# Patient Record
Sex: MALE | Race: WHITE | NOT HISPANIC OR LATINO | Employment: OTHER | URBAN - METROPOLITAN AREA
[De-identification: names, ages, dates, MRNs, and addresses within clinical notes are randomized per-mention and may not be internally consistent; named-entity substitution may affect disease eponyms.]

---

## 2017-01-04 ENCOUNTER — PATIENT OUTREACH (OUTPATIENT)
Dept: HEALTH INFORMATION MANAGEMENT | Facility: OTHER | Age: 82
End: 2017-01-04

## 2017-01-04 NOTE — PROGRESS NOTES
Outbound  call for Remote Home Tele-Monitoring COPD    Alerted: /78    HR 50      Contact today with  Jeffrey          02 use per patient: 3/L at night and 5/L with activity during the day continuously via NC         SOB: Patient was SOB when answering the phone.  He had walked to the kitchen and back without 02.  He states                   Only a few steps without 02 cause significant SOB         Cough: Denies         Chest pain: Denies         Headaches: Denies         Swelling: Mild puffiness of feet by end of day but unchanged from normal         Dizziness: Denies         Nausea: Denies         Weakness/Fatigue: Fatigues with moderate activity           Jeffrey  reports that he is compliant with medications and diet.         Interventions/Education:  --Follow up with PCP ever other week.  --Patient remains on Cardiology cancellation list regarding HR--Patient encouraged to call again.   --Education provided regarding use of 02 especially with any activities to minimize stress to heart/lungs/body caused by lack of 02.  --Reviewed COPD action Plan --Yellow zone      Patient verbalized understanding of education/teaching provided

## 2017-01-05 ENCOUNTER — PATIENT OUTREACH (OUTPATIENT)
Dept: HEALTH INFORMATION MANAGEMENT | Facility: OTHER | Age: 82
End: 2017-01-05

## 2017-01-06 NOTE — PROGRESS NOTES
Outbound call for [facility] transmit Error    Spoke to Shahram and he did transmit as usual.      --[employer] ticket placed to troubleshoot problem.

## 2017-01-11 ENCOUNTER — PATIENT OUTREACH (OUTPATIENT)
Dept: HEALTH INFORMATION MANAGEMENT | Facility: OTHER | Age: 82
End: 2017-01-11
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
## 2017-01-12 ENCOUNTER — PATIENT OUTREACH (OUTPATIENT)
Dept: HEALTH INFORMATION MANAGEMENT | Facility: OTHER | Age: 82
End: 2017-01-12

## 2017-01-12 NOTE — PROGRESS NOTES
REMOTE HOME TELE MONITORING FOR COPD  Alerted: /87 and HR 44  Cardiologist has been sent information regarding his intermittent low pulse.  Patient is pending on a cancellation list to be seen.  Reviewed biometrics and these numbers are trending within patients normal ranges.  Refer to last “CC” progress note on 1/11/17

## 2017-01-18 ENCOUNTER — PATIENT OUTREACH (OUTPATIENT)
Dept: HEALTH INFORMATION MANAGEMENT | Facility: OTHER | Age: 82
End: 2017-01-18

## 2017-01-18 NOTE — PROGRESS NOTES
Outbound  call for Remote Home Tele-Monitoring COPD with cardiac medical health issues    Alerted: Pulse 42       Contact today with  Jeffrey          02 use per patient: 3/L at night and 5/L with activity during the day continuously via NC         SOB: Patient reports only SOB with mild exertion         Cough: Denies         Chest pain: Denies         Headaches: Denies         Swelling: mild puffiness present in ankle/feet but remains unchanged         Dizziness: Denies         Nausea: Denies         Weakness/Fatigue: Patient reports fatigues quickly but this is unchanged                    Jeffrey reports that he is compliant with medications and diet.       Interventions/Education:  -- Appt with Dr. Whitehead on 2/10/16 -- will review the lower HR  --Education provided to patient regarding symptoms related to low HR for which he should seek immediate medical care.  --Education provided regarding review of medication/02 to be ensure he has sufficient supply to get through upcoming [place]    Patient verbalized understanding of education/teaching provided

## 2017-01-24 ENCOUNTER — PATIENT OUTREACH (OUTPATIENT)
Dept: HEALTH INFORMATION MANAGEMENT | Facility: OTHER | Age: 82
End: 2017-01-24

## 2017-01-24 NOTE — PROGRESS NOTES
Outbound  call for Remote Home Tele-Monitoring COPD    Alerted: Wt: 199.0 decreased 3.5 lbs in 24 hours                 /103    Unable to reach patient today -- LVM  Requested return call and retest of BP    Interventions/Education:  --Continue to monitor biometrics daily  --Cardiology appt on 2/10/17

## 2017-01-26 ENCOUNTER — PATIENT OUTREACH (OUTPATIENT)
Dept: HEALTH INFORMATION MANAGEMENT | Facility: OTHER | Age: 82
End: 2017-01-26

## 2017-01-27 ENCOUNTER — PATIENT OUTREACH (OUTPATIENT)
Dept: HEALTH INFORMATION MANAGEMENT | Facility: OTHER | Age: 82
End: 2017-01-27

## 2017-01-27 NOTE — PROGRESS NOTES
"Outbound  call for Remote Home Tele-Monitoring COPD    Alerted: O2 = 77%      Unable to reach Jeffrey -- LVM requested retest for contact \"CC\" if noting respiratory issues.  "

## 2017-01-27 NOTE — PROGRESS NOTES
Outbound  call for Remote Home Tele-Monitoring COPD       Biometrics today are within range (green).\      Unable to reach patient- LVM

## 2017-01-30 ENCOUNTER — PATIENT OUTREACH (OUTPATIENT)
Dept: HEALTH INFORMATION MANAGEMENT | Facility: OTHER | Age: 82
End: 2017-01-30

## 2017-01-30 NOTE — PROGRESS NOTES
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                  Patient reports Am /74 and HR 58        Contact today with  Jeffrey          02 use per patient: 3/L at night and 5/L with activity during the day continuously via NC.                     Patient reports that he frequently take off is 02 when moving about the house from room to room. He                     Has 02 set up in several areas of his home. He is aware that his 02 rapidly decreases when he is                     Off his 02.                     SOB: Denies Patient states this at his normal baseline         Cough: Denies         Chest pain: Denies         Headaches: Denies         Swelling: Has noted puffiness in his hands that seems to correlate with periods of increase wt gain.         Dizziness: Denies         Nausea: Denies         Weakness/Fatigue: Patient states at his normal baseline           Jeffrey  reports that he is compliant with medications and diet.          Jeffrey states he is having a good day today and his back pain level is around a 4/10.    Interventions/Education:  --Education provided on maximizing his energy  --Education provided on use of 02 with ambulating  --Cardiology appt on 2/10/17  --Requested patient to add to written logs that he keeps and takes with him to his MD appt. (when he remembers)               Weight and any edema noted, has weights have been very labile.      Patient verbalized understanding of education/teaching provided

## 2017-02-02 ENCOUNTER — PATIENT OUTREACH (OUTPATIENT)
Dept: HEALTH INFORMATION MANAGEMENT | Facility: OTHER | Age: 82
End: 2017-02-02

## 2017-02-02 NOTE — PROGRESS NOTES
"Outbound  call for Remote Home Tele-Monitoring for COPD    Alerted: Transmit Error   Retested                Wt remain up from 188 approximately 1 week to ago to 191.5 today                                             02 over past 3 days slightly decreased to 91 % from normal baseline of 95%    Contact today with  Jeffrey today.     Jeffrey states he is not feeling well.  Has picked up a \"cold\"  Did see PCP on 2/1/17.  CXR was ordered as well                         As an ultrasound of left leg related to a pain in calf he has noted over the last couple of days         02 use per patient:  3/L at night and 5/L with activity during the day continuously via NC         SOB: Slight increase of SOB         Cough: Productive cough with increase of amt and thickness of phlegm that is currently clear.  Runny nose                      That is also clear         Chest pain: Denies         Headaches: patient has had a dull headache over the past few days.         Swelling: He continues to note puffiness in his hands         Dizziness: Denies         Nausea: Denies         Fever:  Patient states he has a low grade fever 99's         Weakness/Fatigue: Increase of fatigue noted over the past few days.           Jeffrey reports that he is compliant with medications and diet.          Jeffrey reports interventions he has taken:   Saw PCP, Had CXR completed, Pending call back for LLE US to                Be scheduled, Increased his nebulizer to 3 X a day. Patient called PCP for CXR results    Interventions/Education:  --\"CC\" contacted Marisol Banerjee's nurse--reviewed recent biometrics changes. Marisol reports no acute process               Noted on CXR--Marisol will contact scheduling about ultrasound.  Marisol will discuss biometric changes                    with PCP for possible early symptoms of exacerbation of COPD with steroid/abx intervention.   --Above information relayed to Jeffrey--Jeffrey will contact scheduling as " "well for LLE ultrasound.  --Education provided for use of his rescue inhaler  --Education provided on s/sx of RED zone from COPD action plan, when to report to ED.  --Reviewed upcoming appt with Cardiology on 2/10/17- patient to discuss extremity edema.  --\"CC\" will send biometrics to Cardiology prior to this appt.     Patient verbalized understanding of education/teaching provided          "

## 2017-02-02 NOTE — PROGRESS NOTES
Received incoming call from Mr. Lizama. Mr. Lizama states that his RHM said transmission error this morning. Advised to retest. If still having difficulty he can call back or call [employer]. Verbalized understanding.    Mr. Lizama states he just recently went to doctor and was diagnosed with pneumonia. Advised to rest and get plenty of fluids. Verbalized understanding.

## 2017-02-03 ENCOUNTER — PATIENT OUTREACH (OUTPATIENT)
Dept: HEALTH INFORMATION MANAGEMENT | Facility: OTHER | Age: 82
End: 2017-02-03

## 2017-02-07 ENCOUNTER — PATIENT OUTREACH (OUTPATIENT)
Dept: HEALTH INFORMATION MANAGEMENT | Facility: OTHER | Age: 82
End: 2017-02-07

## 2017-02-07 NOTE — PROGRESS NOTES
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     Patient does report that he still has tenderness in his left calf--awaiting LE ultrasound ordered by PCP      Interventions/Education:  --Education provided on blood clots/PE's --Patient has a past history of PE.  Patient able to teach back        On symptoms and when to seek urgent medical care.  --Education provided on use of steroids/abx.  --Patient has \"CC\" number to all for concerns      Patient verbalized understanding of education/teaching provided          "

## 2017-02-07 NOTE — PROGRESS NOTES
"Outbound  call for Remote Home Tele-Monitoring COPD    Alerted: /81 and HR 42    Biometrics are within range patient normal range   Unable to reach patient today - LVM requested return call.    Interventions/Education:  --\"CC\"       Patient verbalized understanding of education/teaching provided          "

## 2017-02-08 ENCOUNTER — PATIENT OUTREACH (OUTPATIENT)
Dept: HEALTH INFORMATION MANAGEMENT | Facility: OTHER | Age: 82
End: 2017-02-08
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
## 2017-02-13 ENCOUNTER — PATIENT OUTREACH (OUTPATIENT)
Dept: HEALTH INFORMATION MANAGEMENT | Facility: OTHER | Age: 82
End: 2017-02-13
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
## 2017-02-16 ENCOUNTER — PATIENT OUTREACH (OUTPATIENT)
Dept: HEALTH INFORMATION MANAGEMENT | Facility: OTHER | Age: 82
End: 2017-02-16
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
## 2017-02-27 ENCOUNTER — PATIENT OUTREACH (OUTPATIENT)
Dept: HEALTH INFORMATION MANAGEMENT | Facility: OTHER | Age: 82
End: 2017-02-27
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
## 2017-03-03 ENCOUNTER — PATIENT OUTREACH (OUTPATIENT)
Dept: HEALTH INFORMATION MANAGEMENT | Facility: OTHER | Age: 82
End: 2017-03-03

## 2017-03-03 NOTE — PROGRESS NOTES
Outbound  call for Remote Home Tele-Monitoring COPD    Alerted: Transmit Error  Verbally patient states all his biometrics were within a good range with 02 at 94%                                         Patient reports that the 2 low 02 readings over the past couple of days were related                                         To him doing a chore without his 02 on and is 02 drops very quickly.    Contact today with  Jeffrey          02 use per patient:  3/L at night and 5/L with activity during the day continuously via NC         SOB: Patient reports to be at his normal baseline         Cough: Denies         Chest pain: Denies           Swelling: Remains stable no significant changes         Dizziness: Denies         Weakness/Fatigue: Patient reports to be as this normal level                  Jeffrey reports that he is compliant with medications and diet.     Interventions/Education:  --Education provided regarding maximizing his energy with use of 02 during activities and seeking ways to work around              The need to take his 02 off for activities.  Reviewed the potential risk and increase stress on heart/lungs with              Low 02 saturation and the increase recovery time once 02 is low.    Patient verbalized understanding of education/teaching provided

## 2017-03-08 ENCOUNTER — PATIENT OUTREACH (OUTPATIENT)
Dept: HEALTH INFORMATION MANAGEMENT | Facility: OTHER | Age: 82
End: 2017-03-08

## 2017-03-09 NOTE — PROGRESS NOTES
"Outbound  call for Remote Home Tele-Monitoring COPD  (with third degree AV block/SSS/pacemaker/HPN)    Alerted:   02 84% Said he was rushing to get to the tele-monitoring equipment as it had alarmed                              Retested 1 hours later and was at 98%    Contact today with  Jeffrey          02 use per patient:  3/L at night and 5/L with activity during the day continuously via NC         SOB: Patient reports his usual SOB with minimal activity but at his normal baseline         Cough: Patient reports that this has completely resolved         Chest pain: Denies         Headaches: Denies         Swelling: Patient states at his normal baseline with mild to moderate puffiness of feet/ankles by end of day         Dizziness: Denies         Weakness/Fatigue: Reports at his normal baseline          Jeffrey reports that he is having a bad day related to his back pain 8/10.  He does manage self manage with              Medications and follow up with his MD's                 Jeffrey reports that he is compliant with medications and diet.          Ananth reports that he had a follow up visit with his PCP and was told that his chest now sounded clear.                  Reviewed with Jeffrey the that \"CC\" will now focus mainly on his COPD=02 / HR readings. He can continue to              Use the scale as he does not have one, but that \"CC\" will not be receiving the information and that he will             Need to self reports issues to his PCP/Cardiologist.  He will start taking his BP with his own home cuff.    Interventions/Education:  --Education provided to Jeffrey regarding frequency of weight/BP self monitoring/Keeping an on going log for his            Physicians and self reporting issues to PCP/Cardiologist for interventions as needed  --Education provided regarding alarm with tele-monitoring equipment/not to rush to get to the equipment/equipment            Set to alarm and will alarm a total of 3 times 5 " minutes apart.  Also if he misses those times--reviewed manually            Turning equipment for monitoring and transmission of biometrics.  --BP/weight turned off on [employer]. Patient aware and in agreement with changes.                  Patient verbalized understanding of education/teaching provided

## 2017-03-10 ENCOUNTER — PATIENT OUTREACH (OUTPATIENT)
Dept: HEALTH INFORMATION MANAGEMENT | Facility: OTHER | Age: 82
End: 2017-03-10

## 2017-03-11 NOTE — PROGRESS NOTES
Outbound  call for Remote Home Tele-Monitoring COPD    Alerted: Transmit Error      Unable to contact patient today. LVM  Requested return call.

## 2017-03-13 ENCOUNTER — PATIENT OUTREACH (OUTPATIENT)
Dept: HEALTH INFORMATION MANAGEMENT | Facility: OTHER | Age: 82
End: 2017-03-13

## 2017-03-13 NOTE — PROGRESS NOTES
Outbound  call for Remote Home Tele-Monitoring COPD    Alerted: Empty Packet patient has not been able to transmit for 3 days                Patient reports that his 02 is at 97% today    Contact today with  Jeffrey          02 use per patient:  3/L at night and 5/L with activity during the day continuously via NC         SOB: Patient reports he is at his normal baseline         Cough: Denies         Chest pain: Denies         Headaches: Denies         Swelling: patient reports he is at his normal baseline         Dizziness: Denies         Weakness/Fatigue: Pt. Reports at normal baseline           Jeffrey reports that he is compliant with medications and diet.          Jeffrey reports that he awoke this AM with a very stuffy nose.  The nasal drainage is clear.  He otherwise                      Does not feel ill.    Interventions/Education:  --Education reviewed with patient on COPD action plan/yellow zone.  --Education provided regarding nasal drainage--color changes--patient advised to been seen asap by PCP  --[employer] ticket placed to troubleshoot transmission error.      Patient verbalized understanding of education/teaching provided

## 2017-03-17 ENCOUNTER — PATIENT OUTREACH (OUTPATIENT)
Dept: HEALTH INFORMATION MANAGEMENT | Facility: OTHER | Age: 82
End: 2017-03-17
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
## 2017-03-24 ENCOUNTER — PATIENT OUTREACH (OUTPATIENT)
Dept: HEALTH INFORMATION MANAGEMENT | Facility: OTHER | Age: 82
End: 2017-03-24

## 2017-03-24 NOTE — PROGRESS NOTES
Telephone contact with Mr. Lizama for RHM.    Mr. Lizama received new tablet. Diazcarissa got it up and running with him today. VS stable.    Mr. Lizama has no questions at this time. Educated Mr. Lizama this CCRN will be out of town next week, but a team member will be monitoring and calling him as needed. He may leave  on this writers  and someone will return call if needed. Verbalized understanding.    Plan: will f/u in 2 weeks.

## 2017-03-30 ENCOUNTER — PATIENT OUTREACH (OUTPATIENT)
Dept: HEALTH INFORMATION MANAGEMENT | Facility: OTHER | Age: 82
End: 2017-03-30

## 2017-03-30 NOTE — PROGRESS NOTES
Care Coordination:    RHM Alert    Pt states he had his O2 off because he dropped his coffee this AM and was bent over cleaning it up.    Pt states he has already had a cough x 1 week. He denies SOB. He states his symptoms are improving.    Plan:    Continue to monitor RHM

## 2017-04-03 ENCOUNTER — PATIENT OUTREACH (OUTPATIENT)
Dept: HEALTH INFORMATION MANAGEMENT | Facility: OTHER | Age: 82
End: 2017-04-03

## 2017-04-03 NOTE — PROGRESS NOTES
Received retest on RHM. O2 sat 58%  LVM to return call. If no return call will call tomorrow, 4/4/17.

## 2017-04-03 NOTE — PROGRESS NOTES
Telephone contact with Jeffrey for VS on RHM. O2 sat 81% on RHM this AM.    Jeffrey denies any new SOB and states his cough is much better. States he feels pretty well today.     Requested retest.    Jeffrey states he does have an appt in 2 weeks with PCP.

## 2017-04-04 ENCOUNTER — PATIENT OUTREACH (OUTPATIENT)
Dept: HEALTH INFORMATION MANAGEMENT | Facility: OTHER | Age: 82
End: 2017-04-04

## 2017-04-04 NOTE — PROGRESS NOTES
Spoke with Jeffrey to review the following:    Completed COPD biometrics within parameters x 30 days.    Jeffrey reports that he is compliant with medications and diet.     Jeffrey is able to identify yellow and red symptoms and the necessary interventions and provided Teach Back on causes; symptoms; home care interventions; when to seek medical care versus immediate medical care for COPD management.    Jeffrey is able to give examples interventions of maximizing his energy.    Graduation Plan: Transition to a lower level monitoring with self-reporting to Dr. Banerjee.    Jeffrey is in agreement with the transition to self-reporting initiation of the protocols &/or support regarding symptom management by contacting Dr. Banerjee.    Confirmed that Jeffrey has the Care Coordinator's  contact information:  [place] [phone] and Dr Banerjee contact number.    Provided Jeffrey with the [facility] Nurse Healthline number:  [phone].  An RN is available 24-hours day, 7 days a week to answer medical and health questions you might have about your symptoms, medications, allergies or other conditions.    ILEANA notified Randy to contact the patient to provide instructions on returning the equipment used for the COPD Amb In Home Remote Tele-monitoring program.

## 2017-04-10 ENCOUNTER — TELEPHONE (OUTPATIENT)
Dept: CARDIOLOGY | Facility: MEDICAL CENTER | Age: 82
End: 2017-04-10

## 2017-04-10 DIAGNOSIS — I10 ESSENTIAL HYPERTENSION, BENIGN: ICD-10-CM

## 2017-04-10 RX ORDER — TRIAMTERENE AND HYDROCHLOROTHIAZIDE 37.5; 25 MG/1; MG/1
1 CAPSULE ORAL EVERY MORNING
Qty: 30 CAP | Refills: 3 | Status: SHIPPED | OUTPATIENT
Start: 2017-04-10 | End: 2017-08-23 | Stop reason: SDUPTHER

## 2017-04-11 ENCOUNTER — PATIENT OUTREACH (OUTPATIENT)
Dept: HEALTH INFORMATION MANAGEMENT | Facility: OTHER | Age: 82
End: 2017-04-11

## 2017-04-11 NOTE — TELEPHONE ENCOUNTER
S/w SW about pts hypertension. Per SW: pt is to start Diazide, at standard dose, and get BMP in 1 week. Called pt and notified. He agrees to get lab work on Monday. Orders placed. Pt gets lab work done at [facility]. [facility] lab work to: [phone].      -----------------------------------------------------------------------------------------------------------------------------------------------------------------------------------------------------------------------------------------------------------------------------------------------  S/w pt about his blood pressures. He seems anxious but denies any significant events or added stress recently. He also denies any diet changes or added salt. Pt reports today his blood pressure has been lower than yesterday. BP today 150's/90's. Pt only takes lisinopril 10mg Q evenings for his blood pressure. Pt denies any headaches, CP or shortness of breath. He says hes just worried about his blood pressure. Advised pt to go to ER if his blood pressure.

## 2017-04-11 NOTE — PROGRESS NOTES
Telephone contact with Jeffrey regarding starting testing again on RHM. He states Randy has contacted him and is sending out a box, but his BP was high and Dr. Whitehead told him to start doing it again. Educated eJffrey that we will still complete his COPD monitoring and can start a new program of HTN with the [facility] team. Jeffrey states he would like to do that. Referral made to PAULINE Benitez Pharmacist.

## 2017-04-11 NOTE — TELEPHONE ENCOUNTER
"----- Message from Lala Keller sent at 4/10/2017  2:25 PM PDT -----  Regarding: b/p running as high as 200/110 for 3 days  Contact: [phone]  JARET/ellen    Pt calling to report for last 3 plus days he has been having b/p issues.  Pt took b/p at 2:20pm today, it was 153/95, heart rate 74; it's been as high as 200/110, last evening, running 180's-190's/ with heart rate of mid 70's overall.  Pt is very concerned & hopes to hear from you asap.   Pt feels \"a little bit beat up\".   Pls call pt at [phone].    "

## 2017-04-14 ENCOUNTER — TELEPHONE (OUTPATIENT)
Dept: HEALTH INFORMATION MANAGEMENT | Facility: OTHER | Age: 82
End: 2017-04-14

## 2017-04-14 DIAGNOSIS — I10 ESSENTIAL HYPERTENSION: ICD-10-CM

## 2017-04-14 NOTE — TELEPHONE ENCOUNTER
Dear Dr. Maloney     I would like to make you aware that your patient's HTN will no longer be monitored by the Care Coordination RN.  If you feel he would benefit from the pharmacist managed HTN service, please consider signing the attached order.  Otherwise there will not be any further BP readings transmitted through remote home monitoring.  I have included the patients most recent BP readings.  Feel free to contact me with any questions.         Thank you,   Brittny Michelle, PHARMD  [facility] Clinical Pharmacist  [address] [place] [id number]  [phone]

## 2017-04-19 ENCOUNTER — PATIENT OUTREACH (OUTPATIENT)
Dept: HEALTH INFORMATION MANAGEMENT | Facility: OTHER | Age: 82
End: 2017-04-19

## 2017-04-19 NOTE — PROGRESS NOTES
Outbound call to Jeffrey. Patient is not enrolled in pharmacist-managed HTN protocol. Blood pressures will no longer be monitored through RHM. Patient will send back his RHM equipment to Randy. Jeffrey will maintain a BP log using his own home monitor and will follow-up with cardiology for BP management.     CC RN (Link) to follow-up with Randy for equipment pick-up.      Brittny Michelle, PHARMD

## 2017-04-26 ENCOUNTER — PATIENT OUTREACH (OUTPATIENT)
Dept: HEALTH INFORMATION MANAGEMENT | Facility: OTHER | Age: 82
End: 2017-04-26

## 2017-04-28 ENCOUNTER — TELEPHONE (OUTPATIENT)
Dept: CARDIOLOGY | Facility: MEDICAL CENTER | Age: 82
End: 2017-04-28

## 2017-04-28 DIAGNOSIS — I10 ESSENTIAL HYPERTENSION, BENIGN: ICD-10-CM

## 2017-04-28 LAB — INR PPP: 3.6 (ref 2–3.5)

## 2017-04-29 NOTE — TELEPHONE ENCOUNTER
Pt started Diazide 2 weeks ago and was told by SW to get a BMP in 1 week after starting.     To SW please see BMP results in media- sodium is low

## 2017-05-01 ENCOUNTER — ANTICOAGULATION MONITORING (OUTPATIENT)
Dept: VASCULAR LAB | Facility: MEDICAL CENTER | Age: 82
End: 2017-05-01

## 2017-05-01 DIAGNOSIS — Z79.01 ANTICOAGULANT LONG-TERM USE: ICD-10-CM

## 2017-05-01 DIAGNOSIS — Z86.711 HISTORY OF PULMONARY EMBOLISM: ICD-10-CM

## 2017-05-01 NOTE — PROGRESS NOTES
Anticoagulation Summary as of 5/1/2017     INR goal 2.0-3.0   Selected INR 3.6! (4/28/2017)   Maintenance plan 2.5 mg (5 mg x 0.5) on Mon, Wed; 5 mg (5 mg x 1) all other days   Weekly total 30 mg   Plan last modified Miguel Mojica, PHARMD (3/14/2016)   Next INR check 5/15/2017   Target end date Indefinite    Indications   Anticoagulant long-term use [Z79.01]  Chronic anticoagulation (Resolved) [Z79.01]  History of pulmonary embolism [Z86.711]         Anticoagulation Episode Summary     INR check location Outside Lab    Preferred lab     Send INR reminders to     Comments DUKE 1       Anticoagulation Care Providers     Provider Role Specialty Phone number    Ruthann Sierra M.D. Referring Family Medicine [phone]    Miguel Mojica Responsible          Anticoagulation Patient Findings    Spoke with pt on the phone. Pt is supratherapeutic today. Denies any changes in medications or diet. Patient denies any s/sx of bleeding or clotting. Will HOLD tonight's dose then continue dosing as outlined in calendar. Will follow-up with pt in 2 weeks.    Elise BANUELOS

## 2017-05-03 ENCOUNTER — PATIENT OUTREACH (OUTPATIENT)
Dept: HEALTH INFORMATION MANAGEMENT | Facility: OTHER | Age: 82
End: 2017-05-03

## 2017-05-03 NOTE — PROGRESS NOTES
Telephone contact with Jeffrey for rural care coordination.    Jeffrey states he is doing well. No difficulties at this time.    Jeffrey states [employer] equipment went out yesterday. He will start monitoring BP and Pulse ox and logging to show PCP.    No questions or concerns at this time. Plan to follow up with Jeffrey in approx two weeks; in the interim, Jeffrey is able to call me with questions or concerns.

## 2017-05-04 DIAGNOSIS — E87.1 HYPONATREMIA: ICD-10-CM

## 2017-05-04 DIAGNOSIS — I10 ESSENTIAL HYPERTENSION, BENIGN: ICD-10-CM

## 2017-05-04 NOTE — TELEPHONE ENCOUNTER
"Per SW, \"OK to have repeat BMP\" order placed and pt notified. Pt requesting lab slip to be faxed to [facility] in [place], [place]. #[phone]. Pt to get lab done tomorrow.   "

## 2017-05-15 ENCOUNTER — TELEPHONE (OUTPATIENT)
Dept: CARDIOLOGY | Facility: MEDICAL CENTER | Age: 82
End: 2017-05-15

## 2017-05-15 DIAGNOSIS — I10 ESSENTIAL HYPERTENSION, BENIGN: ICD-10-CM

## 2017-05-15 NOTE — TELEPHONE ENCOUNTER
Received surgery clearance from [facility] requesting pt hold coumadin for 5 days prior to procedure prior to trial spinal cord stimulator implant with Dr. Sarabia. Procedure to be scheduled once cleared.     To JARET. Clearance placed in SW's folder

## 2017-05-15 NOTE — Clinical Note
PROCEDURE/SURGERY CLEARANCE FORM      Encounter Date: 5/15/2017    Patient: Jeffrey Lizama  YOB: 1932    CARDIOLOGIST:  Gene Maloney MD   REFERRING DOCTOR:  Dr. Sarabia    PATIENT HAS PPM. Yes      The above patient can hold warfarin in regards to his atrial fibrillation but will have to check with whoever is managing his DVT and 9 pulmonary emboli about whether bridging is required. He also has advanced lung disease and need a pulmonary clearance of some sort.     Comments: Patient states he has an appointment with his PCP, Dr. Banerjee, next week who monitors his lung issues.                                                      MD Signature   Gene Maloney MD

## 2017-05-16 NOTE — TELEPHONE ENCOUNTER
He can hold it for his atrial fibrillation but they will have to check with whoever is managing his DVT and 9 pulmonary emboli  He also has advanced lung disease and need a pulmonary clearance

## 2017-05-17 ENCOUNTER — TELEPHONE (OUTPATIENT)
Dept: VASCULAR LAB | Facility: MEDICAL CENTER | Age: 82
End: 2017-05-17

## 2017-05-17 NOTE — TELEPHONE ENCOUNTER
[facility]    Left message with pt requesting a call back to discuss anticoagulation for upcoming procedure.    Huang Sotelo, PHARMD

## 2017-05-18 ENCOUNTER — TELEPHONE (OUTPATIENT)
Dept: VASCULAR LAB | Facility: MEDICAL CENTER | Age: 82
End: 2017-05-18

## 2017-05-18 RX ORDER — DILTIAZEM HYDROCHLORIDE 120 MG/1
120 CAPSULE, COATED, EXTENDED RELEASE ORAL DAILY
Qty: 30 CAP | Refills: 5 | Status: SHIPPED | OUTPATIENT
Start: 2017-05-18 | End: 2017-08-23 | Stop reason: SDUPTHER

## 2017-05-18 NOTE — TELEPHONE ENCOUNTER
Called pt to discuss BP and clearance.     Regarding the surgery clearance: letter composed per JARET to the [facility] clinic. Discussed SW's recommendations with the pt. He understands these clearances are recommended by JARET and has already made an appt with his PCP Dr. Banerjee next week.  Letter faxed to [facility] at [phone].     Regarding blood pressure pt reports his SBP remains in the 140's-150's the last 1-2 weeks despite the addition of Diazide. He is concerned about this. He is still urinating well with the diuretic and says he eats a low salt diet and as not had any additional stress lately.     To FK ADD.     Pt is concerned about b/p  Received: Today       Raisa Han R.N.                     JARET/Thomas     Pt is concerned about his b/p and would please like a call back at [phone].

## 2017-05-18 NOTE — TELEPHONE ENCOUNTER
[facility]    Spoke with pt concerning upcoming spinal cord stimulator.  He has been cleared by cardiology but not for his recurrent PE's.  Pt states he has been hospitalized at least 8 times due to PE.  Strongly suggest pt should use enoxaparin for bridging.  He states that his PCP is providing enoxaparin and instructions for bridging.  Instructed pt to follow PCP suggestions and to contact clinic if he needs any additional help.      Also reminded pt he is due for his next INR.    Huang Sotelo, PHARMD

## 2017-05-18 NOTE — TELEPHONE ENCOUNTER
FK recommends starting Diltiaziem 120mg Qday as long as DBP >60. Called pt and discussed. Pt states his DBP stays consistently at about 85. Pt agrees to try new BP drug and requests rx to [facility] in [place]. [place] pharmacy. Pt reminded to call office if SBP continues to be above 140.  Pt agrees.

## 2017-05-23 ENCOUNTER — PATIENT OUTREACH (OUTPATIENT)
Dept: HEALTH INFORMATION MANAGEMENT | Facility: OTHER | Age: 82
End: 2017-05-23

## 2017-05-23 NOTE — PROGRESS NOTES
Telephone contact with Jeffrey for rural care coordination.    Jeffrey states he is doing well. He has been in contact with [facility] regarding blood pressures.    Jeffrey has no other questions or concerns. Explained will be signing off case now as Jeffrey has meet goals for rural care coordination program. Verbalized understanding. Educated to f/u with PCP for medical care. Verbalized understanding.

## 2017-06-12 ENCOUNTER — ANTICOAGULATION MONITORING (OUTPATIENT)
Dept: VASCULAR LAB | Facility: MEDICAL CENTER | Age: 82
End: 2017-06-12

## 2017-06-12 DIAGNOSIS — Z86.711 HISTORY OF PULMONARY EMBOLISM: ICD-10-CM

## 2017-06-12 DIAGNOSIS — Z79.01 ANTICOAGULANT LONG-TERM USE: ICD-10-CM

## 2017-06-12 NOTE — PROGRESS NOTES
Anticoagulation clinic    Reminder voice message for patient regarding getting INR done ASAP for anticoagulation clinic.       Miguel Mojica, PHARMD

## 2017-06-29 ENCOUNTER — ANTICOAGULATION MONITORING (OUTPATIENT)
Dept: VASCULAR LAB | Facility: MEDICAL CENTER | Age: 82
End: 2017-06-29

## 2017-06-29 DIAGNOSIS — Z79.01 ANTICOAGULANT LONG-TERM USE: ICD-10-CM

## 2017-06-29 DIAGNOSIS — Z86.711 HISTORY OF PULMONARY EMBOLISM: ICD-10-CM

## 2017-07-27 ENCOUNTER — ANTICOAGULATION MONITORING (OUTPATIENT)
Dept: VASCULAR LAB | Facility: MEDICAL CENTER | Age: 82
End: 2017-07-27

## 2017-07-27 DIAGNOSIS — Z86.711 HISTORY OF PULMONARY EMBOLISM: ICD-10-CM

## 2017-07-27 DIAGNOSIS — Z79.01 ANTICOAGULANT LONG-TERM USE: ICD-10-CM

## 2017-08-08 ENCOUNTER — ANTICOAGULATION MONITORING (OUTPATIENT)
Dept: VASCULAR LAB | Facility: MEDICAL CENTER | Age: 82
End: 2017-08-08

## 2017-08-08 DIAGNOSIS — Z86.711 HISTORY OF PULMONARY EMBOLISM: ICD-10-CM

## 2017-08-08 DIAGNOSIS — Z79.01 ANTICOAGULANT LONG-TERM USE: ICD-10-CM

## 2017-08-15 ENCOUNTER — ANTICOAGULATION MONITORING (OUTPATIENT)
Dept: VASCULAR LAB | Facility: MEDICAL CENTER | Age: 82
End: 2017-08-15

## 2017-08-15 DIAGNOSIS — Z86.711 HISTORY OF PULMONARY EMBOLISM: ICD-10-CM

## 2017-08-15 DIAGNOSIS — Z79.01 ANTICOAGULANT LONG-TERM USE: ICD-10-CM

## 2017-08-23 ENCOUNTER — OFFICE VISIT (OUTPATIENT)
Dept: CARDIOLOGY | Facility: MEDICAL CENTER | Age: 82
End: 2017-08-23
Payer: MEDICARE

## 2017-08-23 ENCOUNTER — NON-PROVIDER VISIT (OUTPATIENT)
Dept: CARDIOLOGY | Facility: MEDICAL CENTER | Age: 82
End: 2017-08-23
Payer: MEDICARE

## 2017-08-23 VITALS
HEART RATE: 78 BPM | BODY MASS INDEX: 32.99 KG/M2 | OXYGEN SATURATION: 98 % | DIASTOLIC BLOOD PRESSURE: 70 MMHG | SYSTOLIC BLOOD PRESSURE: 122 MMHG | WEIGHT: 198 LBS | HEIGHT: 65 IN

## 2017-08-23 DIAGNOSIS — I10 ESSENTIAL HYPERTENSION, BENIGN: ICD-10-CM

## 2017-08-23 DIAGNOSIS — I48.0 PAF (PAROXYSMAL ATRIAL FIBRILLATION) (HCC): ICD-10-CM

## 2017-08-23 DIAGNOSIS — Z79.01 ANTICOAGULANT LONG-TERM USE: ICD-10-CM

## 2017-08-23 DIAGNOSIS — Z86.711 HISTORY OF PULMONARY EMBOLISM: ICD-10-CM

## 2017-08-23 DIAGNOSIS — Z95.0 CARDIAC PACEMAKER: ICD-10-CM

## 2017-08-23 PROCEDURE — 99214 OFFICE O/P EST MOD 30 MIN: CPT | Performed by: NURSE PRACTITIONER

## 2017-08-23 PROCEDURE — 93280 PM DEVICE PROGR EVAL DUAL: CPT | Performed by: INTERNAL MEDICINE

## 2017-08-23 RX ORDER — TRIAMTERENE AND HYDROCHLOROTHIAZIDE 37.5; 25 MG/1; MG/1
1 CAPSULE ORAL EVERY MORNING
Qty: 90 CAP | Refills: 3 | Status: ON HOLD | OUTPATIENT
Start: 2017-08-23 | End: 2019-02-08

## 2017-08-23 RX ORDER — LISINOPRIL 30 MG/1
30 TABLET ORAL DAILY
Qty: 90 TAB | Refills: 3 | Status: ON HOLD | OUTPATIENT
Start: 2017-08-23 | End: 2018-06-20

## 2017-08-23 RX ORDER — DILTIAZEM HYDROCHLORIDE 120 MG/1
120 CAPSULE, COATED, EXTENDED RELEASE ORAL DAILY
Qty: 90 CAP | Refills: 3 | Status: ON HOLD | OUTPATIENT
Start: 2017-08-23 | End: 2019-02-08

## 2017-08-23 RX ORDER — SOTALOL HYDROCHLORIDE 80 MG/1
40 TABLET ORAL 2 TIMES DAILY
Qty: 90 TAB | Refills: 3 | Status: SHIPPED | OUTPATIENT
Start: 2017-08-23 | End: 2018-09-12 | Stop reason: SDUPTHER

## 2017-08-23 ASSESSMENT — ENCOUNTER SYMPTOMS
ORTHOPNEA: 0
COUGH: 0
CLAUDICATION: 0
BACK PAIN: 1
PALPITATIONS: 0
MYALGIAS: 0
SHORTNESS OF BREATH: 1
WEAKNESS: 0
ABDOMINAL PAIN: 0
DIZZINESS: 0
PND: 0

## 2017-08-23 NOTE — Clinical Note
[facility]   [address] [place] [address]  ROL[place]O Chiu [address]  Phone: [phone]  Fax: [phone]              Jeffrey Lizama  12/23/1932    Encounter Date: 8/23/2017    DOMINIQUE Campbell          PROGRESS NOTE:  Subjective:   Jeffrey Lizama is a 84 y.o. male who presents today to follow up on paroxysmal atrial fibrillation. He has a history of bradycardia, pacemaker, COPD and history of multiple pulmonary emboli.    He states he has been in his usual state of health. His blood pressure has been going up and down per his report. He says it is as low as 85/55 and occasionally as high as 160-170 systolic. The higher blood pressures tend to be in the evening. He is checking his blood pressure with an arm cuff. He is taking most of his blood pressure medication in the morning.    He continues to have shortness of breath from COPD which is due to occupational exposure. He was never a smoker. He is now using his oxygen continuously. He states his breathing is stable.    He denies any chest tightness, heaviness or pressure. No palpitations. No ankle edema.    Past Medical History   Diagnosis Date   • Pituitary adenoma (CMS-HCC) 1995     hypophysectomy   • 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CMS-HCC) 1995   • Phlebitis      chronic / recurrent   • Hypertension    • ASTHMA    • S/P insertion of IVC (inferior vena caval) filter    • DJD (degenerative joint disease)    • Diverticulitis    • Asbestos exposure    • COPD (chronic obstructive pulmonary disease) (CMS-HCC)    • Pituitary adenoma (CMS-HCC) 1995     hypophysectomy   • Bradycardia    • thyroidectomy 2005     benign   • S/P parathyroidectomy    • Cardiac pacemaker 04 2010   • Knee arthroplasty 2002     right   • S/P parathyroidectomy 2005   • Unspecified urinary incontinence    • Arrhythmia    • Heart burn    • Hiatus hernia syndrome    • Urinary bladder disorder    • Bronchitis    • EMPHYSEMA    • Breath shortness    •  "Arthritis      hand, elbow   • BPH (benign prostatic hypertrophy) 4/1/2010   • Obstructive hypertrophic cardiomyopathy (CMS-HCC) 8/4/2011   • Paroxysmal atrial fibrillation (CMS-HCC) 8/29/2011   • Benign neoplasm of pituitary gland and craniopharyngeal duct (pouch) (CMS-HCC) 4/1/2010   • SSS (sick sinus syndrome) (CMS-HCC) 8/29/2011   • Third degree AV block (CMS-HCC) 8/29/2011   • Unspecified hemorrhagic conditions    • Indigestion    • Unspecified disorder of thyroid    • Anesthesia      \"heart stopped\"   • PE (pulmonary embolism)      IVC filter, states PE clots 8 times   • Sleep apnea      no cpap machine   • PAF (paroxysmal atrial fibrillation) (CMS-HCC)    • Back pain    • Cataract    • Glaucoma    • Heart murmur    • Rheumatic fever    • Pneumonia      Past Surgical History   Procedure Laterality Date   • Other orthopedic surgery       knee surgery left knee x 2   • Cholecystectomy     • Pr total knee arthroplasty       left   • Other       pituitary tumor removed   • Cervical disk and fusion anterior     • Pr revise ulnar nerve at wrist     • Pr total knee arthroplasty       right   • Cataract extraction with iol       bilateral    • Thyroidectomy     • Pacemaker insertion     • Carpal tunnel endoscopic  9/30/2011     Performed by RONALD ENNIS at SURGERY SAME DAY [facility] ORS   • Knee revision total  6/20/2013     Performed by Ortega Vega M.D. at SURGERY [facility] ORS   • Cholecystectomy     • Cervical fusion posterior       Family History   Problem Relation Age of Onset   • Cancer Neg Hx    • Heart Disease Neg Hx    • Arthritis Mother    • Arthritis Father      History   Smoking status   • Never Smoker    Smokeless tobacco   • Never Used     Allergies   Allergen Reactions   • Pcn [Penicillins] Rash     Tolerates cephalosporins       Outpatient Encounter Prescriptions as of 8/23/2017   Medication Sig Dispense Refill   • sotalol (BETAPACE) 80 MG Tab Take 0.5 Tabs by mouth 2 Times a Day. 90 Tab " 3   • diltiazem CD (CARDIZEM CD) 120 MG CAPSULE SR 24 HR Take 1 Cap by mouth every day. 90 Cap 3   • triamterene/hctz (DYAZIDE) 37.5-25 MG Cap Take 1 Cap by mouth every morning. 90 Cap 3   • lisinopril (PRINIVIL, ZESTRIL) 30 MG tablet Take 1 Tab by mouth every day. 90 Tab 3   • lisinopril (PRINIVIL) 10 MG Tab Take 3 Tabs by mouth every day. 90 Tab 11   • levothyroxine (SYNTHROID) 100 MCG Tab TAKE ONE TABLET ORALLY ONCE A DAY 90 Tab 3   • hydrocortisone (CORTEF) 10 MG Tab 20 mg in am 10 mg in pm 270 Tab 3   • dutasteride (AVODART) 0.5 MG capsule      • tamsulosin (FLOMAX) 0.4 MG capsule 0.4 mg every day.     • Mometasone Furo-Formoterol Fum (DULERA) 200-5 MCG/ACT Aerosol Inhale 2 Puffs by mouth 2 Times a Day. Use spacer. Rinse mouth after use. 1 Inhaler 11   • warfarin (COUMADIN) 5 MG Tab Take 5 mg by mouth every day. No variation.     • cyclobenzaprine (FLEXERIL) 10 MG Tab Take 10 mg by mouth at bedtime as needed for Moderate Pain.     • gabapentin (NEURONTIN) 300 MG Cap Take 300 mg by mouth every bedtime.     • hydrocodone-acetaminophen (NORCO) 5-325 MG Tab per tablet Take 1 Tab by mouth every 8 hours as needed. Indications: Moderate to Moderately Severe Pain     • montelukast (SINGULAIR) 10 MG Tab Take 10 mg by mouth every day.     • testosterone cypionate (DEPO-TESTOSTERONE) 200 MG/ML Solution injection 300 mg by Intramuscular route every 21 days.     • albuterol (PROVENTIL) 2.5mg/3ml NEBU 2.5 mg by Nebulization route every four hours as needed for Shortness of Breath.     • albuterol (VENTOLIN OR PROVENTIL) 108 (90 BASE) MCG/ACT AERS Inhale 2 Puffs by mouth every 6 hours as needed for Shortness of Breath.     • tiotropium (SPIRIVA) 18 MCG CAPS Inhale 18 mcg by mouth every day.     • [DISCONTINUED] diltiazem CD (CARDIZEM CD) 120 MG CAPSULE SR 24 HR Take 1 Cap by mouth every day. 30 Cap 5   • [DISCONTINUED] triamterene/hctz (DYAZIDE) 37.5-25 MG Cap Take 1 Cap by mouth every morning. 30 Cap 3   • [DISCONTINUED]  "imiquimod (ALDARA) 5 % cream   1   • [DISCONTINUED] predniSONE (DELTASONE) 10 MG Tab      • [DISCONTINUED] sotalol (BETAPACE) 80 MG Tab Take 0.5 Tabs by mouth 2 Times a Day. 90 Tab 3     Facility-Administered Encounter Medications as of 8/23/2017   Medication Dose Route Frequency Provider Last Rate Last Dose   • testosterone cypionate (DEPO-TESTOSTERONE) injection 300 mg  300 mg Intramuscular Q21 DAYS Ruthann Sierra M.D.         Review of Systems   Constitutional: Negative for malaise/fatigue.   Respiratory: Positive for shortness of breath (walking on flat surface.). Negative for cough.    Cardiovascular: Negative for chest pain, palpitations, orthopnea, claudication, leg swelling and PND.   Gastrointestinal: Negative for abdominal pain.   Musculoskeletal: Positive for back pain (chronic) and joint pain (left ankle sprain 2 mo ago). Negative for myalgias.   Neurological: Negative for dizziness and weakness.        Objective:   /70 mmHg  Pulse 78  Ht 1.651 m (5' 5\")  Wt 89.812 kg (198 lb)  BMI 32.95 kg/m2  SpO2 98%    Physical Exam   Constitutional: He is oriented to person, place, and time. He appears well-developed and well-nourished.   Patient with O2 in place. No acute distress.   HENT:   Head: Normocephalic.   Eyes: Conjunctivae are normal.   Neck: No JVD present. No thyromegaly present.   Cardiovascular: Normal rate and normal heart sounds.  An irregularly irregular rhythm present.   Pulmonary/Chest: Effort normal. He has decreased breath sounds (throughout.). He has no wheezes. He has no rales.   Pacemaker left upper chest without redness or fluctuance.   Abdominal: Soft. Bowel sounds are normal. He exhibits no distension. There is no tenderness.   Musculoskeletal: He exhibits no edema.   Neurological: He is alert and oriented to person, place, and time.   Skin: Skin is warm and dry.   Psychiatric: He has a normal mood and affect.     April 28, 2017: Basic metabolic panel was normal with the " exception of sodium 131 and chloride 95.    Assessment:     1. PAF (paroxysmal atrial fibrillation) (CMS-HCC)  sotalol (BETAPACE) 80 MG Tab   2. Anticoagulant long-term use     3. Essential hypertension, benign  diltiazem CD (CARDIZEM CD) 120 MG CAPSULE SR 24 HR    triamterene/hctz (DYAZIDE) 37.5-25 MG Cap    lisinopril (PRINIVIL, ZESTRIL) 30 MG tablet   4. History of pulmonary embolism         Medical Decision Making:  Today's Assessment / Status / Plan:     Paroxysmal atrial fibrillation: He is in an irregular rhythm today. His rate is well controlled. He denies any palpitations. We'll have him continue on the sotalol.    Anticoagulation: He is on warfarin and is followed by the anticoagulation clinic. No excessive bruising or active bleeding.    Hypertension: His blood pressure is excellent in the office today. It was checked on both arms and it is equal. We will have him take his diltiazem and Dyazide in the morning and take the lisinopril 30 mg in the evening. He will continue to monitor his blood pressure.    History of multiple pulmonary emboli: He will remain on anticoagulation. No change in his respiratory effort.    He is stable enough to follow up in 6 months with Dr Maloney. He will follow-up sooner if problems.    Collaborating Provider: Dr. Thomas Bermudez.        No Recipients

## 2017-08-23 NOTE — MR AVS SNAPSHOT
"        Jeffrey Livingston Dl   2017 3:40 PM   Office Visit   MRN: [id number]    Department:  [facility]   Dept Phone:  [phone]    Description:  Male : 1932   Provider:  DOMINIQUE Campbell           Reason for Visit     Follow-Up           Allergies as of 2017     Allergen Noted Reactions    Pcn [Penicillins] 2010   Rash    Tolerates cephalosporins        You were diagnosed with     PAF (paroxysmal atrial fibrillation) (CMS-[facility])   [998899]       Anticoagulant long-term use   [816631]       Essential hypertension, benign   [401.1.ICD-9-CM]       History of pulmonary embolism   [004714]         Vital Signs     Blood Pressure Pulse Height Weight Body Mass Index Oxygen Saturation    122/70 mmHg 78 1.651 m (5' 5\") 89.812 kg (198 lb) 32.95 kg/m2 98%    Smoking Status                   Never Smoker            Basic Information     Date Of Birth Sex Race Ethnicity Preferred Language    1932 Male White Non- English      Problem List              ICD-10-CM Priority Class Noted - Resolved    Essential hypertension, benign I10   2010 - Present    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   Unknown - Present    Cardiac pacemaker Z95.0   2010 - Present    COPD (chronic obstructive pulmonary disease) (CMS-HCC) (Chronic) J44.9   2/3/2012 - Present    EVANGELINA (obstructive sleep apnea) (Chronic) G47.33   2/3/2012 - Present    History of pulmonary embolism (Chronic) Z86.711   2/3/2012 - Present    Chronic back pain (Chronic) M54.9, G89.29   2/3/2012 - Present    Anticoagulant long-term use (Chronic) Z79.01   2012 - Present    BPH (benign prostatic hypertrophy) (Chronic) N40.0   2010 - Present    Obstructive hypertrophic cardiomyopathy (CMS-HCC) (Chronic) I42.1   2011 - Present    Paroxysmal atrial fibrillation (CMS-HCC) (Chronic) I48.0   2011 - Present    SSS (sick sinus syndrome) (CMS-HCC) (Chronic) I49.5   2011 - Present    Third degree AV block (CMS-HCC) " (Chronic) I44.2   8/29/2011 - Present    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 T84.89XA   6/20/2013 - Present    Peripheral neuropathy, idiopathic G60.9   5/26/2015 - Present    SIRS (systemic inflammatory response syndrome) (CMS-HCC) R65.10   12/22/2015 - Present    Ventricular ectopy I49.3   6/16/2016 - Present    SOB (shortness of breath) R06.02   6/16/2016 - Present    Frequent PVCs I49.3   7/26/2016 - Present    Chronic respiratory failure (CMS-HCC) J96.10   7/26/2016 - Present    Long term current use of antiarrhythmic drug Z79.899   7/26/2016 - Present    Pulmonary embolism (CMS-HCC) I26.99   8/18/2016 - Present    Potential for deficient knowledge of chronic obstructive pulmonary disease Z91.89   8/2/2016 - Present      Health Maintenance        Date Due Completion Dates    IMM ZOSTER VACCINE 12/23/1992 ---    IMM PNEUMOCOCCAL 65+ (ADULT) LOW/MEDIUM RISK SERIES (2 of 2 - PPSV23) 12/23/2016 12/23/2015    IMM INFLUENZA (1) 9/1/2017 10/30/2015, 8/26/2013, 10/28/2012, 7/26/2011    COLONOSCOPY 1/8/2022 1/8/2012 (N/S)    Override on 1/8/2012: (N/S)    IMM DTaP/Tdap/Td Vaccine (2 - Td) 4/17/2023 4/17/2013            Current Immunizations     13-VALENT PCV PREVNAR 12/23/2015 12:34 PM    Influenza TIV (IM) 10/30/2015, 8/26/2013, 10/28/2012, 7/26/2011    Tdap Vaccine 4/17/2013      Below and/or attached are the medications your provider expects you to take. Review all of your home medications and newly ordered medications with your provider and/or pharmacist. Follow medication instructions as directed by your provider and/or pharmacist. Please keep your medication list with you and share with your provider. Update the information when medications are discontinued, doses are changed, or new medications (including over-the-counter products) are added; and carry medication information at all times in the event of emergency situations     Allergies:  PCN - Rash               Medications  Valid as of: August  23, 2017 -  4:29 PM    Generic Name Brand Name Tablet Size Instructions for use    Albuterol Sulfate (Nebu Soln) PROVENTIL 2.5mg/3ml 2.5 mg by Nebulization route every four hours as needed for Shortness of Breath.        Albuterol Sulfate (Aero Soln) albuterol 108 (90 Base) MCG/ACT Inhale 2 Puffs by mouth every 6 hours as needed for Shortness of Breath.        Cyclobenzaprine HCl (Tab) FLEXERIL 10 MG Take 10 mg by mouth at bedtime as needed for Moderate Pain.        DilTIAZem HCl Coated Beads (CAPSULE SR 24 HR) CARDIZEM  MG Take 1 Cap by mouth every day.        Dutasteride (Cap) AVODART 0.5 MG         Gabapentin (Cap) NEURONTIN 300 MG Take 300 mg by mouth every bedtime.        Hydrocodone-Acetaminophen (Tab) NORCO 5-325 MG Take 1 Tab by mouth every 8 hours as needed. Indications: Moderate to Moderately Severe Pain        Hydrocortisone (Tab) CORTEF 10 MG 20 mg in am 10 mg in pm        Levothyroxine Sodium (Tab) SYNTHROID 100 MCG TAKE ONE TABLET ORALLY ONCE A DAY        Lisinopril (Tab) PRINIVIL 10 MG Take 3 Tabs by mouth every day.        Lisinopril (Tab) PRINIVIL, ZESTRIL 30 MG Take 1 Tab by mouth every day.        Mometasone Furo-Formoterol Fum (Aerosol) Mometasone Furo-Formoterol Fum 200-5 MCG/ACT Inhale 2 Puffs by mouth 2 Times a Day. Use spacer. Rinse mouth after use.        Montelukast Sodium (Tab) SINGULAIR 10 MG Take 10 mg by mouth every day.        Sotalol HCl (Tab) BETAPACE 80 MG Take 0.5 Tabs by mouth 2 Times a Day.        Tamsulosin HCl (Cap) FLOMAX 0.4 MG 0.4 mg every day.        Testosterone Cypionate (Solution) DEPO-TESTOSTERONE 200 MG/ mg by Intramuscular route every 21 days.        Tiotropium Bromide Monohydrate (Cap) SPIRIVA 18 MCG Inhale 18 mcg by mouth every day.        Triamterene-HCTZ (Cap) MAXZIDE-25/DYAZIDE 37.5-25 MG Take 1 Cap by mouth every morning.        Warfarin Sodium (Tab) COUMADIN 5 MG Take 5 mg by mouth every day. No variation.        .                 Medicines  prescribed today were sent to:     [place] [place] [facility] [address]    [address] [address]    Phone: [phone] Fax: [phone]    Open 24 Hours?: No    [facility]    [address] [place] [address]    Phone: [phone] Fax: [phone]      Medication refill instructions:       If your prescription bottle indicates you have medication refills left, it is not necessary to call your provider’s office. Please contact your pharmacy and they will refill your medication.    If your prescription bottle indicates you do not have any refills left, you may request refills at any time through one of the following ways: The online Social Tables system (except Urgent Care), by calling your provider’s office, or by asking your pharmacy to contact your provider’s office with a refill request. Medication refills are processed only during regular business hours and may not be available until the next business day. Your provider may request additional information or to have a follow-up visit with you prior to refilling your medication.   *Please Note: Medication refills are assigned a new Rx number when refilled electronically. Your pharmacy may indicate that no refills were authorized even though a new prescription for the same medication is available at the pharmacy. Please request the medicine by name with the pharmacy before contacting your provider for a refill.           Social Tables Access Code: Activation code not generated  Current Social Tables Status: Active

## 2017-08-23 NOTE — PROGRESS NOTES
Subjective:   Jeffrey Lizama is a 84 y.o. male who presents today to follow up on paroxysmal atrial fibrillation. He has a history of bradycardia, pacemaker, COPD and history of multiple pulmonary emboli.    He states he has been in his usual state of health. His blood pressure has been going up and down per his report. He says it is as low as 85/55 and occasionally as high as 160-170 systolic. The higher blood pressures tend to be in the evening. He is checking his blood pressure with an arm cuff. He is taking most of his blood pressure medication in the morning.    He continues to have shortness of breath from COPD which is due to occupational exposure. He was never a smoker. He is now using his oxygen continuously. He states his breathing is stable.    He denies any chest tightness, heaviness or pressure. No palpitations. No ankle edema.    Past Medical History   Diagnosis Date   • Pituitary adenoma (CMS-HCC) 1995     hypophysectomy   • 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CMS-HCC) 1995   • Phlebitis      chronic / recurrent   • Hypertension    • ASTHMA    • S/P insertion of IVC (inferior vena caval) filter    • DJD (degenerative joint disease)    • Diverticulitis    • Asbestos exposure    • COPD (chronic obstructive pulmonary disease) (CMS-HCC)    • Pituitary adenoma (CMS-HCC) 1995     hypophysectomy   • Bradycardia    • thyroidectomy 2005     benign   • S/P parathyroidectomy    • Cardiac pacemaker 04 2010   • Knee arthroplasty 2002     right   • S/P parathyroidectomy 2005   • Unspecified urinary incontinence    • Arrhythmia    • Heart burn    • Hiatus hernia syndrome    • Urinary bladder disorder    • Bronchitis    • EMPHYSEMA    • Breath shortness    • Arthritis      hand, elbow   • BPH (benign prostatic hypertrophy) 4/1/2010   • Obstructive hypertrophic cardiomyopathy (CMS-HCC) 8/4/2011   • Paroxysmal atrial fibrillation (CMS-HCC) 8/29/2011   • Benign neoplasm of pituitary gland and craniopharyngeal duct (pouch) (CMS-HCC)  "4/1/2010   • SSS (sick sinus syndrome) (CMS-HCC) 8/29/2011   • Third degree AV block (CMS-HCC) 8/29/2011   • Unspecified hemorrhagic conditions    • Indigestion    • Unspecified disorder of thyroid    • Anesthesia      \"heart stopped\"   • PE (pulmonary embolism)      IVC filter, states PE clots 8 times   • Sleep apnea      no cpap machine   • PAF (paroxysmal atrial fibrillation) (CMS-HCC)    • Back pain    • Cataract    • Glaucoma    • Heart murmur    • Rheumatic fever    • Pneumonia      Past Surgical History   Procedure Laterality Date   • Other orthopedic surgery       knee surgery left knee x 2   • Cholecystectomy     • Pr total knee arthroplasty       left   • Other       pituitary tumor removed   • Cervical disk and fusion anterior     • Pr revise ulnar nerve at wrist     • Pr total knee arthroplasty       right   • Cataract extraction with iol       bilateral    • Thyroidectomy     • Pacemaker insertion     • Carpal tunnel endoscopic  9/30/2011     Performed by RONALD ENNIS at SURGERY SAME DAY [facility] ORS   • Knee revision total  6/20/2013     Performed by Ortega Vega M.D. at SURGERY [facility] ORS   • Cholecystectomy     • Cervical fusion posterior       Family History   Problem Relation Age of Onset   • Cancer Neg Hx    • Heart Disease Neg Hx    • Arthritis Mother    • Arthritis Father      History   Smoking status   • Never Smoker    Smokeless tobacco   • Never Used     Allergies   Allergen Reactions   • Pcn [Penicillins] Rash     Tolerates cephalosporins       Outpatient Encounter Prescriptions as of 8/23/2017   Medication Sig Dispense Refill   • sotalol (BETAPACE) 80 MG Tab Take 0.5 Tabs by mouth 2 Times a Day. 90 Tab 3   • diltiazem CD (CARDIZEM CD) 120 MG CAPSULE SR 24 HR Take 1 Cap by mouth every day. 90 Cap 3   • triamterene/hctz (DYAZIDE) 37.5-25 MG Cap Take 1 Cap by mouth every morning. 90 Cap 3   • lisinopril (PRINIVIL, ZESTRIL) 30 MG tablet Take 1 Tab by mouth every day. 90 Tab 3 "   • lisinopril (PRINIVIL) 10 MG Tab Take 3 Tabs by mouth every day. 90 Tab 11   • levothyroxine (SYNTHROID) 100 MCG Tab TAKE ONE TABLET ORALLY ONCE A DAY 90 Tab 3   • hydrocortisone (CORTEF) 10 MG Tab 20 mg in am 10 mg in pm 270 Tab 3   • dutasteride (AVODART) 0.5 MG capsule      • tamsulosin (FLOMAX) 0.4 MG capsule 0.4 mg every day.     • Mometasone Furo-Formoterol Fum (DULERA) 200-5 MCG/ACT Aerosol Inhale 2 Puffs by mouth 2 Times a Day. Use spacer. Rinse mouth after use. 1 Inhaler 11   • warfarin (COUMADIN) 5 MG Tab Take 5 mg by mouth every day. No variation.     • cyclobenzaprine (FLEXERIL) 10 MG Tab Take 10 mg by mouth at bedtime as needed for Moderate Pain.     • gabapentin (NEURONTIN) 300 MG Cap Take 300 mg by mouth every bedtime.     • hydrocodone-acetaminophen (NORCO) 5-325 MG Tab per tablet Take 1 Tab by mouth every 8 hours as needed. Indications: Moderate to Moderately Severe Pain     • montelukast (SINGULAIR) 10 MG Tab Take 10 mg by mouth every day.     • testosterone cypionate (DEPO-TESTOSTERONE) 200 MG/ML Solution injection 300 mg by Intramuscular route every 21 days.     • albuterol (PROVENTIL) 2.5mg/3ml NEBU 2.5 mg by Nebulization route every four hours as needed for Shortness of Breath.     • albuterol (VENTOLIN OR PROVENTIL) 108 (90 BASE) MCG/ACT AERS Inhale 2 Puffs by mouth every 6 hours as needed for Shortness of Breath.     • tiotropium (SPIRIVA) 18 MCG CAPS Inhale 18 mcg by mouth every day.     • [DISCONTINUED] diltiazem CD (CARDIZEM CD) 120 MG CAPSULE SR 24 HR Take 1 Cap by mouth every day. 30 Cap 5   • [DISCONTINUED] triamterene/hctz (DYAZIDE) 37.5-25 MG Cap Take 1 Cap by mouth every morning. 30 Cap 3   • [DISCONTINUED] imiquimod (ALDARA) 5 % cream   1   • [DISCONTINUED] predniSONE (DELTASONE) 10 MG Tab      • [DISCONTINUED] sotalol (BETAPACE) 80 MG Tab Take 0.5 Tabs by mouth 2 Times a Day. 90 Tab 3     Facility-Administered Encounter Medications as of 8/23/2017   Medication Dose Route  "Frequency Provider Last Rate Last Dose   • testosterone cypionate (DEPO-TESTOSTERONE) injection 300 mg  300 mg Intramuscular Q21 DAYS Ruthann Sierra M.D.         Review of Systems   Constitutional: Negative for malaise/fatigue.   Respiratory: Positive for shortness of breath (walking on flat surface.). Negative for cough.    Cardiovascular: Negative for chest pain, palpitations, orthopnea, claudication, leg swelling and PND.   Gastrointestinal: Negative for abdominal pain.   Musculoskeletal: Positive for back pain (chronic) and joint pain (left ankle sprain 2 mo ago). Negative for myalgias.   Neurological: Negative for dizziness and weakness.        Objective:   /70 mmHg  Pulse 78  Ht 1.651 m (5' 5\")  Wt 89.812 kg (198 lb)  BMI 32.95 kg/m2  SpO2 98%    Physical Exam   Constitutional: He is oriented to person, place, and time. He appears well-developed and well-nourished.   Patient with O2 in place. No acute distress.   HENT:   Head: Normocephalic.   Eyes: Conjunctivae are normal.   Neck: No JVD present. No thyromegaly present.   Cardiovascular: Normal rate and normal heart sounds.  An irregularly irregular rhythm present.   Pulmonary/Chest: Effort normal. He has decreased breath sounds (throughout.). He has no wheezes. He has no rales.   Pacemaker left upper chest without redness or fluctuance.   Abdominal: Soft. Bowel sounds are normal. He exhibits no distension. There is no tenderness.   Musculoskeletal: He exhibits no edema.   Neurological: He is alert and oriented to person, place, and time.   Skin: Skin is warm and dry.   Psychiatric: He has a normal mood and affect.     April 28, 2017: Basic metabolic panel was normal with the exception of sodium 131 and chloride 95.    Assessment:     1. PAF (paroxysmal atrial fibrillation) (CMS-HCC)  sotalol (BETAPACE) 80 MG Tab   2. Anticoagulant long-term use     3. Essential hypertension, benign  diltiazem CD (CARDIZEM CD) 120 MG CAPSULE SR 24 HR    " triamterene/hctz (DYAZIDE) 37.5-25 MG Cap    lisinopril (PRINIVIL, ZESTRIL) 30 MG tablet   4. History of pulmonary embolism         Medical Decision Making:  Today's Assessment / Status / Plan:     Paroxysmal atrial fibrillation: He is in an irregular rhythm today. His rate is well controlled. He denies any palpitations. We'll have him continue on the sotalol.    Anticoagulation: He is on warfarin and is followed by the anticoagulation clinic. No excessive bruising or active bleeding.    Hypertension: His blood pressure is excellent in the office today. It was checked on both arms and it is equal. We will have him take his diltiazem and Dyazide in the morning and take the lisinopril 30 mg in the evening. He will continue to monitor his blood pressure.    History of multiple pulmonary emboli: He will remain on anticoagulation. No change in his respiratory effort.    He is stable enough to follow up in 6 months with Dr Maloney. He will follow-up sooner if problems.    Collaborating Provider: Dr. Thomas Bermudez.

## 2017-09-05 ENCOUNTER — ANTICOAGULATION MONITORING (OUTPATIENT)
Dept: VASCULAR LAB | Facility: MEDICAL CENTER | Age: 82
End: 2017-09-05

## 2017-09-05 DIAGNOSIS — Z79.01 ANTICOAGULANT LONG-TERM USE: ICD-10-CM

## 2017-09-05 DIAGNOSIS — Z86.711 HISTORY OF PULMONARY EMBOLISM: ICD-10-CM

## 2017-09-26 DIAGNOSIS — E23.0 HYPOPITUITARISM (HCC): Chronic | ICD-10-CM

## 2017-09-26 RX ORDER — HYDROCORTISONE 10 MG/1
TABLET ORAL
Qty: 270 TAB | Refills: 0 | OUTPATIENT
Start: 2017-09-26

## 2017-10-03 ENCOUNTER — ANTICOAGULATION MONITORING (OUTPATIENT)
Dept: VASCULAR LAB | Facility: MEDICAL CENTER | Age: 82
End: 2017-10-03

## 2017-10-03 DIAGNOSIS — Z79.01 ANTICOAGULANT LONG-TERM USE: ICD-10-CM

## 2017-10-03 DIAGNOSIS — Z86.711 HISTORY OF PULMONARY EMBOLISM: ICD-10-CM

## 2017-10-05 RX ORDER — LEVOTHYROXINE SODIUM 0.1 MG/1
TABLET ORAL
Qty: 90 TAB | Refills: 3 | OUTPATIENT
Start: 2017-10-05

## 2017-10-11 DIAGNOSIS — I10 ESSENTIAL HYPERTENSION, BENIGN: ICD-10-CM

## 2017-10-19 ENCOUNTER — ANTICOAGULATION MONITORING (OUTPATIENT)
Dept: VASCULAR LAB | Facility: MEDICAL CENTER | Age: 82
End: 2017-10-19

## 2017-10-19 DIAGNOSIS — Z79.01 ANTICOAGULANT LONG-TERM USE: ICD-10-CM

## 2017-10-19 DIAGNOSIS — Z86.711 HISTORY OF PULMONARY EMBOLISM: ICD-10-CM

## 2017-10-19 NOTE — PROGRESS NOTES
Spoke with Jeffrey, his INRs have been managed by his PCP.  I will DC from clinic    Miguel Mojica, PharmD

## 2017-11-28 ENCOUNTER — NON-PROVIDER VISIT (OUTPATIENT)
Dept: CARDIOLOGY | Facility: MEDICAL CENTER | Age: 82
End: 2017-11-28
Payer: MEDICARE

## 2017-11-28 VITALS
WEIGHT: 192 LBS | DIASTOLIC BLOOD PRESSURE: 90 MMHG | BODY MASS INDEX: 31.99 KG/M2 | SYSTOLIC BLOOD PRESSURE: 155 MMHG | OXYGEN SATURATION: 92 % | HEART RATE: 48 BPM | HEIGHT: 65 IN

## 2017-11-28 DIAGNOSIS — Z95.0 CARDIAC PACEMAKER: ICD-10-CM

## 2017-11-28 DIAGNOSIS — I49.5 SSS (SICK SINUS SYNDROME) (HCC): Chronic | ICD-10-CM

## 2017-11-28 DIAGNOSIS — I48.0 PAROXYSMAL ATRIAL FIBRILLATION (HCC): Chronic | ICD-10-CM

## 2017-11-28 PROCEDURE — 93288 INTERROG EVL PM/LDLS PM IP: CPT | Performed by: NURSE PRACTITIONER

## 2017-11-28 NOTE — PROGRESS NOTES
Device is working normally. No mode switching episodes.  Normal sensing and capture of RA and RV leads; stable impedances. Battery longevity is 1 year.  No changes are made today.    FU in 3-4 months for next PM check, as battery is nearing SHRAVAN. He will also be due to see Dr. Maloney.    Collaborating MD: Ilan

## 2018-05-29 ENCOUNTER — TELEPHONE (OUTPATIENT)
Dept: CARDIOLOGY | Facility: MEDICAL CENTER | Age: 83
End: 2018-05-29

## 2018-05-29 NOTE — TELEPHONE ENCOUNTER
Pt states HR reading on BP monitor occasionally shows 48-54. No sx's reported. Last PMC was 11/2017 & battery was believed to be about 1 yr.   I asked why he missed last 2 PM checks. Lives near [facility] & weather was bad. PMC appt. made for tomorrow at [place] [facility] office. ER precautions given. To KEIRA Gtz to inform.

## 2018-05-29 NOTE — TELEPHONE ENCOUNTER
----- Message from Raisa Fenton sent at 2018  9:38 AM PDT -----  Regarding: battery in pacemaker  last night  Contact: [phone]  AB/Maryjo    Pt reports his battery in pacemaker  last night. He would please like a call back at [phone].

## 2018-05-30 ENCOUNTER — NON-PROVIDER VISIT (OUTPATIENT)
Dept: CARDIOLOGY | Facility: MEDICAL CENTER | Age: 83
End: 2018-05-30
Payer: MEDICARE

## 2018-05-30 ENCOUNTER — TELEPHONE (OUTPATIENT)
Dept: CARDIOLOGY | Facility: MEDICAL CENTER | Age: 83
End: 2018-05-30

## 2018-05-30 DIAGNOSIS — Z95.0 CARDIAC PACEMAKER: ICD-10-CM

## 2018-05-30 PROCEDURE — 93280 PM DEVICE PROGR EVAL DUAL: CPT | Performed by: INTERNAL MEDICINE

## 2018-05-30 NOTE — TELEPHONE ENCOUNTER
Patient scheduled for PM gen change on 6-20-18 at [facility] with Dr. Donaldson. Patient's last Coumadin dose will be on 6-18-18. He has been instructed to her his INR checked on 6-18-18. Future task sent to Natalie the check the patient's INR on 6-18-18.

## 2018-05-30 NOTE — TELEPHONE ENCOUNTER
----- Message from Randall Donaldson M.D. sent at 5/30/2018  3:23 PM PDT -----  Regarding: FW: Coumadin and gen change  Hold one day but get inr two days before procedure  ----- Message -----  From: Samaria Rosas, Med Ass't  Sent: 5/30/2018   3:18 PM  To: Randall Donaldson M.D.  Subject: Coumadin and gen change                          Dr. Donaldson,    I'm going to schedule this patient with you for his gen change. He is taking Coumadin. Would you like him to hold the Coumadin for this procedure or continue taking it?    Thank You,  Samaria

## 2018-06-18 ENCOUNTER — TELEPHONE (OUTPATIENT)
Dept: CARDIOLOGY | Facility: MEDICAL CENTER | Age: 83
End: 2018-06-18

## 2018-06-18 DIAGNOSIS — I48.0 PAF (PAROXYSMAL ATRIAL FIBRILLATION) (HCC): ICD-10-CM

## 2018-06-18 NOTE — TELEPHONE ENCOUNTER
Called to get INR results. Pt did not go today bc his wife was sick. He just figured he would wait until wed morning at [facility], advised pt we need INR prior to advise on dosing, or procedure could be cancelled if INR too high. Pt will go to [facility] tomorrow and will fax order for stat INR to [phone], [phone]

## 2018-06-18 NOTE — TELEPHONE ENCOUNTER
----- Message from Natalie Pereira R.N. sent at 6/18/2018  9:22 AM PDT -----  Regarding: FW: Check patient's INR      ----- Message -----  From: Samaria Rosas, Med Ass't  Sent: 6/18/2018  To: Natalie Pereira R.N.  Subject: Check patient's INR                              Natalie,    This patient is scheduled for a gen change on 6-20-18. His last dose will be on 6-18-18. He has been instructed to get his INR checked today. Please check his INR to make sure holding the one dose will be ok.    Thank You,  Samaria

## 2018-06-19 NOTE — TELEPHONE ENCOUNTER
Per Lab verbally on phone INR 1.9, do you want pt to hold coumadin tonight for tomorrows gen change?

## 2018-06-20 ENCOUNTER — HOSPITAL ENCOUNTER (OUTPATIENT)
Facility: MEDICAL CENTER | Age: 83
End: 2018-06-20
Attending: INTERNAL MEDICINE | Admitting: INTERNAL MEDICINE
Payer: MEDICARE

## 2018-06-20 VITALS
RESPIRATION RATE: 16 BRPM | TEMPERATURE: 97 F | BODY MASS INDEX: 30.86 KG/M2 | HEART RATE: 56 BPM | SYSTOLIC BLOOD PRESSURE: 75 MMHG | OXYGEN SATURATION: 97 % | DIASTOLIC BLOOD PRESSURE: 45 MMHG | WEIGHT: 192 LBS | HEIGHT: 66 IN

## 2018-06-20 LAB
ALBUMIN SERPL BCP-MCNC: 3.5 G/DL (ref 3.2–4.9)
ALBUMIN/GLOB SERPL: 1.6 G/DL
ALP SERPL-CCNC: 37 U/L (ref 30–99)
ALT SERPL-CCNC: 13 U/L (ref 2–50)
ANION GAP SERPL CALC-SCNC: 9 MMOL/L (ref 0–11.9)
AST SERPL-CCNC: 16 U/L (ref 12–45)
BILIRUB SERPL-MCNC: 0.5 MG/DL (ref 0.1–1.5)
BUN SERPL-MCNC: 23 MG/DL (ref 8–22)
CALCIUM SERPL-MCNC: 9.1 MG/DL (ref 8.5–10.5)
CHLORIDE SERPL-SCNC: 103 MMOL/L (ref 96–112)
CO2 SERPL-SCNC: 27 MMOL/L (ref 20–33)
CREAT SERPL-MCNC: 1.36 MG/DL (ref 0.5–1.4)
EKG IMPRESSION: NORMAL
ERYTHROCYTE [DISTWIDTH] IN BLOOD BY AUTOMATED COUNT: 47.4 FL (ref 35.9–50)
GLOBULIN SER CALC-MCNC: 2.2 G/DL (ref 1.9–3.5)
GLUCOSE SERPL-MCNC: 97 MG/DL (ref 65–99)
HCT VFR BLD AUTO: 40.9 % (ref 42–52)
HGB BLD-MCNC: 13 G/DL (ref 14–18)
INR PPP: 1.72 (ref 0.87–1.13)
MCH RBC QN AUTO: 28.4 PG (ref 27–33)
MCHC RBC AUTO-ENTMCNC: 31.8 G/DL (ref 33.7–35.3)
MCV RBC AUTO: 89.3 FL (ref 81.4–97.8)
PLATELET # BLD AUTO: 213 K/UL (ref 164–446)
PMV BLD AUTO: 10.3 FL (ref 9–12.9)
POTASSIUM SERPL-SCNC: 4.5 MMOL/L (ref 3.6–5.5)
PROT SERPL-MCNC: 5.7 G/DL (ref 6–8.2)
PROTHROMBIN TIME: 19.8 SEC (ref 12–14.6)
RBC # BLD AUTO: 4.58 M/UL (ref 4.7–6.1)
SODIUM SERPL-SCNC: 139 MMOL/L (ref 135–145)
WBC # BLD AUTO: 14.1 K/UL (ref 4.8–10.8)

## 2018-06-20 PROCEDURE — 33228 REMV&REPLC PM GEN DUAL LEAD: CPT

## 2018-06-20 PROCEDURE — 304952 HCHG R 2 PADS

## 2018-06-20 PROCEDURE — 700101 HCHG RX REV CODE 250

## 2018-06-20 PROCEDURE — 80053 COMPREHEN METABOLIC PANEL: CPT

## 2018-06-20 PROCEDURE — 85027 COMPLETE CBC AUTOMATED: CPT

## 2018-06-20 PROCEDURE — 160002 HCHG RECOVERY MINUTES (STAT)

## 2018-06-20 PROCEDURE — 99152 MOD SED SAME PHYS/QHP 5/>YRS: CPT

## 2018-06-20 PROCEDURE — 305387 HCHG SUTURES

## 2018-06-20 PROCEDURE — C1785 PMKR, DUAL, RATE-RESP: HCPCS

## 2018-06-20 PROCEDURE — 304853 HCHG PPM TEST CABLE

## 2018-06-20 PROCEDURE — 700111 HCHG RX REV CODE 636 W/ 250 OVERRIDE (IP)

## 2018-06-20 PROCEDURE — 93010 ELECTROCARDIOGRAM REPORT: CPT | Performed by: INTERNAL MEDICINE

## 2018-06-20 PROCEDURE — 93005 ELECTROCARDIOGRAM TRACING: CPT | Performed by: INTERNAL MEDICINE

## 2018-06-20 PROCEDURE — 85610 PROTHROMBIN TIME: CPT

## 2018-06-20 RX ORDER — DOXYCYCLINE 100 MG/1
100 CAPSULE ORAL 2 TIMES DAILY
Qty: 8 CAP | Refills: 0 | Status: SHIPPED | OUTPATIENT
Start: 2018-06-20 | End: 2019-01-21 | Stop reason: CLARIF

## 2018-06-20 RX ORDER — LIDOCAINE HYDROCHLORIDE 20 MG/ML
INJECTION, SOLUTION INFILTRATION; PERINEURAL
Status: COMPLETED
Start: 2018-06-20 | End: 2018-06-20

## 2018-06-20 RX ORDER — MIDAZOLAM HYDROCHLORIDE 1 MG/ML
INJECTION INTRAMUSCULAR; INTRAVENOUS
Status: COMPLETED
Start: 2018-06-20 | End: 2018-06-20

## 2018-06-20 RX ADMIN — VANCOMYCIN HYDROCHLORIDE 3000 MG: 1 INJECTION, POWDER, LYOPHILIZED, FOR SOLUTION INTRAVENOUS at 14:14

## 2018-06-20 RX ADMIN — MIDAZOLAM 2 MG: 1 INJECTION INTRAMUSCULAR; INTRAVENOUS at 14:14

## 2018-06-20 RX ADMIN — LIDOCAINE HYDROCHLORIDE: 20 INJECTION, SOLUTION INFILTRATION; PERINEURAL at 14:14

## 2018-06-20 RX ADMIN — FENTANYL CITRATE 100 MCG: 50 INJECTION, SOLUTION INTRAMUSCULAR; INTRAVENOUS at 14:14

## 2018-06-20 ASSESSMENT — PAIN SCALES - GENERAL
PAINLEVEL_OUTOF10: 0

## 2018-06-20 NOTE — OR NURSING
1437 Pt report received from cath lab RN, pt brought over on a gurney by cath lab RN, pt placed on Tele monitor, VSS, no C/O pain at this. Left upper quadrant PPM site is clean, dry and soft, dressing intact. Family at beside, pt given water and a snack.     1445 Pt left upper quadrant site clean, dry and soft, no C/O pain     1500 Pt left upper quadrant site clean, dry and soft, no C/O pain     1557 pt given D/C instructions, pt verbalized understanding. Pt was given information regarding PPM care. Pt going home with family in wheelchair.

## 2018-06-20 NOTE — H&P
Physician H&P    Patient ID:  Jeffrey Lizama  [id number]  85 y.o. male  12/23/1932    History:  Primary Diagnosis: Syncope and CHB f=with PPM at [facility]    HPI:  For generator change. Asymptomatic. No chest pain or SOB. No constitutional symptoms.     Past Medical History:  has a past medical history of Anesthesia; Arrhythmia; Arthritis; Asbestos exposure; ASTHMA; Back pain; Benign neoplasm of pituitary gland and craniopharyngeal duct (pouch) (Formerly [facility]) (4/1/2010); BPH (benign prostatic hypertrophy) (4/1/2010); Bradycardia; Breath shortness; Bronchitis; Cardiac pacemaker (04 2010); Cataract; COPD (chronic obstructive pulmonary disease) (Formerly [facility]); Diverticulitis; DJD (degenerative joint disease); EMPHYSEMA; Glaucoma; Heart burn; Heart murmur; Hiatus hernia syndrome; Hypertension; 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); Indigestion; Knee arthroplasty (2002); Obstructive hypertrophic cardiomyopathy (Formerly [facility]) (8/4/2011); PAF (paroxysmal atrial fibrillation) (Formerly [facility]); Paroxysmal atrial fibrillation (Formerly [facility]) (8/29/2011); PE (pulmonary embolism); Phlebitis; Pituitary adenoma (Formerly [facility]) (1995); Pituitary adenoma ([facility]ly [facility]) (1995); Pneumonia; Rheumatic fever; S/P insertion of IVC (inferior vena caval) filter; S/P parathyroidectomy ([facility]ly [facility]); S/P parathyroidectomy ([facility]ly [facility]) (2005); Sleep apnea; SSS (sick sinus syndrome) (Formerly [facility]) (8/29/2011); Third degree AV block (Formerly [facility]) (8/29/2011); thyroidectomy (2005); Unspecified disorder of thyroid; Unspecified hemorrhagic conditions; Unspecified urinary incontinence; and Urinary bladder disorder.  Past Surgical History:  has a past surgical history that includes other orthopedic surgery; cholecystectomy; pr total knee arthroplasty; other; cervical disk and fusion anterior; pr revise ulnar nerve at wrist; pr total knee arthroplasty; cataract extraction with iol; thyroidectomy; pacemaker insertion (Left, 04/23/2010); carpal tunnel endoscopic (9/30/2011); knee revision total (6/20/2013); cholecystectomy; and cervical fusion posterior.  Past  Social History:  reports that he has never smoked. He has never used smokeless tobacco. He reports that he does not drink alcohol or use drugs.  Past Family History:   Family History   Problem Relation Age of Onset   • Arthritis Mother    • Arthritis Father    • Cancer Neg Hx    • Heart Disease Neg Hx      Allergies: Pcn [penicillins]    Current Medications:  Prior to Admission medications    Medication Sig Start Date End Date Taking? Authorizing Provider   sotalol (BETAPACE) 80 MG Tab Take 0.5 Tabs by mouth 2 Times a Day. 8/23/17   KENYA CampbellP.N.   diltiazem CD (CARDIZEM CD) 120 MG CAPSULE SR 24 HR Take 1 Cap by mouth every day. 8/23/17   KEVIN Campbell.P.N.   triamterene/hctz (DYAZIDE) 37.5-25 MG Cap Take 1 Cap by mouth every morning. 8/23/17   KEVIN Campbell.P.N.   lisinopril (PRINIVIL) 10 MG Tab Take 3 Tabs by mouth every day. 10/6/16   KEVIN Campbell.P.NShannan   levothyroxine (SYNTHROID) 100 MCG Tab TAKE ONE TABLET ORALLY ONCE A DAY 10/3/16   Ruthann Sierra M.D.   hydrocortisone (CORTEF) 10 MG Tab 20 mg in am 10 mg in pm 9/27/16   Ruthann Sierra M.D.   dutasteride (AVODART) 0.5 MG capsule  5/31/16   Physician Outpatient   tamsulosin (FLOMAX) 0.4 MG capsule 0.4 mg every day. 8/8/16   Physician Outpatient   Mometasone Furo-Formoterol Fum (DULERA) 200-5 MCG/ACT Aerosol Inhale 2 Puffs by mouth 2 Times a Day. Use spacer. Rinse mouth after use. 8/12/16   Ritika Mckeon A.P.R.N.   warfarin (COUMADIN) 5 MG Tab Take 5 mg by mouth every day. No variation.    Physician Outpatient   cyclobenzaprine (FLEXERIL) 10 MG Tab Take 10 mg by mouth at bedtime as needed for Moderate Pain.    Physician Outpatient   gabapentin (NEURONTIN) 300 MG Cap Take 300 mg by mouth every bedtime.    Physician Outpatient   hydrocodone-acetaminophen (NORCO) 5-325 MG Tab per tablet Take 1 Tab by mouth every 8 hours as needed. Indications: Moderate to Moderately Severe Pain    Physician Outpatient   montelukast  "(SINGULAIR) 10 MG Tab Take 10 mg by mouth every day.    Physician Outpatient   testosterone cypionate (DEPO-TESTOSTERONE) 200 MG/ML Solution injection 300 mg by Intramuscular route every 21 days.    Physician Outpatient   albuterol (PROVENTIL) 2.5mg/3ml NEBU 2.5 mg by Nebulization route every four hours as needed for Shortness of Breath.    Physician Outpatient   albuterol (VENTOLIN OR PROVENTIL) 108 (90 BASE) MCG/ACT AERS Inhale 2 Puffs by mouth every 6 hours as needed for Shortness of Breath.    Physician Outpatient   tiotropium (SPIRIVA) 18 MCG CAPS Inhale 18 mcg by mouth every day.    Physician Outpatient       Review of Systems:  ROS  Blood pressure (!) 75/45, pulse 76, temperature 36.4 °C (97.5 °F), resp. rate 18, height 1.676 m (5' 6\"), weight 87.1 kg (192 lb), SpO2 97 %.    Physical Examination:  Physical Exam   Constitutional: He is oriented to person, place, and time. He appears well-developed. No distress.   HENT:   Mouth/Throat: Mucous membranes are normal.   Eyes: Conjunctivae and EOM are normal.   Neck: No JVD present. No tracheal deviation present. No thyroid mass and no thyromegaly present.   Cardiovascular: Normal rate, regular rhythm and intact distal pulses.    No murmur heard.  Pulmonary/Chest: Effort normal and breath sounds normal. No respiratory distress. He exhibits no tenderness.   Abdominal: Soft. There is no tenderness.   Musculoskeletal: Normal range of motion. He exhibits no edema.   Neurological: He is alert and oriented to person, place, and time. He has normal strength. He displays no tremor.   Skin: Skin is warm and dry. He is not diaphoretic.   Psychiatric: He has a normal mood and affect. His behavior is normal.   Vitals reviewed.      Impression:  1. PPM for generator change.  The risks, benefits, and alternatives to permanent pacemaker replacement were discussed in great detail.  Specific risks mentioned to the patient including bleeding, cardiac perforation with possible " tamponade possibly requiring pericardiocentesis or open heart surgery.  In addition the possibility of lead dislodgment (2-3%), pneumothorax (3%), hemothorax, infection were discussed.  Also, mentioned were the risk of death, stroke, and myocardial infarction.  The patient verbalized understanding of the potential complications and wishes to proceed with the procedure.          Pre Procedure Assessment:  Pre-Procedure Assessment - Applicable for patients receiving sedation by non-anesthesia practitioner.  Previous drug history, other anesthetic experiences, potential anesthetic problems and choice of anesthesia assessed, discussed with patient.     No prior complications.  Results of relevant diagnostic studies reviewed.    Plan of Sedation:  IV conscious sedationPatient assessed immediately prior to sedationPatient deemed appropriate candidate for conscious sedation options and plans discussed with patient / family    ASA 3 - Patient with severe systemic disease

## 2018-06-20 NOTE — OP REPORT
Electrophysiology Procedure Note  [facility]    PROCEDURE:  PPM generator change,   moderate sedation administered by CRNA and supervised by physician    : Randall Donaldson M.D.    ANESTHESIA: Moderate sedation,  start time 2:04 pm, stop time 2:29 pm  the moderate sedation document has been reviewed, signed and scanned into media     ESTIMATED BLOOD LOSS: 20 cc.    SPECIMENS: None.    COMPLICATIONS: None    INDICATION: PPM at [facility]    PRE-PROCEDURE ECG: SR with RV pacing and PVC's    POST-PROCEDURE ECG: SR with RV pacing and PVC's    DESCRIPTION OF PROCEDURE: After informed written consent, the patient was brought to the electrophysiology lab in the fasting, unsedated state. The patient was prepped and draped in the usual sterile fashion. The procedure was performed under moderate sedation with local anesthetic. An incision was made with a scalpel along the old scar. Access to the device pocket was made using a combination of blunt dissection and electrocautery. The old generator and leads were freed from adhesions and the generator disconnected from the leads. Lead tested fine.  The pocket was irrigated with antibiotic solution, and the new generator was connected to the leads and inserted back in the pocket. The wound was closed with three layers of absorbable sutures and covered with Steri-Strips.   I personally supervised the administration of moderate sedation by the CRNA and observed the level of consciousness and physiologic status throughout the procedure.  Following recovery from sedation, the patient was transferred to a monitored bed in good condition.    IMPLANTED DEVICE INFORMATION:    Pulse generator is a [place] model L301  Serial number [id number]      LEAD INFORMATION:  1. Right atrial lead is a [place] model 4136, serial number [id number], P wave 3 millivolts, threshold 0.5 Volts, pacing impedance 523 Ohms, implant date 4/23/2010  2. Right ventricular lead is a [place] model 4137,  serial number [id number], R wave 5 millivolts, threshold 1.1 Volts, pacing impedance 471 Ohms, implant date 4/23/2010    DEVICE PROGRAMMING:    As previous programmed     IMPRESSIONS:  1. Successful PPM dual chamber generator change.    RECOMMENDATIONS:  1. Transfer to PPU.  2. Follow-up in device clinic for wound check and device interrogation.

## 2018-06-20 NOTE — DISCHARGE INSTRUCTIONS
ACTIVITY: Rest and take it easy for the first 24 hours.  A responsible adult is recommended to remain with you during that time.  It is normal to feel sleepy.  We encourage you to not do anything that requires balance, judgment or coordination.    MILD FLU-LIKE SYMPTOMS ARE NORMAL. YOU MAY EXPERIENCE GENERALIZED MUSCLE ACHES, THROAT IRRITATION, HEADACHE AND/OR SOME NAUSEA.    FOR 24 HOURS DO NOT:  Drive, operate machinery or run household appliances.  Drink beer or alcoholic beverages.   Make important decisions or sign legal documents.    SPECIAL INSTRUCTIONS: keep incision dry for 7 days and follow up in device clinic in 1 week    DIET: To avoid nausea, slowly advance diet as tolerated, avoiding spicy or greasy foods for the first day.  Add more substantial food to your diet according to your physician's instructions.  Babies can be fed formula or breast milk as soon as they are hungry.  INCREASE FLUIDS AND FIBER TO AVOID CONSTIPATION.    SURGICAL DRESSING/BATHING: do not shower for 7 days, may shower 6/27/2018    FOLLOW-UP APPOINTMENT:  A follow-up appointment should be arranged with your cardiologist at [phone]; call to schedule.    You should CALL YOUR PHYSICIAN if you develop:  Fever greater than 101 degrees F.  Pain not relieved by medication, or persistent nausea or vomiting.  Excessive bleeding (blood soaking through dressing) or unexpected drainage from the wound.  Extreme redness or swelling around the incision site, drainage of pus or foul smelling drainage.  Inability to urinate or empty your bladder within 8 hours.  Problems with breathing or chest pain.    You should call 911 if you develop problems with breathing or chest pain.  If you are unable to contact your doctor or surgical center, you should go to the nearest emergency room or urgent care center.  Physician's telephone #: [phone]    If any questions arise, call your doctor.  If your doctor is not available, please feel free to  call the Surgical Center at [phone].  The Center is open Monday through Friday from 7AM to 7PM.  You can also call the HEALTH HOTLINE open 24 hours/day, 7 days/week and speak to a nurse at [phone], or toll free at [phone].    A registered nurse may call you a few days after your surgery to see how you are doing after your procedure.    MEDICATIONS: Resume taking daily medication.  Take prescribed pain medication with food.  If no medication is prescribed, you may take non-aspirin pain medication if needed.  PAIN MEDICATION CAN BE VERY CONSTIPATING.  Take a stool softener or laxative such as senokot, pericolace, or milk of magnesia if needed.    If your physician has prescribed pain medication that includes Acetaminophen (Tylenol), do not take additional Acetaminophen (Tylenol) while taking the prescribed medication.    Depression / Suicide Risk    As you are discharged from this [facility] facility, it is important to learn how to keep safe from harming yourself.    Recognize the warning signs:  · Abrupt changes in personality, positive or negative- including increase in energy   · Giving away possessions  · Change in eating patterns- significant weight changes-  positive or negative  · Change in sleeping patterns- unable to sleep or sleeping all the time   · Unwillingness or inability to communicate  · Depression  · Unusual sadness, discouragement and loneliness  · Talk of wanting to die  · Neglect of personal appearance   · Rebelliousness- reckless behavior  · Withdrawal from people/activities they love  · Confusion- inability to concentrate     If you or a loved one observes any of these behaviors or has concerns about self-harm, here's what you can do:  · Talk about it- your feelings and reasons for harming yourself  · Remove any means that you might use to hurt yourself (examples: pills, rope, extension cords, firearm)  · Get professional help from the community (Mental Health, Substance  Abuse, psychological counseling)  · Do not be alone:Call your Safe Contact- someone whom you trust who will be there for you.  · Call your local CRISIS HOTLINE 613-0385 or 067-143-5589  · Call your local Children's Mobile Crisis Response Team [place] (686) 743-4247 or www.Cookapp  · Call the toll free National Suicide Prevention Hotlines   · National Suicide Prevention Lifeline 275-386-DVEL (0315)  · [place] [facility] Line Network 800-SUICIDE (615-6580)    PACEMAKER / AICD    DISCHARGE INSTRUCTIONS      WOUND CARE:    • No Showers for one week, you may take a sponge bath.  Keep your dressing dry.  No submerging in bath tub, hot tub, swimming, etc. for six weeks.  • Leave your dressing in place until your follow up appointment.    • No lifting over 5-10 pounds for six weeks; this applies to affected side only.  • Do not raise affected arm above shoulder level for 4-6 weeks.  • Avoid excessive pushing, pulling, or twisting for six weeks; this applies to affected side only.  • Call your doctor’s office if you notice any increased swelling, redness, or any drainage at the incision site.  Also notify your doctor if you develop a fever.  • You can also call the Health Hotline open 24 hours/day, 7 days/week and speak to a nurse at [phone], or toll free at [phone].  • Seven to ten days after implant, your cardiologist’s office will schedule a visit for you with a nurse to check your incision site.  The nurse will remove your original dressing at this time.  If you have an AICD, you will be enrolled in an AICD clinic.  • For AICD’s - you should be checked every three months or as determined by your cardiologist.    • Do not drive until you have been cleared to do so by your cardiologist.  • Call 911 if you develop problems with breathing or chest pain.    IF YOU RECEIVE A SHOCK FROM YOUR AICD:    • When you receive your first shock, please call your cardiologist and schedule an appointment in the  pacemaker/AICD clinic.  If you are being shocked multiple times, please call 911.  • Once you have been evaluated after the initial shock with the pacemaker/AICD nurse, you should notify your doctor if you receive 3 or more shocks in a 24 hour period.  • If you experience any near fainting or fainting episodes, please call your cardiologists office.  • For detailed information on your particular pacemaker or AICD, please read the patient guide that has been provided to you by the company representative.    FOR PROBLEMS CALL YOUR CARDIOLOGIST -[phone]

## 2018-06-21 ENCOUNTER — TELEPHONE (OUTPATIENT)
Dept: CARDIOLOGY | Facility: MEDICAL CENTER | Age: 83
End: 2018-06-21

## 2018-06-21 RX ORDER — DOXYCYCLINE 100 MG/1
100 CAPSULE ORAL 2 TIMES DAILY
Qty: 4 CAP | Refills: 0 | Status: SHIPPED | OUTPATIENT
Start: 2018-06-21 | End: 2019-01-21 | Stop reason: CLARIF

## 2018-06-21 NOTE — PROGRESS NOTES
"Pt called and d/c Rx for [place]xy was sent to [place] [facility], but he will be here another day. Requesting doxy for \"a day or 2\" to go to [facility].   "

## 2018-06-21 NOTE — TELEPHONE ENCOUNTER
----- Message from Anjana Roe sent at 6/21/2018  1:21 PM PDT -----  Regarding: patient concerned after having pacemaker replaced yesterday  HANNA/Yuki        Patient had pacemaker replaced yesterday. He said the skin around the surgical area is red, and appears wet, he also noticed a tracing of blood on the bandage. Please call to advise. He can be reached at [phone].

## 2018-06-21 NOTE — TELEPHONE ENCOUNTER
"Spoke to pt. Reports he was hot and sweaty after taking a nap, thus the wetness. Reports blood was an area <1/2 inch and not new, and redness looks like it is probably from \"razor burn\". Denies swelling, oozing, or fever; advised to call for symptoms such as those. Pt is taking antibiotics. Wound check appt made.   "

## 2018-06-27 ENCOUNTER — NON-PROVIDER VISIT (OUTPATIENT)
Dept: CARDIOLOGY | Facility: MEDICAL CENTER | Age: 83
End: 2018-06-27
Payer: MEDICARE

## 2018-06-27 DIAGNOSIS — Z95.0 CARDIAC PACEMAKER: ICD-10-CM

## 2018-06-27 PROCEDURE — 93280 PM DEVICE PROGR EVAL DUAL: CPT | Performed by: INTERNAL MEDICINE

## 2018-06-28 NOTE — NON-PROVIDER
S/p pacemaker generator replacement, implanted on 6-. Appropriate device function demonstrated. Wound site appears clear. Advised to watch for redness,swelling, oozing or fever. Pt verbalized understanding. See back in 5 weeks for final testing.

## 2018-09-12 DIAGNOSIS — I48.0 PAF (PAROXYSMAL ATRIAL FIBRILLATION) (HCC): ICD-10-CM

## 2018-09-12 RX ORDER — SOTALOL HYDROCHLORIDE 80 MG/1
TABLET ORAL
Qty: 90 TAB | Refills: 0 | Status: SHIPPED | OUTPATIENT
Start: 2018-09-12 | End: 2018-10-08 | Stop reason: SDUPTHER

## 2018-10-08 DIAGNOSIS — I48.0 PAF (PAROXYSMAL ATRIAL FIBRILLATION) (HCC): ICD-10-CM

## 2018-10-09 RX ORDER — SOTALOL HYDROCHLORIDE 80 MG/1
40 TABLET ORAL 2 TIMES DAILY
Qty: 90 TAB | Refills: 0 | Status: ON HOLD | OUTPATIENT
Start: 2018-10-09 | End: 2019-02-08

## 2018-11-08 ENCOUNTER — TELEPHONE (OUTPATIENT)
Dept: CARDIOLOGY | Facility: MEDICAL CENTER | Age: 83
End: 2018-11-08

## 2019-01-21 ENCOUNTER — HOSPITAL ENCOUNTER (INPATIENT)
Facility: MEDICAL CENTER | Age: 84
LOS: 7 days | DRG: 377 | End: 2019-01-28
Attending: EMERGENCY MEDICINE | Admitting: HOSPITALIST
Payer: MEDICARE

## 2019-01-21 ENCOUNTER — APPOINTMENT (OUTPATIENT)
Dept: RADIOLOGY | Facility: MEDICAL CENTER | Age: 84
DRG: 377 | End: 2019-01-21
Attending: EMERGENCY MEDICINE
Payer: MEDICARE

## 2019-01-21 DIAGNOSIS — I48.0 PAROXYSMAL ATRIAL FIBRILLATION (HCC): Chronic | ICD-10-CM

## 2019-01-21 DIAGNOSIS — I95.89 HYPOTENSION DUE TO HYPOVOLEMIA: ICD-10-CM

## 2019-01-21 DIAGNOSIS — E86.1 HYPOTENSION DUE TO HYPOVOLEMIA: ICD-10-CM

## 2019-01-21 DIAGNOSIS — Z79.01 CHRONIC ANTICOAGULATION: ICD-10-CM

## 2019-01-21 DIAGNOSIS — K92.2 GASTRIC HEMORRHAGE: Primary | ICD-10-CM

## 2019-01-21 DIAGNOSIS — E87.20 METABOLIC ACIDOSIS: ICD-10-CM

## 2019-01-21 DIAGNOSIS — K92.1 GASTROINTESTINAL HEMORRHAGE WITH MELENA: ICD-10-CM

## 2019-01-21 DIAGNOSIS — R57.8 NONTRAUMATIC HEMORRHAGIC SHOCK (HCC): ICD-10-CM

## 2019-01-21 DIAGNOSIS — E87.29 RESPIRATORY ACIDOSIS: ICD-10-CM

## 2019-01-21 PROBLEM — D50.0 ANEMIA DUE TO GI BLOOD LOSS: Status: ACTIVE | Noted: 2019-01-21

## 2019-01-21 PROBLEM — B37.2 CANDIDAL INTERTRIGO: Status: ACTIVE | Noted: 2019-01-21

## 2019-01-21 PROBLEM — R79.1 SUPRATHERAPEUTIC INR: Status: ACTIVE | Noted: 2019-01-21

## 2019-01-21 PROBLEM — J96.01 ACUTE RESPIRATORY FAILURE WITH HYPOXIA (HCC): Status: ACTIVE | Noted: 2019-01-21

## 2019-01-21 PROBLEM — R57.9 SHOCK (HCC): Status: ACTIVE | Noted: 2019-01-21

## 2019-01-21 LAB
ABO GROUP BLD: NORMAL
ABO GROUP BLD: NORMAL
ACTION RANGE TRIGGERED IACRT: NO
ACTION RANGE TRIGGERED IACRT: YES
ALBUMIN SERPL BCP-MCNC: 2.7 G/DL (ref 3.2–4.9)
ALBUMIN/GLOB SERPL: 1.7 G/DL
ALP SERPL-CCNC: 22 U/L (ref 30–99)
ALT SERPL-CCNC: 7 U/L (ref 2–50)
ANION GAP SERPL CALC-SCNC: 5 MMOL/L (ref 0–11.9)
APPEARANCE UR: CLEAR
APTT PPP: 49 SEC (ref 24.7–36)
AST SERPL-CCNC: 12 U/L (ref 12–45)
BACTERIA #/AREA URNS HPF: NEGATIVE /HPF
BARCODED ABORH UBTYP: 5100
BARCODED ABORH UBTYP: 5100
BARCODED ABORH UBTYP: 9500
BARCODED PRD CODE UBPRD: NORMAL
BARCODED UNIT NUM UBUNT: NORMAL
BASE EXCESS BLDA CALC-SCNC: -6 MMOL/L (ref -4–3)
BASE EXCESS BLDA CALC-SCNC: -8 MMOL/L (ref -4–3)
BILIRUB SERPL-MCNC: 0.9 MG/DL (ref 0.1–1.5)
BILIRUB UR QL STRIP.AUTO: NEGATIVE
BLD GP AB SCN SERPL QL: NORMAL
BODY TEMPERATURE: ABNORMAL DEGREES
BODY TEMPERATURE: ABNORMAL DEGREES
BUN SERPL-MCNC: 89 MG/DL (ref 8–22)
CA-I BLD ISE-SCNC: 1.09 MMOL/L (ref 1.1–1.3)
CALCIUM SERPL-MCNC: 7.2 MG/DL (ref 8.5–10.5)
CFT BLD TEG: 4.3 MIN (ref 5–10)
CFT BLD TEG: 5.2 MIN (ref 5–10)
CFT P HPASE BLD TEG: 4.3 MIN (ref 5–10)
CHLORIDE SERPL-SCNC: 111 MMOL/L (ref 96–112)
CLOT ANGLE BLD TEG: 67.8 DEGREES (ref 53–72)
CLOT ANGLE BLD TEG: 74.1 DEGREES (ref 53–72)
CLOT ANGLE P HPASE BLD TEG: 73.3 DEGREES (ref 53–72)
CLOT INIT P HPASE BLD TEG: 1.2 MIN (ref 1–3)
CLOT LYSIS 30M P MA LENFR BLD TEG: 0 % (ref 0–8)
CLOT LYSIS 30M P MA LENFR BLD TEG: 0.1 % (ref 0–8)
CLOT LYSIS 30M P MA LENFR BLD TEG: 0.2 % (ref 0–8)
CO2 BLDA-SCNC: 18 MMOL/L (ref 20–33)
CO2 BLDA-SCNC: 23 MMOL/L (ref 20–33)
CO2 SERPL-SCNC: 21 MMOL/L (ref 20–33)
COLOR UR: YELLOW
COMPONENT R 8504R: NORMAL
CREAT SERPL-MCNC: 1.28 MG/DL (ref 0.5–1.4)
CT.EXTRINSIC BLD ROTEM: 1.2 MIN (ref 1–3)
CT.EXTRINSIC BLD ROTEM: 1.6 MIN (ref 1–3)
EKG IMPRESSION: NORMAL
EPI CELLS #/AREA URNS HPF: NEGATIVE /HPF
ERYTHROCYTE [DISTWIDTH] IN BLOOD BY AUTOMATED COUNT: 48.3 FL (ref 35.9–50)
ERYTHROCYTE [DISTWIDTH] IN BLOOD BY AUTOMATED COUNT: 48.6 FL (ref 35.9–50)
GLOBULIN SER CALC-MCNC: 1.6 G/DL (ref 1.9–3.5)
GLUCOSE SERPL-MCNC: 126 MG/DL (ref 65–99)
GLUCOSE UR STRIP.AUTO-MCNC: NEGATIVE MG/DL
HCO3 BLDA-SCNC: 17.3 MMOL/L (ref 17–25)
HCO3 BLDA-SCNC: 21.2 MMOL/L (ref 17–25)
HCT VFR BLD AUTO: 21.7 % (ref 42–52)
HCT VFR BLD AUTO: 29.9 % (ref 42–52)
HCT VFR BLD CALC: 30 % (ref 42–52)
HGB BLD-MCNC: 10.2 G/DL (ref 14–18)
HGB BLD-MCNC: 7 G/DL (ref 14–18)
HGB BLD-MCNC: 9.6 G/DL (ref 14–18)
HGB BLD-MCNC: 9.8 G/DL (ref 14–18)
HOROWITZ INDEX BLDA+IHG-RTO: 387 MM[HG]
HOROWITZ INDEX BLDA+IHG-RTO: 391 MM[HG]
HYALINE CASTS #/AREA URNS LPF: ABNORMAL /LPF
INR PPP: 1.08 (ref 0.87–1.13)
INR PPP: 6.56 (ref 0.87–1.13)
INST. QUALIFIED PATIENT IIQPT: YES
INST. QUALIFIED PATIENT IIQPT: YES
KETONES UR STRIP.AUTO-MCNC: NEGATIVE MG/DL
LACTATE BLD-SCNC: 2.5 MMOL/L (ref 0.5–2)
LEUKOCYTE ESTERASE UR QL STRIP.AUTO: NEGATIVE
MCF BLD TEG: 66.4 MM (ref 50–70)
MCF BLD TEG: 66.5 MM (ref 50–70)
MCF P HPASE BLD TEG: 67.3 MM (ref 50–70)
MCH RBC QN AUTO: 28 PG (ref 27–33)
MCH RBC QN AUTO: 29.3 PG (ref 27–33)
MCHC RBC AUTO-ENTMCNC: 32.3 G/DL (ref 33.7–35.3)
MCHC RBC AUTO-ENTMCNC: 32.8 G/DL (ref 33.7–35.3)
MCV RBC AUTO: 86.8 FL (ref 81.4–97.8)
MCV RBC AUTO: 89.3 FL (ref 81.4–97.8)
MICRO URNS: ABNORMAL
NITRITE UR QL STRIP.AUTO: NEGATIVE
O2/TOTAL GAS SETTING VFR VENT: 100 %
O2/TOTAL GAS SETTING VFR VENT: 60 %
PA AA BLD-ACNC: 44.6 %
PA AA BLD-ACNC: 90.4 %
PA ADP BLD-ACNC: 100 %
PA ADP BLD-ACNC: 99.8 %
PCO2 BLDA: 31.6 MMHG (ref 26–37)
PCO2 BLDA: 50 MMHG (ref 26–37)
PCO2 TEMP ADJ BLDA: 32.1 MMHG (ref 26–37)
PH BLDA: 7.24 [PH] (ref 7.4–7.5)
PH BLDA: 7.34 [PH] (ref 7.4–7.5)
PH TEMP ADJ BLDA: 7.34 [PH] (ref 7.4–7.5)
PH UR STRIP.AUTO: 5 [PH]
PLATELET # BLD AUTO: 199 K/UL (ref 164–446)
PLATELET # BLD AUTO: 219 K/UL (ref 164–446)
PMV BLD AUTO: 11 FL (ref 9–12.9)
PMV BLD AUTO: 11.3 FL (ref 9–12.9)
PO2 BLDA: 232 MMHG (ref 64–87)
PO2 BLDA: 391 MMHG (ref 64–87)
PO2 TEMP ADJ BLDA: 233 MMHG (ref 64–87)
POTASSIUM BLD-SCNC: 4.4 MMOL/L (ref 3.6–5.5)
POTASSIUM SERPL-SCNC: 4.4 MMOL/L (ref 3.6–5.5)
PROCALCITONIN SERPL-MCNC: 0.13 NG/ML
PRODUCT TYPE UPROD: NORMAL
PROT SERPL-MCNC: 4.3 G/DL (ref 6–8.2)
PROT UR QL STRIP: NEGATIVE MG/DL
PROTHROMBIN TIME: 14.1 SEC (ref 12–14.6)
PROTHROMBIN TIME: 57.1 SEC (ref 12–14.6)
RBC # BLD AUTO: 2.5 M/UL (ref 4.7–6.1)
RBC # BLD AUTO: 3.35 M/UL (ref 4.7–6.1)
RBC # URNS HPF: ABNORMAL /HPF
RBC UR QL AUTO: ABNORMAL
RH BLD: NORMAL
RH BLD: NORMAL
SAO2 % BLDA: 100 % (ref 93–99)
SAO2 % BLDA: 100 % (ref 93–99)
SODIUM BLD-SCNC: 141 MMOL/L (ref 135–145)
SODIUM SERPL-SCNC: 137 MMOL/L (ref 135–145)
SP GR UR STRIP.AUTO: 1.02
SPECIMEN DRAWN FROM PATIENT: ABNORMAL
SPECIMEN DRAWN FROM PATIENT: ABNORMAL
TEG ALGORITHM TGALG: ABNORMAL
TEG ALGORITHM TGALG: NORMAL
UNIT STATUS USTAT: NORMAL
UROBILINOGEN UR STRIP.AUTO-MCNC: 0.2 MG/DL
WBC # BLD AUTO: 19.6 K/UL (ref 4.8–10.8)
WBC # BLD AUTO: 32.1 K/UL (ref 4.8–10.8)
WBC #/AREA URNS HPF: ABNORMAL /HPF

## 2019-01-21 PROCEDURE — 51702 INSERT TEMP BLADDER CATH: CPT

## 2019-01-21 PROCEDURE — 85018 HEMOGLOBIN: CPT | Mod: 91

## 2019-01-21 PROCEDURE — 86850 RBC ANTIBODY SCREEN: CPT

## 2019-01-21 PROCEDURE — 36620 INSERTION CATHETER ARTERY: CPT

## 2019-01-21 PROCEDURE — 700111 HCHG RX REV CODE 636 W/ 250 OVERRIDE (IP)

## 2019-01-21 PROCEDURE — C1751 CATH, INF, PER/CENT/MIDLINE: HCPCS

## 2019-01-21 PROCEDURE — 94002 VENT MGMT INPAT INIT DAY: CPT

## 2019-01-21 PROCEDURE — 86923 COMPATIBILITY TEST ELECTRIC: CPT | Mod: 91

## 2019-01-21 PROCEDURE — P9016 RBC LEUKOCYTES REDUCED: HCPCS

## 2019-01-21 PROCEDURE — 0DJ68ZZ INSPECTION OF STOMACH, VIA NATURAL OR ARTIFICIAL OPENING ENDOSCOPIC: ICD-10-PCS | Performed by: INTERNAL MEDICINE

## 2019-01-21 PROCEDURE — 86900 BLOOD TYPING SEROLOGIC ABO: CPT

## 2019-01-21 PROCEDURE — 82803 BLOOD GASES ANY COMBINATION: CPT

## 2019-01-21 PROCEDURE — 160203 HCHG ENDO MINUTES - 1ST 30 MINS LEVEL 4: Performed by: INTERNAL MEDICINE

## 2019-01-21 PROCEDURE — 96375 TX/PRO/DX INJ NEW DRUG ADDON: CPT

## 2019-01-21 PROCEDURE — 87070 CULTURE OTHR SPECIMN AEROBIC: CPT

## 2019-01-21 PROCEDURE — 87205 SMEAR GRAM STAIN: CPT

## 2019-01-21 PROCEDURE — 74018 RADEX ABDOMEN 1 VIEW: CPT

## 2019-01-21 PROCEDURE — 700105 HCHG RX REV CODE 258: Performed by: HOSPITALIST

## 2019-01-21 PROCEDURE — 36600 WITHDRAWAL OF ARTERIAL BLOOD: CPT

## 2019-01-21 PROCEDURE — 86901 BLOOD TYPING SEROLOGIC RH(D): CPT

## 2019-01-21 PROCEDURE — 700101 HCHG RX REV CODE 250

## 2019-01-21 PROCEDURE — 30233N1 TRANSFUSION OF NONAUTOLOGOUS RED BLOOD CELLS INTO PERIPHERAL VEIN, PERCUTANEOUS APPROACH: ICD-10-PCS | Performed by: INTERNAL MEDICINE

## 2019-01-21 PROCEDURE — 96368 THER/DIAG CONCURRENT INF: CPT

## 2019-01-21 PROCEDURE — 0BH17EZ INSERTION OF ENDOTRACHEAL AIRWAY INTO TRACHEA, VIA NATURAL OR ARTIFICIAL OPENING: ICD-10-PCS | Performed by: EMERGENCY MEDICINE

## 2019-01-21 PROCEDURE — 99291 CRITICAL CARE FIRST HOUR: CPT

## 2019-01-21 PROCEDURE — 96376 TX/PRO/DX INJ SAME DRUG ADON: CPT

## 2019-01-21 PROCEDURE — 500066 HCHG BITE BLOCK, ECT: Performed by: INTERNAL MEDICINE

## 2019-01-21 PROCEDURE — 87040 BLOOD CULTURE FOR BACTERIA: CPT

## 2019-01-21 PROCEDURE — 93005 ELECTROCARDIOGRAM TRACING: CPT | Performed by: EMERGENCY MEDICINE

## 2019-01-21 PROCEDURE — 99291 CRITICAL CARE FIRST HOUR: CPT | Performed by: INTERNAL MEDICINE

## 2019-01-21 PROCEDURE — 700105 HCHG RX REV CODE 258: Performed by: INTERNAL MEDICINE

## 2019-01-21 PROCEDURE — 700101 HCHG RX REV CODE 250: Performed by: INTERNAL MEDICINE

## 2019-01-21 PROCEDURE — 36556 INSERT NON-TUNNEL CV CATH: CPT

## 2019-01-21 PROCEDURE — 99292 CRITICAL CARE ADDL 30 MIN: CPT | Mod: 25 | Performed by: INTERNAL MEDICINE

## 2019-01-21 PROCEDURE — 304538 HCHG NG TUBE

## 2019-01-21 PROCEDURE — 31500 INSERT EMERGENCY AIRWAY: CPT

## 2019-01-21 PROCEDURE — 700101 HCHG RX REV CODE 250: Performed by: HOSPITALIST

## 2019-01-21 PROCEDURE — 700105 HCHG RX REV CODE 258: Performed by: EMERGENCY MEDICINE

## 2019-01-21 PROCEDURE — C9132 KCENTRA, PER I.U.: HCPCS | Mod: JG | Performed by: EMERGENCY MEDICINE

## 2019-01-21 PROCEDURE — 85610 PROTHROMBIN TIME: CPT

## 2019-01-21 PROCEDURE — 84132 ASSAY OF SERUM POTASSIUM: CPT

## 2019-01-21 PROCEDURE — 700111 HCHG RX REV CODE 636 W/ 250 OVERRIDE (IP): Mod: JG | Performed by: EMERGENCY MEDICINE

## 2019-01-21 PROCEDURE — 96366 THER/PROPH/DIAG IV INF ADDON: CPT

## 2019-01-21 PROCEDURE — 160208 HCHG ENDO MINUTES - EA ADDL 1 MIN LEVEL 4: Performed by: INTERNAL MEDICINE

## 2019-01-21 PROCEDURE — C9113 INJ PANTOPRAZOLE SODIUM, VIA: HCPCS | Performed by: EMERGENCY MEDICINE

## 2019-01-21 PROCEDURE — 85014 HEMATOCRIT: CPT | Mod: 91

## 2019-01-21 PROCEDURE — 304561 HCHG STAT O2

## 2019-01-21 PROCEDURE — 303105 HCHG CATHETER EXTRA

## 2019-01-21 PROCEDURE — 85384 FIBRINOGEN ACTIVITY: CPT | Mod: 91

## 2019-01-21 PROCEDURE — 700111 HCHG RX REV CODE 636 W/ 250 OVERRIDE (IP): Performed by: INTERNAL MEDICINE

## 2019-01-21 PROCEDURE — C9113 INJ PANTOPRAZOLE SODIUM, VIA: HCPCS | Performed by: HOSPITALIST

## 2019-01-21 PROCEDURE — 87086 URINE CULTURE/COLONY COUNT: CPT

## 2019-01-21 PROCEDURE — 80053 COMPREHEN METABOLIC PANEL: CPT

## 2019-01-21 PROCEDURE — 03HY32Z INSERTION OF MONITORING DEVICE INTO UPPER ARTERY, PERCUTANEOUS APPROACH: ICD-10-PCS | Performed by: INTERNAL MEDICINE

## 2019-01-21 PROCEDURE — 85730 THROMBOPLASTIN TIME PARTIAL: CPT

## 2019-01-21 PROCEDURE — 99223 1ST HOSP IP/OBS HIGH 75: CPT | Performed by: HOSPITALIST

## 2019-01-21 PROCEDURE — 84295 ASSAY OF SERUM SODIUM: CPT

## 2019-01-21 PROCEDURE — 770022 HCHG ROOM/CARE - ICU (200)

## 2019-01-21 PROCEDURE — B34HZZZ ULTRASONOGRAPHY OF RIGHT UPPER EXTREMITY ARTERIES: ICD-10-PCS | Performed by: INTERNAL MEDICINE

## 2019-01-21 PROCEDURE — 02HV33Z INSERTION OF INFUSION DEVICE INTO SUPERIOR VENA CAVA, PERCUTANEOUS APPROACH: ICD-10-PCS | Performed by: EMERGENCY MEDICINE

## 2019-01-21 PROCEDURE — 96365 THER/PROPH/DIAG IV INF INIT: CPT

## 2019-01-21 PROCEDURE — 81001 URINALYSIS AUTO W/SCOPE: CPT

## 2019-01-21 PROCEDURE — 700101 HCHG RX REV CODE 250: Performed by: EMERGENCY MEDICINE

## 2019-01-21 PROCEDURE — C1751 CATH, INF, PER/CENT/MIDLINE: HCPCS | Performed by: EMERGENCY MEDICINE

## 2019-01-21 PROCEDURE — 85347 COAGULATION TIME ACTIVATED: CPT | Mod: 91

## 2019-01-21 PROCEDURE — 5A1945Z RESPIRATORY VENTILATION, 24-96 CONSECUTIVE HOURS: ICD-10-PCS | Performed by: EMERGENCY MEDICINE

## 2019-01-21 PROCEDURE — 85576 BLOOD PLATELET AGGREGATION: CPT | Mod: 91

## 2019-01-21 PROCEDURE — 71045 X-RAY EXAM CHEST 1 VIEW: CPT

## 2019-01-21 PROCEDURE — 160048 HCHG OR STATISTICAL LEVEL 1-5: Performed by: INTERNAL MEDICINE

## 2019-01-21 PROCEDURE — 85027 COMPLETE CBC AUTOMATED: CPT

## 2019-01-21 PROCEDURE — 83605 ASSAY OF LACTIC ACID: CPT

## 2019-01-21 PROCEDURE — 84145 PROCALCITONIN (PCT): CPT

## 2019-01-21 PROCEDURE — 96367 TX/PROPH/DG ADDL SEQ IV INF: CPT

## 2019-01-21 PROCEDURE — 700111 HCHG RX REV CODE 636 W/ 250 OVERRIDE (IP): Performed by: HOSPITALIST

## 2019-01-21 PROCEDURE — 36620 INSERTION CATHETER ARTERY: CPT | Mod: RT | Performed by: INTERNAL MEDICINE

## 2019-01-21 PROCEDURE — 36430 TRANSFUSION BLD/BLD COMPNT: CPT

## 2019-01-21 PROCEDURE — 700102 HCHG RX REV CODE 250 W/ 637 OVERRIDE(OP): Performed by: HOSPITALIST

## 2019-01-21 PROCEDURE — 82330 ASSAY OF CALCIUM: CPT

## 2019-01-21 PROCEDURE — 700105 HCHG RX REV CODE 258

## 2019-01-21 RX ORDER — NYSTATIN 100000 [USP'U]/G
POWDER TOPICAL 2 TIMES DAILY
Status: DISCONTINUED | OUTPATIENT
Start: 2019-01-21 | End: 2019-01-28 | Stop reason: HOSPADM

## 2019-01-21 RX ORDER — HYDRALAZINE HYDROCHLORIDE 10 MG/1
10 TABLET, FILM COATED ORAL
Status: ON HOLD | COMMUNITY
End: 2019-02-08

## 2019-01-21 RX ORDER — MIDAZOLAM HYDROCHLORIDE 1 MG/ML
INJECTION INTRAMUSCULAR; INTRAVENOUS
Status: COMPLETED
Start: 2019-01-21 | End: 2019-01-21

## 2019-01-21 RX ORDER — MIDAZOLAM HYDROCHLORIDE 1 MG/ML
2 INJECTION INTRAMUSCULAR; INTRAVENOUS ONCE
Status: COMPLETED | OUTPATIENT
Start: 2019-01-21 | End: 2019-01-21

## 2019-01-21 RX ORDER — SODIUM CHLORIDE 9 MG/ML
INJECTION, SOLUTION INTRAVENOUS
Status: COMPLETED
Start: 2019-01-21 | End: 2019-01-21

## 2019-01-21 RX ORDER — METOCLOPRAMIDE HYDROCHLORIDE 5 MG/ML
10 INJECTION INTRAMUSCULAR; INTRAVENOUS ONCE
Status: COMPLETED | OUTPATIENT
Start: 2019-01-21 | End: 2019-01-21

## 2019-01-21 RX ORDER — LEVOTHYROXINE SODIUM 0.07 MG/1
75 TABLET ORAL
Status: ON HOLD | COMMUNITY
End: 2019-02-08

## 2019-01-21 RX ORDER — KETAMINE HYDROCHLORIDE 50 MG/ML
INJECTION, SOLUTION INTRAMUSCULAR; INTRAVENOUS
Status: COMPLETED
Start: 2019-01-21 | End: 2019-01-21

## 2019-01-21 RX ORDER — SODIUM CHLORIDE 9 MG/ML
30 INJECTION, SOLUTION INTRAVENOUS ONCE
Status: COMPLETED | OUTPATIENT
Start: 2019-01-21 | End: 2019-01-21

## 2019-01-21 RX ORDER — PHENYLEPHRINE HCL IN 0.9% NACL 0.5 MG/5ML
200 SYRINGE (ML) INTRAVENOUS
Status: DISCONTINUED | OUTPATIENT
Start: 2019-01-21 | End: 2019-01-21

## 2019-01-21 RX ORDER — BISACODYL 10 MG
10 SUPPOSITORY, RECTAL RECTAL
Status: DISCONTINUED | OUTPATIENT
Start: 2019-01-21 | End: 2019-01-25

## 2019-01-21 RX ORDER — ROCURONIUM BROMIDE 10 MG/ML
100 INJECTION, SOLUTION INTRAVENOUS ONCE
Status: COMPLETED | OUTPATIENT
Start: 2019-01-21 | End: 2019-01-21

## 2019-01-21 RX ORDER — MIDAZOLAM HYDROCHLORIDE 1 MG/ML
.5-2 INJECTION INTRAMUSCULAR; INTRAVENOUS PRN
Status: ACTIVE | OUTPATIENT
Start: 2019-01-21 | End: 2019-01-21

## 2019-01-21 RX ORDER — SODIUM CHLORIDE 9 MG/ML
500 INJECTION, SOLUTION INTRAVENOUS
Status: ACTIVE | OUTPATIENT
Start: 2019-01-21 | End: 2019-01-21

## 2019-01-21 RX ORDER — HYDROCORTISONE 10 MG/1
10 TABLET ORAL 3 TIMES DAILY
Status: ON HOLD | COMMUNITY
End: 2019-01-27

## 2019-01-21 RX ORDER — AMOXICILLIN 250 MG
2 CAPSULE ORAL 2 TIMES DAILY
Status: DISCONTINUED | OUTPATIENT
Start: 2019-01-21 | End: 2019-01-25

## 2019-01-21 RX ORDER — METOCLOPRAMIDE HYDROCHLORIDE 5 MG/ML
10 INJECTION INTRAMUSCULAR; INTRAVENOUS EVERY 6 HOURS
Status: COMPLETED | OUTPATIENT
Start: 2019-01-21 | End: 2019-01-22

## 2019-01-21 RX ORDER — KETAMINE HYDROCHLORIDE 50 MG/ML
150 INJECTION, SOLUTION INTRAMUSCULAR; INTRAVENOUS ONCE
Status: COMPLETED | OUTPATIENT
Start: 2019-01-21 | End: 2019-01-21

## 2019-01-21 RX ORDER — NOREPINEPHRINE BITARTRATE 1 MG/ML
INJECTION, SOLUTION INTRAVENOUS
Status: DISPENSED
Start: 2019-01-21 | End: 2019-01-22

## 2019-01-21 RX ORDER — SODIUM CHLORIDE, SODIUM LACTATE, POTASSIUM CHLORIDE, CALCIUM CHLORIDE 600; 310; 30; 20 MG/100ML; MG/100ML; MG/100ML; MG/100ML
INJECTION, SOLUTION INTRAVENOUS CONTINUOUS
Status: DISCONTINUED | OUTPATIENT
Start: 2019-01-21 | End: 2019-01-22

## 2019-01-21 RX ORDER — PHENYLEPHRINE HCL IN 0.9% NACL 0.5 MG/5ML
SYRINGE (ML) INTRAVENOUS
Status: COMPLETED
Start: 2019-01-21 | End: 2019-01-21

## 2019-01-21 RX ORDER — POLYETHYLENE GLYCOL 3350 17 G/17G
1 POWDER, FOR SOLUTION ORAL
Status: DISCONTINUED | OUTPATIENT
Start: 2019-01-21 | End: 2019-01-25

## 2019-01-21 RX ORDER — LISINOPRIL 30 MG/1
30 TABLET ORAL DAILY
Status: ON HOLD | COMMUNITY
End: 2019-01-27

## 2019-01-21 RX ADMIN — FENTANYL CITRATE 100 MCG: 50 INJECTION, SOLUTION INTRAMUSCULAR; INTRAVENOUS at 15:10

## 2019-01-21 RX ADMIN — MIDAZOLAM HYDROCHLORIDE 2 MG: 1 INJECTION INTRAMUSCULAR; INTRAVENOUS at 17:57

## 2019-01-21 RX ADMIN — HYDROCORTISONE SODIUM SUCCINATE 100 MG: 100 INJECTION, POWDER, FOR SOLUTION INTRAMUSCULAR; INTRAVENOUS at 14:53

## 2019-01-21 RX ADMIN — Medication 200 MCG: at 14:38

## 2019-01-21 RX ADMIN — PROTHROMBIN, COAGULATION FACTOR VII HUMAN, COAGULATION FACTOR IX HUMAN, COAGULATION FACTOR X HUMAN, PROTEIN C, PROTEIN S HUMAN, AND WATER 3635 UNITS: KIT at 13:34

## 2019-01-21 RX ADMIN — FENTANYL CITRATE 50 MCG: 50 INJECTION, SOLUTION INTRAMUSCULAR; INTRAVENOUS at 18:09

## 2019-01-21 RX ADMIN — SODIUM CHLORIDE 80 MG: 9 INJECTION, SOLUTION INTRAVENOUS at 15:14

## 2019-01-21 RX ADMIN — FENTANYL CITRATE 50 MCG: 50 INJECTION, SOLUTION INTRAMUSCULAR; INTRAVENOUS at 18:00

## 2019-01-21 RX ADMIN — FENTANYL CITRATE 100 MCG: 50 INJECTION, SOLUTION INTRAMUSCULAR; INTRAVENOUS at 15:45

## 2019-01-21 RX ADMIN — PHENYLEPHRINE HYDROCHLORIDE 40000 MCG: 10 INJECTION INTRAVENOUS at 20:00

## 2019-01-21 RX ADMIN — Medication 200 MCG: at 14:00

## 2019-01-21 RX ADMIN — PROTHROMBIN, COAGULATION FACTOR VII HUMAN, COAGULATION FACTOR IX HUMAN, COAGULATION FACTOR X HUMAN, PROTEIN C, PROTEIN S HUMAN, AND WATER 1604 UNITS: KIT at 14:55

## 2019-01-21 RX ADMIN — DOXYCYCLINE 100 MG: 100 INJECTION, POWDER, LYOPHILIZED, FOR SOLUTION INTRAVENOUS at 20:52

## 2019-01-21 RX ADMIN — ROCURONIUM BROMIDE 100 MG: 10 INJECTION, SOLUTION INTRAVENOUS at 13:57

## 2019-01-21 RX ADMIN — NYSTATIN: 100000 POWDER TOPICAL at 21:44

## 2019-01-21 RX ADMIN — FENTANYL CITRATE 50 MCG: 50 INJECTION, SOLUTION INTRAMUSCULAR; INTRAVENOUS at 16:25

## 2019-01-21 RX ADMIN — NOREPINEPHRINE BITARTRATE 30 MCG/MIN: 1 INJECTION INTRAVENOUS at 19:58

## 2019-01-21 RX ADMIN — MIDAZOLAM 2 MG: 1 INJECTION INTRAMUSCULAR; INTRAVENOUS at 14:11

## 2019-01-21 RX ADMIN — HYDROCORTISONE SODIUM SUCCINATE 50 MG: 100 INJECTION, POWDER, FOR SOLUTION INTRAMUSCULAR; INTRAVENOUS at 21:44

## 2019-01-21 RX ADMIN — METOCLOPRAMIDE 10 MG: 5 INJECTION, SOLUTION INTRAMUSCULAR; INTRAVENOUS at 18:42

## 2019-01-21 RX ADMIN — SODIUM CHLORIDE: 9 INJECTION, SOLUTION INTRAVENOUS at 20:30

## 2019-01-21 RX ADMIN — OCTREOTIDE ACETATE 50 MCG/HR: 200 INJECTION, SOLUTION INTRAVENOUS; SUBCUTANEOUS at 17:58

## 2019-01-21 RX ADMIN — MIDAZOLAM 2 MG: 1 INJECTION INTRAMUSCULAR; INTRAVENOUS at 13:30

## 2019-01-21 RX ADMIN — FENTANYL CITRATE 100 MCG: 50 INJECTION, SOLUTION INTRAMUSCULAR; INTRAVENOUS at 17:55

## 2019-01-21 RX ADMIN — KETAMINE HYDROCHLORIDE 150 MG: 50 INJECTION, SOLUTION INTRAMUSCULAR; INTRAVENOUS at 13:57

## 2019-01-21 RX ADMIN — SODIUM CHLORIDE: 9 INJECTION, SOLUTION INTRAVENOUS at 20:00

## 2019-01-21 RX ADMIN — MIDAZOLAM HYDROCHLORIDE 2 MG: 1 INJECTION, SOLUTION INTRAMUSCULAR; INTRAVENOUS at 17:57

## 2019-01-21 RX ADMIN — SODIUM CHLORIDE 8 MG/HR: 9 INJECTION, SOLUTION INTRAVENOUS at 15:31

## 2019-01-21 RX ADMIN — Medication 200 MCG: at 13:45

## 2019-01-21 RX ADMIN — SODIUM CHLORIDE, POTASSIUM CHLORIDE, SODIUM LACTATE AND CALCIUM CHLORIDE: 600; 310; 30; 20 INJECTION, SOLUTION INTRAVENOUS at 18:34

## 2019-01-21 RX ADMIN — KETAMINE HYDROCHLORIDE 150 MG: 50 INJECTION, SOLUTION, CONCENTRATE INTRAMUSCULAR; INTRAVENOUS at 13:57

## 2019-01-21 RX ADMIN — METOCLOPRAMIDE 10 MG: 5 INJECTION, SOLUTION INTRAMUSCULAR; INTRAVENOUS at 15:06

## 2019-01-21 RX ADMIN — SODIUM CHLORIDE 2994 ML: 9 INJECTION, SOLUTION INTRAVENOUS at 13:21

## 2019-01-21 RX ADMIN — MIDAZOLAM HYDROCHLORIDE 2 MG: 1 INJECTION INTRAMUSCULAR; INTRAVENOUS at 13:30

## 2019-01-21 RX ADMIN — FENTANYL CITRATE 100 MCG: 50 INJECTION, SOLUTION INTRAMUSCULAR; INTRAVENOUS at 19:45

## 2019-01-21 RX ADMIN — PHYTONADIONE 10 MG: 10 INJECTION, EMULSION INTRAMUSCULAR; INTRAVENOUS; SUBCUTANEOUS at 13:21

## 2019-01-21 RX ADMIN — NOREPINEPHRINE BITARTRATE 15 MCG/MIN: 1 INJECTION INTRAVENOUS at 13:50

## 2019-01-21 RX ADMIN — SODIUM CHLORIDE: 9 INJECTION, SOLUTION INTRAVENOUS at 19:45

## 2019-01-21 RX ADMIN — CEFTRIAXONE SODIUM 2 G: 2 INJECTION, POWDER, FOR SOLUTION INTRAMUSCULAR; INTRAVENOUS at 18:21

## 2019-01-21 RX ADMIN — FENTANYL CITRATE 100 MCG: 50 INJECTION, SOLUTION INTRAMUSCULAR; INTRAVENOUS at 20:09

## 2019-01-21 RX ADMIN — FENTANYL CITRATE 100 MCG: 50 INJECTION, SOLUTION INTRAMUSCULAR; INTRAVENOUS at 20:25

## 2019-01-21 NOTE — CONSULTS
Critical Care Consultation    Date of consult: 1/21/2019    Referring Physician  Macie Simmons M.D.    Reason for Consultation  Hemorrhagic shock    History of Presenting Illness  86 y.o. male who presented 1/21/2019 with pituitary neoplasm on chronic steroids, pulmonary embolism with IVC filter, cardiac pace maker for sick sinus syndrome, COPD, EVANGELINA, that presented to [facility] for one day of bloody melanotic stools, and some chronic pain which he was taking aleve per report. His hg was 11 and repeat here was 7 and had persistent melanotic stools and agitation that led him getting intubation with ketamine and mayuri in ER here and was given 1 prbc and placed on Jonny synephrine up to 400 and norepinephrine to 20 also required jonny pushes, his inial blood gas was 7.23/50/391 and his RR was increased from 18 to 22 and titrated down to 40% FIO2. His INR was 6 and was given K centra. I was asked to see patient and history is limited secondary to being intubated and given ketamine and versed.     Code Status  Full Code    Review of Systems  Review of Systems   Unable to perform ROS: Intubated       Past Medical History   has a past medical history of Anesthesia; Arrhythmia; Arthritis; Asbestos exposure; ASTHMA; Back pain; Benign neoplasm of pituitary gland and craniopharyngeal duct (pouch) ([facility]) (4/1/2010); BPH (benign prostatic hypertrophy) (4/1/2010); Bradycardia; Breath shortness; Bronchitis; Cardiac pacemaker (04 2010); Cataract; COPD (chronic obstructive pulmonary disease) ([facility]); Diverticulitis; DJD (degenerative joint disease); EMPHYSEMA; Glaucoma; Heart burn; Heart murmur; Hiatus hernia syndrome; Hypertension; 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); Indigestion; Knee arthroplasty (2002); Obstructive hypertrophic cardiomyopathy ([facility]) (8/4/2011); PAF (paroxysmal atrial fibrillation) ([facility]); Paroxysmal atrial fibrillation ([facility]) (8/29/2011); PE (pulmonary embolism); Phlebitis; Pituitary adenoma ([facility]) (1995); Pituitary adenoma ([facility])  (1995); Pneumonia; Rheumatic fever; S/P insertion of IVC (inferior vena caval) filter; S/P parathyroidectomy ([facility]); S/P parathyroidectomy ([facility]) (2005); Sleep apnea; SSS (sick sinus syndrome) ([facility]) (8/29/2011); Third degree AV block ([facility]) (8/29/2011); thyroidectomy (2005); Unspecified disorder of thyroid; Unspecified hemorrhagic conditions; Unspecified urinary incontinence; and Urinary bladder disorder.    Surgical History   has a past surgical history that includes other orthopedic surgery; cholecystectomy; pr total knee arthroplasty; other; cervical disk and fusion anterior; pr revise ulnar nerve at wrist; pr total knee arthroplasty; cataract extraction with iol; thyroidectomy; pacemaker insertion (Left, 04/23/2010); carpal tunnel endoscopic (9/30/2011); knee revision total (6/20/2013); cholecystectomy; and cervical fusion posterior.    Family History  family history includes Arthritis in his father and mother.    Social History   reports that he has never smoked. He has never used smokeless tobacco. He reports that he does not drink alcohol or use drugs.    Medications  Home Medications     Reviewed by William Torres (Pharmacy Tech) on 01/21/19 at 1421  Med List Status: Complete   Medication Last Dose Status   albuterol (PROVENTIL) 2.5mg/3ml NEBU PRN Active   albuterol (VENTOLIN OR PROVENTIL) 108 (90 BASE) MCG/ACT AERS PRN Active   diltiazem CD (CARDIZEM CD) 120 MG CAPSULE SR 24 HR UNK Active   gabapentin (NEURONTIN) 600 MG tablet UNK Active   hydrALAZINE (APRESOLINE) 10 MG Tab UNK Active   hydrocodone-acetaminophen (NORCO) 5-325 MG Tab per tablet UNK Active   hydrocortisone (CORTEF) 10 MG Tab UNK Active   levothyroxine (SYNTHROID) 75 MCG Tab UNK Active   lisinopril (PRINIVIL, ZESTRIL) 30 MG tablet UNK Active   Mometasone Furo-Formoterol Fum (DULERA) 200-5 MCG/ACT Aerosol UNK Active   montelukast (SINGULAIR) 10 MG Tab UNK Active   sotalol (BETAPACE) 80 MG Tab UNK Active   tamsulosin (FLOMAX) 0.4 MG capsule UNK  Active   testosterone cypionate (DEPO-TESTOSTERONE) 200 MG/ML Solution injection UNK Active   testosterone cypionate (DEPO-TESTOSTERONE) injection 300 mg  Active   tiotropium (SPIRIVA) 18 MCG CAPS UNK Active   triamterene/hctz (DYAZIDE) 37.5-25 MG Cap UNK Active   warfarin (COUMADIN) 5 MG Tab UNK Active              Current Facility-Administered Medications   Medication Dose Route Frequency Provider Last Rate Last Dose   • fentaNYL (SUBLIMAZE) injection 50 mcg  50 mcg Intravenous Once Macie Simmons M.D.   Stopped at 01/21/19 1330   • NOREPINEPHRINE BITARTRATE 1 MG/ML IV SOLN            • norepinephrine (LEVOPHED) 8 mg in  mL Infusion  0-30 mcg/min Intravenous Continuous Luca Puckett M.D. 26.3 mL/hr at 01/21/19 1622 14 mcg/min at 01/21/19 1622   • octreotide (SANDOSTATIN) 1,250 mcg in  mL Infusion  50 mcg/hr Intravenous Continuous Luca Puckett M.D.       • pantoprazole (PROTONIX) 80 mg in  mL Infusion  8 mg/hr Intravenous Continuous Luca Puckett M.D. 25 mL/hr at 01/21/19 1531 8 mg/hr at 01/21/19 1531   • senna-docusate (PERICOLACE or SENOKOT S) 8.6-50 MG per tablet 2 Tab  2 Tab Enteral Tube BID Luca Puckett M.D.        And   • polyethylene glycol/lytes (MIRALAX) PACKET 1 Packet  1 Packet Enteral Tube QDAY PRN Luca Puckett M.D.        And   • magnesium hydroxide (MILK OF MAGNESIA) suspension 30 mL  30 mL Enteral Tube QDAY PRN Luca Puckett M.D.        And   • bisacodyl (DULCOLAX) suppository 10 mg  10 mg Rectal QDAY PRN Luca Puckett M.D.       • Respiratory Care per Protocol   Nebulization Continuous RT Luca Puckett M.D.       • lactated ringers infusion   Intravenous Continuous Luca Puckett M.D.       • hydrocortisone sodium succinate PF (SOLU-CORTEF) 100 MG injection 50 mg  50 mg Intravenous Q6HRS Luca Puckett M.D.       • fentaNYL (SUBLIMAZE) injection 25-50 mcg  25-50 mcg Intravenous Q30 MIN PRN Luca Puckett M.D.   50 mcg at 01/21/19 1625   • meropenem (MERREM) 1 g in NS  100 mL IVPB  1,000 mg Intravenous Q12HRS Randolph Tan M.D.       • doxycycline (VIBRAMYCIN) 100 mg in  mL IVPB  100 mg Intravenous Q12HRS Randolph Tan M.D.       • testosterone cypionate (DEPO-TESTOSTERONE) injection 300 mg  300 mg Intramuscular Q21 DAYS Ruthann Sierra M.D.         Current Outpatient Prescriptions   Medication Sig Dispense Refill   • lisinopril (PRINIVIL, ZESTRIL) 30 MG tablet Take 30 mg by mouth every day.     • hydrALAZINE (APRESOLINE) 10 MG Tab Take 10 mg by mouth 1 time daily as needed. Only take if BP > 160     • hydrocortisone (CORTEF) 10 MG Tab Take 10 mg by mouth 3 times a day.     • levothyroxine (SYNTHROID) 75 MCG Tab Take 75 mcg by mouth Every morning on an empty stomach.     • sotalol (BETAPACE) 80 MG Tab Take 0.5 Tabs by mouth 2 times a day. Needs to be seen for further refills. Thank you 90 Tab 0   • diltiazem CD (CARDIZEM CD) 120 MG CAPSULE SR 24 HR Take 1 Cap by mouth every day. 90 Cap 3   • triamterene/hctz (DYAZIDE) 37.5-25 MG Cap Take 1 Cap by mouth every morning. 90 Cap 3   • tamsulosin (FLOMAX) 0.4 MG capsule Take 0.4 mg by mouth every day.     • Mometasone Furo-Formoterol Fum (DULERA) 200-5 MCG/ACT Aerosol Inhale 2 Puffs by mouth 2 Times a Day. Use spacer. Rinse mouth after use. 1 Inhaler 11   • gabapentin (NEURONTIN) 600 MG tablet Take 600 mg by mouth every bedtime.     • hydrocodone-acetaminophen (NORCO) 5-325 MG Tab per tablet Take 1 Tab by mouth every 8 hours as needed. Indications: Moderate to Moderately Severe Pain     • montelukast (SINGULAIR) 10 MG Tab Take 10 mg by mouth every day.     • testosterone cypionate (DEPO-TESTOSTERONE) 200 MG/ML Solution injection 300 mg by Intramuscular route every 21 days.     • albuterol (PROVENTIL) 2.5mg/3ml NEBU 2.5 mg by Nebulization route every four hours as needed for Shortness of Breath.     • albuterol (VENTOLIN OR PROVENTIL) 108 (90 BASE) MCG/ACT AERS Inhale 2 Puffs by mouth every 6 hours as needed for Shortness  of Breath.     • tiotropium (SPIRIVA) 18 MCG CAPS Inhale 18 mcg by mouth every day.         Allergies  Allergies   Allergen Reactions   • Pcn [Penicillins] Rash     Tolerates cephalosporins         Vital Signs last 24 hours  Temp:  [36.1 °C (97 °F)-36.9 °C (98.4 °F)] 36.9 °C (98.4 °F)  Pulse:  [] 119  Resp:  [15-32] 20  SpO2:  [86 %-100 %] 86 %    Physical Exam  Physical Exam   Constitutional: He appears well-developed and well-nourished. No distress.   HENT:   Head: Normocephalic.   Periorbital edema  ET in place   Eyes: Pupils are equal, round, and reactive to light. EOM are normal.   Neck: Normal range of motion. No JVD present.   Cardiovascular: Normal rate and regular rhythm.    No murmur heard.  Pulmonary/Chest: No respiratory distress. He has no wheezes. He has no rales.   Abdominal: He exhibits no distension. There is no tenderness. There is no rebound and no guarding.   Musculoskeletal:   Left scar to knee   Neurological:   Sedated and received mayuri and ketamine   Skin: He is not diaphoretic.   Red rash to left under breast and bilateral groin   Psychiatric:   sedated       Fluids  No intake or output data in the 24 hours ending 19 1630    Laboratory  Recent Results (from the past 48 hour(s))   EKG    Collection Time: 19 12:56 PM   Result Value Ref Range    Report       [facility] Emergency Dept.    Test Date:  2019  Pt Name:    MANOLO AUGUST                 Department: ER  MRN:        [id number]                      Room:       RD 12  Gender:     Male                         Technician: [id number]  :        [id number]                   Requested By:ER TRIAGE PROTOCOL  Order #:    [id number]                    Reading MD: SUNDEEP GAN MD    Measurements  Intervals                                Axis  Rate:       81                           P:          0  MN:         248                          QRS:        -38  QRSD:       126                          T:           155  QT:         384  QTc:        446    Interpretive Statements  SINUS RHYTHM at 81  FIRST DEGREE AV BLOCK  LEFT BUNDLE BRANCH BLOCK  INFERIOR Q WAVES, POSSIBLY DUE TO LBBB  Compared to ECG 06/20/2018 12:22:46  First degree AV block now present  Left bundle-branch block now present  Inferior Q waves now present  Q waves now present  Ventricular-paced complex(es) or rhy thm no longer present  Ventricular premature complex(es) no longer present  Prolonged QT interval no longer present    Electronically Signed On 1- 14:24:04 PST by SUNDEEP GAN MD     COD (ADULT)    Collection Time: 01/21/19  1:40 PM   Result Value Ref Range    ABO Grouping Only O     Rh Grouping Only POS     Antibody Screen-Cod NEG    CBC WITHOUT DIFFERENTIAL    Collection Time: 01/21/19  1:45 PM   Result Value Ref Range    WBC 19.6 (H) 4.8 - 10.8 K/uL    RBC 2.50 (L) 4.70 - 6.10 M/uL    Hemoglobin 7.0 (L) 14.0 - 18.0 g/dL    Hematocrit 21.7 (L) 42.0 - 52.0 %    MCV 86.8 81.4 - 97.8 fL    MCH 28.0 27.0 - 33.0 pg    MCHC 32.3 (L) 33.7 - 35.3 g/dL    RDW 48.6 35.9 - 50.0 fL    Platelet Count 199 164 - 446 K/uL    MPV 11.0 9.0 - 12.9 fL   LACTIC ACID    Collection Time: 01/21/19  1:45 PM   Result Value Ref Range    Lactic Acid 2.5 (H) 0.5 - 2.0 mmol/L   PROTHROMBIN TIME (INR)    Collection Time: 01/21/19  1:45 PM   Result Value Ref Range    PT 57.1 (HH) 12.0 - 14.6 sec    INR 6.56 (H) 0.87 - 1.13   APTT    Collection Time: 01/21/19  1:45 PM   Result Value Ref Range    APTT 49.0 (H) 24.7 - 36.0 sec   ABO AND RH CONFIRMATION    Collection Time: 01/21/19  1:45 PM   Result Value Ref Range    ABO Confirm O     Second Rh Group POS    UN-XM'D RBC    Collection Time: 01/21/19  1:59 PM   Result Value Ref Range    Component R       R33                 Red Blood Cells     [id number]   issued       01/21/19   13:50      Product Type Red Blood Cells LR Pheresis     Dispense Status Issued     Unit Number (Barcoded) [id number]     Product Code  (Barcoded) N5741W88     Blood Type (Barcoded) 5100     Component R       R3                  Red Blood Cells3    [id number]   issued       01/21/19   15:01      Product Type Red Blood Cells LR Pheresis     Dispense Status Issued     Unit Number (Barcoded) [id number]     Product Code (Barcoded) U0341O08     Blood Type (Barcoded) 5100    URINALYSIS (UA)    Collection Time: 01/21/19  2:11 PM   Result Value Ref Range    Color Yellow     Character Clear     Specific Gravity 1.018 <1.035    Ph 5.0 5.0 - 8.0    Glucose Negative Negative mg/dL    Ketones Negative Negative mg/dL    Protein Negative Negative mg/dL    Bilirubin Negative Negative    Urobilinogen, Urine 0.2 Negative    Nitrite Negative Negative    Leukocyte Esterase Negative Negative    Occult Blood Trace (A) Negative    Micro Urine Req Microscopic    URINE MICROSCOPIC (W/UA)    Collection Time: 01/21/19  2:11 PM   Result Value Ref Range    WBC 0-2 (A) /hpf    RBC 5-10 (A) /hpf    Bacteria Negative None /hpf    Epithelial Cells Negative /hpf    Hyaline Cast 3-5 (A) /lpf   ISTAT ARTERIAL BLOOD GAS    Collection Time: 01/21/19  2:20 PM   Result Value Ref Range    Ph 7.235 (LL) 7.400 - 7.500    Pco2 50.0 (H) 26.0 - 37.0 mmHg    Po2 391 (H) 64 - 87 mmHg    Tco2 23 20 - 33 mmol/L    S02 100 (H) 93 - 99 %    Hco3 21.2 17.0 - 25.0 mmol/L    BE -6 (L) -4 - 3 mmol/L    Body Temp see below degrees    O2 Therapy 100 %    iPF Ratio 391     Specimen Arterial     Action Range Triggered YES     Inst. Qualified Patient YES        Imaging  [id number] VIEW   Final Result      NG tube tip in the expected location of the mid stomach.      DX-CHEST-PORTABLE (1 VIEW)   Final Result      Tubes and line as described above.      Enlarged cardiac silhouette with left basal consolidation and left pleural fluid.      NM-GI BLEEDING SCAN    (Results Pending)       Assessment/Plan  * Shock (HCC)- (present on admission)   Assessment & Plan    Mutlifactorial: vasodilatory secondary to  adrenal insufficiency, hemorrhagic shock and possible septic  Follow up on exam, hg, cortisol, echo and cultures  Currently with warm shock predominating  Wean kehinde first and continue to wean norepinephrine to map > 65     Acute respiratory failure with hypoxia (HCC)   Assessment & Plan    Intubated date: 1/21 for airway protection and hemodynamic control  Goal saturation > 90%    Monitor pulse oximetry and adjust peep and FIO2 to abg/vbg, and peep optimization.  Monitor ventilator waveforms and titrate flow/peep and volumes according.   CXR: monitor lung volumes and tube/line placement  VAP bundle prevention, oral care, post pyloric feeding  Head of bed > 30 degree  GI prophylaxis  Daily awakening and SBT trials unless contraindicated  Monitor for liberation/extubation    Respiratory treatments: bronchodilators    PNA left lower lobe vs effusion will start antibiotics             GI bleed- (present on admission)   Assessment & Plan    Life threatening GI bleed requiring emergent reversal of anticoagulation and blood transfusion  PPI and GI planning for EGD tonight to access bleeding  Q6 hg re-check teg and Inr post kcentra  Continue to evaluate need for IR or surgery  Likely related to coagulopathy and chronic aleve use per report     Anemia due to GI blood loss- (present on admission)   Assessment & Plan    PPI likely NSAID related and coagulopathy, Q6 hr, INR TEG follow up  Transfuse hg<7 s/p 3 units PRBC and Kcentra     Supratherapeutic INR- (present on admission)   Assessment & Plan    Secondary to Coumadin s/p kcentra     SIRS (systemic inflammatory response syndrome) (HCC)- (present on admission)   Assessment & Plan    Board spectrum antibiotics and rapidly narrow antibiotic coverage once source or culture data return.     Likely source: lung vs gi    Continue to monitor for adequate source control  Fluid resusitiation of crystalloids of at least 30ml/kg unless contraindicated.  Monitor lactate clearence and  end organ perfusion  Monitor for patient fluid responsiveness vs EVLW and cappilary leak  If persistent hypotension will add vasopressor/iontropic support  If escalation of pressor requirement or concern for relative adrenal insufficiency will give a trial of steroids.     Review risk for MRSA, MDRO, pseudomonas, ESBL, strep pna resistance, caninda risk    Patient critical ill with vasodilatory and hemorrhagic shock as well as adrenal insufficieny will cover empirically with meropenem and doxy and follow up on culture my guess this is all related to AI and bleeding but is critical ill.        Paroxysmal atrial fibrillation (HCC)- (present on admission)   Assessment & Plan    Rate control  Currently in shock on pressors norepi  Continue to re-access need for and add home regime once stablize     Anticoagulant long-term use- (present on admission)   Assessment & Plan    Currently reversed with kcentra for life threatening hemorrhage INR 6     History of pulmonary embolism- (present on admission)   Assessment & Plan    Currently with life threatening hemorrhagic shock requiring Kcentra reversal  Has IVC filter  Check official Echo  EGD then discuss with GI timing depending of restarting anticoagulation on risk of source of bleeding     COPD (chronic obstructive pulmonary disease) (HCC)- (present on admission)   Assessment & Plan    Not in acute excerbation continue bronchidilators  On stress dose steroids for AI  No air trapping on Vent wavefom  LLL pleural effusion vs pna     Cardiac pacemaker- (present on admission)   Assessment & Plan    Continue to monitor on tele     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HCC)- (present on admission)   Assessment & Plan    Continue hydrocortisone stress dose   Wean as appropriate to chronic dosing  Check TSH  Follow up on cortisol         Discussed patient condition and risk of morbidity and/or mortality with Hospitalist, RN, Pharmacy and GI.    The patient remains critically ill from hemorrhagic and  vasodilatory shock requiring pressor support.  Critical care time = 65 minutes in directly providing and coordinating critical care and extensive data review.  No time overlap and excludes procedures.

## 2019-01-21 NOTE — ASSESSMENT & PLAN NOTE
EGD with gastric ulcer and possible mass status post clipping  PO omeprazole and sucralfate     Patient will need repeat EGD in 3 months reviewed with patient importance to have this follow-up  Holding AC  Resume 1-2 weeks if Hemoglobin is stable

## 2019-01-21 NOTE — H&P
Hospital Medicine History & Physical Note    Date of Service  1/21/2019    Primary Care Physician  Bebe Banerjee M.D.    Consultants  Critical Care. I discussed with Dr. Britney MCCLENDON. I discussed with Dr. Mtz  Code Status  Full code by default.     Chief Complaint  Black stools.     History of Presenting Illness  86 y.o. male who presented 1/21/2019 with Black stools. Mr. Lizama has a complex medical history including history of pulmonary embolism as well as atrial fibrillation on chronic Coumadin therapy as well as oxygen dependency from COPD at 5 L that reportedly 2 days ago developed black stools.  He was brought to the emergency room in [facility] where he was found to be hypotensive with a blood pressure of 60/40 and was found to have a hemoglobin of 11 and INR of 4.  He was appropriately resuscitated and immediately transferred here for higher level of care.  In the emergency room, he cannot protect her is airway and required intubation.  Despite IV fluids, IV pressors, and blood products, he remains markedly hypotensive.  I spoke with Dr. Mtz, gastric, and he will take him to the operating room for an EGD.  Of note, as patient is on outpatient Cortef thus IV hydrocortisone will be given the emergency room.  At this point in time, there are no family members available nor contacts we have only the ER notes from the transferring facility.    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) (4/1/2010); BPH (benign prostatic hypertrophy) (4/1/2010); Bradycardia; Breath shortness; Bronchitis; Cardiac pacemaker (04 2010); Cataract; COPD (chronic obstructive pulmonary disease) ([facility]); Diverticulitis; DJD (degenerative joint disease); EMPHYSEMA; Glaucoma; Heart burn; Heart murmur;  Hiatus hernia syndrome; Hypertension; 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[facility]) (); Indigestion; Knee arthroplasty (); Obstructive hypertrophic cardiomyopathy ([facility]) (2011); PAF (paroxysmal atrial fibrillation) ([facility]); Paroxysmal atrial fibrillation ([facility]) (2011); PE (pulmonary embolism); Phlebitis; Pituitary adenoma ([facility]) (); Pituitary adenoma ([facility]) (); Pneumonia; Rheumatic fever; S/P insertion of IVC (inferior vena caval) filter; S/P parathyroidectomy ([facility]); S/P parathyroidectomy ([facility]) (); Sleep apnea; SSS (sick sinus syndrome) ([facility]) (2011); Third degree AV block ([facility]) (2011); thyroidectomy (); Unspecified disorder of thyroid; Unspecified hemorrhagic conditions; Unspecified urinary incontinence; and Urinary bladder disorder.    Surgical History   has a past surgical history that includes other orthopedic surgery; cholecystectomy; pr total knee arthroplasty; other; cervical disk and fusion anterior; pr revise ulnar nerve at wrist; pr total knee arthroplasty; cataract extraction with iol; thyroidectomy; pacemaker insertion (Left, 2010); carpal tunnel endoscopic (2011); knee revision total (2013); cholecystectomy; and cervical fusion posterior.     Family History  Unobtainable as patient is intubated.      Social History   reports that he has never smoked. He has never used smokeless tobacco. He reports that he does not drink alcohol or use drugs.    Allergies  Allergies   Allergen Reactions   • Pcn [Penicillins] Rash     Tolerates cephalosporins         Medications  Prior to Admission Medications   Prescriptions Last Dose Informant Patient Reported? Taking?   Mometasone Furo-Formoterol Fum (DULERA) 200-5 MCG/ACT Aerosol UNK at UNK Patient's Home Pharmacy No No   Sig: Inhale 2 Puffs by mouth 2 Times a Day. Use spacer. Rinse mouth after use.   albuterol (PROVENTIL) 2.5mg/3ml NEBU PRN at PRN Patient's Home Pharmacy Yes No   Si.5 mg by Nebulization route every four hours as needed  for Shortness of Breath.   albuterol (VENTOLIN OR PROVENTIL) 108 (90 BASE) MCG/ACT AERS PRN at PRN Patient's Home Pharmacy Yes No   Sig: Inhale 2 Puffs by mouth every 6 hours as needed for Shortness of Breath.   diltiazem CD (CARDIZEM CD) 120 MG CAPSULE SR 24 HR UNK at [facility] Patient's Home Pharmacy No No   Sig: Take 1 Cap by mouth every day.   gabapentin (NEURONTIN) 600 MG tablet UNK at [facility] Patient's Home Pharmacy Yes No   Sig: Take 600 mg by mouth every bedtime.   hydrALAZINE (APRESOLINE) 10 MG Tab UNK at [facility] Patient's Home Pharmacy Yes Yes   Sig: Take 10 mg by mouth 1 time daily as needed. Only take if BP > 160   hydrocodone-acetaminophen (NORCO) 5-325 MG Tab per tablet UNK at [facility] Patient's Home Pharmacy Yes No   Sig: Take 1 Tab by mouth every 8 hours as needed. Indications: Moderate to Moderately Severe Pain   hydrocortisone (CORTEF) 10 MG Tab UNK at [facility] Patient's Home Pharmacy Yes Yes   Sig: Take 10 mg by mouth 3 times a day.   levothyroxine (SYNTHROID) 75 MCG Tab UNK at [facility] Patient's Home Pharmacy Yes Yes   Sig: Take 75 mcg by mouth Every morning on an empty stomach.   lisinopril (PRINIVIL, ZESTRIL) 30 MG tablet UNK at [facility] Patient's Home Pharmacy Yes Yes   Sig: Take 30 mg by mouth every day.   montelukast (SINGULAIR) 10 MG Tab UNK at [facility] Patient's Home Pharmacy Yes No   Sig: Take 10 mg by mouth every day.   sotalol (BETAPACE) 80 MG Tab UNK at [facility] Patient's Home Pharmacy No No   Sig: Take 0.5 Tabs by mouth 2 times a day. Needs to be seen for further refills. Thank you   tamsulosin (FLOMAX) 0.4 MG capsule UNK at [facility] Patient's Home Pharmacy Yes No   Sig: Take 0.4 mg by mouth every day.   testosterone cypionate (DEPO-TESTOSTERONE) 200 MG/ML Solution injection UNK at [facility] Patient's Home Pharmacy Yes No   Si mg by Intramuscular route every 21 days.   tiotropium (SPIRIVA) 18 MCG CAPS UNK at [facility] Patient's Home Pharmacy Yes No   Sig: Inhale 18 mcg by mouth every day.   triamterene/hctz (DYAZIDE) 37.5-25 MG Cap UNK at  [facility] Patient's Home Pharmacy No No   Sig: Take 1 Cap by mouth every morning.   warfarin (COUMADIN) 5 MG Tab UNK at [facility] Patient's Home Pharmacy Yes No   Sig: Take 5 mg by mouth every day. No variation.      Facility-Administered Medications Last Administration Doses Remaining   testosterone cypionate (DEPO-TESTOSTERONE) injection 300 mg None recorded           Physical Exam  Temp:  [36.1 °C (97 °F)-36.9 °C (98.4 °F)] 36.9 °C (98.4 °F)  Pulse:  [] 109  Resp:  [20-32] 21  SpO2:  [89 %-100 %] 100 %    Physical Exam   Constitutional: He appears distressed.   HENT:   Pale conjunctiva  Dry mucous membranes   Neck:   Right IJ central line   Cardiovascular:   Murmur heard.  Distant heart sounds  Irregular   Pulmonary/Chest:   Vent, good air movement   Abdominal: He exhibits distension. There is no tenderness.   Musculoskeletal: He exhibits no edema.   Left leg is externally rotated  Bruises on both his arms   Neurological:   Sedated, not responsive, he does not follow commands.    Skin: Skin is warm and dry. He is not diaphoretic. There is pallor.   Intertrigo of the creases under her breasts and in his groin   Nursing note and vitals reviewed.      Laboratory:  Recent Labs      01/21/19   1345   WBC  19.6*   RBC  2.50*   HEMOGLOBIN  7.0*   HEMATOCRIT  21.7*   MCV  86.8   MCH  28.0   MCHC  32.3*   RDW  48.6   PLATELETCT  199   MPV  11.0         No results for input(s): ALTSGPT, ASTSGOT, ALKPHOSPHAT, TBILIRUBIN, DBILIRUBIN, GAMMAGT, AMYLASE, LIPASE, ALB, PREALBUMIN, GLUCOSE in the last 72 hours.  Recent Labs      01/21/19   1345   APTT  49.0*   INR  6.56*             No results for input(s): TROPONINI in the last 72 hours.    Urinalysis:    Recent Labs      01/21/19   1411   SPECGRAVITY  1.018   GLUCOSEUR  Negative   KETONES  Negative   NITRITE  Negative   LEUKESTERAS  Negative   WBCURINE  0-2*   RBCURINE  5-10*   BACTERIA  Negative   EPITHELCELL  Negative        Imaging:  [id number] VIEW   Final Result      NG tube  tip in the expected location of the mid stomach.      DX-CHEST-PORTABLE (1 VIEW)   Final Result      Tubes and line as described above.      Enlarged cardiac silhouette with left basal consolidation and left pleural fluid.      EC-ECHOCARDIOGRAM COMPLETE W/ CONT    (Results Pending)         Assessment/Plan:  I anticipate this patient will require at least two midnights for appropriate medical management, necessitating inpatient admission.    * Shock (HCC)- (present on admission)   Assessment & Plan    Hypovolemic shock secondary to acute blood loss  IV fluids, transfusion of RBCs, IV levophed ordered to maintain a MAP over 65, IV hydrocortisone.   Admit to the ICU     Acute respiratory failure with hypoxia (HCC)- (present on admission)   Assessment & Plan    Initiated on mechanical ventilation on 1/21/2019 due to lack of ability to protect his airway  Critical care has been consulted     GI bleed- (present on admission)   Assessment & Plan    Upper GI hemorrhage  IV protonix drip ordered  Reverse INR with vitamin K and Kcentra  TEG ordered  Dr. Mtz, GI, consulted for EGD 1/21 after reversal.     Candidal intertrigo- (present on admission)   Assessment & Plan    Nystatin ordered.      Anemia due to GI blood loss- (present on admission)   Assessment & Plan    Hb 7  Transfuse RBCs for Hb less than 7 or for hypotension  Serial Hb q 6 hours ordered.      Supratherapeutic INR- (present on admission)   Assessment & Plan    On coumadin for history of PE and afib  INR was 4 prior to transfer and has gone up to 6.56     SIRS (systemic inflammatory response syndrome) (HCC)- (present on admission)   Assessment & Plan    WBC 19.6 and tachycardia  No apparent source of infection  This is consistent with a non-infectious SIRS in the setting of GI hemorrhage     Paroxysmal atrial fibrillation (HCC)- (present on admission)   Assessment & Plan    History of   On Sotalol outpatient which will be restarted once he is able to tolerate  enteral meds     Anticoagulant long-term use- (present on admission)   Assessment & Plan    On coumadin     History of pulmonary embolism- (present on admission)   Assessment & Plan    On coumadin outpatient  History of IVC filter     COPD (chronic obstructive pulmonary disease) ([facility])- (present on admission)   Assessment & Plan    On 5 liters nasal cannula oxygen prior to admit     Cardiac pacemaker- (present on admission)   Assessment & Plan    Secondary to sick sinus syndrome     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[facility])- (present on admission)   Assessment & Plan    On cortef outpatient  Stress dose IV hydrocortisone 100 mg x 1 in the ER and 50 mg IV q6 hourse          VTE prophylaxis: SCDs

## 2019-01-21 NOTE — ED NOTES
Multiple IV attempts made- unsuccessful.  ERP at bedside for central line placement  Pt remains hypotensive (ERP aware)  Pt uncooperative in following commands

## 2019-01-21 NOTE — ED PROVIDER NOTES
"ED Provider Note    Scribed for Macie Simmons M.D. by Rosas Parrish. 1/21/2019, 12:55 PM.    Primary care provider: Bebe Banerjee M.D.  Means of arrival: EMS  History obtained from: Patient and hospital notes  History limited by: Altered mental status    CHIEF COMPLAINT  Chief Complaint   Patient presents with   • Bloody Stools     melena since last night per ems, Takes coumadin for hx of a fib, PE. 5L NC at home baseline.      HPI  Jeffrey Lizama is a 86 y.o. male who presents to the Emergency Department as a transfer from [place].  The patient's rectum had a large amount of melena and he was agitated on arrival.  The patient complains of melena, upper back pain, neck pain, and abdominal pain.  The patient states the melena onset last night, which is when he took his last dose of Coumadin.  Denies any vomiting or current chest pain.  He previously reported chest pain at the [facility].  He regularly takes Coumadin, has a pacemaker in place, and he has been taking Aleve to manage his pain.  He additionally has a history of PE, atrial fibrillation, sick sinus syndrome with 3rd degree av block, IVC filter, and Dr. Whitehead is his cardiologist.  He has had a prior cholecystectomy but denies any history of melena.    REVIEW OF SYSTEMS  Pertinent positives include melena, neck pain, abdominal pain, upper back pain. Pertinent negatives include no vomiting, fever, or chest pain. As above, all other systems cannot be evaluated as patient has altered mental status  See HPI for further details.     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) 8/4/2011   • PAF (paroxysmal atrial fibrillation) ([facility])    • Paroxysmal atrial fibrillation ([facility]) 8/29/2011   • PE (pulmonary embolism)     IVC filter, states PE clots 8 times   • Phlebitis     chronic / recurrent   • Pituitary adenoma ([facility]) 1995    hypophysectomy   • Pituitary adenoma ([facility]) 1995    hypophysectomy   • Pneumonia    • Rheumatic fever    • S/P insertion of IVC (inferior vena caval) filter    • S/P parathyroidectomy ([facility])    • S/P parathyroidectomy ([facility]) 2005   • Sleep apnea     no cpap machine   • SSS (sick sinus syndrome) ([facility]) 8/29/2011   • Third degree AV block ([facility]) 8/29/2011   • thyroidectomy 2005    benign   • Unspecified disorder of thyroid    • Unspecified hemorrhagic conditions    • Unspecified urinary incontinence    • Urinary bladder disorder      SURGICAL HISTORY  Past Surgical History:   Procedure Laterality Date   • KNEE REVISION TOTAL  6/20/2013    Performed by Ortega Vega M.D. at SURGERY [facility] ORS   • CARPAL TUNNEL ENDOSCOPIC  9/30/2011    Performed by RONALD ENNIS at SURGERY SAME DAY [facility] ORS   • PACEMAKER INSERTION Left 04/23/2010    [employer] Altrua 60 [id number] implanted by Dr. Donaldson.   • CATARACT EXTRACTION WITH IOL      bilateral    • CERVICAL DISK AND FUSION ANTERIOR     • CERVICAL FUSION POSTERIOR     • CHOLECYSTECTOMY     • CHOLECYSTECTOMY     • OTHER      pituitary tumor removed   • OTHER ORTHOPEDIC SURGERY      knee surgery left knee x 2   • PB REVISE ULNAR NERVE AT WRIST     • PB TOTAL KNEE ARTHROPLASTY      left   • PB TOTAL KNEE ARTHROPLASTY      right   • THYROIDECTOMY       SOCIAL  "HISTORY  Social History   Substance Use Topics   • Smoking status: Never Smoker   • Smokeless tobacco: Never Used   • Alcohol use No      History   Drug Use No     FAMILY HISTORY  Family History   Problem Relation Age of Onset   • Arthritis Mother    • Arthritis Father    • Cancer Neg Hx    • Heart Disease Neg Hx      CURRENT MEDICATIONS  Home Medications     Reviewed by Gisell Haque PhT (Pharmacy Tech) on 01/21/19 at 1421  Med List Status: Complete   Medication Last Dose Status   albuterol (PROVENTIL) 2.5mg/3ml NEBU PRN Active   albuterol (VENTOLIN OR PROVENTIL) 108 (90 BASE) MCG/ACT AERS PRN Active   diltiazem CD (CARDIZEM CD) 120 MG CAPSULE SR 24 HR UNK Active   gabapentin (NEURONTIN) 600 MG tablet UNK Active   hydrALAZINE (APRESOLINE) 10 MG Tab UNK Active   hydrocodone-acetaminophen (NORCO) 5-325 MG Tab per tablet UNK Active   hydrocortisone (CORTEF) 10 MG Tab UNK Active   levothyroxine (SYNTHROID) 75 MCG Tab UNK Active   lisinopril (PRINIVIL, ZESTRIL) 30 MG tablet UNK Active   Mometasone Furo-Formoterol Fum (DULERA) 200-5 MCG/ACT Aerosol UNK Active   montelukast (SINGULAIR) 10 MG Tab UNK Active   sotalol (BETAPACE) 80 MG Tab UNK Active   tamsulosin (FLOMAX) 0.4 MG capsule UNK Active   testosterone cypionate (DEPO-TESTOSTERONE) 200 MG/ML Solution injection UNK Active   testosterone cypionate (DEPO-TESTOSTERONE) injection 300 mg  Active   tiotropium (SPIRIVA) 18 MCG CAPS UNK Active   triamterene/hctz (DYAZIDE) 37.5-25 MG Cap UNK Active   warfarin (COUMADIN) 5 MG Tab UNK Active              ALLERGIES  Allergies   Allergen Reactions   • Pcn [Penicillins] Rash     Tolerates cephalosporins         PHYSICAL EXAM  VITAL SIGNS: Pulse 79   Resp (!) 21   Ht 1.651 m (5' 5\")   Wt 99.8 kg (220 lb)   SpO2 100%   BMI 36.61 kg/m²   Vitals reviewed.    Consitutional: Well-developed, morbidly obese. Obese, pale, agitated, won't lay still.  HENT: Normocephalic, right external ear normal, left external ear normal, oropharynx " clear, dry mucous membranes.  Eyes: PERRLA at 2 mm bilaterally.  Conjunctivae normal, extraocular movements normal. Negative for: discharge in right and left eye, icterus.  Neck: Range of motion normal, supple. Negative for cervical adenopathy.  Cardiovascular: Tachycardic rate, regular rhythm, heart sounds normal, intact distal pulses. Negative for: murmur, rub, gallop.  Pulmonary/Chest Wall: Effort increased, tachypneic, breath sounds normal. Negative for: respiratory distress, wheezes, rales, rhonchi.   Abdominal: Soft, bowel sounds normal. Negative for: distention, tenderness, rebound, guarding.  Rectal: Large amount of melena.  Musculoskeletal: Normal range of motion. Negative for edema.  Neurological: Somnolent and oriented x1. No focal deficits.  Unable to cooperate with the rest of the exam  Skin: Warm, dry. Negative for rash.  Multiple ecchymoses of differing ages.  Psych: Intermittently extremely agitated and combative    DIAGNOSTIC STUDIES / PROCEDURES    LABS  Results for orders placed or performed during the hospital encounter of 01/21/19   CBC WITHOUT DIFFERENTIAL   Result Value Ref Range    WBC 19.6 (H) 4.8 - 10.8 K/uL    RBC 2.50 (L) 4.70 - 6.10 M/uL    Hemoglobin 7.0 (L) 14.0 - 18.0 g/dL    Hematocrit 21.7 (L) 42.0 - 52.0 %    MCV 86.8 81.4 - 97.8 fL    MCH 28.0 27.0 - 33.0 pg    MCHC 32.3 (L) 33.7 - 35.3 g/dL    RDW 48.6 35.9 - 50.0 fL    Platelet Count 199 164 - 446 K/uL    MPV 11.0 9.0 - 12.9 fL   LACTIC ACID   Result Value Ref Range    Lactic Acid 2.5 (H) 0.5 - 2.0 mmol/L   URINALYSIS (UA)   Result Value Ref Range    Color Yellow     Character Clear     Specific Gravity 1.018 <1.035    Ph 5.0 5.0 - 8.0    Glucose Negative Negative mg/dL    Ketones Negative Negative mg/dL    Protein Negative Negative mg/dL    Bilirubin Negative Negative    Urobilinogen, Urine 0.2 Negative    Nitrite Negative Negative    Leukocyte Esterase Negative Negative    Occult Blood Trace (A) Negative    Micro Urine Req  Microscopic    PROTHROMBIN TIME (INR)   Result Value Ref Range    PT 57.1 (HH) 12.0 - 14.6 sec    INR 6.56 (H) 0.87 - 1.13   APTT   Result Value Ref Range    APTT 49.0 (H) 24.7 - 36.0 sec   COD (ADULT)   Result Value Ref Range    ABO Grouping Only O     Rh Grouping Only POS     Antibody Screen-Cod NEG    ABO AND RH CONFIRMATION   Result Value Ref Range    ABO Confirm O     Second Rh Group POS    UN-XM'D RBC   Result Value Ref Range    Component R       R33                 Red Blood Cells     [id number]   issued       01/21/19   13:50      Product Type Red Blood Cells LR Pheresis     Dispense Status Issued     Unit Number (Barcoded) [id number]     Product Code (Barcoded) D5832E04     Blood Type (Barcoded) 5100     Component R       R3                  Red Blood Cells3    [id number]   issued       01/21/19   15:01      Product Type Red Blood Cells LR Pheresis     Dispense Status Issued     Unit Number (Barcoded) [id number]     Product Code (Barcoded) F3864E41     Blood Type (Barcoded) 5100    BASIC TEG W HEPARINASE   Result Value Ref Range    React Time Initial Hep 4.3 (L) 5.0 - 10.0 min    Clot Kinetics Heparinase 1.2 1.0 - 3.0 min    Clot Angle Heparinase 73.3 (H) 53.0 - 72.0 degrees    Max Clot Heparinase 67.3 50.0 - 70.0 mm    Lysis 30 min Heparinase 0.2 0.0 - 8.0 %    TEG Algorithm Link Algorithm    PLATELET MAPPING WITH BASIC TEG   Result Value Ref Range    Reaction Time Initial-R 4.3 (L) 5.0 - 10.0 min    Clot Kinetics-K 1.2 1.0 - 3.0 min    Clot Angle-Angle 74.1 (H) 53.0 - 72.0 degrees    Maximum Clot Strength-MA 66.4 50.0 - 70.0 mm    Lysis 30 minutes-LY30 0.1 0.0 - 8.0 %    % Inhibition .0 %    % Inhibition AA 90.4 %   URINE MICROSCOPIC (W/UA)   Result Value Ref Range    WBC 0-2 (A) /hpf    RBC 5-10 (A) /hpf    Bacteria Negative None /hpf    Epithelial Cells Negative /hpf    Hyaline Cast 3-5 (A) /lpf   PLATELET MAPPING WITH BASIC TEG   Result Value Ref Range    Reaction Time Initial-R 5.2  5.0 - 10.0 min    Clot Kinetics-K 1.6 1.0 - 3.0 min    Clot Angle-Angle 67.8 53.0 - 72.0 degrees    Maximum Clot Strength-MA 66.5 50.0 - 70.0 mm    Lysis 30 minutes-LY30 0.0 0.0 - 8.0 %    % Inhibition ADP 99.8 %    % Inhibition AA 44.6 %    TEG Algorithm Link Algorithm    COMP METABOLIC PANEL   Result Value Ref Range    Sodium 137 135 - 145 mmol/L    Potassium 4.4 3.6 - 5.5 mmol/L    Chloride 111 96 - 112 mmol/L    Co2 21 20 - 33 mmol/L    Anion Gap 5.0 0.0 - 11.9    Glucose 126 (H) 65 - 99 mg/dL    Bun 89 (HH) 8 - 22 mg/dL    Creatinine 1.28 0.50 - 1.40 mg/dL    Calcium 7.2 (L) 8.5 - 10.5 mg/dL    AST(SGOT) 12 12 - 45 U/L    ALT(SGPT) 7 2 - 50 U/L    Alkaline Phosphatase 22 (L) 30 - 99 U/L    Total Bilirubin 0.9 0.1 - 1.5 mg/dL    Albumin 2.7 (L) 3.2 - 4.9 g/dL    Total Protein 4.3 (L) 6.0 - 8.2 g/dL    Globulin 1.6 (L) 1.9 - 3.5 g/dL    A-G Ratio 1.7 g/dL   PROCALCITONIN   Result Value Ref Range    Procalcitonin 0.13 <0.25 ng/mL   Prothrombin Time   Result Value Ref Range    PT 14.1 12.0 - 14.6 sec    INR 1.08 0.87 - 1.13   ESTIMATED GFR   Result Value Ref Range    GFR If African American >60 >60 mL/min/1.73 m 2    GFR If Non  53 (A) >60 mL/min/1.73 m 2   EKG   Result Value Ref Range    Report       [facility] Emergency Dept.    Test Date:  2019  Pt Name:    MANOLO AUGUST                 Department: ER  MRN:        [id number]                      Room:       RD 12  Gender:     Male                         Technician: [id number]  :        [id number]                   Requested By:ER TRIAGE PROTOCOL  Order #:    [id number]                    Reading MD: SUNDEEP GAN MD    Measurements  Intervals                                Axis  Rate:       81                           P:          0  GA:         248                          QRS:        -38  QRSD:       126                          T:          155  QT:         384  QTc:        446    Interpretive Statements  SINUS RHYTHM at  81  FIRST DEGREE AV BLOCK  LEFT BUNDLE BRANCH BLOCK  INFERIOR Q WAVES, POSSIBLY DUE TO LBBB  Compared to ECG 06/20/2018 12:22:46  First degree AV block now present  Left bundle-branch block now present  Inferior Q waves now present  Q waves now present  Ventricular-paced complex(es) or rhy thm no longer present  Ventricular premature complex(es) no longer present  Prolonged QT interval no longer present    Electronically Signed On 1- 14:24:04 PST by SUNDEEP GAN MD     ISTAT ARTERIAL BLOOD GAS   Result Value Ref Range    Ph 7.235 (LL) 7.400 - 7.500    Pco2 50.0 (H) 26.0 - 37.0 mmHg    Po2 391 (H) 64 - 87 mmHg    Tco2 23 20 - 33 mmol/L    S02 100 (H) 93 - 99 %    Hco3 21.2 17.0 - 25.0 mmol/L    BE -6 (L) -4 - 3 mmol/L    Body Temp see below degrees    O2 Therapy 100 %    iPF Ratio 391     Specimen Arterial     Action Range Triggered YES     Inst. Qualified Patient YES       All labs reviewed by me.    EKG Interpretation:  Twelve-lead EKG by my interpretation shows sinus rhythm at a rate of 81.  First-degree AV block continues.  Left bundle branch block.  No ST or T wave changes to indicate ischemia or infarct.  Similar morphology seen on 6/20/18    RADIOLOGY  MN-YFLGNEY-3 VIEW   Final Result      NG tube tip in the expected location of the mid stomach.      DX-CHEST-PORTABLE (1 VIEW)   Final Result      Tubes and line as described above.      Enlarged cardiac silhouette with left basal consolidation and left pleural fluid.      EC-ECHOCARDIOGRAM COMPLETE W/ CONT    (Results Pending)     The radiologist's interpretation of all radiological studies have been reviewed by me.    Central Line Placement Procedure Note  Indication: poor peripheral access, centrally administered medications and need for frequent blood draws    Consent: Unable to be obtained due to the emergent nature of this procedure.    Procedure: The patient was positioned appropriately and the skin over the right internal jugular vein was  prepped with betadine and draped in a sterile fashion. Local anesthesia was obtained by infiltration using 1% Lidocaine without epinephrine.  A large bore needle was used to identify the vein.  A guide wire was then inserted into the vein through the needle. A triple lumen catheter was then inserted into the vessel over the guide wire using the Seldinger technique.  All ports showed good, free flowing blood return and were flushed with saline solution.  The catheter was then securely fastened to the skin with sutures and with an adhesive dressing and covered with a sterile dressing.  A post procedure X-ray was ordered and showed good line position.    The patient tolerated the procedure well.    Complications: None      Intubation Procedure Note    Indication: impending respiratory failure, hypercarbia and airway protection    Consent: Unable to be obtained due to the emergent nature of this procedure.    Medications Used: ketamine intravenously and rocuronium intravenously    Procedure: The patient was placed in the appropriate position.  Cricoid pressure was not required.  Intubation was performed by direct laryngoscopy using a laryngoscope and a 7.5 cuffed endotracheal tube.  The cuff was then inflated and the tube was secured appropriately at a distance of 24 cm to the dental ridge.  Initial confirmation of placement included bilateral breath sounds, an end tidal CO2 detector, absence of sounds over the stomach, tube fogging and adequate chest rise.  A chest x-ray to verify correct placement of the tube showed appropriate tube position.    The patient tolerated the procedure well.     Complications: None    COURSE & MEDICAL DECISION MAKING  Nursing notes, VS, PMSFHx reviewed in chart.    Obtained and reviewed past medical records from today from [facility] which indicated, tropinin and cka were negative, bnp 782, inr 4, wbc 21, hemoglobin 11, platelets 262, bun 89, creatinine 1.5, PT INR 4.    12:55 PM  Patient seen and examined at bedside. The patient presents with melena, abdominal pain, and neck pain and the differential diagnosis includes but is not limited to upper versus lower GI bleed, sepsis, shock. Ordered EKG, GI bleeding scan, Lactic acid, blood culture x2, UA, Urine culture, PT INR, APTT, COD, CBC without differential. Patient will be treated with 10 mg Phytonadione, 50 mcg fentanyl, and 2994 mL normal saline for his symptoms.  IV fluids were administered for hypotension, volume loss, and suspected sepsis.  I discussed the plan of care and patient agrees to proceed.      12:57 PM Paged pharmacy.      1:02 PM Discussed patient's case and medications with pharmacy at bedside.      1:25 PM Central line was placed, see above for procedure note.  Ordered for Chest Xray, medical restrains, 2mg Verced, and 3635 units prothrombin complex.    1:38 .9mg/1-mL Phenylephrine adminstered.  Ordered for blood transfusion.      I have explained to the patient the risks and benefits of transfusion of blood products.  This includes, as appropriate, the risk of mild allergic reaction, hemolytic reaction, transfusion-associated lung injury, febrile reactions, circulatory or iron overload, and infection.    We discussed possible alternatives and their risks, including directed donation, autologous transfusion, and no transfusion, including IV or oral iron supplementation, as appropriate.  I believe the patient understands the risks and benefits and was able to express understanding.     1:54 PM Patient was reevaluated at the bedside.  Patient will be intubated.      1:57  mg Ketamine and 100 of rocuronium administered.  Patient intubated, see above procedure note.    2:06 PM Discussed the patient's case, above findings, and admission with Dr. Puckett.  Placement of OG tube shows yoseph blood.    2:12 PM Paged Gastroenterology.    2:21 PM Dr Mtz has been given the history and current situation and says that he  will come into consult promptly.    2:34 PM Patient's case was discussed further with Dr. Puckett.      CRITICAL CARE  The very real possibilty of a deterioration of this patient's condition required the highest level of my preparedness for sudden, emergent intervention.  I provided critical care services, which included medication orders, frequent reevaluations of the patient's condition and response to treatment, ordering and reviewing test results, and discussing the case with various consultants.  The critical care time associated with the care of the patient was 50 minutes. Review chart for interventions. This time is exclusive of any other billable procedures.      DISPOSITION:  Patient will be admitted to Dr. Puckett in guarded condition.     FINAL IMPRESSION  1. Gastric hemorrhage    2. Chronic anticoagulation    3. Hypotension due to hypovolemia    4. Nontraumatic hemorrhagic shock (HCC)    5. Metabolic acidosis    6. Respiratory acidosis    The critical care time associated with the care of the patient was 50 minutes.  This time is exclusive of any other billable procedures.     IRosas (Margaretteibe), am scribing for, and in the presence of, Macie Simmons M.D..    Electronically signed by: Rosas Parrish (Jono), 1/21/2019    IMacie M.D. personally performed the services described in this documentation, as scribed by Rosas Parrish in my presence, and it is both accurate and complete.  C.    The note accurately reflects work and decisions made by me.  Macie Simmons  1/21/2019  7:42 PM

## 2019-01-21 NOTE — ASSESSMENT & PLAN NOTE
On coumadin for history of A. Fib / PE  Anticoagulation reversed given GI bleed  Continue to hold anticoagulation and monitor clinically

## 2019-01-21 NOTE — DISCHARGE PLANNING
Medical Social Work    LSW notified by RN ILEANA that Dr. Simmons requesting that pt's family be notified of pt's change in condition. LSW notified that pt's condition is guarded and finding NOK and AD info would be helpful. LSW made PC to wife Jacquie Lizama ([phone]) and left message requesting a return call. LSW made PC to [facility] ([phone]) in an effort to obtain additional information and LSW left a message requesting a return call.

## 2019-01-21 NOTE — ASSESSMENT & PLAN NOTE
Continue home dose of sotalol and Cardizem  Resume anticoagulation 1-2 weeks from control of bleeding

## 2019-01-21 NOTE — ASSESSMENT & PLAN NOTE
Continue home dose of levothyroxine and hydrocortisone  We will change hydrocortisone to 30/20/10 with acute concerns for now  Wean off to home regimen over the coming days

## 2019-01-21 NOTE — ASSESSMENT & PLAN NOTE
Hypovolemic shock secondary to acute blood loss    Continue IV fluids and stress dose hydrocortisone  Wean off norepinephrine

## 2019-01-21 NOTE — ASSESSMENT & PLAN NOTE
No acute exacerbation  Oxygen requirements back to baseline  Continue Symbicort Spiriva and bronchodilators as needed

## 2019-01-21 NOTE — ASSESSMENT & PLAN NOTE
On coumadin outpatient  History of IVC filter    Anticoagulation reversed given severe GI bleed  Resume anticoagulation in 2 weeks from acute bleed control

## 2019-01-21 NOTE — ED NOTES
Late Entry-    Pt remains hypotensive.  ERP at bedside- decision made to place central line.  Per ERP verbal order- transfuse 1 unit uncrossmatched RBC.  Transfusion started 1354.  Pt becoming more uncooperative- decision made to intubate.  ERP, RT & pharmacy at bedside.  OG placed.  Rodas placed.  Lab at bedside for further blood draw.  Xray at bedside to confirm tube placement.  OK to use lines.  Admitting MD at bedside.  GI at bedside.

## 2019-01-21 NOTE — ASSESSMENT & PLAN NOTE
Was empirically started on ceftriaxone and doxycycline for possible community-acquired pneumonia    No clinical signs of pneumonia  Pro-calcitonin normal  Antibiotics discontinued

## 2019-01-21 NOTE — ED NOTES
Chief Complaint   Patient presents with   • Bloody Stools     melena since last night per ems, Takes coumadin for hx of a fib, PE. 5L NC at home baseline.         Pt bib care flight from [facility] where pt was seen for melena since last night. Pt last took coumadin last night. INR: 4     Pt noncompliant during flight, restrained for safety.    Pt very restless upon arrival. x1 episode of large melena.     BP 63/47. ERP paged overhead, Dr. Simmons to bedside.

## 2019-01-22 ENCOUNTER — APPOINTMENT (OUTPATIENT)
Dept: CARDIOLOGY | Facility: MEDICAL CENTER | Age: 84
DRG: 377 | End: 2019-01-22
Attending: INTERNAL MEDICINE
Payer: MEDICARE

## 2019-01-22 ENCOUNTER — APPOINTMENT (OUTPATIENT)
Dept: RADIOLOGY | Facility: MEDICAL CENTER | Age: 84
DRG: 377 | End: 2019-01-22
Attending: INTERNAL MEDICINE
Payer: MEDICARE

## 2019-01-22 PROBLEM — I35.0 AORTIC STENOSIS: Status: ACTIVE | Noted: 2019-01-22

## 2019-01-22 LAB
ACTION RANGE TRIGGERED IACRT: NO
ALBUMIN SERPL BCP-MCNC: 2.8 G/DL (ref 3.2–4.9)
ALBUMIN/GLOB SERPL: 1.6 G/DL
ALP SERPL-CCNC: 21 U/L (ref 30–99)
ALT SERPL-CCNC: 7 U/L (ref 2–50)
ANION GAP SERPL CALC-SCNC: 9 MMOL/L (ref 0–11.9)
AST SERPL-CCNC: 17 U/L (ref 12–45)
BASE EXCESS BLDA CALC-SCNC: -7 MMOL/L (ref -4–3)
BASOPHILS # BLD AUTO: 0.3 % (ref 0–1.8)
BASOPHILS # BLD: 0.08 K/UL (ref 0–0.12)
BILIRUB SERPL-MCNC: 0.9 MG/DL (ref 0.1–1.5)
BODY TEMPERATURE: ABNORMAL DEGREES
BUN SERPL-MCNC: 67 MG/DL (ref 8–22)
CALCIUM SERPL-MCNC: 8.4 MG/DL (ref 8.5–10.5)
CHLORIDE SERPL-SCNC: 113 MMOL/L (ref 96–112)
CO2 BLDA-SCNC: 17 MMOL/L (ref 20–33)
CO2 SERPL-SCNC: 16 MMOL/L (ref 20–33)
CREAT SERPL-MCNC: 1.01 MG/DL (ref 0.5–1.4)
EOSINOPHIL # BLD AUTO: 0.01 K/UL (ref 0–0.51)
EOSINOPHIL NFR BLD: 0 % (ref 0–6.9)
ERYTHROCYTE [DISTWIDTH] IN BLOOD BY AUTOMATED COUNT: 47.2 FL (ref 35.9–50)
GLOBULIN SER CALC-MCNC: 1.8 G/DL (ref 1.9–3.5)
GLUCOSE SERPL-MCNC: 169 MG/DL (ref 65–99)
GRAM STN SPEC: NORMAL
HCO3 BLDA-SCNC: 16.6 MMOL/L (ref 17–25)
HCT VFR BLD AUTO: 30.9 % (ref 42–52)
HCT VFR BLD AUTO: 32.7 % (ref 42–52)
HGB BLD-MCNC: 10.1 G/DL (ref 14–18)
HGB BLD-MCNC: 10.4 G/DL (ref 14–18)
HGB BLD-MCNC: 11 G/DL (ref 14–18)
HGB BLD-MCNC: 9.6 G/DL (ref 14–18)
HGB BLD-MCNC: 9.6 G/DL (ref 14–18)
HOROWITZ INDEX BLDA+IHG-RTO: 317 MM[HG]
IMM GRANULOCYTES # BLD AUTO: 0.28 K/UL (ref 0–0.11)
IMM GRANULOCYTES NFR BLD AUTO: 1 % (ref 0–0.9)
INST. QUALIFIED PATIENT IIQPT: YES
LV EJECT FRACT  99904: 80
LV EJECT FRACT MOD 2C 99903: 69.78
LV EJECT FRACT MOD 4C 99902: 65.37
LV EJECT FRACT MOD BP 99901: 62.97
LYMPHOCYTES # BLD AUTO: 1.26 K/UL (ref 1–4.8)
LYMPHOCYTES NFR BLD: 4.6 % (ref 22–41)
MAGNESIUM SERPL-MCNC: 1.7 MG/DL (ref 1.5–2.5)
MCH RBC QN AUTO: 29.3 PG (ref 27–33)
MCHC RBC AUTO-ENTMCNC: 33.7 G/DL (ref 33.7–35.3)
MCV RBC AUTO: 87 FL (ref 81.4–97.8)
MONOCYTES # BLD AUTO: 0.77 K/UL (ref 0–0.85)
MONOCYTES NFR BLD AUTO: 2.8 % (ref 0–13.4)
NEUTROPHILS # BLD AUTO: 25.18 K/UL (ref 1.82–7.42)
NEUTROPHILS NFR BLD: 91.3 % (ref 44–72)
NRBC # BLD AUTO: 0 K/UL
NRBC BLD-RTO: 0 /100 WBC
O2/TOTAL GAS SETTING VFR VENT: 30 %
PCO2 BLDA: 26.2 MMHG (ref 26–37)
PCO2 TEMP ADJ BLDA: 26.9 MMHG (ref 26–37)
PH BLDA: 7.41 [PH] (ref 7.4–7.5)
PH TEMP ADJ BLDA: 7.4 [PH] (ref 7.4–7.5)
PHOSPHATE SERPL-MCNC: 2.2 MG/DL (ref 2.5–4.5)
PLATELET # BLD AUTO: 180 K/UL (ref 164–446)
PMV BLD AUTO: 10.9 FL (ref 9–12.9)
PO2 BLDA: 95 MMHG (ref 64–87)
PO2 TEMP ADJ BLDA: 98 MMHG (ref 64–87)
POTASSIUM SERPL-SCNC: 4.2 MMOL/L (ref 3.6–5.5)
PROT SERPL-MCNC: 4.6 G/DL (ref 6–8.2)
RBC # BLD AUTO: 3.55 M/UL (ref 4.7–6.1)
SAO2 % BLDA: 98 % (ref 93–99)
SIGNIFICANT IND 70042: NORMAL
SITE SITE: NORMAL
SODIUM SERPL-SCNC: 138 MMOL/L (ref 135–145)
SOURCE SOURCE: NORMAL
SPECIMEN DRAWN FROM PATIENT: ABNORMAL
WBC # BLD AUTO: 27.6 K/UL (ref 4.8–10.8)

## 2019-01-22 PROCEDURE — 82803 BLOOD GASES ANY COMBINATION: CPT

## 2019-01-22 PROCEDURE — 160048 HCHG OR STATISTICAL LEVEL 1-5: Performed by: INTERNAL MEDICINE

## 2019-01-22 PROCEDURE — 85018 HEMOGLOBIN: CPT

## 2019-01-22 PROCEDURE — 93005 ELECTROCARDIOGRAM TRACING: CPT | Performed by: INTERNAL MEDICINE

## 2019-01-22 PROCEDURE — 99291 CRITICAL CARE FIRST HOUR: CPT | Performed by: INTERNAL MEDICINE

## 2019-01-22 PROCEDURE — 93306 TTE W/DOPPLER COMPLETE: CPT | Mod: 26 | Performed by: INTERNAL MEDICINE

## 2019-01-22 PROCEDURE — 500066 HCHG BITE BLOCK, ECT: Performed by: INTERNAL MEDICINE

## 2019-01-22 PROCEDURE — 700105 HCHG RX REV CODE 258: Performed by: INTERNAL MEDICINE

## 2019-01-22 PROCEDURE — 700111 HCHG RX REV CODE 636 W/ 250 OVERRIDE (IP): Performed by: HOSPITALIST

## 2019-01-22 PROCEDURE — 84100 ASSAY OF PHOSPHORUS: CPT

## 2019-01-22 PROCEDURE — C9113 INJ PANTOPRAZOLE SODIUM, VIA: HCPCS | Performed by: HOSPITALIST

## 2019-01-22 PROCEDURE — 93306 TTE W/DOPPLER COMPLETE: CPT

## 2019-01-22 PROCEDURE — 94003 VENT MGMT INPAT SUBQ DAY: CPT

## 2019-01-22 PROCEDURE — 700111 HCHG RX REV CODE 636 W/ 250 OVERRIDE (IP): Performed by: INTERNAL MEDICINE

## 2019-01-22 PROCEDURE — 700111 HCHG RX REV CODE 636 W/ 250 OVERRIDE (IP)

## 2019-01-22 PROCEDURE — 83735 ASSAY OF MAGNESIUM: CPT

## 2019-01-22 PROCEDURE — 700105 HCHG RX REV CODE 258: Performed by: HOSPITALIST

## 2019-01-22 PROCEDURE — A9270 NON-COVERED ITEM OR SERVICE: HCPCS | Performed by: INTERNAL MEDICINE

## 2019-01-22 PROCEDURE — 700101 HCHG RX REV CODE 250: Performed by: HOSPITALIST

## 2019-01-22 PROCEDURE — 37799 UNLISTED PX VASCULAR SURGERY: CPT

## 2019-01-22 PROCEDURE — 700102 HCHG RX REV CODE 250 W/ 637 OVERRIDE(OP): Performed by: HOSPITALIST

## 2019-01-22 PROCEDURE — 93010 ELECTROCARDIOGRAM REPORT: CPT | Performed by: INTERNAL MEDICINE

## 2019-01-22 PROCEDURE — 0BP1XDZ REMOVAL OF INTRALUMINAL DEVICE FROM TRACHEA, EXTERNAL APPROACH: ICD-10-PCS | Performed by: INTERNAL MEDICINE

## 2019-01-22 PROCEDURE — 85025 COMPLETE CBC W/AUTO DIFF WBC: CPT

## 2019-01-22 PROCEDURE — 0DJ68ZZ INSPECTION OF STOMACH, VIA NATURAL OR ARTIFICIAL OPENING ENDOSCOPIC: ICD-10-PCS | Performed by: INTERNAL MEDICINE

## 2019-01-22 PROCEDURE — 99233 SBSQ HOSP IP/OBS HIGH 50: CPT | Performed by: HOSPITALIST

## 2019-01-22 PROCEDURE — 80053 COMPREHEN METABOLIC PANEL: CPT

## 2019-01-22 PROCEDURE — 700105 HCHG RX REV CODE 258

## 2019-01-22 PROCEDURE — 160203 HCHG ENDO MINUTES - 1ST 30 MINS LEVEL 4: Performed by: INTERNAL MEDICINE

## 2019-01-22 PROCEDURE — 700102 HCHG RX REV CODE 250 W/ 637 OVERRIDE(OP): Performed by: INTERNAL MEDICINE

## 2019-01-22 PROCEDURE — 99292 CRITICAL CARE ADDL 30 MIN: CPT | Performed by: INTERNAL MEDICINE

## 2019-01-22 PROCEDURE — A9270 NON-COVERED ITEM OR SERVICE: HCPCS | Performed by: HOSPITALIST

## 2019-01-22 PROCEDURE — 71045 X-RAY EXAM CHEST 1 VIEW: CPT

## 2019-01-22 PROCEDURE — 770022 HCHG ROOM/CARE - ICU (200)

## 2019-01-22 PROCEDURE — 700101 HCHG RX REV CODE 250: Performed by: INTERNAL MEDICINE

## 2019-01-22 RX ORDER — GABAPENTIN 300 MG/1
600 CAPSULE ORAL
Status: DISCONTINUED | OUTPATIENT
Start: 2019-01-22 | End: 2019-01-28 | Stop reason: HOSPADM

## 2019-01-22 RX ORDER — BUDESONIDE AND FORMOTEROL FUMARATE DIHYDRATE 160; 4.5 UG/1; UG/1
2 AEROSOL RESPIRATORY (INHALATION) 2 TIMES DAILY
Status: DISCONTINUED | OUTPATIENT
Start: 2019-01-22 | End: 2019-01-28 | Stop reason: HOSPADM

## 2019-01-22 RX ORDER — MAGNESIUM SULFATE HEPTAHYDRATE 40 MG/ML
2 INJECTION, SOLUTION INTRAVENOUS ONCE
Status: COMPLETED | OUTPATIENT
Start: 2019-01-22 | End: 2019-01-22

## 2019-01-22 RX ORDER — CALCIUM CHLORIDE 100 MG/ML
1 INJECTION INTRAVENOUS; INTRAVENTRICULAR ONCE
Status: DISCONTINUED | OUTPATIENT
Start: 2019-01-22 | End: 2019-01-22

## 2019-01-22 RX ORDER — DILTIAZEM HYDROCHLORIDE 120 MG/1
120 CAPSULE, COATED, EXTENDED RELEASE ORAL DAILY
Status: DISCONTINUED | OUTPATIENT
Start: 2019-01-23 | End: 2019-01-22

## 2019-01-22 RX ORDER — LEVOTHYROXINE SODIUM 0.07 MG/1
75 TABLET ORAL
Status: DISCONTINUED | OUTPATIENT
Start: 2019-01-23 | End: 2019-01-28 | Stop reason: HOSPADM

## 2019-01-22 RX ORDER — EPINEPHRINE 1 MG/ML(1)
AMPUL (ML) INJECTION
Status: COMPLETED
Start: 2019-01-22 | End: 2019-01-22

## 2019-01-22 RX ORDER — TIOTROPIUM BROMIDE 18 UG/1
1 CAPSULE ORAL; RESPIRATORY (INHALATION) DAILY
Status: DISCONTINUED | OUTPATIENT
Start: 2019-01-22 | End: 2019-01-28 | Stop reason: HOSPADM

## 2019-01-22 RX ORDER — FUROSEMIDE 10 MG/ML
20 INJECTION INTRAMUSCULAR; INTRAVENOUS ONCE
Status: COMPLETED | OUTPATIENT
Start: 2019-01-22 | End: 2019-01-22

## 2019-01-22 RX ORDER — EPINEPHRINE 1 MG/ML(1)
1 AMPUL (ML) INJECTION ONCE
Status: DISCONTINUED | OUTPATIENT
Start: 2019-01-22 | End: 2019-01-22

## 2019-01-22 RX ORDER — ACETAMINOPHEN 500 MG
500 TABLET ORAL EVERY 6 HOURS PRN
Status: DISCONTINUED | OUTPATIENT
Start: 2019-01-22 | End: 2019-01-28 | Stop reason: HOSPADM

## 2019-01-22 RX ORDER — SODIUM CHLORIDE 9 MG/ML
INJECTION, SOLUTION INTRAVENOUS
Status: COMPLETED
Start: 2019-01-22 | End: 2019-01-22

## 2019-01-22 RX ORDER — MONTELUKAST SODIUM 10 MG/1
10 TABLET ORAL DAILY
Status: DISCONTINUED | OUTPATIENT
Start: 2019-01-23 | End: 2019-01-28 | Stop reason: HOSPADM

## 2019-01-22 RX ORDER — SOTALOL HYDROCHLORIDE 80 MG/1
40 TABLET ORAL 2 TIMES DAILY
Status: DISCONTINUED | OUTPATIENT
Start: 2019-01-22 | End: 2019-01-22

## 2019-01-22 RX ORDER — TAMSULOSIN HYDROCHLORIDE 0.4 MG/1
0.4 CAPSULE ORAL DAILY
Status: DISCONTINUED | OUTPATIENT
Start: 2019-01-23 | End: 2019-01-28 | Stop reason: HOSPADM

## 2019-01-22 RX ORDER — EPINEPHRINE 1 MG/ML(1)
1 AMPUL (ML) INJECTION ONCE
Status: COMPLETED | OUTPATIENT
Start: 2019-01-22 | End: 2019-01-22

## 2019-01-22 RX ADMIN — MAGNESIUM SULFATE IN WATER 2 G: 40 INJECTION, SOLUTION INTRAVENOUS at 20:39

## 2019-01-22 RX ADMIN — SODIUM CHLORIDE 8 MG/HR: 9 INJECTION, SOLUTION INTRAVENOUS at 00:53

## 2019-01-22 RX ADMIN — CEFTRIAXONE SODIUM 2 G: 2 INJECTION, POWDER, FOR SOLUTION INTRAMUSCULAR; INTRAVENOUS at 05:50

## 2019-01-22 RX ADMIN — FENTANYL CITRATE 100 MCG: 50 INJECTION, SOLUTION INTRAMUSCULAR; INTRAVENOUS at 06:06

## 2019-01-22 RX ADMIN — FUROSEMIDE 20 MG: 10 INJECTION, SOLUTION INTRAMUSCULAR; INTRAVENOUS at 14:43

## 2019-01-22 RX ADMIN — FENTANYL CITRATE 100 MCG: 50 INJECTION, SOLUTION INTRAMUSCULAR; INTRAVENOUS at 02:00

## 2019-01-22 RX ADMIN — DOXYCYCLINE 100 MG: 100 INJECTION, POWDER, LYOPHILIZED, FOR SOLUTION INTRAVENOUS at 05:50

## 2019-01-22 RX ADMIN — Medication 1 MG: at 13:32

## 2019-01-22 RX ADMIN — HYDROCORTISONE SODIUM SUCCINATE 50 MG: 100 INJECTION, POWDER, FOR SOLUTION INTRAMUSCULAR; INTRAVENOUS at 20:39

## 2019-01-22 RX ADMIN — SODIUM CHLORIDE 500 ML: 9 INJECTION, SOLUTION INTRAVENOUS at 10:54

## 2019-01-22 RX ADMIN — DILTIAZEM HYDROCHLORIDE 60 MG: 60 TABLET, FILM COATED ORAL at 22:07

## 2019-01-22 RX ADMIN — TIOTROPIUM BROMIDE 1 CAPSULE: 18 CAPSULE ORAL; RESPIRATORY (INHALATION) at 20:38

## 2019-01-22 RX ADMIN — POTASSIUM PHOSPHATE, MONOBASIC AND POTASSIUM PHOSPHATE, DIBASIC 15 MMOL: 224; 236 INJECTION, SOLUTION INTRAVENOUS at 10:54

## 2019-01-22 RX ADMIN — GABAPENTIN 600 MG: 300 CAPSULE ORAL at 20:39

## 2019-01-22 RX ADMIN — BUDESONIDE AND FORMOTEROL FUMARATE DIHYDRATE 2 PUFF: 160; 4.5 AEROSOL RESPIRATORY (INHALATION) at 20:38

## 2019-01-22 RX ADMIN — CALCIUM CHLORIDE 1 G: 100 INJECTION, SOLUTION INTRAVENOUS at 00:50

## 2019-01-22 RX ADMIN — DOXYCYCLINE 100 MG: 100 INJECTION, POWDER, LYOPHILIZED, FOR SOLUTION INTRAVENOUS at 19:17

## 2019-01-22 RX ADMIN — HYDROCORTISONE SODIUM SUCCINATE 50 MG: 100 INJECTION, POWDER, FOR SOLUTION INTRAMUSCULAR; INTRAVENOUS at 02:45

## 2019-01-22 RX ADMIN — EPINEPHRINE 1 MG: 1 INJECTION INTRAMUSCULAR; INTRAVENOUS; SUBCUTANEOUS at 13:32

## 2019-01-22 RX ADMIN — HYDROCORTISONE SODIUM SUCCINATE 50 MG: 100 INJECTION, POWDER, FOR SOLUTION INTRAMUSCULAR; INTRAVENOUS at 08:46

## 2019-01-22 RX ADMIN — NYSTATIN: 100000 POWDER TOPICAL at 20:39

## 2019-01-22 RX ADMIN — METOCLOPRAMIDE 10 MG: 5 INJECTION, SOLUTION INTRAMUSCULAR; INTRAVENOUS at 00:51

## 2019-01-22 RX ADMIN — HYDROCORTISONE SODIUM SUCCINATE 50 MG: 100 INJECTION, POWDER, FOR SOLUTION INTRAMUSCULAR; INTRAVENOUS at 14:43

## 2019-01-22 RX ADMIN — PROPOFOL 15 MCG/KG/MIN: 10 INJECTION, EMULSION INTRAVENOUS at 08:46

## 2019-01-22 RX ADMIN — FENTANYL CITRATE 100 MCG: 50 INJECTION, SOLUTION INTRAMUSCULAR; INTRAVENOUS at 07:36

## 2019-01-22 RX ADMIN — MAGNESIUM SULFATE IN WATER 2 G: 40 INJECTION, SOLUTION INTRAVENOUS at 07:58

## 2019-01-22 RX ADMIN — NYSTATIN: 100000 POWDER TOPICAL at 05:50

## 2019-01-22 RX ADMIN — FENTANYL CITRATE 25 MCG: 50 INJECTION, SOLUTION INTRAMUSCULAR; INTRAVENOUS at 15:24

## 2019-01-22 RX ADMIN — FENTANYL CITRATE 100 MCG: 50 INJECTION, SOLUTION INTRAMUSCULAR; INTRAVENOUS at 00:51

## 2019-01-22 ASSESSMENT — PAIN SCALES - GENERAL
PAINLEVEL_OUTOF10: 0
PAINLEVEL_OUTOF10: 3
PAINLEVEL_OUTOF10: 0
PAINLEVEL_OUTOF10: 10
PAINLEVEL_OUTOF10: 2

## 2019-01-22 ASSESSMENT — COPD QUESTIONNAIRES
DURING THE PAST 4 WEEKS HOW MUCH DID YOU FEEL SHORT OF BREATH: NONE/LITTLE OF THE TIME
HAVE YOU SMOKED AT LEAST 100 CIGARETTES IN YOUR ENTIRE LIFE: NO/DON'T KNOW
COPD SCREENING SCORE: 2
DO YOU EVER COUGH UP ANY MUCUS OR PHLEGM?: NO/ONLY WITH OCCASIONAL COLDS OR INFECTIONS

## 2019-01-22 ASSESSMENT — LIFESTYLE VARIABLES: EVER_SMOKED: NEVER

## 2019-01-22 NOTE — ASSESSMENT & PLAN NOTE
PPI likely NSAID related and coagulopathy, Q6 hr, INR TEG follow up  Transfuse hg<7 s/p 3 units PRBC and Kcentra  INR normal  Melena overnight  2 endoscopies, second with clipping  Continued bleeding, recurred again today  Stable hb near 8

## 2019-01-22 NOTE — PROGRESS NOTES
Late Entry:     1900- Bedside report received from ОЛЬГА Pagan. Pt thrashing in bed, agitated, MD Corona paged for updates and sedation orders. MD stated he would place orders in EPIC himself.     1932- Pt  BP dropped to 75/59, dark blood continuing to rapidly drain from OG, MD Corona STAT paged.  Per MD get ABG with H&H and order 2Units PRBC's. LR increased to 500ml/hr per MD at this time as well.     2015- MD Corona to bedside. After discussion with family, MD to place arterial line for more accurate BP    2030- Consent signed by pt spouse for arterial line.   2035- Time out called, all in agreement. Arterial line successfully placed, pt tolerated procedure well. Arterial line and BP cuff correlating.

## 2019-01-22 NOTE — OP REPORT
OP Note  Procedure Date: 1/22/2019     Procedure Time: 13:19-13:37    PreOp Diagnosis: UGI bleeding    PostOp Diagnosis:   Esophagus: Esophagitis, LA-B  Stomach: A. A 2 cm somewaht mass-like lesion with ulcerative surface with exposed vessel and small oozing (Tito IB) was seen at the lesser curvature of lower body, s/p Epi (1:10,000) 3 cc injection X 2, and hemoclip X 1 with successful hemostasis B. A 2 cm ulcer with adherent clot (Tito IIB) was seen at the greater curvature of upper body, s/p Epi (1:10,000) 4 cc injection X 1, and hemoclip X 1 with successful hemostasis  Duodenum: Mild duodenitis    Procedure(s):  GASTROSCOPY-ENDO    Surgeon(s):  Yoandy Mcelroy M.D.    Anesthesiologist/Type of Anesthesia:  No anesthesia staff entered./* No anesthesia type entered * Propofol 60 mg and 40 mg per ICU RN    Surgical Staff:  Endoscopy Technician: Lucero Langston    Specimens removed if any:  Nil    Estimated Blood Loss: < 1 cc    COMPLICATIONS:  No immediate complications.    PROCEDURE IN DETAIL, Findings and ENDOSCOPIC DIAGNOSIS:      -Prior to the procedure, a History and Physical was performed, and patient medications and allergies were reviewed. The patient’s tolerance of previous anesthesia was also reviewed. The risks and benefits of the procedure and the sedation options and risks were previously discussed with the family. The patient was deemed satisfactory to undergo the procedure. Consent was made by 2 physicians.    -Prior Anticoagulants: the patient has taken no previous anticoagulant or antiplatelet agents.    -ASA Grade Assessment: IV    -The patient was placed in the left lateral decubitus position. The scope was passed under direct vision. Continuous oxygen was provided via ET tube and intravenous sedation was administered in divided doses throughout the procedure. The patient’s blood pressure, pulse, and oxygen saturation were monitored continuously throughout the procedure.    -The  gastroscope was gently advanced under direct visualization over the tongue, down the esophagus, through the stomach and into the 2nd portion of the duodenum. The color, texture, mucosa and anatomy of esophagus, stomach and duodenum were carefully examined with the scope. The scope was then withdrawn from the patient and the procedure terminated. Further details are in the finding section, based on anatomical location.    -The patient tolerated the procedure well and there were no immediate complications. The patient was then recovered in stable condition.    Findings:  Esophagus: Esophagitis, LA-B  Stomach: A. A 2 cm somewaht mass-like lesion with ulcerative surface with exposed vessel and small oozing (Tito IB) was seen at the lesser curvature of lower body, s/p Epi (1:10,000) 3 cc injection X 2, and hemoclip X 1 with successful hemostasis B. A 2 cm ulcer with adherent clot (Tito IIB) was seen at the greater curvature of upper body, s/p Epi (1:10,000) 4 cc injection X 1, and hemoclip X 1 with successful hemostasis  Duodenum: Mild duodenitis    RECOMMENDATIONS:    1. OG was removed. Avoid OG insertion/trauma  2. Consider to extubate the patient if stable  3. Ok to start clear liq diet once extubated, awake and alert  4. Continue PPI infusion for another 48 hours  5. Repeat EGD to confirm healing and possible biopsy in 3-4 months    1/22/2019 2:08 PM Yoandy Mcelroy M.D.

## 2019-01-22 NOTE — PROGRESS NOTES
Critical Care Progress Note    Date of admission  1/21/2019    Chief Complaint  86 y.o. 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 His hg was 11 and repeat here was 7 and had persistent melanotic stools and agitation that led him getting intubation with ketamine and mayuri in ER here and was given 1 prbc and placed on Jonny synephrine up to 400 and norepinephrine to 20 also required jonny pushes, his inial blood gas was 7.23/50/391 and his RR was increased from 18 to 22 and titrated down to 40% FIO2. His INR was 6 and was given K centra. I was asked to see patient and history is limited secondary to being intubated and given ketamine and versed.        Hospital Course    intubated 1/21 for gi bleed  1/22 endoscopy overnight, repeat today, sbt following procedure      Interval Problem Update  Reviewed last 24 hour events:  On sedation overnight  Follows without prop  Hr 70s  Black tarry bm overnight  Pending repeat endoscopy today  sbt after scope  LR at 100 ml/hr, stop  10.4 to 9.6 hb  1 u prbc overnight, 2 u prbc in er  Vent day 2  On propofol  Apneic this am on sbt  fent if remains intubated    Review of Systems  Review of Systems   Unable to perform ROS: Intubated        Vital Signs for last 24 hours   Temp:  [36.1 °C (97 °F)-37.8 °C (100 °F)] 37.7 °C (99.9 °F)  Pulse:  [] 85  Resp:  [7-32] 16  BP: (121)/(60) 121/60  SpO2:  [89 %-100 %] 100 %    Hemodynamic parameters for last 24 hours       Respiratory  Kramer Vent Mode: APVCMV  Rate (breaths/min): 20  Vt Target (mL): 410  PEEP/CPAP: 8  FiO2: 30  P MEAN: 11  Control VTE (exp VT): 407    Physical Exam   Physical Exam   Constitutional: He appears well-developed and well-nourished. No distress.   Intubated and sedated    HENT:   Head: Normocephalic and atraumatic.   Right Ear: External ear normal.   Left Ear: External ear normal.   Mouth/Throat: No oropharyngeal exudate.   Eyes: Pupils are equal, round, and reactive to light. Conjunctivae and EOM are normal. Right eye exhibits no discharge. Left eye exhibits no discharge.   Neck: No JVD present. No tracheal deviation present.   Cardiovascular: Normal rate, regular rhythm and normal heart sounds.    No murmur heard.  Pulmonary/Chest: No stridor. He is in respiratory distress. He has no wheezes. He has rales. He exhibits no tenderness.   Intubated and sedated   Abdominal: He exhibits no mass. There is no tenderness. There is no rebound and no guarding.   Musculoskeletal: He exhibits edema. He exhibits no tenderness or deformity.   Neurological: He displays normal reflexes. No cranial nerve deficit. Coordination normal.   Moves all ext, agitated off sedation but does follow commmands   Skin: Skin is warm and dry. No rash noted. He is not diaphoretic. No erythema.   Psychiatric: He has a normal mood and affect. His behavior is normal.       Medications  Current Facility-Administered Medications   Medication Dose Route Frequency Provider Last Rate Last Dose   • magnesium sulfate IVPB premix 2 g  2 g Intravenous Once Luca Puckett M.D.       • fentaNYL (SUBLIMAZE) injection 50 mcg  50 mcg Intravenous Once Macie Simmons M.D.   Stopped at 01/21/19 1330   • norepinephrine (LEVOPHED) 8 mg in  mL Infusion  0-30 mcg/min Intravenous Continuous Luca Puckett M.D. 15 mL/hr at 01/22/19 0647 8 mcg/min at 01/22/19 0647   • pantoprazole (PROTONIX) 80 mg in  mL Infusion  8 mg/hr Intravenous Continuous Luca Puckett M.D. 25 mL/hr at 01/22/19 0053 8 mg/hr at 01/22/19 0053   • senna-docusate (PERICOLACE or SENOKOT S) 8.6-50 MG per tablet 2 Tab  2 Tab Enteral Tube BID Luca Puckett M.D.   Stopped at 01/21/19 1800    And   • polyethylene glycol/lytes (MIRALAX) PACKET 1 Packet  1 Packet  Enteral Tube QDAY PRN Luca Puckett M.D.        And   • magnesium hydroxide (MILK OF MAGNESIA) suspension 30 mL  30 mL Enteral Tube QDAY PRN Luca Puckett M.D.        And   • bisacodyl (DULCOLAX) suppository 10 mg  10 mg Rectal QDAY PRN Luca Puckett M.D.       • Respiratory Care per Protocol   Nebulization Continuous RT Luca Puckett M.D.       • lactated ringers infusion   Intravenous Continuous Luca Puckett M.D. 100 mL/hr at 01/21/19 1834     • hydrocortisone sodium succinate PF (SOLU-CORTEF) 100 MG injection 50 mg  50 mg Intravenous Q6HRS Luca Puckett M.D.   50 mg at 01/22/19 0245   • doxycycline (VIBRAMYCIN) 100 mg in  mL IVPB  100 mg Intravenous Q12HRS Randolph Tan M.D. 100 mL/hr at 01/22/19 0550 100 mg at 01/22/19 0550   • cefTRIAXone (ROCEPHIN) 2 g in  mL IVPB  2 g Intravenous Q24HRS Randolph Tan M.D. 200 mL/hr at 01/22/19 0550 2 g at 01/22/19 0550   • MD Alert...ICU Electrolyte Replacement per Pharmacy   Other PHARMACY TO DOSE Randolph Tan M.D.       • nystatin (MYCOSTATIN) powder   Topical BID Luca Puckett M.D.       • propofol (DIPRIVAN) injection  0-80 mcg/kg/min Intravenous Continuous Salvador Corona M.D. 9 mL/hr at 01/22/19 0311 15 mcg/kg/min at 01/22/19 0311   • fentaNYL (SUBLIMAZE) injection  mcg   mcg Intravenous Q30 MIN PRN Salvador Corona M.D.   100 mcg at 01/22/19 0606   • vasopressin (VASOSTRICT) 20 Units in  mL Infusion  0.03 Units/min Intravenous Continuous Salvador Corona M.D.   Stopped at 01/21/19 2000       Fluids    Intake/Output Summary (Last 24 hours) at 01/22/19 0647  Last data filed at 01/22/19 0600   Gross per 24 hour   Intake          2639.53 ml   Output             2100 ml   Net           539.53 ml       Laboratory  Recent Labs      01/21/19   1420  01/21/19   1953  01/22/19   0457   ISTATAPH  7.235*  7.345*  7.411   ISTATAPCO2  50.0*  31.6  26.2   ISTATAPO2  391*  232*  95*   ISTATATCO2  23  18*  17*   [id number]  100*  100*   98   ISTATARTHCO3  21.2  17.3  16.6*   ISTATARTBE  -6*  -8*  -7*   ISTATTEMP  see below  37.3 C  37.6 C   ISTATFIO2  100  60  30   ISTATSPEC  Arterial  Arterial  Arterial   ISTATAPHTC   --   7.340*  7.402   [id number]   --   233*  98*         Recent Labs      01/21/19   1621  01/22/19   0543   SODIUM  137   --    POTASSIUM  4.4   --    CHLORIDE  111   --    CO2  21   --    BUN  89*   --    CREATININE  1.28   --    MAGNESIUM   --   1.7   PHOSPHORUS   --   2.2*   CALCIUM  7.2*   --      Recent Labs      01/21/19   1621   ALTSGPT  7   ASTSGOT  12   ALKPHOSPHAT  22*   TBILIRUBIN  0.9   GLUCOSE  126*     Recent Labs      01/21/19   1345  01/21/19   1621  01/21/19   1924  01/22/19   0543   WBC  19.6*   --   32.1*  27.6*   NEUTSPOLYS   --    --    --   91.30*   LYMPHOCYTES   --    --    --   4.60*   MONOCYTES   --    --    --   2.80   EOSINOPHILS   --    --    --   0.00   BASOPHILS   --    --    --   0.30   ASTSGOT   --   12   --    --    ALTSGPT   --   7   --    --    ALKPHOSPHAT   --   22*   --    --    TBILIRUBIN   --   0.9   --    --      Recent Labs      01/21/19   1345   01/21/19   1635  01/21/19   1924  01/21/19   2156  01/22/19   0248  01/22/19   0543   RBC  2.50*   --    --   3.35*   --    --   3.55*   HEMOGLOBIN  7.0*   < >   --   9.8*  11.0*  10.1*  10.4*   HEMATOCRIT  21.7*   --    --   29.9*  32.7*   --   30.9*   PLATELETCT  199   --    --   219   --    --   180   PROTHROMBTM  57.1*   --   14.1   --    --    --    --    APTT  49.0*   --    --    --    --    --    --    INR  6.56*   --   1.08   --    --    --    --     < > = values in this interval not displayed.       Imaging  X-Ray:  I have personally reviewed the images and compared with prior images. and My impression is: bilateral congestion, no significant consolidation  EKG:  I have personally reviewed the images and compared with prior images. and No film today    Assessment/Plan  * Shock (HCC)- (present on admission)   Assessment & Plan    Secondary  to hemorrhagic shock  Received transfusions  2 endoscopies with intervention  k central to reverse inr  Transfuse for hb < 7     Acute respiratory failure with hypoxia (HCC)- (present on admission)   Assessment & Plan    On ventilator  sbt following procedure  Plan for extubation if tolerates  Lasix for diuresis  On hydrocortisone  Nebs scheduled       GI bleed- (present on admission)   Assessment & Plan    Repeat endoscopy today  Placed clips  ppi drip, can change to bid  Hold ac for now       Candidal intertrigo- (present on admission)   Assessment & Plan    nystatin     Anemia due to GI blood loss- (present on admission)   Assessment & Plan    PPI likely NSAID related and coagulopathy, Q6 hr, INR TEG follow up  Transfuse hg<7 s/p 3 units PRBC and Kcentra     Supratherapeutic INR- (present on admission)   Assessment & Plan    Secondary to Coumadin s/p kcentra     SIRS (systemic inflammatory response syndrome) (HCC)- (present on admission)   Assessment & Plan      On ceftriaxone and doxycycline for possible cap  Improved infectious symptoms  Once extubated reevaluate  May be able to discontinue antibiotics  Leukocytosis likely from aspiration penumonitis and gi bleed     Paroxysmal atrial fibrillation (HCC)- (present on admission)   Assessment & Plan    Keep map > 65  Rate controlled currently  Will add home regimen diltiazem and sotalol as stabilizes       Anticoagulant long-term use- (present on admission)   Assessment & Plan    Elevated inr  Received k centra  Hold ac       History of pulmonary embolism- (present on admission)   Assessment & Plan    Has ivc filter  Echo reviewed  Does not appear to have pe  On ac outpt  Received k central  Hold ac for now       COPD (chronic obstructive pulmonary disease) (HCC)- (present on admission)   Assessment & Plan    On hydrocortisone  Not contributory to current ICU stay  May have been partially exac with intubation  Has hx of pulmonary fibrosis  duonebs sched  Restart  mometasone and spiriva once extubated     Cardiac pacemaker- (present on admission)   Assessment & Plan    Continue to monitor on tele     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 contriubotory to bleeding  On hydrocortisone  Will wean off with weaning of pressors        OK to restart diltiazem  Held sotalol as patient qtc 553    VTE:  Contraindicated  Ulcer: PPI  Lines: Central Line  Ongoing indication addressed, Arterial Line  Ongoing indication addressed and Rodas Catheter  Ongoing indication addressed    I have performed a physical exam and reviewed and updated ROS and Plan today (1/22/2019). In review of yesterday's note (1/21/2019), there are no changes except as documented above.     Discussed patient condition and risk of morbidity and/or mortality with Hospitalist, Family, RN, RT, Therapies, Pharmacy, Dietary, , Code status disscussed, Charge nurse / hot rounds, Patient and GI     The patient remains critically ill.  Adjustment of propofol drip, on mechanical ventilator, sbt failed am due to agitation, had endoscopy, sbt again of which tolerated.  Extubated and did well post extubation.  Multiple reassessments and extensive discussion with family.  Critical care time = 90 minutes in directly providing and coordinating critical care and extensive data review.  No time overlap and excludes procedures.

## 2019-01-22 NOTE — PROGRESS NOTES
Gastroenterology Interval Note  Note Author: Rene Mirza M.D.     Name Jeffrey Lizama     1932   Age/Sex 86 y.o. male   MRN [id number]   Code Status Full     After 5PM or if no immediate response to page, please call for cross-coverage  Attending/Team: Dr Mcelroy See Patient List for primary contact information  Call [phone] to page    1st Call - Day Intern (R1):   Yuki          Reason for interval visit  (Principal Problem)   Shock (HCC)    Interval Problem Daily Status Update  (24 hours)   -Remains intubated  -Hgb recovered s/p 4 units PRBC but continues to trend down  -Endoscopy  showed multiple stomach ulcers likely resulting from excess NSAID use s/p clips x2, one lesion potentially suggestive of malignancy, will likely need EUS to appropriately biopsy    Review of Systems   Unable to perform ROS: Intubated         Disposition  ICU    Quality Measures  Quality-Core Measures   Reviewed items::  EKG reviewed, Labs reviewed, Radiology images reviewed and Medications reviewed  Rodas catheter::  Critically Ill - Requiring Accurate Measurement of Urinary Output  DVT prophylaxis - mechanical:  SCDs          Physical Exam       Vitals:    19 0923 19 1000 19 1100 19 1200   BP:       Pulse:  (P) 71 (P) 69 (P) 80   Resp:  (P) 17 (P) 18 (P) 20   Temp:       TempSrc:    (P) Rodas   SpO2: 100% (P) 100% (P) 98% (P) 99%   Weight:       Height:         Body mass index is 36.87 kg/m². Weight: 100.5 kg (221 lb 9 oz)  Oxygen Therapy:  Pulse Oximetry: (P) 99 %, FiO2%: 30 %, O2 Delivery: Ventilator    Physical Exam   Constitutional: He has a sickly appearance.   HENT:   Head: Normocephalic and atraumatic.   Eyes: Pupils are equal, round, and reactive to light.   Cardiovascular: Normal rate and regular rhythm.    No murmur heard.  Pulmonary/Chest: Effort normal and breath sounds normal.   Abdominal: Soft. He exhibits distension.   Musculoskeletal: He exhibits no edema.    Neurological:   GCS 7-8t   Skin: Skin is warm and dry.         Lab Data Review:         1/22/2019  2:02 PM    Recent Labs      01/21/19 1621 01/22/19   0543   SODIUM  137  138   POTASSIUM  4.4  4.2   CHLORIDE  111  113*   CO2  21  16*   BUN  89*  67*   CREATININE  1.28  1.01   MAGNESIUM   --   1.7   PHOSPHORUS   --   2.2*   CALCIUM  7.2*  8.4*       Recent Labs      01/21/19   1621 01/22/19   0543   ALTSGPT  7  7   ASTSGOT  12  17   ALKPHOSPHAT  22*  21*   TBILIRUBIN  0.9  0.9   GLUCOSE  126*  169*       Recent Labs      01/21/19   1345   01/21/19   1635  01/21/19 1924 01/21/19   2156  01/22/19   0248  01/22/19   0543  01/22/19   0850   RBC  2.50*   --    --   3.35*   --    --   3.55*   --    HEMOGLOBIN  7.0*   < >   --   9.8*  11.0*  10.1*  10.4*  9.6*   HEMATOCRIT  21.7*   --    --   29.9*  32.7*   --   30.9*   --    PLATELETCT  199   --    --   219   --    --   180   --    PROTHROMBTM  57.1*   --   14.1   --    --    --    --    --    APTT  49.0*   --    --    --    --    --    --    --    INR  6.56*   --   1.08   --    --    --    --    --     < > = values in this interval not displayed.       Recent Labs      01/21/19   1345  01/21/19   1621 01/21/19 1924 01/22/19   0543   WBC  19.6*   --   32.1*  27.6*   NEUTSPOLYS   --    --    --   91.30*   LYMPHOCYTES   --    --    --   4.60*   MONOCYTES   --    --    --   2.80   EOSINOPHILS   --    --    --   0.00   BASOPHILS   --    --    --   0.30   ASTSGOT   --   12   --   17   ALTSGPT   --   7   --   7   ALKPHOSPHAT   --   22*   --   21*   TBILIRUBIN   --   0.9   --   0.9           Assessment/Plan     #Upper GI Bleed  -Prior to intubation pt noted epigastric pain and excess NSAID use  -Hgb recovered s/p 4 units PRBC but continues to trend down, melanotic stools and bloody NG suction continue  -Endoscopy 1/22 showed multiple stomach ulcers likely resulting from excess NSAID use s/p clips x2, one lesion potentially suggestive of malignancy, will likely need  EUS to appropriately biopsy  -Continue IV PPI  -CTM H/H Q6H and transfuse for Hgb <7

## 2019-01-22 NOTE — CONSULTS
"Date of Consultation:  1/21/2019    Patient: : Jeffrey Lizama  MRN: [id number]    Referring Physician: HAKAN Simmons:Luca Mtz M.D.     Reason for Consultation: Upper GI bleed melena anemia    History of Present Illness:   86-year-old male with history of proximal atrial fibrillation sick sinus syndrome third-degree AV block cardiomyopathy pacemaker pituitary adenoma with hypopituitarism who is being seen today for anemia and melena.  Patient was intubated on examination history obtained from discussion with team as well as nursing staff.  Per review of chart patient had melena since last night INR was 4 initially however on arrival was 6.  Patient was noncompliant during flight and upon arrival had a large melenic stool.  Due to mentation changes patient was intubated.  Patient was noted to have hemoglobin of 7 down from previous 13 in 2018.  In review of chart patient was taking a lot of NSAID as well.  OG was placed with significant amount of dark blood noted.  Patient was given transfusion and medication for reversal      Past Medical History:   Diagnosis Date   • Paroxysmal atrial fibrillation ([facility]) 8/29/2011   • SSS (sick sinus syndrome) ([facility]) 8/29/2011   • Third degree AV block ([facility]) 8/29/2011   • Obstructive hypertrophic cardiomyopathy ([facility]) 8/4/2011   • Cardiac pacemaker 04 2010   • BPH (benign prostatic hypertrophy) 4/1/2010   • Benign neoplasm of pituitary gland and craniopharyngeal duct (pouch) ([facility]) 4/1/2010   • thyroidectomy 2005    benign   • S/P parathyroidectomy ([facility]) 2005   • Knee arthroplasty 2002    right   • Pituitary adenoma ([facility]) 1995    hypophysectomy   • 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[facility]) 1995   • Pituitary adenoma ([facility]) 1995    hypophysectomy   • Anesthesia     \"heart stopped\"   • Arrhythmia    • Arthritis     hand, elbow   • Asbestos exposure    • ASTHMA    • Back pain    • Bradycardia    • Breath shortness    • Bronchitis    • Cataract    • COPD (chronic obstructive pulmonary disease) ([facility])  "   • Diverticulitis    • DJD (degenerative joint disease)    • EMPHYSEMA    • Glaucoma    • Heart burn    • Heart murmur    • Hiatus hernia syndrome    • Hypertension    • Indigestion    • PAF (paroxysmal atrial fibrillation) ([facility])    • PE (pulmonary embolism)     IVC filter, states PE clots 8 times   • Phlebitis     chronic / recurrent   • Pneumonia    • Rheumatic fever    • S/P insertion of IVC (inferior vena caval) filter    • S/P parathyroidectomy (HCC)    • Sleep apnea     no cpap machine   • Unspecified disorder of thyroid    • Unspecified hemorrhagic conditions    • Unspecified urinary incontinence    • Urinary bladder disorder          Past Surgical History:   Procedure Laterality Date   • KNEE REVISION TOTAL  6/20/2013    Performed by Ortega Vega M.D. at SURGERY [facility] ORS   • CARPAL TUNNEL ENDOSCOPIC  9/30/2011    Performed by RONALD ENNIS at SURGERY SAME DAY [facility] ORS   • PACEMAKER INSERTION Left 04/23/2010    [employer] Altrua 60 [id number] implanted by Dr. Donaldson.   • CATARACT EXTRACTION WITH IOL      bilateral    • CERVICAL DISK AND FUSION ANTERIOR     • CERVICAL FUSION POSTERIOR     • CHOLECYSTECTOMY     • CHOLECYSTECTOMY     • OTHER      pituitary tumor removed   • OTHER ORTHOPEDIC SURGERY      knee surgery left knee x 2   • PB REVISE ULNAR NERVE AT WRIST     • PB TOTAL KNEE ARTHROPLASTY      left   • PB TOTAL KNEE ARTHROPLASTY      right   • THYROIDECTOMY         Family History   Problem Relation Age of Onset   • Arthritis Mother    • Arthritis Father    • Cancer Neg Hx    • Heart Disease Neg Hx        Social History     Social History   • Marital status:      Spouse name: N/A   • Number of children: N/A   • Years of education: N/A     Social History Main Topics   • Smoking status: Never Smoker   • Smokeless tobacco: Never Used   • Alcohol use No   • Drug use: No   • Sexual activity: Not on file     Other Topics Concern   • Not on file     Social History Narrative   • No  narrative on file       Review of Systems   Unable to perform ROS: Intubated         Physical Exam:  Vitals:    01/21/19 1611 01/21/19 1616 01/21/19 1620 01/21/19 1624   Pulse: 100 (!) 108 (!) 112    Resp: (!) 33 (!) 28 (!) 29    Temp:       TempSrc:       SpO2: 100% 100% 100% 100%   Weight:       Height:           Physical Exam   Constitutional: He is well-developed, well-nourished, and in no distress.   HENT:   Head: Normocephalic and atraumatic.   Intubated   Eyes: Pupils are equal, round, and reactive to light.   Neck: Normal range of motion. Neck supple. No JVD present. No tracheal deviation present. No thyromegaly present.   Cardiovascular: Normal rate and regular rhythm.  Exam reveals no gallop and no friction rub.    No murmur heard.  Pulmonary/Chest: Effort normal and breath sounds normal. No stridor. No respiratory distress. He has no wheezes. He has no rales. He exhibits no tenderness.   Intubated on respirator   Abdominal: Soft. Bowel sounds are normal. He exhibits no distension and no mass. There is no tenderness. There is no rebound and no guarding.   Musculoskeletal: Normal range of motion. He exhibits no edema, tenderness or deformity.   Lymphadenopathy:     He has no cervical adenopathy.   Skin: Skin is warm and dry. No rash noted. No erythema. No pallor.         Labs:  Recent Labs      01/21/19   1345   WBC  19.6*   RBC  2.50*   HEMOGLOBIN  7.0*   HEMATOCRIT  21.7*   MCV  86.8   MCH  28.0   MCHC  32.3*   RDW  48.6   PLATELETCT  199   MPV  11.0     No results for input(s): SODIUM, POTASSIUM, CHLORIDE, CO2, GLUCOSE, BUN, CPKTOTAL in the last 72 hours.  Recent Labs      01/21/19   1345   APTT  49.0*   INR  6.56*         Imaging:  None        Impressions:  1. Shock: Likely multifactorial with hemorrhagic septic versus hypopituitarism  2.  GI bleed suspect upper: Differential diagnosis esophagitis peptic ulcer disease versus other  3. Acute respiratory failure  4. Supratherapeutic INR s/p kcentra  5.  Sick sinus  6. History of PE  7. Hypopituitarism     86-year-old male with history of multiple significant condition including hypopituitarism, sick sinus on chronic pacemaker atrial fibrillation on chronic anticoagulation who presents with melenic stool requiring intubation.  Also had hypotension possibly hemorrhagic versus septic shock.  Was taking significant NSAIDs.  INR supratherapeutic.  Differential diagnosis likely upper GI peptic ulcer versus other.  OG suction out significant amount of blood.  H&H sound.    Recommendations:  1.  INR anticoagulation reversal as per pharmacy and ICU  2.  Hypotension management as per ICU with blood replacement pressor  3.  Based on anticoagulation status and reversal INR recommend potential for endoscopic evaluation.  Risk and benefit was unable to be discussed to physician consent signed.  10 mg IV Reglan given to promote GI motility to clean out stomach for further evaluation  4.  PPI drip for now  5.  Critically ill patient with high potential for decompensation and rebleeding.         This note was generated using voice recognition software which has a small chance of producing errors of grammar and possibly content. I have made every reasonable attempt to find and correct any obvious errors, but expect that some may not be found prior to finalization of this note.

## 2019-01-22 NOTE — ASSESSMENT & PLAN NOTE
On steroids outpt,  ? contriubotory to bleeding  On hydrocortisone IV, changed to po home dosing  Tolerating  May need to be weaned off eventually if tolerates given gi bleed

## 2019-01-22 NOTE — PROGRESS NOTES
I was called to bedside for worsening hypotension.  Patient was admitted with GI hemorrhage, EGD reviewed showing probable gastric bleeding with no clear culprit lesion identified due to blood in the stomach.  He remains on Protonix infusion, octreotide, full mechanical ventilator support and now is more hypotensive on escalating doses of norepinephrine.  Have added vasopressin.  A stat hemoglobin is pending but there is some evidence of ongoing blood from the NG tube.  I have given an additional 1 unit of packed red blood cells.  I replace calcium.  I examined the patient.  He has slightly cool extremities but intact capillary refill.  He is sedate but withdrawals appropriately and moves all extremities.  Pupils are equal.  I placed a right radial arterial catheter.    I reviewed the most recent TEG.  He does have significant platelet inhibition.  If there is ongoing bleeding we will give additional platelets but await follow-up H&H.  If there is evidence of ongoing significant bleeding he may require embolization.  Case will be reviewed with GI if further bleeding noted.    The patient is critically ill.  I have examined and reexamined the patient.  Critical care time=40 minutes excluding separate procedures.

## 2019-01-22 NOTE — ASSESSMENT & PLAN NOTE
Secondary to Coumadin s/p kcentra  Currently normal  See ac discussion above  Needs risk benefit assessment

## 2019-01-22 NOTE — ASSESSMENT & PLAN NOTE
Has ivc filter  Echo reviewed  Does not appear to have pe  On ac outpt  Received k central  Hold ac for now  Will need to restart at some point, discuss with gi when appropriate per their perspective  Then needs risk benefit discussion, has DVT presumably from hx dvt of which had source for prior pe, if that is the case then does not absolutely need ac for pe history  Afib, older age, may be able to just be on antiplatelets

## 2019-01-22 NOTE — PROGRESS NOTES
Report received from ER RN. Pt taken up to [facility] with RT and 2 RNs. Plan for EGD was explained to wife.

## 2019-01-22 NOTE — ASSESSMENT & PLAN NOTE
On ceftriaxone and doxycycline for possible cap  Improved infectious symptoms  Once extubated reevaluate  Stopped antibiotics 1/23  No changes regarding fever, no leukocytosis currently

## 2019-01-22 NOTE — PROCEDURES
Date: 1/22/2019    Procedure:  Arterial Catheter Insertion    Indication: Hypotension, GI bleed    Physician:  Dr. Salvador Corona MD    Consent:  Emergent    Procedure:  The patient is intubated and on full mechanical vent support with GI bleed, hypotensive on vasopressors.  Arterial catheter is indicated for continuous invasive BP monitoring to titrate vasoactive agents.  The right wrist was prepped and draped using sterile barrier precautions.  A small bore radial artery catheter was placed in the right radial artery without difficulty.  Ultrasound was used for localization and placement.  A good quality arterial waveform was noted on monitor.  The catheter ws sutured in place and a sterile dressing was applied.  No immediate complications.  EBL < 5 cc.

## 2019-01-22 NOTE — ASSESSMENT & PLAN NOTE
Repeat endoscopy today  Placed clips  ppi drip, can change to bid after 72 hours post procedure as per GI  Hold ac for now  H/h stable  3 u prbc on this admission  Continued bleeding  Hb remains relatively stable in 8 range  Had another melanotic stool today

## 2019-01-22 NOTE — PROGRESS NOTES
2 RN assessment completed with Kimberly RN. Patient has moisture excoriation in skin folds, under breasts, pannus, and moisture fissure in sacral fold.  Waffle cushion overlay in use.  Picture of sacrum uploaded in chart.  Wound consult ordered.

## 2019-01-22 NOTE — PROGRESS NOTES
Hospital Medicine Daily Progress Note    Date of Service  1/22/2019    Chief Complaint  86 y.o. male admitted 1/21/2019 with GI bleed on coumadin  For AFib received vit  K and Kcentra    Hospital Course          Interval Problem Update  Agitated started on prop 15  SR with paroxysmal AFib  Levo at 6  Small tarry BVM  OG with minimal output this   am   protonix drip   Received 1 unit of blood overnight  Hg 10.1-10.4-9.6  VD#2     Consultants/Specialty  Critical care  GI    Code Status  Full code    Disposition  To be determined    Review of Systems  Review of Systems   Unable to perform ROS: Intubated        Physical Exam  Temp:  [36.1 °C (97 °F)-37.8 °C (100 °F)] 37.7 °C (99.9 °F)  Pulse:  [] (P) 71  Resp:  [7-32] (P) 12  BP: (121)/(60) 121/60  SpO2:  [89 %-100 %] 100 %    Physical Exam   Constitutional: He appears well-developed and well-nourished.   HENT:   Head: Normocephalic and atraumatic.   Right Ear: External ear normal.   Left Ear: External ear normal.   Mouth/Throat: No oropharyngeal exudate.   Eyes: Conjunctivae are normal. Right eye exhibits no discharge. Left eye exhibits no discharge. No scleral icterus.   Neck: Neck supple. No JVD present. No tracheal deviation present.   Cardiovascular: Normal rate and regular rhythm.  Exam reveals no gallop and no friction rub.    No murmur heard.  Pulmonary/Chest: Effort normal. No stridor. No respiratory distress. He has no wheezes. He has rales. He exhibits no tenderness.   Intubated   Abdominal: Soft. Bowel sounds are normal. He exhibits no distension. There is no tenderness. There is no rebound.   Musculoskeletal: He exhibits no edema or tenderness.   Neurological: No cranial nerve deficit. He exhibits normal muscle tone.   Sedated   Skin: Skin is warm and dry. He is not diaphoretic. No cyanosis. Nails show no clubbing.   Psychiatric:   Sedated   Nursing note and vitals reviewed.      Fluids    Intake/Output Summary (Last 24 hours) at 01/22/19  1000  Last data filed at 01/22/19 0800   Gross per 24 hour   Intake             3028 ml   Output             2345 ml   Net              683 ml       Laboratory  Recent Labs      01/21/19   1345   01/21/19   1924  01/21/19   2156  01/22/19   0248  01/22/19   0543  01/22/19   0850   WBC  19.6*   --   32.1*   --    --   27.6*   --    RBC  2.50*   --   3.35*   --    --   3.55*   --    HEMOGLOBIN  7.0*   < >  9.8*  11.0*  10.1*  10.4*  9.6*   HEMATOCRIT  21.7*   --   29.9*  32.7*   --   30.9*   --    MCV  86.8   --   89.3   --    --   87.0   --    MCH  28.0   --   29.3   --    --   29.3   --    MCHC  32.3*   --   32.8*   --    --   33.7   --    RDW  48.6   --   48.3   --    --   47.2   --    PLATELETCT  199   --   219   --    --   180   --    MPV  11.0   --   11.3   --    --   10.9   --     < > = values in this interval not displayed.     Recent Labs      01/21/19   1621  01/22/19   0543   SODIUM  137  138   POTASSIUM  4.4  4.2   CHLORIDE  111  113*   CO2  21  16*   GLUCOSE  126*  169*   BUN  89*  67*   CREATININE  1.28  1.01   CALCIUM  7.2*  8.4*     Recent Labs      01/21/19   1345  01/21/19   1635   APTT  49.0*   --    INR  6.56*  1.08               Imaging  EC-ECHOCARDIOGRAM COMPLETE W/O CONT   Final Result      DX-CHEST-PORTABLE (1 VIEW)   Final Result      Mild worsening hypoinflation and bibasilar infiltrate or atelectasis, worse on the LEFT.      [id number] VIEW   Final Result      NG tube tip in the expected location of the mid stomach.      DX-CHEST-PORTABLE (1 VIEW)   Final Result      Tubes and line as described above.      Enlarged cardiac silhouette with left basal consolidation and left pleural fluid.           Assessment/Plan  * Shock (HCC)- (present on admission)   Assessment & Plan    Hypovolemic shock secondary to acute blood loss    Continue IV fluids and stress dose hydrocortisone  Wean off norepinephrine     Acute respiratory failure with hypoxia (HCC)- (present on admission)   Assessment & Plan     Vent management per critical care discussed with Dr. Da Silva  Plan is to evaluate for extubation post EGD         GI bleed- (present on admission)   Assessment & Plan    Continue IV Protonix  Monitor H&H and transfuse if less than 7  GI following with plans for repeat EGD today discussed with Dr. Mcelroy     Aortic stenosis   Assessment & Plan    Mild noted on echo     Candidal intertrigo- (present on admission)   Assessment & Plan    Nystatin      Anemia due to GI blood loss- (present on admission)   Assessment & Plan    Monitor hemoglobin and transfuse if less than 7  Plan is for repeat EGD today     SIRS (systemic inflammatory response syndrome) ([facility])- (present on admission)   Assessment & Plan    Was empirically started on ceftriaxone and doxycycline for possible community-acquired pneumonia  Follow-up on cultures and check pro-calcitonin     Paroxysmal atrial fibrillation ([facility])- (present on admission)   Assessment & Plan    History of   Resume sotalol  Telemetry monitoring     Anticoagulant long-term use- (present on admission)   Assessment & Plan    On coumadin for history of A. Fib  Anticoagulation reversed given GI bleed     History of pulmonary embolism- (present on admission)   Assessment & Plan    On coumadin outpatient  History of IVC filter    Anticoagulation reversed given severe GI bleed     COPD (chronic obstructive pulmonary disease) ([facility])- (present on admission)   Assessment & Plan    On 5 liters nasal cannula oxygen prior to admit     Cardiac pacemaker- (present on admission)   Assessment & Plan    Secondary to sick sinus syndrome     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[facility])- (present on admission)   Assessment & Plan    On cortef outpatient    Continue stress dose hydrocortisone          VTE prophylaxis: SCD

## 2019-01-22 NOTE — ASSESSMENT & PLAN NOTE
Secondary to hemorrhagic shock  Received transfusions  2 endoscopies with intervention  k central to reverse inr  Transfuse for hb < 7

## 2019-01-22 NOTE — PROGRESS NOTES
Pt given 2 mg of versed for EGD. BP 's did not tolerate, droppped to 60's/40's. Levo maxed and 250ml bolus given per Dr. Ashton. BP's increased to 100's/70's.

## 2019-01-22 NOTE — DIETARY
NUTRITION - Order received for Metabolic Cart Study per Dietitian/RT. Study not indicated at this time. Dietitian/RT to order as needed.   -no TF to begin today

## 2019-01-22 NOTE — ASSESSMENT & PLAN NOTE
On hydrocortisone, changed to home dosing  Not contributory to current ICU stay  May have been partially exac with intubation  Has hx of pulmonary fibrosis  duonebs sched  Restarted spiriva and symbicort (on other brand outpt)  Improved respiratory status, close to baseline per patient

## 2019-01-22 NOTE — PROCEDURES
Esophagogastroduodenoscopy  Date of Procedure: 1/21/2019  Attending Physician: Luca Mtz MD    Indications: Melena, anemia, upper GI bleed    Instrument: Olympus Flexible Endoscope  Sedation:   Moderate sedation  Versed: 2mg (patient already intubated)  Start 6:00PM  End: 6:15PM    Pre-Anesthesia Assessment:  Prior to the procedure, a History and Physical was performed, and patient medications and allergies were reviewed. The patient’s tolerance of previous anesthesia was also reviewed. The risks and benefits of the procedure and the sedation options and risks were discussed with the family including but not limited to infection, bleeding, aspiration, perforation, adverse medication reaction, missed diagnosis, and missed lesions. The patient verbalized understanding. All questions were answered, and informed consent was obtained.       After I obtained informed consent from the patient, the patient was placed in the left lateral position. Appropriate time-out protocol was followed: the correct patient, the correct procedure, and the correct equipment in the room were confirmed. Throughout the procedure, the patient’s blood pressure, pulse, and oxygen saturations were monitored continuously. The Olympus flexible gastroscope was gently passed through the incisoral orifice into the oral cavity and under direct visualization the esophagus was intubated. The endoscope was passed down the esophagus, through the stomach and into the 2nd portion of the duodenum. Retroflexion was performed at the gastroesophageal junction. Color, texture, mucosa and anatomy of esophagus, stomach, and duodenum were carefully examined with the scope.  After completion of the examination, the endoscope as removed. The patient tolerated the procedure well. There were no immediate postoperative complications.       Findings:    Esophagus: no gross esophagitis, some reflux old blood seen. No active fresh blood seen.    Stomach: visualization of  stomach poor due to lots of old blood (black) covering the mucosa; despite extensive lavage, does not have great visualization. Retroflexion of the cardia fundus showed old blood with possible clot noted. Unable to suction out all the clots but visualization still hindered. No active fresh red blood seen.    Duodenum: old red blood noted to 2nd portion of the duodenum.    Impressions:   1. GI bleed likely from gastric source, however unable to visualize due to old blood. No active fresh red blood seen.    Recommendations:   1. Continue PPI drip  2. Continue serial H/H monitoring, transfuse as per ICU  3. Recommend repeat EGD tomorrow with Dr. Mcelroy on 1/22/2019 at bedside after additional promotility medications given (erythromycin 250mg IV x 1 dose national shortage, given 10mg reglan x 2.doses).      This note was generated using voice recognition software which has a small chance of producing errors of grammar and possibly content. I have made every reasonable attempt to find and correct any obvious errors, but expect that some may not be found prior to finalization of this note

## 2019-01-22 NOTE — ASSESSMENT & PLAN NOTE
On ventilator  sbt following procedure  Plan for extubation if tolerates  Lasix for diuresis  On hydrocortisone  Nebs scheduled  Restarted spiriva  On symbicort, on mometason-vilanterol  Deconditioning contributory  Needs pt/ot eval, may need skilled facility short term

## 2019-01-22 NOTE — CARE PLAN
Problem: Ventilation Defect:  Goal: Ability to achieve and maintain unassisted ventilation or tolerate decreased levels of ventilator support    Intervention: Support and monitor invasive and noninvasive mechanical ventilation  Adult Ventilation Update    Total Vent Days: 2    Patient Lines/Drains/Airways Status    Active Airway     Name: Placement date: Placement time: Site: Days:    Airway ETT Oral 7.5 01/21/19   1657   Oral    1              Plateau Pressure (Q Shift): 17 (01/21/19 2231)  Static Compliance (ml / cm H2O): 57 (01/22/19 0457)    Sputum/Suction  Cough: Productive (01/22/19 0256)  Sputum Amount: Small (01/22/19 0256)  Sputum Color: White (01/22/19 0256)  Sputum Consistency: Thick (01/22/19 0256)    Mobility  Level of Mobility: Level IV (01/22/19 0000)  Activity Performed: Unable to mobilize (01/22/19 0000)  Reason Not Mobilized: Bed rest;Unstable condition (01/22/19 0000)  Mobilization Comments: Active Bleed (01/22/19 0000)    Events/Summary/Plan: Fio2 weaned to 30% (01/21/19 2114)

## 2019-01-22 NOTE — CARE PLAN
Problem: Venous Thromboembolism (VTW)/Deep Vein Thrombosis (DVT) Prevention:  Goal: Patient will participate in Venous Thrombosis (VTE)/Deep Vein Thrombosis (DVT)Prevention Measures    Intervention: Ensure patient wears graduated elastic stockings (BRIDGETT hose) and/or SCDs, if ordered, when in bed or chair (Remove at least once per shift for skin check)  SCD's properly placed      Problem: Pain Management  Goal: Pain level will decrease to patient's comfort goal    Intervention: Follow pain managment plan developed in collaboration with patient and Interdisciplinary Team  Pt medicated per MAR for pain

## 2019-01-22 NOTE — ASSESSMENT & PLAN NOTE
Keep map > 65  Rate controlled currently  Added diltiazem overnight, restart sotalol  Continue to hold anticoagulation

## 2019-01-23 ENCOUNTER — APPOINTMENT (OUTPATIENT)
Dept: RADIOLOGY | Facility: MEDICAL CENTER | Age: 84
DRG: 377 | End: 2019-01-23
Attending: INTERNAL MEDICINE
Payer: MEDICARE

## 2019-01-23 PROBLEM — R57.9 SHOCK (HCC): Status: RESOLVED | Noted: 2019-01-21 | Resolved: 2019-01-23

## 2019-01-23 PROBLEM — J90 PLEURAL EFFUSION: Status: ACTIVE | Noted: 2019-01-23

## 2019-01-23 PROBLEM — J96.21 ACUTE ON CHRONIC RESPIRATORY FAILURE WITH HYPOXIA (HCC): Status: ACTIVE | Noted: 2019-01-21

## 2019-01-23 LAB
ANION GAP SERPL CALC-SCNC: 7 MMOL/L (ref 0–11.9)
BACTERIA UR CULT: NORMAL
BASOPHILS # BLD AUTO: 0.1 % (ref 0–1.8)
BASOPHILS # BLD AUTO: 0.2 % (ref 0–1.8)
BASOPHILS # BLD: 0.02 K/UL (ref 0–0.12)
BASOPHILS # BLD: 0.04 K/UL (ref 0–0.12)
BUN SERPL-MCNC: 36 MG/DL (ref 8–22)
CALCIUM SERPL-MCNC: 7.8 MG/DL (ref 8.5–10.5)
CHLORIDE SERPL-SCNC: 111 MMOL/L (ref 96–112)
CO2 SERPL-SCNC: 24 MMOL/L (ref 20–33)
CREAT SERPL-MCNC: 1.01 MG/DL (ref 0.5–1.4)
EKG IMPRESSION: NORMAL
EKG IMPRESSION: NORMAL
EOSINOPHIL # BLD AUTO: 0.01 K/UL (ref 0–0.51)
EOSINOPHIL # BLD AUTO: 0.04 K/UL (ref 0–0.51)
EOSINOPHIL NFR BLD: 0.1 % (ref 0–6.9)
EOSINOPHIL NFR BLD: 0.2 % (ref 0–6.9)
ERYTHROCYTE [DISTWIDTH] IN BLOOD BY AUTOMATED COUNT: 49.5 FL (ref 35.9–50)
ERYTHROCYTE [DISTWIDTH] IN BLOOD BY AUTOMATED COUNT: 50.3 FL (ref 35.9–50)
GLUCOSE SERPL-MCNC: 127 MG/DL (ref 65–99)
HCT VFR BLD AUTO: 24.2 % (ref 42–52)
HCT VFR BLD AUTO: 25.5 % (ref 42–52)
HGB BLD-MCNC: 8 G/DL (ref 14–18)
HGB BLD-MCNC: 8.3 G/DL (ref 14–18)
IMM GRANULOCYTES # BLD AUTO: 0.15 K/UL (ref 0–0.11)
IMM GRANULOCYTES # BLD AUTO: 0.23 K/UL (ref 0–0.11)
IMM GRANULOCYTES NFR BLD AUTO: 0.8 % (ref 0–0.9)
IMM GRANULOCYTES NFR BLD AUTO: 1.3 % (ref 0–0.9)
LYMPHOCYTES # BLD AUTO: 0.99 K/UL (ref 1–4.8)
LYMPHOCYTES # BLD AUTO: 1.05 K/UL (ref 1–4.8)
LYMPHOCYTES NFR BLD: 5.3 % (ref 22–41)
LYMPHOCYTES NFR BLD: 6 % (ref 22–41)
MAGNESIUM SERPL-MCNC: 2.4 MG/DL (ref 1.5–2.5)
MCH RBC QN AUTO: 29.2 PG (ref 27–33)
MCH RBC QN AUTO: 29.2 PG (ref 27–33)
MCHC RBC AUTO-ENTMCNC: 32.5 G/DL (ref 33.7–35.3)
MCHC RBC AUTO-ENTMCNC: 33.1 G/DL (ref 33.7–35.3)
MCV RBC AUTO: 88.3 FL (ref 81.4–97.8)
MCV RBC AUTO: 89.8 FL (ref 81.4–97.8)
MONOCYTES # BLD AUTO: 1.09 K/UL (ref 0–0.85)
MONOCYTES # BLD AUTO: 1.16 K/UL (ref 0–0.85)
MONOCYTES NFR BLD AUTO: 6.3 % (ref 0–13.4)
MONOCYTES NFR BLD AUTO: 6.3 % (ref 0–13.4)
NEUTROPHILS # BLD AUTO: 14.97 K/UL (ref 1.82–7.42)
NEUTROPHILS # BLD AUTO: 16.2 K/UL (ref 1.82–7.42)
NEUTROPHILS NFR BLD: 86 % (ref 44–72)
NEUTROPHILS NFR BLD: 87.4 % (ref 44–72)
NRBC # BLD AUTO: 0 K/UL
NRBC # BLD AUTO: 0 K/UL
NRBC BLD-RTO: 0 /100 WBC
NRBC BLD-RTO: 0 /100 WBC
PHOSPHATE SERPL-MCNC: 2.6 MG/DL (ref 2.5–4.5)
PLATELET # BLD AUTO: 124 K/UL (ref 164–446)
PLATELET # BLD AUTO: 127 K/UL (ref 164–446)
PMV BLD AUTO: 10.7 FL (ref 9–12.9)
PMV BLD AUTO: 10.8 FL (ref 9–12.9)
POTASSIUM SERPL-SCNC: 3.6 MMOL/L (ref 3.6–5.5)
RBC # BLD AUTO: 2.74 M/UL (ref 4.7–6.1)
RBC # BLD AUTO: 2.84 M/UL (ref 4.7–6.1)
SIGNIFICANT IND 70042: NORMAL
SITE SITE: NORMAL
SODIUM SERPL-SCNC: 142 MMOL/L (ref 135–145)
SOURCE SOURCE: NORMAL
WBC # BLD AUTO: 17.4 K/UL (ref 4.8–10.8)
WBC # BLD AUTO: 18.5 K/UL (ref 4.8–10.8)

## 2019-01-23 PROCEDURE — 71045 X-RAY EXAM CHEST 1 VIEW: CPT

## 2019-01-23 PROCEDURE — 99291 CRITICAL CARE FIRST HOUR: CPT | Performed by: INTERNAL MEDICINE

## 2019-01-23 PROCEDURE — 700102 HCHG RX REV CODE 250 W/ 637 OVERRIDE(OP): Performed by: INTERNAL MEDICINE

## 2019-01-23 PROCEDURE — 83735 ASSAY OF MAGNESIUM: CPT

## 2019-01-23 PROCEDURE — 770020 HCHG ROOM/CARE - TELE (206)

## 2019-01-23 PROCEDURE — C9113 INJ PANTOPRAZOLE SODIUM, VIA: HCPCS | Performed by: HOSPITALIST

## 2019-01-23 PROCEDURE — 700111 HCHG RX REV CODE 636 W/ 250 OVERRIDE (IP): Performed by: INTERNAL MEDICINE

## 2019-01-23 PROCEDURE — 700105 HCHG RX REV CODE 258: Performed by: INTERNAL MEDICINE

## 2019-01-23 PROCEDURE — 93010 ELECTROCARDIOGRAM REPORT: CPT | Performed by: INTERNAL MEDICINE

## 2019-01-23 PROCEDURE — A9270 NON-COVERED ITEM OR SERVICE: HCPCS | Performed by: INTERNAL MEDICINE

## 2019-01-23 PROCEDURE — 700105 HCHG RX REV CODE 258: Performed by: HOSPITALIST

## 2019-01-23 PROCEDURE — 84100 ASSAY OF PHOSPHORUS: CPT

## 2019-01-23 PROCEDURE — 99233 SBSQ HOSP IP/OBS HIGH 50: CPT | Performed by: HOSPITALIST

## 2019-01-23 PROCEDURE — 85025 COMPLETE CBC W/AUTO DIFF WBC: CPT

## 2019-01-23 PROCEDURE — 700111 HCHG RX REV CODE 636 W/ 250 OVERRIDE (IP): Performed by: HOSPITALIST

## 2019-01-23 PROCEDURE — 700102 HCHG RX REV CODE 250 W/ 637 OVERRIDE(OP): Performed by: HOSPITALIST

## 2019-01-23 PROCEDURE — 700101 HCHG RX REV CODE 250: Performed by: INTERNAL MEDICINE

## 2019-01-23 PROCEDURE — 80048 BASIC METABOLIC PNL TOTAL CA: CPT

## 2019-01-23 PROCEDURE — 93005 ELECTROCARDIOGRAM TRACING: CPT | Performed by: HOSPITALIST

## 2019-01-23 PROCEDURE — A9270 NON-COVERED ITEM OR SERVICE: HCPCS | Performed by: HOSPITALIST

## 2019-01-23 RX ORDER — POTASSIUM CHLORIDE 20 MEQ/1
60 TABLET, EXTENDED RELEASE ORAL ONCE
Status: COMPLETED | OUTPATIENT
Start: 2019-01-23 | End: 2019-01-23

## 2019-01-23 RX ORDER — SOTALOL HYDROCHLORIDE 80 MG/1
40 TABLET ORAL 2 TIMES DAILY
Status: DISCONTINUED | OUTPATIENT
Start: 2019-01-23 | End: 2019-01-28 | Stop reason: HOSPADM

## 2019-01-23 RX ORDER — HYDROCODONE BITARTRATE AND ACETAMINOPHEN 5; 325 MG/1; MG/1
1 TABLET ORAL EVERY 6 HOURS PRN
Status: DISCONTINUED | OUTPATIENT
Start: 2019-01-23 | End: 2019-01-28 | Stop reason: HOSPADM

## 2019-01-23 RX ORDER — POLYVINYL ALCOHOL 14 MG/ML
1-2 SOLUTION/ DROPS OPHTHALMIC
Status: DISCONTINUED | OUTPATIENT
Start: 2019-01-23 | End: 2019-01-28 | Stop reason: HOSPADM

## 2019-01-23 RX ORDER — TETRAHYDROZOLINE HCL 0.05 %
1-2 DROPS OPHTHALMIC (EYE) 4 TIMES DAILY PRN
Status: ON HOLD | COMMUNITY
End: 2019-02-08

## 2019-01-23 RX ORDER — HYDROCORTISONE 10 MG/1
10 TABLET ORAL 3 TIMES DAILY
Status: DISCONTINUED | OUTPATIENT
Start: 2019-01-23 | End: 2019-01-25

## 2019-01-23 RX ORDER — POLYVINYL ALCOHOL 14 MG/ML
1 SOLUTION/ DROPS OPHTHALMIC
Status: DISCONTINUED | OUTPATIENT
Start: 2019-01-23 | End: 2019-01-23

## 2019-01-23 RX ADMIN — HYDROCORTISONE 10 MG: 10 TABLET ORAL at 12:11

## 2019-01-23 RX ADMIN — ACETAMINOPHEN 500 MG: 500 TABLET ORAL at 12:11

## 2019-01-23 RX ADMIN — POLYVINYL ALCOHOL 1 DROP: 14 SOLUTION/ DROPS OPHTHALMIC at 22:52

## 2019-01-23 RX ADMIN — SODIUM CHLORIDE 8 MG/HR: 9 INJECTION, SOLUTION INTRAVENOUS at 12:12

## 2019-01-23 RX ADMIN — DILTIAZEM HYDROCHLORIDE 60 MG: 60 TABLET, FILM COATED ORAL at 05:30

## 2019-01-23 RX ADMIN — BUDESONIDE AND FORMOTEROL FUMARATE DIHYDRATE 2 PUFF: 160; 4.5 AEROSOL RESPIRATORY (INHALATION) at 05:29

## 2019-01-23 RX ADMIN — POLYVINYL ALCOHOL 1 DROP: 14 SOLUTION/ DROPS OPHTHALMIC at 16:43

## 2019-01-23 RX ADMIN — HYDROCODONE BITARTRATE AND ACETAMINOPHEN 1 TABLET: 5; 325 TABLET ORAL at 22:49

## 2019-01-23 RX ADMIN — MONTELUKAST SODIUM 10 MG: 10 TABLET, FILM COATED ORAL at 05:28

## 2019-01-23 RX ADMIN — POTASSIUM CHLORIDE 60 MEQ: 1500 TABLET, EXTENDED RELEASE ORAL at 09:50

## 2019-01-23 RX ADMIN — HYDROCODONE BITARTRATE AND ACETAMINOPHEN 1 TABLET: 5; 325 TABLET ORAL at 16:22

## 2019-01-23 RX ADMIN — LEVOTHYROXINE SODIUM 75 MCG: 75 TABLET ORAL at 05:28

## 2019-01-23 RX ADMIN — NYSTATIN: 100000 POWDER TOPICAL at 05:30

## 2019-01-23 RX ADMIN — DOXYCYCLINE 100 MG: 100 INJECTION, POWDER, LYOPHILIZED, FOR SOLUTION INTRAVENOUS at 05:28

## 2019-01-23 RX ADMIN — HYDROCORTISONE SODIUM SUCCINATE 50 MG: 100 INJECTION, POWDER, FOR SOLUTION INTRAMUSCULAR; INTRAVENOUS at 05:24

## 2019-01-23 RX ADMIN — SOTALOL HYDROCHLORIDE 40 MG: 80 TABLET ORAL at 12:11

## 2019-01-23 RX ADMIN — GABAPENTIN 600 MG: 300 CAPSULE ORAL at 19:56

## 2019-01-23 RX ADMIN — TAMSULOSIN HYDROCHLORIDE 0.4 MG: 0.4 CAPSULE ORAL at 05:28

## 2019-01-23 RX ADMIN — SODIUM CHLORIDE 8 MG/HR: 9 INJECTION, SOLUTION INTRAVENOUS at 22:48

## 2019-01-23 RX ADMIN — DILTIAZEM HYDROCHLORIDE 60 MG: 60 TABLET, FILM COATED ORAL at 18:39

## 2019-01-23 RX ADMIN — BUDESONIDE AND FORMOTEROL FUMARATE DIHYDRATE 2 PUFF: 160; 4.5 AEROSOL RESPIRATORY (INHALATION) at 18:40

## 2019-01-23 RX ADMIN — NYSTATIN: 100000 POWDER TOPICAL at 18:40

## 2019-01-23 RX ADMIN — SOTALOL HYDROCHLORIDE 40 MG: 80 TABLET ORAL at 18:36

## 2019-01-23 RX ADMIN — HYDROCORTISONE 10 MG: 10 TABLET ORAL at 18:39

## 2019-01-23 RX ADMIN — SODIUM CHLORIDE 8 MG/HR: 9 INJECTION, SOLUTION INTRAVENOUS at 01:44

## 2019-01-23 RX ADMIN — TIOTROPIUM BROMIDE 1 CAPSULE: 18 CAPSULE ORAL; RESPIRATORY (INHALATION) at 05:29

## 2019-01-23 RX ADMIN — CEFTRIAXONE SODIUM 2 G: 2 INJECTION, POWDER, FOR SOLUTION INTRAMUSCULAR; INTRAVENOUS at 05:28

## 2019-01-23 ASSESSMENT — ENCOUNTER SYMPTOMS
HEADACHES: 0
DEPRESSION: 0
HEMOPTYSIS: 0
WEAKNESS: 0
MYALGIAS: 0
FOCAL WEAKNESS: 0
COUGH: 0
WHEEZING: 0
BLOOD IN STOOL: 0
BACK PAIN: 1
SHORTNESS OF BREATH: 1
SEIZURES: 0
CONSTIPATION: 0
VOMITING: 0
PALPITATIONS: 0
LOSS OF CONSCIOUSNESS: 0
BLURRED VISION: 0
MEMORY LOSS: 0
CHILLS: 0
STRIDOR: 0
DOUBLE VISION: 0
SENSORY CHANGE: 0
DIZZINESS: 0
DIAPHORESIS: 0
EYE REDNESS: 0
SPEECH CHANGE: 0
BACK PAIN: 0
WEIGHT LOSS: 0
FEVER: 0
NECK PAIN: 0
FALLS: 0
EYE DISCHARGE: 0
SINUS PAIN: 0
SHORTNESS OF BREATH: 0
HALLUCINATIONS: 0
NERVOUS/ANXIOUS: 0
TINGLING: 0
ABDOMINAL PAIN: 0
POLYDIPSIA: 0
ORTHOPNEA: 0
PHOTOPHOBIA: 0
FLANK PAIN: 0
BRUISES/BLEEDS EASILY: 0
SPUTUM PRODUCTION: 0
DIARRHEA: 0
NAUSEA: 0
EYE PAIN: 0
HEARTBURN: 0

## 2019-01-23 ASSESSMENT — LIFESTYLE VARIABLES: SUBSTANCE_ABUSE: 0

## 2019-01-23 ASSESSMENT — PAIN SCALES - GENERAL
PAINLEVEL_OUTOF10: 0
PAINLEVEL_OUTOF10: 0

## 2019-01-23 NOTE — PROGRESS NOTES
Attempted sedation vacation per MD's request. Pt calm initially and following commands. After about 15 mins she became very tense and agitated., HR increased rapidly from 90's to 150 and PIP increased from 30's to 50 on vent. Sedation increased and 200 mcg Fentanyl push given.

## 2019-01-23 NOTE — PROGRESS NOTES
Gastroenterology Interval Note  Note Author: Rene Mirza M.D.     Name Jeffrey Lizama     1932   Age/Sex 86 y.o. male   MRN [id number]   Code Status Full     After 5PM or if no immediate response to page, please call for cross-coverage  Attending/Team: Dr Mcelroy See Patient List for primary contact information  Call [phone] to page    1st Call - Day Intern (R1):   Yuki          Reason for interval visit  (Principal Problem)   Shock (HCC)    Interval Problem Daily Status Update  (24 hours)   -Extubated   -Endoscopy  showed multiple stomach ulcers likely resulting from excess NSAID use s/p clips x2, one lesion potentially suggestive of malignancy, will likely need EUS to appropriately biopsy  -Pt denies abd pain, denies N/V/D, did not look at BM (last on ) nurse notes say melanic  -Continue IV PPI for another 24 hrs and then transition to oral  -Thank you for this consult, at this point as ulcers have been clipped and pt appears to be clinically improving we will sign off. Please contact if any further deterioration.      Review of Systems   Constitutional: Negative for chills and fever.   Respiratory: Negative for cough and shortness of breath.    Cardiovascular: Negative for chest pain, palpitations and leg swelling.   Gastrointestinal: Positive for melena. Negative for abdominal pain, blood in stool, constipation, diarrhea, nausea and vomiting.   Genitourinary: Negative for dysuria.   Musculoskeletal: Negative for myalgias.   Skin: Negative for rash.       Disposition  ICU    Quality Measures  Quality-Core Measures   Reviewed items::  EKG reviewed, Labs reviewed, Radiology images reviewed and Medications reviewed  Rodas catheter::  Critically Ill - Requiring Accurate Measurement of Urinary Output  DVT prophylaxis - mechanical:  SCDs          Physical Exam       Vitals:    19 0400 19 0500 19 0600 19 0700   BP:       Pulse: 70 70 70 70   Resp: 19  15 19 17   Temp: 36.6 °C (97.9 °F)      TempSrc: Adrianna      SpO2: 94%      Weight:       Height:         Body mass index is 36.87 kg/m².    Oxygen Therapy:  Pulse Oximetry: 94 %, O2 (LPM): 3.5, O2 Delivery: Silicone Nasal Cannula    Physical Exam   Constitutional: He is oriented to person, place, and time. He has a sickly appearance.   HENT:   Head: Normocephalic and atraumatic.   Eyes: Pupils are equal, round, and reactive to light.   Cardiovascular: Normal rate and regular rhythm.    Murmur heard.  Pulmonary/Chest: Effort normal and breath sounds normal.   Abdominal: Soft. He exhibits distension. There is no tenderness.   Musculoskeletal: He exhibits no edema or tenderness.   Neurological: He is alert and oriented to person, place, and time. GCS score is 15.   Skin: Skin is warm and dry.       Lab Data Review:       1/22/2019  2:02 PM    Recent Labs      01/21/19   1621  01/22/19   0543  01/23/19   0530   SODIUM  137  138  142   POTASSIUM  4.4  4.2  3.6   CHLORIDE  111  113*  111   CO2  21  16*  24   BUN  89*  67*  36*   CREATININE  1.28  1.01  1.01   MAGNESIUM   --   1.7  2.4   PHOSPHORUS   --   2.2*  2.6   CALCIUM  7.2*  8.4*  7.8*       Recent Labs      01/21/19   1621  01/22/19   0543  01/23/19   0530   ALTSGPT  7  7   --    ASTSGOT  12  17   --    ALKPHOSPHAT  22*  21*   --    TBILIRUBIN  0.9  0.9   --    GLUCOSE  126*  169*  127*       Recent Labs      01/21/19   1345   01/21/19   1635  01/21/19   1924  01/21/19   2156   01/22/19   0543  01/22/19   0850  01/22/19   1450  01/23/19   0530   RBC  2.50*   --    --   3.35*   --    --   3.55*   --    --   2.74*   HEMOGLOBIN  7.0*   < >   --   9.8*  11.0*   < >  10.4*  9.6*  9.6*  8.0*   HEMATOCRIT  21.7*   --    --   29.9*  32.7*   --   30.9*   --    --   24.2*   PLATELETCT  199   --    --   219   --    --   180   --    --   127*   PROTHROMBTM  57.1*   --   14.1   --    --    --    --    --    --    --    APTT  49.0*   --    --    --    --    --    --    --    --     --    INR  6.56*   --   1.08   --    --    --    --    --    --    --     < > = values in this interval not displayed.       Recent Labs      01/21/19   1621  01/21/19   1924  01/22/19   0543  01/23/19   0530   WBC   --   32.1*  27.6*  18.5*   NEUTSPOLYS   --    --   91.30*  87.40*   LYMPHOCYTES   --    --   4.60*  5.30*   MONOCYTES   --    --   2.80  6.30   EOSINOPHILS   --    --   0.00  0.10   BASOPHILS   --    --   0.30  0.10   ASTSGOT  12   --   17   --    ALTSGPT  7   --   7   --    ALKPHOSPHAT  22*   --   21*   --    TBILIRUBIN  0.9   --   0.9   --            Assessment/Plan     #Upper GI Bleed  -Prior to intubation pt noted epigastric pain and excess NSAID use  -Hgb recovered s/p 4 units PRBC  -Endoscopy 1/22 showed multiple stomach ulcers likely resulting from excess NSAID use s/p clips x2, one lesion potentially suggestive of malignancy, will likely need EUS to appropriately biopsy  -Continue IV PPI, transition to oral after another 24 hrs  -CTM H/H and transfuse for Hgb <7    Thank you for this consult, at this point as ulcers have been clipped and pt appears to be clinically improving we will sign off. Please contact if any further deterioration.

## 2019-01-23 NOTE — DISCHARGE PLANNING
Anticipated Discharge Disposition: TBD    Action: Spoke to bedside RN. Pt's son, Arya, is an RN. He is requesting to speak w/ LSW for d/c planning needs.    Pt is his wife's CG. She has Parkinson's. LSw noted pt lives in [place], [place]. LSw advised son to contact the local [facility] in their county as they will have a current list of all resources available to both pt and his wife.    Son indicates he lives miles away from pt and wife. Wife's two sons lives in [place] and [place]. Pt's son is planning on moving to [place] and will be closer to pt. The only concern is [place] and there may not be a lot of services to support pt upon d/c as he will need supports for his wife and himself. Wife has tremors, but is independent w/ ADLs.     LSw explained HH, Senior Services, Assisted Living Facilities as options to keep the elderly couple safe.           Barriers to Discharge: TBD    Plan: f/u w/ medical team

## 2019-01-23 NOTE — CARE PLAN
Problem: Safety  Goal: Will remain free from injury  Outcome: PROGRESSING AS EXPECTED  Pt confused at times. Removed arterial and central line just prior to this RN's arrival. Wife, Jacquie, concerned and stayed at pt's bedside throughout the night. Pt responded well to wife's presence and did not pull at tubes/lines thus far tonight.     Problem: Urinary Elimination:  Goal: Ability to reestablish a normal urinary elimination pattern will improve  Outcome: PROGRESSING AS EXPECTED   01/23/19 0417   OTHER   Urinary Elimination Catheter (Document on LDA)     Great urine output tonight.     Problem: Mobility  Goal: Risk for activity intolerance will decrease  Outcome: PROGRESSING AS EXPECTED  Stood at the edge of the bed and walked a few steps with this RN. Pt has generalized weakness, but is able to mobilize well with 1 person assistance.

## 2019-01-23 NOTE — PROGRESS NOTES
Critical Care Progress Note    Date of admission  1/21/2019    Chief Complaint  86 y.o. 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 His hg was 11 and repeat here was 7 and had persistent melanotic stools and agitation that led him getting intubation with ketamine and mayuri in ER here and was given 1 prbc and placed on Jonny synephrine up to 400 and norepinephrine to 20 also required jonny pushes, his inial blood gas was 7.23/50/391 and his RR was increased from 18 to 22 and titrated down to 40% FIO2. His INR was 6 and was given K centra. I was asked to see patient and history is limited secondary to being intubated and given ketamine and versed.        Hospital Course    intubated 1/21 for gi bleed  1/22 endoscopy overnight, repeat today, sbt following procedure      Interval Problem Update  Reviewed last 24 hour events:  Extubated 1/22  Hb stable  2 egd on this hospitalization  Stopped hydrocortisone  toleraing diet  Blood pressure 110-140s  Hr 6070s, paced 60 back up  Tarry bm overnight  Voiding adequately  On lasix  Chest xray left pleural effusion  Stopped ab  Pt/ot  qtc improved with mg  Restart sotalol  ppi drip continue another 24 hours, then bid  Restart 10 po tid hydrocortisone at home        Review of Systems  Review of Systems   Constitutional: Positive for malaise/fatigue. Negative for chills, diaphoresis, fever and weight loss.   HENT: Negative for congestion and sinus pain.    Eyes: Negative for blurred vision, double vision and photophobia.   Respiratory: Positive for shortness of breath. Negative for cough, hemoptysis, sputum production, wheezing and stridor.    Cardiovascular: Negative for chest pain, palpitations and leg swelling.   Gastrointestinal: Negative for  blood in stool, heartburn, melena and vomiting.   Genitourinary: Negative for dysuria and urgency.   Musculoskeletal: Positive for back pain. Negative for myalgias and neck pain.   Skin: Negative for itching and rash.   Neurological: Negative for dizziness, tingling, sensory change, speech change, focal weakness and headaches.   Endo/Heme/Allergies: Negative for polydipsia. Does not bruise/bleed easily.   Psychiatric/Behavioral: Negative for depression. The patient is not nervous/anxious.       Per bedside us not significant left side effusion    Vital Signs for last 24 hours   Temp:  [36.5 °C (97.7 °F)-36.9 °C (98.4 °F)] 36.6 °C (97.9 °F)  Pulse:  [] 70  Resp:  [2-29] 17  SpO2:  [93 %-100 %] 94 %    Hemodynamic parameters for last 24 hours       Respiratory  Kramer Vent Mode: Spont  Rate (breaths/min): 20  Vt Target (mL): 410  PEEP/CPAP: 8  FiO2: 30  P Support: 5  P MEAN: 10  Length of Weaning Trial (Hours): .30  Control VTE (exp VT): 371    Physical Exam   Physical Exam   Constitutional: He is oriented to person, place, and time. He appears well-developed and well-nourished. No distress.   Intubated and sedated   HENT:   Head: Normocephalic and atraumatic.   Right Ear: External ear normal.   Left Ear: External ear normal.   Mouth/Throat: No oropharyngeal exudate.   Eyes: Pupils are equal, round, and reactive to light. Conjunctivae and EOM are normal. Right eye exhibits no discharge. Left eye exhibits no discharge.   Neck: No JVD present. No tracheal deviation present.   Cardiovascular: Normal rate, regular rhythm and normal heart sounds.    No murmur heard.  Pulmonary/Chest: No stridor. He is in respiratory distress. He has no wheezes. He has rales. He exhibits no tenderness.   Abdominal: He exhibits no mass. There is no tenderness. There is no rebound and no guarding.   Musculoskeletal: He exhibits edema. He exhibits no tenderness or deformity.   Neurological: He is alert and oriented to person, place,  and time. He displays normal reflexes. No cranial nerve deficit. Coordination normal.   Skin: Skin is warm and dry. No rash noted. He is not diaphoretic. No erythema.   Psychiatric: He has a normal mood and affect. His behavior is normal.       Medications  Current Facility-Administered Medications   Medication Dose Route Frequency Provider Last Rate Last Dose   • gabapentin (NEURONTIN) capsule 600 mg  600 mg Oral QHS Pacheco Montiel M.D.   600 mg at 01/22/19 2039   • levothyroxine (SYNTHROID) tablet 75 mcg  75 mcg Oral AM ES Pacheco Montiel M.D.   75 mcg at 01/23/19 0528   • montelukast (SINGULAIR) tablet 10 mg  10 mg Oral DAILY Pacheco Montiel M.D.   10 mg at 01/23/19 0528   • tamsulosin (FLOMAX) capsule 0.4 mg  0.4 mg Oral DAILY Pacheco Montiel M.D.   0.4 mg at 01/23/19 0528   • tiotropium (SPIRIVA) 18 MCG inhalation capsule 1 Cap  1 Cap Inhalation DAILY Pacheco Montiel M.D.   1 Cap at 01/23/19 0529   • budesonide-formoterol (SYMBICORT) 160-4.5 MCG/ACT inhaler 2 Puff  2 Puff Inhalation BID Pacheco Montiel M.D.   2 Puff at 01/23/19 0529   • acetaminophen (TYLENOL) tablet 500 mg  500 mg Oral Q6HRS PRN Johnny Da Silva M.D.       • DILTIAZem (CARDIZEM) tablet 60 mg  60 mg Oral TWICE DAILY Jhonny Da Silva M.D.   60 mg at 01/23/19 0530   • norepinephrine (LEVOPHED) 8 mg in  mL Infusion  0-30 mcg/min Intravenous Continuous Luca Puckett M.D.   Stopped at 01/22/19 1512   • pantoprazole (PROTONIX) 80 mg in  mL Infusion  8 mg/hr Intravenous Continuous Luca Puckett M.D. 25 mL/hr at 01/23/19 0144 8 mg/hr at 01/23/19 0144   • senna-docusate (PERICOLACE or SENOKOT S) 8.6-50 MG per tablet 2 Tab  2 Tab Enteral Tube BID Luca Puckett M.D.   Stopped at 01/21/19 1800    And   • polyethylene glycol/lytes (MIRALAX) PACKET 1 Packet  1 Packet Enteral Tube QDAY PRN Luca Puckett M.D.        And   • magnesium hydroxide (MILK OF MAGNESIA) suspension 30 mL  30 mL Enteral Tube QDAY PRN Luca NAVARRETE  EMILY Puckett        And   • bisacodyl (DULCOLAX) suppository 10 mg  10 mg Rectal QDAY PRN Luca Puckett M.D.       • Respiratory Care per Protocol   Nebulization Continuous RT Luca Puckett M.D.       • hydrocortisone sodium succinate PF (SOLU-CORTEF) 100 MG injection 50 mg  50 mg Intravenous Q6HRS Luca Puckett M.D.   50 mg at 01/23/19 0524   • doxycycline (VIBRAMYCIN) 100 mg in  mL IVPB  100 mg Intravenous Q12HRS Randolph Tan M.D.   Stopped at 01/23/19 0656   • cefTRIAXone (ROCEPHIN) 2 g in  mL IVPB  2 g Intravenous Q24HRS Randolph Tan M.D.   Stopped at 01/23/19 0655   • MD Alert...ICU Electrolyte Replacement per Pharmacy   Other PHARMACY TO DOSE Randolph Tan M.D.       • nystatin (MYCOSTATIN) powder   Topical BID Luca Puckett M.D.           Fluids    Intake/Output Summary (Last 24 hours) at 01/23/19 0751  Last data filed at 01/23/19 0600   Gross per 24 hour   Intake          2231.22 ml   Output             2710 ml   Net          -478.78 ml       Laboratory  Recent Labs      01/21/19   1420  01/21/19   1953  01/22/19   0457   ISTATAPH  7.235*  7.345*  7.411   ISTATAPCO2  50.0*  31.6  26.2   ISTATAPO2  391*  232*  95*   ISTATATCO2  23  18*  17*   [id number]  100*  100*  98   ISTATARTHCO3  21.2  17.3  16.6*   ISTATARTBE  -6*  -8*  -7*   ISTATTEMP  see below  37.3 C  37.6 C   ISTATFIO2  100  60  30   ISTATSPEC  Arterial  Arterial  Arterial   ISTATAPHTC   --   7.340*  7.402   [id number]   --   233*  98*         Recent Labs      01/21/19   1621  01/22/19   0543  01/23/19   0530   SODIUM  137  138  142   POTASSIUM  4.4  4.2  3.6   CHLORIDE  111  113*  111   CO2  21  16*  24   BUN  89*  67*  36*   CREATININE  1.28  1.01  1.01   MAGNESIUM   --   1.7  2.4   PHOSPHORUS   --   2.2*  2.6   CALCIUM  7.2*  8.4*  7.8*     Recent Labs      01/21/19   1621  01/22/19   0543  01/23/19   0530   ALTSGPT  7  7   --    ASTSGOT  12  17   --    ALKPHOSPHAT  22*  21*   --    TBILIRUBIN  0.9  0.9   --     GLUCOSE  126*  169*  127*     Recent Labs      01/21/19   1621  01/21/19   1924  01/22/19   0543  01/23/19   0530   WBC   --   32.1*  27.6*  18.5*   NEUTSPOLYS   --    --   91.30*  87.40*   LYMPHOCYTES   --    --   4.60*  5.30*   MONOCYTES   --    --   2.80  6.30   EOSINOPHILS   --    --   0.00  0.10   BASOPHILS   --    --   0.30  0.10   ASTSGOT  12   --   17   --    ALTSGPT  7   --   7   --    ALKPHOSPHAT  22*   --   21*   --    TBILIRUBIN  0.9   --   0.9   --      Recent Labs      01/21/19   1345   01/21/19   1635  01/21/19   1924  01/21/19   2156   01/22/19   0543  01/22/19   0850  01/22/19   1450  01/23/19   0530   RBC  2.50*   --    --   3.35*   --    --   3.55*   --    --   2.74*   HEMOGLOBIN  7.0*   < >   --   9.8*  11.0*   < >  10.4*  9.6*  9.6*  8.0*   HEMATOCRIT  21.7*   --    --   29.9*  32.7*   --   30.9*   --    --   24.2*   PLATELETCT  199   --    --   219   --    --   180   --    --   127*   PROTHROMBTM  57.1*   --   14.1   --    --    --    --    --    --    --    APTT  49.0*   --    --    --    --    --    --    --    --    --    INR  6.56*   --   1.08   --    --    --    --    --    --    --     < > = values in this interval not displayed.       Imaging  X-Ray:  I have personally reviewed the images and compared with prior images. and My impression is: small left side effusion  EKG:  I have personally reviewed the images and compared with prior images., My impression is: no st/t changes for ischemia and No film today  Echo:   Reviewed    Assessment/Plan  Acute on chronic respiratory failure with hypoxia (HCC)- (present on admission)   Assessment & Plan    On ventilator  sbt following procedure  Plan for extubation if tolerates  Lasix for diuresis  On hydrocortisone  Nebs scheduled  Restarted spiriva  On symbicort, on mometason-vilanterol       GI bleed- (present on admission)   Assessment & Plan    Repeat endoscopy today  Placed clips  ppi drip, can change to bid after 72 hours post procedure as  per GI  Hold ac for now  H/h stable  3 u prbc on this admission         Pleural effusion   Assessment & Plan    Left pleural effuison on chest xray  Minimal and not substantial for thoracentesis per bedside us       Candidal intertrigo- (present on admission)   Assessment & Plan    nystatin     Anemia due to GI blood loss- (present on admission)   Assessment & Plan    PPI likely NSAID related and coagulopathy, Q6 hr, INR TEG follow up  Transfuse hg<7 s/p 3 units PRBC and Kcentra  INR normal  Melena overnight  2 endoscopies, second with clipping     Supratherapeutic INR- (present on admission)   Assessment & Plan    Secondary to Coumadin s/p kcentra  Currently normal  Will need ac once stable     SIRS (systemic inflammatory response syndrome) (HCC)- (present on admission)   Assessment & Plan      On ceftriaxone and doxycycline for possible cap  Improved infectious symptoms  Once extubated reevaluate  Stopped antibiotics today     Paroxysmal atrial fibrillation (HCC)- (present on admission)   Assessment & Plan    Keep map > 65  Rate controlled currently  Added diltiazem overnight, restart sotalol       Anticoagulant long-term use- (present on admission)   Assessment & Plan    Elevated inr  Received k centra  Hold ac       History of pulmonary embolism- (present on admission)   Assessment & Plan    Has ivc filter  Echo reviewed  Does not appear to have pe  On ac outpt  Received k central  Hold ac for now  Will need to restart at some point       COPD (chronic obstructive pulmonary disease) (HCC)- (present on admission)   Assessment & Plan    On hydrocortisone, changed to home dosing  Not contributory to current ICU stay  May have been partially exac with intubation  Has hx of pulmonary fibrosis  duonebs sched  Restarted spiriva and symbicort (on other brand outpt)     Cardiac pacemaker- (present on admission)   Assessment & Plan    Continue to monitor on tele     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 contriubotory to bleeding  On hydrocortisone IV, changed to po home dosing          OK to restart diltiazem  Held sotalol as patient qtc 553    VTE:  Contraindicated  Ulcer: PPI  Lines: Rodas Catheter  Ongoing indication addressed    I have performed a physical exam and reviewed and updated ROS and Plan today (1/23/2019). In review of yesterday's note (1/22/2019), there are no changes except as documented above.     Discussed patient condition and risk of morbidity and/or mortality with Hospitalist, Family, RN, RT, Therapies, Pharmacy, Dietary, , Code status disscussed, Charge nurse / hot rounds, Patient and GI     The patient remains critically ill.  Ongoing GI bleed with melena with hb 9.6 to 8 overnight.  On pantoprazole drip.  Critical care time = 31 minutes in directly providing and coordinating critical care and extensive data review.  No time overlap and excludes procedures.

## 2019-01-23 NOTE — PROGRESS NOTES
Monitor summary: pt has been SR/PARTIALLY PACED.  HR: 70s-90s  Ectopy: occasional PVC    12 hour chart check

## 2019-01-24 ENCOUNTER — APPOINTMENT (OUTPATIENT)
Dept: RADIOLOGY | Facility: MEDICAL CENTER | Age: 84
DRG: 377 | End: 2019-01-24
Attending: INTERNAL MEDICINE
Payer: MEDICARE

## 2019-01-24 PROBLEM — R60.9 EDEMA: Status: ACTIVE | Noted: 2019-01-24

## 2019-01-24 LAB
ANION GAP SERPL CALC-SCNC: 3 MMOL/L (ref 0–11.9)
BACTERIA SPEC RESP CULT: NORMAL
BASOPHILS # BLD AUTO: 0.4 % (ref 0–1.8)
BASOPHILS # BLD: 0.06 K/UL (ref 0–0.12)
BUN SERPL-MCNC: 21 MG/DL (ref 8–22)
CALCIUM SERPL-MCNC: 7.8 MG/DL (ref 8.5–10.5)
CHLORIDE SERPL-SCNC: 109 MMOL/L (ref 96–112)
CO2 SERPL-SCNC: 26 MMOL/L (ref 20–33)
CREAT SERPL-MCNC: 0.78 MG/DL (ref 0.5–1.4)
EOSINOPHIL # BLD AUTO: 0.38 K/UL (ref 0–0.51)
EOSINOPHIL NFR BLD: 2.8 % (ref 0–6.9)
ERYTHROCYTE [DISTWIDTH] IN BLOOD BY AUTOMATED COUNT: 52.8 FL (ref 35.9–50)
GLUCOSE SERPL-MCNC: 85 MG/DL (ref 65–99)
GRAM STN SPEC: NORMAL
HCT VFR BLD AUTO: 25.8 % (ref 42–52)
HGB BLD-MCNC: 8.1 G/DL (ref 14–18)
IMM GRANULOCYTES # BLD AUTO: 0.19 K/UL (ref 0–0.11)
IMM GRANULOCYTES NFR BLD AUTO: 1.4 % (ref 0–0.9)
LYMPHOCYTES # BLD AUTO: 1.28 K/UL (ref 1–4.8)
LYMPHOCYTES NFR BLD: 9.3 % (ref 22–41)
MAGNESIUM SERPL-MCNC: 2 MG/DL (ref 1.5–2.5)
MCH RBC QN AUTO: 29.2 PG (ref 27–33)
MCHC RBC AUTO-ENTMCNC: 31.4 G/DL (ref 33.7–35.3)
MCV RBC AUTO: 93.1 FL (ref 81.4–97.8)
MONOCYTES # BLD AUTO: 0.89 K/UL (ref 0–0.85)
MONOCYTES NFR BLD AUTO: 6.4 % (ref 0–13.4)
NEUTROPHILS # BLD AUTO: 11 K/UL (ref 1.82–7.42)
NEUTROPHILS NFR BLD: 79.7 % (ref 44–72)
NRBC # BLD AUTO: 0 K/UL
NRBC BLD-RTO: 0 /100 WBC
PHOSPHATE SERPL-MCNC: 1.7 MG/DL (ref 2.5–4.5)
PLATELET # BLD AUTO: 95 K/UL (ref 164–446)
PMV BLD AUTO: 10.8 FL (ref 9–12.9)
POTASSIUM SERPL-SCNC: 4.2 MMOL/L (ref 3.6–5.5)
RBC # BLD AUTO: 2.77 M/UL (ref 4.7–6.1)
SIGNIFICANT IND 70042: NORMAL
SITE SITE: NORMAL
SODIUM SERPL-SCNC: 138 MMOL/L (ref 135–145)
SOURCE SOURCE: NORMAL
WBC # BLD AUTO: 13.8 K/UL (ref 4.8–10.8)

## 2019-01-24 PROCEDURE — 99291 CRITICAL CARE FIRST HOUR: CPT | Performed by: INTERNAL MEDICINE

## 2019-01-24 PROCEDURE — 85025 COMPLETE CBC W/AUTO DIFF WBC: CPT

## 2019-01-24 PROCEDURE — 97162 PT EVAL MOD COMPLEX 30 MIN: CPT

## 2019-01-24 PROCEDURE — 84100 ASSAY OF PHOSPHORUS: CPT

## 2019-01-24 PROCEDURE — 97166 OT EVAL MOD COMPLEX 45 MIN: CPT

## 2019-01-24 PROCEDURE — 700111 HCHG RX REV CODE 636 W/ 250 OVERRIDE (IP): Performed by: INTERNAL MEDICINE

## 2019-01-24 PROCEDURE — 700111 HCHG RX REV CODE 636 W/ 250 OVERRIDE (IP): Performed by: HOSPITALIST

## 2019-01-24 PROCEDURE — A9270 NON-COVERED ITEM OR SERVICE: HCPCS | Performed by: HOSPITALIST

## 2019-01-24 PROCEDURE — 770020 HCHG ROOM/CARE - TELE (206)

## 2019-01-24 PROCEDURE — 71045 X-RAY EXAM CHEST 1 VIEW: CPT

## 2019-01-24 PROCEDURE — 99232 SBSQ HOSP IP/OBS MODERATE 35: CPT | Performed by: HOSPITALIST

## 2019-01-24 PROCEDURE — 700105 HCHG RX REV CODE 258: Performed by: HOSPITALIST

## 2019-01-24 PROCEDURE — A9270 NON-COVERED ITEM OR SERVICE: HCPCS | Mod: TB | Performed by: INTERNAL MEDICINE

## 2019-01-24 PROCEDURE — 80048 BASIC METABOLIC PNL TOTAL CA: CPT

## 2019-01-24 PROCEDURE — 83735 ASSAY OF MAGNESIUM: CPT

## 2019-01-24 PROCEDURE — 700102 HCHG RX REV CODE 250 W/ 637 OVERRIDE(OP): Performed by: HOSPITALIST

## 2019-01-24 PROCEDURE — C9113 INJ PANTOPRAZOLE SODIUM, VIA: HCPCS | Performed by: HOSPITALIST

## 2019-01-24 PROCEDURE — 700102 HCHG RX REV CODE 250 W/ 637 OVERRIDE(OP): Mod: TB | Performed by: INTERNAL MEDICINE

## 2019-01-24 RX ORDER — FUROSEMIDE 10 MG/ML
20 INJECTION INTRAMUSCULAR; INTRAVENOUS
Status: DISCONTINUED | OUTPATIENT
Start: 2019-01-24 | End: 2019-01-26

## 2019-01-24 RX ORDER — OMEPRAZOLE 20 MG/1
20 CAPSULE, DELAYED RELEASE ORAL 2 TIMES DAILY
Status: DISCONTINUED | OUTPATIENT
Start: 2019-01-24 | End: 2019-01-25

## 2019-01-24 RX ADMIN — HYDROCORTISONE 10 MG: 10 TABLET ORAL at 18:32

## 2019-01-24 RX ADMIN — MONTELUKAST SODIUM 10 MG: 10 TABLET, FILM COATED ORAL at 05:28

## 2019-01-24 RX ADMIN — SENNOSIDES AND DOCUSATE SODIUM 2 TABLET: 8.6; 5 TABLET ORAL at 17:05

## 2019-01-24 RX ADMIN — BUDESONIDE AND FORMOTEROL FUMARATE DIHYDRATE 2 PUFF: 160; 4.5 AEROSOL RESPIRATORY (INHALATION) at 18:00

## 2019-01-24 RX ADMIN — BUDESONIDE AND FORMOTEROL FUMARATE DIHYDRATE 2 PUFF: 160; 4.5 AEROSOL RESPIRATORY (INHALATION) at 05:26

## 2019-01-24 RX ADMIN — OMEPRAZOLE 20 MG: 20 CAPSULE, DELAYED RELEASE ORAL at 11:02

## 2019-01-24 RX ADMIN — HYDROCORTISONE 10 MG: 10 TABLET ORAL at 05:27

## 2019-01-24 RX ADMIN — LEVOTHYROXINE SODIUM 75 MCG: 75 TABLET ORAL at 05:27

## 2019-01-24 RX ADMIN — OMEPRAZOLE 20 MG: 20 CAPSULE, DELAYED RELEASE ORAL at 17:05

## 2019-01-24 RX ADMIN — SODIUM CHLORIDE 8 MG/HR: 9 INJECTION, SOLUTION INTRAVENOUS at 07:56

## 2019-01-24 RX ADMIN — NYSTATIN: 100000 POWDER TOPICAL at 05:46

## 2019-01-24 RX ADMIN — HYDROCODONE BITARTRATE AND ACETAMINOPHEN 1 TABLET: 5; 325 TABLET ORAL at 17:06

## 2019-01-24 RX ADMIN — FUROSEMIDE 20 MG: 10 INJECTION, SOLUTION INTRAMUSCULAR; INTRAVENOUS at 08:54

## 2019-01-24 RX ADMIN — GABAPENTIN 600 MG: 300 CAPSULE ORAL at 23:01

## 2019-01-24 RX ADMIN — SOTALOL HYDROCHLORIDE 40 MG: 80 TABLET ORAL at 05:27

## 2019-01-24 RX ADMIN — HYDROCORTISONE 10 MG: 10 TABLET ORAL at 11:02

## 2019-01-24 RX ADMIN — SOTALOL HYDROCHLORIDE 40 MG: 80 TABLET ORAL at 17:06

## 2019-01-24 RX ADMIN — TIOTROPIUM BROMIDE 1 CAPSULE: 18 CAPSULE ORAL; RESPIRATORY (INHALATION) at 05:30

## 2019-01-24 RX ADMIN — TAMSULOSIN HYDROCHLORIDE 0.4 MG: 0.4 CAPSULE ORAL at 05:27

## 2019-01-24 RX ADMIN — NYSTATIN: 100000 POWDER TOPICAL at 17:08

## 2019-01-24 RX ADMIN — HYDROCODONE BITARTRATE AND ACETAMINOPHEN 1 TABLET: 5; 325 TABLET ORAL at 05:40

## 2019-01-24 RX ADMIN — DILTIAZEM HYDROCHLORIDE 60 MG: 60 TABLET, FILM COATED ORAL at 05:26

## 2019-01-24 RX ADMIN — DILTIAZEM HYDROCHLORIDE 60 MG: 60 TABLET, FILM COATED ORAL at 17:05

## 2019-01-24 ASSESSMENT — PATIENT HEALTH QUESTIONNAIRE - PHQ9
SUM OF ALL RESPONSES TO PHQ9 QUESTIONS 1 AND 2: 0
1. LITTLE INTEREST OR PLEASURE IN DOING THINGS: NOT AT ALL
2. FEELING DOWN, DEPRESSED, IRRITABLE, OR HOPELESS: NOT AT ALL
2. FEELING DOWN, DEPRESSED, IRRITABLE, OR HOPELESS: NOT AT ALL
SUM OF ALL RESPONSES TO PHQ9 QUESTIONS 1 AND 2: 0
1. LITTLE INTEREST OR PLEASURE IN DOING THINGS: NOT AT ALL

## 2019-01-24 ASSESSMENT — COGNITIVE AND FUNCTIONAL STATUS - GENERAL
EATING MEALS: A LITTLE
DAILY ACTIVITIY SCORE: 13
MOVING FROM LYING ON BACK TO SITTING ON SIDE OF FLAT BED: A LOT
MOBILITY SCORE: 9
PERSONAL GROOMING: A LOT
SUGGESTED CMS G CODE MODIFIER MOBILITY: CM
SUGGESTED CMS G CODE MODIFIER MOBILITY: CL
HELP NEEDED FOR BATHING: A LOT
DRESSING REGULAR UPPER BODY CLOTHING: A LOT
SUGGESTED CMS G CODE MODIFIER DAILY ACTIVITY: CK
MOVING TO AND FROM BED TO CHAIR: A LOT
WALKING IN HOSPITAL ROOM: A LOT
MOVING TO AND FROM BED TO CHAIR: UNABLE
DRESSING REGULAR LOWER BODY CLOTHING: A LOT
CLIMB 3 TO 5 STEPS WITH RAILING: TOTAL
SUGGESTED CMS G CODE MODIFIER DAILY ACTIVITY: CL
STANDING UP FROM CHAIR USING ARMS: A LOT
PERSONAL GROOMING: A LITTLE
MOVING FROM LYING ON BACK TO SITTING ON SIDE OF FLAT BED: UNABLE
WALKING IN HOSPITAL ROOM: A LITTLE
TURNING FROM BACK TO SIDE WHILE IN FLAT BAD: UNABLE
TURNING FROM BACK TO SIDE WHILE IN FLAT BAD: A LITTLE
HELP NEEDED FOR BATHING: A LOT
DAILY ACTIVITIY SCORE: 15
DRESSING REGULAR LOWER BODY CLOTHING: A LOT
MOBILITY SCORE: 13
STANDING UP FROM CHAIR USING ARMS: A LOT
TOILETING: A LOT
TOILETING: A LOT
CLIMB 3 TO 5 STEPS WITH RAILING: A LOT
DRESSING REGULAR UPPER BODY CLOTHING: A LITTLE
EATING MEALS: A LITTLE

## 2019-01-24 ASSESSMENT — ENCOUNTER SYMPTOMS
EYE REDNESS: 0
HEMOPTYSIS: 0
DIZZINESS: 0
DOUBLE VISION: 0
SENSORY CHANGE: 0
MYALGIAS: 0
CHILLS: 0
NECK PAIN: 0
POLYDIPSIA: 0
ABDOMINAL PAIN: 0
DIAPHORESIS: 0
SINUS PAIN: 0
BACK PAIN: 0
SEIZURES: 0
PALPITATIONS: 0
TINGLING: 0
PHOTOPHOBIA: 0
FEVER: 0
BLOOD IN STOOL: 0
SPEECH CHANGE: 0
WEIGHT LOSS: 0
WHEEZING: 0
COUGH: 0
SHORTNESS OF BREATH: 1
SPUTUM PRODUCTION: 0
HEADACHES: 0
FOCAL WEAKNESS: 0
NERVOUS/ANXIOUS: 0
DEPRESSION: 0
BLURRED VISION: 0
BRUISES/BLEEDS EASILY: 0
STRIDOR: 0
MUSCULOSKELETAL NEGATIVE: 1
PSYCHIATRIC NEGATIVE: 1
EYE DISCHARGE: 0
VOMITING: 0
HEARTBURN: 0

## 2019-01-24 ASSESSMENT — GAIT ASSESSMENTS
GAIT LEVEL OF ASSIST: MINIMAL ASSIST
DISTANCE (FEET): 15
ASSISTIVE DEVICE: FRONT WHEEL WALKER
DEVIATION: BRADYKINETIC;SHUFFLED GAIT

## 2019-01-24 ASSESSMENT — ACTIVITIES OF DAILY LIVING (ADL): TOILETING: INDEPENDENT

## 2019-01-24 ASSESSMENT — LIFESTYLE VARIABLES: ALCOHOL_USE: NO

## 2019-01-24 NOTE — THERAPY
"Occupational Therapy Evaluation completed.   Functional Status: Pt is an 87 y/o male admitted with bloody stool found to have UGIB, respiratory failure, and shock, now s/p endoscopy with GIB repair. He was pleasant and cooperative. He needed modA for sit<>stand and CGA-Jeanette for functional mobility with FWW. MaxA LB cares. Setup for feeding with built up  due to edematous hands. He is limited by weakness, fatigue, impaired balance, and edema which impacts independence in ADLs and functional mobility.  Plan of Care: Will benefit from Occupational Therapy 3 times per week  Discharge Recommendations:  Equipment: Will Continue to Assess for Equipment Needs. Recommend inpatient transitional care services for continued occupational therapy services.       See \"Rehab Therapy-Acute\" Patient Summary Report for complete documentation.    "

## 2019-01-24 NOTE — THERAPY
"Physical Therapy Evaluation completed.   Bed Mobility:  Supine to Sit:  (up in chair)  Transfers: Sit to Stand: Moderate Assist  Gait: Level Of Assist: Minimal Assist with Front-Wheel Walker       Plan of Care: Will benefit from Physical Therapy 3 times per week  Discharge Recommendations: Equipment: Will Continue to Assess for Equipment Needs. Post-acute therapy Recommend inpatient transitional care services for continued physical therapy services.      Mr. Lizama is an 87 y/o male who presents to acute secondary to respiratory failure, GI bleed, and aortic stenosis. He presents with significan tlwoer extremity weakness, gross motor coordiantion deficits, dynamic balance impairments, and decreased activity tolerance. These deficits negatively impact his ability to perform gait, transfers, and bed mobility at his piror level of function and prevent his safe discharge home. His spouse has physical issues as well and cannot assist. Very motivated to improve. Recommend post acute rehabiliaton prior to discharge home. He would benefit from continued acute physical therapy services to improve his mobility and decrease risk for falls.     See \"Rehab Therapy-Acute\" Patient Summary Report for complete documentation.     "

## 2019-01-24 NOTE — ASSESSMENT & PLAN NOTE
Left pleural effuison on chest xray  Minimal and not substantial for thoracentesis per bedside us  Similar on todays chest xray  Per family may have chronic effusion  No ifnectious etiology currently  Free flowing fluid on us but likely less than 100 ml  Post resuscitation diuresis

## 2019-01-24 NOTE — CARE PLAN
Problem: Pain Management  Goal: Pain level will decrease to patient's comfort goal  Order obtained for prn medication.  Per patient, medication administration gives adequate pain relief.      Problem: Mobility  Goal: Risk for activity intolerance will decrease  Patient refusing mobilization during this RN's shift.    Order obtained for PT/OT eval and treat.

## 2019-01-24 NOTE — PROGRESS NOTES
Critical Care Progress Note    Date of admission  1/21/2019    Chief Complaint  86 y.o. 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 His hg was 11 and repeat here was 7 and had persistent melanotic stools and agitation that led him getting intubation with ketamine and mayuri in ER here and was given 1 prbc and placed on Jonny synephrine up to 400 and norepinephrine to 20 also required jonny pushes, his inial blood gas was 7.23/50/391 and his RR was increased from 18 to 22 and titrated down to 40% FIO2. His INR was 6 and was given K centra. I was asked to see patient and history is limited secondary to being intubated and given ketamine and versed.        Hospital Course    intubated 1/21 for gi bleed  1/22 endoscopy overnight, repeat today, sbt following procedure      Interval Problem Update  Reviewed last 24 hour events:  Sotalol and diltiazem  Hr 70s  Paced   Peripherals  Rodas  Pt/ot eval, may need placement  Started lasix, bid  Hb stable  3 L nc  1500 is  No effusion per us, elevated left diaphragm 1/24, not enough for thoracentesis  protonix drip switch to oral  Off ac  Hx pe, has ivc filter, afib  ? Need at this point regarding ac, needs risk vs benefit discussion once more stable  Will check with gi when appropriate to consider starting  Had melena this morning, moderate amount    Review of Systems  Review of Systems   Constitutional: Negative for chills, diaphoresis, fever, malaise/fatigue and weight loss.   HENT: Negative for congestion and sinus pain.    Eyes: Negative for blurred vision, double vision and photophobia.   Respiratory: Positive for shortness of breath. Negative for cough, hemoptysis, sputum production, wheezing and stridor.    Cardiovascular: Negative for  chest pain, palpitations and leg swelling.   Gastrointestinal: Positive for melena. Negative for blood in stool, heartburn and vomiting.   Genitourinary: Negative for dysuria and urgency.   Musculoskeletal: Negative for back pain, myalgias and neck pain.   Skin: Negative for itching and rash.   Neurological: Negative for dizziness, tingling, sensory change, speech change, focal weakness and headaches.   Endo/Heme/Allergies: Negative for polydipsia. Does not bruise/bleed easily.   Psychiatric/Behavioral: Negative for depression. The patient is not nervous/anxious.       Per bedside us not significant left side effusion    Vital Signs for last 24 hours   Temp:  [36.6 °C (97.9 °F)-36.8 °C (98.2 °F)] 36.7 °C (98.1 °F)  Pulse:  [68-74] 70  Resp:  [12-23] 16  SpO2:  [89 %-100 %] 97 %    Hemodynamic parameters for last 24 hours       Respiratory       Physical Exam   Physical Exam   Constitutional: He is oriented to person, place, and time. No distress.   Intubated and sedated   HENT:   Head: Normocephalic and atraumatic.   Right Ear: External ear normal.   Left Ear: External ear normal.   Mouth/Throat: No oropharyngeal exudate.   Eyes: Pupils are equal, round, and reactive to light. Conjunctivae and EOM are normal. Right eye exhibits no discharge. Left eye exhibits no discharge.   Neck: No JVD present. No tracheal deviation present.   Cardiovascular: Normal rate, regular rhythm and normal heart sounds.    No murmur heard.  Pulmonary/Chest: No stridor. He is in respiratory distress. He has no wheezes. He has rales. He exhibits no tenderness.   Reduced air movement left base   Abdominal: He exhibits no distension and no mass. There is no tenderness. There is no rebound and no guarding.   Musculoskeletal: He exhibits edema. He exhibits no tenderness or deformity.   Neurological: He is alert and oriented to person, place, and time. He displays normal reflexes. No cranial nerve deficit. Coordination normal.   Skin: Skin is  warm and dry. No rash noted. He is not diaphoretic. No erythema.   Psychiatric: He has a normal mood and affect. His behavior is normal.       Medications  Current Facility-Administered Medications   Medication Dose Route Frequency Provider Last Rate Last Dose   • sotalol (BETAPACE) tablet 40 mg  40 mg Oral BID Johnny Da Silva M.D.   40 mg at 01/24/19 0527   • hydrocortisone (CORTEF) tablet 10 mg  10 mg Oral TID Johnny Da Silva M.D.   10 mg at 01/24/19 0527   • HYDROcodone-acetaminophen (NORCO) 5-325 MG per tablet 1 Tab  1 Tab Oral Q6HRS PRN Johnny Da Silva M.D.   1 Tab at 01/24/19 0540   • artificial tears 1.4 % ophthalmic solution 1-2 Drop  1-2 Drop Both Eyes Q2HRS PRN Pacheco Montiel M.D.   1 Drop at 01/23/19 2252   • gabapentin (NEURONTIN) capsule 600 mg  600 mg Oral QHS Pacheco Montiel M.D.   600 mg at 01/23/19 1956   • levothyroxine (SYNTHROID) tablet 75 mcg  75 mcg Oral AM ES Pacheco Montiel M.D.   75 mcg at 01/24/19 0527   • montelukast (SINGULAIR) tablet 10 mg  10 mg Oral DAILY Pacheco Montiel M.D.   10 mg at 01/24/19 0528   • tamsulosin (FLOMAX) capsule 0.4 mg  0.4 mg Oral DAILY Pacheco Montiel M.D.   0.4 mg at 01/24/19 0527   • tiotropium (SPIRIVA) 18 MCG inhalation capsule 1 Cap  1 Cap Inhalation DAILY Pacheco Montiel M.D.   1 Cap at 01/24/19 0530   • budesonide-formoterol (SYMBICORT) 160-4.5 MCG/ACT inhaler 2 Puff  2 Puff Inhalation BID Pacheco Montiel M.D.   2 Puff at 01/24/19 0526   • acetaminophen (TYLENOL) tablet 500 mg  500 mg Oral Q6HRS PRN Johnny Da Silva M.D.   500 mg at 01/23/19 1211   • DILTIAZem (CARDIZEM) tablet 60 mg  60 mg Oral TWICE DAILY Johnny Da Silva M.D.   60 mg at 01/24/19 0526   • pantoprazole (PROTONIX) 80 mg in  mL Infusion  8 mg/hr Intravenous Continuous Luca Puckett M.D. 25 mL/hr at 01/24/19 0756 8 mg/hr at 01/24/19 0756   • senna-docusate (PERICOLACE or SENOKOT S) 8.6-50 MG per tablet 2 Tab  2 Tab Enteral Tube BID Luca Puckett M.D.    Stopped at 01/21/19 1800    And   • polyethylene glycol/lytes (MIRALAX) PACKET 1 Packet  1 Packet Enteral Tube QDAY PRN Luca Puckett M.D.        And   • magnesium hydroxide (MILK OF MAGNESIA) suspension 30 mL  30 mL Enteral Tube QDAY PRN Luca Puckett M.D.        And   • bisacodyl (DULCOLAX) suppository 10 mg  10 mg Rectal QDAY PRN Luca Puckett M.D.       • Respiratory Care per Protocol   Nebulization Continuous RT Luca Puckett M.D.       • MD Alert...ICU Electrolyte Replacement per Pharmacy   Other PHARMACY TO DOSE Randolph Tan M.D.       • nystatin (MYCOSTATIN) powder   Topical BID Luca Puckett M.D.           Fluids    Intake/Output Summary (Last 24 hours) at 01/24/19 0806  Last data filed at 01/24/19 0600   Gross per 24 hour   Intake             1400 ml   Output             1650 ml   Net             -250 ml       Laboratory  Recent Labs      01/21/19   1420  01/21/19   1953  01/22/19   0457   ISTATAPH  7.235*  7.345*  7.411   ISTATAPCO2  50.0*  31.6  26.2   ISTATAPO2  391*  232*  95*   ISTATATCO2  23  18*  17*   NPLGAMY2DSB  100*  100*  98   ISTATARTHCO3  21.2  17.3  16.6*   ISTATARTBE  -6*  -8*  -7*   ISTATTEMP  see below  37.3 C  37.6 C   ISTATFIO2  100  60  30   ISTATSPEC  Arterial  Arterial  Arterial   ISTATAPHTC   --   7.340*  7.402   ONNCMSNM4BI   --   233*  98*         Recent Labs      01/22/19   0543  01/23/19   0530  01/24/19   0503   SODIUM  138  142  138   POTASSIUM  4.2  3.6  4.2   CHLORIDE  113*  111  109   CO2  16*  24  26   BUN  67*  36*  21   CREATININE  1.01  1.01  0.78   MAGNESIUM  1.7  2.4  2.0   PHOSPHORUS  2.2*  2.6  1.7*   CALCIUM  8.4*  7.8*  7.8*     Recent Labs      01/21/19   1621  01/22/19   0543  01/23/19   0530  01/24/19   0503   ALTSGPT  7  7   --    --    ASTSGOT  12  17   --    --    ALKPHOSPHAT  22*  21*   --    --    TBILIRUBIN  0.9  0.9   --    --    GLUCOSE  126*  169*  127*  85     Recent Labs      01/21/19   1621   01/22/19   0543  01/23/19   0530  01/23/19    1640  01/24/19   0503   WBC   --    < >  27.6*  18.5*  17.4*  13.8*   NEUTSPOLYS   --    < >  91.30*  87.40*  86.00*  79.70*   LYMPHOCYTES   --    < >  4.60*  5.30*  6.00*  9.30*   MONOCYTES   --    < >  2.80  6.30  6.30  6.40   EOSINOPHILS   --    < >  0.00  0.10  0.20  2.80   BASOPHILS   --    < >  0.30  0.10  0.20  0.40   ASTSGOT  12   --   17   --    --    --    ALTSGPT  7   --   7   --    --    --    ALKPHOSPHAT  22*   --   21*   --    --    --    TBILIRUBIN  0.9   --   0.9   --    --    --     < > = values in this interval not displayed.     Recent Labs      01/21/19   1345   01/21/19   1635   01/23/19   0530  01/23/19   1640  01/24/19   0503   RBC  2.50*   --    --    < >  2.74*  2.84*  2.77*   HEMOGLOBIN  7.0*   < >   --    < >  8.0*  8.3*  8.1*   HEMATOCRIT  21.7*   --    --    < >  24.2*  25.5*  25.8*   PLATELETCT  199   --    --    < >  127*  124*  95*   PROTHROMBTM  57.1*   --   14.1   --    --    --    --    APTT  49.0*   --    --    --    --    --    --    INR  6.56*   --   1.08   --    --    --    --     < > = values in this interval not displayed.       Imaging  X-Ray:  I have personally reviewed the images and compared with prior images. and My impression is: small left side effusion  EKG:  I have personally reviewed the images and compared with prior images., My impression is: no st/t changes for ischemia and No film today  Echo:   Reviewed    Assessment/Plan  Acute on chronic respiratory failure with hypoxia (HCC)- (present on admission)   Assessment & Plan    On ventilator  sbt following procedure  Plan for extubation if tolerates  Lasix for diuresis  On hydrocortisone  Nebs scheduled  Restarted spiriva  On symbicort, on mometason-vilanterol  Deconditioning contributory  Needs pt/ot eval, may need skilled facility short term     GI bleed- (present on admission)   Assessment & Plan    Repeat endoscopy today  Placed clips  ppi drip, can change to bid after 72 hours post procedure as per GI  Hold ac  for now  H/h stable  3 u prbc on this admission  Continued bleeding  Hb remains relatively stable in 8 range  Had another melanotic stool today         Edema   Assessment & Plan    Secondary to resuscitation fluids and blood  Diuresis, will give 20 mg bid IV for now     Pleural effusion   Assessment & Plan    Left pleural effuison on chest xray  Minimal and not substantial for thoracentesis per bedside us  Similar on todays chest xray  Per family may have chronic effusion  No ifnectious etiology currently  Free flowing fluid on us but likely less than 100 ml  Post resuscitation diuresis       Aortic stenosis   Assessment & Plan    Contributory to respiratory insufficiency     Candidal intertrigo- (present on admission)   Assessment & Plan    nystatin     Anemia due to GI blood loss- (present on admission)   Assessment & Plan    PPI likely NSAID related and coagulopathy, Q6 hr, INR TEG follow up  Transfuse hg<7 s/p 3 units PRBC and Kcentra  INR normal  Melena overnight  2 endoscopies, second with clipping  Continued bleeding, recurred again today  Stable hb near 8     Supratherapeutic INR- (present on admission)   Assessment & Plan    Secondary to Coumadin s/p kcentra  Currently normal  See ac discussion above  Needs risk benefit assessment     SIRS (systemic inflammatory response syndrome) (HCC)- (present on admission)   Assessment & Plan      On ceftriaxone and doxycycline for possible cap  Improved infectious symptoms  Once extubated reevaluate  Stopped antibiotics 1/23  No changes regarding fever, no leukocytosis currently     Paroxysmal atrial fibrillation (HCC)- (present on admission)   Assessment & Plan    Keep map > 65  Rate controlled currently  Added diltiazem overnight, restart sotalol  Continue to hold anticoagulation     Anticoagulant long-term use- (present on admission)   Assessment & Plan    Elevated inr  Received k centra  See above for decision making for future  Hold for now     History of  pulmonary embolism- (present on admission)   Assessment & Plan    Has ivc filter  Echo reviewed  Does not appear to have pe  On ac outpt  Received k central  Hold ac for now  Will need to restart at some point, discuss with gi when appropriate per their perspective  Then needs risk benefit discussion, has DVT presumably from hx dvt of which had source for prior pe, if that is the case then does not absolutely need ac for pe history  Afib, older age, may be able to just be on antiplatelets       COPD (chronic obstructive pulmonary disease) ([facility])- (present on admission)   Assessment & Plan    On hydrocortisone, changed to home dosing  Not contributory to current ICU stay  May have been partially exac with intubation  Has hx of pulmonary fibrosis  duonebs sched  Restarted spiriva and symbicort (on other brand outpt)  Improved respiratory status, close to baseline per patient       Cardiac pacemaker- (present on admission)   Assessment & Plan    Continue to monitor on tele     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 contriubotory to bleeding  On hydrocortisone IV, changed to po home dosing  Tolerating  May need to be weaned off eventually if tolerates given gi bleed          OK to restart diltiazem  Held sotalol as patient qtc 553    VTE:  Contraindicated  Ulcer: PPI  Lines: Rodas Catheter  Ongoing indication addressed    I have performed a physical exam and reviewed and updated ROS and Plan today (1/24/2019). In review of yesterday's note (1/23/2019), there are no changes except as documented above.     Discussed patient condition and risk of morbidity and/or mortality with Hospitalist, Family, RN, RT, Therapies, Pharmacy, Dietary, , Code status disscussed, Charge nurse / hot rounds, Patient and GI     The patient remains critically ill.  Ongoing melena/GI bleeding.  Hb GI bleed with melena with hb 8.1 and transfusion target is less than 7, mild decrease  past 24 hours. Repeat bid for now.  Continue on pantoprazole drip.  Critical care time = 34 minutes in directly providing and coordinating critical care and extensive data review.  No time overlap and excludes procedures.

## 2019-01-24 NOTE — PROGRESS NOTES
Received bedside report from ОЛЬГА Felix. Assumed care of patient. Patient up to the chair at this time. Plan of care discussed with patient and white board updated. Personal belongings and call light within reach.

## 2019-01-24 NOTE — CARE PLAN
Problem: Pain Management  Goal: Pain level will decrease to patient's comfort goal    Intervention: Follow pain managment plan developed in collaboration with patient and Interdisciplinary Team  Assessing patient's pain every 2-4 hours and PRN using the 0-10 pain scale. Patient being given pain medications and heat for pain relief.       Problem: Mobility  Goal: Risk for activity intolerance will decrease    Intervention: Provide rest periods  Patient mobilized up to the chair for 4 hours. Tolerated well. Patient placed back in bed to rest.

## 2019-01-24 NOTE — CARE PLAN
Problem: Pain Management  Goal: Pain level will decrease to patient's comfort goal  Pain assessed and documented Q2h and PRN, chronis back and neck pain present. Norco administered as needed. Television utilized for distraction     Problem: Respiratory:  Goal: Respiratory status will improve  Pt on home O2 of 3LNC. Lung sounds assessed and documented. IS encouraged

## 2019-01-24 NOTE — PROGRESS NOTES
Hospital Medicine Daily Progress Note    Date of Service  1/24/2019    Chief Complaint  86 y.o. male admitted 1/21/2019 with GI bleed on coumadin  For AFib received vit  K and Kcentra    Hospital Course          Interval Problem Update    Phos 1.7  3L NC  IS  1500  Paced  No sign effusion on US              Consultants/Specialty  Critical care  GI    Code Status  Full code    Disposition  Telemetry    Review of Systems  Review of Systems   Constitutional: Negative for chills and fever.   HENT: Negative for congestion and nosebleeds.    Eyes: Negative for discharge and redness.   Respiratory: Negative for cough, hemoptysis and wheezing.    Cardiovascular: Negative for chest pain and palpitations.   Gastrointestinal: Positive for melena. Negative for abdominal pain.   Genitourinary: Negative for hematuria.   Musculoskeletal: Negative.    Skin: Negative.    Neurological: Negative for speech change, focal weakness and seizures.   Psychiatric/Behavioral: Negative.    All other systems reviewed and are negative.       Physical Exam  Temp:  [36.6 °C (97.9 °F)-36.8 °C (98.2 °F)] 36.7 °C (98.1 °F)  Pulse:  [68-74] 70  Resp:  [12-23] 16  SpO2:  [89 %-98 %] 97 %    Physical Exam   Constitutional: He is oriented to person, place, and time. He appears well-developed.   HENT:   Head: Normocephalic and atraumatic.   Mouth/Throat: Oropharynx is clear and moist. No oropharyngeal exudate.   Eyes: Pupils are equal, round, and reactive to light. Conjunctivae are normal. Right eye exhibits no discharge. Left eye exhibits no discharge. No scleral icterus.   Neck: Neck supple. No JVD present. No tracheal deviation present.   Cardiovascular: Normal rate and regular rhythm.  Exam reveals no gallop and no friction rub.    No murmur heard.  Pulmonary/Chest: Effort normal. No stridor. No respiratory distress. He has no wheezes. He has rales. He exhibits no tenderness.   Abdominal: Soft. Bowel sounds are normal. He exhibits no distension. There  is no tenderness. There is no rebound.   Musculoskeletal: He exhibits edema. He exhibits no tenderness.   Neurological: He is alert and oriented to person, place, and time. No cranial nerve deficit. He exhibits normal muscle tone.   Skin: Skin is warm and dry. He is not diaphoretic. No cyanosis or erythema. Nails show no clubbing.   Psychiatric: He has a normal mood and affect. His behavior is normal.   Nursing note and vitals reviewed.      Fluids    Intake/Output Summary (Last 24 hours) at 01/24/19 0933  Last data filed at 01/24/19 0800   Gross per 24 hour   Intake             1700 ml   Output             1725 ml   Net              -25 ml       Laboratory  Recent Labs      01/23/19   0530  01/23/19   1640  01/24/19   0503   WBC  18.5*  17.4*  13.8*   RBC  2.74*  2.84*  2.77*   HEMOGLOBIN  8.0*  8.3*  8.1*   HEMATOCRIT  24.2*  25.5*  25.8*   MCV  88.3  89.8  93.1   MCH  29.2  29.2  29.2   MCHC  33.1*  32.5*  31.4*   RDW  49.5  50.3*  52.8*   PLATELETCT  127*  124*  95*   MPV  10.7  10.8  10.8     Recent Labs      01/22/19   0543  01/23/19   0530  01/24/19   0503   SODIUM  138  142  138   POTASSIUM  4.2  3.6  4.2   CHLORIDE  113*  111  109   CO2  16*  24  26   GLUCOSE  169*  127*  85   BUN  67*  36*  21   CREATININE  1.01  1.01  0.78   CALCIUM  8.4*  7.8*  7.8*     Recent Labs      01/21/19   1345  01/21/19   1635   APTT  49.0*   --    INR  6.56*  1.08               Imaging  DX-CHEST-PORTABLE (1 VIEW)   Final Result      Left basilar opacity likely represents atelectasis/consolidation. There may be a layering left pleural effusion.      Trace right pleural effusion may be present.      Stable prominence of the cardiomediastinal silhouette.      Atherosclerotic plaque.         EC-ECHOCARDIOGRAM COMPLETE W/O CONT   Final Result      [id number] VIEW   Final Result      NG tube tip in the expected location of the mid stomach.      DX-CHEST-PORTABLE (1 VIEW)   Final Result      Tubes and line as described above.       Enlarged cardiac silhouette with left basal consolidation and left pleural fluid.           Assessment/Plan  Acute on chronic respiratory failure with hypoxia ([facility])- (present on admission)   Assessment & Plan    Tolerated extubation on 1/22/2019    Diuresis and monitor intake and output  Continue oxygen and RT protocol  Discussed with Dr. Da Silva         GI bleed- (present on admission)   Assessment & Plan    EGD with gastric ulcer and possible mass status post clipping  Continue IV Protonix for 24 more hours discussed with GI    Patient will need repeat EGD in 3 months reviewed with patient importance to have this follow-up     Aortic stenosis   Assessment & Plan    Mild noted on echo  Will need outpatient followup     Candidal intertrigo- (present on admission)   Assessment & Plan    Skin care  Nystatin     Anemia due to GI blood loss- (present on admission)   Assessment & Plan    Hemoglobin stable    Change Protonix to Prilosec  Monitor H&H     SIRS (systemic inflammatory response syndrome) ([facility])- (present on admission)   Assessment & Plan    Was empirically started on ceftriaxone and doxycycline for possible community-acquired pneumonia    No clinical signs of pneumonia  Pro-calcitonin normal  Antibiotics discontinued     Paroxysmal atrial fibrillation ([facility])- (present on admission)   Assessment & Plan    Resume home dose of sotalol and Cardizem  Consider resuming anticoagulation 1 week if no further bleeding     Anticoagulant long-term use- (present on admission)   Assessment & Plan    On coumadin for history of A. Fib  Anticoagulation reversed given GI bleed    Continue to hold anticoagulation and monitor clinically     History of pulmonary embolism- (present on admission)   Assessment & Plan    On coumadin outpatient  History of IVC filter    Anticoagulation reversed given severe GI bleed     COPD (chronic obstructive pulmonary disease) ([facility])- (present on admission)   Assessment & Plan    No acute  exacerbation  Oxygen requirements back to baseline  Continue Symbicort Spiriva and bronchodilators as needed     Cardiac pacemaker- (present on admission)   Assessment & Plan    Secondary to sick sinus syndrome     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HCC)- (present on admission)   Assessment & Plan    Continue home dose of levothyroxine and hydrocortisone          VTE prophylaxis: SCD

## 2019-01-25 LAB
ANION GAP SERPL CALC-SCNC: 6 MMOL/L (ref 0–11.9)
BASOPHILS # BLD AUTO: 0.4 % (ref 0–1.8)
BASOPHILS # BLD: 0.06 K/UL (ref 0–0.12)
BUN SERPL-MCNC: 16 MG/DL (ref 8–22)
CALCIUM SERPL-MCNC: 7.7 MG/DL (ref 8.5–10.5)
CHLORIDE SERPL-SCNC: 105 MMOL/L (ref 96–112)
CO2 SERPL-SCNC: 26 MMOL/L (ref 20–33)
CREAT SERPL-MCNC: 0.78 MG/DL (ref 0.5–1.4)
EOSINOPHIL # BLD AUTO: 0.98 K/UL (ref 0–0.51)
EOSINOPHIL NFR BLD: 7.1 % (ref 0–6.9)
ERYTHROCYTE [DISTWIDTH] IN BLOOD BY AUTOMATED COUNT: 50.3 FL (ref 35.9–50)
GLUCOSE SERPL-MCNC: 80 MG/DL (ref 65–99)
HCT VFR BLD AUTO: 26.6 % (ref 42–52)
HGB BLD-MCNC: 8.5 G/DL (ref 14–18)
IMM GRANULOCYTES # BLD AUTO: 0.25 K/UL (ref 0–0.11)
IMM GRANULOCYTES NFR BLD AUTO: 1.8 % (ref 0–0.9)
LYMPHOCYTES # BLD AUTO: 1.43 K/UL (ref 1–4.8)
LYMPHOCYTES NFR BLD: 10.4 % (ref 22–41)
MAGNESIUM SERPL-MCNC: 1.9 MG/DL (ref 1.5–2.5)
MCH RBC QN AUTO: 29.4 PG (ref 27–33)
MCHC RBC AUTO-ENTMCNC: 32 G/DL (ref 33.7–35.3)
MCV RBC AUTO: 92 FL (ref 81.4–97.8)
MONOCYTES # BLD AUTO: 1.01 K/UL (ref 0–0.85)
MONOCYTES NFR BLD AUTO: 7.4 % (ref 0–13.4)
NEUTROPHILS # BLD AUTO: 10.01 K/UL (ref 1.82–7.42)
NEUTROPHILS NFR BLD: 72.9 % (ref 44–72)
NRBC # BLD AUTO: 0 K/UL
NRBC BLD-RTO: 0 /100 WBC
PHOSPHATE SERPL-MCNC: 2.2 MG/DL (ref 2.5–4.5)
PLATELET # BLD AUTO: 144 K/UL (ref 164–446)
PMV BLD AUTO: 10.6 FL (ref 9–12.9)
POTASSIUM SERPL-SCNC: 4.2 MMOL/L (ref 3.6–5.5)
RBC # BLD AUTO: 2.89 M/UL (ref 4.7–6.1)
SODIUM SERPL-SCNC: 137 MMOL/L (ref 135–145)
WBC # BLD AUTO: 13.7 K/UL (ref 4.8–10.8)

## 2019-01-25 PROCEDURE — 700102 HCHG RX REV CODE 250 W/ 637 OVERRIDE(OP): Performed by: INTERNAL MEDICINE

## 2019-01-25 PROCEDURE — 84100 ASSAY OF PHOSPHORUS: CPT

## 2019-01-25 PROCEDURE — 85025 COMPLETE CBC W/AUTO DIFF WBC: CPT

## 2019-01-25 PROCEDURE — 700102 HCHG RX REV CODE 250 W/ 637 OVERRIDE(OP): Mod: TB | Performed by: INTERNAL MEDICINE

## 2019-01-25 PROCEDURE — A9270 NON-COVERED ITEM OR SERVICE: HCPCS | Performed by: HOSPITALIST

## 2019-01-25 PROCEDURE — 700102 HCHG RX REV CODE 250 W/ 637 OVERRIDE(OP): Performed by: HOSPITALIST

## 2019-01-25 PROCEDURE — 99232 SBSQ HOSP IP/OBS MODERATE 35: CPT | Performed by: INTERNAL MEDICINE

## 2019-01-25 PROCEDURE — 700111 HCHG RX REV CODE 636 W/ 250 OVERRIDE (IP): Performed by: INTERNAL MEDICINE

## 2019-01-25 PROCEDURE — 36415 COLL VENOUS BLD VENIPUNCTURE: CPT

## 2019-01-25 PROCEDURE — 80048 BASIC METABOLIC PNL TOTAL CA: CPT

## 2019-01-25 PROCEDURE — 99358 PROLONG SERVICE W/O CONTACT: CPT | Performed by: PHYSICAL MEDICINE & REHABILITATION

## 2019-01-25 PROCEDURE — 770020 HCHG ROOM/CARE - TELE (206)

## 2019-01-25 PROCEDURE — A9270 NON-COVERED ITEM OR SERVICE: HCPCS | Mod: TB | Performed by: INTERNAL MEDICINE

## 2019-01-25 PROCEDURE — 83735 ASSAY OF MAGNESIUM: CPT

## 2019-01-25 PROCEDURE — A9270 NON-COVERED ITEM OR SERVICE: HCPCS | Performed by: INTERNAL MEDICINE

## 2019-01-25 RX ORDER — HYDROCORTISONE 10 MG/1
10 TABLET ORAL EVERY EVENING
Status: DISCONTINUED | OUTPATIENT
Start: 2019-01-25 | End: 2019-01-28 | Stop reason: HOSPADM

## 2019-01-25 RX ORDER — SUCRALFATE ORAL 1 G/10ML
1 SUSPENSION ORAL EVERY 6 HOURS
Status: DISCONTINUED | OUTPATIENT
Start: 2019-01-25 | End: 2019-01-28 | Stop reason: HOSPADM

## 2019-01-25 RX ORDER — OMEPRAZOLE 20 MG/1
40 CAPSULE, DELAYED RELEASE ORAL 2 TIMES DAILY
Status: DISCONTINUED | OUTPATIENT
Start: 2019-01-25 | End: 2019-01-28 | Stop reason: HOSPADM

## 2019-01-25 RX ORDER — HYDROCORTISONE 20 MG/1
20 TABLET ORAL
Status: DISCONTINUED | OUTPATIENT
Start: 2019-01-26 | End: 2019-01-28 | Stop reason: HOSPADM

## 2019-01-25 RX ORDER — POLYETHYLENE GLYCOL 3350 17 G/17G
1 POWDER, FOR SOLUTION ORAL 2 TIMES DAILY
Status: DISCONTINUED | OUTPATIENT
Start: 2019-01-25 | End: 2019-01-28 | Stop reason: HOSPADM

## 2019-01-25 RX ADMIN — SENNOSIDES AND DOCUSATE SODIUM 2 TABLET: 8.6; 5 TABLET ORAL at 05:43

## 2019-01-25 RX ADMIN — OMEPRAZOLE 20 MG: 20 CAPSULE, DELAYED RELEASE ORAL at 05:43

## 2019-01-25 RX ADMIN — BUDESONIDE AND FORMOTEROL FUMARATE DIHYDRATE 2 PUFF: 160; 4.5 AEROSOL RESPIRATORY (INHALATION) at 18:00

## 2019-01-25 RX ADMIN — OMEPRAZOLE 40 MG: 20 CAPSULE, DELAYED RELEASE ORAL at 17:45

## 2019-01-25 RX ADMIN — POLYETHYLENE GLYCOL 3350 1 PACKET: 17 POWDER, FOR SOLUTION ORAL at 20:13

## 2019-01-25 RX ADMIN — BUDESONIDE AND FORMOTEROL FUMARATE DIHYDRATE 2 PUFF: 160; 4.5 AEROSOL RESPIRATORY (INHALATION) at 05:45

## 2019-01-25 RX ADMIN — GABAPENTIN 600 MG: 300 CAPSULE ORAL at 20:14

## 2019-01-25 RX ADMIN — NYSTATIN: 100000 POWDER TOPICAL at 17:45

## 2019-01-25 RX ADMIN — HYDROCODONE BITARTRATE AND ACETAMINOPHEN 1 TABLET: 5; 325 TABLET ORAL at 08:56

## 2019-01-25 RX ADMIN — SENNOSIDES AND DOCUSATE SODIUM 2 TABLET: 8.6; 5 TABLET ORAL at 17:45

## 2019-01-25 RX ADMIN — TIOTROPIUM BROMIDE 1 CAPSULE: 18 CAPSULE ORAL; RESPIRATORY (INHALATION) at 05:43

## 2019-01-25 RX ADMIN — SUCRALFATE 1 G: 1 SUSPENSION ORAL at 13:26

## 2019-01-25 RX ADMIN — NYSTATIN: 100000 POWDER TOPICAL at 05:45

## 2019-01-25 RX ADMIN — FUROSEMIDE 20 MG: 10 INJECTION, SOLUTION INTRAMUSCULAR; INTRAVENOUS at 05:43

## 2019-01-25 RX ADMIN — SOTALOL HYDROCHLORIDE 40 MG: 80 TABLET ORAL at 05:44

## 2019-01-25 RX ADMIN — SOTALOL HYDROCHLORIDE 40 MG: 80 TABLET ORAL at 17:45

## 2019-01-25 RX ADMIN — HYDROCORTISONE 10 MG: 10 TABLET ORAL at 13:26

## 2019-01-25 RX ADMIN — HYDROCORTISONE 10 MG: 10 TABLET ORAL at 20:19

## 2019-01-25 RX ADMIN — DIBASIC SODIUM PHOSPHATE, MONOBASIC POTASSIUM PHOSPHATE AND MONOBASIC SODIUM PHOSPHATE 2 TABLET: 852; 155; 130 TABLET ORAL at 20:21

## 2019-01-25 RX ADMIN — HYDROCORTISONE 10 MG: 10 TABLET ORAL at 05:43

## 2019-01-25 RX ADMIN — SUCRALFATE 1 G: 1 SUSPENSION ORAL at 17:44

## 2019-01-25 RX ADMIN — HYDROCORTISONE 10 MG: 10 TABLET ORAL at 17:45

## 2019-01-25 RX ADMIN — DILTIAZEM HYDROCHLORIDE 60 MG: 60 TABLET, FILM COATED ORAL at 05:43

## 2019-01-25 RX ADMIN — TAMSULOSIN HYDROCHLORIDE 0.4 MG: 0.4 CAPSULE ORAL at 05:45

## 2019-01-25 RX ADMIN — DILTIAZEM HYDROCHLORIDE 60 MG: 60 TABLET, FILM COATED ORAL at 17:45

## 2019-01-25 RX ADMIN — MONTELUKAST SODIUM 10 MG: 10 TABLET, FILM COATED ORAL at 05:43

## 2019-01-25 RX ADMIN — LEVOTHYROXINE SODIUM 75 MCG: 75 TABLET ORAL at 05:43

## 2019-01-25 ASSESSMENT — ENCOUNTER SYMPTOMS
SHORTNESS OF BREATH: 0
STRIDOR: 0
MYALGIAS: 0
HALLUCINATIONS: 0
HEMOPTYSIS: 0
COUGH: 0
HEARTBURN: 0
SORE THROAT: 0
EYE REDNESS: 0
DIAPHORESIS: 0
SENSORY CHANGE: 0
PND: 0
CONSTIPATION: 0
DIZZINESS: 0
VOMITING: 0
PALPITATIONS: 0
FLANK PAIN: 0
SEIZURES: 0
LOSS OF CONSCIOUSNESS: 0
DOUBLE VISION: 0
CHILLS: 0
FEVER: 0
BLURRED VISION: 0
HEADACHES: 0
WEAKNESS: 1
SINUS PAIN: 0
WHEEZING: 0
NAUSEA: 0
ORTHOPNEA: 0
TINGLING: 0
FALLS: 0
ABDOMINAL PAIN: 0
SPEECH CHANGE: 0
BACK PAIN: 0
BLOOD IN STOOL: 0
CLAUDICATION: 0
FOCAL WEAKNESS: 0
DEPRESSION: 0
NECK PAIN: 0
DIARRHEA: 0
EYE DISCHARGE: 0

## 2019-01-25 ASSESSMENT — PATIENT HEALTH QUESTIONNAIRE - PHQ9
2. FEELING DOWN, DEPRESSED, IRRITABLE, OR HOPELESS: NOT AT ALL
SUM OF ALL RESPONSES TO PHQ9 QUESTIONS 1 AND 2: 0
1. LITTLE INTEREST OR PLEASURE IN DOING THINGS: NOT AT ALL

## 2019-01-25 ASSESSMENT — LIFESTYLE VARIABLES: SUBSTANCE_ABUSE: 0

## 2019-01-25 NOTE — DISCHARGE PLANNING
Anticipated Discharge Disposition: Discharge to SNF vs Home Health    Action: Met with patient and family for 30-minutes. Family and patient does not want him to be sent to a facility in [facility], they request he be able to go to [facility] or [facility] which is in [facility]. Patient lives in [place] and there is not much outpatient services in [place]. Patient has strong support from children and they are willing to assist him should he be discharged home with home health services. I explained that he would receive more intensive physical therapy from a skilled facility than with home health services they agreed to pursue SNF placement first. Discussed also that he has a case with Worker's compensation that pays for his coumadin therapy. He inquired as to if they will pay for his hospital stay and SNF. I explained that this is something that he will have to discuss with the   that is assigned to him.    Case # is [id number]      Barriers to Discharge: Placement to SNF vs Home health services    Plan: [facility] Faxed choice for SNF to [facility] and [facility]. If neither accepts or has bed available, will ask for home health order.   Family provided their numbers  Daughter Maya [phone] Home [phone]  Son in law Miles [phone]     Care Transition Team Assessment    Information Source  Orientation : (P) Oriented x 4    Readmission Evaluation  Is this a readmission?: (P) No    Elopement Risk  Legal Hold: No  Ambulatory or Self Mobile in Wheelchair: No-Not an Elopement Risk  Elopement Risk: Not at Risk for Elopement    Interdisciplinary Discharge Planning  Lives with - Patient's Self Care Capacity: Spouse  Patient or legal guardian wants to designate a caregiver (see row info): No  Housing / Facility: [address]  Prior Services: None    Discharge Preparedness  What is your plan after discharge?: (P) Home with help, Skilled nursing facility  What are  your discharge supports?: (P) Child  Prior Functional Level: (P) Ambulatory, Drives Self, Independent with Activities of Daily Living, Independent with Medication Management, Uses Wheelchair    Functional Assesment  Prior Functional Level: (P) Ambulatory, Drives Self, Independent with Activities of Daily Living, Independent with Medication Management, Uses Wheelchair    Finances  Financial Barriers to Discharge: (P) No  Prescription Coverage: (P) Yes      Discharge Risks or Barriers  Discharge risks or barriers?: (P) Post-acute placement / services  Patient risk factors: (P) Complex medical needs    Anticipated Discharge Information  Anticipated discharge disposition: (P) SNF VS home health services with family support.

## 2019-01-25 NOTE — CONSULTS
"Medical chart review completed.     Patient is a 86 y.o. year-old male with a past medical history significant for BPH, COPD, Glaucoma, HTN, A fib on coumadin, Hx of PE, Hypothyroidism,  admitted to [facility] on 1/21/2019 12:42 PM with black stools for 2 days. Patient was reportedly hypotensive at OSH and anemic and was transferred to [facility] for higher level of care.  Patient on transport could not protect his airway and required intubation. Patient was hypotensive and required IV fluids, pressors and multiple blood transfusions. In addition patient received Vit K and K centra for coumadin. Patient was taken to EGD with Dr. Mtz on 1/21/19, but with significant old blood limiting view. Patient was admitted to ICU and continued on PPI drip.  Patient had repeat EGD on 1/22/19 and two ulcers were clipped with successful hemostasis.  Per GI consult, one lesion concerning for malignancy and recommending follow-up EGD in 3 months.  Patient was extubated successfully on 1/22/19 and has since been transferred to the floor.  Hemoglobin has remained 8-8.5 since clipping. Echo was performed on 1/22/19 and EF of 80% with mild aortic stenosis/insufficiency. Last SBP 85/57.  Patient has been discontinued off of antibiotics, last WBC 13.7 on 1/25/19.     Patient reportedly lives with wife who has Parkinson's in [place] in a 1 story home with a ramp to enter; previously using a 4WW. Patient was last evaluated by PT on 1/24/19 and was Jeanette for gait. Patient last evaluated by OT on 1/24/19 and was CGA-MaxA for ADLs.       PMH:  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 (pouch) (HCC) 4/1/2010   • BPH (benign prostatic hypertrophy) 4/1/2010   • Bradycardia    • Breath shortness    • Bronchitis    • Cardiac pacemaker 04 2010   • " Cataract    • COPD (chronic obstructive pulmonary disease) ([facility])    • Diverticulitis    • DJD (degenerative joint disease)    • EMPHYSEMA    • Glaucoma    • Heart burn    • Heart murmur    • Hiatus hernia syndrome    • Hypertension    • 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   • Indigestion    • Knee arthroplasty 2002    right   • Obstructive hypertrophic cardiomyopathy ([facility]) 8/4/2011   • PAF (paroxysmal atrial fibrillation) ([facility])    • Paroxysmal atrial fibrillation ([facility]) 8/29/2011   • PE (pulmonary embolism)     IVC filter, states PE clots 8 times   • Phlebitis     chronic / recurrent   • Pituitary adenoma ([facility]) 1995    hypophysectomy   • Pituitary adenoma ([facility]) 1995    hypophysectomy   • Pneumonia    • Rheumatic fever    • S/P insertion of IVC (inferior vena caval) filter    • S/P parathyroidectomy ([facility])    • S/P parathyroidectomy ([facility]) 2005   • Sleep apnea     no cpap machine   • SSS (sick sinus syndrome) ([facility]) 8/29/2011   • Third degree AV block ([facility]) 8/29/2011   • thyroidectomy 2005    benign   • Unspecified disorder of thyroid    • Unspecified hemorrhagic conditions    • Unspecified urinary incontinence    • Urinary bladder disorder        PSH:  Past Surgical History:   Procedure Laterality Date   • GASTROSCOPY-ENDO  1/22/2019    Procedure: GASTROSCOPY-ENDO;  Surgeon: Yoandy Mcelroy M.D.;  Location: [facility];  Service: Gastroenterology   • GASTROSCOPY-ENDO  1/21/2019    Procedure: GASTROSCOPY-ENDO;  Surgeon: Luca Mtz M.D.;  Location: ENDOSCOPY [facility];  Service: Gastroenterology   • KNEE REVISION TOTAL  6/20/2013    Performed by Ortega Vega M.D. at SURGERY [facility]   • CARPAL TUNNEL ENDOSCOPIC  9/30/2011    Performed by RONALD ENNIS at SURGERY SAME DAY [facility]   • PACEMAKER INSERTION Left 04/23/2010    [employer] Altrua 60 [id number] implanted by Dr. Donaldson.   • CATARACT EXTRACTION WITH IOL      bilateral    • CERVICAL DISK AND FUSION ANTERIOR     • CERVICAL FUSION  POSTERIOR     • CHOLECYSTECTOMY     • CHOLECYSTECTOMY     • OTHER      pituitary tumor removed   • OTHER ORTHOPEDIC SURGERY      knee surgery left knee x 2   • PB REVISE ULNAR NERVE AT WRIST     • PB TOTAL KNEE ARTHROPLASTY      left   • PB TOTAL KNEE ARTHROPLASTY      right   • THYROIDECTOMY         FAMILY HISTORY:  Family History   Problem Relation Age of Onset   • Arthritis Mother    • Arthritis Father    • Cancer Neg Hx    • Heart Disease Neg Hx        MEDICATIONS:  Current Facility-Administered Medications   Medication Dose   • omeprazole (PRILOSEC) capsule 40 mg  40 mg   • sucralfate (CARAFATE) 1 GM/10ML suspension 1 g  1 g   • furosemide (LASIX) injection 20 mg  20 mg   • sotalol (BETAPACE) tablet 40 mg  40 mg   • hydrocortisone (CORTEF) tablet 10 mg  10 mg   • HYDROcodone-acetaminophen (NORCO) 5-325 MG per tablet 1 Tab  1 Tab   • artificial tears 1.4 % ophthalmic solution 1-2 Drop  1-2 Drop   • gabapentin (NEURONTIN) capsule 600 mg  600 mg   • levothyroxine (SYNTHROID) tablet 75 mcg  75 mcg   • montelukast (SINGULAIR) tablet 10 mg  10 mg   • tamsulosin (FLOMAX) capsule 0.4 mg  0.4 mg   • tiotropium (SPIRIVA) 18 MCG inhalation capsule 1 Cap  1 Cap   • budesonide-formoterol (SYMBICORT) 160-4.5 MCG/ACT inhaler 2 Puff  2 Puff   • acetaminophen (TYLENOL) tablet 500 mg  500 mg   • DILTIAZem (CARDIZEM) tablet 60 mg  60 mg   • senna-docusate (PERICOLACE or SENOKOT S) 8.6-50 MG per tablet 2 Tab  2 Tab    And   • polyethylene glycol/lytes (MIRALAX) PACKET 1 Packet  1 Packet    And   • magnesium hydroxide (MILK OF MAGNESIA) suspension 30 mL  30 mL    And   • bisacodyl (DULCOLAX) suppository 10 mg  10 mg   • Respiratory Care per Protocol     • nystatin (MYCOSTATIN) powder         ALLERGIES:  Pcn [penicillins]    PSYCHOSOCIAL HISTORY:  Living Site:  Home  Living With:  spouse  Caregiver's availability:  Wife caregiver  Number of stairs:  Ramped  Substance use history:  Denies      The patient presents  with cognitive  deficits and functional deficits in mobility/self-cares, and Moderate  de-conditioning. Pre-morbidly, this patient lived in a single level home with rampto enter,with spouse  The patient was evaluated by acute care Physical Therapy and Occupational Therapy; currently requiring minimal assistance for mobility and maximal assistance for ADLs. The patient's current diet is Full liquid liquids.     Patient still undergoing acute work-up. Current SBP is 85/57 and not medically stable for rehab.  Hgb stable in 8 range after clipping. WBC 13.7 improved from > 25, no longer on antibiotics.     Once medically stable, the patient is   a Good candidate for an acute inpatient rehabilitation program with a coordinated program of care at an intensity and frequency not available at a lower level of care.     Note: This recommendation requires that patient has at least CGA/Minimal Assistance needs in at least two therapy disciplines.  If patient progresses to no longer need CGA/Lex with at least two therapy disciplines they may be more appropriate for Skilled nursing facility versus home with home health.      This recommendation is substantiated by the patient's current medical condition with intervention and assessment of medical issues requiring an acute level of care for patient's safety and maximum outcome. A coordinated program of care will be provided by an interdisciplinary team including physical therapy, occupational therapy, hospitalist, physiatry, rehab nursing and rehab psychology. Rehab goals include improved mobility, self-care management, strength and conditioning/endurance, pain management, bowel and bladder management, mood and affect, and safety with independent home management including caregiver training. Estimated length of stay is approximately 10-14 days. Rehab potential: Good. Disposition: to pre-morbid independent living setting with supportive care of patient's spouse. We will continue to follow with you  in anticipation of discharge to acute inpatient rehabilitation when medically stable to do so at the discretion of the attending physician. Thank you for allowing us to participate in this patient's care. Please call with any questions regarding this recommendation.    Faviola Garcia M.D.

## 2019-01-25 NOTE — PROGRESS NOTES
Assumed care of pt. Bedside report completed with night shift RN Flores. Pt alert and oriented x4. Pt denies pain at this time. Pt on 3L O2 and resting comfortably. Call light within reach. Bed low and locked. Side rails up x2.

## 2019-01-25 NOTE — PROGRESS NOTES
Pt transferred to 735 Bed 1 with ICU RN present. Pt ambulated from wheelchair to bed with 2 person assist. Bedside report completed with ICU RN. Pt oriented to room and call light. Pt resting comfortably in bed. Call light in reach.

## 2019-01-25 NOTE — CARE PLAN
Problem: Communication  Goal: The ability to communicate needs accurately and effectively will improve  Outcome: PROGRESSING AS EXPECTED      Problem: Safety  Goal: Will remain free from falls  Outcome: PROGRESSING AS EXPECTED  Pt will remain free from falls through effective communication and use of call light and strip alarm.

## 2019-01-25 NOTE — PROGRESS NOTES
2 RN Skin check completed with Ashley ROLON    Pt's bilateral ears pink, but blanchable. Grey foam applied to silicone oxygen tubing.   Bilateral arms very bruised and weeping. Right arm/elbow purple.   Pt's abdomen intact.  Pt's sacrum intact, but has pink dermitis due to moist areas on buttocks. Will encourage turning and powder applied.   Pink and moist in bilateral groin area and under panis. Powder applied.  Bilateral heels dry, but intact.  Gale Castro

## 2019-01-25 NOTE — RESPIRATORY CARE
COPD EDUCATION by COPD CLINICAL EDUCATOR  1/25/2019 at 6:25 AM by Sandy Yates     Patient reviewed by COPD education team. Patient does not qualify for COPD program.

## 2019-01-25 NOTE — DISCHARGE PLANNING
PMR referral from Dr. Henry.     Debility ongoing medical management as well as therapy need. Anticipate post acute services to facilitate a successful transition to community home single story with spouse. Spouse is independent with a diagnosis of Parkinson's[place] RPG to consult per protocol. Medicare procvider.

## 2019-01-26 LAB
ANION GAP SERPL CALC-SCNC: 4 MMOL/L (ref 0–11.9)
BACTERIA BLD CULT: NORMAL
BACTERIA BLD CULT: NORMAL
BUN SERPL-MCNC: 15 MG/DL (ref 8–22)
CALCIUM SERPL-MCNC: 8.3 MG/DL (ref 8.5–10.5)
CHLORIDE SERPL-SCNC: 101 MMOL/L (ref 96–112)
CO2 SERPL-SCNC: 31 MMOL/L (ref 20–33)
CREAT SERPL-MCNC: 0.83 MG/DL (ref 0.5–1.4)
ERYTHROCYTE [DISTWIDTH] IN BLOOD BY AUTOMATED COUNT: 49.8 FL (ref 35.9–50)
GLUCOSE SERPL-MCNC: 108 MG/DL (ref 65–99)
HCT VFR BLD AUTO: 27.1 % (ref 42–52)
HGB BLD-MCNC: 8.7 G/DL (ref 14–18)
MCH RBC QN AUTO: 29.5 PG (ref 27–33)
MCHC RBC AUTO-ENTMCNC: 32.1 G/DL (ref 33.7–35.3)
MCV RBC AUTO: 91.9 FL (ref 81.4–97.8)
PLATELET # BLD AUTO: 147 K/UL (ref 164–446)
PMV BLD AUTO: 10.4 FL (ref 9–12.9)
POTASSIUM SERPL-SCNC: 4 MMOL/L (ref 3.6–5.5)
RBC # BLD AUTO: 2.95 M/UL (ref 4.7–6.1)
SIGNIFICANT IND 70042: NORMAL
SIGNIFICANT IND 70042: NORMAL
SITE SITE: NORMAL
SITE SITE: NORMAL
SODIUM SERPL-SCNC: 136 MMOL/L (ref 135–145)
SOURCE SOURCE: NORMAL
SOURCE SOURCE: NORMAL
WBC # BLD AUTO: 14.1 K/UL (ref 4.8–10.8)

## 2019-01-26 PROCEDURE — A9270 NON-COVERED ITEM OR SERVICE: HCPCS | Performed by: HOSPITALIST

## 2019-01-26 PROCEDURE — 700111 HCHG RX REV CODE 636 W/ 250 OVERRIDE (IP): Performed by: INTERNAL MEDICINE

## 2019-01-26 PROCEDURE — A9270 NON-COVERED ITEM OR SERVICE: HCPCS | Mod: TB | Performed by: INTERNAL MEDICINE

## 2019-01-26 PROCEDURE — 85027 COMPLETE CBC AUTOMATED: CPT

## 2019-01-26 PROCEDURE — 700102 HCHG RX REV CODE 250 W/ 637 OVERRIDE(OP): Performed by: INTERNAL MEDICINE

## 2019-01-26 PROCEDURE — 36415 COLL VENOUS BLD VENIPUNCTURE: CPT

## 2019-01-26 PROCEDURE — 80048 BASIC METABOLIC PNL TOTAL CA: CPT

## 2019-01-26 PROCEDURE — 700102 HCHG RX REV CODE 250 W/ 637 OVERRIDE(OP): Mod: TB | Performed by: INTERNAL MEDICINE

## 2019-01-26 PROCEDURE — 99232 SBSQ HOSP IP/OBS MODERATE 35: CPT | Performed by: INTERNAL MEDICINE

## 2019-01-26 PROCEDURE — 700102 HCHG RX REV CODE 250 W/ 637 OVERRIDE(OP): Performed by: HOSPITALIST

## 2019-01-26 PROCEDURE — 770006 HCHG ROOM/CARE - MED/SURG/GYN SEMI*

## 2019-01-26 PROCEDURE — 700105 HCHG RX REV CODE 258: Performed by: INTERNAL MEDICINE

## 2019-01-26 PROCEDURE — A9270 NON-COVERED ITEM OR SERVICE: HCPCS | Performed by: INTERNAL MEDICINE

## 2019-01-26 RX ORDER — SODIUM CHLORIDE 9 MG/ML
250 INJECTION, SOLUTION INTRAVENOUS ONCE
Status: COMPLETED | OUTPATIENT
Start: 2019-01-26 | End: 2019-01-26

## 2019-01-26 RX ADMIN — SUCRALFATE 1 G: 1 SUSPENSION ORAL at 17:46

## 2019-01-26 RX ADMIN — SUCRALFATE 1 G: 1 SUSPENSION ORAL at 00:00

## 2019-01-26 RX ADMIN — HYDROCODONE BITARTRATE AND ACETAMINOPHEN 1 TABLET: 5; 325 TABLET ORAL at 21:39

## 2019-01-26 RX ADMIN — DILTIAZEM HYDROCHLORIDE 60 MG: 60 TABLET, FILM COATED ORAL at 05:43

## 2019-01-26 RX ADMIN — SOTALOL HYDROCHLORIDE 40 MG: 80 TABLET ORAL at 17:46

## 2019-01-26 RX ADMIN — HYDROCORTISONE 20 MG: 20 TABLET ORAL at 12:24

## 2019-01-26 RX ADMIN — NYSTATIN: 100000 POWDER TOPICAL at 05:45

## 2019-01-26 RX ADMIN — HYDROCODONE BITARTRATE AND ACETAMINOPHEN 1 TABLET: 5; 325 TABLET ORAL at 02:24

## 2019-01-26 RX ADMIN — POLYETHYLENE GLYCOL 3350 1 PACKET: 17 POWDER, FOR SOLUTION ORAL at 05:45

## 2019-01-26 RX ADMIN — OMEPRAZOLE 40 MG: 20 CAPSULE, DELAYED RELEASE ORAL at 17:47

## 2019-01-26 RX ADMIN — HYDROCORTISONE 30 MG: 10 TABLET ORAL at 08:36

## 2019-01-26 RX ADMIN — NYSTATIN: 100000 POWDER TOPICAL at 17:47

## 2019-01-26 RX ADMIN — FUROSEMIDE 20 MG: 10 INJECTION, SOLUTION INTRAMUSCULAR; INTRAVENOUS at 05:43

## 2019-01-26 RX ADMIN — TIOTROPIUM BROMIDE 1 CAPSULE: 18 CAPSULE ORAL; RESPIRATORY (INHALATION) at 05:41

## 2019-01-26 RX ADMIN — SODIUM CHLORIDE 250 ML: 9 INJECTION, SOLUTION INTRAVENOUS at 18:33

## 2019-01-26 RX ADMIN — SOTALOL HYDROCHLORIDE 40 MG: 80 TABLET ORAL at 05:44

## 2019-01-26 RX ADMIN — BUDESONIDE AND FORMOTEROL FUMARATE DIHYDRATE 2 PUFF: 160; 4.5 AEROSOL RESPIRATORY (INHALATION) at 05:41

## 2019-01-26 RX ADMIN — SUCRALFATE 1 G: 1 SUSPENSION ORAL at 05:44

## 2019-01-26 RX ADMIN — OMEPRAZOLE 40 MG: 20 CAPSULE, DELAYED RELEASE ORAL at 05:44

## 2019-01-26 RX ADMIN — TAMSULOSIN HYDROCHLORIDE 0.4 MG: 0.4 CAPSULE ORAL at 05:44

## 2019-01-26 RX ADMIN — DILTIAZEM HYDROCHLORIDE 60 MG: 60 TABLET, FILM COATED ORAL at 17:46

## 2019-01-26 RX ADMIN — BUDESONIDE AND FORMOTEROL FUMARATE DIHYDRATE 2 PUFF: 160; 4.5 AEROSOL RESPIRATORY (INHALATION) at 19:31

## 2019-01-26 RX ADMIN — GABAPENTIN 600 MG: 300 CAPSULE ORAL at 20:57

## 2019-01-26 RX ADMIN — LEVOTHYROXINE SODIUM 75 MCG: 75 TABLET ORAL at 05:44

## 2019-01-26 RX ADMIN — SUCRALFATE 1 G: 1 SUSPENSION ORAL at 12:24

## 2019-01-26 RX ADMIN — HYDROCORTISONE 10 MG: 10 TABLET ORAL at 17:47

## 2019-01-26 RX ADMIN — HYDROCODONE BITARTRATE AND ACETAMINOPHEN 1 TABLET: 5; 325 TABLET ORAL at 12:24

## 2019-01-26 RX ADMIN — MONTELUKAST SODIUM 10 MG: 10 TABLET, FILM COATED ORAL at 05:44

## 2019-01-26 ASSESSMENT — ENCOUNTER SYMPTOMS
FLANK PAIN: 0
STRIDOR: 0
FOCAL WEAKNESS: 0
WEAKNESS: 1
CONSTIPATION: 0
VOMITING: 0
PND: 0
EYE REDNESS: 0
SINUS PAIN: 0
NECK PAIN: 0
DOUBLE VISION: 0
CLAUDICATION: 0
WHEEZING: 0
SENSORY CHANGE: 0
EYE DISCHARGE: 0
HALLUCINATIONS: 0
HEARTBURN: 0
MYALGIAS: 0
BLOOD IN STOOL: 0
DIARRHEA: 0
NAUSEA: 0
FEVER: 0
CHILLS: 0
LOSS OF CONSCIOUSNESS: 0
BACK PAIN: 0
PALPITATIONS: 0
COUGH: 0
FALLS: 0
ORTHOPNEA: 0
SEIZURES: 0
DIAPHORESIS: 0
TINGLING: 0
DIZZINESS: 0
DEPRESSION: 0
SORE THROAT: 0
SPEECH CHANGE: 0
ABDOMINAL PAIN: 0
SHORTNESS OF BREATH: 0
HEMOPTYSIS: 0
BLURRED VISION: 0
HEADACHES: 0

## 2019-01-26 ASSESSMENT — LIFESTYLE VARIABLES: SUBSTANCE_ABUSE: 0

## 2019-01-26 NOTE — PROGRESS NOTES
0725: Report received from ОЛЬГА Tanner. Patient is alert and oriented, resting in bed. Fall precautions in place, call light within reach, vitals stable. Will continue to monitor.

## 2019-01-26 NOTE — CARE PLAN
Problem: Safety - Medical Restraint  Goal: Remains free of injury from restraints (Restraint for Interference with Medical Device)  INTERVENTIONS:  1. Determine that other, less restrictive measures have been tried or would not be effective before applying the restraint  2. Evaluate the patient's condition at the time of restraint application  3. Inform patient/family regarding the reason for restraint  4. Q2H: Monitor safety, psychosocial status, comfort, nutrition and hydration     Outcome: PROGRESSING AS EXPECTED  Call light use; nonskid socks; up to chair    Problem: Infection  Goal: Will remain free from infection  Outcome: PROGRESSING AS EXPECTED  Clean hands; vickie care; daily bath; sitting up for meals

## 2019-01-26 NOTE — PROGRESS NOTES
[facility] Medicine Daily Progress Note    Date of Service  1/25/2019    Chief Complaint  86 y.o. male admitted 1/21/2019 with GI bleed on coumadin  For AFib received vit  K and Kcentra    Hospital Course          Interval Problem Update  Patient seen and evaluated on rounds  Discussed with nursing staff  Transferred out of the ICU  Feels better but significantly weak and debilitated  Wants to go to an acute rehab facility as opposed to SNF  Discussed with spouse at bedside  Plan of care discussed, all questions and concerns answered  No other complaints  Discussed with social workers on rounds    Consultants/Specialty  Pulmonology / Critical Care  Gastroenterology     Code Status  FULL CODE, discussed with patient   Palliative team consult to complete AD     Disposition  PT/OT evaluations  Anticipate post acute placement  Physiatry evaluation     Review of Systems  Review of Systems   Constitutional: Positive for malaise/fatigue. Negative for chills, diaphoresis and fever.   HENT: Negative for congestion, ear discharge, ear pain, hearing loss, nosebleeds, sinus pain, sore throat and tinnitus.    Eyes: Negative for blurred vision, double vision, discharge and redness.   Respiratory: Negative for cough, hemoptysis, shortness of breath, wheezing and stridor.    Cardiovascular: Negative for chest pain, palpitations, orthopnea, claudication, leg swelling and PND.   Gastrointestinal: Negative for abdominal pain, blood in stool, constipation, diarrhea, heartburn, melena, nausea and vomiting.   Genitourinary: Negative for dysuria, flank pain, frequency, hematuria and urgency.   Musculoskeletal: Negative for back pain, falls, joint pain, myalgias and neck pain.   Skin: Negative for itching and rash.   Neurological: Positive for weakness. Negative for dizziness, tingling, sensory change, speech change, focal weakness, seizures, loss of consciousness and headaches.   Psychiatric/Behavioral: Negative for depression,  hallucinations, substance abuse and suicidal ideas.        Physical Exam  Temp:  [35.8 °C (96.4 °F)-36.8 °C (98.3 °F)] 36.2 °C (97.2 °F)  Pulse:  [67-74] 69  Resp:  [18-19] 18  BP: ()/(57-77) 118/77  SpO2:  [93 %-100 %] 95 %    Physical Exam   Constitutional: He is oriented to person, place, and time. He appears well-developed and well-nourished. No distress.   Body mass index is 36.87 kg/m².   HENT:   Head: Normocephalic and atraumatic.   Mouth/Throat: Oropharynx is clear and moist. No oropharyngeal exudate.   Eyes: Pupils are equal, round, and reactive to light. Right eye exhibits no discharge. Left eye exhibits no discharge. No scleral icterus.   Pale conjunctivae    Neck: Neck supple. No JVD present. No tracheal deviation present.   Cardiovascular: Normal rate and regular rhythm.  Exam reveals no gallop and no friction rub.    Murmur heard.  Pulmonary/Chest: Effort normal. No stridor. No respiratory distress. He has no wheezes. He has rales. He exhibits no tenderness.   Diminished bases   Abdominal: Soft. Bowel sounds are normal. He exhibits no distension. There is no tenderness. There is no rebound.   Musculoskeletal: He exhibits edema. He exhibits no tenderness.   Neurological: He is alert and oriented to person, place, and time. No cranial nerve deficit. He exhibits normal muscle tone.   Skin: Skin is warm and dry. He is not diaphoretic. No cyanosis or erythema. There is pallor. Nails show no clubbing.   Psychiatric: He has a normal mood and affect. His behavior is normal. Judgment and thought content normal.   Nursing note and vitals reviewed.      Fluids    Intake/Output Summary (Last 24 hours) at 01/25/19 1846  Last data filed at 01/25/19 1300   Gross per 24 hour   Intake              540 ml   Output             3600 ml   Net            -3060 ml       Laboratory  Recent Labs      01/23/19   1640  01/24/19   0503  01/25/19   0451   WBC  17.4*  13.8*  13.7*   RBC  2.84*  2.77*  2.89*   HEMOGLOBIN  8.3*   8.1*  8.5*   HEMATOCRIT  25.5*  25.8*  26.6*   MCV  89.8  93.1  92.0   MCH  29.2  29.2  29.4   MCHC  32.5*  31.4*  32.0*   RDW  50.3*  52.8*  50.3*   PLATELETCT  124*  95*  144*   MPV  10.8  10.8  10.6     Recent Labs      01/23/19   0530  01/24/19   0503  01/25/19   0450   SODIUM  142  138  137   POTASSIUM  3.6  4.2  4.2   CHLORIDE  111  109  105   CO2  24  26  26   GLUCOSE  127*  85  80   BUN  36*  21  16   CREATININE  1.01  0.78  0.78   CALCIUM  7.8*  7.8*  7.7*                   Imaging  DX-CHEST-PORTABLE (1 VIEW)   Final Result      Left basilar opacity likely represents atelectasis/consolidation. There may be a layering left pleural effusion.      Trace right pleural effusion may be present.      Stable prominence of the cardiomediastinal silhouette.      Atherosclerotic plaque.         EC-ECHOCARDIOGRAM COMPLETE W/O CONT   Final Result      TV-HYMHGPI-1 VIEW   Final Result      NG tube tip in the expected location of the mid stomach.      DX-CHEST-PORTABLE (1 VIEW)   Final Result      Tubes and line as described above.      Enlarged cardiac silhouette with left basal consolidation and left pleural fluid.           Assessment/Plan  Acute on chronic respiratory failure with hypoxia (HCC)- (present on admission)   Assessment & Plan    Stable  Monitor closely  Wean off O2 as able  RT protocol      GI bleed- (present on admission)   Assessment & Plan    EGD with gastric ulcer and possible mass status post clipping  PO omeprazole and sucralfate     Patient will need repeat EGD in 3 months reviewed with patient importance to have this follow-up  Holding AC  Resume 1-2 weeks if Hemoglobin is stable      Edema- (present on admission)   Assessment & Plan    IV lasix     Aortic stenosis- (present on admission)   Assessment & Plan    Mild noted on echo  Will need outpatient followup     Candidal intertrigo- (present on admission)   Assessment & Plan    Skin care  Nystatin     Anemia due to GI blood loss- (present on  admission)   Assessment & Plan    Hemoglobin stable  Monitor Hb / Restrictive transfusion strategy     Paroxysmal atrial fibrillation (HCC)- (present on admission)   Assessment & Plan    Continue home dose of sotalol and Cardizem  Resume anticoagulation 1-2 weeks from control of bleeding      Anticoagulant long-term use- (present on admission)   Assessment & Plan    On coumadin for history of A. Fib / PE  Anticoagulation reversed given GI bleed  Continue to hold anticoagulation and monitor clinically     History of pulmonary embolism- (present on admission)   Assessment & Plan    On coumadin outpatient  History of IVC filter    Anticoagulation reversed given severe GI bleed  Resume anticoagulation in 2 weeks from acute bleed control     COPD (chronic obstructive pulmonary disease) ([facility])- (present on admission)   Assessment & Plan    No acute exacerbation  Oxygen requirements back to baseline  Continue Symbicort Spiriva and bronchodilators as needed     Cardiac pacemaker- (present on admission)   Assessment & Plan    Secondary to sick sinus syndrome     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[facility])- (present on admission)   Assessment & Plan    Continue home dose of levothyroxine and hydrocortisone  We will change hydrocortisone to 30/20/10 with acute concerns for now  Wean off to home regimen over the coming days          VTE prophylaxis: SCD

## 2019-01-26 NOTE — CARE PLAN
Problem: Fluid Volume:  Goal: Will maintain balanced intake and output  Outcome: PROGRESSING SLOWER THAN EXPECTED  Patient continues to have 2+ BLE edema and 3+ with weeping in Bilateral upper extremities. Lasix given as ordered. Will continue to monitor.     Problem: Urinary Elimination:  Goal: Ability to reestablish a normal urinary elimination pattern will improve  Outcome: PROGRESSING AS EXPECTED  Patient has condom cath applied at this time for frequency d/t lasix, will continue to monitor for any skin breakdown or issues

## 2019-01-26 NOTE — PROGRESS NOTES
Notified Dr Henry that pt's BP was low when getting pt up to chair. Pt asymptomatic. BP 9763 when in chair and then rechecked after pt had been sitting up for a while at 85/57. Physician states to continue to monitor. Will continue to monitor.

## 2019-01-27 ENCOUNTER — APPOINTMENT (OUTPATIENT)
Dept: RADIOLOGY | Facility: MEDICAL CENTER | Age: 84
DRG: 377 | End: 2019-01-27
Attending: INTERNAL MEDICINE
Payer: MEDICARE

## 2019-01-27 ENCOUNTER — PATIENT OUTREACH (OUTPATIENT)
Dept: HEALTH INFORMATION MANAGEMENT | Facility: OTHER | Age: 84
End: 2019-01-27

## 2019-01-27 PROBLEM — Z71.89 ADVANCE CARE PLANNING: Status: ACTIVE | Noted: 2019-01-27

## 2019-01-27 LAB
ALBUMIN SERPL BCP-MCNC: 2.9 G/DL (ref 3.2–4.9)
ALBUMIN/GLOB SERPL: 1.6 G/DL
ALP SERPL-CCNC: 30 U/L (ref 30–99)
ALT SERPL-CCNC: 13 U/L (ref 2–50)
ANION GAP SERPL CALC-SCNC: 5 MMOL/L (ref 0–11.9)
AST SERPL-CCNC: 14 U/L (ref 12–45)
BASOPHILS # BLD AUTO: 0.3 % (ref 0–1.8)
BASOPHILS # BLD: 0.06 K/UL (ref 0–0.12)
BILIRUB SERPL-MCNC: 0.3 MG/DL (ref 0.1–1.5)
BUN SERPL-MCNC: 15 MG/DL (ref 8–22)
CALCIUM SERPL-MCNC: 8.2 MG/DL (ref 8.5–10.5)
CHLORIDE SERPL-SCNC: 98 MMOL/L (ref 96–112)
CO2 SERPL-SCNC: 33 MMOL/L (ref 20–33)
CREAT SERPL-MCNC: 0.86 MG/DL (ref 0.5–1.4)
EOSINOPHIL # BLD AUTO: 0.34 K/UL (ref 0–0.51)
EOSINOPHIL NFR BLD: 1.8 % (ref 0–6.9)
ERYTHROCYTE [DISTWIDTH] IN BLOOD BY AUTOMATED COUNT: 50.9 FL (ref 35.9–50)
GLOBULIN SER CALC-MCNC: 1.8 G/DL (ref 1.9–3.5)
GLUCOSE SERPL-MCNC: 96 MG/DL (ref 65–99)
HCT VFR BLD AUTO: 27.5 % (ref 42–52)
HGB BLD-MCNC: 8.7 G/DL (ref 14–18)
IMM GRANULOCYTES # BLD AUTO: 0.82 K/UL (ref 0–0.11)
IMM GRANULOCYTES NFR BLD AUTO: 4.3 % (ref 0–0.9)
LYMPHOCYTES # BLD AUTO: 1.6 K/UL (ref 1–4.8)
LYMPHOCYTES NFR BLD: 8.5 % (ref 22–41)
MAGNESIUM SERPL-MCNC: 1.8 MG/DL (ref 1.5–2.5)
MCH RBC QN AUTO: 29.4 PG (ref 27–33)
MCHC RBC AUTO-ENTMCNC: 31.6 G/DL (ref 33.7–35.3)
MCV RBC AUTO: 92.9 FL (ref 81.4–97.8)
MONOCYTES # BLD AUTO: 1.53 K/UL (ref 0–0.85)
MONOCYTES NFR BLD AUTO: 8.1 % (ref 0–13.4)
NEUTROPHILS # BLD AUTO: 14.58 K/UL (ref 1.82–7.42)
NEUTROPHILS NFR BLD: 77 % (ref 44–72)
NRBC # BLD AUTO: 0.02 K/UL
NRBC BLD-RTO: 0.1 /100 WBC
PHOSPHATE SERPL-MCNC: 2.9 MG/DL (ref 2.5–4.5)
PLATELET # BLD AUTO: 174 K/UL (ref 164–446)
PMV BLD AUTO: 10.7 FL (ref 9–12.9)
POTASSIUM SERPL-SCNC: 3.6 MMOL/L (ref 3.6–5.5)
PROT SERPL-MCNC: 4.7 G/DL (ref 6–8.2)
RBC # BLD AUTO: 2.96 M/UL (ref 4.7–6.1)
SODIUM SERPL-SCNC: 136 MMOL/L (ref 135–145)
WBC # BLD AUTO: 18.9 K/UL (ref 4.8–10.8)

## 2019-01-27 PROCEDURE — 700102 HCHG RX REV CODE 250 W/ 637 OVERRIDE(OP): Performed by: INTERNAL MEDICINE

## 2019-01-27 PROCEDURE — 85025 COMPLETE CBC W/AUTO DIFF WBC: CPT

## 2019-01-27 PROCEDURE — 770006 HCHG ROOM/CARE - MED/SURG/GYN SEMI*

## 2019-01-27 PROCEDURE — 700102 HCHG RX REV CODE 250 W/ 637 OVERRIDE(OP): Mod: TB | Performed by: INTERNAL MEDICINE

## 2019-01-27 PROCEDURE — 99497 ADVNCD CARE PLAN 30 MIN: CPT | Performed by: INTERNAL MEDICINE

## 2019-01-27 PROCEDURE — A9270 NON-COVERED ITEM OR SERVICE: HCPCS | Performed by: HOSPITALIST

## 2019-01-27 PROCEDURE — 84100 ASSAY OF PHOSPHORUS: CPT

## 2019-01-27 PROCEDURE — 700102 HCHG RX REV CODE 250 W/ 637 OVERRIDE(OP): Performed by: HOSPITALIST

## 2019-01-27 PROCEDURE — 80053 COMPREHEN METABOLIC PANEL: CPT

## 2019-01-27 PROCEDURE — A9270 NON-COVERED ITEM OR SERVICE: HCPCS | Mod: TB | Performed by: INTERNAL MEDICINE

## 2019-01-27 PROCEDURE — 71045 X-RAY EXAM CHEST 1 VIEW: CPT

## 2019-01-27 PROCEDURE — 36415 COLL VENOUS BLD VENIPUNCTURE: CPT

## 2019-01-27 PROCEDURE — 83735 ASSAY OF MAGNESIUM: CPT

## 2019-01-27 PROCEDURE — A9270 NON-COVERED ITEM OR SERVICE: HCPCS | Performed by: INTERNAL MEDICINE

## 2019-01-27 PROCEDURE — 99232 SBSQ HOSP IP/OBS MODERATE 35: CPT | Mod: 25 | Performed by: INTERNAL MEDICINE

## 2019-01-27 RX ORDER — HYDROCORTISONE 10 MG/1
30 TABLET ORAL
Qty: 30 TAB | Status: ON HOLD
Start: 2019-01-27 | End: 2019-02-08

## 2019-01-27 RX ORDER — SUCRALFATE ORAL 1 G/10ML
1 SUSPENSION ORAL EVERY 6 HOURS
Refills: 3 | Status: ON HOLD
Start: 2019-01-27 | End: 2019-02-08

## 2019-01-27 RX ORDER — POLYETHYLENE GLYCOL 3350 17 G/17G
17 POWDER, FOR SOLUTION ORAL 2 TIMES DAILY
Refills: 3 | Status: ON HOLD
Start: 2019-01-27 | End: 2019-02-08

## 2019-01-27 RX ORDER — HYDROCORTISONE 10 MG/1
10 TABLET ORAL EVERY EVENING
Qty: 30 TAB | Status: ON HOLD
Start: 2019-01-27 | End: 2019-02-08

## 2019-01-27 RX ORDER — HYDROCORTISONE 20 MG/1
20 TABLET ORAL
Qty: 30 TAB | Status: ON HOLD
Start: 2019-01-27 | End: 2019-02-08

## 2019-01-27 RX ORDER — OMEPRAZOLE 40 MG/1
40 CAPSULE, DELAYED RELEASE ORAL 2 TIMES DAILY
Qty: 30 CAP | Status: ON HOLD
Start: 2019-01-27 | End: 2019-02-08

## 2019-01-27 RX ADMIN — BUDESONIDE AND FORMOTEROL FUMARATE DIHYDRATE 2 PUFF: 160; 4.5 AEROSOL RESPIRATORY (INHALATION) at 05:31

## 2019-01-27 RX ADMIN — TIOTROPIUM BROMIDE 1 CAPSULE: 18 CAPSULE ORAL; RESPIRATORY (INHALATION) at 05:31

## 2019-01-27 RX ADMIN — OMEPRAZOLE 40 MG: 20 CAPSULE, DELAYED RELEASE ORAL at 05:33

## 2019-01-27 RX ADMIN — SOTALOL HYDROCHLORIDE 40 MG: 80 TABLET ORAL at 17:50

## 2019-01-27 RX ADMIN — NYSTATIN: 100000 POWDER TOPICAL at 17:49

## 2019-01-27 RX ADMIN — LEVOTHYROXINE SODIUM 75 MCG: 75 TABLET ORAL at 05:32

## 2019-01-27 RX ADMIN — NYSTATIN: 100000 POWDER TOPICAL at 05:33

## 2019-01-27 RX ADMIN — HYDROCORTISONE 20 MG: 20 TABLET ORAL at 12:15

## 2019-01-27 RX ADMIN — SOTALOL HYDROCHLORIDE 40 MG: 80 TABLET ORAL at 05:32

## 2019-01-27 RX ADMIN — SUCRALFATE 1 G: 1 SUSPENSION ORAL at 17:49

## 2019-01-27 RX ADMIN — SUCRALFATE 1 G: 1 SUSPENSION ORAL at 12:15

## 2019-01-27 RX ADMIN — HYDROCORTISONE 30 MG: 10 TABLET ORAL at 09:09

## 2019-01-27 RX ADMIN — GABAPENTIN 600 MG: 300 CAPSULE ORAL at 20:43

## 2019-01-27 RX ADMIN — SUCRALFATE 1 G: 1 SUSPENSION ORAL at 05:31

## 2019-01-27 RX ADMIN — OMEPRAZOLE 40 MG: 20 CAPSULE, DELAYED RELEASE ORAL at 17:49

## 2019-01-27 RX ADMIN — HYDROCODONE BITARTRATE AND ACETAMINOPHEN 1 TABLET: 5; 325 TABLET ORAL at 17:49

## 2019-01-27 RX ADMIN — DILTIAZEM HYDROCHLORIDE 60 MG: 60 TABLET, FILM COATED ORAL at 05:32

## 2019-01-27 RX ADMIN — HYDROCODONE BITARTRATE AND ACETAMINOPHEN 1 TABLET: 5; 325 TABLET ORAL at 09:09

## 2019-01-27 RX ADMIN — BUDESONIDE AND FORMOTEROL FUMARATE DIHYDRATE 2 PUFF: 160; 4.5 AEROSOL RESPIRATORY (INHALATION) at 20:42

## 2019-01-27 RX ADMIN — MONTELUKAST SODIUM 10 MG: 10 TABLET, FILM COATED ORAL at 05:33

## 2019-01-27 RX ADMIN — SUCRALFATE 1 G: 1 SUSPENSION ORAL at 01:05

## 2019-01-27 RX ADMIN — DILTIAZEM HYDROCHLORIDE 60 MG: 60 TABLET, FILM COATED ORAL at 17:49

## 2019-01-27 RX ADMIN — TAMSULOSIN HYDROCHLORIDE 0.4 MG: 0.4 CAPSULE ORAL at 05:34

## 2019-01-27 RX ADMIN — HYDROCORTISONE 10 MG: 10 TABLET ORAL at 17:49

## 2019-01-27 ASSESSMENT — LIFESTYLE VARIABLES: SUBSTANCE_ABUSE: 0

## 2019-01-27 ASSESSMENT — ENCOUNTER SYMPTOMS
NAUSEA: 0
FOCAL WEAKNESS: 0
MYALGIAS: 0
HEARTBURN: 0
TINGLING: 0
SINUS PAIN: 0
SPEECH CHANGE: 0
BLOOD IN STOOL: 0
DEPRESSION: 0
WEAKNESS: 1
FEVER: 0
PALPITATIONS: 0
FALLS: 0
SENSORY CHANGE: 0
STRIDOR: 0
HALLUCINATIONS: 0
BACK PAIN: 0
PND: 0
SORE THROAT: 0
HEMOPTYSIS: 0
HEADACHES: 0
COUGH: 0
CLAUDICATION: 0
ABDOMINAL PAIN: 0
SHORTNESS OF BREATH: 0
CHILLS: 0
WHEEZING: 0
LOSS OF CONSCIOUSNESS: 0
EYE REDNESS: 0
VOMITING: 0
SEIZURES: 0
DOUBLE VISION: 0
BLURRED VISION: 0
DIAPHORESIS: 0
CONSTIPATION: 0
ORTHOPNEA: 0
EYE DISCHARGE: 0
NECK PAIN: 0
FLANK PAIN: 0
DIARRHEA: 0
DIZZINESS: 0

## 2019-01-27 ASSESSMENT — PATIENT HEALTH QUESTIONNAIRE - PHQ9
1. LITTLE INTEREST OR PLEASURE IN DOING THINGS: NOT AT ALL
SUM OF ALL RESPONSES TO PHQ9 QUESTIONS 1 AND 2: 0
2. FEELING DOWN, DEPRESSED, IRRITABLE, OR HOPELESS: NOT AT ALL

## 2019-01-27 NOTE — DISCHARGE PLANNING
Anticipated Discharge Disposition: acute rehab    Action: Spoke with pt's son in law Miles [phone], he states family is agreeable to having pt go to acute rehab vs SNF, as pt has been to [facility] acute [facility]hab in the past.   Will f/u with acute rehab for bed availability     Barriers to Discharge: None    Plan: F/U with acute rehab

## 2019-01-27 NOTE — DISCHARGE SUMMARY
Discharge Summary    CHIEF COMPLAINT ON ADMISSION  Chief Complaint   Patient presents with   • Bloody Stools     melena since last night per ems, Takes coumadin for hx of a fib, PE. 5L NC at home baseline.        Reason for Admission  EMS     Admission Date  1/21/2019    CODE STATUS  Full Code    HPI & HOSPITAL COURSE  This is a 86 y.o. male here with Bloody stools.     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     Patient was admitted to the hospital on January 21, 2019 with melena.  On presentation he was noted to be hypotensive.  His hemoglobin was 11 and INR was 4.  He was noted to have acute hypoxemic respiratory failure, inability to protect his airway requiring intubation in the emergency department and requiring initiation of vasopressors.  He was admitted in critical condition to the intensive care unit.  Coumadin was reversed medically, gastroenterology and pulmonology and critical care consultations were obtained.  Patient underwent upper endoscopy with Dr. garay on January 21, 2019 revealing lots of blood covering the gastric mucosa.  Unable to suction out all the clots.  He was continued on IV PPI.  Repeat upper endoscopy was performed on January 22, 2019, this revealed a 2 cm somewhat masslike lesion with ulcerated surface with exposed vessel and small oozing was seen at the lesser curvature of the body which was treated.  A 2 cm ulcer with adherent clot was seen at the greater curvature of the upper body and it was also treated.  Mild duodenitis was also noted.  Subsequently he  was weaned off IV PPI, transition to oral omeprazole 40 mg twice a day with sucralfate.  Patient will need to take sucralfate for another 2-4 weeks.  Recommend omeprazole 40 mg twice a day for 2 weeks, followed by 20 mg twice a day until established with gastroenterology in the outpatient setting.  He was noted to be hypotensive, suspect component of adrenal insufficiency with chronic steroid supplementation.  Given this at this time his Cortef dose has been changed to 30 mg in the morning, 20 at lunchtime and 10 mg at nighttime.  Recommend slow tapering of this over the coming week to his baseline regimen of 10 mg 3 times daily.  In the setting of underlying recurrent venous thromboembolic disease and atrial fibrillation he would need reinitiation of his anticoagulation with Coumadin.  Would consider resuming anticoagulation in approximately 10 days from endoscopy which would be around February 2, 2019.  While anticoagulation is resumed, he will need ongoing close monitoring of his hemoglobin and monitoring for any issues with bleeding.  Otherwise this patient has chronic leukocytosis, which has been ongoing since 2013.  I would recommend outpatient hematology evaluation for this.  He would need monitoring of his CBC twice a week while he is at the rehab.  Chemistry panel is stable at this time and will require monitoring twice a week also at the rehab.  He was noted to be slightly hypotensive which responded to increase in the dosage of Cortef and stopping IV Lasix.  On discharge we have held his home lisinopril 30 mg which can be resumed if he is hypertensive.  Testosterone supplementation is held given this may lead to venous thromboembolic disease incidence.  This can be resumed only one therapeutic anticoagulation is resumed.  He remains on 4 L of oxygen at this time, baseline use of 5 L.  He has underlying leukocytosis which is chronic but I do not see any acute infectious etiologies, his vital signs are  stable and he is afebrile given I would not pursue any further workup of this at this point in time.  If patient becomes symptomatic with any concerns and prompt evaluation should be considered.  He would need outpatient follow-up with gastroenterology and cardiology.  In addition he will need follow-up with pulmonology for underlying COPD.  CODE STATUS was discussed with the patient, he wants to pursue a full CODE STATUS. POLST completed with his wishes during this hospital stay. He will need to follow-up with his primary care physician following discharge from the rehab.                Therefore, he is discharged in good and stable condition to an inpatient rehabilitation hospital.    The patient met 2-midnight criteria for an inpatient stay at the time of discharge.    Discharge Date  01/28/19    FOLLOW UP ITEMS POST DISCHARGE  Monitor Hb, to resume AC as stated above     DISCHARGE DIAGNOSES  Active Problems:    Acute on chronic respiratory failure with hypoxia (HCC) POA: Yes    GI bleed POA: Yes    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HCC) POA: Yes      Overview: Resection pituitary adenoma 1998.  Treated with Cortef, testosterone       injections, levothyroxine.    Cardiac pacemaker POA: Yes    COPD (chronic obstructive pulmonary disease) (HCC) POA: Yes      Overview: PMA HOME O2    History of pulmonary embolism POA: Yes      Overview: Multiple. S/P IVC filter    Anticoagulant long-term use POA: Yes    Paroxysmal atrial fibrillation (HCC) POA: Yes    Anemia due to GI blood loss POA: Yes    Candidal intertrigo POA: Yes    Aortic stenosis POA: Yes    Edema POA: Yes  Resolved Problems:    * No resolved hospital problems. *      FOLLOW UP  Future Appointments  Date Time Provider Department Center   3/11/2019 3:15 PM PACER CHECK-[facility] None   3/11/2019 3:40 PM Gene Maloney M.D. [facility] None     No follow-up provider specified.    MEDICATIONS ON DISCHARGE     Medication List      START taking these medications       Instructions   omeprazole 40 MG delayed-release capsule  Commonly known as:  PRILOSEC   Take 1 Cap by mouth 2 Times a Day.  Dose:  40 mg     polyethylene glycol/lytes Pack  Commonly known as:  MIRALAX   Take 1 Packet by mouth 2 Times a Day.  Dose:  17 g     sucralfate 1 GM/10ML Susp  Commonly known as:  CARAFATE   Take 10 mL by mouth every 6 hours.  Dose:  1 g        CHANGE how you take these medications      Instructions   * hydrocortisone 10 MG Tabs  What changed:  · how much to take  · when to take this  Commonly known as:  CORTEF   Take 3 Tabs by mouth every morning with breakfast.  Dose:  30 mg     * hydrocortisone 20 MG Tabs  What changed:  You were already taking a medication with the same name, and this prescription was added. Make sure you understand how and when to take each.  Commonly known as:  CORTEF   Take 1 Tab by mouth ONE-HALF HOUR AFTER LUNCH.  Dose:  20 mg     * hydrocortisone 10 MG Tabs  What changed:  You were already taking a medication with the same name, and this prescription was added. Make sure you understand how and when to take each.  Commonly known as:  CORTEF   Take 1 Tab by mouth every evening.  Dose:  10 mg        * This list has 3 medication(s) that are the same as other medications prescribed for you. Read the directions carefully, and ask your doctor or other care provider to review them with you.            CONTINUE taking these medications      Instructions   * albuterol 2.5mg/3ml Nebu solution for nebulization  Commonly known as:  PROVENTIL   2.5 mg by Nebulization route every four hours as needed for Shortness of Breath.  Dose:  2.5 mg     * albuterol 108 (90 Base) MCG/ACT Aers inhalation aerosol   Inhale 2 Puffs by mouth every 6 hours as needed for Shortness of Breath.  Dose:  2 Puff     DILTIAZem  MG Cp24  Commonly known as:  CARDIZEM CD   Take 1 Cap by mouth every day.  Dose:  120 mg     gabapentin 600 MG tablet  Commonly known as:  NEURONTIN   Take 600 mg by mouth  every bedtime.  Dose:  600 mg     hydrALAZINE 10 MG Tabs  Commonly known as:  APRESOLINE   Take 10 mg by mouth 1 time daily as needed. Only take if BP > 160  Dose:  10 mg     HYDROcodone-acetaminophen 5-325 MG Tabs per tablet  Commonly known as:  NORCO   Take 1 Tab by mouth every 8 hours as needed. Indications: Moderate to Moderately Severe Pain  Dose:  1 Tab     levothyroxine 75 MCG Tabs  Commonly known as:  SYNTHROID   Take 75 mcg by mouth Every morning on an empty stomach.  Dose:  75 mcg     Mometasone Furo-Formoterol Fum 200-5 MCG/ACT Aero  Commonly known as:  DULERA   Inhale 2 Puffs by mouth 2 Times a Day. Use spacer. Rinse mouth after use.  Dose:  2 Puff     montelukast 10 MG Tabs  Commonly known as:  SINGULAIR   Take 10 mg by mouth every day.  Dose:  10 mg     sotalol 80 MG Tabs  Commonly known as:  BETAPACE   Take 0.5 Tabs by mouth 2 times a day. Needs to be seen for further refills. Thank you  Dose:  40 mg     tamsulosin 0.4 MG capsule  Commonly known as:  FLOMAX   Take 0.4 mg by mouth every day.  Dose:  0.4 mg     tetrahydrozoline 0.05 % Soln  Commonly known as:  VISINE   Place 1-2 Drops in both eyes 4 times a day as needed (dry eyes).  Dose:  1-2 Drop     tiotropium 18 MCG Caps  Commonly known as:  SPIRIVA   Inhale 18 mcg by mouth every day.  Dose:  18 mcg     triamterene/hctz 37.5-25 MG Caps  Commonly known as:  DYAZIDE   Take 1 Cap by mouth every morning.  Dose:  1 Cap        * This list has 2 medication(s) that are the same as other medications prescribed for you. Read the directions carefully, and ask your doctor or other care provider to review them with you.            STOP taking these medications    lisinopril 30 MG tablet  Commonly known as:  PRINIVIL, ZESTRIL     testosterone cypionate 200 MG/ML Soln injection  Commonly known as:  DEPO-TESTOSTERONE            Allergies  Allergies   Allergen Reactions   • Pcn [Penicillins] Rash     Tolerates cephalosporins         DIET  Orders Placed This  Encounter   Procedures   • Diet Order Low Fiber(GI Soft) (once extubated, awake and alert)     Standing Status:   Standing     Number of Occurrences:   1     Order Specific Question:   Diet:     Answer:   Low Fiber(GI Soft) [2]     Comments:   once extubated, awake and alert       ACTIVITY  As tolerated.  Weight bearing as tolerated    CONSULTATIONS  Pulmonology critical care  Physiatry  Gastroenterology    PROCEDURES  Upper endoscopy, intubation, central venous catheter placement    LABORATORY  Lab Results   Component Value Date    SODIUM 136 01/27/2019    POTASSIUM 3.6 01/27/2019    CHLORIDE 98 01/27/2019    CO2 33 01/27/2019    GLUCOSE 96 01/27/2019    BUN 15 01/27/2019    CREATININE 0.86 01/27/2019    CREATININE 1.3 09/11/2008        Lab Results   Component Value Date    WBC 18.9 (H) 01/27/2019    HEMOGLOBIN 8.7 (L) 01/27/2019    HEMATOCRIT 27.5 (L) 01/27/2019    PLATELETCT 174 01/27/2019        Total time of the discharge process exceeds 36 minutes.

## 2019-01-27 NOTE — PROGRESS NOTES
Bedside report received. Pt sitting up in chair eating dinner. Tele box on. No signs of distress. Call light within reach, white board updated.

## 2019-01-27 NOTE — ASSESSMENT & PLAN NOTE
Discussed advance care planning with the patient, discussed process of CPR, complications, benefits, suffering associated with this.  Patient wants to proceed with full CODE STATUS but verified that he would not want a tracheostomy, prolonged intubation or long care facilities. POLST completed. Appreciate palliative team help.     Time spent on advanced care planning is 16 minutes, this is in addition to time spent evaluating this medically complex patient.  I am billing [id number], [id number] for my services today.

## 2019-01-27 NOTE — CONSULTS
"Reason for PC Consult: Advance Care Planning    Consulted by: Sulma Henry MD    Assessment:  General: 85yo gentleman transferred from [place], intubated in ED and admitted to ICU 1/21 for shock 2/2 GIB, now looking to d/c tomorrow to acute rehab.  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    Spiritual:  Is Jainism or spirituality important for coping with this illness? Yes- pt's Jew germán is source of strength, message left on [phone] for chaplian visit  Has a  or spiritual provider visit been requested? Yes    Palliative Performance Scale: 50    Advanced Directive: None- completed at this encounter with pt choosing Statements of Desires #2, #3 and #5 and additional wishes for no tracheostomy, no long term ventilator, no long term facility - Dr. Henry signed Certificate of Competency.  Pt wanted his wife to review prior to [place] service so that will likley happen tomorrow as his wife does not have batteries for her hearing aids and she does not simply want to read the document - she wants her  and myself to talk her though it.  DPOA:  -    POLST:No- completed at this encounter for Full code, no tracheostomy, no long term ventilator, no long term facility, and artificial nutrition only as long as pt is improving.  Signed by pt and MD. To be scanned to EPIC with original placed on chart to go with pt at d/c.     Code Status: Full- re-addressed at this encounter, including detailing what attempting resuscitation looks like and survival statistics, pt reiterated full code staing \"I might be the nora 3%.\"    Social:  Lives with his wife Jacquie in [place], [place] with dtr Maya and family nearby.  Pt also has three sons, 13 grandchildren and 4 great grand " children.  Pt enjoys being out on his deck and the company of his husky dog.  Per pt, he is 100% disabled from an accident while working for the [employer].  He did also work as an addiction therapist in behavioral health.    Outcome:  Introduced self and role of Palliative Care.  Assessed pt's understanding of his current medical status, overall health picture, and options for future care.  Pt verbalized good understanding of his acute and chronic issues, including the fine balance of coumadin for his PE history/Afib vs GIB.    Explored pt's values, beliefs, and preferences in order to identify GOC.  Pt stated his goal is to d/c to acute rehab tomorrow (where he has already been accepted) for reconditioning with hopes of building up enough to eliminate need for the walker he has used x6 months.    Active listening, reflection, reminiscing, empathic support and validation utilized throughout this encounter.  All questions answered.  PC contact information given.     Discussed with/Updated: Dr. Henry    Plan: d/c [facility], AD notarized prior to d/c    Recommendations:  I do not recommend an ethics or hospice consult at this time because pt does not meet criteria and is pursuing treatment.    Thank you for allowing Palliative Care to participate in this patient's care. Please feel free to call [phone] with any questions or concerns.

## 2019-01-27 NOTE — PROGRESS NOTES
[facility] Medicine Daily Progress Note    Date of Service  1/26/2019    Chief Complaint  86 y.o. male admitted 1/21/2019 with GI bleed on coumadin  For AFib received vit  K and Kcentra    Hospital Course          Interval Problem Update  Patient seen and evaluated on rounds  Discussed with nursing staff  Feels better but significantly weak and debilitated  Wants to go to an acute rehab facility as opposed to SNF  Appreciate physiatry input  Intermittent BP low, likely lasix related will stop  Will repeat labs in am tomorrow  Does not appear infectious  Leukocytosis is chronic for many year  Will repeat CXRAY in am tomorrow   Discussed with spouse at bedside  Plan of care discussed, all questions and concerns answered  No other complaints  Will give 250 cc NSB and assess response   Discussed with social workers on rounds    Consultants/Specialty  Pulmonology / Critical Care  Gastroenterology     Code Status  FULL CODE, discussed with patient   Palliative team consult to complete AD     Disposition  No telemetry needs can be transferred to medical RNF  PT/OT evaluations  Anticipate post acute placement  Physiatry evaluation appreciated  Acute rehab vs SNF  Discussed with SW / CM On rounds    Review of Systems  Review of Systems   Constitutional: Positive for malaise/fatigue. Negative for chills, diaphoresis and fever.   HENT: Negative for congestion, ear discharge, ear pain, hearing loss, nosebleeds, sinus pain, sore throat and tinnitus.    Eyes: Negative for blurred vision, double vision, discharge and redness.   Respiratory: Negative for cough, hemoptysis, shortness of breath, wheezing and stridor.    Cardiovascular: Negative for chest pain, palpitations, orthopnea, claudication, leg swelling and PND.   Gastrointestinal: Negative for abdominal pain, blood in stool, constipation, diarrhea, heartburn, melena, nausea and vomiting.   Genitourinary: Negative for dysuria, flank pain, frequency, hematuria and urgency.    Musculoskeletal: Negative for back pain, falls, joint pain, myalgias and neck pain.   Skin: Negative for itching and rash.   Neurological: Positive for weakness. Negative for dizziness, tingling, sensory change, speech change, focal weakness, seizures, loss of consciousness and headaches.   Psychiatric/Behavioral: Negative for depression, hallucinations, substance abuse and suicidal ideas.        Physical Exam  Temp:  [35.8 °C (96.5 °F)-36.6 °C (97.9 °F)] 36.6 °C (97.9 °F)  Pulse:  [65-76] 65  Resp:  [16-19] 16  BP: ()/(57-66) 98/59  SpO2:  [95 %-96 %] 96 %    Physical Exam   Constitutional: He is oriented to person, place, and time. He appears well-developed and well-nourished. No distress.   Body mass index is 36.87 kg/m².   HENT:   Head: Normocephalic and atraumatic.   Mouth/Throat: Oropharynx is clear and moist. No oropharyngeal exudate.   Eyes: Pupils are equal, round, and reactive to light. Right eye exhibits no discharge. Left eye exhibits no discharge. No scleral icterus.   Pale conjunctivae    Neck: Neck supple. No JVD present. No tracheal deviation present.   Cardiovascular: Normal rate and regular rhythm.  Exam reveals no gallop and no friction rub.    Murmur heard.  Pulmonary/Chest: Effort normal. No stridor. No respiratory distress. He has no wheezes. He has rales. He exhibits no tenderness.   Diminished bases   Abdominal: Soft. Bowel sounds are normal. He exhibits no distension. There is no tenderness. There is no rebound.   Musculoskeletal: He exhibits edema. He exhibits no tenderness.   Neurological: He is alert and oriented to person, place, and time. No cranial nerve deficit. He exhibits normal muscle tone.   Skin: Skin is warm and dry. He is not diaphoretic. No cyanosis or erythema. There is pallor. Nails show no clubbing.   Psychiatric: He has a normal mood and affect. His behavior is normal. Judgment and thought content normal.   Nursing note and vitals  reviewed.      Fluids    Intake/Output Summary (Last 24 hours) at 01/26/19 1809  Last data filed at 01/26/19 1400   Gross per 24 hour   Intake              840 ml   Output             3600 ml   Net            -2760 ml       Laboratory  Recent Labs      01/24/19   0503  01/25/19   0451  01/26/19   0410   WBC  13.8*  13.7*  14.1*   RBC  2.77*  2.89*  2.95*   HEMOGLOBIN  8.1*  8.5*  8.7*   HEMATOCRIT  25.8*  26.6*  27.1*   MCV  93.1  92.0  91.9   MCH  29.2  29.4  29.5   MCHC  31.4*  32.0*  32.1*   RDW  52.8*  50.3*  49.8   PLATELETCT  95*  144*  147*   MPV  10.8  10.6  10.4     Recent Labs      01/24/19   0503  01/25/19   0450  01/26/19   0410   SODIUM  138  137  136   POTASSIUM  4.2  4.2  4.0   CHLORIDE  109  105  101   CO2  26  26  31   GLUCOSE  85  80  108*   BUN  21  16  15   CREATININE  0.78  0.78  0.83   CALCIUM  7.8*  7.7*  8.3*                   Imaging  DX-CHEST-PORTABLE (1 VIEW)   Final Result      Left basilar opacity likely represents atelectasis/consolidation. There may be a layering left pleural effusion.      Trace right pleural effusion may be present.      Stable prominence of the cardiomediastinal silhouette.      Atherosclerotic plaque.         EC-ECHOCARDIOGRAM COMPLETE W/O CONT   Final Result      GE-BPTDEYS-9 VIEW   Final Result      NG tube tip in the expected location of the mid stomach.      DX-CHEST-PORTABLE (1 VIEW)   Final Result      Tubes and line as described above.      Enlarged cardiac silhouette with left basal consolidation and left pleural fluid.           Assessment/Plan  Acute on chronic respiratory failure with hypoxia (HCC)- (present on admission)   Assessment & Plan    Stable  Monitor closely  Wean off O2 as able  RT protocol      GI bleed- (present on admission)   Assessment & Plan    EGD with gastric ulcer and possible mass status post clipping  PO omeprazole and sucralfate     Patient will need repeat EGD in 3 months reviewed with patient importance to have this  follow-up  Holding AC  Resume 1-2 weeks if Hemoglobin is stable      Edema- (present on admission)   Assessment & Plan    Improved, will stop lasix      Aortic stenosis- (present on admission)   Assessment & Plan    Mild noted on echo  Will need outpatient followup     Candidal intertrigo- (present on admission)   Assessment & Plan    Skin care  Nystatin     Anemia due to GI blood loss- (present on admission)   Assessment & Plan    Hemoglobin stable  Monitor Hb / Restrictive transfusion strategy     Paroxysmal atrial fibrillation (HCC)- (present on admission)   Assessment & Plan    Continue home dose of sotalol and Cardizem  Resume anticoagulation 1-2 weeks from control of bleeding      Anticoagulant long-term use- (present on admission)   Assessment & Plan    On coumadin for history of A. Fib / PE  Anticoagulation reversed given GI bleed  Continue to hold anticoagulation and monitor clinically     History of pulmonary embolism- (present on admission)   Assessment & Plan    On coumadin outpatient  History of IVC filter    Anticoagulation reversed given severe GI bleed  Resume anticoagulation in 2 weeks from acute bleed control     COPD (chronic obstructive pulmonary disease) ([facility])- (present on admission)   Assessment & Plan    No acute exacerbation  Oxygen requirements back to baseline  Continue Symbicort Spiriva and bronchodilators as needed     Cardiac pacemaker- (present on admission)   Assessment & Plan    Secondary to sick sinus syndrome     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[facility])- (present on admission)   Assessment & Plan    Continue home dose of levothyroxine and hydrocortisone  We will change hydrocortisone to 30/20/10 with acute concerns for now  Wean off to home regimen over the coming days          VTE prophylaxis: SCD

## 2019-01-27 NOTE — PROGRESS NOTES
[facility] Medicine Daily Progress Note    Date of Service  1/27/2019    Chief Complaint  86 y.o. male admitted 1/21/2019 with GI bleed on coumadin  For AFib received vit  K and [facility]    Hospital Course          Interval Problem Update  Patient seen and evaluated on rounds  Discussed with nursing staff  Feels better but significantly weak and debilitated  Wants to go to an acute rehab facility as opposed to SNF  Appreciate physiatry input  Intermittent BP low, improved  Labs stable, chronic leukocytosis  Does not appear infectious  Leukocytosis is chronic for many years  No PNA symptoms   Discussed with spouse at bedside  Plan of care discussed, all questions and concerns answered  No other complaints  Advance care planning addressed   Appreciate palliative input and help   Discussed with social workers on rounds    Consultants/Specialty  Pulmonology / Critical Care  Gastroenterology   Palliative Care    Code Status  FULL CODE, discussed with patient   Palliative team consult to complete AD     Disposition  No telemetry needs can be transferred to medical RNF  PT/OT evaluations  Anticipate post acute placement  Physiatry evaluation appreciated  Acute rehab vs SNF  Discussed with SW / CM On rounds    Review of Systems  Review of Systems   Constitutional: Positive for malaise/fatigue. Negative for chills, diaphoresis and fever.   HENT: Negative for congestion, ear discharge, ear pain, hearing loss, nosebleeds, sinus pain, sore throat and tinnitus.    Eyes: Negative for blurred vision, double vision, discharge and redness.   Respiratory: Negative for cough, hemoptysis, shortness of breath, wheezing and stridor.    Cardiovascular: Negative for chest pain, palpitations, orthopnea, claudication, leg swelling and PND.   Gastrointestinal: Negative for abdominal pain, blood in stool, constipation, diarrhea, heartburn, melena, nausea and vomiting.   Genitourinary: Negative for dysuria, flank pain, frequency, hematuria and  urgency.   Musculoskeletal: Negative for back pain, falls, joint pain, myalgias and neck pain.   Skin: Negative for itching and rash.   Neurological: Positive for weakness. Negative for dizziness, tingling, sensory change, speech change, focal weakness, seizures, loss of consciousness and headaches.   Psychiatric/Behavioral: Negative for depression, hallucinations, substance abuse and suicidal ideas.        Physical Exam  Temp:  [36.2 °C (97.2 °F)-36.9 °C (98.5 °F)] 36.9 °C (98.5 °F)  Pulse:  [65-82] 69  Resp:  [16-19] 17  BP: ()/(54-86) 108/70  SpO2:  [95 %-98 %] 97 %    Physical Exam   Constitutional: He is oriented to person, place, and time. He appears well-developed and well-nourished. No distress.   Body mass index is 36.87 kg/m².   HENT:   Head: Normocephalic and atraumatic.   Mouth/Throat: Oropharynx is clear and moist. No oropharyngeal exudate.   Eyes: Pupils are equal, round, and reactive to light. Right eye exhibits no discharge. Left eye exhibits no discharge. No scleral icterus.   Pale conjunctivae    Neck: Neck supple. No JVD present. No tracheal deviation present.   Cardiovascular: Normal rate and regular rhythm.  Exam reveals no gallop and no friction rub.    Murmur heard.  Pulmonary/Chest: Effort normal. No stridor. No respiratory distress. He has no wheezes. He has rales. He exhibits no tenderness.   Diminished bases   Abdominal: Soft. Bowel sounds are normal. He exhibits no distension. There is no tenderness. There is no rebound.   Musculoskeletal: He exhibits edema. He exhibits no tenderness.   Neurological: He is alert and oriented to person, place, and time. No cranial nerve deficit. He exhibits normal muscle tone.   Skin: Skin is warm and dry. He is not diaphoretic. No cyanosis or erythema. There is pallor. Nails show no clubbing.   Psychiatric: He has a normal mood and affect. His behavior is normal. Judgment and thought content normal.   Nursing note and vitals  reviewed.      Fluids    Intake/Output Summary (Last 24 hours) at 01/27/19 1549  Last data filed at 01/27/19 0909   Gross per 24 hour   Intake              240 ml   Output             2900 ml   Net            -2660 ml       Laboratory  Recent Labs      01/25/19 0451 01/26/19   0410  01/27/19   0430   WBC  13.7*  14.1*  18.9*   RBC  2.89*  2.95*  2.96*   HEMOGLOBIN  8.5*  8.7*  8.7*   HEMATOCRIT  26.6*  27.1*  27.5*   MCV  92.0  91.9  92.9   MCH  29.4  29.5  29.4   MCHC  32.0*  32.1*  31.6*   RDW  50.3*  49.8  50.9*   PLATELETCT  144*  147*  174   MPV  10.6  10.4  10.7     Recent Labs      01/25/19 0450 01/26/19 0410 01/27/19   0430   SODIUM  137  136  136   POTASSIUM  4.2  4.0  3.6   CHLORIDE  105  101  98   CO2  26  31  33   GLUCOSE  80  108*  96   BUN  16  15  15   CREATININE  0.78  0.83  0.86   CALCIUM  7.7*  8.3*  8.2*                   Imaging  DX-CHEST-LIMITED (1 VIEW)   Final Result      1.  Low lung volumes with bilateral basilar atelectasis. Underlying infection is possible.   2.  Small left pleural effusion likely present.   3.  Stable enlargement of the cardiomediastinal silhouette.      DX-CHEST-PORTABLE (1 VIEW)   Final Result      Left basilar opacity likely represents atelectasis/consolidation. There may be a layering left pleural effusion.      Trace right pleural effusion may be present.      Stable prominence of the cardiomediastinal silhouette.      Atherosclerotic plaque.         EC-ECHOCARDIOGRAM COMPLETE W/O CONT   Final Result      [id number] VIEW   Final Result      NG tube tip in the expected location of the mid stomach.      DX-CHEST-PORTABLE (1 VIEW)   Final Result      Tubes and line as described above.      Enlarged cardiac silhouette with left basal consolidation and left pleural fluid.           Assessment/Plan  Acute on chronic respiratory failure with hypoxia (HCC)- (present on admission)   Assessment & Plan    Stable  Monitor closely  Wean off O2 as able  RT protocol       Advance care planning- (present on admission)   Assessment & Plan    Discussed advance care planning with the patient, discussed process of CPR, complications, benefits, suffering associated with this.  Patient wants to proceed with full CODE STATUS but verified that he would not want a tracheostomy, prolonged intubation or long care facilities. POLST completed. Appreciate palliative team help.     Time spent on advanced care planning is 16 minutes, this is in addition to time spent evaluating this medically complex patient.  I am billing [id number], [id number] for my services today.     GI bleed- (present on admission)   Assessment & Plan    EGD with gastric ulcer and possible mass status post clipping  PO omeprazole and sucralfate     Patient will need repeat EGD in 3 months reviewed with patient importance to have this follow-up  Holding AC  Resume 1-2 weeks if Hemoglobin is stable      Edema- (present on admission)   Assessment & Plan    Improved, will stop lasix      Aortic stenosis- (present on admission)   Assessment & Plan    Mild noted on echo  Will need outpatient followup     Candidal intertrigo- (present on admission)   Assessment & Plan    Skin care  Nystatin     Anemia due to GI blood loss- (present on admission)   Assessment & Plan    Hemoglobin stable  Monitor Hb / Restrictive transfusion strategy     Paroxysmal atrial fibrillation (HCC)- (present on admission)   Assessment & Plan    Continue home dose of sotalol and Cardizem  Resume anticoagulation 1-2 weeks from control of bleeding      Anticoagulant long-term use- (present on admission)   Assessment & Plan    On coumadin for history of A. Fib / PE  Anticoagulation reversed given GI bleed  Continue to hold anticoagulation and monitor clinically     History of pulmonary embolism- (present on admission)   Assessment & Plan    On coumadin outpatient  History of IVC filter    Anticoagulation reversed given severe GI bleed  Resume anticoagulation in 2 weeks  from acute bleed control     COPD (chronic obstructive pulmonary disease) ([facility])- (present on admission)   Assessment & Plan    No acute exacerbation  Oxygen requirements back to baseline  Continue Symbicort Spiriva and bronchodilators as needed     Cardiac pacemaker- (present on admission)   Assessment & Plan    Secondary to sick sinus syndrome     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

## 2019-01-27 NOTE — CARE PLAN
Problem: Communication  Goal: The ability to communicate needs accurately and effectively will improve  Outcome: PROGRESSING AS EXPECTED  Patient able to express needs appropriately, will continue to use call light as needed.     Problem: Knowledge Deficit  Goal: Knowledge of disease process/condition, treatment plan, diagnostic tests, and medications will improve  Outcome: PROGRESSING AS EXPECTED  Patient has been educated on importance of care after hospitalization. Will continue to educate as needed.

## 2019-01-27 NOTE — PROGRESS NOTES
0720: Report received from Yamila ROLON. Patient is alert and oriented at this time. Vitals wnl, fall precautions in place, bed alarm on, and call light within reach. Will continue to monitor.

## 2019-01-28 ENCOUNTER — HOSPITAL ENCOUNTER (INPATIENT)
Facility: REHABILITATION | Age: 84
LOS: 11 days | DRG: 948 | End: 2019-02-08
Attending: PHYSICAL MEDICINE & REHABILITATION | Admitting: PHYSICAL MEDICINE & REHABILITATION
Payer: MEDICARE

## 2019-01-28 VITALS
OXYGEN SATURATION: 97 % | HEART RATE: 71 BPM | WEIGHT: 226.19 LBS | BODY MASS INDEX: 37.69 KG/M2 | DIASTOLIC BLOOD PRESSURE: 77 MMHG | TEMPERATURE: 98.4 F | SYSTOLIC BLOOD PRESSURE: 152 MMHG | HEIGHT: 65 IN | RESPIRATION RATE: 19 BRPM

## 2019-01-28 DIAGNOSIS — I48.0 PAF (PAROXYSMAL ATRIAL FIBRILLATION) (HCC): ICD-10-CM

## 2019-01-28 PROCEDURE — 99223 1ST HOSP IP/OBS HIGH 75: CPT | Mod: AI | Performed by: PHYSICAL MEDICINE & REHABILITATION

## 2019-01-28 PROCEDURE — A9270 NON-COVERED ITEM OR SERVICE: HCPCS | Performed by: HOSPITALIST

## 2019-01-28 PROCEDURE — 97530 THERAPEUTIC ACTIVITIES: CPT

## 2019-01-28 PROCEDURE — 700102 HCHG RX REV CODE 250 W/ 637 OVERRIDE(OP): Performed by: PHYSICAL MEDICINE & REHABILITATION

## 2019-01-28 PROCEDURE — 99239 HOSP IP/OBS DSCHRG MGMT >30: CPT | Performed by: INTERNAL MEDICINE

## 2019-01-28 PROCEDURE — A9270 NON-COVERED ITEM OR SERVICE: HCPCS | Performed by: INTERNAL MEDICINE

## 2019-01-28 PROCEDURE — A9270 NON-COVERED ITEM OR SERVICE: HCPCS | Performed by: PHYSICAL MEDICINE & REHABILITATION

## 2019-01-28 PROCEDURE — 700102 HCHG RX REV CODE 250 W/ 637 OVERRIDE(OP): Mod: TB | Performed by: INTERNAL MEDICINE

## 2019-01-28 PROCEDURE — A9270 NON-COVERED ITEM OR SERVICE: HCPCS | Mod: TB | Performed by: INTERNAL MEDICINE

## 2019-01-28 PROCEDURE — 700102 HCHG RX REV CODE 250 W/ 637 OVERRIDE(OP): Performed by: INTERNAL MEDICINE

## 2019-01-28 PROCEDURE — 700102 HCHG RX REV CODE 250 W/ 637 OVERRIDE(OP): Performed by: HOSPITALIST

## 2019-01-28 PROCEDURE — 97535 SELF CARE MNGMENT TRAINING: CPT

## 2019-01-28 PROCEDURE — 94760 N-INVAS EAR/PLS OXIMETRY 1: CPT

## 2019-01-28 PROCEDURE — 770010 HCHG ROOM/CARE - REHAB SEMI PRIVAT*

## 2019-01-28 RX ORDER — ONDANSETRON 2 MG/ML
4 INJECTION INTRAMUSCULAR; INTRAVENOUS 4 TIMES DAILY PRN
Status: DISCONTINUED | OUTPATIENT
Start: 2019-01-28 | End: 2019-02-08 | Stop reason: HOSPADM

## 2019-01-28 RX ORDER — SOTALOL HYDROCHLORIDE 80 MG/1
40 TABLET ORAL 2 TIMES DAILY
Status: DISCONTINUED | OUTPATIENT
Start: 2019-01-28 | End: 2019-02-08 | Stop reason: HOSPADM

## 2019-01-28 RX ORDER — GABAPENTIN 300 MG/1
600 CAPSULE ORAL
Status: DISCONTINUED | OUTPATIENT
Start: 2019-01-28 | End: 2019-02-05

## 2019-01-28 RX ORDER — SUCRALFATE ORAL 1 G/10ML
1 SUSPENSION ORAL EVERY 6 HOURS
Status: DISCONTINUED | OUTPATIENT
Start: 2019-01-28 | End: 2019-02-08 | Stop reason: HOSPADM

## 2019-01-28 RX ORDER — LEVOTHYROXINE SODIUM 0.07 MG/1
75 TABLET ORAL
Status: CANCELLED | OUTPATIENT
Start: 2019-01-29

## 2019-01-28 RX ORDER — TIOTROPIUM BROMIDE 18 UG/1
1 CAPSULE ORAL; RESPIRATORY (INHALATION) DAILY
Status: DISCONTINUED | OUTPATIENT
Start: 2019-01-29 | End: 2019-02-08 | Stop reason: HOSPADM

## 2019-01-28 RX ORDER — MONTELUKAST SODIUM 10 MG/1
10 TABLET ORAL DAILY
Status: DISCONTINUED | OUTPATIENT
Start: 2019-01-29 | End: 2019-02-08 | Stop reason: HOSPADM

## 2019-01-28 RX ORDER — HYDROCORTISONE 10 MG/1
30 TABLET ORAL
Status: DISCONTINUED | OUTPATIENT
Start: 2019-01-29 | End: 2019-01-30

## 2019-01-28 RX ORDER — TRAZODONE HYDROCHLORIDE 50 MG/1
50 TABLET ORAL
Status: DISCONTINUED | OUTPATIENT
Start: 2019-01-28 | End: 2019-02-08 | Stop reason: HOSPADM

## 2019-01-28 RX ORDER — BUDESONIDE AND FORMOTEROL FUMARATE DIHYDRATE 160; 4.5 UG/1; UG/1
2 AEROSOL RESPIRATORY (INHALATION) 2 TIMES DAILY
Status: CANCELLED | OUTPATIENT
Start: 2019-01-28

## 2019-01-28 RX ORDER — NYSTATIN 100000 [USP'U]/G
POWDER TOPICAL 2 TIMES DAILY
Status: CANCELLED | OUTPATIENT
Start: 2019-01-28

## 2019-01-28 RX ORDER — AMOXICILLIN 250 MG
2 CAPSULE ORAL 2 TIMES DAILY
Status: DISCONTINUED | OUTPATIENT
Start: 2019-01-28 | End: 2019-02-08 | Stop reason: HOSPADM

## 2019-01-28 RX ORDER — ONDANSETRON 4 MG/1
4 TABLET, ORALLY DISINTEGRATING ORAL 4 TIMES DAILY PRN
Status: DISCONTINUED | OUTPATIENT
Start: 2019-01-28 | End: 2019-02-08 | Stop reason: HOSPADM

## 2019-01-28 RX ORDER — HYDRALAZINE HYDROCHLORIDE 25 MG/1
25 TABLET, FILM COATED ORAL EVERY 8 HOURS PRN
Status: DISCONTINUED | OUTPATIENT
Start: 2019-01-28 | End: 2019-02-08 | Stop reason: HOSPADM

## 2019-01-28 RX ORDER — SOTALOL HYDROCHLORIDE 80 MG/1
40 TABLET ORAL 2 TIMES DAILY
Status: CANCELLED | OUTPATIENT
Start: 2019-01-28

## 2019-01-28 RX ORDER — GABAPENTIN 300 MG/1
600 CAPSULE ORAL
Status: CANCELLED | OUTPATIENT
Start: 2019-01-28

## 2019-01-28 RX ORDER — HYDROCORTISONE 10 MG/1
20 TABLET ORAL
Status: DISCONTINUED | OUTPATIENT
Start: 2019-01-29 | End: 2019-02-04

## 2019-01-28 RX ORDER — BISACODYL 10 MG
10 SUPPOSITORY, RECTAL RECTAL
Status: DISCONTINUED | OUTPATIENT
Start: 2019-01-28 | End: 2019-02-08 | Stop reason: HOSPADM

## 2019-01-28 RX ORDER — TAMSULOSIN HYDROCHLORIDE 0.4 MG/1
0.4 CAPSULE ORAL DAILY
Status: CANCELLED | OUTPATIENT
Start: 2019-01-29

## 2019-01-28 RX ORDER — NYSTATIN 100000 [USP'U]/G
POWDER TOPICAL 2 TIMES DAILY
Status: DISCONTINUED | OUTPATIENT
Start: 2019-01-28 | End: 2019-02-08 | Stop reason: HOSPADM

## 2019-01-28 RX ORDER — TIOTROPIUM BROMIDE 18 UG/1
1 CAPSULE ORAL; RESPIRATORY (INHALATION) DAILY
Status: CANCELLED | OUTPATIENT
Start: 2019-01-29

## 2019-01-28 RX ORDER — HYDROCORTISONE 10 MG/1
10 TABLET ORAL EVERY EVENING
Status: CANCELLED | OUTPATIENT
Start: 2019-01-28

## 2019-01-28 RX ORDER — POLYVINYL ALCOHOL 14 MG/ML
1 SOLUTION/ DROPS OPHTHALMIC PRN
Status: DISCONTINUED | OUTPATIENT
Start: 2019-01-28 | End: 2019-02-08 | Stop reason: HOSPADM

## 2019-01-28 RX ORDER — BUDESONIDE AND FORMOTEROL FUMARATE DIHYDRATE 160; 4.5 UG/1; UG/1
2 AEROSOL RESPIRATORY (INHALATION) 2 TIMES DAILY
Status: DISCONTINUED | OUTPATIENT
Start: 2019-01-28 | End: 2019-02-08 | Stop reason: HOSPADM

## 2019-01-28 RX ORDER — POLYETHYLENE GLYCOL 3350 17 G/17G
1 POWDER, FOR SOLUTION ORAL
Status: DISCONTINUED | OUTPATIENT
Start: 2019-01-28 | End: 2019-02-08 | Stop reason: HOSPADM

## 2019-01-28 RX ORDER — POLYETHYLENE GLYCOL 3350 17 G/17G
1 POWDER, FOR SOLUTION ORAL 2 TIMES DAILY
Status: CANCELLED | OUTPATIENT
Start: 2019-01-28

## 2019-01-28 RX ORDER — HYDROCODONE BITARTRATE AND ACETAMINOPHEN 5; 325 MG/1; MG/1
1 TABLET ORAL EVERY 6 HOURS PRN
Status: DISCONTINUED | OUTPATIENT
Start: 2019-01-28 | End: 2019-02-08 | Stop reason: HOSPADM

## 2019-01-28 RX ORDER — OMEPRAZOLE 20 MG/1
40 CAPSULE, DELAYED RELEASE ORAL 2 TIMES DAILY
Status: CANCELLED | OUTPATIENT
Start: 2019-01-28

## 2019-01-28 RX ORDER — ALUMINA, MAGNESIA, AND SIMETHICONE 2400; 2400; 240 MG/30ML; MG/30ML; MG/30ML
20 SUSPENSION ORAL
Status: DISCONTINUED | OUTPATIENT
Start: 2019-01-28 | End: 2019-02-08 | Stop reason: HOSPADM

## 2019-01-28 RX ORDER — TAMSULOSIN HYDROCHLORIDE 0.4 MG/1
0.4 CAPSULE ORAL DAILY
Status: DISCONTINUED | OUTPATIENT
Start: 2019-01-29 | End: 2019-02-08 | Stop reason: HOSPADM

## 2019-01-28 RX ORDER — ACETAMINOPHEN 325 MG/1
650 TABLET ORAL EVERY 4 HOURS PRN
Status: DISCONTINUED | OUTPATIENT
Start: 2019-01-28 | End: 2019-02-08 | Stop reason: HOSPADM

## 2019-01-28 RX ORDER — MONTELUKAST SODIUM 10 MG/1
10 TABLET ORAL DAILY
Status: CANCELLED | OUTPATIENT
Start: 2019-01-29

## 2019-01-28 RX ORDER — TRAMADOL HYDROCHLORIDE 50 MG/1
50 TABLET ORAL EVERY 4 HOURS PRN
Status: DISCONTINUED | OUTPATIENT
Start: 2019-01-28 | End: 2019-02-08 | Stop reason: HOSPADM

## 2019-01-28 RX ORDER — ECHINACEA PURPUREA EXTRACT 125 MG
2 TABLET ORAL PRN
Status: DISCONTINUED | OUTPATIENT
Start: 2019-01-28 | End: 2019-02-08 | Stop reason: HOSPADM

## 2019-01-28 RX ORDER — HYDROCODONE BITARTRATE AND ACETAMINOPHEN 5; 325 MG/1; MG/1
1 TABLET ORAL EVERY 6 HOURS PRN
Status: CANCELLED | OUTPATIENT
Start: 2019-01-28

## 2019-01-28 RX ORDER — OMEPRAZOLE 20 MG/1
40 CAPSULE, DELAYED RELEASE ORAL 2 TIMES DAILY
Status: DISCONTINUED | OUTPATIENT
Start: 2019-01-28 | End: 2019-02-08 | Stop reason: HOSPADM

## 2019-01-28 RX ORDER — HYDROCORTISONE 20 MG/1
20 TABLET ORAL
Status: CANCELLED | OUTPATIENT
Start: 2019-01-28

## 2019-01-28 RX ORDER — LEVOTHYROXINE SODIUM 0.07 MG/1
75 TABLET ORAL
Status: DISCONTINUED | OUTPATIENT
Start: 2019-01-29 | End: 2019-02-08 | Stop reason: HOSPADM

## 2019-01-28 RX ORDER — POLYETHYLENE GLYCOL 3350 17 G/17G
1 POWDER, FOR SOLUTION ORAL 2 TIMES DAILY
Status: DISCONTINUED | OUTPATIENT
Start: 2019-01-28 | End: 2019-02-08 | Stop reason: HOSPADM

## 2019-01-28 RX ORDER — HYDROCORTISONE 10 MG/1
10 TABLET ORAL EVERY EVENING
Status: DISCONTINUED | OUTPATIENT
Start: 2019-01-28 | End: 2019-02-08 | Stop reason: HOSPADM

## 2019-01-28 RX ORDER — SUCRALFATE ORAL 1 G/10ML
1 SUSPENSION ORAL EVERY 6 HOURS
Status: CANCELLED | OUTPATIENT
Start: 2019-01-28

## 2019-01-28 RX ADMIN — SOTALOL HYDROCHLORIDE 40 MG: 80 TABLET ORAL at 20:38

## 2019-01-28 RX ADMIN — BUDESONIDE AND FORMOTEROL FUMARATE DIHYDRATE 2 PUFF: 160; 4.5 AEROSOL RESPIRATORY (INHALATION) at 05:27

## 2019-01-28 RX ADMIN — OMEPRAZOLE 40 MG: 20 CAPSULE, DELAYED RELEASE ORAL at 20:38

## 2019-01-28 RX ADMIN — HYDROCORTISONE 30 MG: 10 TABLET ORAL at 08:49

## 2019-01-28 RX ADMIN — HYDROCODONE BITARTRATE AND ACETAMINOPHEN 1 TABLET: 5; 325 TABLET ORAL at 05:29

## 2019-01-28 RX ADMIN — SOTALOL HYDROCHLORIDE 40 MG: 80 TABLET ORAL at 05:30

## 2019-01-28 RX ADMIN — MONTELUKAST SODIUM 10 MG: 10 TABLET, FILM COATED ORAL at 05:30

## 2019-01-28 RX ADMIN — LEVOTHYROXINE SODIUM 75 MCG: 75 TABLET ORAL at 05:28

## 2019-01-28 RX ADMIN — OMEPRAZOLE 40 MG: 20 CAPSULE, DELAYED RELEASE ORAL at 05:29

## 2019-01-28 RX ADMIN — SUCRALFATE 1 G: 1 SUSPENSION ORAL at 23:57

## 2019-01-28 RX ADMIN — GABAPENTIN 600 MG: 300 CAPSULE ORAL at 20:38

## 2019-01-28 RX ADMIN — DILTIAZEM HYDROCHLORIDE 60 MG: 60 TABLET, FILM COATED ORAL at 05:30

## 2019-01-28 RX ADMIN — HYDROCODONE BITARTRATE AND ACETAMINOPHEN 1 TABLET: 5; 325 TABLET ORAL at 23:57

## 2019-01-28 RX ADMIN — SUCRALFATE 1 G: 1 SUSPENSION ORAL at 17:09

## 2019-01-28 RX ADMIN — HYDROCODONE BITARTRATE AND ACETAMINOPHEN 1 TABLET: 5; 325 TABLET ORAL at 12:18

## 2019-01-28 RX ADMIN — HYDROCORTISONE 20 MG: 20 TABLET ORAL at 12:18

## 2019-01-28 RX ADMIN — SUCRALFATE 1 G: 1 SUSPENSION ORAL at 05:28

## 2019-01-28 RX ADMIN — TIOTROPIUM BROMIDE 1 CAPSULE: 18 CAPSULE ORAL; RESPIRATORY (INHALATION) at 05:27

## 2019-01-28 RX ADMIN — SUCRALFATE 1 G: 1 SUSPENSION ORAL at 00:26

## 2019-01-28 RX ADMIN — NYSTATIN: 100000 POWDER TOPICAL at 05:28

## 2019-01-28 RX ADMIN — TAMSULOSIN HYDROCHLORIDE 0.4 MG: 0.4 CAPSULE ORAL at 05:29

## 2019-01-28 RX ADMIN — HYDROCORTISONE 10 MG: 10 TABLET ORAL at 20:38

## 2019-01-28 RX ADMIN — DILTIAZEM HYDROCHLORIDE 60 MG: 30 TABLET, FILM COATED ORAL at 17:09

## 2019-01-28 RX ADMIN — SUCRALFATE 1 G: 1 SUSPENSION ORAL at 12:18

## 2019-01-28 RX ADMIN — BUDESONIDE AND FORMOTEROL FUMARATE DIHYDRATE 2 PUFF: 160; 4.5 AEROSOL RESPIRATORY (INHALATION) at 20:37

## 2019-01-28 RX ADMIN — NYSTATIN: 100000 POWDER TOPICAL at 20:39

## 2019-01-28 ASSESSMENT — COGNITIVE AND FUNCTIONAL STATUS - GENERAL
CLIMB 3 TO 5 STEPS WITH RAILING: A LOT
TURNING FROM BACK TO SIDE WHILE IN FLAT BAD: A LITTLE
TOILETING: A LOT
MOBILITY SCORE: 17
MOVING FROM LYING ON BACK TO SITTING ON SIDE OF FLAT BED: A LITTLE
HELP NEEDED FOR BATHING: A LOT
WALKING IN HOSPITAL ROOM: A LITTLE
EATING MEALS: A LITTLE
DRESSING REGULAR UPPER BODY CLOTHING: A LITTLE
DRESSING REGULAR LOWER BODY CLOTHING: A LOT
PERSONAL GROOMING: A LITTLE
SUGGESTED CMS G CODE MODIFIER DAILY ACTIVITY: CK
SUGGESTED CMS G CODE MODIFIER MOBILITY: CK
DAILY ACTIVITIY SCORE: 15
MOVING TO AND FROM BED TO CHAIR: A LITTLE
STANDING UP FROM CHAIR USING ARMS: A LITTLE

## 2019-01-28 ASSESSMENT — PATIENT HEALTH QUESTIONNAIRE - PHQ9
2. FEELING DOWN, DEPRESSED, IRRITABLE, OR HOPELESS: NOT AT ALL
1. LITTLE INTEREST OR PLEASURE IN DOING THINGS: NOT AT ALL
SUM OF ALL RESPONSES TO PHQ9 QUESTIONS 1 AND 2: 0
SUM OF ALL RESPONSES TO PHQ9 QUESTIONS 1 AND 2: 0
1. LITTLE INTEREST OR PLEASURE IN DOING THINGS: NOT AT ALL
SUM OF ALL RESPONSES TO PHQ9 QUESTIONS 1 AND 2: 0
2. FEELING DOWN, DEPRESSED, IRRITABLE, OR HOPELESS: NOT AT ALL
1. LITTLE INTEREST OR PLEASURE IN DOING THINGS: NOT AT ALL
2. FEELING DOWN, DEPRESSED, IRRITABLE, OR HOPELESS: NOT AT ALL

## 2019-01-28 ASSESSMENT — LIFESTYLE VARIABLES
ALCOHOL_USE: NO
EVER_SMOKED: NEVER

## 2019-01-28 ASSESSMENT — GAIT ASSESSMENTS
DISTANCE (FEET): 60
GAIT LEVEL OF ASSIST: MINIMAL ASSIST
DEVIATION: BRADYKINETIC
ASSISTIVE DEVICE: FRONT WHEEL WALKER

## 2019-01-28 NOTE — DISCHARGE INSTRUCTIONS
Discharge Instructions    Discharged to other by transport team with relative. Discharged via wheelchair, hospital escort: Yes.  Special equipment needed: Walker    Be sure to schedule a follow-up appointment with your primary care doctor or any specialists as instructed.     Discharge Plan:   Diet Plan: Discussed  Activity Level: Discussed  Confirmed Follow up Appointment: Appointment Scheduled  Confirmed Symptoms Management: Discussed  Medication Reconciliation Updated: Yes  Influenza Vaccine Indication: Not indicated: Previously immunized this influenza season and > 8 years of age    I understand that a diet low in cholesterol, fat, and sodium is recommended for good health. Unless I have been given specific instructions below for another diet, I accept this instruction as my diet prescription.   Other diet: Cardiac/ Mech soft    Special Instructions: None    · Is patient discharged on Warfarin / Coumadin?   No     Depression / Suicide Risk    As you are discharged from this [facility] facility, it is important to learn how to keep safe from harming yourself.    Recognize the warning signs:  · Abrupt changes in personality, positive or negative- including increase in energy   · Giving away possessions  · Change in eating patterns- significant weight changes-  positive or negative  · Change in sleeping patterns- unable to sleep or sleeping all the time   · Unwillingness or inability to communicate  · Depression  · Unusual sadness, discouragement and loneliness  · Talk of wanting to die  · Neglect of personal appearance   · Rebelliousness- reckless behavior  · Withdrawal from people/activities they love  · Confusion- inability to concentrate     If you or a loved one observes any of these behaviors or has concerns about self-harm, here's what you can do:  · Talk about it- your feelings and reasons for harming yourself  · Remove any means that you might use to hurt yourself (examples: pills, rope, extension  cords, firearm)  · Get professional help from the community (Mental Health, Substance Abuse, psychological counseling)  · Do not be alone:Call your Safe Contact- someone whom you trust who will be there for you.  · Call your local CRISIS HOTLINE 151-4300 or 249-405-9060  · Call your local Children's Mobile Crisis Response Team [place] (993) 980-2021 or www.BandApp  · Call the toll free National Suicide Prevention Hotlines   · National Suicide Prevention Lifeline 367-902-KBEI (7943)  · [employer] Hope Line Network 800-SUICIDE (202-2102)        Gastrointestinal Bleeding  Gastrointestinal (GI) bleeding is bleeding somewhere along the digestive tract, between the mouth and anus. This can be caused by various problems. The severity of these problems can range from mild to serious or even life-threatening. If you have GI bleeding, you may find blood in your stools (feces), you may have black stools, or you may vomit blood. If there is a lot of bleeding, you may need to stay in the hospital.  What are the causes?  This condition may be caused by:  · Esophagitis. This is inflammation, irritation, or swelling of the esophagus.  · Hemorrhoids. These are swollen veins in the rectum.  · Anal fissures. These are areas of painful tearing that are often caused by passing hard stool.  · Diverticulosis. These are pouches that form on the colon over time, with age, and may bleed a lot.  · Diverticulitis. This is inflammation in areas with diverticulosis. It can cause pain, fever, and bloody stools, although bleeding may be mild.  · Polyps and cancer. Colon cancer often starts out as precancerous polyps.  · Gastritis and ulcers. With these, bleeding may come from the upper GI tract, near the stomach.  What are the signs or symptoms?  Symptoms of this condition may include:  · Bright red blood in your vomit, or vomit that looks like coffee grounds.  · Bloody, black, or tarry stools.  ¨ Bleeding from the lower GI tract will  usually cause red or maroon blood in the stools.  ¨ Bleeding from the upper GI tract may cause black, tarry, often bad-smelling stools.  ¨ In certain cases, if the bleeding is fast enough, the stools may be red.  · Pain or cramping in the abdomen.  How is this diagnosed?  This condition may be diagnosed based on:  · Medical history and physical exam.  · Various tests, such as:  ¨ Blood tests.  ¨ X-rays and other imaging tests.  ¨ Esophagogastroduodenoscopy (EGD). In this test, a flexible, lighted tube is used to look at your esophagus, stomach, and small intestine.  ¨ Colonoscopy. In this test, a flexible, lighted tube is used to look at your colon.  How is this treated?  Treatment for this condition depends on the cause of the bleeding. For example:  · For bleeding from the esophagus, stomach, small intestine, or colon, the health care provider doing your EGD or colonoscopy may be able to stop the bleeding as part of the procedure.  · Inflammation or infection of the colon can be treated with medicines.  · Certain rectal problems can be treated with creams, suppositories, or warm baths.  · Surgery is sometimes needed.  · Blood transfusions are sometimes needed if a lot of blood has been lost.  If bleeding is slow, you may be allowed to go home. If there is a lot of bleeding, you will need to stay in the hospital for observation.  Follow these instructions at home:  · Take over-the-counter and prescription medicines only as told by your health care provider.  · Eat foods that are high in fiber. This will help to keep your stools soft. These foods include whole grains, legumes, fruits, and vegetables. Eating 1-3 prunes each day works well for many people.  · Drink enough fluid to keep your urine clear or pale yellow.  · Keep all follow-up visits as told by your health care provider. This is important.  Contact a health care provider if:  · Your symptoms do not improve.  Get help right away if:  · Your bleeding  increases.  · You feel light-headed or you faint.  · You feel weak.  · You have severe cramps in your back or abdomen.  · You pass large blood clots in your stool.  · Your symptoms are getting worse.  This information is not intended to replace advice given to you by your health care provider. Make sure you discuss any questions you have with your health care provider.  Document Released: 12/15/2001 Document Revised: 05/17/2017 Document Reviewed: 06/06/2016  [employer] Patient Education © 2017 [employer]

## 2019-01-28 NOTE — H&P
REHABILITATION HISTORY AND PHYSICAL/POST ADMISSION PHYSICAL EVALUATION    Date of Admission: 1/28/2019  3:53 PM  Jeffrey Lizama  [id number]/02    [facility] Code / Diagnosis to Support: 16 Debility (Non Cardiac, Non Pulmonary)  Etiologic Diagnosis: GI bleed    CC: Decreased function after GI bleed    HPI:  Per my PM&R consult note:  Patient is a 86 y.o. year-old male with a past medical history significant for BPH, COPD, Glaucoma, HTN, A fib on coumadin, Hx of PE, Hypothyroidism,  admitted to [facility] on 1/21/2019 12:42 PM with black stools for 2 days. Patient was reportedly hypotensive at OSH and anemic and was transferred to [facility] for higher level of care.  Patient on transport could not protect his airway and required intubation. Patient was hypotensive and required IV fluids, pressors and multiple blood transfusions. In addition patient received Vit K and K centra for coumadin. Patient was taken to EGD with Dr. Mtz on 1/21/19, but with significant old blood limiting view. Patient was admitted to ICU and continued on PPI drip.  Patient had repeat EGD on 1/22/19 and two ulcers were clipped with successful hemostasis.  Per GI consult, one lesion concerning for malignancy and recommending follow-up EGD in 3 months.  Patient was extubated successfully on 1/22/19 and has since been transferred to the floor.  Hemoglobin has remained 8-8.5 since clipping. Echo was performed on 1/22/19 and EF of 80% with mild aortic stenosis/insufficiency. Last SBP 85/57.  Patient has been discontinued off of antibiotics, last WBC 13.7 on 1/25/19.      Patient reportedly lives with wife who has Parkinson's in [place] in a 1 story home with a ramp to enter; previously using a 4WW. Patient was last evaluated by PT on 1/24/19 and was Jeanette for gait. Patient last evaluated by OT on 1/24/19 and was CGA-MaxA for ADLs.     Since my consult patient's SBP has improved. He reports his WBC elevation is chronic and always very labile. Per  "discharge summary recommending restarting anticoagulation 10 days post clipping (February 2nd) and to slowly titrate steroids down to home dose of 10 mg TID. Patient's lisinopril has been held due to the low BP.  Patient reports he tolerated the transfer. He reported to nursing that he had some double vision earlier today. Denies NVD. Bilateral leg swelling is present on admission.     REVIEW OF SYSTEMS:     Comprehensive 14 point ROS was reviewed and all were negative except as noted elsewhere in this document.     PMH:  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 (pouch) ([facility]) 4/1/2010   • BPH (benign prostatic hypertrophy) 4/1/2010   • Bradycardia    • Breath shortness    • Bronchitis    • Cardiac pacemaker 04 2010   • Cataract    • COPD (chronic obstructive pulmonary disease) ([facility])    • Diverticulitis    • DJD (degenerative joint disease)    • EMPHYSEMA    • Glaucoma    • Heart burn    • Heart murmur    • Hiatus hernia syndrome    • Hypertension    • 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[facility]) 1995   • Indigestion    • Knee arthroplasty 2002    right   • Obstructive hypertrophic cardiomyopathy ([facility]) 8/4/2011   • PAF (paroxysmal atrial fibrillation) ([facility])    • Paroxysmal atrial fibrillation ([facility]) 8/29/2011   • PE (pulmonary embolism)     IVC filter, states PE clots 8 times   • Phlebitis     chronic / recurrent   • Pituitary adenoma ([facility]) 1995    hypophysectomy   • Pituitary adenoma ([facility]) 1995    hypophysectomy   • Pneumonia    • Rheumatic fever    • S/P insertion of IVC (inferior vena caval) filter    • S/P parathyroidectomy ([facility])    • S/P parathyroidectomy ([facility]) 2005   • Sleep apnea     no cpap machine   • SSS (sick sinus syndrome) ([facility]) 8/29/2011   • Third degree AV block ([facility]) 8/29/2011   • thyroidectomy 2005    benign   • Unspecified disorder " of thyroid    • Unspecified hemorrhagic conditions    • Unspecified urinary incontinence    • Urinary bladder disorder        PSH:  Past Surgical History:   Procedure Laterality Date   • GASTROSCOPY-ENDO  1/22/2019    Procedure: GASTROSCOPY-ENDO;  Surgeon: Yoandy Mcelroy M.D.;  Location: ENDOSCOPY [facility];  Service: Gastroenterology   • GASTROSCOPY-ENDO  1/21/2019    Procedure: GASTROSCOPY-ENDO;  Surgeon: Luca Mtz M.D.;  Location: ENDOSCOPY [facility];  Service: Gastroenterology   • KNEE REVISION TOTAL  6/20/2013    Performed by Ortega Vega M.D. at SURGERY [facility] ORS   • CARPAL TUNNEL ENDOSCOPIC  9/30/2011    Performed by RONALD ENNIS at SURGERY SAME DAY [facility] ORS   • PACEMAKER INSERTION Left 04/23/2010    [employer] Altrua 60 [id number] implanted by Dr. Donaldson.   • CATARACT EXTRACTION WITH IOL      bilateral    • CERVICAL DISK AND FUSION ANTERIOR     • CERVICAL FUSION POSTERIOR     • CHOLECYSTECTOMY     • CHOLECYSTECTOMY     • OTHER      pituitary tumor removed   • OTHER CARDIAC SURGERY     • OTHER ORTHOPEDIC SURGERY      knee surgery left knee x 2   • PB REVISE ULNAR NERVE AT WRIST     • PB TOTAL KNEE ARTHROPLASTY      left   • PB TOTAL KNEE ARTHROPLASTY      right   • THYROIDECTOMY         FAMILY HISTORY:  Family History   Problem Relation Age of Onset   • Arthritis Mother    • Arthritis Father    • Cancer Neg Hx    • Heart Disease Neg Hx    No family history of GI bleed    MEDICATIONS:  Current Facility-Administered Medications   Medication Dose   • Respiratory Care per Protocol     • Pharmacy Consult Request ...Pain Management Review 1 Each  1 Each   • tramadol (ULTRAM) 50 MG tablet 50 mg  50 mg   • hydrALAZINE (APRESOLINE) tablet 25 mg  25 mg   • acetaminophen (TYLENOL) tablet 650 mg  650 mg   • senna-docusate (PERICOLACE or SENOKOT S) 8.6-50 MG per tablet 2 Tab  2 Tab    And   • polyethylene glycol/lytes (MIRALAX) PACKET 1 Packet  1 Packet    And   • magnesium hydroxide  (MILK OF MAGNESIA) suspension 30 mL  30 mL    And   • bisacodyl (DULCOLAX) suppository 10 mg  10 mg   • artificial tears 1.4 % ophthalmic solution 1 Drop  1 Drop   • benzocaine-menthol (CEPACOL) lozenge 1 Lozenge  1 Lozenge   • mag hydrox-al hydrox-simeth (MAALOX PLUS ES or MYLANTA DS) suspension 20 mL  20 mL   • ondansetron (ZOFRAN ODT) dispertab 4 mg  4 mg    Or   • ondansetron (ZOFRAN) syringe/vial injection 4 mg  4 mg   • traZODone (DESYREL) tablet 50 mg  50 mg   • sodium chloride (OCEAN) 0.65 % nasal spray 2 Spray  2 Spray   • budesonide-formoterol (SYMBICORT) 160-4.5 MCG/ACT inhaler 2 Puff  2 Puff   • DILTIAZem (CARDIZEM) tablet 60 mg  60 mg   • gabapentin (NEURONTIN) capsule 600 mg  600 mg   • HYDROcodone-acetaminophen (NORCO) 5-325 MG per tablet 1 Tab  1 Tab   • hydrocortisone (CORTEF) tablet 10 mg  10 mg   • [START ON 1/29/2019] hydrocortisone (CORTEF) tablet 20 mg  20 mg   • [START ON 1/29/2019] hydrocortisone (CORTEF) tablet 30 mg  30 mg   • [START ON 1/29/2019] levothyroxine (SYNTHROID) tablet 75 mcg  75 mcg   • [START ON 1/29/2019] montelukast (SINGULAIR) tablet 10 mg  10 mg   • nystatin (MYCOSTATIN) powder     • omeprazole (PRILOSEC) capsule 40 mg  40 mg   • polyethylene glycol/lytes (MIRALAX) PACKET 1 Packet  1 Packet   • sotalol (BETAPACE) tablet 40 mg  40 mg   • sucralfate (CARAFATE) 1 GM/10ML suspension 1 g  1 g   • [START ON 1/29/2019] tamsulosin (FLOMAX) capsule 0.4 mg  0.4 mg   • [START ON 1/29/2019] tiotropium (SPIRIVA) 18 MCG inhalation capsule 1 Cap  1 Cap       ALLERGIES:  Pcn [penicillins]    PSYCHOSOCIAL HISTORY:  Housing / Facility: 1 Story House  Steps Into Home:  (ramp to enter)  Lives with - Patient's Self Care Capacity: Spouse  Equipment Owned: 4-Wheel Walker, Ramp     Prior Level of Function / Living Situation:   Physical Therapy: Prior Services: None  Housing / Facility: [address] [place] [address]  Steps Into Home:  (ramp to enter)  Bathroom Set up: Bathtub / Shower Combination, Shower  Chair  Equipment Owned: 4-Wheel Walker, Ramp  Lives with - Patient's Self Care Capacity: Spouse  Bed Mobility: Independent  Transfer Status: Independent  Ambulation: Independent  Distance Ambulation (Feet):  (community ambulator)  Assistive Devices Used: 4-Wheel Walker  Current Level of Function:   Level Of Assist: Minimal Assist  Assistive Device: Front Wheel Walker  Distance (Feet): 60 (40 feet, then seated rest, then 20 feet to sink.)  Deviation: Bradykinetic  # of Stairs Climbed: 0  Weight Bearing Status: no restrictions  Skilled Intervention: Verbal Cuing  Supine to Sit:  (up in chair)  Sit to Supine:  (up chair)  Scooting: Stand by Assist  Skilled Intervention: Verbal Cuing  Sit to Stand: Contact Guard Assist (SBA by last sit to stand from chair)  Bed, Chair, Wheelchair Transfer: Contact Guard Assist  Skilled Intervention: Verbal Cuing, Sequencing  Sitting in Chair: pre/post  Standin  Occupational Therapy:   Self Feeding: Independent  Grooming / Hygiene: Independent  Bathing: Independent  Dressing: Independent  Toileting: Independent  Medication Management: Independent  Laundry: Requires Assist  Kitchen Mobility: Independent  Finances: Independent  Home Management: Requires Assist  Shopping: Requires Assist  Prior Level Of Mobility: Independent With Device in Community, Independent With Device in Home  Driving / Transportation: Driving Independent  Prior Services: None  Housing / Facility: [address]  Occupation (Pre-Hospital Vocational): Retired Due To Age  Current Level of Function:   Eating: Supervision (w/ built up )  Lower Body Dressing: Moderate Assist (don pajama pants)  Skilled Intervention: Verbal Cuing, Tactile Cuing, Compensatory Strategies  Speech Language Pathology:      Rehabilitation Prognosis/Potential: Good  Estimated Length of Stay: 10-14 days    CURRENT LEVEL OF FUNCTION:   Same as level of function prior to admission to [facility]    PHYSICAL EXAM:     VITAL  "SIGNS:   height is 1.651 m (5' 5\") and weight is 93 kg (205 lb 0.4 oz). His oral temperature is 36.4 °C (97.6 °F). His blood pressure is 114/72 and his pulse is 72. His respiration is 20 and oxygen saturation is 96%.     GENERAL: No apparent distress  HEENT: Normocephalic/atraumatic, EOMI and PERRL  CARDIAC: Regular rate and rhythm, normal S1, S2   LUNGS: Diminished bilaterally, no upper airway sounds  ABDOMINAL: bowel sounds present, soft, nontender and nondistended    EXTREMITIES: no contractures, spasticity, bilateral 1+ edema. No calf tenderness bilaterally  NEURO:  Mental status:  A&Ox4 (person, place, date, situation) answers questions appropriately follows commands  Speech: fluent, no aphasia or dysarthria  CRANIAL NERVES: 2-12 grossly intact.  Motor:    Deferred today, moving all extremities grossly antigravity at least  Sensory: subjectively intact to light touch through out    RADIOLOGY:                            TTE 1/22/19  CONCLUSIONS  Technically difficult and incomplete study.   Small left ventricular chamber size.  Normal left ventricular systolic function.  Left ventricular ejection fraction is visually estimated to be >80%.  Normal regional wall motion.  All the valves were not well visualized.  Mild aortic insufficiency.  Mild aortic stenosis is probably present.  No mitral stenosis or mitral regurgitation seen.  Mildly dilated left atrium.  Right heart was well visualized.  Vena cava is not well visualized.  Normal pericardium without effusion.              CXR 1/27/19  Impression       1.  Low lung volumes with bilateral basilar atelectasis. Underlying infection is possible.  2.  Small left pleural effusion likely present.  3.  Stable enlargement of the cardiomediastinal silhouette.         LABS:  Recent Labs      01/26/19   0410  01/27/19   0430   SODIUM  136  136   POTASSIUM  4.0  3.6   CHLORIDE  101  98   CO2  31  33   GLUCOSE  108*  96   BUN  15  15   CREATININE  0.83  0.86   CALCIUM  8.3*  " 8.2*     Recent Labs      01/26/19   0410  01/27/19   0430   WBC  14.1*  18.9*   RBC  2.95*  2.96*   HEMOGLOBIN  8.7*  8.7*   HEMATOCRIT  27.1*  27.5*   MCV  91.9  92.9   MCH  29.5  29.4   MCHC  32.1*  31.6*   RDW  49.8  50.9*   PLATELETCT  147*  174   MPV  10.4  10.7             PRIMARY REHAB DIAGNOSIS:    This patient is a 86 y.o. male admitted for acute inpatient rehabilitation with GI bleed.    IMPAIRMENTS:   Cognitive  ADLs/IADLs  Mobility    SECONDARY DIAGNOSIS/MEDICAL CO-MORBIDITIES AFFECTING FUNCTION:  BPH, COPD, Glaucoma, HTN, A fib on coumadin, Hx of PE, Hypothyroidism, Hypopituitary    RELEVANT CHANGES SINCE PREADMISSION EVALUATION:    Status unchanged    The patient's rehabilitation potential is Good  The patient's medical prognosis is good    PLAN:   Discussion and Recommendations:   1. The patient requires an acute inpatient rehabilitation program with a coordinated program of care at an intensity and frequency not available at a lower level of care. This recommendation is substantiated by the patient's medical physicians who recommend that the patient's intervention and assessment of medical issues needs to be done at an acute level of care for patient's safety and maximum outcome.   2. A coordinated program of care will be supplied by an interdisciplinary team of physical therapy, occupational therapy, rehab physician, rehab nursing, and, if needed, speech therapy and rehab psychology. Rehab team presents a patient-specific rehabilitation and education program concentrating on prevention of future problems related to accessibility, mobility, skin, bowel, bladder, sexuality, and psychosocial and medical/surgical problems.   3. Need for Rehabilitation Physician: The rehab physician will be evaluating the patient on a multi-weekly basis to help coordinate the program of care. The rehab physician communicates between medical physicians, therapists, and nurses to maximize the patient's potential outcome.  Specific areas in which the rehab physician will be providing daily assessment include the following:   A. Assessing the patient's heart rate and blood pressure response (vitals monitoring) to activity and making adjustments in medications or conservative measures as needed.   B. The rehab physician will be assessing the frequency at which the program can be increased to allow the patient to reach optimal functional outcome.   C. The rehab physician will also provide assessments in daily skin care, especially in light of patient's impairments in mobility.   D. The rehab physician will provide special expertise in understanding how to work with functional impairment and recommend appropriate interventions, compensatory techniques, and education that will facilitate the patient's outcome.   4. Rehab R.N.   The rehab RN will be working with patient to carry over in room mobility and activities of daily living when the patient is not in 3 hours of skilled therapy. Rehab nursing will be working in conjunction with rehab physician to address all the medical issues above and continue to assess laboratory work and discuss abnormalities with the treating physicians, assess vitals, and response to activity, and discuss and report abnormalities with the rehab physician. Rehab RN will also continue daily skin care, supervise bladder/bowel program, instruct in medication administration, and ensure patient safety.   5. Rehab Therapy: Therapies to treat at intensity and frequency of (may change after completion of evaluation by all therapeutic disciplines):       PT:  Physical therapy to address mobility, transfer, gait training and evaluation for adaptive equipment needs 1 and 1/2 hour/day at least 5 days/week for the duration of the ELOS (see below)       OT:  Occupational therapy to address ADLs, self-care, home management training, functional mobility/transfers and assistive device evaluation, and community re-integration 1  and 1/2 hour/day at least 5 days/week for the duration of the ELOS (see below).     Medical management / Rehabilitation Issues/ Adverse Potential as part of rehabilitation plan     REHABILITATION ISSUES/ADVERSE POTENTIAL:  1.  GI Bleed w debility: GI bleed s/p clipping x2 areas. Patient demonstrates functional deficits in strength, balance, coordination, and ADL's. Patient is admitted to [facility] for comprehensive rehabilitation therapy as described below.   Rehabilitation nursing monitors bowel and bladder control, educates on medication administration, co-morbidities and monitors patient safety.    2.  Neurostimulants: None at this time but continue to assess daily for need to initiate should status change.    3.  DVT prophylaxis:  Patient is high risk for bleed with recent GI bleed. Encourage OOB. Monitor daily for signs and symptoms of DVT including but not limited to swelling and pain to prevent the development of DVT that may interfere with therapies.    4.  GI prophylaxis:  On prilosec to prevent gastritis/dyspepsia which may interfere with therapies.    5.  Pain: No issues with pain currently / Controlled with APAP/Tramadol    6.  Nutrition/Dysphagia: Dietician monitors nutrient intake, recommend supplements prn and provide nutrition education to pt/family to promote optimal nutrition for wound healing/recovery.     7.  Bladder/bowel:  Start bowel and bladder program as described below, to prevent constipation, urinary retention (which may lead to UTI), and urinary incontinence (which will impact upon pt's functional independence).   - Post void bladder scans, I&O cath for PVRs >400. Prefers condom catheter  - up to commode after meal     8.  Skin/dermal ulcer prophylaxis: Monitor for new skin conditions with q.2 h. turns as required to prevent the development of skin breakdown.     9.  Cognition/Behavior:  Psychologist Dr. Torres provides adjustment counseling to illness and  psychosocial barriers that may be potential barriers to rehabilitation.     10. Respiratory therapy: RT performs O2 management prn, breathing retraining, pulmonary hygiene and bronchospasm management prn to optimize participation in therapies.     MEDICAL CO-MORBIDITIES/ADVERSE POTENTIAL AFFECTING FUNCTION:  GI Bleed w debility: GI bleed s/p clipping x2 areas s/p multiple transfusions. Last Hgb 8.7 on 1/27/19  -Continue Omeprazole 40 mg BID. Recommended at discharge to continue for 2 weeks then 20 mg BID until follow-up with GI  -Continue Sucralfate for 2-4 weeks  -PT and OT for mobility and ADLs.    Adrenal Insufficiency - Patient on steroids at baseline for previous pituitary lesion.   -Continue hydrocortisone 30 QAM, 20 QNOON and 10 mg QHS. Baseline on 10 mg TID  -Consider consult hospitalist    HTN/A Fib - Patient on Diltiazem 60 mg BID, Sotalol 40 mg BID. Previously on Lisinopril which was put on hold due to hypotension.  -Continue Diltiazem and Sotalol  -Will need to restart Coumadin on 2/2/19 per DC summary    COPD - Patient on Symbicort BID, Singular daily, and Spiriva  -RT to consult    Leukocytosis - Chronic issue with labile WBC. Patient will need follow-up with Hematology    Hypothyroidism - Patient on 75 mcg daily    BPH - Patient on Flomax 0.4 mg on transfer, currently with condom catheter    DVT Ppx - Currently high risk for bleed with recent GI bleed. Restart Coumadin on 2/2/19    Discussed patient's code status: Confirmed as FULL    I personally performed a complete drug regimen review and no potential clinically significant medication issues were identified.     Pt was seen today for 75 min, and entire time spent in face-to-face contact was >50% in counseling and coordination of care as detailed in A/P above.        GOALS/EXPECTED LEVEL OF FUNCTION BASED ON CURRENT MEDICAL AND FUNCTIONAL STATUS (may change based on patient's medical status and rate of impairment recovery):  Transfers:   Modified  Independent  Mobility/Gait:   Modified Independent  ADL's:   Modified Independent    DISPOSITION: Discharge to pre-morbid independent living setting with the supportive care of patient's family.    ELOS: 10-14 days

## 2019-01-28 NOTE — THERAPY
"Occupational Therapy Treatment completed with focus on ADLs, ADL transfers and patient education.  Functional Status: Pt seen today for OT treatment. He remains very pleasant and cooperative, motivated to get stronger. He needed modA to don pajama pants, assist with threading but he was able to pull over hips with CGA for balance. CGA for functional mobility with FWW. He completed grooming in stance at the sink with CGA. He fatigued quickly needing a seated rest break. Pt very motivated, but limited by weakness, fatigue, impaired balance which impacts independence in ADLs and functional mobility.  Plan of Care: Will benefit from Occupational Therapy 3 times per week  Discharge Recommendations:  Equipment Will Continue to Assess for Equipment Needs. Recommend inpatient transitional care services for continued occupational therapy services.       See \"Rehab Therapy-Acute\" Patient Summary Report for complete documentation.   "

## 2019-01-28 NOTE — CARE PLAN
Problem: Respiratory:  Goal: Respiratory status will improve  Outcome: PROGRESSING AS EXPECTED  Patient continues to be on 4LNC, patient on 3-5L at home. Will continue to monitor.     Problem: Mobility  Goal: Risk for activity intolerance will decrease  Outcome: PROGRESSING SLOWER THAN EXPECTED  Patient able to be up for meals, but gets SOB on any exertion. Patient to discharge to rehab facility today. Will continue to monitor.

## 2019-01-28 NOTE — PROGRESS NOTES
1430 Pt arrived at [facility] from [facility] via wheelchair. Pt oriented to room and facility routine and safety measures; pt education binder provided and discussed. Pt A/O x 4, continent of bowel, has condom catheter in place, draining yellow clear urine. SBA assist for transfers. All wounds photographed and documented; photos uploaded to . Pt complains of pain 4/10 to lower back. Patient also mentioned some occasional double vision but vision is fine at the moment. Pt positioned for comfort in bed. Call light within reach, safety measures in place. Will continue to monitor.

## 2019-01-28 NOTE — PROGRESS NOTES
2 RN skin check completed with Tomasa RN     Pt's bilateral ears intact. Bilateral upper extremities red, bruised, weeping but blanchable. Pt. Sacrum intact, red, blanchable. Bilateral heels dry but intact.     Encouraged to turn in bed heels floated with pillows and condom cath used for moisture diversion.

## 2019-01-28 NOTE — PROGRESS NOTES
0712: Report received from Marion ROLON. Patient is alert and oriented at this time. Vitals wnl, fall precautions in place, bed alarm on, and call light within reach. Will continue to monitor.     1400: Discharge instructions given and discussed, Signed copy in chart. Patient verbalizes understanding with all questions answered. All prescriptions will be taken care of at [facility]. Pt discharged to [facility]. In stable condition, on 4L O2 via NC. Escorted by Transport/wife. Personal belongings returned to patient. IV in place per Rehab and tolerated well. Telemetry box removed and monitor room notified.

## 2019-01-28 NOTE — DISCHARGE PLANNING
Follow up with client/spouse at bedside to discuss IRF referral. Mrs. Lizama tells me they are familiar with [facility] from prior experience and she endorses admission. Provided [facility] contact information for any additional questions. Will follow for updated PT note in anticipation of transfer today.

## 2019-01-28 NOTE — DISCHARGE PLANNING
Dr. Garcia is accepting Jeffrey to inpatient rehab. Transport is scheduled at 2p today. Nursing to call report to [phone]. Bedside RN Anna aware. JARET Mancini aware. TCC will follow to assist as needed with transition to [facility].

## 2019-01-28 NOTE — PALLIATIVE CARE
PALLIATIVE CARE FOLLOW-UP:    Pt's wife wants to take more time to review AD prior to having notarized.  Instructed that they can use notary or two witnesses, and that if pt is still at [facility] when they are ready, to please contact PC office as there is possibility that volunteer notary will be available to go there and assist pt to complete.  Pt verbalized understanding and has PC office #.    Thank you for allowing Palliative Care to support this pt and his family.  Contact [phone] for additional assistance, questions or concerns.

## 2019-01-28 NOTE — FLOWSHEET NOTE
01/28/19 1510   Type of Assessment   Assessment Yes   Patient History   Pulmonary Diagnosis COPD, pulm fibrosis   Home O2 Yes   Oxygen liter flow 5   Oxygen at night only No   Nocturnal CPAP No   Home Treatments/Frequency Yes   DPI 1/Frequency Spiriva   DPI 2/Frequency Advair BID   COPD Risk Screening   Do you have a history of COPD? Yes   Do you have a Pulmonologist? Yes   Smoking History   Have you ever smoked Never   Level Of Consciousness   Level of Consciousness Alert   Respiratory WDL   Respiratory (WDL) X   Chest Exam   Work Of Breathing / Effort Moderate  (positional)   Respiration 20   Pulse 72   Breath Sounds   RML Breath Sounds Diminished   RLL Breath Sounds Diminished   LLL Breath Sounds Diminished   Secretions   Cough (daily per pt)   How Sputum Obtained Spontaneous   Sputum Amount Small   Sputum Color Unable to Evaluate   Sputum Consistency Unable to Evaluate   Oximetry   #Pulse Oximetry (Single Determination) Yes   Oxygen   Home O2 Use Prior To Admission? Yes   Home O2 LPM Flow 5 LPM   Home O2 Delivery Method Nasal Cannula   Home O2 Frequency of Use Continuous   Pulse Oximetry 96 %   O2 (LPM) 5   O2 Daily Delivery Respiratory  Silicone Nasal Cannula   Protocol Pathways   Protocol Pathways Yes   Bronchodilator Protocol   Med Order With RCP No   Is this an exacerbation of COPD/Asthma? No   Bronchodilator Indications History / Diagnosis;Strong Subjective / Objective Improvement   Breath Sounds Criteria 1    Benefit Criteria 0    Pulse Criteria 0    Respiratory Rate Criteria 1    S.O.B. Criteria 1    Criteria Total 3   Point Values 0-3 - PRN   Bronchodilator Goals/Outcome Patient at Stable Baseline   O2 Protocol   O2 Protocol Indications Room Air SpO2 Less Than 90%   Continuous oxygen initiated for: Continuous Home Oxygen / CPAP / BIPAP   O2 Protocol Goals/Outcome SpO2 greater than 90% with exertion

## 2019-01-28 NOTE — THERAPY
"Physical Therapy Treatment completed.   Bed Mobility:  Supine to Sit:  (up in chair)  Transfers: Sit to Stand: Contact Guard Assist (SBA by last sit to stand from chair)  Gait: Level Of Assist: Minimal Assist with Front-Wheel Walker       Plan of Care: Plan to complete next treatment by 11/30  Discharge Recommendations: Equipment: Will Continue to Assess for Equipment Needs.  Today pt is showing decreased endurance for ambulation, needing seated rest after 40 feet of ambulation with FWW.  Pt will need a transitional care stay before home.   See \"Rehab Therapy-Acute\" Patient Summary Report for complete documentation.       "

## 2019-01-28 NOTE — CARE PLAN
PROBLEM: SAFETY     Outcome: PROGRESSING AS EXPECTED  Pt remains free from falls at this time. Safety precautions in place. Pt educated on calling for assistance when needed.     Problem: Infection  Goal: Will remain free from infection  Pt remains free from infection at this time. Pt assessed for signs and symptoms of infection. Standard precautions in place.

## 2019-01-29 LAB
ALBUMIN SERPL BCP-MCNC: 3.1 G/DL (ref 3.2–4.9)
ALBUMIN/GLOB SERPL: 1.6 G/DL
ALP SERPL-CCNC: 33 U/L (ref 30–99)
ALT SERPL-CCNC: 20 U/L (ref 2–50)
ANION GAP SERPL CALC-SCNC: 2 MMOL/L (ref 0–11.9)
AST SERPL-CCNC: 19 U/L (ref 12–45)
BASOPHILS # BLD AUTO: 0.4 % (ref 0–1.8)
BASOPHILS # BLD: 0.07 K/UL (ref 0–0.12)
BILIRUB SERPL-MCNC: 0.4 MG/DL (ref 0.1–1.5)
BUN SERPL-MCNC: 15 MG/DL (ref 8–22)
CALCIUM SERPL-MCNC: 8.6 MG/DL (ref 8.5–10.5)
CHLORIDE SERPL-SCNC: 101 MMOL/L (ref 96–112)
CHOLEST SERPL-MCNC: 124 MG/DL (ref 100–199)
CO2 SERPL-SCNC: 35 MMOL/L (ref 20–33)
CREAT SERPL-MCNC: 0.8 MG/DL (ref 0.5–1.4)
EOSINOPHIL # BLD AUTO: 0.46 K/UL (ref 0–0.51)
EOSINOPHIL NFR BLD: 2.4 % (ref 0–6.9)
ERYTHROCYTE [DISTWIDTH] IN BLOOD BY AUTOMATED COUNT: 55 FL (ref 35.9–50)
EST. AVERAGE GLUCOSE BLD GHB EST-MCNC: 103 MG/DL
GLOBULIN SER CALC-MCNC: 1.9 G/DL (ref 1.9–3.5)
GLUCOSE SERPL-MCNC: 85 MG/DL (ref 65–99)
HBA1C MFR BLD: 5.2 % (ref 0–5.6)
HCT VFR BLD AUTO: 28.7 % (ref 42–52)
HDLC SERPL-MCNC: 42 MG/DL
HGB BLD-MCNC: 8.9 G/DL (ref 14–18)
IMM GRANULOCYTES # BLD AUTO: 0.96 K/UL (ref 0–0.11)
IMM GRANULOCYTES NFR BLD AUTO: 5 % (ref 0–0.9)
LDLC SERPL CALC-MCNC: 69 MG/DL
LYMPHOCYTES # BLD AUTO: 1.29 K/UL (ref 1–4.8)
LYMPHOCYTES NFR BLD: 6.8 % (ref 22–41)
MCH RBC QN AUTO: 29.5 PG (ref 27–33)
MCHC RBC AUTO-ENTMCNC: 31 G/DL (ref 33.7–35.3)
MCV RBC AUTO: 95 FL (ref 81.4–97.8)
MONOCYTES # BLD AUTO: 1.33 K/UL (ref 0–0.85)
MONOCYTES NFR BLD AUTO: 7 % (ref 0–13.4)
NEUTROPHILS # BLD AUTO: 14.91 K/UL (ref 1.82–7.42)
NEUTROPHILS NFR BLD: 78.4 % (ref 44–72)
NRBC # BLD AUTO: 0.02 K/UL
NRBC BLD-RTO: 0.1 /100 WBC
PLATELET # BLD AUTO: 215 K/UL (ref 164–446)
PMV BLD AUTO: 10 FL (ref 9–12.9)
POTASSIUM SERPL-SCNC: 4.1 MMOL/L (ref 3.6–5.5)
PROT SERPL-MCNC: 5 G/DL (ref 6–8.2)
RBC # BLD AUTO: 3.02 M/UL (ref 4.7–6.1)
SODIUM SERPL-SCNC: 138 MMOL/L (ref 135–145)
TRIGL SERPL-MCNC: 67 MG/DL (ref 0–149)
TSH SERPL DL<=0.005 MIU/L-ACNC: 1.44 UIU/ML (ref 0.38–5.33)
WBC # BLD AUTO: 19 K/UL (ref 4.8–10.8)

## 2019-01-29 PROCEDURE — 700102 HCHG RX REV CODE 250 W/ 637 OVERRIDE(OP): Performed by: PHYSICAL MEDICINE & REHABILITATION

## 2019-01-29 PROCEDURE — A9270 NON-COVERED ITEM OR SERVICE: HCPCS | Performed by: PHYSICAL MEDICINE & REHABILITATION

## 2019-01-29 PROCEDURE — 97110 THERAPEUTIC EXERCISES: CPT

## 2019-01-29 PROCEDURE — 97166 OT EVAL MOD COMPLEX 45 MIN: CPT

## 2019-01-29 PROCEDURE — 97116 GAIT TRAINING THERAPY: CPT

## 2019-01-29 PROCEDURE — 84443 ASSAY THYROID STIM HORMONE: CPT

## 2019-01-29 PROCEDURE — 94760 N-INVAS EAR/PLS OXIMETRY 1: CPT

## 2019-01-29 PROCEDURE — 83036 HEMOGLOBIN GLYCOSYLATED A1C: CPT

## 2019-01-29 PROCEDURE — 80061 LIPID PANEL: CPT

## 2019-01-29 PROCEDURE — 99233 SBSQ HOSP IP/OBS HIGH 50: CPT | Performed by: PHYSICAL MEDICINE & REHABILITATION

## 2019-01-29 PROCEDURE — 97535 SELF CARE MNGMENT TRAINING: CPT

## 2019-01-29 PROCEDURE — 770010 HCHG ROOM/CARE - REHAB SEMI PRIVAT*

## 2019-01-29 PROCEDURE — 80053 COMPREHEN METABOLIC PANEL: CPT

## 2019-01-29 PROCEDURE — 85025 COMPLETE CBC W/AUTO DIFF WBC: CPT

## 2019-01-29 PROCEDURE — 97530 THERAPEUTIC ACTIVITIES: CPT

## 2019-01-29 PROCEDURE — 97161 PT EVAL LOW COMPLEX 20 MIN: CPT

## 2019-01-29 PROCEDURE — 36415 COLL VENOUS BLD VENIPUNCTURE: CPT

## 2019-01-29 RX ORDER — LISINOPRIL 5 MG/1
10 TABLET ORAL DAILY
Status: DISCONTINUED | OUTPATIENT
Start: 2019-01-30 | End: 2019-02-04

## 2019-01-29 RX ADMIN — HYDROCORTISONE 20 MG: 10 TABLET ORAL at 11:19

## 2019-01-29 RX ADMIN — NYSTATIN: 100000 POWDER TOPICAL at 20:50

## 2019-01-29 RX ADMIN — SOTALOL HYDROCHLORIDE 40 MG: 80 TABLET ORAL at 08:26

## 2019-01-29 RX ADMIN — SUCRALFATE 1 G: 1 SUSPENSION ORAL at 05:47

## 2019-01-29 RX ADMIN — LEVOTHYROXINE SODIUM 75 MCG: 75 TABLET ORAL at 05:48

## 2019-01-29 RX ADMIN — BUDESONIDE AND FORMOTEROL FUMARATE DIHYDRATE 2 PUFF: 160; 4.5 AEROSOL RESPIRATORY (INHALATION) at 08:25

## 2019-01-29 RX ADMIN — TIOTROPIUM BROMIDE 1 CAPSULE: 18 CAPSULE ORAL; RESPIRATORY (INHALATION) at 10:09

## 2019-01-29 RX ADMIN — OMEPRAZOLE 40 MG: 20 CAPSULE, DELAYED RELEASE ORAL at 20:51

## 2019-01-29 RX ADMIN — HYDROCORTISONE 10 MG: 10 TABLET ORAL at 20:51

## 2019-01-29 RX ADMIN — DILTIAZEM HYDROCHLORIDE 60 MG: 30 TABLET, FILM COATED ORAL at 05:48

## 2019-01-29 RX ADMIN — TAMSULOSIN HYDROCHLORIDE 0.4 MG: 0.4 CAPSULE ORAL at 08:26

## 2019-01-29 RX ADMIN — BUDESONIDE AND FORMOTEROL FUMARATE DIHYDRATE 2 PUFF: 160; 4.5 AEROSOL RESPIRATORY (INHALATION) at 20:50

## 2019-01-29 RX ADMIN — HYDROCODONE BITARTRATE AND ACETAMINOPHEN 1 TABLET: 5; 325 TABLET ORAL at 20:54

## 2019-01-29 RX ADMIN — SUCRALFATE 1 G: 1 SUSPENSION ORAL at 11:20

## 2019-01-29 RX ADMIN — HYDROCORTISONE 30 MG: 10 TABLET ORAL at 08:25

## 2019-01-29 RX ADMIN — HYDROCODONE BITARTRATE AND ACETAMINOPHEN 1 TABLET: 5; 325 TABLET ORAL at 08:25

## 2019-01-29 RX ADMIN — DILTIAZEM HYDROCHLORIDE 60 MG: 30 TABLET, FILM COATED ORAL at 17:17

## 2019-01-29 RX ADMIN — GABAPENTIN 600 MG: 300 CAPSULE ORAL at 20:51

## 2019-01-29 RX ADMIN — OMEPRAZOLE 40 MG: 20 CAPSULE, DELAYED RELEASE ORAL at 08:25

## 2019-01-29 RX ADMIN — MONTELUKAST SODIUM 10 MG: 10 TABLET, FILM COATED ORAL at 08:26

## 2019-01-29 RX ADMIN — SOTALOL HYDROCHLORIDE 40 MG: 80 TABLET ORAL at 20:50

## 2019-01-29 RX ADMIN — NYSTATIN: 100000 POWDER TOPICAL at 08:25

## 2019-01-29 RX ADMIN — SUCRALFATE 1 G: 1 SUSPENSION ORAL at 17:17

## 2019-01-29 ASSESSMENT — GAIT ASSESSMENTS
GAIT LEVEL OF ASSIST: CONTACT GUARD ASSIST
DEVIATION: BRADYKINETIC
DISTANCE (FEET): 30
ASSISTIVE DEVICE: 4 WHEEL WALKER
GAIT LEVEL OF ASSIST: CONTACT GUARD ASSIST
ASSISTIVE DEVICE: 4 WHEEL WALKER
DISTANCE (FEET): 110
DEVIATION: BRADYKINETIC

## 2019-01-29 ASSESSMENT — BRIEF INTERVIEW FOR MENTAL STATUS (BIMS)
WHAT MONTH IS IT: ACCURATE WITHIN 5 DAYS
BIMS SUMMARY SCORE: 15
WHAT YEAR IS IT: CORRECT
ASKED TO RECALL BED: YES, NO CUE REQUIRED
ASKED TO RECALL SOCK: YES, NO CUE REQUIRED
WHAT DAY OF THE WEEK IS IT: CORRECT
INITIAL REPETITION OF BED BLUE SOCK - FIRST ATTEMPT: 3
ASKED TO RECALL BLUE: YES, NO CUE REQUIRED

## 2019-01-29 ASSESSMENT — ACTIVITIES OF DAILY LIVING (ADL): TOILETING: INDEPENDENT

## 2019-01-29 NOTE — PROGRESS NOTES
-----------Non-Face-to-Face------------  Prolonged non-face-to-face time was spent on 1/25/19 from 1231 to 1304 and 1515 to 1520 by this reviewer.  This time included medical chart review, medication review, and decision making for the appropriateness of transfer to inpatient rehabilitation.  Please see PM&R Chart Review Consult from 1/25/19 by this provider for detailed summation of the medical chart and the reasoning for current recommendations. In addition to the above, time was spent coordinating care with case management as well as transitional care coordination team in the acceptance and transfer of the patient to the inpatient rehabilitation facility setting.

## 2019-01-29 NOTE — THERAPY
Occupational Therapy Initial Plan of Care Note    1) Assessment:  Patient is 86 y.o. male with a diagnosis of Pt is an 86 y.o. male s/p GI bleed with PMH of diplopia, multiple PEs, L knee replacement, back injury, 5L 02. Pt lives with wife in accessible home.  Uses 4ww in home and community.  .  Additional factors influencing patient status / progress (ie: cognitive factors, co-morbidities, social support, etc): .  Long term and short term goals have been discussed with patient and they are in agreement.  2) Plan:  Recommend Occupational Therapy  minutes per day 5-7 days per week for 2-3 weeks for the following treatments:  OT Self Care/ADL, OT Community Reintegration, OT Neuro Re-Ed/Balance, OT Therapeutic Activity, OT Evaluation and OT Therapeutic Exercise.  3) Goals:  Please refer to care plan for goals.

## 2019-01-29 NOTE — PROGRESS NOTES
Received shift report and assumed care of patient.  Patient awake, calm and stable. Denies pain or discomfort at this time.  Will continue to monitor.

## 2019-01-29 NOTE — PROGRESS NOTES
"Rehab Progress Note     Encounter Date: 1/29/2019    CC: Debility from blood loss; GI bleed;     Interval Events (Subjective)  Patient seen in bed. He reports he slept well due to the quietness at [facility]. Discussed admission labs with patient including Hgb 8.9, slightly improved and WBC at 19. Patient reports labile WBC for years. He reports he is encouraged that his anemia is slowly improving. He denies NVD.  Denies fever or chills. He reports he was previously followed by the [facility] for his left ankle pain and would like another referral.  Reviewed left ankle and poor alignment with early charcot changes. He reports he was previously told he could not have the procedure due to his risk factors including coumadin. Otherwise SBP mildly elevated today, will continue to monitor.     Objective:  VITAL SIGNS: /80   Pulse 71   Temp 36.4 °C (97.6 °F) (Oral)   Resp 20   Ht 1.651 m (5' 5\")   Wt 93 kg (205 lb 0.4 oz)   SpO2 96%   BMI 34.12 kg/m²   Gen: NAD  Psych: Mood and affect appropriate  CV: RRR, no edema  Resp: CTAB, no upper airway sounds  Abd: NTND  Neuro: AOx4, 5/5 BUE  MSK: L ankle with rocker bottom deformity, normal ROM, nonTTP    Recent Results (from the past 72 hour(s))   MAGNESIUM    Collection Time: 01/27/19  4:30 AM   Result Value Ref Range    Magnesium 1.8 1.5 - 2.5 mg/dL   CBC WITH DIFFERENTIAL    Collection Time: 01/27/19  4:30 AM   Result Value Ref Range    WBC 18.9 (H) 4.8 - 10.8 K/uL    RBC 2.96 (L) 4.70 - 6.10 M/uL    Hemoglobin 8.7 (L) 14.0 - 18.0 g/dL    Hematocrit 27.5 (L) 42.0 - 52.0 %    MCV 92.9 81.4 - 97.8 fL    MCH 29.4 27.0 - 33.0 pg    MCHC 31.6 (L) 33.7 - 35.3 g/dL    RDW 50.9 (H) 35.9 - 50.0 fL    Platelet Count 174 164 - 446 K/uL    MPV 10.7 9.0 - 12.9 fL    Neutrophils-Polys 77.00 (H) 44.00 - 72.00 %    Lymphocytes 8.50 (L) 22.00 - 41.00 %    Monocytes 8.10 0.00 - 13.40 %    Eosinophils 1.80 0.00 - 6.90 %    Basophils 0.30 0.00 - 1.80 %    Immature Granulocytes 4.30 (H) 0.00 " - 0.90 %    Nucleated RBC 0.10 /100 WBC    Neutrophils (Absolute) 14.58 (H) 1.82 - 7.42 K/uL    Lymphs (Absolute) 1.60 1.00 - 4.80 K/uL    Monos (Absolute) 1.53 (H) 0.00 - 0.85 K/uL    Eos (Absolute) 0.34 0.00 - 0.51 K/uL    Baso (Absolute) 0.06 0.00 - 0.12 K/uL    Immature Granulocytes (abs) 0.82 (H) 0.00 - 0.11 K/uL    NRBC (Absolute) 0.02 K/uL   COMP METABOLIC PANEL    Collection Time: 01/27/19  4:30 AM   Result Value Ref Range    Sodium 136 135 - 145 mmol/L    Potassium 3.6 3.6 - 5.5 mmol/L    Chloride 98 96 - 112 mmol/L    Co2 33 20 - 33 mmol/L    Anion Gap 5.0 0.0 - 11.9    Glucose 96 65 - 99 mg/dL    Bun 15 8 - 22 mg/dL    Creatinine 0.86 0.50 - 1.40 mg/dL    Calcium 8.2 (L) 8.5 - 10.5 mg/dL    AST(SGOT) 14 12 - 45 U/L    ALT(SGPT) 13 2 - 50 U/L    Alkaline Phosphatase 30 30 - 99 U/L    Total Bilirubin 0.3 0.1 - 1.5 mg/dL    Albumin 2.9 (L) 3.2 - 4.9 g/dL    Total Protein 4.7 (L) 6.0 - 8.2 g/dL    Globulin 1.8 (L) 1.9 - 3.5 g/dL    A-G Ratio 1.6 g/dL   PHOSPHORUS    Collection Time: 01/27/19  4:30 AM   Result Value Ref Range    Phosphorus 2.9 2.5 - 4.5 mg/dL   ESTIMATED GFR    Collection Time: 01/27/19  4:30 AM   Result Value Ref Range    GFR If African American >60 >60 mL/min/1.73 m 2    GFR If Non African American >60 >60 mL/min/1.73 m 2   CBC with Differential    Collection Time: 01/29/19  5:58 AM   Result Value Ref Range    WBC 19.0 (H) 4.8 - 10.8 K/uL    RBC 3.02 (L) 4.70 - 6.10 M/uL    Hemoglobin 8.9 (L) 14.0 - 18.0 g/dL    Hematocrit 28.7 (L) 42.0 - 52.0 %    MCV 95.0 81.4 - 97.8 fL    MCH 29.5 27.0 - 33.0 pg    MCHC 31.0 (L) 33.7 - 35.3 g/dL    RDW 55.0 (H) 35.9 - 50.0 fL    Platelet Count 215 164 - 446 K/uL    MPV 10.0 9.0 - 12.9 fL    Neutrophils-Polys 78.40 (H) 44.00 - 72.00 %    Lymphocytes 6.80 (L) 22.00 - 41.00 %    Monocytes 7.00 0.00 - 13.40 %    Eosinophils 2.40 0.00 - 6.90 %    Basophils 0.40 0.00 - 1.80 %    Immature Granulocytes 5.00 (H) 0.00 - 0.90 %    Nucleated RBC 0.10 /100 WBC     Neutrophils (Absolute) 14.91 (H) 1.82 - 7.42 K/uL    Lymphs (Absolute) 1.29 1.00 - 4.80 K/uL    Monos (Absolute) 1.33 (H) 0.00 - 0.85 K/uL    Eos (Absolute) 0.46 0.00 - 0.51 K/uL    Baso (Absolute) 0.07 0.00 - 0.12 K/uL    Immature Granulocytes (abs) 0.96 (H) 0.00 - 0.11 K/uL    NRBC (Absolute) 0.02 K/uL   Comp Metabolic Panel (CMP)    Collection Time: 01/29/19  5:58 AM   Result Value Ref Range    Sodium 138 135 - 145 mmol/L    Potassium 4.1 3.6 - 5.5 mmol/L    Chloride 101 96 - 112 mmol/L    Co2 35 (H) 20 - 33 mmol/L    Anion Gap 2.0 0.0 - 11.9    Glucose 85 65 - 99 mg/dL    Bun 15 8 - 22 mg/dL    Creatinine 0.80 0.50 - 1.40 mg/dL    Calcium 8.6 8.5 - 10.5 mg/dL    AST(SGOT) 19 12 - 45 U/L    ALT(SGPT) 20 2 - 50 U/L    Alkaline Phosphatase 33 30 - 99 U/L    Total Bilirubin 0.4 0.1 - 1.5 mg/dL    Albumin 3.1 (L) 3.2 - 4.9 g/dL    Total Protein 5.0 (L) 6.0 - 8.2 g/dL    Globulin 1.9 1.9 - 3.5 g/dL    A-G Ratio 1.6 g/dL   TSH with Reflex to FT4    Collection Time: 01/29/19  5:58 AM   Result Value Ref Range    TSH 1.440 0.380 - 5.330 uIU/mL   HEMOGLOBIN A1C    Collection Time: 01/29/19  5:58 AM   Result Value Ref Range    Glycohemoglobin 5.2 0.0 - 5.6 %    Est Avg Glucose 103 mg/dL   Lipid Profile (Lipid Panel)    Collection Time: 01/29/19  5:58 AM   Result Value Ref Range    Cholesterol,Tot 124 100 - 199 mg/dL    Triglycerides 67 0 - 149 mg/dL    HDL 42 >=40 mg/dL    LDL 69 <100 mg/dL   ESTIMATED GFR    Collection Time: 01/29/19  5:58 AM   Result Value Ref Range    GFR If African American >60 >60 mL/min/1.73 m 2    GFR If Non African American >60 >60 mL/min/1.73 m 2       Current Facility-Administered Medications   Medication Frequency   • Respiratory Care per Protocol Continuous RT   • Pharmacy Consult Request ...Pain Management Review 1 Each PHARMACY TO DOSE   • tramadol (ULTRAM) 50 MG tablet 50 mg Q4HRS PRN   • hydrALAZINE (APRESOLINE) tablet 25 mg Q8HRS PRN   • acetaminophen (TYLENOL) tablet 650 mg Q4HRS PRN   •  senna-docusate (PERICOLACE or SENOKOT S) 8.6-50 MG per tablet 2 Tab BID    And   • polyethylene glycol/lytes (MIRALAX) PACKET 1 Packet QDAY PRN    And   • magnesium hydroxide (MILK OF MAGNESIA) suspension 30 mL QDAY PRN    And   • bisacodyl (DULCOLAX) suppository 10 mg QDAY PRN   • artificial tears 1.4 % ophthalmic solution 1 Drop PRN   • benzocaine-menthol (CEPACOL) lozenge 1 Lozenge Q2HRS PRN   • mag hydrox-al hydrox-simeth (MAALOX PLUS ES or MYLANTA DS) suspension 20 mL Q2HRS PRN   • ondansetron (ZOFRAN ODT) dispertab 4 mg 4X/DAY PRN    Or   • ondansetron (ZOFRAN) syringe/vial injection 4 mg 4X/DAY PRN   • traZODone (DESYREL) tablet 50 mg QHS PRN   • sodium chloride (OCEAN) 0.65 % nasal spray 2 Spray PRN   • budesonide-formoterol (SYMBICORT) 160-4.5 MCG/ACT inhaler 2 Puff BID   • DILTIAZem (CARDIZEM) tablet 60 mg TWICE DAILY   • gabapentin (NEURONTIN) capsule 600 mg QHS   • HYDROcodone-acetaminophen (NORCO) 5-325 MG per tablet 1 Tab Q6HRS PRN   • hydrocortisone (CORTEF) tablet 10 mg Q EVENING   • hydrocortisone (CORTEF) tablet 20 mg AFTER LUNCH   • hydrocortisone (CORTEF) tablet 30 mg QDAY with Breakfast   • levothyroxine (SYNTHROID) tablet 75 mcg AM ES   • montelukast (SINGULAIR) tablet 10 mg DAILY   • nystatin (MYCOSTATIN) powder BID   • omeprazole (PRILOSEC) capsule 40 mg BID   • polyethylene glycol/lytes (MIRALAX) PACKET 1 Packet BID   • sotalol (BETAPACE) tablet 40 mg BID   • sucralfate (CARAFATE) 1 GM/10ML suspension 1 g Q6HRS   • tamsulosin (FLOMAX) capsule 0.4 mg DAILY   • tiotropium (SPIRIVA) 18 MCG inhalation capsule 1 Cap DAILY       Orders Placed This Encounter   Procedures   • Diet Order Low Fiber(GI Soft)     Standing Status:   Standing     Number of Occurrences:   1     Order Specific Question:   Diet:     Answer:   Low Fiber(GI Soft) [2]       Assessment:  Active Hospital Problems    Diagnosis   • *GI bleed   • Aortic stenosis   • Anemia due to GI blood loss   • Anticoagulant long-term use   •  Chronic back pain   • EVANGELINA (obstructive sleep apnea)   • BPH (benign prostatic hypertrophy)       Medical Decision Making and Plan:  GI Bleed w debility: GI bleed s/p clipping x2 areas s/p multiple transfusions. Last Hgb 8.7 on 1/27/19  -Continue Omeprazole 40 mg BID. Recommended at discharge to continue for 2 weeks then 20 mg BID until follow-up with GI  -Continue Sucralfate for 2-4 weeks  -PT and OT for mobility and ADLs.     Adrenal Insufficiency - Patient on steroids at baseline for previous pituitary lesion.   -Continue hydrocortisone 30 QAM, 20 QNOON and 10 mg QHS. Baseline on 10 mg TID  -Consider consult hospitalist     Left Ankle - Worsening arthritis with early charcot changes. Would like referral back to [facility]. Consider AFO although limited evidence of improvement and concern for skin breakdown.     HTN/A Fib - Patient on Diltiazem 60 mg BID, Sotalol 40 mg BID. Previously on Lisinopril which was put on hold due to hypotension.  -Continue Diltiazem and Sotalol. SBP. Will restart Lisinopril at 10  -Will need to restart Coumadin on 2/2/19 per DC summary     COPD - Patient on Symbicort BID, Singular daily, and Spiriva  -RT to consult     Leukocytosis - Chronic issue with labile WBC. Patient will need follow-up with Hematology. Admit WBC - 19     Hypothyroidism - Patient on 75 mcg daily     BPH - Patient on Flomax 0.4 mg on transfer, currently with condom catheter PRN     DVT Ppx - Currently high risk for bleed with recent GI bleed. Restart Coumadin on 2/2/19    Total time:  35 minutes.  I spent greater than 50% of the time for patient care and coordination on this date, including unit/floor time, and face-to-face time with the patient as per assessment and plan above.  Discussed admission labs including leukocytosis, SBP elevated restart home lisinopril at 10 mg (home 30), and improving anemia. Patient would like referral to [facility] for ankle evaluation.     Faviola Garcia M.D.

## 2019-01-29 NOTE — CARE PLAN
Problem: Communication  Goal: The ability to communicate needs accurately and effectively will improve  Patient is AOx4, able to communicate needs effectively to staff.     Problem: Safety  Goal: Will remain free from falls  Patient was oriented to the new environment, initiated fall prevention and safety precautions, patient is wearing non skid socks, bed is locked and at lowest position, belongings are within easy reach, encouraged use of call light when assistance is needed.     Problem: Pain Management  Goal: Pain level will decrease to patient's comfort goal    Intervention: Educate and implement non-pharmacologic comfort measures. Examples: relaxation, distration, play therapy, activity therapy, massage, etc.  Patient verbalized pain to lower back 3/10, patient says it's tolerable, comfortable at rest at the moment. Repositioned for comfort, encouraged to rest.       Problem: Respiratory:  Goal: Respiratory status will improve  Patient was on 4 LPM oxygen via nasal cannula on admission, RT increased flow to 5 LPM during assessment, patient verbalized feeling better after the increase. No coughing noted thus far. Head of the bed currently elevated.     Problem: Urinary Elimination:  Goal: Ability to reestablish a normal urinary elimination pattern will improve  Patient has a condom catheter in place, intact, draining yellow clear urine, no foul odor noted.

## 2019-01-29 NOTE — THERAPY
Physical Therapy Initial Plan of Care Note    1) Assessment: Patient is 86 y.o. male with a diagnosis of debility 2* GI bleed.  Additional factors influencing patient status / progress : include L knee/ ankle joint deformity, limited respiratory capacity and advanced age.  Long term and short term goals have been discussed with patient and they are in agreement.  2) Plan: Recommend Physical Therapy  minutes per day 5-6 days per week for 10-14 days for the following treatments:  PT Group Therapy, PT Gait Training, PT Therapeutic Exercises, PT Neuro Re-Ed/Balance, PT Aquatic Therapy, PT Therapeutic Activity, PT Manual Therapy and PT Evaluation.  3) Goals:  Please refer to care plan for goals.

## 2019-01-29 NOTE — REHAB-DIETARY IDT TEAM NOTE
Dietary   Nutrition       Dietary Problems (Active)            There are no active problems.      Nutrition Care/ Consult For Supplements    Assessment:    Admitting Diagnosis: Decreased function s/p GIB s/p ulcer clipping x 2    Pertinent PMH: BPH, COPD, Glaucoma, HTN, A.Fib on Coumadin, PE (multiple) s/p IVC filter, Hypothyroidism, Arthritis, Pacemaker, Diverticulitis, DJD, Emphysema, s/p Parathyroidectomy,     Additional Information: Mr. Lizama is seen in his room sleeping soundly after lunch.  He appears adequately nourished.  Nutrition screen upon admission is negative.  Patient low risk and does not indicate need for supplements.  Did not arouse patient for interview.     Appetite: Good  Diet: GI Soft/ Low Fiber   Average PO intake x 3 days: % of meals since admission (x 2 meals)     Labs: WBC 19, Hgb 8.9/Hct 28.7, CO2 35, Albumin 3.1, T.P. 5  Medications: Symbicort, Cardizem, Gabapentin, Cortef, Synthroid, Lisinopril, Singulair, Nystatin, Omeprazole, Bowel Regimen, Betapace, Carafate, Flomax, Spiriva   PRN Medications: noted  IVF: n/a     Height: 165.1cm  Weight: 93kg  BMI: 34.12- obese class 1     Skin: leg wound open to air   GI: BM 1/28  : WNL  Vitals: 152/80 BP, 5L NC  I/Os: -480mL since admission/ UOP adequate      Malnutrition risk: low     Diagnosis: No nutrition diagnosis.     Intervention/ Recommendations/POC:  1. Continue current nutritional POC. PO adequate with no indication for supplements.   2.Encourage adequate PO/fluid intake.  3. Nutrition rep to see regarding food prefs/ honor within dietary restrictions (if indicated)     Monitor/Evaluation: Monitor PO intake, weight, labs, medication adjustments, skin integrity, GI function, vitals, I/Os, and overall hydration status.  Adjust nutritional POC pending clinical outcomes.    RD following PRN.  Goal: Maintain adequate oral nutrient/fluid intake to promote nutrition optimization/healing.         Section completed by:  Belinda Felix,  ROSA

## 2019-01-29 NOTE — DISCHARGE PLANNING
Future Appointments  Date Time Provider Department Center   3/11/2019 3:15 PM PACER CHECK-[facility] B 2 [facility] None   3/11/2019 3:40 PM Gene Maloney M.D. [facility] None

## 2019-01-29 NOTE — CARE PLAN
Problem: Safety  Goal: Will remain free from falls    Intervention: Implement fall precautions  Use of call light encouraged to make needs known.Bed in low position, non skid socks/shoes on when out of bed.Alarms in place for safety.Call light within reach.Will continue to monitor and assess needs and safety.      Problem: Bowel/Gastric:  Goal: Normal bowel function is maintained or improved    Intervention: Educate patient and significant other/support system about signs and symptoms of constipation and interventions to implement  Pt refused scheduled medication.Assisted to the bathroom, continent of bowel.LBM 01/28.Will continue to monitor and assess for s/sx of constipation.      Problem: Pain Management  Goal: Pain level will decrease to patient's comfort goal    Intervention: Follow pain managment plan developed in collaboration with patient and Interdisciplinary Team  Medicated per request for pain.Repositioned with pillows for comfort.Will continue to monitor and assess pain level and medicate as needed.      Problem: Urinary Elimination:  Goal: Ability to reestablish a normal urinary elimination pattern will improve  Pt is continent of bladder using condom catheter.Draining adequate amount of urine.Will continue to monitor and assess.

## 2019-01-29 NOTE — FLOWSHEET NOTE
01/29/19 0834   Events/Summary/Plan   Events/Summary/Plan 02 spot check   Interdisciplinary Plan of Care-Goals (Indications)   Bronchodilator Indications History / Diagnosis   Interdisciplinary Plan of Care-Outcomes    Bronchodilator Outcome Patient at Stable Baseline   Respiratory WDL   Respiratory (WDL) X   Chest Exam   Respiration 20   Pulse 71   Oximetry   #Pulse Oximetry (Single Determination) Yes   Oxygen   Home O2 Use Prior To Admission? Yes   Home O2 LPM Flow 5 LPM   Home O2 Delivery Method Nasal Cannula   Home O2 Frequency of Use Continuous   Pulse Oximetry 96 %   O2 (LPM) 5   O2 Daily Delivery Respiratory  Silicone Nasal Cannula

## 2019-01-29 NOTE — CARE PLAN
Problem: Safety  Goal: Will remain free from injury  Pt uses call light consistently and appropriately. Waits for assistance does not attempt self transfer this shift. Able to verbalize needs.       Problem: Bowel/Gastric:  Goal: Will not experience complications related to bowel motility  Pt. Is continent of bowels. LBM 1/28/19.

## 2019-01-30 PROCEDURE — 97110 THERAPEUTIC EXERCISES: CPT

## 2019-01-30 PROCEDURE — 97535 SELF CARE MNGMENT TRAINING: CPT

## 2019-01-30 PROCEDURE — 97530 THERAPEUTIC ACTIVITIES: CPT

## 2019-01-30 PROCEDURE — 94760 N-INVAS EAR/PLS OXIMETRY 1: CPT

## 2019-01-30 PROCEDURE — A9270 NON-COVERED ITEM OR SERVICE: HCPCS | Performed by: PHYSICAL MEDICINE & REHABILITATION

## 2019-01-30 PROCEDURE — 99232 SBSQ HOSP IP/OBS MODERATE 35: CPT | Performed by: PHYSICAL MEDICINE & REHABILITATION

## 2019-01-30 PROCEDURE — 97116 GAIT TRAINING THERAPY: CPT

## 2019-01-30 PROCEDURE — 97112 NEUROMUSCULAR REEDUCATION: CPT

## 2019-01-30 PROCEDURE — 700102 HCHG RX REV CODE 250 W/ 637 OVERRIDE(OP): Performed by: PHYSICAL MEDICINE & REHABILITATION

## 2019-01-30 PROCEDURE — 770010 HCHG ROOM/CARE - REHAB SEMI PRIVAT*

## 2019-01-30 RX ORDER — HYDROCORTISONE 10 MG/1
20 TABLET ORAL
Status: DISCONTINUED | OUTPATIENT
Start: 2019-01-31 | End: 2019-02-06

## 2019-01-30 RX ADMIN — SOTALOL HYDROCHLORIDE 40 MG: 80 TABLET ORAL at 08:00

## 2019-01-30 RX ADMIN — LEVOTHYROXINE SODIUM 75 MCG: 75 TABLET ORAL at 05:39

## 2019-01-30 RX ADMIN — NYSTATIN: 100000 POWDER TOPICAL at 21:04

## 2019-01-30 RX ADMIN — TIOTROPIUM BROMIDE 1 CAPSULE: 18 CAPSULE ORAL; RESPIRATORY (INHALATION) at 08:00

## 2019-01-30 RX ADMIN — DILTIAZEM HYDROCHLORIDE 60 MG: 30 TABLET, FILM COATED ORAL at 05:39

## 2019-01-30 RX ADMIN — SUCRALFATE 1 G: 1 SUSPENSION ORAL at 05:39

## 2019-01-30 RX ADMIN — Medication 2 TABLET: at 08:00

## 2019-01-30 RX ADMIN — SUCRALFATE 1 G: 1 SUSPENSION ORAL at 17:42

## 2019-01-30 RX ADMIN — POLYVINYL ALCOHOL 1 DROP: 14 SOLUTION/ DROPS OPHTHALMIC at 16:05

## 2019-01-30 RX ADMIN — TAMSULOSIN HYDROCHLORIDE 0.4 MG: 0.4 CAPSULE ORAL at 08:00

## 2019-01-30 RX ADMIN — POLYETHYLENE GLYCOL 3350 1 PACKET: 17 POWDER, FOR SOLUTION ORAL at 08:00

## 2019-01-30 RX ADMIN — HYDROCODONE BITARTRATE AND ACETAMINOPHEN 1 TABLET: 5; 325 TABLET ORAL at 06:01

## 2019-01-30 RX ADMIN — HYDROCODONE BITARTRATE AND ACETAMINOPHEN 1 TABLET: 5; 325 TABLET ORAL at 16:05

## 2019-01-30 RX ADMIN — HYDROCORTISONE 10 MG: 10 TABLET ORAL at 21:04

## 2019-01-30 RX ADMIN — GABAPENTIN 600 MG: 300 CAPSULE ORAL at 21:03

## 2019-01-30 RX ADMIN — SUCRALFATE 1 G: 1 SUSPENSION ORAL at 23:50

## 2019-01-30 RX ADMIN — HYDROCORTISONE 30 MG: 10 TABLET ORAL at 08:00

## 2019-01-30 RX ADMIN — HYDROCORTISONE 20 MG: 10 TABLET ORAL at 11:51

## 2019-01-30 RX ADMIN — LISINOPRIL 10 MG: 5 TABLET ORAL at 08:00

## 2019-01-30 RX ADMIN — OMEPRAZOLE 40 MG: 20 CAPSULE, DELAYED RELEASE ORAL at 21:03

## 2019-01-30 RX ADMIN — BUDESONIDE AND FORMOTEROL FUMARATE DIHYDRATE 2 PUFF: 160; 4.5 AEROSOL RESPIRATORY (INHALATION) at 08:00

## 2019-01-30 RX ADMIN — BUDESONIDE AND FORMOTEROL FUMARATE DIHYDRATE 2 PUFF: 160; 4.5 AEROSOL RESPIRATORY (INHALATION) at 21:04

## 2019-01-30 RX ADMIN — HYDROCODONE BITARTRATE AND ACETAMINOPHEN 1 TABLET: 5; 325 TABLET ORAL at 23:50

## 2019-01-30 RX ADMIN — SOTALOL HYDROCHLORIDE 40 MG: 80 TABLET ORAL at 21:05

## 2019-01-30 RX ADMIN — Medication 2 TABLET: at 21:03

## 2019-01-30 RX ADMIN — MONTELUKAST SODIUM 10 MG: 10 TABLET, FILM COATED ORAL at 08:00

## 2019-01-30 RX ADMIN — OMEPRAZOLE 40 MG: 20 CAPSULE, DELAYED RELEASE ORAL at 08:00

## 2019-01-30 RX ADMIN — SUCRALFATE 1 G: 1 SUSPENSION ORAL at 00:08

## 2019-01-30 RX ADMIN — SUCRALFATE 1 G: 1 SUSPENSION ORAL at 11:50

## 2019-01-30 RX ADMIN — NYSTATIN: 100000 POWDER TOPICAL at 08:00

## 2019-01-30 ASSESSMENT — GAIT ASSESSMENTS
ASSISTIVE DEVICE: 4 WHEEL WALKER
DEVIATION: BRADYKINETIC
DISTANCE (FEET): 210
GAIT LEVEL OF ASSIST: CONTACT GUARD ASSIST

## 2019-01-30 NOTE — CARE PLAN
Problem: Safety  Goal: Will remain free from falls    Intervention: Implement fall precautions  Appropriately uses call light to make needs known.Bed in low position, non skid socks/shoes on when out of bed.Call light within reach.Will continue to monitor and assess needs and safety.      Problem: Bowel/Gastric:  Goal: Normal bowel function is maintained or improved    Intervention: Educate patient and significant other/support system about signs and symptoms of constipation and interventions to implement  Pt refused scheduled medication.Assisted to the bathroom, continent of bowel.[facility] 01/29.Will continue to monitor and assess for s/sx of constipation.      Problem: Pain Management  Goal: Pain level will decrease to patient's comfort goal    Intervention: Follow pain managment plan developed in collaboration with patient and Interdisciplinary Team  Medicated per request for pain with good effect.Repositioned with pillows for comfort.Will continue to monitor and assess pain level and medicate as needed.

## 2019-01-30 NOTE — PROGRESS NOTES
"Rehab Progress Note     Encounter Date: 1/30/2019    CC: Debility from blood loss; GI bleed;     Interval Events (Subjective)  Patient seen in therapy gym. Discussed with PT about possible AFO vs bracing for L ankle. PT discussed with orthotics vendor and recommend soft bracing with tight wrapping trial. Otherwise there are AFOs that are possible. Patient seen in gym and doing well walking in parallel bars in soft brace. Denies NVD.     Objective:  VITAL SIGNS: /74   Pulse 75   Temp 36.4 °C (97.6 °F) (Oral)   Resp 20   Ht 1.651 m (5' 5\")   Wt 93 kg (205 lb 0.4 oz)   SpO2 97%   BMI 34.12 kg/m²   Gen: NAD  Psych: Mood and affect appropriate  CV: RRR, no edema  Resp: CTAB, no upper airway sounds  Abd: NTND  Neuro: AOx4, 5/5 BUE  MSK: L ankle with rocker bottom deformity, normal ROM, nonTTP  Unchanged from 1/29/19 except now in soft brace which corrects eversion of left foot    Recent Results (from the past 72 hour(s))   CBC with Differential    Collection Time: 01/29/19  5:58 AM   Result Value Ref Range    WBC 19.0 (H) 4.8 - 10.8 K/uL    RBC 3.02 (L) 4.70 - 6.10 M/uL    Hemoglobin 8.9 (L) 14.0 - 18.0 g/dL    Hematocrit 28.7 (L) 42.0 - 52.0 %    MCV 95.0 81.4 - 97.8 fL    MCH 29.5 27.0 - 33.0 pg    MCHC 31.0 (L) 33.7 - 35.3 g/dL    RDW 55.0 (H) 35.9 - 50.0 fL    Platelet Count 215 164 - 446 K/uL    MPV 10.0 9.0 - 12.9 fL    Neutrophils-Polys 78.40 (H) 44.00 - 72.00 %    Lymphocytes 6.80 (L) 22.00 - 41.00 %    Monocytes 7.00 0.00 - 13.40 %    Eosinophils 2.40 0.00 - 6.90 %    Basophils 0.40 0.00 - 1.80 %    Immature Granulocytes 5.00 (H) 0.00 - 0.90 %    Nucleated RBC 0.10 /100 WBC    Neutrophils (Absolute) 14.91 (H) 1.82 - 7.42 K/uL    Lymphs (Absolute) 1.29 1.00 - 4.80 K/uL    Monos (Absolute) 1.33 (H) 0.00 - 0.85 K/uL    Eos (Absolute) 0.46 0.00 - 0.51 K/uL    Baso (Absolute) 0.07 0.00 - 0.12 K/uL    Immature Granulocytes (abs) 0.96 (H) 0.00 - 0.11 K/uL    NRBC (Absolute) 0.02 K/uL   Comp Metabolic Panel " (CMP)    Collection Time: 01/29/19  5:58 AM   Result Value Ref Range    Sodium 138 135 - 145 mmol/L    Potassium 4.1 3.6 - 5.5 mmol/L    Chloride 101 96 - 112 mmol/L    Co2 35 (H) 20 - 33 mmol/L    Anion Gap 2.0 0.0 - 11.9    Glucose 85 65 - 99 mg/dL    Bun 15 8 - 22 mg/dL    Creatinine 0.80 0.50 - 1.40 mg/dL    Calcium 8.6 8.5 - 10.5 mg/dL    AST(SGOT) 19 12 - 45 U/L    ALT(SGPT) 20 2 - 50 U/L    Alkaline Phosphatase 33 30 - 99 U/L    Total Bilirubin 0.4 0.1 - 1.5 mg/dL    Albumin 3.1 (L) 3.2 - 4.9 g/dL    Total Protein 5.0 (L) 6.0 - 8.2 g/dL    Globulin 1.9 1.9 - 3.5 g/dL    A-G Ratio 1.6 g/dL   TSH with Reflex to FT4    Collection Time: 01/29/19  5:58 AM   Result Value Ref Range    TSH 1.440 0.380 - 5.330 uIU/mL   HEMOGLOBIN A1C    Collection Time: 01/29/19  5:58 AM   Result Value Ref Range    Glycohemoglobin 5.2 0.0 - 5.6 %    Est Avg Glucose 103 mg/dL   Lipid Profile (Lipid Panel)    Collection Time: 01/29/19  5:58 AM   Result Value Ref Range    Cholesterol,Tot 124 100 - 199 mg/dL    Triglycerides 67 0 - 149 mg/dL    HDL 42 >=40 mg/dL    LDL 69 <100 mg/dL   ESTIMATED GFR    Collection Time: 01/29/19  5:58 AM   Result Value Ref Range    GFR If African American >60 >60 mL/min/1.73 m 2    GFR If Non African American >60 >60 mL/min/1.73 m 2       Current Facility-Administered Medications   Medication Frequency   • lisinopril (PRINIVIL) tablet 10 mg DAILY   • Respiratory Care per Protocol Continuous RT   • Pharmacy Consult Request ...Pain Management Review 1 Each PHARMACY TO DOSE   • tramadol (ULTRAM) 50 MG tablet 50 mg Q4HRS PRN   • hydrALAZINE (APRESOLINE) tablet 25 mg Q8HRS PRN   • acetaminophen (TYLENOL) tablet 650 mg Q4HRS PRN   • senna-docusate (PERICOLACE or SENOKOT S) 8.6-50 MG per tablet 2 Tab BID    And   • polyethylene glycol/lytes (MIRALAX) PACKET 1 Packet QDAY PRN    And   • magnesium hydroxide (MILK OF MAGNESIA) suspension 30 mL QDAY PRN    And   • bisacodyl (DULCOLAX) suppository 10 mg QDAY PRN   •  artificial tears 1.4 % ophthalmic solution 1 Drop PRN   • benzocaine-menthol (CEPACOL) lozenge 1 Lozenge Q2HRS PRN   • mag hydrox-al hydrox-simeth (MAALOX PLUS ES or MYLANTA DS) suspension 20 mL Q2HRS PRN   • ondansetron (ZOFRAN ODT) dispertab 4 mg 4X/DAY PRN    Or   • ondansetron (ZOFRAN) syringe/vial injection 4 mg 4X/DAY PRN   • traZODone (DESYREL) tablet 50 mg QHS PRN   • sodium chloride (OCEAN) 0.65 % nasal spray 2 Spray PRN   • budesonide-formoterol (SYMBICORT) 160-4.5 MCG/ACT inhaler 2 Puff BID   • DILTIAZem (CARDIZEM) tablet 60 mg TWICE DAILY   • gabapentin (NEURONTIN) capsule 600 mg QHS   • HYDROcodone-acetaminophen (NORCO) 5-325 MG per tablet 1 Tab Q6HRS PRN   • hydrocortisone (CORTEF) tablet 10 mg Q EVENING   • hydrocortisone (CORTEF) tablet 20 mg AFTER LUNCH   • hydrocortisone (CORTEF) tablet 30 mg QDAY with Breakfast   • levothyroxine (SYNTHROID) tablet 75 mcg AM ES   • montelukast (SINGULAIR) tablet 10 mg DAILY   • nystatin (MYCOSTATIN) powder BID   • omeprazole (PRILOSEC) capsule 40 mg BID   • polyethylene glycol/lytes (MIRALAX) PACKET 1 Packet BID   • sotalol (BETAPACE) tablet 40 mg BID   • sucralfate (CARAFATE) 1 GM/10ML suspension 1 g Q6HRS   • tamsulosin (FLOMAX) capsule 0.4 mg DAILY   • tiotropium (SPIRIVA) 18 MCG inhalation capsule 1 Cap DAILY       Orders Placed This Encounter   Procedures   • Diet Order Low Fiber(GI Soft)     Standing Status:   Standing     Number of Occurrences:   1     Order Specific Question:   Diet:     Answer:   Low Fiber(GI Soft) [2]       Assessment:  Active Hospital Problems    Diagnosis   • *GI bleed   • Aortic stenosis   • Anemia due to GI blood loss   • Anticoagulant long-term use   • Chronic back pain   • EVANGELINA (obstructive sleep apnea)   • BPH (benign prostatic hypertrophy)       Medical Decision Making and Plan:  GI Bleed w debility: GI bleed s/p clipping x2 areas s/p multiple transfusions. Last Hgb 8.7 on 1/27/19  -Continue Omeprazole 40 mg BID. Recommended at  discharge to continue for 2 weeks then 20 mg BID until follow-up with GI  -Continue Sucralfate for 2-4 weeks  -PT and OT for mobility and ADLs.     Adrenal Insufficiency - Patient on steroids at baseline for previous pituitary lesion.   -Continue hydrocortisone 30 QAM, 20 QNOON and 10 mg QHS. Baseline on 10 mg TID. Decrease QAM to 20 mg  -Consider consult hospitalist     Left Ankle - Worsening arthritis with early charcot changes. Would like referral back to [facility]. Consider AFO although limited evidence of improvement and concern for skin breakdown.   -Orthotics vendor to issue soft brace for correction of deformity. Improved ambulation    HTN/A Fib - Patient on Diltiazem 60 mg BID, Sotalol 40 mg BID. Previously on Lisinopril which was put on hold due to hypotension.  -Continue Diltiazem and Sotalol. SBP. Will restart Lisinopril at 10, continue to monitor another day  -Will need to restart Coumadin on 2/2/19 per DC summary     COPD - Patient on Symbicort BID, Singular daily, and Spiriva  -RT to consult     Leukocytosis - Chronic issue with labile WBC. Patient will need follow-up with Hematology. Admit WBC - 19     Hypothyroidism - Patient on 75 mcg daily     BPH - Patient on Flomax 0.4 mg on transfer, currently with condom catheter PRN. Would prefer patient to wear condom cath only at night, he prefers during daytime as well.      DVT Ppx - Currently high risk for bleed with recent GI bleed. Restart Coumadin on 2/2/19    Total time:  25 minutes.  I spent greater than 50% of the time for patient care, counseling, and coordination on this date, including unit/floor time, and face-to-face time with the patient as per interval events and assessment and plan above. Topics discussed included bracing for ankle, decrease steroids and monitor SBP.     Faviola Garcia M.D.

## 2019-01-30 NOTE — FLOWSHEET NOTE
01/30/19 0857   Events/Summary/Plan   Events/Summary/Plan 02 spot check   Interdisciplinary Plan of Care-Goals (Indications)   Bronchodilator Indications History / Diagnosis;Strong Subjective / Objective Improvement   Interdisciplinary Plan of Care-Outcomes    Bronchodilator Outcome Patient at Stable Baseline   Respiratory WDL   Respiratory (WDL) X   Chest Exam   Respiration 20   Pulse 75   Oximetry   #Pulse Oximetry (Single Determination) Yes   Oxygen   Home O2 Use Prior To Admission? Yes   Home O2 LPM Flow 5 LPM   Home O2 Delivery Method Nasal Cannula   Home O2 Frequency of Use Continuous   Pulse Oximetry 97 %   O2 (LPM) 5   O2 Daily Delivery Respiratory  Silicone Nasal Cannula

## 2019-01-30 NOTE — CARE PLAN
"Problem: Bowel/Gastric:  Goal: Normal bowel function is maintained or improved  Scheduled Miralax and stool softer have been given as ordered. Per patient last bowel movement was yesterday \"just one time, and at home I go twice a day\". Bowel sounds present in all four quadrants. LBM     Problem: Urinary Elimination:  Goal: Ability to reestablish a normal urinary elimination pattern will improve  Pt. is continent of bladder. Per patient request condom cath stay on all the time. New condom catheter was placed and drainage bag was changed. . MD aware.       "

## 2019-01-31 PROCEDURE — 97530 THERAPEUTIC ACTIVITIES: CPT

## 2019-01-31 PROCEDURE — A9270 NON-COVERED ITEM OR SERVICE: HCPCS | Performed by: PHYSICAL MEDICINE & REHABILITATION

## 2019-01-31 PROCEDURE — 97112 NEUROMUSCULAR REEDUCATION: CPT

## 2019-01-31 PROCEDURE — 770010 HCHG ROOM/CARE - REHAB SEMI PRIVAT*

## 2019-01-31 PROCEDURE — 97110 THERAPEUTIC EXERCISES: CPT

## 2019-01-31 PROCEDURE — 94760 N-INVAS EAR/PLS OXIMETRY 1: CPT

## 2019-01-31 PROCEDURE — 97116 GAIT TRAINING THERAPY: CPT

## 2019-01-31 PROCEDURE — 99233 SBSQ HOSP IP/OBS HIGH 50: CPT | Performed by: PHYSICAL MEDICINE & REHABILITATION

## 2019-01-31 PROCEDURE — 700102 HCHG RX REV CODE 250 W/ 637 OVERRIDE(OP): Performed by: PHYSICAL MEDICINE & REHABILITATION

## 2019-01-31 RX ORDER — ANALGESIC BALM 1.74; 4.06 G/29G; G/29G
OINTMENT TOPICAL 3 TIMES DAILY PRN
Status: DISCONTINUED | OUTPATIENT
Start: 2019-01-31 | End: 2019-02-08 | Stop reason: HOSPADM

## 2019-01-31 RX ADMIN — POLYETHYLENE GLYCOL 3350 1 PACKET: 17 POWDER, FOR SOLUTION ORAL at 08:24

## 2019-01-31 RX ADMIN — TAMSULOSIN HYDROCHLORIDE 0.4 MG: 0.4 CAPSULE ORAL at 08:22

## 2019-01-31 RX ADMIN — HYDROCORTISONE 10 MG: 10 TABLET ORAL at 20:35

## 2019-01-31 RX ADMIN — SUCRALFATE 1 G: 1 SUSPENSION ORAL at 17:31

## 2019-01-31 RX ADMIN — LEVOTHYROXINE SODIUM 75 MCG: 75 TABLET ORAL at 05:47

## 2019-01-31 RX ADMIN — BUDESONIDE AND FORMOTEROL FUMARATE DIHYDRATE 2 PUFF: 160; 4.5 AEROSOL RESPIRATORY (INHALATION) at 20:35

## 2019-01-31 RX ADMIN — HYDROCORTISONE 20 MG: 10 TABLET ORAL at 12:00

## 2019-01-31 RX ADMIN — NYSTATIN: 100000 POWDER TOPICAL at 08:25

## 2019-01-31 RX ADMIN — HYDROCODONE BITARTRATE AND ACETAMINOPHEN 1 TABLET: 5; 325 TABLET ORAL at 20:36

## 2019-01-31 RX ADMIN — DILTIAZEM HYDROCHLORIDE 60 MG: 30 TABLET, FILM COATED ORAL at 05:47

## 2019-01-31 RX ADMIN — TIOTROPIUM BROMIDE 1 CAPSULE: 18 CAPSULE ORAL; RESPIRATORY (INHALATION) at 08:22

## 2019-01-31 RX ADMIN — OMEPRAZOLE 40 MG: 20 CAPSULE, DELAYED RELEASE ORAL at 08:22

## 2019-01-31 RX ADMIN — POLYETHYLENE GLYCOL 3350 1 PACKET: 17 POWDER, FOR SOLUTION ORAL at 20:34

## 2019-01-31 RX ADMIN — HYDROCORTISONE 20 MG: 10 TABLET ORAL at 08:22

## 2019-01-31 RX ADMIN — NYSTATIN: 100000 POWDER TOPICAL at 20:35

## 2019-01-31 RX ADMIN — HYDROCODONE BITARTRATE AND ACETAMINOPHEN 1 TABLET: 5; 325 TABLET ORAL at 09:59

## 2019-01-31 RX ADMIN — DILTIAZEM HYDROCHLORIDE 60 MG: 30 TABLET, FILM COATED ORAL at 17:31

## 2019-01-31 RX ADMIN — SUCRALFATE 1 G: 1 SUSPENSION ORAL at 12:00

## 2019-01-31 RX ADMIN — Medication 2 TABLET: at 20:35

## 2019-01-31 RX ADMIN — SOTALOL HYDROCHLORIDE 40 MG: 80 TABLET ORAL at 08:22

## 2019-01-31 RX ADMIN — SUCRALFATE 1 G: 1 SUSPENSION ORAL at 23:58

## 2019-01-31 RX ADMIN — SUCRALFATE 1 G: 1 SUSPENSION ORAL at 05:47

## 2019-01-31 RX ADMIN — MONTELUKAST SODIUM 10 MG: 10 TABLET, FILM COATED ORAL at 08:22

## 2019-01-31 RX ADMIN — LISINOPRIL 10 MG: 5 TABLET ORAL at 08:21

## 2019-01-31 RX ADMIN — Medication 2 TABLET: at 08:22

## 2019-01-31 RX ADMIN — SOTALOL HYDROCHLORIDE 40 MG: 80 TABLET ORAL at 20:35

## 2019-01-31 RX ADMIN — GABAPENTIN 600 MG: 300 CAPSULE ORAL at 20:35

## 2019-01-31 RX ADMIN — BUDESONIDE AND FORMOTEROL FUMARATE DIHYDRATE 2 PUFF: 160; 4.5 AEROSOL RESPIRATORY (INHALATION) at 08:22

## 2019-01-31 RX ADMIN — OMEPRAZOLE 40 MG: 20 CAPSULE, DELAYED RELEASE ORAL at 20:35

## 2019-01-31 ASSESSMENT — GAIT ASSESSMENTS
GAIT LEVEL OF ASSIST: CONTACT GUARD ASSIST
DISTANCE (FEET): 80
DEVIATION: BRADYKINETIC
GAIT LEVEL OF ASSIST: CONTACT GUARD ASSIST
DEVIATION: BRADYKINETIC
ASSISTIVE DEVICE: 4 WHEEL WALKER
DISTANCE (FEET): 80
ASSISTIVE DEVICE: 4 WHEEL WALKER

## 2019-01-31 NOTE — FLOWSHEET NOTE
01/31/19 1035   Events/Summary/Plan   Events/Summary/Plan 02 spot check.  Pt sitting quietly in room   Interdisciplinary Plan of Care-Goals (Indications)   Bronchodilator Indications History / Diagnosis   Interdisciplinary Plan of Care-Outcomes    Bronchodilator Outcome Patient at Stable Baseline   Respiratory WDL   Respiratory (WDL) X   Chest Exam   Respiration 18   Pulse 74   Breath Sounds   RML Breath Sounds Diminished   RLL Breath Sounds Diminished   LLL Breath Sounds Diminished   Oximetry   #Pulse Oximetry (Single Determination) Yes   Oxygen   Home O2 Use Prior To Admission? Yes   Home O2 LPM Flow 5 LPM   Home O2 Delivery Method Nasal Cannula   Home O2 Frequency of Use Continuous   Pulse Oximetry 96 %   O2 (LPM) 5   O2 Daily Delivery Respiratory  Silicone Nasal Cannula

## 2019-01-31 NOTE — CARE PLAN
Problem: Dressing  Goal: STG-Within one week, patient will dress LB  1) Individualized Goal:  Set up with AE prn  2) Interventions:  OT Group Therapy, OT Self Care/ADL, OT Community Reintegration, OT Neuro Re-Ed/Balance, OT Therapeutic Activity, OT Evaluation and OT Therapeutic Exercise     Outcome: NOT MET    Goal: STG-Within one week, patient will retrieve clothing  1) Individualized Goal:  CGA with 4ww  2) Interventions:  OT Group Therapy, OT Self Care/ADL, OT Community Reintegration, OT Neuro Re-Ed/Balance, OT Therapeutic Activity, OT Evaluation and OT Therapeutic Exercise   Outcome: NOT MET      Problem: Toileting  Goal: STG-Within one week, patient will complete toileting tasks  1) Individualized Goal:  SUpervision for safety  2) Interventions:  OT Group Therapy, OT Self Care/ADL, OT Community Reintegration, OT Neuro Re-Ed/Balance, OT Therapeutic Activity, OT Evaluation and OT Therapeutic Exercise   Outcome: NOT MET

## 2019-01-31 NOTE — CARE PLAN
Problem: Balance  Goal: STG-Within one week, patient will maintain static standing  1) Individualized goal:  In // bars with no UE support, SPV for 1 minute.  2) Interventions:  PT Group Therapy, PT Gait Training, PT Self Care/Home Eval, PT Therapeutic Exercises, PT Neuro Re-Ed/Balance, PT Therapeutic Activity and PT Manual Therapy     Outcome: NOT MET  Not formally assessed  Goal: STG-Within one week, patient will maintain dynamic sitting  1) Individualized goal:  On uneven surface with no UE for 1 minute  2) Interventions:  PT Group Therapy, PT Gait Training, PT Self Care/Home Eval, PT Therapeutic Exercises, PT Neuro Re-Ed/Balance, PT Therapeutic Activity and PT Manual Therapy     Outcome: NOT MET  Not formally assessed  Goal: STG-Within one week, patient will maintain dynamic standing  1) Individualized goal:  In // bars on uneven surface with single UE support for 30 seconds  2) Interventions:  PT Group Therapy, PT Gait Training, PT Self Care/Home Eval, PT Therapeutic Exercises, PT Neuro Re-Ed/Balance, PT Therapeutic Activity and PT Manual Therapy     Outcome: NOT MET  Not formally assessed    Problem: Mobility  Goal: STG-Within one week, patient will propel wheelchair household distances  1) Individualized goal:  100 ft x 2 with min A  2) Interventions:  PT Group Therapy, PT Gait Training, PT Self Care/Home Eval, PT Therapeutic Exercises, PT Neuro Re-Ed/Balance, PT Therapeutic Activity and PT Manual Therapy     Outcome: NOT MET  Endurance limited to 50 ft  Goal: STG-Within one week, patient will ambulate household distance  1) Individualized goal:  50 ft x 2 with 4WW, SPV/set-up  2) Interventions:  PT Group Therapy, PT Gait Training, PT Self Care/Home Eval, PT Therapeutic Exercises, PT Neuro Re-Ed/Balance, PT Therapeutic Activity and PT Manual Therapy     Outcome: PROGRESSING AS EXPECTED  CGA for safety, ambulates 80 ft x 3  Goal: STG-Within one week, patient will ambulate up/down a curb  1) Individualized  goal:  With 4WW and mod A  2) Interventions:  PT Group Therapy, PT Gait Training, PT Self Care/Home Eval, PT Therapeutic Exercises, PT Neuro Re-Ed/Balance, PT Therapeutic Activity and PT Manual Therapy     Outcome: MET Date Met: 01/31/19  Defer to LTG

## 2019-01-31 NOTE — REHAB-CM IDT TEAM NOTE
Case Management    DC Planning    DC destination/dispostion:  Home w/ family assist to [place], [place] (near [place]). Has 4WW, entry ramp & home O2. Wife has undiagnosed tremor. Daughter is retired RN who lives w/in 3 miles to assist.     Referrals: TBD.    DC Needs: HH referral. MD f/u appts: verify if cardiology, pulmonary & GI f/u needed post rehab.    Barriers to discharge: Functional mobilty deficits, [place], wife unable to provide much assistance.      Strengths: Motivation. Good support system. Already established w/ cardiology, Dr. Maloney, & pulmonary (unknown provider's name on [address]    Section completed by:  Addie Box R.N.

## 2019-01-31 NOTE — DISCHARGE PLANNING
Case Management;  Met with patient and reviewed team conference discussion and d/c target.  He confirms that he has his 4ww and home oxygen.   He has used [facility] prior and is agreeable to referral to them again.  Case Management will continue to follow.

## 2019-01-31 NOTE — REHAB-PHARMACY IDT TEAM NOTE
Pharmacy   Pharmacy  Antibiotics/Antifungals/Antivirals:  Medication:      Active Orders     None        Route:         None  Stop Date:  N/A  Reason:   Antihypertensives/Cardiac:  Medication:    Orders (72h ago through future)    Start     Ordered    01/30/19 0900  lisinopril (PRINIVIL) tablet 10 mg  DAILY      01/29/19 1245    01/28/19 2100  sotalol (BETAPACE) tablet 40 mg  2 TIMES DAILY      01/28/19 1438    01/28/19 1800  DILTIAZem (CARDIZEM) tablet 60 mg  TWICE DAILY      01/28/19 1438    01/28/19 1438  hydrALAZINE (APRESOLINE) tablet 25 mg  EVERY 8 HOURS PRN      01/28/19 1438        Patient Vitals for the past 24 hrs:   BP Pulse   01/30/19 1739 112/60 -   01/30/19 1501 128/66 76   01/30/19 1300 105/62 72   01/30/19 1001 - 82   01/30/19 0857 - 75   01/30/19 0700 120/74 68   01/30/19 0539 150/80 70   01/29/19 1940 108/66 70       Anticoagulation:  Medication:  Not applicable  INR:      Other key medications:       A review of the medication list reveals no issues at this time. Patient is currently on antihypertensive(s). Recommend home blood pressure monitoring/recording if antihypertensive(s) regimen(s) continue.    Section completed by:  Asaf Angeles [facility]

## 2019-01-31 NOTE — DISCHARGE PLANNING
"CASE MANAGEMENT INITIAL ASSESSMENT    Admit Date:  1/28/2019     I met with patient to discuss role of case management / discharge planning / team conference.   Patient is a  86 y.o. male transferred from [facility] 1.28.19 s/p UGI bleed, acute resp failure requiring airway protection & vent mgmt & extubation.   PMHx: multiple medical co-morbidities, PE & afib w/ PPM, on chronic coumadin, COPD on home O2 24/7 w/ [employer], BPH, DJD, HTN, CAD, cardiomyopathy, IVC filter placement, EVANGELINA, cervical fusion, glaucoma, pituitary adenoma, on chronic steroids.  PLOF: mod independent, uses 4WW, still drives, home O2 dependent at 5L/nc, lives w/ wife (has undiagnosed tremor disorder), has ramp to enter home, supportive daughter lives approx 3 mi away (retired RN, caring for patient's wife while he is hospitalized), has tub/shower combo, no grab bars, has shower \"stool\" w/o arms. States \"if we need grab bars either my son or a contractor can install them.\"   States \"I need to be modified independent to go home.\" Discussed IDT conference set for tomorrow. Reviewed possibility of HH referral for discharge to continue progress w/ therapies post acute. Verbalizes understanding may need continued therapies for back to baseline.    Admitting MD: Dr. Emory Garcia    Diagnosis: 16 Debility  GI bleed    Co-morbidities:   Patient Active Problem List    Diagnosis Date Noted   • Acute on chronic respiratory failure with hypoxia (HCC) 01/21/2019     Priority: High   • Advance care planning 01/27/2019     Priority: Medium   • GI bleed 01/21/2019     Priority: Medium   • Edema 01/24/2019     Priority: Low   • Aortic stenosis 01/22/2019     Priority: Low   • Anemia due to GI blood loss 01/21/2019     Priority: Low   • Candidal intertrigo 01/21/2019     Priority: Low   • Anticoagulant long-term use 07/02/2012     Priority: Low   • COPD (chronic obstructive pulmonary disease) (HCC) 02/03/2012     Priority: Low   • History of pulmonary " embolism 02/03/2012     Priority: Low   • Paroxysmal atrial fibrillation ([facility]) 08/29/2011     Priority: Low   • 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[facility])      Priority: Low   • Cardiac pacemaker 04/01/2010     Priority: Low   • Pleural effusion 01/23/2019   • Supratherapeutic INR 01/21/2019   • Pulmonary embolism ([facility]) 08/18/2016   • Potential for deficient knowledge of chronic obstructive pulmonary disease 08/02/2016   • Frequent PVCs 07/26/2016   • Chronic respiratory failure ([facility]) 07/26/2016   • Long term current use of antiarrhythmic drug 07/26/2016   • Ventricular ectopy 06/16/2016   • SOB (shortness of breath) 06/16/2016   • Peripheral neuropathy, idiopathic 05/26/2015   • 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 06/20/2013   • EVANGELINA (obstructive sleep apnea) 02/03/2012   • Chronic back pain 02/03/2012   • SSS (sick sinus syndrome) ([facility]) 08/29/2011   • Third degree AV block ([facility]) 08/29/2011   • Obstructive hypertrophic cardiomyopathy ([facility]) 08/04/2011   • Essential hypertension, benign 04/14/2010   • BPH (benign prostatic hypertrophy) 04/01/2010     Prior Living Situation:  Housing / Facility: [address] [facility] in [place], [place] (outside [facility])  Lives with - Patient's Self Care Capacity: Spouse w/ medical issues also    Prior Level of Function:  Medication Management: Independent  Finances: Independent  Home Management: Independent  Shopping: Requires Assist  Prior Level Of Mobility: Independent With Device in Community  Driving / Transportation: Driving Independent    Support Systems:  Primary : Maya Lee daughter [phone]  Other support systems: none provided  Advance Directives: No  Power of  (Name & Phone): no    Previous Services Utilized:   Equipment Owned: 4-Wheel Walker, Ramp, Oxygen, Other (Comments) (has shower stool w/o arms)  Prior Services: Home-Independent    Other Information:  Occupation (Pre-Hospital Vocational): Retired Due To Age     Primary Payor Source:  Medicare A  Secondary Payor Source: Supplemental Insurance  Primary Care Practitioner : Dr. Bebe Banerjee [phone]  Other MDs: Dr. Luca MCCLNEDON, Dr. Gene Maloney Cardiology, unknown inessa SHELDON    Patient / Family Goal:  Patient / Family Goal: To improve mobility, To get back home to my wife.    Additional Case Management Questions:  Have you ever received case management services for yourself or a family member? no    Do you feel you have and an understanding of what services  provide? Yes, thank you.    Do you have any additional questions regarding case management? I need to be modified independent to go home.         CASE MANAGEMENT PLAN OF CARE   Individualized Goals:   1. To improve mobility.  2. To improve strength.  3. To get back home to my wife.    Barriers:   1. Multiple medical co-morbidities.  2. Functional mobility deficits.  [place]. [place].    Plan:  1. Continue to follow patient through hospitalization and provide discharge planning in collaboration with patient, family, physicians and ancillary services.     2. Utilize community resources to ensure a safe discharge.

## 2019-01-31 NOTE — CARE PLAN
Problem: Safety  Goal: Will remain free from falls    Intervention: Implement fall precautions  Appropriately uses call light to make needs known.Bed in low position, non skid socks/shoes on when out of bed.Call light within reach.Will continue to monitor and assess needs and safety.      Problem: Bowel/Gastric:  Goal: Normal bowel function is maintained or improved    Intervention: Educate patient and significant other/support system about signs and symptoms of constipation and interventions to implement  Pt continues to refused scheduled miralax.Continent of bowel.[facility] 01/30.Will continue to monitor and assess for s/sx of constipation.      Problem: Pain Management  Goal: Pain level will decrease to patient's comfort goal    Intervention: Follow pain managment plan developed in collaboration with patient and Interdisciplinary Team  Medicated per request for pain with good effect.Repositioned with pillows for comfort.Will continue to monitor and assess pain level and medicate as needed.

## 2019-01-31 NOTE — REHAB-OT IDT TEAM NOTE
Occupational Therapy   Activities of Daily Living  Eating Initial:  6 - Modified Independent  Eating Current:  6 - Modified Independent   Eating Description:     Grooming Initial:  5 - Standby Prompting/Supervision or Set-up  Grooming Current:  5 - Standby Prompting/Supervision or Set-up   Grooming Description:  Set-up of equipment  Bathing Initial:  4 - Minimal Assistance  Bathing Current:  5 - Standby Prompting/Supervision or Set-up   Bathing Description:  Grab bar, Tub bench  Upper Body Dressing Initial:  4 - Minimal Assistance  Upper Body Dressing Current:  5 - Standby Prompting/Supervision or Set-up   Upper Body Dressing Description:     Lower Body Dressing Initial:  1 - Total Assistance  Lower Body Dressing Current:  1 - Total Assistance   Lower Body Dressing Description:  1 - Total Assistance  Toileting Initial:  4 - Minimal Assistance  Toileting Current:  2 - Max Assistance   Toileting Description:     Toilet Transfer Initial:  5 - Standby Prompting/Supervision or Set-up  Toilet Transfer Current:  4 - Minimal Assistance   Toilet Transfer Description:  4 - Minimal Assistance  Tub / Shower Transfer Initial:  4 - Minimal Assistance  Tub / Shower Transfer Current:  4 - Minimal Assistance   Tub / Shower Transfer Description:  Grab bar  IADL:  Total A   Family Training/Education:  Wife to be trained   DME/DC Recommendations:  Home with assist per wife     Strengths:  Motivated for self care and independence, Pleasant and cooperative, Supportive family and Willingly participates in therapeutic activities  Barriers:  Decreased endurance, Generalized weakness and Other: arthritis     # of short term goals set= 3    # of short term goals met= 0          Occupational Therapy Goals           Problem: Dressing     Dates: Start: 01/29/19       Goal: STG-Within one week, patient will dress LB     Dates: Start: 01/29/19       Description: 1) Individualized Goal:  Set up with AE prn  2) Interventions:  OT Group Therapy, OT  Self Care/ADL, OT Community Reintegration, OT Neuro Re-Ed/Balance, OT Therapeutic Activity, OT Evaluation and OT Therapeutic Exercise             Goal: STG-Within one week, patient will retrieve clothing     Dates: Start: 01/29/19       Description: 1) Individualized Goal:  CGA with 4ww  2) Interventions:  OT Group Therapy, OT Self Care/ADL, OT Community Reintegration, OT Neuro Re-Ed/Balance, OT Therapeutic Activity, OT Evaluation and OT Therapeutic Exercise             Problem: OT Long Term Goals     Dates: Start: 01/29/19       Goal: LTG-By discharge, patient will complete basic self care tasks     Dates: Start: 01/29/19       Description: 1) Individualized Goal:  MOd I  2) Interventions:  OT Group Therapy, OT Self Care/ADL, OT Community Reintegration, OT Neuro Re-Ed/Balance, OT Therapeutic Activity, OT Evaluation and OT Therapeutic Exercise           Goal: LTG-By discharge, patient will complete basic home management     Dates: Start: 01/29/19       Description: 1) Individualized Goal:  MOd I  2) Interventions:  OT Group Therapy, OT Self Care/ADL, OT Community Reintegration, OT Neuro Re-Ed/Balance, OT Therapeutic Activity, OT Evaluation and OT Therapeutic Exercise             Problem: Toileting     Dates: Start: 01/29/19       Goal: STG-Within one week, patient will complete toileting tasks     Dates: Start: 01/29/19       Description: 1) Individualized Goal:  SUpervision for safety  2) Interventions:  OT Group Therapy, OT Self Care/ADL, OT Community Reintegration, OT Neuro Re-Ed/Balance, OT Therapeutic Activity, OT Evaluation and OT Therapeutic Exercise                 Section completed by:  Kellee Askew MS,OTR/L

## 2019-01-31 NOTE — REHAB-RESPIRATORY IDT TEAM NOTE
Respiratory Therapy   Respiratory Therapy    Pt is stable on his home 0xygen level of 5 lpm, with SATS in mid 90's.    Section completed by:  Cheryl Loya, RRT

## 2019-01-31 NOTE — PROGRESS NOTES
Received shift report and assumed care of patient.  Patient awake, calm and stable, assisted up into wheelchair for meal. Will continue to monitor.

## 2019-01-31 NOTE — REHAB-PT IDT TEAM NOTE
Physical Therapy   Mobility  Bed mobility:   SBA / CGA  Bed /Chair/Wheelchair Transfer Initial:  5 - Standby Prompting/Supervision or Set-up  Bed /Chair/Wheelchair Transfer Current:  4 - Minimal Assistance   Bed/Chair/Wheelchair Transfer Description:  Adaptive equipment, Increased time, Supervision for safety, Verbal cueing, Set-up of equipment (Bed mobility and SPT with SBA)  Walk Initial:  1 - Total Assistance  Walk Current:  2 - Max Assistance   Walk Description:   (CGA with 4WW 80 ft x 3)  Wheelchair Initial:  1 - Total Assistance  Wheelchair Current:  2 - Max Assistance   Wheelchair Description:   (SBA with BUE support x 50 ft, increased time/effort)  Stairs Initial:  0 - Not tested,unsafe activity  Stairs Current: 2 - Max Assistance   Stairs Description:    Patient/Family Training/Education:  On-going endurance/ gait training, trial use of soft orthotic for L ankle/foot  DME/DC Recommendations:  Home health PT  Strengths:  Able to follow instructions, Independent PLOF, Making steady progress towards goals, Manages pain appropriately, Motivated for self care and independence, Pleasant and cooperative, Supportive family and Willingly participates in therapeutic activities  Barriers:   Decreased endurance, Generalized weakness, Limited mobility and Other: chronic LBP, L foot/ankle instability  # of short term goals set= 6  # of short term goals met=1       Physical Therapy Problems           Problem: Balance     Dates: Start: 01/29/19       Goal: STG-Within one week, patient will maintain static standing     Dates: Start: 01/29/19       Description: 1) Individualized goal:  In // bars with no UE support, SPV for 1 minute.  2) Interventions:  PT Group Therapy, PT Gait Training, PT Self Care/Home Eval, PT Therapeutic Exercises, PT Neuro Re-Ed/Balance, PT Therapeutic Activity and PT Manual Therapy       Note:     Goal Note filed on 01/31/19 1215 by Halley Hays, BSPT, ATP    Goal: STG-Within one week, patient  will maintain static standing  Outcome: NOT MET  Not formally assessed                Goal: STG-Within one week, patient will maintain dynamic sitting     Dates: Start: 01/29/19       Description: 1) Individualized goal:  On uneven surface with no UE for 1 minute  2) Interventions:  PT Group Therapy, PT Gait Training, PT Self Care/Home Eval, PT Therapeutic Exercises, PT Neuro Re-Ed/Balance, PT Therapeutic Activity and PT Manual Therapy       Note:     Goal Note filed on 01/31/19 1215 by SHIRA GeorgeT, ATP    Goal: STG-Within one week, patient will maintain dynamic sitting  Outcome: NOT MET  Not formally assessed                Goal: STG-Within one week, patient will maintain dynamic standing     Dates: Start: 01/29/19       Description: 1) Individualized goal:  In // bars on uneven surface with single UE support for 30 seconds  2) Interventions:  PT Group Therapy, PT Gait Training, PT Self Care/Home Eval, PT Therapeutic Exercises, PT Neuro Re-Ed/Balance, PT Therapeutic Activity and PT Manual Therapy       Note:     Goal Note filed on 01/31/19 1215 by Halley Hays, SHIRAT, ATP    Goal: STG-Within one week, patient will maintain dynamic standing  Outcome: NOT MET  Not formally assessed                  Problem: Mobility     Dates: Start: 01/29/19       Goal: STG-Within one week, patient will propel wheelchair household distances     Dates: Start: 01/29/19       Description: 1) Individualized goal:  100 ft x 2 with min A  2) Interventions:  PT Group Therapy, PT Gait Training, PT Self Care/Home Eval, PT Therapeutic Exercises, PT Neuro Re-Ed/Balance, PT Therapeutic Activity and PT Manual Therapy       Note:     Goal Note filed on 01/31/19 1215 by SHIRA GeorgeT, ATP    Goal: STG-Within one week, patient will propel wheelchair household   distances  Outcome: NOT MET  Endurance limited to 50 ft                Goal: STG-Within one week, patient will ambulate household distance     Dates: Start:  01/29/19       Description: 1) Individualized goal:  50 ft x 2 with 4WW, SPV/set-up  2) Interventions:  PT Group Therapy, PT Gait Training, PT Self Care/Home Eval, PT Therapeutic Exercises, PT Neuro Re-Ed/Balance, PT Therapeutic Activity and PT Manual Therapy       Note:     Goal Note filed on 01/31/19 1215 by Halley Hays, BSPT, ATP    Goal: STG-Within one week, patient will ambulate household distance  Outcome: PROGRESSING AS EXPECTED  CGA for safety, ambulates 80 ft x 3                  Problem: PT-Long Term Goals     Dates: Start: 01/29/19       Goal: LTG-By discharge, patient will maintain balance     Dates: Start: 01/29/19       Description: 1) Individualized goal:  With no UE support for standing on even surface for >5 minutes independently  2) Interventions:  PT Group Therapy, PT Gait Training, PT Self Care/Home Eval, PT Therapeutic Exercises, PT Neuro Re-Ed/Balance, PT Therapeutic Activity and PT Manual Therapy              Goal: LTG-By discharge, patient will propel wheelchair     Dates: Start: 01/29/19       Description: 1) Individualized goal:  >200 ft on even surface independently  2) Interventions:  PT Group Therapy, PT Gait Training, PT Self Care/Home Eval, PT Therapeutic Exercises, PT Neuro Re-Ed/Balance, PT Therapeutic Activity and PT Manual Therapy             Goal: LTG-By discharge, patient will ambulate     Dates: Start: 01/29/19       Description: 1) Individualized goal:  200 ft x 3 with 4WW modified independent  2) Interventions:  PT Group Therapy, PT Gait Training, PT Self Care/Home Eval, PT Therapeutic Exercises, PT Neuro Re-Ed/Balance, PT Therapeutic Activity and PT Manual Therapy             Goal: LTG-By discharge, patient will transfer in/out of a car     Dates: Start: 01/29/19       Description: 1) Individualized goal:  Modified independent   2) Interventions:  PT Group Therapy, PT Gait Training, PT Self Care/Home Eval, PT Therapeutic Exercises, PT Neuro Re-Ed/Balance, PT Therapeutic  Activity and PT Manual Therapy                     Section completed by:  Halley Hays, BSPT, ATP

## 2019-01-31 NOTE — REHAB-COLLABORATIVE ONGOING IDT TEAM NOTE
Weekly Interdisciplinary Team Conference Note    Weekly Interdisciplinary Team Conference # 1  Date:  1/31/2019    Clinicians present and reporting at team conference include the following:   MD: SABRA Garcia MD    RN:  Nalini Palafox RN   PT:   Ni Hays PT, BSPT  OT:  Kellee Askew, MS, OTR/L   ST:  None  CM:  Pamela Lynch RN, [facility]  REC:  None  RT:  None  RPh:  Cecilia Quinteros [facility]  Other:   None  All reporting clinicians have a working knowledge of this patient's plan of care.    Targeted DC Date:  2/9/19     Medical    Patient Active Problem List    Diagnosis Date Noted   • Acute on chronic respiratory failure with hypoxia (HCC) 01/21/2019     Priority: High   • Advance care planning 01/27/2019     Priority: Medium   • GI bleed 01/21/2019     Priority: Medium   • Edema 01/24/2019     Priority: Low   • Aortic stenosis 01/22/2019     Priority: Low   • Anemia due to GI blood loss 01/21/2019     Priority: Low   • Candidal intertrigo 01/21/2019     Priority: Low   • Anticoagulant long-term use 07/02/2012     Priority: Low   • COPD (chronic obstructive pulmonary disease) ([facility]) 02/03/2012     Priority: Low   • History of pulmonary embolism 02/03/2012     Priority: Low   • Paroxysmal atrial fibrillation ([facility]) 08/29/2011     Priority: Low   • 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[facility])      Priority: Low   • Cardiac pacemaker 04/01/2010     Priority: Low   • Pleural effusion 01/23/2019   • Supratherapeutic INR 01/21/2019   • Pulmonary embolism ([facility]) 08/18/2016   • Potential for deficient knowledge of chronic obstructive pulmonary disease 08/02/2016   • Frequent PVCs 07/26/2016   • Chronic respiratory failure (HCC) 07/26/2016   • Long term current use of antiarrhythmic drug 07/26/2016   • Ventricular ectopy 06/16/2016   • SOB (shortness of breath) 06/16/2016   • Peripheral neuropathy, idiopathic 05/26/2015   • 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) 08/29/2011   • Third degree AV block ([facility]) 08/29/2011   • Obstructive hypertrophic cardiomyopathy ([facility]) 08/04/2011   • Essential hypertension, benign 04/14/2010   • BPH (benign prostatic hypertrophy) 04/01/2010     Results     ** No results found for the last 24 hours. **           Nursing  Diet/Nutrition: Low Fiber  Medication Administration:  Whole with Liquid Wash  % consumed at meals in last 24 hours:  Consumed 75%-100% of meals per documentation.  Meal/Snack Consumption for the past 24 hrs:   Oral Nutrition   01/30/19 1300 Lunch;Between % Consumed   01/30/19 0900 Breakfast;Between % Consumed     Snack schedule:  None  Appetite:  Good  Fluid Intake/Output in past 24 hours: In: 960 [P.O.:960]  Out: 2100   Admit Weight:  Weight: 93 kg (205 lb 0.4 oz)  Weight Last 7 Days   [93 kg (205 lb 0.4 oz)] 93 kg (205 lb 0.4 oz) (01/28 1445)    Pain Issues:    Location:  Back (01/30 2352)  Mid (01/30 2352)         Severity:  Moderate   Scheduled pain medications:  gabapentin (NEURONTIN)      PRN pain medications used in last 24 hours:  hydrocodone/acetaminophen (NORCO)    Non Pharmacologic Interventions:  emotional support, repositioned and rest  Effectiveness of pain management plan:  good=patient states acceptable comfort after interventions    Bowel:    Bowel Assist Initial Score:  4 - Minimal Assistance  Bowel Assist Current Score:  4 - Minimal Assistance  Bowl Accidents in last 7 days:     Last bowel movement: 01/30/19  Stool Description: Formed, Soft, Medium     Usual bowel pattern:  daily  Scheduled bowel medications:  senna-docusate (PERICOLACE or SENOKOT S)  and polyethylene glycol/lytes (MIRALAX)   PRN bowel medications used in last 24 hours:  None  Nursing Interventions:  Increased time, Scheduled medication, Supervision, Verbal cueing, Positioning on commode/toilet  Effectiveness of bowel program:   fair=sometimes needs prn bowel meds for  constipation  Bladder:    Bladder Assist Initial Score:  1 - Total Assistance  Bladder Assist Current Score:  1 - Total Assistance  Bladder Accidents in last 7 days:     PVR range for past 24-48 hours: -- ()  Medications affecting bladder:  Flomax    Interventions:  Increased time, Verbal cueing, Emptying of device, External catheter  Effectiveness of bladder training:  Fair=no incontinence but requires cueing to keep time void schedule    Wound:  Patient Lines/Drains/Airways Status    Active Current Wounds     Name: Placement date: Placement time: Site: Days:    Wound 01/28/19 Leg 01/28/19   1612      2                   Interventions:  Monitor and assess  Effectiveness of intervention:  wound is unchanged     Sleep/wake cycle:   Average hours slept:  Sleeps 4-6 hours without waking  Sleep medication usage:  None    Patient/Family Training/Education:  Bladder Management/Training, Bowel Management/Training, Diet/Nutrition, Fall Prevention, General Self Care, Medication Management, Pain Management, Respiratory Hygiene, Safety and Skin Care    Strengths: Alert and oriented, Willingly participates in therapeutic activities, Able to follow instructions and Supportive family   Barriers:   Fatigue, Generalized weakness and Limited mobility            Nursing Problems           Problem: Bowel/Gastric:     Goal: Normal bowel function is maintained or improved           Goal: Will not experience complications related to bowel motility             Problem: Communication     Goal: The ability to communicate needs accurately and effectively will improve             Problem: Discharge Barriers/Planning     Goal: Patient's continuum of care needs will be met             Problem: Fluid Volume:     Goal: Will maintain balanced intake and output             Problem: Infection     Goal: Will remain free from infection             Problem: Knowledge Deficit     Goal: Knowledge of disease process/condition, treatment plan, diagnostic  tests, and medications will improve           Goal: Knowledge of the prescribed therapeutic regimen will improve             Problem: Medication     Goal: Compliance with prescribed medication will improve             Problem: Mobility     Goal: Risk for activity intolerance will decrease             Problem: Pain Management     Goal: Pain level will decrease to patient's comfort goal             Problem: Psychosocial Needs:     Goal: Level of anxiety will decrease             Problem: Respiratory:     Goal: Respiratory status will improve             Problem: Safety     Goal: Will remain free from injury           Goal: Will remain free from falls             Problem: Skin Integrity     Goal: Risk for impaired skin integrity will decrease             Problem: Urinary Elimination:     Goal: Ability to reestablish a normal urinary elimination pattern will improve             Problem: Venous Thromboembolism (VTW)/Deep Vein Thrombosis (DVT) Prevention:     Goal: Patient will participate in Venous Thrombosis (VTE)/Deep Vein Thrombosis (DVT)Prevention Measures                  Long Term Goals:   At discharge patient will be able to function safely at home and in the community with support.    Section completed by:  Leta Hall R.N.           Respiratory Therapy    Pt is stable on his home 0xygen level of 5 lpm, with SATS in mid 90's.    Section completed by:  Cheryl Loya, RRT       Mobility  Bed mobility:   SBA / CGA  Bed /Chair/Wheelchair Transfer Initial:  5 - Standby Prompting/Supervision or Set-up  Bed /Chair/Wheelchair Transfer Current:  4 - Minimal Assistance   Bed/Chair/Wheelchair Transfer Description:  Adaptive equipment, Increased time, Supervision for safety, Verbal cueing, Set-up of equipment (Bed mobility and SPT with SBA)  Walk Initial:  1 - Total Assistance  Walk Current:  2 - Max Assistance   Walk Description:   (CGA with 4WW 80 ft x 3)  Wheelchair Initial:  1 - Total  Assistance  Wheelchair Current:  2 - Max Assistance   Wheelchair Description:   (SBA with BUE support x 50 ft, increased time/effort)  Stairs Initial:  0 - Not tested,unsafe activity  Stairs Current: 2 - Max Assistance   Stairs Description:    Patient/Family Training/Education:  On-going endurance/ gait training, trial use of soft orthotic for L ankle/foot  DME/DC Recommendations:  Home health PT  Strengths:  Able to follow instructions, Independent PLOF, Making steady progress towards goals, Manages pain appropriately, Motivated for self care and independence, Pleasant and cooperative, Supportive family and Willingly participates in therapeutic activities  Barriers:   Decreased endurance, Generalized weakness, Limited mobility and Other: chronic LBP, L foot/ankle instability  # of short term goals set= 6  # of short term goals met=1       Physical Therapy Problems           Problem: Balance     Dates: Start: 01/29/19       Goal: STG-Within one week, patient will maintain static standing     Dates: Start: 01/29/19       Description: 1) Individualized goal:  In // bars with no UE support, SPV for 1 minute.  2) Interventions:  PT Group Therapy, PT Gait Training, PT Self Care/Home Eval, PT Therapeutic Exercises, PT Neuro Re-Ed/Balance, PT Therapeutic Activity and PT Manual Therapy       Note:     Goal Note filed on 01/31/19 1215 by LAURA George, ATP    Goal: STG-Within one week, patient will maintain static standing  Outcome: NOT MET  Not formally assessed                Goal: STG-Within one week, patient will maintain dynamic sitting     Dates: Start: 01/29/19       Description: 1) Individualized goal:  On uneven surface with no UE for 1 minute  2) Interventions:  PT Group Therapy, PT Gait Training, PT Self Care/Home Eval, PT Therapeutic Exercises, PT Neuro Re-Ed/Balance, PT Therapeutic Activity and PT Manual Therapy       Note:     Goal Note filed on 01/31/19 1215 by LAURA George, ATP    Goal:  STG-Within one week, patient will maintain dynamic sitting  Outcome: NOT MET  Not formally assessed                Goal: STG-Within one week, patient will maintain dynamic standing     Dates: Start: 01/29/19       Description: 1) Individualized goal:  In // bars on uneven surface with single UE support for 30 seconds  2) Interventions:  PT Group Therapy, PT Gait Training, PT Self Care/Home Eval, PT Therapeutic Exercises, PT Neuro Re-Ed/Balance, PT Therapeutic Activity and PT Manual Therapy       Note:     Goal Note filed on 01/31/19 1215 by Halley Hays, SHIRAT, ATP    Goal: STG-Within one week, patient will maintain dynamic standing  Outcome: NOT MET  Not formally assessed                  Problem: Mobility     Dates: Start: 01/29/19       Goal: STG-Within one week, patient will propel wheelchair household distances     Dates: Start: 01/29/19       Description: 1) Individualized goal:  100 ft x 2 with min A  2) Interventions:  PT Group Therapy, PT Gait Training, PT Self Care/Home Eval, PT Therapeutic Exercises, PT Neuro Re-Ed/Balance, PT Therapeutic Activity and PT Manual Therapy       Note:     Goal Note filed on 01/31/19 1215 by Halley Hays, SHIRAT, ATP    Goal: STG-Within one week, patient will propel wheelchair household   distances  Outcome: NOT MET  Endurance limited to 50 ft                Goal: STG-Within one week, patient will ambulate household distance     Dates: Start: 01/29/19       Description: 1) Individualized goal:  50 ft x 2 with 4WW, SPV/set-up  2) Interventions:  PT Group Therapy, PT Gait Training, PT Self Care/Home Eval, PT Therapeutic Exercises, PT Neuro Re-Ed/Balance, PT Therapeutic Activity and PT Manual Therapy       Note:     Goal Note filed on 01/31/19 1215 by SHIRA GeorgeT, ATP    Goal: STG-Within one week, patient will ambulate household distance  Outcome: PROGRESSING AS EXPECTED  CGA for safety, ambulates 80 ft x 3                  Problem: PT-Long Term Goals      Dates: Start: 01/29/19       Goal: LTG-By discharge, patient will maintain balance     Dates: Start: 01/29/19       Description: 1) Individualized goal:  With no UE support for standing on even surface for >5 minutes independently  2) Interventions:  PT Group Therapy, PT Gait Training, PT Self Care/Home Eval, PT Therapeutic Exercises, PT Neuro Re-Ed/Balance, PT Therapeutic Activity and PT Manual Therapy              Goal: LTG-By discharge, patient will propel wheelchair     Dates: Start: 01/29/19       Description: 1) Individualized goal:  >200 ft on even surface independently  2) Interventions:  PT Group Therapy, PT Gait Training, PT Self Care/Home Eval, PT Therapeutic Exercises, PT Neuro Re-Ed/Balance, PT Therapeutic Activity and PT Manual Therapy             Goal: LTG-By discharge, patient will ambulate     Dates: Start: 01/29/19       Description: 1) Individualized goal:  200 ft x 3 with 4WW modified independent  2) Interventions:  PT Group Therapy, PT Gait Training, PT Self Care/Home Eval, PT Therapeutic Exercises, PT Neuro Re-Ed/Balance, PT Therapeutic Activity and PT Manual Therapy             Goal: LTG-By discharge, patient will transfer in/out of a car     Dates: Start: 01/29/19       Description: 1) Individualized goal:  Modified independent   2) Interventions:  PT Group Therapy, PT Gait Training, PT Self Care/Home Eval, PT Therapeutic Exercises, PT Neuro Re-Ed/Balance, PT Therapeutic Activity and PT Manual Therapy                     Section completed by:  Halley Hays, BSPT, ATP       Activities of Daily Living  Eating Initial:  6 - Modified Independent  Eating Current:  6 - Modified Independent   Eating Description:     Grooming Initial:  5 - Standby Prompting/Supervision or Set-up  Grooming Current:  5 - Standby Prompting/Supervision or Set-up   Grooming Description:  Set-up of equipment  Bathing Initial:  4 - Minimal Assistance  Bathing Current:  5 - Standby Prompting/Supervision or  Set-up   Bathing Description:  Grab bar, Tub bench  Upper Body Dressing Initial:  4 - Minimal Assistance  Upper Body Dressing Current:  5 - Standby Prompting/Supervision or Set-up   Upper Body Dressing Description:     Lower Body Dressing Initial:  1 - Total Assistance  Lower Body Dressing Current:  1 - Total Assistance   Lower Body Dressing Description:  1 - Total Assistance  Toileting Initial:  4 - Minimal Assistance  Toileting Current:  2 - Max Assistance   Toileting Description:     Toilet Transfer Initial:  5 - Standby Prompting/Supervision or Set-up  Toilet Transfer Current:  4 - Minimal Assistance   Toilet Transfer Description:  4 - Minimal Assistance  Tub / Shower Transfer Initial:  4 - Minimal Assistance  Tub / Shower Transfer Current:  4 - Minimal Assistance   Tub / Shower Transfer Description:  Grab bar  IADL:  Total A   Family Training/Education:  Wife to be trained   DME/DC Recommendations:  Home with assist per wife     Strengths:  Motivated for self care and independence, Pleasant and cooperative, Supportive family and Willingly participates in therapeutic activities  Barriers:  Decreased endurance, Generalized weakness and Other: arthritis     # of short term goals set= 3    # of short term goals met= 0          Occupational Therapy Goals           Problem: Dressing     Dates: Start: 01/29/19       Goal: STG-Within one week, patient will dress LB     Dates: Start: 01/29/19       Description: 1) Individualized Goal:  Set up with AE prn  2) Interventions:  OT Group Therapy, OT Self Care/ADL, OT Community Reintegration, OT Neuro Re-Ed/Balance, OT Therapeutic Activity, OT Evaluation and OT Therapeutic Exercise             Goal: STG-Within one week, patient will retrieve clothing     Dates: Start: 01/29/19       Description: 1) Individualized Goal:  CGA with 4ww  2) Interventions:  OT Group Therapy, OT Self Care/ADL, OT Community Reintegration, OT Neuro Re-Ed/Balance, OT Therapeutic Activity, OT  Evaluation and OT Therapeutic Exercise             Problem: OT Long Term Goals     Dates: Start: 01/29/19       Goal: LTG-By discharge, patient will complete basic self care tasks     Dates: Start: 01/29/19       Description: 1) Individualized Goal:  MOd I  2) Interventions:  OT Group Therapy, OT Self Care/ADL, OT Community Reintegration, OT Neuro Re-Ed/Balance, OT Therapeutic Activity, OT Evaluation and OT Therapeutic Exercise           Goal: LTG-By discharge, patient will complete basic home management     Dates: Start: 01/29/19       Description: 1) Individualized Goal:  MOd I  2) Interventions:  OT Group Therapy, OT Self Care/ADL, OT Community Reintegration, OT Neuro Re-Ed/Balance, OT Therapeutic Activity, OT Evaluation and OT Therapeutic Exercise             Problem: Toileting     Dates: Start: 01/29/19       Goal: STG-Within one week, patient will complete toileting tasks     Dates: Start: 01/29/19       Description: 1) Individualized Goal:  SUpervision for safety  2) Interventions:  OT Group Therapy, OT Self Care/ADL, OT Community Reintegration, OT Neuro Re-Ed/Balance, OT Therapeutic Activity, OT Evaluation and OT Therapeutic Exercise                 Section completed by:  Kellee Askew, MS,OTR/L             Nutrition       Dietary Problems (Active)            There are no active problems.      Nutrition Care/ Consult For Supplements    Assessment:    Admitting Diagnosis: Decreased function s/p GIB s/p ulcer clipping x 2    Pertinent PMH: BPH, COPD, Glaucoma, HTN, A.Fib on Coumadin, PE (multiple) s/p IVC filter, Hypothyroidism, Arthritis, Pacemaker, Diverticulitis, DJD, Emphysema, s/p Parathyroidectomy,     Additional Information: Mr. Lizama is seen in his room sleeping soundly after lunch.  He appears adequately nourished.  Nutrition screen upon admission is negative.  Patient low risk and does not indicate need for supplements.  Did not arouse patient for interview.     Appetite: Good  Diet: GI Soft/ Low  Fiber   Average PO intake x 3 days: % of meals since admission (x 2 meals)     Labs: WBC 19, Hgb 8.9/Hct 28.7, CO2 35, Albumin 3.1, T.P. 5  Medications: Symbicort, Cardizem, Gabapentin, Cortef, Synthroid, Lisinopril, Singulair, Nystatin, Omeprazole, Bowel Regimen, Betapace, Carafate, Flomax, Spiriva   PRN Medications: noted  IVF: n/a     Height: 165.1cm  Weight: 93kg  BMI: 34.12- obese class 1     Skin: leg wound open to air   GI: BM 1/28  : WNL  Vitals: 152/80 BP, 5L NC  I/Os: -480mL since admission/ UOP adequate      Malnutrition risk: low     Diagnosis: No nutrition diagnosis.     Intervention/ Recommendations/POC:  1. Continue current nutritional POC. PO adequate with no indication for supplements.   2.Encourage adequate PO/fluid intake.  3. Nutrition rep to see regarding food prefs/ honor within dietary restrictions (if indicated)     Monitor/Evaluation: Monitor PO intake, weight, labs, medication adjustments, skin integrity, GI function, vitals, I/Os, and overall hydration status.  Adjust nutritional POC pending clinical outcomes.    RD following PRN.  Goal: Maintain adequate oral nutrient/fluid intake to promote nutrition optimization/healing.         Section completed by:  Belinda Felix R.D.    REHAB-Pharmacy IDT Team Note by Asaf Angeles RPH at 1/30/2019  5:49 PM  Version 1 of 1    Author:  Asaf Angeles RPH Service:  (none) Author Type:  Pharmacist    Filed:  1/30/2019  5:50 PM Date of Service:  1/30/2019  5:49 PM Status:  Signed    :  Asaf Angeles RPH (Pharmacist)         Pharmacy   Pharmacy  Antibiotics/Antifungals/Antivirals:  Medication:      Active Orders     None        Route:         None  Stop Date:  N/A  Reason:   Antihypertensives/Cardiac:  Medication:    Orders (72h ago through future)    Start     Ordered    01/30/19 0900  lisinopril (PRINIVIL) tablet 10 mg  DAILY      01/29/19 1245    01/28/19 2100  sotalol (BETAPACE) tablet 40 mg  2 TIMES DAILY       01/28/19 1438    01/28/19 1800  DILTIAZem (CARDIZEM) tablet 60 mg  TWICE DAILY      01/28/19 1438    01/28/19 1438  hydrALAZINE (APRESOLINE) tablet 25 mg  EVERY 8 HOURS PRN      01/28/19 1438        Patient Vitals for the past 24 hrs:   BP Pulse   01/30/19 1739 112/60 -   01/30/19 1501 128/66 76   01/30/19 1300 105/62 72   01/30/19 1001 - 82   01/30/19 0857 - 75   01/30/19 0700 120/74 68   01/30/19 0539 150/80 70   01/29/19 1940 108/66 70       Anticoagulation:  Medication:  Not applicable  INR:      Other key medications:       A review of the medication list reveals no issues at this time. Patient is currently on antihypertensive(s). Recommend home blood pressure monitoring/recording if antihypertensive(s) regimen(s) continue.    Section completed by:  Asaf Angeles RPH[WR.1]        Attribution Hyatt     WR.1 - Asaf Angeles RPH on 1/30/2019  5:49 PM                    DC Planning    DC destination/dispostion:  Home w/ family assist to [place], [place] (near [place]). Has 4WW, entry ramp & home O2. Wife has undiagnosed tremor. Daughter is retired RN who lives w/in 3 miles to assist.     Referrals: TBD.    DC Needs:  referral. MD f/u appts: verify if cardiology, pulmonary & GI f/u needed post rehab.    Barriers to discharge: Functional mobilty deficits, [place], wife unable to provide much assistance.      Strengths: Motivation. Good support system. Already established w/ cardiology, Dr. Maloney, & pulmonary (unknown provider's name on [address]    Section completed by:  Addie Box R.N.      Physician Summary  SABRA Garcia MD  participated and led team conference discussion.

## 2019-01-31 NOTE — REHAB-NURSING IDT TEAM NOTE
Nursing   Nursing  Diet/Nutrition: Low Fiber  Medication Administration:  Whole with Liquid Wash  % consumed at meals in last 24 hours:  Consumed 75%-100% of meals per documentation.  Meal/Snack Consumption for the past 24 hrs:   Oral Nutrition   01/30/19 1300 Lunch;Between % Consumed   01/30/19 0900 Breakfast;Between % Consumed     Snack schedule:  None  Appetite:  Good  Fluid Intake/Output in past 24 hours: In: 960 [P.O.:960]  Out: 2100   Admit Weight:  Weight: 93 kg (205 lb 0.4 oz)  Weight Last 7 Days   [93 kg (205 lb 0.4 oz)] 93 kg (205 lb 0.4 oz) (01/28 1445)    Pain Issues:    Location:  Back (01/30 2352)  Mid (01/30 2352)         Severity:  Moderate   Scheduled pain medications:  gabapentin (NEURONTIN)      PRN pain medications used in last 24 hours:  hydrocodone/acetaminophen (NORCO)    Non Pharmacologic Interventions:  emotional support, repositioned and rest  Effectiveness of pain management plan:  good=patient states acceptable comfort after interventions    Bowel:    Bowel Assist Initial Score:  4 - Minimal Assistance  Bowel Assist Current Score:  4 - Minimal Assistance  Bowl Accidents in last 7 days:     Last bowel movement: 01/30/19  Stool Description: Formed, Soft, Medium     Usual bowel pattern:  daily  Scheduled bowel medications:  senna-docusate (PERICOLACE or SENOKOT S)  and polyethylene glycol/lytes (MIRALAX)   PRN bowel medications used in last 24 hours:  None  Nursing Interventions:  Increased time, Scheduled medication, Supervision, Verbal cueing, Positioning on commode/toilet  Effectiveness of bowel program:   fair=sometimes needs prn bowel meds for constipation  Bladder:    Bladder Assist Initial Score:  1 - Total Assistance  Bladder Assist Current Score:  1 - Total Assistance  Bladder Accidents in last 7 days:     PVR range for past 24-48 hours: -- ()  Medications affecting bladder:  Flomax    Interventions:  Increased time, Verbal cueing, Emptying of device, External  catheter  Effectiveness of bladder training:  Fair=no incontinence but requires cueing to keep time void schedule    Wound:  Patient Lines/Drains/Airways Status    Active Current Wounds     Name: Placement date: Placement time: Site: Days:    Wound 01/28/19 Leg 01/28/19   1612      2                   Interventions:  Monitor and assess  Effectiveness of intervention:  wound is unchanged     Sleep/wake cycle:   Average hours slept:  Sleeps 4-6 hours without waking  Sleep medication usage:  None    Patient/Family Training/Education:  Bladder Management/Training, Bowel Management/Training, Diet/Nutrition, Fall Prevention, General Self Care, Medication Management, Pain Management, Respiratory Hygiene, Safety and Skin Care    Strengths: Alert and oriented, Willingly participates in therapeutic activities, Able to follow instructions and Supportive family   Barriers:   Fatigue, Generalized weakness and Limited mobility            Nursing Problems           Problem: Bowel/Gastric:     Goal: Normal bowel function is maintained or improved           Goal: Will not experience complications related to bowel motility             Problem: Communication     Goal: The ability to communicate needs accurately and effectively will improve             Problem: Discharge Barriers/Planning     Goal: Patient's continuum of care needs will be met             Problem: Fluid Volume:     Goal: Will maintain balanced intake and output             Problem: Infection     Goal: Will remain free from infection             Problem: Knowledge Deficit     Goal: Knowledge of disease process/condition, treatment plan, diagnostic tests, and medications will improve           Goal: Knowledge of the prescribed therapeutic regimen will improve             Problem: Medication     Goal: Compliance with prescribed medication will improve             Problem: Mobility     Goal: Risk for activity intolerance will decrease             Problem: Pain Management      Goal: Pain level will decrease to patient's comfort goal             Problem: Psychosocial Needs:     Goal: Level of anxiety will decrease             Problem: Respiratory:     Goal: Respiratory status will improve             Problem: Safety     Goal: Will remain free from injury           Goal: Will remain free from falls             Problem: Skin Integrity     Goal: Risk for impaired skin integrity will decrease             Problem: Urinary Elimination:     Goal: Ability to reestablish a normal urinary elimination pattern will improve             Problem: Venous Thromboembolism (VTW)/Deep Vein Thrombosis (DVT) Prevention:     Goal: Patient will participate in Venous Thrombosis (VTE)/Deep Vein Thrombosis (DVT)Prevention Measures                  Long Term Goals:   At discharge patient will be able to function safely at home and in the community with support.    Section completed by:  Leta Hall R.N.

## 2019-02-01 PROCEDURE — 97116 GAIT TRAINING THERAPY: CPT

## 2019-02-01 PROCEDURE — 97530 THERAPEUTIC ACTIVITIES: CPT

## 2019-02-01 PROCEDURE — 97535 SELF CARE MNGMENT TRAINING: CPT

## 2019-02-01 PROCEDURE — A9270 NON-COVERED ITEM OR SERVICE: HCPCS | Performed by: PHYSICAL MEDICINE & REHABILITATION

## 2019-02-01 PROCEDURE — 99233 SBSQ HOSP IP/OBS HIGH 50: CPT | Performed by: PHYSICAL MEDICINE & REHABILITATION

## 2019-02-01 PROCEDURE — 97110 THERAPEUTIC EXERCISES: CPT

## 2019-02-01 PROCEDURE — 700102 HCHG RX REV CODE 250 W/ 637 OVERRIDE(OP): Performed by: PHYSICAL MEDICINE & REHABILITATION

## 2019-02-01 PROCEDURE — 770010 HCHG ROOM/CARE - REHAB SEMI PRIVAT*

## 2019-02-01 RX ORDER — GUAIFENESIN/DEXTROMETHORPHAN 100-10MG/5
5 SYRUP ORAL EVERY 6 HOURS PRN
Status: DISCONTINUED | OUTPATIENT
Start: 2019-02-01 | End: 2019-02-08 | Stop reason: HOSPADM

## 2019-02-01 RX ADMIN — DILTIAZEM HYDROCHLORIDE 60 MG: 30 TABLET, FILM COATED ORAL at 17:52

## 2019-02-01 RX ADMIN — TRAMADOL HYDROCHLORIDE 50 MG: 50 TABLET, FILM COATED ORAL at 17:52

## 2019-02-01 RX ADMIN — BUDESONIDE AND FORMOTEROL FUMARATE DIHYDRATE 2 PUFF: 160; 4.5 AEROSOL RESPIRATORY (INHALATION) at 20:24

## 2019-02-01 RX ADMIN — POLYETHYLENE GLYCOL 3350 1 PACKET: 17 POWDER, FOR SOLUTION ORAL at 20:16

## 2019-02-01 RX ADMIN — HYDROCORTISONE 20 MG: 10 TABLET ORAL at 08:32

## 2019-02-01 RX ADMIN — SOTALOL HYDROCHLORIDE 40 MG: 80 TABLET ORAL at 08:33

## 2019-02-01 RX ADMIN — DILTIAZEM HYDROCHLORIDE 60 MG: 30 TABLET, FILM COATED ORAL at 05:36

## 2019-02-01 RX ADMIN — TAMSULOSIN HYDROCHLORIDE 0.4 MG: 0.4 CAPSULE ORAL at 08:32

## 2019-02-01 RX ADMIN — Medication 2 TABLET: at 08:32

## 2019-02-01 RX ADMIN — SUCRALFATE 1 G: 1 SUSPENSION ORAL at 23:45

## 2019-02-01 RX ADMIN — HYDROCODONE BITARTRATE AND ACETAMINOPHEN 1 TABLET: 5; 325 TABLET ORAL at 06:27

## 2019-02-01 RX ADMIN — MONTELUKAST SODIUM 10 MG: 10 TABLET, FILM COATED ORAL at 08:33

## 2019-02-01 RX ADMIN — SUCRALFATE 1 G: 1 SUSPENSION ORAL at 11:52

## 2019-02-01 RX ADMIN — GABAPENTIN 600 MG: 300 CAPSULE ORAL at 20:17

## 2019-02-01 RX ADMIN — HYDROCODONE BITARTRATE AND ACETAMINOPHEN 1 TABLET: 5; 325 TABLET ORAL at 14:43

## 2019-02-01 RX ADMIN — LEVOTHYROXINE SODIUM 75 MCG: 75 TABLET ORAL at 05:36

## 2019-02-01 RX ADMIN — BUDESONIDE AND FORMOTEROL FUMARATE DIHYDRATE 2 PUFF: 160; 4.5 AEROSOL RESPIRATORY (INHALATION) at 08:35

## 2019-02-01 RX ADMIN — OMEPRAZOLE 40 MG: 20 CAPSULE, DELAYED RELEASE ORAL at 08:32

## 2019-02-01 RX ADMIN — HYDROCORTISONE 20 MG: 10 TABLET ORAL at 11:52

## 2019-02-01 RX ADMIN — HYDROCORTISONE 10 MG: 10 TABLET ORAL at 20:19

## 2019-02-01 RX ADMIN — LISINOPRIL 10 MG: 5 TABLET ORAL at 08:32

## 2019-02-01 RX ADMIN — POLYVINYL ALCOHOL 1 DROP: 14 SOLUTION/ DROPS OPHTHALMIC at 14:03

## 2019-02-01 RX ADMIN — Medication 2 TABLET: at 20:17

## 2019-02-01 RX ADMIN — SUCRALFATE 1 G: 1 SUSPENSION ORAL at 05:36

## 2019-02-01 RX ADMIN — NYSTATIN: 100000 POWDER TOPICAL at 20:24

## 2019-02-01 RX ADMIN — HYDROCODONE BITARTRATE AND ACETAMINOPHEN 1 TABLET: 5; 325 TABLET ORAL at 21:15

## 2019-02-01 RX ADMIN — OMEPRAZOLE 40 MG: 20 CAPSULE, DELAYED RELEASE ORAL at 20:17

## 2019-02-01 RX ADMIN — SOTALOL HYDROCHLORIDE 40 MG: 80 TABLET ORAL at 20:17

## 2019-02-01 RX ADMIN — POLYETHYLENE GLYCOL 3350 1 PACKET: 17 POWDER, FOR SOLUTION ORAL at 08:35

## 2019-02-01 RX ADMIN — SUCRALFATE 1 G: 1 SUSPENSION ORAL at 17:55

## 2019-02-01 RX ADMIN — NYSTATIN: 100000 POWDER TOPICAL at 08:39

## 2019-02-01 RX ADMIN — TIOTROPIUM BROMIDE 1 CAPSULE: 18 CAPSULE ORAL; RESPIRATORY (INHALATION) at 08:33

## 2019-02-01 ASSESSMENT — CHA2DS2 SCORE
PRIOR STROKE OR TIA OR THROMBOEMBOLISM: YES
AGE 75 OR GREATER: YES
HYPERTENSION: YES
VASCULAR DISEASE: NO
DIABETES: NO
CHA2DS2 VASC SCORE: 5
AGE 65 TO 74: NO
SEX: MALE
CHF OR LEFT VENTRICULAR DYSFUNCTION: NO

## 2019-02-01 ASSESSMENT — GAIT ASSESSMENTS
GAIT LEVEL OF ASSIST: STAND BY ASSIST
DISTANCE (FEET): 150
ASSISTIVE DEVICE: 4 WHEEL WALKER
DEVIATION: BRADYKINETIC
GAIT LEVEL OF ASSIST: STAND BY ASSIST
ASSISTIVE DEVICE: 4 WHEEL WALKER
DISTANCE (FEET): 150
DEVIATION: BRADYKINETIC

## 2019-02-01 ASSESSMENT — PATIENT HEALTH QUESTIONNAIRE - PHQ9
SUM OF ALL RESPONSES TO PHQ9 QUESTIONS 1 AND 2: 0
2. FEELING DOWN, DEPRESSED, IRRITABLE, OR HOPELESS: NOT AT ALL
1. LITTLE INTEREST OR PLEASURE IN DOING THINGS: NOT AT ALL

## 2019-02-01 NOTE — CARE PLAN
Problem: Safety  Goal: Will remain free from injury  Outcome: PROGRESSING AS EXPECTED  Patient use call light for assistance. Remains free from injury.     Problem: Pain Management  Goal: Pain level will decrease to patient's comfort goal  Outcome: PROGRESSING SLOWER THAN EXPECTED  Patient c/o left elbow pain. Dr Garcia assessed patient. Medicated patient with norco for pain with positive result. Up and participated in therapies. Tolerated well.

## 2019-02-01 NOTE — CARE PLAN
Problem: Communication  Goal: The ability to communicate needs accurately and effectively will improve  Pt is a/o x4. Pt able to make needs known, uses call light for assistance.    Problem: Pain Management  Goal: Pain level will decrease to patient's comfort goal  Pt c/o pain, administered prn NORCO 1 tab with good relief (see MAR/flowsheet).

## 2019-02-01 NOTE — PROGRESS NOTES
Pharmacy Warfarin Consult   2/1/2019     86 y.o.   male     Indication for anticoagulation: Atrial Fibrillation    Goal INR = 2 - 3    No results for input(s): INR, HEMOGLOBIN, HEMATOCRIT in the last 72 hours.    Pertinent Drug/Drug Interactions:  Steroid, PPI, PRN ultram, PRN trazodone, Thyroid medications.   Outpatient Warfarin Regimen:  Last record in system 10/2017.  Recent Warfarin Dosing:  None    Bridge Therapy: No bridge therapy due to GI bleed.        1.  Start on 2/2/2019 per MD order.  2.  Recent GI Bleed.  3.  INR to begin 2/2/2019.        Mendez Valentino formerly [facility]

## 2019-02-01 NOTE — PROGRESS NOTES
"Rehab Progress Note     Encounter Date: 2/1/2019    CC: Debility from blood loss; GI bleed;     Interval Events (Subjective)  Patient sitting up in room. He reports he is doing well except tickle in his throat which required a lot of coughing to clear. Discussed adding Tussin PRN. Not related to eating, felt like he just had to cough.  Discussed about restarting anticoagulation with him tomorrow. He reports history of 8 PEs during 6 hospitalizations. Discussed would monitor his INR and CBC.  Discussed risks and benefits, has been 10 days since clipping.  SBP has been labile from 145 to 100 yesterday. SOB at baseline.     IDT Team Meeting 1/31/2019  DC/Disposition:  2/9/19    Objective:  VITAL SIGNS: /76   Pulse 75   Temp 36.5 °C (97.7 °F) (Tympanic)   Resp 18   Ht 1.651 m (5' 5\")   Wt 93 kg (205 lb 0.4 oz)   SpO2 98%   BMI 34.12 kg/m²   Gen: NAD  Psych: Mood and affect appropriate  CV: RRR, no edema  Resp: CTAB, no upper airway sounds  Abd: NTND  MSK: Left elbow with swelling, no erythema, no drainage or lesion  Unchanged from 1/31/19    No results found for this or any previous visit (from the past 72 hour(s)).    Current Facility-Administered Medications   Medication Frequency   • MD Alert...Warfarin per Pharmacy PHARMACY TO DOSE   • menthol-methyl salicylate (BENGAY) ointment TID PRN   • hydrocortisone (CORTEF) tablet 20 mg QDAY with Breakfast   • lisinopril (PRINIVIL) tablet 10 mg DAILY   • Respiratory Care per Protocol Continuous RT   • Pharmacy Consult Request ...Pain Management Review 1 Each PHARMACY TO DOSE   • tramadol (ULTRAM) 50 MG tablet 50 mg Q4HRS PRN   • hydrALAZINE (APRESOLINE) tablet 25 mg Q8HRS PRN   • acetaminophen (TYLENOL) tablet 650 mg Q4HRS PRN   • senna-docusate (PERICOLACE or SENOKOT S) 8.6-50 MG per tablet 2 Tab BID    And   • polyethylene glycol/lytes (MIRALAX) PACKET 1 Packet QDAY PRN    And   • magnesium hydroxide (MILK OF MAGNESIA) suspension 30 mL QDAY PRN    And   • " bisacodyl (DULCOLAX) suppository 10 mg QDAY PRN   • artificial tears 1.4 % ophthalmic solution 1 Drop PRN   • benzocaine-menthol (CEPACOL) lozenge 1 Lozenge Q2HRS PRN   • mag hydrox-al hydrox-simeth (MAALOX PLUS ES or MYLANTA DS) suspension 20 mL Q2HRS PRN   • ondansetron (ZOFRAN ODT) dispertab 4 mg 4X/DAY PRN    Or   • ondansetron (ZOFRAN) syringe/vial injection 4 mg 4X/DAY PRN   • traZODone (DESYREL) tablet 50 mg QHS PRN   • sodium chloride (OCEAN) 0.65 % nasal spray 2 Spray PRN   • budesonide-formoterol (SYMBICORT) 160-4.5 MCG/ACT inhaler 2 Puff BID   • DILTIAZem (CARDIZEM) tablet 60 mg TWICE DAILY   • gabapentin (NEURONTIN) capsule 600 mg QHS   • HYDROcodone-acetaminophen (NORCO) 5-325 MG per tablet 1 Tab Q6HRS PRN   • hydrocortisone (CORTEF) tablet 10 mg Q EVENING   • hydrocortisone (CORTEF) tablet 20 mg AFTER LUNCH   • levothyroxine (SYNTHROID) tablet 75 mcg AM ES   • montelukast (SINGULAIR) tablet 10 mg DAILY   • nystatin (MYCOSTATIN) powder BID   • omeprazole (PRILOSEC) capsule 40 mg BID   • polyethylene glycol/lytes (MIRALAX) PACKET 1 Packet BID   • sotalol (BETAPACE) tablet 40 mg BID   • sucralfate (CARAFATE) 1 GM/10ML suspension 1 g Q6HRS   • tamsulosin (FLOMAX) capsule 0.4 mg DAILY   • tiotropium (SPIRIVA) 18 MCG inhalation capsule 1 Cap DAILY       Orders Placed This Encounter   Procedures   • Diet Order Low Fiber(GI Soft)     Standing Status:   Standing     Number of Occurrences:   1     Order Specific Question:   Diet:     Answer:   Low Fiber(GI Soft) [2]       Assessment:  Active Hospital Problems    Diagnosis   • *GI bleed   • Aortic stenosis   • Anemia due to GI blood loss   • Anticoagulant long-term use   • Chronic back pain   • EVANGELINA (obstructive sleep apnea)   • BPH (benign prostatic hypertrophy)       Medical Decision Making and Plan:  GI Bleed w debility: GI bleed s/p clipping x2 areas s/p multiple transfusions. Last Hgb 8.7 on 1/27/19  -Continue Omeprazole 40 mg BID. Recommended at discharge to  continue for 2 weeks then 20 mg BID until follow-up with GI  -Continue Sucralfate for 2-4 weeks  -PT and OT for mobility and ADLs.     Anemia - 8.9 on admission. Will recheck CBC on 2/2/19    Adrenal Insufficiency - Patient on steroids at baseline for previous pituitary lesion.   -Continue hydrocortisone 30 QAM, 20 QNOON and 10 mg QHS. Baseline on 10 mg TID. Decrease QAM to 20 mg     Left Ankle - Worsening arthritis with early charcot changes. Would like referral back to [facility]. Consider AFO although limited evidence of improvement and concern for skin breakdown.   -Orthotics vendor to issue soft brace for correction of deformity. Improved ambulation, but limited ability to don any orthotic    Olecranon bursitis - Patient with olecranon bursitis, suggested compression to therapy.    HTN/A Fib - Patient on Diltiazem 60 mg BID, Sotalol 40 mg BID. Previously on Lisinopril which was put on hold due to hypotension.  -Continue Diltiazem and Sotalol. SBP. Will restart Lisinopril at 10, continue to monitor another day  -Restart Coumadin on 2/2/19, pharmacy to manage. Discussed risks and benefits     COPD - Patient on Symbicort BID, Singular daily, and Spiriva  -RT to consult    Leukocytosis - Chronic issue with labile WBC. Patient will need follow-up with Hematology. Admit WBC - 19     Hypothyroidism - Patient on 75 mcg daily     BPH - Patient on Flomax 0.4 mg on transfer, currently with condom catheter PRN. Would prefer patient to wear condom cath only at night, he prefers during daytime as well.      DVT Ppx - Currently high risk for bleed with recent GI bleed.  Restart coumadin on 2/2/19, pharmacy to manage.      Total time:  35 minutes.  I spent greater than 50% of the time for patient care, counseling, and coordination on this date, including unit/floor time, and face-to-face time with the patient as per interval events and assessment and plan above. Topics discussed included cough, started on Tussin PRN.  Discussed risks  and benefits for restarting coumadin, patient with very high risk for PE.     Faviola Garcia M.D.

## 2019-02-02 LAB
BASOPHILS # BLD AUTO: 0.3 % (ref 0–1.8)
BASOPHILS # BLD: 0.05 K/UL (ref 0–0.12)
EOSINOPHIL # BLD AUTO: 0.23 K/UL (ref 0–0.51)
EOSINOPHIL NFR BLD: 1.6 % (ref 0–6.9)
ERYTHROCYTE [DISTWIDTH] IN BLOOD BY AUTOMATED COUNT: 56.2 FL (ref 35.9–50)
HCT VFR BLD AUTO: 31.7 % (ref 42–52)
HGB BLD-MCNC: 9.7 G/DL (ref 14–18)
IMM GRANULOCYTES # BLD AUTO: 0.16 K/UL (ref 0–0.11)
IMM GRANULOCYTES NFR BLD AUTO: 1.1 % (ref 0–0.9)
INR PPP: 1.08 (ref 0.87–1.13)
LYMPHOCYTES # BLD AUTO: 1.24 K/UL (ref 1–4.8)
LYMPHOCYTES NFR BLD: 8.4 % (ref 22–41)
MCH RBC QN AUTO: 29.2 PG (ref 27–33)
MCHC RBC AUTO-ENTMCNC: 30.6 G/DL (ref 33.7–35.3)
MCV RBC AUTO: 95.5 FL (ref 81.4–97.8)
MONOCYTES # BLD AUTO: 0.85 K/UL (ref 0–0.85)
MONOCYTES NFR BLD AUTO: 5.7 % (ref 0–13.4)
NEUTROPHILS # BLD AUTO: 12.3 K/UL (ref 1.82–7.42)
NEUTROPHILS NFR BLD: 82.9 % (ref 44–72)
NRBC # BLD AUTO: 0 K/UL
NRBC BLD-RTO: 0 /100 WBC
PLATELET # BLD AUTO: 219 K/UL (ref 164–446)
PMV BLD AUTO: 10 FL (ref 9–12.9)
PROTHROMBIN TIME: 14.1 SEC (ref 12–14.6)
RBC # BLD AUTO: 3.32 M/UL (ref 4.7–6.1)
WBC # BLD AUTO: 14.8 K/UL (ref 4.8–10.8)

## 2019-02-02 PROCEDURE — 85025 COMPLETE CBC W/AUTO DIFF WBC: CPT

## 2019-02-02 PROCEDURE — 97116 GAIT TRAINING THERAPY: CPT

## 2019-02-02 PROCEDURE — 36415 COLL VENOUS BLD VENIPUNCTURE: CPT

## 2019-02-02 PROCEDURE — 770010 HCHG ROOM/CARE - REHAB SEMI PRIVAT*

## 2019-02-02 PROCEDURE — A9270 NON-COVERED ITEM OR SERVICE: HCPCS | Performed by: PHYSICAL MEDICINE & REHABILITATION

## 2019-02-02 PROCEDURE — 85610 PROTHROMBIN TIME: CPT

## 2019-02-02 PROCEDURE — 97530 THERAPEUTIC ACTIVITIES: CPT

## 2019-02-02 PROCEDURE — 97110 THERAPEUTIC EXERCISES: CPT

## 2019-02-02 PROCEDURE — 94760 N-INVAS EAR/PLS OXIMETRY 1: CPT

## 2019-02-02 PROCEDURE — 700102 HCHG RX REV CODE 250 W/ 637 OVERRIDE(OP): Performed by: PHYSICAL MEDICINE & REHABILITATION

## 2019-02-02 RX ORDER — WARFARIN SODIUM 2.5 MG/1
2.5 TABLET ORAL
Status: COMPLETED | OUTPATIENT
Start: 2019-02-02 | End: 2019-02-02

## 2019-02-02 RX ADMIN — SOTALOL HYDROCHLORIDE 40 MG: 80 TABLET ORAL at 08:46

## 2019-02-02 RX ADMIN — NYSTATIN: 100000 POWDER TOPICAL at 08:50

## 2019-02-02 RX ADMIN — OMEPRAZOLE 40 MG: 20 CAPSULE, DELAYED RELEASE ORAL at 08:46

## 2019-02-02 RX ADMIN — SOTALOL HYDROCHLORIDE 40 MG: 80 TABLET ORAL at 19:53

## 2019-02-02 RX ADMIN — HYDROCORTISONE 20 MG: 10 TABLET ORAL at 11:57

## 2019-02-02 RX ADMIN — SUCRALFATE 1 G: 1 SUSPENSION ORAL at 23:31

## 2019-02-02 RX ADMIN — NYSTATIN: 100000 POWDER TOPICAL at 19:56

## 2019-02-02 RX ADMIN — BUDESONIDE AND FORMOTEROL FUMARATE DIHYDRATE 2 PUFF: 160; 4.5 AEROSOL RESPIRATORY (INHALATION) at 08:45

## 2019-02-02 RX ADMIN — OMEPRAZOLE 40 MG: 20 CAPSULE, DELAYED RELEASE ORAL at 19:53

## 2019-02-02 RX ADMIN — SUCRALFATE 1 G: 1 SUSPENSION ORAL at 11:57

## 2019-02-02 RX ADMIN — SUCRALFATE 1 G: 1 SUSPENSION ORAL at 17:59

## 2019-02-02 RX ADMIN — DILTIAZEM HYDROCHLORIDE 60 MG: 30 TABLET, FILM COATED ORAL at 05:40

## 2019-02-02 RX ADMIN — DILTIAZEM HYDROCHLORIDE 60 MG: 30 TABLET, FILM COATED ORAL at 17:59

## 2019-02-02 RX ADMIN — GABAPENTIN 600 MG: 300 CAPSULE ORAL at 19:53

## 2019-02-02 RX ADMIN — Medication 2 TABLET: at 19:53

## 2019-02-02 RX ADMIN — WARFARIN SODIUM 2.5 MG: 2.5 TABLET ORAL at 17:59

## 2019-02-02 RX ADMIN — HYDROCODONE BITARTRATE AND ACETAMINOPHEN 1 TABLET: 5; 325 TABLET ORAL at 07:06

## 2019-02-02 RX ADMIN — HYDROCODONE BITARTRATE AND ACETAMINOPHEN 1 TABLET: 5; 325 TABLET ORAL at 17:59

## 2019-02-02 RX ADMIN — HYDROCORTISONE 20 MG: 10 TABLET ORAL at 08:46

## 2019-02-02 RX ADMIN — ACETAMINOPHEN 650 MG: 325 TABLET, FILM COATED ORAL at 19:53

## 2019-02-02 RX ADMIN — HYDROCORTISONE 10 MG: 10 TABLET ORAL at 19:53

## 2019-02-02 RX ADMIN — TIOTROPIUM BROMIDE 1 CAPSULE: 18 CAPSULE ORAL; RESPIRATORY (INHALATION) at 08:46

## 2019-02-02 RX ADMIN — MONTELUKAST SODIUM 10 MG: 10 TABLET, FILM COATED ORAL at 08:46

## 2019-02-02 RX ADMIN — POLYETHYLENE GLYCOL 3350 1 PACKET: 17 POWDER, FOR SOLUTION ORAL at 19:52

## 2019-02-02 RX ADMIN — LISINOPRIL 10 MG: 5 TABLET ORAL at 08:46

## 2019-02-02 RX ADMIN — LEVOTHYROXINE SODIUM 75 MCG: 75 TABLET ORAL at 05:40

## 2019-02-02 RX ADMIN — TRAMADOL HYDROCHLORIDE 50 MG: 50 TABLET, FILM COATED ORAL at 23:35

## 2019-02-02 RX ADMIN — BUDESONIDE AND FORMOTEROL FUMARATE DIHYDRATE 2 PUFF: 160; 4.5 AEROSOL RESPIRATORY (INHALATION) at 19:52

## 2019-02-02 RX ADMIN — SUCRALFATE 1 G: 1 SUSPENSION ORAL at 05:40

## 2019-02-02 RX ADMIN — TAMSULOSIN HYDROCHLORIDE 0.4 MG: 0.4 CAPSULE ORAL at 08:46

## 2019-02-02 RX ADMIN — Medication 2 TABLET: at 08:46

## 2019-02-02 ASSESSMENT — GAIT ASSESSMENTS
DEVIATION: BRADYKINETIC;SHUFFLED GAIT
ASSISTIVE DEVICE: FRONT WHEEL WALKER
ASSISTIVE DEVICE: 4 WHEEL WALKER
GAIT LEVEL OF ASSIST: STAND BY ASSIST
DISTANCE (FEET): 125
DISTANCE (FEET): 125
GAIT LEVEL OF ASSIST: CONTACT GUARD ASSIST
DEVIATION: BRADYKINETIC;SHUFFLED GAIT

## 2019-02-02 NOTE — CARE PLAN
Problem: Safety  Goal: Will remain free from injury  Outcome: PROGRESSING AS EXPECTED  Patient use call light for assistance. Remains free from injury.     Problem: Bowel/Gastric:  Goal: Normal bowel function is maintained or improved  Outcome: PROGRESSING AS EXPECTED  Patient denied any s/s of constipation. Last BM was on 1/31.

## 2019-02-02 NOTE — CARE PLAN
Problem: Safety  Goal: Will remain free from injury  Outcome: PROGRESSING AS EXPECTED  Patient use call light for assistance. Remains free from injury.     Problem: Bowel/Gastric:  Goal: Normal bowel function is maintained or improved  Outcome: PROGRESSING AS EXPECTED  Patient denied any s/s of constipation. Last BM was on 2/2. Refused miralax this am.

## 2019-02-02 NOTE — CARE PLAN
Problem: Infection  Goal: Will remain free from infection  Pt is afebrile and VSS. No s/s of infection.     Problem: Pain Management  Goal: Pain level will decrease to patient's comfort goal  Pt c/o generalized pain, administered prn NORCO with good relief (see MAR/flowsheet).      Problem: Respiratory:  Goal: Respiratory status will improve    Intervention: Assess and monitor pulmonary status  Pt is on 5L NC continuous. RR are even and unlabored. Occasionally non-productive coughing noted. No s/s of respiratory distress. Will continue to monitor.

## 2019-02-02 NOTE — FLOWSHEET NOTE
02/02/19 1144   Events/Summary/Plan   Events/Summary/Plan O2 spot check   Respiratory WDL   Respiratory (WDL) X   Chest Exam   Respiration 18   Pulse 76   Oximetry   #Pulse Oximetry (Single Determination) Yes   Oxygen   Home O2 Use Prior To Admission? Yes   Home O2 LPM Flow 5 LPM   Home O2 Delivery Method Nasal Cannula   Home O2 Frequency of Use Continuous   Pulse Oximetry 98 %   O2 (LPM) 5   O2 Daily Delivery Respiratory  Silicone Nasal Cannula

## 2019-02-03 LAB
INR PPP: 1.08 (ref 0.87–1.13)
PROTHROMBIN TIME: 14.1 SEC (ref 12–14.6)

## 2019-02-03 PROCEDURE — A9270 NON-COVERED ITEM OR SERVICE: HCPCS | Performed by: PHYSICAL MEDICINE & REHABILITATION

## 2019-02-03 PROCEDURE — 36415 COLL VENOUS BLD VENIPUNCTURE: CPT

## 2019-02-03 PROCEDURE — 94760 N-INVAS EAR/PLS OXIMETRY 1: CPT

## 2019-02-03 PROCEDURE — 700102 HCHG RX REV CODE 250 W/ 637 OVERRIDE(OP): Performed by: PHYSICAL MEDICINE & REHABILITATION

## 2019-02-03 PROCEDURE — 770010 HCHG ROOM/CARE - REHAB SEMI PRIVAT*

## 2019-02-03 PROCEDURE — 85610 PROTHROMBIN TIME: CPT

## 2019-02-03 RX ORDER — WARFARIN SODIUM 5 MG/1
5 TABLET ORAL
Status: COMPLETED | OUTPATIENT
Start: 2019-02-03 | End: 2019-02-03

## 2019-02-03 RX ADMIN — BUDESONIDE AND FORMOTEROL FUMARATE DIHYDRATE 2 PUFF: 160; 4.5 AEROSOL RESPIRATORY (INHALATION) at 08:08

## 2019-02-03 RX ADMIN — TIOTROPIUM BROMIDE 1 CAPSULE: 18 CAPSULE ORAL; RESPIRATORY (INHALATION) at 08:09

## 2019-02-03 RX ADMIN — WARFARIN SODIUM 5 MG: 5 TABLET ORAL at 17:38

## 2019-02-03 RX ADMIN — BUDESONIDE AND FORMOTEROL FUMARATE DIHYDRATE 2 PUFF: 160; 4.5 AEROSOL RESPIRATORY (INHALATION) at 20:21

## 2019-02-03 RX ADMIN — POLYETHYLENE GLYCOL 3350 1 PACKET: 17 POWDER, FOR SOLUTION ORAL at 20:23

## 2019-02-03 RX ADMIN — SUCRALFATE 1 G: 1 SUSPENSION ORAL at 23:58

## 2019-02-03 RX ADMIN — SUCRALFATE 1 G: 1 SUSPENSION ORAL at 17:38

## 2019-02-03 RX ADMIN — HYDROCORTISONE 20 MG: 10 TABLET ORAL at 08:10

## 2019-02-03 RX ADMIN — HYDROCODONE BITARTRATE AND ACETAMINOPHEN 1 TABLET: 5; 325 TABLET ORAL at 06:05

## 2019-02-03 RX ADMIN — NYSTATIN: 100000 POWDER TOPICAL at 20:23

## 2019-02-03 RX ADMIN — TAMSULOSIN HYDROCHLORIDE 0.4 MG: 0.4 CAPSULE ORAL at 08:10

## 2019-02-03 RX ADMIN — DILTIAZEM HYDROCHLORIDE 60 MG: 30 TABLET, FILM COATED ORAL at 06:05

## 2019-02-03 RX ADMIN — LEVOTHYROXINE SODIUM 75 MCG: 75 TABLET ORAL at 06:05

## 2019-02-03 RX ADMIN — LISINOPRIL 10 MG: 5 TABLET ORAL at 08:10

## 2019-02-03 RX ADMIN — OMEPRAZOLE 40 MG: 20 CAPSULE, DELAYED RELEASE ORAL at 08:09

## 2019-02-03 RX ADMIN — DILTIAZEM HYDROCHLORIDE 60 MG: 30 TABLET, FILM COATED ORAL at 17:38

## 2019-02-03 RX ADMIN — POLYETHYLENE GLYCOL 3350 1 PACKET: 17 POWDER, FOR SOLUTION ORAL at 08:13

## 2019-02-03 RX ADMIN — TRAMADOL HYDROCHLORIDE 50 MG: 50 TABLET, FILM COATED ORAL at 20:24

## 2019-02-03 RX ADMIN — HYDROCORTISONE 10 MG: 10 TABLET ORAL at 20:22

## 2019-02-03 RX ADMIN — POLYVINYL ALCOHOL 1 DROP: 14 SOLUTION/ DROPS OPHTHALMIC at 14:51

## 2019-02-03 RX ADMIN — NYSTATIN: 100000 POWDER TOPICAL at 08:13

## 2019-02-03 RX ADMIN — Medication 2 TABLET: at 20:22

## 2019-02-03 RX ADMIN — MONTELUKAST SODIUM 10 MG: 10 TABLET, FILM COATED ORAL at 08:10

## 2019-02-03 RX ADMIN — SOTALOL HYDROCHLORIDE 40 MG: 80 TABLET ORAL at 20:22

## 2019-02-03 RX ADMIN — SOTALOL HYDROCHLORIDE 40 MG: 80 TABLET ORAL at 08:10

## 2019-02-03 RX ADMIN — SUCRALFATE 1 G: 1 SUSPENSION ORAL at 11:48

## 2019-02-03 RX ADMIN — SUCRALFATE 1 G: 1 SUSPENSION ORAL at 06:05

## 2019-02-03 RX ADMIN — OMEPRAZOLE 40 MG: 20 CAPSULE, DELAYED RELEASE ORAL at 20:22

## 2019-02-03 RX ADMIN — HYDROCORTISONE 20 MG: 10 TABLET ORAL at 11:48

## 2019-02-03 RX ADMIN — GABAPENTIN 600 MG: 300 CAPSULE ORAL at 20:22

## 2019-02-03 NOTE — PROGRESS NOTES
Received shift report and assumed care of patient.  Patient awake, calm and stable, currently positioned in bed for comfort and safety; call light within reach.  Denies pain or discomfort at this time. Will continue to monitor.

## 2019-02-03 NOTE — PROGRESS NOTES
Inpatient Anticoagulation Service Note    Date: 2/3/2019  Reason for Anticoagulation: Atrial Fibrillation, Pulmonary Embolism   [id number] VASc Score: 5    Hemoglobin Value: (!) 9.7  Hematocrit Value: (!) 31.7  Lab Platelet Value: 219  Target INR: 2.0 to 3.0    INR from last 7 days     Date/Time INR Value    02/03/19 0542  1.08    02/02/19 0541  1.08        Dose from last 7 days     Date/Time Dose (mg)    02/03/19 1400  5    02/02/19 1700  2.5    02/01/19 1300  0        Average Dose (mg):  (Clinic: 2.5 mg MW, 5 mg AOD)  Significant Interactions: Corticosteroids, Proton Pump Inhibitor, Thyroid Medications, Other (Comments) (prn ultram and prn trazodone)  Bridge Therapy: No     Comments: Begin 2/2/2019    Plan:  Coumadin 5 mg PO tonight for INR 1.08     Pharmacist suggested discharge dosing: resume outpatient regimen of 2.5 mg Mon/Wed and 5 mg all other days with close follow up by anticoagulation clinic     Ade DevlinD

## 2019-02-03 NOTE — CARE PLAN
Problem: Safety  Goal: Will remain free from injury  Pt uses call light consistently and appropriately. Waits for assistance does not attempt self transfer this shift. Able to verbalize needs.    Problem: Pain Management  Goal: Pain level will decrease to patient's comfort goal  Patient able to verbalize pain level and verbalize an acceptable level of pain.

## 2019-02-03 NOTE — CONSULTS
DATE OF SERVICE:  01/30/2019    BEHAVIORAL MEDICINE EVALUATION    BRIEF HISTORY OF PRESENTING COMPLAINTS:  The patient is an 86-year-old white    male who is referred for behavioral medicine evaluation by Dr. Garcia.  The patient was transferred to rehab from [facility] where he was   admitted on 01/21/2019 with black stools for 2 days.  The patient was found to   have a GI bleed.  The patient was treated and stabilized.  He was then sent   to rehab to address his general debility.    PAST MEDICAL HISTORY:  Significant for anesthesia, arrhythmia, arthritis,   asbestos exposure, asthma, back pain, benign neoplasm of the pituitary gland,   BPH, bradycardia, breath shortness, bronchitis, cardiac pacemaker, cataract,   COPD, diverticulitis, degenerative joint disease, emphysema, glaucoma,   heartburn, heart murmur, hiatal hernia, hypertension, hypopituitarism,   indigestion, knee arthroplasty, obstructive hypertrophic cardiomyopathy,   atrial fibrillation, pulmonary embolism, phlebitis, pituitary adenoma,   rheumatic fever, sleep apnea, sick sinus syndrome, unspecified disorder of the   thyroid, hemorrhagic conditions, and urinary bladder disorder.    PSYCHOLOGICAL STATUS:  MENTAL STATUS EXAMINATION:  The patient is a well-nourished, obese male of   short stature who appeared his stated age of 86.  At presentation, the patient   was alert.  He was resting supine in bed when approached.  The patient   oriented well to my presence.  The patient was kempt in appearance.  He was   dressed in hospital garb.  The patient's manner of presentation was   cooperative and friendly.    The patient was well oriented to time, place, and person.  His language was   logical and goal oriented, his speech was normal for rate and rhythm.  The   patient's concentration and memory functioning seemed intact.  The patient's   abstract reasoning was good.    The patient's affect was well modulated, stable, and mildly intense.  He    related well.  His mood appeared euthymic and appropriate to the context.    There was no evidence for delusional or perceptual disturbance.  Also, the   patient showed no unusual pain or motor behavior during the interview.    SPECIFIC BEHAVIORAL COMPLAINTS:  The patient denied any clinically significant   symptoms of a negative mood.  He reported no strong feelings of depression,   anxiety, or anger.    The patient reported some mild-to-moderate chronic low back pain.  He rated   his pain as a 4-5/10 on the average.  He is taking Norco chronically.    The patient reported his appetite is good.  He said his energy level is   returning and he is sleeping well.  The patient reported no interpersonal   discord or discomfort, marital strife or problems managing his day-to-day   stressors.    The patient also denied ETOH or other drug abuse or any use of tobacco.    PSYCHIATRIC HISTORY:  The patient denied any history significant for   psychiatric disturbance or treatment including in or outpatient care.    PSYCHOMETRIC TESTING:  The patient was administered 2 psychometric tests and 2   screening instruments.  The PS/PC-R revealed no clinically significant   symptoms of a negative mood.  His CDR survey showed no problems with level of   consciousness, attention, thinking, perception, speech or memory.  He did   report some loss of vigor.  He said his sleep is good.  He also reported   problems with behavioral activation and basic self-care.  No problem behaviors   are noted.  He denied any mood disturbance.  He said his appetite is good.    He admitted to chronic low back pain.  The patient reported no history of ETOH   or other drug abuse.    The patient was screened for any elder abuse or risk of suicide.  There is no   strong evidence for either problem.    SOCIAL HISTORY:  The patient is a retired [employer] man.  He is .  He lives   with his wife in [place], [place].    IMPRESSION:  Minor adjustment  difficulties.    RECOMMENDATIONS:  The patient will be followed for status and supportive care.       ____________________________________     ROSENDO POLLARD, PHD    JOJO / LYNNE    DD:  02/03/2019 10:57:41  DT:  02/03/2019 11:31:58    D#:  [id number]  Job#:  [id number]

## 2019-02-03 NOTE — PROGRESS NOTES
Inpatient Anticoagulation Service Note    Date: 2/2/2019  Reason for Anticoagulation: Atrial Fibrillation, Pulmonary Embolism   [id number] VASc Score: 5    Hemoglobin Value: (!) 9.7  Hematocrit Value: (!) 31.7  Lab Platelet Value: 219  Target INR: 2.0 to 3.0    INR from last 7 days     Date/Time INR Value    02/02/19 0541  1.08        Dose from last 7 days     Date/Time Dose (mg)    02/02/19 1700  2.5    02/01/19 1300  0        Significant Interactions: Corticosteroids, Proton Pump Inhibitor, Thyroid Medications, Other (Comments) (prn ultram and prn trazodone)  Bridge Therapy: No (If less than 5 days and overlap therapy discontinued -- document reason (i.e. Bleed Risk))    (If still on overlap therapy, if No -- document reason (i.e. Bleed Risk))    Comments: Begin 2/2/2019    Plan:  Warfarin 2.5 mg PO today.  Will dose very conservatively given patient's recent GIB.        Sha Bridges, PharmD

## 2019-02-04 LAB
INR PPP: 1.06 (ref 0.87–1.13)
PROTHROMBIN TIME: 13.9 SEC (ref 12–14.6)

## 2019-02-04 PROCEDURE — 97116 GAIT TRAINING THERAPY: CPT

## 2019-02-04 PROCEDURE — 99233 SBSQ HOSP IP/OBS HIGH 50: CPT | Performed by: PHYSICAL MEDICINE & REHABILITATION

## 2019-02-04 PROCEDURE — A9270 NON-COVERED ITEM OR SERVICE: HCPCS | Performed by: PHYSICAL MEDICINE & REHABILITATION

## 2019-02-04 PROCEDURE — 97530 THERAPEUTIC ACTIVITIES: CPT

## 2019-02-04 PROCEDURE — 94760 N-INVAS EAR/PLS OXIMETRY 1: CPT

## 2019-02-04 PROCEDURE — 85610 PROTHROMBIN TIME: CPT

## 2019-02-04 PROCEDURE — 36415 COLL VENOUS BLD VENIPUNCTURE: CPT

## 2019-02-04 PROCEDURE — 770010 HCHG ROOM/CARE - REHAB SEMI PRIVAT*

## 2019-02-04 PROCEDURE — 97110 THERAPEUTIC EXERCISES: CPT

## 2019-02-04 PROCEDURE — 700102 HCHG RX REV CODE 250 W/ 637 OVERRIDE(OP): Performed by: PHYSICAL MEDICINE & REHABILITATION

## 2019-02-04 RX ORDER — HYDROCORTISONE 10 MG/1
10 TABLET ORAL
Status: DISCONTINUED | OUTPATIENT
Start: 2019-02-05 | End: 2019-02-08 | Stop reason: HOSPADM

## 2019-02-04 RX ORDER — LISINOPRIL 20 MG/1
20 TABLET ORAL DAILY
Status: DISCONTINUED | OUTPATIENT
Start: 2019-02-05 | End: 2019-02-08 | Stop reason: HOSPADM

## 2019-02-04 RX ORDER — WARFARIN SODIUM 5 MG/1
5 TABLET ORAL
Status: COMPLETED | OUTPATIENT
Start: 2019-02-04 | End: 2019-02-04

## 2019-02-04 RX ADMIN — NYSTATIN: 100000 POWDER TOPICAL at 21:18

## 2019-02-04 RX ADMIN — BUDESONIDE AND FORMOTEROL FUMARATE DIHYDRATE 2 PUFF: 160; 4.5 AEROSOL RESPIRATORY (INHALATION) at 21:24

## 2019-02-04 RX ADMIN — DILTIAZEM HYDROCHLORIDE 60 MG: 30 TABLET, FILM COATED ORAL at 06:16

## 2019-02-04 RX ADMIN — LEVOTHYROXINE SODIUM 75 MCG: 75 TABLET ORAL at 06:16

## 2019-02-04 RX ADMIN — POLYETHYLENE GLYCOL 3350 1 PACKET: 17 POWDER, FOR SOLUTION ORAL at 21:25

## 2019-02-04 RX ADMIN — SUCRALFATE 1 G: 1 SUSPENSION ORAL at 23:10

## 2019-02-04 RX ADMIN — OMEPRAZOLE 40 MG: 20 CAPSULE, DELAYED RELEASE ORAL at 21:18

## 2019-02-04 RX ADMIN — OMEPRAZOLE 40 MG: 20 CAPSULE, DELAYED RELEASE ORAL at 10:05

## 2019-02-04 RX ADMIN — NYSTATIN: 100000 POWDER TOPICAL at 10:02

## 2019-02-04 RX ADMIN — SUCRALFATE 1 G: 1 SUSPENSION ORAL at 18:02

## 2019-02-04 RX ADMIN — SUCRALFATE 1 G: 1 SUSPENSION ORAL at 06:16

## 2019-02-04 RX ADMIN — HYDROCODONE BITARTRATE AND ACETAMINOPHEN 1 TABLET: 5; 325 TABLET ORAL at 06:16

## 2019-02-04 RX ADMIN — GABAPENTIN 600 MG: 300 CAPSULE ORAL at 21:18

## 2019-02-04 RX ADMIN — LISINOPRIL 10 MG: 5 TABLET ORAL at 10:14

## 2019-02-04 RX ADMIN — POLYETHYLENE GLYCOL 3350 1 PACKET: 17 POWDER, FOR SOLUTION ORAL at 10:02

## 2019-02-04 RX ADMIN — SOTALOL HYDROCHLORIDE 40 MG: 80 TABLET ORAL at 10:05

## 2019-02-04 RX ADMIN — WARFARIN SODIUM 5 MG: 5 TABLET ORAL at 19:19

## 2019-02-04 RX ADMIN — HYDROCORTISONE 20 MG: 10 TABLET ORAL at 08:57

## 2019-02-04 RX ADMIN — DILTIAZEM HYDROCHLORIDE 60 MG: 30 TABLET, FILM COATED ORAL at 17:59

## 2019-02-04 RX ADMIN — HYDROCORTISONE 10 MG: 10 TABLET ORAL at 21:18

## 2019-02-04 RX ADMIN — MONTELUKAST SODIUM 10 MG: 10 TABLET, FILM COATED ORAL at 10:05

## 2019-02-04 RX ADMIN — BUDESONIDE AND FORMOTEROL FUMARATE DIHYDRATE 2 PUFF: 160; 4.5 AEROSOL RESPIRATORY (INHALATION) at 09:02

## 2019-02-04 RX ADMIN — SOTALOL HYDROCHLORIDE 40 MG: 80 TABLET ORAL at 21:18

## 2019-02-04 RX ADMIN — TIOTROPIUM BROMIDE 1 CAPSULE: 18 CAPSULE ORAL; RESPIRATORY (INHALATION) at 09:03

## 2019-02-04 RX ADMIN — TAMSULOSIN HYDROCHLORIDE 0.4 MG: 0.4 CAPSULE ORAL at 10:02

## 2019-02-04 RX ADMIN — HYDROCORTISONE 20 MG: 10 TABLET ORAL at 11:49

## 2019-02-04 RX ADMIN — TRAMADOL HYDROCHLORIDE 50 MG: 50 TABLET, FILM COATED ORAL at 21:18

## 2019-02-04 RX ADMIN — Medication 2 TABLET: at 21:18

## 2019-02-04 RX ADMIN — SUCRALFATE 1 G: 1 SUSPENSION ORAL at 11:48

## 2019-02-04 ASSESSMENT — GAIT ASSESSMENTS
DISTANCE (FEET): 160
GAIT LEVEL OF ASSIST: STAND BY ASSIST
DEVIATION: BRADYKINETIC
ASSISTIVE DEVICE: 4 WHEEL WALKER

## 2019-02-04 NOTE — CARE PLAN
Problem: Communication  Goal: The ability to communicate needs accurately and effectively will improve  Makes needs known to staff.  Encouraged to use call light for staff assist.    Problem: Safety  Goal: Will remain free from falls  Call light kept within reach and encouraged to use for assistance and with toileting needs. Encouraged to call staff and to wait for staff before transfers.    Problem: Pain Management  Goal: Pain level will decrease to patient's comfort goal  Complains of back pain, medicated tonight with scheduled Gabapentin and prn Tramadol, has + relief.  See MAR and doc flow sheet.  Will continue to monitor patient.

## 2019-02-04 NOTE — PROGRESS NOTES
"Rehab Progress Note     Encounter Date: 2/4/2019    CC: Debility from blood loss; GI bleed;     Interval Events (Subjective)  Patient sitting up in room. He reports the weekend went well. No new bleeding after restarting coumadin. Reviewed labs including WBC 14.8, Hgb improved to 9.7 and INR subtherapeutic.  Patient reports he has had many VTEs so knows what to watch for.  Discussed would decrease his steroids again.     IDT Team Meeting 1/31/2019  DC/Disposition:  2/9/19    Objective:  VITAL SIGNS: /56   Pulse (!) 59   Temp 36.6 °C (97.8 °F) (Oral)   Resp 18   Ht 1.651 m (5' 5\")   Wt 94.8 kg (208 lb 15.9 oz)   SpO2 96%   BMI 34.78 kg/m²   Gen: NAD  Psych: Mood and affect appropriate  CV: RRR, no edema  Resp: CTAB, no upper airway sounds  Abd: NTND  Neuro: AOx4, 4+/5 BUE     Recent Results (from the past 72 hour(s))   CBC WITH DIFFERENTIAL    Collection Time: 02/02/19  5:41 AM   Result Value Ref Range    WBC 14.8 (H) 4.8 - 10.8 K/uL    RBC 3.32 (L) 4.70 - 6.10 M/uL    Hemoglobin 9.7 (L) 14.0 - 18.0 g/dL    Hematocrit 31.7 (L) 42.0 - 52.0 %    MCV 95.5 81.4 - 97.8 fL    MCH 29.2 27.0 - 33.0 pg    MCHC 30.6 (L) 33.7 - 35.3 g/dL    RDW 56.2 (H) 35.9 - 50.0 fL    Platelet Count 219 164 - 446 K/uL    MPV 10.0 9.0 - 12.9 fL    Neutrophils-Polys 82.90 (H) 44.00 - 72.00 %    Lymphocytes 8.40 (L) 22.00 - 41.00 %    Monocytes 5.70 0.00 - 13.40 %    Eosinophils 1.60 0.00 - 6.90 %    Basophils 0.30 0.00 - 1.80 %    Immature Granulocytes 1.10 (H) 0.00 - 0.90 %    Nucleated RBC 0.00 /100 WBC    Neutrophils (Absolute) 12.30 (H) 1.82 - 7.42 K/uL    Lymphs (Absolute) 1.24 1.00 - 4.80 K/uL    Monos (Absolute) 0.85 0.00 - 0.85 K/uL    Eos (Absolute) 0.23 0.00 - 0.51 K/uL    Baso (Absolute) 0.05 0.00 - 0.12 K/uL    Immature Granulocytes (abs) 0.16 (H) 0.00 - 0.11 K/uL    NRBC (Absolute) 0.00 K/uL   Prothrombin Time    Collection Time: 02/02/19  5:41 AM   Result Value Ref Range    PT 14.1 12.0 - 14.6 sec    INR 1.08 0.87 - " 1.13   Prothrombin Time    Collection Time: 02/03/19  5:42 AM   Result Value Ref Range    PT 14.1 12.0 - 14.6 sec    INR 1.08 0.87 - 1.13   Prothrombin Time    Collection Time: 02/04/19  5:57 AM   Result Value Ref Range    PT 13.9 12.0 - 14.6 sec    INR 1.06 0.87 - 1.13       Current Facility-Administered Medications   Medication Frequency   • warfarin (COUMADIN) tablet 5 mg COUMADIN-ONCE   • MD Alert...Warfarin per Pharmacy PHARMACY TO DOSE   • guaiFENesin dextromethorphan (ROBITUSSIN DM) 100-10 MG/5ML syrup 5 mL Q6HRS PRN   • menthol-methyl salicylate (BENGAY) ointment TID PRN   • hydrocortisone (CORTEF) tablet 20 mg QDAY with Breakfast   • lisinopril (PRINIVIL) tablet 10 mg DAILY   • Respiratory Care per Protocol Continuous RT   • Pharmacy Consult Request ...Pain Management Review 1 Each PHARMACY TO DOSE   • tramadol (ULTRAM) 50 MG tablet 50 mg Q4HRS PRN   • hydrALAZINE (APRESOLINE) tablet 25 mg Q8HRS PRN   • acetaminophen (TYLENOL) tablet 650 mg Q4HRS PRN   • senna-docusate (PERICOLACE or SENOKOT S) 8.6-50 MG per tablet 2 Tab BID    And   • polyethylene glycol/lytes (MIRALAX) PACKET 1 Packet QDAY PRN    And   • magnesium hydroxide (MILK OF MAGNESIA) suspension 30 mL QDAY PRN    And   • bisacodyl (DULCOLAX) suppository 10 mg QDAY PRN   • artificial tears 1.4 % ophthalmic solution 1 Drop PRN   • benzocaine-menthol (CEPACOL) lozenge 1 Lozenge Q2HRS PRN   • mag hydrox-al hydrox-simeth (MAALOX PLUS ES or MYLANTA DS) suspension 20 mL Q2HRS PRN   • ondansetron (ZOFRAN ODT) dispertab 4 mg 4X/DAY PRN    Or   • ondansetron (ZOFRAN) syringe/vial injection 4 mg 4X/DAY PRN   • traZODone (DESYREL) tablet 50 mg QHS PRN   • sodium chloride (OCEAN) 0.65 % nasal spray 2 Spray PRN   • budesonide-formoterol (SYMBICORT) 160-4.5 MCG/ACT inhaler 2 Puff BID   • DILTIAZem (CARDIZEM) tablet 60 mg TWICE DAILY   • gabapentin (NEURONTIN) capsule 600 mg QHS   • HYDROcodone-acetaminophen (NORCO) 5-325 MG per tablet 1 Tab Q6HRS PRN   •  hydrocortisone (CORTEF) tablet 10 mg Q EVENING   • hydrocortisone (CORTEF) tablet 20 mg AFTER LUNCH   • levothyroxine (SYNTHROID) tablet 75 mcg AM ES   • montelukast (SINGULAIR) tablet 10 mg DAILY   • nystatin (MYCOSTATIN) powder BID   • omeprazole (PRILOSEC) capsule 40 mg BID   • polyethylene glycol/lytes (MIRALAX) PACKET 1 Packet BID   • sotalol (BETAPACE) tablet 40 mg BID   • sucralfate (CARAFATE) 1 GM/10ML suspension 1 g Q6HRS   • tamsulosin (FLOMAX) capsule 0.4 mg DAILY   • tiotropium (SPIRIVA) 18 MCG inhalation capsule 1 Cap DAILY       Orders Placed This Encounter   Procedures   • Diet Order Low Fiber(GI Soft)     Standing Status:   Standing     Number of Occurrences:   1     Order Specific Question:   Diet:     Answer:   Low Fiber(GI Soft) [2]       Assessment:  Active Hospital Problems    Diagnosis   • *GI bleed   • Aortic stenosis   • Anemia due to GI blood loss   • Anticoagulant long-term use   • Chronic back pain   • EVANGELINA (obstructive sleep apnea)   • BPH (benign prostatic hypertrophy)       Medical Decision Making and Plan:  GI Bleed w debility: GI bleed s/p clipping x2 areas s/p multiple transfusions. Last Hgb 8.7 on 1/27/19  -Continue Omeprazole 40 mg BID. Recommended at discharge to continue for 2 weeks then 20 mg BID until follow-up with GI  -Continue Sucralfate for 2-4 weeks  -PT and OT for mobility and ADLs.     Anemia - 8.9 on admission. Will recheck CBC on 2/2/19 - 9.7    Adrenal Insufficiency - Patient on steroids at baseline for previous pituitary lesion.   -Continue hydrocortisone 30 QAM, 20 QNOON and 10 mg QHS. Baseline on 10 mg TID. Decrease QAM to 20 mg. Decrease noon to 10 mg     Left Ankle - Worsening arthritis with early charcot changes. Would like referral back to [facility]. Consider AFO although limited evidence of improvement and concern for skin breakdown.   -Orthotics vendor to issue soft brace for correction of deformity. Improved ambulation, but limited ability to don any  orthotic    Olecranon bursitis - Patient with olecranon bursitis, suggested compression to therapy.    HTN/A Fib - Patient on Diltiazem 60 mg BID, Sotalol 40 mg BID. Previously on Lisinopril which was put on hold due to hypotension.  -Continue Diltiazem and Sotalol. SBP. Will restart Lisinopril at 10, continue to monitor another day.  SBP into 140s will increase Lisinopril to 20 mg  -Restart Coumadin on 2/2/19, pharmacy to manage. Discussed risks and benefits     COPD - Patient on Symbicort BID, Singular daily, and Spiriva  -RT to consult    Leukocytosis - Chronic issue with labile WBC. Patient will need follow-up with Hematology. Admit WBC - 19     Hypothyroidism - Patient on 75 mcg daily     BPH - Patient on Flomax 0.4 mg on transfer, currently with condom catheter PRN. Would prefer patient to wear condom cath only at night, he prefers during daytime as well.      DVT Ppx - Currently high risk for bleed with recent GI bleed.  Restart coumadin on 2/2/19, pharmacy to manage.      Total time:  35 minutes.  I spent greater than 50% of the time for patient care, counseling, and coordination on this date, including unit/floor time, and face-to-face time with the patient as per interval events and assessment and plan above. Topics discussed included INR, anemia improving, decrease steroid and increase ACEi.    Faviola Garcia M.D.

## 2019-02-04 NOTE — FLOWSHEET NOTE
02/03/19 1508   Events/Summary/Plan   Events/Summary/Plan O2 spot check   Chest Exam   Respiration 18   Pulse 64   Oximetry   #Pulse Oximetry (Single Determination) Yes   Oxygen   Home O2 Use Prior To Admission? Yes   Home O2 LPM Flow 5 LPM   Home O2 Delivery Method Nasal Cannula   Home O2 Frequency of Use Continuous   Pulse Oximetry 97 %   O2 (LPM) 5   O2 Daily Delivery Respiratory  Silicone Nasal Cannula

## 2019-02-04 NOTE — PROGRESS NOTES
Received bedside shift report from Gita SHARIF RN regarding patient and assumed care. Patient awake, calm and stable, currently positioned in bed for comfort and safety; call light within reach. Denies pain or discomfort at this time. Will continue to monitor.

## 2019-02-04 NOTE — PROGRESS NOTES
Pharmacy Warfarin Consult   2/4/2019     86 y.o.   male     Indication for anticoagulation: Atrial Fibrillation     Goal INR = 2 - 3    Recent Labs      02/02/19   0541  02/03/19   0542  02/04/19   0557   INR  1.08  1.08  1.06   HEMOGLOBIN  9.7*   --    --    HEMATOCRIT  31.7*   --    --        Pertinent Drug/Drug Interactions:  Steroid, PPI, PRN ultram, PRN trazodone, Thyroid medications.   Outpatient Warfarin Regimen:  Last record in system 10/2017.  Recent Warfarin Dosing:   Dose from last 7 days     Date/Time Dose (mg)    02/04/19 0557  5    02/03/19 1400  5    02/02/19 1700  2.5    02/01/19 1300  0           Bridge Therapy: No bridge therapy due to GI bleed.           1.  Coumadin 5 mg tonight per INR = 1.06  2.  Recent GI Bleed.             Mendez Valentino [facility]

## 2019-02-05 LAB
INR PPP: 1.13 (ref 0.87–1.13)
PROTHROMBIN TIME: 14.6 SEC (ref 12–14.6)

## 2019-02-05 PROCEDURE — 97116 GAIT TRAINING THERAPY: CPT

## 2019-02-05 PROCEDURE — 97112 NEUROMUSCULAR REEDUCATION: CPT

## 2019-02-05 PROCEDURE — 99232 SBSQ HOSP IP/OBS MODERATE 35: CPT | Performed by: PHYSICAL MEDICINE & REHABILITATION

## 2019-02-05 PROCEDURE — 94760 N-INVAS EAR/PLS OXIMETRY 1: CPT

## 2019-02-05 PROCEDURE — 97530 THERAPEUTIC ACTIVITIES: CPT

## 2019-02-05 PROCEDURE — 97110 THERAPEUTIC EXERCISES: CPT

## 2019-02-05 PROCEDURE — 97535 SELF CARE MNGMENT TRAINING: CPT

## 2019-02-05 PROCEDURE — A9270 NON-COVERED ITEM OR SERVICE: HCPCS | Performed by: PHYSICAL MEDICINE & REHABILITATION

## 2019-02-05 PROCEDURE — 36415 COLL VENOUS BLD VENIPUNCTURE: CPT

## 2019-02-05 PROCEDURE — 700102 HCHG RX REV CODE 250 W/ 637 OVERRIDE(OP): Performed by: PHYSICAL MEDICINE & REHABILITATION

## 2019-02-05 PROCEDURE — 770010 HCHG ROOM/CARE - REHAB SEMI PRIVAT*

## 2019-02-05 PROCEDURE — 85610 PROTHROMBIN TIME: CPT

## 2019-02-05 RX ORDER — LANOLIN ALCOHOL/MO/W.PET/CERES
6 CREAM (GRAM) TOPICAL
Status: DISCONTINUED | OUTPATIENT
Start: 2019-02-05 | End: 2019-02-06

## 2019-02-05 RX ORDER — SUCRALFATE ORAL 1 G/10ML
1 SUSPENSION ORAL ONCE
Status: COMPLETED | OUTPATIENT
Start: 2019-02-05 | End: 2019-02-05

## 2019-02-05 RX ORDER — GABAPENTIN 300 MG/1
900 CAPSULE ORAL
Status: DISCONTINUED | OUTPATIENT
Start: 2019-02-05 | End: 2019-02-08 | Stop reason: HOSPADM

## 2019-02-05 RX ORDER — WARFARIN SODIUM 5 MG/1
5 TABLET ORAL
Status: COMPLETED | OUTPATIENT
Start: 2019-02-05 | End: 2019-02-05

## 2019-02-05 RX ADMIN — POLYETHYLENE GLYCOL 3350 1 PACKET: 17 POWDER, FOR SOLUTION ORAL at 20:54

## 2019-02-05 RX ADMIN — MONTELUKAST SODIUM 10 MG: 10 TABLET, FILM COATED ORAL at 09:19

## 2019-02-05 RX ADMIN — HYDROCORTISONE 10 MG: 10 TABLET ORAL at 20:55

## 2019-02-05 RX ADMIN — SOTALOL HYDROCHLORIDE 40 MG: 80 TABLET ORAL at 09:18

## 2019-02-05 RX ADMIN — OMEPRAZOLE 40 MG: 20 CAPSULE, DELAYED RELEASE ORAL at 20:55

## 2019-02-05 RX ADMIN — MELATONIN TAB 3 MG 6 MG: 3 TAB at 20:55

## 2019-02-05 RX ADMIN — HYDROCORTISONE 20 MG: 10 TABLET ORAL at 09:17

## 2019-02-05 RX ADMIN — HYDROCODONE BITARTRATE AND ACETAMINOPHEN 1 TABLET: 5; 325 TABLET ORAL at 18:11

## 2019-02-05 RX ADMIN — BUDESONIDE AND FORMOTEROL FUMARATE DIHYDRATE 2 PUFF: 160; 4.5 AEROSOL RESPIRATORY (INHALATION) at 20:59

## 2019-02-05 RX ADMIN — Medication 2 TABLET: at 20:55

## 2019-02-05 RX ADMIN — LISINOPRIL 20 MG: 20 TABLET ORAL at 09:19

## 2019-02-05 RX ADMIN — TRAMADOL HYDROCHLORIDE 50 MG: 50 TABLET, FILM COATED ORAL at 13:48

## 2019-02-05 RX ADMIN — TAMSULOSIN HYDROCHLORIDE 0.4 MG: 0.4 CAPSULE ORAL at 09:16

## 2019-02-05 RX ADMIN — SUCRALFATE 1 G: 1 SUSPENSION ORAL at 15:27

## 2019-02-05 RX ADMIN — SOTALOL HYDROCHLORIDE 40 MG: 80 TABLET ORAL at 20:55

## 2019-02-05 RX ADMIN — WARFARIN SODIUM 5 MG: 5 TABLET ORAL at 18:07

## 2019-02-05 RX ADMIN — NYSTATIN: 100000 POWDER TOPICAL at 09:22

## 2019-02-05 RX ADMIN — TRAMADOL HYDROCHLORIDE 50 MG: 50 TABLET, FILM COATED ORAL at 20:55

## 2019-02-05 RX ADMIN — DILTIAZEM HYDROCHLORIDE 60 MG: 30 TABLET, FILM COATED ORAL at 05:00

## 2019-02-05 RX ADMIN — SUCRALFATE 1 G: 1 SUSPENSION ORAL at 18:06

## 2019-02-05 RX ADMIN — SUCRALFATE 1 G: 1 SUSPENSION ORAL at 23:47

## 2019-02-05 RX ADMIN — TRAMADOL HYDROCHLORIDE 50 MG: 50 TABLET, FILM COATED ORAL at 06:26

## 2019-02-05 RX ADMIN — HYDROCORTISONE 10 MG: 10 TABLET ORAL at 12:26

## 2019-02-05 RX ADMIN — DILTIAZEM HYDROCHLORIDE 60 MG: 30 TABLET, FILM COATED ORAL at 18:06

## 2019-02-05 RX ADMIN — SUCRALFATE 1 G: 1 SUSPENSION ORAL at 05:00

## 2019-02-05 RX ADMIN — SUCRALFATE 1 G: 1 SUSPENSION ORAL at 12:26

## 2019-02-05 RX ADMIN — Medication 2 TABLET: at 09:17

## 2019-02-05 RX ADMIN — GABAPENTIN 900 MG: 300 CAPSULE ORAL at 20:55

## 2019-02-05 RX ADMIN — BUDESONIDE AND FORMOTEROL FUMARATE DIHYDRATE 2 PUFF: 160; 4.5 AEROSOL RESPIRATORY (INHALATION) at 09:15

## 2019-02-05 RX ADMIN — NYSTATIN: 100000 POWDER TOPICAL at 20:59

## 2019-02-05 RX ADMIN — LEVOTHYROXINE SODIUM 75 MCG: 75 TABLET ORAL at 05:01

## 2019-02-05 RX ADMIN — OMEPRAZOLE 40 MG: 20 CAPSULE, DELAYED RELEASE ORAL at 09:17

## 2019-02-05 RX ADMIN — TIOTROPIUM BROMIDE 1 CAPSULE: 18 CAPSULE ORAL; RESPIRATORY (INHALATION) at 09:14

## 2019-02-05 RX ADMIN — HYDROCODONE BITARTRATE AND ACETAMINOPHEN 1 TABLET: 5; 325 TABLET ORAL at 01:27

## 2019-02-05 ASSESSMENT — GAIT ASSESSMENTS
DISTANCE (FEET): 150
DEVIATION: BRADYKINETIC
DISTANCE (FEET): 150
ASSISTIVE DEVICE: 4 WHEEL WALKER
GAIT LEVEL OF ASSIST: STAND BY ASSIST
ASSISTIVE DEVICE: 4 WHEEL WALKER
GAIT LEVEL OF ASSIST: STAND BY ASSIST

## 2019-02-05 NOTE — CARE PLAN
Problem: Bowel/Gastric:  Goal: Normal bowel function is maintained or improved    Intervention: Educate patient and significant other/support system about diet, fluid intake, medications and activity to promote bowel function  Had a moderate BM today without difficulty. Taking Miralax but did not want the pericolace this am for at least 1 day.

## 2019-02-05 NOTE — FLOWSHEET NOTE
02/05/19 1255   Events/Summary/Plan   Events/Summary/Plan 02 spot check   Interdisciplinary Plan of Care-Goals (Indications)   Bronchodilator Indications History / Diagnosis   Interdisciplinary Plan of Care-Outcomes    Bronchodilator Outcome Patient at Stable Baseline   Education   Education Yes - Pt. / Family has been Instructed in use of Respiratory Equipment   Respiratory WDL   Respiratory (WDL) X   Chest Exam   Respiration 18   Pulse 72   Oximetry   #Pulse Oximetry (Single Determination) Yes   Oxygen   Home O2 Use Prior To Admission? Yes   Home O2 LPM Flow 5 LPM   Home O2 Delivery Method Nasal Cannula   Home O2 Frequency of Use Continuous   Pulse Oximetry 97 %   O2 (LPM) 5   O2 Daily Delivery Respiratory  Silicone Nasal Cannula

## 2019-02-05 NOTE — PROGRESS NOTES
Received bedside shift report from Bharati LOCKETT RN regarding patient and assumed care. Patient awake, calm and stable, currently positioned in bed for comfort and safety; call light within reach. Denies pain or discomfort at this time. Will continue to monitor.

## 2019-02-05 NOTE — PROGRESS NOTES
Pharmacy Warfarin Consult   2/5/2019     86 y.o.   male     Indication for anticoagulation: Atrial Fibrillation     Goal INR = 2 - 3    Recent Labs      02/03/19   0542  02/04/19   0557  02/05/19   0540   INR  1.08  1.06  1.13       Pertinent Drug/Drug Interactions:  Steroid, PPI, PRN ultram, PRN trazodone, Thyroid medications.   Outpatient Warfarin Regimen:  Last record in system 10/2017.  Recent Warfarin Dosing:   Dose from last 7 days     Date/Time Dose (mg)    02/05/19 0540  5    02/04/19 0557  5    02/03/19 1400  5    02/02/19 1700  2.5    02/01/19 1300  0          Bridge Therapy: No bridge therapy due to GI bleed.           1.  Coumadin 5 mg tonight per INR = 1.13  2.  Recent GI Bleed.              Mendez Valentino [facility]

## 2019-02-05 NOTE — FLOWSHEET NOTE
02/04/19 1642   Events/Summary/Plan   Events/Summary/Plan 02 spot check.     Interdisciplinary Plan of Care-Goals (Indications)   Bronchodilator Indications History / Diagnosis   Interdisciplinary Plan of Care-Outcomes    Bronchodilator Outcome Patient at Stable Baseline   Respiratory WDL   Respiratory (WDL) X   Chest Exam   Respiration 18   Pulse 64   Oximetry   #Pulse Oximetry (Single Determination) Yes   Oxygen   Home O2 Use Prior To Admission? Yes   Home O2 LPM Flow 5 LPM   Home O2 Delivery Method Nasal Cannula   Home O2 Frequency of Use Continuous   Pulse Oximetry 95 %   O2 (LPM) 5   O2 Daily Delivery Respiratory  Silicone Nasal Cannula

## 2019-02-05 NOTE — PROGRESS NOTES
"Rehab Progress Note     Encounter Date: 2/5/2019    CC: Debility from blood loss; GI bleed;     Interval Events (Subjective)  Patient sitting up in room. He reports his sleep was very poor last night due to pain. He had pain in multiple areas including his neck, left elbow and feet. He reports he eventually requested a Norco and he was able to sleep for 3-4 hours.  Had a tramadol this morning which helped with the pain as well. He reports he had a lot of back to back therapy this morning so he is now very tired. Denies SHARYN    IDT Team Meeting 1/31/2019  DC/Disposition:  2/9/19    Objective:  VITAL SIGNS: /84   Pulse 73   Temp 36.8 °C (98.3 °F) (Oral)   Resp 18   Ht 1.651 m (5' 5\")   Wt 94.8 kg (208 lb 15.9 oz)   SpO2 96%   BMI 34.78 kg/m²   Gen: NAD  Psych: Mood and affect appropriate  CV: RRR, no edema  Resp: CTAB, no upper airway sounds  Abd: NTND  Neuro: AOx4, 4+/5 BUE   Unchanged from 2/4/19 except TTP on left elbow at bursa and common extensor ligament    Recent Results (from the past 72 hour(s))   Prothrombin Time    Collection Time: 02/03/19  5:42 AM   Result Value Ref Range    PT 14.1 12.0 - 14.6 sec    INR 1.08 0.87 - 1.13   Prothrombin Time    Collection Time: 02/04/19  5:57 AM   Result Value Ref Range    PT 13.9 12.0 - 14.6 sec    INR 1.06 0.87 - 1.13   Prothrombin Time    Collection Time: 02/05/19  5:40 AM   Result Value Ref Range    PT 14.6 12.0 - 14.6 sec    INR 1.13 0.87 - 1.13       Current Facility-Administered Medications   Medication Frequency   • warfarin (COUMADIN) tablet 5 mg COUMADIN-ONCE   • hydrocortisone (CORTEF) tablet 10 mg AFTER LUNCH   • lisinopril (PRINIVIL) tablet 20 mg DAILY   • MD Alert...Warfarin per Pharmacy PHARMACY TO DOSE   • guaiFENesin dextromethorphan (ROBITUSSIN DM) 100-10 MG/5ML syrup 5 mL Q6HRS PRN   • menthol-methyl salicylate (BENGAY) ointment TID PRN   • hydrocortisone (CORTEF) tablet 20 mg QDAY with Breakfast   • Respiratory Care per Protocol Continuous " RT   • Pharmacy Consult Request ...Pain Management Review 1 Each PHARMACY TO DOSE   • tramadol (ULTRAM) 50 MG tablet 50 mg Q4HRS PRN   • hydrALAZINE (APRESOLINE) tablet 25 mg Q8HRS PRN   • acetaminophen (TYLENOL) tablet 650 mg Q4HRS PRN   • senna-docusate (PERICOLACE or SENOKOT S) 8.6-50 MG per tablet 2 Tab BID    And   • polyethylene glycol/lytes (MIRALAX) PACKET 1 Packet QDAY PRN    And   • magnesium hydroxide (MILK OF MAGNESIA) suspension 30 mL QDAY PRN    And   • bisacodyl (DULCOLAX) suppository 10 mg QDAY PRN   • artificial tears 1.4 % ophthalmic solution 1 Drop PRN   • benzocaine-menthol (CEPACOL) lozenge 1 Lozenge Q2HRS PRN   • mag hydrox-al hydrox-simeth (MAALOX PLUS ES or MYLANTA DS) suspension 20 mL Q2HRS PRN   • ondansetron (ZOFRAN ODT) dispertab 4 mg 4X/DAY PRN    Or   • ondansetron (ZOFRAN) syringe/vial injection 4 mg 4X/DAY PRN   • traZODone (DESYREL) tablet 50 mg QHS PRN   • sodium chloride (OCEAN) 0.65 % nasal spray 2 Spray PRN   • budesonide-formoterol (SYMBICORT) 160-4.5 MCG/ACT inhaler 2 Puff BID   • DILTIAZem (CARDIZEM) tablet 60 mg TWICE DAILY   • gabapentin (NEURONTIN) capsule 600 mg QHS   • HYDROcodone-acetaminophen (NORCO) 5-325 MG per tablet 1 Tab Q6HRS PRN   • hydrocortisone (CORTEF) tablet 10 mg Q EVENING   • levothyroxine (SYNTHROID) tablet 75 mcg AM ES   • montelukast (SINGULAIR) tablet 10 mg DAILY   • nystatin (MYCOSTATIN) powder BID   • omeprazole (PRILOSEC) capsule 40 mg BID   • polyethylene glycol/lytes (MIRALAX) PACKET 1 Packet BID   • sotalol (BETAPACE) tablet 40 mg BID   • sucralfate (CARAFATE) 1 GM/10ML suspension 1 g Q6HRS   • tamsulosin (FLOMAX) capsule 0.4 mg DAILY   • tiotropium (SPIRIVA) 18 MCG inhalation capsule 1 Cap DAILY       Orders Placed This Encounter   Procedures   • Diet Order Low Fiber(GI Soft)     Standing Status:   Standing     Number of Occurrences:   1     Order Specific Question:   Diet:     Answer:   Low Fiber(GI Soft) [2]       Assessment:  Active Hospital  Problems    Diagnosis   • *GI bleed   • Aortic stenosis   • Anemia due to GI blood loss   • Anticoagulant long-term use   • Chronic back pain   • EVANGELINA (obstructive sleep apnea)   • BPH (benign prostatic hypertrophy)       Medical Decision Making and Plan:  GI Bleed w debility: GI bleed s/p clipping x2 areas s/p multiple transfusions. Last Hgb 8.7 on 1/27/19  -Continue Omeprazole 40 mg BID. Recommended at discharge to continue for 2 weeks then 20 mg BID until follow-up with GI  -Continue Sucralfate for 2-4 weeks  -PT and OT for mobility and ADLs.     Anemia - 8.9 on admission. Will recheck CBC on 2/2/19 - 9.7    Adrenal Insufficiency - Patient on steroids at baseline for previous pituitary lesion.   -Continue hydrocortisone 30 QAM, 20 QNOON and 10 mg QHS. Baseline on 10 mg TID. Decrease QAM to 20 mg. Decrease noon to 10 mg     Left Ankle - Worsening arthritis with early charcot changes. Would like referral back to [facility]. Consider AFO although limited evidence of improvement and concern for skin breakdown.   -Orthotics vendor to issue soft brace for correction of deformity. Improved ambulation, but limited ability to don any orthotic    Olecranon bursitis - Patient with olecranon bursitis, suggested compression to therapy.    HTN/A Fib - Patient on Diltiazem 60 mg BID, Sotalol 40 mg BID. Previously on Lisinopril which was put on hold due to hypotension.  -Continue Diltiazem and Sotalol. SBP. Will restart Lisinopril at 10, continue to monitor another day.  SBP into 140s will increase Lisinopril to 20 mg  -Restart Coumadin on 2/2/19, pharmacy to manage. Discussed risks and benefits     COPD - Patient on Symbicort BID, Singular daily, and Spiriva  -RT to consult    Leukocytosis - Chronic issue with labile WBC. Patient will need follow-up with Hematology. Admit WBC - 19     Hypothyroidism - Patient on 75 mcg daily     Pain - Patient with chronic pain in multiple joints, most common worse at night. Increase Gabapentin to 900  QHS.    Insomnia - Start Melatonin 6 mg QHS    BPH - Patient on Flomax 0.4 mg on transfer, currently with condom catheter PRN. Would prefer patient to wear condom cath only at night, he prefers during daytime as well.      DVT Ppx - Currently high risk for bleed with recent GI bleed.  Restart coumadin on 2/2/19, pharmacy to manage. INR still subtherapeutic on 2/5/19     Total time:  30 minutes.  I spent greater than 50% of the time for patient care, counseling, and coordination on this date, including unit/floor time, and face-to-face time with the patient as per interval events and assessment and plan above. Topics discussed included ongoing INR titration, discussed with pharmacy, added melatonin and increase Gabapentin to 900 QHS.     Faviola Garcia M.D.

## 2019-02-06 LAB
INR PPP: 1.19 (ref 0.87–1.13)
PROTHROMBIN TIME: 15.2 SEC (ref 12–14.6)

## 2019-02-06 PROCEDURE — 97110 THERAPEUTIC EXERCISES: CPT

## 2019-02-06 PROCEDURE — 85610 PROTHROMBIN TIME: CPT

## 2019-02-06 PROCEDURE — 97535 SELF CARE MNGMENT TRAINING: CPT

## 2019-02-06 PROCEDURE — 97530 THERAPEUTIC ACTIVITIES: CPT

## 2019-02-06 PROCEDURE — 770010 HCHG ROOM/CARE - REHAB SEMI PRIVAT*

## 2019-02-06 PROCEDURE — 97116 GAIT TRAINING THERAPY: CPT

## 2019-02-06 PROCEDURE — A9270 NON-COVERED ITEM OR SERVICE: HCPCS | Performed by: PHYSICAL MEDICINE & REHABILITATION

## 2019-02-06 PROCEDURE — 36415 COLL VENOUS BLD VENIPUNCTURE: CPT

## 2019-02-06 PROCEDURE — 97112 NEUROMUSCULAR REEDUCATION: CPT

## 2019-02-06 PROCEDURE — 99232 SBSQ HOSP IP/OBS MODERATE 35: CPT | Performed by: PHYSICAL MEDICINE & REHABILITATION

## 2019-02-06 PROCEDURE — 700102 HCHG RX REV CODE 250 W/ 637 OVERRIDE(OP): Performed by: PHYSICAL MEDICINE & REHABILITATION

## 2019-02-06 PROCEDURE — 94760 N-INVAS EAR/PLS OXIMETRY 1: CPT

## 2019-02-06 RX ORDER — WARFARIN SODIUM 2 MG/1
2 TABLET ORAL
Status: COMPLETED | OUTPATIENT
Start: 2019-02-06 | End: 2019-02-06

## 2019-02-06 RX ORDER — WARFARIN SODIUM 5 MG/1
5 TABLET ORAL
Status: COMPLETED | OUTPATIENT
Start: 2019-02-06 | End: 2019-02-06

## 2019-02-06 RX ORDER — HYDROCORTISONE 10 MG/1
10 TABLET ORAL
Status: DISCONTINUED | OUTPATIENT
Start: 2019-02-07 | End: 2019-02-08 | Stop reason: HOSPADM

## 2019-02-06 RX ORDER — LANOLIN ALCOHOL/MO/W.PET/CERES
9 CREAM (GRAM) TOPICAL
Status: DISCONTINUED | OUTPATIENT
Start: 2019-02-06 | End: 2019-02-08 | Stop reason: HOSPADM

## 2019-02-06 RX ADMIN — TAMSULOSIN HYDROCHLORIDE 0.4 MG: 0.4 CAPSULE ORAL at 08:26

## 2019-02-06 RX ADMIN — LISINOPRIL 20 MG: 20 TABLET ORAL at 08:27

## 2019-02-06 RX ADMIN — LEVOTHYROXINE SODIUM 75 MCG: 75 TABLET ORAL at 04:46

## 2019-02-06 RX ADMIN — SUCRALFATE 1 G: 1 SUSPENSION ORAL at 23:44

## 2019-02-06 RX ADMIN — TIOTROPIUM BROMIDE 1 CAPSULE: 18 CAPSULE ORAL; RESPIRATORY (INHALATION) at 08:26

## 2019-02-06 RX ADMIN — WARFARIN SODIUM 2 MG: 2 TABLET ORAL at 17:50

## 2019-02-06 RX ADMIN — MONTELUKAST SODIUM 10 MG: 10 TABLET, FILM COATED ORAL at 08:27

## 2019-02-06 RX ADMIN — POLYVINYL ALCOHOL 1 DROP: 14 SOLUTION/ DROPS OPHTHALMIC at 11:08

## 2019-02-06 RX ADMIN — DILTIAZEM HYDROCHLORIDE 60 MG: 30 TABLET, FILM COATED ORAL at 04:46

## 2019-02-06 RX ADMIN — TRAMADOL HYDROCHLORIDE 50 MG: 50 TABLET, FILM COATED ORAL at 08:47

## 2019-02-06 RX ADMIN — HYDROCORTISONE 10 MG: 10 TABLET ORAL at 11:46

## 2019-02-06 RX ADMIN — SUCRALFATE 1 G: 1 SUSPENSION ORAL at 17:50

## 2019-02-06 RX ADMIN — SOTALOL HYDROCHLORIDE 40 MG: 80 TABLET ORAL at 08:26

## 2019-02-06 RX ADMIN — BUDESONIDE AND FORMOTEROL FUMARATE DIHYDRATE 2 PUFF: 160; 4.5 AEROSOL RESPIRATORY (INHALATION) at 20:11

## 2019-02-06 RX ADMIN — BUDESONIDE AND FORMOTEROL FUMARATE DIHYDRATE 2 PUFF: 160; 4.5 AEROSOL RESPIRATORY (INHALATION) at 08:31

## 2019-02-06 RX ADMIN — NYSTATIN: 100000 POWDER TOPICAL at 08:30

## 2019-02-06 RX ADMIN — POLYETHYLENE GLYCOL 3350 1 PACKET: 17 POWDER, FOR SOLUTION ORAL at 08:30

## 2019-02-06 RX ADMIN — SUCRALFATE 1 G: 1 SUSPENSION ORAL at 12:00

## 2019-02-06 RX ADMIN — Medication 2 TABLET: at 08:27

## 2019-02-06 RX ADMIN — WARFARIN SODIUM 5 MG: 5 TABLET ORAL at 18:10

## 2019-02-06 RX ADMIN — OMEPRAZOLE 40 MG: 20 CAPSULE, DELAYED RELEASE ORAL at 20:12

## 2019-02-06 RX ADMIN — OMEPRAZOLE 40 MG: 20 CAPSULE, DELAYED RELEASE ORAL at 08:27

## 2019-02-06 RX ADMIN — TRAMADOL HYDROCHLORIDE 50 MG: 50 TABLET, FILM COATED ORAL at 18:10

## 2019-02-06 RX ADMIN — HYDROCODONE BITARTRATE AND ACETAMINOPHEN 1 TABLET: 5; 325 TABLET ORAL at 20:28

## 2019-02-06 RX ADMIN — NYSTATIN: 100000 POWDER TOPICAL at 20:13

## 2019-02-06 RX ADMIN — GABAPENTIN 900 MG: 300 CAPSULE ORAL at 20:12

## 2019-02-06 RX ADMIN — DILTIAZEM HYDROCHLORIDE 60 MG: 30 TABLET, FILM COATED ORAL at 17:50

## 2019-02-06 RX ADMIN — HYDROCORTISONE 10 MG: 10 TABLET ORAL at 20:13

## 2019-02-06 RX ADMIN — HYDROCORTISONE 20 MG: 10 TABLET ORAL at 08:27

## 2019-02-06 RX ADMIN — SUCRALFATE 1 G: 1 SUSPENSION ORAL at 04:46

## 2019-02-06 RX ADMIN — SOTALOL HYDROCHLORIDE 40 MG: 80 TABLET ORAL at 20:13

## 2019-02-06 RX ADMIN — MELATONIN TAB 3 MG 9 MG: 3 TAB at 20:12

## 2019-02-06 RX ADMIN — Medication 2 TABLET: at 20:13

## 2019-02-06 ASSESSMENT — GAIT ASSESSMENTS
GAIT LEVEL OF ASSIST: STAND BY ASSIST
ASSISTIVE DEVICE: 4 WHEEL WALKER
GAIT LEVEL OF ASSIST: STAND BY ASSIST
DISTANCE (FEET): 150
DEVIATION: BRADYKINETIC;SHUFFLED GAIT;DECREASED HEEL STRIKE;DECREASED TOE OFF
ASSISTIVE DEVICE: 4 WHEEL WALKER

## 2019-02-06 NOTE — PROGRESS NOTES
Received bedside shift report from Pearl PARDO RN regarding patient and assumed care. Patient awake, calm and stable, currently positioned in bed for comfort and safety; call light within reach. Denies pain or discomfort at this time. Will continue to monitor.

## 2019-02-06 NOTE — CARE PLAN
Problem: Pain Management  Goal: Pain level will decrease to patient's comfort goal  Patient complains of lower back pain 7/10, PRN tramadol given, repositioned for comfort.     Verbalized decrease in pain after reassessment. Currently up in wheelchair, comfortable.     Problem: Respiratory:  Goal: Respiratory status will improve  Patient is on 5 lpm oxygen via nasal cannula, up in wheelchair, no complains of shortness of breath, mild work of breathing noted,  Both lungs are clear, diminished on bases, no acute distress noted.

## 2019-02-06 NOTE — FLOWSHEET NOTE
02/06/19 1248   Events/Summary/Plan   Events/Summary/Plan 02 spot check   Interdisciplinary Plan of Care-Goals (Indications)   Bronchodilator Indications History / Diagnosis   Interdisciplinary Plan of Care-Outcomes    Bronchodilator Outcome Patient at Stable Baseline   Education   Education Yes - Pt. / Family has been Instructed in use of Respiratory Equipment   Respiratory WDL   Respiratory (WDL) X   Chest Exam   Respiration 18   Pulse 70   Oximetry   #Pulse Oximetry (Single Determination) Yes   Oxygen   Home O2 Use Prior To Admission? Yes   Home O2 LPM Flow 5 LPM   Home O2 Delivery Method Nasal Cannula   Home O2 Frequency of Use Continuous   Pulse Oximetry 99 %   O2 (LPM) 5   O2 Daily Delivery Respiratory  Silicone Nasal Cannula

## 2019-02-06 NOTE — PROGRESS NOTES
"Rehab Progress Note     Encounter Date: 2/6/2019    CC: Debility from blood loss; GI bleed;     Interval Events (Subjective)  Patient sitting up in room. He reports he is doing well. He reports he is concerned about his discharge date of 2/9/19 if it snows or if his son cannot come down. Discussed can go home on 2/8/19 if concern. He reports he will talk to his family. Otherwise his cough is slowly improving.   Pain is controlled on current dose of gabapentin.     IDT Team Meeting 1/31/2019  DC/Disposition:  2/9/19    Objective:  VITAL SIGNS: /74   Pulse 70   Temp 36.2 °C (97.1 °F) (Temporal)   Resp 18   Ht 1.651 m (5' 5\")   Wt 94.8 kg (208 lb 15.9 oz)   SpO2 99%   BMI 34.78 kg/m²   Gen: NAD  Psych: Mood and affect appropriate  CV: RRR, no edema  Resp: CTAB, no upper airway sounds  Abd: NTND  Neuro: AOx4, 4+/5 BUE    Recent Results (from the past 72 hour(s))   Prothrombin Time    Collection Time: 02/04/19  5:57 AM   Result Value Ref Range    PT 13.9 12.0 - 14.6 sec    INR 1.06 0.87 - 1.13   Prothrombin Time    Collection Time: 02/05/19  5:40 AM   Result Value Ref Range    PT 14.6 12.0 - 14.6 sec    INR 1.13 0.87 - 1.13   Prothrombin Time    Collection Time: 02/06/19  5:54 AM   Result Value Ref Range    PT 15.2 (H) 12.0 - 14.6 sec    INR 1.19 (H) 0.87 - 1.13       Current Facility-Administered Medications   Medication Frequency   • warfarin (COUMADIN) tablet 5 mg COUMADIN-ONCE    And   • warfarin (COUMADIN) tablet 2 mg COUMADIN-ONCE   • melatonin tablet 6 mg QHS   • gabapentin (NEURONTIN) capsule 900 mg QHS   • hydrocortisone (CORTEF) tablet 10 mg AFTER LUNCH   • lisinopril (PRINIVIL) tablet 20 mg DAILY   • MD Alert...Warfarin per Pharmacy PHARMACY TO DOSE   • guaiFENesin dextromethorphan (ROBITUSSIN DM) 100-10 MG/5ML syrup 5 mL Q6HRS PRN   • menthol-methyl salicylate (BENGAY) ointment TID PRN   • hydrocortisone (CORTEF) tablet 20 mg QDAY with Breakfast   • Respiratory Care per Protocol Continuous RT   • " Pharmacy Consult Request ...Pain Management Review 1 Each PHARMACY TO DOSE   • tramadol (ULTRAM) 50 MG tablet 50 mg Q4HRS PRN   • hydrALAZINE (APRESOLINE) tablet 25 mg Q8HRS PRN   • acetaminophen (TYLENOL) tablet 650 mg Q4HRS PRN   • senna-docusate (PERICOLACE or SENOKOT S) 8.6-50 MG per tablet 2 Tab BID    And   • polyethylene glycol/lytes (MIRALAX) PACKET 1 Packet QDAY PRN    And   • magnesium hydroxide (MILK OF MAGNESIA) suspension 30 mL QDAY PRN    And   • bisacodyl (DULCOLAX) suppository 10 mg QDAY PRN   • artificial tears 1.4 % ophthalmic solution 1 Drop PRN   • benzocaine-menthol (CEPACOL) lozenge 1 Lozenge Q2HRS PRN   • mag hydrox-al hydrox-simeth (MAALOX PLUS ES or MYLANTA DS) suspension 20 mL Q2HRS PRN   • ondansetron (ZOFRAN ODT) dispertab 4 mg 4X/DAY PRN    Or   • ondansetron (ZOFRAN) syringe/vial injection 4 mg 4X/DAY PRN   • traZODone (DESYREL) tablet 50 mg QHS PRN   • sodium chloride (OCEAN) 0.65 % nasal spray 2 Spray PRN   • budesonide-formoterol (SYMBICORT) 160-4.5 MCG/ACT inhaler 2 Puff BID   • DILTIAZem (CARDIZEM) tablet 60 mg TWICE DAILY   • HYDROcodone-acetaminophen (NORCO) 5-325 MG per tablet 1 Tab Q6HRS PRN   • hydrocortisone (CORTEF) tablet 10 mg Q EVENING   • levothyroxine (SYNTHROID) tablet 75 mcg AM ES   • montelukast (SINGULAIR) tablet 10 mg DAILY   • nystatin (MYCOSTATIN) powder BID   • omeprazole (PRILOSEC) capsule 40 mg BID   • polyethylene glycol/lytes (MIRALAX) PACKET 1 Packet BID   • sotalol (BETAPACE) tablet 40 mg BID   • sucralfate (CARAFATE) 1 GM/10ML suspension 1 g Q6HRS   • tamsulosin (FLOMAX) capsule 0.4 mg DAILY   • tiotropium (SPIRIVA) 18 MCG inhalation capsule 1 Cap DAILY       Orders Placed This Encounter   Procedures   • Diet Order Low Fiber(GI Soft)     Standing Status:   Standing     Number of Occurrences:   1     Order Specific Question:   Diet:     Answer:   Low Fiber(GI Soft) [2]       Assessment:  Active Hospital Problems    Diagnosis   • *GI bleed   • Aortic  stenosis   • Anemia due to GI blood loss   • Anticoagulant long-term use   • Chronic back pain   • EVANGELINA (obstructive sleep apnea)   • BPH (benign prostatic hypertrophy)       Medical Decision Making and Plan:  GI Bleed w debility: GI bleed s/p clipping x2 areas s/p multiple transfusions. Last Hgb 8.7 on 1/27/19  -Continue Omeprazole 40 mg BID. Recommended at discharge to continue for 2 weeks then 20 mg BID until follow-up with GI  -Continue Sucralfate for 2-4 weeks  -PT and OT for mobility and ADLs.     Anemia - 8.9 on admission. Will recheck CBC on 2/2/19 - 9.7    Adrenal Insufficiency - Patient on steroids at baseline for previous pituitary lesion.   -Continue hydrocortisone 30 QAM, 20 QNOON and 10 mg QHS. Baseline on 10 mg TID. Titrated down to 10 mg TID    Left Ankle - Worsening arthritis with early charcot changes. Would like referral back to [facility]. Consider AFO although limited evidence of improvement and concern for skin breakdown.   -Orthotics vendor to issue soft brace for correction of deformity. Improved ambulation, but limited ability to don any orthotic    Olecranon bursitis - Patient with olecranon bursitis, suggested compression to therapy.    HTN/A Fib - Patient on Diltiazem 60 mg BID, Sotalol 40 mg BID. Previously on Lisinopril which was put on hold due to hypotension.  -Continue Diltiazem and Sotalol. SBP. Will restart Lisinopril at 10, continue to monitor another day.  SBP into 140s will increase Lisinopril to 20 mg  -Restart Coumadin on 2/2/19, pharmacy to manage. Discussed risks and benefits     COPD - Patient on Symbicort BID, Singular daily, and Spiriva  -RT to consult    Leukocytosis - Chronic issue with labile WBC. Patient will need follow-up with Hematology. Admit WBC - 19     Hypothyroidism - Patient on 75 mcg daily     Pain - Patient with chronic pain in multiple joints, most common worse at night. Increase Gabapentin to 900 QHS.    Insomnia - Start Melatonin 6 mg QHS    BPH - Patient on  Flomax 0.4 mg on transfer, currently with condom catheter PRN. Would prefer patient to wear condom cath only at night, he prefers during daytime as well.      DVT Ppx - Currently high risk for bleed with recent GI bleed.  Restart coumadin on 2/2/19, pharmacy to manage. INR still subtherapeutic on 2/5/19     Dispo - Patient will need ride other than wife. Discussed can discharge early on 2/8/19 if son available at that time. Patient will talk to family.     Total time:  25 minutes.  I spent greater than 50% of the time for patient care, counseling, and coordination on this date, including unit/floor time, and face-to-face time with the patient as per interval events and assessment and plan above. Topics discussed included improved pain control, decrease steroids and working on dispo planning.     Faviola Garcia M.D.

## 2019-02-06 NOTE — PROGRESS NOTES
Received shift report from night RN and assumed patient care. Patient is awake, alert and oriented. Patient complains of lower back pain, repositioned for comfort, reinforced safety precautions, will continue to monitor.

## 2019-02-06 NOTE — CARE PLAN
Problem: Bowel/Gastric:  Goal: Normal bowel function is maintained or improved  Patient is continent of bowels. Last BM was 2/5/19    Problem: Pain Management  Goal: Pain level will decrease to patient's comfort goal  Pt requested Tramadol after therapies this afternoon for generalized pain.  Pt requested [place] after dinner for generalized pain.   Medication given with good results. See MAR

## 2019-02-06 NOTE — PROGRESS NOTES
Pharmacy Warfarin Consult   2/6/2019     86 y.o.   male     Indication for anticoagulation: Atrial Fibrillation    Goal INR = 2 to 3     Recent Labs      02/04/19   0557  02/05/19   0540  02/06/19   0554   INR  1.06  1.13  1.19*       Pertinent Drug/Drug Interactions:  Steroids, PPI, Thyroid Medications. PRN Ultram and Trazodone.   Outpatient Warfarin Regimen:  Last record in system 10/2017  Recent Warfarin Dosing:    Dose from last 7 days     Date/Time Dose (mg)    02/06/19 0823  --    02/06/19 0554  7    02/05/19 0540  5    02/04/19 0557  5    02/03/19 1400  5    02/02/19 1700  2.5    02/01/19 1300  0          Bridge Therapy: No bridge therapy due to GI bleed        1.  Coumadin 7mg tonight for INR = 1.19  2.   Recent GI bleed.           Jaswinder [facility]

## 2019-02-07 LAB
INR PPP: 1.26 (ref 0.87–1.13)
PROTHROMBIN TIME: 16 SEC (ref 12–14.6)

## 2019-02-07 PROCEDURE — 770010 HCHG ROOM/CARE - REHAB SEMI PRIVAT*

## 2019-02-07 PROCEDURE — 85610 PROTHROMBIN TIME: CPT

## 2019-02-07 PROCEDURE — 97535 SELF CARE MNGMENT TRAINING: CPT

## 2019-02-07 PROCEDURE — A9270 NON-COVERED ITEM OR SERVICE: HCPCS | Performed by: PHYSICAL MEDICINE & REHABILITATION

## 2019-02-07 PROCEDURE — 97116 GAIT TRAINING THERAPY: CPT

## 2019-02-07 PROCEDURE — 97530 THERAPEUTIC ACTIVITIES: CPT

## 2019-02-07 PROCEDURE — 99233 SBSQ HOSP IP/OBS HIGH 50: CPT | Performed by: PHYSICAL MEDICINE & REHABILITATION

## 2019-02-07 PROCEDURE — 97110 THERAPEUTIC EXERCISES: CPT

## 2019-02-07 PROCEDURE — 36415 COLL VENOUS BLD VENIPUNCTURE: CPT

## 2019-02-07 PROCEDURE — 700102 HCHG RX REV CODE 250 W/ 637 OVERRIDE(OP): Performed by: PHYSICAL MEDICINE & REHABILITATION

## 2019-02-07 RX ORDER — WARFARIN SODIUM 3 MG/1
6 TABLET ORAL
Status: COMPLETED | OUTPATIENT
Start: 2019-02-07 | End: 2019-02-07

## 2019-02-07 RX ADMIN — NYSTATIN: 100000 POWDER TOPICAL at 20:56

## 2019-02-07 RX ADMIN — HYDROCORTISONE 10 MG: 10 TABLET ORAL at 09:03

## 2019-02-07 RX ADMIN — TIOTROPIUM BROMIDE 1 CAPSULE: 18 CAPSULE ORAL; RESPIRATORY (INHALATION) at 09:00

## 2019-02-07 RX ADMIN — HYDROCODONE BITARTRATE AND ACETAMINOPHEN 1 TABLET: 5; 325 TABLET ORAL at 09:03

## 2019-02-07 RX ADMIN — DILTIAZEM HYDROCHLORIDE 60 MG: 30 TABLET, FILM COATED ORAL at 05:28

## 2019-02-07 RX ADMIN — OMEPRAZOLE 40 MG: 20 CAPSULE, DELAYED RELEASE ORAL at 20:56

## 2019-02-07 RX ADMIN — SUCRALFATE 1 G: 1 SUSPENSION ORAL at 05:28

## 2019-02-07 RX ADMIN — OMEPRAZOLE 40 MG: 20 CAPSULE, DELAYED RELEASE ORAL at 09:02

## 2019-02-07 RX ADMIN — MELATONIN TAB 3 MG 9 MG: 3 TAB at 20:55

## 2019-02-07 RX ADMIN — BUDESONIDE AND FORMOTEROL FUMARATE DIHYDRATE 2 PUFF: 160; 4.5 AEROSOL RESPIRATORY (INHALATION) at 09:03

## 2019-02-07 RX ADMIN — SOTALOL HYDROCHLORIDE 40 MG: 80 TABLET ORAL at 09:01

## 2019-02-07 RX ADMIN — SOTALOL HYDROCHLORIDE 40 MG: 80 TABLET ORAL at 20:56

## 2019-02-07 RX ADMIN — HYDROCORTISONE 10 MG: 10 TABLET ORAL at 21:08

## 2019-02-07 RX ADMIN — BUDESONIDE AND FORMOTEROL FUMARATE DIHYDRATE 2 PUFF: 160; 4.5 AEROSOL RESPIRATORY (INHALATION) at 20:56

## 2019-02-07 RX ADMIN — SUCRALFATE 1 G: 1 SUSPENSION ORAL at 23:20

## 2019-02-07 RX ADMIN — TAMSULOSIN HYDROCHLORIDE 0.4 MG: 0.4 CAPSULE ORAL at 09:02

## 2019-02-07 RX ADMIN — HYDROCORTISONE 10 MG: 10 TABLET ORAL at 11:42

## 2019-02-07 RX ADMIN — LEVOTHYROXINE SODIUM 75 MCG: 75 TABLET ORAL at 05:28

## 2019-02-07 RX ADMIN — MONTELUKAST SODIUM 10 MG: 10 TABLET, FILM COATED ORAL at 09:02

## 2019-02-07 RX ADMIN — NYSTATIN: 100000 POWDER TOPICAL at 09:12

## 2019-02-07 RX ADMIN — Medication 2 TABLET: at 09:02

## 2019-02-07 RX ADMIN — LISINOPRIL 20 MG: 20 TABLET ORAL at 09:03

## 2019-02-07 RX ADMIN — GABAPENTIN 900 MG: 300 CAPSULE ORAL at 20:56

## 2019-02-07 RX ADMIN — POLYVINYL ALCOHOL 1 DROP: 14 SOLUTION/ DROPS OPHTHALMIC at 09:12

## 2019-02-07 RX ADMIN — SUCRALFATE 1 G: 1 SUSPENSION ORAL at 11:43

## 2019-02-07 RX ADMIN — SUCRALFATE 1 G: 1 SUSPENSION ORAL at 18:12

## 2019-02-07 RX ADMIN — WARFARIN SODIUM 6 MG: 3 TABLET ORAL at 18:15

## 2019-02-07 RX ADMIN — TRAMADOL HYDROCHLORIDE 50 MG: 50 TABLET, FILM COATED ORAL at 10:34

## 2019-02-07 ASSESSMENT — GAIT ASSESSMENTS
ASSISTIVE DEVICE: 4 WHEEL WALKER
GAIT LEVEL OF ASSIST: STAND BY ASSIST
DISTANCE (FEET): 200

## 2019-02-07 NOTE — PROGRESS NOTES
"Rehab Progress Note     Encounter Date: 2/7/2019    CC: Debility from blood loss; GI bleed;     Interval Events (Subjective)  Patient sitting up in room. He reports he is doing well. Denies NVD. Denies SOB or cough. He reports he thinks he can arrange for a family member to pick him up at 1 o clock tomorrow if we are OK with that. Discussed that we would have IDT later to discuss discharge planning. Otherwise discussed will be able to prescribe all of his medications at discharge.  Discussed weaned his hydrocortisone to 10 mg TID and he can follow-up with his physician.      IDT Team Meeting 2/7/2019    IFaviola M.D., was present and led the interdisciplinary team conference on 2/7/2019.  I led the IDT conference and agree with the IDT conference documentation and plan of care as noted below.     RN:  Diet Regular   % Meal     Pain    Sleep    Bowel Continent   Bladder Continent   In's & Out's    HR - high 40's     PT:  Bed Mobility    Transfers setup   Mobility SBA-setup  feet   Stairs    Limited by activity tolerance  5 of 6 short term goals    OT:  Eating    Grooming    Bathing SBA   UB Dressing SBA   LB Dressing SBA   Toileting    Shower & Transfer    BRIDGETT hoses difficult    SLP:  Not following    Resp:  No issues    CM:  Continues to work on disposition and DME needs.      DC/Disposition:  2/9/19 -> moved up to 2/8?    Objective:  VITAL SIGNS: /74   Pulse 69   Temp 36.6 °C (97.9 °F) (Oral)   Resp 18   Ht 1.651 m (5' 5\")   Wt 94.8 kg (208 lb 15.9 oz)   SpO2 95%   BMI 34.78 kg/m²   Gen: NAD  Psych: Mood and affect appropriate  CV: RRR, no edema  Resp: CTAB, no upper airway sounds  Abd: NTND  Neuro: AOx4, 4+/5 BUE  Unchanged from 2/6/19    Recent Results (from the past 72 hour(s))   Prothrombin Time    Collection Time: 02/05/19  5:40 AM   Result Value Ref Range    PT 14.6 12.0 - 14.6 sec    INR 1.13 0.87 - 1.13   Prothrombin Time    Collection Time: 02/06/19  5:54 AM   Result " Value Ref Range    PT 15.2 (H) 12.0 - 14.6 sec    INR 1.19 (H) 0.87 - 1.13   Prothrombin Time    Collection Time: 02/07/19  5:22 AM   Result Value Ref Range    PT 16.0 (H) 12.0 - 14.6 sec    INR 1.26 (H) 0.87 - 1.13       Current Facility-Administered Medications   Medication Frequency   • warfarin (COUMADIN) tablet 6 mg COUMADIN-ONCE   • hydrocortisone (CORTEF) tablet 10 mg QDAY with Breakfast   • melatonin tablet 9 mg QHS   • gabapentin (NEURONTIN) capsule 900 mg QHS   • hydrocortisone (CORTEF) tablet 10 mg AFTER LUNCH   • lisinopril (PRINIVIL) tablet 20 mg DAILY   • MD Alert...Warfarin per Pharmacy PHARMACY TO DOSE   • guaiFENesin dextromethorphan (ROBITUSSIN DM) 100-10 MG/5ML syrup 5 mL Q6HRS PRN   • menthol-methyl salicylate (BENGAY) ointment TID PRN   • Respiratory Care per Protocol Continuous RT   • Pharmacy Consult Request ...Pain Management Review 1 Each PHARMACY TO DOSE   • tramadol (ULTRAM) 50 MG tablet 50 mg Q4HRS PRN   • hydrALAZINE (APRESOLINE) tablet 25 mg Q8HRS PRN   • acetaminophen (TYLENOL) tablet 650 mg Q4HRS PRN   • senna-docusate (PERICOLACE or SENOKOT S) 8.6-50 MG per tablet 2 Tab BID    And   • polyethylene glycol/lytes (MIRALAX) PACKET 1 Packet QDAY PRN    And   • magnesium hydroxide (MILK OF MAGNESIA) suspension 30 mL QDAY PRN    And   • bisacodyl (DULCOLAX) suppository 10 mg QDAY PRN   • artificial tears 1.4 % ophthalmic solution 1 Drop PRN   • benzocaine-menthol (CEPACOL) lozenge 1 Lozenge Q2HRS PRN   • mag hydrox-al hydrox-simeth (MAALOX PLUS ES or MYLANTA DS) suspension 20 mL Q2HRS PRN   • ondansetron (ZOFRAN ODT) dispertab 4 mg 4X/DAY PRN    Or   • ondansetron (ZOFRAN) syringe/vial injection 4 mg 4X/DAY PRN   • traZODone (DESYREL) tablet 50 mg QHS PRN   • sodium chloride (OCEAN) 0.65 % nasal spray 2 Spray PRN   • budesonide-formoterol (SYMBICORT) 160-4.5 MCG/ACT inhaler 2 Puff BID   • DILTIAZem (CARDIZEM) tablet 60 mg TWICE DAILY   • HYDROcodone-acetaminophen (NORCO) 5-325 MG per tablet  1 Tab Q6HRS PRN   • hydrocortisone (CORTEF) tablet 10 mg Q EVENING   • levothyroxine (SYNTHROID) tablet 75 mcg AM ES   • montelukast (SINGULAIR) tablet 10 mg DAILY   • nystatin (MYCOSTATIN) powder BID   • omeprazole (PRILOSEC) capsule 40 mg BID   • polyethylene glycol/lytes (MIRALAX) PACKET 1 Packet BID   • sotalol (BETAPACE) tablet 40 mg BID   • sucralfate (CARAFATE) 1 GM/10ML suspension 1 g Q6HRS   • tamsulosin (FLOMAX) capsule 0.4 mg DAILY   • tiotropium (SPIRIVA) 18 MCG inhalation capsule 1 Cap DAILY       Orders Placed This Encounter   Procedures   • Diet Order Low Fiber(GI Soft)     Standing Status:   Standing     Number of Occurrences:   1     Order Specific Question:   Diet:     Answer:   Low Fiber(GI Soft) [2]       Assessment:  Active Hospital Problems    Diagnosis   • *GI bleed   • Aortic stenosis   • Anemia due to GI blood loss   • Anticoagulant long-term use   • Chronic back pain   • EVANGELINA (obstructive sleep apnea)   • BPH (benign prostatic hypertrophy)       Medical Decision Making and Plan:  GI Bleed w debility: GI bleed s/p clipping x2 areas s/p multiple transfusions. Last Hgb 8.7 on 1/27/19  -Continue Omeprazole 40 mg BID. Recommended at discharge to continue for 2 weeks then 20 mg BID until follow-up with GI. Will discharge on 20 mg BID  -Continue Sucralfate for 2-4 weeks. Will not order at discharge  -PT and OT for mobility and ADLs.     Anemia - 8.9 on admission. Will recheck CBC on 2/2/19 - 9.7    Adrenal Insufficiency - Patient on steroids at baseline for previous pituitary lesion. Continue hydrocortisone 30 QAM, 20 QNOON and 10 mg QHS. Baseline on 10 mg TID. Titrated down to 10 mg TID  -Follow-up with PCP    Left Ankle - Worsening arthritis with early charcot changes. Would like referral back to [facility]. Consider AFO although limited evidence of improvement and concern for skin breakdown.   -Orthotics vendor to issue soft brace for correction of deformity. Improved ambulation, but limited ability to  don any orthotic    Olecranon bursitis - Patient with olecranon bursitis, suggested compression to therapy.    HTN/A Fib - Patient on Diltiazem 60 mg BID, Sotalol 40 mg BID. Previously on Lisinopril which was put on hold due to hypotension.  -Continue Diltiazem and Sotalol. SBP. Will restart Lisinopril at 10, continue to monitor another day.  SBP into 140s will increase Lisinopril to 20 mg. SBP well controlled  -Restart Coumadin on 2/2/19, pharmacy to manage. Discussed risks and benefits     COPD - Patient on Symbicort BID, Singular daily, and Spiriva  -RT to consult    Leukocytosis - Chronic issue with labile WBC. Patient will need follow-up with Hematology. Admit WBC - 19     Hypothyroidism - Patient on 75 mcg daily     Pain - Patient with chronic pain in multiple joints, most common worse at night. Increase Gabapentin to 900 QHS.    Insomnia - Start Melatonin 6 mg QHS    BPH - Patient on Flomax 0.4 mg on transfer, currently with condom catheter PRN. Would prefer patient to wear condom cath only at night, he prefers during daytime as well.      DVT Ppx - Currently high risk for bleed with recent GI bleed.  Restart coumadin on 2/2/19, pharmacy to manage. INR still subtherapeutic on 2/7/19     Dispo - Patient will need ride other than wife. Discussed can discharge early on 2/8/19 if son available at that time. Patient will talk to family.     Total time:  35 minutes.  I spent greater than 50% of the time for patient care, counseling, and coordination on this date, including unit/floor time, and face-to-face time with the patient as per interval events and assessment and plan above. Topics discussed included discharge planning, medications including coumadin and will need follow-up with PCP for steroid management. Patient's son may be available tomorrow afternoon. Patient was discussed separately in IDT today; please see details above.    Faviola Garcia M.D.

## 2019-02-07 NOTE — REHAB-COLLABORATIVE ONGOING IDT TEAM NOTE
Weekly Interdisciplinary Team Conference Note    Weekly Interdisciplinary Team Conference # 2  Date:  2/7/2019    Clinicians present and reporting at team conference include the following:   MD: SABRA Garcia MD    RN:  Pearl Monique RN   PT:   Ni Hays, PT, BSPT  OT:  Kellee Askew, MS, OTR/L   ST:  Not following.   CM:  ANDERSON EscalanteW, JONG  REC:  Not Applicable  RT:  Not Applicable  RPh:  Mendez Valentino [facility]  Other:   None  All reporting clinicians have a working knowledge of this patient's plan of care.    Targeted DC Date:  2/8/2019 Friday      Medical    Patient Active Problem List    Diagnosis Date Noted   • Acute on chronic respiratory failure with hypoxia (HCC) 01/21/2019     Priority: High   • Advance care planning 01/27/2019     Priority: Medium   • GI bleed 01/21/2019     Priority: Medium   • Edema 01/24/2019     Priority: Low   • Aortic stenosis 01/22/2019     Priority: Low   • Anemia due to GI blood loss 01/21/2019     Priority: Low   • Candidal intertrigo 01/21/2019     Priority: Low   • Anticoagulant long-term use 07/02/2012     Priority: Low   • COPD (chronic obstructive pulmonary disease) ([facility]) 02/03/2012     Priority: Low   • History of pulmonary embolism 02/03/2012     Priority: Low   • Paroxysmal atrial fibrillation ([facility]) 08/29/2011     Priority: Low   • 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[facility])      Priority: Low   • Cardiac pacemaker 04/01/2010     Priority: Low   • Pleural effusion 01/23/2019   • Supratherapeutic INR 01/21/2019   • Pulmonary embolism ([facility]) 08/18/2016   • Potential for deficient knowledge of chronic obstructive pulmonary disease 08/02/2016   • Frequent PVCs 07/26/2016   • Chronic respiratory failure (HCC) 07/26/2016   • Long term current use of antiarrhythmic drug 07/26/2016   • Ventricular ectopy 06/16/2016   • SOB (shortness of breath) 06/16/2016   • Peripheral neuropathy, idiopathic 05/26/2015   • 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 06/20/2013   • EVANGELINA (obstructive  sleep apnea) 02/03/2012   • Chronic back pain 02/03/2012   • SSS (sick sinus syndrome) ([facility]) 08/29/2011   • Third degree AV block ([facility]) 08/29/2011   • Obstructive hypertrophic cardiomyopathy ([facility]) 08/04/2011   • Essential hypertension, benign 04/14/2010   • BPH (benign prostatic hypertrophy) 04/01/2010     Results     Procedure Component Value Ref Range Date/Time    Prothrombin Time [[id number]]  (Abnormal) Collected:  02/07/19 0522    Order Status:  Completed Lab Status:  Final result Updated:  02/07/19 0623    Specimen:  Blood      PT 16.0 (H) 12.0 - 14.6 sec      INR 1.26 (H) 0.87 - 1.13      Comment: INR - Non-therapeutic Reference Range: 0.87-1.13  INR - Therapeutic Reference Range: 2.0-4.0         Narrative:       Indicate which anticoagulants the patient is on:->COUMADIN           Nursing  Diet/Nutrition:  Thin Liquids and Other: low fiber  Medication Administration:  Whole with Liquid Wash  % consumed at meals in last 24 hours:  Consumed % of meals per documentation.  Meal/Snack Consumption for the past 24 hrs:   Oral Nutrition   02/06/19 1900 Dinner;Between % Consumed       Snack schedule:  None  Appetite:  Good  Fluid Intake/Output in past 24 hours: In: 480 [P.O.:480]  Out: 1800   Admit Weight:  Weight: 93 kg (205 lb 0.4 oz)  Weight Last 7 Days   [94.8 kg (208 lb 15.9 oz)] 94.8 kg (208 lb 15.9 oz) (02/03 0500)    Pain Issues:    Location:  Back (02/06 2020)  Lower (02/06 2020)         Severity:  Moderate   Scheduled pain medications:  gabapentin (NEURONTIN)      PRN pain medications used in last 24 hours:  hydrocodone/acetaminophen (NORCO)  and tramadol (Ultram)   Non Pharmacologic Interventions:  distraction, emotional support, relaxation and repositioned  Effectiveness of pain management plan:  good=patient states acceptable comfort after interventions    Bowel:    Bowel Assist Initial Score:  4 - Minimal Assistance  Bowel Assist Current Score0:  4 - Minimal Assistance  Bowl Accidents in last  7 days:     Last bowel movement: 02/06/19  Stool Description: Medium, Brown, Formed     Usual bowel pattern:  daily  Scheduled bowel medications:  senna-docusate (PERICOLACE or SENOKOT S)  and polyethylene glycol/lytes (MIRALAX)   PRN bowel medications used in last 24 hours:  None  Nursing Interventions:  Increased time, Scheduled medication, Supervision, Verbal cueing, Positioning on commode/toilet  Effectiveness of bowel program:   good=regular, predictable, controlled emptying of bowel  Bladder:    Bladder Assist Initial Score:  1 - Total Assistance  Bladder Assist Current Score:  5 - Standby Prompting/Supervision or Set-up  Bladder Accidents in last 7 days:  0   PVR range for past 24-48 hours:  [23-45]  ()  Medications affecting bladder:  Flomax    Time void schedule/voiding pattern:  Pt initiates voiding  Interventions:  Increased time, Supervision, Verbal cueing, Emptying of device, Urinal, Time void patient initiated  Effectiveness of bladder training:  continent  Wound:      Patient Lines/Drains/Airways Status    Active Current Wounds     Name: Placement date: Placement time: Site: Days:    Wound 02/02/19 Pressure Injury Penis 02/02/19   2300      4                   Interventions:  monitor  Effectiveness of intervention:  wound is unchanged   Sleep/wake cycle:   Average hours slept:  Sleeps 3-4 hours without waking  Sleep medication usage:  Other melatonin 9mg     Patient/Family Training/Education:  Bladder Management/Training, Fall Prevention, General Self Care, Medication Management and Pain Management  Strengths: Alert and oriented, Willingly participates in therapeutic activities, Able to follow instructions, Supportive family, Pleasant and cooperative and Motivated for self care and independence   Barriers:   Generalized weakness            Nursing Problems           Problem: Bowel/Gastric:     Goal: Normal bowel function is maintained or improved           Goal: Will not experience complications  related to bowel motility             Problem: Communication     Goal: The ability to communicate needs accurately and effectively will improve             Problem: Discharge Barriers/Planning     Goal: Patient's continuum of care needs will be met             Problem: Fluid Volume:     Goal: Will maintain balanced intake and output             Problem: Infection     Goal: Will remain free from infection             Problem: Knowledge Deficit     Goal: Knowledge of disease process/condition, treatment plan, diagnostic tests, and medications will improve           Goal: Knowledge of the prescribed therapeutic regimen will improve             Problem: Medication     Goal: Compliance with prescribed medication will improve             Problem: Mobility     Goal: Risk for activity intolerance will decrease             Problem: Pain Management     Goal: Pain level will decrease to patient's comfort goal             Problem: Psychosocial Needs:     Goal: Level of anxiety will decrease             Problem: Respiratory:     Goal: Respiratory status will improve             Problem: Safety     Goal: Will remain free from injury           Goal: Will remain free from falls             Problem: Skin Integrity     Goal: Risk for impaired skin integrity will decrease             Problem: Urinary Elimination:     Goal: Ability to reestablish a normal urinary elimination pattern will improve             Problem: Venous Thromboembolism (VTW)/Deep Vein Thrombosis (DVT) Prevention:     Goal: Patient will participate in Venous Thrombosis (VTE)/Deep Vein Thrombosis (DVT)Prevention Measures                  Long Term Goals:   At discharge patient will be able to function safely at home and in the community with support.    Section completed by:  Marta Loza RLIZETTE           Respiratory Therapy    Pt is stable on his home 0xygen level of 5 lpm, with SATS in mid 90's.    Section completed by:  Cheryl Loya, RRT        Mobility  Bed mobility:   SPV  Bed /Chair/Wheelchair Transfer Initial:  5 - Standby Prompting/Supervision or Set-up  Bed /Chair/Wheelchair Transfer Current:  5 - Standby Prompting/Supervision or Set-up   Bed/Chair/Wheelchair Transfer Description:  Adaptive equipment, Increased time, Set-up of equipment  Walk Initial:  1 - Total Assistance  Walk Current:  5 - Standby Prompting/Supervision or Set-up   Walk Description:  Oxygen (150,120,150 4ww level surface assist with O2, SBA for safety. )  Wheelchair Initial:  1 - Total Assistance  Wheelchair Current:  1 - Total Assistance   Wheelchair Description:   (limited to 25 ft with increased effort / fatigued)  Stairs Initial:  0 - Not tested,unsafe activity  Stairs Current: 2 - Max Assistance   Stairs Description:  (SBA/ CGA with B hand rails for 6 short rise steps. )  Patient/Family Training/Education:  On-going strength/ endurance and gait training  DME/DC Recommendations:  Home health PT  Strengths:  Making steady progress towards goals, Manages pain appropriately, Motivated for self care and independence, Pleasant and cooperative, Supportive family and Willingly participates in therapeutic activities  Barriers:   Decreased endurance, Limited mobility and Other: limited respiratory capacity, poly-OA  # of short term goals set= 6  # of short term goals met=5       Physical Therapy Problems           Problem: Mobility     Dates: Start: 01/29/19       Goal: STG-Within one week, patient will propel wheelchair household distances     Dates: Start: 01/29/19       Description: 1) Individualized goal:  100 ft x 2 with min A  2) Interventions:  PT Group Therapy, PT Gait Training, PT Self Care/Home Eval, PT Therapeutic Exercises, PT Neuro Re-Ed/Balance, PT Therapeutic Activity and PT Manual Therapy       Note:     Goal Note filed on 01/31/19 1215 by Halley Hays, BSPT, ATP    Goal: STG-Within one week, patient will propel wheelchair household   distances  Outcome: NOT  MET  Endurance limited to 50 ft                  Problem: PT-Long Term Goals     Dates: Start: 01/29/19       Goal: LTG-By discharge, patient will maintain balance     Dates: Start: 01/29/19       Description: 1) Individualized goal:  With no UE support for standing on even surface for >5 minutes independently  2) Interventions:  PT Group Therapy, PT Gait Training, PT Self Care/Home Eval, PT Therapeutic Exercises, PT Neuro Re-Ed/Balance, PT Therapeutic Activity and PT Manual Therapy              Goal: LTG-By discharge, patient will propel wheelchair     Dates: Start: 01/29/19       Description: 1) Individualized goal:  >200 ft on even surface independently  2) Interventions:  PT Group Therapy, PT Gait Training, PT Self Care/Home Eval, PT Therapeutic Exercises, PT Neuro Re-Ed/Balance, PT Therapeutic Activity and PT Manual Therapy             Goal: LTG-By discharge, patient will ambulate     Dates: Start: 01/29/19       Description: 1) Individualized goal:  200 ft x 3 with 4WW modified independent  2) Interventions:  PT Group Therapy, PT Gait Training, PT Self Care/Home Eval, PT Therapeutic Exercises, PT Neuro Re-Ed/Balance, PT Therapeutic Activity and PT Manual Therapy             Goal: LTG-By discharge, patient will transfer in/out of a car     Dates: Start: 01/29/19       Description: 1) Individualized goal:  Modified independent   2) Interventions:  PT Group Therapy, PT Gait Training, PT Self Care/Home Eval, PT Therapeutic Exercises, PT Neuro Re-Ed/Balance, PT Therapeutic Activity and PT Manual Therapy                     Section completed by:  Halley Hays, BSPT, ATP       Activities of Daily Living  Eating Initial:  6 - Modified Independent  Eating Current:  6 - Modified Independent   Eating Description:     Grooming Initial:  5 - Standby Prompting/Supervision or Set-up  Grooming Current:  5 - Standby Prompting/Supervision or Set-up   Grooming Description:  Set-up of equipment  Bathing Initial:  4 - Minimal  Assistance  Bathing Current:  5 - Standby Prompting/Supervision or Set-up   Bathing Description:  Grab bar, Tub bench  Upper Body Dressing Initial:  4 - Minimal Assistance  Upper Body Dressing Current:  5 - Standby Prompting/Supervision or Set-up   Upper Body Dressing Description:     Lower Body Dressing Initial:  1 - Total Assistance  Lower Body Dressing Current:  1 - Total Assistance   Lower Body Dressing Description:  1 - Total Assistance  Toileting Initial:  4 - Minimal Assistance  Toileting Current:  2 - Max Assistance   Toileting Description:     Toilet Transfer Initial:  5 - Standby Prompting/Supervision or Set-up  Toilet Transfer Current:  4 - Minimal Assistance   Toilet Transfer Description:  4 - Minimal Assistance  Tub / Shower Transfer Initial:  4 - Minimal Assistance  Tub / Shower Transfer Current:  4 - Minimal Assistance   Tub / Shower Transfer Description:  Grab bar  IADL:  Total A   Family Training/Education:  Wife to be trained   DME/DC Recommendations:  Home with assist per wife     Strengths:  Motivated for self care and independence, Pleasant and cooperative, Supportive family and Willingly participates in therapeutic activities  Barriers:  Decreased endurance, Generalized weakness and Other: arthritis     # of short term goals set= 3    # of short term goals met= 0          Occupational Therapy Goals           Problem: Dressing     Dates: Start: 01/29/19       Goal: STG-Within one week, patient will dress LB     Dates: Start: 01/29/19       Description: 1) Individualized Goal:  Set up with AE prn  2) Interventions:  OT Group Therapy, OT Self Care/ADL, OT Community Reintegration, OT Neuro Re-Ed/Balance, OT Therapeutic Activity, OT Evaluation and OT Therapeutic Exercise             Goal: STG-Within one week, patient will retrieve clothing     Dates: Start: 01/29/19       Description: 1) Individualized Goal:  CGA with 4ww  2) Interventions:  OT Group Therapy, OT Self Care/ADL, OT Community  Reintegration, OT Neuro Re-Ed/Balance, OT Therapeutic Activity, OT Evaluation and OT Therapeutic Exercise             Problem: OT Long Term Goals     Dates: Start: 01/29/19       Goal: LTG-By discharge, patient will complete basic self care tasks     Dates: Start: 01/29/19       Description: 1) Individualized Goal:  MOd I  2) Interventions:  OT Group Therapy, OT Self Care/ADL, OT Community Reintegration, OT Neuro Re-Ed/Balance, OT Therapeutic Activity, OT Evaluation and OT Therapeutic Exercise           Goal: LTG-By discharge, patient will complete basic home management     Dates: Start: 01/29/19       Description: 1) Individualized Goal:  MOd I  2) Interventions:  OT Group Therapy, OT Self Care/ADL, OT Community Reintegration, OT Neuro Re-Ed/Balance, OT Therapeutic Activity, OT Evaluation and OT Therapeutic Exercise             Problem: Toileting     Dates: Start: 01/29/19       Goal: STG-Within one week, patient will complete toileting tasks     Dates: Start: 01/29/19       Description: 1) Individualized Goal:  SUpervision for safety  2) Interventions:  OT Group Therapy, OT Self Care/ADL, OT Community Reintegration, OT Neuro Re-Ed/Balance, OT Therapeutic Activity, OT Evaluation and OT Therapeutic Exercise                 Section completed by:  Kellee Askew, MS,OTR/L             Nutrition       Dietary Problems (Active)            There are no active problems.      Nutrition Care/ Consult For Supplements    Assessment:    Admitting Diagnosis: Decreased function s/p GIB s/p ulcer clipping x 2    Pertinent PMH: BPH, COPD, Glaucoma, HTN, A.Fib on Coumadin, PE (multiple) s/p IVC filter, Hypothyroidism, Arthritis, Pacemaker, Diverticulitis, DJD, Emphysema, s/p Parathyroidectomy,     Additional Information: Mr. Lizama is seen in his room sleeping soundly after lunch.  He appears adequately nourished.  Nutrition screen upon admission is negative.  Patient low risk and does not indicate need for supplements.  Did not  arouse patient for interview.     Appetite: Good  Diet: GI Soft/ Low Fiber   Average PO intake x 3 days: % of meals since admission (x 2 meals)     Labs: WBC 19, Hgb 8.9/Hct 28.7, CO2 35, Albumin 3.1, T.P. 5  Medications: Symbicort, Cardizem, Gabapentin, Cortef, Synthroid, Lisinopril, Singulair, Nystatin, Omeprazole, Bowel Regimen, Betapace, Carafate, Flomax, Spiriva   PRN Medications: noted  IVF: n/a     Height: 165.1cm  Weight: 93kg  BMI: 34.12- obese class 1     Skin: leg wound open to air   GI: BM 1/28  : WNL  Vitals: 152/80 BP, 5L NC  I/Os: -480mL since admission/ UOP adequate      Malnutrition risk: low     Diagnosis: No nutrition diagnosis.     Intervention/ Recommendations/POC:  1. Continue current nutritional POC. PO adequate with no indication for supplements.   2.Encourage adequate PO/fluid intake.  3. Nutrition rep to see regarding food prefs/ honor within dietary restrictions (if indicated)     Monitor/Evaluation: Monitor PO intake, weight, labs, medication adjustments, skin integrity, GI function, vitals, I/Os, and overall hydration status.  Adjust nutritional POC pending clinical outcomes.    RD following PRN.  Goal: Maintain adequate oral nutrient/fluid intake to promote nutrition optimization/healing.         Section completed by:  Belinda Felix R.D.    REHAB-Pharmacy IDT Team Note by Paris Jackson RPH at 2/6/2019  4:20 PM  Version 1 of 1    Author:  Paris Jackson RPH Service:  (none) Author Type:  Pharmacist    Filed:  2/6/2019  4:36 PM Date of Service:  2/6/2019  4:20 PM Status:  Signed    :  Paris Jackson RPH (Pharmacist)         Pharmacy   Pharmacy  Antibiotics/Antifungals/Antivirals:  Medication:      Active Orders     None        Route:         n/a  Stop Date:  n/a  Reason:   Antihypertensives/Cardiac:  Medication:    Orders (72h ago through future)    Start     Ordered    02/05/19 0900  lisinopril (PRINIVIL) tablet 20 mg  DAILY      02/04/19 1332    01/30/19 0900   lisinopril (PRINIVIL) tablet 10 mg  DAILY,   Status:  Discontinued      01/29/19 1245    01/28/19 2100  sotalol (BETAPACE) tablet 40 mg  2 TIMES DAILY      01/28/19 1438    01/28/19 1800  DILTIAZem (CARDIZEM) tablet 60 mg  TWICE DAILY      01/28/19 1438    01/28/19 1438  hydrALAZINE (APRESOLINE) tablet 25 mg  EVERY 8 HOURS PRN      01/28/19 1438        Patient Vitals for the past 24 hrs:   BP Pulse   02/06/19 1248 - 70   02/06/19 0615 118/74 71   02/06/19 0446 108/64 70   02/05/19 1830 107/63 71       Anticoagulation:  Medication:  Coumadin per pharmacy  INR:    Recent Labs      02/03/19   0542  02/04/19   0557  02/05/19   0540  02/06/19   0554   INR  1.08  1.06  1.13  1.19*     Other key medications: gabapentin, cortef, montelukast, omeprazole, levothyroxine, sucralfate, tamsulosin  A review of the medication list reveals no issues at this time. Patient is currently on an antihypertensive. Recommend home blood pressure monitoring/recording if antihypertensive regimen continues.  Section completed by:  Paris Jackson RPH[TK.1]        Attribution Key     TK.1 - Paris Jackson RPH on 2/6/2019  4:20 PM                    DC Planning  DC destination/disposition:  Return home w/ family assist to [place], [place] (near [employer] Wife has undiagnosed tremor. Daughter is retired RN who lives w/in 3 miles to assist. SonArya to provide transport home - patient may need discharge on Friday 2/8/19 instead of 2/9 due to [place] weather conditions.     Referrals: Referred to [facility] for RN & PT.     DC Needs:  DME needs met, patient already has 4WW, entry ramp & home O2 . Appointment scheduled with PCP; verify if cardiology, pulmonary & GI f/u needed post rehab.     Barriers to discharge: Functional mobilty deficits[place], wife unable to provide much assistance.       Strengths: Motivation. Good support system. Already established w/ cardiology, Dr. Maloney, &  pulmonary (unknown provider's name on [address]     Section completed by:  Shivani Zimmerman CM MSW    Summary:  Needs assist w/ don and doffing kyle hose; stephany on 2/8/2019; PCP to coordinate w/ pulmonary as needed.      Physician Summary  SABRA Garcia MD  participated and led team conference discussion.

## 2019-02-07 NOTE — DISCHARGE PLANNING
Pt has been on coumadin prior to admission.     Contact made with pt's PCP and they indicate that Kellee Cespedes RN operates a coumadin clinic out of the [facility] in [place].      Son will provide transportation home on Friday 2/8.

## 2019-02-07 NOTE — CARE PLAN
Problem: Dressing  Goal: STG-Within one week, patient will dress LB  1) Individualized Goal:  Set up with AE prn  2) Interventions:  OT Group Therapy, OT Self Care/ADL, OT Community Reintegration, OT Neuro Re-Ed/Balance, OT Therapeutic Activity, OT Evaluation and OT Therapeutic Exercise     Outcome: NOT MET    Goal: STG-Within one week, patient will retrieve clothing  1) Individualized Goal:  CGA with 4ww  2) Interventions:  OT Group Therapy, OT Self Care/ADL, OT Community Reintegration, OT Neuro Re-Ed/Balance, OT Therapeutic Activity, OT Evaluation and OT Therapeutic Exercise   Outcome: NOT MET      Problem: Toileting  Goal: STG-Within one week, patient will complete toileting tasks  1) Individualized Goal:  SUpervision for safety  2) Interventions:  OT Group Therapy, OT Self Care/ADL, OT Community Reintegration, OT Neuro Re-Ed/Balance, OT Therapeutic Activity, OT Evaluation and OT Therapeutic Exercise   Outcome: MET Date Met: 02/07/19

## 2019-02-07 NOTE — CARE PLAN
Problem: Safety  Goal: Will remain free from injury  Patient uses call light consistently and appropriately this shift.  Waits for assistance when needed and does not attempt self transfer.  Able to verbalize needs.  Will continue to monitor.    Problem: Bowel/Gastric:  Goal: Normal bowel function is maintained or improved  Pt refused the miralax but took the senna, denies  Having constipation.    Problem: Pain Management  Goal: Pain level will decrease to patient's comfort goal  Medicated pt per mar with routine and prn meds with relief, non pharmacological measures such as relaxation, repositioning, distraction and  Heat encouraged.    Problem: Respiratory:  Goal: Respiratory status will improve  Pt is on 5L/min of oxygen via nasal cannula, sats at 95%, no signs of acute resp distress.

## 2019-02-07 NOTE — CARE PLAN
Problem: Safety  Goal: Will remain free from injury  Patient uses call light consistently and appropriately this shift.  Waits for assistance when needed and does not attempt self transfer.  Able to verbalize needs.  Will continue to monitor.    Problem: Pain Management  Goal: Pain level will decrease to patient's comfort goal  Medicated pt per mar with routine and prn meds with relief,     Problem: Respiratory:  Goal: Respiratory status will improve  Pt is on 5L/min of oxygen via nasal cannula, sats at 95%, no signs of acute resp distress.

## 2019-02-07 NOTE — PROGRESS NOTES
Pharmacy Warfarin Consult   2/7/2019     86 y.o.   male     Indication for anticoagulation: Atrial Fibrillation     Goal INR = 2 to 3     Recent Labs      02/05/19   0540  02/06/19   0554  02/07/19   0522   INR  1.13  1.19*  1.26*       Pertinent Drug/Drug Interactions:  Steroids, PPI, Thyroid Medications. PRN Ultram and Trazodone.   Outpatient Warfarin Regimen:  Last record in system 10/2017  Recent Warfarin Dosing:    Dose from last 7 days     Date/Time Dose (mg)    02/07/19 0522  6    02/06/19 0823  --    02/06/19 0554  7    02/05/19 0540  5    02/04/19 0557  5    02/03/19 1400  5    02/02/19 1700  2.5    02/01/19 1300  0          Bridge Therapy: No bridge therapy due to GI bleed           1.  Coumadin 6 mg tonight per INR = 1.26  2.   Recent GI bleed.               Mendez Valentino [facility]

## 2019-02-07 NOTE — CARE PLAN
Problem: Balance  Goal: STG-Within one week, patient will maintain static standing  1) Individualized goal:  In // bars with no UE support, SPV for 1 minute.  2) Interventions:  PT Group Therapy, PT Gait Training, PT Self Care/Home Eval, PT Therapeutic Exercises, PT Neuro Re-Ed/Balance, PT Therapeutic Activity and PT Manual Therapy     Outcome: MET Date Met: 02/07/19    Goal: STG-Within one week, patient will maintain dynamic sitting  1) Individualized goal:  On uneven surface with no UE for 1 minute  2) Interventions:  PT Group Therapy, PT Gait Training, PT Self Care/Home Eval, PT Therapeutic Exercises, PT Neuro Re-Ed/Balance, PT Therapeutic Activity and PT Manual Therapy     Outcome: MET Date Met: 02/07/19    Goal: STG-Within one week, patient will maintain dynamic standing  1) Individualized goal:  In // bars on uneven surface with single UE support for 30 seconds  2) Interventions:  PT Group Therapy, PT Gait Training, PT Self Care/Home Eval, PT Therapeutic Exercises, PT Neuro Re-Ed/Balance, PT Therapeutic Activity and PT Manual Therapy     Outcome: MET Date Met: 02/07/19      Problem: Mobility  Goal: STG-Within one week, patient will propel wheelchair household distances  1) Individualized goal:  100 ft x 2 with min A  2) Interventions:  PT Group Therapy, PT Gait Training, PT Self Care/Home Eval, PT Therapeutic Exercises, PT Neuro Re-Ed/Balance, PT Therapeutic Activity and PT Manual Therapy     Outcome: NOT MET    Goal: STG-Within one week, patient will ambulate household distance  1) Individualized goal:  50 ft x 2 with 4WW, SPV/set-up  2) Interventions:  PT Group Therapy, PT Gait Training, PT Self Care/Home Eval, PT Therapeutic Exercises, PT Neuro Re-Ed/Balance, PT Therapeutic Activity and PT Manual Therapy     Outcome: MET Date Met: 02/07/19

## 2019-02-07 NOTE — REHAB-NURSING IDT TEAM NOTE
Nursing   Nursing  Diet/Nutrition:  Thin Liquids and Other: low fiber  Medication Administration:  Whole with Liquid Wash  % consumed at meals in last 24 hours:  Consumed % of meals per documentation.  Meal/Snack Consumption for the past 24 hrs:   Oral Nutrition   02/06/19 1900 Dinner;Between % Consumed       Snack schedule:  None  Appetite:  Good  Fluid Intake/Output in past 24 hours: In: 480 [P.O.:480]  Out: 1800   Admit Weight:  Weight: 93 kg (205 lb 0.4 oz)  Weight Last 7 Days   [94.8 kg (208 lb 15.9 oz)] 94.8 kg (208 lb 15.9 oz) (02/03 0500)    Pain Issues:    Location:  Back (02/06 2020)  Lower (02/06 2020)         Severity:  Moderate   Scheduled pain medications:  gabapentin (NEURONTIN)      PRN pain medications used in last 24 hours:  hydrocodone/acetaminophen (NORCO)  and tramadol (Ultram)   Non Pharmacologic Interventions:  distraction, emotional support, relaxation and repositioned  Effectiveness of pain management plan:  good=patient states acceptable comfort after interventions    Bowel:    Bowel Assist Initial Score:  4 - Minimal Assistance  Bowel Assist Current Score0:  4 - Minimal Assistance  Bowl Accidents in last 7 days:     Last bowel movement: 02/06/19  Stool Description: Medium, Brown, Formed     Usual bowel pattern:  daily  Scheduled bowel medications:  senna-docusate (PERICOLACE or SENOKOT S)  and polyethylene glycol/lytes (MIRALAX)   PRN bowel medications used in last 24 hours:  None  Nursing Interventions:  Increased time, Scheduled medication, Supervision, Verbal cueing, Positioning on commode/toilet  Effectiveness of bowel program:   good=regular, predictable, controlled emptying of bowel  Bladder:    Bladder Assist Initial Score:  1 - Total Assistance  Bladder Assist Current Score:  5 - Standby Prompting/Supervision or Set-up  Bladder Accidents in last 7 days:  0   PVR range for past 24-48 hours:  [23-45]  ()  Medications affecting bladder:  Flomax    Time void  schedule/voiding pattern:  Pt initiates voiding  Interventions:  Increased time, Supervision, Verbal cueing, Emptying of device, Urinal, Time void patient initiated  Effectiveness of bladder training:  continent  Wound:      Patient Lines/Drains/Airways Status    Active Current Wounds     Name: Placement date: Placement time: Site: Days:    Wound 02/02/19 Pressure Injury Penis 02/02/19   2300      4                   Interventions:  monitor  Effectiveness of intervention:  wound is unchanged   Sleep/wake cycle:   Average hours slept:  Sleeps 3-4 hours without waking  Sleep medication usage:  Other melatonin 9mg     Patient/Family Training/Education:  Bladder Management/Training, Fall Prevention, General Self Care, Medication Management and Pain Management  Strengths: Alert and oriented, Willingly participates in therapeutic activities, Able to follow instructions, Supportive family, Pleasant and cooperative and Motivated for self care and independence   Barriers:   Generalized weakness            Nursing Problems           Problem: Bowel/Gastric:     Goal: Normal bowel function is maintained or improved           Goal: Will not experience complications related to bowel motility             Problem: Communication     Goal: The ability to communicate needs accurately and effectively will improve             Problem: Discharge Barriers/Planning     Goal: Patient's continuum of care needs will be met             Problem: Fluid Volume:     Goal: Will maintain balanced intake and output             Problem: Infection     Goal: Will remain free from infection             Problem: Knowledge Deficit     Goal: Knowledge of disease process/condition, treatment plan, diagnostic tests, and medications will improve           Goal: Knowledge of the prescribed therapeutic regimen will improve             Problem: Medication     Goal: Compliance with prescribed medication will improve             Problem: Mobility     Goal: Risk for  activity intolerance will decrease             Problem: Pain Management     Goal: Pain level will decrease to patient's comfort goal             Problem: Psychosocial Needs:     Goal: Level of anxiety will decrease             Problem: Respiratory:     Goal: Respiratory status will improve             Problem: Safety     Goal: Will remain free from injury           Goal: Will remain free from falls             Problem: Skin Integrity     Goal: Risk for impaired skin integrity will decrease             Problem: Urinary Elimination:     Goal: Ability to reestablish a normal urinary elimination pattern will improve             Problem: Venous Thromboembolism (VTW)/Deep Vein Thrombosis (DVT) Prevention:     Goal: Patient will participate in Venous Thrombosis (VTE)/Deep Vein Thrombosis (DVT)Prevention Measures                  Long Term Goals:   At discharge patient will be able to function safely at home and in the community with support.    Section completed by:  Marta Loza R.N.

## 2019-02-07 NOTE — REHAB-OT IDT TEAM NOTE
Occupational Therapy   Activities of Daily Living  Eating Initial:  6 - Modified Independent  Eating Current:  6 - Modified Independent   Eating Description:     Grooming Initial:  5 - Standby Prompting/Supervision or Set-up  Grooming Current:  5 - Standby Prompting/Supervision or Set-up   Grooming Description:  Set-up of equipment  Bathing Initial:  4 - Minimal Assistance  Bathing Current:  5 - Standby Prompting/Supervision or Set-up   Bathing Description:  Grab bar, Tub bench  Upper Body Dressing Initial:  4 - Minimal Assistance  Upper Body Dressing Current:  5 - Standby Prompting/Supervision or Set-up   Upper Body Dressing Description:     Lower Body Dressing Initial:  1 - Total Assistance  Lower Body Dressing Current:  3 - Moderate Assistance   Lower Body Dressing Description:  3 - Moderate Assistance  Toileting Initial:  4 - Minimal Assistance  Toileting Current:  5 - Standby Prompting/Supervision or Set-up   Toileting Description:     Toilet Transfer Initial:  5 - Standby Prompting/Supervision or Set-up  Toilet Transfer Current:  5 - Standby Prompting/Supervision or Set-up   Toilet Transfer Description:  5 - Standby Prompting/Supervision or Set-up  Tub / Shower Transfer Initial:  4 - Minimal Assistance  Tub / Shower Transfer Current:  4 - Minimal Assistance   Tub / Shower Transfer Description:  Grab bar  IADL:  Min A   Family Training/Education:  Pt to go home with wife/son/daughter   DME/DC Recommendations:  Home with assister per family     Strengths:  Effective communication skills, Good carryover of learning, Good insight into deficits/needs, Independent PLOF, Motivated for self care and independence, Pleasant and cooperative, Supportive family and Willingly participates in therapeutic activities  Barriers:  Decreased endurance and Other: arthritis, age, bursitis     # of short term goals set= 3    # of short term goals met= 1          Occupational Therapy Goals           Problem: Dressing     Dates:  Start: 01/29/19       Goal: STG-Within one week, patient will dress LB     Dates: Start: 01/29/19       Description: 1) Individualized Goal:  Set up with AE prn  2) Interventions:  OT Group Therapy, OT Self Care/ADL, OT Community Reintegration, OT Neuro Re-Ed/Balance, OT Therapeutic Activity, OT Evaluation and OT Therapeutic Exercise             Goal: STG-Within one week, patient will retrieve clothing     Dates: Start: 01/29/19       Description: 1) Individualized Goal:  CGA with 4ww  2) Interventions:  OT Group Therapy, OT Self Care/ADL, OT Community Reintegration, OT Neuro Re-Ed/Balance, OT Therapeutic Activity, OT Evaluation and OT Therapeutic Exercise             Problem: OT Long Term Goals     Dates: Start: 01/29/19       Goal: LTG-By discharge, patient will complete basic self care tasks     Dates: Start: 01/29/19       Description: 1) Individualized Goal:  MOd I  2) Interventions:  OT Group Therapy, OT Self Care/ADL, OT Community Reintegration, OT Neuro Re-Ed/Balance, OT Therapeutic Activity, OT Evaluation and OT Therapeutic Exercise           Goal: LTG-By discharge, patient will complete basic home management     Dates: Start: 01/29/19       Description: 1) Individualized Goal:  MOd I  2) Interventions:  OT Group Therapy, OT Self Care/ADL, OT Community Reintegration, OT Neuro Re-Ed/Balance, OT Therapeutic Activity, OT Evaluation and OT Therapeutic Exercise                 Section completed by:  Kellee Askew, MS,OTR/L

## 2019-02-07 NOTE — REHAB-CM IDT TEAM NOTE
Case Management    DC Planning  DC destination/disposition:  Return home w/ family assist to [place], [place] (near [place]). Wife has undiagnosed tremor. Daughter is retired RN who lives w/in 3 miles to assist. Son, Arya to provide transport home - patient may need discharge on Friday 2/8/19 instead of 2/9 due to [place] weather conditions.     Referrals: Referred to [facility] for RN & PT.     DC Needs:  DME needs met, patient already has 4WW, entry ramp & home O2 . Appointment scheduled with PCP; verify if cardiology, pulmonary & GI f/u needed post rehab.     Barriers to discharge: Functional mobilty deficits, [place], wife unable to provide much assistance.       Strengths: Motivation. Good support system. Already established w/ cardiology, Dr. Maloney, & pulmonary (unknown provider's name on [address]     Section completed by:  Shivani Zimmerman CM MSW

## 2019-02-07 NOTE — REHAB-PT IDT TEAM NOTE
Physical Therapy   Mobility  Bed mobility:   SPV  Bed /Chair/Wheelchair Transfer Initial:  5 - Standby Prompting/Supervision or Set-up  Bed /Chair/Wheelchair Transfer Current:  5 - Standby Prompting/Supervision or Set-up   Bed/Chair/Wheelchair Transfer Description:  Adaptive equipment, Increased time, Set-up of equipment  Walk Initial:  1 - Total Assistance  Walk Current:  5 - Standby Prompting/Supervision or Set-up   Walk Description:  Oxygen (150,120,150 4ww level surface assist with O2, SBA for safety. )  Wheelchair Initial:  1 - Total Assistance  Wheelchair Current:  1 - Total Assistance   Wheelchair Description:   (limited to 25 ft with increased effort / fatigued)  Stairs Initial:  0 - Not tested,unsafe activity  Stairs Current: 2 - Max Assistance   Stairs Description:  (SBA/ CGA with B hand rails for 6 short rise steps. )  Patient/Family Training/Education:  On-going strength/ endurance and gait training  DME/DC Recommendations:  Home health PT  Strengths:  Making steady progress towards goals, Manages pain appropriately, Motivated for self care and independence, Pleasant and cooperative, Supportive family and Willingly participates in therapeutic activities  Barriers:   Decreased endurance, Limited mobility and Other: limited respiratory capacity, poly-OA  # of short term goals set= 6  # of short term goals met=5       Physical Therapy Problems           Problem: Mobility     Dates: Start: 01/29/19       Goal: STG-Within one week, patient will propel wheelchair household distances     Dates: Start: 01/29/19       Description: 1) Individualized goal:  100 ft x 2 with min A  2) Interventions:  PT Group Therapy, PT Gait Training, PT Self Care/Home Eval, PT Therapeutic Exercises, PT Neuro Re-Ed/Balance, PT Therapeutic Activity and PT Manual Therapy       Note:     Goal Note filed on 01/31/19 1215 by Halley Hays, BSPT, ATP    Goal: STG-Within one week, patient will propel wheelchair household    distances  Outcome: NOT MET  Endurance limited to 50 ft                  Problem: PT-Long Term Goals     Dates: Start: 01/29/19       Goal: LTG-By discharge, patient will maintain balance     Dates: Start: 01/29/19       Description: 1) Individualized goal:  With no UE support for standing on even surface for >5 minutes independently  2) Interventions:  PT Group Therapy, PT Gait Training, PT Self Care/Home Eval, PT Therapeutic Exercises, PT Neuro Re-Ed/Balance, PT Therapeutic Activity and PT Manual Therapy              Goal: LTG-By discharge, patient will propel wheelchair     Dates: Start: 01/29/19       Description: 1) Individualized goal:  >200 ft on even surface independently  2) Interventions:  PT Group Therapy, PT Gait Training, PT Self Care/Home Eval, PT Therapeutic Exercises, PT Neuro Re-Ed/Balance, PT Therapeutic Activity and PT Manual Therapy             Goal: LTG-By discharge, patient will ambulate     Dates: Start: 01/29/19       Description: 1) Individualized goal:  200 ft x 3 with 4WW modified independent  2) Interventions:  PT Group Therapy, PT Gait Training, PT Self Care/Home Eval, PT Therapeutic Exercises, PT Neuro Re-Ed/Balance, PT Therapeutic Activity and PT Manual Therapy             Goal: LTG-By discharge, patient will transfer in/out of a car     Dates: Start: 01/29/19       Description: 1) Individualized goal:  Modified independent   2) Interventions:  PT Group Therapy, PT Gait Training, PT Self Care/Home Eval, PT Therapeutic Exercises, PT Neuro Re-Ed/Balance, PT Therapeutic Activity and PT Manual Therapy                     Section completed by:  Halley Hays, BSPT, ATP

## 2019-02-07 NOTE — DISCHARGE PLANNING
Case Management Discharge Instructions for Jeffrey Greer  Discharge Date: Friday 2/8/2019        Discharge Location: Home with spouse with home health.      Agency Name / Phone: [facility]  -  Phone [phone].  They will call you to schedule home visits with a registered nurse and physical therapist.  Home health is not available until 2/18/2019.  Therefore, a lab slip has been provided in order for you to go to a lab to get blood work (PT/INR)  done on Mondays and Thursday.  Kellee Cespedes RN with [facility] in [place] will manage your coumadin dosing.         Durable medical Equipment:  None ordered, as patient has a ramp in place, front wheeled walker, and home oxygen.         Follow-up Information:  Bebe Banerjee M.D.  [address] [place] [id number] [phone]  Appointment on 2/19/2019 PCP appointment at 1:30 PM     Gene Maloney M.D.  [address]  [facility] [address]  [phone]  Appointment on 3/11/2019:   Carlos @ 3:15pm and then appointment with MD at 4:40pm.

## 2019-02-08 VITALS
DIASTOLIC BLOOD PRESSURE: 70 MMHG | OXYGEN SATURATION: 93 % | WEIGHT: 209 LBS | BODY MASS INDEX: 34.82 KG/M2 | SYSTOLIC BLOOD PRESSURE: 114 MMHG | HEIGHT: 65 IN | TEMPERATURE: 97.9 F | HEART RATE: 52 BPM | RESPIRATION RATE: 16 BRPM

## 2019-02-08 LAB
INR PPP: 1.6 (ref 0.87–1.13)
PROTHROMBIN TIME: 19.2 SEC (ref 12–14.6)

## 2019-02-08 PROCEDURE — A9270 NON-COVERED ITEM OR SERVICE: HCPCS | Performed by: PHYSICAL MEDICINE & REHABILITATION

## 2019-02-08 PROCEDURE — 85610 PROTHROMBIN TIME: CPT

## 2019-02-08 PROCEDURE — 36415 COLL VENOUS BLD VENIPUNCTURE: CPT

## 2019-02-08 PROCEDURE — 99239 HOSP IP/OBS DSCHRG MGMT >30: CPT | Performed by: PHYSICAL MEDICINE & REHABILITATION

## 2019-02-08 PROCEDURE — 700102 HCHG RX REV CODE 250 W/ 637 OVERRIDE(OP): Performed by: PHYSICAL MEDICINE & REHABILITATION

## 2019-02-08 PROCEDURE — 94760 N-INVAS EAR/PLS OXIMETRY 1: CPT

## 2019-02-08 RX ORDER — HYDROCORTISONE 10 MG/1
10 TABLET ORAL 3 TIMES DAILY
Qty: 90 TAB | Refills: 2 | Status: ON HOLD | OUTPATIENT
Start: 2019-02-08 | End: 2021-09-26

## 2019-02-08 RX ORDER — GABAPENTIN 600 MG/1
900 TABLET ORAL
Qty: 90 TAB | Refills: 2 | Status: ON HOLD | OUTPATIENT
Start: 2019-02-08 | End: 2021-05-14

## 2019-02-08 RX ORDER — SOTALOL HYDROCHLORIDE 80 MG/1
40 TABLET ORAL 2 TIMES DAILY
Qty: 60 TAB | Refills: 2 | Status: ON HOLD | OUTPATIENT
Start: 2019-02-08 | End: 2021-05-14

## 2019-02-08 RX ORDER — LISINOPRIL 20 MG/1
20 TABLET ORAL DAILY
Qty: 30 TAB | Refills: 2 | Status: SHIPPED | OUTPATIENT
Start: 2019-02-09 | End: 2019-03-11

## 2019-02-08 RX ORDER — DILTIAZEM HYDROCHLORIDE 60 MG/1
60 TABLET, FILM COATED ORAL 2 TIMES DAILY
Qty: 60 TAB | Refills: 2 | Status: SHIPPED | OUTPATIENT
Start: 2019-02-08 | End: 2019-02-08

## 2019-02-08 RX ORDER — TIOTROPIUM BROMIDE 18 UG/1
18 CAPSULE ORAL; RESPIRATORY (INHALATION) DAILY
Qty: 30 CAP | Refills: 3 | Status: SHIPPED | OUTPATIENT
Start: 2019-02-08

## 2019-02-08 RX ORDER — HYDROCORTISONE 10 MG/1
30 TABLET ORAL 3 TIMES DAILY
Qty: 90 TAB | Refills: 1 | Status: SHIPPED | OUTPATIENT
Start: 2019-02-08 | End: 2019-02-08

## 2019-02-08 RX ORDER — WARFARIN SODIUM 3 MG/1
6 TABLET ORAL
Status: DISCONTINUED | OUTPATIENT
Start: 2019-02-08 | End: 2019-02-08 | Stop reason: HOSPADM

## 2019-02-08 RX ORDER — WARFARIN SODIUM 6 MG/1
6 TABLET ORAL DAILY
Qty: 30 TAB | Refills: 0 | Status: SHIPPED | OUTPATIENT
Start: 2019-02-08 | End: 2019-02-08

## 2019-02-08 RX ORDER — TAMSULOSIN HYDROCHLORIDE 0.4 MG/1
0.4 CAPSULE ORAL DAILY
Qty: 30 CAP | Refills: 2 | Status: SHIPPED | OUTPATIENT
Start: 2019-02-08 | End: 2021-05-20 | Stop reason: SDUPTHER

## 2019-02-08 RX ORDER — WARFARIN SODIUM 5 MG/1
5 TABLET ORAL DAILY
Qty: 30 TAB | Refills: 1 | Status: ON HOLD | OUTPATIENT
Start: 2019-02-08 | End: 2021-05-14

## 2019-02-08 RX ORDER — LEVOTHYROXINE SODIUM 0.07 MG/1
75 TABLET ORAL
Qty: 30 TAB | Refills: 2 | Status: SHIPPED | OUTPATIENT
Start: 2019-02-08 | End: 2021-05-20 | Stop reason: SDUPTHER

## 2019-02-08 RX ORDER — ALBUTEROL SULFATE 90 UG/1
2 AEROSOL, METERED RESPIRATORY (INHALATION) EVERY 6 HOURS PRN
Qty: 8.5 G | Refills: 1 | Status: ON HOLD | OUTPATIENT
Start: 2019-02-08 | End: 2021-04-29

## 2019-02-08 RX ORDER — OMEPRAZOLE 20 MG/1
20 CAPSULE, DELAYED RELEASE ORAL 2 TIMES DAILY
Qty: 60 CAP | Refills: 1 | Status: ON HOLD | OUTPATIENT
Start: 2019-02-08 | End: 2021-04-29

## 2019-02-08 RX ORDER — MONTELUKAST SODIUM 10 MG/1
10 TABLET ORAL DAILY
Qty: 30 TAB | Refills: 2 | Status: ON HOLD | OUTPATIENT
Start: 2019-02-08 | End: 2021-09-26

## 2019-02-08 RX ADMIN — SUCRALFATE 1 G: 1 SUSPENSION ORAL at 12:51

## 2019-02-08 RX ADMIN — TIOTROPIUM BROMIDE 1 CAPSULE: 18 CAPSULE ORAL; RESPIRATORY (INHALATION) at 08:38

## 2019-02-08 RX ADMIN — HYDROCORTISONE 10 MG: 10 TABLET ORAL at 12:51

## 2019-02-08 RX ADMIN — OMEPRAZOLE 40 MG: 20 CAPSULE, DELAYED RELEASE ORAL at 08:39

## 2019-02-08 RX ADMIN — HYDROCORTISONE 10 MG: 10 TABLET ORAL at 08:44

## 2019-02-08 RX ADMIN — BUDESONIDE AND FORMOTEROL FUMARATE DIHYDRATE 2 PUFF: 160; 4.5 AEROSOL RESPIRATORY (INHALATION) at 08:37

## 2019-02-08 RX ADMIN — SUCRALFATE 1 G: 1 SUSPENSION ORAL at 05:13

## 2019-02-08 RX ADMIN — MONTELUKAST SODIUM 10 MG: 10 TABLET, FILM COATED ORAL at 08:39

## 2019-02-08 RX ADMIN — NYSTATIN: 100000 POWDER TOPICAL at 08:43

## 2019-02-08 RX ADMIN — SOTALOL HYDROCHLORIDE 40 MG: 80 TABLET ORAL at 08:40

## 2019-02-08 RX ADMIN — TAMSULOSIN HYDROCHLORIDE 0.4 MG: 0.4 CAPSULE ORAL at 08:39

## 2019-02-08 RX ADMIN — LEVOTHYROXINE SODIUM 75 MCG: 75 TABLET ORAL at 05:13

## 2019-02-08 RX ADMIN — LISINOPRIL 20 MG: 20 TABLET ORAL at 08:41

## 2019-02-08 ASSESSMENT — ACTIVITIES OF DAILY LIVING (ADL)
TOILETING_LEVEL_OF_ASSIST: ABLE TO COMPLETE TOILETING WITHOUT ASSIST
TOILET_TRANSFER_LEVEL_OF_ASSIST: ABLE TO COMPLETE TOILET TRANSFER WITHOUT ASSIST
SHOWER_TRANSFER_LEVEL_OF_ASSIST: ABLE TO COMPLETE SHOWER TRANSFER WITHOUT ASSIST

## 2019-02-08 NOTE — DISCHARGE INSTRUCTIONS
Physical Therapy Discharge Instructions for Jeffrey Lizama    2/8/2019    Level of Assist Required for Ambulation: Supervision on Flat Surfaces, Assist for Balance on Curbs, Assist for Balance on Stairs  Distance Patient May Ambulate: as tolerated up to 200 ft  Device Recommended for Ambulation: 4-Wheeled Walker  Level of Assist Required to Propel Wheelchair: Intermittent Physical Assist  Level of Assist Required for Transfers: Supervision  Device Recommended for Transfers: 4-Wheeled Walker    Occupational Therapy Discharge Instructions for Jeffrey Lizama    2/8/2019    Level of Assist Required for Eating: Able to Complete Eating without Assist  Level of Assist Required for Grooming: Able to Complete Grooming without Assist  Level of Assist Required for Dressing: Requires Physical Assist with Dressing  Level of Assist Required for Toileting: Able to Complete Toileting without Assist  Level of Assist Required for Toilet Transfer: Able to Complete Toilet Transfer without Assist  Level of Assist Required for Bathing: Able to Complete Bathing without Assist  Equipment for Bathing: Shower Chair  Level of Assist Required for Shower Transfer: Able to Complete Shower Transfer without Assist  Equipment for Shower Transfer: Shower Chair  Level of Assist Required for Home Mgmt: Requires Physical Assist with Home Management  Level of Assist Required for Meal Prep: Able to Complete Meal Preparation without Assist  Driving: Please Contact Physician Prior to Driving  Home Exercise Program: None Issued    It was wonderful to meet and work with you anna Murphy on your further recovery.  [facility] NURSING DISCHARGE INSTRUCTIONS    Blood Pressure : 133/83  Weight: 94.8 kg (208 lb 15.9 oz)  Nursing recommendations for Jeffrey Lizama at time of discharge are as follows:  Client and Family Member verbalized understanding of all discharge instructions and prescriptions.      Review all your home medications and newly ordered medications with your doctor and/or pharmacist. Follow medication instructions as directed by your doctor and/or pharmacist.    Pain Management:   Discharge Pain Medication Instructions:  Comfort Goal: 0, Comfort at Rest, Sleep Comfortably, Stay Alert, Perform Activity, Comfort with Movement  Notify your primary care provider if pain is unrelieved with these measures, if the pain is new, or increased in intensity.    Discharge Skin Characteristics: Warm, Dry  Discharge Skin Exam:  (penile wound)  Wound 02/02/19 Pressure Injury Penis (Active)   Site Assessment Red;Excoriated 2/7/2019  8:56 PM   Shavonne-wound Assessment Red 2/7/2019  8:56 PM   Closure Open to air 2/7/2019  8:56 PM   Drainage Amount None 2/7/2019  8:56 PM   Cleansing Approved Wound Cleanser 2/7/2019  9:00 AM   Dressing Options Open to Air 2/7/2019  9:00 AM   NEXT Weekly Photo (Inpatient Only) 02/08/19 2/6/2019  7:15 AM     Skin / Wound Care Instructions: Please contact your primary care physician for any change in skin integrity.     If You Have Surgical Incisions / Wounds:  Monitor surgical site(s) for signs of increased swelling, redness or symptoms of drainage from the site or fever as this could indicate signs and symptoms of infection. If these symptoms are noted, notifiy your primary care provider.      Discharge Safety Instructions: Should Have ADULT SUPERVISION     Discharge Safety Concerns: Unsteady Gait, Weakness  The interdisciplinary team has made recommendation that you should have adult supervision in the house due to weakness and unsteady gait  Anti-embolic stockings are required during the day and off at night to increase circulation to the lower extremities.    Discharge Diet: Low Fiber/ GI soft     Discharge Liquids: Thin  Discharge Bowel Function: Continent  Please contact your primary care physician for any changes in bowel habits.  Discharge Bowel Program:    Discharge Bladder  Function: Continent  Discharge Urinary Devices: None      Nursing Discharge Plan:   Influenza Vaccine Indication: Not indicated: Previously immunized this influenza season and > 8 years of age    Case Management Discharge Instructions:   Discharge Location: Home with Home Health  Agency Name/Address/Phone: [facility]  -  Phone [phone].  They will call you to schedule visits with the following: They are not available until 2/18/2019.    Home Health: Registered Nurse, Physical Therapist  Outpatient Services:    DME Provider/Phone: None ordered, as patient has a ramp in place, front wheeled walker, and home oxygen.   Medical Equipment Ordered:    Prescription Faxed to:        Discharge Medication Instructions:  Below are the medications your physician expects you to take upon discharge:      Gastrointestinal Bleeding  Introduction  Gastrointestinal bleeding is bleeding somewhere along the path food travels through the body (digestive tract). This path is anywhere between the mouth and the opening of the butt (anus). You may have blood in your poop (stools) or have black poop. If you throw up (vomit), there may be blood in it.  This condition can be mild, serious, or even life-threatening. If you have a lot of bleeding, you may need to stay in the hospital.  Follow these instructions at home:  · Take over-the-counter and prescription medicines only as told by your doctor.  · Eat foods that have a lot of fiber in them. These foods include whole grains, fruits, and vegetables. You can also try eating 1-3 prunes each day.  · Drink enough fluid to keep your pee (urine) clear or pale yellow.  · Keep all follow-up visits as told by your doctor. This is important.  Contact a doctor if:  · Your symptoms do not get better.  Get help right away if:  · Your bleeding gets worse.  · You feel dizzy or you pass out (faint).  · You feel weak.  · You have very bad cramps in your back or belly (abdomen).  · You  pass large clumps of blood (clots) in your poop.  · Your symptoms are getting worse.  This information is not intended to replace advice given to you by your health care provider. Make sure you discuss any questions you have with your health care provider.  Document Released: 09/26/2009 Document Revised: 05/25/2017 Document Reviewed: 06/06/2016  © 2017 Elsevier    Benign Prostatic Hyperplasia  An enlarged prostate (benign prostatic hyperplasia) is common in older men. You may experience the following:  · Weak urine stream.  · Dribbling.  · Feeling like the bladder has not emptied completely.  · Difficulty starting urination.  · Getting up frequently at night to urinate.  · Urinating more frequently during the day.  HOME CARE INSTRUCTIONS   Monitor your prostatic hyperplasia for any changes. The following actions may help to alleviate any discomfort you are experiencing:  · Give yourself time when you urinate.  · Stay away from alcohol.  · Avoid beverages containing caffeine, such as coffee, tea, and lokesh, because they can make the problem worse.  · Avoid decongestants, antihistamines, and some prescription medicines that can make the problem worse.  · Follow up with your health care provider for further treatment as recommended.  SEEK MEDICAL CARE IF:  · You are experiencing progressive difficulty voiding.  · Your urine stream is progressively getting narrower.  · You are awaking from sleep with the urge to void more frequently.  · You are constantly feeling the need to void.  · You experience loss of urine, especially in small amounts.  SEEK IMMEDIATE MEDICAL CARE IF:   · You develop increased pain with urination or are unable to urinate.  · You develop severe abdominal pain, vomiting, a high fever, or fainting.  · You develop back pain or blood in your urine.  MAKE SURE YOU:   · Understand these instructions.  · Will watch your condition.  · Will get help right away if you are not doing well or get worse.  This  information is not intended to replace advice given to you by your health care provider. Make sure you discuss any questions you have with your health care provider.  Document Released: 12/18/2006 Document Revised: 01/08/2016 Document Reviewed: 05/20/2014  [employer] Interactive Patient Education © 2017 Elsevier Inc.    Tamsulosin capsules  What is this medicine?  TAMSULOSIN (mckay LOUISA alycia sin) is used to treat enlargement of the prostate gland in men, a condition called benign prostatic hyperplasia or BPH. It is not for use in women. It works by relaxing muscles in the prostate and bladder neck. This improves urine flow and reduces BPH symptoms.  This medicine may be used for other purposes; ask your health care provider or pharmacist if you have questions.  COMMON BRAND NAME(S): Flomax  What should I tell my health care provider before I take this medicine?  They need to know if you have any of the following conditions:  -advanced kidney disease  -advanced liver disease  -low blood pressure  -prostate cancer  -an unusual or allergic reaction to tamsulosin, sulfa drugs, other medicines, foods, dyes, or preservatives  -pregnant or trying to get pregnant  -breast-feeding  How should I use this medicine?  Take this medicine by mouth about 30 minutes after the same meal every day. Follow the directions on the prescription label. Swallow the capsules whole with a glass of water. Do not crush, chew, or open capsules. Do not take your medicine more often than directed. Do not stop taking your medicine unless your doctor tells you to.  Talk to your pediatrician regarding the use of this medicine in children. Special care may be needed.  Overdosage: If you think you have taken too much of this medicine contact a poison control center or emergency room at once.  NOTE: This medicine is only for you. Do not share this medicine with others.  What if I miss a dose?  If you miss a dose, take it as soon as you can. If it is almost  time for your next dose, take only that dose. Do not take double or extra doses. If you stop taking your medicine for several days or more, ask your doctor or health care professional what dose you should start back on.  What may interact with this medicine?  -cimetidine  -fluoxetine  -ketoconazole  -medicines for erectile disfunction like sildenafil, tadalafil, vardenafil  -medicines for high blood pressure  -other alpha-blockers like alfuzosin, doxazosin, phentolamine, phenoxybenzamine, prazosin, terazosin  -warfarin  This list may not describe all possible interactions. Give your health care provider a list of all the medicines, herbs, non-prescription drugs, or dietary supplements you use. Also tell them if you smoke, drink alcohol, or use illegal drugs. Some items may interact with your medicine.  What should I watch for while using this medicine?  Visit your doctor or health care professional for regular check ups. You will need lab work done before you start this medicine and regularly while you are taking it. Check your blood pressure as directed. Ask your health care professional what your blood pressure should be, and when you should contact him or her.  This medicine may make you feel dizzy or lightheaded. This is more likely to happen after the first dose, after an increase in dose, or during hot weather or exercise. Drinking alcohol and taking some medicines can make this worse. Do not drive, use machinery, or do anything that needs mental alertness until you know how this medicine affects you. Do not sit or stand up quickly. If you begin to feel dizzy, sit down until you feel better. These effects can decrease once your body adjusts to the medicine.  Contact your doctor or health care professional right away if you have an erection that lasts longer than 4 hours or if it becomes painful. This may be a sign of a serious problem and must be treated right away to prevent permanent damage.  If you are  thinking of having cataract surgery, tell your eye surgeon that you have taken this medicine.  What side effects may I notice from receiving this medicine?  Side effects that you should report to your doctor or health care professional as soon as possible:  -allergic reactions like skin rash or itching, hives, swelling of the lips, mouth, tongue, or throat  -breathing problems  -change in vision  -feeling faint or lightheaded  -irregular heartbeat  -prolonged or painful erection  -weakness  Side effects that usually do not require medical attention (report to your doctor or health care professional if they continue or are bothersome):  -back pain  -change in sex drive or performance  -constipation, nausea or vomiting  -cough  -drowsy  -runny or stuffy nose  -trouble sleeping  This list may not describe all possible side effects. Call your doctor for medical advice about side effects. You may report side effects to FDA at 1-503-FDA-0376.  Where should I keep my medicine?  Keep out of the reach of children.  Store at room temperature between 15 and 30 degrees C (59 and 86 degrees F). Throw away any unused medicine after the expiration date.  NOTE: This sheet is a summary. It may not cover all possible information. If you have questions about this medicine, talk to your doctor, pharmacist, or health care provider.  © 2018 Elsevier/Gold Standard (2013-12-18 14:11:34)      How to Take a Pulse  Your pulse is the increase in pressure inside the blood vessels that carry blood from your heart to the rest of your body (arteries). Every time your heart beats, you can feel your pulse in an artery near the surface of your skin.  You can easily feel your pulse in the artery in your wrist (radial artery) and in the artery in your neck (carotid artery). Taking your pulse can tell you how fast your heart is beating and whether it has a normal rhythm. You can also tell whether your heart is beating strongly or weakly.  What you need  to know about pulse rates  Your pulse is the same as your heart rate. Both are measured in beats per minute (bpm). A normal resting heart rate varies depending on a person's age.  · Infants under 1 year of age: Normal heart rate of 100-160 bpm.  · Children 1-2 years of age: Normal heart rate of  bpm.  · Children 2-5 years of age: Normal heart rate of  bpm.  · Children 6-12 years of age: Normal heart rate of  bpm.  · Everyone over 12 years of age: Normal heart rate of  bpm.  There can be a lot of variation in your pulse. It can be different depending on the time of day or the amount of exercise that you get. It changes with your fitness level. Many things can change the speed and regularity of your pulse. These include:  · Exercise.  · Fever.  · Stress.  · Heart problems.  · Poor circulation.  · Medicines.  How to take your pulse  To take your pulse, all you need is a digital stopwatch or a clock or watch that has a second hand. The best time to measure your resting pulse is in the morning before you start moving around. Take it as soon as you wake up or after resting for about 10 minutes. There are no firm rules about how often to check your pulse. In general, it is a good idea to check your pulse at least once a month. Measuring your pulse is a good way to check your heart health.  Checking your pulse before and after exercise can tell you if you are getting the right amount of exercise. This is called finding your target heart rate. Your target heart rate depends on your age, fitness, and health. Ask your health care provider what would be a safe target heart rate for you during exercise.  Radial Pulse   To check the pulse in your radial artery:  1. Turn one hand palm-up and relax your arm.  2. Place the first two fingers of your other hand gently over your wrist, just below the base of your thumb.  3. Place your fingertips just inside the bone that runs along the outside of your  arm.  4. Slowly increase pressure until you feel a pulsing beneath your fingers. You may need to move your fingers slightly.  5. Do not press too hard. Too much pressure may cut off blood supply.  6. Count how many pulse beats you feel in 1 minute. Or, count how many pulse beats you feel in 30 seconds and double that number.  7. Pay attention to the rhythm of the pulse. It should be steady and even.  Carotid Pulse   To check the pulse in your carotid artery:  1. Place two fingers just to one side of your Miguelito’s apple so that you feel a pulsing beneath your fingers.  2. Do not press too hard. Too much pressure may cut off blood supply and can make you dizzy.  3. Count how many pulse beats you feel in 1 minute. Or, count how many pulse beats you feel in 30 seconds and double that number.  4. Pay attention to the rhythm of the pulse. It should be steady and even.  Contact a health care provider if:  · Your pulse is too slow or too fast.  · Your pulse is weak or hard to find.  · You have skipped beats or extra beats.  · Your pulse has an irregular rhythm.  · You have an abnormal pulse along with dizziness, fatigue, or shortness of breath.  This information is not intended to replace advice given to you by your health care provider. Make sure you discuss any questions you have with your health care provider.  Document Released: 06/23/2004 Document Revised: 07/07/2017 Document Reviewed: 05/23/2017  [employer] Interactive Patient Education © 2017 [employer]    Atrial Fibrillation  Introduction  Atrial fibrillation is a type of heartbeat that is irregular or fast (rapid). If you have this condition, your heart keeps quivering in a weird (chaotic) way. This condition can make it so your heart cannot pump blood normally. Having this condition gives a person more risk for stroke, heart failure, and other heart problems. There are different types of atrial fibrillation. Talk with your doctor to learn about the type that you  have.  Follow these instructions at home:  · Take over-the-counter and prescription medicines only as told by your doctor.  · If your doctor prescribed a blood-thinning medicine, take it exactly as told. Taking too much of it can cause bleeding. If you do not take enough of it, you will not have the protection that you need against stroke and other problems.  · Do not use any tobacco products. These include cigarettes, chewing tobacco, and e-cigarettes. If you need help quitting, ask your doctor.  · If you have apnea (obstructive sleep apnea), manage it as told by your doctor.  · Do not drink alcohol.  · Do not drink beverages that have caffeine. These include coffee, soda, and tea.  · Maintain a healthy weight. Do not use diet pills unless your doctor says they are safe for you. Diet pills may make heart problems worse.  · Follow diet instructions as told by your doctor.  · Exercise regularly as told by your doctor.  · Keep all follow-up visits as told by your doctor. This is important.  Contact a doctor if:  · You notice a change in the speed, rhythm, or strength of your heartbeat.  · You are taking a blood-thinning medicine and you notice more bruising.  · You get tired more easily when you move or exercise.  Get help right away if:  · You have pain in your chest or your belly (abdomen).  · You have sweating or weakness.  · You feel sick to your stomach (nauseous).  · You notice blood in your throw up (vomit), poop (stool), or pee (urine).  · You are short of breath.  · You suddenly have swollen feet and ankles.  · You feel dizzy.  · Your suddenly get weak or numb in your face, arms, or legs, especially if it happens on one side of your body.  · You have trouble talking, trouble understanding, or both.  · Your face or your eyelid droops on one side.  These symptoms may be an emergency. Do not wait to see if the symptoms will go away. Get medical help right away. Call your local emergency services (911 in the  [place] Do not drive yourself to the hospital.   This information is not intended to replace advice given to you by your health care provider. Make sure you discuss any questions you have with your health care provider.  Document Released: 09/26/2009 Document Revised: 05/25/2017 Document Reviewed: 04/13/2016  © 2017 Elsevier      Warfarin   Warfarin is a blood thinner (anticoagulant) medicine. It is used to thin the blood so blood clots do not form. This medicine may cause very bad bleeding. You may also bruise easily while on this medicine. You may also bleed a little longer if you cut yourself.   Before taking warfarin, tell your doctor if:  · You take any other medicine for your heart or blood pressure.  · You are pregnant.  · You plan to get pregnant.  · You are breastfeeding.  · You are allergic to any medicine.  HOME CARE  · Have your blood checked as told by your doctor. Do not miss visits. Blood tests will include the PT and INR tests. These tests measure how long it takes for a clot to form in a blood sample. The test results help your doctor change your dose of warfarin if needed. If you miss your blood test visits, you could have bad bleeding or blood clots.  · Take warfarin exactly as told by your doctor. If you do not, bleeding or blood clots could lead to lasting injury, pain, or disability.  · Take your medicine at the same time every day. Do not miss a dose.  · Tell your doctor about all medicine you take. This includes medicines, vitamins, natural products, or dietary pills (supplements). Certain medicines are not safe to take while taking warfarin. Taking other medicines while taking warfarin can affect your PT and INR test results.  · Do not start or stop any medicine unless you have been told to do so by your doctor.  · Do not take anything that has aspirin in it unless your doctor says it is okay.  · Eat the same amount of vitamin K foods every day. Vitamin K foods can change how warfarin works  and your PT and INR test results.    · High vitamin K foods: Beef liver. Pork liver. Green tea. Broccoli. Hustler sprouts. Cauliflower. Chickpeas. Kale. Spinach. Turnip greens.  · Medium vitamin K foods: Chicken liver. Pork tenderloin. Cheddar cheese. Rolled oats. Coffee. Asparagus. Cabbage. Iceberg lettuce.  · Low vitamin K foods: Apples. Butter. Bananas. Skim or 1% milk. Canned pears. White bread. Strawberries. Corn. Tomatoes. Green beans. Eggs. Potatoes. Lopez. Pumpkin. Chicken breasts. Ground beef. Oil (except soybean oil).  · Avoid making major changes to your normal diet. Talk to your doctor first before changing your normal diet.  · Do not drink alcohol.  · Tell your doctors and dentists that you are taking warfarin at the beginning of every visit.  GET HELP RIGHT AWAY IF:  · You miss a dose of warfarin. Do not take 2 doses at the same time.  · You have a skin rash.  · You have heavy or unusual bleeding.  · There is blood in your pee (urine) or poop (stool).  · You have side effects from medicine that do not get better after a few days.  MAKE SURE YOU:  · Understand these instructions.  · Will watch your condition.  · Will get help right away if you are not doing well or get worse.  Document Released: 01/20/2012 Document Revised: 06/18/2013 Document Reviewed: 01/20/2012  [employer]® Patient Information ©2014 [employer].      Insomnia  Insomnia is a sleep disorder that makes it difficult to fall asleep or to stay asleep. Insomnia can cause tiredness (fatigue), low energy, difficulty concentrating, mood swings, and poor performance at work or school.  There are three different ways to classify insomnia:  · Difficulty falling asleep.  · Difficulty staying asleep.  · Waking up too early in the morning.  Any type of insomnia can be long-term (chronic) or short-term (acute). Both are common. Short-term insomnia usually lasts for three months or less. Chronic insomnia occurs at least three times a week for longer  than three months.  What are the causes?  Insomnia may be caused by another condition, situation, or substance, such as:  · Anxiety.  · Certain medicines.  · Gastroesophageal reflux disease (GERD) or other gastrointestinal conditions.  · Asthma or other breathing conditions.  · Restless legs syndrome, sleep apnea, or other sleep disorders.  · Chronic pain.  · Menopause. This may include hot flashes.  · Stroke.  · Abuse of alcohol, tobacco, or illegal drugs.  · Depression.  · Caffeine.  · Neurological disorders, such as Alzheimer disease.  · An overactive thyroid (hyperthyroidism).  The cause of insomnia may not be known.  What increases the risk?  Risk factors for insomnia include:  · Gender. Women are more commonly affected than men.  · Age. Insomnia is more common as you get older.  · Stress. This may involve your professional or personal life.  · Income. Insomnia is more common in people with lower income.  · Lack of exercise.  · Irregular work schedule or night shifts.  · Traveling between different time zones.  What are the signs or symptoms?  If you have insomnia, trouble falling asleep or trouble staying asleep is the main symptom. This may lead to other symptoms, such as:  · Feeling fatigued.  · Feeling nervous about going to sleep.  · Not feeling rested in the morning.  · Having trouble concentrating.  · Feeling irritable, anxious, or depressed.  How is this treated?  Treatment for insomnia depends on the cause. If your insomnia is caused by an underlying condition, treatment will focus on addressing the condition. Treatment may also include:  · Medicines to help you sleep.  · Counseling or therapy.  · Lifestyle adjustments.  Follow these instructions at home:  · Take medicines only as directed by your health care provider.  · Keep regular sleeping and waking hours. Avoid naps.  · Keep a sleep diary to help you and your health care provider figure out what could be causing your insomnia. Include:  ¨ When  you sleep.  ¨ When you wake up during the night.  ¨ How well you sleep.  ¨ How rested you feel the next day.  ¨ Any side effects of medicines you are taking.  ¨ What you eat and drink.  · Make your bedroom a comfortable place where it is easy to fall asleep:  ¨ Put up shades or special blackout curtains to block light from outside.  ¨ Use a white noise machine to block noise.  ¨ Keep the temperature cool.  · Exercise regularly as directed by your health care provider. Avoid exercising right before bedtime.  · Use relaxation techniques to manage stress. Ask your health care provider to suggest some techniques that may work well for you. These may include:  ¨ Breathing exercises.  ¨ Routines to release muscle tension.  ¨ Visualizing peaceful scenes.  · Cut back on alcohol, caffeinated beverages, and cigarettes, especially close to bedtime. These can disrupt your sleep.  · Do not overeat or eat spicy foods right before bedtime. This can lead to digestive discomfort that can make it hard for you to sleep.  · Limit screen use before bedtime. This includes:  ¨ Watching TV.  ¨ Using your smartphone, tablet, and computer.  · Stick to a routine. This can help you fall asleep faster. Try to do a quiet activity, brush your teeth, and go to bed at the same time each night.  · Get out of bed if you are still awake after 15 minutes of trying to sleep. Keep the lights down, but try reading or doing a quiet activity. When you feel sleepy, go back to bed.  · Make sure that you drive carefully. Avoid driving if you feel very sleepy.  · Keep all follow-up appointments as directed by your health care provider. This is important.  Contact a health care provider if:  · You are tired throughout the day or have trouble in your daily routine due to sleepiness.  · You continue to have sleep problems or your sleep problems get worse.  Get help right away if:  · You have serious thoughts about hurting yourself or someone else.  This  information is not intended to replace advice given to you by your health care provider. Make sure you discuss any questions you have with your health care provider.  Document Released: 12/15/2001 Document Revised: 05/19/2017 Document Reviewed: 09/18/2015  [employer] Interactive Patient Education © 2017 [employer]    Gabapentin capsules or tablets  What is this medicine?  GABAPENTIN (GA ba pen tin) is used to control partial seizures in adults with epilepsy. It is also used to treat certain types of nerve pain.  This medicine may be used for other purposes; ask your health care provider or pharmacist if you have questions.  COMMON BRAND NAME(S): Active-PAC with Gabapentin, Gabarone, Neurontin  What should I tell my health care provider before I take this medicine?  They need to know if you have any of these conditions:  -kidney disease  -suicidal thoughts, plans, or attempt; a previous suicide attempt by you or a family member  -an unusual or allergic reaction to gabapentin, other medicines, foods, dyes, or preservatives  -pregnant or trying to get pregnant  -breast-feeding  How should I use this medicine?  Take this medicine by mouth with a glass of water. Follow the directions on the prescription label. You can take it with or without food. If it upsets your stomach, take it with food.Take your medicine at regular intervals. Do not take it more often than directed. Do not stop taking except on your doctor's advice.  If you are directed to break the 600 or 800 mg tablets in half as part of your dose, the extra half tablet should be used for the next dose. If you have not used the extra half tablet within 28 days, it should be thrown away.  A special MedGuide will be given to you by the pharmacist with each prescription and refill. Be sure to read this information carefully each time.  Talk to your pediatrician regarding the use of this medicine in children. Special care may be needed.  Overdosage: If you think you  have taken too much of this medicine contact a poison control center or emergency room at once.  NOTE: This medicine is only for you. Do not share this medicine with others.  What if I miss a dose?  If you miss a dose, take it as soon as you can. If it is almost time for your next dose, take only that dose. Do not take double or extra doses.  What may interact with this medicine?  Do not take this medicine with any of the following medications:  -other gabapentin products  This medicine may also interact with the following medications:  -alcohol  -antacids  -antihistamines for allergy, cough and cold  -certain medicines for anxiety or sleep  -certain medicines for depression or psychotic disturbances  -homatropine; hydrocodone  -naproxen  -narcotic medicines (opiates) for pain  -phenothiazines like chlorpromazine, mesoridazine, prochlorperazine, thioridazine  This list may not describe all possible interactions. Give your health care provider a list of all the medicines, herbs, non-prescription drugs, or dietary supplements you use. Also tell them if you smoke, drink alcohol, or use illegal drugs. Some items may interact with your medicine.  What should I watch for while using this medicine?  Visit your doctor or health care professional for regular checks on your progress. You may want to keep a record at home of how you feel your condition is responding to treatment. You may want to share this information with your doctor or health care professional at each visit. You should contact your doctor or health care professional if your seizures get worse or if you have any new types of seizures. Do not stop taking this medicine or any of your seizure medicines unless instructed by your doctor or health care professional. Stopping your medicine suddenly can increase your seizures or their severity.  Wear a medical identification bracelet or chain if you are taking this medicine for seizures, and carry a card that lists  all your medications.  You may get drowsy, dizzy, or have blurred vision. Do not drive, use machinery, or do anything that needs mental alertness until you know how this medicine affects you. To reduce dizzy or fainting spells, do not sit or stand up quickly, especially if you are an older patient. Alcohol can increase drowsiness and dizziness. Avoid alcoholic drinks.  Your mouth may get dry. Chewing sugarless gum or sucking hard candy, and drinking plenty of water will help.  The use of this medicine may increase the chance of suicidal thoughts or actions. Pay special attention to how you are responding while on this medicine. Any worsening of mood, or thoughts of suicide or dying should be reported to your health care professional right away.  Women who become pregnant while using this medicine may enroll in the [place] Antiepileptic Drug Pregnancy Registry by calling 1-703.683.6221. This registry collects information about the safety of antiepileptic drug use during pregnancy.  What side effects may I notice from receiving this medicine?  Side effects that you should report to your doctor or health care professional as soon as possible:  -allergic reactions like skin rash, itching or hives, swelling of the face, lips, or tongue  -worsening of mood, thoughts or actions of suicide or dying  Side effects that usually do not require medical attention (report to your doctor or health care professional if they continue or are bothersome):  -constipation  -difficulty walking or controlling muscle movements  -dizziness  -nausea  -slurred speech  -tiredness  -tremors  -weight gain  This list may not describe all possible side effects. Call your doctor for medical advice about side effects. You may report side effects to FDA at 8-791-FDA-0689.  Where should I keep my medicine?  Keep out of reach of children.  This medicine may cause accidental overdose and death if it taken by other adults, children, or pets. Mix  any unused medicine with a substance like cat litter or coffee grounds. Then throw the medicine away in a sealed container like a sealed bag or a coffee can with a lid. Do not use the medicine after the expiration date.  Store at room temperature between 15 and 30 degrees C (59 and 86 degrees F).  NOTE: This sheet is a summary. It may not cover all possible information. If you have questions about this medicine, talk to your doctor, pharmacist, or health care provider.  © 2018 [employer]vier/Gold Standard (2015-02-13 15:26:50)      Fall Prevention in the Home  Introduction  Falls can cause injuries. They can happen to people of all ages. There are many things you can do to make your home safe and to help prevent falls.  What can I do on the outside of my home?  · Regularly fix the edges of walkways and driveways and fix any cracks.  · Remove anything that might make you trip as you walk through a door, such as a raised step or threshold.  · Trim any bushes or trees on the path to your home.  · Use bright outdoor lighting.  · Clear any walking paths of anything that might make someone trip, such as rocks or tools.  · Regularly check to see if handrails are loose or broken. Make sure that both sides of any steps have handrails.  · Any raised decks and porches should have guardrails on the edges.  · Have any leaves, snow, or ice cleared regularly.  · Use sand or salt on walking paths during winter.  · Clean up any spills in your garage right away. This includes oil or grease spills.  What can I do in the bathroom?  · Use night lights.  · Install grab bars by the toilet and in the tub and shower. Do not use towel bars as grab bars.  · Use non-skid mats or decals in the tub or shower.  · If you need to sit down in the shower, use a plastic, non-slip stool.  · Keep the floor dry. Clean up any water that spills on the floor as soon as it happens.  · Remove soap buildup in the tub or shower regularly.  · Attach bath mats securely  with double-sided non-slip rug tape.  · Do not have throw rugs and other things on the floor that can make you trip.  What can I do in the bedroom?  · Use night lights.  · Make sure that you have a light by your bed that is easy to reach.  · Do not use any sheets or blankets that are too big for your bed. They should not hang down onto the floor.  · Have a firm chair that has side arms. You can use this for support while you get dressed.  · Do not have throw rugs and other things on the floor that can make you trip.  What can I do in the kitchen?  · Clean up any spills right away.  · Avoid walking on wet floors.  · Keep items that you use a lot in easy-to-reach places.  · If you need to reach something above you, use a strong step stool that has a grab bar.  · Keep electrical cords out of the way.  · Do not use floor polish or wax that makes floors slippery. If you must use wax, use non-skid floor wax.  · Do not have throw rugs and other things on the floor that can make you trip.  What can I do with my stairs?  · Do not leave any items on the stairs.  · Make sure that there are handrails on both sides of the stairs and use them. Fix handrails that are broken or loose. Make sure that handrails are as long as the stairways.  · Check any carpeting to make sure that it is firmly attached to the stairs. Fix any carpet that is loose or worn.  · Avoid having throw rugs at the top or bottom of the stairs. If you do have throw rugs, attach them to the floor with carpet tape.  · Make sure that you have a light switch at the top of the stairs and the bottom of the stairs. If you do not have them, ask someone to add them for you.  What else can I do to help prevent falls?  · Wear shoes that:  ¨ Do not have high heels.  ¨ Have rubber bottoms.  ¨ Are comfortable and fit you well.  ¨ Are closed at the toe. Do not wear sandals.  · If you use a stepladder:  ¨ Make sure that it is fully opened. Do not climb a closed  stepladder.  ¨ Make sure that both sides of the stepladder are locked into place.  ¨ Ask someone to hold it for you, if possible.  · Clearly kurt and make sure that you can see:  ¨ Any grab bars or handrails.  ¨ First and last steps.  ¨ Where the edge of each step is.  · Use tools that help you move around (mobility aids) if they are needed. These include:  ¨ Canes.  ¨ Walkers.  ¨ Scooters.  ¨ Crutches.  · Turn on the lights when you go into a dark area. Replace any light bulbs as soon as they burn out.  · Set up your furniture so you have a clear path. Avoid moving your furniture around.  · If any of your floors are uneven, fix them.  · If there are any pets around you, be aware of where they are.  · Review your medicines with your doctor. Some medicines can make you feel dizzy. This can increase your chance of falling.  Ask your doctor what other things that you can do to help prevent falls.  This information is not intended to replace advice given to you by your health care provider. Make sure you discuss any questions you have with your health care provider.  Document Released: 10/14/2010 Document Revised: 05/25/2017 Document Reviewed: 01/22/2016  © 2017 Elsevier      Depression, Adult  Depression is feeling sad, low, down in the dumps, blue, gloomy, or empty. In general, there are two kinds of depression:  · Normal sadness or grief. This can happen after something upsetting. It often goes away on its own within 2 weeks. After losing a loved one (bereavement), normal sadness and grief may last longer than two weeks. It usually gets better with time.  · Clinical depression. This kind lasts longer than normal sadness or grief. It keeps you from doing the things you normally do in life. It is often hard to function at home, work, or at school. It may affect your relationships with others. Treatment is often needed.  GET HELP RIGHT AWAY IF:  · You have thoughts about hurting yourself or others.  · You lose touch  with reality (psychotic symptoms). You may:  ¨ See or hear things that are not real.  ¨ Have untrue beliefs about your life or people around you.  · Your medicine is giving you problems.  MAKE SURE YOU:  · Understand these instructions.  · Will watch your condition.  · Will get help right away if you are not doing well or get worse.     This information is not intended to replace advice given to you by your health care provider. Make sure you discuss any questions you have with your health care provider.     Document Released: 01/20/2012 Document Revised: 01/08/2016 Document Reviewed: 04/18/2013  [employer] Interactive Patient Education ©2016 [employer].    Helping Someone Who is Suicidal  Suicide is when someone takes his or her own life. Someone who is thinking about suicide needs immediate help. Although you might not know what to say or do to help, start by letting that person know you care. Listen to him or her. Then talk about how to get help. Help is available through therapy, medicine, and other treatments.  What are signs that someone is suicidal?  Common signs include:  · Signs of depression, such as:  ¨ Rage.  ¨ Irritability.  ¨ Shame.  ¨ Excessive worry.  ¨ Loss of interest in things the person once enjoyed.  · Changes in social behaviors and relationships, including:  ¨ Isolating oneself.  ¨ Withdrawing from friends and family.  ¨ Giving away possessions.  ¨ Saying good-bye.  ¨ Acting aggressively.  ¨ Sleeping more or less than usual.  ¨ Having trouble managing school or work.  ¨ Talking about feeling hopeless or being a burden.  ¨ Engaging in risky behaviors, such as drinking more alcohol or using more drugs.  What are the risk factors for suicide?  Risk factors for suicide include:  · Other suicides in the family.  · A history of suicide attempts.  · Depression or other mental health issues.  · Being in intermediate or facing intermediate time.  · Having had close friends who have committed suicide.  · Alcohol or  drug abuse, especially combined with a mental illness.  What should I do if someone is suicidal?  If you believe a person is in immediate danger of committing suicide, call your local emergency services (911 in the [place] for help.  If a person says he or she wants to commit suicide, take the threat seriously. Help the person get help right away by:  · Calling your local emergency services.  · Calling a suicide prevention hotline.  · Contacting a crisis center or a local suicide prevention center. These are often located at hospitals, clinics, community service organizations, social service providers, or health departments.  If a person confides in you that he or she is considering suicide:  · Listen to the person's thoughts and concerns with compassion.  · Let the person know you will stay with him or her.  · Ask if the person is having thoughts of hurting himself or herself.  · Offer to help the person get to a doctor or mental health professional.  · Remove all weapons and medicines from the person's living space.  · Do not promise to keep his or her thoughts of suicide a secret.  This information is not intended to replace advice given to you by your health care provider. Make sure you discuss any questions you have with your health care provider.  Document Released: 06/23/2004 Document Revised: 05/25/2017 Document Reviewed: 05/28/2015  [employer]vier Interactive Patient Education © 2017 [employer]

## 2019-02-08 NOTE — FLOWSHEET NOTE
02/08/19 0824   Events/Summary/Plan   Events/Summary/Plan O2 spot check   Interdisciplinary Plan of Care-Outcomes    Bronchodilator Outcome Patient at Stable Baseline   Education   Education Yes - Pt. / Family has been Instructed in Trach Care   Respiratory WDL   Respiratory (WDL) X   Chest Exam   Work Of Breathing / Effort Mild   Respiration 16   Oximetry   #Pulse Oximetry (Single Determination) Yes   Oxygen   Home O2 Use Prior To Admission? Yes   Home O2 Delivery Method Nasal Cannula   Home O2 Frequency of Use Continuous   Pulse Oximetry 93 %   O2 (LPM) 5   O2 Daily Delivery Respiratory  Silicone Nasal Cannula

## 2019-02-08 NOTE — PROGRESS NOTES
DATE OF SERVICE:  02/05/2019    Patient is doing well on followup.  He said he is making gains in his strength   and endurance.  The patient was alert and well focused.  He talked about his   therapies and what his plans are for returning home.  Patient also talked   about some of his interest, now he would like to renew them.  This includes   the reading of history.       ____________________________________     ROSENDO POLLARD, PHD    JOJO / LYNNE    DD:  02/07/2019 11:25:57  DT:  02/08/2019 06:19:55    D#:  [id number]  Job#:  [id number]

## 2019-02-08 NOTE — CARE PLAN
Problem: Communication  Goal: The ability to communicate needs accurately and effectively will improve  Outcome: PROGRESSING AS EXPECTED  Pt is AOx4 and uses the call light to notify staff of any needs.    Problem: Pain Management  Goal: Pain level will decrease to patient's comfort goal  Outcome: PROGRESSING AS EXPECTED  Pt denied pain when initially assessed. Pt educated to call for changes in pain or other needs and verbalized understanding.

## 2019-02-08 NOTE — PROGRESS NOTES
DATE OF SERVICE:  02/06/2019    The patient continues to do well at followup.  The patient was spoken to at   length about topics that were brought up the last time he was seen.  The focus   was on his home program and what he believes he needs to do to continue his   rehab efforts upon his return home.       ____________________________________     ROSENDO POLLARD, PHD    JOJO / LYNNE    DD:  02/07/2019 11:38:58  DT:  02/08/2019 07:33:53    D#:  [id number]  Job#:  [id number]

## 2019-02-08 NOTE — CARE PLAN
Problem: Communication  Goal: The ability to communicate needs accurately and effectively will improve  Patient is able to communicate needs with no difficulty. Patient is educated as to whom to voice concerns and questions to as needed. Patient is able to understand with no difficulty. Will continue to monitor.     Problem: Safety  Goal: Will remain free from falls  Pt alert, oriented and cooperative. Pt consistently used call light to call for assistance. Pt waits for assistance. Able to verbalize needs when awake.  Call light within reach of patient.      Problem: Pain Management  Goal: Pain level will decrease to patient's comfort goal  Patient denies pain this shift.

## 2019-02-08 NOTE — DISCHARGE SUMMARY
Rehab Discharge Summary    Admission Date: 1/28/2019    Discharge Date: 2/8/2019    Attending Provider: Faviola Garcia MD/PhD    Admission Diagnosis:   Active Hospital Problems    Diagnosis   • *GI bleed   • Aortic stenosis   • Anemia due to GI blood loss   • Anticoagulant long-term use   • Chronic back pain   • EVANGELINA (obstructive sleep apnea)   • BPH (benign prostatic hypertrophy)       Discharge Diagnosis:  Active Hospital Problems    Diagnosis   • *GI bleed   • Aortic stenosis   • Anemia due to GI blood loss   • Anticoagulant long-term use   • Chronic back pain   • EVANGELINA (obstructive sleep apnea)   • BPH (benign prostatic hypertrophy)       HPI per H&P:  Per my PM&R consult note:  Patient is a 86 y.o. year-old male with a past medical history significant for BPH, COPD, Glaucoma, HTN, A fib on coumadin, Hx of PE, Hypothyroidism,  admitted to [facility] on 1/21/2019 12:42 PM with black stools for 2 days. Patient was reportedly hypotensive at OSH and anemic and was transferred to [facility] for higher level of care.  Patient on transport could not protect his airway and required intubation. Patient was hypotensive and required IV fluids, pressors and multiple blood transfusions. In addition patient received Vit K and K centra for coumadin. Patient was taken to EGD with Dr. Mtz on 1/21/19, but with significant old blood limiting view. Patient was admitted to ICU and continued on PPI drip.  Patient had repeat EGD on 1/22/19 and two ulcers were clipped with successful hemostasis.  Per GI consult, one lesion concerning for malignancy and recommending follow-up EGD in 3 months.  Patient was extubated successfully on 1/22/19 and has since been transferred to the floor.  Hemoglobin has remained 8-8.5 since clipping. Echo was performed on 1/22/19 and EF of 80% with mild aortic stenosis/insufficiency. Last SBP 85/57.  Patient has been discontinued off of antibiotics, last WBC 13.7 on 1/25/19.      Patient  reportedly lives with wife who has Parkinson's in [place] in a 1 story home with a ramp to enter; previously using a 4WW. Patient was last evaluated by PT on 1/24/19 and was Jeanette for gait. Patient last evaluated by OT on 1/24/19 and was CGA-MaxA for ADLs.      Since my consult patient's SBP has improved. He reports his WBC elevation is chronic and always very labile. Per discharge summary recommending restarting anticoagulation 10 days post clipping (February 2nd) and to slowly titrate steroids down to home dose of 10 mg TID. Patient's lisinopril has been held due to the low BP.  Patient reports he tolerated the transfer. He reported to nursing that he had some double vision earlier today. Denies NVD. Bilateral leg swelling is present on admission.     Patient was admitted to [facility] on 1/28/2019.     Hospital Course by Problem List:  GI Bleed w debility: GI bleed s/p clipping x2 areas s/p multiple transfusions. Last Hgb 8.7 on 1/27/19. Continue Omeprazole 40 mg BID. Recommended at discharge to continue for 2 weeks then 20 mg BID until follow-up with GI. Patient underwent acute inpatient rehabilitation from 1/28/19 to 2/8/19 with good improvement in ADLs and mobility.  -Will discharge on Omeprazole 20 mg BID. Completed sucralfate     Anemia - 8.9 on admission. Will recheck CBC on 2/2/19 - 9.7. Continue to improve     Adrenal Insufficiency - Patient on steroids at baseline for previous pituitary lesion. Continue hydrocortisone 30 QAM, 20 QNOON and 10 mg QHS. Baseline on 10 mg TID. Titrated down to 10 mg TID  -Follow-up with PCP     Left Ankle - Worsening arthritis with early charcot changes. Would like referral back to [facility]. Consider AFO although limited evidence of improvement and concern for skin breakdown.   -Orthotics vendor to issue soft brace for correction of deformity. Improved ambulation, but limited ability to don any orthotic     Olecranon bursitis - Patient with olecranon  bursitis, suggested compression to therapy.     HTN/A Fib - Patient on Diltiazem 60 mg BID, Sotalol 40 mg BID. Previously on Lisinopril which was put on hold due to hypotension. Continue Diltiazem and Sotalol. SBP. Will restart Lisinopril at 10, continue to monitor another day.  SBP into 140s will increase Lisinopril to 20 mg. SBP well controlled  -Restart Coumadin on 19, discharge on 5 mg coumadin, follow-up with coumadin clinic on Monday     COPD - Patient on Symbicort BID, Singular daily, and Spiriva     Leukocytosis - Chronic issue with labile WBC. Patient will need follow-up with Hematology. Admit WBC -      Hypothyroidism - Patient on 75 mcg daily     Pain - Patient with chronic pain in multiple joints, most common worse at night. Increase Gabapentin to 900 QHS.     BPH - Patient on Flomax 0.4 mg on transfer, currently with condom catheter PRN. Would prefer patient to wear condom cath only at night, he prefers during daytime as well.      DVT Ppx - Currently high risk for bleed with recent GI bleed.  Restart coumadin on 19, coumadin clinic to manage     Dispo - Patient's son is available for pickup today at 1PM. OK for early discharge.     Functional Status at Discharge  Eatin - Independent  Eating Description:     Groomin - Modified Independent  Grooming Description:  Set-up of equipment  Bathin - Modified Independent  Bathing Description:  Grab bar, Tub bench  Upper Body Dressin - Modified Independent  Upper Body Dressing Description:     Lower Body Dressin - Minimal Assistance  Lower Body Dressing Description:  4 - Minimal Assistance  Discharge Location : Home  Patient Discharging with Assist of: Family   Level of Supervision Required: Intermittent Supervision  Recommended Services Upon Discharge: Home Health Occupational Therapy  Long Term Goals Met: IADLs mod I  Long Term Goals Not Met: self care mod I  Reason(s) for Goals Not Met: Pt limited with donning kyle hose for  home use and foot wear  Criteria for Termination of Services: Maximum Function Achieved for Inpatient Rehabilitation  Walk:  5 - Standby Prompting/Supervision or Set-up  Distance Walked:  Walks a minimum of 150 feet  Walk Description:   (SBA for O2 management, pt ambulates with 4WW x 200 ft)  Wheelchair:  2 - Max Assistance  Distance Propelled:  Propels  feet   Wheelchair Description:   (SBA for encouragement x 75 ft, slow pace with increased effort)  Stairs 2 - Max Assistance  Stairs Description (CGA with B hand rails x 6 short rise steps/ reciprocal pattern)  Discharge Location: Home  Patient Discharging with Assist of: Family  Level of Supervision Required Upon Discharge: Intermittent Supervision  Recommended Equipment for Discharge: 4-Wheeled Walker  Recommeded Services Upon Discharge: Home Health Physical Therapy  Long Term Goals Met: 0  Long Term Goals Not Met: 4  Reason(s) for Goals Not Met: pt remains with signifincaint endurance limitations due to pre-existing respiratory impairment  Criteria for Termination of Services: Maximum Function Achieved for Inpatient Rehabilitation  Comprehension Mode:  Both  Comprehension:  7 - Independent  Comprehension Description:     Expression Mode:  Both  Expression:  7 - Independent  Expression Description:     Social Interaction:  7 - Independent  Social Interaction Description:     Problem Solvin - Independent  Problem Solving Description:     Memory:  7 - Independent  Memory Description:          Faviola BHAKTA M.D., personally performed a complete drug regimen review and no potential clinically significant medication issues were identified.   Discharge Medication:     Medication List      START taking these medications      Instructions   DILTIAZem 30 MG Tabs  Commonly known as:  CARDIZEM   Take 1 Tab by mouth 2 Times a Day.  Dose:  30 mg     lisinopril 20 MG Tabs  Start taking on:  2019  Commonly known as:  PRINIVIL  Notes to patient:   Blood pressure   Take 1 Tab by mouth every day.  Dose:  20 mg     warfarin 5 MG Tabs  Commonly known as:  COUMADIN   Take 1 Tab by mouth every day.  Dose:  5 mg        CHANGE how you take these medications      Instructions   albuterol 108 (90 Base) MCG/ACT Aers inhalation aerosol  What changed:  Another medication with the same name was removed. Continue taking this medication, and follow the directions you see here.  Notes to patient:  Breathing   Inhale 2 Puffs by mouth every 6 hours as needed for Shortness of Breath.  Dose:  2 Puff     gabapentin 600 MG tablet  What changed:  how much to take  Commonly known as:  NEURONTIN  Notes to patient:  Nerve pain   Take 1.5 Tabs by mouth every bedtime.  Dose:  900 mg     hydrocortisone 10 MG Tabs  What changed:  · how much to take  · when to take this  · Another medication with the same name was removed. Continue taking this medication, and follow the directions you see here.  Commonly known as:  CORTEF   Take 1 Tab by mouth 3 times a day.  Dose:  10 mg     omeprazole 20 MG delayed-release capsule  What changed:  · medication strength  · how much to take  · when to take this  Commonly known as:  PRILOSEC   Take 1 Cap by mouth 2 times a day.  Dose:  20 mg     sotalol 80 MG Tabs  What changed:  additional instructions  Commonly known as:  BETAPACE  Notes to patient:  Arrhythmia   Take 0.5 Tabs by mouth 2 times a day.  Dose:  40 mg        CONTINUE taking these medications      Instructions   levothyroxine 75 MCG Tabs  Commonly known as:  SYNTHROID  Notes to patient:  Thyroid   Take 1 Tab by mouth Every morning on an empty stomach.  Dose:  75 mcg     Mometasone Furo-Formoterol Fum 200-5 MCG/ACT Aero  Commonly known as:  DULERA  Notes to patient:  Breathing   Inhale 2 Puffs by mouth 2 Times a Day. Use spacer. Rinse mouth after use.  Dose:  2 Puff     montelukast 10 MG Tabs  Commonly known as:  SINGULAIR  Notes to patient:  Breathing   Take 1 Tab by mouth every day.  Dose:  10  mg     tamsulosin 0.4 MG capsule  Commonly known as:  FLOMAX  Notes to patient:  Bladder   Take 1 Cap by mouth every day.  Dose:  0.4 mg     tiotropium 18 MCG Caps  Commonly known as:  SPIRIVA  Notes to patient:  Breathing   Inhale 1 Cap by mouth every day.  Dose:  18 mcg        STOP taking these medications    DILTIAZem  MG Cp24  Commonly known as:  CARDIZEM CD     hydrALAZINE 10 MG Tabs  Commonly known as:  APRESOLINE     HYDROcodone-acetaminophen 5-325 MG Tabs per tablet  Commonly known as:  NORCO     polyethylene glycol/lytes Pack  Commonly known as:  MIRALAX     sucralfate 1 GM/10ML Susp  Commonly known as:  CARAFATE     tetrahydrozoline 0.05 % Soln  Commonly known as:  VISINE     triamterene/hctz 37.5-25 MG Caps  Commonly known as:  DYAZIDE            Discharge Diet:  Regular    Discharge Activity:  As tolerated     Disposition:  Patient to discharge home with family support and community resources.     Equipment:  Home O2    Follow-up & Discharge Instructions:  Follow up with your primary care provider (PCP) within 7-10 days of discharge to review your medications and take over your care.     If you develop chest pain, fever, chills, change in neurologic function (weakness, sensation changes, vision changes), or other concerning sxs, seek immediate medical attention or call 911.      Condition on Discharge:  Good    More than 40 minutes was spent on discharging this patient, including face-to-face time, prescription management, and the dictation of this note.    Faviola Garcia M.D.    Date of Service: 2/8/2019

## 2019-02-08 NOTE — CARE PLAN
Problem: Mobility  Goal: STG-Within one week, patient will propel wheelchair household distances  1) Individualized goal:  100 ft x 2 with min A  2) Interventions:  PT Group Therapy, PT Gait Training, PT Self Care/Home Eval, PT Therapeutic Exercises, PT Neuro Re-Ed/Balance, PT Therapeutic Activity and PT Manual Therapy     Outcome: DISCHARGED-GOAL NOT MET Date Met: 02/08/19      Problem: PT-Long Term Goals  Goal: LTG-By discharge, patient will maintain balance  1) Individualized goal:  With no UE support for standing on even surface for >5 minutes independently  2) Interventions:  PT Group Therapy, PT Gait Training, PT Self Care/Home Eval, PT Therapeutic Exercises, PT Neuro Re-Ed/Balance, PT Therapeutic Activity and PT Manual Therapy      Outcome: DISCHARGED-GOAL NOT MET Date Met: 02/08/19  Requires intermittent UE support  Goal: LTG-By discharge, patient will propel wheelchair  1) Individualized goal:  >200 ft on even surface independently  2) Interventions:  PT Group Therapy, PT Gait Training, PT Self Care/Home Eval, PT Therapeutic Exercises, PT Neuro Re-Ed/Balance, PT Therapeutic Activity and PT Manual Therapy     Outcome: DISCHARGED-GOAL NOT MET Date Met: 02/08/19    Goal: LTG-By discharge, patient will ambulate  1) Individualized goal:  200 ft x 3 with 4WW modified independent  2) Interventions:  PT Group Therapy, PT Gait Training, PT Self Care/Home Eval, PT Therapeutic Exercises, PT Neuro Re-Ed/Balance, PT Therapeutic Activity and PT Manual Therapy     Outcome: DISCHARGED-GOAL NOT MET Date Met: 02/08/19    Goal: LTG-By discharge, patient will transfer in/out of a car  1) Individualized goal:  Modified independent   2) Interventions:  PT Group Therapy, PT Gait Training, PT Self Care/Home Eval, PT Therapeutic Exercises, PT Neuro Re-Ed/Balance, PT Therapeutic Activity and PT Manual Therapy     Outcome: DISCHARGED-GOAL NOT MET Date Met: 02/08/19

## 2019-02-08 NOTE — PROGRESS NOTES
Pt discharged into care of spouse and son. Discussed education, follow up appointments, activity. Questions encouraged and answered. Pt and wife state understanding of instructions.  Pt escorted to car with staff without incident.   Pt given written and verbal education. Discharged to home with wife in stable condition with RN in attendance to vehicle without incident.    Pt left facility at 1315.

## 2019-02-08 NOTE — PROGRESS NOTES
Pharmacy Warfarin Consult   2/8/2019     86 y.o.   male     Indication for anticoagulation: Atrial Fibrillation     Goal INR = 2 - 3     Recent Labs      02/06/19   0554  02/07/19   0522  02/08/19   0549   INR  1.19*  1.26*  1.60*       Pertinent Drug/Drug Interactions:  Steroids, PPI, Thyroid Medications. PRN Ultram and Trazodone.   Outpatient Warfarin Regimen:  Last record in system 10/2017  Recent Warfarin Dosing:    Dose from last 7 days     Date/Time Dose (mg)    02/08/19 0549  6    02/07/19 0522  6    02/06/19 0823  --    02/06/19 0554  7    02/05/19 0540  5    02/04/19 0557  5    02/03/19 1400  5    02/02/19 1700  2.5    02/01/19 1300  0          Bridge Therapy: No bridge therapy due to GI bleed           1.  Coumadin 6 mg tonight per INR = 1.60  2.   Recent GI bleed.               Mendez Valentino [facility]

## 2019-02-08 NOTE — DISCHARGE PLANNING
"Case management Summary:   I met  with pt today  prior to discharge.  PT reports his son who lives in [place] will be picking him up, and plans to stay w/ patient and his wife for several days and then other family members will be able to stay w/ pt.  PT also has a \"\" who comes to the house daily for 2 hours  @ a time to assist.   Reviewed  follow up appointments for his PCP / Cardiologist. Pt reports he may call cardiology sooner in order to have his pacer checked sooner.     Referral made to [facility], and they are have accepted referral and are not able to see pt until 2/18.  Pt understands.   Pt confirms he goes to the lab in [place] for his blood draws, and then result are sent to Amira Dasilva RN @ [facility].  Pt is very familiar w/ process.   PT has has dme at home including 02.  Provider is [facility] in [place] area.  PT's son will bring 02 tanks for trip home.   PT confirms he has 4 sons and 1 dtr + grandchildren who all work in healthcare ---rn's/ emt's/ administrators/.    During hospitalization, I have provided support and education and have been available for questions and informati on during hours of operation, communicated with therapy team and MD along with providing links/resources  to outside services.    Patient verbalizes agreement with all plans and has an understanding of the next steps within the post acute services.     Individualized Goals:   1. To improve mobility.  2. To improve strength.  3. To get back home to my wife.    Outcome:   1. Met.  Mobility has improved.   2.  Met. Strength has improved.   3. Met.  PT returning home home w/ spouse     "

## 2019-02-21 ENCOUNTER — TELEPHONE (OUTPATIENT)
Dept: CARDIOLOGY | Facility: MEDICAL CENTER | Age: 84
End: 2019-02-21

## 2019-02-21 NOTE — TELEPHONE ENCOUNTER
cardizem   Received: Today   Message Contents   Krupajohn Gould, Med Ass't  Yuki Posey R.N.   Phone Number: [phone]             Kary     Pt has questions regarding his cardizem medication, he has a 60MG dosage but chart says 30MG, pt wants clarification. He can be reached at [phone]      Spoke to pt. Verified dosage as per recent hospital discharge. Called pharmacy, pt did  correct dosage. Advised pt to track BP and call us w/any problems/concerns. Verified upcoming appt w/SW on 3/11.

## 2019-03-11 ENCOUNTER — OFFICE VISIT (OUTPATIENT)
Dept: CARDIOLOGY | Facility: MEDICAL CENTER | Age: 84
End: 2019-03-11
Payer: MEDICARE

## 2019-03-11 ENCOUNTER — NON-PROVIDER VISIT (OUTPATIENT)
Dept: CARDIOLOGY | Facility: MEDICAL CENTER | Age: 84
End: 2019-03-11
Payer: MEDICARE

## 2019-03-11 VITALS
HEART RATE: 58 BPM | HEIGHT: 65 IN | SYSTOLIC BLOOD PRESSURE: 124 MMHG | BODY MASS INDEX: 34.49 KG/M2 | WEIGHT: 207 LBS | OXYGEN SATURATION: 92 % | DIASTOLIC BLOOD PRESSURE: 84 MMHG

## 2019-03-11 DIAGNOSIS — I48.0 PAF (PAROXYSMAL ATRIAL FIBRILLATION) (HCC): ICD-10-CM

## 2019-03-11 DIAGNOSIS — Z95.0 CARDIAC PACEMAKER: ICD-10-CM

## 2019-03-11 DIAGNOSIS — I49.3 FREQUENT PVCS: ICD-10-CM

## 2019-03-11 DIAGNOSIS — I42.1 OBSTRUCTIVE HYPERTROPHIC CARDIOMYOPATHY (HCC): Chronic | ICD-10-CM

## 2019-03-11 DIAGNOSIS — Z79.899 LONG TERM CURRENT USE OF ANTIARRHYTHMIC DRUG: ICD-10-CM

## 2019-03-11 DIAGNOSIS — I10 ESSENTIAL HYPERTENSION, BENIGN: ICD-10-CM

## 2019-03-11 DIAGNOSIS — I49.5 SSS (SICK SINUS SYNDROME) (HCC): Chronic | ICD-10-CM

## 2019-03-11 PROCEDURE — 99214 OFFICE O/P EST MOD 30 MIN: CPT | Performed by: INTERNAL MEDICINE

## 2019-03-11 PROCEDURE — 93280 PM DEVICE PROGR EVAL DUAL: CPT | Performed by: INTERNAL MEDICINE

## 2019-03-11 RX ORDER — OMEPRAZOLE 40 MG/1
CAPSULE, DELAYED RELEASE ORAL
Refills: 6 | Status: ON HOLD | COMMUNITY
Start: 2019-02-19 | End: 2019-04-19 | Stop reason: CLARIF

## 2019-03-11 RX ORDER — LISINOPRIL 10 MG/1
TABLET ORAL
Refills: 11 | COMMUNITY
Start: 2019-03-08 | End: 2019-03-11 | Stop reason: SDUPTHER

## 2019-03-11 RX ORDER — FUROSEMIDE 20 MG/1
TABLET ORAL
Refills: 3 | COMMUNITY
Start: 2019-02-19 | End: 2019-03-11

## 2019-03-11 RX ORDER — WARFARIN SODIUM 6 MG/1
TABLET ORAL
Refills: 0 | Status: ON HOLD | COMMUNITY
Start: 2019-02-08 | End: 2019-04-19 | Stop reason: CLARIF

## 2019-03-11 RX ORDER — DILTIAZEM HYDROCHLORIDE 60 MG/1
TABLET, FILM COATED ORAL
Refills: 2 | COMMUNITY
Start: 2019-02-08 | End: 2019-03-11

## 2019-03-11 RX ORDER — LISINOPRIL 10 MG/1
10 TABLET ORAL DAILY
Qty: 90 TAB | Refills: 3 | Status: ON HOLD | OUTPATIENT
Start: 2019-03-11 | End: 2019-04-19 | Stop reason: CLARIF

## 2019-03-11 RX ORDER — HYDROCODONE BITARTRATE AND ACETAMINOPHEN 5; 325 MG/1; MG/1
1 TABLET ORAL EVERY 8 HOURS PRN
Refills: 0 | Status: ON HOLD | COMMUNITY
End: 2021-05-01

## 2019-03-11 ASSESSMENT — ENCOUNTER SYMPTOMS
MYALGIAS: 0
BRUISES/BLEEDS EASILY: 1
DIZZINESS: 0
PALPITATIONS: 0
LOSS OF CONSCIOUSNESS: 0
COUGH: 0
SHORTNESS OF BREATH: 0

## 2019-03-11 NOTE — PROGRESS NOTES
Chief Complaint   Patient presents with   • Atrial Fibrillation       Subjective:   Jeffrey Lizama is a 86 y.o. male who presents today for followup evaluation ventricular ectopy, antiarrhythmic therapy, HOCM, PAF, chronic anticoagulation, permanent pacemaker and hypertension.     Last seen on 8/23/2017 by APN.    Since 8/23/2017 the patient has had no specific cardiac problems.  In 6/2018 underwent pacemaker generator change for end of life.  In 1/2019 hospitalized consecutively for upper GI bleed requiring therapeutic endoscopic clipping.  The patient here today with his son who is an RN.  Has had intermittent hypotension which is resolved after stopping Cardizem.  Currently on lisinopril 10 mg daily and sotalol 40 mg twice daily.    Past medical history   Blood pressures been labile.  Lisinopril as been titrated up to 30 mg daily at bedtime.  No cardiac symptoms.  Continues to suffer from chronic back pain problems.     Past medical history  Previous appointment appointment the patient was pulled off Multaq because of over 3 million PVCs on pacemaker interrogation along with symptoms of worsening shortness of breath.  Pacemaker interrogation on 6/30/2016 showed 519,000 PVCs.  Since starting Multaq however the patient has noted increasing shortness of breath and fatigue.  He did not take his Multaq hasn't yesterday.  He was notified to come in for follow-up evaluation.  No fevers chills or productive cough.     His being seen for evaluation of excessive ventricular ectopy is recorded on the last 2 permanent pacemaker interrogations.  There is evidence of innumerable PVCs.  The patient reports worsening fatigue and shortness of breath.  No palpitations.  No near syncope or syncope.  No angina pectoris.  The patient has advanced lung disease in addition to her history of pulmonary emboli.  No worsening CHF symptoms such as PND, orthopnea or lower extremity edema.      Past medical history  #1 history of fall  "multiple pulmonary emboli, remote, status post IVC filter     #2 paroxysmal atrial fibrillation, sick sinus syndrome, status post permanent pacemaker in 2010     #3 hypertension     #4 COPD, O2 at night     #5 history of hypertrophic obstructive cardiomyopathy     #6 chronic anticoagulation with Coumadin      #7 BPH, on Hytrin and Proscar     #8 Thyroid dysfunction     #9 Pituitary dysfunction, on steroids      #10 left total knee replacement x2.     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 (pouch) ([facility]) 4/1/2010   • BPH (benign prostatic hypertrophy) 4/1/2010   • Bradycardia    • Breath shortness    • Bronchitis    • Cardiac pacemaker 04 2010   • Cataract    • COPD (chronic obstructive pulmonary disease) ([facility])    • Diverticulitis    • DJD (degenerative joint disease)    • EMPHYSEMA    • Glaucoma    • Heart burn    • Heart murmur    • Hiatus hernia syndrome    • Hypertension    • 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   • Indigestion    • Knee arthroplasty 2002    right   • Obstructive hypertrophic cardiomyopathy ([facility]) 8/4/2011   • PAF (paroxysmal atrial fibrillation) ([facility])    • Paroxysmal atrial fibrillation ([facility]) 8/29/2011   • PE (pulmonary embolism)     IVC filter, states PE clots 8 times   • Phlebitis     chronic / recurrent   • Pituitary adenoma ([facility]) 1995    hypophysectomy   • Pituitary adenoma ([facility]) 1995    hypophysectomy   • Pneumonia    • Rheumatic fever    • S/P insertion of IVC (inferior vena caval) filter    • S/P parathyroidectomy ([facility])    • S/P parathyroidectomy ([facility]) 2005   • Sleep apnea     no cpap machine   • SSS (sick sinus syndrome) ([facility]) 8/29/2011   • Third degree AV block ([facility]) 8/29/2011   • thyroidectomy 2005    benign   • Unspecified disorder of thyroid    • Unspecified " hemorrhagic conditions    • Unspecified urinary incontinence    • Urinary bladder disorder      Past Surgical History:   Procedure Laterality Date   • GASTROSCOPY-ENDO  1/22/2019    Procedure: GASTROSCOPY-ENDO;  Surgeon: Yoandy Mcelroy M.D.;  Location: ENDOSCOPY [facility];  Service: Gastroenterology   • GASTROSCOPY-ENDO  1/21/2019    Procedure: GASTROSCOPY-ENDO;  Surgeon: Luca Mtz M.D.;  Location: ENDOSCOPY [facility];  Service: Gastroenterology   • KNEE REVISION TOTAL  6/20/2013    Performed by Ortega Vega M.D. at SURGERY [facility]   • CARPAL TUNNEL ENDOSCOPIC  9/30/2011    Performed by RONALD ENNIS at SURGERY SAME DAY [facility] ORS   • PACEMAKER INSERTION Left 04/23/2010    [employer] Altrua 60 [id number] implanted by Dr. Donaldson.   • CATARACT EXTRACTION WITH IOL      bilateral    • CERVICAL DISK AND FUSION ANTERIOR     • CERVICAL FUSION POSTERIOR     • CHOLECYSTECTOMY     • CHOLECYSTECTOMY     • OTHER      pituitary tumor removed   • OTHER CARDIAC SURGERY     • OTHER ORTHOPEDIC SURGERY      knee surgery left knee x 2   • PB REVISE ULNAR NERVE AT WRIST     • PB TOTAL KNEE ARTHROPLASTY      left   • PB TOTAL KNEE ARTHROPLASTY      right   • THYROIDECTOMY       Family History   Problem Relation Age of Onset   • Arthritis Mother    • Arthritis Father    • Cancer Neg Hx    • Heart Disease Neg Hx      Social History     Social History   • Marital status:      Spouse name: N/A   • Number of children: N/A   • Years of education: N/A     Occupational History   • Not on file.     Social History Main Topics   • Smoking status: Never Smoker   • Smokeless tobacco: Never Used   • Alcohol use No   • Drug use: No   • Sexual activity: Not on file     Other Topics Concern   • Not on file     Social History Narrative   • No narrative on file     Allergies   Allergen Reactions   • Pcn [Penicillins] Rash     Tolerates cephalosporins       Outpatient Encounter Prescriptions as of 3/11/2019    Medication Sig Dispense Refill   • HYDROcodone-acetaminophen (NORCO) 5-325 MG Tab per tablet   0   • lisinopril (PRINIVIL) 10 MG Tab Take 1 Tab by mouth every day. 90 Tab 3   • albuterol 108 (90 Base) MCG/ACT Aero Soln inhalation aerosol Inhale 2 Puffs by mouth every 6 hours as needed for Shortness of Breath. 8.5 g 1   • gabapentin (NEURONTIN) 600 MG tablet Take 1.5 Tabs by mouth every bedtime. 90 Tab 2   • levothyroxine (SYNTHROID) 75 MCG Tab Take 1 Tab by mouth Every morning on an empty stomach. 30 Tab 2   • montelukast (SINGULAIR) 10 MG Tab Take 1 Tab by mouth every day. 30 Tab 2   • sotalol (BETAPACE) 80 MG Tab Take 0.5 Tabs by mouth 2 times a day. 60 Tab 2   • tamsulosin (FLOMAX) 0.4 MG capsule Take 1 Cap by mouth every day. 30 Cap 2   • tiotropium (SPIRIVA) 18 MCG Cap Inhale 1 Cap by mouth every day. 30 Cap 3   • omeprazole (PRILOSEC) 20 MG delayed-release capsule Take 1 Cap by mouth 2 times a day. 60 Cap 1   • warfarin (COUMADIN) 5 MG Tab Take 1 Tab by mouth every day. 30 Tab 1   • hydrocortisone (CORTEF) 10 MG Tab Take 1 Tab by mouth 3 times a day. 90 Tab 2   • Mometasone Furo-Formoterol Fum (DULERA) 200-5 MCG/ACT Aerosol Inhale 2 Puffs by mouth 2 Times a Day. Use spacer. Rinse mouth after use. 1 Inhaler 11   • omeprazole (PRILOSEC) 40 MG delayed-release capsule   6   • warfarin (COUMADIN) 6 MG Tab   0   • [DISCONTINUED] lisinopril (PRINIVIL) 10 MG Tab   11   • [DISCONTINUED] furosemide (LASIX) 20 MG Tab   3   • [DISCONTINUED] DILTIAZem (CARDIZEM) 60 MG Tab   2   • [DISCONTINUED] lisinopril (PRINIVIL) 20 MG Tab Take 1 Tab by mouth every day. 30 Tab 2   • [DISCONTINUED] DILTIAZem (CARDIZEM) 30 MG Tab Take 1 Tab by mouth 2 Times a Day. 60 Tab 2     No facility-administered encounter medications on file as of 3/11/2019.      Review of Systems   Constitutional: Positive for malaise/fatigue.   Respiratory: Negative for cough and shortness of breath.    Cardiovascular: Negative for chest pain and palpitations.  "  Musculoskeletal: Negative for myalgias.   Neurological: Negative for dizziness and loss of consciousness.   Endo/Heme/Allergies: Bruises/bleeds easily.        Objective:   /84 (BP Location: Left arm, Patient Position: Sitting, BP Cuff Size: Adult)   Pulse (!) 58   Ht 1.651 m (5' 5\")   Wt 93.9 kg (207 lb)   SpO2 92%   BMI 34.45 kg/m²     Physical Exam   Constitutional: He is oriented to person, place, and time. No distress.   Chronically ill-appearing.  Nasal cannula.   Eyes: Pupils are equal, round, and reactive to light. Conjunctivae are normal.   Neck: Normal range of motion. Neck supple. No JVD present.   Cardiovascular: Normal rate, regular rhythm, normal heart sounds and intact distal pulses.    Pulmonary/Chest: Effort normal. No accessory muscle usage. No respiratory distress. He has no wheezes. He has rales.   Increased AP diameter.   Musculoskeletal: He exhibits edema.   Mild edema.  Bilateral support stockings.   Neurological: He is alert and oriented to person, place, and time.   Skin: Skin is warm, dry and intact. No rash noted. He is not diaphoretic. No cyanosis. Nails show no clubbing.   Psychiatric: He has a normal mood and affect. His behavior is normal.       Pacemaker interrogation results all reviewed.     06/16/2016 EKG: AV pacing. Ventricular bigeminy.     ECHOCARDIOGRAM 01/22/2019  Technically difficult and incomplete study.   Small left ventricular chamber size.  Normal left ventricular systolic function.  Left ventricular ejection fraction is visually estimated to be >80%.  Normal regional wall motion.  All the valves were not well visualized.  Mild aortic insufficiency.  Mild aortic stenosis is probably present.    Assessment:     1. PAF (paroxysmal atrial fibrillation) (HCC)  Basic Metabolic Panel   2. Frequent PVCs     3. Cardiac pacemaker     4. Obstructive hypertrophic cardiomyopathy (HCC)     5. Long term current use of antiarrhythmic drug     6. Essential hypertension, " "benign         Medical Decision Making:  Today's Assessment / Status / Plan:   Assessment  1.  Ventricular ectopy. On chronic sotalol therapy.   Dramatic reduction on Multaq from 3 million PVCs to 519,000.  Shortness of breath and fatigue aggravated by Multaq started.  2.  PAF.  No documented recurrence on pacemaker interrogation.  3.  Permanent pacemaker.  Generator change 6/20/2018.   4.  SVT, no recurrence.  5.  Hypertrophic cardiomyopathy.  6.  COPD.  7.  Hypertension. Labile.  8.  Chronic respiratory failure.  9.  Anticoagulation.   10.  History of DVT and \"9 pulmonary emboli\".  11.  Upper GI bleed 1/22/2019 with vessel clipping.     Recommendation Discussion  1.  I reviewed all of his medications with the patient and his son.  2.  Reorder his medications.  3.  INR surveillance being done through [facility].  4.  Obtain BMP.  5.  Patient is stable from a cardiac standpoint regards to his PAF, ventricular ectopy, permanent pacemaker function, hypertrophic cardiomyopathy, and hypertension  6.  Continue current cardiac therapy.  7.  Follow-up 3 months.  "

## 2019-04-18 ENCOUNTER — HOSPITAL ENCOUNTER (INPATIENT)
Facility: MEDICAL CENTER | Age: 84
LOS: 3 days | DRG: 871 | End: 2019-04-21
Attending: EMERGENCY MEDICINE | Admitting: INTERNAL MEDICINE
Payer: MEDICARE

## 2019-04-18 ENCOUNTER — APPOINTMENT (OUTPATIENT)
Dept: RADIOLOGY | Facility: MEDICAL CENTER | Age: 84
DRG: 871 | End: 2019-04-18
Attending: EMERGENCY MEDICINE
Payer: MEDICARE

## 2019-04-18 DIAGNOSIS — J43.9 PULMONARY EMPHYSEMA, UNSPECIFIED EMPHYSEMA TYPE (HCC): ICD-10-CM

## 2019-04-18 DIAGNOSIS — N39.0 URINARY TRACT INFECTION WITHOUT HEMATURIA, SITE UNSPECIFIED: ICD-10-CM

## 2019-04-18 DIAGNOSIS — E27.40 ADRENAL INSUFFICIENCY (HCC): ICD-10-CM

## 2019-04-18 PROBLEM — I95.9 HYPOTENSION: Status: ACTIVE | Noted: 2019-04-18

## 2019-04-18 PROBLEM — D64.9 NORMOCYTIC ANEMIA: Status: ACTIVE | Noted: 2019-04-18

## 2019-04-18 LAB
ALBUMIN SERPL BCP-MCNC: 2.8 G/DL (ref 3.2–4.9)
ALBUMIN/GLOB SERPL: 1.1 G/DL
ALP SERPL-CCNC: 35 U/L (ref 30–99)
ALT SERPL-CCNC: 13 U/L (ref 2–50)
ANION GAP SERPL CALC-SCNC: 6 MMOL/L (ref 0–11.9)
APPEARANCE UR: CLEAR
APTT PPP: 38.2 SEC (ref 24.7–36)
AST SERPL-CCNC: 16 U/L (ref 12–45)
BACTERIA #/AREA URNS HPF: ABNORMAL /HPF
BASOPHILS # BLD AUTO: 0.9 % (ref 0–1.8)
BASOPHILS # BLD: 0.1 K/UL (ref 0–0.12)
BILIRUB SERPL-MCNC: 0.4 MG/DL (ref 0.1–1.5)
BILIRUB UR QL STRIP.AUTO: NEGATIVE
BUN SERPL-MCNC: 18 MG/DL (ref 8–22)
CALCIUM SERPL-MCNC: 7.9 MG/DL (ref 8.4–10.2)
CHLORIDE SERPL-SCNC: 97 MMOL/L (ref 96–112)
CO2 SERPL-SCNC: 26 MMOL/L (ref 20–33)
COLOR UR: YELLOW
CREAT SERPL-MCNC: 1.32 MG/DL (ref 0.5–1.4)
EOSINOPHIL # BLD AUTO: 0.59 K/UL (ref 0–0.51)
EOSINOPHIL NFR BLD: 5.4 % (ref 0–6.9)
EPI CELLS #/AREA URNS HPF: ABNORMAL /HPF
ERYTHROCYTE [DISTWIDTH] IN BLOOD BY AUTOMATED COUNT: 48 FL (ref 35.9–50)
GLOBULIN SER CALC-MCNC: 2.5 G/DL (ref 1.9–3.5)
GLUCOSE SERPL-MCNC: 80 MG/DL (ref 65–99)
GLUCOSE UR STRIP.AUTO-MCNC: NEGATIVE MG/DL
HCT VFR BLD AUTO: 36 % (ref 42–52)
HGB BLD-MCNC: 11.1 G/DL (ref 14–18)
HYALINE CASTS #/AREA URNS LPF: ABNORMAL /LPF
IMM GRANULOCYTES # BLD AUTO: 0.08 K/UL (ref 0–0.11)
IMM GRANULOCYTES NFR BLD AUTO: 0.7 % (ref 0–0.9)
INR PPP: 1.63 (ref 0.87–1.13)
KETONES UR STRIP.AUTO-MCNC: NEGATIVE MG/DL
LACTATE BLD-SCNC: 1.1 MMOL/L (ref 0.5–2)
LACTATE BLD-SCNC: 1.3 MMOL/L (ref 0.5–2)
LEUKOCYTE ESTERASE UR QL STRIP.AUTO: ABNORMAL
LYMPHOCYTES # BLD AUTO: 1.28 K/UL (ref 1–4.8)
LYMPHOCYTES NFR BLD: 11.7 % (ref 22–41)
MCH RBC QN AUTO: 25.2 PG (ref 27–33)
MCHC RBC AUTO-ENTMCNC: 30.8 G/DL (ref 33.7–35.3)
MCV RBC AUTO: 81.6 FL (ref 81.4–97.8)
MICRO URNS: ABNORMAL
MONOCYTES # BLD AUTO: 1.15 K/UL (ref 0–0.85)
MONOCYTES NFR BLD AUTO: 10.5 % (ref 0–13.4)
MUCOUS THREADS #/AREA URNS HPF: ABNORMAL /HPF
NEUTROPHILS # BLD AUTO: 7.71 K/UL (ref 1.82–7.42)
NEUTROPHILS NFR BLD: 70.8 % (ref 44–72)
NITRITE UR QL STRIP.AUTO: NEGATIVE
NRBC # BLD AUTO: 0 K/UL
NRBC BLD-RTO: 0 /100 WBC
PH UR STRIP.AUTO: 6 [PH]
PLATELET # BLD AUTO: 204 K/UL (ref 164–446)
PMV BLD AUTO: 9.7 FL (ref 9–12.9)
POTASSIUM SERPL-SCNC: 3.6 MMOL/L (ref 3.6–5.5)
PROT SERPL-MCNC: 5.3 G/DL (ref 6–8.2)
PROT UR QL STRIP: NEGATIVE MG/DL
PROTHROMBIN TIME: 19.1 SEC (ref 12–14.6)
RBC # BLD AUTO: 4.41 M/UL (ref 4.7–6.1)
RBC # URNS HPF: ABNORMAL /HPF
RBC UR QL AUTO: ABNORMAL
SODIUM SERPL-SCNC: 129 MMOL/L (ref 135–145)
SP GR UR STRIP.AUTO: <=1.005
TSH SERPL DL<=0.005 MIU/L-ACNC: 2.11 UIU/ML (ref 0.38–5.33)
WBC # BLD AUTO: 10.9 K/UL (ref 4.8–10.8)
WBC #/AREA URNS HPF: ABNORMAL /HPF

## 2019-04-18 PROCEDURE — 71045 X-RAY EXAM CHEST 1 VIEW: CPT

## 2019-04-18 PROCEDURE — 85610 PROTHROMBIN TIME: CPT

## 2019-04-18 PROCEDURE — 85025 COMPLETE CBC W/AUTO DIFF WBC: CPT

## 2019-04-18 PROCEDURE — 99285 EMERGENCY DEPT VISIT HI MDM: CPT

## 2019-04-18 PROCEDURE — 700102 HCHG RX REV CODE 250 W/ 637 OVERRIDE(OP): Performed by: INTERNAL MEDICINE

## 2019-04-18 PROCEDURE — 80053 COMPREHEN METABOLIC PANEL: CPT

## 2019-04-18 PROCEDURE — 304561 HCHG STAT O2

## 2019-04-18 PROCEDURE — 83605 ASSAY OF LACTIC ACID: CPT | Mod: 91

## 2019-04-18 PROCEDURE — A9270 NON-COVERED ITEM OR SERVICE: HCPCS | Performed by: INTERNAL MEDICINE

## 2019-04-18 PROCEDURE — 36415 COLL VENOUS BLD VENIPUNCTURE: CPT

## 2019-04-18 PROCEDURE — 700105 HCHG RX REV CODE 258: Performed by: EMERGENCY MEDICINE

## 2019-04-18 PROCEDURE — 770020 HCHG ROOM/CARE - TELE (206)

## 2019-04-18 PROCEDURE — 700101 HCHG RX REV CODE 250: Performed by: INTERNAL MEDICINE

## 2019-04-18 PROCEDURE — 87040 BLOOD CULTURE FOR BACTERIA: CPT

## 2019-04-18 PROCEDURE — 96365 THER/PROPH/DIAG IV INF INIT: CPT

## 2019-04-18 PROCEDURE — 700111 HCHG RX REV CODE 636 W/ 250 OVERRIDE (IP): Performed by: EMERGENCY MEDICINE

## 2019-04-18 PROCEDURE — 81001 URINALYSIS AUTO W/SCOPE: CPT

## 2019-04-18 PROCEDURE — 85730 THROMBOPLASTIN TIME PARTIAL: CPT

## 2019-04-18 PROCEDURE — 87086 URINE CULTURE/COLONY COUNT: CPT

## 2019-04-18 PROCEDURE — 99223 1ST HOSP IP/OBS HIGH 75: CPT | Mod: AI | Performed by: INTERNAL MEDICINE

## 2019-04-18 PROCEDURE — 84443 ASSAY THYROID STIM HORMONE: CPT

## 2019-04-18 RX ORDER — SODIUM CHLORIDE 9 MG/ML
INJECTION, SOLUTION INTRAVENOUS CONTINUOUS
Status: DISCONTINUED | OUTPATIENT
Start: 2019-04-18 | End: 2019-04-18

## 2019-04-18 RX ORDER — HYDRALAZINE HYDROCHLORIDE 20 MG/ML
10 INJECTION INTRAMUSCULAR; INTRAVENOUS EVERY 4 HOURS PRN
Status: DISCONTINUED | OUTPATIENT
Start: 2019-04-18 | End: 2019-04-21 | Stop reason: HOSPADM

## 2019-04-18 RX ORDER — ACETAMINOPHEN 325 MG/1
650 TABLET ORAL EVERY 6 HOURS PRN
Status: DISCONTINUED | OUTPATIENT
Start: 2019-04-18 | End: 2019-04-21 | Stop reason: HOSPADM

## 2019-04-18 RX ORDER — HYDROCORTISONE 10 MG/1
10 TABLET ORAL 3 TIMES DAILY
Status: DISCONTINUED | OUTPATIENT
Start: 2019-04-18 | End: 2019-04-21 | Stop reason: HOSPADM

## 2019-04-18 RX ORDER — HYDROCODONE BITARTRATE AND ACETAMINOPHEN 5; 325 MG/1; MG/1
1-2 TABLET ORAL EVERY 6 HOURS PRN
Status: DISCONTINUED | OUTPATIENT
Start: 2019-04-18 | End: 2019-04-21 | Stop reason: HOSPADM

## 2019-04-18 RX ORDER — TAMSULOSIN HYDROCHLORIDE 0.4 MG/1
0.4 CAPSULE ORAL DAILY
Status: DISCONTINUED | OUTPATIENT
Start: 2019-04-19 | End: 2019-04-21 | Stop reason: HOSPADM

## 2019-04-18 RX ORDER — AMOXICILLIN 250 MG
2 CAPSULE ORAL 2 TIMES DAILY
Status: DISCONTINUED | OUTPATIENT
Start: 2019-04-18 | End: 2019-04-21 | Stop reason: HOSPADM

## 2019-04-18 RX ORDER — ONDANSETRON 2 MG/ML
4 INJECTION INTRAMUSCULAR; INTRAVENOUS EVERY 4 HOURS PRN
Status: DISCONTINUED | OUTPATIENT
Start: 2019-04-18 | End: 2019-04-21 | Stop reason: HOSPADM

## 2019-04-18 RX ORDER — SODIUM CHLORIDE 9 MG/ML
30 INJECTION, SOLUTION INTRAVENOUS
Status: DISCONTINUED | OUTPATIENT
Start: 2019-04-18 | End: 2019-04-21 | Stop reason: HOSPADM

## 2019-04-18 RX ORDER — SODIUM CHLORIDE, SODIUM LACTATE, POTASSIUM CHLORIDE, CALCIUM CHLORIDE 600; 310; 30; 20 MG/100ML; MG/100ML; MG/100ML; MG/100ML
INJECTION, SOLUTION INTRAVENOUS ONCE
Status: COMPLETED | OUTPATIENT
Start: 2019-04-18 | End: 2019-04-18

## 2019-04-18 RX ORDER — BISACODYL 10 MG
10 SUPPOSITORY, RECTAL RECTAL
Status: DISCONTINUED | OUTPATIENT
Start: 2019-04-18 | End: 2019-04-21 | Stop reason: HOSPADM

## 2019-04-18 RX ORDER — POLYETHYLENE GLYCOL 3350 17 G/17G
1 POWDER, FOR SOLUTION ORAL
Status: DISCONTINUED | OUTPATIENT
Start: 2019-04-18 | End: 2019-04-21 | Stop reason: HOSPADM

## 2019-04-18 RX ORDER — OMEPRAZOLE 20 MG/1
20 CAPSULE, DELAYED RELEASE ORAL DAILY
Status: DISCONTINUED | OUTPATIENT
Start: 2019-04-19 | End: 2019-04-21 | Stop reason: HOSPADM

## 2019-04-18 RX ORDER — SODIUM CHLORIDE 9 MG/ML
1000 INJECTION, SOLUTION INTRAVENOUS
Status: DISCONTINUED | OUTPATIENT
Start: 2019-04-18 | End: 2019-04-21 | Stop reason: HOSPADM

## 2019-04-18 RX ORDER — SODIUM CHLORIDE 9 MG/ML
30 INJECTION, SOLUTION INTRAVENOUS ONCE
Status: COMPLETED | OUTPATIENT
Start: 2019-04-18 | End: 2019-04-18

## 2019-04-18 RX ORDER — WARFARIN SODIUM 3 MG/1
9 TABLET ORAL
Status: COMPLETED | OUTPATIENT
Start: 2019-04-18 | End: 2019-04-18

## 2019-04-18 RX ORDER — ONDANSETRON 4 MG/1
4 TABLET, ORALLY DISINTEGRATING ORAL EVERY 4 HOURS PRN
Status: DISCONTINUED | OUTPATIENT
Start: 2019-04-18 | End: 2019-04-21 | Stop reason: HOSPADM

## 2019-04-18 RX ORDER — SODIUM CHLORIDE AND POTASSIUM CHLORIDE 150; 900 MG/100ML; MG/100ML
INJECTION, SOLUTION INTRAVENOUS CONTINUOUS
Status: DISCONTINUED | OUTPATIENT
Start: 2019-04-18 | End: 2019-04-19

## 2019-04-18 RX ORDER — SODIUM CHLORIDE 9 MG/ML
1667 INJECTION, SOLUTION INTRAVENOUS ONCE
Status: ACTIVE | OUTPATIENT
Start: 2019-04-18 | End: 2019-04-19

## 2019-04-18 RX ORDER — SOTALOL HYDROCHLORIDE 80 MG/1
40 TABLET ORAL 2 TIMES DAILY
Status: DISCONTINUED | OUTPATIENT
Start: 2019-04-18 | End: 2019-04-21 | Stop reason: HOSPADM

## 2019-04-18 RX ORDER — GABAPENTIN 300 MG/1
900 CAPSULE ORAL
Status: DISCONTINUED | OUTPATIENT
Start: 2019-04-18 | End: 2019-04-21 | Stop reason: HOSPADM

## 2019-04-18 RX ORDER — LEVOTHYROXINE SODIUM 0.05 MG/1
75 TABLET ORAL
Status: DISCONTINUED | OUTPATIENT
Start: 2019-04-19 | End: 2019-04-21 | Stop reason: HOSPADM

## 2019-04-18 RX ADMIN — GABAPENTIN 900 MG: 300 CAPSULE ORAL at 21:52

## 2019-04-18 RX ADMIN — SODIUM CHLORIDE 1667 ML: 9 INJECTION, SOLUTION INTRAVENOUS at 18:12

## 2019-04-18 RX ADMIN — POTASSIUM CHLORIDE AND SODIUM CHLORIDE: 900; 150 INJECTION, SOLUTION INTRAVENOUS at 21:12

## 2019-04-18 RX ADMIN — CEFTRIAXONE SODIUM 2 G: 2 INJECTION, POWDER, FOR SOLUTION INTRAMUSCULAR; INTRAVENOUS at 19:31

## 2019-04-18 RX ADMIN — HYDROCODONE BITARTRATE AND ACETAMINOPHEN 1 TABLET: 5; 325 TABLET ORAL at 22:18

## 2019-04-18 RX ADMIN — SODIUM CHLORIDE, POTASSIUM CHLORIDE, SODIUM LACTATE AND CALCIUM CHLORIDE 1000 ML: 600; 310; 30; 20 INJECTION, SOLUTION INTRAVENOUS at 20:16

## 2019-04-18 RX ADMIN — HYDROCORTISONE 10 MG: 10 TABLET ORAL at 21:52

## 2019-04-18 RX ADMIN — WARFARIN SODIUM 9 MG: 3 TABLET ORAL at 21:52

## 2019-04-18 ASSESSMENT — COGNITIVE AND FUNCTIONAL STATUS - GENERAL
TOILETING: A LOT
EATING MEALS: A LOT
MOBILITY SCORE: 12
DAILY ACTIVITIY SCORE: 12
STANDING UP FROM CHAIR USING ARMS: A LOT
CLIMB 3 TO 5 STEPS WITH RAILING: A LOT
TURNING FROM BACK TO SIDE WHILE IN FLAT BAD: A LOT
DRESSING REGULAR LOWER BODY CLOTHING: A LOT
MOVING TO AND FROM BED TO CHAIR: A LOT
DRESSING REGULAR UPPER BODY CLOTHING: A LOT
MOVING FROM LYING ON BACK TO SITTING ON SIDE OF FLAT BED: A LOT
HELP NEEDED FOR BATHING: A LOT
SUGGESTED CMS G CODE MODIFIER DAILY ACTIVITY: CL
SUGGESTED CMS G CODE MODIFIER MOBILITY: CL
WALKING IN HOSPITAL ROOM: A LOT
PERSONAL GROOMING: A LOT

## 2019-04-18 ASSESSMENT — ENCOUNTER SYMPTOMS
LOSS OF CONSCIOUSNESS: 0
TINGLING: 0
WEAKNESS: 0
FALLS: 0
CHILLS: 0
STRIDOR: 0
DEPRESSION: 0
SPUTUM PRODUCTION: 0
DIZZINESS: 0
NAUSEA: 0
FEVER: 0
MYALGIAS: 0
HEADACHES: 0
PALPITATIONS: 0
COUGH: 0
CONSTIPATION: 0
DIARRHEA: 0
ABDOMINAL PAIN: 1
SHORTNESS OF BREATH: 0
VOMITING: 0

## 2019-04-18 ASSESSMENT — LIFESTYLE VARIABLES: EVER_SMOKED: YES

## 2019-04-19 PROBLEM — E27.40 ADRENAL INSUFFICIENCY (HCC): Status: ACTIVE | Noted: 2019-04-19

## 2019-04-19 LAB
ANION GAP SERPL CALC-SCNC: 7 MMOL/L (ref 0–11.9)
BASOPHILS # BLD AUTO: 0.6 % (ref 0–1.8)
BASOPHILS # BLD: 0.08 K/UL (ref 0–0.12)
BUN SERPL-MCNC: 14 MG/DL (ref 8–22)
CALCIUM SERPL-MCNC: 7.6 MG/DL (ref 8.4–10.2)
CHLORIDE SERPL-SCNC: 100 MMOL/L (ref 96–112)
CO2 SERPL-SCNC: 24 MMOL/L (ref 20–33)
CORTIS SERPL-MCNC: 7.8 UG/DL (ref 0–23)
CREAT SERPL-MCNC: 1.15 MG/DL (ref 0.5–1.4)
EOSINOPHIL # BLD AUTO: 0.45 K/UL (ref 0–0.51)
EOSINOPHIL NFR BLD: 3.7 % (ref 0–6.9)
ERYTHROCYTE [DISTWIDTH] IN BLOOD BY AUTOMATED COUNT: 48.1 FL (ref 35.9–50)
GLUCOSE SERPL-MCNC: 82 MG/DL (ref 65–99)
HCT VFR BLD AUTO: 35.1 % (ref 42–52)
HGB BLD-MCNC: 10.7 G/DL (ref 14–18)
IMM GRANULOCYTES # BLD AUTO: 0.08 K/UL (ref 0–0.11)
IMM GRANULOCYTES NFR BLD AUTO: 0.6 % (ref 0–0.9)
INR PPP: 1.82 (ref 0.87–1.13)
LACTATE BLD-SCNC: 0.9 MMOL/L (ref 0.5–2)
LACTATE BLD-SCNC: 1 MMOL/L (ref 0.5–2)
LYMPHOCYTES # BLD AUTO: 0.99 K/UL (ref 1–4.8)
LYMPHOCYTES NFR BLD: 8 % (ref 22–41)
MCH RBC QN AUTO: 25.1 PG (ref 27–33)
MCHC RBC AUTO-ENTMCNC: 30.5 G/DL (ref 33.7–35.3)
MCV RBC AUTO: 82.2 FL (ref 81.4–97.8)
MONOCYTES # BLD AUTO: 1.01 K/UL (ref 0–0.85)
MONOCYTES NFR BLD AUTO: 8.2 % (ref 0–13.4)
NEUTROPHILS # BLD AUTO: 9.71 K/UL (ref 1.82–7.42)
NEUTROPHILS NFR BLD: 78.9 % (ref 44–72)
NRBC # BLD AUTO: 0 K/UL
NRBC BLD-RTO: 0 /100 WBC
PLATELET # BLD AUTO: 191 K/UL (ref 164–446)
PMV BLD AUTO: 9.4 FL (ref 9–12.9)
POTASSIUM SERPL-SCNC: 4.1 MMOL/L (ref 3.6–5.5)
PROTHROMBIN TIME: 20.8 SEC (ref 12–14.6)
RBC # BLD AUTO: 4.27 M/UL (ref 4.7–6.1)
SODIUM SERPL-SCNC: 131 MMOL/L (ref 135–145)
WBC # BLD AUTO: 12.3 K/UL (ref 4.8–10.8)

## 2019-04-19 PROCEDURE — 85610 PROTHROMBIN TIME: CPT

## 2019-04-19 PROCEDURE — 700111 HCHG RX REV CODE 636 W/ 250 OVERRIDE (IP): Performed by: INTERNAL MEDICINE

## 2019-04-19 PROCEDURE — 82533 TOTAL CORTISOL: CPT

## 2019-04-19 PROCEDURE — 97162 PT EVAL MOD COMPLEX 30 MIN: CPT

## 2019-04-19 PROCEDURE — 83605 ASSAY OF LACTIC ACID: CPT

## 2019-04-19 PROCEDURE — 700105 HCHG RX REV CODE 258: Performed by: INTERNAL MEDICINE

## 2019-04-19 PROCEDURE — A9270 NON-COVERED ITEM OR SERVICE: HCPCS | Performed by: INTERNAL MEDICINE

## 2019-04-19 PROCEDURE — 80048 BASIC METABOLIC PNL TOTAL CA: CPT

## 2019-04-19 PROCEDURE — 700102 HCHG RX REV CODE 250 W/ 637 OVERRIDE(OP): Performed by: INTERNAL MEDICINE

## 2019-04-19 PROCEDURE — 85025 COMPLETE CBC W/AUTO DIFF WBC: CPT

## 2019-04-19 PROCEDURE — 770020 HCHG ROOM/CARE - TELE (206)

## 2019-04-19 PROCEDURE — 99233 SBSQ HOSP IP/OBS HIGH 50: CPT | Performed by: INTERNAL MEDICINE

## 2019-04-19 RX ORDER — WARFARIN SODIUM 3 MG/1
6 TABLET ORAL
Status: DISCONTINUED | OUTPATIENT
Start: 2019-04-19 | End: 2019-04-20

## 2019-04-19 RX ORDER — LISINOPRIL 30 MG/1
30 TABLET ORAL DAILY
COMMUNITY
End: 2021-04-18

## 2019-04-19 RX ORDER — NYSTATIN 100000 [USP'U]/G
POWDER TOPICAL 2 TIMES DAILY
Status: DISCONTINUED | OUTPATIENT
Start: 2019-04-19 | End: 2019-04-21 | Stop reason: HOSPADM

## 2019-04-19 RX ADMIN — SOTALOL HYDROCHLORIDE 40 MG: 80 TABLET ORAL at 11:17

## 2019-04-19 RX ADMIN — OMEPRAZOLE 20 MG: 20 CAPSULE, DELAYED RELEASE ORAL at 05:16

## 2019-04-19 RX ADMIN — NYSTATIN: 100000 POWDER TOPICAL at 17:41

## 2019-04-19 RX ADMIN — GABAPENTIN 900 MG: 300 CAPSULE ORAL at 22:18

## 2019-04-19 RX ADMIN — CEFTRIAXONE SODIUM 2 G: 2 INJECTION, POWDER, FOR SOLUTION INTRAMUSCULAR; INTRAVENOUS at 17:40

## 2019-04-19 RX ADMIN — HYDROCORTISONE 10 MG: 10 TABLET ORAL at 11:18

## 2019-04-19 RX ADMIN — WARFARIN SODIUM 6 MG: 3 TABLET ORAL at 17:39

## 2019-04-19 RX ADMIN — TAMSULOSIN HYDROCHLORIDE 0.4 MG: 0.4 CAPSULE ORAL at 05:16

## 2019-04-19 RX ADMIN — HYDROCORTISONE 10 MG: 10 TABLET ORAL at 17:39

## 2019-04-19 RX ADMIN — HYDROCODONE BITARTRATE AND ACETAMINOPHEN 2 TABLET: 5; 325 TABLET ORAL at 17:39

## 2019-04-19 RX ADMIN — HYDROCORTISONE 10 MG: 10 TABLET ORAL at 05:15

## 2019-04-19 RX ADMIN — LEVOTHYROXINE SODIUM 75 MCG: 50 TABLET ORAL at 05:15

## 2019-04-19 RX ADMIN — HYDROCODONE BITARTRATE AND ACETAMINOPHEN 1 TABLET: 5; 325 TABLET ORAL at 11:17

## 2019-04-19 ASSESSMENT — CHA2DS2 SCORE
AGE 65 TO 74: NO
SEX: MALE
AGE 75 OR GREATER: YES
CHF OR LEFT VENTRICULAR DYSFUNCTION: NO
HYPERTENSION: YES
PRIOR STROKE OR TIA OR THROMBOEMBOLISM: YES
DIABETES: NO
VASCULAR DISEASE: NO
CHA2DS2 VASC SCORE: 5

## 2019-04-19 ASSESSMENT — GAIT ASSESSMENTS
DEVIATION: SHUFFLED GAIT;BRADYKINETIC;DECREASED HEEL STRIKE;DECREASED TOE OFF
GAIT LEVEL OF ASSIST: CONTACT GUARD ASSIST
DISTANCE (FEET): 40
ASSISTIVE DEVICE: FRONT WHEEL WALKER

## 2019-04-19 ASSESSMENT — ENCOUNTER SYMPTOMS
SHORTNESS OF BREATH: 1
WEAKNESS: 1
NAUSEA: 0
DIZZINESS: 0
ABDOMINAL PAIN: 0
FEVER: 0
VOMITING: 0

## 2019-04-19 NOTE — PROGRESS NOTES
Med Rec completed per patient and home pharmacy   Allergies reviewed  No ORAL antibiotics in last 30 days    Patient takes Warfarin   5 mg daily

## 2019-04-19 NOTE — DISCHARGE PLANNING
LSW spoke with pt and spouse Jacquie at bedside. Per Jacquie, she is hard of hearing and has a hard time telling if her phone is ringing, family on face sheet can be reached if needed. [facility]. Pt uses a walker at all times and has a cane. Pt uses [facility] for his 02. Pt has used [facility] and has been admitted to [facility] in the past. Pt stated family is available to provide transportation upon d/c. CM team will follow for d/c planning.     Care Transition Team Assessment    Information Source  Information Given By: Patient  Informant's Name:  (Jeffrey Lizama)  Who is responsible for making decisions for patient? : Patient    Readmission Evaluation  Is this a readmission?: No    Elopement Risk  Legal Hold: No  Ambulatory or Self Mobile in Wheelchair: No-Not an Elopement Risk    Interdisciplinary Discharge Planning  Patient or legal guardian wants to designate a caregiver (see row info): No    Discharge Preparedness  What is your plan after discharge?: Uncertain - pending medical team collaboration  What are your discharge supports?: Child, Spouse  Prior Functional Level: Independent with Activities of Daily Living, Uses Cane, Uses Walker    Functional Assesment  Prior Functional Level: Independent with Activities of Daily Living, Uses Cane, Uses Walker    Finances  Financial Barriers to Discharge: No  Prescription Coverage: Yes    Vision / Hearing Impairment  Vision Impairment : No  Hearing Impairment : No         Advance Directive  Advance Directive?: None    Domestic Abuse  Have you ever been the victim of abuse or violence?: No  Physical Abuse or Sexual Abuse: No  Verbal Abuse or Emotional Abuse: No  Possible Abuse Reported to:: Not Applicable    Psychological Assessment  History of Substance Abuse: None  History of Psychiatric Problems: No  Non-compliant with Treatment: No    Discharge Risks or Barriers  Discharge risks or barriers?: Complex medical needs  Patient risk  factors: Complex medical needs, Vulnerable adult    Anticipated Discharge Information  Anticipated discharge disposition: Discharge needs currently unknown

## 2019-04-19 NOTE — ED NOTES
Pt brought in by [facility]. Attached to vitals and cardiac monitor. IV established by ems. Pt is A&Ox3, disoriented to date. On 5L NC which is baseline. Call light in reach. VSS. No apparent distress. All questions answered. Waiting for MD

## 2019-04-19 NOTE — ASSESSMENT & PLAN NOTE
-Currently sinus on sotalol, continue  Supplement with magnesium 400mg daily for level 1.7  -Continue Coumadin, INR slightly sub therapeutic on admission, dose adjust PRN to get INR 2-3, check daily INR today is 2.96 now adequate

## 2019-04-19 NOTE — ED TRIAGE NOTES
Pt bib [facility] from outside facility for hypotension. Pt was down at home, called ems, brought to outside facility where he was Dx with UTI. Developed episode of hypotension BP in the 50's, given fluids and sent to [facility].

## 2019-04-19 NOTE — H&P
Hospital Medicine History & Physical Note    Date of Service  4/18/2019    Primary Care Physician  Bebe Banerjee M.D.    Consultants  None    Code Status  Full    Chief Complaint  Abdominal pain    History of Presenting Illness  86 y.o. male who presented 4/18/2019 with abdominal pain.  Patient states it started a couple days ago, went to the hospital, has been in the [facility] since, transferred here due to hypotension.  He states he had abdominal pain that he felt were spasms as well as abdominal swelling.  He states that was associated with shortness of breath.  He states that lasted about a minute and then he just felt weak.  Because of this he went to the emergency department.  He states he gets cold but denies any actual fever or chills, denied any nausea or vomiting.  He does complain of some mild dysuria and decreased urination.  He also states he has had decreased oral intake, states he is just not hungry.  During my exam he did have a heart rate of 96 and a respiratory rate of 24.  Patient is somewhat of a poor historian, he is not sure what treatments they provided at the previous hospital.    Review of Systems  Review of Systems   Constitutional: Negative for chills, fever and malaise/fatigue.   HENT: Negative for congestion.    Respiratory: Negative for cough, sputum production, shortness of breath and stridor.    Cardiovascular: Negative for chest pain, palpitations and leg swelling.   Gastrointestinal: Positive for abdominal pain. Negative for constipation, diarrhea, nausea and vomiting.   Genitourinary: Positive for dysuria and urgency. Negative for frequency.   Musculoskeletal: Negative for falls and myalgias.   Neurological: Negative for dizziness, tingling, loss of consciousness, weakness and headaches.   Psychiatric/Behavioral: Negative for depression and suicidal ideas.   All other systems reviewed and are negative.      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); Indigestion; Knee arthroplasty (2002); Obstructive hypertrophic cardiomyopathy ([facility]) (8/4/2011); PAF (paroxysmal atrial fibrillation) ([facility]); Paroxysmal atrial fibrillation ([facility]) (8/29/2011); PE (pulmonary embolism); Phlebitis; Pituitary adenoma ([facility]) (1995); Pituitary adenoma ([facility]) (1995); Pneumonia; Rheumatic fever; S/P insertion of IVC (inferior vena caval) filter; S/P parathyroidectomy ([facility]); S/P parathyroidectomy ([facility]) (2005); Sleep apnea; SSS (sick sinus syndrome) ([facility]) (8/29/2011); Third degree AV block ([facility]) (8/29/2011); thyroidectomy (2005); Unspecified disorder of thyroid; Unspecified hemorrhagic conditions; Unspecified urinary incontinence; and Urinary bladder disorder.    Surgical History   has a past surgical history that includes other orthopedic surgery; cholecystectomy; pr total knee arthroplasty; other; cervical disk and fusion anterior; pr revise ulnar nerve at wrist; pr total knee arthroplasty; cataract extraction with iol; thyroidectomy; pacemaker insertion (Left, 04/23/2010); carpal tunnel endoscopic (9/30/2011); knee revision total (6/20/2013); cholecystectomy; cervical fusion posterior; gastroscopy-endo (1/21/2019); gastroscopy-endo (1/22/2019); and other cardiac surgery.     Family History  family history includes Arthritis in his father and mother.     Social History   reports that he has never smoked. He has never used smokeless tobacco. He reports that he does not drink alcohol or use  drugs.    Allergies  Allergies   Allergen Reactions   • Pcn [Penicillins] Rash     Tolerates cephalosporins         Medications  Prior to Admission Medications   Prescriptions Last Dose Informant Patient Reported? Taking?   HYDROcodone-acetaminophen (NORCO) 5-325 MG Tab per tablet   Yes No   Mometasone Furo-Formoterol Fum (DULERA) 200-5 MCG/ACT Aerosol  Patient's Home Pharmacy No No   Sig: Inhale 2 Puffs by mouth 2 Times a Day. Use spacer. Rinse mouth after use.   albuterol 108 (90 Base) MCG/ACT Aero Soln inhalation aerosol   No No   Sig: Inhale 2 Puffs by mouth every 6 hours as needed for Shortness of Breath.   gabapentin (NEURONTIN) 600 MG tablet 4/17/2019 at Unknown time  No No   Sig: Take 1.5 Tabs by mouth every bedtime.   hydrocortisone (CORTEF) 10 MG Tab 4/17/2019 at Unknown time  No No   Sig: Take 1 Tab by mouth 3 times a day.   levothyroxine (SYNTHROID) 75 MCG Tab 4/18/2019 at Unknown time  No No   Sig: Take 1 Tab by mouth Every morning on an empty stomach.   lisinopril (PRINIVIL) 10 MG Tab   No No   Sig: Take 1 Tab by mouth every day.   montelukast (SINGULAIR) 10 MG Tab 4/18/2019 at Unknown time  No No   Sig: Take 1 Tab by mouth every day.   omeprazole (PRILOSEC) 20 MG delayed-release capsule 4/18/2019 at Unknown time  No No   Sig: Take 1 Cap by mouth 2 times a day.   Patient taking differently: Take 20 mg by mouth every day.   omeprazole (PRILOSEC) 40 MG delayed-release capsule   Yes No   sotalol (BETAPACE) 80 MG Tab 4/18/2019 at Unknown time  No No   Sig: Take 0.5 Tabs by mouth 2 times a day.   tamsulosin (FLOMAX) 0.4 MG capsule 4/18/2019 at Unknown time  No No   Sig: Take 1 Cap by mouth every day.   tiotropium (SPIRIVA) 18 MCG Cap 4/18/2019 at Unknown time  No No   Sig: Inhale 1 Cap by mouth every day.   warfarin (COUMADIN) 5 MG Tab 4/17/2019 at Unknown time  No No   Sig: Take 1 Tab by mouth every day.   warfarin (COUMADIN) 6 MG Tab   Yes No      Facility-Administered Medications: None       Physical  Exam  Temp:  [37.7 °C (99.8 °F)] 37.7 °C (99.8 °F)  Pulse:  [68-96] 96  Resp:  [12-24] 24  BP: (94)/(62) 94/62  SpO2:  [94 %-99 %] 96 %    Physical Exam   Constitutional: He is oriented to person, place, and time. He appears well-developed and well-nourished.  Non-toxic appearance. No distress.   HENT:   Head: Normocephalic and atraumatic. Not macrocephalic and not microcephalic. Head is without raccoon's eyes and without Jhaveri's sign.   Right Ear: External ear normal.   Left Ear: External ear normal.   Mouth/Throat: Mucous membranes are dry. No oropharyngeal exudate.   Eyes: Conjunctivae are normal. Right eye exhibits no discharge. Left eye exhibits no discharge. No scleral icterus.   Neck: Normal range of motion. Neck supple. No tracheal deviation, no edema and no erythema present.   Cardiovascular: Normal rate, regular rhythm and intact distal pulses.  Exam reveals no gallop, no friction rub and no decreased pulses.    Murmur heard.  Pulmonary/Chest: Effort normal and breath sounds normal. No stridor. No respiratory distress. He has no decreased breath sounds. He has no wheezes. He has no rhonchi. He has no rales. He exhibits no tenderness.   Abdominal: Soft. Bowel sounds are normal. He exhibits no distension. There is no splenomegaly or hepatomegaly. There is no tenderness. There is no rebound and no guarding.   Musculoskeletal: Normal range of motion. He exhibits no edema, tenderness or deformity.   Lymphadenopathy:     He has no cervical adenopathy.   Neurological: He is alert and oriented to person, place, and time. No cranial nerve deficit. Coordination normal.   Skin: Skin is warm and dry. No rash noted. He is not diaphoretic. No cyanosis or erythema. No pallor. Nails show no clubbing.   Psychiatric: He has a normal mood and affect. His speech is normal and behavior is normal. Judgment and thought content normal. Cognition and memory are normal.   Nursing note and vitals reviewed.      Laboratory:  Recent  Labs      04/18/19   1715   WBC  10.9*   RBC  4.41*   HEMOGLOBIN  11.1*   HEMATOCRIT  36.0*   MCV  81.6   MCH  25.2*   MCHC  30.8*   RDW  48.0   PLATELETCT  204   MPV  9.7     Recent Labs      04/18/19   1715   SODIUM  129*   POTASSIUM  3.6   CHLORIDE  97   CO2  26   GLUCOSE  80   BUN  18   CREATININE  1.32   CALCIUM  7.9*     Recent Labs      04/18/19   1715   ALTSGPT  13   ASTSGOT  16   ALKPHOSPHAT  35   TBILIRUBIN  0.4   GLUCOSE  80                 No results for input(s): TROPONINI in the last 72 hours.    Urinalysis:    Recent Labs      04/18/19   1930   SPECGRAVITY  <=1.005   GLUCOSEUR  Negative   KETONES  Negative   NITRITE  Negative   LEUKESTERAS  Small*   WBCURINE  20-50*   RBCURINE  0-2*   BACTERIA  Few*   EPITHELCELL  Rare        Imaging:  DX-CHEST-PORTABLE (1 VIEW)   Final Result      1.  Hypoinflation with LEFT lung base consolidation which may indicate atelectasis and/or pneumonia.   2.  Mild blunting of LEFT costophrenic angle, unchanged.  Small chronic pleural effusion versus pleural reactive change.                  Assessment/Plan:  I anticipate this patient will require at least two midnights for appropriate medical management, necessitating inpatient admission.    Severe sepsis with septic shock (HCC)- (present on admission)   Assessment & Plan    -This is severe sepsis with the following associated acute organ dysfunction(s): acute kidney failure.   -Due to urinary tract infection  -Start IV Rocephin  -Await culture results  -Sepsis protocol has been initiated, patient will receive significant IV fluids  -Blood pressure is currently improved, unsure if they were giving him his home Cortef but this has been restarted  -Apparently at outside facility blood pressure was in the 50s currently it has been in the 90s to low 100s  -Trend lactic acid     UTI (urinary tract infection)   Assessment & Plan    -Start IV Rocephin     Normocytic anemia   Assessment & Plan    -No sign of gross bleeding  -Repeat  CBC in the morning     ARF (acute renal failure) ([facility])- (present on admission)   Assessment & Plan    -Due to sepsis and dehydration  -Start IV fluids  -Repeat BMP in the morning     Hyponatremia- (present on admission)   Assessment & Plan    -Due to dehydration  -Start IV fluids  -Repeat BMP in the morning     BPH (benign prostatic hypertrophy)- (present on admission)   Assessment & Plan    -Continue Flomax     Paroxysmal atrial fibrillation ([facility])- (present on admission)   Assessment & Plan    -Currently sinus on sotalol, continue  -Continue Coumadin     COPD (chronic obstructive pulmonary disease) ([facility])- (present on admission)   Assessment & Plan    -No acute exacerbation         VTE prophylaxis: Coumadin

## 2019-04-19 NOTE — PROGRESS NOTES
Telemetry Shift Summary    Rhythm SR A-V-AV pacing  HR Range 70-90  Ectopy occ pac freq pvc  Measurements -/0.12/0.34        Normal Values  Rhythm SR  HR Range    Measurements 0.12-0.20 / 0.06-0.10  / 0.30-0.52

## 2019-04-19 NOTE — PROGRESS NOTES
· 2 RN skin check complete with ОЛЬГА Dexter.  · Devices in place -.  · Skin assessed under devices -.  · Confirmed pressure ulcers found on -.  · New potential pressure ulcers noted on -. Wound consult placed and wound reported.  · The following interventions in place -*    Left breast open sore and redness, pannus fungal infection, bilateral arms bruising redness, butt cheeks reddened.    Wound c/s placed.    Pillows in place for positioning, patient turns self q2.

## 2019-04-19 NOTE — ED NOTES
Assisting with care, skin warm and dry, awake, alert, c/o pain to back-chronic. Oxygen to 4L n/c from 5. Ns hung for sbp=70-80. erp aware of same

## 2019-04-19 NOTE — THERAPY
"Physical Therapy Evaluation completed.   Bed Mobility:  Supine to Sit: Moderate Assist  Transfers: Sit to Stand: Contact Guard Assist  Gait: Level Of Assist: Contact Guard Assist with Front-Wheel Walker   X 40 ft    Plan of Care: Will benefit from Physical Therapy 3 times per week  Discharge Recommendations: Equipment: No Equipment Needed. Post-acute therapy Discharge to home with outpatient or home health for additional skilled therapy services.    See \"Rehab Therapy-Acute\" Patient Summary Report for complete documentation.   Pt is an 86 y.o.m. referred to PT secondary to weakness and functional decline d/t UTI.  The pt lives with his wife in a single story home with a ramp to enter. He has a hx of arthritic pain and bilat TKA with the most recent being 1.5 years ago.  the pt is alert and able to follow commands.  He is pleasant and cooperative.  The pt presents with mild LE pain bilat.  He required assist with supine to sit.  He was limited to 40 ft with gait secondary to pain and demonstrated a shuffling gait pattern.  The pt is not at his PLOF and would benefit from cont skilled PT in acute care setting to increase strength, improve gait, and increase indep so he can return home with his wife.   "

## 2019-04-19 NOTE — PROGRESS NOTES
Inpatient Anticoagulation Service Note    Date: 4/19/2019  Reason for Anticoagulation: Atrial Fibrillation, Pulmonary Embolism   [id number] VASc Score: 5    Hemoglobin Value: (!) 10.7  Hematocrit Value: (!) 35.1  Lab Platelet Value: 191  Target INR: 2.0 to 3.0    INR from last 7 days     Date/Time INR Value    04/19/19 0227 (!)  1.82    04/18/19 1715 (!)  1.63        Dose from last 7 days     Date/Time Dose (mg)    04/19/19 0800  6    04/18/19 1715  9        Average Dose (mg): 6  Significant Interactions: Antibiotics, Corticosteroids, Proton Pump Inhibitor, Thyroid Medications       Plan:  Coumadin 6 mg PO tonight for INR 1.82  Education Material Provided?: No (long-term anticoagulation)  Pharmacist suggested discharge dosing: resume outpatient regimen of 6 mg daily.  Consider follow up with [facility], last seen 10/2017.     Ade DevlinD

## 2019-04-19 NOTE — ED PROVIDER NOTES
ED Provider Note    ED Provider Note      Primary care provider: Bebe Banerjee M.D.    CHIEF COMPLAINT  Chief Complaint   Patient presents with   • Hypotension     had low bp at the hospital so they transferred me here   • UTI     pain with urination, urinary frequency       HPI  Jeffrey Lizama is a 86 y.o. male who presents to the Emergency Department with chief complaint of hypotension and presyncope.  Patient sent here from outside hospital after he was having bouts of hypotension.  Patient reportedly was told earlier in the week that he had a urinary tract infection and probable prostatitis by primary care physician and was started on an unknown antibiotic.  Since then he has had intermittent fevers chills intermittent weakness and intermittent low blood pressure.  He reports no headache no altered mental status no cough congestion chest pain or shortness of breath.  He is having worsening lower back pain that goes into bilateral flanks.  Patient states history of disc and degenerative disc disease of the lumbar spine.  No fecal incontinence no urinary retention no saddle anesthesia no weakness numbness tingling in lower extremities.    REVIEW OF SYSTEMS  10 systems reviewed and otherwise negative, pertinent positives and negatives listed in the history of present illness.    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); Indigestion; Knee arthroplasty (2002); Obstructive hypertrophic cardiomyopathy  "([facility]) (8/4/2011); PAF (paroxysmal atrial fibrillation) ([facility]); Paroxysmal atrial fibrillation ([facility]) (8/29/2011); PE (pulmonary embolism); Phlebitis; Pituitary adenoma ([facility]) (1995); Pituitary adenoma ([facility]) (1995); Pneumonia; Rheumatic fever; S/P insertion of IVC (inferior vena caval) filter; S/P parathyroidectomy ([facility]); S/P parathyroidectomy ([facility]) (2005); Sleep apnea; SSS (sick sinus syndrome) ([facility]) (8/29/2011); Third degree AV block ([facility]) (8/29/2011); thyroidectomy (2005); Unspecified disorder of thyroid; Unspecified hemorrhagic conditions; Unspecified urinary incontinence; and Urinary bladder disorder.    SURGICAL HISTORY   has a past surgical history that includes other orthopedic surgery; cholecystectomy; total knee arthroplasty; other; cervical disk and fusion anterior; revise ulnar nerve at wrist; total knee arthroplasty; cataract extraction with iol; thyroidectomy; pacemaker insertion (Left, 04/23/2010); carpal tunnel endoscopic (9/30/2011); knee revision total (6/20/2013); cholecystectomy; cervical fusion posterior; gastroscopy-endo (1/21/2019); gastroscopy-endo (1/22/2019); and other cardiac surgery.    SOCIAL HISTORY  Social History   Substance Use Topics   • Smoking status: Never Smoker   • Smokeless tobacco: Never Used   • Alcohol use No      History   Drug Use No       FAMILY HISTORY  Non-Contributory    CURRENT MEDICATIONS  Home Medications    **Home medications have not yet been reviewed for this encounter**         ALLERGIES  Allergies   Allergen Reactions   • Pcn [Penicillins] Rash     Tolerates cephalosporins             PHYSICAL EXAM  VITAL SIGNS: BP (!) 94/62   Pulse 80   Temp 37.7 °C (99.8 °F) (Temporal)   Resp 13   Ht 1.702 m (5' 7\")   Wt 88.9 kg (196 lb)   SpO2 97%   BMI 30.70 kg/m²   Pulse ox interpretation: I interpret this pulse ox as normal.  Constitutional: Alert and oriented x 3, minimal distress  HEENT: Atraumatic normocephalic, pupils are equal round reactive to light extraocular " movements are intact. The nares is clear, external ears are normal, mouth shows moist mucous membranes  Neck: Supple, no JVD no tracheal deviation  Cardiovascular: Regular rate and rhythm no murmur rub or gallop 2+ pulses peripherally x4  Thorax & Lungs: No respiratory distress, no wheezes rales or rhonchi, No chest tenderness.   GI: Obese soft nontender nondistended positive bowel sounds, no peritoneal signs  Skin: Warm dry no acute rash or lesion  Musculoskeletal: Moving all extremities with full range and 5 of 5 strength, no acute  deformity  Neurologic: Cranial nerves III through XII are grossly intact, no sensory deficit, no cerebellar dysfunction   Psychiatric: Appropriate affect for situation at this time      DIAGNOSTIC STUDIES / PROCEDURES  LABS      Results for orders placed or performed during the hospital encounter of 04/18/19   LACTIC ACID   Result Value Ref Range    Lactic Acid 1.3 0.5 - 2.0 mmol/L   CBC WITH DIFFERENTIAL   Result Value Ref Range    WBC 10.9 (H) 4.8 - 10.8 K/uL    RBC 4.41 (L) 4.70 - 6.10 M/uL    Hemoglobin 11.1 (L) 14.0 - 18.0 g/dL    Hematocrit 36.0 (L) 42.0 - 52.0 %    MCV 81.6 81.4 - 97.8 fL    MCH 25.2 (L) 27.0 - 33.0 pg    MCHC 30.8 (L) 33.7 - 35.3 g/dL    RDW 48.0 35.9 - 50.0 fL    Platelet Count 204 164 - 446 K/uL    MPV 9.7 9.0 - 12.9 fL    Neutrophils-Polys 70.80 44.00 - 72.00 %    Lymphocytes 11.70 (L) 22.00 - 41.00 %    Monocytes 10.50 0.00 - 13.40 %    Eosinophils 5.40 0.00 - 6.90 %    Basophils 0.90 0.00 - 1.80 %    Immature Granulocytes 0.70 0.00 - 0.90 %    Nucleated RBC 0.00 /100 WBC    Neutrophils (Absolute) 7.71 (H) 1.82 - 7.42 K/uL    Lymphs (Absolute) 1.28 1.00 - 4.80 K/uL    Monos (Absolute) 1.15 (H) 0.00 - 0.85 K/uL    Eos (Absolute) 0.59 (H) 0.00 - 0.51 K/uL    Baso (Absolute) 0.10 0.00 - 0.12 K/uL    Immature Granulocytes (abs) 0.08 0.00 - 0.11 K/uL    NRBC (Absolute) 0.00 K/uL   COMP METABOLIC PANEL   Result Value Ref Range    Sodium 129 (L) 135 - 145 mmol/L     Potassium 3.6 3.6 - 5.5 mmol/L    Chloride 97 96 - 112 mmol/L    Co2 26 20 - 33 mmol/L    Anion Gap 6.0 0.0 - 11.9    Glucose 80 65 - 99 mg/dL    Bun 18 8 - 22 mg/dL    Creatinine 1.32 0.50 - 1.40 mg/dL    Calcium 7.9 (L) 8.4 - 10.2 mg/dL    AST(SGOT) 16 12 - 45 U/L    ALT(SGPT) 13 2 - 50 U/L    Alkaline Phosphatase 35 30 - 99 U/L    Total Bilirubin 0.4 0.1 - 1.5 mg/dL    Albumin 2.8 (L) 3.2 - 4.9 g/dL    Total Protein 5.3 (L) 6.0 - 8.2 g/dL    Globulin 2.5 1.9 - 3.5 g/dL    A-G Ratio 1.1 g/dL   URINALYSIS   Result Value Ref Range    Color Yellow     Character Clear     Specific Gravity <=1.005 <1.035    Ph 6.0 5.0 - 8.0    Glucose Negative Negative mg/dL    Ketones Negative Negative mg/dL    Protein Negative Negative mg/dL    Bilirubin Negative Negative    Nitrite Negative Negative    Leukocyte Esterase Small (A) Negative    Occult Blood Trace (A) Negative    Micro Urine Req Microscopic    LACTIC ACID   Result Value Ref Range    Lactic Acid 1.1 0.5 - 2.0 mmol/L   ESTIMATED GFR   Result Value Ref Range    GFR If African American >60 >60 mL/min/1.73 m 2    GFR If Non  51 (A) >60 mL/min/1.73 m 2   URINE MICROSCOPIC (W/UA)   Result Value Ref Range    WBC 20-50 (A) /hpf    RBC 0-2 (A) /hpf    Bacteria Few (A) None /hpf    Epithelial Cells Rare Few /hpf    Mucous Threads Rare /hpf    Hyaline Cast 0-2 /lpf   Prothrombin time (INR)   Result Value Ref Range    PT 19.1 (H) 12.0 - 14.6 sec    INR 1.63 (H) 0.87 - 1.13   APTT   Result Value Ref Range    APTT 38.2 (H) 24.7 - 36.0 sec   TSH   Result Value Ref Range    TSH 2.110 0.380 - 5.330 uIU/mL       All labs reviewed by me.      RADIOLOGY  DX-CHEST-PORTABLE (1 VIEW)   Final Result      1.  Hypoinflation with LEFT lung base consolidation which may indicate atelectasis and/or pneumonia.   2.  Mild blunting of LEFT costophrenic angle, unchanged.  Small chronic pleural effusion versus pleural reactive change.              The radiologist's interpretation of  "all radiological studies have been reviewed by me.    COURSE & MEDICAL DECISION MAKING  Pertinent Labs & Imaging studies reviewed. (See chart for details)    5:46 PM - Patient seen and examined at bedside.  Patient presents from outside hospital concerns urosepsis hypotensive he is given 30 cc/kg bolus appropriate cultures were obtained given Rocephin IV.  Patient be admitted to the hospital service for further evaluation and treatment admitted in guarded condition.    Patient noted to have slightly elevated blood pressure likely circumstantial secondary to presenting complaint. Referred to primary care physician for further evaluation.     Patient was given IV fluids based on hypotension concern for dehydration and sepsis, oral hydration was not attempted due to insufficiency for hydration, after fluids had improvement of hypotension and symptoms      BP (!) 94/62   Pulse 80   Temp 37.7 °C (99.8 °F) (Temporal)   Resp 13   Ht 1.702 m (5' 7\")   Wt 88.9 kg (196 lb)   SpO2 97%   BMI 30.70 kg/m²             FINAL IMPRESSION  1.  Hypotension  2.  Urosepsis  3.  Hyponatremia    This dictation has been created using voice recognition software and/or scribes. The accuracy of the dictation is limited by the abilities of the software and the expertise of the scribes. I expect there may be some errors of grammar and possibly content. I made every attempt to manually correct the errors within my dictation. However, errors related to voice recognition software and/or scribes may still exist and should be interpreted within the appropriate context.            "

## 2019-04-19 NOTE — ASSESSMENT & PLAN NOTE
-This is severe sepsis with the following associated acute organ dysfunction(s): acute kidney failure.   -Due to urinary tract infection  continue IV Rocephin  Cultures negative to date urine and blood  -Sepsis protocol was initiated  Blood pressures better, cortisol 8

## 2019-04-20 LAB
ANION GAP SERPL CALC-SCNC: 7 MMOL/L (ref 0–11.9)
BASOPHILS # BLD AUTO: 0.7 % (ref 0–1.8)
BASOPHILS # BLD: 0.08 K/UL (ref 0–0.12)
BUN SERPL-MCNC: 13 MG/DL (ref 8–22)
CALCIUM SERPL-MCNC: 7.7 MG/DL (ref 8.4–10.2)
CHLORIDE SERPL-SCNC: 100 MMOL/L (ref 96–112)
CO2 SERPL-SCNC: 25 MMOL/L (ref 20–33)
CREAT SERPL-MCNC: 0.99 MG/DL (ref 0.5–1.4)
EOSINOPHIL # BLD AUTO: 0.49 K/UL (ref 0–0.51)
EOSINOPHIL NFR BLD: 4.2 % (ref 0–6.9)
ERYTHROCYTE [DISTWIDTH] IN BLOOD BY AUTOMATED COUNT: 48.1 FL (ref 35.9–50)
GLUCOSE SERPL-MCNC: 112 MG/DL (ref 65–99)
HCT VFR BLD AUTO: 32.8 % (ref 42–52)
HGB BLD-MCNC: 10.1 G/DL (ref 14–18)
IMM GRANULOCYTES # BLD AUTO: 0.06 K/UL (ref 0–0.11)
IMM GRANULOCYTES NFR BLD AUTO: 0.5 % (ref 0–0.9)
INR PPP: 2.96 (ref 0.87–1.13)
LYMPHOCYTES # BLD AUTO: 1.06 K/UL (ref 1–4.8)
LYMPHOCYTES NFR BLD: 9 % (ref 22–41)
MAGNESIUM SERPL-MCNC: 1.8 MG/DL (ref 1.5–2.5)
MCH RBC QN AUTO: 25.2 PG (ref 27–33)
MCHC RBC AUTO-ENTMCNC: 30.8 G/DL (ref 33.7–35.3)
MCV RBC AUTO: 81.8 FL (ref 81.4–97.8)
MONOCYTES # BLD AUTO: 1.31 K/UL (ref 0–0.85)
MONOCYTES NFR BLD AUTO: 11.2 % (ref 0–13.4)
NEUTROPHILS # BLD AUTO: 8.73 K/UL (ref 1.82–7.42)
NEUTROPHILS NFR BLD: 74.4 % (ref 44–72)
NRBC # BLD AUTO: 0 K/UL
NRBC BLD-RTO: 0 /100 WBC
PLATELET # BLD AUTO: 175 K/UL (ref 164–446)
PMV BLD AUTO: 9.6 FL (ref 9–12.9)
POTASSIUM SERPL-SCNC: 3.9 MMOL/L (ref 3.6–5.5)
PROTHROMBIN TIME: 30.4 SEC (ref 12–14.6)
RBC # BLD AUTO: 4.01 M/UL (ref 4.7–6.1)
SODIUM SERPL-SCNC: 132 MMOL/L (ref 135–145)
WBC # BLD AUTO: 11.7 K/UL (ref 4.8–10.8)

## 2019-04-20 PROCEDURE — 770020 HCHG ROOM/CARE - TELE (206)

## 2019-04-20 PROCEDURE — 700111 HCHG RX REV CODE 636 W/ 250 OVERRIDE (IP): Performed by: INTERNAL MEDICINE

## 2019-04-20 PROCEDURE — A9270 NON-COVERED ITEM OR SERVICE: HCPCS | Performed by: INTERNAL MEDICINE

## 2019-04-20 PROCEDURE — 85610 PROTHROMBIN TIME: CPT

## 2019-04-20 PROCEDURE — 700105 HCHG RX REV CODE 258: Performed by: INTERNAL MEDICINE

## 2019-04-20 PROCEDURE — A9270 NON-COVERED ITEM OR SERVICE: HCPCS | Performed by: HOSPITALIST

## 2019-04-20 PROCEDURE — 85025 COMPLETE CBC W/AUTO DIFF WBC: CPT

## 2019-04-20 PROCEDURE — 700102 HCHG RX REV CODE 250 W/ 637 OVERRIDE(OP): Performed by: HOSPITALIST

## 2019-04-20 PROCEDURE — 80048 BASIC METABOLIC PNL TOTAL CA: CPT

## 2019-04-20 PROCEDURE — 700102 HCHG RX REV CODE 250 W/ 637 OVERRIDE(OP): Performed by: INTERNAL MEDICINE

## 2019-04-20 PROCEDURE — 99232 SBSQ HOSP IP/OBS MODERATE 35: CPT | Performed by: HOSPITALIST

## 2019-04-20 PROCEDURE — 97165 OT EVAL LOW COMPLEX 30 MIN: CPT

## 2019-04-20 PROCEDURE — 83735 ASSAY OF MAGNESIUM: CPT

## 2019-04-20 RX ORDER — WARFARIN SODIUM 3 MG/1
3 TABLET ORAL
Status: COMPLETED | OUTPATIENT
Start: 2019-04-20 | End: 2019-04-20

## 2019-04-20 RX ADMIN — HYDROCORTISONE 10 MG: 10 TABLET ORAL at 17:01

## 2019-04-20 RX ADMIN — HYDROCODONE BITARTRATE AND ACETAMINOPHEN 2 TABLET: 5; 325 TABLET ORAL at 00:06

## 2019-04-20 RX ADMIN — SOTALOL HYDROCHLORIDE 40 MG: 80 TABLET ORAL at 20:10

## 2019-04-20 RX ADMIN — NYSTATIN: 100000 POWDER TOPICAL at 18:00

## 2019-04-20 RX ADMIN — CEFTRIAXONE SODIUM 2 G: 2 INJECTION, POWDER, FOR SOLUTION INTRAMUSCULAR; INTRAVENOUS at 17:01

## 2019-04-20 RX ADMIN — HYDROCODONE BITARTRATE AND ACETAMINOPHEN 1 TABLET: 5; 325 TABLET ORAL at 14:28

## 2019-04-20 RX ADMIN — WARFARIN SODIUM 3 MG: 3 TABLET ORAL at 17:03

## 2019-04-20 RX ADMIN — HYDROCORTISONE 10 MG: 10 TABLET ORAL at 14:28

## 2019-04-20 RX ADMIN — HYDROCODONE BITARTRATE AND ACETAMINOPHEN 1 TABLET: 5; 325 TABLET ORAL at 05:45

## 2019-04-20 RX ADMIN — HYDROCORTISONE 10 MG: 10 TABLET ORAL at 05:46

## 2019-04-20 RX ADMIN — TAMSULOSIN HYDROCHLORIDE 0.4 MG: 0.4 CAPSULE ORAL at 05:46

## 2019-04-20 RX ADMIN — Medication 400 MG: at 14:28

## 2019-04-20 RX ADMIN — GABAPENTIN 900 MG: 300 CAPSULE ORAL at 20:09

## 2019-04-20 RX ADMIN — LEVOTHYROXINE SODIUM 75 MCG: 50 TABLET ORAL at 05:45

## 2019-04-20 RX ADMIN — NYSTATIN: 100000 POWDER TOPICAL at 05:45

## 2019-04-20 RX ADMIN — OMEPRAZOLE 20 MG: 20 CAPSULE, DELAYED RELEASE ORAL at 05:46

## 2019-04-20 RX ADMIN — SOTALOL HYDROCHLORIDE 40 MG: 80 TABLET ORAL at 08:21

## 2019-04-20 RX ADMIN — SENNOSIDES, DOCUSATE SODIUM 2 TABLET: 50; 8.6 TABLET, FILM COATED ORAL at 17:01

## 2019-04-20 ASSESSMENT — COGNITIVE AND FUNCTIONAL STATUS - GENERAL
DRESSING REGULAR UPPER BODY CLOTHING: A LITTLE
HELP NEEDED FOR BATHING: A LITTLE
PERSONAL GROOMING: A LITTLE
TOILETING: A LITTLE
DRESSING REGULAR LOWER BODY CLOTHING: A LOT
DAILY ACTIVITIY SCORE: 18
SUGGESTED CMS G CODE MODIFIER DAILY ACTIVITY: CK

## 2019-04-20 ASSESSMENT — ENCOUNTER SYMPTOMS
VOMITING: 0
NAUSEA: 0
DIZZINESS: 0
SHORTNESS OF BREATH: 1
ABDOMINAL PAIN: 0
WEAKNESS: 1
FEVER: 0

## 2019-04-20 ASSESSMENT — ACTIVITIES OF DAILY LIVING (ADL): TOILETING: INDEPENDENT

## 2019-04-20 NOTE — PROGRESS NOTES
[facility] Medicine Daily Progress Note    Date of Service  4/20/2019    Chief Complaint  86 y.o. male admitted 4/18/2019 with sepsis and UTI along with abdominal pain    Hospital Course    86 y.o. male who presented 4/18/2019 with abdominal pain.  Patient states it started a couple days ago, went to the hospital, has been in the [facility] since, transferred here due to hypotension.  He states he had abdominal pain that he felt were spasms as well as abdominal swelling.  He states that was associated with shortness of breath.  He states that lasted about a minute and then he just felt weak.  Because of this he went to the emergency department.  He states he gets cold but denies any actual fever or chills, denied any nausea or vomiting.  He does complain of some mild dysuria and decreased urination.  He also states he has had decreased oral intake, states he is just not hungry. Found to have UTI and sepsis, hypotension.    Interval Problem Update  Renal function normalized, eating well, feels much better. OT and PT recommending skilled nursing, patient would prefer to return home Because he is from [place] and not residing in this area  He is doing a lot better and much stronger he reports today.  Wife at bedside.    Consultants/Specialty  none    Code Status  fcfc    Disposition  HH versus SNF    Review of Systems  Review of Systems   Constitutional: Positive for malaise/fatigue. Negative for fever.   Respiratory: Positive for shortness of breath (better).    Gastrointestinal: Negative for abdominal pain, nausea and vomiting.   Genitourinary: Negative for dysuria and hematuria.   Neurological: Positive for weakness. Negative for dizziness.   All other systems reviewed and are negative.       Physical Exam  Temp:  [36.3 °C (97.4 °F)-36.8 °C (98.2 °F)] 36.3 °C (97.4 °F)  Pulse:  [71-78] 75  Resp:  [18] 18  BP: ()/(48-72) 113/69  SpO2:  [96 %-100 %] 100 %    Physical Exam   Constitutional: He is  oriented to person, place, and time. He appears well-developed and well-nourished. No distress.   Patient eating lunch and appears to have finished 90% of his food.   HENT:   Head: Normocephalic and atraumatic.   Mouth/Throat: No oropharyngeal exudate.   NC in place   Eyes: Pupils are equal, round, and reactive to light. No scleral icterus.   Neck: Normal range of motion. Neck supple.   Cardiovascular: Normal rate, regular rhythm, normal heart sounds and intact distal pulses.    No murmur heard.  Pulmonary/Chest: Effort normal and breath sounds normal. No stridor. No respiratory distress.   Abdominal: Soft. Bowel sounds are normal. There is no tenderness.   Musculoskeletal: Normal range of motion. He exhibits edema (Trace improved). He exhibits no tenderness.   Neurological: He is alert and oriented to person, place, and time. No cranial nerve deficit.   Skin: Skin is warm and dry. No rash noted. He is not diaphoretic.   Psychiatric: He has a normal mood and affect. His behavior is normal. Judgment and thought content normal.   Nursing note and vitals reviewed.      Fluids    Intake/Output Summary (Last 24 hours) at 04/20/19 1545  Last data filed at 04/20/19 1327   Gross per 24 hour   Intake              220 ml   Output              550 ml   Net             -330 ml       Laboratory  Recent Labs      04/18/19 1715 04/19/19 0227 04/20/19   0404   WBC  10.9*  12.3*  11.7*   RBC  4.41*  4.27*  4.01*   HEMOGLOBIN  11.1*  10.7*  10.1*   HEMATOCRIT  36.0*  35.1*  32.8*   MCV  81.6  82.2  81.8   MCH  25.2*  25.1*  25.2*   MCHC  30.8*  30.5*  30.8*   RDW  48.0  48.1  48.1   PLATELETCT  204  191  175   MPV  9.7  9.4  9.6     Recent Labs      04/18/19   1715 04/19/19 0227 04/20/19   0404   SODIUM  129*  131*  132*   POTASSIUM  3.6  4.1  3.9   CHLORIDE  97  100  100   CO2  26  24  25   GLUCOSE  80  82  112*   BUN  18  14  13   CREATININE  1.32  1.15  0.99   CALCIUM  7.9*  7.6*  7.7*     Recent Labs      04/18/19    1715  04/19/19   0227  04/20/19   0404   APTT  38.2*   --    --    INR  1.63*  1.82*  2.96*               Imaging  DX-CHEST-PORTABLE (1 VIEW)   Final Result      1.  Hypoinflation with LEFT lung base consolidation which may indicate atelectasis and/or pneumonia.   2.  Mild blunting of LEFT costophrenic angle, unchanged.  Small chronic pleural effusion versus pleural reactive change.                 Assessment/Plan  Severe sepsis with septic shock (HCC)- (present on admission)   Assessment & Plan    -This is severe sepsis with the following associated acute organ dysfunction(s): acute kidney failure.   -Due to urinary tract infection  continue IV Rocephin  Cultures negative to date urine and blood  -Sepsis protocol was initiated  Blood pressures better, cortisol 8       UTI (urinary tract infection)- (present on admission)   Assessment & Plan    -Start IV Rocephin, F/U urine culture and adjust PRN     Adrenal insufficiency (HCC)- (present on admission)   Assessment & Plan    -continiue outpatient cortef,  random cortisol adequate at 8 and blood pressures improved     Normocytic anemia- (present on admission)   Assessment & Plan    -No sign of gross bleeding  Chronic remains at 10     ARF (acute renal failure) ([facility])- (present on admission)   Assessment & Plan    -Due to sepsis and dehydration  Resolved, stop iv fluids and observe     Hyponatremia- (present on admission)   Assessment & Plan    -Due to dehydration  -Slowly improving, daily Na+     BPH (benign prostatic hypertrophy)- (present on admission)   Assessment & Plan    -Continue Flomax     Paroxysmal atrial fibrillation ([facility])- (present on admission)   Assessment & Plan    -Currently sinus on sotalol, continue  Supplement with magnesium 400mg daily for level 1.7  -Continue Coumadin, INR slightly sub therapeutic on admission, dose adjust PRN to get INR 2-3, check daily INR today is 2.96 now adequate     COPD (chronic obstructive pulmonary disease) ([facility])- (present  on admission)   Assessment & Plan    -No acute exacerbation          VTE prophylaxis: coumadin

## 2019-04-20 NOTE — THERAPY
"Occupational Therapy Evaluation completed.   Functional Status:  Pt is an 85 y/o male, admit with increasing weakness, SOB- UTI. Pt lives with his wife, who will be able to assist with care after d/c. Pt PLOF- Wife assists with IADLS and showering. Pt presents with decreased activity tolerance, and generalized weakness., decreased I with ADLS and functional mobility, from baseline. Pt requires Mod A for LB self care, Min A for UB and for functional transfers. Pt will benefit from Acute OT services to increase functional I prior to d/c.  Plan of Care: Will benefit from Occupational Therapy 3 times per week  Discharge Recommendations:  Equipment: Will Continue to Assess for Equipment Needs. Post-acute therapy Discharge to a transitional care facility for continued skilled therapy services.    See \"Rehab Therapy-Acute\" Patient Summary Report for complete documentation.    "

## 2019-04-20 NOTE — PROGRESS NOTES
Monitor Summary      SR/AV-P  70-89   O PVCs   -/.12/.36    Normal Values  Rhythm SR  HR Range    Measurements 0.12-0.20 / 0.06-0.10  / 0.30-0.52

## 2019-04-20 NOTE — PROGRESS NOTES
[facility] Medicine Daily Progress Note    Date of Service  4/19/2019    Chief Complaint  86 y.o. male admitted 4/18/2019 with sepsis and UTI along with abdominal pain    Hospital Course    See below    Interval Problem Update  Sepsis-blood pressuers doing better today. Son worried about volume overload as patient appears to have some edema.    CRF with Hypoxia-on 5 L NC at baseline.  Adrenal insufficiency-patient is tolerating home dose of cortef without issue    Consultants/Specialty  none    Code Status  fcfc    Disposition  HH versus SNF    Review of Systems  Review of Systems   Constitutional: Positive for malaise/fatigue. Negative for fever.   Respiratory: Positive for shortness of breath.    Gastrointestinal: Negative for abdominal pain, nausea and vomiting.   Genitourinary: Negative for dysuria and hematuria.   Neurological: Positive for weakness. Negative for dizziness.   All other systems reviewed and are negative.       Physical Exam  Temp:  [36.5 °C (97.7 °F)-36.9 °C (98.4 °F)] 36.7 °C (98 °F)  Pulse:  [72-90] 78  Resp:  [16-22] 18  BP: ()/(49-63) 118/56  SpO2:  [95 %-98 %] 97 %    Physical Exam   Constitutional: He is oriented to person, place, and time. He appears well-developed and well-nourished. No distress.   HENT:   Head: Normocephalic and atraumatic.   Mouth/Throat: No oropharyngeal exudate.   NC in place   Eyes: Pupils are equal, round, and reactive to light. No scleral icterus.   Neck: Normal range of motion. Neck supple.   Cardiovascular: Normal rate, regular rhythm, normal heart sounds and intact distal pulses.    No murmur heard.  Pulmonary/Chest: Effort normal and breath sounds normal. No stridor. No respiratory distress.   Abdominal: Soft. Bowel sounds are normal. There is no tenderness.   Musculoskeletal: Normal range of motion. He exhibits edema. He exhibits no tenderness.   Neurological: He is alert and oriented to person, place, and time. No cranial nerve deficit.   Skin: Skin is  warm and dry. No rash noted.   Psychiatric: He has a normal mood and affect.   Nursing note and vitals reviewed.      Fluids    Intake/Output Summary (Last 24 hours) at 04/19/19 1943  Last data filed at 04/19/19 1740   Gross per 24 hour   Intake             1000 ml   Output             1550 ml   Net             -550 ml       Laboratory  Recent Labs      04/18/19 1715 04/19/19 0227   WBC  10.9*  12.3*   RBC  4.41*  4.27*   HEMOGLOBIN  11.1*  10.7*   HEMATOCRIT  36.0*  35.1*   MCV  81.6  82.2   MCH  25.2*  25.1*   MCHC  30.8*  30.5*   RDW  48.0  48.1   PLATELETCT  204  191   MPV  9.7  9.4     Recent Labs      04/18/19 1715 04/19/19 0227   SODIUM  129*  131*   POTASSIUM  3.6  4.1   CHLORIDE  97  100   CO2  26  24   GLUCOSE  80  82   BUN  18  14   CREATININE  1.32  1.15   CALCIUM  7.9*  7.6*     Recent Labs      04/18/19 1715 04/19/19 0227   APTT  38.2*   --    INR  1.63*  1.82*               Imaging  DX-CHEST-PORTABLE (1 VIEW)   Final Result      1.  Hypoinflation with LEFT lung base consolidation which may indicate atelectasis and/or pneumonia.   2.  Mild blunting of LEFT costophrenic angle, unchanged.  Small chronic pleural effusion versus pleural reactive change.                 Assessment/Plan  Severe sepsis with septic shock (HCC)- (present on admission)   Assessment & Plan    -This is severe sepsis with the following associated acute organ dysfunction(s): acute kidney failure.   -Due to urinary tract infection  -Start IV Rocephin  -Await culture results  -Sepsis protocol was initiated  -blood pressures are doing better, check random cortisol  -stop IVF's as edematous on exam, if BP's worsen, will initiate stress dose IV steroids     UTI (urinary tract infection)- (present on admission)   Assessment & Plan    -Start IV Rocephin, F/U urine culture and adjust PRN     Adrenal insufficiency (HCC)- (present on admission)   Assessment & Plan    -continiue outpatient cortef, check random cortisol      Normocytic anemia- (present on admission)   Assessment & Plan    -No sign of gross bleeding  -Repeat CBC in the morning     ARF (acute renal failure) (HCC)- (present on admission)   Assessment & Plan    -Due to sepsis and dehydration  -Cr nearing his baseline, hold IVF's for now     Hyponatremia- (present on admission)   Assessment & Plan    -Due to dehydration  -Slowly improving, daily Na+     BPH (benign prostatic hypertrophy)- (present on admission)   Assessment & Plan    -Continue Flomax     Paroxysmal atrial fibrillation (HCC)- (present on admission)   Assessment & Plan    -Currently sinus on sotalol, continue  -Continue Coumadin, INR slightly sub therapeutic, dose adjust PRN to get INR 2-3, check daily INR     COPD (chronic obstructive pulmonary disease) (HCC)- (present on admission)   Assessment & Plan    -No acute exacerbation          VTE prophylaxis: coumadin

## 2019-04-20 NOTE — PROGRESS NOTES
Inpatient Anticoagulation Service Note    Date: 4/20/2019  Reason for Anticoagulation: Atrial Fibrillation, Pulmonary Embolism   [id number] VASc Score: 5        Hemoglobin Value: (!) 10.1  Hematocrit Value: (!) 32.8  Lab Platelet Value: 175  Target INR: 2.0 to 3.0    INR from last 7 days     Date/Time INR Value    04/20/19 0404 (!)  2.96    04/19/19 0227 (!)  1.82    04/18/19 1715 (!)  1.63        Dose from last 7 days     Date/Time Dose (mg)    04/20/19 0600  3    04/19/19 0800  6    04/18/19 1715  9        Average Dose (mg): 6  Significant Interactions: Antibiotics, Corticosteroids, Proton Pump Inhibitor, Thyroid Medications (If less than 5 days and overlap therapy discontinued -- document reason (i.e. Bleed Risk))    (If still on overlap therapy, if No -- document reason (i.e. Bleed Risk))         Plan:  Will give warfarin 3 mg po tonight for INR of 2.96  Education Material Provided?: No (long-term anticoagulation)  Pharmacist suggested discharge dosing: To be determined.     Low Zhang [facility].

## 2019-04-20 NOTE — PROGRESS NOTES
Report received from ОЛЬГА Ny. Pt denies needs at this time. Safety precautions in place. Call light within reach.

## 2019-04-20 NOTE — WOUND TEAM
Asked RN about wound care referral. Apparently related to skin under breasts and pannus. Discussed with patient, and assessed skin. Nursing has Nystatin powder ordered to apply per MAR. Skin care orders provided to nursing. No further wound team needs at this time.

## 2019-04-20 NOTE — CARE PLAN
Problem: Safety  Goal: Will remain free from falls  Outcome: PROGRESSING AS EXPECTED  Pt rings in appropriatey for assistance, bed alarm on, bed in lowest position with  socks on.     Problem: Infection  Goal: Will remain free from infection  Outcome: PROGRESSING AS EXPECTED  Pt with good hand hygeine before meals and after use of bathroom.

## 2019-04-21 ENCOUNTER — PATIENT OUTREACH (OUTPATIENT)
Dept: HEALTH INFORMATION MANAGEMENT | Facility: OTHER | Age: 84
End: 2019-04-21

## 2019-04-21 VITALS
SYSTOLIC BLOOD PRESSURE: 135 MMHG | BODY MASS INDEX: 33.43 KG/M2 | OXYGEN SATURATION: 98 % | WEIGHT: 212.96 LBS | HEIGHT: 67 IN | RESPIRATION RATE: 18 BRPM | DIASTOLIC BLOOD PRESSURE: 77 MMHG | TEMPERATURE: 97.8 F | HEART RATE: 79 BPM

## 2019-04-21 LAB
BACTERIA UR CULT: NORMAL
INR PPP: 3.53 (ref 0.87–1.13)
PROTHROMBIN TIME: 34.8 SEC (ref 12–14.6)
SIGNIFICANT IND 70042: NORMAL
SITE SITE: NORMAL
SOURCE SOURCE: NORMAL

## 2019-04-21 PROCEDURE — A9270 NON-COVERED ITEM OR SERVICE: HCPCS | Performed by: HOSPITALIST

## 2019-04-21 PROCEDURE — 85610 PROTHROMBIN TIME: CPT

## 2019-04-21 PROCEDURE — 700102 HCHG RX REV CODE 250 W/ 637 OVERRIDE(OP): Performed by: INTERNAL MEDICINE

## 2019-04-21 PROCEDURE — 99239 HOSP IP/OBS DSCHRG MGMT >30: CPT | Performed by: HOSPITALIST

## 2019-04-21 PROCEDURE — 700102 HCHG RX REV CODE 250 W/ 637 OVERRIDE(OP): Performed by: HOSPITALIST

## 2019-04-21 PROCEDURE — A9270 NON-COVERED ITEM OR SERVICE: HCPCS | Performed by: INTERNAL MEDICINE

## 2019-04-21 RX ADMIN — HYDROCORTISONE 10 MG: 10 TABLET ORAL at 11:50

## 2019-04-21 RX ADMIN — HYDROCORTISONE 10 MG: 10 TABLET ORAL at 05:10

## 2019-04-21 RX ADMIN — HYDROCODONE BITARTRATE AND ACETAMINOPHEN 1 TABLET: 5; 325 TABLET ORAL at 09:41

## 2019-04-21 RX ADMIN — LEVOTHYROXINE SODIUM 75 MCG: 50 TABLET ORAL at 05:10

## 2019-04-21 RX ADMIN — SENNOSIDES, DOCUSATE SODIUM 2 TABLET: 50; 8.6 TABLET, FILM COATED ORAL at 05:10

## 2019-04-21 RX ADMIN — OMEPRAZOLE 20 MG: 20 CAPSULE, DELAYED RELEASE ORAL at 05:10

## 2019-04-21 RX ADMIN — NYSTATIN: 100000 POWDER TOPICAL at 05:11

## 2019-04-21 RX ADMIN — SOTALOL HYDROCHLORIDE 40 MG: 80 TABLET ORAL at 09:34

## 2019-04-21 RX ADMIN — TAMSULOSIN HYDROCHLORIDE 0.4 MG: 0.4 CAPSULE ORAL at 05:10

## 2019-04-21 RX ADMIN — Medication 400 MG: at 05:10

## 2019-04-21 NOTE — CARE PLAN
Problem: Safety  Goal: Will remain free from falls  Outcome: PROGRESSING AS EXPECTED  Bed in low position, call light in reach, non slip socks on, pt verbal;ized understanding to call before getting up    Problem: Respiratory:  Goal: Respiratory status will improve  Outcome: PROGRESSING AS EXPECTED  Pt instructed on deep breathing and coughing exercises, performs correctly on own, encouraged to do every hour while awake

## 2019-04-21 NOTE — DISCHARGE SUMMARY
Discharge Summary    CHIEF COMPLAINT ON ADMISSION  Chief Complaint   Patient presents with   • Hypotension     had low bp at the hospital so they transferred me here   • UTI     pain with urination, urinary frequency       Reason for Admission  Hyoptention     Admission Date  4/18/2019    CODE STATUS  Full Code    HPI & HOSPITAL COURSE      86 y.o. male who presented 4/18/2019 with abdominal pain.  Patient states it started a couple days ago, went to the hospital, has been in the [facility] since, transferred here due to hypotension.  He states he had abdominal pain that he felt were spasms as well as abdominal swelling.  He states that was associated with shortness of breath.  He states that lasted about a minute and then he just felt weak.  Because of this he went to the emergency department.  He states he gets cold but denies any actual fever or chills, denied any nausea or vomiting.  He does complain of some mild dysuria and decreased urination.  He also states he has had decreased oral intake, states he is just not hungry. Found to have UTI and sepsis, hypotension.    Interval Problem Update  He was resumed on his usual dose of Cortef treated for presumed UTI.  During his hospital stay his urine had no growth and therefore he is not being sent home on any further antibiotics.  Renal function normalized, eating well, feels much better. OT and PT recommending skilled nursing, patient would prefer to return home Because he is from [place] and not residing in this area  He is doing a lot better and much stronger he reports today.  Wife at bedside.      Therefore, he is discharged in good and stable condition to home with close outpatient follow-up.    The patient met 2-midnight criteria for an inpatient stay at the time of discharge.    Discharge Date  4/21/2019    FOLLOW UP ITEMS POST DISCHARGE  Primary care    DISCHARGE DIAGNOSES  Active Problems:    Severe sepsis with septic shock (HCC) POA: Yes     UTI (urinary tract infection) POA: Yes    COPD (chronic obstructive pulmonary disease) (HCC) POA: Yes      Overview: PMA HOME O2    Paroxysmal atrial fibrillation (HCC) POA: Yes    BPH (benign prostatic hypertrophy) (Chronic) POA: Yes      Overview: Treated withTerazosin and Proscar      IMO Update    Hyponatremia POA: Yes    ARF (acute renal failure) (HCC) POA: Yes    Normocytic anemia POA: Yes    Adrenal insufficiency (HCC) POA: Yes  Resolved Problems:    * No resolved hospital problems. *      FOLLOW UP  Future Appointments  Date Time Provider Department Center   6/28/2019 3:00 PM Gene Maloney M.D. [facility] None     No follow-up provider specified.    MEDICATIONS ON DISCHARGE     Medication List      CONTINUE taking these medications      Instructions   albuterol 108 (90 Base) MCG/ACT Aers inhalation aerosol   Inhale 2 Puffs by mouth every 6 hours as needed for Shortness of Breath.  Dose:  2 Puff     gabapentin 600 MG tablet  Commonly known as:  NEURONTIN   Take 1.5 Tabs by mouth every bedtime.  Dose:  900 mg     HYDROcodone-acetaminophen 5-325 MG Tabs per tablet  Commonly known as:  NORCO   Take 1 Tab by mouth 2 times a day as needed.  Dose:  1 Tab     hydrocortisone 10 MG Tabs  Commonly known as:  CORTEF   Take 1 Tab by mouth 3 times a day.  Dose:  10 mg     levothyroxine 75 MCG Tabs  Commonly known as:  SYNTHROID   Take 1 Tab by mouth Every morning on an empty stomach.  Dose:  75 mcg     lisinopril 30 MG tablet  Commonly known as:  PRINIVIL, ZESTRIL   Take 30 mg by mouth every day.  Dose:  30 mg     montelukast 10 MG Tabs  Commonly known as:  SINGULAIR   Take 1 Tab by mouth every day.  Dose:  10 mg     omeprazole 20 MG delayed-release capsule  Commonly known as:  PRILOSEC   Take 1 Cap by mouth 2 times a day.  Dose:  20 mg     sotalol 80 MG Tabs  Commonly known as:  BETAPACE   Take 0.5 Tabs by mouth 2 times a day.  Dose:  40 mg     tamsulosin 0.4 MG capsule  Commonly known as:  FLOMAX   Take 1 Cap by mouth  every day.  Dose:  0.4 mg     tiotropium 18 MCG Caps  Commonly known as:  SPIRIVA   Inhale 1 Cap by mouth every day.  Dose:  18 mcg     warfarin 5 MG Tabs  Commonly known as:  COUMADIN   Take 1 Tab by mouth every day.  Dose:  5 mg            Allergies  Allergies   Allergen Reactions   • Pcn [Penicillins] Rash     Tolerates cephalosporins         DIET  Orders Placed This Encounter   Procedures   • Diet Order Regular     Standing Status:   Standing     Number of Occurrences:   1     Order Specific Question:   Diet:     Answer:   Regular [1]       ACTIVITY  As tolerated.  Weight bearing as tolerated    CONSULTATIONS  None    PROCEDURES  None    LABORATORY  Lab Results   Component Value Date    SODIUM 132 (L) 04/20/2019    POTASSIUM 3.9 04/20/2019    CHLORIDE 100 04/20/2019    CO2 25 04/20/2019    GLUCOSE 112 (H) 04/20/2019    BUN 13 04/20/2019    CREATININE 0.99 04/20/2019    CREATININE 1.3 09/11/2008        Lab Results   Component Value Date    WBC 11.7 (H) 04/20/2019    HEMOGLOBIN 10.1 (L) 04/20/2019    HEMATOCRIT 32.8 (L) 04/20/2019    PLATELETCT 175 04/20/2019        Total time of the discharge process exceeds 32 minutes.

## 2019-04-21 NOTE — FACE TO FACE
Face to Face Supporting Documentation - Home Health    The encounter with this patient was in whole or in part the primary reason for home health admission.    Date of encounter:   Patient:                    MRN:                       YOB: 2019  Jeffrey Lizama  [id number]  12/23/1932     Home health to see patient for:  Skilled Nursing care for assessment, interventions & education, Physical Therapy evaluation and treatment and Occupational therapy evaluation and treatment    Skilled need for:  Exacerbation of Chronic Disease State adrenal insufficiency, uti    Skilled nursing interventions to include:  Comment: pt, ot, skilled nursing care    Homebound status evidenced by:  Needs the assistance of another person in order to leave the home. Leaving home requires a considerable and taxing effort. There is a normal inability to leave the home.    Community Physician to provide follow up care: Bebe Banerjee M.D.     Optional Interventions? No      I certify the face to face encounter for this home health care referral meets the CMS requirements and the encounter/clinical assessment with the patient was, in whole, or in part, for the medical condition(s) listed above, which is the primary reason for home health care. Based on my clinical findings: the service(s) are medically necessary, support the need for home health care, and the homebound criteria are met.  I certify that this patient has had a face to face encounter by myself.  Jayne Kenny M.D. - NPI: [id number]

## 2019-04-21 NOTE — DISCHARGE INSTRUCTIONS
Discharge Instructions    Discharged to home by car with relative. Discharged via wheelchair, hospital escort: Yes.  Special equipment needed: Not Applicable    Be sure to schedule a follow-up appointment with your primary care doctor or any specialists as instructed.     Discharge Plan:   Diet Plan: Discussed  Activity Level: Discussed  Confirmed Follow up Appointment: Appointment Scheduled  Confirmed Symptoms Management: Discussed  Medication Reconciliation Updated: Yes  Pneumococcal Vaccine Administered/Refused: Not given - Patient refused pneumococcal vaccine  Influenza Vaccine Indication: Patient Refuses, Not indicated: Previously immunized this influenza season and > 8 years of age    I understand that a diet low in cholesterol, fat, and sodium is recommended for good health. Unless I have been given specific instructions below for another diet, I accept this instruction as my diet prescription.   Other diet: Cardiac    Special Instructions: Will hold warfarin tonight for INR of 3.53    · Is patient discharged on Warfarin / Coumadin?   Yes    You are receiving the drug warfarin. Please understand the importance of monitoring warfarin with scheduled PT/INR blood draws.  Follow-up with a call to your personal Doctor's office in 3 days to schedule a PT/INR. .    IMPORTANT: HOW TO USE THIS INFORMATION:  This is a summary and does NOT have all possible information about this product. This information does not assure that this product is safe, effective, or appropriate for you. This information is not individual medical advice and does not substitute for the advice of your health care professional. Always ask your health care professional for complete information about this product and your specific health needs.      WARFARIN - ORAL (WARF-uh-rin)      COMMON BRAND NAME(S): Coumadin      WARNING:  Warfarin can cause very serious (possibly fatal) bleeding. This is more likely to occur when you first start taking this  "medication or if you take too much warfarin. To decrease your risk for bleeding, your doctor or other health care provider will monitor you closely and check your lab results (INR test) to make sure you are not taking too much warfarin. Keep all medical and laboratory appointments. Tell your doctor right away if you notice any signs of serious bleeding. See also Side Effects section.      USES:  This medication is used to treat blood clots (such as in deep vein thrombosis-DVT or pulmonary embolus-PE) and/or to prevent new clots from forming in your body. Preventing harmful blood clots helps to reduce the risk of a stroke or heart attack. Conditions that increase your risk of developing blood clots include a certain type of irregular heart rhythm (atrial fibrillation), heart valve replacement, recent heart attack, and certain surgeries (such as hip/knee replacement). Warfarin is commonly called a \"blood thinner,\" but the more correct term is \"anticoagulant.\" It helps to keep blood flowing smoothly in your body by decreasing the amount of certain substances (clotting proteins) in your blood.      HOW TO USE:  Read the Medication Guide provided by your pharmacist before you start taking warfarin and each time you get a refill. If you have any questions, ask your doctor or pharmacist. Take this medication by mouth with or without food as directed by your doctor or other health care professional, usually once a day. It is very important to take it exactly as directed. Do not increase the dose, take it more frequently, or stop using it unless directed by your doctor. Dosage is based on your medical condition, laboratory tests (such as INR), and response to treatment. Your doctor or other health care provider will monitor you closely while you are taking this medication to determine the right dose for you. Use this medication regularly to get the most benefit from it. To help you remember, take it at the same time each " day. It is important to eat a balanced, consistent diet while taking warfarin. Some foods can affect how warfarin works in your body and may affect your treatment and dose. Avoid sudden large increases or decreases in your intake of foods high in vitamin K (such as broccoli, cauliflower, cabbage, brussels sprouts, kale, spinach, and other green leafy vegetables, liver, green tea, certain vitamin supplements). If you are trying to lose weight, check with your doctor before you try to go on a diet. Cranberry products may also affect how your warfarin works. Limit the amount of cranberry juice (16 ounces/480 milliliters a day) or other cranberry products you may drink or eat.      SIDE EFFECTS:  Nausea, loss of appetite, or stomach/abdominal pain may occur. If any of these effects persist or worsen, tell your doctor or pharmacist promptly. Remember that your doctor has prescribed this medication because he or she has judged that the benefit to you is greater than the risk of side effects. Many people using this medication do not have serious side effects. This medication can cause serious bleeding if it affects your blood clotting proteins too much (shown by unusually high INR lab results). Even if your doctor stops your medication, this risk of bleeding can continue for up to a week. Tell your doctor right away if you have any signs of serious bleeding, including: unusual pain/swelling/discomfort, unusual/easy bruising, prolonged bleeding from cuts or gums, persistent/frequent nosebleeds, unusually heavy/prolonged menstrual flow, pink/dark urine, coughing up blood, vomit that is bloody or looks like coffee grounds, severe headache, dizziness/fainting, unusual or persistent tiredness/weakness, bloody/black/tarry stools, chest pain, shortness of breath, difficulty swallowing. Tell your doctor right away if any of these unlikely but serious side effects occur: persistent nausea/vomiting, severe stomach/abdominal pain,  yellowing eyes/skin. This drug rarely has caused very serious (possibly fatal) problems if its effects lead to small blood clots (usually at the beginning of treatment). This can lead to severe skin/tissue damage that may require surgery or amputation if left untreated. Patients with certain blood conditions (protein C or S deficiency) may be at greater risk. Get medical help right away if any of these rare but serious side effects occur: painful/red/purplish patches on the skin (such as on the toe, breast, abdomen), change in the amount of urine, vision changes, confusion, slurred speech, weakness on one side of the body. A very serious allergic reaction to this drug is rare. However, get medical help right away if you notice any symptoms of a serious allergic reaction, including: rash, itching/swelling (especially of the face/tongue/throat), severe dizziness, trouble breathing. This is not a complete list of possible side effects. If you notice other effects not listed above, contact your doctor or pharmacist. In the [place] - Call your doctor for medical advice about side effects. You may report side effects to FDA at 9-537-BIQ-6858. In [place] - Call your doctor for medical advice about side effects. You may report side effects to [facility] [place] at 1-283.684.6256.      PRECAUTIONS:  Before taking warfarin, tell your doctor or pharmacist if you are allergic to it; or if you have any other allergies. This product may contain inactive ingredients, which can cause allergic reactions or other problems. Talk to your pharmacist for more details. Before using this medication, tell your doctor or pharmacist your medical history, especially of: blood disorders (such as anemia, hemophilia), bleeding problems (such as bleeding of the stomach/intestines, bleeding in the brain), blood vessel disorders (such as aneurysms), recent major injury/surgery, liver disease, alcohol use, mental/mood disorders (including memory problems),  frequent falls/injuries. It is important that all your doctors and dentists know that you take warfarin. Before having surgery or any medical/dental procedures, tell your doctor or dentist that you are taking this medication and about all the products you use (including prescription drugs, nonprescription drugs, and herbal products). Avoid getting injections into the muscles. If you must have an injection into a muscle (for example, a flu shot), it should be given in the arm. This way, it will be easier to check for bleeding and/or apply pressure bandages. This medication may cause stomach bleeding. Daily use of alcohol while using this medicine will increase your risk for stomach bleeding and may also affect how this medication works. Limit or avoid alcoholic beverages. If you have not been eating well, if you have an illness or infection that causes fever, vomiting, or diarrhea for more than 2 days, or if you start using any antibiotic medications, contact your doctor or pharmacist immediately because these conditions can affect how warfarin works. This medication can cause heavy bleeding. To lower the chance of getting cut, bruised, or injured, use great caution with sharp objects like safety razors and nail cutters. Use an electric razor when shaving and a soft toothbrush when brushing your teeth. Avoid activities such as contact sports. If you fall or injure yourself, especially if you hit your head, call your doctor immediately. Your doctor may need to check you. The Food & Drug Administration has stated that generic warfarin products are interchangeable. However, consult your doctor or pharmacist before switching warfarin products. Be careful not to take more than one medication that contains warfarin unless specifically directed by the doctor or health care provider who is monitoring your warfarin treatment. Older adults may be at greater risk for bleeding while using this drug. This medication is not  "recommended for use during pregnancy because of serious (possibly fatal) harm to an unborn baby. Discuss the use of reliable forms of birth control with your doctor. If you become pregnant or think you may be pregnant, tell your doctor immediately. If you are planning pregnancy, discuss a plan for managing your condition with your doctor before you become pregnant. Your doctor may switch the type of medication you use during pregnancy. Very small amounts of this medication may pass into breast milk but is unlikely to harm a nursing infant. Consult your doctor before breast-feeding.      DRUG INTERACTIONS:  Drug interactions may change how your medications work or increase your risk for serious side effects. This document does not contain all possible drug interactions. Keep a list of all the products you use (including prescription/nonprescription drugs and herbal products) and share it with your doctor and pharmacist. Do not start, stop, or change the dosage of any medicines without your doctor's approval. Warfarin interacts with many prescription, nonprescription, vitamin, and herbal products. This includes medications that are applied to the skin or inside the vagina or rectum. The interactions with warfarin usually result in an increase or decrease in the \"blood-thinning\" (anticoagulant) effect. Your doctor or other health care professional should closely monitor you to prevent serious bleeding or clotting problems. While taking warfarin, it is very important to tell your doctor or pharmacist of any changes in medications, vitamins, or herbal products that you are taking. Some products that may interact with this drug include: capecitabine, imatinib, mifepristone. Aspirin, aspirin-like drugs (salicylates), and nonsteroidal anti-inflammatory drugs (NSAIDs such as ibuprofen, naproxen, celecoxib) may have effects similar to warfarin. These drugs may increase the risk of bleeding problems if taken during treatment " with warfarin. Carefully check all prescription/nonprescription product labels (including drugs applied to the skin such as pain-relieving creams) since the products may contain NSAIDs or salicylates. Talk to your doctor about using a different medication (such as acetaminophen) to treat pain/fever. Low-dose aspirin and related drugs (such as clopidogrel, ticlopidine) should be continued if prescribed by your doctor for specific medical reasons such as heart attack or stroke prevention. Consult your doctor or pharmacist for more details. Many herbal products interact with warfarin. Tell your doctor before taking any herbal products, especially bromelains, coenzyme Q10, cranberry, danshen, dong quai, fenugreek, garlic, ginkgo biloba, ginseng, and Anshul's wort, among others. This medication may interfere with a certain laboratory test to measure theophylline levels, possibly causing false test results. Make sure laboratory personnel and all your doctors know you use this drug.      OVERDOSE:  If overdose is suspected, contact a poison control center or emergency room immediately. [place] residents can call the [place] National Poison Hotline at 1-908.905.3630. [place] residents can call a provincial poison control center. Symptoms of overdose may include: bloody/black/tarry stools, pink/dark urine, unusual/prolonged bleeding.      NOTES:  Do not share this medication with others. Laboratory and/or medical tests (such as INR, complete blood count) must be performed periodically to monitor your progress or check for side effects. Consult your doctor for more details.      MISSED DOSE:  For the best possible benefit, do not miss any doses. If you do miss a dose and remember on the same day, take it as soon as you remember. If you remember on the next day, skip the missed dose and resume your usual dosing schedule. Do not double the dose to catch up because this could increase your risk for bleeding. Keep a record of missed doses  to give to your doctor or pharmacist. Contact your doctor or pharmacist if you miss 2 or more doses in a row.      STORAGE:  Store at room temperature away from light and moisture. Do not store in the bathroom. Keep all medications away from children and pets. Do not flush medications down the toilet or pour them into a drain unless instructed to do so. Properly discard this product when it is  or no longer needed. Consult your pharmacist or local waste disposal company for more details about how to safely discard your product.      MEDICAL ALERT:  Your condition and medication can cause complications in a medical emergency. For information about enrolling in MedicAlert, call 1-971.932.2601 ([place]) or 1-127.950.9344 ([place]).      Information last revised 2010 Copyright(c)  [employer]           Depression / Suicide Risk    As you are discharged from this [facility] facility, it is important to learn how to keep safe from harming yourself.    Recognize the warning signs:  · Abrupt changes in personality, positive or negative- including increase in energy   · Giving away possessions  · Change in eating patterns- significant weight changes-  positive or negative  · Change in sleeping patterns- unable to sleep or sleeping all the time   · Unwillingness or inability to communicate  · Depression  · Unusual sadness, discouragement and loneliness  · Talk of wanting to die  · Neglect of personal appearance   · Rebelliousness- reckless behavior  · Withdrawal from people/activities they love  · Confusion- inability to concentrate     If you or a loved one observes any of these behaviors or has concerns about self-harm, here's what you can do:  · Talk about it- your feelings and reasons for harming yourself  · Remove any means that you might use to hurt yourself (examples: pills, rope, extension cords, firearm)  · Get professional help from the community (Mental Health, Substance Abuse,  psychological counseling)  · Do not be alone:Call your Safe Contact- someone whom you trust who will be there for you.  · Call your local CRISIS HOTLINE 501-2278 or 312-745-8153  · Call your local Children's Mobile Crisis Response Team [place] (270) 139-1080 or www.[employer]  · Call the toll free National Suicide Prevention Hotlines   · National Suicide Prevention Lifeline 124-870-WHJA (7902)  · Anti-Microbial [employer] Line Network 800-SUICIDE (696-0681)        Urinary Tract Infection, Adult  Introduction  A urinary tract infection (UTI) is an infection of any part of the urinary tract. The urinary tract includes the:  · Kidneys.  · Ureters.  · Bladder.  · Urethra.  These organs make, store, and get rid of pee (urine) in the body.  Follow these instructions at home:  · Take over-the-counter and prescription medicines only as told by your doctor.  · If you were prescribed an antibiotic medicine, take it as told by your doctor. Do not stop taking the antibiotic even if you start to feel better.  · Avoid the following drinks:  ¨ Alcohol.  ¨ Caffeine.  ¨ Tea.  ¨ Carbonated drinks.  · Drink enough fluid to keep your pee clear or pale yellow.  · Keep all follow-up visits as told by your doctor. This is important.  · Make sure to:  ¨ Empty your bladder often and completely. Do not to hold pee for long periods of time.  ¨ Empty your bladder before and after sex.  ¨ Wipe from front to back after a bowel movement if you are female. Use each tissue one time when you wipe.  Contact a doctor if:  · You have back pain.  · You have a fever.  · You feel sick to your stomach (nauseous).  · You throw up (vomit).  · Your symptoms do not get better after 3 days.  · Your symptoms go away and then come back.  Get help right away if:  · You have very bad back pain.  · You have very bad lower belly (abdominal) pain.  · You are throwing up and cannot keep down any medicines or water.  This information is not intended to replace advice  given to you by your health care provider. Make sure you discuss any questions you have with your health care provider.  Document Released: 06/05/2009 Document Revised: 05/25/2017 Document Reviewed: 11/07/2016  © 2017 [employer]

## 2019-04-21 NOTE — DISCHARGE PLANNING
Received Choice form at 1151  Agency/Facility Name: [facility]  Referral sent per Choice form @ 4436

## 2019-04-21 NOTE — PROGRESS NOTES
Per SW, pt likely to be accepted at [facility] as pt had used [facility] prior.  Cleared w/ Dr. Kenny.  Ok to dc prior to  approval.

## 2019-04-21 NOTE — PROGRESS NOTES
Inpatient Anticoagulation Service Note    Date: 4/21/2019  Reason for Anticoagulation: Atrial Fibrillation, Pulmonary Embolism   [id number] VASc Score: 5        Hemoglobin Value: (!) 10.1  Hematocrit Value: (!) 32.8  Lab Platelet Value: 175  Target INR: 2.0 to 3.0    INR from last 7 days     Date/Time INR Value    04/21/19 0416 (!)  3.53    04/20/19 0404 (!)  2.96    04/19/19 0227 (!)  1.82    04/18/19 1715 (!)  1.63        Dose from last 7 days     Date/Time Dose (mg)    04/21/19 0600  0    04/20/19 0600  3    04/19/19 0800  6    04/18/19 1715  9        Average Dose (mg): 6  Significant Interactions: Antibiotics, Corticosteroids, Proton Pump Inhibitor, Thyroid Medications (If less than 5 days and overlap therapy discontinued -- document reason (i.e. Bleed Risk))    (If still on overlap therapy, if No -- document reason (i.e. Bleed Risk))         Plan:  Will hold warfarin tonight for INR of 3.53  Education Material Provided?: No (long-term anticoagulation)  Pharmacist suggested discharge dosing: Te be determined.     Low Zhang [facility]

## 2019-04-21 NOTE — PROGRESS NOTES
Telemetry Shift Summary    Rhythm SR  HR Range 70-90  Ectopy occasional PVC  Measurements .26/.12/.36        Normal Values  Rhythm SR  HR Range    Measurements 0.12-0.20 / 0.06-0.10  / 0.30-0.52

## 2019-04-21 NOTE — DISCHARGE PLANNING
Anticipated Discharge Disposition: home with spouse and HH    Action: SW met with this patient bedside to obtain HH choice.  [facility] uses [facility]  Completed choice form faxed to [facility] for referral follow up. SW informed that patient's wife was a CNA and his Son and Dtr are both nurses.     Barriers to Discharge: None noted at this time.    Plan: Sw will continue to follow and assist as needed.

## 2019-04-21 NOTE — PROGRESS NOTES
"Pt aox4, discharge summary given, discharge education provided.  Pt verbalized understanding.  Follow up w/ cardiology scheduled.  RN attempted to contact cardiology scheduling, for earlier apt, but office close. PCP office also closed. Pt to schedule follow up w/ PCP and will contact cardiology for earlier apt.  Pt has own INR device for INR monitoring at home, and pt states he knows how to use it, \"I've been doing it forever.\"  IV removed. All belongings returned. Family at bedside, no further concerns. Escorted by staff to the . Discharged safely.    "

## 2019-04-22 NOTE — DISCHARGE PLANNING
Agency/Facility Name: [facility]   Spoke To: Danica  Outcome: Patient accepted and DC summary faxed.

## 2019-04-23 LAB
BACTERIA BLD CULT: NORMAL
BACTERIA BLD CULT: NORMAL
SIGNIFICANT IND 70042: NORMAL
SIGNIFICANT IND 70042: NORMAL
SITE SITE: NORMAL
SITE SITE: NORMAL
SOURCE SOURCE: NORMAL
SOURCE SOURCE: NORMAL

## 2019-04-24 ENCOUNTER — TELEPHONE (OUTPATIENT)
Dept: CARDIOLOGY | Facility: MEDICAL CENTER | Age: 84
End: 2019-04-24

## 2019-04-24 NOTE — TELEPHONE ENCOUNTER
high BP problems   Received: Today   Message Contents   Krupa Gould, Med Ass't  Paris Mcfarland R.N.   Phone Number: [phone]             SW pt states he cant get his high BP under control. He can be reached at [phone]      contacted patient.  He states he recently was seen for low blood pressure, and now its high. He reports:    161/81  170/86  156/84    Patient admits he had two cans of regular tomato soup yesterday and today which contains a high amount of sodium.  Patient denies any other complaint.  Denies CP. Advised to avoid further sodium, monitor fluid intake. Wait until tomorrow 1-2 hrs after morning medication and report new BP.  Patient verbalizes understanding.

## 2019-04-25 NOTE — TELEPHONE ENCOUNTER
Contacted patient to follow up.      After taking his morning medications, his BP was 125/73    158/61 after physical therapy today.     Now /81    Advised patient to avoid sodium, monitor fluid intake.  Advised to only take BP one time daily after BP meds.  If SBP sustains above 160 patient to call clinic.  Patient verbalizes understanding.

## 2019-04-29 ENCOUNTER — HOSPITAL ENCOUNTER (OUTPATIENT)
Dept: RADIOLOGY | Facility: MEDICAL CENTER | Age: 84
End: 2019-04-29

## 2019-05-30 LAB — INR PPP: 2.6 (ref 2–3.5)

## 2019-05-31 ENCOUNTER — TELEPHONE (OUTPATIENT)
Dept: CARDIOLOGY | Facility: MEDICAL CENTER | Age: 84
End: 2019-05-31

## 2019-05-31 ENCOUNTER — ANTICOAGULATION MONITORING (OUTPATIENT)
Dept: VASCULAR LAB | Facility: MEDICAL CENTER | Age: 84
End: 2019-05-31

## 2019-05-31 DIAGNOSIS — Z79.01 ANTICOAGULANT LONG-TERM USE: ICD-10-CM

## 2019-05-31 DIAGNOSIS — K92.2 GASTROINTESTINAL HEMORRHAGE, UNSPECIFIED GASTROINTESTINAL HEMORRHAGE TYPE: ICD-10-CM

## 2019-05-31 DIAGNOSIS — I48.0 PAROXYSMAL ATRIAL FIBRILLATION (HCC): ICD-10-CM

## 2019-05-31 DIAGNOSIS — I26.99 OTHER PULMONARY EMBOLISM WITHOUT ACUTE COR PULMONALE, UNSPECIFIED CHRONICITY (HCC): ICD-10-CM

## 2019-05-31 DIAGNOSIS — Z86.711 HISTORY OF PULMONARY EMBOLISM: ICD-10-CM

## 2019-05-31 LAB — INR PPP: 3.5 (ref 2–3.5)

## 2019-05-31 NOTE — TELEPHONE ENCOUNTER
getting coumadin level under control   Received: Today   Message Contents   Lala Mcfarland R.N.   Phone Number: [phone]             JARET/laureano     Pt calling to discuss how to get his coumadin level under control.  Pt has strong concerns about bleeding ulcers that were life threatening just a short while ago.  At that time he was told by hospital doctor he must be extremely careful with his protime #'s.  Please call Jeffrey       Contacted patient, he states he's concerned about being on Warfarin.  He states he used to be on brand name Coumadin and never had a problem with his INR being in the appropriate window.  Patient states since he was changed to Warfarin (patient claims he was told coumadin brand is no longer manufactured) his INR is all over the place.     He was recently hospitalized (care flight to [facility]. Patient lives in [place], [place]) for GI bleed.  Patient states he lost 5L of blood.  He was told upon discharge he must be very careful monitoring his INR and it should never get above 2.8 due to high risk for bleeding.     He has a home INR monitor that he calls in result to:    MD INR  [phone]    Today his INR was 3.5   He was instructed to hold warfarin by MD INR.      Discussed the importance of watching diet that can affect INR level.      Advised seeking guidance from anticoagulation clinic who can offer expertise on medications and monitoring.     Referral to Anticoag clinic initiated    To: [place]

## 2019-05-31 NOTE — PROGRESS NOTES
Anticoagulation Summary  As of 2019    INR goal:   2.0-3.0   TTR:   34.3 % (2.1 y)   INR used for dosin.60 (2019)   Warfarin maintenance plan:   5 mg (5 mg x 1), then 2 mg (2 mg x 1), then 5 mg (5 mg x 1) repeating every 3 days   Average weekly warfarin total:   28 mg   Plan last modified:   Lauren Shepard, PharmD (2019)   Next INR check:   6/3/2019   Target end date:   Indefinite    Indications    Anticoagulant long-term use [Z79.01]  GI bleed [K92.2]  History of pulmonary embolism [Z86.711]  Pulmonary embolism (HCC) [I26.99]             Anticoagulation Episode Summary     INR check location:       Preferred lab:       Send INR reminders to:       Comments:   INR goal 2 - 2.8 per pt      Anticoagulation Care Providers     Provider Role Specialty Phone number    Gene Maloney M.D. Referring Cardiology [phone]    [facility] Responsible  [phone]        Anticoagulation Patient Findings      Spoke with patient.  INR is SUPRA therapeutic.   Pt was previously managed by [facility] until , when PCP assumed care.   Pt took INR yesterday and it was 2.6, and then woke this morning to find he had scratched himself during his sleep last night, pt bleed so much that it soaked down to the mattress. Thus he rechecked his INR today and it was 3.5.  Pt has been taking warfarin 5mg for 2 days and then the third day taking 2mg, and then repeats.     Pt took 5mg on -,  he took 2mg, and  he took 5mg.     Denies any other s/s bleeding (randy concerned bc of recent hx bleeding ulcers, but pt denies any dark/tarry stools, or blood). Denies any etoh, cranberries, no medication changes, no diarrhea, no vomiting. Unclear why INR increased so quickly.     At this point will have pt HOLD his warfarin today, tomorrow pt to take 2mg warfarin, and then  5mg. Pt will retest INR on Monday and call clinic, states he has the number.     Lauren PARDO  Devyn, PharmD

## 2019-12-19 NOTE — RESPIRATORY CARE
Extubation    Cuff leak noted yes  Stridor present no        Patient toleration yes  RCP Complete? yes  Events/Summary/Plan: Per Dr. Da Silva go ahead and extubate (01/22/19 3784)         normal...

## 2019-12-27 ENCOUNTER — ANTICOAGULATION MONITORING (OUTPATIENT)
Dept: VASCULAR LAB | Facility: MEDICAL CENTER | Age: 84
End: 2019-12-27

## 2019-12-27 DIAGNOSIS — Z86.711 HISTORY OF PULMONARY EMBOLISM: ICD-10-CM

## 2019-12-27 DIAGNOSIS — I26.99 OTHER PULMONARY EMBOLISM WITHOUT ACUTE COR PULMONALE, UNSPECIFIED CHRONICITY (HCC): ICD-10-CM

## 2019-12-27 DIAGNOSIS — K92.2 GASTROINTESTINAL HEMORRHAGE, UNSPECIFIED GASTROINTESTINAL HEMORRHAGE TYPE: ICD-10-CM

## 2019-12-27 DIAGNOSIS — Z79.01 ANTICOAGULANT LONG-TERM USE: ICD-10-CM

## 2019-12-28 NOTE — PROGRESS NOTES
Spoke with Jeffrey.  He is still taking warfarin.  He is being managed by his primary care physician and the hospital in [place].    Miguel Mojica, PharmD, MS, BCACP, [facility]    This note was created using voice recognition software (Dragon). The accuracy of the dictation is limited by the abilities of the software. I have reviewed the note prior to signing, however some errors in grammar and context are still possible. If you have any questions related to this note please do not hesitate to contact our office.

## 2020-05-12 ENCOUNTER — TELEPHONE (OUTPATIENT)
Dept: CARDIOLOGY | Facility: MEDICAL CENTER | Age: 85
End: 2020-05-12

## 2020-05-12 DIAGNOSIS — I10 ESSENTIAL HYPERTENSION, BENIGN: ICD-10-CM

## 2020-05-12 DIAGNOSIS — I49.3 FREQUENT PVCS: ICD-10-CM

## 2020-05-12 DIAGNOSIS — Z79.899 LONG TERM CURRENT USE OF ANTIARRHYTHMIC DRUG: ICD-10-CM

## 2020-05-12 DIAGNOSIS — I49.5 SSS (SICK SINUS SYNDROME) (HCC): ICD-10-CM

## 2020-05-12 DIAGNOSIS — I42.1 OBSTRUCTIVE HYPERTROPHIC CARDIOMYOPATHY (HCC): ICD-10-CM

## 2020-05-12 DIAGNOSIS — I48.0 PAROXYSMAL ATRIAL FIBRILLATION (HCC): ICD-10-CM

## 2020-05-12 DIAGNOSIS — Z95.0 CARDIAC PACEMAKER: ICD-10-CM

## 2020-05-12 NOTE — TELEPHONE ENCOUNTER
JARET/laureano    Pt calling to discuss when he needs to replace his pacemaker and if there is a facility in [place] for a pacer check to be done so pt does not have to make the trip to [place].    Please call Jeffrey to discuss

## 2020-05-13 NOTE — TELEPHONE ENCOUNTER
Beth Avila  You 1 hour ago (8:37 AM)      Marshal Toledo,   The reps service the [facility]. Dr's Lombard/Vaughn are the local cardiologists working out of [place].   Their office would make arrangements for device checks, etc.   Hope this helps.   JH      Contacted patient.      Referral initiated for above to accommodate patient

## 2021-04-18 ENCOUNTER — APPOINTMENT (OUTPATIENT)
Dept: RADIOLOGY | Facility: MEDICAL CENTER | Age: 86
DRG: 247 | End: 2021-04-18
Attending: EMERGENCY MEDICINE
Payer: MEDICARE

## 2021-04-18 ENCOUNTER — APPOINTMENT (OUTPATIENT)
Dept: CARDIOLOGY | Facility: MEDICAL CENTER | Age: 86
DRG: 247 | End: 2021-04-18
Attending: STUDENT IN AN ORGANIZED HEALTH CARE EDUCATION/TRAINING PROGRAM
Payer: MEDICARE

## 2021-04-18 ENCOUNTER — HOSPITAL ENCOUNTER (INPATIENT)
Facility: MEDICAL CENTER | Age: 86
LOS: 10 days | DRG: 247 | End: 2021-04-29
Attending: EMERGENCY MEDICINE | Admitting: STUDENT IN AN ORGANIZED HEALTH CARE EDUCATION/TRAINING PROGRAM
Payer: MEDICARE

## 2021-04-18 DIAGNOSIS — I35.0 NONRHEUMATIC AORTIC VALVE STENOSIS: ICD-10-CM

## 2021-04-18 DIAGNOSIS — J96.11 CHRONIC RESPIRATORY FAILURE WITH HYPOXIA (HCC): ICD-10-CM

## 2021-04-18 DIAGNOSIS — R79.89 ELEVATED TROPONIN: ICD-10-CM

## 2021-04-18 DIAGNOSIS — I21.4 NSTEMI (NON-ST ELEVATED MYOCARDIAL INFARCTION) (HCC): ICD-10-CM

## 2021-04-18 DIAGNOSIS — I49.3 VENTRICULAR ECTOPY: ICD-10-CM

## 2021-04-18 DIAGNOSIS — J90 PLEURAL EFFUSION: ICD-10-CM

## 2021-04-18 DIAGNOSIS — I25.118 CORONARY ARTERY DISEASE OF NATIVE ARTERY OF NATIVE HEART WITH STABLE ANGINA PECTORIS (HCC): ICD-10-CM

## 2021-04-18 DIAGNOSIS — Z95.0 CARDIAC PACEMAKER IN SITU: ICD-10-CM

## 2021-04-18 DIAGNOSIS — E27.40 ADRENAL INSUFFICIENCY (HCC): ICD-10-CM

## 2021-04-18 DIAGNOSIS — I48.0 PAF (PAROXYSMAL ATRIAL FIBRILLATION) (HCC): ICD-10-CM

## 2021-04-18 DIAGNOSIS — J96.21 ACUTE ON CHRONIC RESPIRATORY FAILURE WITH HYPOXIA (HCC): ICD-10-CM

## 2021-04-18 DIAGNOSIS — J44.9 CHRONIC OBSTRUCTIVE PULMONARY DISEASE, UNSPECIFIED COPD TYPE (HCC): ICD-10-CM

## 2021-04-18 DIAGNOSIS — I26.99 OTHER PULMONARY EMBOLISM WITHOUT ACUTE COR PULMONALE, UNSPECIFIED CHRONICITY (HCC): ICD-10-CM

## 2021-04-18 DIAGNOSIS — I42.1 OBSTRUCTIVE HYPERTROPHIC CARDIOMYOPATHY (HCC): ICD-10-CM

## 2021-04-18 DIAGNOSIS — R07.9 ACUTE CHEST PAIN: ICD-10-CM

## 2021-04-18 LAB
ALBUMIN SERPL BCP-MCNC: 3.4 G/DL (ref 3.2–4.9)
ALBUMIN/GLOB SERPL: 1.1 G/DL
ALP SERPL-CCNC: 42 U/L (ref 30–99)
ALT SERPL-CCNC: 11 U/L (ref 2–50)
ANION GAP SERPL CALC-SCNC: 8 MMOL/L (ref 7–16)
AST SERPL-CCNC: 18 U/L (ref 12–45)
BASOPHILS # BLD AUTO: 0.9 % (ref 0–1.8)
BASOPHILS # BLD: 0.12 K/UL (ref 0–0.12)
BILIRUB SERPL-MCNC: 0.2 MG/DL (ref 0.1–1.5)
BUN SERPL-MCNC: 16 MG/DL (ref 8–22)
CALCIUM SERPL-MCNC: 8.7 MG/DL (ref 8.5–10.5)
CHLORIDE SERPL-SCNC: 96 MMOL/L (ref 96–112)
CO2 SERPL-SCNC: 30 MMOL/L (ref 20–33)
CREAT SERPL-MCNC: 0.95 MG/DL (ref 0.5–1.4)
EKG IMPRESSION: NORMAL
EOSINOPHIL # BLD AUTO: 0.66 K/UL (ref 0–0.51)
EOSINOPHIL NFR BLD: 5.2 % (ref 0–6.9)
ERYTHROCYTE [DISTWIDTH] IN BLOOD BY AUTOMATED COUNT: 52.8 FL (ref 35.9–50)
GLOBULIN SER CALC-MCNC: 3 G/DL (ref 1.9–3.5)
GLUCOSE SERPL-MCNC: 93 MG/DL (ref 65–99)
HCT VFR BLD AUTO: 43.5 % (ref 42–52)
HGB BLD-MCNC: 13.1 G/DL (ref 14–18)
IMM GRANULOCYTES # BLD AUTO: 0.07 K/UL (ref 0–0.11)
IMM GRANULOCYTES NFR BLD AUTO: 0.6 % (ref 0–0.9)
LIPASE SERPL-CCNC: 29 U/L (ref 11–82)
LYMPHOCYTES # BLD AUTO: 1.48 K/UL (ref 1–4.8)
LYMPHOCYTES NFR BLD: 11.7 % (ref 22–41)
MCH RBC QN AUTO: 25.4 PG (ref 27–33)
MCHC RBC AUTO-ENTMCNC: 30.1 G/DL (ref 33.7–35.3)
MCV RBC AUTO: 84.3 FL (ref 81.4–97.8)
MONOCYTES # BLD AUTO: 1.03 K/UL (ref 0–0.85)
MONOCYTES NFR BLD AUTO: 8.1 % (ref 0–13.4)
NEUTROPHILS # BLD AUTO: 9.31 K/UL (ref 1.82–7.42)
NEUTROPHILS NFR BLD: 73.5 % (ref 44–72)
NRBC # BLD AUTO: 0 K/UL
NRBC BLD-RTO: 0 /100 WBC
PLATELET # BLD AUTO: 216 K/UL (ref 164–446)
PMV BLD AUTO: 9.7 FL (ref 9–12.9)
POTASSIUM SERPL-SCNC: 4.4 MMOL/L (ref 3.6–5.5)
PROT SERPL-MCNC: 6.4 G/DL (ref 6–8.2)
RBC # BLD AUTO: 5.16 M/UL (ref 4.7–6.1)
SODIUM SERPL-SCNC: 134 MMOL/L (ref 135–145)
TROPONIN T SERPL-MCNC: 38 NG/L (ref 6–19)
WBC # BLD AUTO: 12.7 K/UL (ref 4.8–10.8)

## 2021-04-18 PROCEDURE — 86140 C-REACTIVE PROTEIN: CPT

## 2021-04-18 PROCEDURE — 85652 RBC SED RATE AUTOMATED: CPT

## 2021-04-18 PROCEDURE — 83690 ASSAY OF LIPASE: CPT

## 2021-04-18 PROCEDURE — 80053 COMPREHEN METABOLIC PANEL: CPT

## 2021-04-18 PROCEDURE — 84484 ASSAY OF TROPONIN QUANT: CPT

## 2021-04-18 PROCEDURE — 93306 TTE W/DOPPLER COMPLETE: CPT

## 2021-04-18 PROCEDURE — U0003 INFECTIOUS AGENT DETECTION BY NUCLEIC ACID (DNA OR RNA); SEVERE ACUTE RESPIRATORY SYNDROME CORONAVIRUS 2 (SARS-COV-2) (CORONAVIRUS DISEASE [COVID-19]), AMPLIFIED PROBE TECHNIQUE, MAKING USE OF HIGH THROUGHPUT TECHNOLOGIES AS DESCRIBED BY CMS-2020-01-R: HCPCS

## 2021-04-18 PROCEDURE — 71045 X-RAY EXAM CHEST 1 VIEW: CPT

## 2021-04-18 PROCEDURE — 83735 ASSAY OF MAGNESIUM: CPT

## 2021-04-18 PROCEDURE — 700111 HCHG RX REV CODE 636 W/ 250 OVERRIDE (IP): Performed by: EMERGENCY MEDICINE

## 2021-04-18 PROCEDURE — 96375 TX/PRO/DX INJ NEW DRUG ADDON: CPT

## 2021-04-18 PROCEDURE — 85025 COMPLETE CBC W/AUTO DIFF WBC: CPT

## 2021-04-18 PROCEDURE — G0378 HOSPITAL OBSERVATION PER HR: HCPCS

## 2021-04-18 PROCEDURE — 71275 CT ANGIOGRAPHY CHEST: CPT | Mod: ME

## 2021-04-18 PROCEDURE — 99285 EMERGENCY DEPT VISIT HI MDM: CPT

## 2021-04-18 PROCEDURE — 36415 COLL VENOUS BLD VENIPUNCTURE: CPT

## 2021-04-18 PROCEDURE — 96376 TX/PRO/DX INJ SAME DRUG ADON: CPT

## 2021-04-18 PROCEDURE — 96374 THER/PROPH/DIAG INJ IV PUSH: CPT

## 2021-04-18 PROCEDURE — 93005 ELECTROCARDIOGRAM TRACING: CPT | Performed by: EMERGENCY MEDICINE

## 2021-04-18 PROCEDURE — 84145 PROCALCITONIN (PCT): CPT

## 2021-04-18 PROCEDURE — U0005 INFEC AGEN DETEC AMPLI PROBE: HCPCS

## 2021-04-18 PROCEDURE — 700117 HCHG RX CONTRAST REV CODE 255: Performed by: EMERGENCY MEDICINE

## 2021-04-18 RX ORDER — OMEPRAZOLE 20 MG/1
20 CAPSULE, DELAYED RELEASE ORAL 2 TIMES DAILY
Status: DISCONTINUED | OUTPATIENT
Start: 2021-04-19 | End: 2021-04-29 | Stop reason: HOSPADM

## 2021-04-18 RX ORDER — ACETAMINOPHEN 325 MG/1
650 TABLET ORAL EVERY 6 HOURS PRN
Status: DISCONTINUED | OUTPATIENT
Start: 2021-04-18 | End: 2021-04-29 | Stop reason: HOSPADM

## 2021-04-18 RX ORDER — MONTELUKAST SODIUM 10 MG/1
10 TABLET ORAL DAILY
Status: DISCONTINUED | OUTPATIENT
Start: 2021-04-19 | End: 2021-04-29 | Stop reason: HOSPADM

## 2021-04-18 RX ORDER — MORPHINE SULFATE 4 MG/ML
4 INJECTION, SOLUTION INTRAMUSCULAR; INTRAVENOUS ONCE
Status: COMPLETED | OUTPATIENT
Start: 2021-04-18 | End: 2021-04-18

## 2021-04-18 RX ORDER — ONDANSETRON 2 MG/ML
4 INJECTION INTRAMUSCULAR; INTRAVENOUS ONCE
Status: COMPLETED | OUTPATIENT
Start: 2021-04-18 | End: 2021-04-18

## 2021-04-18 RX ORDER — POLYETHYLENE GLYCOL 3350 17 G/17G
1 POWDER, FOR SOLUTION ORAL
Status: DISCONTINUED | OUTPATIENT
Start: 2021-04-18 | End: 2021-04-29 | Stop reason: HOSPADM

## 2021-04-18 RX ORDER — LEVOTHYROXINE SODIUM 0.07 MG/1
75 TABLET ORAL
Status: DISCONTINUED | OUTPATIENT
Start: 2021-04-19 | End: 2021-04-29 | Stop reason: HOSPADM

## 2021-04-18 RX ORDER — HYDROCODONE BITARTRATE AND ACETAMINOPHEN 5; 325 MG/1; MG/1
1 TABLET ORAL 2 TIMES DAILY PRN
Status: DISCONTINUED | OUTPATIENT
Start: 2021-04-18 | End: 2021-04-20

## 2021-04-18 RX ORDER — AMOXICILLIN 250 MG
2 CAPSULE ORAL 2 TIMES DAILY
Status: DISCONTINUED | OUTPATIENT
Start: 2021-04-19 | End: 2021-04-29 | Stop reason: HOSPADM

## 2021-04-18 RX ORDER — ASPIRIN 81 MG/1
324 TABLET, CHEWABLE ORAL DAILY
Status: DISCONTINUED | OUTPATIENT
Start: 2021-04-19 | End: 2021-04-19

## 2021-04-18 RX ORDER — MULTIVIT-MIN/IRON/FOLIC ACID/K 18-600-40
2000 CAPSULE ORAL DAILY
Status: ON HOLD | COMMUNITY
End: 2021-05-01

## 2021-04-18 RX ORDER — GABAPENTIN 300 MG/1
600 CAPSULE ORAL EVERY EVENING
Status: DISCONTINUED | OUTPATIENT
Start: 2021-04-18 | End: 2021-04-29 | Stop reason: HOSPADM

## 2021-04-18 RX ORDER — ASPIRIN 300 MG/1
300 SUPPOSITORY RECTAL DAILY
Status: DISCONTINUED | OUTPATIENT
Start: 2021-04-19 | End: 2021-04-19

## 2021-04-18 RX ORDER — CLOPIDOGREL 300 MG/1
300 TABLET, FILM COATED ORAL ONCE
Status: DISCONTINUED | OUTPATIENT
Start: 2021-04-18 | End: 2021-04-18

## 2021-04-18 RX ORDER — ASPIRIN 325 MG
325 TABLET ORAL DAILY
Status: DISCONTINUED | OUTPATIENT
Start: 2021-04-19 | End: 2021-04-19

## 2021-04-18 RX ORDER — ASPIRIN 81 MG/1
324 TABLET, CHEWABLE ORAL ONCE
Status: DISCONTINUED | OUTPATIENT
Start: 2021-04-18 | End: 2021-04-18

## 2021-04-18 RX ORDER — TAMSULOSIN HYDROCHLORIDE 0.4 MG/1
0.4 CAPSULE ORAL DAILY
Status: DISCONTINUED | OUTPATIENT
Start: 2021-04-19 | End: 2021-04-29 | Stop reason: HOSPADM

## 2021-04-18 RX ORDER — HYDROCORTISONE 10 MG/1
10 TABLET ORAL 2 TIMES DAILY
Status: DISCONTINUED | OUTPATIENT
Start: 2021-04-18 | End: 2021-04-19

## 2021-04-18 RX ORDER — SOTALOL HYDROCHLORIDE 80 MG/1
40 TABLET ORAL 2 TIMES DAILY
Status: DISCONTINUED | OUTPATIENT
Start: 2021-04-18 | End: 2021-04-29 | Stop reason: HOSPADM

## 2021-04-18 RX ORDER — ALBUTEROL SULFATE 90 UG/1
2 AEROSOL, METERED RESPIRATORY (INHALATION) EVERY 6 HOURS PRN
Status: DISCONTINUED | OUTPATIENT
Start: 2021-04-18 | End: 2021-04-18

## 2021-04-18 RX ORDER — BISACODYL 10 MG
10 SUPPOSITORY, RECTAL RECTAL
Status: DISCONTINUED | OUTPATIENT
Start: 2021-04-18 | End: 2021-04-29 | Stop reason: HOSPADM

## 2021-04-18 RX ADMIN — IOHEXOL 55 ML: 350 INJECTION, SOLUTION INTRAVENOUS at 20:26

## 2021-04-18 RX ADMIN — MORPHINE SULFATE 4 MG: 4 INJECTION INTRAVENOUS at 22:23

## 2021-04-18 RX ADMIN — MORPHINE SULFATE 4 MG: 4 INJECTION INTRAVENOUS at 18:53

## 2021-04-18 RX ADMIN — ONDANSETRON 4 MG: 2 INJECTION INTRAMUSCULAR; INTRAVENOUS at 18:53

## 2021-04-18 ASSESSMENT — LIFESTYLE VARIABLES
HAVE YOU EVER FELT YOU SHOULD CUT DOWN ON YOUR DRINKING: NO
DOES PATIENT WANT TO STOP DRINKING: NO
EVER HAD A DRINK FIRST THING IN THE MORNING TO STEADY YOUR NERVES TO GET RID OF A HANGOVER: NO
EVER FELT BAD OR GUILTY ABOUT YOUR DRINKING: NO
ON A TYPICAL DAY WHEN YOU DRINK ALCOHOL HOW MANY DRINKS DO YOU HAVE: 0
ALCOHOL_USE: NO
HOW MANY TIMES IN THE PAST YEAR HAVE YOU HAD 5 OR MORE DRINKS IN A DAY: 0
TOTAL SCORE: 0
CONSUMPTION TOTAL: NEGATIVE
AVERAGE NUMBER OF DAYS PER WEEK YOU HAVE A DRINK CONTAINING ALCOHOL: 0
SUBSTANCE_ABUSE: 0
TOTAL SCORE: 0
TOTAL SCORE: 0
HAVE PEOPLE ANNOYED YOU BY CRITICIZING YOUR DRINKING: NO

## 2021-04-18 ASSESSMENT — ENCOUNTER SYMPTOMS
WEAKNESS: 0
BACK PAIN: 1
FEVER: 0
CHILLS: 0
ABDOMINAL PAIN: 0
NAUSEA: 0
CONSTIPATION: 0
SORE THROAT: 0
MYALGIAS: 0
NECK PAIN: 1
DIZZINESS: 1
DIARRHEA: 0
PALPITATIONS: 0
VOMITING: 0
BLURRED VISION: 0
COUGH: 0
DEPRESSION: 0
FALLS: 0
DOUBLE VISION: 0
NERVOUS/ANXIOUS: 0
HEADACHES: 0
MEMORY LOSS: 0
SHORTNESS OF BREATH: 1

## 2021-04-18 ASSESSMENT — PATIENT HEALTH QUESTIONNAIRE - PHQ9
2. FEELING DOWN, DEPRESSED, IRRITABLE, OR HOPELESS: NOT AT ALL
1. LITTLE INTEREST OR PLEASURE IN DOING THINGS: NOT AT ALL
SUM OF ALL RESPONSES TO PHQ9 QUESTIONS 1 AND 2: 0

## 2021-04-18 ASSESSMENT — COGNITIVE AND FUNCTIONAL STATUS - GENERAL
SUGGESTED CMS G CODE MODIFIER DAILY ACTIVITY: CH
SUGGESTED CMS G CODE MODIFIER MOBILITY: CJ
DAILY ACTIVITIY SCORE: 24
WALKING IN HOSPITAL ROOM: A LITTLE
TURNING FROM BACK TO SIDE WHILE IN FLAT BAD: A LITTLE
CLIMB 3 TO 5 STEPS WITH RAILING: A LITTLE
MOBILITY SCORE: 21

## 2021-04-18 ASSESSMENT — FIBROSIS 4 INDEX: FIB4 SCORE: 2.211083193570266566

## 2021-04-18 ASSESSMENT — PAIN DESCRIPTION - PAIN TYPE: TYPE: ACUTE PAIN

## 2021-04-19 ENCOUNTER — APPOINTMENT (OUTPATIENT)
Dept: RADIOLOGY | Facility: MEDICAL CENTER | Age: 86
DRG: 247 | End: 2021-04-19
Attending: INTERNAL MEDICINE
Payer: MEDICARE

## 2021-04-19 PROBLEM — R94.31 PROLONGED Q-T INTERVAL ON ECG: Chronic | Status: ACTIVE | Noted: 2021-04-19

## 2021-04-19 PROBLEM — I21.4 NSTEMI (NON-ST ELEVATED MYOCARDIAL INFARCTION) (HCC): Status: ACTIVE | Noted: 2021-04-18

## 2021-04-19 PROBLEM — I95.9 HYPOTENSION: Status: ACTIVE | Noted: 2021-04-19

## 2021-04-19 LAB
ALBUMIN SERPL BCP-MCNC: 3.4 G/DL (ref 3.2–4.9)
ALBUMIN/GLOB SERPL: 1.1 G/DL
ALP SERPL-CCNC: 44 U/L (ref 30–99)
ALT SERPL-CCNC: 17 U/L (ref 2–50)
ANION GAP SERPL CALC-SCNC: 6 MMOL/L (ref 7–16)
ANISOCYTOSIS BLD QL SMEAR: ABNORMAL
APTT PPP: 33 SEC (ref 24.7–36)
AST SERPL-CCNC: 25 U/L (ref 12–45)
BASOPHILS # BLD AUTO: 0.9 % (ref 0–1.8)
BASOPHILS # BLD: 0.1 K/UL (ref 0–0.12)
BILIRUB SERPL-MCNC: 0.4 MG/DL (ref 0.1–1.5)
BUN SERPL-MCNC: 17 MG/DL (ref 8–22)
CALCIUM SERPL-MCNC: 8.7 MG/DL (ref 8.5–10.5)
CHLORIDE SERPL-SCNC: 96 MMOL/L (ref 96–112)
CO2 SERPL-SCNC: 32 MMOL/L (ref 20–33)
CORTIS SERPL-MCNC: 7.3 UG/DL (ref 0–23)
CREAT SERPL-MCNC: 1.03 MG/DL (ref 0.5–1.4)
CRP SERPL HS-MCNC: 1.4 MG/DL (ref 0–0.75)
EKG IMPRESSION: NORMAL
EOSINOPHIL # BLD AUTO: 0.58 K/UL (ref 0–0.51)
EOSINOPHIL NFR BLD: 5.2 % (ref 0–6.9)
ERYTHROCYTE [DISTWIDTH] IN BLOOD BY AUTOMATED COUNT: 52.9 FL (ref 35.9–50)
ERYTHROCYTE [SEDIMENTATION RATE] IN BLOOD BY WESTERGREN METHOD: 7 MM/HOUR (ref 0–20)
GLOBULIN SER CALC-MCNC: 3.2 G/DL (ref 1.9–3.5)
GLUCOSE SERPL-MCNC: 106 MG/DL (ref 65–99)
HCT VFR BLD AUTO: 46.6 % (ref 42–52)
HGB BLD-MCNC: 13.9 G/DL (ref 14–18)
INR PPP: 1 (ref 0.87–1.13)
INR PPP: 1.01 (ref 0.87–1.13)
LV EJECT FRACT  99904: 60
LV EJECT FRACT MOD 2C 99903: 27.28
LV EJECT FRACT MOD 4C 99902: 45.97
LV EJECT FRACT MOD BP 99901: 32.85
LYMPHOCYTES # BLD AUTO: 1.25 K/UL (ref 1–4.8)
LYMPHOCYTES NFR BLD: 11.3 % (ref 22–41)
MACROCYTES BLD QL SMEAR: ABNORMAL
MAGNESIUM SERPL-MCNC: 1.9 MG/DL (ref 1.5–2.5)
MAGNESIUM SERPL-MCNC: 1.9 MG/DL (ref 1.5–2.5)
MANUAL DIFF BLD: NORMAL
MCH RBC QN AUTO: 25.4 PG (ref 27–33)
MCHC RBC AUTO-ENTMCNC: 29.8 G/DL (ref 33.7–35.3)
MCV RBC AUTO: 85 FL (ref 81.4–97.8)
MICROCYTES BLD QL SMEAR: ABNORMAL
MONOCYTES # BLD AUTO: 0.39 K/UL (ref 0–0.85)
MONOCYTES NFR BLD AUTO: 3.5 % (ref 0–13.4)
MORPHOLOGY BLD-IMP: NORMAL
NEUTROPHILS # BLD AUTO: 8.78 K/UL (ref 1.82–7.42)
NEUTROPHILS NFR BLD: 79.1 % (ref 44–72)
NRBC # BLD AUTO: 0 K/UL
NRBC BLD-RTO: 0 /100 WBC
PLATELET # BLD AUTO: 193 K/UL (ref 164–446)
PLATELET BLD QL SMEAR: NORMAL
PMV BLD AUTO: 9.2 FL (ref 9–12.9)
POLYCHROMASIA BLD QL SMEAR: NORMAL
POTASSIUM SERPL-SCNC: 4.1 MMOL/L (ref 3.6–5.5)
PROCALCITONIN SERPL-MCNC: 0.28 NG/ML
PROT SERPL-MCNC: 6.6 G/DL (ref 6–8.2)
PROTHROMBIN TIME: 13.5 SEC (ref 12–14.6)
PROTHROMBIN TIME: 13.6 SEC (ref 12–14.6)
RBC # BLD AUTO: 5.48 M/UL (ref 4.7–6.1)
RBC BLD AUTO: PRESENT
SARS-COV-2 RNA RESP QL NAA+PROBE: NOTDETECTED
SODIUM SERPL-SCNC: 134 MMOL/L (ref 135–145)
SPECIMEN SOURCE: NORMAL
TROPONIN T SERPL-MCNC: 163 NG/L (ref 6–19)
TROPONIN T SERPL-MCNC: 447 NG/L (ref 6–19)
TROPONIN T SERPL-MCNC: 516 NG/L (ref 6–19)
TROPONIN T SERPL-MCNC: 621 NG/L (ref 6–19)
UFH PPP CHRO-ACNC: 0.14 IU/ML
UFH PPP CHRO-ACNC: 0.51 IU/ML
UFH PPP CHRO-ACNC: <0.1 IU/ML
WBC # BLD AUTO: 11.1 K/UL (ref 4.8–10.8)

## 2021-04-19 PROCEDURE — 99291 CRITICAL CARE FIRST HOUR: CPT | Mod: 25 | Performed by: INTERNAL MEDICINE

## 2021-04-19 PROCEDURE — 700105 HCHG RX REV CODE 258: Performed by: INTERNAL MEDICINE

## 2021-04-19 PROCEDURE — 700102 HCHG RX REV CODE 250 W/ 637 OVERRIDE(OP): Performed by: FAMILY MEDICINE

## 2021-04-19 PROCEDURE — 94664 DEMO&/EVAL PT USE INHALER: CPT

## 2021-04-19 PROCEDURE — 700111 HCHG RX REV CODE 636 W/ 250 OVERRIDE (IP): Performed by: INTERNAL MEDICINE

## 2021-04-19 PROCEDURE — A9270 NON-COVERED ITEM OR SERVICE: HCPCS | Performed by: STUDENT IN AN ORGANIZED HEALTH CARE EDUCATION/TRAINING PROGRAM

## 2021-04-19 PROCEDURE — 93005 ELECTROCARDIOGRAM TRACING: CPT | Performed by: STUDENT IN AN ORGANIZED HEALTH CARE EDUCATION/TRAINING PROGRAM

## 2021-04-19 PROCEDURE — 85730 THROMBOPLASTIN TIME PARTIAL: CPT

## 2021-04-19 PROCEDURE — 82533 TOTAL CORTISOL: CPT

## 2021-04-19 PROCEDURE — 99233 SBSQ HOSP IP/OBS HIGH 50: CPT | Performed by: FAMILY MEDICINE

## 2021-04-19 PROCEDURE — 84484 ASSAY OF TROPONIN QUANT: CPT | Mod: 91

## 2021-04-19 PROCEDURE — 93010 ELECTROCARDIOGRAM REPORT: CPT | Mod: 77 | Performed by: INTERNAL MEDICINE

## 2021-04-19 PROCEDURE — 85027 COMPLETE CBC AUTOMATED: CPT

## 2021-04-19 PROCEDURE — 96366 THER/PROPH/DIAG IV INF ADDON: CPT

## 2021-04-19 PROCEDURE — 96375 TX/PRO/DX INJ NEW DRUG ADDON: CPT

## 2021-04-19 PROCEDURE — 85520 HEPARIN ASSAY: CPT | Mod: 91

## 2021-04-19 PROCEDURE — 85610 PROTHROMBIN TIME: CPT

## 2021-04-19 PROCEDURE — A9270 NON-COVERED ITEM OR SERVICE: HCPCS | Performed by: INTERNAL MEDICINE

## 2021-04-19 PROCEDURE — 96365 THER/PROPH/DIAG IV INF INIT: CPT

## 2021-04-19 PROCEDURE — 83735 ASSAY OF MAGNESIUM: CPT

## 2021-04-19 PROCEDURE — 700102 HCHG RX REV CODE 250 W/ 637 OVERRIDE(OP): Performed by: INTERNAL MEDICINE

## 2021-04-19 PROCEDURE — 700111 HCHG RX REV CODE 636 W/ 250 OVERRIDE (IP): Performed by: STUDENT IN AN ORGANIZED HEALTH CARE EDUCATION/TRAINING PROGRAM

## 2021-04-19 PROCEDURE — 96368 THER/DIAG CONCURRENT INF: CPT

## 2021-04-19 PROCEDURE — 85007 BL SMEAR W/DIFF WBC COUNT: CPT

## 2021-04-19 PROCEDURE — 80053 COMPREHEN METABOLIC PANEL: CPT

## 2021-04-19 PROCEDURE — 700102 HCHG RX REV CODE 250 W/ 637 OVERRIDE(OP): Performed by: STUDENT IN AN ORGANIZED HEALTH CARE EDUCATION/TRAINING PROGRAM

## 2021-04-19 PROCEDURE — 99221 1ST HOSP IP/OBS SF/LOW 40: CPT | Mod: GC | Performed by: INTERNAL MEDICINE

## 2021-04-19 PROCEDURE — 96376 TX/PRO/DX INJ SAME DRUG ADON: CPT

## 2021-04-19 PROCEDURE — 99291 CRITICAL CARE FIRST HOUR: CPT | Performed by: INTERNAL MEDICINE

## 2021-04-19 PROCEDURE — 770022 HCHG ROOM/CARE - ICU (200)

## 2021-04-19 PROCEDURE — 93010 ELECTROCARDIOGRAM REPORT: CPT | Performed by: INTERNAL MEDICINE

## 2021-04-19 PROCEDURE — A9270 NON-COVERED ITEM OR SERVICE: HCPCS | Performed by: FAMILY MEDICINE

## 2021-04-19 PROCEDURE — 93306 TTE W/DOPPLER COMPLETE: CPT | Mod: 26 | Performed by: INTERNAL MEDICINE

## 2021-04-19 RX ORDER — MIDODRINE HYDROCHLORIDE 5 MG/1
10 TABLET ORAL
Status: DISCONTINUED | OUTPATIENT
Start: 2021-04-20 | End: 2021-04-19

## 2021-04-19 RX ORDER — CLOPIDOGREL BISULFATE 75 MG/1
300 TABLET ORAL ONCE
Status: COMPLETED | OUTPATIENT
Start: 2021-04-19 | End: 2021-04-19

## 2021-04-19 RX ORDER — HEPARIN SODIUM 5000 [USP'U]/100ML
0-30 INJECTION, SOLUTION INTRAVENOUS CONTINUOUS
Status: DISCONTINUED | OUTPATIENT
Start: 2021-04-19 | End: 2021-04-21

## 2021-04-19 RX ORDER — MORPHINE SULFATE 4 MG/ML
2-4 INJECTION, SOLUTION INTRAMUSCULAR; INTRAVENOUS
Status: DISCONTINUED | OUTPATIENT
Start: 2021-04-19 | End: 2021-04-19

## 2021-04-19 RX ORDER — MAGNESIUM SULFATE HEPTAHYDRATE 40 MG/ML
2 INJECTION, SOLUTION INTRAVENOUS ONCE
Status: COMPLETED | OUTPATIENT
Start: 2021-04-19 | End: 2021-04-19

## 2021-04-19 RX ORDER — HEPARIN SODIUM 1000 [USP'U]/ML
2000 INJECTION, SOLUTION INTRAVENOUS; SUBCUTANEOUS PRN
Status: DISCONTINUED | OUTPATIENT
Start: 2021-04-19 | End: 2021-04-21

## 2021-04-19 RX ORDER — SODIUM CHLORIDE 9 MG/ML
1000 INJECTION, SOLUTION INTRAVENOUS ONCE
Status: COMPLETED | OUTPATIENT
Start: 2021-04-19 | End: 2021-04-20

## 2021-04-19 RX ORDER — LISINOPRIL 20 MG/1
20 TABLET ORAL DAILY
Status: DISCONTINUED | OUTPATIENT
Start: 2021-04-20 | End: 2021-04-19

## 2021-04-19 RX ORDER — SODIUM CHLORIDE 9 MG/ML
500 INJECTION, SOLUTION INTRAVENOUS ONCE
Status: COMPLETED | OUTPATIENT
Start: 2021-04-19 | End: 2021-04-20

## 2021-04-19 RX ORDER — HEPARIN SODIUM 1000 [USP'U]/ML
4000 INJECTION, SOLUTION INTRAVENOUS; SUBCUTANEOUS ONCE
Status: COMPLETED | OUTPATIENT
Start: 2021-04-19 | End: 2021-04-19

## 2021-04-19 RX ORDER — ASPIRIN 325 MG
325 TABLET ORAL DAILY
Status: DISCONTINUED | OUTPATIENT
Start: 2021-04-19 | End: 2021-04-19

## 2021-04-19 RX ORDER — POTASSIUM CHLORIDE 20 MEQ/1
40 TABLET, EXTENDED RELEASE ORAL ONCE
Status: COMPLETED | OUTPATIENT
Start: 2021-04-19 | End: 2021-04-19

## 2021-04-19 RX ORDER — MIDODRINE HYDROCHLORIDE 5 MG/1
10 TABLET ORAL
Status: DISCONTINUED | OUTPATIENT
Start: 2021-04-19 | End: 2021-04-20

## 2021-04-19 RX ORDER — ATORVASTATIN CALCIUM 80 MG/1
80 TABLET, FILM COATED ORAL EVERY EVENING
Status: DISCONTINUED | OUTPATIENT
Start: 2021-04-19 | End: 2021-04-29 | Stop reason: HOSPADM

## 2021-04-19 RX ORDER — ASPIRIN 300 MG/1
300 SUPPOSITORY RECTAL DAILY
Status: DISCONTINUED | OUTPATIENT
Start: 2021-04-19 | End: 2021-04-19

## 2021-04-19 RX ORDER — MORPHINE SULFATE 4 MG/ML
4 INJECTION, SOLUTION INTRAMUSCULAR; INTRAVENOUS
Status: DISCONTINUED | OUTPATIENT
Start: 2021-04-19 | End: 2021-04-19

## 2021-04-19 RX ORDER — HYDROCORTISONE 10 MG/1
10 TABLET ORAL 2 TIMES DAILY
Status: DISCONTINUED | OUTPATIENT
Start: 2021-04-19 | End: 2021-04-19

## 2021-04-19 RX ORDER — CLOPIDOGREL BISULFATE 75 MG/1
75 TABLET ORAL DAILY
Status: DISCONTINUED | OUTPATIENT
Start: 2021-04-20 | End: 2021-04-20

## 2021-04-19 RX ORDER — ASPIRIN 81 MG/1
324 TABLET, CHEWABLE ORAL DAILY
Status: DISCONTINUED | OUTPATIENT
Start: 2021-04-19 | End: 2021-04-19

## 2021-04-19 RX ORDER — MORPHINE SULFATE 4 MG/ML
2-4 INJECTION, SOLUTION INTRAMUSCULAR; INTRAVENOUS
Status: DISCONTINUED | OUTPATIENT
Start: 2021-04-19 | End: 2021-04-21

## 2021-04-19 RX ADMIN — GABAPENTIN 600 MG: 300 CAPSULE ORAL at 00:17

## 2021-04-19 RX ADMIN — SOTALOL HYDROCHLORIDE 40 MG: 80 TABLET ORAL at 00:17

## 2021-04-19 RX ADMIN — DOCUSATE SODIUM 50 MG AND SENNOSIDES 8.6 MG 2 TABLET: 8.6; 5 TABLET, FILM COATED ORAL at 05:49

## 2021-04-19 RX ADMIN — POTASSIUM CHLORIDE 40 MEQ: 1500 TABLET, EXTENDED RELEASE ORAL at 05:49

## 2021-04-19 RX ADMIN — HEPARIN SODIUM 16 UNITS/KG/HR: 5000 INJECTION, SOLUTION INTRAVENOUS at 14:21

## 2021-04-19 RX ADMIN — HYDROCORTISONE SODIUM SUCCINATE 50 MG: 100 INJECTION, POWDER, FOR SOLUTION INTRAMUSCULAR; INTRAVENOUS at 22:19

## 2021-04-19 RX ADMIN — HYDROCORTISONE 10 MG: 10 TABLET ORAL at 05:56

## 2021-04-19 RX ADMIN — HYDROCORTISONE 10 MG: 10 TABLET ORAL at 17:07

## 2021-04-19 RX ADMIN — ASPIRIN 325 MG ORAL TABLET 325 MG: 325 PILL ORAL at 05:49

## 2021-04-19 RX ADMIN — HYDROCODONE BITARTRATE AND ACETAMINOPHEN 1 TABLET: 5; 325 TABLET ORAL at 01:32

## 2021-04-19 RX ADMIN — MORPHINE SULFATE 2 MG: 4 INJECTION INTRAVENOUS at 23:09

## 2021-04-19 RX ADMIN — DOCUSATE SODIUM 50 MG AND SENNOSIDES 8.6 MG 2 TABLET: 8.6; 5 TABLET, FILM COATED ORAL at 17:06

## 2021-04-19 RX ADMIN — MONTELUKAST 10 MG: 10 TABLET, FILM COATED ORAL at 05:49

## 2021-04-19 RX ADMIN — CLOPIDOGREL BISULFATE 300 MG: 75 TABLET ORAL at 16:14

## 2021-04-19 RX ADMIN — HYDROCODONE BITARTRATE AND ACETAMINOPHEN 1 TABLET: 5; 325 TABLET ORAL at 19:02

## 2021-04-19 RX ADMIN — HYDROCORTISONE 10 MG: 10 TABLET ORAL at 00:17

## 2021-04-19 RX ADMIN — SODIUM CHLORIDE 500 ML: 9 INJECTION, SOLUTION INTRAVENOUS at 22:43

## 2021-04-19 RX ADMIN — HEPARIN SODIUM 4000 UNITS: 1000 INJECTION, SOLUTION INTRAVENOUS; SUBCUTANEOUS at 05:50

## 2021-04-19 RX ADMIN — ATORVASTATIN CALCIUM 80 MG: 80 TABLET, FILM COATED ORAL at 17:06

## 2021-04-19 RX ADMIN — HEPARIN SODIUM 2000 UNITS: 1000 INJECTION, SOLUTION INTRAVENOUS; SUBCUTANEOUS at 14:18

## 2021-04-19 RX ADMIN — OMEPRAZOLE 20 MG: 20 CAPSULE, DELAYED RELEASE ORAL at 17:06

## 2021-04-19 RX ADMIN — TAMSULOSIN HYDROCHLORIDE 0.4 MG: 0.4 CAPSULE ORAL at 05:49

## 2021-04-19 RX ADMIN — HEPARIN SODIUM 12 UNITS/KG/HR: 5000 INJECTION, SOLUTION INTRAVENOUS at 05:50

## 2021-04-19 RX ADMIN — MIDODRINE HYDROCHLORIDE 10 MG: 5 TABLET ORAL at 21:13

## 2021-04-19 RX ADMIN — MAGNESIUM SULFATE 2 G: 2 INJECTION INTRAVENOUS at 05:50

## 2021-04-19 RX ADMIN — LISINOPRIL 30 MG: 20 TABLET ORAL at 05:49

## 2021-04-19 RX ADMIN — LEVOTHYROXINE SODIUM 75 MCG: 0.07 TABLET ORAL at 05:49

## 2021-04-19 RX ADMIN — OMEPRAZOLE 20 MG: 20 CAPSULE, DELAYED RELEASE ORAL at 05:49

## 2021-04-19 RX ADMIN — SODIUM CHLORIDE 1000 ML: 9 INJECTION, SOLUTION INTRAVENOUS at 15:03

## 2021-04-19 RX ADMIN — GABAPENTIN 600 MG: 300 CAPSULE ORAL at 17:07

## 2021-04-19 ASSESSMENT — ENCOUNTER SYMPTOMS
DIZZINESS: 0
DOUBLE VISION: 0
HEADACHES: 0
DIZZINESS: 1
MYALGIAS: 0
WHEEZING: 0
SHORTNESS OF BREATH: 1
FEVER: 0
SORE THROAT: 0
CHILLS: 0
VOMITING: 0
PHOTOPHOBIA: 0
NECK PAIN: 0
BLURRED VISION: 0
NERVOUS/ANXIOUS: 0
NAUSEA: 0
COUGH: 0
PALPITATIONS: 0
ORTHOPNEA: 0
HEMOPTYSIS: 0
ABDOMINAL PAIN: 0
PND: 0
DEPRESSION: 0
HEARTBURN: 0
SHORTNESS OF BREATH: 0
LOSS OF CONSCIOUSNESS: 0
DIARRHEA: 0

## 2021-04-19 ASSESSMENT — PAIN DESCRIPTION - PAIN TYPE
TYPE: ACUTE PAIN

## 2021-04-19 ASSESSMENT — LIFESTYLE VARIABLES: SUBSTANCE_ABUSE: 0

## 2021-04-19 NOTE — PROGRESS NOTES
Notified by lab of critical:     Trop 163     Hospitalist Cheri notified. Orders to start heparin drip

## 2021-04-19 NOTE — CARE PLAN
Fall risk assessed, fall precautions in place, bed alarm on, pt verbalizes understanding of fall risk.  Pt and family educated on POC, all questions answered in regards to disease process, treatment and diet. Pt and family verbalize understanding and voice no further questions at this time.  Pt receiving prn pain medication as ordered per MD. Pt also using distraction to decrease pain.

## 2021-04-19 NOTE — PROGRESS NOTES
2 RN Skin Check    2 RN skin check completed with ОЛЬГА Cazares.   Devices in place: Nasal Cannula, glasses, tele box.  Skin assessed under devices: yes.  No Confirmed pressure ulcers found   No New potential pressure ulcers noted  Wound consult placed Yes.  The following interventions in place Pillows.    Sacrum red, but blanching   Left upper back scabbing   Bilateral dry flaky heels   Bilateral CDI, healed knee scars  Dry, flaky face   Red, blanching pannus   L lower chest/breast area red, blanching   L ear: yellow, red, brown, partial healing ulcer, skin cancer, open   Posterior R ear: open, blistered, red, yellow  Bilateral bridge of nose where glasses sit: red, blanching

## 2021-04-19 NOTE — ASSESSMENT & PLAN NOTE
Continues to have urinary frequency.  Continue on tamsulosin.  Follows Dr Gonzales outpatient urology

## 2021-04-19 NOTE — ASSESSMENT & PLAN NOTE
Mildly elevated troponin in setting of chest pain concerning.    Serial troponins and EKG.  Low threshold to start full anticoagulation.

## 2021-04-19 NOTE — ASSESSMENT & PLAN NOTE
Secondary to third-degree blocks status post pacemaker placement.  Continue telemetry monitoring.  Continue home sotalol 40mg BID

## 2021-04-19 NOTE — ASSESSMENT & PLAN NOTE
Secondary to chronic steroid use and hx of panhypopituitary per note  He was on hydrocortisone 10 mg BID as an outpatient  Due to hypotension, he had been started on IV hydrocortisone 50 mg q 6 hours which will change back to oral home dose on 4/21  Continue home dose Cortef.

## 2021-04-19 NOTE — ASSESSMENT & PLAN NOTE
As per history at least 8-9 PEs in the past.    Hx of having an IVC filter in place.  Initially he was holding warfarin for right ear basal cell carcinoma removal in 2 days.  4/22 stopped lovenox and warfarin due to hematuria  CTA with PE protocol from the ER was negative for PE.  4/27: Start heparin drip and Coumadin.

## 2021-04-19 NOTE — THERAPY
Missed Therapy     Patient Name: Jeffrey Lizama  Age:  88 y.o., Sex:  male  Medical Record #: [id number]  Today's Date: 4/19/2021     Pt order received, pt is pending cardiac cath tomorrow and has up trending troponins. PT eval will hold until after [facility].    Brittny Waddell PT, DPT

## 2021-04-19 NOTE — ASSESSMENT & PLAN NOTE
With chronic respiratory failure.  On 5 L of oxygen via nasal cannula at home.  Currently not in acute exacerbation clinically and at baseline with respiratory status.  Bronchodilators as needed per RT protocol.

## 2021-04-19 NOTE — ED NOTES
Med rec updated and complete. Allergies reviewed.  Met with pt at bedside. Pt denies antibiotic use in last 14 days.     Home pharmacy [facility] [phone]

## 2021-04-19 NOTE — CONSULTS
"Consult     Date of Admission: 4/19/2021  Admission Status: Observation-Outpatient  Attending: Dr. Yanez  Senior Resident: Dr. Thompson      Chief Complaint: chest pain     History of Present Illness (HPI):   \"88 y.o. male who presented 4/18/2021 with chest pain at rest, which started 1:30 PM while he was sitting in front of the computer today.  This is a pleasant gentleman with a complex medical history including 8 PEs in the past status post IVC placement on warfarin, which she stopped taking 2 days ago in preparation for removal of basal cell carcinoma from his right ear.  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  He is followed closely by [facility] cardiology.\"     Patient reports that the chest pain is substernal and pressure like without radiation. Has not felt this type of pain in the past. At its worst was 8/10. Currently continues to have mild substernal pressure. Episodes are intermittent every 2 hours and can last up to 45 minutes and sometimes completely resolve. Exertion does not change his symptoms. He reports chronic dizziness with room spinning sensation that comes and goes and has been going on for several years. Denied nausea, vomiting, abdominal pain, diaphoresis. No other exacerbating or alleviating factors.  Deep breathing does not make it worse or better.  He denies any cough.  No palpitations.  He denies any orthopnea, peripheral edema, and paroxysmal nocturnal dyspnea.  His chronic shortness of breath and dizziness improves with sitting and resting.  He ambulates with a walker.  Denies any family history of early myocardial infarction. Denies personal known history of CAD, MI, and prior cardiac catheterization.      Per record " review, on his last cardiology visit, he was noted to have significant amount of PVCs with worsening of shortness of breath and fatigue on Multaq.  Multaq was stopped. He is now on sotalol therapy     Review of Systems:   Review of Systems   Constitutional: Positive for malaise/fatigue. Negative for chills and fever.   HENT: Negative for congestion and sore throat.    Respiratory: Negative for cough, shortness of breath and wheezing.    Cardiovascular: Positive for chest pain. Negative for palpitations, orthopnea, leg swelling and PND.   Gastrointestinal: Negative for abdominal pain, diarrhea, nausea and vomiting.   Neurological: Positive for dizziness. Negative for loss of consciousness and headaches.   Psychiatric/Behavioral: Negative for substance abuse. The patient is not nervous/anxious.          Past Medical History:   Past Medical History was reviewed with patient.   has a past medical history of Anesthesia, Arrhythmia, Arthritis, Asbestos exposure, ASTHMA, Back pain, Benign neoplasm of pituitary gland and craniopharyngeal duct (pouch) ([facility]) (4/1/2010), BPH (benign prostatic hypertrophy) (4/1/2010), Bradycardia, Breath shortness, Bronchitis, Cardiac pacemaker (04 2010), Cataract, COPD (chronic obstructive pulmonary disease) ([facility]), Diverticulitis, DJD (degenerative joint disease), EMPHYSEMA, Glaucoma, Heart burn, Heart murmur, Hiatus hernia syndrome, Hypertension, 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), Indigestion, Knee arthroplasty (2002), Obstructive hypertrophic cardiomyopathy ([facility]) (8/4/2011), PAF (paroxysmal atrial fibrillation) ([facility]), Paroxysmal atrial fibrillation ([facility]) (8/29/2011), PE (pulmonary embolism), Phlebitis, Pituitary adenoma ([facility]) (1995), Pituitary adenoma ([facility]) (1995), Pneumonia, Rheumatic fever, S/P insertion of IVC (inferior vena caval) filter, S/P parathyroidectomy, S/P parathyroidectomy (2005), Sleep apnea, SSS (sick sinus syndrome) ([facility]) (8/29/2011), Third degree AV block ([facility]) (8/29/2011),  thyroidectomy (2005), Unspecified disorder of thyroid, Unspecified hemorrhagic conditions, Unspecified urinary incontinence, and Urinary bladder disorder.    Past Surgical History: Past Surgical History was reviewed with patient.   has a past surgical history that includes other orthopedic surgery; cholecystectomy; pr total knee arthroplasty; other; cervical disk and fusion anterior; pr revise ulnar nerve at wrist; pr total knee arthroplasty; cataract extraction with iol; thyroidectomy; pacemaker insertion (Left, 04/23/2010); carpal tunnel endoscopic (9/30/2011); knee revision total (6/20/2013); cholecystectomy; cervical fusion posterior; gastroscopy-endo (1/21/2019); gastroscopy-endo (1/22/2019); and other cardiac surgery.    Medications: Medications have been reviewed with patient.  Prior to Admission Medications   Prescriptions Last Dose Informant Patient Reported? Taking?   HYDROcodone-acetaminophen (NORCO) 5-325 MG Tab per tablet 4/18/2021 at 0800 Patient Yes No   Sig: Take 1 tablet by mouth every 8 hours as needed. Indications: Pain   Vitamin D, Cholecalciferol, 50 MCG (2000 UT) Cap 4/18/2021 at 0830 Patient Yes Yes   Sig: Take 2,000 mcg by mouth every day.   albuterol 108 (90 Base) MCG/ACT Aero Soln inhalation aerosol Not Taking at Unknown time Patient No No   Sig: Inhale 2 Puffs by mouth every 6 hours as needed for Shortness of Breath.   Patient not taking: Reported on 4/18/2021   gabapentin (NEURONTIN) 600 MG tablet 4/17/2021 at 2030 Patient No No   Sig: Take 1.5 Tabs by mouth every bedtime.   Patient taking differently: Take 600 mg by mouth at bedtime.   hydrocortisone (CORTEF) 10 MG Tab 4/18/2021 at 0800 Patient No No   Sig: Take 1 Tab by mouth 3 times a day.   Patient taking differently: Take 10 mg by mouth 2 times a day.   levothyroxine (SYNTHROID) 75 MCG Tab 4/18/2021 at 0730 Patient No No   Sig: Take 1 Tab by mouth Every morning on an empty stomach.   montelukast (SINGULAIR) 10 MG Tab 4/17/2021 at  2030 Patient No No   Sig: Take 1 Tab by mouth every day.   Patient taking differently: Take 10 mg by mouth at bedtime.   omeprazole (PRILOSEC) 20 MG delayed-release capsule 4/18/2021 at 0800 Patient No No   Sig: Take 1 Cap by mouth 2 times a day.   sotalol (BETAPACE) 80 MG Tab 4/18/2021 at 0800 Patient No No   Sig: Take 0.5 Tabs by mouth 2 times a day.   tamsulosin (FLOMAX) 0.4 MG capsule 4/18/2021 at 0800 Patient No No   Sig: Take 1 Cap by mouth every day.   tiotropium (SPIRIVA) 18 MCG Cap Not Taking at Unknown time Patient No No   Sig: Inhale 1 Cap by mouth every day.   Patient not taking: Reported on 4/18/2021   warfarin (COUMADIN) 5 MG Tab 4/17/2021 at 2030 Patient No No   Sig: Take 1 Tab by mouth every day.   Patient taking differently: Take 5 mg by mouth every evening.      Facility-Administered Medications: None        Allergies: Allergies have been reviewed with patient.  Allergies   Allergen Reactions   • Pcn [Penicillins] Rash     Tolerates cephalosporins         Family History:   family history includes Arthritis in his father and mother.   No significant family hx for CAD     Social History:   Tobacco: tried when he was young, none in many years    Alcohol: denied   Recreational drugs (illegal and prescription):  denied   Activity Level: ambulates with a walker   Living situation:  Home with wife   Primary Care Provider: reviewed Bebe Banerjee M.D.  Physical Exam:   Vitals:  Temp:  [36.1 °C (96.9 °F)-36.9 °C (98.4 °F)] 36.1 °C (97 °F)  Pulse:  [69-77] 70  Resp:  [12-18] 18  BP: ()/(53-94) 70/53  SpO2:  [92 %-100 %] 99 %    Physical Exam  Constitutional:       General: He is not in acute distress.     Appearance: He is obese. He is not ill-appearing, toxic-appearing or diaphoretic.   HENT:      Head: Normocephalic and atraumatic.      Nose: Nose normal.      Mouth/Throat:      Mouth: Mucous membranes are moist.   Eyes:      Extraocular Movements: Extraocular movements intact.      Pupils:  Pupils are equal, round, and reactive to light.   Cardiovascular:      Rate and Rhythm: Normal rate and regular rhythm.      Pulses: Normal pulses.      Heart sounds: Murmur (Crescendo  systolic murmur present with a grade of 3/6.) present. No friction rub. No gallop.       Comments: Non tender on palpation   Pulmonary:      Effort: Pulmonary effort is normal. No respiratory distress.      Breath sounds: No wheezing, rhonchi or rales.   Chest:      Chest wall: No tenderness.   Abdominal:      General: Bowel sounds are normal. There is no distension.      Palpations: Abdomen is soft.      Tenderness: There is no abdominal tenderness. There is no guarding or rebound.      Comments: obese   Musculoskeletal:         General: Normal range of motion.      Cervical back: Normal range of motion and neck supple.      Right lower leg: No edema.      Left lower leg: No edema.   Skin:     General: Skin is warm.      Findings: No lesion or rash.   Neurological:      General: No focal deficit present.      Mental Status: He is alert and oriented to person, place, and time.   Psychiatric:         Mood and Affect: Mood normal.         Behavior: Behavior normal.         Thought Content: Thought content normal.         Judgment: Judgment normal.         Labs:   Recent Labs     04/18/21 1844 04/19/21  0102   WBC 12.7* 11.1*   RBC 5.16 5.48   HEMOGLOBIN 13.1* 13.9*   HEMATOCRIT 43.5 46.6   MCV 84.3 85.0   MCH 25.4* 25.4*   RDW 52.8* 52.9*   PLATELETCT 216 193   MPV 9.7 9.2   NEUTSPOLYS 73.50* 79.10*   LYMPHOCYTES 11.70* 11.30*   MONOCYTES 8.10 3.50   EOSINOPHILS 5.20 5.20   BASOPHILS 0.90 0.90   RBCMORPHOLO  --  Present     Recent Labs     04/18/21  1844 04/19/21  0102   SODIUM 134* 134*   POTASSIUM 4.4 4.1   CHLORIDE 96 96   CO2 30 32   GLUCOSE 93 106*   BUN 16 17         EKG: Per my read, NSR 72, QTc 504, LBBB, Multiple PVCs, no obvious ischemic changes     Imaging:   EC-ECHOCARDIOGRAM COMPLETE W/O CONT   Final Result      CT-CTA  CHEST PULMONARY ARTERY W/ RECONS   Final Result         1.  No evidence of pulmonary embolism.   2.  Mild cardiomegaly.   3.  Atherosclerosis.   4.  Mild 4.2 cm ascending thoracic aortic aneurysm.   5.  Hypoinflation, elevation of left hemidiaphragm with bibasilar atelectasis. No significant consolidation or pleural effusion.   6.  4 mm noncalcified right middle lobe pulmonary nodule. Follow-up per Fleischner criteria is clinically indicated.      Fleischner Society pulmonary nodule recommendations:   Low Risk: No routine follow-up      High Risk: Optional CT at 12 months      Comments: Nodules less than 6 mm do not require routine follow-up, but certain patients at high risk with suspicious nodule morphology, upper lobe location, or both may warrant 12-month follow-up.      Low Risk - Minimal or absent history of smoking and of other known risk factors.      High Risk - History of smoking or of other known risk factors.      Note: These recommendations do not apply to lung cancer screening, patients with immunosuppression, or patients with known primary cancer.      Fleischner Society 2017 Guidelines for Management of Incidentally Detected Pulmonary Nodules in Adults      DX-CHEST-PORTABLE (1 VIEW)   Final Result      1.  Hypoinflation with LEFT lung base atelectasis or infiltrate, possibly chronic.   2.  No overt pulmonary edema.            Previous Data Review: reviewed    Assessment and Plan:    * Chest pain/possible NSTEMI- (present on admission)  Assessment & Plan    New onset chest pain substernal pressure, non radiating 8/10 at its worse that started at rest at 1:30pm on 4/18/21. Has not had this pain in the past.   troponin is mildly elevated on admission and has increased.  He has a left bundle elida block on EKGs.  He has multiple PVCs on EKG, which could be a possible contributor    HEART Score 5, Risk of MACE of 12-16.6%  Pt denies previous hx of heart catheterization, no hx found in the chart      Plan:  Continue telemetry monitoring   Serial troponins and EKGs  Aspirin   Start BB and high intensity statin   Continue home sotalol and lisinopril   Continue heparin drip   NPO at midnight for cardiac catheterization in the am     Elevated troponin- (present on admission)  Assessment & Plan  Mildly elevated troponin in setting of chest pain concerning for NSTEMI  Troponin increased from   Serial troponins and EKG  On heparin drip   Will add BB with parameters and high intensity statin   NPO at midnight for cardiac catherization in the morning     Aortic stenosis- (present on admission)  Assessment & Plan  Mild aortic stenosis   Area 2.6cm2  Peak velocity 2m/s  Peak gradient 15.8 mmHg     Frequent PVCs- (present on admission)  Assessment & Plan  Patient continues to have frequent PVCs on EKG.    Continue telemetry monitoring.  Optimize electrolytes for potassium goal greater than 4.5 and magnesium goal greater than 2.0.    Obstructive hypertrophic cardiomyopathy (HCC)- (present on admission)  Assessment & Plan  As per history.   - currently on: (sotalol 80 mg) 40 mg q12h    Repeat echocardiogram in setting of new dizziness and dyspnea on exertion shows stable ECHO when compared to one in 2019  Continue home sotalol    SSS (sick sinus syndrome) (HCC)- (present on admission)  Assessment & Plan  Secondary to third-degree blocks status post pacemaker placement.    Continue telemetry monitoring.  Continue home sotalol.    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  Reports last PE 5-6 years ago    Continue to hold home warfarin.  Started on heparin drip for rising troponin in setting of chest pain     Essential hypertension, benign- (present on admission)  Assessment & Plan  goal BP < 130/80 mmHg   - 24hr BP range: (154-125)/(94-80) mmHg  States his BP at home runs 130-135/70-80, occasionally experiences  hypotension and skips taking his lisinopril on those days     Continue home lisinopril and sotalol    Prolonged Q-T interval on ECG- (present on admission)  Assessment & Plan  In sotalol use.    Avoid QTC prolonging medications    Cardiac pacemaker in situ- (present on admission)  Assessment & Plan  Placed for sick sinus syndrome third-degree block.  History of multiple PVCs.    Telemetry monitoring  Serial EKGs    History of pulmonary embolism- (present on admission)  Assessment & Plan  As per history at least 8-9 PEs in the past.  He has a IVC filter in place.  Holding warfarin for right ear basal cell carcinoma removal in 2 days.  CTA with PE protocol from the ER was negative for PE.    Continue to hold warfarin.  On heparin drip      Adrenal insufficiency (HCC)- (present on admission)  Assessment & Plan  As per history secondary to chronic steroid use.    Continue home steroids for hypopituitarism.    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  Status post resection of pituitary adenoma in 1998    Continue home hydrocortisone and levothyroxine

## 2021-04-19 NOTE — CARE PLAN
Problem: Respiratory:  Goal: Respiratory status will improve  Outcome: PROGRESSING AS EXPECTED     Problem: Pain Management  Goal: Pain level will decrease to patient's comfort goal  Outcome: PROGRESSING AS EXPECTED   Educated pt on pain scale and pain control options. Pt denying pain medication at this time.     Problem: Communication  Goal: The ability to communicate needs accurately and effectively will improve  Outcome: PROGRESSING AS EXPECTED     Problem: Safety  Goal: Will remain free from injury  Outcome: PROGRESSING AS EXPECTED  Goal: Will remain free from falls  Outcome: PROGRESSING AS EXPECTED   Educated pt not to get out of bed without staff present as he requires assistance.

## 2021-04-19 NOTE — RESPIRATORY CARE
"COPD EDUCATION by COPD CLINICAL EDUCATOR  4/19/2021  at  2:15 PM by Shantel Larkin, RRT     Patient interviewed by COPD education team.  Patient unable to participate in full program.  Short intervention has been conducted.  A comprehensive packet including information about COPD and home treatment. Patient is a non-smoker.     COPD Assessment  COPD Clinical Specialists ONLY  COPD Education Initiated: Yes--Short Intervention(refuses full)  Physician Name: vishnu Joseph [facility]  Pulmonologist Name: lindy Donaldson 2016, will need a new referral to be seen. notified MD.  Referrals Initiated: Yes  Pulmonary Rehab: No  Smoking Cessation: N/A  Hospice: N/A  Home Health Care: Declined(has a private caregiver)  [facility] Outreach: N/A  [facility]: N/A  [facility]  Private In-Home Care Agency: N/A  Is this a COPD exacerbation patient?: No  $ Demo/Eval of SVN's, MDI's and Aerosols: Yes  (OP) Pulmonary Function Testing: Yes(2016)    Meds to Beds  Would the patient like to opt in for Bedside Medication Delivery at Discharge?: Yes, interested     MY COPD ACTION PLAN     It is recommended that patients and physicians /healthcare providers complete this action plan together. This plan should be discussed at each physician visit and updated as needed.    The green, yellow and red zones show groups of symptoms of COPD. This list of symptoms is not comprehensive, and you may experience other symptoms. In the \"Actions\" column, your healthcare provider has recommended actions for you to take based on your symptoms.    Patient Name: Jeffrey Lizama   YOB: 1932   Last Updated on:     Green Zone:  I am doing well today Actions   •  Usual activitiy and exercise level •  Take daily medications   •  Usual amounts of cough and phlegm/mucus •  Use oxygen as prescribed   •  Sleep well at night •  Continue regular exercise/diet plan   •  Appetite is good •  At all times avoid cigarette " "smoke, inhaled irritants     Daily Medications (these medications are taken every day):                Yellow Zone:  I am having a bad day or a COPD flare Actions   •  More breathless than usual •  Continue daily medications   •  I have less energy for my daily activities •  Use quick relief inhaler as ordered   •  Increased or thicker phlegm/mucus •  Use oxygen as prescribed   •  Using quick relief inhaler/nebulizer more often •  Get plenty of rest   •  Swelling of ankles more than usual •  Use pursed lip breathing   •  More coughing than usual •  At all times avoid cigarette smoke, inhaled irritants   •  I feel like I have a \"chest cold\"   •  Poor sleep and my symptoms woke me up   •  My appetite is not good   •  My medicine is not helping    •  Call provider immediately if symptoms don’t improve     Continue daily medications, add rescue medications:               Medications to be used during a flare up, (as Discussed with Provider):              Red Zone:  I need urgent medical care Actions   •  Severe shortness of breath even at rest •  Call 911 or seek medical care immediately   •  Not able to do any activity because of breathing    •  Fever or shaking chills    •  Feeling confused or very drowsy     •  Chest pains    •  Coughing up blood              "

## 2021-04-19 NOTE — ASSESSMENT & PLAN NOTE
As per history.   currently on: sotalol 40 mg q12h  Repeat echocardiogram unchanged  4/18 Echo: Compared to the images of the study done 1/22/2019  - there has been no   significant change..   Akinesis of apex, hypokinesis of apical septal wall may be from   ventricular pacing verus prior infarct.  Normal right ventricular size and systolic function.  Mild mitral regurgitation.  Mild aortic stenosis.  Mild aortic insufficiency.

## 2021-04-19 NOTE — ASSESSMENT & PLAN NOTE
Placed for sick sinus syndrome third-degree block.  History of multiple PVCs.  Telemetry monitoring.

## 2021-04-19 NOTE — ED TRIAGE NOTES
Chief Complaint   Patient presents with   • Chest Pain     Pt brought in by med flight c/o chest pain that began at 1500 while at rest.  States pain is in center of chest, non radiating.  Feels like he was kicked in the chest.  Denies N/V, diaphoresis, sob.  PTA pt received 0.4 ntg SL X 2 with moderate relief.  Connected to cardiac monitor, BP cuff, and continuous pulse ox upon arrival.  Pt in gown, call light in reach, bed in lowest position.  Chart up for ERP.

## 2021-04-19 NOTE — ASSESSMENT & PLAN NOTE
I have discussed the case with the ER physician, Dr. Sotelo, and agree with need to admit the patient for continued observation under telemetry monitoring given his significant and complex cardiac history.  Chest pain is atypical in nature.  However, troponin is mildly elevated.  He has a left bundle elida block on EKGs.  He has multiple PVCs on EKG, which could be a possible contributor.    HEART Score 5, Risk of MACE of 12-16.6%.    Admit to telemetry for further monitoring.  Serial troponins and EKG.  Given complex cardiac history, recommend consulting cardiology in the morning for consideration of cardiac catheterization rather than proceeding with a stress test.  Start aspirin full dose now and daily.  Continue home cardiac medications including sotalol.  Continue to hold warfarin.

## 2021-04-19 NOTE — H&P
[facility] Medicine History & Physical Note    Date of Service  4/18/2021    Primary Care Physician  Bebe Banerjee M.D.    Consultants      Code Status  Full Code    Chief Complaint  Chief Complaint   Patient presents with   • Chest Pain       History of Presenting Illness  88 y.o. male who presented 4/18/2021 with chest pain at rest, which started 1:30 PM while he was sitting in front of the computer today.  This is a pleasant gentleman with a complex medical history including 8 PEs in the past status post IVC placement on warfarin, which she stopped taking 2 days ago in preparation for removal of basal cell carcinoma from his right ear.  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  He is followed closely by [facility] cardiology.      Patient reports that the chest pain is substernal and dull in nature and that he has never had this type of chest pain prior.  He denies any radiation of the chest pain.  At its worst it was 8/10, and intensity has been fluctuating up and down for 3 to 4 hours.  He is continue to have some chest pain here in the ER.  No nausea or vomiting.  No diaphoresis..  He denies any exertional chest pain.  No other exacerbating or alleviating factors.  Deep breathing does not make it worse or better.  He denies any cough.  No palpitations.  Interestingly, he also reports development of dyspnea on exertion as well as associated dizziness approximately 1.5 months ago.  He denies any orthopnea or paroxysmal nocturnal dyspnea.  The shortness of breath and dizziness improves with sitting and resting.  He ambulates with a walker.  Denies any family history of early myocardial infarction.    Per record review, on his last cardiology visit,  he was noted to have significant amount of PVCs with worsening of shortness of breath and fatigue on Multaq.  He was continued on his chronic sotalol therapy.    Request was made for admission of the patient for monitoring and work-up of his chest pain.  CTA with PE protocol was negative.        Review of Systems  Review of Systems   Constitutional: Negative for chills and fever.   HENT: Negative for ear pain and sore throat.    Eyes: Negative for blurred vision and double vision.   Respiratory: Positive for shortness of breath. Negative for cough.    Cardiovascular: Positive for chest pain. Negative for palpitations.   Gastrointestinal: Negative for abdominal pain, constipation, diarrhea, nausea and vomiting.   Genitourinary: Positive for frequency. Negative for dysuria.   Musculoskeletal: Positive for back pain, joint pain and neck pain. Negative for falls and myalgias.   Skin: Negative for itching and rash.   Neurological: Positive for dizziness. Negative for weakness and headaches.   Psychiatric/Behavioral: Negative for depression, memory loss and substance abuse. The patient is not nervous/anxious.        Past Medical History   has a past medical history of Anesthesia, Arrhythmia, Arthritis, Asbestos exposure, ASTHMA, Back pain, Benign neoplasm of pituitary gland and craniopharyngeal duct (pouch) ([facility]) (4/1/2010), BPH (benign prostatic hypertrophy) (4/1/2010), Bradycardia, Breath shortness, Bronchitis, Cardiac pacemaker (04 2010), Cataract, COPD (chronic obstructive pulmonary disease) ([facility]), Diverticulitis, DJD (degenerative joint disease), EMPHYSEMA, Glaucoma, Heart burn, Heart murmur, Hiatus hernia syndrome, Hypertension, 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[facility]) (1995), Indigestion, Knee arthroplasty (2002), Obstructive hypertrophic cardiomyopathy ([facility]) (8/4/2011), PAF (paroxysmal atrial fibrillation) ([facility]), Paroxysmal atrial fibrillation ([facility]) (8/29/2011), PE (pulmonary embolism), Phlebitis, Pituitary adenoma ([facility]) (1995),  Pituitary adenoma ([facility]) (1995), Pneumonia, Rheumatic fever, S/P insertion of IVC (inferior vena caval) filter, S/P parathyroidectomy, S/P parathyroidectomy (2005), Sleep apnea, SSS (sick sinus syndrome) ([facility]) (8/29/2011), Third degree AV block ([facility]) (8/29/2011), thyroidectomy (2005), Unspecified disorder of thyroid, Unspecified hemorrhagic conditions, Unspecified urinary incontinence, and Urinary bladder disorder.    Surgical History   has a past surgical history that includes other orthopedic surgery; cholecystectomy; pr total knee arthroplasty; other; cervical disk and fusion anterior; pr revise ulnar nerve at wrist; pr total knee arthroplasty; cataract extraction with iol; thyroidectomy; pacemaker insertion (Left, 04/23/2010); carpal tunnel endoscopic (9/30/2011); knee revision total (6/20/2013); cholecystectomy; cervical fusion posterior; gastroscopy-endo (1/21/2019); gastroscopy-endo (1/22/2019); and other cardiac surgery.     Family History  family history includes Arthritis in his father and mother.     Social History   reports that he has never smoked. He has never used smokeless tobacco. He reports that he does not drink alcohol and does not use drugs.    Allergies  Allergies   Allergen Reactions   • Pcn [Penicillins] Rash     Tolerates cephalosporins         Medications  Prior to Admission Medications   Prescriptions Last Dose Informant Patient Reported? Taking?   HYDROcodone-acetaminophen (NORCO) 5-325 MG Tab per tablet  Patient Yes No   Sig: Take 1 Tab by mouth 2 times a day as needed.   albuterol 108 (90 Base) MCG/ACT Aero Soln inhalation aerosol  Patient No No   Sig: Inhale 2 Puffs by mouth every 6 hours as needed for Shortness of Breath.   gabapentin (NEURONTIN) 600 MG tablet  Patient No No   Sig: Take 1.5 Tabs by mouth every bedtime.   hydrocortisone (CORTEF) 10 MG Tab  Patient No No   Sig: Take 1 Tab by mouth 3 times a day.   levothyroxine (SYNTHROID) 75 MCG Tab  Patient No No   Sig: Take 1 Tab by  mouth Every morning on an empty stomach.   lisinopril (PRINIVIL, ZESTRIL) 30 MG tablet  Patient Yes No   Sig: Take 30 mg by mouth every day.   montelukast (SINGULAIR) 10 MG Tab  Patient No No   Sig: Take 1 Tab by mouth every day.   omeprazole (PRILOSEC) 20 MG delayed-release capsule  Patient No No   Sig: Take 1 Cap by mouth 2 times a day.   sotalol (BETAPACE) 80 MG Tab  Patient No No   Sig: Take 0.5 Tabs by mouth 2 times a day.   tamsulosin (FLOMAX) 0.4 MG capsule  Patient No No   Sig: Take 1 Cap by mouth every day.   tiotropium (SPIRIVA) 18 MCG Cap  Patient No No   Sig: Inhale 1 Cap by mouth every day.   warfarin (COUMADIN) 5 MG Tab  Patient No No   Sig: Take 1 Tab by mouth every day.      Facility-Administered Medications: None       Physical Exam  Temp:  [36.9 °C (98.4 °F)] 36.9 °C (98.4 °F)  Pulse:  [69-74] 71  Resp:  [12-18] 13  BP: (134-154)/(80-94) 134/80  SpO2:  [95 %-100 %] 100 %    Physical Exam  Vitals and nursing note reviewed.   Constitutional:       General: He is not in acute distress.     Appearance: Normal appearance. He is well-developed. He is not diaphoretic.   HENT:      Head: Normocephalic and atraumatic.      Right Ear: External ear normal.      Left Ear: External ear normal.      Nose: Nose normal.      Mouth/Throat:      Pharynx: Oropharynx is clear. No oropharyngeal exudate or posterior oropharyngeal erythema.   Eyes:      General: No scleral icterus.        Right eye: No discharge.         Left eye: No discharge.      Conjunctiva/sclera: Conjunctivae normal.      Pupils: Pupils are equal, round, and reactive to light.   Neck:      Thyroid: No thyromegaly.      Vascular: No JVD.      Trachea: No tracheal deviation.   Cardiovascular:      Rate and Rhythm: Normal rate and regular rhythm.      Pulses: Normal pulses.      Heart sounds: Murmur present. Crescendo  systolic murmur present with a grade of 3/6. No friction rub. No gallop.       Comments: No tenderness to palpation of the  precordium.  Pulmonary:      Effort: Pulmonary effort is normal. No respiratory distress.      Breath sounds: Normal breath sounds. No stridor. No wheezing or rales.   Abdominal:      General: Bowel sounds are normal. There is no distension.      Palpations: Abdomen is soft. There is no mass.      Tenderness: There is no abdominal tenderness. There is no guarding or rebound.   Musculoskeletal:         General: Deformity (Externally deviated left foot) present. No swelling, tenderness or signs of injury.      Cervical back: Neck supple. No tenderness.      Right lower leg: No edema.      Left lower leg: No edema.   Lymphadenopathy:      Cervical: No cervical adenopathy.   Skin:     General: Skin is warm and dry.      Coloration: Skin is not pale.      Findings: No erythema or rash.   Neurological:      Mental Status: He is alert and oriented to person, place, and time.      Sensory: No sensory deficit.      Motor: No weakness or abnormal muscle tone.   Psychiatric:         Behavior: Behavior normal.         Thought Content: Thought content normal.         Judgment: Judgment normal.         Laboratory:  Recent Labs     04/18/21  1844   WBC 12.7*   RBC 5.16   HEMOGLOBIN 13.1*   HEMATOCRIT 43.5   MCV 84.3   MCH 25.4*   MCHC 30.1*   RDW 52.8*   PLATELETCT 216   MPV 9.7     Recent Labs     04/18/21  1844   SODIUM 134*   POTASSIUM 4.4   CHLORIDE 96   CO2 30   GLUCOSE 93   BUN 16   CREATININE 0.95   CALCIUM 8.7     Recent Labs     04/18/21  1844   ALTSGPT 11   ASTSGOT 18   ALKPHOSPHAT 42   TBILIRUBIN 0.2   LIPASE 29   GLUCOSE 93         No results for input(s): NTPROBNP in the last 72 hours.      Recent Labs     04/18/21  1844   TROPONINT 38*       Imaging:  CT-CTA CHEST PULMONARY ARTERY W/ RECONS   Final Result         1.  No evidence of pulmonary embolism.   2.  Mild cardiomegaly.   3.  Atherosclerosis.   4.  Mild 4.2 cm ascending thoracic aortic aneurysm.   5.  Hypoinflation, elevation of left hemidiaphragm with  bibasilar atelectasis. No significant consolidation or pleural effusion.   6.  4 mm noncalcified right middle lobe pulmonary nodule. Follow-up per Fleischner criteria is clinically indicated.      Fleischner Society pulmonary nodule recommendations:   Low Risk: No routine follow-up      High Risk: Optional CT at 12 months      Comments: Nodules less than 6 mm do not require routine follow-up, but certain patients at high risk with suspicious nodule morphology, upper lobe location, or both may warrant 12-month follow-up.      Low Risk - Minimal or absent history of smoking and of other known risk factors.      High Risk - History of smoking or of other known risk factors.      Note: These recommendations do not apply to lung cancer screening, patients with immunosuppression, or patients with known primary cancer.      Fleischner Society 2017 Guidelines for Management of Incidentally Detected Pulmonary Nodules in Adults      DX-CHEST-PORTABLE (1 VIEW)   Final Result      1.  Hypoinflation with LEFT lung base atelectasis or infiltrate, possibly chronic.   2.  No overt pulmonary edema.      EC-ECHOCARDIOGRAM COMPLETE W/O CONT    (Results Pending)         I have personally reviewed the patient's chest x-ray.  Per my read, clear lung volumes bilaterally with no significant interstitial or focal infiltrates.  Sharp costophrenic angles bilaterally.  Pacemaker present.      I have personally reviewed the patient's CTA with PE protocol.  No filling defects consistent with pulmonary embolism per my review.  There is hazy interstitial infiltrates in the posterior lobes bilaterally.  No focal consolidation.        I have personally reviewed the patient's EKG.  Per my read rhythm, patient is in normal sinus rhythm with heart rate of 72, prolonged QTC of 504, left bundle branch block present.  Multiple PVCs present.    Assessment/Plan:  I anticipate this patient is appropriate for observation status at this time.    * Chest pain-  (present on admission)  Assessment & Plan  I have discussed the case with the ER physician, Dr. Sotelo, and agree with need to admit the patient for continued observation under telemetry monitoring given his significant and complex cardiac history.  Chest pain is atypical in nature.  However, troponin is mildly elevated.  He has a left bundle elida block on EKGs.  He has multiple PVCs on EKG, which could be a possible contributor.    HEART Score 5, Risk of MACE of 12-16.6%.    Admit to telemetry for further monitoring.  Serial troponins and EKG.  Given complex cardiac history, recommend consulting cardiology in the morning for consideration of cardiac catheterization rather than proceeding with a stress test.  Start aspirin full dose now and daily.  Continue home cardiac medications including sotalol.  Continue to hold warfarin.    Elevated troponin- (present on admission)  Assessment & Plan  Mildly elevated troponin in setting of chest pain concerning.    Serial troponins and EKG.  Low threshold to start full anticoagulation.    Aortic stenosis- (present on admission)  Assessment & Plan  Mild aortic stenosis on echocardiogram in 2019.  Likely secondary to childhood traumatic fever.    Repeating echocardiogram given new dizziness and shortness of breath.    Frequent PVCs- (present on admission)  Assessment & Plan  Patient continued to have frequent PVCs on EKG.    Continue telemetry monitoring.  Optimize electrolytes for potassium goal greater than 4.5 and magnesium goal greater than 2.0.  Consult cardiology in the morning about adjusting sotalol.    Obstructive hypertrophic cardiomyopathy (HCC)- (present on admission)  Assessment & Plan  As per history.   - currently on: (sotalol 80 mg) 40 mg q12h    Repeat echocardiogram in setting of new dizziness and dyspnea on exertion.  Continue home sotalol.    History of pulmonary embolism- (present on admission)  Assessment & Plan  As per history at least 8-9 PEs in the  past.  He has a IVC filter in place.  Holding warfarin for right ear basal cell carcinoma removal in 2 days.  CTA with PE protocol from the ER was negative for PE.    Continue to hold warfarin.  Enoxaparin for DVT prophylaxis.    Adrenal insufficiency (HCC)- (present on admission)  Assessment & Plan  As per history secondary to chronic steroid use.    Continue home steroids for hypopituitarism.    SSS (sick sinus syndrome) (HCC)- (present on admission)  Assessment & Plan  Secondary to third-degree blocks status post pacemaker placement.    Continue telemetry monitoring.  Continue home sotalol.    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    Continue to hold home warfarin.  Enoxaparin for DVT prophylaxis.    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  Status post resection of pituitary adenoma in 1998.    Continue home hydrocortisone and levothyroxine.    Essential hypertension, benign- (present on admission)  Assessment & Plan  goal BP < 130/80 mmHg   - 24hr BP range: (154-125)/(94-80) mmHg    Continue home lisinopril and sotalol.    Prolonged Q-T interval on ECG- (present on admission)  Assessment & Plan  In sotalol use.    Avoid QTC prolonging medications.    Hyponatremia- (present on admission)  Assessment & Plan   - serum sodium: 134.00 mmol/L (04/19/2021 1:02:00) unchanged from 134.00 mmol/L (04/18/2021 18:44:00)    Mild and chronic.      Continue to monitor.    BPH (benign prostatic hypertrophy)- (present on admission)  Assessment & Plan  Continues to have urinary frequency.    Continue on tamsulosin.    Chronic back pain- (present on admission)  Assessment & Plan  As per history in setting of arthritis.    Continue home Norco and gabapentin.    Cardiac pacemaker in situ- (present on admission)  Assessment & Plan  Placed for sick sinus syndrome third-degree  block.  History of multiple PVCs.    Telemetry monitoring.  Serial EKGs.      Addendum at 4:35 AM:  Notified of increasing troponin T from 38 to 163.  Started on heparin drip.

## 2021-04-19 NOTE — PROGRESS NOTES
[facility] Medicine Daily Progress Note    Date of Service  4/19/2021    Chief Complaint  88 y.o. male admitted 4/18/2021 with chest pain.    Hospital Course  This is 88 years old male who has past medical history of hypertrophic obstructive cardiomyopathy, history of paroxysmal A. 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  Not radiating.  No aggravating factors.  Lasted for an hour and decreased in intensity with rest.  Patient stated that he usually gets occasional chest pain but this time his pain lingered so he presented to the ER for further evaluation.  In ER noted to be hemodynamically stable.  Heart rate stable.  Afebrile.  Chest x-ray showed hypoinflation of the left lung base concerning for plexuses or infiltrate.  No pulmonary edema noted.  CT of the chest was negative for PE and showed 4.2 cm ascending thoracic aortic aneurysm and bibasilar atelectasis with no consolidation or infiltrate noted.  EKG showed paced rhythm with occasional PVCs.  Initially patient admitted for observation with troponin of 38.  However, he continued to have chest pain and troponin increased to 163.  Patient was started on heparin drip.  Echocardiogram showed ejection fraction 60% with akinesis of the apex and hypokinesis of the apical septal wall which might represent ventricular pacing or prior infarct.  Cardiology consulted.  Planning for cardiac cath in the a.m.  Discussed CODE STATUS with patient.  He wishes to continue to be full code for now.  Interval Problem Update  Resting in bed comfortably.  Still complaining of mild chest pain on the right side.  Hemodynamically stable.  Afebrile.  Blood  pressure soft but patient is asymptomatic.  Currently on 5 L of oxygen via nasal cannula which is his baseline.  No acute distress noted.  No issues overnight per staff.    Consultants/Specialty  Cards     Code Status  Full Code    Disposition  Pending clinical course     Review of Systems  Review of Systems   Constitutional: Negative for chills and fever.   HENT: Negative for hearing loss and tinnitus.    Eyes: Negative for blurred vision, double vision and photophobia.   Respiratory: Positive for shortness of breath (chronic ). Negative for cough and hemoptysis.    Cardiovascular: Positive for chest pain. Negative for palpitations and orthopnea.   Gastrointestinal: Negative for heartburn and nausea.   Genitourinary: Negative for dysuria and urgency.   Musculoskeletal: Negative for myalgias and neck pain.   Skin: Negative for rash.   Neurological: Negative for dizziness and headaches.   Psychiatric/Behavioral: Negative for depression and suicidal ideas.        Physical Exam  Temp:  [36.1 °C (96.9 °F)-36.9 °C (98.4 °F)] 36.1 °C (97 °F)  Pulse:  [66-77] 66  Resp:  [12-18] 18  BP: ()/(53-94) 86/63  SpO2:  [92 %-100 %] 99 %    Physical Exam  Constitutional:       General: He is not in acute distress.     Appearance: He is not ill-appearing.   HENT:      Head: Normocephalic and atraumatic.      Mouth/Throat:      Mouth: Mucous membranes are dry.   Eyes:      Extraocular Movements: Extraocular movements intact.      Pupils: Pupils are equal, round, and reactive to light.   Cardiovascular:      Rate and Rhythm: Normal rate and regular rhythm.      Heart sounds: No friction rub. No gallop.    Pulmonary:      Effort: Pulmonary effort is normal. No respiratory distress.   Abdominal:      General: There is no distension.      Palpations: Abdomen is soft.   Musculoskeletal:         General: No swelling or tenderness.      Right lower leg: No edema.      Left lower leg: No edema.   Skin:     General: Skin is dry.    Neurological:      General: No focal deficit present.      Mental Status: He is alert and oriented to person, place, and time.   Psychiatric:         Mood and Affect: Mood normal.         Fluids    Intake/Output Summary (Last 24 hours) at 4/19/2021 1323  Last data filed at 4/19/2021 0621  Gross per 24 hour   Intake --   Output 300 ml   Net -300 ml       Laboratory  Recent Labs     04/18/21  1844 04/19/21  0102   WBC 12.7* 11.1*   RBC 5.16 5.48   HEMOGLOBIN 13.1* 13.9*   HEMATOCRIT 43.5 46.6   MCV 84.3 85.0   MCH 25.4* 25.4*   MCHC 30.1* 29.8*   RDW 52.8* 52.9*   PLATELETCT 216 193   MPV 9.7 9.2     Recent Labs     04/18/21  1844 04/19/21  0102   SODIUM 134* 134*   POTASSIUM 4.4 4.1   CHLORIDE 96 96   CO2 30 32   GLUCOSE 93 106*   BUN 16 17   CREATININE 0.95 1.03   CALCIUM 8.7 8.7     Recent Labs     04/19/21 0102 04/19/21  0443   APTT  --  33.0   INR 1.00 1.01               Imaging  EC-ECHOCARDIOGRAM COMPLETE W/O CONT   Final Result      CT-CTA CHEST PULMONARY ARTERY W/ RECONS   Final Result         1.  No evidence of pulmonary embolism.   2.  Mild cardiomegaly.   3.  Atherosclerosis.   4.  Mild 4.2 cm ascending thoracic aortic aneurysm.   5.  Hypoinflation, elevation of left hemidiaphragm with bibasilar atelectasis. No significant consolidation or pleural effusion.   6.  4 mm noncalcified right middle lobe pulmonary nodule. Follow-up per Fleischner criteria is clinically indicated.      Fleischner Society pulmonary nodule recommendations:   Low Risk: No routine follow-up      High Risk: Optional CT at 12 months      Comments: Nodules less than 6 mm do not require routine follow-up, but certain patients at high risk with suspicious nodule morphology, upper lobe location, or both may warrant 12-month follow-up.      Low Risk - Minimal or absent history of smoking and of other known risk factors.      High Risk - History of smoking or of other known risk factors.      Note: These recommendations do not apply to  lung cancer screening, patients with immunosuppression, or patients with known primary cancer.      Fleischner Society 2017 Guidelines for Management of Incidentally Detected Pulmonary Nodules in Adults      DX-CHEST-PORTABLE (1 VIEW)   Final Result      1.  Hypoinflation with LEFT lung base atelectasis or infiltrate, possibly chronic.   2.  No overt pulmonary edema.           Assessment/Plan  * NSTEMI (non-ST elevated myocardial infarction) (HCC)- (present on admission)  Assessment & Plan  Patient came in with chest pain.  EKG showed paced rhythm with frequent PVCs.  Troponin trended up from 38 to 163.   Echocardiogram showed normal left ventricle chamber size with mild concentric left ventricular hypertrophy with ejection fraction estimated to be around 60%.  It was akinesis of the apex and hypokinesis of the apical septal wall that could be related to ventricular pacing versus prior infarct.  Patient was given a full dose of aspirin.  Continue core measure medications.  Currently on heparin drip.  Cardiology consulted.  Planning for cardiac catheter in the a.m.    Elevated troponin- (present on admission)  Assessment & Plan  Mildly elevated troponin in setting of chest pain concerning.    Serial troponins and EKG.  Low threshold to start full anticoagulation.    Aortic stenosis- (present on admission)  Assessment & Plan  Mild aortic stenosis on echocardiogram in 2019.  Likely secondary to childhood traumatic fever.        Frequent PVCs- (present on admission)  Assessment & Plan  Patient continued to have frequent PVCs on EKG.    Continue telemetry monitoring.  Optimize electrolytes for potassium goal greater than 4.5 and magnesium goal greater than 2.0.  Consult cardiology in the morning about adjusting sotalol.    Obstructive hypertrophic cardiomyopathy (HCC)- (present on admission)  Assessment & Plan  As per history.   - currently on: (sotalol 80 mg) 40 mg q12h    Repeat echocardiogram unchanged    History of  pulmonary embolism- (present on admission)  Assessment & Plan  As per history at least 8-9 PEs in the past.  He has a IVC filter in place.  Holding warfarin for right ear basal cell carcinoma removal in 2 days.  CTA with PE protocol from the ER was negative for PE.    Continue to hold warfarin.  Enoxaparin for DVT prophylaxis.    Adrenal insufficiency (HCC)- (present on admission)  Assessment & Plan  As per history secondary to chronic steroid use.    Continue home steroids for hypopituitarism.    SSS (sick sinus syndrome) (HCC)- (present on admission)  Assessment & Plan  Secondary to third-degree blocks status post pacemaker placement.    Continue telemetry monitoring.  Continue home sotalol.    Anticoagulant long-term use- (present on admission)  Assessment & Plan  Secondary to extensive history of DVT AND MULTIPLE PE's  STATUS post IVC filter placement.  Home Coumadin has been held for anticipated skin BCC procedure.     Currently on heparin gtt     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HCC)- (present on admission)  Assessment & Plan  As per history.  Status post resection of pituitary adenoma in 1998.    Continue home hydrocortisone and levothyroxine.    Essential hypertension, benign- (present on admission)  Assessment & Plan  goal BP < 130/80 mmHg   - 24hr BP range: (154-125)/(94-80) mmHg    Continue home lisinopril and sotalol.    Prolonged Q-T interval on ECG- (present on admission)  Assessment & Plan  In sotalol use.    Avoid QTC prolonging medications.    Hyponatremia- (present on admission)  Assessment & Plan   - serum sodium: 134.00 mmol/L (04/19/2021 1:02:00) unchanged from 134.00 mmol/L (04/18/2021 18:44:00)    Mild and chronic.      Continue to monitor.    BPH (benign prostatic hypertrophy)- (present on admission)  Assessment & Plan  Continues to have urinary frequency.    Continue on tamsulosin.    Chronic back pain- (present on admission)  Assessment & Plan  As per history in setting of arthritis.    Continue home [place]  and gabapentin.    COPD (chronic obstructive pulmonary disease) ([facility])- (present on admission)  Assessment & Plan  With chronic respiratory failure.  On 5 L of oxygen via nasal cannula at home.  Currently not in acute exacerbation clinically and at baseline with respiratory status.  Bronchodilators as needed per RT protocol.    Cardiac pacemaker in situ- (present on admission)  Assessment & Plan  Placed for sick sinus syndrome third-degree block.  History of multiple PVCs.    Telemetry monitoring.         VTE prophylaxis: Heparin gtt

## 2021-04-19 NOTE — ASSESSMENT & PLAN NOTE
Status post heart cath with stenting to the LAD x 3 on 4/20  Statin Lipitor 80 mg  Sotalol   plavix and aspirin (holding lovenox and warfarin 4/22 due to acute hematuria.)  Troponin trended up to 810  Echocardiogram showed normal left ventricle chamber size with mild concentric left ventricular hypertrophy with ejection fraction estimated to be around 60%.  It was akinesis of the apex and hypokinesis of the apical septal wall that could be related to ventricular pacing versus prior infarct  Cardiology consulting

## 2021-04-19 NOTE — ED PROVIDER NOTES
ED Provider Note    Scribed for Rex Sotelo M.D. by Mars Eckert. 4/18/2021  6:31 PM    Primary care provider: Bebe Banerjee M.D.  Means of arrival: Paulo  History obtained from: Patient  History limited by: None    CHIEF COMPLAINT  Chief Complaint   Patient presents with   • Chest Pain       HPI  Jeffrey Lizama is a 88 y.o. male with an extensive medical history who presents to the Emergency Department for chest pain onset at 3:00 PM today while the patient was using the computer. Patient notes that his chest pain is located in the center of his chest and to the right side. He states that his pain is somewhat alleviated at this time and rates it a 4/10. Patient notes that he has history of 8 pulmonary embolisms. Patient states that he currently is not taking his Warfarin due to having a surgery scheduled in 2 days. Patient notes that he hasnt taken warfarin in 2 days. Patient denies any vomiting, diarrhea, fever, or chills. Patient denies any recent injuries. Patient denies taking any Aspirin. Patient denies any chemotherapy or radiation treatment. Patient denies any history of heart attacks or having stents placed. Patient emphasizes that he was not exercising at the time of symptom onset.    REVIEW OF SYSTEMS  Pertinent positives include: chest pain. Pertinent negatives include: vomiting, fever, chills, diarrhea. See history of present illness. All other systems are negative.     PAST MEDICAL HISTORY   has a past medical history of Anesthesia, Arrhythmia, Arthritis, Asbestos exposure, ASTHMA, Back pain, Benign neoplasm of pituitary gland and craniopharyngeal duct (pouch) ([facility]) (4/1/2010), BPH (benign prostatic hypertrophy) (4/1/2010), Bradycardia, Breath shortness, Bronchitis, Cardiac pacemaker (04 2010), Cataract, COPD (chronic obstructive pulmonary disease) ([facility]), Diverticulitis, DJD (degenerative joint disease), EMPHYSEMA, Glaucoma, Heart burn, Heart murmur, Hiatus hernia syndrome,  Hypertension, 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[facility]) (1995), Indigestion, Knee arthroplasty (2002), Obstructive hypertrophic cardiomyopathy ([facility]) (8/4/2011), PAF (paroxysmal atrial fibrillation) ([facility]), Paroxysmal atrial fibrillation ([facility]) (8/29/2011), PE (pulmonary embolism), Phlebitis, Pituitary adenoma ([facility]) (1995), Pituitary adenoma ([facility]) (1995), Pneumonia, Rheumatic fever, S/P insertion of IVC (inferior vena caval) filter, S/P parathyroidectomy, S/P parathyroidectomy (2005), Sleep apnea, SSS (sick sinus syndrome) ([facility]) (8/29/2011), Third degree AV block ([facility]) (8/29/2011), thyroidectomy (2005), Unspecified disorder of thyroid, Unspecified hemorrhagic conditions, Unspecified urinary incontinence, and Urinary bladder disorder.    SURGICAL HISTORY   has a past surgical history that includes other orthopedic surgery; cholecystectomy; total knee arthroplasty; other; cervical disk and fusion anterior; revise ulnar nerve at wrist; total knee arthroplasty; cataract extraction with iol; thyroidectomy; pacemaker insertion (Left, 04/23/2010); carpal tunnel endoscopic (9/30/2011); knee revision total (6/20/2013); cholecystectomy; cervical fusion posterior; gastroscopy-endo (1/21/2019); gastroscopy-endo (1/22/2019); and other cardiac surgery.    SOCIAL HISTORY  Social History     Tobacco Use   • Smoking status: Never Smoker   • Smokeless tobacco: Never Used   Substance Use Topics   • Alcohol use: No   • Drug use: No      Social History     Substance and Sexual Activity   Drug Use No       FAMILY HISTORY  Family History   Problem Relation Age of Onset   • Arthritis Mother    • Arthritis Father    • Cancer Neg Hx    • Heart Disease Neg Hx        CURRENT MEDICATIONS  Current Outpatient Medications:   •  lisinopril (PRINIVIL, ZESTRIL) 30 MG tablet, Take 30 mg by mouth every day., Disp: , Rfl:   •  HYDROcodone-acetaminophen (NORCO) 5-325 MG Tab per tablet, Take 1 Tab by mouth 2 times a day as needed., Disp: , Rfl: 0  •  albuterol 108 (90 Base)  "MCG/ACT Aero Soln inhalation aerosol, Inhale 2 Puffs by mouth every 6 hours as needed for Shortness of Breath., Disp: 8.5 g, Rfl: 1  •  gabapentin (NEURONTIN) 600 MG tablet, Take 1.5 Tabs by mouth every bedtime., Disp: 90 Tab, Rfl: 2  •  levothyroxine (SYNTHROID) 75 MCG Tab, Take 1 Tab by mouth Every morning on an empty stomach., Disp: 30 Tab, Rfl: 2  •  montelukast (SINGULAIR) 10 MG Tab, Take 1 Tab by mouth every day., Disp: 30 Tab, Rfl: 2  •  sotalol (BETAPACE) 80 MG Tab, Take 0.5 Tabs by mouth 2 times a day., Disp: 60 Tab, Rfl: 2  •  tamsulosin (FLOMAX) 0.4 MG capsule, Take 1 Cap by mouth every day., Disp: 30 Cap, Rfl: 2  •  tiotropium (SPIRIVA) 18 MCG Cap, Inhale 1 Cap by mouth every day., Disp: 30 Cap, Rfl: 3  •  omeprazole (PRILOSEC) 20 MG delayed-release capsule, Take 1 Cap by mouth 2 times a day., Disp: 60 Cap, Rfl: 1  •  warfarin (COUMADIN) 5 MG Tab, Take 1 Tab by mouth every day., Disp: 30 Tab, Rfl: 1  •  hydrocortisone (CORTEF) 10 MG Tab, Take 1 Tab by mouth 3 times a day., Disp: 90 Tab, Rfl: 2    ALLERGIES  Allergies   Allergen Reactions   • Pcn [Penicillins] Rash     Tolerates cephalosporins         PHYSICAL EXAM  VITAL SIGNS: /85   Pulse 69   Temp 36.9 °C (98.4 °F) (Temporal)   Resp 18   Ht 1.702 m (5' 7\")   Wt 99.8 kg (220 lb)   SpO2 95%   BMI 34.46 kg/m²   Constitutional: Obese. Alert in no apparent distress.  HENT: Wound to left nares.  Bilateral external ears normal, Uvula midline.   Eyes: Pupils are equal and reactive, Conjunctiva normal, Non-icteric.   Neck: Normal range of motion, No tenderness, Supple, No stridor.   Lymphatic: No lymphadenopathy noted.   Cardiovascular: Regular rate and rhythm, no murmurs.   Thorax & Lungs: Normal breath sounds, No respiratory distress, No wheezing, No chest tenderness.   Abdomen:  Soft, No tenderness, No peritoneal signs, No masses, No pulsatile masses.   Skin: Warm, Dry, No erythema, No rash.   Back: No bony tenderness, No CVA tenderness. "   Extremities: 2+ peripheral pulse, No lower extremity edema, No tenderness, No cyanosis.  Musculoskeletal: Good range of motion in all major joints. No tenderness to palpation or major deformities noted.   Neurologic: Alert , Normal motor function, Normal sensory function, No focal deficits noted.   Psychiatric: Affect normal, Judgment normal, Mood normal.     DIAGNOSTIC STUDIES / PROCEDURES    LABS  Labs Reviewed   CBC WITH DIFFERENTIAL - Abnormal; Notable for the following components:       Result Value    WBC 12.7 (*)     Hemoglobin 13.1 (*)     MCH 25.4 (*)     MCHC 30.1 (*)     RDW 52.8 (*)     Neutrophils-Polys 73.50 (*)     Lymphocytes 11.70 (*)     Neutrophils (Absolute) 9.31 (*)     Monos (Absolute) 1.03 (*)     Eos (Absolute) 0.66 (*)     All other components within normal limits   COMP METABOLIC PANEL - Abnormal; Notable for the following components:    Sodium 134 (*)     All other components within normal limits   TROPONIN - Abnormal; Notable for the following components:    Troponin T 38 (*)     All other components within normal limits   CRP QUANTITIVE (NON-CARDIAC) - Abnormal; Notable for the following components:    Stat C-Reactive Protein 1.40 (*)     All other components within normal limits   CBC WITH DIFFERENTIAL - Abnormal; Notable for the following components:    WBC 11.1 (*)     Hemoglobin 13.9 (*)     MCH 25.4 (*)     MCHC 29.8 (*)     RDW 52.9 (*)     Neutrophils-Polys 79.10 (*)     Lymphocytes 11.30 (*)     Neutrophils (Absolute) 8.78 (*)     Eos (Absolute) 0.58 (*)     All other components within normal limits   COMP METABOLIC PANEL - Abnormal; Notable for the following components:    Sodium 134 (*)     Anion Gap 6.0 (*)     Glucose 106 (*)     All other components within normal limits   LIPASE   ESTIMATED GFR   SARS-COV-2, PCR (IN-HOUSE)    Narrative:     Have you been in close contact with a person who is suspected  or known to be positive for COVID-19 within the last 30 days  (e.g.  last seen that person < 30 days ago)->No   PROCALCITONIN   SED RATE   MAGNESIUM   MAGNESIUM   PROTHROMBIN TIME    Narrative:     Indicate which anticoagulants the patient is on:->NONE   ESTIMATED GFR   DIFFERENTIAL MANUAL   PERIPHERAL SMEAR REVIEW   PLATELET ESTIMATE   MORPHOLOGY   TROPONIN   TROPONIN      All labs reviewed by me.    EKG  12 Lead EKG interpreted by me to show:  A-V DUAL-PACED RHYTHM WITH SOME INHIBITION  NO FURTHER ANALYSIS ATTEMPTED DUE TO PACED RHYTHM  Compared to ECG 01/23/2019 09:06:06  AV dual-paced complex(es) or rhythm no longer present  Left bundle-branch block no longer present    RADIOLOGY  EC-ECHOCARDIOGRAM COMPLETE W/O CONT   Final Result      CT-CTA CHEST PULMONARY ARTERY W/ RECONS   Final Result         1.  No evidence of pulmonary embolism.   2.  Mild cardiomegaly.   3.  Atherosclerosis.   4.  Mild 4.2 cm ascending thoracic aortic aneurysm.   5.  Hypoinflation, elevation of left hemidiaphragm with bibasilar atelectasis. No significant consolidation or pleural effusion.   6.  4 mm noncalcified right middle lobe pulmonary nodule. Follow-up per Fleischner criteria is clinically indicated.      Fleischner Society pulmonary nodule recommendations:   Low Risk: No routine follow-up      High Risk: Optional CT at 12 months      Comments: Nodules less than 6 mm do not require routine follow-up, but certain patients at high risk with suspicious nodule morphology, upper lobe location, or both may warrant 12-month follow-up.      Low Risk - Minimal or absent history of smoking and of other known risk factors.      High Risk - History of smoking or of other known risk factors.      Note: These recommendations do not apply to lung cancer screening, patients with immunosuppression, or patients with known primary cancer.      Fleischner Society 2017 Guidelines for Management of Incidentally Detected Pulmonary Nodules in Adults      DX-CHEST-PORTABLE (1 VIEW)   Final Result      1.  Hypoinflation with  LEFT lung base atelectasis or infiltrate, possibly chronic.   2.  No overt pulmonary edema.        The radiologist's interpretation of all radiological studies have been reviewed by me.    COURSE & MEDICAL DECISION MAKING  Nursing notes, PB [facility] reviewed in chart.    88 y.o. male p/w chief complaint of chest pain onset earlier today.    6:31 PM Patient seen and examined at bedside. Ordered for labs and imaging. Prior history of PE. Off of Warfarin. Patient has a heart score of 5.     6:48 PM Patient is to be treated with morphine 4 mg and Zofran injection 4 mg.     7:27 PM Patient is to be treated with morphine injection 4 mg.     9:24 PM Paged Hospitalist.     9:45 PM I discussed the patient's case and the above findings with Dr. Alonzo (Hospitalist) who agreed to hospitalize the patient. Patient is to be hospitalized in guarded condition.     I verified that the patient was wearing a mask and I was wearing appropriate PPE every time I entered the room. The patient's mask was on the patient at all times during my encounter except for a brief view of the oropharynx.     The differential diagnoses include but are not limited to:     #acute chest pain  Patient with heart score of 5 and elevated troponin  and CT ordered given history of pulmonary embolisms in the past.  CT scan with no evidence of pulmonary embolism at this time.  Given elevated troponin and chest pain will admit for serial EKG and troponin.  Consideration for stress test in a.m. vs cardiology consult in AM    DISPOSITION:  Patient will be hospitalized by Dr. Alonzo in guarded condition.    FINAL IMPRESSION  1. Acute chest pain    2. Elevated troponin          I, Mars Eckert (Jono), am scribing for, and in the presence of, Rex Sotelo M.D..    Electronically signed by: Mars Eckert (Jono), 4/18/2021    I, Rex Sotelo M.D. personally performed the services described in this documentation, as scribed by Mars Eckert in my  presence, and it is both accurate and complete. C    The note accurately reflects work and decisions made by me.  Rex Sotelo M.D.  4/19/2021  3:34 AM

## 2021-04-19 NOTE — ASSESSMENT & PLAN NOTE
Secondary to extensive history of DVT AND MULTIPLE PE's  STATUS post IVC filter placement.  Home Coumadin had been held for anticipated skin BCC procedures.   IV heparin drip and follow Factor X levels  Coumadin was restarted 4/21 per Dr. To goal INR 1.5-2 but held 4/22 due to hematuria.  4/27: Hematuria resolved.  Resume Coumadin and heparin drip.  4/28: No hematuria so far.  Currently on Lovenox and Coumadin.

## 2021-04-19 NOTE — ASSESSMENT & PLAN NOTE
As per history.  Status post resection of pituitary adenoma in 1998.   levothyroxine, cortef  Testosterone cyp 200mg IM very 21days, missed two dose, resume

## 2021-04-20 ENCOUNTER — APPOINTMENT (OUTPATIENT)
Dept: RADIOLOGY | Facility: MEDICAL CENTER | Age: 86
DRG: 247 | End: 2021-04-20
Attending: INTERNAL MEDICINE
Payer: MEDICARE

## 2021-04-20 ENCOUNTER — APPOINTMENT (OUTPATIENT)
Dept: CARDIOLOGY | Facility: MEDICAL CENTER | Age: 86
DRG: 247 | End: 2021-04-20
Attending: INTERNAL MEDICINE
Payer: MEDICARE

## 2021-04-20 PROBLEM — R79.89 ELEVATED TROPONIN: Status: RESOLVED | Noted: 2021-04-18 | Resolved: 2021-04-20

## 2021-04-20 PROBLEM — I35.0 AORTIC STENOSIS: Status: RESOLVED | Noted: 2019-01-22 | Resolved: 2021-04-20

## 2021-04-20 LAB
ACT BLD: 241 SEC (ref 74–137)
ACT BLD: 268 SEC (ref 74–137)
ACT BLD: 279 SEC (ref 74–137)
ACT BLD: 290 SEC (ref 74–137)
ACT BLD: 318 SEC (ref 74–137)
ANION GAP SERPL CALC-SCNC: 8 MMOL/L (ref 7–16)
BASOPHILS # BLD AUTO: 0.5 % (ref 0–1.8)
BASOPHILS # BLD: 0.07 K/UL (ref 0–0.12)
BUN SERPL-MCNC: 18 MG/DL (ref 8–22)
CALCIUM SERPL-MCNC: 8 MG/DL (ref 8.5–10.5)
CHLORIDE SERPL-SCNC: 99 MMOL/L (ref 96–112)
CO2 SERPL-SCNC: 28 MMOL/L (ref 20–33)
CREAT SERPL-MCNC: 1 MG/DL (ref 0.5–1.4)
EKG IMPRESSION: NORMAL
EOSINOPHIL # BLD AUTO: 0.03 K/UL (ref 0–0.51)
EOSINOPHIL NFR BLD: 0.2 % (ref 0–6.9)
ERYTHROCYTE [DISTWIDTH] IN BLOOD BY AUTOMATED COUNT: 52.2 FL (ref 35.9–50)
GLUCOSE SERPL-MCNC: 122 MG/DL (ref 65–99)
HCT VFR BLD AUTO: 41.6 % (ref 42–52)
HGB BLD-MCNC: 12.6 G/DL (ref 14–18)
IMM GRANULOCYTES # BLD AUTO: 0.08 K/UL (ref 0–0.11)
IMM GRANULOCYTES NFR BLD AUTO: 0.5 % (ref 0–0.9)
LYMPHOCYTES # BLD AUTO: 0.96 K/UL (ref 1–4.8)
LYMPHOCYTES NFR BLD: 6.4 % (ref 22–41)
MAGNESIUM SERPL-MCNC: 2 MG/DL (ref 1.5–2.5)
MCH RBC QN AUTO: 25 PG (ref 27–33)
MCHC RBC AUTO-ENTMCNC: 30.3 G/DL (ref 33.7–35.3)
MCV RBC AUTO: 82.7 FL (ref 81.4–97.8)
MONOCYTES # BLD AUTO: 0.77 K/UL (ref 0–0.85)
MONOCYTES NFR BLD AUTO: 5.1 % (ref 0–13.4)
NEUTROPHILS # BLD AUTO: 13.16 K/UL (ref 1.82–7.42)
NEUTROPHILS NFR BLD: 87.3 % (ref 44–72)
NRBC # BLD AUTO: 0 K/UL
NRBC BLD-RTO: 0 /100 WBC
PLATELET # BLD AUTO: 216 K/UL (ref 164–446)
PMV BLD AUTO: 10 FL (ref 9–12.9)
POTASSIUM SERPL-SCNC: 4.6 MMOL/L (ref 3.6–5.5)
RBC # BLD AUTO: 5.03 M/UL (ref 4.7–6.1)
SODIUM SERPL-SCNC: 135 MMOL/L (ref 135–145)
UFH PPP CHRO-ACNC: 1.08 IU/ML
WBC # BLD AUTO: 15.1 K/UL (ref 4.8–10.8)

## 2021-04-20 PROCEDURE — 85520 HEPARIN ASSAY: CPT

## 2021-04-20 PROCEDURE — 700111 HCHG RX REV CODE 636 W/ 250 OVERRIDE (IP)

## 2021-04-20 PROCEDURE — 4A023N7 MEASUREMENT OF CARDIAC SAMPLING AND PRESSURE, LEFT HEART, PERCUTANEOUS APPROACH: ICD-10-PCS | Performed by: INTERNAL MEDICINE

## 2021-04-20 PROCEDURE — 93458 L HRT ARTERY/VENTRICLE ANGIO: CPT | Mod: 26,59 | Performed by: INTERNAL MEDICINE

## 2021-04-20 PROCEDURE — 700102 HCHG RX REV CODE 250 W/ 637 OVERRIDE(OP): Performed by: STUDENT IN AN ORGANIZED HEALTH CARE EDUCATION/TRAINING PROGRAM

## 2021-04-20 PROCEDURE — 700117 HCHG RX CONTRAST REV CODE 255: Performed by: INTERNAL MEDICINE

## 2021-04-20 PROCEDURE — 700111 HCHG RX REV CODE 636 W/ 250 OVERRIDE (IP): Performed by: INTERNAL MEDICINE

## 2021-04-20 PROCEDURE — C1894 INTRO/SHEATH, NON-LASER: HCPCS

## 2021-04-20 PROCEDURE — 99233 SBSQ HOSP IP/OBS HIGH 50: CPT | Mod: GC | Performed by: INTERNAL MEDICINE

## 2021-04-20 PROCEDURE — 80048 BASIC METABOLIC PNL TOTAL CA: CPT

## 2021-04-20 PROCEDURE — 027036Z DILATION OF CORONARY ARTERY, ONE ARTERY WITH THREE DRUG-ELUTING INTRALUMINAL DEVICES, PERCUTANEOUS APPROACH: ICD-10-PCS | Performed by: INTERNAL MEDICINE

## 2021-04-20 PROCEDURE — 700105 HCHG RX REV CODE 258: Performed by: INTERNAL MEDICINE

## 2021-04-20 PROCEDURE — B2111ZZ FLUOROSCOPY OF MULTIPLE CORONARY ARTERIES USING LOW OSMOLAR CONTRAST: ICD-10-PCS | Performed by: INTERNAL MEDICINE

## 2021-04-20 PROCEDURE — 700111 HCHG RX REV CODE 636 W/ 250 OVERRIDE (IP): Performed by: STUDENT IN AN ORGANIZED HEALTH CARE EDUCATION/TRAINING PROGRAM

## 2021-04-20 PROCEDURE — 93010 ELECTROCARDIOGRAM REPORT: CPT | Mod: 59 | Performed by: INTERNAL MEDICINE

## 2021-04-20 PROCEDURE — 99152 MOD SED SAME PHYS/QHP 5/>YRS: CPT | Performed by: INTERNAL MEDICINE

## 2021-04-20 PROCEDURE — A9270 NON-COVERED ITEM OR SERVICE: HCPCS | Performed by: STUDENT IN AN ORGANIZED HEALTH CARE EDUCATION/TRAINING PROGRAM

## 2021-04-20 PROCEDURE — 700102 HCHG RX REV CODE 250 W/ 637 OVERRIDE(OP): Performed by: HOSPITALIST

## 2021-04-20 PROCEDURE — 85347 COAGULATION TIME ACTIVATED: CPT | Mod: 91

## 2021-04-20 PROCEDURE — A9270 NON-COVERED ITEM OR SERVICE: HCPCS

## 2021-04-20 PROCEDURE — 93005 ELECTROCARDIOGRAM TRACING: CPT | Performed by: INTERNAL MEDICINE

## 2021-04-20 PROCEDURE — 85025 COMPLETE CBC W/AUTO DIFF WBC: CPT

## 2021-04-20 PROCEDURE — 700102 HCHG RX REV CODE 250 W/ 637 OVERRIDE(OP)

## 2021-04-20 PROCEDURE — 770022 HCHG ROOM/CARE - ICU (200)

## 2021-04-20 PROCEDURE — 700101 HCHG RX REV CODE 250

## 2021-04-20 PROCEDURE — A9270 NON-COVERED ITEM OR SERVICE: HCPCS | Performed by: HOSPITALIST

## 2021-04-20 PROCEDURE — 92928 PRQ TCAT PLMT NTRAC ST 1 LES: CPT | Mod: LD | Performed by: INTERNAL MEDICINE

## 2021-04-20 PROCEDURE — 99233 SBSQ HOSP IP/OBS HIGH 50: CPT | Performed by: HOSPITALIST

## 2021-04-20 PROCEDURE — 83735 ASSAY OF MAGNESIUM: CPT

## 2021-04-20 RX ORDER — MIDAZOLAM HYDROCHLORIDE 1 MG/ML
INJECTION INTRAMUSCULAR; INTRAVENOUS
Status: COMPLETED
Start: 2021-04-20 | End: 2021-04-20

## 2021-04-20 RX ORDER — SODIUM CHLORIDE 9 MG/ML
INJECTION, SOLUTION INTRAVENOUS CONTINUOUS
Status: DISCONTINUED | OUTPATIENT
Start: 2021-04-20 | End: 2021-04-21

## 2021-04-20 RX ORDER — HEPARIN SODIUM 1000 [USP'U]/ML
INJECTION, SOLUTION INTRAVENOUS; SUBCUTANEOUS
Status: COMPLETED
Start: 2021-04-20 | End: 2021-04-20

## 2021-04-20 RX ORDER — HEPARIN SODIUM 200 [USP'U]/100ML
INJECTION, SOLUTION INTRAVENOUS
Status: COMPLETED
Start: 2021-04-20 | End: 2021-04-20

## 2021-04-20 RX ORDER — NYSTATIN 100000 [USP'U]/G
POWDER TOPICAL 3 TIMES DAILY
Status: DISPENSED | OUTPATIENT
Start: 2021-04-20 | End: 2021-04-27

## 2021-04-20 RX ORDER — CLOPIDOGREL BISULFATE 75 MG/1
75 TABLET ORAL DAILY
Status: DISCONTINUED | OUTPATIENT
Start: 2021-04-21 | End: 2021-04-29 | Stop reason: HOSPADM

## 2021-04-20 RX ORDER — ASPIRIN 81 MG/1
TABLET, CHEWABLE ORAL
Status: COMPLETED
Start: 2021-04-20 | End: 2021-04-20

## 2021-04-20 RX ORDER — VERAPAMIL HYDROCHLORIDE 2.5 MG/ML
INJECTION, SOLUTION INTRAVENOUS
Status: COMPLETED
Start: 2021-04-20 | End: 2021-04-20

## 2021-04-20 RX ORDER — LIDOCAINE HYDROCHLORIDE 20 MG/ML
INJECTION, SOLUTION INFILTRATION; PERINEURAL
Status: COMPLETED
Start: 2021-04-20 | End: 2021-04-20

## 2021-04-20 RX ORDER — CLOPIDOGREL BISULFATE 75 MG/1
TABLET ORAL
Status: COMPLETED
Start: 2021-04-20 | End: 2021-04-20

## 2021-04-20 RX ADMIN — FENTANYL CITRATE 100 MCG: 50 INJECTION, SOLUTION INTRAMUSCULAR; INTRAVENOUS at 10:25

## 2021-04-20 RX ADMIN — HYDROCORTISONE SODIUM SUCCINATE 50 MG: 100 INJECTION, POWDER, FOR SOLUTION INTRAMUSCULAR; INTRAVENOUS at 18:44

## 2021-04-20 RX ADMIN — DOCUSATE SODIUM 50 MG AND SENNOSIDES 8.6 MG 2 TABLET: 8.6; 5 TABLET, FILM COATED ORAL at 18:44

## 2021-04-20 RX ADMIN — ASPIRIN 81 MG: 81 TABLET, CHEWABLE ORAL at 10:05

## 2021-04-20 RX ADMIN — VERAPAMIL HYDROCHLORIDE 2.5 MG: 2.5 INJECTION, SOLUTION INTRAVENOUS at 10:05

## 2021-04-20 RX ADMIN — HYDROCORTISONE SODIUM SUCCINATE 50 MG: 100 INJECTION, POWDER, FOR SOLUTION INTRAMUSCULAR; INTRAVENOUS at 23:26

## 2021-04-20 RX ADMIN — FENTANYL CITRATE 50 MCG: 50 INJECTION, SOLUTION INTRAMUSCULAR; INTRAVENOUS at 12:04

## 2021-04-20 RX ADMIN — HEPARIN SODIUM: 1000 INJECTION, SOLUTION INTRAVENOUS; SUBCUTANEOUS at 11:00

## 2021-04-20 RX ADMIN — MIDAZOLAM HYDROCHLORIDE 1 MG: 1 INJECTION, SOLUTION INTRAMUSCULAR; INTRAVENOUS at 12:04

## 2021-04-20 RX ADMIN — MIDAZOLAM HYDROCHLORIDE 2 MG: 1 INJECTION, SOLUTION INTRAMUSCULAR; INTRAVENOUS at 10:42

## 2021-04-20 RX ADMIN — MORPHINE SULFATE 2 MG: 4 INJECTION INTRAVENOUS at 14:31

## 2021-04-20 RX ADMIN — NYSTATIN: 100000 POWDER TOPICAL at 18:44

## 2021-04-20 RX ADMIN — HYDROCORTISONE SODIUM SUCCINATE 50 MG: 100 INJECTION, POWDER, FOR SOLUTION INTRAMUSCULAR; INTRAVENOUS at 05:08

## 2021-04-20 RX ADMIN — OMEPRAZOLE 20 MG: 20 CAPSULE, DELAYED RELEASE ORAL at 18:44

## 2021-04-20 RX ADMIN — ATORVASTATIN CALCIUM 80 MG: 80 TABLET, FILM COATED ORAL at 18:44

## 2021-04-20 RX ADMIN — SOTALOL HYDROCHLORIDE 40 MG: 80 TABLET ORAL at 23:26

## 2021-04-20 RX ADMIN — MIDAZOLAM HYDROCHLORIDE 2 MG: 1 INJECTION, SOLUTION INTRAMUSCULAR; INTRAVENOUS at 11:44

## 2021-04-20 RX ADMIN — SODIUM CHLORIDE: 9 INJECTION, SOLUTION INTRAVENOUS at 14:16

## 2021-04-20 RX ADMIN — GABAPENTIN 600 MG: 300 CAPSULE ORAL at 18:44

## 2021-04-20 RX ADMIN — HEPARIN SODIUM: 1000 INJECTION, SOLUTION INTRAVENOUS; SUBCUTANEOUS at 09:42

## 2021-04-20 RX ADMIN — MORPHINE SULFATE 4 MG: 4 INJECTION INTRAVENOUS at 20:17

## 2021-04-20 RX ADMIN — NITROGLYCERIN 10 ML: 20 INJECTION INTRAVENOUS at 10:05

## 2021-04-20 RX ADMIN — IOHEXOL 95 ML: 350 INJECTION, SOLUTION INTRAVENOUS at 11:59

## 2021-04-20 RX ADMIN — LIDOCAINE HYDROCHLORIDE: 20 INJECTION, SOLUTION INFILTRATION; PERINEURAL at 10:05

## 2021-04-20 RX ADMIN — HEPARIN SODIUM: 200 INJECTION, SOLUTION INTRAVENOUS at 09:30

## 2021-04-20 RX ADMIN — HEPARIN SODIUM 16 UNITS/KG/HR: 5000 INJECTION, SOLUTION INTRAVENOUS at 03:29

## 2021-04-20 RX ADMIN — FENTANYL CITRATE 100 MCG: 50 INJECTION, SOLUTION INTRAMUSCULAR; INTRAVENOUS at 11:44

## 2021-04-20 RX ADMIN — CLOPIDOGREL BISULFATE 75 MG: 75 TABLET ORAL at 10:06

## 2021-04-20 ASSESSMENT — COGNITIVE AND FUNCTIONAL STATUS - GENERAL
MOVING FROM LYING ON BACK TO SITTING ON SIDE OF FLAT BED: UNABLE
PERSONAL GROOMING: TOTAL
MOVING FROM LYING ON BACK TO SITTING ON SIDE OF FLAT BED: UNABLE
MOVING TO AND FROM BED TO CHAIR: UNABLE
DRESSING REGULAR UPPER BODY CLOTHING: TOTAL
SUGGESTED CMS G CODE MODIFIER DAILY ACTIVITY: CN
CLIMB 3 TO 5 STEPS WITH RAILING: TOTAL
DAILY ACTIVITIY SCORE: 6
TURNING FROM BACK TO SIDE WHILE IN FLAT BAD: UNABLE
CLIMB 3 TO 5 STEPS WITH RAILING: TOTAL
HELP NEEDED FOR BATHING: TOTAL
MOBILITY SCORE: 6
HELP NEEDED FOR BATHING: TOTAL
SUGGESTED CMS G CODE MODIFIER MOBILITY: CN
TURNING FROM BACK TO SIDE WHILE IN FLAT BAD: UNABLE
DRESSING REGULAR UPPER BODY CLOTHING: TOTAL
EATING MEALS: TOTAL
STANDING UP FROM CHAIR USING ARMS: TOTAL
EATING MEALS: TOTAL
DRESSING REGULAR LOWER BODY CLOTHING: TOTAL
TOILETING: TOTAL
MOVING TO AND FROM BED TO CHAIR: UNABLE
WALKING IN HOSPITAL ROOM: TOTAL
WALKING IN HOSPITAL ROOM: TOTAL
MOBILITY SCORE: 6
DRESSING REGULAR LOWER BODY CLOTHING: TOTAL
STANDING UP FROM CHAIR USING ARMS: TOTAL
PERSONAL GROOMING: TOTAL
SUGGESTED CMS G CODE MODIFIER MOBILITY: CN

## 2021-04-20 ASSESSMENT — PAIN DESCRIPTION - PAIN TYPE
TYPE: ACUTE PAIN;CHRONIC PAIN
TYPE: ACUTE PAIN
TYPE: ACUTE PAIN
TYPE: ACUTE PAIN;CHRONIC PAIN
TYPE: ACUTE PAIN

## 2021-04-20 ASSESSMENT — PATIENT HEALTH QUESTIONNAIRE - PHQ9
SUM OF ALL RESPONSES TO PHQ9 QUESTIONS 1 AND 2: 0
1. LITTLE INTEREST OR PLEASURE IN DOING THINGS: NOT AT ALL
2. FEELING DOWN, DEPRESSED, IRRITABLE, OR HOPELESS: NOT AT ALL

## 2021-04-20 NOTE — PROGRESS NOTES
[facility] Medicine Daily Progress Note    Date of Service  4/20/2021    Chief Complaint  88 y.o. male admitted 4/18/2021 with chest pain    Hospital Course  Mr. Lizama is an 88 year old male who has past medical history of hypertrophic obstructive cardiomyopathy, history of paroxysmal A. 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  Not radiating.  No aggravating factors.  Lasted for an hour and decreased in intensity with rest.  Patient stated that he usually gets occasional chest pain but this time his pain lingered so he presented to the ER for further evaluation.  In ER noted to be hemodynamically stable.  Heart rate stable.  Afebrile.  Chest x-ray showed hypoinflation of the left lung base concerning for plexuses or infiltrate.  No pulmonary edema noted.  CT of the chest was negative for PE and showed 4.2 cm ascending thoracic aortic aneurysm and bibasilar atelectasis with no consolidation or infiltrate noted.  EKG showed paced rhythm with occasional PVCs.  Initially patient admitted for observation with troponin of 38.  However, he continued to have chest pain and troponin increased to 163.  Patient was started on heparin drip.  Echocardiogram showed ejection fraction 60% with akinesis of the apex and hypokinesis of the apical septal wall which might represent ventricular pacing or prior infarct.  Cardiology consulted.  He developed hypotension and required transfer to the ICU and was initiated on IV hydrocortisone and was given a bolus of IV fluids. He went to the cardiac cath lab on 4/20 and had stents x 3 to the LAD.    Interval Problem Update  4/20: Mr. Lizama was evaluated and examined in  the ICU. He is back from cath lab though is quite somnolent due to IV sedation. I discussed with his nurse at bedside. He has been in a sinus rhythm. He denies chest pain.    Consultants/Specialty  Critical care. I discussed with Dr. Sher  Cardiology     Code Status  Full Code    Disposition  CICU due to LAD stenting x 3    Review of Systems  Review of Systems   Unable to perform ROS: Mental acuity        Physical Exam  Temp:  [35.8 °C (96.5 °F)-36.8 °C (98.2 °F)] 35.8 °C (96.5 °F)  Pulse:  [70-90] 75  Resp:  [13-23] 16  BP: ()/(41-87) 147/85  SpO2:  [89 %-99 %] 90 %    Physical Exam  Vitals and nursing note reviewed.   Constitutional:       General: He is not in acute distress.     Appearance: He is not toxic-appearing.   HENT:      Head: Normocephalic and atraumatic.      Mouth/Throat:      Mouth: Mucous membranes are dry.      Pharynx: Oropharynx is clear.   Eyes:      General: No scleral icterus.     Conjunctiva/sclera: Conjunctivae normal.   Cardiovascular:      Rate and Rhythm: Normal rate and regular rhythm.      Heart sounds: Murmur present.      Comments: Paced rhythm   Pulmonary:      Effort: Pulmonary effort is normal.      Breath sounds: Normal breath sounds.   Abdominal:      General: There is no distension.      Tenderness: There is no abdominal tenderness.   Musculoskeletal:      Cervical back: Normal range of motion and neck supple.      Right lower leg: No edema.      Left lower leg: No edema.      Comments: Right groin site covered without significant hematoma  Right wrist site covered without hematoma and no bleeding   Skin:     General: Skin is warm and dry.      Comments: Seborrheic dermatitis perioral    Neurological:      Comments: Somnolent, speech is difficult to understand   Psychiatric:      Comments: Sleepy           Fluids    Intake/Output Summary (Last 24 hours) at 4/20/2021 1357  Last data filed at 4/20/2021 0800  Gross per 24 hour   Intake 2055.67 ml   Output 1550 ml   Net  505.67 ml       Laboratory  Recent Labs     04/18/21  1844 04/19/21  0102 04/20/21  0500   WBC 12.7* 11.1* 15.1*   RBC 5.16 5.48 5.03   HEMOGLOBIN 13.1* 13.9* 12.6*   HEMATOCRIT 43.5 46.6 41.6*   MCV 84.3 85.0 82.7   MCH 25.4* 25.4* 25.0*   MCHC 30.1* 29.8* 30.3*   RDW 52.8* 52.9* 52.2*   PLATELETCT 216 193 216   MPV 9.7 9.2 10.0     Recent Labs     04/18/21  1844 04/19/21  0102 04/20/21  0500   SODIUM 134* 134* 135   POTASSIUM 4.4 4.1 4.6   CHLORIDE 96 96 99   CO2 30 32 28   GLUCOSE 93 106* 122*   BUN 16 17 18   CREATININE 0.95 1.03 1.00   CALCIUM 8.7 8.7 8.0*     Recent Labs     04/19/21  0102 04/19/21  0443   APTT  --  33.0   INR 1.00 1.01               Imaging  EC-ECHOCARDIOGRAM COMPLETE W/O CONT   Final Result      CT-CTA CHEST PULMONARY ARTERY W/ RECONS   Final Result         1.  No evidence of pulmonary embolism.   2.  Mild cardiomegaly.   3.  Atherosclerosis.   4.  Mild 4.2 cm ascending thoracic aortic aneurysm.   5.  Hypoinflation, elevation of left hemidiaphragm with bibasilar atelectasis. No significant consolidation or pleural effusion.   6.  4 mm noncalcified right middle lobe pulmonary nodule. Follow-up per Fleischner criteria is clinically indicated.      Fleischner Society pulmonary nodule recommendations:   Low Risk: No routine follow-up      High Risk: Optional CT at 12 months      Comments: Nodules less than 6 mm do not require routine follow-up, but certain patients at high risk with suspicious nodule morphology, upper lobe location, or both may warrant 12-month follow-up.      Low Risk - Minimal or absent history of smoking and of other known risk factors.      High Risk - History of smoking or of other known risk factors.      Note: These recommendations do not apply to lung cancer screening, patients with immunosuppression, or patients with known primary cancer.      Fleischner Society 2017 Guidelines for Management of Incidentally Detected Pulmonary Nodules in Adults      DX-CHEST-PORTABLE (1  VIEW)   Final Result      1.  Hypoinflation with LEFT lung base atelectasis or infiltrate, possibly chronic.   2.  No overt pulmonary edema.      CL-LEFT HEART CATHETERIZATION WITH POSSIBLE INTERVENTION    (Results Pending)        Assessment/Plan  * NSTEMI (non-ST elevated myocardial infarction) (HCC)- (present on admission)  Assessment & Plan  Status post heart cath with stenting to the LAD x 3 on 4/20  Statin Lipitor 80 mg  plavix and aspirin   Troponin trended up from 38 to 163.   Echocardiogram showed normal left ventricle chamber size with mild concentric left ventricular hypertrophy with ejection fraction estimated to be around 60%.  It was akinesis of the apex and hypokinesis of the apical septal wall that could be related to ventricular pacing versus prior infarct.        Hypotension  Assessment & Plan  Likely adrenal insufficiency in the setting of stress response  IV hydrocortisone    Adrenal insufficiency (HCC)- (present on admission)  Assessment & Plan  As per history secondary to chronic steroid use.  He was on hydrocortisone 10 mg BID as an outpatient  Due to hypotension, he has been started on IV hydrocortisone 50 mg q 6 hours    Obstructive hypertrophic cardiomyopathy (HCC)- (present on admission)  Assessment & Plan  As per history.   - currently on: (sotalol 80 mg) 40 mg q12h    Repeat echocardiogram unchanged    Anticoagulant long-term use- (present on admission)  Assessment & Plan  Secondary to extensive history of DVT AND MULTIPLE PE's  STATUS post IVC filter placement.  Home Coumadin had been held for anticipated skin BCC procedures.   IV heparin drip and follow Factor X levels    History of pulmonary embolism- (present on admission)  Assessment & Plan  As per history at least 8-9 PEs in the past.  He has a IVC filter in place.  Holding warfarin for right ear basal cell carcinoma removal in 2 days.  CTA with PE protocol from the ER was negative for PE.    Continue to hold warfarin.  IV heparin  drip and titrate factor X levels     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HCC)- (present on admission)  Assessment & Plan  As per history.  Status post resection of pituitary adenoma in 1998.     levothyroxine.    Essential hypertension, benign- (present on admission)  Assessment & Plan  goal BP < 130/80 mmHg   - 24hr BP range: (154-125)/(94-80) mmHg    Continue home lisinopril and sotalol.    Prolonged Q-T interval on ECG- (present on admission)  Assessment & Plan  In sotalol use.    Avoid QTC prolonging medications.    Frequent PVCs- (present on admission)  Assessment & Plan  Now in a paced rhythm     Hyponatremia- (present on admission)  Assessment & Plan   - serum sodium: 134.00 mmol/L (04/19/2021 1:02:00) unchanged from 134.00 mmol/L (04/18/2021 18:44:00)    Mild and chronic.      Continue to monitor.    SSS (sick sinus syndrome) ([facility])- (present on admission)  Assessment & Plan  Secondary to third-degree blocks status post pacemaker placement.    Continue telemetry monitoring.  Continue home sotalol.    BPH (benign prostatic hypertrophy)- (present on admission)  Assessment & Plan  Continues to have urinary frequency.    Continue on tamsulosin.    Chronic back pain- (present on admission)  Assessment & Plan  As per history in setting of arthritis.    Continue home Norco and gabapentin.    COPD (chronic obstructive pulmonary disease) ([facility])- (present on admission)  Assessment & Plan  With chronic respiratory failure.  On 5 L of oxygen via nasal cannula at home.  Currently not in acute exacerbation clinically and at baseline with respiratory status.  Bronchodilators as needed per RT protocol.    Cardiac pacemaker in situ- (present on admission)  Assessment & Plan  Placed for sick sinus syndrome third-degree block.  History of multiple PVCs.    Telemetry monitoring.         VTE prophylaxis: IV heparin drip

## 2021-04-20 NOTE — HOSPITAL COURSE
4/19 admitted to ICU  4/20 LH and showed LHC and found LM 30%, severe stenosis to distal LAD, LCx with normal OM, 2nd % prox occlusion, RCA 50%  - s/p PCI JAS x3 overlapping to mid-distal LAD with ADRIAN 3 flow. Normal LVEDP  4/22 transferred out of ICU  4/24 re-admitted to ICU due to chest pain and hypotension

## 2021-04-20 NOTE — PROGRESS NOTES
Desmond from Lab called with critical result of Troponin at 621 Critical lab result read back to Desmond.    notified of critical lab result at 2230.  Critical lab result read back by 2230.

## 2021-04-20 NOTE — CARE PLAN
Problem: Urinary Elimination:  Goal: Ability to reestablish a normal urinary elimination pattern will improve  Outcome: PROGRESSING AS EXPECTED     Problem: Communication  Goal: The ability to communicate needs accurately and effectively will improve  Outcome: PROGRESSING SLOWER THAN EXPECTED     Problem: Safety  Goal: Will remain free from injury  Outcome: PROGRESSING SLOWER THAN EXPECTED     Problem: Psychosocial Needs:  Goal: Level of anxiety will decrease  Outcome: PROGRESSING SLOWER THAN EXPECTED       Patient with left heart cath today. Disorientation upon arrival back to the ICU. Reeducated on need to stay still and safe following the procedure. Patient's wife at bedside and stated she was willing to help. VS WNL. Patient to remain on bedrest until 1630 this evening. Femstop placed for right groin bleeding. Will continue to monitor.

## 2021-04-20 NOTE — ASSESSMENT & PLAN NOTE
Undifferentiated hypotension,? ACS, adrenal insufficiency, other  Chest pain resolved. Pt received 2L fluids yesterday 4/24  Troponin up to 1000, but serial troponins were 700 and 500. Overall improving troponin levels.   Restarted on hydrocortisone 100 Q8H  Continue midodrine 10 TID

## 2021-04-20 NOTE — WOUND TEAM
Wound team consulted for bilateral ears.  Patient states that he has a derm appointment later this week to take biopsy.  Area of the ears is dry.  Scab intact, no need to disturb.  Keep ears TYRESE.  Groin and panniculus has moisture associated skin breakdown due to yeast infection, nystatin ordered and interdrys placed.  No advanced wound care needs at this time, wound team signing off.  Please re-consult if needed.

## 2021-04-20 NOTE — CONSULTS
"Critical Care Consultation    Date of consult: 4/19/2021    Referring Physician  Kellee Miller M.D.    Reason for Consultation  Hypotension, chest pain    History of Presenting Illness  88 y.o. 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  His warfarin was recently held for planned resection of a right ear basal cell carcinoma and was not bridged.  CTA chest showed no evidence of PE.  He was started on heparin infusion, ASA, Plavix load and ongoing DAPT, high intensity statin and followed by cardiology.  Initial plan was for left heart cath 4/20.  TTE showed preserved LVEF of 60% with akinesis of the apex and hypokinesis of the apical septal wall. Today he has developed hypotension despite 1 L IV fluid and ongoing chest discomfort.  EKG shows atrial paced rhythm.  I was called by cardiology regarding ongoing hypotension and request to move to ICU.  Giving additional 500 cc crystalloid and increasing stress dose glucocorticoid from 10 mg p.o. Cortef to 50 mg IV Solu-Cortef.  He is afebrile.  He has a mild leukocytosis of 11.1 with no bands.  Blood cultures x2 - so far, UA relatively unimpressive.  Troponin T 516.  COVID-19 PCR negative.  Patient is confirmed full CODE STATUS and despite extensive medical history apparently is quite functional at home.    Code Status  Full Code    Review of Systems  Review of Systems   Constitutional: Negative for chills and fever.   Eyes: Negative for blurred vision.   Cardiovascular: Positive for chest pain.   Genitourinary: Negative for dysuria and urgency.   Musculoskeletal: Negative for myalgias.   Neurological: Negative for dizziness and headaches.       Past Medical History  Past Medical History:   Diagnosis Date   • Anesthesia     \"heart stopped\"   • Arrhythmia    • Arthritis     hand, elbow   • Asbestos exposure    • ASTHMA    • " Back pain    • Benign neoplasm of pituitary gland and craniopharyngeal duct (pouch) (Formerly [facility]) 4/1/2010   • BPH (benign prostatic hypertrophy) 4/1/2010   • Bradycardia    • Breath shortness    • Bronchitis    • Cardiac pacemaker 04 2010   • Cataract    • COPD (chronic obstructive pulmonary disease) (Formerly [facility])    • Diverticulitis    • DJD (degenerative joint disease)    • EMPHYSEMA    • Glaucoma    • Heart burn    • Heart murmur    • Hiatus hernia syndrome    • Hypertension    • 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[facility]) 1995   • Indigestion    • Knee arthroplasty 2002    right   • Obstructive hypertrophic cardiomyopathy ([facility]) 8/4/2011   • PAF (paroxysmal atrial fibrillation) (Formerly [facility])    • Paroxysmal atrial fibrillation ([facility]) 8/29/2011   • PE (pulmonary embolism)     IVC filter, states PE clots 8 times   • Phlebitis     chronic / recurrent   • Pituitary adenoma ([facility]) 1995    hypophysectomy   • Pituitary adenoma ([facility]) 1995    hypophysectomy   • Pneumonia    • Rheumatic fever    • S/P insertion of IVC (inferior vena caval) filter    • S/P parathyroidectomy    • S/P parathyroidectomy 2005   • Sleep apnea     no cpap machine   • SSS (sick sinus syndrome) ([facility]) 8/29/2011   • Third degree AV block ([facility]) 8/29/2011   • thyroidectomy 2005    benign   • Unspecified disorder of thyroid    • Unspecified hemorrhagic conditions    • Unspecified urinary incontinence    • Urinary bladder disorder        Surgical History   has a past surgical history that includes other orthopedic surgery; cholecystectomy; pr total knee arthroplasty; other; cervical disk and fusion anterior; pr revise ulnar nerve at wrist; pr total knee arthroplasty; cataract extraction with iol; thyroidectomy; pacemaker insertion (Left, 04/23/2010); carpal tunnel endoscopic (9/30/2011); knee revision total (6/20/2013); cholecystectomy; cervical fusion posterior; gastroscopy-endo (1/21/2019); gastroscopy-endo (1/22/2019); and other cardiac surgery.    Family History  family history includes Arthritis  in his father and mother.    Social History   reports that he has never smoked. He has never used smokeless tobacco. He reports that he does not drink alcohol and does not use drugs.    Medications  Home Medications     Reviewed by William Owen ([facility]) on 04/18/21 at 2240  Med List Status: Complete   Medication Last Dose Status   albuterol 108 (90 Base) MCG/ACT Aero Soln inhalation aerosol Not Taking Active   gabapentin (NEURONTIN) 600 MG tablet 4/17/2021 Active   HYDROcodone-acetaminophen (NORCO) 5-325 MG Tab per tablet 4/18/2021 Active   hydrocortisone (CORTEF) 10 MG Tab 4/18/2021 Active   levothyroxine (SYNTHROID) 75 MCG Tab 4/18/2021 Active   montelukast (SINGULAIR) 10 MG Tab 4/17/2021 Active   omeprazole (PRILOSEC) 20 MG delayed-release capsule 4/18/2021 Active   sotalol (BETAPACE) 80 MG Tab 4/18/2021 Active   tamsulosin (FLOMAX) 0.4 MG capsule 4/18/2021 Active   tiotropium (SPIRIVA) 18 MCG Cap Not Taking Active   Vitamin D, Cholecalciferol, 50 MCG (2000 UT) Cap 4/18/2021 Active   warfarin (COUMADIN) 5 MG Tab 4/17/2021 Active              Current Facility-Administered Medications   Medication Dose Route Frequency Provider Last Rate Last Admin   • heparin infusion 25,000 units in 500 mL 0.45% NACL  0-30 Units/kg/hr (Adjusted) Intravenous Continuous Arya Alonzo M.D. 24.2 mL/hr at 04/19/21 1906 16 Units/kg/hr at 04/19/21 1906   • heparin injection 2,000 Units  2,000 Units Intravenous PRN Arya Alonzo M.D.   2,000 Units at 04/19/21 1418   • atorvastatin (LIPITOR) tablet 80 mg  80 mg Oral Q EVENING Jeffrey Thompson M.D.   80 mg at 04/19/21 1706   • hydrocortisone (CORTEF) tablet 10 mg  10 mg Oral BID Toma Wylie M.D.   10 mg at 04/19/21 1707   • [START ON 4/20/2021] clopidogrel (PLAVIX) tablet 75 mg  75 mg Oral DAILY Isa Yanez M.D.       • [START ON 4/20/2021] aspirin EC (ECOTRIN) tablet 81 mg  81 mg Oral DAILY Kellee Miller M.D.       • morphine (pf) 4 mg/mL injection 4 mg  4 mg  Intravenous Q3HRS PRN Kellee Miller M.D.       • midodrine (PROAMATINE) tablet 10 mg  10 mg Oral TID WITH MEALS Kellee Miller M.D.   10 mg at 04/19/21 2113   • hydrocortisone sodium succinate PF (SOLU-CORTEF) 100 MG injection 50 mg  50 mg Intravenous Q6HRS Salvador Corona M.D.       • gabapentin (NEURONTIN) capsule 600 mg  600 mg Oral Q EVENING Arya Alonzo M.D.   600 mg at 04/19/21 1707   • HYDROcodone-acetaminophen (NORCO) 5-325 MG per tablet 1 tablet  1 tablet Oral BID PRN Arya Alonzo M.D.   1 tablet at 04/19/21 1902   • levothyroxine (SYNTHROID) tablet 75 mcg  75 mcg Oral AM ES Arya Alonzo M.D.   75 mcg at 04/19/21 0549   • montelukast (SINGULAIR) tablet 10 mg  10 mg Oral DAILY Arya Alonzo M.D.   10 mg at 04/19/21 0549   • omeprazole (PRILOSEC) capsule 20 mg  20 mg Oral BID Arya Alonzo M.D.   20 mg at 04/19/21 1706   • sotalol (BETAPACE) tablet 40 mg  40 mg Oral BID Arya Alonzo M.D.   Stopped at 04/19/21 1130   • tamsulosin (FLOMAX) capsule 0.4 mg  0.4 mg Oral DAILY Arya Alonzo M.D.   0.4 mg at 04/19/21 0549   • acetaminophen (Tylenol) tablet 650 mg  650 mg Oral Q6HRS PRN Arya Alonzo M.D.       • senna-docusate (PERICOLACE or SENOKOT S) 8.6-50 MG per tablet 2 tablet  2 tablet Oral BID Arya Alonzo M.D.   2 tablet at 04/19/21 1706    And   • polyethylene glycol/lytes (MIRALAX) PACKET 1 Packet  1 Packet Oral QDAY PRN Arya Alonzo M.D.        And   • magnesium hydroxide (MILK OF MAGNESIA) suspension 30 mL  30 mL Oral QDAY PRN Arya Alonzo M.D.        And   • bisacodyl (DULCOLAX) suppository 10 mg  10 mg Rectal QDAY PRN Arya Alonzo M.D.           Allergies  Allergies   Allergen Reactions   • Pcn [Penicillins] Rash     Tolerates cephalosporins         Vital Signs last 24 hours  Temp:  [36.1 °C (96.9 °F)-36.8 °C (98.2 °F)] 36.8 °C (98.2 °F)  Pulse:  [66-77] 72  Resp:  [13-18] 16  BP: ()/(53-93) 98/56  SpO2:  [92 %-100 %] 96 %    Physical Exam  Physical Exam  Vitals and nursing note reviewed.    Constitutional:       Appearance: Normal appearance.   HENT:      Head: Normocephalic and atraumatic.      Mouth/Throat:      Mouth: Mucous membranes are moist.   Eyes:      Extraocular Movements: Extraocular movements intact.      Pupils: Pupils are equal, round, and reactive to light.   Cardiovascular:      Rate and Rhythm: Normal rate and regular rhythm.      Heart sounds: Murmur present.      Comments: Left upper chest pacemaker in place, systolic murmur noted  Pulmonary:      Effort: No respiratory distress.      Breath sounds: Normal breath sounds. No wheezing.   Abdominal:      General: There is no distension.      Palpations: Abdomen is soft.      Tenderness: There is no abdominal tenderness.   Musculoskeletal:         General: No swelling or deformity.      Cervical back: Neck supple.   Skin:     General: Skin is warm and dry.      Capillary Refill: Capillary refill takes less than 2 seconds.      Comments: Bilateral ear lesions noted, scattered areas of psoriatic skin changes   Neurological:      General: No focal deficit present.      Mental Status: He is alert and oriented to person, place, and time.      Cranial Nerves: No cranial nerve deficit.   Psychiatric:         Mood and Affect: Mood normal.         Fluids    Intake/Output Summary (Last 24 hours) at 4/19/2021 2118  Last data filed at 4/19/2021 1700  Gross per 24 hour   Intake 800 ml   Output 800 ml   Net 0 ml       Laboratory  Recent Results (from the past 48 hour(s))   CBC with Differential    Collection Time: 04/18/21  6:44 PM   Result Value Ref Range    WBC 12.7 (H) 4.8 - 10.8 K/uL    RBC 5.16 4.70 - 6.10 M/uL    Hemoglobin 13.1 (L) 14.0 - 18.0 g/dL    Hematocrit 43.5 42.0 - 52.0 %    MCV 84.3 81.4 - 97.8 fL    MCH 25.4 (L) 27.0 - 33.0 pg    MCHC 30.1 (L) 33.7 - 35.3 g/dL    RDW 52.8 (H) 35.9 - 50.0 fL    Platelet Count 216 164 - 446 K/uL    MPV 9.7 9.0 - 12.9 fL    Neutrophils-Polys 73.50 (H) 44.00 - 72.00 %    Lymphocytes 11.70 (L) 22.00 -  41.00 %    Monocytes 8.10 0.00 - 13.40 %    Eosinophils 5.20 0.00 - 6.90 %    Basophils 0.90 0.00 - 1.80 %    Immature Granulocytes 0.60 0.00 - 0.90 %    Nucleated RBC 0.00 /100 WBC    Neutrophils (Absolute) 9.31 (H) 1.82 - 7.42 K/uL    Lymphs (Absolute) 1.48 1.00 - 4.80 K/uL    Monos (Absolute) 1.03 (H) 0.00 - 0.85 K/uL    Eos (Absolute) 0.66 (H) 0.00 - 0.51 K/uL    Baso (Absolute) 0.12 0.00 - 0.12 K/uL    Immature Granulocytes (abs) 0.07 0.00 - 0.11 K/uL    NRBC (Absolute) 0.00 K/uL   Complete Metabolic Panel (CMP)    Collection Time: 04/18/21  6:44 PM   Result Value Ref Range    Sodium 134 (L) 135 - 145 mmol/L    Potassium 4.4 3.6 - 5.5 mmol/L    Chloride 96 96 - 112 mmol/L    Co2 30 20 - 33 mmol/L    Anion Gap 8.0 7.0 - 16.0    Glucose 93 65 - 99 mg/dL    Bun 16 8 - 22 mg/dL    Creatinine 0.95 0.50 - 1.40 mg/dL    Calcium 8.7 8.5 - 10.5 mg/dL    AST(SGOT) 18 12 - 45 U/L    ALT(SGPT) 11 2 - 50 U/L    Alkaline Phosphatase 42 30 - 99 U/L    Total Bilirubin 0.2 0.1 - 1.5 mg/dL    Albumin 3.4 3.2 - 4.9 g/dL    Total Protein 6.4 6.0 - 8.2 g/dL    Globulin 3.0 1.9 - 3.5 g/dL    A-G Ratio 1.1 g/dL   Troponin    Collection Time: 04/18/21  6:44 PM   Result Value Ref Range    Troponin T 38 (H) 6 - 19 ng/L   Lipase    Collection Time: 04/18/21  6:44 PM   Result Value Ref Range    Lipase 29 11 - 82 U/L   ESTIMATED GFR    Collection Time: 04/18/21  6:44 PM   Result Value Ref Range    GFR If African American >60 >60 mL/min/1.73 m 2    GFR If Non African American >60 >60 mL/min/1.73 m 2   SARS-CoV-2 PCR (24 hour In-House): Collect NP swab in VTM    Collection Time: 04/18/21 10:20 PM    Specimen: Respirate   Result Value Ref Range    SARS-CoV-2 Source NP Swab     SARS-CoV-2 by PCR NotDetected    EKG    Collection Time: 04/18/21 11:14 PM   Result Value Ref Range    Report       [facility]    Test Date:  2021-04-18  Pt Name:    MANOLO AUGUST                 Department: ER  MRN:        [id number]                      Room:        T714  Gender:     Male                         Technician: YAIMA  :        1932                   Requested By:ER TRIAGE PROTOCOL  Order #:    [id number]                    Reading MD:    Measurements  Intervals                                Axis  Rate:       72                           P:  MO:         58                           QRS:        -36  QRSD:       124                          T:          137  QT:         410  QTc:        449    Interpretive Statements  A-V DUAL-PACED RHYTHM WITH SOME INHIBITION  NO FURTHER ANALYSIS ATTEMPTED DUE TO PACED RHYTHM  Compared to ECG 2019 09:06:06  AV dual-paced complex(es) or rhythm no longer present  Left bundle-branch block no longer present     EKG    Collection Time: 21 11:14 PM   Result Value Ref Range    Report       [facility]    Test Date:  2021  Pt Name:    MANOLO AUGUST                 Department: ER  MRN:        [id number]                      Room:       T714  Gender:     Male                         Technician: YAIMA  :        1932                   Requested By:HUDSON JEAN  Order #:    [id number]                    Reading MD: Thomas Christian MD    Measurements  Intervals                                Axis  Rate:       72                           P:  MO:         58                           QRS:        -36  QRSD:       124                          T:          137  QT:         410  QTc:        449    Interpretive Statements  A-V DUAL-PACED RHYTHM WITH SOME INHIBITION  LEFT BUNDLE BRANCH BLOCK  Compared to ECG 2019 09:06:06  NO SIGNIFICANT CHANGES  Electronically Signed On 2021 0:31:12 PDT by Thomas Christian MD     PROCALCITONIN    Collection Time: 21 11:30 PM   Result Value Ref Range    Procalcitonin 0.28 (H) <0.25 ng/mL   Sed Rate    Collection Time: 21 11:30 PM   Result Value Ref Range    Sed Rate Westergren 7 0 - 20 mm/hour   CRP QUANTITIVE (NON-CARDIAC)    Collection Time: 21 11:30 PM    Result Value Ref Range    Stat C-Reactive Protein 1.40 (H) 0.00 - 0.75 mg/dL   MAGNESIUM    Collection Time: 04/18/21 11:30 PM   Result Value Ref Range    Magnesium 1.9 1.5 - 2.5 mg/dL   EC-ECHOCARDIOGRAM COMPLETE W/O CONT    Collection Time: 04/18/21 11:57 PM   Result Value Ref Range    Eject.Frac. MOD BP 32.85     Eject.Frac. MOD 4C 45.97     Eject.Frac. MOD 2C 27.28     Left Ventrical Ejection Fraction 60    Troponin in four (4) hours    Collection Time: 04/19/21  1:02 AM   Result Value Ref Range    Troponin T 163 (H) 6 - 19 ng/L   CBC with Differential    Collection Time: 04/19/21  1:02 AM   Result Value Ref Range    WBC 11.1 (H) 4.8 - 10.8 K/uL    RBC 5.48 4.70 - 6.10 M/uL    Hemoglobin 13.9 (L) 14.0 - 18.0 g/dL    Hematocrit 46.6 42.0 - 52.0 %    MCV 85.0 81.4 - 97.8 fL    MCH 25.4 (L) 27.0 - 33.0 pg    MCHC 29.8 (L) 33.7 - 35.3 g/dL    RDW 52.9 (H) 35.9 - 50.0 fL    Platelet Count 193 164 - 446 K/uL    MPV 9.2 9.0 - 12.9 fL    Neutrophils-Polys 79.10 (H) 44.00 - 72.00 %    Lymphocytes 11.30 (L) 22.00 - 41.00 %    Monocytes 3.50 0.00 - 13.40 %    Eosinophils 5.20 0.00 - 6.90 %    Basophils 0.90 0.00 - 1.80 %    Nucleated RBC 0.00 /100 WBC    Neutrophils (Absolute) 8.78 (H) 1.82 - 7.42 K/uL    Lymphs (Absolute) 1.25 1.00 - 4.80 K/uL    Monos (Absolute) 0.39 0.00 - 0.85 K/uL    Eos (Absolute) 0.58 (H) 0.00 - 0.51 K/uL    Baso (Absolute) 0.10 0.00 - 0.12 K/uL    NRBC (Absolute) 0.00 K/uL    Anisocytosis 1+     Macrocytosis 1+     Microcytosis 1+    Comp Metabolic Panel (CMP)    Collection Time: 04/19/21  1:02 AM   Result Value Ref Range    Sodium 134 (L) 135 - 145 mmol/L    Potassium 4.1 3.6 - 5.5 mmol/L    Chloride 96 96 - 112 mmol/L    Co2 32 20 - 33 mmol/L    Anion Gap 6.0 (L) 7.0 - 16.0    Glucose 106 (H) 65 - 99 mg/dL    Bun 17 8 - 22 mg/dL    Creatinine 1.03 0.50 - 1.40 mg/dL    Calcium 8.7 8.5 - 10.5 mg/dL    AST(SGOT) 25 12 - 45 U/L    ALT(SGPT) 17 2 - 50 U/L    Alkaline Phosphatase 44 30 - 99 U/L     Total Bilirubin 0.4 0.1 - 1.5 mg/dL    Albumin 3.4 3.2 - 4.9 g/dL    Total Protein 6.6 6.0 - 8.2 g/dL    Globulin 3.2 1.9 - 3.5 g/dL    A-G Ratio 1.1 g/dL   Magnesium    Collection Time: 21  1:02 AM   Result Value Ref Range    Magnesium 1.9 1.5 - 2.5 mg/dL   PT/INR    Collection Time: 21  1:02 AM   Result Value Ref Range    PT 13.5 12.0 - 14.6 sec    INR 1.00 0.87 - 1.13   ESTIMATED GFR    Collection Time: 21  1:02 AM   Result Value Ref Range    GFR If African American >60 >60 mL/min/1.73 m 2    GFR If Non African American >60 >60 mL/min/1.73 m 2   DIFFERENTIAL MANUAL    Collection Time: 21  1:02 AM   Result Value Ref Range    Manual Diff Status PERFORMED    PERIPHERAL SMEAR REVIEW    Collection Time: 21  1:02 AM   Result Value Ref Range    Peripheral Smear Review see below    PLATELET ESTIMATE    Collection Time: 21  1:02 AM   Result Value Ref Range    Plt Estimation Normal    MORPHOLOGY    Collection Time: 21  1:02 AM   Result Value Ref Range    RBC Morphology Present     Polychromia 1+    EKG in four (4) hours    Collection Time: 21  1:28 AM   Result Value Ref Range    Report       [facility]    Test Date:  2021  Pt Name:    MANOLO AUGUST                 Department: [facility]  MRN:        [id number]                      Room:       14  Gender:     Male                         Technician: YAIMA  :        1932                   Requested By:HALEY MANE  Order #:    [id number]                    Reading MD: Thomas Christian MD    Measurements  Intervals                                Axis  Rate:       72                           P:          -65  TX:         69                           QRS:        -83  QRSD:       169                          T:          81  QT:         460  QTc:        504    Interpretive Statements  SINUS RHYTHM  VENTRICULAR PACING  MULTIPLE PREMATURE COMPLEXES, VENT & SUPRAVEN  Compared to ECG 2021 23:14:05  NO SIGNIFICANT  CHANGES  Electronically Signed On 2021 2:07:55 PDT by Thomas Christian MD     aPTT    Collection Time: 21  4:43 AM   Result Value Ref Range    APTT 33.0 24.7 - 36.0 sec   Prothrombin Time    Collection Time: 21  4:43 AM   Result Value Ref Range    PT 13.6 12.0 - 14.6 sec    INR 1.01 0.87 - 1.13   Heparin Xa (Unfractionated)    Collection Time: 21  4:43 AM   Result Value Ref Range    Heparin Xa (UFH) <0.10 IU/mL   Heparin Xa (Unfractionated)    Collection Time: 21 12:29 PM   Result Value Ref Range    Heparin Xa (UFH) 0.14 IU/mL   TROPONIN    Collection Time: 21  1:32 PM   Result Value Ref Range    Troponin T 447 (H) 6 - 19 ng/L   CORTISOL    Collection Time: 21  1:32 PM   Result Value Ref Range    Cortisol 7.3 0.0 - 23.0 ug/dL   EKG    Collection Time: 21  3:26 PM   Result Value Ref Range    Report       [facility]    Test Date:  2021  Pt Name:    [facility]                 Department: 171  MRN:        [id number]                      Room:       T714  Gender:     Male                         Technician: LAMAR  :        1932                   Requested By:ROSA LEMUS  Order #:    [id number]                    Reading MD: Kellee Miller    Measurements  Intervals                                Axis  Rate:       70                           P:          0  AL:         158                          QRS:        -104  QRSD:       162                          T:          46  QT:         484  QTc:        523    Interpretive Statements  SINUS RHYTHM  Ventricular pacing  Baseline artifact  Electronically Signed On 2021 16:50:30 PDT by Kellee Miller     TROPONIN    Collection Time: 21  5:46 PM   Result Value Ref Range    Troponin T 516 (H) 6 - 19 ng/L   EKG    Collection Time: 21  7:58 PM   Result Value Ref Range    Report       [facility]    Test Date:  2021  Pt Name:    [facility]                 Department: 171  MRN:         [id number]                      Room:       T714  Gender:     Male                         Technician: LNYN  :        1932                   Requested By:ROSA KEVIN LEMUS  Order #:    [id number]                    Reading MD: Kellee Miller    Measurements  Intervals                                Axis  Rate:       70                           P:          3  AL:         276                          QRS:        -35  QRSD:       129                          T:          154  QT:         394  QTc:        426    Interpretive Statements  ATRIAL-PACED RHYTHM  Atypical LBBB  Electronically Signed On 2021 20:36:01 PDT by Kellee Miller         Imaging  EC-ECHOCARDIOGRAM COMPLETE W/O CONT   Final Result      CT-CTA CHEST PULMONARY ARTERY W/ RECONS   Final Result         1.  No evidence of pulmonary embolism.   2.  Mild cardiomegaly.   3.  Atherosclerosis.   4.  Mild 4.2 cm ascending thoracic aortic aneurysm.   5.  Hypoinflation, elevation of left hemidiaphragm with bibasilar atelectasis. No significant consolidation or pleural effusion.   6.  4 mm noncalcified right middle lobe pulmonary nodule. Follow-up per Fleischner criteria is clinically indicated.      Fleischner Society pulmonary nodule recommendations:   Low Risk: No routine follow-up      High Risk: Optional CT at 12 months      Comments: Nodules less than 6 mm do not require routine follow-up, but certain patients at high risk with suspicious nodule morphology, upper lobe location, or both may warrant 12-month follow-up.      Low Risk - Minimal or absent history of smoking and of other known risk factors.      High Risk - History of smoking or of other known risk factors.      Note: These recommendations do not apply to lung cancer screening, patients with immunosuppression, or patients with known primary cancer.      Fleischner Society 2017 Guidelines for Management of Incidentally Detected Pulmonary Nodules in Adults      DX-CHEST-PORTABLE (1 VIEW)    Final Result      1.  Hypoinflation with LEFT lung base atelectasis or infiltrate, possibly chronic.   2.  No overt pulmonary edema.      CL-LEFT HEART CATHETERIZATION WITH POSSIBLE INTERVENTION    (Results Pending)   CT-CTA CHEST PULMONARY ARTERY W/ RECONS    (Results Pending)       Assessment/Plan  * Hypotension  Assessment & Plan  Undifferentiated hypotension,?  Related to lisinopril given this morning, ACS, adrenal insufficiency, other  I will change oral hydrocortisone to IV Solu-Cortef  I will give additional crystalloid  Transfer to ICU  Started on midodrine per cardiology, will add Jonny-Synephrine as needed    NSTEMI (non-ST elevated myocardial infarction) (HCC)- (present on admission)  Assessment & Plan  Consistent with ACS  Cardiology following, suspect hypertensive heart disease, LVH and microvascular ischemia  Continue heparin infusion, DAPT, pain control with morphine  And for LHC in a.m.  Avoid hypotension and hypertension in the setting of ACS for coronary perfusion    Aortic stenosis- (present on admission)  Assessment & Plan  By history    Obstructive hypertrophic cardiomyopathy (HCC)- (present on admission)  Assessment & Plan  As above    History of pulmonary embolism- (present on admission)  Assessment & Plan  History of DVT/PE with prior IVC filter placement  Warfarin held 2 days PTA for planned dermatologic surgery    Adrenal insufficiency (HCC)- (present on admission)  Assessment & Plan  Remote history of pituitary adenoma resection on chronic steroid replacement with hydrocortisone  Increase to stress dose acutely    Anticoagulant long-term use- (present on admission)  Assessment & Plan  Warfarin recently held for planned right ear skin lesion resection  Heparin drip acutely for ACS    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    The patient remains critically ill.  Critical care time = 40 minutes in directly providing and coordinating critical care and extensive data review.  No time overlap and excludes procedures.

## 2021-04-20 NOTE — ASSESSMENT & PLAN NOTE
Consistent with ACS  Cardiology following  Repeat troponin today at 1086  Continue asa/plavix  High intensity statin  CTA chest with stable aneurysms

## 2021-04-20 NOTE — CARE PLAN
Problem: Respiratory:  Goal: Respiratory status will improve  Outcome: PROGRESSING AS EXPECTED  Note: Patient maintained on 5 LPM/ NC, baseline at home    Problem: Pain Management  Goal: Pain level will decrease to patient's comfort goal  Outcome: PROGRESSING AS EXPECTED  Note: With pain medication on board, given per pain NRS report

## 2021-04-20 NOTE — PROCEDURES
"CARDIAC CATHETERIZATION REPORT    Referring Provider: Kellee Miller M.D.    PROCEDURE PHYSICIAN: José Miguel Byrd MD, [facility], [facility]  ASSISTANT: None    IMPRESSIONS:  1.  Non-STEMI due to severe stenosis of the distal LAD with heavy calcification throughout.  2.  Complex but successful PCI of the mid to distal LAD using 3 overlapping drug-eluting stents  3.  Residual total occlusion of the OM 2  4.  Normal LVEDP    Recommendations:  Dual antiplatelet therapy for 1 months then plavix + coumadin afterwards. PPI while on triple therapy    Pre-procedure diagnosis: NSTEMI  Post-procedure diagnosis: Same    Procedure performed  Selective coronary angiography  Left heart catheterization  Percutaneous coronary intervention - Stent Placement (JAS x3 to LAD)    Conscious sedation was supervised by myself and administered by trained personnel using fentanyl and versed between 0950 and 1201. The patient tolerated sedation without complication.     Procedure Description  1. Access: 5/6 Nepalese right radial artery Micropuncture technique was utilized following local anesthesia with lidocaine.  A radial cocktail containing verapamil, heparin and saline was administered in the radial artery sheath.  7 Nepalese destination sheath in the right femoral artery under micropuncture technique with ultrasound and x-ray guidance    2. Diagnostic description: The catheter was passed to the central circulation with the aide of J tipped 0.35\" wire. A 5F TIG 4.0 and 6F JR4 were used to inject the coronary circulation enter the left ventricle during invasive hemodynamic monitoring.     3. Description of Intervention: With some difficulty due to innominate tortuosity I was able to position a 6 Nepalese EBU 4.0 from the radial artery.  I was able to traverse the lesion with a run-through wire and performed predilation with a 2.5 x 12 mm compliant balloon at nominal atmospheres.  There was good balloon expansion and so atherectomy was deferred.  I then " struggled to deliver stents into the vessel due to proximal calcification as well as tortuosity within the guiding catheter.  With the assistance of a guide extension catheter I was able to pass a 2.5 x 12 mm [place] drug-eluting stent distally and a 2.5 x 15 [place] drug-eluting stent proximally in overlapping fashion.  Next I delivered a 3.0 x 15 [place] drug-eluting stent again overlapping proximally.  The stents were all deployed at 14 arik.  Subsequently I struggled to deliver a post dilation balloon and the patient was becoming agitated due to back discomfort and after moving his arm dislodged the coronary equipment.  At that point I gained access in the right common femoral artery with a 7 Beninese destination sheath which was placed due to severe tortuosity within the iliofemoral system.  I then reengage the coronary with a 7 Beninese EBU 4.0 guide and passed a run-through wire into the distal vessel.  Then with the assistance of a guide extension catheter I was able to deliver a 2.5 x 12 mm compliant balloon and performed angioplasty around the heavily calcified angle.  I then was able to deliver a 2.5 x 12 mm NC balloon and performed angioplasty at 20 arik throughout.  There was excellent balloon expansion, particularly at the culprit lesion site.  However, more proximally and a heavily calcified segment there was continued recoil despite repeated angioplasty efforts.  I then postdilated the proximal portion of the stent with a 3.0 x 15 NC balloon at 16 arik.  Post procedure angiograms demonstrated good results, the equipment was removed and the arteriotomy closed with a Perclose, and radial band respectively    Findings   Hemodynamics:   Aorta: 97/64 mmHg  LVEDP: 97/12 mmHg    Coronary Anatomy   Left Main: Distal 30% stenosis.   LAD: Heavily calcified in the proximal mid segment with proximal 30% stenosis.  The mid segment has a long 50% stenosis.  Distally there is a focal 99% stenosis with ADRIAN I flow into the distal  bedThe first diagonal has proximal 50% stenosis, the second diagonal is small and has proximal 30% stenosis   LCx: Minimal luminal irregularities in the AV groove.  The first OM is normal.  The second OM has 100% proximal occlusion -suspected chronic   RCA: Dominant, 50% stenosis in the midsegment of the vessel.  50% stenosis distally.     Results of intervention to the LAD:  Pre: 99% stenosis and ADRIAN I flow  Post: 20% residual stenosis and ADRIAN III flow. No dissection or distal embolization.    Technical Factors  1. Complications: None  2. Estimated Blood Loss: <50 cc  3. Specimens: None  4. Contrast Volume: 95 ml  5. Medications: Radial cocktail (Verapamil 2.5 mg, Nitroglycerin 100 mcg) Heparin to maintain ACT >250  6. Radiation (Air Kerma): 2365 mGy

## 2021-04-20 NOTE — ASSESSMENT & PLAN NOTE
Warfarin recently held for planned right ear skin lesion resection  Holding warfarin/lovenox due to hematuria

## 2021-04-20 NOTE — ASSESSMENT & PLAN NOTE
Without acute exacerbation, no current wheezing  Continue submental oxygen, on 5 L home oxygen at baseline  RT protocols,

## 2021-04-20 NOTE — PROGRESS NOTES
Pt. Received form the floor due to worsening chest pain and hypotension. Patient is AO4 upon arrival in the unit with SBP's maintaining on mid 90's, chest NRS at 3-4/10, with chronic pain on his joints. With oxygen support at 5 LPM/ NC as per baseline at home.    2200:Seen and assessed by Dr. Corona and followed by Dr. Miller (Cardio) at bedside. Awaiting morphine orders to get modified, with BP precautions.    Meanwhile, patient was instructed to be NPO post MN for cath lab tomorrow.

## 2021-04-20 NOTE — DISCHARGE PLANNING
Care Transition Team Discharge Planning    Anticipated Discharge Disposition: TBD    Action: Per AM rounds, charge RN indicates pt is in cath lab during rounds.    Barriers to Discharge: medical clearance/stability    Plan: Lsw will continue to follow, attend rounds for medical updates, and assist w/ d/c planning.

## 2021-04-20 NOTE — PROGRESS NOTES
Pt c/o CP 7/10. STAT EKG ordered. Rapid RN consulted on EKG results.     Dr. Agarwal notified of CP with EKG changes at 2026     Dr. Miller notified of low BPs, EKG changes and CP at 2028.      Original plans to do PE study since CP was similar to patients prior PEs although radiology was concerned because of prior contrast dose yesterday. CT w/ contrast cancelled.     Dr. Miller came to bedside to assess. Decision to transfer to ICU. Dr. Corona accepted patient. Pt transferred with all belongings and chart to T619 with rapid RN.

## 2021-04-20 NOTE — PROGRESS NOTES
Pt still currently in cath lab. Pt will be changing rooms from T619 to T 606. Report given to Sherron ROLON. Pt belongings moved to new room.

## 2021-04-20 NOTE — ASSESSMENT & PLAN NOTE
Remote history of pituitary adenoma resection on chronic steroid replacement with hydrocortisone  On hydrocortisone 100 Q8H

## 2021-04-20 NOTE — ASSESSMENT & PLAN NOTE
On admit had adrenal insufficiency in the setting of stress response  Monitor vitals and having improved BP>  4/26 finish hydrocortison 50mg TID IV and switch to back on home hydrocortisone 10mg BID 4/27  Wean midodrine 5mg TID as tolerates

## 2021-04-20 NOTE — PROGRESS NOTES
Report received at bedside, pt care assumed, tele box on. Pt aaox4, no signs of distress noted at this time. Patient resting comfortably in bed. POC discussed with pt and verbalizes no questions. Pt c/o 7/10 chest pain. EKG ordered. Pt denies any additional needs at this time. Bed in lowest position, pt educated on fall risk and verbalized understanding, call light within reach, bed alarm plugged in and on. Will continue hourly rounding.

## 2021-04-20 NOTE — PROGRESS NOTES
2 RN Skin Check with ОЛЬГА Matthews    Devices in place: Nasal Cannula, condom catheter  2 peripherals  Lt wrist and LFA  Skin assessed under devices: yes.  The following interventions in place: Pillows.  Foams   Sacrum red, but blanching   Left upper back scabbing   Face Dry, flaky   Pannus Red, moisture dermatitis  L lower chest/breast area red, blanching   L ear: yellow, red, brown, partial healing ulcer, skin cancer, open   Posterior R ear: open, blistered, red, yellow  BUE: dry fragile skin, with scattered scabs and bruises  BLE: dry flaky skin with multiple scabs

## 2021-04-20 NOTE — PROGRESS NOTES
Daily Progress Note: Cardiology     Date of Service: 4/20/2021  Attending: Dr. Yanez  Senior Resident: Dr. Thompson    Chief Complaint:   Chest pain    ID: 88 y.o. male who presented 4/18/2021 with chest pain that started at rest while he was sitting in front of the computer. Complex medical history including 8 PEs in the past status post IVC placement on warfarin, which she stopped taking 2 days ago in preparation for removal of basal cell carcinoma from his right ear.  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  Pt's troponins trended up and he was treated medically for NSTEMi and underwent a left heart cath on 4/20/21, which revealed severe stenosis of the distal LAD with heavy calcification of the vessel and residual total occlusion of OM2. He had a complex cath with successful PCI of the mid to distal LAD using 3 overlapping drug-eluting stents.     Interval Update:  Pt was transferred to ICU overnight for ongoing hypotension and persistent chest pain. His anti-hypertensives were held and he was switched to stress dose iv steroids, received an additional 500cc bolus, and midodrine. His troponin trended up from 38 on admission to 621. His blood pressure improved overnight and chest pain resolved this morning. He underwent  a left heart cath on 4/20/21, which revealed severe stenosis of the distal LAD with heavy calcification of the vessel and residual total occlusion of OM2. He had a complex cath with successful PCI of the mid to distal LAD using 3 overlapping drug-eluting stents.     Consultants/Specialty:  Cardiology  Interventional cardiology  ICU     Review of Systems:  Constitutional: Positive for malaise/fatigue. Negative for chills and  fever.   HENT: Negative for congestion and sore throat.    Respiratory: Negative for cough, shortness of breath and wheezing.    Cardiovascular: Positive for chest pain. Negative for palpitations, orthopnea, leg swelling and PND.   Gastrointestinal: Negative for abdominal pain, diarrhea, nausea and vomiting.   Neurological: Positive for dizziness. Negative for loss of consciousness and headaches.   Psychiatric/Behavioral: Negative for substance abuse. The patient is not nervous/anxious.      Objective Data:   Physical Exam:   Vitals:   Temp:  [35.8 °C (96.5 °F)-36.8 °C (98.2 °F)] 35.8 °C (96.5 °F)  Pulse:  [70-90] 76  Resp:  [13-23] 17  BP: ()/(41-87) 130/87  SpO2:  [89 %-99 %] 91 %    Physical Exam  Constitutional:       General: He is not in acute distress.     Appearance: He is obese. He is not ill-appearing, toxic-appearing or diaphoretic.   HENT:      Head: Normocephalic and atraumatic.      Nose: Nose normal.      Mouth/Throat:      Mouth: Mucous membranes are moist.   Eyes:      Extraocular Movements: Extraocular movements intact.      Pupils: Pupils are equal, round, and reactive to light.   Cardiovascular:      Rate and Rhythm: Normal rate and regular rhythm.      Pulses: Normal pulses.      Heart sounds: Murmur (Crescendo  systolic murmur present with a grade of 3/6.) present. No friction rub. No gallop.       Comments: Non tender on palpation   Pulmonary:      Effort: Pulmonary effort is normal. No respiratory distress.      Breath sounds: No wheezing, or rhonchi, mild bibasilar rales   Chest:      Chest wall: No tenderness.   Abdominal:      General: Bowel sounds are normal. There is no distension.      Palpations: Abdomen is soft.      Tenderness: There is no abdominal tenderness. There is no guarding or rebound.      Comments: obese   Musculoskeletal:         General: Normal range of motion.      Cervical back: Normal range of motion and neck supple.      Right lower leg: No edema.      Left  lower leg: No edema.   Skin:     General: Skin is warm.      Findings: No lesion or rash.   Neurological:      General: No focal deficit present.      Mental Status: He is alert and oriented to person, place, and time.   Psychiatric:         Mood and Affect: Mood normal.         Behavior: Behavior normal.         Thought Content: Thought content normal.         Judgment: Judgment normal.     Labs:   Recent Labs     04/18/21 1844 04/19/21  0102 04/20/21  0500   WBC 12.7* 11.1* 15.1*   RBC 5.16 5.48 5.03   HEMOGLOBIN 13.1* 13.9* 12.6*   HEMATOCRIT 43.5 46.6 41.6*   MCV 84.3 85.0 82.7   MCH 25.4* 25.4* 25.0*   RDW 52.8* 52.9* 52.2*   PLATELETCT 216 193 216   MPV 9.7 9.2 10.0   NEUTSPOLYS 73.50* 79.10* 87.30*   LYMPHOCYTES 11.70* 11.30* 6.40*   MONOCYTES 8.10 3.50 5.10   EOSINOPHILS 5.20 5.20 0.20   BASOPHILS 0.90 0.90 0.50   RBCMORPHOLO  --  Present  --      Recent Labs     04/18/21 1844 04/19/21  0102 04/20/21  0500   SODIUM 134* 134* 135   POTASSIUM 4.4 4.1 4.6   CHLORIDE 96 96 99   CO2 30 32 28   GLUCOSE 93 106* 122*   BUN 16 17 18       Imaging:   EC-ECHOCARDIOGRAM COMPLETE W/O CONT   Final Result      CT-CTA CHEST PULMONARY ARTERY W/ RECONS   Final Result         1.  No evidence of pulmonary embolism.   2.  Mild cardiomegaly.   3.  Atherosclerosis.   4.  Mild 4.2 cm ascending thoracic aortic aneurysm.   5.  Hypoinflation, elevation of left hemidiaphragm with bibasilar atelectasis. No significant consolidation or pleural effusion.   6.  4 mm noncalcified right middle lobe pulmonary nodule. Follow-up per Fleischner criteria is clinically indicated.      Fleischner Society pulmonary nodule recommendations:   Low Risk: No routine follow-up      High Risk: Optional CT at 12 months      Comments: Nodules less than 6 mm do not require routine follow-up, but certain patients at high risk with suspicious nodule morphology, upper lobe location, or both may warrant 12-month follow-up.      Low Risk - Minimal or absent  history of smoking and of other known risk factors.      High Risk - History of smoking or of other known risk factors.      Note: These recommendations do not apply to lung cancer screening, patients with immunosuppression, or patients with known primary cancer.      Fleischner Society 2017 Guidelines for Management of Incidentally Detected Pulmonary Nodules in Adults      DX-CHEST-PORTABLE (1 VIEW)   Final Result      1.  Hypoinflation with LEFT lung base atelectasis or infiltrate, possibly chronic.   2.  No overt pulmonary edema.      CL-LEFT HEART CATHETERIZATION WITH POSSIBLE INTERVENTION    (Results Pending)     Assessment and plan:     NSTEMI- (present on admission)  Assessment & Plan     New onset chest pain substernal pressure, non radiating 8/10 at its worse that started at rest at 1:30pm on 4/18/21. Has not had this pain in the past.   Troponin trended up. HEART Score 5, Risk of MACE of 12-16.6%. Pt denied previous hx of heart catheterization, no hx found in the chart. Pt treated medically for NSTEMI and underwent  a left heart cath on 4/20/21, which revealed severe stenosis of the distal LAD with heavy calcification of the vessel and residual total occlusion of OM2. He had a complex cath with successful PCI of the mid to distal LAD using 3 overlapping drug-eluting stents with Dr. Byrd.     Plan:  Continue telemetry monitoring in the ICU  Serial troponins and EKGs       Per Dr. Byrd, dual antiplatelet therapy for 1 months then plavix + coumadin afterwards. 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     Continue telemetry monitoring.  Optimize electrolytes for potassium goal greater than 4.5 and magnesium goal greater than 2.0.     Obstructive hypertrophic cardiomyopathy (HCC)- (present on admission)  Assessment & Plan  As per history.   - currently on: (sotalol 80 mg) 40 mg q12h     Repeat echocardiogram in setting of new dizziness and dyspnea on exertion shows stable ECHO when compared to one in 2019  Continue home sotalol     SSS (sick sinus syndrome) (HCC)- (present on admission)  Assessment & Plan  Secondary to third-degree blocks status post pacemaker placement.     Continue telemetry monitoring.  Continue home sotalol.     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  Reports last PE 5-6 years ago     Will need to resume Warfarin   Currently on heparin drip for NSTEMI     Essential hypertension, benign- (present on admission)   Assessment & Plan  goal BP < 130/80 mmHg    States his BP at home runs 130-135/70-80, occasionally experiences hypotension and skips taking his lisinopril on those days      Continue sotalol for now, introduce BB and ACE or ARB when can tolerate     Hypotension   Resolved with iv fluids and stress dose steroids   D/C midodrine this am      Prolonged Q-T interval on ECG- (present on admission)  Assessment & Plan  In sotalol use.     Avoid QTC prolonging medications     Cardiac pacemaker in situ- (present on admission)  Assessment & Plan  Placed for sick sinus syndrome third-degree block.  History of multiple PVCs.     Telemetry monitoring    History of pulmonary embolism- (present on admission)  Assessment & Plan  As per history at least 8-9 PEs in the past.  He has a IVC filter in place.  Holding warfarin for right ear basal cell carcinoma removal in 2 days.  CTA with PE protocol from the ER was negative for PE.     On heparin  drip, will need to resume warfarin and per Dr. Byrd, would recommend postponing removing BCC as pt needs triple therapy      Adrenal insufficiency (HCC)- (present on admission)  Assessment & Plan  As per history secondary to chronic steroid use.     Continue stress dose steroids for now, can consider switching back to home regiment on discharge      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HCC)- (present on admission)  Assessment & Plan  As per history.  Status post resection of pituitary adenoma in 1998     On chronic hydrocortisone and levothyroxine therapy

## 2021-04-21 LAB
ANION GAP SERPL CALC-SCNC: 5 MMOL/L (ref 7–16)
BUN SERPL-MCNC: 15 MG/DL (ref 8–22)
CALCIUM SERPL-MCNC: 7.4 MG/DL (ref 8.5–10.5)
CHLORIDE SERPL-SCNC: 101 MMOL/L (ref 96–112)
CO2 SERPL-SCNC: 25 MMOL/L (ref 20–33)
CREAT SERPL-MCNC: 0.78 MG/DL (ref 0.5–1.4)
EKG IMPRESSION: NORMAL
ERYTHROCYTE [DISTWIDTH] IN BLOOD BY AUTOMATED COUNT: 55 FL (ref 35.9–50)
GLUCOSE SERPL-MCNC: 111 MG/DL (ref 65–99)
HCT VFR BLD AUTO: 37.6 % (ref 42–52)
HGB BLD-MCNC: 11.4 G/DL (ref 14–18)
MCH RBC QN AUTO: 25.9 PG (ref 27–33)
MCHC RBC AUTO-ENTMCNC: 30.3 G/DL (ref 33.7–35.3)
MCV RBC AUTO: 85.3 FL (ref 81.4–97.8)
PLATELET # BLD AUTO: 202 K/UL (ref 164–446)
PMV BLD AUTO: 9.8 FL (ref 9–12.9)
POTASSIUM SERPL-SCNC: 4.3 MMOL/L (ref 3.6–5.5)
RBC # BLD AUTO: 4.41 M/UL (ref 4.7–6.1)
SODIUM SERPL-SCNC: 131 MMOL/L (ref 135–145)
TROPONIN T SERPL-MCNC: 810 NG/L (ref 6–19)
WBC # BLD AUTO: 18 K/UL (ref 4.8–10.8)

## 2021-04-21 PROCEDURE — 93010 ELECTROCARDIOGRAM REPORT: CPT | Performed by: INTERNAL MEDICINE

## 2021-04-21 PROCEDURE — 97161 PT EVAL LOW COMPLEX 20 MIN: CPT

## 2021-04-21 PROCEDURE — 700102 HCHG RX REV CODE 250 W/ 637 OVERRIDE(OP): Performed by: STUDENT IN AN ORGANIZED HEALTH CARE EDUCATION/TRAINING PROGRAM

## 2021-04-21 PROCEDURE — 700111 HCHG RX REV CODE 636 W/ 250 OVERRIDE (IP): Performed by: HOSPITALIST

## 2021-04-21 PROCEDURE — 700102 HCHG RX REV CODE 250 W/ 637 OVERRIDE(OP): Performed by: HOSPITALIST

## 2021-04-21 PROCEDURE — 85027 COMPLETE CBC AUTOMATED: CPT

## 2021-04-21 PROCEDURE — 84484 ASSAY OF TROPONIN QUANT: CPT

## 2021-04-21 PROCEDURE — A9270 NON-COVERED ITEM OR SERVICE: HCPCS | Performed by: STUDENT IN AN ORGANIZED HEALTH CARE EDUCATION/TRAINING PROGRAM

## 2021-04-21 PROCEDURE — 93005 ELECTROCARDIOGRAM TRACING: CPT | Performed by: STUDENT IN AN ORGANIZED HEALTH CARE EDUCATION/TRAINING PROGRAM

## 2021-04-21 PROCEDURE — 700111 HCHG RX REV CODE 636 W/ 250 OVERRIDE (IP): Performed by: INTERNAL MEDICINE

## 2021-04-21 PROCEDURE — 99233 SBSQ HOSP IP/OBS HIGH 50: CPT | Performed by: HOSPITALIST

## 2021-04-21 PROCEDURE — 99233 SBSQ HOSP IP/OBS HIGH 50: CPT | Mod: GC | Performed by: INTERNAL MEDICINE

## 2021-04-21 PROCEDURE — 700102 HCHG RX REV CODE 250 W/ 637 OVERRIDE(OP): Performed by: INTERNAL MEDICINE

## 2021-04-21 PROCEDURE — 80048 BASIC METABOLIC PNL TOTAL CA: CPT

## 2021-04-21 PROCEDURE — A9270 NON-COVERED ITEM OR SERVICE: HCPCS | Performed by: INTERNAL MEDICINE

## 2021-04-21 PROCEDURE — A9270 NON-COVERED ITEM OR SERVICE: HCPCS | Performed by: HOSPITALIST

## 2021-04-21 PROCEDURE — 770020 HCHG ROOM/CARE - TELE (206)

## 2021-04-21 RX ORDER — HYDROCORTISONE 20 MG/1
10 TABLET ORAL 2 TIMES DAILY
Status: DISCONTINUED | OUTPATIENT
Start: 2021-04-21 | End: 2021-04-24

## 2021-04-21 RX ORDER — WARFARIN SODIUM 5 MG/1
5 TABLET ORAL DAILY
Status: DISCONTINUED | OUTPATIENT
Start: 2021-04-22 | End: 2021-04-23

## 2021-04-21 RX ORDER — WARFARIN SODIUM 7.5 MG/1
7.5 TABLET ORAL
Status: COMPLETED | OUTPATIENT
Start: 2021-04-21 | End: 2021-04-21

## 2021-04-21 RX ADMIN — SOTALOL HYDROCHLORIDE 40 MG: 80 TABLET ORAL at 10:40

## 2021-04-21 RX ADMIN — ATORVASTATIN CALCIUM 80 MG: 80 TABLET, FILM COATED ORAL at 17:42

## 2021-04-21 RX ADMIN — HYDROCORTISONE 10 MG: 10 TABLET ORAL at 10:34

## 2021-04-21 RX ADMIN — WARFARIN SODIUM 7.5 MG: 7.5 TABLET ORAL at 17:43

## 2021-04-21 RX ADMIN — ENOXAPARIN SODIUM 40 MG: 40 INJECTION SUBCUTANEOUS at 15:22

## 2021-04-21 RX ADMIN — OMEPRAZOLE 20 MG: 20 CAPSULE, DELAYED RELEASE ORAL at 17:43

## 2021-04-21 RX ADMIN — HYDROCORTISONE 10 MG: 10 TABLET ORAL at 17:44

## 2021-04-21 RX ADMIN — LEVOTHYROXINE SODIUM 75 MCG: 0.07 TABLET ORAL at 05:34

## 2021-04-21 RX ADMIN — NYSTATIN: 100000 POWDER TOPICAL at 15:22

## 2021-04-21 RX ADMIN — MONTELUKAST 10 MG: 10 TABLET, FILM COATED ORAL at 05:34

## 2021-04-21 RX ADMIN — OMEPRAZOLE 20 MG: 20 CAPSULE, DELAYED RELEASE ORAL at 05:34

## 2021-04-21 RX ADMIN — NYSTATIN: 100000 POWDER TOPICAL at 05:36

## 2021-04-21 RX ADMIN — SOTALOL HYDROCHLORIDE 40 MG: 80 TABLET ORAL at 23:10

## 2021-04-21 RX ADMIN — NYSTATIN: 100000 POWDER TOPICAL at 17:45

## 2021-04-21 RX ADMIN — TAMSULOSIN HYDROCHLORIDE 0.4 MG: 0.4 CAPSULE ORAL at 05:34

## 2021-04-21 RX ADMIN — HYDROCORTISONE SODIUM SUCCINATE 50 MG: 100 INJECTION, POWDER, FOR SOLUTION INTRAMUSCULAR; INTRAVENOUS at 05:35

## 2021-04-21 RX ADMIN — DOCUSATE SODIUM 50 MG AND SENNOSIDES 8.6 MG 2 TABLET: 8.6; 5 TABLET, FILM COATED ORAL at 05:34

## 2021-04-21 RX ADMIN — GABAPENTIN 600 MG: 300 CAPSULE ORAL at 17:43

## 2021-04-21 RX ADMIN — ASPIRIN 81 MG: 81 TABLET, COATED ORAL at 05:35

## 2021-04-21 RX ADMIN — CLOPIDOGREL BISULFATE 75 MG: 75 TABLET ORAL at 05:35

## 2021-04-21 ASSESSMENT — ENCOUNTER SYMPTOMS
ROS GI COMMENTS: EATING WELL
COUGH: 0
SHORTNESS OF BREATH: 0
FEVER: 0
CHILLS: 0

## 2021-04-21 ASSESSMENT — COGNITIVE AND FUNCTIONAL STATUS - GENERAL
SUGGESTED CMS G CODE MODIFIER MOBILITY: CK
STANDING UP FROM CHAIR USING ARMS: A LITTLE
MOVING TO AND FROM BED TO CHAIR: A LITTLE
CLIMB 3 TO 5 STEPS WITH RAILING: A LITTLE
WALKING IN HOSPITAL ROOM: A LITTLE
MOVING FROM LYING ON BACK TO SITTING ON SIDE OF FLAT BED: A LITTLE
MOBILITY SCORE: 18
TURNING FROM BACK TO SIDE WHILE IN FLAT BAD: A LITTLE

## 2021-04-21 ASSESSMENT — PAIN DESCRIPTION - PAIN TYPE
TYPE: ACUTE PAIN;CHRONIC PAIN
TYPE: ACUTE PAIN;CHRONIC PAIN

## 2021-04-21 ASSESSMENT — FIBROSIS 4 INDEX: FIB4 SCORE: 2.47

## 2021-04-21 ASSESSMENT — GAIT ASSESSMENTS
DISTANCE (FEET): 120
GAIT LEVEL OF ASSIST: SUPERVISED
ASSISTIVE DEVICE: FRONT WHEEL WALKER
DEVIATION: SHUFFLED GAIT;DECREASED TOE OFF;DECREASED HEEL STRIKE

## 2021-04-21 NOTE — PROGRESS NOTES
Daily Progress Note: Cardiology     Date of Service: 4/21/2021  Attending: Dr. Yanez  Senior Resident: Dr. Thompson    Chief Complaint:   Chest pain    ID: 88 y.o. male who presented 4/18/2021 with chest pain that started at rest while he was sitting in front of the computer. Complex medical history including 8 PEs in the past status post IVC placement on warfarin, which she stopped taking 2 days ago in preparation for removal of basal cell carcinoma from his right ear.  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  Pt's troponins trended up and he was treated medically for NSTEMi and underwent a left heart cath on 4/20/21, which revealed severe stenosis of the distal LAD with heavy calcification of the vessel and residual total occlusion of OM2. He had a complex cath with successful PCI of the mid to distal LAD using 3 overlapping drug-eluting stents.     Interval Update:  Pt sitting up in the chair this morning, denied CP. Catheter sites healing well. His iv dose of steroids was switched back to PO dosing. Episodes of hypotension overnight, but improved this morning. He will be transferred to the medical floor.     Consultants/Specialty:  Cardiology  Interventional cardiology  ICU     Review of Systems:  Constitutional: Positive for malaise/fatigue. Negative for chills and fever.   HENT: Negative for congestion and sore throat.    Respiratory: Negative for cough, shortness of breath and wheezing.    Cardiovascular: Positive for chest pain. Negative for palpitations, orthopnea, leg swelling and PND.   Gastrointestinal: Negative for abdominal pain, diarrhea, nausea and vomiting.   Neurological: Positive for dizziness. Negative for loss of consciousness  and headaches.   Psychiatric/Behavioral: Negative for substance abuse. The patient is not nervous/anxious.      Objective Data:   Physical Exam:   Vitals:   Temp:  [35.8 °C (96.5 °F)-36 °C (96.8 °F)] 36 °C (96.8 °F)  Pulse:  [63-90] 75  Resp:  [12-32] 18  BP: ()/() 121/70  SpO2:  [86 %-99 %] 98 %    Physical Exam  Constitutional:       General: He is not in acute distress.     Appearance: He is obese. He is not ill-appearing, toxic-appearing or diaphoretic.   HENT:      Head: Normocephalic and atraumatic.      Nose: Nose normal.      Mouth/Throat:      Mouth: Mucous membranes are moist.   Eyes:      Extraocular Movements: Extraocular movements intact.      Pupils: Pupils are equal, round, and reactive to light.   Cardiovascular:      Rate and Rhythm: Normal rate and regular rhythm.      Pulses: Normal pulses.      Heart sounds: Murmur (Crescendo  systolic murmur present with a grade of 3/6.) present. No friction rub. No gallop.       Comments: Non tender on palpation   Pulmonary:      Effort: Pulmonary effort is normal. No respiratory distress.      Breath sounds: No wheezing, or rhonchi, mild bibasilar rales   Chest:      Chest wall: No tenderness.   Abdominal:      General: Bowel sounds are normal. There is no distension.      Palpations: Abdomen is soft.      Tenderness: There is no abdominal tenderness. There is no guarding or rebound.      Comments: obese   Musculoskeletal:         General: Normal range of motion.      Cervical back: Normal range of motion and neck supple.      Right lower leg: No edema.      Left lower leg: No edema.   Skin:     General: Skin is warm.      Findings: No lesion or rash.   Neurological:      General: No focal deficit present.      Mental Status: He is alert and oriented to person, place, and time.   Psychiatric:         Mood and Affect: Mood normal.         Behavior: Behavior normal.         Thought Content: Thought content normal.         Judgment: Judgment normal.      Labs:   Recent Labs     04/18/21  1844 04/18/21  1844 04/19/21  0102 04/20/21  0500 04/21/21  0350   WBC 12.7*   < > 11.1* 15.1* 18.0*   RBC 5.16   < > 5.48 5.03 4.41*   HEMOGLOBIN 13.1*   < > 13.9* 12.6* 11.4*   HEMATOCRIT 43.5   < > 46.6 41.6* 37.6*   MCV 84.3   < > 85.0 82.7 85.3   MCH 25.4*   < > 25.4* 25.0* 25.9*   RDW 52.8*   < > 52.9* 52.2* 55.0*   PLATELETCT 216   < > 193 216 202   MPV 9.7   < > 9.2 10.0 9.8   NEUTSPOLYS 73.50*  --  79.10* 87.30*  --    LYMPHOCYTES 11.70*  --  11.30* 6.40*  --    MONOCYTES 8.10  --  3.50 5.10  --    EOSINOPHILS 5.20  --  5.20 0.20  --    BASOPHILS 0.90  --  0.90 0.50  --    RBCMORPHOLO  --   --  Present  --   --     < > = values in this interval not displayed.     Recent Labs     04/19/21  0102 04/20/21  0500 04/21/21  0350   SODIUM 134* 135 131*   POTASSIUM 4.1 4.6 4.3   CHLORIDE 96 99 101   CO2 32 28 25   GLUCOSE 106* 122* 111*   BUN 17 18 15       Imaging:   EC-ECHOCARDIOGRAM COMPLETE W/O CONT   Final Result      CT-CTA CHEST PULMONARY ARTERY W/ RECONS   Final Result         1.  No evidence of pulmonary embolism.   2.  Mild cardiomegaly.   3.  Atherosclerosis.   4.  Mild 4.2 cm ascending thoracic aortic aneurysm.   5.  Hypoinflation, elevation of left hemidiaphragm with bibasilar atelectasis. No significant consolidation or pleural effusion.   6.  4 mm noncalcified right middle lobe pulmonary nodule. Follow-up per Fleischner criteria is clinically indicated.      Fleischner Society pulmonary nodule recommendations:   Low Risk: No routine follow-up      High Risk: Optional CT at 12 months      Comments: Nodules less than 6 mm do not require routine follow-up, but certain patients at high risk with suspicious nodule morphology, upper lobe location, or both may warrant 12-month follow-up.      Low Risk - Minimal or absent history of smoking and of other known risk factors.      High Risk - History of smoking or of other known risk factors.      Note: These  recommendations do not apply to lung cancer screening, patients with immunosuppression, or patients with known primary cancer.      Fleischner Society 2017 Guidelines for Management of Incidentally Detected Pulmonary Nodules in Adults      DX-CHEST-PORTABLE (1 VIEW)   Final Result      1.  Hypoinflation with LEFT lung base atelectasis or infiltrate, possibly chronic.   2.  No overt pulmonary edema.      CL-LEFT HEART CATHETERIZATION WITH POSSIBLE INTERVENTION    (Results Pending)     Assessment and plan:     NSTEMI- (present on admission)  Assessment & Plan     New onset chest pain substernal pressure, non radiating 8/10 at its worse that started at rest at 1:30pm on 4/18/21. Has not had this pain in the past.   Troponin trended up. HEART Score 5, Risk of MACE of 12-16.6%. Pt denied previous hx of heart catheterization, no hx found in the chart. Pt treated medically for NSTEMI and underwent  a left heart cath on 4/20/21, which revealed severe stenosis of the distal LAD with heavy calcification of the vessel and residual total occlusion of OM2. He had a complex cath with successful PCI of the mid to distal LAD using 3 overlapping drug-eluting stents with Dr. Byrd.     Plan:  Pt planning to be transferred to telemetry        Per Dr. Byrd, dual antiplatelet therapy for 1 months then plavix + coumadin afterwards. PPI while      on triple therapy  Continue high intensity statin   Can't tolerate BB or ACE/ARB at this time as he had more hypotensive episodes overnight   Continue home sotalol   Transition to Warfarin     Aortic stenosis- (present on admission)  Assessment & Plan  Mild aortic stenosis   Area 2.6cm2  Peak velocity 2m/s  Peak gradient 15.8 mmHg      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  Optimize electrolytes for potassium goal greater than 4.5 and magnesium goal greater than 2.0.     Obstructive hypertrophic cardiomyopathy (HCC)-  (present on admission)  Assessment & Plan  As per history.   - currently on: (sotalol 80 mg) 40 mg q12h     Repeat echocardiogram in setting of new dizziness and dyspnea on exertion shows stable ECHO when compared to one in 2019  Continue home sotalol     SSS (sick sinus syndrome) (HCC)- (present on admission)  Assessment & Plan  Secondary to third-degree blocks status post pacemaker placement.     Continue telemetry monitoring.  Continue home sotalol.     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  Reports last PE 5-6 years ago  Now back on Warfarin     Essential hypertension, benign- (present on admission)   Assessment & Plan  goal BP < 130/80 mmHg    States his BP at home runs 130-135/70-80, occasionally experiences hypotension and skips taking his lisinopril on those days      Continue sotalol for now, no BB, ACE, or ARB at this time as he is having hypotension     Hypotension   Episodes of hypotension overnight   Improved, now on home PO steroid dose      Prolonged Q-T interval on ECG- (present on admission)  Assessment & Plan  In sotalol use     Avoid QTC prolonging medications     Cardiac pacemaker in situ- (present on admission)  Assessment & Plan  Placed for sick sinus syndrome third-degree block.  History of multiple PVCs.     Telemetry monitoring    History of pulmonary embolism- (present on admission)  Assessment & Plan  As per history at least 8-9 PEs in the past.  He has a IVC filter in place.  Holding warfarin for right ear basal cell carcinoma removal in 2 days.  CTA with PE protocol from the ER was negative for PE.     Back on Warfarin      Adrenal insufficiency (HCC)- (present on admission)  Assessment & Plan  As per history secondary to chronic steroid use.     Back on home steroid regiment      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HCC)- (present on admission)  Assessment & Plan  As per history.  Status post  resection of pituitary adenoma in 1998     Continue chronic hydrocortisone and levothyroxine therapy

## 2021-04-21 NOTE — PROGRESS NOTES
0730 report received assumed care pt.  0810 Pt up to chair for breakfast with 1 person assisted with walker. Pt able to stand up for 3 minutes and perform leg exercises. Pt benefiting from  handles on eating utensils  0825 Hospitalist MD seeing pt, POC transfer out of ICU, with aggressive OT/PT   1600 pt up with standby assist To bathroom for BM

## 2021-04-21 NOTE — THERAPY
Physical Therapy   Initial Evaluation     Patient Name: Jeffrey Lizama  Age:  88 y.o., Sex:  male  Medical Record #: [id number]  Today's Date: 4/21/2021     Precautions: Fall Risk, Cardiac Precautions (See Comments)( post PCI on 4/20/21)    Assessment  Patient is 88 y.o. male with a diagnosis of Non-STEMI due to severe stenosis of the distal LAD, post PCI on 4/20/21.   Past Medical history includes,  8 PEs in the past status post IVC placement on warfarin, cardiomyopathy, a-fib, SSS w/ pacer placement, HTN and COPD on home O2 at 5 l/m.  Pt seen for mobility assessment and education. Pt is able to safely ambulate for functional home distances with FWW. Pt educated on use of Modified RPE scale. Pt will benefit from continued PT while in house. Pt requesting and would benefit from home health services upon discharge.     Plan    Recommend Physical Therapy 3 times per week until therapy goals are met for the following treatments:  Gait Training and Therapeutic Activities, post cardiac procedure education.    DC Equipment Recommendations: None  Discharge Recommendations: Recommend home health for continued physical therapy services          Objective       04/21/21 1056   Prior Living Situation   Prior Services None  (HH in the remote past. )   Housing / Facility [address]   Steps Into Home 6  (also has ramp)   Steps In Home 0   Rail Left Rail  (Steps into Home)   Equipment Owned 4-Wheel Walker;Oxygen   Lives with - Patient's Self Care Capacity Spouse   Comments resides with spouse in [facility]. supplemental O2 5 l/m continuous   Prior Level of Functional Mobility   Bed Mobility Independent   Transfer Status Independent   Ambulation Independent   Distance Ambulation (Feet)   (short household)   Assistive Devices Used 4-Wheel Walker   Wheelchair   (used ramp )   Comments recent decline in activity tolerance.    Active ROM Lower Body    Active ROM Lower Body  WDL   Comments multiple jt deformities due to OA    Balance Assessment   Sitting Balance (Static) Good   Sitting Balance (Dynamic) Good   Standing Balance (Static) Fair +   Standing Balance (Dynamic) Fair   Weight Shift Sitting Fair   Weight Shift Standing Fair   Comments w/ FWW   Gait Analysis   Gait Level Of Assist Supervised   Assistive Device Front Wheel Walker   Distance (Feet) 120   # of Times Distance was Traveled 1   Deviation Shuffled Gait;Decreased Toe Off;Decreased Heel Strike   # of Stairs Climbed 0   Weight Bearing Status WBAT   Comments modified RPE scale 5/10. O2 at 5 l/m.   Bed Mobility    Comments NT, pt up to chair pre and post session.   Functional Mobility   Sit to Stand Minimal Assist  (from bedside chair )   Bed, Chair, Wheelchair Transfer Minimal Assist   Transfer Method Stand Step   How much help from another person does the patient currently need...   6 clicks Mobility Score 18   Patient / Family Goals    Patient / Family Goal #1 return home with home health therapy   Short Term Goals    Short Term Goal # 1 Pt will verbalize understanding of modified RPE scale by the 6thtx   Short Term Goal # 2 Pt will perform sit <> stand to FWW at SPV level by the 6th tx   Short Term Goal # 3 Pt will ambulate with FWW for 150 ft with SPV by the 6th tx   Education Group   Cardiac Precautions Patient Response Patient;Eager;Explanation;Verbal Demonstration   Role of Physical Therapist Patient Response Patient;Eager;Explanation;Verbal Demonstration   Problem List    Problems Impaired Transfers;Impaired Ambulation;Decreased Activity Tolerance

## 2021-04-21 NOTE — PROGRESS NOTES
[facility] Medicine Daily Progress Note    Date of Service  4/21/2021    Chief Complaint  88 y.o. male admitted 4/18/2021 with chest pain    Hospital Course  Mr. Lizama is an 88 year old male who has past medical history of hypertrophic obstructive cardiomyopathy, history of paroxysmal A. 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  Not radiating.  No aggravating factors.  Lasted for an hour and decreased in intensity with rest.  Patient stated that he usually gets occasional chest pain but this time his pain lingered so he presented to the ER for further evaluation.  In ER noted to be hemodynamically stable.  Heart rate stable.  Afebrile.  Chest x-ray showed hypoinflation of the left lung base concerning for plexuses or infiltrate.  No pulmonary edema noted.  CT of the chest was negative for PE and showed 4.2 cm ascending thoracic aortic aneurysm and bibasilar atelectasis with no consolidation or infiltrate noted.  EKG showed paced rhythm with occasional PVCs.  Initially patient admitted for observation with troponin of 38.  However, he continued to have chest pain and troponin increased to 163.  Patient was started on heparin drip.  Echocardiogram showed ejection fraction 60% with akinesis of the apex and hypokinesis of the apical septal wall which might represent ventricular pacing or prior infarct.  Cardiology consulted.  He developed hypotension and required transfer to the ICU and was initiated on IV hydrocortisone and was given a bolus of IV fluids. He went to the cardiac cath lab on 4/20 and had stents x 3 to the LAD.    Interval Problem Update  4/20: Mr. Lizama was evaluated and examined in  the ICU. He is back from cath lab though is quite somnolent due to IV sedation. I discussed with his nurse at bedside. He has been in a sinus rhythm. He denies chest pain.  4/21: patient seen and evaluated in the ICU. He denies chest pain. He is on his home 5 liters of oxygen. Mr. Lizama states he usually uses a walker at home. He had been scheduled to have basal cell carcinomas removed yesterday by a dermatologist here in [place]. He remains on a heparin drip. His BP has improved thus IV hydrocortisone. PT/OT ordered; he uses a walker at home.    Consultants/Specialty  Critical care.   Cardiology. I discussed with Dr. Yanez    Code Status  Full Code    Disposition  tele    Review of Systems  Review of Systems   Constitutional: Negative for chills and fever.   Respiratory: Negative for cough and shortness of breath.    Cardiovascular: Negative for chest pain.   Gastrointestinal:        Eating well   All other systems reviewed and are negative.       Physical Exam  Temp:  [35.8 °C (96.5 °F)-36 °C (96.8 °F)] 36 °C (96.8 °F)  Pulse:  [63-90] 75  Resp:  [12-32] 18  BP: ()/() 121/70  SpO2:  [86 %-99 %] 98 %    Physical Exam  Vitals and nursing note reviewed.   Constitutional:       General: He is not in acute distress.     Appearance: He is not toxic-appearing.   HENT:      Head: Normocephalic and atraumatic.      Mouth/Throat:      Mouth: Mucous membranes are moist.      Pharynx: Oropharynx is clear.   Eyes:      General: No scleral icterus.     Conjunctiva/sclera: Conjunctivae normal.   Cardiovascular:      Rate and Rhythm: Normal rate and regular rhythm.      Heart sounds: Murmur present.      Comments: Paced rhythm   Pulmonary:      Effort: Pulmonary effort is normal.      Breath sounds: Normal breath sounds.   Abdominal:      General: There is distension.      Tenderness: There is no abdominal tenderness.   Musculoskeletal:      Cervical back: Normal range of motion and neck supple.      Right lower leg: No  edema.      Left lower leg: No edema.      Comments: Right groin site covered without significant hematoma  Right wrist site covered without hematoma and no bleeding   Skin:     General: Skin is warm and dry.      Findings: Lesion present.      Comments: Seborrheic dermatitis perioral   Numerous skin lesions arms   Neurological:      General: No focal deficit present.      Mental Status: He is oriented to person, place, and time.      Comments: Speech is clear   Psychiatric:         Mood and Affect: Mood normal.         Behavior: Behavior normal.         Fluids    Intake/Output Summary (Last 24 hours) at 4/21/2021 0755  Last data filed at 4/21/2021 0500  Gross per 24 hour   Intake 811.19 ml   Output 900 ml   Net -88.81 ml       Laboratory  Recent Labs     04/19/21  0102 04/20/21  0500 04/21/21  0350   WBC 11.1* 15.1* 18.0*   RBC 5.48 5.03 4.41*   HEMOGLOBIN 13.9* 12.6* 11.4*   HEMATOCRIT 46.6 41.6* 37.6*   MCV 85.0 82.7 85.3   MCH 25.4* 25.0* 25.9*   MCHC 29.8* 30.3* 30.3*   RDW 52.9* 52.2* 55.0*   PLATELETCT 193 216 202   MPV 9.2 10.0 9.8     Recent Labs     04/19/21  0102 04/20/21  0500 04/21/21  0350   SODIUM 134* 135 131*   POTASSIUM 4.1 4.6 4.3   CHLORIDE 96 99 101   CO2 32 28 25   GLUCOSE 106* 122* 111*   BUN 17 18 15   CREATININE 1.03 1.00 0.78   CALCIUM 8.7 8.0* 7.4*     Recent Labs     04/19/21  0102 04/19/21  0443   APTT  --  33.0   INR 1.00 1.01               Imaging  EC-ECHOCARDIOGRAM COMPLETE W/O CONT   Final Result      CT-CTA CHEST PULMONARY ARTERY W/ RECONS   Final Result         1.  No evidence of pulmonary embolism.   2.  Mild cardiomegaly.   3.  Atherosclerosis.   4.  Mild 4.2 cm ascending thoracic aortic aneurysm.   5.  Hypoinflation, elevation of left hemidiaphragm with bibasilar atelectasis. No significant consolidation or pleural effusion.   6.  4 mm noncalcified right middle lobe pulmonary nodule. Follow-up per Fleischner criteria is clinically indicated.      Fleischner Society pulmonary  nodule recommendations:   Low Risk: No routine follow-up      High Risk: Optional CT at 12 months      Comments: Nodules less than 6 mm do not require routine follow-up, but certain patients at high risk with suspicious nodule morphology, upper lobe location, or both may warrant 12-month follow-up.      Low Risk - Minimal or absent history of smoking and of other known risk factors.      High Risk - History of smoking or of other known risk factors.      Note: These recommendations do not apply to lung cancer screening, patients with immunosuppression, or patients with known primary cancer.      Fleischner Society 2017 Guidelines for Management of Incidentally Detected Pulmonary Nodules in Adults      DX-CHEST-PORTABLE (1 VIEW)   Final Result      1.  Hypoinflation with LEFT lung base atelectasis or infiltrate, possibly chronic.   2.  No overt pulmonary edema.      CL-LEFT HEART CATHETERIZATION WITH POSSIBLE INTERVENTION    (Results Pending)        Assessment/Plan  * NSTEMI (non-ST elevated myocardial infarction) (HCC)- (present on admission)  Assessment & Plan  Status post heart cath with stenting to the LAD x 3 on 4/20  Statin Lipitor 80 mg  Sotalol   plavix and aspirin   Troponin trended up to 810  Echocardiogram showed normal left ventricle chamber size with mild concentric left ventricular hypertrophy with ejection fraction estimated to be around 60%.  It was akinesis of the apex and hypokinesis of the apical septal wall that could be related to ventricular pacing versus prior infarct.        Hypotension  Assessment & Plan  Likely adrenal insufficiency in the setting of stress response  IV hydrocortisone    Adrenal insufficiency (HCC)- (present on admission)  Assessment & Plan  Secondary to chronic steroid use.  He was on hydrocortisone 10 mg BID as an outpatient  Due to hypotension, he had been started on IV hydrocortisone 50 mg q 6 hours which will change back to oral home dose on 4/21    Obstructive  hypertrophic cardiomyopathy (HCC)- (present on admission)  Assessment & Plan  As per history.   - currently on: (sotalol 80 mg) 40 mg q12h    Repeat echocardiogram unchanged    Anticoagulant long-term use- (present on admission)  Assessment & Plan  Secondary to extensive history of DVT AND MULTIPLE PE's  STATUS post IVC filter placement.  Home Coumadin had been held for anticipated skin BCC procedures.   IV heparin drip and follow Factor X levels  Restart coumadin on 4/21 per Dr. To goal INR 1.5-2    History of pulmonary embolism- (present on admission)  Assessment & Plan  As per history at least 8-9 PEs in the past.  He has a IVC filter in place.  Holding warfarin for right ear basal cell carcinoma removal in 2 days.  CTA with PE protocol from the ER was negative for PE.  IV heparin drip and titrate factor X levels   Restart coumadin    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HCC)- (present on admission)  Assessment & Plan  As per history.  Status post resection of pituitary adenoma in 1998.     levothyroxine.    Essential hypertension, benign- (present on admission)  Assessment & Plan  Continue home sotalol if BP allows    Prolonged Q-T interval on ECG- (present on admission)  Assessment & Plan  In sotalol use.    Avoid QTC prolonging medications.    Frequent PVCs- (present on admission)  Assessment & Plan  Now in a paced rhythm     Hyponatremia- (present on admission)  Assessment & Plan   - serum sodium: 134.00 mmol/L (04/19/2021 1:02:00) unchanged from 134.00 mmol/L (04/18/2021 18:44:00)    Mild and chronic.      Continue to monitor.    SSS (sick sinus syndrome) ([facility])- (present on admission)  Assessment & Plan  Secondary to third-degree blocks status post pacemaker placement.    Continue telemetry monitoring.  Continue home sotalol.    BPH (benign prostatic hypertrophy)- (present on admission)  Assessment & Plan  Continues to have urinary frequency.    Continue on tamsulosin.    Chronic back pain- (present on admission)  Assessment &  Plan  As per history in setting of arthritis.    Continue home Norco and gabapentin.    COPD (chronic obstructive pulmonary disease) (HCC)- (present on admission)  Assessment & Plan  With chronic respiratory failure.  On 5 L of oxygen via nasal cannula at home.  Currently not in acute exacerbation clinically and at baseline with respiratory status.  Bronchodilators as needed per RT protocol.    Cardiac pacemaker in situ- (present on admission)  Assessment & Plan  Placed for sick sinus syndrome third-degree block.  History of multiple PVCs.    Telemetry monitoring.         VTE prophylaxis: IV heparin drip

## 2021-04-21 NOTE — PROGRESS NOTES
Brief intensivsit note.     Reason for admission was reviewed.   Pt was hypotensive last night, received fluids and midodrine  This am, hemodynamically stable, and planned for cath lab    Intra cath lab, underwent PCI JAS x3 to mid LAD, Om2.   Post cath lab, pt is on 5L NC, sat >90%  MAP >65, HR in 60-80s  IL IVF    Plan   DAPT x 1 month with coumadin, then plavix-coumadin after.     Will transfer care to [facility], Dr. Puckett  D/w Dr. Puckett  Will sign off, please call with questions.     Jude Sher D.O.

## 2021-04-21 NOTE — PROGRESS NOTES
Inpatient Anticoagulation Service Note    Date: 4/21/2021    Reason for Anticoagulation: Pulmonary Embolism   Target INR: 1.5 to 2.0         Hemoglobin Value: (!) 11.4  Hematocrit Value: (!) 37.6  Lab Platelet Value: 202    INR from last 7 days     Date/Time INR Value    04/19/21 0443  1.01    04/19/21 0102  1        Dose from last 7 days     Date/Time Dose (mg)    04/21/21 1000  7.5        Average Dose (mg): (Home dose: warfarin 5 mg daily)  Significant Interactions: Aspirin, Clopidogrel, Proton Pump Inhibitor, Corticosteroids, Thyroid Medications, Statin  Bridge Therapy: No(Lovenox 40 mg qday PPX)     Comments: Pt on warfarin 5 mg daily at home for history of PE. Pt had been off warfarin (last dose 4/17) for basal cell removal as an outpt. But pt experienced a NSTEMI and went to the cath lab on 4/20 and had JAS x 3 placed. Warfarin now ordered to be restarted. Will give warfarin 7.5 mg today and then restart the pt's home warfarin 5 mg daily tomorrow. Will trend the INR. Per MD INR goal 1.5-2.0. Pt also was transitioned from HWBP to prophylactic Lovenox. Will DC the Lovenox once INR 1.5 or >.    Education Material Provided?: No(chronic warfarin therapy)  Pharmacist suggested discharge dosing: likely pt's home warfarin dose of 5 mg daily     Fer Hussein, PharmD

## 2021-04-22 PROBLEM — N30.01 HEMATURIA DUE TO ACUTE CYSTITIS: Status: ACTIVE | Noted: 2021-04-22

## 2021-04-22 LAB
APPEARANCE UR: ABNORMAL
APTT PPP: 35.7 SEC (ref 24.7–36)
BACTERIA #/AREA URNS HPF: ABNORMAL /HPF
COLOR UR: ABNORMAL
EPI CELLS #/AREA URNS HPF: NEGATIVE /HPF
ERYTHROCYTE [DISTWIDTH] IN BLOOD BY AUTOMATED COUNT: 53.4 FL (ref 35.9–50)
HCT VFR BLD AUTO: 36.3 % (ref 42–52)
HGB BLD-MCNC: 11 G/DL (ref 14–18)
INR PPP: 1.01 (ref 0.87–1.13)
MCH RBC QN AUTO: 25.3 PG (ref 27–33)
MCHC RBC AUTO-ENTMCNC: 30.3 G/DL (ref 33.7–35.3)
MCV RBC AUTO: 83.4 FL (ref 81.4–97.8)
MICRO URNS: ABNORMAL
PLATELET # BLD AUTO: 183 K/UL (ref 164–446)
PMV BLD AUTO: 9.9 FL (ref 9–12.9)
PROTHROMBIN TIME: 13.6 SEC (ref 12–14.6)
RBC # BLD AUTO: 4.35 M/UL (ref 4.7–6.1)
RBC # URNS HPF: >150 /HPF
WBC # BLD AUTO: 14.5 K/UL (ref 4.8–10.8)
WBC #/AREA URNS HPF: ABNORMAL /HPF

## 2021-04-22 PROCEDURE — 85027 COMPLETE CBC AUTOMATED: CPT

## 2021-04-22 PROCEDURE — A9270 NON-COVERED ITEM OR SERVICE: HCPCS | Performed by: HOSPITALIST

## 2021-04-22 PROCEDURE — A9270 NON-COVERED ITEM OR SERVICE: HCPCS | Performed by: STUDENT IN AN ORGANIZED HEALTH CARE EDUCATION/TRAINING PROGRAM

## 2021-04-22 PROCEDURE — 700102 HCHG RX REV CODE 250 W/ 637 OVERRIDE(OP): Performed by: STUDENT IN AN ORGANIZED HEALTH CARE EDUCATION/TRAINING PROGRAM

## 2021-04-22 PROCEDURE — A9270 NON-COVERED ITEM OR SERVICE: HCPCS | Performed by: INTERNAL MEDICINE

## 2021-04-22 PROCEDURE — 770020 HCHG ROOM/CARE - TELE (206)

## 2021-04-22 PROCEDURE — 700111 HCHG RX REV CODE 636 W/ 250 OVERRIDE (IP): Performed by: HOSPITALIST

## 2021-04-22 PROCEDURE — 85730 THROMBOPLASTIN TIME PARTIAL: CPT

## 2021-04-22 PROCEDURE — 700102 HCHG RX REV CODE 250 W/ 637 OVERRIDE(OP): Performed by: INTERNAL MEDICINE

## 2021-04-22 PROCEDURE — 700102 HCHG RX REV CODE 250 W/ 637 OVERRIDE(OP): Performed by: HOSPITALIST

## 2021-04-22 PROCEDURE — 85610 PROTHROMBIN TIME: CPT

## 2021-04-22 PROCEDURE — 81001 URINALYSIS AUTO W/SCOPE: CPT

## 2021-04-22 PROCEDURE — 99233 SBSQ HOSP IP/OBS HIGH 50: CPT | Performed by: HOSPITALIST

## 2021-04-22 PROCEDURE — 700105 HCHG RX REV CODE 258: Performed by: HOSPITALIST

## 2021-04-22 PROCEDURE — 99233 SBSQ HOSP IP/OBS HIGH 50: CPT | Mod: GC | Performed by: INTERNAL MEDICINE

## 2021-04-22 RX ORDER — PHENAZOPYRIDINE HYDROCHLORIDE 100 MG/1
100 TABLET, FILM COATED ORAL
Status: DISCONTINUED | OUTPATIENT
Start: 2021-04-22 | End: 2021-04-25

## 2021-04-22 RX ORDER — OXYCODONE HYDROCHLORIDE 5 MG/1
5 TABLET ORAL EVERY 4 HOURS PRN
Status: DISCONTINUED | OUTPATIENT
Start: 2021-04-22 | End: 2021-04-29 | Stop reason: HOSPADM

## 2021-04-22 RX ADMIN — SOTALOL HYDROCHLORIDE 40 MG: 80 TABLET ORAL at 23:34

## 2021-04-22 RX ADMIN — ACETAMINOPHEN 650 MG: 325 TABLET ORAL at 10:48

## 2021-04-22 RX ADMIN — OXYCODONE 5 MG: 5 TABLET ORAL at 18:19

## 2021-04-22 RX ADMIN — MONTELUKAST 10 MG: 10 TABLET, FILM COATED ORAL at 06:29

## 2021-04-22 RX ADMIN — ASPIRIN 81 MG: 81 TABLET, COATED ORAL at 10:23

## 2021-04-22 RX ADMIN — OMEPRAZOLE 20 MG: 20 CAPSULE, DELAYED RELEASE ORAL at 16:59

## 2021-04-22 RX ADMIN — SOTALOL HYDROCHLORIDE 40 MG: 80 TABLET ORAL at 10:47

## 2021-04-22 RX ADMIN — ATORVASTATIN CALCIUM 80 MG: 80 TABLET, FILM COATED ORAL at 16:59

## 2021-04-22 RX ADMIN — CEFTRIAXONE SODIUM 2 G: 2 INJECTION, POWDER, FOR SOLUTION INTRAMUSCULAR; INTRAVENOUS at 10:49

## 2021-04-22 RX ADMIN — HYDROCORTISONE 10 MG: 10 TABLET ORAL at 06:30

## 2021-04-22 RX ADMIN — OMEPRAZOLE 20 MG: 20 CAPSULE, DELAYED RELEASE ORAL at 06:29

## 2021-04-22 RX ADMIN — TAMSULOSIN HYDROCHLORIDE 0.4 MG: 0.4 CAPSULE ORAL at 06:29

## 2021-04-22 RX ADMIN — CLOPIDOGREL BISULFATE 75 MG: 75 TABLET ORAL at 10:48

## 2021-04-22 RX ADMIN — NYSTATIN: 100000 POWDER TOPICAL at 16:59

## 2021-04-22 RX ADMIN — NYSTATIN: 100000 POWDER TOPICAL at 06:31

## 2021-04-22 RX ADMIN — PHENAZOPYRIDINE HYDROCHLORIDE 100 MG: 200 TABLET ORAL at 16:59

## 2021-04-22 RX ADMIN — HYDROCORTISONE 10 MG: 10 TABLET ORAL at 17:07

## 2021-04-22 RX ADMIN — GABAPENTIN 600 MG: 300 CAPSULE ORAL at 16:59

## 2021-04-22 RX ADMIN — NYSTATIN: 100000 POWDER TOPICAL at 12:31

## 2021-04-22 RX ADMIN — LEVOTHYROXINE SODIUM 75 MCG: 0.07 TABLET ORAL at 06:29

## 2021-04-22 RX ADMIN — PHENAZOPYRIDINE HYDROCHLORIDE 100 MG: 200 TABLET ORAL at 12:40

## 2021-04-22 ASSESSMENT — ENCOUNTER SYMPTOMS
STRIDOR: 0
FEVER: 0
SHORTNESS OF BREATH: 0
COUGH: 0
BLOOD IN STOOL: 0
DEPRESSION: 0
HEADACHES: 0
SENSORY CHANGE: 0
ABDOMINAL PAIN: 1
EYE DISCHARGE: 0
BACK PAIN: 0
VOMITING: 0
PALPITATIONS: 0
SPEECH CHANGE: 0
DIZZINESS: 0
NERVOUS/ANXIOUS: 0
DIARRHEA: 0
CHILLS: 0
NAUSEA: 0

## 2021-04-22 ASSESSMENT — PAIN DESCRIPTION - PAIN TYPE
TYPE: ACUTE PAIN
TYPE: ACUTE PAIN;CHRONIC PAIN
TYPE: ACUTE PAIN

## 2021-04-22 ASSESSMENT — FIBROSIS 4 INDEX: FIB4 SCORE: 2.92

## 2021-04-22 NOTE — ASSESSMENT & PLAN NOTE
Acute cystitis with hematuria.  4/18 urine culture negative.  Started ceftriaxone and pyridium.  On 4/22  completed antibiotics

## 2021-04-22 NOTE — PROGRESS NOTES
Inpatient Anticoagulation Service Note    Date: 4/22/2021    Reason for Anticoagulation: Pulmonary Embolism   Target INR: 1.5 to 2.0         Hemoglobin Value: (!) 11  Hematocrit Value: (!) 36.3  Lab Platelet Value: 183    INR from last 7 days     Date/Time INR Value    04/22/21 0405  1.01    04/19/21 0443  1.01    04/19/21 0102  1        Dose from last 7 days     Date/Time Dose (mg)    04/22/21 1114  0    04/21/21 1000  7.5        Average Dose (mg): (Home dose: warfarin 5 mg daily)  Significant Interactions: Antibiotics, Aspirin, Clopidogrel, Corticosteroids, Proton Pump Inhibitor, Statin, Thyroid Medications  Bridge Therapy: No    Comments: Warfarin dosing had been restarted yesterday, INR still subtherapeutic. Pt experiencing hematuria as well as UTI sx's. Prophylactic Lovenox dc'd and Warfarin has been placed on Provider Hold. ASA and Plavix continued due to recent JAS placements. Will monitor for when OK to restart warfarin.    Education Material Provided?: No(chronic warfarin therapy)  Pharmacist suggested discharge dosing: likely home dosing of warfarin 5 mg daily     Fer Hussein, PharmD

## 2021-04-22 NOTE — ASSESSMENT & PLAN NOTE
Holding lovenox and warfarin 4/22.  Continue plavix and aspirin due to recent stent placement.  May hold aspirin if not clearing per cardiology  S/p CBI with clearing of hematuria  S/p rocephin.  If continues I will consult Dr Toño Fitzpatrick his urologist.  ultrasound kidney and bladder without significant finding  Urology consult Dr. Fitzpatrick informed  Resolved.

## 2021-04-22 NOTE — CARE PLAN
Problem: Medication  Goal: Compliance with prescribed medication will improve  Outcome: PROGRESSING AS EXPECTED     Problem: Mobility  Goal: Risk for activity intolerance will decrease  Outcome: PROGRESSING AS EXPECTED     Problem: Skin Integrity  Goal: Risk for impaired skin integrity will decrease  Outcome: PROGRESSING AS EXPECTED     Problem: Psychosocial Needs:  Goal: Level of anxiety will decrease  Outcome: PROGRESSING AS EXPECTED     Problem: Safety  Goal: Will remain free from injury  Outcome: PROGRESSING AS EXPECTED     Problem: Communication  Goal: The ability to communicate needs accurately and effectively will improve  Outcome: PROGRESSING AS EXPECTED     Problem: Pain Management  Goal: Pain level will decrease to patient's comfort goal  Outcome: PROGRESSING AS EXPECTED     Problem: Respiratory:  Goal: Respiratory status will improve  Outcome: PROGRESSING AS EXPECTED

## 2021-04-22 NOTE — PROGRESS NOTES
2 RN skin check completed w/ Gary ROLON.   Devices in place oxygen tubing.  Skin assessed under devices red, nonblanching.  Confirmed pressure ulcers found on n/a.  New potential pressure ulcers noted on n/a. Wound consult placed n/a.  The following interventions in place pillows for support and positioning, Q2 turns, silicone oxygen tubing, gray foams, linen changes as needed.     Black/bleeding carcinoma posterior right ear  Bilateral ears, non-blanching.   Scabbing upper L back.   R radial and R femoral cath site .   Sacrum red, slow to elida.   Large ecchymosis R arm.   Large ecchymosis R groin/abdomen/leg surrounding cath site.   Moisture/redness/peeling to pannus and groin.   Scabbing on R foot.   Bilateral heels red, blanching

## 2021-04-22 NOTE — PROGRESS NOTES
Hospital Medicine Daily Progress Note    Date of Service  4/22/2021    Chief Complaint  Chest pain    Hospital Course  88 y.o. male w/ hx of paroxysmal afib, AV block ans s/p pacemaker, DVT/PE's in past and s/p IVC filter placement, COPD, hypertrophic obstructive cardiomyopathy admitted 4/18/2021 with chest pain while watching TV.  His workup was negative for PE but did show a 4.2cm ascending aneurysm.  He had serial troponin with increase levels. He was started on a heparin drip and cardiology consulted and took him to heart cath and placed 3 stents to the LAD.  Subsequently to cath he developed acute hematuria and is on asa, plavix, warfarin, heparin with subtherapeutic INR.    Interval Problem Update  4/21  Bright red blood in condom catheter lines.  Some bladder/pelvic pain.  Alert and oriented with clear fluent speech.  States it stings when he urinates.  Nurse states no obstruction of urine.  Patient has seen Dr Swanson, urology in past for BPH.  Patient denies chest pain.    Consultants/Specialty  Cardiology  Critical Care (Dr Sher signing off 4/22)    Code Status  Full Code    Disposition  4/22 transfer orders placed by Dr Sher    Review of Systems  Review of Systems   Constitutional: Negative for chills and fever.   Eyes: Negative for discharge.   Respiratory: Negative for cough, shortness of breath and stridor.    Cardiovascular: Negative for chest pain, palpitations and leg swelling.   Gastrointestinal: Positive for abdominal pain. Negative for blood in stool, diarrhea, nausea and vomiting.   Genitourinary: Positive for dysuria and hematuria.   Musculoskeletal: Negative for back pain and joint pain.   Neurological: Negative for dizziness, sensory change, speech change and headaches.   Psychiatric/Behavioral: Negative for depression. The patient is not nervous/anxious.         Physical Exam  Temp:  [36 °C (96.8 °F)-36.8 °C (98.2 °F)] 36 °C (96.8 °F)  Pulse:  [70-92] 83  Resp:  [14-25] 25  BP:  (102-168)/(48-97) 153/91  SpO2:  [94 %-98 %] 95 %    Physical Exam  Vitals reviewed.   Constitutional:       Appearance: Normal appearance. He is obese. He is not diaphoretic.   HENT:      Head: Normocephalic and atraumatic.      Nose: Nose normal.      Mouth/Throat:      Mouth: Mucous membranes are moist.      Pharynx: No oropharyngeal exudate.   Eyes:      General: No scleral icterus.        Right eye: No discharge.         Left eye: No discharge.      Extraocular Movements: Extraocular movements intact.      Conjunctiva/sclera: Conjunctivae normal.   Neck:      Vascular: No carotid bruit.   Cardiovascular:      Rate and Rhythm: Normal rate and regular rhythm.      Pulses:           Radial pulses are 2+ on the right side and 2+ on the left side.        Dorsalis pedis pulses are 2+ on the right side and 2+ on the left side.      Heart sounds: Murmur present.   Pulmonary:      Effort: Pulmonary effort is normal. No respiratory distress.      Breath sounds: Normal breath sounds. No wheezing or rales.   Abdominal:      General: Bowel sounds are normal. There is no distension.      Palpations: Abdomen is soft.      Tenderness: There is no abdominal tenderness.   Genitourinary:     Comments: Condom catheter over penis with bright red bloody urine in tubing  Musculoskeletal:         General: No swelling or tenderness.      Cervical back: No muscular tenderness.      Right lower leg: Edema (trace) present.      Left lower leg: Edema (trace) present.   Lymphadenopathy:      Cervical: No cervical adenopathy.   Skin:     Coloration: Skin is not jaundiced or pale.   Neurological:      General: No focal deficit present.      Mental Status: He is alert and oriented to person, place, and time. Mental status is at baseline.      Cranial Nerves: No cranial nerve deficit.   Psychiatric:         Mood and Affect: Mood normal.         Behavior: Behavior normal.         Fluids    Intake/Output Summary (Last 24 hours) at 4/22/2021  1122  Last data filed at 4/22/2021 0600  Gross per 24 hour   Intake 700 ml   Output 1500 ml   Net -800 ml       Laboratory  Recent Labs     04/20/21  0500 04/21/21  0350 04/22/21  0623   WBC 15.1* 18.0* 14.5*   RBC 5.03 4.41* 4.35*   HEMOGLOBIN 12.6* 11.4* 11.0*   HEMATOCRIT 41.6* 37.6* 36.3*   MCV 82.7 85.3 83.4   MCH 25.0* 25.9* 25.3*   MCHC 30.3* 30.3* 30.3*   RDW 52.2* 55.0* 53.4*   PLATELETCT 216 202 183   MPV 10.0 9.8 9.9     Recent Labs     04/20/21  0500 04/21/21  0350   SODIUM 135 131*   POTASSIUM 4.6 4.3   CHLORIDE 99 101   CO2 28 25   GLUCOSE 122* 111*   BUN 18 15   CREATININE 1.00 0.78   CALCIUM 8.0* 7.4*     Recent Labs     04/22/21  0405 04/22/21  0623   APTT  --  35.7   INR 1.01  --                Imaging  EC-ECHOCARDIOGRAM COMPLETE W/O CONT   Final Result      CT-CTA CHEST PULMONARY ARTERY W/ RECONS   Final Result         1.  No evidence of pulmonary embolism.   2.  Mild cardiomegaly.   3.  Atherosclerosis.   4.  Mild 4.2 cm ascending thoracic aortic aneurysm.   5.  Hypoinflation, elevation of left hemidiaphragm with bibasilar atelectasis. No significant consolidation or pleural effusion.   6.  4 mm noncalcified right middle lobe pulmonary nodule. Follow-up per Fleischner criteria is clinically indicated.      Fleischner Society pulmonary nodule recommendations:   Low Risk: No routine follow-up      High Risk: Optional CT at 12 months      Comments: Nodules less than 6 mm do not require routine follow-up, but certain patients at high risk with suspicious nodule morphology, upper lobe location, or both may warrant 12-month follow-up.      Low Risk - Minimal or absent history of smoking and of other known risk factors.      High Risk - History of smoking or of other known risk factors.      Note: These recommendations do not apply to lung cancer screening, patients with immunosuppression, or patients with known primary cancer.      Fleischner Society 2017 Guidelines for Management of Incidentally  Detected Pulmonary Nodules in Adults      DX-CHEST-PORTABLE (1 VIEW)   Final Result      1.  Hypoinflation with LEFT lung base atelectasis or infiltrate, possibly chronic.   2.  No overt pulmonary edema.      CL-LEFT HEART CATHETERIZATION WITH POSSIBLE INTERVENTION    (Results Pending)        Assessment/Plan  * NSTEMI (non-ST elevated myocardial infarction) (HCC)- (present on admission)  Assessment & Plan  Status post heart cath with stenting to the LAD x 3 on 4/20  Statin Lipitor 80 mg  Sotalol   plavix and aspirin (holding lovenox and warfarin 4/22 due to acute hematuria.)  Troponin trended up to 810  Echocardiogram showed normal left ventricle chamber size with mild concentric left ventricular hypertrophy with ejection fraction estimated to be around 60%.  It was akinesis of the apex and hypokinesis of the apical septal wall that could be related to ventricular pacing versus prior infarct  Cardiology consulting    Hematuria due to acute cystitis  Assessment & Plan  Holding lovenox and warfarin 4/22.  Continue plavix and aspirin due to recent stent placement.  May hold aspiring if not clearing per cardiology  Start 4/22 rocephin.  If continues I will consult Dr Toño Fitzpatrick his urologist.  Monitor for obstruction.  Pyridium for pain.    Hypotension  Assessment & Plan  On admit had adrenal insufficiency in the setting of stress response  Monitor vitals and having improved BP>  Back on home hydrocortisone 10mg BID    Adrenal insufficiency (HCC)- (present on admission)  Assessment & Plan  Secondary to chronic steroid use and hx of panhypopituitary per note  He was on hydrocortisone 10 mg BID as an outpatient  Due to hypotension, he had been started on IV hydrocortisone 50 mg q 6 hours which will change back to oral home dose on 4/21  Monitoring vitals.      UTI (urinary tract infection)- (present on admission)  Assessment & Plan  Acute cystitis with hematuria.  4/18 urine culture negative.  Started ceftriaxone and  pyridium.    Obstructive hypertrophic cardiomyopathy (HCC)- (present on admission)  Assessment & Plan  As per history.   currently on: (sotalol 80 mg) 40 mg q12h  Repeat echocardiogram unchanged  4/18 Echo: Compared to the images of the study done 1/22/2019  - there has been no   significant change..   Akinesis of apex, hypokinesis of apical septal wall may be from   ventricular pacing verus prior infarct.  Normal right ventricular size and systolic function.  Mild mitral regurgitation.  Mild aortic stenosis.  Mild aortic insufficiency.    Anticoagulant long-term use- (present on admission)  Assessment & Plan  Secondary to extensive history of DVT AND MULTIPLE PE's  STATUS post IVC filter placement.  Home Coumadin had been held for anticipated skin BCC procedures.   IV heparin drip and follow Factor X levels  Coumadin was restarted 4/21 per Dr. To goal INR 1.5-2 but held 4/22 due to hematuria.    History of pulmonary embolism- (present on admission)  Assessment & Plan  As per history at least 8-9 PEs in the past.    Hx of having an IVC filter in place.  Initially he was holding warfarin for right ear basal cell carcinoma removal in 2 days.  4/22 stopped lovenox and warfarin due to hematuria  4/22 check LE U/S to r/o DVT and for monitoring.  CTA with PE protocol from the ER was negative for PE.    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  Status post resection of pituitary adenoma in 1998.   levothyroxinem, cortef    Essential hypertension, benign- (present on admission)  Assessment & Plan  Continue home sotalol if BP allows    Prolonged Q-T interval on ECG- (present on admission)  Assessment & Plan  In sotalol use.  Avoid QTC prolonging medications.    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  Paced rhythm noted 4/22 on tele  Continue telemetry monitoring.  Continue home sotalol 40mg BID    BPH (benign prostatic hypertrophy)- (present on admission)  Assessment & Plan  Continues to have urinary frequency.  Continue on tamsulosin.  Follows Dr Gonzales outpatient urology    Chronic back pain- (present on admission)  Assessment & Plan  As per history in setting of arthritis.  Continue home Norco and gabapentin.    COPD (chronic obstructive pulmonary disease) (HCC)- (present on admission)  Assessment & Plan  With chronic respiratory failure.  On 5 L of oxygen via nasal cannula at home.  Currently not in acute exacerbation clinically and at baseline with respiratory status.  Bronchodilators as needed per RT protocol.    Cardiac pacemaker in situ- (present on admission)  Assessment & Plan  Placed for sick sinus syndrome third-degree block.  History of multiple PVCs.  Telemetry monitoring.         VTE prophylaxis: Holding due to hematuria

## 2021-04-22 NOTE — PROGRESS NOTES
Pt reports having burning sensation this am, and noted pt urine is now blood tinged, voided twice. Pt reports he has not had hematuria in the past. Dr. POP Hall notified, orders for labs to be drawn, hold Lovenox this am and reschedule asa and plavix to later in am for dayshift team. Remove condom cath for now. Pt updated on POC, will continue to monitor closely.

## 2021-04-22 NOTE — DISCHARGE PLANNING
Care Transition Team Assessment  Spoke w/ pt at bedside and verified facesheet info. PCP Dr. Bebe Banerjee. Pt states he would like his wife Jacquie to be the first point of contact before calling other family members. Pt lives w/ wife, independent w/ ADL/IADL's. Pt states he uses a cane and FWW and was on service w/ [employer] and has home O2 through [facility] (5L O2 continuous as baseline). Pt states he has ride from family for [place]    Information Source  Orientation : Oriented x 4  Information Given By: Patient  Who is responsible for making decisions for patient? : Patient         Elopement Risk  Legal Hold: No  Ambulatory or Self Mobile in Wheelchair: Yes  Disoriented: No  Psychiatric Symptoms: None  History of Wandering: No  Elopement this Admit: No  Vocalizing Wanting to Leave: No  Displays Behaviors, Body Language Wanting to Leave: No-Not at Risk for Elopement  Elopement Risk: Not at Risk for Elopement    Interdisciplinary Discharge Planning  Primary Care Physician: Bebe Banerjee  Lives with - Patient's Self Care Capacity: Spouse  Patient or legal guardian wants to designate a caregiver: No  Support Systems: Children, Spouse / Significant Other  Housing / Facility: 1 Story House  Able to Return to Previous ADL's: Yes  Mobility Issues: No  Prior Services: Home-Independent  Assistance Needed: Unknown at this Time  Durable Medical Equipment: Home Oxygen, Walker  DME Provider / Phone: [facility]    Discharge Preparedness  What is your plan after discharge?: Home health care  What are your discharge supports?: Child, Spouse  Prior Functional Level: Ambulatory, Independent with Activities of Daily Living, Uses Cane, Uses Walker    Functional Assesment  Prior Functional Level: Ambulatory, Independent with Activities of Daily Living, Uses Cane, Uses Walker    Finances  Financial Barriers to Discharge: No    Vision / Hearing Impairment  Vision Impairment : Yes  Right Eye Vision:  Impaired, Wears Glasses  Left Eye Vision: Impaired, Wears Glasses  Hearing Impairment : No         Advance Directive  Advance Directive?: None    Domestic Abuse  Have you ever been the victim of abuse or violence?: No  Physical Abuse or Sexual Abuse: No  Verbal Abuse or Emotional Abuse: No  Possible Abuse/Neglect Reported to:: Not Applicable    Psychological Assessment  History of Substance Abuse: None  History of Psychiatric Problems: No  Non-compliant with Treatment: No    Discharge Risks or Barriers  Discharge risks or barriers?: No    Anticipated Discharge Information  Discharge Address: [address] [place] [id number]  Discharge Contact Phone Number: [phone]

## 2021-04-22 NOTE — PROGRESS NOTES
Daily Progress Note: Cardiology     Date of Service: 4/22/2021  Attending: Dr. Yanez  Senior Resident: Dr. Thompson    Chief Complaint:   Chest pain    ID: 88 y.o. male who presented 4/18/2021 with chest pain that started at rest while he was sitting in front of the computer. Complex medical history including 8 PEs in the past status post IVC placement on warfarin, which she stopped taking 2 days ago in preparation for removal of basal cell carcinoma from his right ear.  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  Pt's troponins trended up and he was treated medically for NSTEMi and underwent a left heart cath on 4/20/21, which revealed severe stenosis of the distal LAD with heavy calcification of the vessel and residual total occlusion of OM2. He had a complex cath with successful PCI of the mid to distal LAD using 3 overlapping drug-eluting stents.     Interval Update:  Denied CP, BP stable. He now has hematuria and dysuria and UA positive for many bacteria. Reached out to medicine team regarding management of hematuria as pt needs to be on triple therapy for his new stents.     Consultants/Specialty:  Cardiology  Interventional cardiology  ICU     Review of Systems:  Constitutional: Positive for malaise/fatigue. Negative for chills and fever.   HENT: Negative for congestion and sore throat.    Respiratory: Negative for cough, shortness of breath and wheezing.    Cardiovascular: Positive for chest pain. Negative for palpitations, orthopnea, leg swelling and PND.   Gastrointestinal: Negative for abdominal pain, diarrhea, nausea and vomiting.   Neurological: Positive for dizziness. Negative for loss of consciousness and headaches.    Psychiatric/Behavioral: Negative for substance abuse. The patient is not nervous/anxious.    Urological: positive for dysuria, gross hematuria, lower abdominal discomfort, flank area pain      Objective Data:   Physical Exam:   Vitals:   Temp:  [36 °C (96.8 °F)-36.8 °C (98.2 °F)] 36 °C (96.8 °F)  Pulse:  [70-92] 75  Resp:  [14-24] 24  BP: (102-168)/(48-97) 108/52  SpO2:  [94 %-98 %] 98 %    Physical Exam  Constitutional:       General: He is not in acute distress.     Appearance: He is obese. He is not ill-appearing, toxic-appearing or diaphoretic.   HENT:      Head: Normocephalic and atraumatic.      Nose: Nose normal.      Mouth/Throat:      Mouth: Mucous membranes are moist.   Eyes:      Extraocular Movements: Extraocular movements intact.      Pupils: Pupils are equal, round, and reactive to light.   Cardiovascular:      Rate and Rhythm: Normal rate and regular rhythm.      Pulses: Normal pulses.      Heart sounds: Murmur (Crescendo  systolic murmur present with a grade of 3/6.) present. No friction rub. No gallop.       Comments: Non tender on palpation   Pulmonary:      Effort: Pulmonary effort is normal. No respiratory distress.      Breath sounds: No wheezing, or rhonchi, mild bibasilar rales   Chest:      Chest wall: No tenderness.   Abdominal:      General: Bowel sounds are normal. There is no distension.      Palpations: Abdomen is soft.      Tenderness: mild lower abdominal tenderness. There is no guarding or rebound.      Comments: obese   Musculoskeletal:         General: Normal range of motion.      Cervical back: Normal range of motion and neck supple.    Complaining of lower back/flank area pain      Right lower leg: No edema.      Left lower leg: No edema.   Skin:     General: Skin is warm.      Findings: No lesion or rash.   Neurological:      General: No focal deficit present.      Mental Status: He is alert and oriented to person, place, and time.   Psychiatric:         Mood and Affect: Mood  normal.         Behavior: Behavior normal.         Thought Content: Thought content normal.         Judgment: Judgment normal.     Labs:   Recent Labs     04/20/21  0500 04/21/21  0350 04/22/21  0623   WBC 15.1* 18.0* 14.5*   RBC 5.03 4.41* 4.35*   HEMOGLOBIN 12.6* 11.4* 11.0*   HEMATOCRIT 41.6* 37.6* 36.3*   MCV 82.7 85.3 83.4   MCH 25.0* 25.9* 25.3*   RDW 52.2* 55.0* 53.4*   PLATELETCT 216 202 183   MPV 10.0 9.8 9.9   NEUTSPOLYS 87.30*  --   --    LYMPHOCYTES 6.40*  --   --    MONOCYTES 5.10  --   --    EOSINOPHILS 0.20  --   --    BASOPHILS 0.50  --   --      Recent Labs     04/20/21  0500 04/21/21  0350   SODIUM 135 131*   POTASSIUM 4.6 4.3   CHLORIDE 99 101   CO2 28 25   GLUCOSE 122* 111*   BUN 18 15       Imaging:   EC-ECHOCARDIOGRAM COMPLETE W/O CONT   Final Result      CT-CTA CHEST PULMONARY ARTERY W/ RECONS   Final Result         1.  No evidence of pulmonary embolism.   2.  Mild cardiomegaly.   3.  Atherosclerosis.   4.  Mild 4.2 cm ascending thoracic aortic aneurysm.   5.  Hypoinflation, elevation of left hemidiaphragm with bibasilar atelectasis. No significant consolidation or pleural effusion.   6.  4 mm noncalcified right middle lobe pulmonary nodule. Follow-up per Fleischner criteria is clinically indicated.      Fleischner Society pulmonary nodule recommendations:   Low Risk: No routine follow-up      High Risk: Optional CT at 12 months      Comments: Nodules less than 6 mm do not require routine follow-up, but certain patients at high risk with suspicious nodule morphology, upper lobe location, or both may warrant 12-month follow-up.      Low Risk - Minimal or absent history of smoking and of other known risk factors.      High Risk - History of smoking or of other known risk factors.      Note: These recommendations do not apply to lung cancer screening, patients with immunosuppression, or patients with known primary cancer.      Fleischner Society 2017 Guidelines for Management of Incidentally  Detected Pulmonary Nodules in Adults      DX-CHEST-PORTABLE (1 VIEW)   Final Result      1.  Hypoinflation with LEFT lung base atelectasis or infiltrate, possibly chronic.   2.  No overt pulmonary edema.      CL-LEFT HEART CATHETERIZATION WITH POSSIBLE INTERVENTION    (Results Pending)     Assessment and plan:     NSTEMI- (present on admission)  Assessment & Plan     New onset chest pain substernal pressure, non radiating 8/10 at its worse that started at rest at 1:30pm on 4/18/21. Has not had this pain in the past.   Troponin trended up. HEART Score 5, Risk of MACE of 12-16.6%. Pt denied previous hx of heart catheterization, no hx found in the chart. Pt treated medically for NSTEMI and underwent  a left heart cath on 4/20/21, which revealed severe stenosis of the distal LAD with heavy calcification of the vessel and residual total occlusion of OM2. He had a complex cath with successful PCI of the mid to distal LAD using 3 overlapping drug-eluting stents with Dr. Byrd.     Plan:  on telemetry monitoring        Per Dr. Byrd, dual antiplatelet therapy for 1 months then plavix + coumadin afterwards. PPI while      on triple therapy  Continue high intensity statin   Can't tolerate BB or ACE/ARB at this time do to hypotension   Continue home sotalol   Transitioned to Warfarin     Gross hematuria/UTI  New onset this morning, pt needs to be on triple therapy for his stent  Appears to have symptomatic UTI   Reached out to medicine team regarding treating the UTI and hematuria management   Can consider urology output for possible cystoscopy   Need to resolve urgently so his triple therapy for the heart stents is not interrupted     Aortic stenosis- (present on admission)   Assessment & Plan  Mild aortic stenosis   Area 2.6cm2  Peak velocity 2m/s  Peak gradient 15.8 mmHg      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  Optimize electrolytes for potassium goal  greater than 4.5 and magnesium goal greater than 2.0.     Obstructive hypertrophic cardiomyopathy (HCC)- (present on admission)  Assessment & Plan  As per history.   - currently on: (sotalol 80 mg) 40 mg q12h     Repeat echocardiogram in setting of new dizziness and dyspnea on exertion shows stable ECHO when compared to one in 2019  Continue home sotalol     SSS (sick sinus syndrome) (HCC)- (present on admission)  Assessment & Plan  Secondary to third-degree blocks status post pacemaker placement.     Continue telemetry monitoring.  Continue home sotalol.     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  Reports last PE 5-6 years ago  Now back on Warfarin     Essential hypertension, benign- (present on admission)   Assessment & Plan  goal BP < 130/80 mmHg    States his BP at home runs 130-135/70-80, occasionally experiences hypotension and skips taking his lisinopril on those days      Continue sotalol for now, no BB, ACE, or ARB at this time as he is having hypotension     Hypotension   Episodes of hypotension overnight   Improved, now on home PO steroid dose      Prolonged Q-T interval on ECG- (present on admission)  Assessment & Plan  In sotalol use     Avoid QTC prolonging medications     Cardiac pacemaker in situ- (present on admission)  Assessment & Plan  Placed for sick sinus syndrome third-degree block.  History of multiple PVCs.     Telemetry monitoring    History of pulmonary embolism- (present on admission)  Assessment & Plan  As per history at least 8-9 PEs in the past.  He has a IVC filter in place.  Holding warfarin for right ear basal cell carcinoma removal in 2 days.  CTA with PE protocol from the ER was negative for PE.     Back on Warfarin      Adrenal insufficiency (HCC)- (present on admission)  Assessment & Plan  As per history secondary to chronic steroid use.     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  Status post resection of pituitary adenoma in 1998     Continue chronic hydrocortisone and levothyroxine therapy

## 2021-04-22 NOTE — CARE PLAN
Problem: Respiratory:  Goal: Respiratory status will improve  Outcome: PROGRESSING AS EXPECTED     Problem: Pain Management  Goal: Pain level will decrease to patient's comfort goal  Outcome: PROGRESSING AS EXPECTED     Problem: Communication  Goal: The ability to communicate needs accurately and effectively will improve  Outcome: PROGRESSING AS EXPECTED     Problem: Safety  Goal: Will remain free from injury  Outcome: PROGRESSING AS EXPECTED  Goal: Will remain free from falls  Outcome: PROGRESSING AS EXPECTED     Problem: Infection  Goal: Will remain free from infection  Outcome: PROGRESSING AS EXPECTED     Problem: Venous Thromboembolism (VTW)/Deep Vein Thrombosis (DVT) Prevention:  Goal: Patient will participate in Venous Thrombosis (VTE)/Deep Vein Thrombosis (DVT)Prevention Measures  Outcome: PROGRESSING AS EXPECTED

## 2021-04-22 NOTE — PROGRESS NOTES
0700 report received, assumed care pt. Assessment as charted. Pt with gross hematuria, GCS 15 C/o pain w/ hematuria    0915 Pt up with 1 person walker assist to bathroom for BM. Per hosp  MD POC hold coumadin for hematuria    1230 report farhat to pedro, pt transferred to [id number]

## 2021-04-22 NOTE — PROGRESS NOTES
Assumed care for patient. Bedside report done with Beny ROLON. Pt is resting in bed, updated on poc. Call light within reach.

## 2021-04-22 NOTE — PROGRESS NOTES
Assumed care of PT A&O 4. Pt resting in bed with no signs of labored breathing. On 5L NC. Tele monitor in place, cardiac rhythm being monitored. Call light within reach, bed in lowest position, upper bed rails up. Pt was updated on plan of care for the day . Will continue to monitor.

## 2021-04-23 ENCOUNTER — APPOINTMENT (OUTPATIENT)
Dept: RADIOLOGY | Facility: MEDICAL CENTER | Age: 86
DRG: 247 | End: 2021-04-23
Attending: STUDENT IN AN ORGANIZED HEALTH CARE EDUCATION/TRAINING PROGRAM
Payer: MEDICARE

## 2021-04-23 LAB
EKG IMPRESSION: NORMAL
EKG IMPRESSION: NORMAL
INR PPP: 1.2 (ref 0.87–1.13)
PROTHROMBIN TIME: 15.5 SEC (ref 12–14.6)

## 2021-04-23 PROCEDURE — 93005 ELECTROCARDIOGRAM TRACING: CPT | Performed by: STUDENT IN AN ORGANIZED HEALTH CARE EDUCATION/TRAINING PROGRAM

## 2021-04-23 PROCEDURE — 94760 N-INVAS EAR/PLS OXIMETRY 1: CPT

## 2021-04-23 PROCEDURE — A9270 NON-COVERED ITEM OR SERVICE: HCPCS | Performed by: HOSPITALIST

## 2021-04-23 PROCEDURE — 700102 HCHG RX REV CODE 250 W/ 637 OVERRIDE(OP): Performed by: STUDENT IN AN ORGANIZED HEALTH CARE EDUCATION/TRAINING PROGRAM

## 2021-04-23 PROCEDURE — 700101 HCHG RX REV CODE 250: Performed by: STUDENT IN AN ORGANIZED HEALTH CARE EDUCATION/TRAINING PROGRAM

## 2021-04-23 PROCEDURE — 770020 HCHG ROOM/CARE - TELE (206)

## 2021-04-23 PROCEDURE — 700102 HCHG RX REV CODE 250 W/ 637 OVERRIDE(OP): Performed by: HOSPITALIST

## 2021-04-23 PROCEDURE — A9270 NON-COVERED ITEM OR SERVICE: HCPCS | Performed by: STUDENT IN AN ORGANIZED HEALTH CARE EDUCATION/TRAINING PROGRAM

## 2021-04-23 PROCEDURE — 93005 ELECTROCARDIOGRAM TRACING: CPT | Performed by: HOSPITALIST

## 2021-04-23 PROCEDURE — 93010 ELECTROCARDIOGRAM REPORT: CPT | Mod: 76 | Performed by: INTERNAL MEDICINE

## 2021-04-23 PROCEDURE — 700102 HCHG RX REV CODE 250 W/ 637 OVERRIDE(OP): Performed by: INTERNAL MEDICINE

## 2021-04-23 PROCEDURE — 99233 SBSQ HOSP IP/OBS HIGH 50: CPT | Performed by: INTERNAL MEDICINE

## 2021-04-23 PROCEDURE — 76775 US EXAM ABDO BACK WALL LIM: CPT

## 2021-04-23 PROCEDURE — 700101 HCHG RX REV CODE 250

## 2021-04-23 PROCEDURE — 99232 SBSQ HOSP IP/OBS MODERATE 35: CPT | Performed by: STUDENT IN AN ORGANIZED HEALTH CARE EDUCATION/TRAINING PROGRAM

## 2021-04-23 PROCEDURE — 94640 AIRWAY INHALATION TREATMENT: CPT

## 2021-04-23 PROCEDURE — 93010 ELECTROCARDIOGRAM REPORT: CPT | Performed by: INTERNAL MEDICINE

## 2021-04-23 PROCEDURE — A9270 NON-COVERED ITEM OR SERVICE: HCPCS | Performed by: INTERNAL MEDICINE

## 2021-04-23 PROCEDURE — 700111 HCHG RX REV CODE 636 W/ 250 OVERRIDE (IP): Performed by: STUDENT IN AN ORGANIZED HEALTH CARE EDUCATION/TRAINING PROGRAM

## 2021-04-23 PROCEDURE — 85610 PROTHROMBIN TIME: CPT

## 2021-04-23 PROCEDURE — 700111 HCHG RX REV CODE 636 W/ 250 OVERRIDE (IP): Performed by: HOSPITALIST

## 2021-04-23 PROCEDURE — 700105 HCHG RX REV CODE 258: Performed by: HOSPITALIST

## 2021-04-23 RX ORDER — TESTOSTERONE CYPIONATE 200 MG/ML
200 INJECTION, SOLUTION INTRAMUSCULAR
Status: DISCONTINUED | OUTPATIENT
Start: 2021-04-23 | End: 2021-04-29 | Stop reason: HOSPADM

## 2021-04-23 RX ORDER — IPRATROPIUM BROMIDE AND ALBUTEROL SULFATE 2.5; .5 MG/3ML; MG/3ML
3 SOLUTION RESPIRATORY (INHALATION)
Status: DISCONTINUED | OUTPATIENT
Start: 2021-04-23 | End: 2021-04-29 | Stop reason: HOSPADM

## 2021-04-23 RX ORDER — IPRATROPIUM BROMIDE AND ALBUTEROL SULFATE 2.5; .5 MG/3ML; MG/3ML
SOLUTION RESPIRATORY (INHALATION)
Status: COMPLETED
Start: 2021-04-23 | End: 2021-04-23

## 2021-04-23 RX ADMIN — PHENAZOPYRIDINE HYDROCHLORIDE 100 MG: 200 TABLET ORAL at 08:08

## 2021-04-23 RX ADMIN — TESTOSTERONE CYPIONATE 200 MG: 200 INJECTION, SOLUTION INTRAMUSCULAR at 16:54

## 2021-04-23 RX ADMIN — TAMSULOSIN HYDROCHLORIDE 0.4 MG: 0.4 CAPSULE ORAL at 05:40

## 2021-04-23 RX ADMIN — ATORVASTATIN CALCIUM 80 MG: 80 TABLET, FILM COATED ORAL at 16:55

## 2021-04-23 RX ADMIN — NYSTATIN: 100000 POWDER TOPICAL at 12:00

## 2021-04-23 RX ADMIN — HYDROCORTISONE 10 MG: 10 TABLET ORAL at 05:39

## 2021-04-23 RX ADMIN — CEFTRIAXONE SODIUM 2 G: 2 INJECTION, POWDER, FOR SOLUTION INTRAMUSCULAR; INTRAVENOUS at 05:41

## 2021-04-23 RX ADMIN — CLOPIDOGREL BISULFATE 75 MG: 75 TABLET ORAL at 05:39

## 2021-04-23 RX ADMIN — SOTALOL HYDROCHLORIDE 40 MG: 80 TABLET ORAL at 23:33

## 2021-04-23 RX ADMIN — LEVOTHYROXINE SODIUM 75 MCG: 0.07 TABLET ORAL at 05:40

## 2021-04-23 RX ADMIN — MONTELUKAST 10 MG: 10 TABLET, FILM COATED ORAL at 05:39

## 2021-04-23 RX ADMIN — NYSTATIN 1 APPLICATION: 100000 POWDER TOPICAL at 05:39

## 2021-04-23 RX ADMIN — SOTALOL HYDROCHLORIDE 40 MG: 80 TABLET ORAL at 11:12

## 2021-04-23 RX ADMIN — OMEPRAZOLE 20 MG: 20 CAPSULE, DELAYED RELEASE ORAL at 16:55

## 2021-04-23 RX ADMIN — IPRATROPIUM BROMIDE AND ALBUTEROL SULFATE 3 ML: .5; 2.5 SOLUTION RESPIRATORY (INHALATION) at 06:39

## 2021-04-23 RX ADMIN — NYSTATIN: 100000 POWDER TOPICAL at 16:54

## 2021-04-23 RX ADMIN — HYDROCORTISONE 10 MG: 10 TABLET ORAL at 16:54

## 2021-04-23 RX ADMIN — PHENAZOPYRIDINE HYDROCHLORIDE 100 MG: 200 TABLET ORAL at 16:54

## 2021-04-23 RX ADMIN — IPRATROPIUM BROMIDE AND ALBUTEROL SULFATE 3 ML: .5; 2.5 SOLUTION RESPIRATORY (INHALATION) at 06:40

## 2021-04-23 RX ADMIN — GABAPENTIN 600 MG: 300 CAPSULE ORAL at 16:54

## 2021-04-23 RX ADMIN — OMEPRAZOLE 20 MG: 20 CAPSULE, DELAYED RELEASE ORAL at 05:40

## 2021-04-23 RX ADMIN — ASPIRIN 81 MG: 81 TABLET, COATED ORAL at 05:40

## 2021-04-23 RX ADMIN — PHENAZOPYRIDINE HYDROCHLORIDE 100 MG: 200 TABLET ORAL at 11:12

## 2021-04-23 ASSESSMENT — ENCOUNTER SYMPTOMS
HEADACHES: 0
ABDOMINAL PAIN: 1
NAUSEA: 0
SENSORY CHANGE: 0
BLOOD IN STOOL: 0
FEVER: 0
PALPITATIONS: 0
NERVOUS/ANXIOUS: 0
SHORTNESS OF BREATH: 0
STRIDOR: 0
SPEECH CHANGE: 0
VOMITING: 0
DEPRESSION: 0
BACK PAIN: 0
EYE DISCHARGE: 0
COUGH: 0
DIZZINESS: 0
CHILLS: 0
DIARRHEA: 0

## 2021-04-23 ASSESSMENT — COGNITIVE AND FUNCTIONAL STATUS - GENERAL
CLIMB 3 TO 5 STEPS WITH RAILING: A LITTLE
MOBILITY SCORE: 23
TOILETING: A LITTLE
DAILY ACTIVITIY SCORE: 23
SUGGESTED CMS G CODE MODIFIER DAILY ACTIVITY: CI
SUGGESTED CMS G CODE MODIFIER MOBILITY: CI

## 2021-04-23 ASSESSMENT — PATIENT HEALTH QUESTIONNAIRE - PHQ9
1. LITTLE INTEREST OR PLEASURE IN DOING THINGS: NOT AT ALL
2. FEELING DOWN, DEPRESSED, IRRITABLE, OR HOPELESS: NOT AT ALL
SUM OF ALL RESPONSES TO PHQ9 QUESTIONS 1 AND 2: 0

## 2021-04-23 ASSESSMENT — PAIN DESCRIPTION - PAIN TYPE
TYPE: ACUTE PAIN
TYPE: ACUTE PAIN

## 2021-04-23 ASSESSMENT — COPD QUESTIONNAIRES
DO YOU EVER COUGH UP ANY MUCUS OR PHLEGM?: NO/ONLY WITH OCCASIONAL COLDS OR INFECTIONS
DURING THE PAST 4 WEEKS HOW MUCH DID YOU FEEL SHORT OF BREATH: NONE/LITTLE OF THE TIME
HAVE YOU SMOKED AT LEAST 100 CIGARETTES IN YOUR ENTIRE LIFE: YES
COPD SCREENING SCORE: 4

## 2021-04-23 NOTE — PROGRESS NOTES
"Spoke with Dr. Sapp about clarification on nursing communication for \"continuous irrigation\". Verbal read back she wants continuous bladder irrigation.   "

## 2021-04-23 NOTE — PROGRESS NOTES
Daily Progress Note: Cardiology     Date of Service: 4/23/2021  Attending: Dr. Yanez  Senior Resident: Dr. Thompson    Chief Complaint:   Chest pain    ID: 88 y.o. male who presented 4/18/2021 with chest pain that started at rest while he was sitting in front of the computer. Complex medical history including 8 PEs in the past status post IVC placement on warfarin, which she stopped taking 2 days ago in preparation for removal of basal cell carcinoma from his right ear.  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  Pt's troponins trended up and he was treated medically for NSTEMi and underwent a left heart cath on 4/20/21, which revealed severe stenosis of the distal LAD with heavy calcification of the vessel and residual total occlusion of OM2. He had a complex cath with successful PCI of the mid to distal LAD using 3 overlapping drug-eluting stents.     Interval Update:  Denied CP, BP stable. Hematuria and dysuria ongoing despite being started on C3 yesterday. Spoke to the hospitalist and she is planning to reach out to pt's previous urologist. Urine cultures pending. Will continue DAPT to avoid acute stents thrombosis, holding warfarin for now in setting of acute gross hematuria.     Consultants/Specialty:  Cardiology  Interventional cardiology  ICU     Review of Systems:  Constitutional: Positive for malaise/fatigue. Negative for chills and fever.   HENT: Negative for congestion and sore throat.    Respiratory: Negative for cough, shortness of breath and wheezing.    Cardiovascular: Positive for chest pain. Negative for palpitations, orthopnea, leg swelling and PND.   Gastrointestinal: Negative for abdominal pain, diarrhea, nausea and vomiting.    Neurological: Positive for dizziness. Negative for loss of consciousness and headaches.   Psychiatric/Behavioral: Negative for substance abuse. The patient is not nervous/anxious.    Urological: positive for dysuria, gross hematuria    Objective Data:   Physical Exam:   Vitals:   Temp:  [36 °C (96.8 °F)-36.7 °C (98.1 °F)] 36.7 °C (98.1 °F)  Pulse:  [71-81] 81  Resp:  [16-19] 16  BP: (100-141)/(56-82) 140/82  SpO2:  [94 %-99 %] 99 %    Physical Exam  Constitutional:       General: He is not in acute distress.     Appearance: He is obese. He is not ill-appearing, toxic-appearing or diaphoretic.   HENT:      Head: Normocephalic and atraumatic.      Nose: Nose normal.      Mouth/Throat:      Mouth: Mucous membranes are moist.   Eyes:      Extraocular Movements: Extraocular movements intact.      Pupils: Pupils are equal, round, and reactive to light.   Cardiovascular:      Rate and Rhythm: Normal rate and regular rhythm.      Pulses: Normal pulses.      Heart sounds: Murmur (Crescendo  systolic murmur present with a grade of 3/6.) present. No friction rub. No gallop.       Comments: Non tender on palpation   Pulmonary:      Effort: Pulmonary effort is normal. No respiratory distress.      Breath sounds: No wheezing, or rhonchi, mild bibasilar rales   Chest:      Chest wall: No tenderness.   Abdominal:      General: Bowel sounds are normal. There is no distension.      Palpations: Abdomen is soft.      Tenderness: mild lower abdominal tenderness. There is no guarding or rebound.      Comments: obese   Musculoskeletal:         General: Normal range of motion.      Cervical back: Normal range of motion and neck supple.    Complaining of lower back/flank area pain      Right lower leg: No edema.      Left lower leg: No edema.   Skin:     General: Skin is warm.      Findings: No lesion or rash.   Neurological:      General: No focal deficit present.      Mental Status: He is alert and oriented to person, place, and time.    Psychiatric:         Mood and Affect: Mood normal.         Behavior: Behavior normal.         Thought Content: Thought content normal.         Judgment: Judgment normal.     Labs:   Recent Labs     04/21/21  0350 04/22/21  0623   WBC 18.0* 14.5*   RBC 4.41* 4.35*   HEMOGLOBIN 11.4* 11.0*   HEMATOCRIT 37.6* 36.3*   MCV 85.3 83.4   MCH 25.9* 25.3*   RDW 55.0* 53.4*   PLATELETCT 202 183   MPV 9.8 9.9     Recent Labs     04/21/21  0350   SODIUM 131*   POTASSIUM 4.3   CHLORIDE 101   CO2 25   GLUCOSE 111*   BUN 15       Imaging:   EC-ECHOCARDIOGRAM COMPLETE W/O CONT   Final Result      CT-CTA CHEST PULMONARY ARTERY W/ RECONS   Final Result         1.  No evidence of pulmonary embolism.   2.  Mild cardiomegaly.   3.  Atherosclerosis.   4.  Mild 4.2 cm ascending thoracic aortic aneurysm.   5.  Hypoinflation, elevation of left hemidiaphragm with bibasilar atelectasis. No significant consolidation or pleural effusion.   6.  4 mm noncalcified right middle lobe pulmonary nodule. Follow-up per Fleischner criteria is clinically indicated.      Fleischner Society pulmonary nodule recommendations:   Low Risk: No routine follow-up      High Risk: Optional CT at 12 months      Comments: Nodules less than 6 mm do not require routine follow-up, but certain patients at high risk with suspicious nodule morphology, upper lobe location, or both may warrant 12-month follow-up.      Low Risk - Minimal or absent history of smoking and of other known risk factors.      High Risk - History of smoking or of other known risk factors.      Note: These recommendations do not apply to lung cancer screening, patients with immunosuppression, or patients with known primary cancer.      Fleischner Society 2017 Guidelines for Management of Incidentally Detected Pulmonary Nodules in Adults      DX-CHEST-PORTABLE (1 VIEW)   Final Result      1.  Hypoinflation with LEFT lung base atelectasis or infiltrate, possibly chronic.   2.  No overt pulmonary edema.       CL-LEFT HEART CATHETERIZATION WITH POSSIBLE INTERVENTION    (Results Pending)   US-RENAL    (Results Pending)     Assessment and plan:     NSTEMI- (present on admission)  Assessment & Plan     New onset chest pain substernal pressure, non radiating 8/10 at its worse that started at rest at 1:30pm on 4/18/21. Has not had this pain in the past.   Troponin trended up. HEART Score 5, Risk of MACE of 12-16.6%. Pt denied previous hx of heart catheterization, no hx found in the chart. Pt treated medically for NSTEMI and underwent  a left heart cath on 4/20/21, which revealed severe stenosis of the distal LAD with heavy calcification of the vessel and residual total occlusion of OM2. He had a complex cath with successful PCI of the mid to distal LAD using 3 overlapping drug-eluting stents with Dr. Byrd.     Plan:  on telemetry monitoring        Per Dr. Byrd, dual antiplatelet therapy for 1 months then plavix + coumadin afterwards. PPI while      on triple therapy  Continue high intensity statin   Can't tolerate BB or ACE/ARB at this time do to hypotension   Continue home sotalol  Transitioned to Warfarin (held for ongoing hematuria)     Gross hematuria/UTI  New onset, pt needs to be on triple therapy for his stent  Appears to have symptomatic UTI   Was started on C3, hematuria continues   Spoke to hospitalist, who will reach out to the urologist   Urine culture pending   Continue DAPT to avoid acute stents thrombosis, holding warfarin for now     Aortic stenosis- (present on admission)   Assessment & Plan  Mild aortic stenosis   Area 2.6cm2  Peak velocity 2m/s  Peak gradient 15.8 mmHg      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  Optimize electrolytes for potassium goal greater than 4.5 and magnesium goal greater than 2.0.     Obstructive hypertrophic cardiomyopathy (HCC)- (present on admission)  Assessment & Plan  As per history.   - currently on:  (sotalol 80 mg) 40 mg q12h     Repeat echocardiogram in setting of new dizziness and dyspnea on exertion shows stable ECHO when compared to one in 2019  Continue home sotalol     SSS (sick sinus syndrome) (HCC)- (present on admission)  Assessment & Plan  Secondary to third-degree blocks status post pacemaker placement.     Continue telemetry monitoring.  Continue home sotalol.     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  Reports last PE 5-6 years ago  Warfarin being held in setting of acute hematuria     Essential hypertension, benign- (present on admission)   Assessment & Plan  goal BP < 130/80 mmHg    States his BP at home runs 130-135/70-80, occasionally experiences hypotension and skips taking his lisinopril on those days      Continue sotalol for now, no BB, ACE, or ARB at this time as he is having hypotension     Hypotension   Episodes of hypotension overnight   Improved, now on home PO steroid dose      Prolonged Q-T interval on ECG- (present on admission)  Assessment & Plan  In sotalol use     Avoid QTC prolonging medications     Cardiac pacemaker in situ- (present on admission)  Assessment & Plan  Placed for sick sinus syndrome third-degree block.  History of multiple PVCs.     Telemetry monitoring    History of pulmonary embolism- (present on admission)  Assessment & Plan  As per history at least 8-9 PEs in the past.  He has a IVC filter in place.  Holding warfarin for right ear basal cell carcinoma removal in 2 days.  CTA with PE protocol from the ER was negative for PE.     Warfarin held in setting of hematuria      Adrenal insufficiency (HCC)- (present on admission)  Assessment & Plan  As per history secondary to chronic steroid use.     Back on home steroid regiment      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HCC)- (present on admission)  Assessment & Plan  As per history.  Status post resection of pituitary adenoma in  1998     Continue chronic hydrocortisone and levothyroxine therapy

## 2021-04-23 NOTE — PROGRESS NOTES
Received report from Nguyen. PT care assumed. Tele box on and verified with monitor room. PT A&Ox4, no signs of distress noted at this time. Patient resting comfortably in bed. POC discussed with PT and verbalizes no questions. PT complains of no pain. PT denies any additional needs at this time. Bed in lowest and locked position. PT educated on fall risk and verbalized understanding. Call light within reach. Will continue plan of care.

## 2021-04-23 NOTE — PROGRESS NOTES
Monitor summary:      Rhythm: 100% Paced  Rate: 72-83  Ectopy: none  Measurements: n/a      12hr chart check

## 2021-04-23 NOTE — PROGRESS NOTES
Hospital Medicine Daily Progress Note    Date of Service  4/23/2021    Chief Complaint  Chest pain    Hospital Course  88 y.o. male w/ hx of paroxysmal afib, AV block ans s/p pacemaker, DVT/PE's in past and s/p IVC filter placement, COPD, hypertrophic obstructive cardiomyopathy admitted 4/18/2021 with chest pain while watching TV.  His workup was negative for PE but did show a 4.2cm ascending aneurysm.  He had serial troponin with increase levels. He was started on a heparin drip and cardiology consulted and took him to heart cath and placed 3 stents to the LAD.  Subsequently to cath he developed acute hematuria and is on asa, plavix, warfarin, heparin with subtherapeutic INR.    Interval Problem Update  4/21  Bright red blood in condom catheter lines.  Some bladder/pelvic pain.  Alert and oriented with clear fluent speech.  States it stings when he urinates.  Nurse states no obstruction of urine.  Patient has seen Dr Swanson, urology in past for BPH.  Patient denies chest pain.  4/22 transfer orders placed by Dr Sher    4/23 hypopituitarism, per son, patient is onTestosterone cyp 200mg IM very 21days, missed two dose, resume    Hematuria, new onset, bladder scan, hold warfarin, triple-lumen Rodas irrigation;  Patient Patient has seen Dr Swanson, urology in past for BPH.  Informed Dr. Meeks; pending recommendation.  Continue dual antiplatelet for stents    Consultants/Specialty  Cardiology  Critical Care (Dr Sher signing off 4/22)  Neurology    Code Status  Full Code    Disposition  Home    Review of Systems  Review of Systems   Constitutional: Negative for chills and fever.   Eyes: Negative for discharge.   Respiratory: Negative for cough, shortness of breath and stridor.    Cardiovascular: Negative for chest pain, palpitations and leg swelling.   Gastrointestinal: Positive for abdominal pain. Negative for blood in stool, diarrhea, nausea and vomiting.   Genitourinary: Positive for dysuria and hematuria.    Musculoskeletal: Negative for back pain and joint pain.   Neurological: Negative for dizziness, sensory change, speech change and headaches.   Psychiatric/Behavioral: Negative for depression. The patient is not nervous/anxious.         Physical Exam  Temp:  [36 °C (96.8 °F)-37.2 °C (98.9 °F)] 37.2 °C (98.9 °F)  Pulse:  [70-81] 70  Resp:  [14-19] 14  BP: (100-151)/(56-92) 151/92  SpO2:  [94 %-99 %] 97 %    Physical Exam  Vitals reviewed.   Constitutional:       Appearance: Normal appearance. He is obese. He is not diaphoretic.   HENT:      Head: Normocephalic and atraumatic.      Nose: Nose normal.      Mouth/Throat:      Mouth: Mucous membranes are moist.      Pharynx: No oropharyngeal exudate.   Eyes:      General: No scleral icterus.        Right eye: No discharge.         Left eye: No discharge.      Extraocular Movements: Extraocular movements intact.      Conjunctiva/sclera: Conjunctivae normal.   Neck:      Vascular: No carotid bruit.   Cardiovascular:      Rate and Rhythm: Normal rate and regular rhythm.      Pulses:           Radial pulses are 2+ on the right side and 2+ on the left side.        Dorsalis pedis pulses are 2+ on the right side and 2+ on the left side.      Heart sounds: Murmur present.   Pulmonary:      Effort: Pulmonary effort is normal. No respiratory distress.      Breath sounds: Normal breath sounds. No wheezing or rales.   Abdominal:      General: Bowel sounds are normal. There is no distension.      Palpations: Abdomen is soft.      Tenderness: There is no abdominal tenderness.   Genitourinary:     Comments: Condom catheter over penis with bright red bloody urine in tubing  Musculoskeletal:         General: No swelling or tenderness.      Cervical back: No muscular tenderness.      Right lower leg: Edema (trace) present.      Left lower leg: Edema (trace) present.   Lymphadenopathy:      Cervical: No cervical adenopathy.   Skin:     Coloration: Skin is not jaundiced or pale.    Neurological:      General: No focal deficit present.      Mental Status: He is alert and oriented to person, place, and time. Mental status is at baseline.      Cranial Nerves: No cranial nerve deficit.   Psychiatric:         Mood and Affect: Mood normal.         Behavior: Behavior normal.         Fluids    Intake/Output Summary (Last 24 hours) at 4/23/2021 1358  Last data filed at 4/23/2021 0900  Gross per 24 hour   Intake 120 ml   Output --   Net 120 ml       Laboratory  Recent Labs     04/21/21  0350 04/22/21  0623   WBC 18.0* 14.5*   RBC 4.41* 4.35*   HEMOGLOBIN 11.4* 11.0*   HEMATOCRIT 37.6* 36.3*   MCV 85.3 83.4   MCH 25.9* 25.3*   MCHC 30.3* 30.3*   RDW 55.0* 53.4*   PLATELETCT 202 183   MPV 9.8 9.9     Recent Labs     04/21/21  0350   SODIUM 131*   POTASSIUM 4.3   CHLORIDE 101   CO2 25   GLUCOSE 111*   BUN 15   CREATININE 0.78   CALCIUM 7.4*     Recent Labs     04/22/21  0405 04/22/21  0623 04/23/21  0651   APTT  --  35.7  --    INR 1.01  --  1.20*               Imaging  EC-ECHOCARDIOGRAM COMPLETE W/O CONT   Final Result      CT-CTA CHEST PULMONARY ARTERY W/ RECONS   Final Result         1.  No evidence of pulmonary embolism.   2.  Mild cardiomegaly.   3.  Atherosclerosis.   4.  Mild 4.2 cm ascending thoracic aortic aneurysm.   5.  Hypoinflation, elevation of left hemidiaphragm with bibasilar atelectasis. No significant consolidation or pleural effusion.   6.  4 mm noncalcified right middle lobe pulmonary nodule. Follow-up per Fleischner criteria is clinically indicated.      Fleischner Society pulmonary nodule recommendations:   Low Risk: No routine follow-up      High Risk: Optional CT at 12 months      Comments: Nodules less than 6 mm do not require routine follow-up, but certain patients at high risk with suspicious nodule morphology, upper lobe location, or both may warrant 12-month follow-up.      Low Risk - Minimal or absent history of smoking and of other known risk factors.      High Risk -  History of smoking or of other known risk factors.      Note: These recommendations do not apply to lung cancer screening, patients with immunosuppression, or patients with known primary cancer.      Fleischner Society 2017 Guidelines for Management of Incidentally Detected Pulmonary Nodules in Adults      DX-CHEST-PORTABLE (1 VIEW)   Final Result      1.  Hypoinflation with LEFT lung base atelectasis or infiltrate, possibly chronic.   2.  No overt pulmonary edema.      CL-LEFT HEART CATHETERIZATION WITH POSSIBLE INTERVENTION    (Results Pending)   US-RENAL    (Results Pending)        Assessment/Plan  * NSTEMI (non-ST elevated myocardial infarction) (HCC)- (present on admission)  Assessment & Plan  Status post heart cath with stenting to the LAD x 3 on 4/20  Statin Lipitor 80 mg  Sotalol   plavix and aspirin (holding lovenox and warfarin 4/22 due to acute hematuria.)  Troponin trended up to 810  Echocardiogram showed normal left ventricle chamber size with mild concentric left ventricular hypertrophy with ejection fraction estimated to be around 60%.  It was akinesis of the apex and hypokinesis of the apical septal wall that could be related to ventricular pacing versus prior infarct  Cardiology consulting    Hematuria due to acute cystitis  Assessment & Plan  Holding lovenox and warfarin 4/22.  Continue plavix and aspirin due to recent stent placement.  May hold aspiring if not clearing per cardiology  Start 4/22 rocephin.  If continues I will consult Dr Toño Fitzpatrick his urologist.  ultrasound kidney and bladder  Triple-lumen Rodas continuously irrigation  Urology consult Dr. Fitzpatrick informed    Hypotension  Assessment & Plan  On admit had adrenal insufficiency in the setting of stress response  Monitor vitals and having improved BP>  Back on home hydrocortisone 10mg BID    Adrenal insufficiency (HCC)- (present on admission)  Assessment & Plan  Secondary to chronic steroid use and hx of panhypopituitary per note  He was  on hydrocortisone 10 mg BID as an outpatient  Due to hypotension, he had been started on IV hydrocortisone 50 mg q 6 hours which will change back to oral home dose on 4/21  Monitoring vitals.      UTI (urinary tract infection)- (present on admission)  Assessment & Plan  Acute cystitis with hematuria.  4/18 urine culture negative.  Started ceftriaxone and pyridium.  On 4/22    Obstructive hypertrophic cardiomyopathy (HCC)- (present on admission)  Assessment & Plan  As per history.   currently on: (sotalol 80 mg) 40 mg q12h  Repeat echocardiogram unchanged  4/18 Echo: Compared to the images of the study done 1/22/2019  - there has been no   significant change..   Akinesis of apex, hypokinesis of apical septal wall may be from   ventricular pacing verus prior infarct.  Normal right ventricular size and systolic function.  Mild mitral regurgitation.  Mild aortic stenosis.  Mild aortic insufficiency.    Anticoagulant long-term use- (present on admission)  Assessment & Plan  Secondary to extensive history of DVT AND MULTIPLE PE's  STATUS post IVC filter placement.  Home Coumadin had been held for anticipated skin BCC procedures.   IV heparin drip and follow Factor X levels  Coumadin was restarted 4/21 per Dr. To goal INR 1.5-2 but held 4/22 due to hematuria.    History of pulmonary embolism- (present on admission)  Assessment & Plan  As per history at least 8-9 PEs in the past.    Hx of having an IVC filter in place.  Initially he was holding warfarin for right ear basal cell carcinoma removal in 2 days.  4/22 stopped lovenox and warfarin due to hematuria  4/22 check LE U/S to r/o DVT and for monitoring.  CTA with PE protocol from the ER was negative for PE.    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  Status post resection of pituitary adenoma in 1998.   levothyroxinem, cortef  Testosterone cyp 200mg IM very 21days, missed two dose, resume    Essential hypertension, benign-  (present on admission)  Assessment & Plan  Continue home sotalol if BP allows    Prolonged Q-T interval on ECG- (present on admission)  Assessment & Plan  In sotalol use.  Avoid QTC prolonging medications.    Frequent PVCs- (present on admission)  Assessment & Plan  Now in a paced rhythm     Hyponatremia- (present on admission)  Assessment & Plan  4/21 Na:131  monitor    SSS (sick sinus syndrome) ([facility])- (present on admission)  Assessment & Plan  Secondary to third-degree blocks status post pacemaker placement.  Paced rhythm noted 4/22 on tele  Continue telemetry monitoring.  Continue home sotalol 40mg BID    BPH (benign prostatic hypertrophy)- (present on admission)  Assessment & Plan  Continues to have urinary frequency.  Continue on tamsulosin.  Follows Dr Gonzales outpatient urology    Chronic back pain- (present on admission)  Assessment & Plan  As per history in setting of arthritis.  Continue home Norco and gabapentin.    COPD (chronic obstructive pulmonary disease) ([facility])- (present on admission)  Assessment & Plan  With chronic respiratory failure.  On 5 L of oxygen via nasal cannula at home.  Currently not in acute exacerbation clinically and at baseline with respiratory status.  Bronchodilators as needed per RT protocol.    Cardiac pacemaker in situ- (present on admission)  Assessment & Plan  Placed for sick sinus syndrome third-degree block.  History of multiple PVCs.  Telemetry monitoring.         VTE prophylaxis: Holding pharmacal anticoagulant due to hematuria, SCD

## 2021-04-23 NOTE — CARE PLAN
Problem: Respiratory:  Goal: Respiratory status will improve  Outcome: PROGRESSING AS EXPECTED   Patient respiratory status assessed. Patient educated on importance of coughing and deep breathing. Deep breaths are encouraged. Educated on how ambulation and movement can affect respiratory status. Patient indicates understanding.     Problem: Pain Management  Goal: Pain level will decrease to patient's comfort goal  Outcome: PROGRESSING AS EXPECTED   Patient educated to pain scale system. Patient encouraged to verbalize discomfort. Patient taught about non-pharmacological pain management. Patient is comfortable at this time without statements of discomfort or pain.     Problem: Communication  Goal: The ability to communicate needs accurately and effectively will improve  Outcome: PROGRESSING AS EXPECTED   Patient educated to utilize call light. Patient and family oriented to hospital room. Patient encouraged to ask questions about plan of care. Patient effectively uses call light and is involved in POC.

## 2021-04-23 NOTE — PROGRESS NOTES
Bedside report received from night RN; assumed pt care. Pt assessment complete. Pt AOx4. Reviewed plan of care with pt. Tele box on and rhythm verified. Chart and labs reviewed. Bed in lowest position, and 2 side rails up. Pt educated on call light; call light with in reach.

## 2021-04-24 ENCOUNTER — APPOINTMENT (OUTPATIENT)
Dept: RADIOLOGY | Facility: MEDICAL CENTER | Age: 86
DRG: 247 | End: 2021-04-24
Attending: STUDENT IN AN ORGANIZED HEALTH CARE EDUCATION/TRAINING PROGRAM
Payer: MEDICARE

## 2021-04-24 ENCOUNTER — APPOINTMENT (OUTPATIENT)
Dept: CARDIOLOGY | Facility: MEDICAL CENTER | Age: 86
DRG: 247 | End: 2021-04-24
Attending: NURSE PRACTITIONER
Payer: MEDICARE

## 2021-04-24 LAB
ALBUMIN SERPL BCP-MCNC: 2.3 G/DL (ref 3.2–4.9)
ALBUMIN/GLOB SERPL: 0.7 G/DL
ALP SERPL-CCNC: 100 U/L (ref 30–99)
ALT SERPL-CCNC: 25 U/L (ref 2–50)
ANION GAP SERPL CALC-SCNC: 7 MMOL/L (ref 7–16)
ANION GAP SERPL CALC-SCNC: 8 MMOL/L (ref 7–16)
AST SERPL-CCNC: 38 U/L (ref 12–45)
BILIRUB SERPL-MCNC: 0.4 MG/DL (ref 0.1–1.5)
BUN SERPL-MCNC: 15 MG/DL (ref 8–22)
BUN SERPL-MCNC: 20 MG/DL (ref 8–22)
CALCIUM SERPL-MCNC: 7.1 MG/DL (ref 8.5–10.5)
CALCIUM SERPL-MCNC: 7.5 MG/DL (ref 8.5–10.5)
CHLORIDE SERPL-SCNC: 100 MMOL/L (ref 96–112)
CHLORIDE SERPL-SCNC: 98 MMOL/L (ref 96–112)
CO2 SERPL-SCNC: 27 MMOL/L (ref 20–33)
CO2 SERPL-SCNC: 30 MMOL/L (ref 20–33)
CORTIS SERPL-MCNC: 13.2 UG/DL (ref 0–23)
CREAT SERPL-MCNC: 0.93 MG/DL (ref 0.5–1.4)
CREAT SERPL-MCNC: 1.01 MG/DL (ref 0.5–1.4)
ERYTHROCYTE [DISTWIDTH] IN BLOOD BY AUTOMATED COUNT: 54.4 FL (ref 35.9–50)
ERYTHROCYTE [DISTWIDTH] IN BLOOD BY AUTOMATED COUNT: 55.1 FL (ref 35.9–50)
GLOBULIN SER CALC-MCNC: 3.1 G/DL (ref 1.9–3.5)
GLUCOSE BLD-MCNC: 100 MG/DL (ref 65–99)
GLUCOSE SERPL-MCNC: 117 MG/DL (ref 65–99)
GLUCOSE SERPL-MCNC: 70 MG/DL (ref 65–99)
HCT VFR BLD AUTO: 36.6 % (ref 42–52)
HCT VFR BLD AUTO: 38.9 % (ref 42–52)
HGB BLD-MCNC: 11 G/DL (ref 14–18)
HGB BLD-MCNC: 11.7 G/DL (ref 14–18)
INR PPP: 1.16 (ref 0.87–1.13)
LACTATE BLD-SCNC: 1.3 MMOL/L (ref 0.5–2)
LV EJECT FRACT  99904: 55
LV EJECT FRACT MOD 2C 99903: 81.62
LV EJECT FRACT MOD 4C 99902: 45.51
LV EJECT FRACT MOD BP 99901: 66.3
MCH RBC QN AUTO: 25.2 PG (ref 27–33)
MCH RBC QN AUTO: 25.3 PG (ref 27–33)
MCHC RBC AUTO-ENTMCNC: 30.1 G/DL (ref 33.7–35.3)
MCHC RBC AUTO-ENTMCNC: 30.1 G/DL (ref 33.7–35.3)
MCV RBC AUTO: 83.8 FL (ref 81.4–97.8)
MCV RBC AUTO: 84.3 FL (ref 81.4–97.8)
PLATELET # BLD AUTO: 196 K/UL (ref 164–446)
PLATELET # BLD AUTO: 205 K/UL (ref 164–446)
PMV BLD AUTO: 9.7 FL (ref 9–12.9)
PMV BLD AUTO: 9.8 FL (ref 9–12.9)
POTASSIUM SERPL-SCNC: 3.5 MMOL/L (ref 3.6–5.5)
POTASSIUM SERPL-SCNC: 3.7 MMOL/L (ref 3.6–5.5)
PROT SERPL-MCNC: 5.4 G/DL (ref 6–8.2)
PROTHROMBIN TIME: 15.1 SEC (ref 12–14.6)
RBC # BLD AUTO: 4.34 M/UL (ref 4.7–6.1)
RBC # BLD AUTO: 4.64 M/UL (ref 4.7–6.1)
SODIUM SERPL-SCNC: 134 MMOL/L (ref 135–145)
SODIUM SERPL-SCNC: 136 MMOL/L (ref 135–145)
TROPONIN T SERPL-MCNC: 1086 NG/L (ref 6–19)
TROPONIN T SERPL-MCNC: 736 NG/L (ref 6–19)
WBC # BLD AUTO: 14.6 K/UL (ref 4.8–10.8)
WBC # BLD AUTO: 15.4 K/UL (ref 4.8–10.8)

## 2021-04-24 PROCEDURE — 82962 GLUCOSE BLOOD TEST: CPT

## 2021-04-24 PROCEDURE — A9270 NON-COVERED ITEM OR SERVICE: HCPCS | Performed by: PSYCHIATRY & NEUROLOGY

## 2021-04-24 PROCEDURE — 83605 ASSAY OF LACTIC ACID: CPT

## 2021-04-24 PROCEDURE — 700117 HCHG RX CONTRAST REV CODE 255: Performed by: NURSE PRACTITIONER

## 2021-04-24 PROCEDURE — 700102 HCHG RX REV CODE 250 W/ 637 OVERRIDE(OP): Performed by: HOSPITALIST

## 2021-04-24 PROCEDURE — 700102 HCHG RX REV CODE 250 W/ 637 OVERRIDE(OP): Performed by: INTERNAL MEDICINE

## 2021-04-24 PROCEDURE — 80048 BASIC METABOLIC PNL TOTAL CA: CPT

## 2021-04-24 PROCEDURE — 80053 COMPREHEN METABOLIC PANEL: CPT

## 2021-04-24 PROCEDURE — A9270 NON-COVERED ITEM OR SERVICE: HCPCS | Performed by: HOSPITALIST

## 2021-04-24 PROCEDURE — 700105 HCHG RX REV CODE 258: Performed by: STUDENT IN AN ORGANIZED HEALTH CARE EDUCATION/TRAINING PROGRAM

## 2021-04-24 PROCEDURE — 700111 HCHG RX REV CODE 636 W/ 250 OVERRIDE (IP): Performed by: INTERNAL MEDICINE

## 2021-04-24 PROCEDURE — 700102 HCHG RX REV CODE 250 W/ 637 OVERRIDE(OP): Performed by: STUDENT IN AN ORGANIZED HEALTH CARE EDUCATION/TRAINING PROGRAM

## 2021-04-24 PROCEDURE — 700102 HCHG RX REV CODE 250 W/ 637 OVERRIDE(OP): Performed by: PSYCHIATRY & NEUROLOGY

## 2021-04-24 PROCEDURE — A9270 NON-COVERED ITEM OR SERVICE: HCPCS | Performed by: STUDENT IN AN ORGANIZED HEALTH CARE EDUCATION/TRAINING PROGRAM

## 2021-04-24 PROCEDURE — 85027 COMPLETE CBC AUTOMATED: CPT

## 2021-04-24 PROCEDURE — 99232 SBSQ HOSP IP/OBS MODERATE 35: CPT | Performed by: NURSE PRACTITIONER

## 2021-04-24 PROCEDURE — 82533 TOTAL CORTISOL: CPT

## 2021-04-24 PROCEDURE — 93005 ELECTROCARDIOGRAM TRACING: CPT | Performed by: STUDENT IN AN ORGANIZED HEALTH CARE EDUCATION/TRAINING PROGRAM

## 2021-04-24 PROCEDURE — 700105 HCHG RX REV CODE 258: Performed by: INTERNAL MEDICINE

## 2021-04-24 PROCEDURE — 700111 HCHG RX REV CODE 636 W/ 250 OVERRIDE (IP): Performed by: STUDENT IN AN ORGANIZED HEALTH CARE EDUCATION/TRAINING PROGRAM

## 2021-04-24 PROCEDURE — 99291 CRITICAL CARE FIRST HOUR: CPT | Performed by: INTERNAL MEDICINE

## 2021-04-24 PROCEDURE — 700111 HCHG RX REV CODE 636 W/ 250 OVERRIDE (IP): Performed by: HOSPITALIST

## 2021-04-24 PROCEDURE — 93306 TTE W/DOPPLER COMPLETE: CPT

## 2021-04-24 PROCEDURE — 51798 US URINE CAPACITY MEASURE: CPT

## 2021-04-24 PROCEDURE — 700105 HCHG RX REV CODE 258: Performed by: HOSPITALIST

## 2021-04-24 PROCEDURE — 71045 X-RAY EXAM CHEST 1 VIEW: CPT

## 2021-04-24 PROCEDURE — 770022 HCHG ROOM/CARE - ICU (200)

## 2021-04-24 PROCEDURE — 93005 ELECTROCARDIOGRAM TRACING: CPT | Performed by: NURSE PRACTITIONER

## 2021-04-24 PROCEDURE — 84484 ASSAY OF TROPONIN QUANT: CPT

## 2021-04-24 PROCEDURE — 700101 HCHG RX REV CODE 250: Performed by: STUDENT IN AN ORGANIZED HEALTH CARE EDUCATION/TRAINING PROGRAM

## 2021-04-24 PROCEDURE — 85610 PROTHROMBIN TIME: CPT

## 2021-04-24 PROCEDURE — 71260 CT THORAX DX C+: CPT | Mod: MG

## 2021-04-24 PROCEDURE — A9270 NON-COVERED ITEM OR SERVICE: HCPCS | Performed by: INTERNAL MEDICINE

## 2021-04-24 PROCEDURE — 93306 TTE W/DOPPLER COMPLETE: CPT | Mod: 26 | Performed by: INTERNAL MEDICINE

## 2021-04-24 PROCEDURE — 700117 HCHG RX CONTRAST REV CODE 255: Performed by: STUDENT IN AN ORGANIZED HEALTH CARE EDUCATION/TRAINING PROGRAM

## 2021-04-24 RX ORDER — LIDOCAINE 50 MG/G
2 PATCH TOPICAL EVERY 24 HOURS
Status: DISCONTINUED | OUTPATIENT
Start: 2021-04-24 | End: 2021-04-29 | Stop reason: HOSPADM

## 2021-04-24 RX ORDER — PHENYLEPHRINE HCL IN 0.9% NACL 0.5 MG/5ML
100 SYRINGE (ML) INTRAVENOUS
Status: ACTIVE | OUTPATIENT
Start: 2021-04-24 | End: 2021-04-24

## 2021-04-24 RX ORDER — BENZONATATE 100 MG/1
100 CAPSULE ORAL 3 TIMES DAILY PRN
Status: DISCONTINUED | OUTPATIENT
Start: 2021-04-24 | End: 2021-04-29 | Stop reason: HOSPADM

## 2021-04-24 RX ORDER — SODIUM CHLORIDE 9 MG/ML
500 INJECTION, SOLUTION INTRAVENOUS ONCE
Status: COMPLETED | OUTPATIENT
Start: 2021-04-24 | End: 2021-04-24

## 2021-04-24 RX ORDER — SODIUM CHLORIDE 9 MG/ML
1000 INJECTION, SOLUTION INTRAVENOUS ONCE
Status: COMPLETED | OUTPATIENT
Start: 2021-04-24 | End: 2021-04-24

## 2021-04-24 RX ORDER — MIDODRINE HYDROCHLORIDE 5 MG/1
10 TABLET ORAL
Status: DISCONTINUED | OUTPATIENT
Start: 2021-04-24 | End: 2021-04-25

## 2021-04-24 RX ADMIN — MIDODRINE HYDROCHLORIDE 10 MG: 5 TABLET ORAL at 10:58

## 2021-04-24 RX ADMIN — DOCUSATE SODIUM 50 MG AND SENNOSIDES 8.6 MG 2 TABLET: 8.6; 5 TABLET, FILM COATED ORAL at 17:19

## 2021-04-24 RX ADMIN — NYSTATIN: 100000 POWDER TOPICAL at 11:03

## 2021-04-24 RX ADMIN — DOCUSATE SODIUM 50 MG AND SENNOSIDES 8.6 MG 2 TABLET: 8.6; 5 TABLET, FILM COATED ORAL at 06:50

## 2021-04-24 RX ADMIN — IOHEXOL 100 ML: 350 INJECTION, SOLUTION INTRAVENOUS at 10:34

## 2021-04-24 RX ADMIN — ATORVASTATIN CALCIUM 80 MG: 80 TABLET, FILM COATED ORAL at 17:19

## 2021-04-24 RX ADMIN — HYDROCORTISONE SODIUM SUCCINATE 100 MG: 100 INJECTION, POWDER, FOR SOLUTION INTRAMUSCULAR; INTRAVENOUS at 21:35

## 2021-04-24 RX ADMIN — PHENAZOPYRIDINE HYDROCHLORIDE 100 MG: 200 TABLET ORAL at 17:20

## 2021-04-24 RX ADMIN — NYSTATIN: 100000 POWDER TOPICAL at 06:49

## 2021-04-24 RX ADMIN — GABAPENTIN 600 MG: 300 CAPSULE ORAL at 17:19

## 2021-04-24 RX ADMIN — HYDROCORTISONE 10 MG: 10 TABLET ORAL at 06:49

## 2021-04-24 RX ADMIN — PHENAZOPYRIDINE HYDROCHLORIDE 100 MG: 200 TABLET ORAL at 11:48

## 2021-04-24 RX ADMIN — BENZONATATE 100 MG: 100 CAPSULE ORAL at 22:07

## 2021-04-24 RX ADMIN — OXYCODONE 5 MG: 5 TABLET ORAL at 08:22

## 2021-04-24 RX ADMIN — CEFTRIAXONE SODIUM 2 G: 2 INJECTION, POWDER, FOR SOLUTION INTRAMUSCULAR; INTRAVENOUS at 06:44

## 2021-04-24 RX ADMIN — MIDODRINE HYDROCHLORIDE 10 MG: 5 TABLET ORAL at 17:19

## 2021-04-24 RX ADMIN — CLOPIDOGREL BISULFATE 75 MG: 75 TABLET ORAL at 06:50

## 2021-04-24 RX ADMIN — OMEPRAZOLE 20 MG: 20 CAPSULE, DELAYED RELEASE ORAL at 06:50

## 2021-04-24 RX ADMIN — SODIUM CHLORIDE 1000 ML: 9 INJECTION, SOLUTION INTRAVENOUS at 10:58

## 2021-04-24 RX ADMIN — OXYCODONE 5 MG: 5 TABLET ORAL at 03:37

## 2021-04-24 RX ADMIN — LEVOTHYROXINE SODIUM 75 MCG: 0.07 TABLET ORAL at 06:50

## 2021-04-24 RX ADMIN — OXYCODONE 5 MG: 5 TABLET ORAL at 12:56

## 2021-04-24 RX ADMIN — ACETAMINOPHEN 650 MG: 325 TABLET ORAL at 09:12

## 2021-04-24 RX ADMIN — NYSTATIN: 100000 POWDER TOPICAL at 17:19

## 2021-04-24 RX ADMIN — HUMAN ALBUMIN MICROSPHERES AND PERFLUTREN 3 ML: 10; .22 INJECTION, SOLUTION INTRAVENOUS at 13:13

## 2021-04-24 RX ADMIN — LIDOCAINE 2 PATCH: 50 PATCH TOPICAL at 10:58

## 2021-04-24 RX ADMIN — ASPIRIN 81 MG: 81 TABLET, COATED ORAL at 06:49

## 2021-04-24 RX ADMIN — OMEPRAZOLE 20 MG: 20 CAPSULE, DELAYED RELEASE ORAL at 17:19

## 2021-04-24 RX ADMIN — Medication 100 MCG: at 10:03

## 2021-04-24 RX ADMIN — SODIUM CHLORIDE 500 ML: 9 INJECTION, SOLUTION INTRAVENOUS at 10:15

## 2021-04-24 RX ADMIN — TAMSULOSIN HYDROCHLORIDE 0.4 MG: 0.4 CAPSULE ORAL at 06:49

## 2021-04-24 RX ADMIN — HYDROCORTISONE SODIUM SUCCINATE 100 MG: 100 INJECTION, POWDER, FOR SOLUTION INTRAMUSCULAR; INTRAVENOUS at 11:15

## 2021-04-24 RX ADMIN — MONTELUKAST 10 MG: 10 TABLET, FILM COATED ORAL at 06:49

## 2021-04-24 RX ADMIN — SODIUM CHLORIDE 500 ML: 9 INJECTION, SOLUTION INTRAVENOUS at 08:21

## 2021-04-24 RX ADMIN — PHENAZOPYRIDINE HYDROCHLORIDE 100 MG: 200 TABLET ORAL at 07:55

## 2021-04-24 ASSESSMENT — ENCOUNTER SYMPTOMS
DIARRHEA: 0
NERVOUS/ANXIOUS: 0
EYE DISCHARGE: 0
NECK PAIN: 0
WEAKNESS: 1
NAUSEA: 0
SENSORY CHANGE: 0
EYE PAIN: 0
DIZZINESS: 1
SPEECH CHANGE: 0
HEADACHES: 0
BRUISES/BLEEDS EASILY: 1
VOMITING: 0
FEVER: 0
ORTHOPNEA: 0
CLAUDICATION: 0
FLANK PAIN: 0
PND: 0
SORE THROAT: 0
CHILLS: 0
SHORTNESS OF BREATH: 0
ABDOMINAL PAIN: 0
SPUTUM PRODUCTION: 0
COUGH: 0
DEPRESSION: 0
FOCAL WEAKNESS: 0
HEMOPTYSIS: 0
WHEEZING: 0
BACK PAIN: 0
PALPITATIONS: 0
DIZZINESS: 0

## 2021-04-24 ASSESSMENT — PAIN DESCRIPTION - PAIN TYPE
TYPE: ACUTE PAIN
TYPE: ACUTE PAIN;CHRONIC PAIN

## 2021-04-24 ASSESSMENT — FIBROSIS 4 INDEX: FIB4 SCORE: 3.41

## 2021-04-24 NOTE — PROGRESS NOTES
Urszula BANUELOS cardiology notified of increase troponin and L sided back pain. Orders for stat EKG and ECHO received.

## 2021-04-24 NOTE — THERAPY
Missed Therapy     Patient Name: Jeffrey Lizama  Age:  88 y.o., Sex:  male  Medical Record #: [id number]  Today's Date: 4/24/2021    Attempted therapy follow up session at this time. Per RN pt's BP low and will be calling a rapid response. Will follow up as appropriate.

## 2021-04-24 NOTE — CARE PLAN
Problem: Fluid Volume:  Goal: Will maintain balanced intake and output  Outcome: PROGRESSING AS EXPECTED  Strict I/Os  2L fluid bolus received for HOTN    Problem: Pain Management  Goal: Pain level will decrease to patient's comfort goal  Outcome: PROGRESSING SLOWER THAN EXPECTED  Lidocaine patch for back pain  Assess pain and give prn medications as ordered  Q2 hour repositioning/turns

## 2021-04-24 NOTE — PROGRESS NOTES
Patients belongings were picked up and taken to PT now in T-608. Report given to ОЛЬГА Mast. Monitor room notified of PT transfer. Called patients wife Jacquie with update. New room number given to wife. Reached out to ОЛЬГА Mast to give her a call as she has a hard time dialing phone numbers.

## 2021-04-24 NOTE — PROGRESS NOTES
Report received at bedside from NOC RN. PT care assumed. Tele box on and verified with monitor room. PT A&Ox4, distress noted at urinary catheter site. POC discussed with PT and verbalizes no questions. PT denies any additional needs at this time. Bed in lowest and locked position, PT educated on fall risk and verbalized understanding, call light within reach. Will continue plan of care.

## 2021-04-24 NOTE — PROGRESS NOTES
Resting in bed. Continues to pull at nam causing bleeding around urethra. Encouraged him not to do this. He states that it is hurting. Repositioned and new stat lock applied. Appears to feel better to him.

## 2021-04-24 NOTE — PROGRESS NOTES
2 RN Skin Assessment Completed by Kezia RN and Shauna RN.    Head: WDL -dry  Ears: redness, blanching  Nose: WDL- dry  Mouth: WDL  Neck: WDL- dry  Breasts/Chest: bruising  Shoulder Blades: WDL  Spine: WDL  (R) Arm/Elbow/hand: bruising  (L) Arm/Elbow/hand: bruising  Abdomen:rash- moisture  Groin: rash- moisture  Sacrum/Coccyx/Buttocks: moisture fissure, with skin tear  (R) Leg: bruising  (L) Leg: bruising  (R) Heel/Foot/Toe: WDL  (L) Heel/Foot/Toe: WDL          Devices in place: ECG, BP Cuff and Pulse Ox    Interventions in place: NC with ear foams, Sacral Mepilex, Q2 turns and Low air loss mattress     Possible skin injury found: Yes    Pictures uploaded into Epic: Yes  Wound Consult Placed: N/A    '

## 2021-04-24 NOTE — PROGRESS NOTES
HLOC of from T7 for HOTN. 1L NS bolus infusing, cortisol lab drawn and sent. SBP 80s. Pt c/o of pain in shoulder and back. Pt is alert and oriented x4.

## 2021-04-24 NOTE — CARE PLAN
Problem: Respiratory:  Goal: Respiratory status will improve  Outcome: PROGRESSING SLOWER THAN EXPECTED     Problem: Pain Management  Goal: Pain level will decrease to patient's comfort goal  Outcome: PROGRESSING AS EXPECTED     Problem: Communication  Goal: The ability to communicate needs accurately and effectively will improve  Outcome: PROGRESSING AS EXPECTED     Problem: Safety  Goal: Will remain free from injury  Outcome: PROGRESSING AS EXPECTED  Goal: Will remain free from falls  Outcome: PROGRESSING AS EXPECTED     Problem: Infection  Goal: Will remain free from infection  Outcome: PROGRESSING AS EXPECTED     Problem: Venous Thromboembolism (VTW)/Deep Vein Thrombosis (DVT) Prevention:  Goal: Patient will participate in Venous Thrombosis (VTE)/Deep Vein Thrombosis (DVT)Prevention Measures  Outcome: PROGRESSING AS EXPECTED     Problem: Psychosocial Needs:  Goal: Level of anxiety will decrease  Outcome: PROGRESSING AS EXPECTED     Problem: Fluid Volume:  Goal: Will maintain balanced intake and output  Outcome: PROGRESSING AS EXPECTED     Problem: Bowel/Gastric:  Goal: Normal bowel function is maintained or improved  Outcome: PROGRESSING AS EXPECTED  Goal: Will not experience complications related to bowel motility  Outcome: PROGRESSING AS EXPECTED     Problem: Urinary Elimination:  Goal: Ability to reestablish a normal urinary elimination pattern will improve  Outcome: PROGRESSING SLOWER THAN EXPECTED     Problem: Skin Integrity  Goal: Risk for impaired skin integrity will decrease  Outcome: PROGRESSING SLOWER THAN EXPECTED     Problem: Mobility  Goal: Risk for activity intolerance will decrease  Outcome: PROGRESSING SLOWER THAN EXPECTED     Problem: Medication  Goal: Compliance with prescribed medication will improve  Outcome: PROGRESSING AS EXPECTED     Problem: Knowledge Deficit  Goal: Knowledge of disease process/condition, treatment plan, diagnostic tests, and medications will improve  Outcome:  PROGRESSING AS EXPECTED  Goal: Knowledge of the prescribed therapeutic regimen will improve  Outcome: PROGRESSING AS EXPECTED     Problem: Discharge Barriers/Planning  Goal: Patient's continuum of care needs will be met  Outcome: PROGRESSING AS EXPECTED

## 2021-04-24 NOTE — PROGRESS NOTES
Patient did not sleep well. Intake fair. Output good (could not get exact numbers). Urine is clearing somewhat with no more clots noted. Patient continues to pull at nam and there is some bleeding at the urethral orifice. Still SOB without his oxygen.

## 2021-04-24 NOTE — CODE DOCUMENTATION
Per orders placed per Dr. Sapp at bedside.   Pt placed on zoll, with 5 LNC O2and transported to CT with ACLS RN.

## 2021-04-24 NOTE — PROGRESS NOTES
Daily Progress Note: Cardiology     Date of Service: 4/24/2021  Attending: Dr. Yanez  Senior Resident: Dr. Thompson    Chief Complaint:   Chest pain    ID: 88 y.o. male who presented 4/18/2021 with chest pain that started at rest while he was sitting in front of the computer. Complex medical history including 8 PEs in the past status post IVC placement on warfarin, which she stopped taking 2 days ago in preparation for removal of basal cell carcinoma from his right ear.  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  Pt's troponins trended up and he was treated medically for NSTEMi and underwent a left heart cath on 4/20/21, which revealed severe stenosis of the distal LAD with heavy calcification of the vessel and residual total occlusion of OM2. He had a complex cath with successful PCI of the mid to distal LAD using 3 overlapping drug-eluting stents.     Interval Update:  4/24/2021: Paced 70s  Rapid response called today at 0 940 due to hypotension.  500 mL bolus was given however BP continued to decline so patient was transferred to [facility].  Stat ECG and echo were obtained.  Troponin was found to be 1086.  Patient has pain at his left lateral side which radiates to his back.  This is very much different from the chest pain he had when he received his 3 overlapping stents on 4/20/2021.      Review of Systems   Constitutional: Negative for chills and fever.   Respiratory: Negative for cough, hemoptysis, sputum production, shortness of breath and wheezing.    Cardiovascular: Negative for chest pain, palpitations, orthopnea, claudication, leg swelling and PND.   Gastrointestinal: Negative for nausea and vomiting.   Genitourinary: Positive for hematuria.    Neurological: Negative for dizziness and headaches.   All other systems reviewed and are negative.        Objective Data:   Physical Exam:   Vitals:   Temp:  [36.1 °C (97 °F)-37.8 °C (100 °F)] 36.4 °C (97.6 °F)  Pulse:  [67-95] 89  Resp:  [14-17] 17  BP: ()/(57-95) 80/61  SpO2:  [94 %-98 %] 97 %    Physical Exam   Constitutional: He is oriented to person, place, and time and well-developed, well-nourished, and in no distress.   HENT:   Head: Normocephalic and atraumatic.   Eyes: EOM are normal.   Cardiovascular: Normal rate. An irregularly irregular rhythm present. Exam reveals distant heart sounds.   Pulses:       Radial pulses are 2+ on the right side and 2+ on the left side.   Pulmonary/Chest: Effort normal and breath sounds normal.   Abdominal: Soft. Bowel sounds are normal.   Musculoskeletal:      Cervical back: Neck supple.   Neurological: He is oriented to person, place, and time.   Skin: Skin is warm and dry.   Psychiatric: Mood, memory, affect and judgment normal.   Nursing note and vitals reviewed.        Labs:   Recent Labs     04/22/21  0623 04/24/21  0339   WBC 14.5* 14.6*   RBC 4.35* 4.64*   HEMOGLOBIN 11.0* 11.7*   HEMATOCRIT 36.3* 38.9*   MCV 83.4 83.8   MCH 25.3* 25.2*   RDW 53.4* 54.4*   PLATELETCT 183 205   MPV 9.9 9.7     Recent Labs     04/24/21  0339   SODIUM 136   POTASSIUM 3.7   CHLORIDE 98   CO2 30   GLUCOSE 70   BUN 15       Imaging:   US-RENAL   Final Result      No evidence of solid renal mass or hydronephrosis.      EC-ECHOCARDIOGRAM COMPLETE W/O CONT   Final Result      CT-CTA CHEST PULMONARY ARTERY W/ RECONS   Final Result         1.  No evidence of pulmonary embolism.   2.  Mild cardiomegaly.   3.  Atherosclerosis.   4.  Mild 4.2 cm ascending thoracic aortic aneurysm.   5.  Hypoinflation, elevation of left hemidiaphragm with bibasilar atelectasis. No significant consolidation or pleural effusion.   6.  4 mm noncalcified right middle lobe pulmonary nodule. Follow-up per  Fleischner criteria is clinically indicated.      Fleischner Society pulmonary nodule recommendations:   Low Risk: No routine follow-up      High Risk: Optional CT at 12 months      Comments: Nodules less than 6 mm do not require routine follow-up, but certain patients at high risk with suspicious nodule morphology, upper lobe location, or both may warrant 12-month follow-up.      Low Risk - Minimal or absent history of smoking and of other known risk factors.      High Risk - History of smoking or of other known risk factors.      Note: These recommendations do not apply to lung cancer screening, patients with immunosuppression, or patients with known primary cancer.      Fleischner Society 2017 Guidelines for Management of Incidentally Detected Pulmonary Nodules in Adults      DX-CHEST-PORTABLE (1 VIEW)   Final Result      1.  Hypoinflation with LEFT lung base atelectasis or infiltrate, possibly chronic.   2.  No overt pulmonary edema.      CL-LEFT HEART CATHETERIZATION WITH POSSIBLE INTERVENTION    (Results Pending)     Assessment and plan:     NSTEMI- (present on admission)  -Continue ASA 81 mg daily for 30 days, atorvastatin 80 mg daily, clopidogrel 75 mg daily  -Patient having left lateral side pain which radiates to the back with troponin of 1089, this is unlike his chest pain in which he received stents.  -4/20/2021 complex PCI to mid-distal LAD with 3 overlapping stents including a 2.5 x 12 mm [place] JAS, 2.5 x 15, and 3.0 x 15 mm [place] JAS  -Troponin elevation likely related to hypotension  -Repeat troponin at 1500    Gross hematuria/UTI  New onset, pt needs to be on triple therapy for his stent  Appears to have symptomatic UTI   Was started on C3, hematuria continues   Spoke to hospitalist, who will reach out to the urologist   Urine culture pending   Continue DAPT to avoid acute stents thrombosis    Aortic stenosis- (present on admission)   Assessment & Plan  Mild aortic stenosis   Area 2.6cm2  Peak  "velocity 2m/s  Peak gradient 15.8 mmHg      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  Optimize electrolytes for potassium goal greater than 4.5 and magnesium goal greater than 2.0.     Obstructive hypertrophic cardiomyopathy (HCC)- (present on admission)  Assessment & Plan  As per history.  As he does not have A. fib \"let me   - currently on: (sotalol 80 mg) 40 mg q12h     Repeat echocardiogram in setting of new dizziness and dyspnea on exertion shows stable ECHO when compared to one in 2019  Continue home sotalol     SSS (sick sinus syndrome) (HCC)- (present on admission)  Assessment & Plan  Secondary to third-degree blocks status post pacemaker placement.     Continue telemetry monitoring.  Continue home sotalol.     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  Reports last PE 5-6 years ago  Warfarin being held in setting of acute hematuria     Essential hypertension, benign- (present on admission)   Assessment & Plan  goal BP < 130/80 mmHg    States his BP at home runs 130-135/70-80, occasionally experiences hypotension and skips taking his lisinopril on those days      Continue sotalol for now, no BB, ACE, or ARB at this time as he is having hypotension     Hypotension   -Continue midodrine     Prolonged Q-T interval on ECG- (present on admission)  Assessment & Plan  In sotalol use     Avoid QTC prolonging medications     Cardiac pacemaker in situ- (present on admission)  Assessment & Plan  Placed for sick sinus syndrome third-degree block.  History of multiple PVCs.     Telemetry monitoring    History of pulmonary embolism- (present on admission)  Assessment & Plan  As per history at least 8-9 PEs in the past.  He has a IVC filter in place.  Holding warfarin for right ear basal cell carcinoma removal in 2 days.  CTA with PE protocol from the " ER was negative for PE.             LAKISHA Xavier  [facility]  [phone]    No future appointments.    Please note that this dictation was created using voice recognition software. I have worked with consultants from the vendor as well as technical experts from [facility] to optimize the interface. I have made every reasonable attempt to correct obvious errors, but I expect that there are errors of grammar and possibly content I did not discover before finalizing the note.

## 2021-04-24 NOTE — PROGRESS NOTES
Called Rapid Response at 0940 due to patients hypotension. 500mL of bolus was ordered and discontinue of triple lumen nam and CBI beforehand per MD. Patients BP continued to decline. PT stated he felt okay but continued to complain of back pain. Rapid response team placed new orders and took patient down to CT and Dr. Lozano put in orders for transfer to [facility].

## 2021-04-24 NOTE — PROGRESS NOTES
Manuela BANUELOS from cardiology at bedside to assess patient. Stat echo being performed. Orders to leave pt NPO at this time received.

## 2021-04-24 NOTE — CONSULTS
Critical Care Consultation    Date of consult: 4/24/2021    Referring Physician  Mireille Sapp M.D.    Reason for Consultation  Critical care management for severe hypotension with chest pain    History of Presenting Illness  Mr. 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  The patient had a CTA chest on the day of admit which was negative for PE.  Cardiology was consulted and started on heparin infusion, ASA, high intensity statin, and plavix load for NSTEMI.  He was hypotensive and required ICU admission.  He was stress dosed steroids and small fluid boluses. He underwent left heart catheterization on 4/20 and had successful PCI of the mid to distal LAD with 3 overlapping drug-eluting stents.  He was transferred out of the ICU on 4/22.  He had been doing relatively well except he developed hematuria and had his lovenox and warfarin held.  The patient was transitioned back to hydrocortisone 10mg BID orally.    A rapid response was called because the patient developed chest pain with hypotension.  He was given 1 liters NS and then underwent CTA chest that showed a stable infrarenal aortic aneurysm and a ascending aortic aneurysm.  The patient's troponin level is 1086.  He was given 100mg of hydrocortisone and an additional 1 liter NS.    Code Status  Full Code    Review of Systems  Review of Systems   Constitutional: Negative for chills, fever and malaise/fatigue.   HENT: Negative for congestion and sore throat.    Eyes: Negative for pain and discharge.   Respiratory: Negative for cough and shortness of breath.    Cardiovascular:  Positive for chest pain. Negative for leg swelling.   Gastrointestinal: Negative for abdominal pain, diarrhea, nausea and vomiting.   Genitourinary: Positive for hematuria. Negative for flank pain and urgency.   Musculoskeletal: Negative for back pain and neck pain.   Skin: Negative for rash.   Neurological: Positive for dizziness and weakness. Negative for sensory change, speech change, focal weakness and headaches.   Endo/Heme/Allergies: Bruises/bleeds easily.   Psychiatric/Behavioral: Negative for depression. The patient is not nervous/anxious.        Past Medical History   has a past medical history of Anesthesia, Arrhythmia, Arthritis, Asbestos exposure, ASTHMA, Back pain, Benign neoplasm of pituitary gland and craniopharyngeal duct (pouch) ([facility]) (4/1/2010), BPH (benign prostatic hypertrophy) (4/1/2010), Bradycardia, Breath shortness, Bronchitis, Cardiac pacemaker (04 2010), Cataract, COPD (chronic obstructive pulmonary disease) ([facility]), Diverticulitis, DJD (degenerative joint disease), EMPHYSEMA, Glaucoma, Heart burn, Heart murmur, Hiatus hernia syndrome, Hypertension, 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[facility]) (1995), Indigestion, Knee arthroplasty (2002), Obstructive hypertrophic cardiomyopathy ([facility]) (8/4/2011), PAF (paroxysmal atrial fibrillation) ([facility]), Paroxysmal atrial fibrillation ([facility]) (8/29/2011), PE (pulmonary embolism), Phlebitis, Pituitary adenoma ([facility]) (1995), Pituitary adenoma ([facility]) (1995), Pneumonia, Rheumatic fever, S/P insertion of IVC (inferior vena caval) filter, S/P parathyroidectomy, S/P parathyroidectomy (2005), Sleep apnea, SSS (sick sinus syndrome) ([facility]) (8/29/2011), Third degree AV block ([facility]) (8/29/2011), thyroidectomy (2005), Unspecified disorder of thyroid, Unspecified hemorrhagic conditions, Unspecified urinary incontinence, and Urinary bladder disorder.    Surgical History   has a past surgical history that includes other orthopedic surgery; cholecystectomy; pr total knee arthroplasty; other; cervical  disk and fusion anterior; pr revise ulnar nerve at wrist; pr total knee arthroplasty; cataract extraction with iol; thyroidectomy; pacemaker insertion (Left, 04/23/2010); carpal tunnel endoscopic (9/30/2011); knee revision total (6/20/2013); cholecystectomy; cervical fusion posterior; gastroscopy-endo (1/21/2019); gastroscopy-endo (1/22/2019); and other cardiac surgery.    Family History  family history includes Arthritis in his father and mother.    Social History   reports that he has never smoked. He has never used smokeless tobacco. He reports that he does not drink alcohol and does not use drugs.    Medications  Home Medications     Reviewed by William Owen (Pharmacy [employer]) on 04/18/21 at 2240  Med List Status: Complete   Medication Last Dose Status   albuterol 108 (90 Base) MCG/ACT Aero Soln inhalation aerosol Not Taking Active   gabapentin (NEURONTIN) 600 MG tablet 4/17/2021 Active   HYDROcodone-acetaminophen (NORCO) 5-325 MG Tab per tablet 4/18/2021 Active   hydrocortisone (CORTEF) 10 MG Tab 4/18/2021 Active   levothyroxine (SYNTHROID) 75 MCG Tab 4/18/2021 Active   montelukast (SINGULAIR) 10 MG Tab 4/17/2021 Active   omeprazole (PRILOSEC) 20 MG delayed-release capsule 4/18/2021 Active   sotalol (BETAPACE) 80 MG Tab 4/18/2021 Active   tamsulosin (FLOMAX) 0.4 MG capsule 4/18/2021 Active   tiotropium (SPIRIVA) 18 MCG Cap Not Taking Active   Vitamin D, Cholecalciferol, 50 MCG (2000 UT) Cap 4/18/2021 Active   warfarin (COUMADIN) 5 MG Tab 4/17/2021 Active              Current Facility-Administered Medications   Medication Dose Route Frequency Provider Last Rate Last Admin   • midodrine (PROAMATINE) tablet 10 mg  10 mg Oral TID WITH MEALS Mireille Sapp M.D.       • lidocaine (LIDODERM) 5 % 2 Patch  2 Patch Transdermal Q24HR Mireille Sapp M.D.       • phenylephrine (MARYJANE-SYNEPHRINE) 100 mcg/mL inj (IV Push Syringe) 100 mcg  100 mcg Intravenous Q15 MIN PRN Mireille Sapp M.D.   100 mcg at 04/24/21 1003   • NS (BOLUS)  infusion 1,000 mL  1,000 mL Intravenous Once Mckayla Lozano M.D.       • ipratropium-albuterol (DUONEB) nebulizer solution  3 mL Nebulization Q4H PRN (RT) Mireille Sapp M.D.   3 mL at 04/23/21 0639   • testosterone cypionate (DEPO-TESTOSTERONE) injection 200 mg  200 mg Intramuscular Q21 DAYS Mireille Sapp M.D.   200 mg at 04/23/21 1654   • cefTRIAXone (ROCEPHIN) 2 g in  mL IVPB  2 g Intravenous Q24HRS Randall Rosado D.O.   Stopped at 04/24/21 0714   • phenazopyridine (PYRIDIUM) tablet 100 mg  100 mg Oral TID WITH MEALS Randall Rosado D.O.   100 mg at 04/24/21 0755   • oxyCODONE immediate-release (ROXICODONE) tablet 5 mg  5 mg Oral Q4HRS PRN Luca Puckett M.D.   5 mg at 04/24/21 0822   • hydrocortisone (CORTEF) tablet 10 mg  10 mg Oral BID Luca Puckett M.D.   10 mg at 04/24/21 0649   • aspirin EC (ECOTRIN) tablet 81 mg  81 mg Oral DAILY José Miguel Byrd M.D.   81 mg at 04/24/21 0649   • clopidogrel (PLAVIX) tablet 75 mg  75 mg Oral DAILY José Miguel Byrd M.D.   75 mg at 04/24/21 0650   • nystatin (MYCOSTATIN) powder   Topical TID Luca Puckett M.D.   Given at 04/24/21 0649   • atorvastatin (LIPITOR) tablet 80 mg  80 mg Oral Q EVENING Jeffrey Thompson M.D.   80 mg at 04/23/21 1655   • gabapentin (NEURONTIN) capsule 600 mg  600 mg Oral Q EVENING Arya Alonzo M.D.   600 mg at 04/23/21 1654   • levothyroxine (SYNTHROID) tablet 75 mcg  75 mcg Oral AM ES Arya Alonzo M.D.   75 mcg at 04/24/21 0650   • montelukast (SINGULAIR) tablet 10 mg  10 mg Oral DAILY Arya Alonzo M.D.   10 mg at 04/24/21 0649   • omeprazole (PRILOSEC) capsule 20 mg  20 mg Oral BID Arya Alonzo M.D.   20 mg at 04/24/21 0650   • sotalol (BETAPACE) tablet 40 mg  40 mg Oral BID Arya Alonzo M.D.   40 mg at 04/23/21 2333   • tamsulosin (FLOMAX) capsule 0.4 mg  0.4 mg Oral DAILY Arya Alonzo M.D.   0.4 mg at 04/24/21 0649   • acetaminophen (Tylenol) tablet 650 mg  650 mg Oral Q6HRS PRN Arya Alonzo M.D.   650 mg at 04/24/21 0912   • senna-docusate  (PERICOLACE or SENOKOT S) 8.6-50 MG per tablet 2 tablet  2 tablet Oral BID Arya Alonzo M.D.   2 tablet at 04/24/21 0650    And   • polyethylene glycol/lytes (MIRALAX) PACKET 1 Packet  1 Packet Oral QDAY PRN Arya Alonzo M.D.        And   • magnesium hydroxide (MILK OF MAGNESIA) suspension 30 mL  30 mL Oral QDAY PRN Arya Alonzo M.D.        And   • bisacodyl (DULCOLAX) suppository 10 mg  10 mg Rectal QDAY PRN Arya Alonzo M.D.           Allergies  Allergies   Allergen Reactions   • Lisinopril      Hypotension, 30 mg dose   • Pcn [Penicillins] Rash     Tolerates cephalosporins         Vital Signs last 24 hours  Temp:  [36.1 °C (97 °F)-37.8 °C (100 °F)] 36.4 °C (97.6 °F)  Pulse:  [] 100  Resp:  [14-20] 15  BP: ()/(43-95) 88/53  SpO2:  [92 %-98 %] 93 %    Physical Exam  Physical Exam  Vitals and nursing note reviewed.   Constitutional:       General: He is not in acute distress.     Appearance: He is obese. He is ill-appearing.   HENT:      Head: Normocephalic and atraumatic.      Right Ear: External ear normal.      Left Ear: External ear normal.      Nose: Nose normal. No rhinorrhea.      Mouth/Throat:      Mouth: Mucous membranes are moist.      Pharynx: Oropharynx is clear. No oropharyngeal exudate.   Eyes:      General: No scleral icterus.     Extraocular Movements: Extraocular movements intact.      Conjunctiva/sclera: Conjunctivae normal.      Pupils: Pupils are equal, round, and reactive to light.   Cardiovascular:      Rate and Rhythm: Normal rate. Rhythm irregularly irregular.      Pulses:           Radial pulses are 2+ on the right side and 2+ on the left side.        Dorsalis pedis pulses are 1+ on the right side and 1+ on the left side.      Heart sounds: Heart sounds are distant. No murmur.   Pulmonary:      Effort: No respiratory distress.      Breath sounds: No wheezing.      Comments: Diminished bases  Chest:      Chest wall: No tenderness.   Abdominal:      General: Bowel sounds are  normal. There is no distension.      Palpations: Abdomen is soft.      Tenderness: There is no abdominal tenderness. There is no guarding or rebound.   Musculoskeletal:         General: Normal range of motion.      Cervical back: Normal range of motion and neck supple.      Right lower le+ Edema present.      Left lower le+ Edema present.   Lymphadenopathy:      Cervical: No cervical adenopathy.   Skin:     General: Skin is warm and dry.      Capillary Refill: Capillary refill takes 2 to 3 seconds.      Findings: No rash.   Neurological:      Mental Status: He is alert and oriented to person, place, and time.      Cranial Nerves: No cranial nerve deficit.      Sensory: No sensory deficit.      Motor: No weakness.   Psychiatric:         Mood and Affect: Mood normal.         Behavior: Behavior normal.         Thought Content: Thought content normal.         Fluids    Intake/Output Summary (Last 24 hours) at 2021 1024  Last data filed at 2021 0630  Gross per 24 hour   Intake --   Output 4000 ml   Net -4000 ml       Laboratory  Recent Results (from the past 48 hour(s))   [facility] EKG    Collection Time: 21  1:20 AM   Result Value Ref Range    Report       [facility]    Test Date:  2021  Pt Name:    MANOLO AUGUST                 Department: 171  MRN:        [id number]                      Room:       T731  Gender:     Male                         Technician: LYNN  :        1932                   Requested By:OSCAR SOLORZANO  Order #:    [id number]                    Reading MD: Thomas Bermudez MD    Measurements  Intervals                                Axis  Rate:       80                           P:          -6  CT:         296                          QRS:        -45  QRSD:       152                          T:          140  QT:         422  QTc:        487    Interpretive Statements  VENTRICULAR-PACED COMPLEXES  NO FURTHER ANALYSIS ATTEMPTED DUE TO PACED RHYTHM  Compared to  ECG 2021 05:12:41  Sinus rhythm no longer present  First degree AV block no longer present  Left bundle-branch block no longer present  Electronically Signed On 2021 5:48:05 PDT by Thomas Bermudez MD     PROTHROMBIN TIME    Collection Time: 21  6:51 AM   Result Value Ref Range    PT 15.5 (H) 12.0 - 14.6 sec    INR 1.20 (H) 0.87 - 1.13   [facility] EKG    Collection Time: 21  1:20 PM   Result Value Ref Range    Report       [facility]    Test Date:  2021  Pt Name:    MANOLO AUGUST                 Department: 171  MRN:        [id number]                      Room:       T731  Gender:     Male                         Technician: [facility]  :        [id number]                   Requested By:GERALD JIMENEZ  Order #:    [id number]                    Reading MD: Thomas Christian MD    Measurements  Intervals                                Axis  Rate:       72                           P:          -33  OK:         204                          QRS:        -9  QRSD:       140                          T:          184  QT:         420  QTc:        460    Interpretive Statements  ATRIAL-PACED COMPLEXES  PROBABLE LEFT ATRIAL ABNORMALITY  LEFT BUNDLE BRANCH BLOCK  Compared to ECG 2021 01:20:57  NO SIGNIFICANT CHANGES  Electronically Signed On 2021 18:23:59 PDT by Thomas Christian MD     PROTHROMBIN TIME    Collection Time: 21  3:39 AM   Result Value Ref Range    PT 15.1 (H) 12.0 - 14.6 sec    INR 1.16 (H) 0.87 - 1.13   Basic Metabolic Panel    Collection Time: 21  3:39 AM   Result Value Ref Range    Sodium 136 135 - 145 mmol/L    Potassium 3.7 3.6 - 5.5 mmol/L    Chloride 98 96 - 112 mmol/L    Co2 30 20 - 33 mmol/L    Glucose 70 65 - 99 mg/dL    Bun 15 8 - 22 mg/dL    Creatinine 0.93 0.50 - 1.40 mg/dL    Calcium 7.5 (L) 8.5 - 10.5 mg/dL    Anion Gap 8.0 7.0 - 16.0   CBC WITHOUT DIFFERENTIAL    Collection Time: 21  3:39 AM   Result Value Ref Range    WBC 14.6 (H) 4.8 - 10.8  K/uL    RBC 4.64 (L) 4.70 - 6.10 M/uL    Hemoglobin 11.7 (L) 14.0 - 18.0 g/dL    Hematocrit 38.9 (L) 42.0 - 52.0 %    MCV 83.8 81.4 - 97.8 fL    MCH 25.2 (L) 27.0 - 33.0 pg    MCHC 30.1 (L) 33.7 - 35.3 g/dL    RDW 54.4 (H) 35.9 - 50.0 fL    Platelet Count 205 164 - 446 K/uL    MPV 9.7 9.0 - 12.9 fL   ESTIMATED GFR    Collection Time: 21  3:39 AM   Result Value Ref Range    GFR If African American >60 >60 mL/min/1.73 m 2    GFR If Non African American >60 >60 mL/min/1.73 m 2   EKG    Collection Time: 21 10:01 AM   Result Value Ref Range    Report       [facility]    Test Date:  2021  Pt Name:    MANOLO AUGUST                 Department: 171  MRN:        [id number]                      Room:       [facility]  Gender:     Male                         Technician: PEP  :        1932                   Requested By:GERLAD JIMENEZ  Order #:    [id number]                    Reading MD:    Measurements  Intervals                                Axis  Rate:       96                           P:          0  SD:                                      QRS:        -87  QRSD:       190                          T:          74  QT:         416  QTc:        526    Interpretive Statements  VENTRICULAR-PACED COMPLEXES  NO FURTHER RHYTHM ANALYSIS ATTEMPTED DUE TO PACED RHYTHM  NONSPECIFIC IVCD WITH LAD  LEFT VENTRICULAR HYPERTROPHY  Compared to ECG 2021 13:20:33  Intraventricular conduction delay now present  Left ventricular hypertrophy now present  Atrial-paced complex(es) or rhythm no longer present  Left bundle-branch block no longer presen t         Imaging  CTA chest/abd/pelvis:  IMPRESSION:     No pleural effusion is identified.     Pleural calcification can be seen as sequelae of prior asbestos exposure.     Elevation of the left hemidiaphragm and bibasilar atelectasis, left greater than right.     Enlarged subcarinal lymph node is nonspecific and may be reactive. Follow-up is recommended as malignancy is  not excluded.     Bilateral renal cysts and 3.4 cm lesion in the upper pole of the left kidney which is dense and may represent a hemorrhagic/proteinaceous cyst but a mass is not excluded and further evaluation with renal ultrasound is recommended.     Infrarenal aortic aneurysm measuring 4.3 x 4.2 cm.     Colonic diverticulosis.     Thick-walled bladder may be related to obstructive uropathy in the setting of a prominent prostate. There is surrounding mild stranding and cystitis is not excluded. Further evaluation with urinalysis is recommended.     Aneurysmal ascending aorta measuring 4.4 cm.     3 mm right middle lobe pulmonary nodule is unchanged.    Assessment/Plan  * NSTEMI (non-ST elevated myocardial infarction) (HCC)- (present on admission)  Assessment & Plan  Consistent with ACS  Cardiology following  Repeat troponin today at 1086  Continue asa/plavix  High intensity statin  CTA chest with stable aneurysms    Hypotension  Assessment & Plan  Undifferentiated hypotension,? ACS, adrenal insufficiency, other  I will change oral hydrocortisone to IV Solu-Cortef 100mg TID  I will give additional crystalloid  Transfer to ICU  continue midodrine 10mg TID  Jonny-synephrine pushes as needed    Adrenal insufficiency (HCC)- (present on admission)  Assessment & Plan  Remote history of pituitary adenoma resection on chronic steroid replacement with hydrocortisone  Increase to stress dose acutely to 100mg TID    Obstructive hypertrophic cardiomyopathy (HCC)- (present on admission)  Assessment & Plan  Reviewed ECHO/chart    Anticoagulant long-term use- (present on admission)  Assessment & Plan  Warfarin recently held for planned right ear skin lesion resection  Holding warfarin/lovenox due to hematuria    History of pulmonary embolism- (present on admission)  Assessment & Plan  History of DVT/PE with prior IVC filter placement  CTA chest negative on admission     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HCC)- (present on admission)  Assessment &  Plan  Continue stress dose steroid replacement as above.    Essential hypertension, benign- (present on admission)  Assessment & Plan  Holding BP meds  With hypertensive heart disease    Hyponatremia- (present on admission)  Assessment & Plan  Mild  Monitoring     BPH (benign prostatic hypertrophy)- (present on admission)  Assessment & Plan  Flomax  S/p bladder irrigation    COPD (chronic obstructive pulmonary disease) (HCC)- (present on admission)  Assessment & Plan  Without acute exacerbation, no current wheezing  Continue submental oxygen, on 5 L home oxygen at baseline  RT protocols,      Discussed patient condition and risk of morbidity and/or mortality with Hospitalist, RN, RT, Pharmacy, Patient and cardiology.    The patient remains critically ill requiring continuous hemodynamic monitoring as well as fluid boluses and frequent pushes of Jonny-Synephrine.  The patient remains at high risk of clinical deterioration requiring vasopressor infusion, as well as vital organ dysfunction, and death without the above critical care interventions.    Critical care time = 50 minutes in directly providing and coordinating critical care and extensive data review.  No time overlap and excludes procedures.

## 2021-04-25 PROBLEM — I25.10 CAD (CORONARY ARTERY DISEASE): Status: ACTIVE | Noted: 2021-04-25

## 2021-04-25 LAB
ANION GAP SERPL CALC-SCNC: 6 MMOL/L (ref 7–16)
BASOPHILS # BLD AUTO: 0.2 % (ref 0–1.8)
BASOPHILS # BLD: 0.03 K/UL (ref 0–0.12)
BUN SERPL-MCNC: 16 MG/DL (ref 8–22)
CALCIUM SERPL-MCNC: 7.1 MG/DL (ref 8.5–10.5)
CHLORIDE SERPL-SCNC: 95 MMOL/L (ref 96–112)
CO2 SERPL-SCNC: 28 MMOL/L (ref 20–33)
CREAT SERPL-MCNC: 0.8 MG/DL (ref 0.5–1.4)
EKG IMPRESSION: NORMAL
EOSINOPHIL # BLD AUTO: 0.03 K/UL (ref 0–0.51)
EOSINOPHIL NFR BLD: 0.2 % (ref 0–6.9)
ERYTHROCYTE [DISTWIDTH] IN BLOOD BY AUTOMATED COUNT: 52.6 FL (ref 35.9–50)
GLUCOSE SERPL-MCNC: 129 MG/DL (ref 65–99)
HCT VFR BLD AUTO: 36.8 % (ref 42–52)
HGB BLD-MCNC: 11.5 G/DL (ref 14–18)
IMM GRANULOCYTES # BLD AUTO: 0.1 K/UL (ref 0–0.11)
IMM GRANULOCYTES NFR BLD AUTO: 0.6 % (ref 0–0.9)
INR PPP: 1.19 (ref 0.87–1.13)
LYMPHOCYTES # BLD AUTO: 0.46 K/UL (ref 1–4.8)
LYMPHOCYTES NFR BLD: 3 % (ref 22–41)
MAGNESIUM SERPL-MCNC: 2 MG/DL (ref 1.5–2.5)
MCH RBC QN AUTO: 25.6 PG (ref 27–33)
MCHC RBC AUTO-ENTMCNC: 31.3 G/DL (ref 33.7–35.3)
MCV RBC AUTO: 81.8 FL (ref 81.4–97.8)
MONOCYTES # BLD AUTO: 0.49 K/UL (ref 0–0.85)
MONOCYTES NFR BLD AUTO: 3.2 % (ref 0–13.4)
NEUTROPHILS # BLD AUTO: 14.3 K/UL (ref 1.82–7.42)
NEUTROPHILS NFR BLD: 92.8 % (ref 44–72)
NRBC # BLD AUTO: 0 K/UL
NRBC BLD-RTO: 0 /100 WBC
PLATELET # BLD AUTO: 208 K/UL (ref 164–446)
PMV BLD AUTO: 9.8 FL (ref 9–12.9)
POTASSIUM SERPL-SCNC: 3.7 MMOL/L (ref 3.6–5.5)
PROTHROMBIN TIME: 15.5 SEC (ref 12–14.6)
RBC # BLD AUTO: 4.5 M/UL (ref 4.7–6.1)
SODIUM SERPL-SCNC: 129 MMOL/L (ref 135–145)
TROPONIN T SERPL-MCNC: 407 NG/L (ref 6–19)
TROPONIN T SERPL-MCNC: 542 NG/L (ref 6–19)
WBC # BLD AUTO: 15.4 K/UL (ref 4.8–10.8)

## 2021-04-25 PROCEDURE — A9270 NON-COVERED ITEM OR SERVICE: HCPCS | Performed by: STUDENT IN AN ORGANIZED HEALTH CARE EDUCATION/TRAINING PROGRAM

## 2021-04-25 PROCEDURE — 85610 PROTHROMBIN TIME: CPT

## 2021-04-25 PROCEDURE — 700111 HCHG RX REV CODE 636 W/ 250 OVERRIDE (IP): Performed by: INTERNAL MEDICINE

## 2021-04-25 PROCEDURE — 99233 SBSQ HOSP IP/OBS HIGH 50: CPT | Performed by: INTERNAL MEDICINE

## 2021-04-25 PROCEDURE — A9270 NON-COVERED ITEM OR SERVICE: HCPCS | Performed by: HOSPITALIST

## 2021-04-25 PROCEDURE — 700102 HCHG RX REV CODE 250 W/ 637 OVERRIDE(OP): Performed by: STUDENT IN AN ORGANIZED HEALTH CARE EDUCATION/TRAINING PROGRAM

## 2021-04-25 PROCEDURE — 700102 HCHG RX REV CODE 250 W/ 637 OVERRIDE(OP): Performed by: INTERNAL MEDICINE

## 2021-04-25 PROCEDURE — 93010 ELECTROCARDIOGRAM REPORT: CPT | Mod: 76 | Performed by: INTERNAL MEDICINE

## 2021-04-25 PROCEDURE — 80048 BASIC METABOLIC PNL TOTAL CA: CPT

## 2021-04-25 PROCEDURE — 700105 HCHG RX REV CODE 258: Performed by: HOSPITALIST

## 2021-04-25 PROCEDURE — 700101 HCHG RX REV CODE 250: Performed by: STUDENT IN AN ORGANIZED HEALTH CARE EDUCATION/TRAINING PROGRAM

## 2021-04-25 PROCEDURE — 770020 HCHG ROOM/CARE - TELE (206)

## 2021-04-25 PROCEDURE — 85025 COMPLETE CBC W/AUTO DIFF WBC: CPT

## 2021-04-25 PROCEDURE — 93005 ELECTROCARDIOGRAM TRACING: CPT | Performed by: INTERNAL MEDICINE

## 2021-04-25 PROCEDURE — 93010 ELECTROCARDIOGRAM REPORT: CPT | Performed by: INTERNAL MEDICINE

## 2021-04-25 PROCEDURE — 99232 SBSQ HOSP IP/OBS MODERATE 35: CPT | Performed by: HOSPITALIST

## 2021-04-25 PROCEDURE — A9270 NON-COVERED ITEM OR SERVICE: HCPCS | Performed by: INTERNAL MEDICINE

## 2021-04-25 PROCEDURE — 84484 ASSAY OF TROPONIN QUANT: CPT

## 2021-04-25 PROCEDURE — 700111 HCHG RX REV CODE 636 W/ 250 OVERRIDE (IP): Performed by: HOSPITALIST

## 2021-04-25 PROCEDURE — 700105 HCHG RX REV CODE 258: Performed by: INTERNAL MEDICINE

## 2021-04-25 PROCEDURE — 700102 HCHG RX REV CODE 250 W/ 637 OVERRIDE(OP): Performed by: HOSPITALIST

## 2021-04-25 PROCEDURE — 83735 ASSAY OF MAGNESIUM: CPT

## 2021-04-25 PROCEDURE — 99232 SBSQ HOSP IP/OBS MODERATE 35: CPT | Performed by: INTERNAL MEDICINE

## 2021-04-25 RX ORDER — SODIUM CHLORIDE 9 MG/ML
500 INJECTION, SOLUTION INTRAVENOUS ONCE
Status: COMPLETED | OUTPATIENT
Start: 2021-04-25 | End: 2021-04-26

## 2021-04-25 RX ORDER — POTASSIUM CHLORIDE 20 MEQ/1
40 TABLET, EXTENDED RELEASE ORAL ONCE
Status: COMPLETED | OUTPATIENT
Start: 2021-04-25 | End: 2021-04-25

## 2021-04-25 RX ORDER — MIDODRINE HYDROCHLORIDE 5 MG/1
5 TABLET ORAL
Status: DISCONTINUED | OUTPATIENT
Start: 2021-04-26 | End: 2021-04-28

## 2021-04-25 RX ADMIN — ASPIRIN 81 MG: 81 TABLET, COATED ORAL at 05:11

## 2021-04-25 RX ADMIN — HYDROCORTISONE SODIUM SUCCINATE 100 MG: 100 INJECTION, POWDER, FOR SOLUTION INTRAMUSCULAR; INTRAVENOUS at 05:11

## 2021-04-25 RX ADMIN — SOTALOL HYDROCHLORIDE 40 MG: 80 TABLET ORAL at 10:53

## 2021-04-25 RX ADMIN — MONTELUKAST 10 MG: 10 TABLET, FILM COATED ORAL at 05:11

## 2021-04-25 RX ADMIN — SODIUM CHLORIDE 500 ML: 9 INJECTION, SOLUTION INTRAVENOUS at 10:56

## 2021-04-25 RX ADMIN — CLOPIDOGREL BISULFATE 75 MG: 75 TABLET ORAL at 05:11

## 2021-04-25 RX ADMIN — OMEPRAZOLE 20 MG: 20 CAPSULE, DELAYED RELEASE ORAL at 17:12

## 2021-04-25 RX ADMIN — CEFTRIAXONE SODIUM 2 G: 2 INJECTION, POWDER, FOR SOLUTION INTRAMUSCULAR; INTRAVENOUS at 05:12

## 2021-04-25 RX ADMIN — DOCUSATE SODIUM 50 MG AND SENNOSIDES 8.6 MG 2 TABLET: 8.6; 5 TABLET, FILM COATED ORAL at 05:11

## 2021-04-25 RX ADMIN — TAMSULOSIN HYDROCHLORIDE 0.4 MG: 0.4 CAPSULE ORAL at 05:11

## 2021-04-25 RX ADMIN — MIDODRINE HYDROCHLORIDE 10 MG: 5 TABLET ORAL at 17:11

## 2021-04-25 RX ADMIN — MIDODRINE HYDROCHLORIDE 10 MG: 5 TABLET ORAL at 10:54

## 2021-04-25 RX ADMIN — HYDROCORTISONE SODIUM SUCCINATE 50 MG: 100 INJECTION, POWDER, FOR SOLUTION INTRAMUSCULAR; INTRAVENOUS at 21:22

## 2021-04-25 RX ADMIN — NYSTATIN: 100000 POWDER TOPICAL at 14:43

## 2021-04-25 RX ADMIN — LIDOCAINE 1 PATCH: 50 PATCH TOPICAL at 10:54

## 2021-04-25 RX ADMIN — ATORVASTATIN CALCIUM 80 MG: 80 TABLET, FILM COATED ORAL at 17:11

## 2021-04-25 RX ADMIN — DOCUSATE SODIUM 50 MG AND SENNOSIDES 8.6 MG 2 TABLET: 8.6; 5 TABLET, FILM COATED ORAL at 17:10

## 2021-04-25 RX ADMIN — GABAPENTIN 600 MG: 300 CAPSULE ORAL at 17:11

## 2021-04-25 RX ADMIN — LEVOTHYROXINE SODIUM 75 MCG: 0.07 TABLET ORAL at 05:12

## 2021-04-25 RX ADMIN — OMEPRAZOLE 20 MG: 20 CAPSULE, DELAYED RELEASE ORAL at 05:11

## 2021-04-25 RX ADMIN — POTASSIUM CHLORIDE 40 MEQ: 1500 TABLET, EXTENDED RELEASE ORAL at 10:54

## 2021-04-25 RX ADMIN — HYDROCORTISONE SODIUM SUCCINATE 100 MG: 100 INJECTION, POWDER, FOR SOLUTION INTRAMUSCULAR; INTRAVENOUS at 14:43

## 2021-04-25 RX ADMIN — MIDODRINE HYDROCHLORIDE 10 MG: 5 TABLET ORAL at 09:01

## 2021-04-25 RX ADMIN — PHENAZOPYRIDINE HYDROCHLORIDE 100 MG: 200 TABLET ORAL at 09:01

## 2021-04-25 RX ADMIN — NYSTATIN: 100000 POWDER TOPICAL at 17:12

## 2021-04-25 RX ADMIN — NYSTATIN: 100000 POWDER TOPICAL at 05:12

## 2021-04-25 RX ADMIN — SOTALOL HYDROCHLORIDE 40 MG: 80 TABLET ORAL at 21:22

## 2021-04-25 ASSESSMENT — PAIN DESCRIPTION - PAIN TYPE
TYPE: CHRONIC PAIN
TYPE: CHRONIC PAIN
TYPE: ACUTE PAIN
TYPE: CHRONIC PAIN
TYPE: ACUTE PAIN
TYPE: CHRONIC PAIN

## 2021-04-25 ASSESSMENT — ENCOUNTER SYMPTOMS
CHILLS: 0
DEPRESSION: 0
EYE DISCHARGE: 0
NERVOUS/ANXIOUS: 0
BACK PAIN: 0
ORTHOPNEA: 0
SPEECH CHANGE: 0
COUGH: 0
HEADACHES: 0
VOMITING: 0
FALLS: 0
ABDOMINAL PAIN: 1
SEIZURES: 0
SENSORY CHANGE: 0
EYE PAIN: 0
SPUTUM PRODUCTION: 0
FEVER: 0
NAUSEA: 0
SINUS PAIN: 0
BRUISES/BLEEDS EASILY: 0
DIARRHEA: 0
PALPITATIONS: 0
STRIDOR: 0
BLOOD IN STOOL: 0
WEAKNESS: 0
MYALGIAS: 0
ABDOMINAL PAIN: 0
HALLUCINATIONS: 0
DIZZINESS: 0
SHORTNESS OF BREATH: 0

## 2021-04-25 NOTE — ASSESSMENT & PLAN NOTE
4/20 [facility] and found LM 30%, severe stenosis to distal LAD, LCx with normal OM, 2nd % prox occlusion, RCA 50%, s/p PCI JAS x3 overlapping to mid-distal LAD with ADRIAN 3 flow. Normal LVEDP    Plan:   Continue ASA, plavix  Statin 80 daily  Sotalol  Cardiology following

## 2021-04-25 NOTE — CARE PLAN
Problem: Respiratory:  Goal: Respiratory status will improve  Outcome: PROGRESSING AS EXPECTED     Problem: Pain Management  Goal: Pain level will decrease to patient's comfort goal  Outcome: PROGRESSING AS EXPECTED     Problem: Communication  Goal: The ability to communicate needs accurately and effectively will improve  Outcome: PROGRESSING AS EXPECTED     Problem: Safety  Goal: Will remain free from injury  Outcome: PROGRESSING AS EXPECTED  Goal: Will remain free from falls  Outcome: PROGRESSING AS EXPECTED     Problem: Infection  Goal: Will remain free from infection  Outcome: PROGRESSING AS EXPECTED     Problem: Venous Thromboembolism (VTW)/Deep Vein Thrombosis (DVT) Prevention:  Goal: Patient will participate in Venous Thrombosis (VTE)/Deep Vein Thrombosis (DVT)Prevention Measures  Outcome: PROGRESSING AS EXPECTED     Problem: Psychosocial Needs:  Goal: Level of anxiety will decrease  Outcome: PROGRESSING AS EXPECTED     Problem: Fluid Volume:  Goal: Will maintain balanced intake and output  Outcome: PROGRESSING AS EXPECTED     Problem: Bowel/Gastric:  Goal: Normal bowel function is maintained or improved  Outcome: PROGRESSING AS EXPECTED  Goal: Will not experience complications related to bowel motility  Outcome: PROGRESSING AS EXPECTED     Problem: Urinary Elimination:  Goal: Ability to reestablish a normal urinary elimination pattern will improve  Outcome: PROGRESSING AS EXPECTED     Problem: Skin Integrity  Goal: Risk for impaired skin integrity will decrease  Outcome: PROGRESSING AS EXPECTED     Problem: Mobility  Goal: Risk for activity intolerance will decrease  Outcome: PROGRESSING AS EXPECTED     Problem: Medication  Goal: Compliance with prescribed medication will improve  Outcome: PROGRESSING AS EXPECTED     Problem: Knowledge Deficit  Goal: Knowledge of disease process/condition, treatment plan, diagnostic tests, and medications will improve  Outcome: PROGRESSING AS EXPECTED  Goal: Knowledge of  the prescribed therapeutic regimen will improve  Outcome: PROGRESSING AS EXPECTED     Problem: Discharge Barriers/Planning  Goal: Patient's continuum of care needs will be met  Outcome: PROGRESSING AS EXPECTED

## 2021-04-25 NOTE — PROGRESS NOTES
----- Message from Inez Cabrera LCSW sent at 8/15/2017  3:05 PM CDT -----  JORGE Stewart,  Please call pt and reschedule follow up EAP visits that fit in with her schedule. I would like to see her in the next 2 weeks.  Thanks  Lola   Bladder scan results = 45 mL.

## 2021-04-25 NOTE — PROGRESS NOTES
Hospital Medicine Daily Progress Note    Date of Service  4/25/2021    Chief Complaint  Chest pain    Hospital Course  88 y.o. male w/ hx of paroxysmal afib, AV block ans s/p pacemaker, DVT/PE's in past and s/p IVC filter placement, COPD, hypertrophic obstructive cardiomyopathy admitted 4/18/2021 with chest pain while watching TV.  His workup was negative for PE but did show a 4.2cm ascending aneurysm.  He had serial troponin with increase levels. He was started on a heparin drip and cardiology consulted and took him to heart cath and placed 3 stents to the LAD.  Subsequently to cath he developed acute hematuria and is on asa, plavix, warfarin, heparin with subtherapeutic INR.    Interval Problem Update  4/25: Improved BP.  Alert and conversant.  I have reviewed his CXR showing right sided opacification >left side.  Patient on 6 L O2 but normally on 5 L at home.  Feels weak.  Using urinal and no hematuria.     Consultants/Specialty  Cardiology  Critical Care (Dr Sher signing off 4/22)    Code Status  Full Code    Disposition  4/22 transfer orders placed by Dr Sher    Review of Systems  Review of Systems   Constitutional: Negative for chills and fever.   Eyes: Negative for discharge.   Respiratory: Negative for cough, shortness of breath and stridor.    Cardiovascular: Negative for chest pain, palpitations and leg swelling.   Gastrointestinal: Positive for abdominal pain. Negative for blood in stool, diarrhea, nausea and vomiting.   Genitourinary: Positive for dysuria and hematuria.   Musculoskeletal: Negative for back pain and joint pain.   Neurological: Negative for dizziness, sensory change, speech change and headaches.   Psychiatric/Behavioral: Negative for depression. The patient is not nervous/anxious.         Physical Exam  Temp:  [36 °C (96.8 °F)-36.8 °C (98.2 °F)] 36.7 °C (98.1 °F)  Pulse:  [69-96] 81  Resp:  [12-28] 24  BP: ()/(53-86) 146/82  SpO2:  [88 %-99 %] 99 %    Physical Exam  Vitals  reviewed.   Constitutional:       Appearance: Normal appearance. He is obese. He is not diaphoretic.   HENT:      Head: Normocephalic and atraumatic.      Nose: Nose normal.      Mouth/Throat:      Mouth: Mucous membranes are moist.      Pharynx: No oropharyngeal exudate.   Eyes:      General: No scleral icterus.        Right eye: No discharge.         Left eye: No discharge.      Extraocular Movements: Extraocular movements intact.      Conjunctiva/sclera: Conjunctivae normal.   Neck:      Vascular: No carotid bruit.   Cardiovascular:      Rate and Rhythm: Normal rate and regular rhythm.      Pulses:           Radial pulses are 2+ on the right side and 2+ on the left side.        Dorsalis pedis pulses are 2+ on the right side and 2+ on the left side.      Heart sounds: Murmur present.   Pulmonary:      Effort: Pulmonary effort is normal. No respiratory distress.      Breath sounds: Normal breath sounds. No wheezing or rales.   Abdominal:      General: Bowel sounds are normal. There is no distension.      Palpations: Abdomen is soft.      Tenderness: There is no abdominal tenderness.   Genitourinary:     Comments: Condom catheter over penis with bright red bloody urine in tubing  Musculoskeletal:         General: No swelling or tenderness.      Cervical back: No muscular tenderness.      Right lower leg: Edema (trace) present.      Left lower leg: Edema (trace) present.   Lymphadenopathy:      Cervical: No cervical adenopathy.   Skin:     Coloration: Skin is not jaundiced or pale.   Neurological:      General: No focal deficit present.      Mental Status: He is alert and oriented to person, place, and time. Mental status is at baseline.      Cranial Nerves: No cranial nerve deficit.   Psychiatric:         Mood and Affect: Mood normal.         Behavior: Behavior normal.         Fluids    Intake/Output Summary (Last 24 hours) at 4/25/2021 1938  Last data filed at 4/25/2021 1800  Gross per 24 hour   Intake 1370 ml    Output 900 ml   Net 470 ml       Laboratory  Recent Labs     04/24/21  0339 04/24/21  1010 04/25/21  0246   WBC 14.6* 15.4* 15.4*   RBC 4.64* 4.34* 4.50*   HEMOGLOBIN 11.7* 11.0* 11.5*   HEMATOCRIT 38.9* 36.6* 36.8*   MCV 83.8 84.3 81.8   MCH 25.2* 25.3* 25.6*   MCHC 30.1* 30.1* 31.3*   RDW 54.4* 55.1* 52.6*   PLATELETCT 205 196 208   MPV 9.7 9.8 9.8     Recent Labs     04/24/21  0339 04/24/21  1010 04/25/21  0246   SODIUM 136 134* 129*   POTASSIUM 3.7 3.5* 3.7   CHLORIDE 98 100 95*   CO2 30 27 28   GLUCOSE 70 117* 129*   BUN 15 20 16   CREATININE 0.93 1.01 0.80   CALCIUM 7.5* 7.1* 7.1*     Recent Labs     04/23/21  0651 04/24/21  0339 04/25/21  0246   INR 1.20* 1.16* 1.19*               Imaging  EC-ECHOCARDIOGRAM COMPLETE W/ CONT   Final Result      DX-CHEST-PORTABLE (1 VIEW)   Final Result      Hazy opacities on the right may represent asymmetric edema, right greater than left.      Right midlung opacities may represent pneumonitis or atelectasis.      Bibasilar opacities may represent atelectasis or consolidation.      Mild cardiomegaly.      Atherosclerotic plaque.         CT-CHEST,ABDOMEN,PELVIS WITH   Final Result      No pleural effusion is identified.      Pleural calcification can be seen as sequelae of prior asbestos exposure.      Elevation of the left hemidiaphragm and bibasilar atelectasis, left greater than right.      Enlarged subcarinal lymph node is nonspecific and may be reactive. Follow-up is recommended as malignancy is not excluded.      Bilateral renal cysts and 3.4 cm lesion in the upper pole of the left kidney which is dense and may represent a hemorrhagic/proteinaceous cyst but a mass is not excluded and further evaluation with renal ultrasound is recommended.      Infrarenal aortic aneurysm measuring 4.3 x 4.2 cm.      Colonic diverticulosis.      Thick-walled bladder may be related to obstructive uropathy in the setting of a prominent prostate. There is surrounding mild stranding and  cystitis is not excluded. Further evaluation with urinalysis is recommended.      Aneurysmal ascending aorta measuring 4.4 cm.      3 mm right middle lobe pulmonary nodule is unchanged.      Fleischner Society pulmonary nodule recommendations:   Low Risk: No routine follow-up      High Risk: Optional CT at 12 months      Comments: Nodules less than 6 mm do not require routine follow-up, but certain patients at high risk with suspicious nodule morphology, upper lobe location, or both may warrant 12-month follow-up.      Low Risk - Minimal or absent history of smoking and of other known risk factors.      High Risk - History of smoking or of other known risk factors.      Note: These recommendations do not apply to lung cancer screening, patients with immunosuppression, or patients with known primary cancer.      Fleischner Society 2017 Guidelines for Management of Incidentally Detected Pulmonary Nodules in Adults      US-RENAL   Final Result      No evidence of solid renal mass or hydronephrosis.      EC-ECHOCARDIOGRAM COMPLETE W/O CONT   Final Result      CT-CTA CHEST PULMONARY ARTERY W/ RECONS   Final Result         1.  No evidence of pulmonary embolism.   2.  Mild cardiomegaly.   3.  Atherosclerosis.   4.  Mild 4.2 cm ascending thoracic aortic aneurysm.   5.  Hypoinflation, elevation of left hemidiaphragm with bibasilar atelectasis. No significant consolidation or pleural effusion.   6.  4 mm noncalcified right middle lobe pulmonary nodule. Follow-up per Fleischner criteria is clinically indicated.      Fleischner Society pulmonary nodule recommendations:   Low Risk: No routine follow-up      High Risk: Optional CT at 12 months      Comments: Nodules less than 6 mm do not require routine follow-up, but certain patients at high risk with suspicious nodule morphology, upper lobe location, or both may warrant 12-month follow-up.      Low Risk - Minimal or absent history of smoking and of other known risk factors.       High Risk - History of smoking or of other known risk factors.      Note: These recommendations do not apply to lung cancer screening, patients with immunosuppression, or patients with known primary cancer.      Fleischner Society 2017 Guidelines for Management of Incidentally Detected Pulmonary Nodules in Adults      DX-CHEST-PORTABLE (1 VIEW)   Final Result      1.  Hypoinflation with LEFT lung base atelectasis or infiltrate, possibly chronic.   2.  No overt pulmonary edema.      CL-LEFT HEART CATHETERIZATION WITH POSSIBLE INTERVENTION    (Results Pending)        Assessment/Plan  * Hypotension  Assessment & Plan  On admit had adrenal insufficiency in the setting of stress response  Monitor vitals and having improved BP>  Back on home hydrocortisone 10mg BID  Wean midodrine 10mg TID as tolerates    Hematuria due to acute cystitis  Assessment & Plan  Holding lovenox and warfarin 4/22.  Continue plavix and aspirin due to recent stent placement.  May hold aspirin if not clearing per cardiology  Started 4/22 rocephin.  If continues I will consult Dr Toño Fitzpatrick his urologist.  ultrasound kidney and bladder without significant finding  Urology consult Dr. Fitzpatrick informed  resolving    NSTEMI (non-ST elevated myocardial infarction) (HCC)- (present on admission)  Assessment & Plan  Status post heart cath with stenting to the LAD x 3 on 4/20  Statin Lipitor 80 mg  Sotalol   plavix and aspirin (holding lovenox and warfarin 4/22 due to acute hematuria.)  Troponin trended up to 810  Echocardiogram showed normal left ventricle chamber size with mild concentric left ventricular hypertrophy with ejection fraction estimated to be around 60%.  It was akinesis of the apex and hypokinesis of the apical septal wall that could be related to ventricular pacing versus prior infarct  Cardiology consulting    Adrenal insufficiency (HCC)- (present on admission)  Assessment & Plan  Secondary to chronic steroid use and hx of panhypopituitary  per note  He was on hydrocortisone 10 mg BID as an outpatient  Due to hypotension, he had been started on IV hydrocortisone 50 mg q 6 hours which will change back to oral home dose on 4/21  Monitoring vitals.      UTI (urinary tract infection)- (present on admission)  Assessment & Plan  Acute cystitis with hematuria.  4/18 urine culture negative.  Started ceftriaxone and pyridium.  On 4/22  Stop antibiotic after 5 day course    Obstructive hypertrophic cardiomyopathy (HCC)- (present on admission)  Assessment & Plan  As per history.   currently on: sotalol 40 mg q12h  Repeat echocardiogram unchanged  4/18 Echo: Compared to the images of the study done 1/22/2019  - there has been no   significant change..   Akinesis of apex, hypokinesis of apical septal wall may be from   ventricular pacing verus prior infarct.  Normal right ventricular size and systolic function.  Mild mitral regurgitation.  Mild aortic stenosis.  Mild aortic insufficiency.    Anticoagulant long-term use- (present on admission)  Assessment & Plan  Secondary to extensive history of DVT AND MULTIPLE PE's  STATUS post IVC filter placement.  Home Coumadin had been held for anticipated skin BCC procedures.   IV heparin drip and follow Factor X levels  Coumadin was restarted 4/21 per Dr. To goal INR 1.5-2 but held 4/22 due to hematuria.    History of pulmonary embolism- (present on admission)  Assessment & Plan  As per history at least 8-9 PEs in the past.    Hx of having an IVC filter in place.  Initially he was holding warfarin for right ear basal cell carcinoma removal in 2 days.  4/22 stopped lovenox and warfarin due to hematuria  CTA with PE protocol from the ER was negative for PE.    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  Status post resection of pituitary adenoma in 1998.   levothyroxinem, cortef  Testosterone cyp 200mg IM very 21days, missed two dose, resume    Essential hypertension, benign- (present on  admission)  Assessment & Plan  Continue home sotalol if BP allows    Prolonged Q-T interval on ECG- (present on admission)  Assessment & Plan  In sotalol use.  Avoid QTC prolonging medications.    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  Continue telemetry monitoring.  Continue home sotalol 40mg BID    BPH (benign prostatic hypertrophy)- (present on admission)  Assessment & Plan  Continues to have urinary frequency.  Continue on tamsulosin.  Follows Dr Gonzales outpatient urology    Chronic back pain- (present on admission)  Assessment & Plan  As per history in setting of arthritis.  Continue home Norco and gabapentin.    COPD (chronic obstructive pulmonary disease) ([facility])- (present on admission)  Assessment & Plan  With chronic respiratory failure.  On 5 L of oxygen via nasal cannula at home.  Currently not in acute exacerbation clinically and at baseline with respiratory status.  Bronchodilators as needed per RT protocol.    Cardiac pacemaker in situ- (present on admission)  Assessment & Plan  Placed for sick sinus syndrome third-degree block.  History of multiple PVCs.  Telemetry monitoring.         VTE prophylaxis: Holding due to hematuria

## 2021-04-25 NOTE — CARE PLAN
Problem: Bowel/Gastric:  Goal: Normal bowel function is maintained or improved  Outcome: PROGRESSING AS EXPECTED  Note: Pt had hard BM today.  Intervention: Educate patient and significant other/support system about diet, fluid intake, medications and activity to promote bowel function  Note: Pt educated about importance of water intake and activity to promote GI motility.     Problem: Urinary Elimination:  Goal: Ability to reestablish a normal urinary elimination pattern will improve  Outcome: PROGRESSING SLOWER THAN EXPECTED  Note: Pt experiencing frequency and describes sense of hesitancy.  UOP minimal, orange.     Problem: Skin Integrity  Goal: Risk for impaired skin integrity will decrease  Outcome: PROGRESSING SLOWER THAN EXPECTED  Note: Pt at increased risk for impaired skin integrity r/t decreased appetite and nutrient intake.  Boost supplement ordered per protocol.

## 2021-04-25 NOTE — PROGRESS NOTES
Cardiology Follow Up Progress Note    Date of Service  4/25/2021    Attending Physician  Jude Sher D.O.    Chief Complaint   Hypotension.    HPI  Jeffrey Lizama is a 88 y.o. male admitted 4/18/2021 with HCOM, PAF, PPM, HTN, ACS s/p LAD stents in 04/2021, residual  of OM2.    Interim Events  + hypotension overnight.  No chest pain.  No dyspnea.  Able to lie flat in bed.    Review of Systems  Review of Systems   Constitutional: Negative for chills and fever.   HENT: Negative for ear discharge, ear pain, hearing loss and nosebleeds.    Eyes: Negative for pain and discharge.   Respiratory: Negative for cough and shortness of breath.    Cardiovascular: Negative for chest pain, palpitations and leg swelling.   Gastrointestinal: Negative for abdominal pain, blood in stool, nausea and vomiting.   Genitourinary: Negative for dysuria and hematuria.   Musculoskeletal: Negative for myalgias.   Skin: Negative for rash.   Allergic/Immunologic: Negative for environmental allergies.   Neurological: Negative for dizziness and headaches.   Hematological: Does not bruise/bleed easily.   Psychiatric/Behavioral: Negative for hallucinations and suicidal ideas.       Vital signs in last 24 hours  Temp:  [36 °C (96.8 °F)-36.8 °C (98.2 °F)] 36.8 °C (98.2 °F)  Pulse:  [70-96] 79  Resp:  [15-28] 25  BP: ()/(53-82) 105/62  SpO2:  [82 %-99 %] 91 %    Physical Exam  Physical Exam  Constitutional:       Appearance: He is well-developed.   HENT:      Head: Normocephalic and atraumatic.   Neck:      Vascular: No JVD.   Cardiovascular:      Rate and Rhythm: Normal rate and regular rhythm.   Pulmonary:      Effort: No respiratory distress.   Chest:      Chest wall: No tenderness.   Abdominal:      General: Bowel sounds are normal.      Palpations: Abdomen is soft.      Tenderness: There is no abdominal tenderness. There is no guarding or rebound.   Musculoskeletal:         General: Normal range of motion.      Cervical back: Normal  range of motion.   Skin:     General: Skin is warm and dry.   Neurological:      Mental Status: He is alert and oriented to person, place, and time.         Lab Review  Lab Results   Component Value Date/Time    WBC 15.4 (H) 04/25/2021 02:46 AM    RBC 4.50 (L) 04/25/2021 02:46 AM    HEMOGLOBIN 11.5 (L) 04/25/2021 02:46 AM    HEMATOCRIT 36.8 (L) 04/25/2021 02:46 AM    MCV 81.8 04/25/2021 02:46 AM    MCH 25.6 (L) 04/25/2021 02:46 AM    MCHC 31.3 (L) 04/25/2021 02:46 AM    MPV 9.8 04/25/2021 02:46 AM      Lab Results   Component Value Date/Time    SODIUM 129 (L) 04/25/2021 02:46 AM    POTASSIUM 3.7 04/25/2021 02:46 AM    CHLORIDE 95 (L) 04/25/2021 02:46 AM    CO2 28 04/25/2021 02:46 AM    GLUCOSE 129 (H) 04/25/2021 02:46 AM    BUN 16 04/25/2021 02:46 AM    CREATININE 0.80 04/25/2021 02:46 AM    CREATININE 1.3 09/11/2008 04:21 PM      Lab Results   Component Value Date/Time    ASTSGOT 38 04/24/2021 10:10 AM    ALTSGPT 25 04/24/2021 10:10 AM     Lab Results   Component Value Date/Time    CHOLSTRLTOT 124 01/29/2019 05:58 AM    LDL 69 01/29/2019 05:58 AM    HDL 42 01/29/2019 05:58 AM    TRIGLYCERIDE 67 01/29/2019 05:58 AM    TROPONINT 542 (H) 04/25/2021 02:46 AM       No results for input(s): NTPROBNP in the last 72 hours.    Cardiac Imaging and Procedures Review  EKG:  My personal interpretation of the EKG dated 04/25/2021 is without evidence of stent thrombosis.    Echocardiogram:  LVEF of 55%. No significant valvular disease. I have independently interpreted and reviewed echocardiogram's actual images.     Cardiac Catheterization:  S/p coronary PCI as motioned.      Assessment/Plan  Principal Problem:    NSTEMI (non-ST elevated myocardial infarction) (HCC) POA: Yes  Active Problems:    Hypotension POA: Unknown    Hematuria due to acute cystitis POA: Unknown    Essential hypertension, benign POA: Yes    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HCC) POA: Yes      Overview: Resection pituitary adenoma 1998.  Treated with Cortef, testosterone        injections, levothyroxine.    History of pulmonary embolism POA: Yes      Overview: Multiple. S/P IVC filter    Anticoagulant long-term use POA: Yes    Obstructive hypertrophic cardiomyopathy (HCC) (Chronic) POA: Yes    UTI (urinary tract infection) POA: Yes    Adrenal insufficiency (HCC) POA: Yes    Cardiac pacemaker in situ POA: Yes    COPD (chronic obstructive pulmonary disease) (HCC) POA: Yes      Overview: PMA HOME O2    Chronic back pain (Chronic) POA: Yes    BPH (benign prostatic hypertrophy) (Chronic) POA: Yes      Overview: Treated withTerazosin and Proscar      IMO Update    SSS (sick sinus syndrome) (HCC) (Chronic) POA: Yes    Hyponatremia (Chronic) POA: Yes    Frequent PVCs POA: Yes    Prolonged Q-T interval on ECG (Chronic) POA: Yes  Resolved Problems:    Elevated troponin POA: Yes    Aortic stenosis POA: Yes    Coronary arterial disease s/p LAD stents in 04/2021, residual  of OM2:  Betablocker will be held for now due to hypotension and also already on Sotalol for PAF treatment.  ASA 81 mg po daily and Clopidogrel 75 mg 1x daily.  Statin as Atorvastatin 80 mg po daily  BB and ACE-I being held due to hypotension.     Hypotension likely due to hypovolemia:  IVF hydration 500 cc NS. Would try to give more if + hypotension recurs. He has normal LVEF.  Holding BB and ACE-I due to hypotension.  Midodrine is not helpful long-term. Would try to avoid and titrate off in future.     Hyperlipidemia:  Optimize statin as within guidelines of CAD treatment as above.     PAF:  Continue Sotalol 40 mg bid.  Stopped anticoagulation due to active urine bleeding. Re-evaluate the risks and benefits in the outpatient setting.          Will sign off at this time, please call us with further questions.  Patient will be followed in the outpatient cardiology clinic for further cardiac care.    Thank you for referring this patient to our cardiology service.    Isa Yanez MD.   [facility].

## 2021-04-25 NOTE — PROGRESS NOTES
Critical Care Progress Note    Date of admission  4/18/2021    Chief Complaint  Mr. 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 CTA chest negative for PE. Had episode of hypotension at admission, admitted to ICU, underwent LHC and found LM 30%, severe stenosis to distal LAD, LCx with normal OM, 2nd % prox occlusion, RCA 50%, s/p PCI JAS x3 overlapping to mid-distal LAD with ADRIAN 3 flow. Normal LVEDP    Post PCI, pt did well, transferred out of ICU on 4/22. He had been doing relatively well except he developed hematuria and had his lovenox and warfarin held.  The patient was transitioned back to hydrocortisone 10mg BID orally. RRT was called for chest pain and hypotension. CTA chest with howed a stable infrarenal aortic aneurysm and a ascending aortic aneurysm.  The patient's troponin level is 1086.  He was given 100mg of hydrocortisone and an additional 1 liter NS.    Hospital Course  4/19 admitted to ICU  4/20 LHC and showed LHC and found LM 30%, severe stenosis to distal LAD, LCx with normal OM, 2nd % prox occlusion, RCA 50%  - s/p PCI JAS x3 overlapping to mid-distal LAD with ADRIAN 3 flow. Normal LVEDP  4/22 transferred out of ICU  4/24 re-admitted to ICU due to chest pain and hypotension      Interval Problem Update  Reviewed last 24 hour events:  SBP in 100-120s, MAP >65s  HR in 70-80s  Troponin 1000 to 700   Afebrile  On 6L Oxymask sat >97%  WBC 15  Platelet 200K  Hgb 11.5 (been stable)  Creatinine 0.80, HCO3 28  Even fluid balance in the past 24 hours, negative 3L since admission    Review of Systems  Review of Systems   Constitutional: Negative for chills, fever and malaise/fatigue.   HENT: Negative for congestion and sinus pain.    Respiratory: Negative  for cough, sputum production and shortness of breath.    Cardiovascular: Negative for chest pain, palpitations, orthopnea and leg swelling.   Gastrointestinal: Negative for abdominal pain, diarrhea, nausea and vomiting.   Genitourinary: Negative for dysuria and urgency.   Musculoskeletal: Negative for back pain, falls and joint pain.   Skin: Negative for rash.   Neurological: Negative for seizures, weakness and headaches.   Psychiatric/Behavioral: The patient is not nervous/anxious.    All other systems reviewed and are negative.       Vital Signs for last 24 hours   Temp:  [36 °C (96.8 °F)-36.8 °C (98.2 °F)] 36.8 °C (98.2 °F)  Pulse:  [] 72  Resp:  [15-28] 23  BP: ()/(43-82) 127/73  SpO2:  [82 %-99 %] 99 %    Hemodynamic parameters for last 24 hours       Respiratory Information for the last 24 hours       Physical Exam   Physical Exam  Vitals and nursing note reviewed.   Constitutional:       General: He is not in acute distress.     Appearance: He is not ill-appearing, toxic-appearing or diaphoretic.   HENT:      Head: Normocephalic and atraumatic.      Mouth/Throat:      Mouth: Mucous membranes are moist.      Pharynx: Oropharynx is clear.   Cardiovascular:      Rate and Rhythm: Normal rate and regular rhythm.      Pulses: Normal pulses.      Heart sounds: Normal heart sounds. No murmur.   Pulmonary:      Effort: Pulmonary effort is normal. No respiratory distress.      Breath sounds: Normal breath sounds. No wheezing, rhonchi or rales.   Abdominal:      General: Bowel sounds are normal. There is no distension.      Palpations: Abdomen is soft. There is no mass.      Tenderness: There is no abdominal tenderness.      Hernia: No hernia is present.   Musculoskeletal:         General: No swelling or tenderness.      Cervical back: Neck supple.   Skin:     General: Skin is warm and dry.      Coloration: Skin is not jaundiced.   Neurological:      General: No focal deficit present.      Mental Status: He  is alert and oriented to person, place, and time.      Cranial Nerves: No cranial nerve deficit.   Psychiatric:         Mood and Affect: Mood normal.         Behavior: Behavior normal.         Medications  Current Facility-Administered Medications   Medication Dose Route Frequency Provider Last Rate Last Admin   • midodrine (PROAMATINE) tablet 10 mg  10 mg Oral TID WITH MEALS Mireille Sapp M.D.   10 mg at 04/24/21 1719   • lidocaine (LIDODERM) 5 % 2 Patch  2 Patch Transdermal Q24HR Mireille Sapp M.D.   2 Patch at 04/24/21 1058   • hydrocortisone sodium succinate PF (SOLU-CORTEF) 100 MG injection 100 mg  100 mg Intravenous Q8HRS Mckayla Lozano M.D.   100 mg at 04/25/21 0511   • benzonatate (TESSALON) capsule 100 mg  100 mg Oral TID PRN Steve Sanchez M.D.   100 mg at 04/24/21 2207   • ipratropium-albuterol (DUONEB) nebulizer solution  3 mL Nebulization Q4H PRN (RT) Mireille Sapp M.D.   3 mL at 04/23/21 0639   • testosterone cypionate (DEPO-TESTOSTERONE) injection 200 mg  200 mg Intramuscular Q21 DAYS Mireille Sapp M.D.   200 mg at 04/23/21 1654   • cefTRIAXone (ROCEPHIN) 2 g in  mL IVPB  2 g Intravenous Q24HRS Randall Rosado D.O.   Stopped at 04/25/21 0542   • phenazopyridine (PYRIDIUM) tablet 100 mg  100 mg Oral TID WITH MEALS Randall Rosado D.O.   100 mg at 04/24/21 1720   • oxyCODONE immediate-release (ROXICODONE) tablet 5 mg  5 mg Oral Q4HRS PRN Luca Puckett M.D.   5 mg at 04/24/21 1256   • aspirin EC (ECOTRIN) tablet 81 mg  81 mg Oral DAILY José Miguel Byrd M.D.   81 mg at 04/25/21 0511   • clopidogrel (PLAVIX) tablet 75 mg  75 mg Oral DAILY José Miguel Byrd M.D.   75 mg at 04/25/21 0511   • nystatin (MYCOSTATIN) powder   Topical TID Luca Puckett M.D.   Given at 04/25/21 0512   • atorvastatin (LIPITOR) tablet 80 mg  80 mg Oral Q EVENING Jeffrey Thompson M.D.   80 mg at 04/24/21 1719   • gabapentin (NEURONTIN) capsule 600 mg  600 mg Oral Q EVENING Arya Alonzo M.D.   600 mg at 04/24/21 1719   • levothyroxine  (SYNTHROID) tablet 75 mcg  75 mcg Oral AM ES Arya Alonzo M.D.   75 mcg at 04/25/21 0512   • montelukast (SINGULAIR) tablet 10 mg  10 mg Oral DAILY Arya Alonzo M.D.   10 mg at 04/25/21 0511   • omeprazole (PRILOSEC) capsule 20 mg  20 mg Oral BID Arya Alonzo M.D.   20 mg at 04/25/21 0511   • sotalol (BETAPACE) tablet 40 mg  40 mg Oral BID Arya Alonzo M.D.   Stopped at 04/24/21 1130   • tamsulosin (FLOMAX) capsule 0.4 mg  0.4 mg Oral DAILY Arya Alonzo M.D.   0.4 mg at 04/25/21 0511   • acetaminophen (Tylenol) tablet 650 mg  650 mg Oral Q6HRS PRN Arya Alonzo M.D.   650 mg at 04/24/21 0912   • senna-docusate (PERICOLACE or SENOKOT S) 8.6-50 MG per tablet 2 tablet  2 tablet Oral BID Arya Alonzo M.D.   2 tablet at 04/25/21 0511    And   • polyethylene glycol/lytes (MIRALAX) PACKET 1 Packet  1 Packet Oral QDAY PRN Arya Alonzo M.D.        And   • magnesium hydroxide (MILK OF MAGNESIA) suspension 30 mL  30 mL Oral QDAY PRN Arya Alonzo M.D.        And   • bisacodyl (DULCOLAX) suppository 10 mg  10 mg Rectal QDAY PRN Arya Alonzo M.D.           Fluids    Intake/Output Summary (Last 24 hours) at 4/25/2021 0745  Last data filed at 4/25/2021 0526  Gross per 24 hour   Intake 2431.55 ml   Output 650 ml   Net 1781.55 ml       Laboratory          Recent Labs     04/24/21  0339 04/24/21  1010 04/25/21  0246   SODIUM 136 134* 129*   POTASSIUM 3.7 3.5* 3.7   CHLORIDE 98 100 95*   CO2 30 27 28   BUN 15 20 16   CREATININE 0.93 1.01 0.80   CALCIUM 7.5* 7.1* 7.1*     Recent Labs     04/24/21  0339 04/24/21  1010 04/25/21  0246   ALTSGPT  --  25  --    ASTSGOT  --  38  --    ALKPHOSPHAT  --  100*  --    TBILIRUBIN  --  0.4  --    GLUCOSE 70 117* 129*     Recent Labs     04/24/21  0339 04/24/21  1010 04/25/21  0246   WBC 14.6* 15.4* 15.4*   NEUTSPOLYS  --   --  92.80*   LYMPHOCYTES  --   --  3.00*   MONOCYTES  --   --  3.20   EOSINOPHILS  --   --  0.20   BASOPHILS  --   --  0.20   ASTSGOT  --  38  --    ALTSGPT  --  25  --     ALKPHOSPHAT  --  100*  --    TBILIRUBIN  --  0.4  --      Recent Labs     04/23/21  0651 04/24/21  0339 04/24/21  1010 04/25/21  0246   RBC  --  4.64* 4.34* 4.50*   HEMOGLOBIN  --  11.7* 11.0* 11.5*   HEMATOCRIT  --  38.9* 36.6* 36.8*   PLATELETCT  --  205 196 208   PROTHROMBTM 15.5* 15.1*  --  15.5*   INR 1.20* 1.16*  --  1.19*       Imaging  X-Ray:  I have personally reviewed the images and compared with prior images.    Assessment/Plan  * Hypotension  Assessment & Plan  Undifferentiated hypotension,? ACS, adrenal insufficiency, other  Chest pain resolved. Pt received 2L fluids yesterday 4/24  Troponin up to 1000, but serial troponins were 700 and 500. Overall improving troponin levels.   Restarted on hydrocortisone 100 Q8H  Continue midodrine 10 TID    CAD (coronary artery disease)  Assessment & Plan  4/20 [facility] and found LM 30%, severe stenosis to distal LAD, LCx with normal OM, 2nd % prox occlusion, RCA 50%, s/p PCI JAS x3 overlapping to mid-distal LAD with ADRIAN 3 flow. Normal LVEDP    Plan:   Continue ASA, plavix  Statin 80 daily  Sotalol  Cardiology following    Adrenal insufficiency (HCC)- (present on admission)  Assessment & Plan  Remote history of pituitary adenoma resection on chronic steroid replacement with hydrocortisone  On hydrocortisone 100 Q8H    Obstructive hypertrophic cardiomyopathy (HCC)- (present on admission)  Assessment & Plan  Hx of    Anticoagulant long-term use- (present on admission)  Assessment & Plan  Warfarin recently held for planned right ear skin lesion resection  Holding warfarin/lovenox due to hematuria    History of pulmonary embolism- (present on admission)  Assessment & Plan  History of DVT/PE with prior IVC filter placement  CTA chest negative on admission     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  Hydrocortisone 100 Q8H    SSS (sick sinus syndrome) ([facility])- (present on admission)  Assessment &  Plan  S/p PPM  On sotalol    COPD (chronic obstructive pulmonary disease) (HCC)- (present on admission)  Assessment & Plan  Without acute exacerbation, no current wheezing  Continue submental oxygen, on 5 L home oxygen at baseline  RT protocols,       VTE:  Lovenox  Ulcer: Not Indicated  Lines: None    I have performed a physical exam and reviewed and updated ROS and Plan today (4/25/2021). In review of yesterday's note (4/24/2021), there are no changes except as documented above.     Discussed patient condition and risk of morbidity and/or mortality with Hospitalist, RN, RT, Pharmacy, Charge nurse / hot rounds and Patient    Can be transferred out of ICU  D/w Dr. Rosado, [facility]  Will sign off, please call with questions

## 2021-04-25 NOTE — PROGRESS NOTES
Dr. Jimenez notified of pt Na level of 129, down from 134 from previous day's labs. Wctm. No orders at this time.

## 2021-04-26 LAB
ANION GAP SERPL CALC-SCNC: 4 MMOL/L (ref 7–16)
BASOPHILS # BLD AUTO: 0.2 % (ref 0–1.8)
BASOPHILS # BLD: 0.03 K/UL (ref 0–0.12)
BUN SERPL-MCNC: 13 MG/DL (ref 8–22)
CALCIUM SERPL-MCNC: 7.4 MG/DL (ref 8.5–10.5)
CHLORIDE SERPL-SCNC: 99 MMOL/L (ref 96–112)
CO2 SERPL-SCNC: 30 MMOL/L (ref 20–33)
CREAT SERPL-MCNC: 0.77 MG/DL (ref 0.5–1.4)
EOSINOPHIL # BLD AUTO: 0.01 K/UL (ref 0–0.51)
EOSINOPHIL NFR BLD: 0.1 % (ref 0–6.9)
ERYTHROCYTE [DISTWIDTH] IN BLOOD BY AUTOMATED COUNT: 52.1 FL (ref 35.9–50)
GLUCOSE SERPL-MCNC: 132 MG/DL (ref 65–99)
HCT VFR BLD AUTO: 37.6 % (ref 42–52)
HGB BLD-MCNC: 11.5 G/DL (ref 14–18)
IMM GRANULOCYTES # BLD AUTO: 0.13 K/UL (ref 0–0.11)
IMM GRANULOCYTES NFR BLD AUTO: 0.7 % (ref 0–0.9)
LYMPHOCYTES # BLD AUTO: 0.82 K/UL (ref 1–4.8)
LYMPHOCYTES NFR BLD: 4.5 % (ref 22–41)
MAGNESIUM SERPL-MCNC: 2.1 MG/DL (ref 1.5–2.5)
MCH RBC QN AUTO: 25.6 PG (ref 27–33)
MCHC RBC AUTO-ENTMCNC: 30.6 G/DL (ref 33.7–35.3)
MCV RBC AUTO: 83.7 FL (ref 81.4–97.8)
MONOCYTES # BLD AUTO: 0.95 K/UL (ref 0–0.85)
MONOCYTES NFR BLD AUTO: 5.3 % (ref 0–13.4)
NEUTROPHILS # BLD AUTO: 16.09 K/UL (ref 1.82–7.42)
NEUTROPHILS NFR BLD: 89.2 % (ref 44–72)
NRBC # BLD AUTO: 0 K/UL
NRBC BLD-RTO: 0 /100 WBC
NT-PROBNP SERPL IA-MCNC: 6212 PG/ML (ref 0–125)
PLATELET # BLD AUTO: 235 K/UL (ref 164–446)
PMV BLD AUTO: 10 FL (ref 9–12.9)
POTASSIUM SERPL-SCNC: 3.7 MMOL/L (ref 3.6–5.5)
RBC # BLD AUTO: 4.49 M/UL (ref 4.7–6.1)
SODIUM SERPL-SCNC: 133 MMOL/L (ref 135–145)
WBC # BLD AUTO: 18 K/UL (ref 4.8–10.8)

## 2021-04-26 PROCEDURE — 700101 HCHG RX REV CODE 250: Performed by: STUDENT IN AN ORGANIZED HEALTH CARE EDUCATION/TRAINING PROGRAM

## 2021-04-26 PROCEDURE — A9270 NON-COVERED ITEM OR SERVICE: HCPCS | Performed by: PSYCHIATRY & NEUROLOGY

## 2021-04-26 PROCEDURE — A9270 NON-COVERED ITEM OR SERVICE: HCPCS | Performed by: STUDENT IN AN ORGANIZED HEALTH CARE EDUCATION/TRAINING PROGRAM

## 2021-04-26 PROCEDURE — 700102 HCHG RX REV CODE 250 W/ 637 OVERRIDE(OP): Performed by: HOSPITALIST

## 2021-04-26 PROCEDURE — 700111 HCHG RX REV CODE 636 W/ 250 OVERRIDE (IP): Performed by: HOSPITALIST

## 2021-04-26 PROCEDURE — 700102 HCHG RX REV CODE 250 W/ 637 OVERRIDE(OP): Performed by: PSYCHIATRY & NEUROLOGY

## 2021-04-26 PROCEDURE — 80048 BASIC METABOLIC PNL TOTAL CA: CPT

## 2021-04-26 PROCEDURE — 85025 COMPLETE CBC W/AUTO DIFF WBC: CPT

## 2021-04-26 PROCEDURE — 99232 SBSQ HOSP IP/OBS MODERATE 35: CPT | Performed by: HOSPITALIST

## 2021-04-26 PROCEDURE — 83880 ASSAY OF NATRIURETIC PEPTIDE: CPT

## 2021-04-26 PROCEDURE — 700102 HCHG RX REV CODE 250 W/ 637 OVERRIDE(OP): Performed by: STUDENT IN AN ORGANIZED HEALTH CARE EDUCATION/TRAINING PROGRAM

## 2021-04-26 PROCEDURE — A9270 NON-COVERED ITEM OR SERVICE: HCPCS | Performed by: HOSPITALIST

## 2021-04-26 PROCEDURE — 770020 HCHG ROOM/CARE - TELE (206)

## 2021-04-26 PROCEDURE — 83735 ASSAY OF MAGNESIUM: CPT

## 2021-04-26 PROCEDURE — A9270 NON-COVERED ITEM OR SERVICE: HCPCS | Performed by: INTERNAL MEDICINE

## 2021-04-26 PROCEDURE — 700102 HCHG RX REV CODE 250 W/ 637 OVERRIDE(OP): Performed by: INTERNAL MEDICINE

## 2021-04-26 RX ORDER — HYDROCORTISONE 10 MG/1
10 TABLET ORAL 2 TIMES DAILY
Status: DISCONTINUED | OUTPATIENT
Start: 2021-04-27 | End: 2021-04-29 | Stop reason: HOSPADM

## 2021-04-26 RX ORDER — FUROSEMIDE 10 MG/ML
40 INJECTION INTRAMUSCULAR; INTRAVENOUS ONCE
Status: COMPLETED | OUTPATIENT
Start: 2021-04-26 | End: 2021-04-26

## 2021-04-26 RX ORDER — POTASSIUM CHLORIDE 20 MEQ/1
40 TABLET, EXTENDED RELEASE ORAL ONCE
Status: COMPLETED | OUTPATIENT
Start: 2021-04-26 | End: 2021-04-26

## 2021-04-26 RX ORDER — HYDROCORTISONE 20 MG/1
10 TABLET ORAL 2 TIMES DAILY
Status: DISCONTINUED | OUTPATIENT
Start: 2021-04-26 | End: 2021-04-26

## 2021-04-26 RX ADMIN — ASPIRIN 81 MG: 81 TABLET, COATED ORAL at 06:00

## 2021-04-26 RX ADMIN — GABAPENTIN 600 MG: 300 CAPSULE ORAL at 17:56

## 2021-04-26 RX ADMIN — POTASSIUM CHLORIDE 40 MEQ: 1500 TABLET, EXTENDED RELEASE ORAL at 14:27

## 2021-04-26 RX ADMIN — OMEPRAZOLE 20 MG: 20 CAPSULE, DELAYED RELEASE ORAL at 17:56

## 2021-04-26 RX ADMIN — LIDOCAINE 2 PATCH: 50 PATCH TOPICAL at 10:22

## 2021-04-26 RX ADMIN — FUROSEMIDE 40 MG: 10 INJECTION, SOLUTION INTRAMUSCULAR; INTRAVENOUS at 07:57

## 2021-04-26 RX ADMIN — LEVOTHYROXINE SODIUM 75 MCG: 0.07 TABLET ORAL at 06:00

## 2021-04-26 RX ADMIN — OXYCODONE 5 MG: 5 TABLET ORAL at 22:26

## 2021-04-26 RX ADMIN — MIDODRINE HYDROCHLORIDE 5 MG: 5 TABLET ORAL at 17:56

## 2021-04-26 RX ADMIN — MIDODRINE HYDROCHLORIDE 5 MG: 5 TABLET ORAL at 11:52

## 2021-04-26 RX ADMIN — OXYCODONE 5 MG: 5 TABLET ORAL at 10:22

## 2021-04-26 RX ADMIN — ATORVASTATIN CALCIUM 80 MG: 80 TABLET, FILM COATED ORAL at 17:56

## 2021-04-26 RX ADMIN — MONTELUKAST 10 MG: 10 TABLET, FILM COATED ORAL at 06:00

## 2021-04-26 RX ADMIN — OMEPRAZOLE 20 MG: 20 CAPSULE, DELAYED RELEASE ORAL at 06:00

## 2021-04-26 RX ADMIN — OXYCODONE 5 MG: 5 TABLET ORAL at 01:01

## 2021-04-26 RX ADMIN — CLOPIDOGREL BISULFATE 75 MG: 75 TABLET ORAL at 05:59

## 2021-04-26 RX ADMIN — NYSTATIN: 100000 POWDER TOPICAL at 11:53

## 2021-04-26 RX ADMIN — HYDROCORTISONE SODIUM SUCCINATE 50 MG: 100 INJECTION, POWDER, FOR SOLUTION INTRAMUSCULAR; INTRAVENOUS at 22:26

## 2021-04-26 RX ADMIN — ACETAMINOPHEN 650 MG: 325 TABLET ORAL at 14:27

## 2021-04-26 RX ADMIN — BENZONATATE 100 MG: 100 CAPSULE ORAL at 22:39

## 2021-04-26 RX ADMIN — HYDROCORTISONE SODIUM SUCCINATE 50 MG: 100 INJECTION, POWDER, FOR SOLUTION INTRAMUSCULAR; INTRAVENOUS at 13:26

## 2021-04-26 RX ADMIN — NYSTATIN: 100000 POWDER TOPICAL at 06:00

## 2021-04-26 RX ADMIN — BENZONATATE 100 MG: 100 CAPSULE ORAL at 01:08

## 2021-04-26 RX ADMIN — MIDODRINE HYDROCHLORIDE 5 MG: 5 TABLET ORAL at 07:56

## 2021-04-26 RX ADMIN — HYDROCORTISONE SODIUM SUCCINATE 50 MG: 100 INJECTION, POWDER, FOR SOLUTION INTRAMUSCULAR; INTRAVENOUS at 05:59

## 2021-04-26 RX ADMIN — TAMSULOSIN HYDROCHLORIDE 0.4 MG: 0.4 CAPSULE ORAL at 06:00

## 2021-04-26 RX ADMIN — SOTALOL HYDROCHLORIDE 40 MG: 80 TABLET ORAL at 22:26

## 2021-04-26 ASSESSMENT — ENCOUNTER SYMPTOMS
NAUSEA: 0
FEVER: 0
BLOOD IN STOOL: 0
COUGH: 0
HEADACHES: 0
SENSORY CHANGE: 0
STRIDOR: 0
SPEECH CHANGE: 0
PALPITATIONS: 0
ABDOMINAL PAIN: 1
CHILLS: 0
DIARRHEA: 0
SHORTNESS OF BREATH: 0
BACK PAIN: 0
EYE DISCHARGE: 0
VOMITING: 0
NERVOUS/ANXIOUS: 0
DEPRESSION: 0
DIZZINESS: 0

## 2021-04-26 ASSESSMENT — COGNITIVE AND FUNCTIONAL STATUS - GENERAL
MOBILITY SCORE: 23
CLIMB 3 TO 5 STEPS WITH RAILING: A LITTLE
SUGGESTED CMS G CODE MODIFIER DAILY ACTIVITY: CI
TOILETING: A LITTLE
DAILY ACTIVITIY SCORE: 23
SUGGESTED CMS G CODE MODIFIER MOBILITY: CI

## 2021-04-26 ASSESSMENT — LIFESTYLE VARIABLES
EVER HAD A DRINK FIRST THING IN THE MORNING TO STEADY YOUR NERVES TO GET RID OF A HANGOVER: NO
ON A TYPICAL DAY WHEN YOU DRINK ALCOHOL HOW MANY DRINKS DO YOU HAVE: 0
ALCOHOL_USE: NO
CONSUMPTION TOTAL: NEGATIVE
HAVE PEOPLE ANNOYED YOU BY CRITICIZING YOUR DRINKING: NO
TOTAL SCORE: 0
DOES PATIENT WANT TO STOP DRINKING: NO
TOTAL SCORE: 0
HOW MANY TIMES IN THE PAST YEAR HAVE YOU HAD 5 OR MORE DRINKS IN A DAY: 0
HAVE YOU EVER FELT YOU SHOULD CUT DOWN ON YOUR DRINKING: NO
EVER FELT BAD OR GUILTY ABOUT YOUR DRINKING: NO
TOTAL SCORE: 0
AVERAGE NUMBER OF DAYS PER WEEK YOU HAVE A DRINK CONTAINING ALCOHOL: 0

## 2021-04-26 ASSESSMENT — FIBROSIS 4 INDEX: FIB4 SCORE: 2.85

## 2021-04-26 ASSESSMENT — PAIN DESCRIPTION - PAIN TYPE
TYPE: ACUTE PAIN
TYPE: ACUTE PAIN

## 2021-04-26 NOTE — DISCHARGE PLANNING
Care Transition Team Discharge Planning    Anticipated Discharge Disposition: d/c to Rehab vs SNF     Action: Lsw noted orders for both Rehab and SNF entered today by Dr. Rosado.    Barriers to Discharge: acceptance to Rehab vs acceptance to SNF     Plan: Lsw will f/u w/ Rehab, medical team, etc.

## 2021-04-26 NOTE — PROGRESS NOTES
Hospital Medicine Daily Progress Note    Date of Service  4/26/2021    Chief Complaint  Chest pain    Hospital Course  88 y.o. male w/ hx of paroxysmal afib, AV block ans s/p pacemaker, DVT/PE's in past and s/p IVC filter placement, COPD, hypertrophic obstructive cardiomyopathy admitted 4/18/2021 with chest pain while watching TV.  His workup was negative for PE but did show a 4.2cm ascending aneurysm.  He had serial troponin with increase levels. He was started on a heparin drip and cardiology consulted and took him to heart cath and placed 3 stents to the LAD.  Subsequently to cath he developed acute hematuria and is on asa, plavix, warfarin, heparin with subtherapeutic INR.    Interval Problem Update  4/25: Improved BP.  Alert and conversant.  I have reviewed his CXR showing right sided opacification >left side.  Patient on 6 L O2 but normally on 5 L at home.  Feels weak.  Using urinal and no hematuria.     4/26: BNP:6212, lasix ordered. ON 6 L but on 5L at home.  Speech clear and he is sitting up in a chair.  He was a two person assist.  Likely needs further post acute care before able to handle his ADL's at home.  Rehab/SNF ordered.    Consultants/Specialty  Cardiology  Critical Care (Dr Sher signing off 4/22)    Code Status  Full Code    Disposition  4/25 transfer orders placed by Dr Sher  4/26 SNF/Rehab ordered    Review of Systems  Review of Systems   Constitutional: Negative for chills and fever.   Eyes: Negative for discharge.   Respiratory: Negative for cough, shortness of breath and stridor.    Cardiovascular: Negative for chest pain, palpitations and leg swelling.   Gastrointestinal: Positive for abdominal pain. Negative for blood in stool, diarrhea, nausea and vomiting.   Genitourinary: Positive for dysuria and hematuria.   Musculoskeletal: Negative for back pain and joint pain.   Neurological: Negative for dizziness, sensory change, speech change and headaches.   Psychiatric/Behavioral: Negative for  depression. The patient is not nervous/anxious.         Physical Exam  Temp:  [35.9 °C (96.6 °F)-36.7 °C (98.1 °F)] 36.3 °C (97.4 °F)  Pulse:  [69-95] 87  Resp:  [12-31] 20  BP: (117-146)/(71-91) 127/88  SpO2:  [88 %-100 %] 93 %    Physical Exam  Vitals reviewed.   Constitutional:       Appearance: Normal appearance. He is obese. He is not diaphoretic.   HENT:      Head: Normocephalic and atraumatic.      Nose: Nose normal.      Mouth/Throat:      Mouth: Mucous membranes are moist.      Pharynx: No oropharyngeal exudate.   Eyes:      General: No scleral icterus.        Right eye: No discharge.         Left eye: No discharge.      Extraocular Movements: Extraocular movements intact.      Conjunctiva/sclera: Conjunctivae normal.   Neck:      Vascular: No carotid bruit.   Cardiovascular:      Rate and Rhythm: Normal rate and regular rhythm.      Pulses:           Radial pulses are 2+ on the right side and 2+ on the left side.        Dorsalis pedis pulses are 2+ on the right side and 2+ on the left side.      Heart sounds: Murmur present.   Pulmonary:      Effort: Pulmonary effort is normal. No respiratory distress.      Breath sounds: Normal breath sounds. No wheezing or rales.   Abdominal:      General: Bowel sounds are normal. There is no distension.      Palpations: Abdomen is soft.      Tenderness: There is no abdominal tenderness.   Genitourinary:     Comments: Condom catheter over penis with bright red bloody urine in tubing  Musculoskeletal:         General: No swelling or tenderness.      Cervical back: No muscular tenderness.      Right lower leg: Edema (trace) present.      Left lower leg: Edema (trace) present.   Lymphadenopathy:      Cervical: No cervical adenopathy.   Skin:     Coloration: Skin is not jaundiced or pale.   Neurological:      General: No focal deficit present.      Mental Status: He is alert and oriented to person, place, and time. Mental status is at baseline.      Cranial Nerves: No  cranial nerve deficit.   Psychiatric:         Mood and Affect: Mood normal.         Behavior: Behavior normal.         Fluids    Intake/Output Summary (Last 24 hours) at 4/26/2021 1015  Last data filed at 4/26/2021 0900  Gross per 24 hour   Intake 1760 ml   Output 1600 ml   Net 160 ml       Laboratory  Recent Labs     04/24/21  1010 04/25/21  0246 04/26/21  0610   WBC 15.4* 15.4* 18.0*   RBC 4.34* 4.50* 4.49*   HEMOGLOBIN 11.0* 11.5* 11.5*   HEMATOCRIT 36.6* 36.8* 37.6*   MCV 84.3 81.8 83.7   MCH 25.3* 25.6* 25.6*   MCHC 30.1* 31.3* 30.6*   RDW 55.1* 52.6* 52.1*   PLATELETCT 196 208 235   MPV 9.8 9.8 10.0     Recent Labs     04/24/21  1010 04/25/21  0246 04/26/21  0610   SODIUM 134* 129* 133*   POTASSIUM 3.5* 3.7 3.7   CHLORIDE 100 95* 99   CO2 27 28 30   GLUCOSE 117* 129* 132*   BUN 20 16 13   CREATININE 1.01 0.80 0.77   CALCIUM 7.1* 7.1* 7.4*     Recent Labs     04/24/21  0339 04/25/21  0246   INR 1.16* 1.19*               Imaging  EC-ECHOCARDIOGRAM COMPLETE W/ CONT   Final Result      DX-CHEST-PORTABLE (1 VIEW)   Final Result      Hazy opacities on the right may represent asymmetric edema, right greater than left.      Right midlung opacities may represent pneumonitis or atelectasis.      Bibasilar opacities may represent atelectasis or consolidation.      Mild cardiomegaly.      Atherosclerotic plaque.         CT-CHEST,ABDOMEN,PELVIS WITH   Final Result      No pleural effusion is identified.      Pleural calcification can be seen as sequelae of prior asbestos exposure.      Elevation of the left hemidiaphragm and bibasilar atelectasis, left greater than right.      Enlarged subcarinal lymph node is nonspecific and may be reactive. Follow-up is recommended as malignancy is not excluded.      Bilateral renal cysts and 3.4 cm lesion in the upper pole of the left kidney which is dense and may represent a hemorrhagic/proteinaceous cyst but a mass is not excluded and further evaluation with renal ultrasound is  recommended.      Infrarenal aortic aneurysm measuring 4.3 x 4.2 cm.      Colonic diverticulosis.      Thick-walled bladder may be related to obstructive uropathy in the setting of a prominent prostate. There is surrounding mild stranding and cystitis is not excluded. Further evaluation with urinalysis is recommended.      Aneurysmal ascending aorta measuring 4.4 cm.      3 mm right middle lobe pulmonary nodule is unchanged.      Fleischner Society pulmonary nodule recommendations:   Low Risk: No routine follow-up      High Risk: Optional CT at 12 months      Comments: Nodules less than 6 mm do not require routine follow-up, but certain patients at high risk with suspicious nodule morphology, upper lobe location, or both may warrant 12-month follow-up.      Low Risk - Minimal or absent history of smoking and of other known risk factors.      High Risk - History of smoking or of other known risk factors.      Note: These recommendations do not apply to lung cancer screening, patients with immunosuppression, or patients with known primary cancer.      Fleischner Society 2017 Guidelines for Management of Incidentally Detected Pulmonary Nodules in Adults      US-RENAL   Final Result      No evidence of solid renal mass or hydronephrosis.      EC-ECHOCARDIOGRAM COMPLETE W/O CONT   Final Result      CT-CTA CHEST PULMONARY ARTERY W/ RECONS   Final Result         1.  No evidence of pulmonary embolism.   2.  Mild cardiomegaly.   3.  Atherosclerosis.   4.  Mild 4.2 cm ascending thoracic aortic aneurysm.   5.  Hypoinflation, elevation of left hemidiaphragm with bibasilar atelectasis. No significant consolidation or pleural effusion.   6.  4 mm noncalcified right middle lobe pulmonary nodule. Follow-up per Fleischner criteria is clinically indicated.      Fleischner Society pulmonary nodule recommendations:   Low Risk: No routine follow-up      High Risk: Optional CT at 12 months      Comments: Nodules less than 6 mm do not  require routine follow-up, but certain patients at high risk with suspicious nodule morphology, upper lobe location, or both may warrant 12-month follow-up.      Low Risk - Minimal or absent history of smoking and of other known risk factors.      High Risk - History of smoking or of other known risk factors.      Note: These recommendations do not apply to lung cancer screening, patients with immunosuppression, or patients with known primary cancer.      Fleischner Society 2017 Guidelines for Management of Incidentally Detected Pulmonary Nodules in Adults      DX-CHEST-PORTABLE (1 VIEW)   Final Result      1.  Hypoinflation with LEFT lung base atelectasis or infiltrate, possibly chronic.   2.  No overt pulmonary edema.      CL-LEFT HEART CATHETERIZATION WITH POSSIBLE INTERVENTION    (Results Pending)        Assessment/Plan  * Hypotension  Assessment & Plan  On admit had adrenal insufficiency in the setting of stress response  Monitor vitals and having improved BP>  4/26 finish hydrocortison 50mg TID IV and switch to back on home hydrocortisone 10mg BID 4/27  Wean midodrine 5mg TID as tolerates    Hematuria due to acute cystitis  Assessment & Plan  Holding lovenox and warfarin 4/22.  Continue plavix and aspirin due to recent stent placement.  May hold aspirin if not clearing per cardiology  S/p CBI with clearing of hematuria  S/p rocephin.  If continues I will consult Dr Toño Fitzpatrick his urologist.  ultrasound kidney and bladder without significant finding  Urology consult Dr. Fitzpatrick informed  resolving    NSTEMI (non-ST elevated myocardial infarction) (HCC)- (present on admission)  Assessment & Plan  Status post heart cath with stenting to the LAD x 3 on 4/20  Statin Lipitor 80 mg  Sotalol   plavix and aspirin (holding lovenox and warfarin 4/22 due to acute hematuria.)  Troponin trended up to 810  Echocardiogram showed normal left ventricle chamber size with mild concentric left ventricular hypertrophy with ejection  fraction estimated to be around 60%.  It was akinesis of the apex and hypokinesis of the apical septal wall that could be related to ventricular pacing versus prior infarct  Cardiology consulting    Adrenal insufficiency (HCC)- (present on admission)  Assessment & Plan  Secondary to chronic steroid use and hx of panhypopituitary per note  He was on hydrocortisone 10 mg BID as an outpatient  Due to hypotension, he had been started on IV hydrocortisone 50 mg q 6 hours which will change back to oral home dose on 4/21  Monitoring vitals.      UTI (urinary tract infection)- (present on admission)  Assessment & Plan  Acute cystitis with hematuria.  4/18 urine culture negative.  Started ceftriaxone and pyridium.  On 4/22  completed antibiotics    Obstructive hypertrophic cardiomyopathy (HCC)- (present on admission)  Assessment & Plan  As per history.   currently on: sotalol 40 mg q12h  Repeat echocardiogram unchanged  4/18 Echo: Compared to the images of the study done 1/22/2019  - there has been no   significant change..   Akinesis of apex, hypokinesis of apical septal wall may be from   ventricular pacing verus prior infarct.  Normal right ventricular size and systolic function.  Mild mitral regurgitation.  Mild aortic stenosis.  Mild aortic insufficiency.    Anticoagulant long-term use- (present on admission)  Assessment & Plan  Secondary to extensive history of DVT AND MULTIPLE PE's  STATUS post IVC filter placement.  Home Coumadin had been held for anticipated skin BCC procedures.   IV heparin drip and follow Factor X levels  Coumadin was restarted 4/21 per DrShannan To goal INR 1.5-2 but held 4/22 due to hematuria.    History of pulmonary embolism- (present on admission)  Assessment & Plan  As per history at least 8-9 PEs in the past.    Hx of having an IVC filter in place.  Initially he was holding warfarin for right ear basal cell carcinoma removal in 2 days.  4/22 stopped lovenox and warfarin due to hematuria  CTA with  PE protocol from the ER was negative for PE.    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  Status post resection of pituitary adenoma in 1998.   levothyroxine, cortef  Testosterone cyp 200mg IM very 21days, missed two dose, resume    Essential hypertension, benign- (present on admission)  Assessment & Plan  Continue home sotalol if BP allows    Prolonged Q-T interval on ECG- (present on admission)  Assessment & Plan  In sotalol use.  Avoid QTC prolonging medications.    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  Continue telemetry monitoring.  Continue home sotalol 40mg BID    BPH (benign prostatic hypertrophy)- (present on admission)  Assessment & Plan  Continues to have urinary frequency.  Continue on tamsulosin.  Follows Dr Gonzales outpatient urology    Chronic back pain- (present on admission)  Assessment & Plan  As per history in setting of arthritis.  Continue home Norco and gabapentin.    COPD (chronic obstructive pulmonary disease) (HCC)- (present on admission)  Assessment & Plan  With chronic respiratory failure.  On 5 L of oxygen via nasal cannula at home.  Currently not in acute exacerbation clinically and at baseline with respiratory status.  Bronchodilators as needed per RT protocol.    Cardiac pacemaker in situ- (present on admission)  Assessment & Plan  Placed for sick sinus syndrome third-degree block.  History of multiple PVCs.  Telemetry monitoring.       VTE prophylaxis: Holding due to hematuria

## 2021-04-26 NOTE — DISCHARGE PLANNING
Care Transition Team Discharge Planning    Anticipated Discharge Disposition: TBD    Action: Per hospitalist rounds, pt is getting better medically. Pt may need a SNF placement.    Barriers to Discharge: medical clearance/stability    Plan: Lsw will continue to follow, attend rounds for medical updates, and assist w/ d/c planning.

## 2021-04-26 NOTE — DISCHARGE PLANNING
[facility]itional [facility] Coordination    Referral from:  Dr. Rosado  Insurance Provider on Facesheet: Medicare  Potential Rehab Diagnosis: Cardiac    Chart review indicates patient may have on going medical management and  therapy needs to possibly meet inpatient rehab facility criteria with the goal of returning to community.    D/C support: TBD     Physiatry consultation pended per protocol.  Last PT note on 4/21 patient was min A-supervised for all mobility.  Would appreciate an OT eval to determine if meets therapy criteria prior to forwarding.    Last Covid test:  4/18/2021  Not detected     Thank you for the referral.

## 2021-04-27 LAB
APTT PPP: 35.8 SEC (ref 24.7–36)
EKG IMPRESSION: NORMAL
INR PPP: 1.11 (ref 0.87–1.13)
PROTHROMBIN TIME: 14.6 SEC (ref 12–14.6)
UFH PPP CHRO-ACNC: 0.31 IU/ML
UFH PPP CHRO-ACNC: <0.1 IU/ML

## 2021-04-27 PROCEDURE — A9270 NON-COVERED ITEM OR SERVICE: HCPCS | Performed by: FAMILY MEDICINE

## 2021-04-27 PROCEDURE — A9270 NON-COVERED ITEM OR SERVICE: HCPCS | Performed by: STUDENT IN AN ORGANIZED HEALTH CARE EDUCATION/TRAINING PROGRAM

## 2021-04-27 PROCEDURE — 700102 HCHG RX REV CODE 250 W/ 637 OVERRIDE(OP): Performed by: PSYCHIATRY & NEUROLOGY

## 2021-04-27 PROCEDURE — 36415 COLL VENOUS BLD VENIPUNCTURE: CPT

## 2021-04-27 PROCEDURE — 85610 PROTHROMBIN TIME: CPT

## 2021-04-27 PROCEDURE — 700111 HCHG RX REV CODE 636 W/ 250 OVERRIDE (IP): Performed by: HOSPITALIST

## 2021-04-27 PROCEDURE — 94640 AIRWAY INHALATION TREATMENT: CPT

## 2021-04-27 PROCEDURE — 700102 HCHG RX REV CODE 250 W/ 637 OVERRIDE(OP): Performed by: STUDENT IN AN ORGANIZED HEALTH CARE EDUCATION/TRAINING PROGRAM

## 2021-04-27 PROCEDURE — 700102 HCHG RX REV CODE 250 W/ 637 OVERRIDE(OP): Performed by: HOSPITALIST

## 2021-04-27 PROCEDURE — 93005 ELECTROCARDIOGRAM TRACING: CPT | Performed by: FAMILY MEDICINE

## 2021-04-27 PROCEDURE — 97116 GAIT TRAINING THERAPY: CPT

## 2021-04-27 PROCEDURE — 97530 THERAPEUTIC ACTIVITIES: CPT

## 2021-04-27 PROCEDURE — 93010 ELECTROCARDIOGRAM REPORT: CPT | Performed by: INTERNAL MEDICINE

## 2021-04-27 PROCEDURE — 85730 THROMBOPLASTIN TIME PARTIAL: CPT

## 2021-04-27 PROCEDURE — 99232 SBSQ HOSP IP/OBS MODERATE 35: CPT | Performed by: FAMILY MEDICINE

## 2021-04-27 PROCEDURE — 85520 HEPARIN ASSAY: CPT

## 2021-04-27 PROCEDURE — 770020 HCHG ROOM/CARE - TELE (206)

## 2021-04-27 PROCEDURE — A9270 NON-COVERED ITEM OR SERVICE: HCPCS | Performed by: INTERNAL MEDICINE

## 2021-04-27 PROCEDURE — 700111 HCHG RX REV CODE 636 W/ 250 OVERRIDE (IP): Performed by: FAMILY MEDICINE

## 2021-04-27 PROCEDURE — 700102 HCHG RX REV CODE 250 W/ 637 OVERRIDE(OP): Performed by: FAMILY MEDICINE

## 2021-04-27 PROCEDURE — A9270 NON-COVERED ITEM OR SERVICE: HCPCS | Performed by: HOSPITALIST

## 2021-04-27 PROCEDURE — 700101 HCHG RX REV CODE 250: Performed by: STUDENT IN AN ORGANIZED HEALTH CARE EDUCATION/TRAINING PROGRAM

## 2021-04-27 PROCEDURE — A9270 NON-COVERED ITEM OR SERVICE: HCPCS | Performed by: PSYCHIATRY & NEUROLOGY

## 2021-04-27 PROCEDURE — 700102 HCHG RX REV CODE 250 W/ 637 OVERRIDE(OP): Performed by: INTERNAL MEDICINE

## 2021-04-27 RX ORDER — FLUTICASONE PROPIONATE 50 MCG
2 SPRAY, SUSPENSION (ML) NASAL DAILY
Status: DISCONTINUED | OUTPATIENT
Start: 2021-04-27 | End: 2021-04-29 | Stop reason: HOSPADM

## 2021-04-27 RX ORDER — HEPARIN SODIUM 5000 [USP'U]/100ML
0-30 INJECTION, SOLUTION INTRAVENOUS CONTINUOUS
Status: DISCONTINUED | OUTPATIENT
Start: 2021-04-27 | End: 2021-04-28

## 2021-04-27 RX ORDER — WARFARIN SODIUM 5 MG/1
5 TABLET ORAL DAILY
Status: DISCONTINUED | OUTPATIENT
Start: 2021-04-27 | End: 2021-04-29 | Stop reason: HOSPADM

## 2021-04-27 RX ADMIN — NYSTATIN: 100000 POWDER TOPICAL at 12:00

## 2021-04-27 RX ADMIN — WARFARIN SODIUM 5 MG: 5 TABLET ORAL at 17:14

## 2021-04-27 RX ADMIN — HYDROCORTISONE 10 MG: 10 TABLET ORAL at 05:18

## 2021-04-27 RX ADMIN — TAMSULOSIN HYDROCHLORIDE 0.4 MG: 0.4 CAPSULE ORAL at 05:17

## 2021-04-27 RX ADMIN — MONTELUKAST 10 MG: 10 TABLET, FILM COATED ORAL at 05:18

## 2021-04-27 RX ADMIN — CLOPIDOGREL BISULFATE 75 MG: 75 TABLET ORAL at 05:17

## 2021-04-27 RX ADMIN — SOTALOL HYDROCHLORIDE 40 MG: 80 TABLET ORAL at 11:01

## 2021-04-27 RX ADMIN — OMEPRAZOLE 20 MG: 20 CAPSULE, DELAYED RELEASE ORAL at 05:17

## 2021-04-27 RX ADMIN — LIDOCAINE 2 PATCH: 50 PATCH TOPICAL at 11:01

## 2021-04-27 RX ADMIN — OXYCODONE 5 MG: 5 TABLET ORAL at 21:18

## 2021-04-27 RX ADMIN — BENZONATATE 100 MG: 100 CAPSULE ORAL at 08:34

## 2021-04-27 RX ADMIN — HYDROCORTISONE 10 MG: 10 TABLET ORAL at 17:14

## 2021-04-27 RX ADMIN — OXYCODONE 5 MG: 5 TABLET ORAL at 11:01

## 2021-04-27 RX ADMIN — DOCUSATE SODIUM 50 MG AND SENNOSIDES 8.6 MG 2 TABLET: 8.6; 5 TABLET, FILM COATED ORAL at 17:12

## 2021-04-27 RX ADMIN — ASPIRIN 81 MG: 81 TABLET, COATED ORAL at 05:18

## 2021-04-27 RX ADMIN — OMEPRAZOLE 20 MG: 20 CAPSULE, DELAYED RELEASE ORAL at 17:12

## 2021-04-27 RX ADMIN — IPRATROPIUM BROMIDE AND ALBUTEROL SULFATE 3 ML: .5; 2.5 SOLUTION RESPIRATORY (INHALATION) at 08:33

## 2021-04-27 RX ADMIN — FLUTICASONE PROPIONATE 100 MCG: 50 SPRAY, METERED NASAL at 12:30

## 2021-04-27 RX ADMIN — ATORVASTATIN CALCIUM 80 MG: 80 TABLET, FILM COATED ORAL at 17:12

## 2021-04-27 RX ADMIN — NYSTATIN: 100000 POWDER TOPICAL at 05:18

## 2021-04-27 RX ADMIN — GABAPENTIN 600 MG: 300 CAPSULE ORAL at 17:12

## 2021-04-27 RX ADMIN — LEVOTHYROXINE SODIUM 75 MCG: 0.07 TABLET ORAL at 05:17

## 2021-04-27 RX ADMIN — HEPARIN SODIUM 12 UNITS/KG/HR: 5000 INJECTION, SOLUTION INTRAVENOUS at 11:43

## 2021-04-27 RX ADMIN — SOTALOL HYDROCHLORIDE 40 MG: 80 TABLET ORAL at 22:00

## 2021-04-27 RX ADMIN — DOCUSATE SODIUM 50 MG AND SENNOSIDES 8.6 MG 2 TABLET: 8.6; 5 TABLET, FILM COATED ORAL at 05:18

## 2021-04-27 RX ADMIN — HYDROCORTISONE SODIUM SUCCINATE 50 MG: 100 INJECTION, POWDER, FOR SOLUTION INTRAMUSCULAR; INTRAVENOUS at 05:18

## 2021-04-27 ASSESSMENT — GAIT ASSESSMENTS
DISTANCE (FEET): 5
ASSISTIVE DEVICE: FRONT WHEEL WALKER
DEVIATION: SHUFFLED GAIT;BRADYKINETIC;DECREASED BASE OF SUPPORT
GAIT LEVEL OF ASSIST: MINIMAL ASSIST

## 2021-04-27 ASSESSMENT — ENCOUNTER SYMPTOMS
CHILLS: 0
BLOOD IN STOOL: 0
NAUSEA: 0
ABDOMINAL PAIN: 1
DOUBLE VISION: 0
FEVER: 0
VOMITING: 0
BLURRED VISION: 0
DIZZINESS: 0
MYALGIAS: 0
DIARRHEA: 0
DEPRESSION: 0
HEADACHES: 0
PALPITATIONS: 0
NECK PAIN: 0
COUGH: 0

## 2021-04-27 ASSESSMENT — COGNITIVE AND FUNCTIONAL STATUS - GENERAL
CLIMB 3 TO 5 STEPS WITH RAILING: A LOT
WALKING IN HOSPITAL ROOM: A LITTLE
SUGGESTED CMS G CODE MODIFIER MOBILITY: CK
STANDING UP FROM CHAIR USING ARMS: A LITTLE
MOBILITY SCORE: 15
MOVING FROM LYING ON BACK TO SITTING ON SIDE OF FLAT BED: A LOT
MOVING TO AND FROM BED TO CHAIR: A LOT
TURNING FROM BACK TO SIDE WHILE IN FLAT BAD: A LITTLE

## 2021-04-27 ASSESSMENT — PAIN DESCRIPTION - PAIN TYPE: TYPE: ACUTE PAIN

## 2021-04-27 ASSESSMENT — FIBROSIS 4 INDEX: FIB4 SCORE: 2.85

## 2021-04-27 NOTE — FACE TO FACE
Face to Face Supporting Documentation - Home Health    The encounter with this patient was in whole or in part the primary reason for home health admission.    Date of encounter:   Patient:                    MRN:                       YOB: 2021  Jeffrey Lizama  [id number]  12/23/1932     Home health to see patient for:  Skilled Nursing care for assessment, interventions & education, Physical Therapy evaluation and treatment and Occupational therapy evaluation and treatment    Skilled need for:  Comment: Skilled/PT/OT    Skilled nursing interventions to include:  Comment: Skilled/PT/OT    Homebound status evidenced by:  Need the aid of supportive devices such as crutches, canes, wheelchairs or walkers. Leaving home requires a considerable and taxing effort. There is a normal inability to leave the home.    Community Physician to provide follow up care: Bebe Banerjee M.D.     Optional Interventions? No      I certify the face to face encounter for this home health care referral meets the CMS requirements and the encounter/clinical assessment with the patient was, in whole, or in part, for the medical condition(s) listed above, which is the primary reason for home health care. Based on my clinical findings: the service(s) are medically necessary, support the need for home health care, and the homebound criteria are met.  I certify that this patient has had a face to face encounter by myself.  Toma Wylie M.D. - NPI: [id number]

## 2021-04-27 NOTE — PROGRESS NOTES
Received report from RN, pt care assumed, VSS, pt assessment complete. Pt AAOx4, with 0/10 of pain at this time. No signs of acute distress noted at this time. Plan of care discussed with pt and verbalizes no questions. Pt denies any additional needs at this time. Bed locked/in lowest position, bed alarm on, pt educated on fall risk and verbalized understanding, call light within reach, hourly rounding initiated.

## 2021-04-27 NOTE — PROGRESS NOTES
Hospital Medicine Daily Progress Note    Date of Service  4/27/2021    Chief Complaint  Chest pain    Hospital Course  88 y.o. male w/ hx of paroxysmal afib, AV block ans s/p pacemaker, DVT/PE's in past and s/p IVC filter placement, COPD, hypertrophic obstructive cardiomyopathy admitted 4/18/2021 with chest pain while watching TV.  His workup was negative for PE but did show a 4.2cm ascending aneurysm.  He had serial troponin with increase levels. He was started on a heparin drip and cardiology consulted and took him to heart cath and placed 3 stents to the LAD.  Subsequently to cath he developed acute hematuria and is on asa, plavix, warfarin, heparin with subtherapeutic INR.    Interval Problem Update  4/25: Improved BP.  Alert and conversant.  I have reviewed his CXR showing right sided opacification >left side.  Patient on 6 L O2 but normally on 5 L at home.  Feels weak.  Using urinal and no hematuria.     4/26: BNP:6212, lasix ordered. ON 6 L but on 5L at home.  Speech clear and he is sitting up in a chair.  He was a two person assist.  Likely needs further post acute care before able to handle his ADL's at home.  Rehab/SNF ordered.  4/27: Resting in bed comfortably.  Respiratory status stable.  Denies any chest pain.  No hematuria. Restarted heparin gtt and Coumadin. No acute distress. No issues overnight per staff.   Consultants/Specialty  Cardiology  Critical Care (Dr Sher signing off 4/22)    Code Status  Full Code    Disposition  4/25 transfer orders placed by Dr Sher  4/26 SNF/Rehab ordered    Review of Systems  Review of Systems   Constitutional: Negative for chills and fever.   HENT: Negative for hearing loss and tinnitus.    Eyes: Negative for blurred vision and double vision.   Respiratory: Negative for cough.    Cardiovascular: Negative for chest pain and palpitations.   Gastrointestinal: Positive for abdominal pain. Negative for blood in stool, diarrhea, nausea and vomiting.   Genitourinary:  Positive for dysuria and hematuria.   Musculoskeletal: Negative for myalgias and neck pain.   Neurological: Negative for dizziness and headaches.   Psychiatric/Behavioral: Negative for depression and suicidal ideas.        Physical Exam  Temp:  [36.1 °C (96.9 °F)-37.1 °C (98.8 °F)] 37.1 °C (98.8 °F)  Pulse:  [75-88] 76  Resp:  [16-28] 19  BP: (120-167)/() 124/85  SpO2:  [96 %-99 %] 98 %    Physical Exam  Vitals reviewed.   Constitutional:       Appearance: Normal appearance. He is not diaphoretic.   HENT:      Head: Normocephalic and atraumatic.      Nose: Nose normal.      Mouth/Throat:      Mouth: Mucous membranes are moist.   Eyes:      General:         Right eye: No discharge.         Left eye: No discharge.      Extraocular Movements: Extraocular movements intact.      Pupils: Pupils are equal, round, and reactive to light.   Neck:      Vascular: No carotid bruit.   Cardiovascular:      Rate and Rhythm: Normal rate and regular rhythm.      Pulses:           Radial pulses are 2+ on the right side and 2+ on the left side.        Dorsalis pedis pulses are 2+ on the right side and 2+ on the left side.      Heart sounds: Murmur present.   Pulmonary:      Effort: Pulmonary effort is normal. No respiratory distress.      Breath sounds: Normal breath sounds.   Abdominal:      General: Abdomen is flat. There is no distension.      Palpations: Abdomen is soft.   Genitourinary:     Comments: Condom catheter over penis with bright red bloody urine in tubing  Musculoskeletal:         General: No swelling or tenderness.      Cervical back: No muscular tenderness.      Right lower leg: Edema (trace) present.      Left lower leg: Edema (trace) present.   Lymphadenopathy:      Cervical: No cervical adenopathy.   Skin:     Coloration: Skin is not jaundiced or pale.   Neurological:      General: No focal deficit present.      Mental Status: He is alert and oriented to person, place, and time.      Cranial Nerves: No cranial  nerve deficit.   Psychiatric:         Mood and Affect: Mood normal.         Behavior: Behavior normal.         Fluids    Intake/Output Summary (Last 24 hours) at 4/27/2021 1611  Last data filed at 4/27/2021 0504  Gross per 24 hour   Intake 200 ml   Output 500 ml   Net -300 ml       Laboratory  Recent Labs     04/25/21  0246 04/26/21  0610   WBC 15.4* 18.0*   RBC 4.50* 4.49*   HEMOGLOBIN 11.5* 11.5*   HEMATOCRIT 36.8* 37.6*   MCV 81.8 83.7   MCH 25.6* 25.6*   MCHC 31.3* 30.6*   RDW 52.6* 52.1*   PLATELETCT 208 235   MPV 9.8 10.0     Recent Labs     04/25/21  0246 04/26/21  0610   SODIUM 129* 133*   POTASSIUM 3.7 3.7   CHLORIDE 95* 99   CO2 28 30   GLUCOSE 129* 132*   BUN 16 13   CREATININE 0.80 0.77   CALCIUM 7.1* 7.4*     Recent Labs     04/25/21  0246 04/27/21  1230   APTT  --  35.8   INR 1.19* 1.11               Imaging  EC-ECHOCARDIOGRAM COMPLETE W/ CONT   Final Result      DX-CHEST-PORTABLE (1 VIEW)   Final Result      Hazy opacities on the right may represent asymmetric edema, right greater than left.      Right midlung opacities may represent pneumonitis or atelectasis.      Bibasilar opacities may represent atelectasis or consolidation.      Mild cardiomegaly.      Atherosclerotic plaque.         CT-CHEST,ABDOMEN,PELVIS WITH   Final Result      No pleural effusion is identified.      Pleural calcification can be seen as sequelae of prior asbestos exposure.      Elevation of the left hemidiaphragm and bibasilar atelectasis, left greater than right.      Enlarged subcarinal lymph node is nonspecific and may be reactive. Follow-up is recommended as malignancy is not excluded.      Bilateral renal cysts and 3.4 cm lesion in the upper pole of the left kidney which is dense and may represent a hemorrhagic/proteinaceous cyst but a mass is not excluded and further evaluation with renal ultrasound is recommended.      Infrarenal aortic aneurysm measuring 4.3 x 4.2 cm.      Colonic diverticulosis.      Thick-walled  bladder may be related to obstructive uropathy in the setting of a prominent prostate. There is surrounding mild stranding and cystitis is not excluded. Further evaluation with urinalysis is recommended.      Aneurysmal ascending aorta measuring 4.4 cm.      3 mm right middle lobe pulmonary nodule is unchanged.      Fleischner Society pulmonary nodule recommendations:   Low Risk: No routine follow-up      High Risk: Optional CT at 12 months      Comments: Nodules less than 6 mm do not require routine follow-up, but certain patients at high risk with suspicious nodule morphology, upper lobe location, or both may warrant 12-month follow-up.      Low Risk - Minimal or absent history of smoking and of other known risk factors.      High Risk - History of smoking or of other known risk factors.      Note: These recommendations do not apply to lung cancer screening, patients with immunosuppression, or patients with known primary cancer.      Fleischner Society 2017 Guidelines for Management of Incidentally Detected Pulmonary Nodules in Adults      US-RENAL   Final Result      No evidence of solid renal mass or hydronephrosis.      EC-ECHOCARDIOGRAM COMPLETE W/O CONT   Final Result      CT-CTA CHEST PULMONARY ARTERY W/ RECONS   Final Result         1.  No evidence of pulmonary embolism.   2.  Mild cardiomegaly.   3.  Atherosclerosis.   4.  Mild 4.2 cm ascending thoracic aortic aneurysm.   5.  Hypoinflation, elevation of left hemidiaphragm with bibasilar atelectasis. No significant consolidation or pleural effusion.   6.  4 mm noncalcified right middle lobe pulmonary nodule. Follow-up per Fleischner criteria is clinically indicated.      Fleischner Society pulmonary nodule recommendations:   Low Risk: No routine follow-up      High Risk: Optional CT at 12 months      Comments: Nodules less than 6 mm do not require routine follow-up, but certain patients at high risk with suspicious nodule morphology, upper lobe location, or  both may warrant 12-month follow-up.      Low Risk - Minimal or absent history of smoking and of other known risk factors.      High Risk - History of smoking or of other known risk factors.      Note: These recommendations do not apply to lung cancer screening, patients with immunosuppression, or patients with known primary cancer.      Fleischner Society 2017 Guidelines for Management of Incidentally Detected Pulmonary Nodules in Adults      DX-CHEST-PORTABLE (1 VIEW)   Final Result      1.  Hypoinflation with LEFT lung base atelectasis or infiltrate, possibly chronic.   2.  No overt pulmonary edema.      CL-LEFT HEART CATHETERIZATION WITH POSSIBLE INTERVENTION    (Results Pending)        Assessment/Plan  * Hypotension  Assessment & Plan  On admit had adrenal insufficiency in the setting of stress response  Monitor vitals and having improved BP>  4/26 finish hydrocortison 50mg TID IV and switch to back on home hydrocortisone 10mg BID 4/27  Wean midodrine 5mg TID as tolerates    CAD (coronary artery disease)  Assessment & Plan  Status post PTCA to LAD with 3 overlapping stents placed.  Continue aspirin and Plavix.    Hematuria due to acute cystitis  Assessment & Plan  Holding lovenox and warfarin 4/22.  Continue plavix and aspirin due to recent stent placement.  May hold aspirin if not clearing per cardiology  S/p CBI with clearing of hematuria  S/p rocephin.  If continues I will consult Dr Toño Fitzpatrick his urologist.  ultrasound kidney and bladder without significant finding  Urology consult Dr. Fitzpatrick informed  Resolved.    NSTEMI (non-ST elevated myocardial infarction) (HCC)- (present on admission)  Assessment & Plan  Status post heart cath with stenting to the LAD x 3 on 4/20  Statin Lipitor 80 mg  Sotalol   plavix and aspirin (holding lovenox and warfarin 4/22 due to acute hematuria.)  Troponin trended up to 810  Echocardiogram showed normal left ventricle chamber size with mild concentric left ventricular  hypertrophy with ejection fraction estimated to be around 60%.  It was akinesis of the apex and hypokinesis of the apical septal wall that could be related to ventricular pacing versus prior infarct  Cardiology consulting    Adrenal insufficiency (HCC)- (present on admission)  Assessment & Plan  Secondary to chronic steroid use and hx of panhypopituitary per note  He was on hydrocortisone 10 mg BID as an outpatient  Due to hypotension, he had been started on IV hydrocortisone 50 mg q 6 hours which will change back to oral home dose on 4/21  Continue home dose Cortef.        UTI (urinary tract infection)- (present on admission)  Assessment & Plan  Acute cystitis with hematuria.  4/18 urine culture negative.  Started ceftriaxone and pyridium.  On 4/22  completed antibiotics    Obstructive hypertrophic cardiomyopathy (HCC)- (present on admission)  Assessment & Plan  As per history.   currently on: sotalol 40 mg q12h  Repeat echocardiogram unchanged  4/18 Echo: Compared to the images of the study done 1/22/2019  - there has been no   significant change..   Akinesis of apex, hypokinesis of apical septal wall may be from   ventricular pacing verus prior infarct.  Normal right ventricular size and systolic function.  Mild mitral regurgitation.  Mild aortic stenosis.  Mild aortic insufficiency.    Anticoagulant long-term use- (present on admission)  Assessment & Plan  Secondary to extensive history of DVT AND MULTIPLE PE's  STATUS post IVC filter placement.  Home Coumadin had been held for anticipated skin BCC procedures.   IV heparin drip and follow Factor X levels  Coumadin was restarted 4/21 per Dr. To goal INR 1.5-2 but held 4/22 due to hematuria.  4/27: Hematuria resolved.  Resume Coumadin and heparin drip.    History of pulmonary embolism- (present on admission)  Assessment & Plan  As per history at least 8-9 PEs in the past.    Hx of having an IVC filter in place.  Initially he was holding warfarin for right ear  basal cell carcinoma removal in 2 days.  4/22 stopped lovenox and warfarin due to hematuria  CTA with PE protocol from the ER was negative for PE.  4/27: Start heparin drip and Coumadin.    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  Status post resection of pituitary adenoma in 1998.   levothyroxine, cortef  Testosterone cyp 200mg IM very 21days, missed two dose, resume    Essential hypertension, benign- (present on admission)  Assessment & Plan  Continue home sotalol if BP allows    Prolonged Q-T interval on ECG- (present on admission)  Assessment & Plan  In sotalol use.  Avoid QTC prolonging medications.    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  Continue telemetry monitoring.  Continue home sotalol 40mg BID    BPH (benign prostatic hypertrophy)- (present on admission)  Assessment & Plan  Continues to have urinary frequency.  Continue on tamsulosin.  Follows Dr Gonzales outpatient urology    Chronic back pain- (present on admission)  Assessment & Plan  As per history in setting of arthritis.  Continue home Norco and gabapentin.    COPD (chronic obstructive pulmonary disease) (HCC)- (present on admission)  Assessment & Plan  With chronic respiratory failure.  On 5 L of oxygen via nasal cannula at home.  Currently not in acute exacerbation clinically and at baseline with respiratory status.  Bronchodilators as needed per RT protocol.    Cardiac pacemaker in situ- (present on admission)  Assessment & Plan  Placed for sick sinus syndrome third-degree block.  History of multiple PVCs.  Telemetry monitoring.       VTE prophylaxis: Heparin

## 2021-04-27 NOTE — DISCHARGE PLANNING
[facility]/ Transitional Care Coordination  TCC following.  Appreciate PT/OT therapy notes when medically appropriate.

## 2021-04-27 NOTE — CARE PLAN
Problem: Pain Management  Goal: Pain level will decrease to patient's comfort goal  Outcome: PROGRESSING AS EXPECTED     Problem: Psychosocial Needs:  Goal: Level of anxiety will decrease  Outcome: PROGRESSING AS EXPECTED     Problem: Respiratory:  Goal: Respiratory status will improve  Outcome: PROGRESSING SLOWER THAN EXPECTED  Note: Pt has expiratory wheezing with exertion.

## 2021-04-27 NOTE — PROGRESS NOTES
"2 RN skin check complete.   Devices in place condom cath, tele monitor and oxy mask.  Skin assessed under devices redness on ears of oxy mask, everything else CDI.  Confirmed pressure ulcers found on NA.  New potential pressure ulcers noted on sacrum.   Wound consult placed yes.  The following interventions in place grey foam pads on oxy mask, nystatin and dry ac on red groin and flank areas, barrier cream and meplex on sacrum.    Pt has some blanchable redness on tops of the ears, he has small open cristian on backs of ears that he states, \"are skin cancer, needs to get them removed.\" Pt has lots of bruising and redness on upper extremities and abdomen, suprapubic bruising, dry skin and raised moles scattered throughout his trunk, anterior and posterior. Moisture associated redness in groin and flank and skin fold under lower belly. Redness on the coccyx and a small skin tear in the center. Feet and toenails dry and cracked.   "

## 2021-04-27 NOTE — PROGRESS NOTES
Report given to ОЛЬГА Mason on T7. All questions answered at this time. Room is ready, this RN and CNA to transport patient to T736-2.

## 2021-04-28 PROBLEM — E83.51 HYPOCALCEMIA: Status: ACTIVE | Noted: 2021-04-28

## 2021-04-28 LAB
ALBUMIN SERPL BCP-MCNC: 2.8 G/DL (ref 3.2–4.9)
ALBUMIN/GLOB SERPL: 1 G/DL
ALP SERPL-CCNC: 98 U/L (ref 30–99)
ALT SERPL-CCNC: 64 U/L (ref 2–50)
ANION GAP SERPL CALC-SCNC: 4 MMOL/L (ref 7–16)
AST SERPL-CCNC: 68 U/L (ref 12–45)
BASOPHILS # BLD AUTO: 0.4 % (ref 0–1.8)
BASOPHILS # BLD: 0.07 K/UL (ref 0–0.12)
BILIRUB SERPL-MCNC: 0.2 MG/DL (ref 0.1–1.5)
BUN SERPL-MCNC: 14 MG/DL (ref 8–22)
CA-I SERPL-SCNC: 1 MMOL/L (ref 1.1–1.3)
CALCIUM SERPL-MCNC: 7.3 MG/DL (ref 8.5–10.5)
CHLORIDE SERPL-SCNC: 98 MMOL/L (ref 96–112)
CO2 SERPL-SCNC: 35 MMOL/L (ref 20–33)
CREAT SERPL-MCNC: 0.67 MG/DL (ref 0.5–1.4)
EKG IMPRESSION: NORMAL
EOSINOPHIL # BLD AUTO: 0.28 K/UL (ref 0–0.51)
EOSINOPHIL NFR BLD: 1.5 % (ref 0–6.9)
ERYTHROCYTE [DISTWIDTH] IN BLOOD BY AUTOMATED COUNT: 51.2 FL (ref 35.9–50)
GLOBULIN SER CALC-MCNC: 2.7 G/DL (ref 1.9–3.5)
GLUCOSE SERPL-MCNC: 106 MG/DL (ref 65–99)
HCT VFR BLD AUTO: 37.8 % (ref 42–52)
HGB BLD-MCNC: 11.6 G/DL (ref 14–18)
IMM GRANULOCYTES # BLD AUTO: 0.79 K/UL (ref 0–0.11)
IMM GRANULOCYTES NFR BLD AUTO: 4.3 % (ref 0–0.9)
INR PPP: 1.06 (ref 0.87–1.13)
LYMPHOCYTES # BLD AUTO: 1.38 K/UL (ref 1–4.8)
LYMPHOCYTES NFR BLD: 7.5 % (ref 22–41)
MCH RBC QN AUTO: 25.6 PG (ref 27–33)
MCHC RBC AUTO-ENTMCNC: 30.7 G/DL (ref 33.7–35.3)
MCV RBC AUTO: 83.3 FL (ref 81.4–97.8)
MONOCYTES # BLD AUTO: 1.55 K/UL (ref 0–0.85)
MONOCYTES NFR BLD AUTO: 8.5 % (ref 0–13.4)
NEUTROPHILS # BLD AUTO: 14.24 K/UL (ref 1.82–7.42)
NEUTROPHILS NFR BLD: 77.8 % (ref 44–72)
NRBC # BLD AUTO: 0 K/UL
NRBC BLD-RTO: 0 /100 WBC
PLATELET # BLD AUTO: 259 K/UL (ref 164–446)
PMV BLD AUTO: 9.5 FL (ref 9–12.9)
POTASSIUM SERPL-SCNC: 3.7 MMOL/L (ref 3.6–5.5)
PROT SERPL-MCNC: 5.5 G/DL (ref 6–8.2)
PROTHROMBIN TIME: 14.1 SEC (ref 12–14.6)
PTH-INTACT SERPL-MCNC: 82 PG/ML (ref 14–72)
RBC # BLD AUTO: 4.54 M/UL (ref 4.7–6.1)
SODIUM SERPL-SCNC: 137 MMOL/L (ref 135–145)
UFH PPP CHRO-ACNC: 0.29 IU/ML
WBC # BLD AUTO: 18.3 K/UL (ref 4.8–10.8)

## 2021-04-28 PROCEDURE — 83970 ASSAY OF PARATHORMONE: CPT

## 2021-04-28 PROCEDURE — 80053 COMPREHEN METABOLIC PANEL: CPT

## 2021-04-28 PROCEDURE — 85025 COMPLETE CBC W/AUTO DIFF WBC: CPT

## 2021-04-28 PROCEDURE — 700102 HCHG RX REV CODE 250 W/ 637 OVERRIDE(OP): Performed by: INTERNAL MEDICINE

## 2021-04-28 PROCEDURE — 93005 ELECTROCARDIOGRAM TRACING: CPT | Performed by: FAMILY MEDICINE

## 2021-04-28 PROCEDURE — 82306 VITAMIN D 25 HYDROXY: CPT

## 2021-04-28 PROCEDURE — 94640 AIRWAY INHALATION TREATMENT: CPT

## 2021-04-28 PROCEDURE — 700111 HCHG RX REV CODE 636 W/ 250 OVERRIDE (IP): Performed by: FAMILY MEDICINE

## 2021-04-28 PROCEDURE — 82330 ASSAY OF CALCIUM: CPT

## 2021-04-28 PROCEDURE — 85610 PROTHROMBIN TIME: CPT

## 2021-04-28 PROCEDURE — A9270 NON-COVERED ITEM OR SERVICE: HCPCS | Performed by: HOSPITALIST

## 2021-04-28 PROCEDURE — 700102 HCHG RX REV CODE 250 W/ 637 OVERRIDE(OP): Performed by: STUDENT IN AN ORGANIZED HEALTH CARE EDUCATION/TRAINING PROGRAM

## 2021-04-28 PROCEDURE — A9270 NON-COVERED ITEM OR SERVICE: HCPCS | Performed by: INTERNAL MEDICINE

## 2021-04-28 PROCEDURE — 700102 HCHG RX REV CODE 250 W/ 637 OVERRIDE(OP): Performed by: FAMILY MEDICINE

## 2021-04-28 PROCEDURE — 700101 HCHG RX REV CODE 250: Performed by: STUDENT IN AN ORGANIZED HEALTH CARE EDUCATION/TRAINING PROGRAM

## 2021-04-28 PROCEDURE — 700102 HCHG RX REV CODE 250 W/ 637 OVERRIDE(OP): Performed by: PSYCHIATRY & NEUROLOGY

## 2021-04-28 PROCEDURE — A9270 NON-COVERED ITEM OR SERVICE: HCPCS | Performed by: FAMILY MEDICINE

## 2021-04-28 PROCEDURE — 700102 HCHG RX REV CODE 250 W/ 637 OVERRIDE(OP): Performed by: HOSPITALIST

## 2021-04-28 PROCEDURE — A9270 NON-COVERED ITEM OR SERVICE: HCPCS | Performed by: PSYCHIATRY & NEUROLOGY

## 2021-04-28 PROCEDURE — 36415 COLL VENOUS BLD VENIPUNCTURE: CPT

## 2021-04-28 PROCEDURE — 770020 HCHG ROOM/CARE - TELE (206)

## 2021-04-28 PROCEDURE — 93010 ELECTROCARDIOGRAM REPORT: CPT | Performed by: INTERNAL MEDICINE

## 2021-04-28 PROCEDURE — 99232 SBSQ HOSP IP/OBS MODERATE 35: CPT | Performed by: FAMILY MEDICINE

## 2021-04-28 PROCEDURE — A9270 NON-COVERED ITEM OR SERVICE: HCPCS | Performed by: STUDENT IN AN ORGANIZED HEALTH CARE EDUCATION/TRAINING PROGRAM

## 2021-04-28 PROCEDURE — 85520 HEPARIN ASSAY: CPT

## 2021-04-28 RX ORDER — IBUPROFEN 200 MG
950 CAPSULE ORAL DAILY
Status: DISCONTINUED | OUTPATIENT
Start: 2021-04-28 | End: 2021-04-29 | Stop reason: HOSPADM

## 2021-04-28 RX ORDER — ALBUTEROL SULFATE 90 UG/1
2 AEROSOL, METERED RESPIRATORY (INHALATION)
Status: DISCONTINUED | OUTPATIENT
Start: 2021-04-28 | End: 2021-04-29 | Stop reason: HOSPADM

## 2021-04-28 RX ORDER — MIDODRINE HYDROCHLORIDE 5 MG/1
2.5 TABLET ORAL
Status: DISCONTINUED | OUTPATIENT
Start: 2021-04-28 | End: 2021-04-29 | Stop reason: HOSPADM

## 2021-04-28 RX ADMIN — IPRATROPIUM BROMIDE AND ALBUTEROL SULFATE 3 ML: .5; 2.5 SOLUTION RESPIRATORY (INHALATION) at 10:24

## 2021-04-28 RX ADMIN — CLOPIDOGREL BISULFATE 75 MG: 75 TABLET ORAL at 05:13

## 2021-04-28 RX ADMIN — TAMSULOSIN HYDROCHLORIDE 0.4 MG: 0.4 CAPSULE ORAL at 05:13

## 2021-04-28 RX ADMIN — LEVOTHYROXINE SODIUM 75 MCG: 0.07 TABLET ORAL at 05:13

## 2021-04-28 RX ADMIN — OMEPRAZOLE 20 MG: 20 CAPSULE, DELAYED RELEASE ORAL at 17:36

## 2021-04-28 RX ADMIN — MONTELUKAST 10 MG: 10 TABLET, FILM COATED ORAL at 05:13

## 2021-04-28 RX ADMIN — LIDOCAINE 2 PATCH: 50 PATCH TOPICAL at 10:06

## 2021-04-28 RX ADMIN — DOCUSATE SODIUM 50 MG AND SENNOSIDES 8.6 MG 2 TABLET: 8.6; 5 TABLET, FILM COATED ORAL at 05:13

## 2021-04-28 RX ADMIN — GABAPENTIN 600 MG: 300 CAPSULE ORAL at 17:36

## 2021-04-28 RX ADMIN — OXYCODONE 5 MG: 5 TABLET ORAL at 14:43

## 2021-04-28 RX ADMIN — OMEPRAZOLE 20 MG: 20 CAPSULE, DELAYED RELEASE ORAL at 05:13

## 2021-04-28 RX ADMIN — OXYCODONE 5 MG: 5 TABLET ORAL at 19:04

## 2021-04-28 RX ADMIN — HYDROCORTISONE 10 MG: 10 TABLET ORAL at 05:13

## 2021-04-28 RX ADMIN — BENZONATATE 100 MG: 100 CAPSULE ORAL at 21:40

## 2021-04-28 RX ADMIN — SOTALOL HYDROCHLORIDE 40 MG: 80 TABLET ORAL at 10:06

## 2021-04-28 RX ADMIN — CALCIUM CITRATE 200 MG (950 MG) TABLET 950 MG: at 18:37

## 2021-04-28 RX ADMIN — HYDROCORTISONE 10 MG: 10 TABLET ORAL at 17:36

## 2021-04-28 RX ADMIN — ATORVASTATIN CALCIUM 80 MG: 80 TABLET, FILM COATED ORAL at 17:36

## 2021-04-28 RX ADMIN — ENOXAPARIN SODIUM 100 MG: 100 INJECTION SUBCUTANEOUS at 10:06

## 2021-04-28 RX ADMIN — ENOXAPARIN SODIUM 100 MG: 100 INJECTION SUBCUTANEOUS at 17:35

## 2021-04-28 RX ADMIN — WARFARIN SODIUM 5 MG: 5 TABLET ORAL at 17:36

## 2021-04-28 RX ADMIN — ASPIRIN 81 MG: 81 TABLET, COATED ORAL at 05:13

## 2021-04-28 RX ADMIN — FLUTICASONE PROPIONATE 100 MCG: 50 SPRAY, METERED NASAL at 05:13

## 2021-04-28 RX ADMIN — SOTALOL HYDROCHLORIDE 40 MG: 80 TABLET ORAL at 21:32

## 2021-04-28 RX ADMIN — MIDODRINE HYDROCHLORIDE 2.5 MG: 5 TABLET ORAL at 17:36

## 2021-04-28 ASSESSMENT — ENCOUNTER SYMPTOMS
TINGLING: 0
HEADACHES: 0
VOMITING: 0
MYALGIAS: 0
DIARRHEA: 0
FEVER: 0
NAUSEA: 0
COUGH: 0
ABDOMINAL PAIN: 1
CHILLS: 0
DEPRESSION: 0
PALPITATIONS: 0
DIZZINESS: 0
BLURRED VISION: 0
DOUBLE VISION: 0

## 2021-04-28 ASSESSMENT — PAIN DESCRIPTION - PAIN TYPE
TYPE: CHRONIC PAIN
TYPE: CHRONIC PAIN

## 2021-04-28 ASSESSMENT — FIBROSIS 4 INDEX: FIB4 SCORE: 2.89

## 2021-04-28 NOTE — THERAPY
Physical Therapy   Daily Treatment     Patient Name: Jeffrey Lizama  Age:  88 y.o., Sex:  male  Medical Record #: [id number]  Today's Date: 4/27/2021     Precautions: Fall Risk, Cardiac Precautions (See Comments)    Assessment    Pt seen for follow up PT tx. Pt appears to have had a decline in function since initial PT eval unclear if this is due to limited mobility or medical status. Pt required min assist with bed mobility, transfers, and short bouts of gait in room with FWW. Pt is limited by SOB and general fatigue but is motivated to return to his prior level of function. PT will cont while pt is in acute care setting    Pt would benefit from OT consult    Plan    Continue current treatment plan.    DC Equipment Recommendations: Unable to determine at this time  Discharge Recommendations: Recommend post-acute placement for additional physical therapy services prior to discharge home         04/27/21 1541   Vitals   Pulse Oximetry 97 %   O2 (LPM) 6   O2 Delivery Device Oxymask   Vitals Comments pt initially on 10L via oxymask and sating 100%. Progressively dropped O2 down to 6L and pt maintained SpO2. RN and RT notified   Other Treatments   Other Treatments Provided educated pt on side effects of too much O2 with COPD. Pt receptive   Gait Analysis   Gait Level Of Assist Minimal Assist   Assistive Device Front Wheel Walker   Distance (Feet) 5   # of Times Distance was Traveled 3   Deviation Shuffled Gait;Bradykinetic;Decreased Base Of Support   # of Stairs Climbed 0   Weight Bearing Status WBAT   Skilled Intervention Verbal Cuing   Comments limited by fatigue   Bed Mobility    Supine to Sit Minimal Assist   Sit to Supine   (NT in chair)   Scooting Supervised   Skilled Intervention Verbal Cuing   Comments HOB raised   Functional Mobility   Sit to Stand Minimal Assist   Bed, Chair, Wheelchair Transfer Minimal Assist   Transfer Method Stand Step   Mobility in room with FWW   Skilled Intervention Verbal Cuing    Short Term Goals    Short Term Goal # 1 Pt will verbalize understanding of modified RPE scale by the 6thtx   Goal Outcome # 1 goal not met   Short Term Goal # 2 Pt will perform sit <> stand to FWW at SPV level by the 6th tx   Goal Outcome # 2 Goal not met   Short Term Goal # 3 Pt will ambulate with FWW for 150 ft with SPV by the 6th tx   Goal Outcome # 3 Goal not met   Anticipated Discharge Equipment and Recommendations   DC Equipment Recommendations Unable to determine at this time   Discharge Recommendations Recommend post-acute placement for additional physical therapy services prior to discharge home

## 2021-04-28 NOTE — PROGRESS NOTES
Hospital Medicine Daily Progress Note    Date of Service  4/28/2021    Chief Complaint  Chest pain    Hospital Course  88 y.o. male w/ hx of paroxysmal afib, AV block ans s/p pacemaker, DVT/PE's in past and s/p IVC filter placement, COPD, hypertrophic obstructive cardiomyopathy admitted 4/18/2021 with chest pain while watching TV.  His workup was negative for PE but did show a 4.2cm ascending aneurysm.  He had serial troponin with increase levels. He was started on a heparin drip and cardiology consulted and took him to heart cath and placed 3 stents to the LAD.  Subsequently to cath he developed acute hematuria and is on asa, plavix, warfarin, heparin with subtherapeutic INR.    Interval Problem Update  4/25: Improved BP.  Alert and conversant.  I have reviewed his CXR showing right sided opacification >left side.  Patient on 6 L O2 but normally on 5 L at home.  Feels weak.  Using urinal and no hematuria.     4/26: BNP:6212, lasix ordered. ON 6 L but on 5L at home.  Speech clear and he is sitting up in a chair.  He was a two person assist.  Likely needs further post acute care before able to handle his ADL's at home.  Rehab/SNF ordered.  4/27: Resting in bed comfortably.  Respiratory status stable.  Denies any chest pain.  No hematuria. Restarted heparin gtt and Coumadin. No acute distress. No issues overnight per staff.   4/28: Resting in bed comfortably.  Feels more congested in the chest today.  He was wheezing on exam.  Breathing treatments adjusted.  Denies any chest pain.  Hemodynamically stable.  Febrile.  No acute distress noted.  No issues overnight per staff.  Consultants/Specialty  Cardiology  Critical Care (Dr Sher signing off 4/22)    Code Status  Full Code    Disposition  Discharge to acute rehab once bed available.    Review of Systems  Review of Systems   Constitutional: Negative for chills and fever.   HENT: Negative for hearing loss and tinnitus.    Eyes: Negative for blurred vision and double  vision.   Respiratory: Negative for cough.    Cardiovascular: Negative for chest pain and palpitations.   Gastrointestinal: Positive for abdominal pain. Negative for diarrhea, nausea and vomiting.   Genitourinary: Positive for dysuria and hematuria.   Musculoskeletal: Negative for myalgias.   Neurological: Negative for dizziness, tingling and headaches.   Psychiatric/Behavioral: Negative for depression and suicidal ideas.        Physical Exam  Temp:  [36.3 °C (97.4 °F)-36.7 °C (98 °F)] 36.3 °C (97.4 °F)  Pulse:  [70-88] 72  Resp:  [17-18] 18  BP: (124-140)/(65-83) 140/77  SpO2:  [95 %-98 %] 96 %    Physical Exam  Vitals reviewed.   Constitutional:       General: He is not in acute distress.     Appearance: Normal appearance. He is not diaphoretic.   HENT:      Head: Normocephalic and atraumatic.      Nose: Nose normal.      Mouth/Throat:      Mouth: Mucous membranes are moist.   Eyes:      General:         Right eye: No discharge.         Left eye: No discharge.      Extraocular Movements: Extraocular movements intact.      Pupils: Pupils are equal, round, and reactive to light.   Neck:      Vascular: No carotid bruit.   Cardiovascular:      Rate and Rhythm: Normal rate and regular rhythm.      Pulses:           Radial pulses are 2+ on the right side and 2+ on the left side.        Dorsalis pedis pulses are 2+ on the right side and 2+ on the left side.      Heart sounds: Murmur present.   Pulmonary:      Effort: Pulmonary effort is normal. No respiratory distress.      Breath sounds: Wheezing present.   Abdominal:      General: Abdomen is flat. There is no distension.      Palpations: Abdomen is soft.   Genitourinary:     Comments: Condom catheter over penis with bright red bloody urine in tubing  Musculoskeletal:         General: No swelling or tenderness.      Cervical back: No muscular tenderness.      Right lower leg: Right lower leg edema: trace.      Left lower leg: Left lower leg edema: trace.    Lymphadenopathy:      Cervical: No cervical adenopathy.   Skin:     Coloration: Skin is not jaundiced or pale.   Neurological:      General: No focal deficit present.      Mental Status: He is alert and oriented to person, place, and time.      Cranial Nerves: No cranial nerve deficit.   Psychiatric:         Mood and Affect: Mood normal.         Behavior: Behavior normal.         Fluids    Intake/Output Summary (Last 24 hours) at 4/28/2021 1651  Last data filed at 4/28/2021 1400  Gross per 24 hour   Intake 840 ml   Output 1250 ml   Net -410 ml       Laboratory  Recent Labs     04/26/21  0610 04/28/21  0101   WBC 18.0* 18.3*   RBC 4.49* 4.54*   HEMOGLOBIN 11.5* 11.6*   HEMATOCRIT 37.6* 37.8*   MCV 83.7 83.3   MCH 25.6* 25.6*   MCHC 30.6* 30.7*   RDW 52.1* 51.2*   PLATELETCT 235 259   MPV 10.0 9.5     Recent Labs     04/26/21  0610 04/28/21  0101   SODIUM 133* 137   POTASSIUM 3.7 3.7   CHLORIDE 99 98   CO2 30 35*   GLUCOSE 132* 106*   BUN 13 14   CREATININE 0.77 0.67   CALCIUM 7.4* 7.3*     Recent Labs     04/27/21  1230 04/28/21  0101   APTT 35.8  --    INR 1.11 1.06               Imaging  EC-ECHOCARDIOGRAM COMPLETE W/ CONT   Final Result      DX-CHEST-PORTABLE (1 VIEW)   Final Result      Hazy opacities on the right may represent asymmetric edema, right greater than left.      Right midlung opacities may represent pneumonitis or atelectasis.      Bibasilar opacities may represent atelectasis or consolidation.      Mild cardiomegaly.      Atherosclerotic plaque.         CT-CHEST,ABDOMEN,PELVIS WITH   Final Result      No pleural effusion is identified.      Pleural calcification can be seen as sequelae of prior asbestos exposure.      Elevation of the left hemidiaphragm and bibasilar atelectasis, left greater than right.      Enlarged subcarinal lymph node is nonspecific and may be reactive. Follow-up is recommended as malignancy is not excluded.      Bilateral renal cysts and 3.4 cm lesion in the upper pole of the  left kidney which is dense and may represent a hemorrhagic/proteinaceous cyst but a mass is not excluded and further evaluation with renal ultrasound is recommended.      Infrarenal aortic aneurysm measuring 4.3 x 4.2 cm.      Colonic diverticulosis.      Thick-walled bladder may be related to obstructive uropathy in the setting of a prominent prostate. There is surrounding mild stranding and cystitis is not excluded. Further evaluation with urinalysis is recommended.      Aneurysmal ascending aorta measuring 4.4 cm.      3 mm right middle lobe pulmonary nodule is unchanged.      Fleischner Society pulmonary nodule recommendations:   Low Risk: No routine follow-up      High Risk: Optional CT at 12 months      Comments: Nodules less than 6 mm do not require routine follow-up, but certain patients at high risk with suspicious nodule morphology, upper lobe location, or both may warrant 12-month follow-up.      Low Risk - Minimal or absent history of smoking and of other known risk factors.      High Risk - History of smoking or of other known risk factors.      Note: These recommendations do not apply to lung cancer screening, patients with immunosuppression, or patients with known primary cancer.      Fleischner Society 2017 Guidelines for Management of Incidentally Detected Pulmonary Nodules in Adults      US-RENAL   Final Result      No evidence of solid renal mass or hydronephrosis.      EC-ECHOCARDIOGRAM COMPLETE W/O CONT   Final Result      CT-CTA CHEST PULMONARY ARTERY W/ RECONS   Final Result         1.  No evidence of pulmonary embolism.   2.  Mild cardiomegaly.   3.  Atherosclerosis.   4.  Mild 4.2 cm ascending thoracic aortic aneurysm.   5.  Hypoinflation, elevation of left hemidiaphragm with bibasilar atelectasis. No significant consolidation or pleural effusion.   6.  4 mm noncalcified right middle lobe pulmonary nodule. Follow-up per Fleischner criteria is clinically indicated.      Fleischner Society  pulmonary nodule recommendations:   Low Risk: No routine follow-up      High Risk: Optional CT at 12 months      Comments: Nodules less than 6 mm do not require routine follow-up, but certain patients at high risk with suspicious nodule morphology, upper lobe location, or both may warrant 12-month follow-up.      Low Risk - Minimal or absent history of smoking and of other known risk factors.      High Risk - History of smoking or of other known risk factors.      Note: These recommendations do not apply to lung cancer screening, patients with immunosuppression, or patients with known primary cancer.      Fleischner Society 2017 Guidelines for Management of Incidentally Detected Pulmonary Nodules in Adults      DX-CHEST-PORTABLE (1 VIEW)   Final Result      1.  Hypoinflation with LEFT lung base atelectasis or infiltrate, possibly chronic.   2.  No overt pulmonary edema.      CL-LEFT HEART CATHETERIZATION WITH POSSIBLE INTERVENTION    (Results Pending)        Assessment/Plan  * Hypotension  Assessment & Plan  On admit had adrenal insufficiency in the setting of stress response  Monitor vitals and having improved BP>  4/26 finish hydrocortison 50mg TID IV and switch to back on home hydrocortisone 10mg BID 4/27  Wean midodrine 5mg TID as tolerates    CAD (coronary artery disease)  Assessment & Plan  Status post PTCA to LAD with 3 overlapping stents placed.  Continue aspirin and Plavix.    Hematuria due to acute cystitis  Assessment & Plan  Holding lovenox and warfarin 4/22.  Continue plavix and aspirin due to recent stent placement.  May hold aspirin if not clearing per cardiology  S/p CBI with clearing of hematuria  S/p rocephin.  If continues I will consult Dr Toño Fitzpatrick his urologist.  ultrasound kidney and bladder without significant finding  Urology consult Dr. Fitzpatrick informed  Resolved.    NSTEMI (non-ST elevated myocardial infarction) (HCC)- (present on admission)  Assessment & Plan  Status post heart cath with  stenting to the LAD x 3 on 4/20  Statin Lipitor 80 mg  Sotalol   plavix and aspirin (holding lovenox and warfarin 4/22 due to acute hematuria.)  Troponin trended up to 810  Echocardiogram showed normal left ventricle chamber size with mild concentric left ventricular hypertrophy with ejection fraction estimated to be around 60%.  It was akinesis of the apex and hypokinesis of the apical septal wall that could be related to ventricular pacing versus prior infarct  Cardiology consulting    Adrenal insufficiency (HCC)- (present on admission)  Assessment & Plan  Secondary to chronic steroid use and hx of panhypopituitary per note  He was on hydrocortisone 10 mg BID as an outpatient  Due to hypotension, he had been started on IV hydrocortisone 50 mg q 6 hours which will change back to oral home dose on 4/21  Continue home dose Cortef.        UTI (urinary tract infection)- (present on admission)  Assessment & Plan  Acute cystitis with hematuria.  4/18 urine culture negative.  Started ceftriaxone and pyridium.  On 4/22  completed antibiotics    Obstructive hypertrophic cardiomyopathy (HCC)- (present on admission)  Assessment & Plan  As per history.   currently on: sotalol 40 mg q12h  Repeat echocardiogram unchanged  4/18 Echo: Compared to the images of the study done 1/22/2019  - there has been no   significant change..   Akinesis of apex, hypokinesis of apical septal wall may be from   ventricular pacing verus prior infarct.  Normal right ventricular size and systolic function.  Mild mitral regurgitation.  Mild aortic stenosis.  Mild aortic insufficiency.    Anticoagulant long-term use- (present on admission)  Assessment & Plan  Secondary to extensive history of DVT AND MULTIPLE PE's  STATUS post IVC filter placement.  Home Coumadin had been held for anticipated skin BCC procedures.   IV heparin drip and follow Factor X levels  Coumadin was restarted 4/21 per  To goal INR 1.5-2 but held 4/22 due to hematuria.  4/27:  Hematuria resolved.  Resume Coumadin and heparin drip.  4/28: No hematuria so far.  Currently on Lovenox and Coumadin.    History of pulmonary embolism- (present on admission)  Assessment & Plan  As per history at least 8-9 PEs in the past.    Hx of having an IVC filter in place.  Initially he was holding warfarin for right ear basal cell carcinoma removal in 2 days.  4/22 stopped lovenox and warfarin due to hematuria  CTA with PE protocol from the ER was negative for PE.  4/27: Start heparin drip and Coumadin.    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  Status post resection of pituitary adenoma in 1998.   levothyroxine, cortef  Testosterone cyp 200mg IM very 21days, missed two dose, resume    Essential hypertension, benign- (present on admission)  Assessment & Plan  Continue home sotalol if BP allows    Hypocalcemia- (present on admission)  Assessment & Plan  Check PTH and ionized calcium.  Check vitamin D levels.  Started on supplements.    Prolonged Q-T interval on ECG- (present on admission)  Assessment & Plan  In sotalol use.  Avoid QTC prolonging medications.    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  Continue telemetry monitoring.  Continue home sotalol 40mg BID    BPH (benign prostatic hypertrophy)- (present on admission)  Assessment & Plan  Continues to have urinary frequency.  Continue on tamsulosin.  Follows Dr Gonzales outpatient urology    Chronic back pain- (present on admission)  Assessment & Plan  As per history in setting of arthritis.  Continue home Norco and gabapentin.    COPD (chronic obstructive pulmonary disease) (HCC)- (present on  admission)  Assessment & Plan  With chronic respiratory failure.  On 5 L of oxygen via nasal cannula at home.  Currently not in acute exacerbation clinically and at baseline with respiratory status.  Bronchodilators as needed per RT protocol.    Cardiac pacemaker in situ- (present on admission)  Assessment & Plan  Placed for sick sinus syndrome third-degree block.  History of multiple PVCs.  Telemetry monitoring.       VTE prophylaxis: Heparin

## 2021-04-28 NOTE — CARE PLAN
Problem: Pain Management  Goal: Pain level will decrease to patient's comfort goal  Outcome: PROGRESSING AS EXPECTED  Note: Assessed pt using 0-10 scale. Medicated pt as per MAR. Facilitated calm environment. Repositioned pt to provide comfort.        Problem: Skin Integrity  Goal: Risk for impaired skin integrity will decrease  Outcome: PROGRESSING AS EXPECTED  Note: Containment device in use. Interdry in place. Linen changes provided as needed. Pt declining placement of waffle mattress and heel float boots but agreeable to use in the AM.

## 2021-04-28 NOTE — PROGRESS NOTES
Inpatient Anticoagulation Service Note  Date: 4/28/2021  Reason for Anticoagulation: Pulmonary Embolism, Deep Vein Thrombosis   Hemoglobin Value: (!) 11.6  Hematocrit Value: (!) 37.8  Lab Platelet Value: 259  Target INR: 2.0 to 3.0  INR from last 7 days     Date/Time INR Value    04/28/21 0101  1.06    04/27/21 1230  1.11    04/25/21 0246  (!) 1.19    04/24/21 0339  (!) 1.16    04/23/21 0651  (!) 1.2    04/22/21 0405  1.01        Dose from last 7 days     Date/Time Dose (mg)    04/28/21 1431  5    04/27/21 1432  5    04/22/21 1114  0        Average Dose (mg): TBD (Home Dose: 5 mg daily)  Significant Interactions: Aspirin, Clopidogrel, Corticosteroids, Proton Pump Inhibitor, Statin  Bridge Therapy: Yes (enoxaparin ~1 mg/kg/dose)  Bridge Therapy Start Date: 04/27/21  Days of Overlap Therapy: 1   INR Value Greater than 2 Prior to Discontinuation of Parenteral Anticoagulation: Not Applicable  Reversal Agent Administered: Not Applicable  Comments: INR at baseline. Concerns for hematuria. H/H low. PLT improved. Warfarin interactions noted. Enoxaparin day #1 noted. Will give warfarin 5 mg today. INR with AM labs. Likely to need dose adjustments. Will consider dose escalations tomorrow pending INR response and bleeding disposition.    Plan:  Warfarin 5 mg 4/28/21  Education Material Provided?: No (chronic warfarin patient)  Pharmacist suggested discharge dosing: warfarin 5 mg daily    Randy Hussein, PharmD

## 2021-04-28 NOTE — CONSULTS
"    Physical Medicine and Rehabilitation Consultation         Initial Consult      Initial Consultation Date: 4/28/2021  Consulting provider: Dr. Randall Rosado  Reason for consultation: assess for acute inpatient rehab appropriateness  LOS: 9 Day(s)      Chief complaint: chest pain      HPI:   The patient is a 88 y.o. 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 Byrd. Hospital course with hematuria/acute cystitis, managed with CBI, antibiotics, Pyridium; COPD/O2 titration, anemia, leukocytosis, hyponatremia, hypokalemia, and management of chronic back pain.      Social Hx:  Pre-morbidly, this patient lived in:   1 story home  With 6 steps to enter, ramp access  Lives with spouse; daughter may also be able to assist      Current level of function:   PT, 4/27: Min A FWW x5 ft x3 times; Min A bed mobility and transfers  OT, pending eval        PMH:  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 (pouch) ([facility]) 4/1/2010   • BPH (benign prostatic hypertrophy) 4/1/2010   • Bradycardia    • Breath shortness    • Bronchitis    • Cardiac pacemaker 04 2010   • Cataract    • COPD (chronic obstructive pulmonary disease) ([facility])    • Diverticulitis    • DJD (degenerative joint disease)    • EMPHYSEMA    • Glaucoma    • Heart burn    • Heart murmur    • Hiatus hernia syndrome    • Hypertension    • 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[facility]) 1995   • Indigestion    • Knee arthroplasty 2002    right   • Obstructive hypertrophic cardiomyopathy ([facility]) 8/4/2011   • PAF (paroxysmal atrial fibrillation) ([facility])    • Paroxysmal atrial fibrillation ([facility]) " 8/29/2011   • PE (pulmonary embolism)     IVC filter, states PE clots 8 times   • Phlebitis     chronic / recurrent   • Pituitary adenoma ([facility]) 1995    hypophysectomy   • Pituitary adenoma ([facility]) 1995    hypophysectomy   • Pneumonia    • Rheumatic fever    • S/P insertion of IVC (inferior vena caval) filter    • S/P parathyroidectomy    • S/P parathyroidectomy 2005   • Sleep apnea     no cpap machine   • SSS (sick sinus syndrome) ([facility]) 8/29/2011   • Third degree AV block ([facility]) 8/29/2011   • thyroidectomy 2005    benign   • Unspecified disorder of thyroid    • Unspecified hemorrhagic conditions    • Unspecified urinary incontinence    • Urinary bladder disorder          PSH:  Past Surgical History:   Procedure Laterality Date   • GASTROSCOPY-ENDO  1/22/2019    Procedure: GASTROSCOPY-ENDO;  Surgeon: Yoandy Mcelroy M.D.;  Location: ENDOSCOPY [facility];  Service: Gastroenterology   • GASTROSCOPY-ENDO  1/21/2019    Procedure: GASTROSCOPY-ENDO;  Surgeon: Luca Mtz M.D.;  Location: ENDOSCOPY [facility];  Service: Gastroenterology   • KNEE REVISION TOTAL  6/20/2013    Performed by Ortega Vega M.D. at SURGERY [facility]   • CARPAL TUNNEL ENDOSCOPIC  9/30/2011    Performed by RONALD ENNIS at SURGERY SAME DAY [facility]   • PACEMAKER INSERTION Left 04/23/2010    [employer] 60 [id number] implanted by Dr. Donaldson.   • CATARACT EXTRACTION WITH IOL      bilateral    • CERVICAL DISK AND FUSION ANTERIOR     • CERVICAL FUSION POSTERIOR     • CHOLECYSTECTOMY     • CHOLECYSTECTOMY     • OTHER      pituitary tumor removed   • OTHER CARDIAC SURGERY     • OTHER ORTHOPEDIC SURGERY      knee surgery left knee x 2   • PB REVISE ULNAR NERVE AT WRIST     • PB TOTAL KNEE ARTHROPLASTY      left   • PB TOTAL KNEE ARTHROPLASTY      right   • THYROIDECTOMY           FHX: Reviewed.  Family History   Problem Relation Age of Onset   • Arthritis Mother    • Arthritis Father    • Cancer Neg Hx    • Heart Disease  "Neg Hx                  Medications:  Current Facility-Administered Medications   Medication Dose   • albuterol inhaler 2 Puff  2 Puff   • enoxaparin (LOVENOX) inj 100 mg  1 mg/kg   • MD Alert...Warfarin per Pharmacy     • fluticasone (FLONASE) nasal spray 100 mcg  2 Spray   • warfarin (COUMADIN) tablet 5 mg  5 mg   • hydrocortisone (CORTEF) tablet 10 mg  10 mg   • midodrine (PROAMATINE) tablet 5 mg  5 mg   • lidocaine (LIDODERM) 5 % 2 Patch  2 Patch   • benzonatate (TESSALON) capsule 100 mg  100 mg   • ipratropium-albuterol (DUONEB) nebulizer solution  3 mL   • testosterone cypionate (DEPO-TESTOSTERONE) injection 200 mg  200 mg   • oxyCODONE immediate-release (ROXICODONE) tablet 5 mg  5 mg   • aspirin EC (ECOTRIN) tablet 81 mg  81 mg   • clopidogrel (PLAVIX) tablet 75 mg  75 mg   • atorvastatin (LIPITOR) tablet 80 mg  80 mg   • gabapentin (NEURONTIN) capsule 600 mg  600 mg   • levothyroxine (SYNTHROID) tablet 75 mcg  75 mcg   • montelukast (SINGULAIR) tablet 10 mg  10 mg   • omeprazole (PRILOSEC) capsule 20 mg  20 mg   • sotalol (BETAPACE) tablet 40 mg  40 mg   • tamsulosin (FLOMAX) capsule 0.4 mg  0.4 mg   • acetaminophen (Tylenol) tablet 650 mg  650 mg   • senna-docusate (PERICOLACE or SENOKOT S) 8.6-50 MG per tablet 2 tablet  2 tablet    And   • polyethylene glycol/lytes (MIRALAX) PACKET 1 Packet  1 Packet    And   • magnesium hydroxide (MILK OF MAGNESIA) suspension 30 mL  30 mL    And   • bisacodyl (DULCOLAX) suppository 10 mg  10 mg       Allergies:  Allergies   Allergen Reactions   • Lisinopril      Hypotension, 30 mg dose   • Pcn [Penicillins] Rash     Tolerates cephalosporins           Vitals: /65   Pulse 70   Temp 36.4 °C (97.6 °F) (Temporal)   Resp 18   Ht 1.702 m (5' 7\")   Wt 96.5 kg (212 lb 11.9 oz)   SpO2 97%           Labs: Reviewed and significant for 4/28: Hgb 11.6, Na 137, K 3.7, Cr 0.67  Recent Labs     04/26/21  0610 04/27/21  1230 04/28/21  0101   RBC 4.49*  --  4.54*   HEMOGLOBIN " 11.5*  --  11.6*   HEMATOCRIT 37.6*  --  37.8*   PLATELETCT 235  --  259   PROTHROMBTM  --  14.6 14.1   APTT  --  35.8  --    INR  --  1.11 1.06     Recent Labs     21  0610 21  0101   SODIUM 133* 137   POTASSIUM 3.7 3.7   CHLORIDE 99 98   CO2 30 35*   GLUCOSE 132* 106*   BUN 13 14   CREATININE 0.77 0.67   CALCIUM 7.4* 7.3*     Recent Results (from the past 24 hour(s))   Prothrombin Time    Collection Time: 21 12:30 PM   Result Value Ref Range    PT 14.6 12.0 - 14.6 sec    INR 1.11 0.87 - 1.13   APTT    Collection Time: 21 12:30 PM   Result Value Ref Range    APTT 35.8 24.7 - 36.0 sec   Heparin Xa (Unfractionated)    Collection Time: 21 12:30 PM   Result Value Ref Range    Heparin Xa (UFH) <0.10 IU/mL   EKG    Collection Time: 21  1:09 PM   Result Value Ref Range    Report       [facility]    Test Date:  2021  Pt Name:    MANOLO AUGUST                 Department: 171  MRN:        [id number]                      Room:       T736  Gender:     Male                         Technician: LINDA  :        [id number]                   Requested By:ARDEN LOPEZ  Order #:    [id number]                    Reading MD: Isa Yanez MD    Measurements  Intervals                                Axis  Rate:       77                           P:          -14  TN:         258                          QRS:        -32  QRSD:       148                          T:          147  QT:         423  QTc:        479    Interpretive Statements  SINUS RHYTHM  ATRIAL PREMATURE COMPLEXES  PROLONGED TN INTERVAL  LEFT ATRIAL ENLARGEMENT  LEFT BUNDLE BRANCH BLOCK  Compared to ECG 2021 08:34:26  Atrial premature complex(es) now present  First degree AV block now present  Atrial abnormality now present  Ectopic atrial rhythm no longer present  Electronically Signed On  14:49:31 PDT by Isa Yanez MD     Heparin Xa (Unfractionated)    Collection Time: 21  6:02 PM   Result  Value Ref Range    Heparin Xa (UFH) 0.31 IU/mL   Heparin Xa (Unfractionated)    Collection Time: 04/28/21  1:01 AM   Result Value Ref Range    Heparin Xa (UFH) 0.29 IU/mL   CBC WITH DIFFERENTIAL    Collection Time: 04/28/21  1:01 AM   Result Value Ref Range    WBC 18.3 (H) 4.8 - 10.8 K/uL    RBC 4.54 (L) 4.70 - 6.10 M/uL    Hemoglobin 11.6 (L) 14.0 - 18.0 g/dL    Hematocrit 37.8 (L) 42.0 - 52.0 %    MCV 83.3 81.4 - 97.8 fL    MCH 25.6 (L) 27.0 - 33.0 pg    MCHC 30.7 (L) 33.7 - 35.3 g/dL    RDW 51.2 (H) 35.9 - 50.0 fL    Platelet Count 259 164 - 446 K/uL    MPV 9.5 9.0 - 12.9 fL    Neutrophils-Polys 77.80 (H) 44.00 - 72.00 %    Lymphocytes 7.50 (L) 22.00 - 41.00 %    Monocytes 8.50 0.00 - 13.40 %    Eosinophils 1.50 0.00 - 6.90 %    Basophils 0.40 0.00 - 1.80 %    Immature Granulocytes 4.30 (H) 0.00 - 0.90 %    Nucleated RBC 0.00 /100 WBC    Neutrophils (Absolute) 14.24 (H) 1.82 - 7.42 K/uL    Lymphs (Absolute) 1.38 1.00 - 4.80 K/uL    Monos (Absolute) 1.55 (H) 0.00 - 0.85 K/uL    Eos (Absolute) 0.28 0.00 - 0.51 K/uL    Baso (Absolute) 0.07 0.00 - 0.12 K/uL    Immature Granulocytes (abs) 0.79 (H) 0.00 - 0.11 K/uL    NRBC (Absolute) 0.00 K/uL   Comp Metabolic Panel    Collection Time: 04/28/21  1:01 AM   Result Value Ref Range    Sodium 137 135 - 145 mmol/L    Potassium 3.7 3.6 - 5.5 mmol/L    Chloride 98 96 - 112 mmol/L    Co2 35 (H) 20 - 33 mmol/L    Anion Gap 4.0 (L) 7.0 - 16.0    Glucose 106 (H) 65 - 99 mg/dL    Bun 14 8 - 22 mg/dL    Creatinine 0.67 0.50 - 1.40 mg/dL    Calcium 7.3 (L) 8.5 - 10.5 mg/dL    AST(SGOT) 68 (H) 12 - 45 U/L    ALT(SGPT) 64 (H) 2 - 50 U/L    Alkaline Phosphatase 98 30 - 99 U/L    Total Bilirubin 0.2 0.1 - 1.5 mg/dL    Albumin 2.8 (L) 3.2 - 4.9 g/dL    Total Protein 5.5 (L) 6.0 - 8.2 g/dL    Globulin 2.7 1.9 - 3.5 g/dL    A-G Ratio 1.0 g/dL   Prothrombin Time    Collection Time: 04/28/21  1:01 AM   Result Value Ref Range    PT 14.1 12.0 - 14.6 sec    INR 1.06 0.87 - 1.13   ESTIMATED GFR     Collection Time: 04/28/21  1:01 AM   Result Value Ref Range    GFR If African American >60 >60 mL/min/1.73 m 2    GFR If Non African American >60 >60 mL/min/1.73 m 2           Imaging:  EC-ECHOCARDIOGRAM COMPLETE W/ CONT  Result Date: 4/24/2021  Transthoracic Echo Report Echocardiography Laboratory CONCLUSIONS Compared to the images of the prior study done 4/18/2021, grossly unchanged. Left ventricular ejection fraction is visually estimated to be 55%. Mid to apical septal wall segments appear hypokinetic.           ASSESSMENT:  Patient is a 88 y.o. male admitted with chest pain, found to have NSTEMI s/p 3 JAS to LAD on 4/20 with Dr. Byrd.    #Rehab:   -Vitals: stable, 7L oxymask  -Insurance: Medicare/[employer]  -Discharge support: spouse/daughter may be able to assist   -Rehab Impairment Code: 0009 - Cardiac  -Reason for admission: Hypotension  -Pending OT eval  -Of note, [facility] can take max O2 of 5L/min (baseline 5L O2 NC at home).   -If patient has OT therapy goals and O2 can be weaned to 5L O2/min, recommend inpatient rehab     #CV: CAD/NSTEMI s/p cath and 3 JAS to LAD on 4/20 with Dr. Byrd; PAFib, AV block s/p PPM, HOCM; EF 55% on 4/24; HTN  -asa, atorvastatin, Plavix, sotalol, PRN lasix     #Adrenal insufficiency: s/p pituitary adenoma resection (1998); on home hydrocortisone 10 mg BID   -hydrocortisone; midodrine (not requiring currently)  -synthroid    #Allergies: Flonase    #Pulm: COPD  -montelukast  -O2 titration    #GI: omeprazole    #Pain: chronic back pain   -PRN Tylenol  -gabapentin, Lidoderm, PRN oxycodone     #Anemia: Hgb 11.5 on 4/26    #Hyponatremia: improved    #Leukocytosis: WBC 18.3 on 4/28    #Hypokalemia: PRN Kdur    #Testosterone    #Bowel: 2x on 4/25, senna s    #Bladder: BPH; cystitis/hematuria improved s/p CBI and rocephin and pyridium; U/S kidney bladder without sig findings  -external cath, flomax    #DVT PPX: hx of DVT/PE s/p IVC filter; neg for PE this admit  -lovenox, warfarin        Thank you for allowing us to participate in the care of this patient.             Jeffrey Reno MD  Physical Medicine and Rehabilitation   4/28/2021

## 2021-04-28 NOTE — PROGRESS NOTES
Received report and assumed care of patient. Patient was updated on the plan of care for the day. Call light within reach, bed in low position, 2 side rails up. Fall precautions in place and tele box is on. Will round hourly.

## 2021-04-28 NOTE — DISCHARGE PLANNING
Anticipated Discharge Disposition: [facility]    Action:   1416 per BSRN, [facility] is pending bed availability. Pt's O2 needs fluctuate and was on 7L O2 this morning. Spoke w/ Sheron at [facility]. Per Sheron, will review chart to see if pt's LTAC appropriate  1500: [facility]     Barriers to Discharge:   Fluctuating O2 needs    Plan: TBD

## 2021-04-28 NOTE — DISCHARGE PLANNING
Would appreciate an OT eval once appropriate.  Msg placed to Elizbaeth CM.  Dr. Wylie is aware and has medically cleared.  Will f/u in the am.  Anticipate an admission pending an OT eval/Physiatry acceptance.

## 2021-04-28 NOTE — WOUND TEAM
Wound consult placed on 4/27/21 regarding bilateral ears, flank, groin and sacrococcygeal area. Chart and images reviewed. This RN in to assess patient in T736/02. Pt sitting up in chair pleasant and agreeable. Bilateral flank and groin assessed. Pt has some bruising to right anterior thigh. Bilateral ears assessed pt has known skin cancer and is being treated by physician back home. Pt was then assisted to a standing position. Sacral mepilex gently removed and area assessed. Area appears to be intact. Mepilex reapplied and pt assisted back to bed using FWW and 2 person assist. Wound consult completed no further follow up unless indicated and consulted

## 2021-04-28 NOTE — PROGRESS NOTES
Received bedside report from RN, pt care assumed, VSS, pt assessment complete. Pt AAOx4, no complaints of pain at this time. No signs of acute distress noted at this time. POC discussed with pt and verbalizes no questions. Pt denies any additional needs at this time. Bed in lowest position, bed alarm in place, pt educated on fall risk and verbalized understanding, call light within reach, hourly rounding initiated.

## 2021-04-28 NOTE — CARE PLAN
Problem: Respiratory:  Goal: Respiratory status will improve  Outcome: PROGRESSING AS EXPECTED     Problem: Pain Management  Goal: Pain level will decrease to patient's comfort goal  Outcome: PROGRESSING AS EXPECTED     Problem: Safety  Goal: Will remain free from injury  Outcome: PROGRESSING AS EXPECTED  Goal: Will remain free from falls  Outcome: PROGRESSING AS EXPECTED

## 2021-04-29 ENCOUNTER — PATIENT OUTREACH (OUTPATIENT)
Dept: HEALTH INFORMATION MANAGEMENT | Facility: OTHER | Age: 86
End: 2021-04-29

## 2021-04-29 ENCOUNTER — HOSPITAL ENCOUNTER (INPATIENT)
Facility: REHABILITATION | Age: 86
LOS: 2 days | DRG: 947 | End: 2021-05-01
Attending: PHYSICAL MEDICINE & REHABILITATION | Admitting: PHYSICAL MEDICINE & REHABILITATION
Payer: MEDICARE

## 2021-04-29 VITALS
BODY MASS INDEX: 33.39 KG/M2 | HEART RATE: 77 BPM | RESPIRATION RATE: 18 BRPM | DIASTOLIC BLOOD PRESSURE: 81 MMHG | OXYGEN SATURATION: 98 % | WEIGHT: 212.74 LBS | SYSTOLIC BLOOD PRESSURE: 139 MMHG | HEIGHT: 67 IN | TEMPERATURE: 97.2 F

## 2021-04-29 PROBLEM — I95.9 HYPOTENSION: Status: RESOLVED | Noted: 2021-04-19 | Resolved: 2021-04-29

## 2021-04-29 PROBLEM — N30.90 CYSTITIS: Status: ACTIVE | Noted: 2021-04-29

## 2021-04-29 PROBLEM — I21.4 NSTEMI (NON-ST ELEVATED MYOCARDIAL INFARCTION) (HCC): Status: RESOLVED | Noted: 2021-04-18 | Resolved: 2021-04-29

## 2021-04-29 PROBLEM — N39.0 UTI (URINARY TRACT INFECTION): Status: RESOLVED | Noted: 2019-04-18 | Resolved: 2021-04-29

## 2021-04-29 PROBLEM — R53.81 DEBILITY: Status: ACTIVE | Noted: 2021-04-29

## 2021-04-29 PROBLEM — D72.829 LEUKOCYTOSIS: Status: ACTIVE | Noted: 2021-04-29

## 2021-04-29 PROBLEM — Z78.9 IMPAIRED MOBILITY AND ADLS: Status: ACTIVE | Noted: 2021-04-29

## 2021-04-29 PROBLEM — N30.01 HEMATURIA DUE TO ACUTE CYSTITIS: Status: RESOLVED | Noted: 2021-04-22 | Resolved: 2021-04-29

## 2021-04-29 PROBLEM — Z74.09 IMPAIRED MOBILITY AND ADLS: Status: ACTIVE | Noted: 2021-04-29

## 2021-04-29 PROBLEM — R31.0 GROSS HEMATURIA: Status: ACTIVE | Noted: 2021-04-29

## 2021-04-29 PROBLEM — T14.8XXA MULTIPLE SKIN TEARS: Status: ACTIVE | Noted: 2021-04-29

## 2021-04-29 PROBLEM — I71.20 THORACIC AORTIC ANEURYSM WITHOUT RUPTURE (HCC): Status: ACTIVE | Noted: 2021-04-29

## 2021-04-29 PROBLEM — R91.1 PULMONARY NODULE: Status: ACTIVE | Noted: 2021-04-29

## 2021-04-29 LAB
ALBUMIN SERPL BCP-MCNC: 2.9 G/DL (ref 3.2–4.9)
ALBUMIN/GLOB SERPL: 1 G/DL
ALP SERPL-CCNC: 90 U/L (ref 30–99)
ALT SERPL-CCNC: 53 U/L (ref 2–50)
ANION GAP SERPL CALC-SCNC: 4 MMOL/L (ref 7–16)
ANISOCYTOSIS BLD QL SMEAR: ABNORMAL
AST SERPL-CCNC: 39 U/L (ref 12–45)
BASOPHILS # BLD AUTO: 0.9 % (ref 0–1.8)
BASOPHILS # BLD: 0.18 K/UL (ref 0–0.12)
BILIRUB SERPL-MCNC: 0.4 MG/DL (ref 0.1–1.5)
BUN SERPL-MCNC: 14 MG/DL (ref 8–22)
CALCIUM SERPL-MCNC: 7.7 MG/DL (ref 8.5–10.5)
CHLORIDE SERPL-SCNC: 96 MMOL/L (ref 96–112)
CO2 SERPL-SCNC: 36 MMOL/L (ref 20–33)
COMMENT 1642: NORMAL
CREAT SERPL-MCNC: 0.73 MG/DL (ref 0.5–1.4)
EOSINOPHIL # BLD AUTO: 1.2 K/UL (ref 0–0.51)
EOSINOPHIL NFR BLD: 5.9 % (ref 0–6.9)
ERYTHROCYTE [DISTWIDTH] IN BLOOD BY AUTOMATED COUNT: 53.1 FL (ref 35.9–50)
GLOBULIN SER CALC-MCNC: 2.8 G/DL (ref 1.9–3.5)
GLUCOSE SERPL-MCNC: 72 MG/DL (ref 65–99)
HCT VFR BLD AUTO: 43.7 % (ref 42–52)
HGB BLD-MCNC: 12.7 G/DL (ref 14–18)
IMM GRANULOCYTES # BLD AUTO: 0.94 K/UL (ref 0–0.11)
IMM GRANULOCYTES NFR BLD AUTO: 4.6 % (ref 0–0.9)
INR PPP: 1.09 (ref 0.87–1.13)
LYMPHOCYTES # BLD AUTO: 2.07 K/UL (ref 1–4.8)
LYMPHOCYTES NFR BLD: 10.1 % (ref 22–41)
MCH RBC QN AUTO: 24.9 PG (ref 27–33)
MCHC RBC AUTO-ENTMCNC: 29.1 G/DL (ref 33.7–35.3)
MCV RBC AUTO: 85.5 FL (ref 81.4–97.8)
MICROCYTES BLD QL SMEAR: ABNORMAL
MONOCYTES # BLD AUTO: 1.45 K/UL (ref 0–0.85)
MONOCYTES NFR BLD AUTO: 7.1 % (ref 0–13.4)
MORPHOLOGY BLD-IMP: NORMAL
NEUTROPHILS # BLD AUTO: 14.63 K/UL (ref 1.82–7.42)
NEUTROPHILS NFR BLD: 71.4 % (ref 44–72)
NRBC # BLD AUTO: 0.03 K/UL
NRBC BLD-RTO: 0.1 /100 WBC
PLATELET # BLD AUTO: 290 K/UL (ref 164–446)
PLATELET BLD QL SMEAR: NORMAL
PMV BLD AUTO: 10 FL (ref 9–12.9)
POTASSIUM SERPL-SCNC: 4.1 MMOL/L (ref 3.6–5.5)
PROT SERPL-MCNC: 5.7 G/DL (ref 6–8.2)
PROTHROMBIN TIME: 14.4 SEC (ref 12–14.6)
RBC # BLD AUTO: 5.11 M/UL (ref 4.7–6.1)
RBC BLD AUTO: PRESENT
SODIUM SERPL-SCNC: 136 MMOL/L (ref 135–145)
WBC # BLD AUTO: 20.5 K/UL (ref 4.8–10.8)

## 2021-04-29 PROCEDURE — U0005 INFEC AGEN DETEC AMPLI PROBE: HCPCS

## 2021-04-29 PROCEDURE — A9270 NON-COVERED ITEM OR SERVICE: HCPCS | Performed by: STUDENT IN AN ORGANIZED HEALTH CARE EDUCATION/TRAINING PROGRAM

## 2021-04-29 PROCEDURE — 700102 HCHG RX REV CODE 250 W/ 637 OVERRIDE(OP): Performed by: PHYSICAL MEDICINE & REHABILITATION

## 2021-04-29 PROCEDURE — 87086 URINE CULTURE/COLONY COUNT: CPT

## 2021-04-29 PROCEDURE — 94760 N-INVAS EAR/PLS OXIMETRY 1: CPT

## 2021-04-29 PROCEDURE — 85610 PROTHROMBIN TIME: CPT

## 2021-04-29 PROCEDURE — 99239 HOSP IP/OBS DSCHRG MGMT >30: CPT | Performed by: FAMILY MEDICINE

## 2021-04-29 PROCEDURE — 770010 HCHG ROOM/CARE - REHAB SEMI PRIVAT*

## 2021-04-29 PROCEDURE — A9270 NON-COVERED ITEM OR SERVICE: HCPCS | Performed by: INTERNAL MEDICINE

## 2021-04-29 PROCEDURE — 700102 HCHG RX REV CODE 250 W/ 637 OVERRIDE(OP): Performed by: FAMILY MEDICINE

## 2021-04-29 PROCEDURE — 99223 1ST HOSP IP/OBS HIGH 75: CPT | Mod: AI | Performed by: PHYSICAL MEDICINE & REHABILITATION

## 2021-04-29 PROCEDURE — 700102 HCHG RX REV CODE 250 W/ 637 OVERRIDE(OP): Performed by: HOSPITALIST

## 2021-04-29 PROCEDURE — 700102 HCHG RX REV CODE 250 W/ 637 OVERRIDE(OP): Performed by: INTERNAL MEDICINE

## 2021-04-29 PROCEDURE — A9270 NON-COVERED ITEM OR SERVICE: HCPCS | Performed by: FAMILY MEDICINE

## 2021-04-29 PROCEDURE — U0003 INFECTIOUS AGENT DETECTION BY NUCLEIC ACID (DNA OR RNA); SEVERE ACUTE RESPIRATORY SYNDROME CORONAVIRUS 2 (SARS-COV-2) (CORONAVIRUS DISEASE [COVID-19]), AMPLIFIED PROBE TECHNIQUE, MAKING USE OF HIGH THROUGHPUT TECHNOLOGIES AS DESCRIBED BY CMS-2020-01-R: HCPCS

## 2021-04-29 PROCEDURE — 700111 HCHG RX REV CODE 636 W/ 250 OVERRIDE (IP): Performed by: FAMILY MEDICINE

## 2021-04-29 PROCEDURE — A9270 NON-COVERED ITEM OR SERVICE: HCPCS | Performed by: HOSPITALIST

## 2021-04-29 PROCEDURE — A9270 NON-COVERED ITEM OR SERVICE: HCPCS | Performed by: PHYSICAL MEDICINE & REHABILITATION

## 2021-04-29 PROCEDURE — 700111 HCHG RX REV CODE 636 W/ 250 OVERRIDE (IP): Performed by: PHYSICAL MEDICINE & REHABILITATION

## 2021-04-29 PROCEDURE — 81001 URINALYSIS AUTO W/SCOPE: CPT

## 2021-04-29 PROCEDURE — 85025 COMPLETE CBC W/AUTO DIFF WBC: CPT

## 2021-04-29 PROCEDURE — 700101 HCHG RX REV CODE 250: Performed by: STUDENT IN AN ORGANIZED HEALTH CARE EDUCATION/TRAINING PROGRAM

## 2021-04-29 PROCEDURE — 36415 COLL VENOUS BLD VENIPUNCTURE: CPT

## 2021-04-29 PROCEDURE — 80053 COMPREHEN METABOLIC PANEL: CPT

## 2021-04-29 PROCEDURE — 97166 OT EVAL MOD COMPLEX 45 MIN: CPT

## 2021-04-29 PROCEDURE — 700102 HCHG RX REV CODE 250 W/ 637 OVERRIDE(OP): Performed by: STUDENT IN AN ORGANIZED HEALTH CARE EDUCATION/TRAINING PROGRAM

## 2021-04-29 RX ORDER — TAMSULOSIN HYDROCHLORIDE 0.4 MG/1
0.4 CAPSULE ORAL DAILY
Status: CANCELLED | OUTPATIENT
Start: 2021-04-30

## 2021-04-29 RX ORDER — IPRATROPIUM BROMIDE AND ALBUTEROL SULFATE 2.5; .5 MG/3ML; MG/3ML
3 SOLUTION RESPIRATORY (INHALATION) EVERY 6 HOURS PRN
Status: ON HOLD
Start: 2021-04-29 | End: 2021-09-26

## 2021-04-29 RX ORDER — MIDODRINE HYDROCHLORIDE 2.5 MG/1
2.5 TABLET ORAL
Status: DISCONTINUED | OUTPATIENT
Start: 2021-04-29 | End: 2021-04-30

## 2021-04-29 RX ORDER — LEVOTHYROXINE SODIUM 0.07 MG/1
75 TABLET ORAL
Status: CANCELLED | OUTPATIENT
Start: 2021-04-30

## 2021-04-29 RX ORDER — ECHINACEA PURPUREA EXTRACT 125 MG
2 TABLET ORAL PRN
Status: DISCONTINUED | OUTPATIENT
Start: 2021-04-29 | End: 2021-05-05 | Stop reason: HOSPADM

## 2021-04-29 RX ORDER — GABAPENTIN 300 MG/1
600 CAPSULE ORAL EVERY EVENING
Status: CANCELLED | OUTPATIENT
Start: 2021-04-29

## 2021-04-29 RX ORDER — FLUTICASONE PROPIONATE 50 MCG
2 SPRAY, SUSPENSION (ML) NASAL DAILY
Status: CANCELLED | OUTPATIENT
Start: 2021-04-30

## 2021-04-29 RX ORDER — LEVOTHYROXINE SODIUM 0.07 MG/1
75 TABLET ORAL
Status: DISCONTINUED | OUTPATIENT
Start: 2021-04-30 | End: 2021-05-05 | Stop reason: HOSPADM

## 2021-04-29 RX ORDER — IBUPROFEN 200 MG
950 CAPSULE ORAL DAILY
Qty: 30 TABLET | Status: ON HOLD
Start: 2021-04-30 | End: 2021-09-26

## 2021-04-29 RX ORDER — ALUMINA, MAGNESIA, AND SIMETHICONE 2400; 2400; 240 MG/30ML; MG/30ML; MG/30ML
20 SUSPENSION ORAL
Status: DISCONTINUED | OUTPATIENT
Start: 2021-04-29 | End: 2021-05-05 | Stop reason: HOSPADM

## 2021-04-29 RX ORDER — CLOPIDOGREL BISULFATE 75 MG/1
75 TABLET ORAL DAILY
Status: CANCELLED | OUTPATIENT
Start: 2021-04-30

## 2021-04-29 RX ORDER — SOTALOL HYDROCHLORIDE 80 MG/1
40 TABLET ORAL 2 TIMES DAILY
Status: DISCONTINUED | OUTPATIENT
Start: 2021-04-29 | End: 2021-05-05 | Stop reason: HOSPADM

## 2021-04-29 RX ORDER — MIDODRINE HYDROCHLORIDE 5 MG/1
2.5 TABLET ORAL
Status: CANCELLED | OUTPATIENT
Start: 2021-04-29

## 2021-04-29 RX ORDER — AMOXICILLIN 250 MG
2 CAPSULE ORAL 2 TIMES DAILY
Status: DISCONTINUED | OUTPATIENT
Start: 2021-04-29 | End: 2021-05-05 | Stop reason: HOSPADM

## 2021-04-29 RX ORDER — ATORVASTATIN CALCIUM 80 MG/1
80 TABLET, FILM COATED ORAL EVERY EVENING
Qty: 30 TABLET | Status: SHIPPED
Start: 2021-04-29 | End: 2021-05-20 | Stop reason: SDUPTHER

## 2021-04-29 RX ORDER — HYDROCORTISONE 10 MG/1
10 TABLET ORAL 2 TIMES DAILY
Status: CANCELLED | OUTPATIENT
Start: 2021-04-29

## 2021-04-29 RX ORDER — IPRATROPIUM BROMIDE AND ALBUTEROL SULFATE 2.5; .5 MG/3ML; MG/3ML
3 SOLUTION RESPIRATORY (INHALATION)
Status: CANCELLED | OUTPATIENT
Start: 2021-04-29

## 2021-04-29 RX ORDER — ACETAMINOPHEN 325 MG/1
650 TABLET ORAL EVERY 4 HOURS PRN
Status: DISCONTINUED | OUTPATIENT
Start: 2021-04-29 | End: 2021-05-05 | Stop reason: HOSPADM

## 2021-04-29 RX ORDER — POLYETHYLENE GLYCOL 3350 17 G/17G
1 POWDER, FOR SOLUTION ORAL
Status: CANCELLED | OUTPATIENT
Start: 2021-04-29

## 2021-04-29 RX ORDER — ATORVASTATIN CALCIUM 80 MG/1
80 TABLET, FILM COATED ORAL EVERY EVENING
Status: CANCELLED | OUTPATIENT
Start: 2021-04-29

## 2021-04-29 RX ORDER — IBUPROFEN 200 MG
950 CAPSULE ORAL DAILY
Status: CANCELLED | OUTPATIENT
Start: 2021-04-30

## 2021-04-29 RX ORDER — POLYVINYL ALCOHOL 14 MG/ML
1 SOLUTION/ DROPS OPHTHALMIC PRN
Status: DISCONTINUED | OUTPATIENT
Start: 2021-04-29 | End: 2021-05-05 | Stop reason: HOSPADM

## 2021-04-29 RX ORDER — ONDANSETRON 4 MG/1
4 TABLET, ORALLY DISINTEGRATING ORAL 4 TIMES DAILY PRN
Status: DISCONTINUED | OUTPATIENT
Start: 2021-04-29 | End: 2021-04-29

## 2021-04-29 RX ORDER — LIDOCAINE 50 MG/G
2 PATCH TOPICAL EVERY 24 HOURS
Status: CANCELLED | OUTPATIENT
Start: 2021-04-30

## 2021-04-29 RX ORDER — OXYCODONE HYDROCHLORIDE 5 MG/1
5 TABLET ORAL EVERY 4 HOURS PRN
Status: DISCONTINUED | OUTPATIENT
Start: 2021-04-29 | End: 2021-05-05 | Stop reason: HOSPADM

## 2021-04-29 RX ORDER — LIDOCAINE 50 MG/G
2 PATCH TOPICAL EVERY 24 HOURS
Status: DISCONTINUED | OUTPATIENT
Start: 2021-04-30 | End: 2021-05-05 | Stop reason: HOSPADM

## 2021-04-29 RX ORDER — OMEPRAZOLE 20 MG/1
20 CAPSULE, DELAYED RELEASE ORAL 2 TIMES DAILY
Qty: 30 CAPSULE | Status: SHIPPED
Start: 2021-04-29 | End: 2021-05-20 | Stop reason: SDUPTHER

## 2021-04-29 RX ORDER — ALBUTEROL SULFATE 90 UG/1
2 AEROSOL, METERED RESPIRATORY (INHALATION)
Status: CANCELLED | OUTPATIENT
Start: 2021-04-29

## 2021-04-29 RX ORDER — FLUTICASONE PROPIONATE 50 MCG
2 SPRAY, SUSPENSION (ML) NASAL DAILY
Status: DISCONTINUED | OUTPATIENT
Start: 2021-04-30 | End: 2021-05-05 | Stop reason: HOSPADM

## 2021-04-29 RX ORDER — AMOXICILLIN 250 MG
2 CAPSULE ORAL 2 TIMES DAILY
Status: CANCELLED | OUTPATIENT
Start: 2021-04-29

## 2021-04-29 RX ORDER — PROCHLORPERAZINE MALEATE 10 MG
10 TABLET ORAL EVERY 6 HOURS PRN
Status: DISCONTINUED | OUTPATIENT
Start: 2021-04-29 | End: 2021-05-05 | Stop reason: HOSPADM

## 2021-04-29 RX ORDER — OMEPRAZOLE 20 MG/1
20 CAPSULE, DELAYED RELEASE ORAL 2 TIMES DAILY
Status: CANCELLED | OUTPATIENT
Start: 2021-04-29

## 2021-04-29 RX ORDER — IBUPROFEN 200 MG
950 CAPSULE ORAL DAILY
Status: DISCONTINUED | OUTPATIENT
Start: 2021-04-30 | End: 2021-05-05 | Stop reason: HOSPADM

## 2021-04-29 RX ORDER — IPRATROPIUM BROMIDE AND ALBUTEROL SULFATE 2.5; .5 MG/3ML; MG/3ML
3 SOLUTION RESPIRATORY (INHALATION) EVERY 4 HOURS PRN
Status: DISCONTINUED | OUTPATIENT
Start: 2021-04-29 | End: 2021-05-05 | Stop reason: HOSPADM

## 2021-04-29 RX ORDER — HYDROXYZINE HYDROCHLORIDE 25 MG/1
50 TABLET, FILM COATED ORAL EVERY 6 HOURS PRN
Status: DISCONTINUED | OUTPATIENT
Start: 2021-04-29 | End: 2021-05-05 | Stop reason: HOSPADM

## 2021-04-29 RX ORDER — CLOPIDOGREL BISULFATE 75 MG/1
75 TABLET ORAL DAILY
Status: DISCONTINUED | OUTPATIENT
Start: 2021-04-30 | End: 2021-05-05 | Stop reason: HOSPADM

## 2021-04-29 RX ORDER — OXYCODONE HYDROCHLORIDE 5 MG/1
5 TABLET ORAL EVERY 4 HOURS PRN
Status: CANCELLED | OUTPATIENT
Start: 2021-04-29

## 2021-04-29 RX ORDER — MONTELUKAST SODIUM 10 MG/1
10 TABLET ORAL DAILY
Status: CANCELLED | OUTPATIENT
Start: 2021-04-30

## 2021-04-29 RX ORDER — TESTOSTERONE CYPIONATE 200 MG/ML
200 INJECTION, SOLUTION INTRAMUSCULAR
Status: DISCONTINUED | OUTPATIENT
Start: 2021-05-14 | End: 2021-05-05 | Stop reason: HOSPADM

## 2021-04-29 RX ORDER — CLOPIDOGREL BISULFATE 75 MG/1
75 TABLET ORAL DAILY
Qty: 30 TABLET | Status: SHIPPED
Start: 2021-04-30 | End: 2021-05-20 | Stop reason: SDUPTHER

## 2021-04-29 RX ORDER — OMEPRAZOLE 20 MG/1
20 CAPSULE, DELAYED RELEASE ORAL 2 TIMES DAILY
Status: DISCONTINUED | OUTPATIENT
Start: 2021-04-29 | End: 2021-05-05 | Stop reason: HOSPADM

## 2021-04-29 RX ORDER — ENEMA 19; 7 G/133ML; G/133ML
1 ENEMA RECTAL
Status: DISCONTINUED | OUTPATIENT
Start: 2021-04-29 | End: 2021-05-05 | Stop reason: HOSPADM

## 2021-04-29 RX ORDER — BISACODYL 10 MG
10 SUPPOSITORY, RECTAL RECTAL
Status: DISCONTINUED | OUTPATIENT
Start: 2021-04-29 | End: 2021-05-05 | Stop reason: HOSPADM

## 2021-04-29 RX ORDER — LANOLIN ALCOHOL/MO/W.PET/CERES
3 CREAM (GRAM) TOPICAL NIGHTLY PRN
Status: DISCONTINUED | OUTPATIENT
Start: 2021-04-29 | End: 2021-05-05 | Stop reason: HOSPADM

## 2021-04-29 RX ORDER — ALBUTEROL SULFATE 90 UG/1
2 AEROSOL, METERED RESPIRATORY (INHALATION) EVERY 4 HOURS PRN
Qty: 8.5 G | Status: SHIPPED
Start: 2021-04-29 | End: 2021-05-20 | Stop reason: SDUPTHER

## 2021-04-29 RX ORDER — MIDODRINE HYDROCHLORIDE 2.5 MG/1
2.5 TABLET ORAL 2 TIMES DAILY
Status: ON HOLD
Start: 2021-04-29 | End: 2021-05-01

## 2021-04-29 RX ORDER — POLYETHYLENE GLYCOL 3350 17 G/17G
1 POWDER, FOR SOLUTION ORAL
Status: DISCONTINUED | OUTPATIENT
Start: 2021-04-29 | End: 2021-05-05 | Stop reason: HOSPADM

## 2021-04-29 RX ORDER — TRAZODONE HYDROCHLORIDE 50 MG/1
50 TABLET ORAL
Status: DISCONTINUED | OUTPATIENT
Start: 2021-04-29 | End: 2021-05-05 | Stop reason: HOSPADM

## 2021-04-29 RX ORDER — ONDANSETRON 2 MG/ML
4 INJECTION INTRAMUSCULAR; INTRAVENOUS 4 TIMES DAILY PRN
Status: DISCONTINUED | OUTPATIENT
Start: 2021-04-29 | End: 2021-04-29

## 2021-04-29 RX ORDER — BISACODYL 10 MG
10 SUPPOSITORY, RECTAL RECTAL
Status: CANCELLED | OUTPATIENT
Start: 2021-04-29

## 2021-04-29 RX ORDER — BENZONATATE 100 MG/1
100 CAPSULE ORAL 3 TIMES DAILY PRN
Status: DISCONTINUED | OUTPATIENT
Start: 2021-04-29 | End: 2021-05-05 | Stop reason: HOSPADM

## 2021-04-29 RX ORDER — ASPIRIN 81 MG/1
81 TABLET ORAL DAILY
Qty: 30 TABLET | Status: SHIPPED
Start: 2021-04-30 | End: 2021-05-20 | Stop reason: SDUPTHER

## 2021-04-29 RX ORDER — TAMSULOSIN HYDROCHLORIDE 0.4 MG/1
0.4 CAPSULE ORAL DAILY
Status: DISCONTINUED | OUTPATIENT
Start: 2021-04-30 | End: 2021-05-05 | Stop reason: HOSPADM

## 2021-04-29 RX ORDER — MONTELUKAST SODIUM 10 MG/1
10 TABLET ORAL DAILY
Status: DISCONTINUED | OUTPATIENT
Start: 2021-04-30 | End: 2021-05-05 | Stop reason: HOSPADM

## 2021-04-29 RX ORDER — LACTULOSE 20 G/30ML
30 SOLUTION ORAL
Status: DISCONTINUED | OUTPATIENT
Start: 2021-04-29 | End: 2021-05-05 | Stop reason: HOSPADM

## 2021-04-29 RX ORDER — ATORVASTATIN CALCIUM 40 MG/1
80 TABLET, FILM COATED ORAL EVERY EVENING
Status: DISCONTINUED | OUTPATIENT
Start: 2021-04-29 | End: 2021-05-05 | Stop reason: HOSPADM

## 2021-04-29 RX ORDER — HYDROCORTISONE 10 MG/1
10 TABLET ORAL 2 TIMES DAILY
Status: DISCONTINUED | OUTPATIENT
Start: 2021-04-29 | End: 2021-05-05 | Stop reason: HOSPADM

## 2021-04-29 RX ORDER — SOTALOL HYDROCHLORIDE 80 MG/1
40 TABLET ORAL 2 TIMES DAILY
Status: CANCELLED | OUTPATIENT
Start: 2021-04-29

## 2021-04-29 RX ORDER — TESTOSTERONE CYPIONATE 200 MG/ML
200 INJECTION, SOLUTION INTRAMUSCULAR
Status: CANCELLED | OUTPATIENT
Start: 2021-05-14

## 2021-04-29 RX ORDER — BENZONATATE 100 MG/1
100 CAPSULE ORAL 3 TIMES DAILY PRN
Status: CANCELLED | OUTPATIENT
Start: 2021-04-29

## 2021-04-29 RX ORDER — WARFARIN SODIUM 5 MG/1
5 TABLET ORAL
Status: COMPLETED | OUTPATIENT
Start: 2021-04-29 | End: 2021-04-29

## 2021-04-29 RX ORDER — GABAPENTIN 300 MG/1
600 CAPSULE ORAL EVERY EVENING
Status: DISCONTINUED | OUTPATIENT
Start: 2021-04-29 | End: 2021-05-05 | Stop reason: HOSPADM

## 2021-04-29 RX ORDER — ALBUTEROL SULFATE 90 UG/1
2 AEROSOL, METERED RESPIRATORY (INHALATION)
Status: DISCONTINUED | OUTPATIENT
Start: 2021-04-29 | End: 2021-05-05 | Stop reason: HOSPADM

## 2021-04-29 RX ADMIN — ALBUTEROL SULFATE 2 PUFF: 90 AEROSOL, METERED RESPIRATORY (INHALATION) at 23:10

## 2021-04-29 RX ADMIN — OXYCODONE 5 MG: 5 TABLET ORAL at 08:49

## 2021-04-29 RX ADMIN — ENOXAPARIN SODIUM 100 MG: 100 INJECTION SUBCUTANEOUS at 21:08

## 2021-04-29 RX ADMIN — OXYCODONE 5 MG: 5 TABLET ORAL at 12:21

## 2021-04-29 RX ADMIN — FLUTICASONE PROPIONATE 100 MCG: 50 SPRAY, METERED NASAL at 05:34

## 2021-04-29 RX ADMIN — HYDROCORTISONE 10 MG: 10 TABLET ORAL at 21:07

## 2021-04-29 RX ADMIN — ENOXAPARIN SODIUM 100 MG: 100 INJECTION SUBCUTANEOUS at 05:37

## 2021-04-29 RX ADMIN — LIDOCAINE 2 PATCH: 50 PATCH TOPICAL at 10:24

## 2021-04-29 RX ADMIN — SOTALOL HYDROCHLORIDE 40 MG: 80 TABLET ORAL at 10:24

## 2021-04-29 RX ADMIN — OMEPRAZOLE 20 MG: 20 CAPSULE, DELAYED RELEASE ORAL at 05:36

## 2021-04-29 RX ADMIN — GABAPENTIN 600 MG: 300 CAPSULE ORAL at 21:07

## 2021-04-29 RX ADMIN — OXYCODONE HYDROCHLORIDE 5 MG: 5 TABLET ORAL at 21:54

## 2021-04-29 RX ADMIN — CALCIUM CITRATE 200 MG (950 MG) TABLET 950 MG: at 05:37

## 2021-04-29 RX ADMIN — ASPIRIN 81 MG: 81 TABLET, COATED ORAL at 05:35

## 2021-04-29 RX ADMIN — HYDROCORTISONE 10 MG: 10 TABLET ORAL at 05:36

## 2021-04-29 RX ADMIN — SOTALOL HYDROCHLORIDE 40 MG: 80 TABLET ORAL at 21:08

## 2021-04-29 RX ADMIN — LEVOTHYROXINE SODIUM 75 MCG: 0.07 TABLET ORAL at 05:36

## 2021-04-29 RX ADMIN — CLOPIDOGREL BISULFATE 75 MG: 75 TABLET ORAL at 05:35

## 2021-04-29 RX ADMIN — MONTELUKAST 10 MG: 10 TABLET, FILM COATED ORAL at 05:35

## 2021-04-29 RX ADMIN — ATORVASTATIN CALCIUM 80 MG: 40 TABLET, FILM COATED ORAL at 21:07

## 2021-04-29 RX ADMIN — OMEPRAZOLE 20 MG: 20 CAPSULE, DELAYED RELEASE ORAL at 17:45

## 2021-04-29 RX ADMIN — TAMSULOSIN HYDROCHLORIDE 0.4 MG: 0.4 CAPSULE ORAL at 05:35

## 2021-04-29 RX ADMIN — WARFARIN SODIUM 5 MG: 5 TABLET ORAL at 17:45

## 2021-04-29 ASSESSMENT — LIFESTYLE VARIABLES
EVER FELT BAD OR GUILTY ABOUT YOUR DRINKING: NO
HAVE PEOPLE ANNOYED YOU BY CRITICIZING YOUR DRINKING: NO
EVER_SMOKED: NEVER
EVER HAD A DRINK FIRST THING IN THE MORNING TO STEADY YOUR NERVES TO GET RID OF A HANGOVER: NO
TOTAL SCORE: 0
TOTAL SCORE: 0
ON A TYPICAL DAY WHEN YOU DRINK ALCOHOL HOW MANY DRINKS DO YOU HAVE: 0
HOW MANY TIMES IN THE PAST YEAR HAVE YOU HAD 5 OR MORE DRINKS IN A DAY: 0
TOTAL SCORE: 0
HAVE YOU EVER FELT YOU SHOULD CUT DOWN ON YOUR DRINKING: NO
AVERAGE NUMBER OF DAYS PER WEEK YOU HAVE A DRINK CONTAINING ALCOHOL: 0
ALCOHOL_USE: NO
CONSUMPTION TOTAL: NEGATIVE

## 2021-04-29 ASSESSMENT — COGNITIVE AND FUNCTIONAL STATUS - GENERAL
TOILETING: A LITTLE
SUGGESTED CMS G CODE MODIFIER DAILY ACTIVITY: CK
HELP NEEDED FOR BATHING: A LITTLE
DRESSING REGULAR LOWER BODY CLOTHING: A LITTLE
DAILY ACTIVITIY SCORE: 19
DRESSING REGULAR UPPER BODY CLOTHING: A LITTLE
PERSONAL GROOMING: A LITTLE

## 2021-04-29 ASSESSMENT — ACTIVITIES OF DAILY LIVING (ADL): TOILETING: INDEPENDENT

## 2021-04-29 ASSESSMENT — FIBROSIS 4 INDEX: FIB4 SCORE: 1.63

## 2021-04-29 ASSESSMENT — PAIN DESCRIPTION - PAIN TYPE
TYPE: CHRONIC PAIN

## 2021-04-29 NOTE — DISCHARGE INSTRUCTIONS
Discharge Instructions    Discharged to other by ambulance with escort. Discharged via wheelchair, hospital escort: Yes.  Special equipment needed: Walker    Be sure to schedule a follow-up appointment with your primary care doctor or any specialists as instructed.     Discharge Plan:        I understand that a diet low in cholesterol, fat, and sodium is recommended for good health. Unless I have been given specific instructions below for another diet, I accept this instruction as my diet prescription.   Other diet: Cardiac/soft;chopped    Special Instructions: Chronic Obstructive Pulmonary Disease (COPD) is a long-term, progressive lung disease that makes it harder to breathe. It includes chronic bronchitis, emphysema, and refractory (non-reversible) asthma. With COPD, the airways in your lungs become inflamed and thicken, and the tissue where oxygen is exchanged is destroyed. The flow of air in and out of your lungs decreases. When that happens, less oxygen gets into your body tissues, and it becomes harder to get rid of the waste gas carbon dioxide. As the disease gets worse, shortness of breath makes it harder to remain active. There is no cure for COPD, but it is preventable and treatable.    COPD Patient Discharge Instructions    • Diet  o Follow a low fat, low cholesterol, low salt diet unless instructed otherwise by your Doctor. Read the labels on the back of food products and track your intake of fat, cholesterol and salt.  • No smoking  o Discontinuing smoking will have the biggest impact on preventing progression of disease.  o To participate in [facility] Quit Tobacco Program, call 681-7939 or visit [email or web address]  • Oxygen  o If your doctor has order that you wear oxygen at home, it is important to wear it as ordered.  o Do not smoke, vape, or use e-cigarettes with oxygen on.  o Store in an appropriate location: upright in its peace or laid flat down, away from open flames and stoves.    o Do not use oil-based creams and moisturizers (ie: petroleum products, oil-based lip moisturizers) or aerosol sprays (ie: hair sprays or deodorants) when using your oxygen equipment.  o Be careful with tubing placement to prevent yourself and others from tripping.  • Medications  o Refer to your personalized Action Plan to manage your symptoms.  • Warning signs of an exacerbation  o Breathing fast and shallow, worsening shortness of breath (like you just finished exercising)  o Chest tightness  o Increases in sputum production  o Changes in sputum color  o Lower oxygen levels than baseline  • When to call your doctor  o If the warning signs of an exacerbation do not improve  o Refer to your personalized Action Plan   • Pulmonary Rehab  o Your doctor has ordered you a referral to Pulmonary Rehab. Call [phone] to schedule an appointment  • Attend your follow-up appointment with your PCP and/or Pulmonologist  • Remote Monitoring: At the direction of the remote monitoring on-call provider, you may increase your oxygen by 2 liters above your baseline.     See the educational handout provided by your COPD Navigator for more information. This also explains more about COPD, symptoms of an exacerbation, and some of the tests that you have undergone.    · Is patient discharged on Warfarin / Coumadin?   Yes    You are receiving the drug warfarin. Please understand the importance of monitoring warfarin with scheduled PT/INR blood draws.      IMPORTANT: HOW TO USE THIS INFORMATION:  This is a summary and does NOT have all possible information about this product. This information does not assure that this product is safe, effective, or appropriate for you. This information is not individual medical advice and does not substitute for the advice of your health care professional. Always ask your health care professional for complete information about this product and your specific health needs.      WARFARIN - ORAL (WARF-uh-rin)  "     COMMON BRAND NAME(S): Coumadin      WARNING:  Warfarin can cause very serious (possibly fatal) bleeding. This is more likely to occur when you first start taking this medication or if you take too much warfarin. To decrease your risk for bleeding, your doctor or other health care provider will monitor you closely and check your lab results (INR test) to make sure you are not taking too much warfarin. Keep all medical and laboratory appointments. Tell your doctor right away if you notice any signs of serious bleeding. See also Side Effects section.      USES:  This medication is used to treat blood clots (such as in deep vein thrombosis-DVT or pulmonary embolus-PE) and/or to prevent new clots from forming in your body. Preventing harmful blood clots helps to reduce the risk of a stroke or heart attack. Conditions that increase your risk of developing blood clots include a certain type of irregular heart rhythm (atrial fibrillation), heart valve replacement, recent heart attack, and certain surgeries (such as hip/knee replacement). Warfarin is commonly called a \"blood thinner,\" but the more correct term is \"anticoagulant.\" It helps to keep blood flowing smoothly in your body by decreasing the amount of certain substances (clotting proteins) in your blood.      HOW TO USE:  Read the Medication Guide provided by your pharmacist before you start taking warfarin and each time you get a refill. If you have any questions, ask your doctor or pharmacist. Take this medication by mouth with or without food as directed by your doctor or other health care professional, usually once a day. It is very important to take it exactly as directed. Do not increase the dose, take it more frequently, or stop using it unless directed by your doctor. Dosage is based on your medical condition, laboratory tests (such as INR), and response to treatment. Your doctor or other health care provider will monitor you closely while you are taking " this medication to determine the right dose for you. Use this medication regularly to get the most benefit from it. To help you remember, take it at the same time each day. It is important to eat a balanced, consistent diet while taking warfarin. Some foods can affect how warfarin works in your body and may affect your treatment and dose. Avoid sudden large increases or decreases in your intake of foods high in vitamin K (such as broccoli, cauliflower, cabbage, brussels sprouts, kale, spinach, and other green leafy vegetables, liver, green tea, certain vitamin supplements). If you are trying to lose weight, check with your doctor before you try to go on a diet. Cranberry products may also affect how your warfarin works. Limit the amount of cranberry juice (16 ounces/480 milliliters a day) or other cranberry products you may drink or eat.      SIDE EFFECTS:  Nausea, loss of appetite, or stomach/abdominal pain may occur. If any of these effects persist or worsen, tell your doctor or pharmacist promptly. Remember that your doctor has prescribed this medication because he or she has judged that the benefit to you is greater than the risk of side effects. Many people using this medication do not have serious side effects. This medication can cause serious bleeding if it affects your blood clotting proteins too much (shown by unusually high INR lab results). Even if your doctor stops your medication, this risk of bleeding can continue for up to a week. Tell your doctor right away if you have any signs of serious bleeding, including: unusual pain/swelling/discomfort, unusual/easy bruising, prolonged bleeding from cuts or gums, persistent/frequent nosebleeds, unusually heavy/prolonged menstrual flow, pink/dark urine, coughing up blood, vomit that is bloody or looks like coffee grounds, severe headache, dizziness/fainting, unusual or persistent tiredness/weakness, bloody/black/tarry stools, chest pain, shortness of breath,  difficulty swallowing. Tell your doctor right away if any of these unlikely but serious side effects occur: persistent nausea/vomiting, severe stomach/abdominal pain, yellowing eyes/skin. This drug rarely has caused very serious (possibly fatal) problems if its effects lead to small blood clots (usually at the beginning of treatment). This can lead to severe skin/tissue damage that may require surgery or amputation if left untreated. Patients with certain blood conditions (protein C or S deficiency) may be at greater risk. Get medical help right away if any of these rare but serious side effects occur: painful/red/purplish patches on the skin (such as on the toe, breast, abdomen), change in the amount of urine, vision changes, confusion, slurred speech, weakness on one side of the body. A very serious allergic reaction to this drug is rare. However, get medical help right away if you notice any symptoms of a serious allergic reaction, including: rash, itching/swelling (especially of the face/tongue/throat), severe dizziness, trouble breathing. This is not a complete list of possible side effects. If you notice other effects not listed above, contact your doctor or pharmacist. In the [place] - Call your doctor for medical advice about side effects. You may report side effects to FDA at 4-603-QOJ-5100. In [place] - Call your doctor for medical advice about side effects. You may report side effects to [facility] [place] at [phone].      PRECAUTIONS:  Before taking warfarin, tell your doctor or pharmacist if you are allergic to it; or if you have any other allergies. This product may contain inactive ingredients, which can cause allergic reactions or other problems. Talk to your pharmacist for more details. Before using this medication, tell your doctor or pharmacist your medical history, especially of: blood disorders (such as anemia, hemophilia), bleeding problems (such as bleeding of the stomach/intestines, bleeding in  the brain), blood vessel disorders (such as aneurysms), recent major injury/surgery, liver disease, alcohol use, mental/mood disorders (including memory problems), frequent falls/injuries. It is important that all your doctors and dentists know that you take warfarin. Before having surgery or any medical/dental procedures, tell your doctor or dentist that you are taking this medication and about all the products you use (including prescription drugs, nonprescription drugs, and herbal products). Avoid getting injections into the muscles. If you must have an injection into a muscle (for example, a flu shot), it should be given in the arm. This way, it will be easier to check for bleeding and/or apply pressure bandages. This medication may cause stomach bleeding. Daily use of alcohol while using this medicine will increase your risk for stomach bleeding and may also affect how this medication works. Limit or avoid alcoholic beverages. If you have not been eating well, if you have an illness or infection that causes fever, vomiting, or diarrhea for more than 2 days, or if you start using any antibiotic medications, contact your doctor or pharmacist immediately because these conditions can affect how warfarin works. This medication can cause heavy bleeding. To lower the chance of getting cut, bruised, or injured, use great caution with sharp objects like safety razors and nail cutters. Use an electric razor when shaving and a soft toothbrush when brushing your teeth. Avoid activities such as contact sports. If you fall or injure yourself, especially if you hit your head, call your doctor immediately. Your doctor may need to check you. The Food & Drug Administration has stated that generic warfarin products are interchangeable. However, consult your doctor or pharmacist before switching warfarin products. Be careful not to take more than one medication that contains warfarin unless specifically directed by the doctor or  "health care provider who is monitoring your warfarin treatment. Older adults may be at greater risk for bleeding while using this drug. This medication is not recommended for use during pregnancy because of serious (possibly fatal) harm to an unborn baby. Discuss the use of reliable forms of birth control with your doctor. If you become pregnant or think you may be pregnant, tell your doctor immediately. If you are planning pregnancy, discuss a plan for managing your condition with your doctor before you become pregnant. Your doctor may switch the type of medication you use during pregnancy. Very small amounts of this medication may pass into breast milk but is unlikely to harm a nursing infant. Consult your doctor before breast-feeding.      DRUG INTERACTIONS:  Drug interactions may change how your medications work or increase your risk for serious side effects. This document does not contain all possible drug interactions. Keep a list of all the products you use (including prescription/nonprescription drugs and herbal products) and share it with your doctor and pharmacist. Do not start, stop, or change the dosage of any medicines without your doctor's approval. Warfarin interacts with many prescription, nonprescription, vitamin, and herbal products. This includes medications that are applied to the skin or inside the vagina or rectum. The interactions with warfarin usually result in an increase or decrease in the \"blood-thinning\" (anticoagulant) effect. Your doctor or other health care professional should closely monitor you to prevent serious bleeding or clotting problems. While taking warfarin, it is very important to tell your doctor or pharmacist of any changes in medications, vitamins, or herbal products that you are taking. Some products that may interact with this drug include: capecitabine, imatinib, mifepristone. Aspirin, aspirin-like drugs (salicylates), and nonsteroidal anti-inflammatory drugs (NSAIDs " such as ibuprofen, naproxen, celecoxib) may have effects similar to warfarin. These drugs may increase the risk of bleeding problems if taken during treatment with warfarin. Carefully check all prescription/nonprescription product labels (including drugs applied to the skin such as pain-relieving creams) since the products may contain NSAIDs or salicylates. Talk to your doctor about using a different medication (such as acetaminophen) to treat pain/fever. Low-dose aspirin and related drugs (such as clopidogrel, ticlopidine) should be continued if prescribed by your doctor for specific medical reasons such as heart attack or stroke prevention. Consult your doctor or pharmacist for more details. Many herbal products interact with warfarin. Tell your doctor before taking any herbal products, especially bromelains, coenzyme Q10, cranberry, danshen, dong quai, fenugreek, garlic, ginkgo biloba, ginseng, and Anshul's wort, among others. This medication may interfere with a certain laboratory test to measure theophylline levels, possibly causing false test results. Make sure laboratory personnel and all your doctors know you use this drug.      OVERDOSE:  If overdose is suspected, contact a poison control center or emergency room immediately. [place] residents can call the [place] National Poison Hotline at 1-681.124.5081. [place] residents can call a provincial poison control center. Symptoms of overdose may include: bloody/black/tarry stools, pink/dark urine, unusual/prolonged bleeding.      NOTES:  Do not share this medication with others. Laboratory and/or medical tests (such as INR, complete blood count) must be performed periodically to monitor your progress or check for side effects. Consult your doctor for more details.      MISSED DOSE:  For the best possible benefit, do not miss any doses. If you do miss a dose and remember on the same day, take it as soon as you remember. If you remember on the next day, skip the missed  dose and resume your usual dosing schedule. Do not double the dose to catch up because this could increase your risk for bleeding. Keep a record of missed doses to give to your doctor or pharmacist. Contact your doctor or pharmacist if you miss 2 or more doses in a row.      STORAGE:  Store at room temperature away from light and moisture. Do not store in the bathroom. Keep all medications away from children and pets. Do not flush medications down the toilet or pour them into a drain unless instructed to do so. Properly discard this product when it is  or no longer needed. Consult your pharmacist or local waste disposal company for more details about how to safely discard your product.      MEDICAL ALERT:  Your condition and medication can cause complications in a medical emergency. For information about enrolling in MedicAlert, call 1-141.529.1260 ([place]) or 1-366.109.3692 ([place]).      Information last revised 2010 Copyright(c) 2010 [employer]             Depression / Suicide Risk    As you are discharged from this [facility] facility, it is important to learn how to keep safe from harming yourself.    Recognize the warning signs:  · Abrupt changes in personality, positive or negative- including increase in energy   · Giving away possessions  · Change in eating patterns- significant weight changes-  positive or negative  · Change in sleeping patterns- unable to sleep or sleeping all the time   · Unwillingness or inability to communicate  · Depression  · Unusual sadness, discouragement and loneliness  · Talk of wanting to die  · Neglect of personal appearance   · Rebelliousness- reckless behavior  · Withdrawal from people/activities they love  · Confusion- inability to concentrate     If you or a loved one observes any of these behaviors or has concerns about self-harm, here's what you can do:  · Talk about it- your feelings and reasons for harming yourself  · Remove any means that you  might use to hurt yourself (examples: pills, rope, extension cords, firearm)  · Get professional help from the community (Mental Health, Substance Abuse, psychological counseling)  · Do not be alone:Call your Safe Contact- someone whom you trust who will be there for you.  · Call your local CRISIS HOTLINE 860-7619 or 547-713-1159  · Call your local Children's Mobile Crisis Response Team [place] (255) 914-5823 or www.Vidyo  · Call the toll free National Suicide Prevention Hotlines   · National Suicide Prevention Lifeline 821-594-JULN (9842)  · National Hope Line Network 800-SUICIDE (822-3679)

## 2021-04-29 NOTE — DISCHARGE PLANNING
Anticipated Discharge Disposition: [facility]    Action: informed by DPA that pt stated he doesn't feel strong enough to participate in 3 hours of therapy daily at [facility]. Spoke w/ pt at bedside. Pt aox4, pleasant. Pt states he'll try and commit to daily therapies at [facility] and would still like to go to [facility]. Pt mentioned [facility] in [place] as alternative if he's unable to go to [facility]. Pt declines SNF's in [place]. COBRA completed. Wife Jacquie notified of transfer by [facility]    Barriers to Discharge: none    Plan: [facility] 12:30 pm today

## 2021-04-29 NOTE — PREADMISSION SCREENING NOTE
"  Pre-Admission Screening Form    Patient Information:   Name: Jeffrey Lizama     MRN: [id number]       : 1932      Age: 88 y.o.   Gender: male      Race: White [7]       Marital Status:  [2]  Family Contact: Dl,Jacquie Lee,Allyn Lee,Miles Morels,Randall Prater        Relationship: Spouse [17]  Daughter [2]  Son-in-law [27]  Son [15]  Son [15]  Home Phone: [phone]           Cell Phone: [phone]    Advanced Directives: None  Code Status:  FULL  Current Attending Provider: Toma Wylie M.D.  Referring Physician: Dr. Rosado   Physiatrist Consult: Dr. Reno    Referral Date: 21  Primary Payor Source:  MEDICARE  Secondary Payor Source:  [place]    Medical Information:   Date of Admission to Acute Care [place]  Room Number: T736/02  Rehabilitation Diagnosis: 0009 - Cardiac  Immunization History   Administered Date(s) Administered   • INFLUENZA TIV (IM) 2011, 10/28/2012, 2013, 10/30/2015   • Influenza Seasonal Injectable - Historical Data 10/30/2015   • Influenza Vaccine Pediatric Split - Historical Data 2010   • Influenza Vaccine Quad Inj (Pf) 10/10/2018   • Pneumococcal Conjugate Vaccine (Prevnar/PCV-13) 2015   • Pneumococcal polysaccharide vaccine (PPSV-23) 10/10/2018   • Tdap Vaccine 2013     Allergies   Allergen Reactions   • Lisinopril      Hypotension, 30 mg dose   • Pcn [Penicillins] Rash     Tolerates cephalosporins       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 (pouch) (HCC) 2010   • BPH (benign prostatic hypertrophy) 2010   • Bradycardia    • Breath shortness    • Bronchitis    • Cardiac pacemaker 2010   • Cataract    • COPD " (chronic obstructive pulmonary disease) ([facility])    • Diverticulitis    • DJD (degenerative joint disease)    • EMPHYSEMA    • Glaucoma    • Heart burn    • Heart murmur    • Hiatus hernia syndrome    • Hypertension    • 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[facility]) 1995   • Indigestion    • Knee arthroplasty 2002    right   • Obstructive hypertrophic cardiomyopathy ([facility]) 8/4/2011   • PAF (paroxysmal atrial fibrillation) ([facility])    • Paroxysmal atrial fibrillation ([facility]) 8/29/2011   • PE (pulmonary embolism)     IVC filter, states PE clots 8 times   • Phlebitis     chronic / recurrent   • Pituitary adenoma ([facility]) 1995    hypophysectomy   • Pituitary adenoma ([facility]) 1995    hypophysectomy   • Pneumonia    • Rheumatic fever    • S/P insertion of IVC (inferior vena caval) filter    • S/P parathyroidectomy    • S/P parathyroidectomy 2005   • Sleep apnea     no cpap machine   • SSS (sick sinus syndrome) ([facility]) 8/29/2011   • Third degree AV block ([facility]) 8/29/2011   • thyroidectomy 2005    benign   • Unspecified disorder of thyroid    • Unspecified hemorrhagic conditions    • Unspecified urinary incontinence    • Urinary bladder disorder      Past Surgical History:   Procedure Laterality Date   • GASTROSCOPY-ENDO  1/22/2019    Procedure: GASTROSCOPY-ENDO;  Surgeon: Yoandy Mcelroy M.D.;  Location: ENDOSCOPY [facility];  Service: Gastroenterology   • GASTROSCOPY-ENDO  1/21/2019    Procedure: GASTROSCOPY-ENDO;  Surgeon: Luca Mtz M.D.;  Location: ENDOSCOPY [facility];  Service: Gastroenterology   • KNEE REVISION TOTAL  6/20/2013    Performed by Ortega Vega M.D. at SURGERY [facility]   • CARPAL TUNNEL ENDOSCOPIC  9/30/2011    Performed by RONALD ENNIS at SURGERY SAME DAY [facility]   • PACEMAKER INSERTION Left 04/23/2010    [employer] Altrua 60 [id number] implanted by Dr. Donaldson.   • CATARACT EXTRACTION WITH IOL      bilateral    • CERVICAL DISK AND FUSION ANTERIOR     • CERVICAL FUSION POSTERIOR     • CHOLECYSTECTOMY     •  CHOLECYSTECTOMY     • OTHER      pituitary tumor removed   • OTHER CARDIAC SURGERY     • OTHER ORTHOPEDIC SURGERY      knee surgery left knee x 2   • PB REVISE ULNAR NERVE AT WRIST     • PB TOTAL KNEE ARTHROPLASTY      left   • PB TOTAL KNEE ARTHROPLASTY      right   • THYROIDECTOMY         History Leading to Admission, Conditions that Caused the Need for Rehab (CMS):     Dr. Alonzo H&P:  Chief Complaint   Patient presents with   • Chest Pain         History of Presenting Illness  88 y.o. male who presented 4/18/2021 with chest pain at rest, which started 1:30 PM while he was sitting in front of the computer today.  This is a pleasant gentleman with a complex medical history including 8 PEs in the past status post IVC placement on warfarin, which she stopped taking 2 days ago in preparation for removal of basal cell carcinoma from his right ear.  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  He is followed closely by [facility].       Patient reports that the chest pain is substernal and dull in nature and that he has never had this type of chest pain prior.  He denies any radiation of the chest pain.  At its worst it was 8/10, and intensity has been fluctuating up and down for 3 to 4 hours.  He is continue to have some chest pain here in the ER.  No nausea or vomiting.  No diaphoresis..  He denies any exertional chest pain.  No other exacerbating or alleviating factors.  Deep breathing does not make it worse or better.  He denies any cough.  No palpitations.  Interestingly, he also reports development of dyspnea on exertion as well as associated dizziness approximately 1.5 months ago.  He denies any orthopnea or paroxysmal nocturnal dyspnea.  The  shortness of breath and dizziness improves with sitting and resting.  He ambulates with a walker.  Denies any family history of early myocardial infarction.     Per record review, on his last cardiology visit, he was noted to have significant amount of PVCs with worsening of shortness of breath and fatigue on Multaq.  He was continued on his chronic sotalol therapy.     Request was made for admission of the patient for monitoring and work-up of his chest pain.  CTA with PE protocol was negative.    Assessment/Plan:  I anticipate this patient is appropriate for observation status at this time.     * Chest pain- (present on admission)  Assessment & Plan  I have discussed the case with the ER physician, Dr. Sotelo, and agree with need to admit the patient for continued observation under telemetry monitoring given his significant and complex cardiac history.  Chest pain is atypical in nature.  However, troponin is mildly elevated.  He has a left bundle elida block on EKGs.  He has multiple PVCs on EKG, which could be a possible contributor.     HEART Score 5, Risk of MACE of 12-16.6%.     Admit to telemetry for further monitoring.  Serial troponins and EKG.  Given complex cardiac history, recommend consulting cardiology in the morning for consideration of cardiac catheterization rather than proceeding with a stress test.  Start aspirin full dose now and daily.  Continue home cardiac medications including sotalol.  Continue to hold warfarin.     Elevated troponin- (present on admission)  Assessment & Plan  Mildly elevated troponin in setting of chest pain concerning.     Serial troponins and EKG.  Low threshold to start full anticoagulation.     Aortic stenosis- (present on admission)  Assessment & Plan  Mild aortic stenosis on echocardiogram in 2019.  Likely secondary to childhood traumatic fever.     Repeating echocardiogram given new dizziness and shortness of breath.     Frequent PVCs- (present on  admission)  Assessment & Plan  Patient continued to have frequent PVCs on EKG.     Continue telemetry monitoring.  Optimize electrolytes for potassium goal greater than 4.5 and magnesium goal greater than 2.0.  Consult cardiology in the morning about adjusting sotalol.     Obstructive hypertrophic cardiomyopathy (HCC)- (present on admission)  Assessment & Plan  As per history.   - currently on: (sotalol 80 mg) 40 mg q12h     Repeat echocardiogram in setting of new dizziness and dyspnea on exertion.  Continue home sotalol.     History of pulmonary embolism- (present on admission)  Assessment & Plan  As per history at least 8-9 PEs in the past.  He has a IVC filter in place.  Holding warfarin for right ear basal cell carcinoma removal in 2 days.  CTA with PE protocol from the ER was negative for PE.     Continue to hold warfarin.  Enoxaparin for DVT prophylaxis.     Adrenal insufficiency (HCC)- (present on admission)  Assessment & Plan  As per history secondary to chronic steroid use.     Continue home steroids for hypopituitarism.     SSS (sick sinus syndrome) ([facility])- (present on admission)  Assessment & Plan  Secondary to third-degree blocks status post pacemaker placement.     Continue telemetry monitoring.  Continue home sotalol.     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     Continue to hold home warfarin.  Enoxaparin for DVT prophylaxis.     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  Status post resection of pituitary adenoma in 1998.     Continue home hydrocortisone and levothyroxine.     Essential hypertension, benign- (present on admission)  Assessment & Plan  goal BP < 130/80 mmHg   - 24hr BP range: (154-125)/(94-80) mmHg     Continue home lisinopril and sotalol.     Prolonged Q-T interval on ECG- (present on admission)  Assessment & Plan  In sotalol  use.     Avoid QTC prolonging medications.     Hyponatremia- (present on admission)  Assessment & Plan   - serum sodium: 134.00 mmol/L (04/19/2021 1:02:00) unchanged from 134.00 mmol/L (04/18/2021 18:44:00)     Mild and chronic.       Continue to monitor.     BPH (benign prostatic hypertrophy)- (present on admission)  Assessment & Plan  Continues to have urinary frequency.     Continue on tamsulosin.     Chronic back pain- (present on admission)  Assessment & Plan  As per history in setting of arthritis.     Continue home Norco and gabapentin.     Cardiac pacemaker in situ- (present on admission)  Assessment & Plan  Placed for sick sinus syndrome third-degree block.  History of multiple PVCs.     Telemetry monitoring.  Serial EKGs.      Dr. Reno (Physiatry) recommendations:  ASSESSMENT:  Patient is a 88 y.o. male admitted with chest pain, found to have NSTEMI s/p 3 JAS to LAD on 4/20 with Dr. Byrd.     #Rehab:   -Vitals: stable, 7L oxymask  -Insurance: Medicare/[employer]  -Discharge support: spouse/daughter may be able to assist   -Rehab Impairment Code: 0009 - Cardiac  -Reason for admission: Hypotension  -Pending OT eval  -Of note, [facility] can take max O2 of 5L/min (baseline 5L O2 NC at home).   -If patient has OT therapy goals and O2 can be weaned to 5L O2/min, recommend inpatient rehab      #CV: CAD/NSTEMI s/p cath and 3 JAS to LAD on 4/20 with Dr. Byrd; PAFib, AV block s/p PPM, HOCM; EF 55% on 4/24; HTN  -asa, atorvastatin, Plavix, sotalol, PRN lasix      #Adrenal insufficiency: s/p pituitary adenoma resection (1998); on home hydrocortisone 10 mg BID   -hydrocortisone; midodrine (not requiring currently)  -synthroid    #Allergies: Flonase     #Pulm: COPD  -montelukast  -O2 titration     #GI: omeprazole     #Pain: chronic back pain   -PRN Tylenol  -gabapentin, Lidoderm, PRN oxycodone      #Anemia: Hgb 11.5 on 4/26     #Hyponatremia: improved     #Leukocytosis: WBC 18.3 on 4/28     #Hypokalemia: PRN  Kdur     #Testosterone     #Bowel: 2x on 4/25, senna s     #Bladder: BPH; cystitis/hematuria improved s/p CBI and rocephin and pyridium; U/S kidney bladder without sig findings  -external cath, flomax     #DVT PPX: hx of DVT/PE s/p IVC filter; neg for PE this admit  -lovenox, warfarin     Dr. Thompson (Cardiology) recommendations:  Assessment and Plan:     * Chest pain/possible NSTEMI- (present on admission)  Assessment & Plan     New onset chest pain substernal pressure, non radiating 8/10 at its worse that started at rest at 1:30pm on 4/18/21. Has not had this pain in the past.   troponin is mildly elevated on admission and has increased.  He has a left bundle elida block on EKGs.  He has multiple PVCs on EKG, which could be a possible contributor     HEART Score 5, Risk of MACE of 12-16.6%  Pt denies previous hx of heart catheterization, no hx found in the chart      Plan:  Continue telemetry monitoring   Serial troponins and EKGs  Aspirin   Start BB and high intensity statin   Continue home sotalol and lisinopril   Continue heparin drip   NPO at midnight for cardiac catheterization in the am      Elevated troponin- (present on admission)  Assessment & Plan  Mildly elevated troponin in setting of chest pain concerning for NSTEMI  Troponin increased from   Serial troponins and EKG  On heparin drip   Will add BB with parameters and high intensity statin   NPO at midnight for cardiac catherization in the morning      Aortic stenosis- (present on admission)  Assessment & Plan  Mild aortic stenosis   Area 2.6cm2  Peak velocity 2m/s  Peak gradient 15.8 mmHg      Frequent PVCs- (present on admission)  Assessment & Plan  Patient continues to have frequent PVCs on EKG.     Continue telemetry monitoring.  Optimize electrolytes for potassium goal greater than 4.5 and magnesium goal greater than 2.0.     Obstructive hypertrophic cardiomyopathy (HCC)- (present on admission)  Assessment & Plan  As per history.   -  currently on: (sotalol 80 mg) 40 mg q12h     Repeat echocardiogram in setting of new dizziness and dyspnea on exertion shows stable ECHO when compared to one in 2019  Continue home sotalol     SSS (sick sinus syndrome) (HCC)- (present on admission)  Assessment & Plan  Secondary to third-degree blocks status post pacemaker placement.     Continue telemetry monitoring.  Continue home sotalol.     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  Reports last PE 5-6 years ago     Continue to hold home warfarin.  Started on heparin drip for rising troponin in setting of chest pain      Essential hypertension, benign- (present on admission)  Assessment & Plan  goal BP < 130/80 mmHg   - 24hr BP range: (154-125)/(94-80) mmHg  States his BP at home runs 130-135/70-80, occasionally experiences hypotension and skips taking his lisinopril on those days      Continue home lisinopril and sotalol     Prolonged Q-T interval on ECG- (present on admission)  Assessment & Plan  In sotalol use.     Avoid QTC prolonging medications     Cardiac pacemaker in situ- (present on admission)  Assessment & Plan  Placed for sick sinus syndrome third-degree block.  History of multiple PVCs.     Telemetry monitoring  Serial EKGs     History of pulmonary embolism- (present on admission)  Assessment & Plan  As per history at least 8-9 PEs in the past.  He has a IVC filter in place.  Holding warfarin for right ear basal cell carcinoma removal in 2 days.  CTA with PE protocol from the ER was negative for PE.     Continue to hold warfarin.  On heparin drip      Adrenal insufficiency (HCC)- (present on admission)  Assessment & Plan  As per history secondary to chronic steroid use.     Continue home steroids for hypopituitarism.     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  Status post resection of pituitary adenoma  in 1998      Dr. Lozano (Critical Care) recommendations:  Assessment/Plan  * NSTEMI (non-ST elevated myocardial infarction) (HCC)- (present on admission)  Assessment & Plan  Consistent with ACS  Cardiology following  Repeat troponin today at 1086  Continue asa/plavix  High intensity statin  CTA chest with stable aneurysms     Hypotension  Assessment & Plan  Undifferentiated hypotension,? ACS, adrenal insufficiency, other  I will change oral hydrocortisone to IV Solu-Cortef 100mg TID  I will give additional crystalloid  Transfer to ICU  continue midodrine 10mg TID  Jonny-synephrine pushes as needed     Adrenal insufficiency (HCC)- (present on admission)  Assessment & Plan  Remote history of pituitary adenoma resection on chronic steroid replacement with hydrocortisone  Increase to stress dose acutely to 100mg TID     Obstructive hypertrophic cardiomyopathy (HCC)- (present on admission)  Assessment & Plan  Reviewed ECHO/chart     Anticoagulant long-term use- (present on admission)  Assessment & Plan  Warfarin recently held for planned right ear skin lesion resection  Holding warfarin/lovenox due to hematuria     History of pulmonary embolism- (present on admission)  Assessment & Plan  History of DVT/PE with prior IVC filter placement  CTA chest negative on admission      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     Essential hypertension, benign- (present on admission)  Assessment & Plan  Holding BP meds  With hypertensive heart disease     Hyponatremia- (present on admission)  Assessment & Plan  Mild  Monitoring      BPH (benign prostatic hypertrophy)- (present on admission)  Assessment & Plan  Flomax  S/p bladder irrigation     COPD (chronic obstructive pulmonary disease) (HCC)- (present on admission)  Assessment & Plan  Without acute exacerbation, no current wheezing  Continue submental oxygen, on 5 L home oxygen at baseline  RT  "protocols,        Discussed patient condition and risk of morbidity and/or mortality with Hospitalist, RN, RT, Pharmacy, Patient and cardiology.    The patient remains critically ill requiring continuous hemodynamic monitoring as well as fluid boluses and frequent pushes of Jonny-Synephrine.  The patient remains at high risk of clinical deterioration requiring vasopressor infusion, as well as vital organ dysfunction, and death without the above critical care interventions.    CARDIAC CATHETERIZATION REPORT     Referring Provider: Kellee Miller M.D.     PROCEDURE PHYSICIAN: José Miguel Byrd MD, [facility], [facility]  ASSISTANT: None     IMPRESSIONS:  1.  Non-STEMI due to severe stenosis of the distal LAD with heavy calcification throughout.  2.  Complex but successful PCI of the mid to distal LAD using 3 overlapping drug-eluting stents  3.  Residual total occlusion of the OM 2  4.  Normal LVEDP     Recommendations:  Dual antiplatelet therapy for 1 months then plavix + coumadin afterwards. PPI while on triple therapy     Pre-procedure diagnosis: NSTEMI  Post-procedure diagnosis: Same     Procedure performed  Selective coronary angiography  Left heart catheterization  Percutaneous coronary intervention - Stent Placement (JAS x3 to LAD)     Conscious sedation was supervised by myself and administered by trained personnel using fentanyl and versed between 0950 and 1201. The patient tolerated sedation without complication.      Procedure Description  1. Access: 5/6 [place] right radial artery Micropuncture technique was utilized following local anesthesia with lidocaine.  A radial cocktail containing verapamil, heparin and saline was administered in the radial artery sheath.  7 [place] destination sheath in the right femoral artery under micropuncture technique with ultrasound and x-ray guidance     2. Diagnostic description: The catheter was passed to the central circulation with the aide of J tipped 0.35\" wire. A 5F TIG 4.0 and 6F " JR4 were used to inject the coronary circulation enter the left ventricle during invasive hemodynamic monitoring.      3. Description of Intervention: With some difficulty due to innominate tortuosity I was able to position a 6 [place] EBU 4.0 from the radial artery.  I was able to traverse the lesion with a run-through wire and performed predilation with a 2.5 x 12 mm compliant balloon at nominal atmospheres.  There was good balloon expansion and so atherectomy was deferred.  I then struggled to deliver stents into the vessel due to proximal calcification as well as tortuosity within the guiding catheter.  With the assistance of a guide extension catheter I was able to pass a 2.5 x 12 mm [place] drug-eluting stent distally and a 2.5 x 15 [place] drug-eluting stent proximally in overlapping fashion.  Next I delivered a 3.0 x 15 [place] drug-eluting stent again overlapping proximally.  The stents were all deployed at 14 arik.  Subsequently I struggled to deliver a post dilation balloon and the patient was becoming agitated due to back discomfort and after moving his arm dislodged the coronary equipment.  At that point I gained access in the right common femoral artery with a 7 Macanese destination sheath which was placed due to severe tortuosity within the iliofemoral system.  I then reengage the coronary with a 7 [place] EBU 4.0 guide and passed a run-through wire into the distal vessel.  Then with the assistance of a guide extension catheter I was able to deliver a 2.5 x 12 mm compliant balloon and performed angioplasty around the heavily calcified angle.  I then was able to deliver a 2.5 x 12 mm NC balloon and performed angioplasty at 20 arik throughout.  There was excellent balloon expansion, particularly at the culprit lesion site.  However, more proximally and a heavily calcified segment there was continued recoil despite repeated angioplasty efforts.  I then postdilated the proximal portion of the stent with a 3.0 x 15 NC  balloon at 16 arik.  Post procedure angiograms demonstrated good results, the equipment was removed and the arteriotomy closed with a Perclose, and radial band respectively     Findings   Hemodynamics:   Aorta: 97/64 mmHg  LVEDP: 97/12 mmHg     Coronary Anatomy              Left Main: Distal 30% stenosis.              LAD: Heavily calcified in the proximal mid segment with proximal 30% stenosis.  The mid segment has a long 50% stenosis.  Distally there is a focal 99% stenosis with ADRIAN I flow into the distal bedThe first diagonal has proximal 50% stenosis, the second diagonal is small and has proximal 30% stenosis              LCx: Minimal luminal irregularities in the AV groove.  The first OM is normal.  The second OM has 100% proximal occlusion -suspected chronic              RCA: Dominant, 50% stenosis in the midsegment of the vessel.  50% stenosis distally.                Results of intervention to the LAD:  Pre: 99% stenosis and ADRIAN I flow  Post: 20% residual stenosis and ADRIAN III flow. No dissection or distal embolization.     Technical Factors  1. Complications: None  2. Estimated Blood Loss: <50 cc  3. Specimens: None  4. Contrast Volume: 95 ml  5. Medications: Radial cocktail (Verapamil 2.5 mg, Nitroglycerin 100 mcg) Heparin to maintain ACT >250  6. Radiation (Air Kerma): 2365 mGy     Co-morbidities: See PMH  Potential Risk - Complications: Cognitive Impairment, Contractures, Deep Vein Thrombosis, Dysphagia, Incontinence, Malnutrition, Pain, Pneumonia, Pressure Ulcer and Urinary Tract Infection  Level of Risk: High    Ongoing Medical Management Needed (Medical/Nursing Needs):   Patient Active Problem List    Diagnosis Date Noted   • CAD (coronary artery disease) 04/25/2021   • Hematuria due to acute cystitis 04/22/2021   • Hypotension 04/19/2021   • NSTEMI (non-ST elevated myocardial infarction) ([facility]) 04/18/2021   • Acute on chronic respiratory failure with hypoxia ([facility]) 01/21/2019   • Sepsis ([facility])  "06/24/2013   • Severe sepsis with septic shock ([facility]) 06/24/2013   • Adrenal insufficiency ([facility]) 04/19/2019   • UTI (urinary tract infection) 04/18/2019   • Advance care planning 01/27/2019   • GI bleed 01/21/2019   • Anticoagulant long-term use 07/02/2012   • History of pulmonary embolism 02/03/2012   • Obstructive hypertrophic cardiomyopathy ([facility]) 08/04/2011   • Essential hypertension, benign 04/14/2010   • 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[facility])    • Prolonged Q-T interval on ECG 04/19/2021   • Edema 01/24/2019   • Anemia due to GI blood loss 01/21/2019   • Candidal intertrigo 01/21/2019   • Frequent PVCs 07/26/2016   • Hyponatremia 06/24/2013   • COPD (chronic obstructive pulmonary disease) ([facility]) 02/03/2012   • Chronic back pain 02/03/2012   • Paroxysmal atrial fibrillation ([facility]) 08/29/2011   • SSS (sick sinus syndrome) ([facility]) 08/29/2011   • Cardiac pacemaker in situ 04/01/2010   • BPH (benign prostatic hypertrophy) 04/01/2010   • Hypocalcemia 04/28/2021   • Normocytic anemia 04/18/2019   • Pleural effusion 01/23/2019   • Supratherapeutic INR 01/21/2019   • Pulmonary embolism ([facility]) 08/18/2016   • Potential for deficient knowledge of chronic obstructive pulmonary disease 08/02/2016   • Chronic respiratory failure ([facility]) 07/26/2016   • Long term current use of antiarrhythmic drug 07/26/2016   • Ventricular ectopy 06/16/2016   • SOB (shortness of breath) 06/16/2016   • Peripheral neuropathy, idiopathic 05/26/2015   • ARF (acute renal failure) ([facility]) 06/24/2013   • 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 06/20/2013   • EVANGELINA (obstructive sleep apnea) 02/03/2012   • Third degree AV block ([facility]) 08/29/2011     A & O    Current Vital Signs:   Temperature: 36.8 °C (98.2 °F) Pulse: 77 Respiration: 18 Blood Pressure : 123/75  Weight: 96.5 kg (212 lb 11.9 oz) Height: 170.2 cm (5' 7\")  Pulse Oximetry: 97 % O2 (LPM): 5      Completed Laboratory Reports:  Recent Labs     04/27/21  1230 04/28/21  0101 04/29/21  0543   WBC  --  18.3* 20.5* "   HEMOGLOBIN  --  11.6* 12.7*   HEMATOCRIT  --  37.8* 43.7   PLATELETCT  --  259 290   SODIUM  --  137 136   POTASSIUM  --  3.7 4.1   BUN  --  14 14   CREATININE  --  0.67 0.73   ALBUMIN  --  2.8* 2.9*   GLUCOSE  --  106* 72   INR 1.11 1.06 1.09     Additional Labs: Not Applicable    Prior Living Situation:   Housing / Facility: 1 Story House  Steps Into Home: 6  Steps In Home: 0  Lives with - Patient's Self Care Capacity: Spouse  Equipment Owned: 4-Wheel Walker    Prior Level of Function / Living Situation:   Physical Therapy: Prior Services: Home-Independent  Housing / Facility: 1 Story House  Steps Into Home: 6  Steps In Home: 0  Rail: Left Rail  (Steps into Home)  Bathroom Set up: Walk In Shower  Equipment Owned: 4-Wheel Walker  Lives with - Patient's Self Care Capacity: Spouse  Bed Mobility: Independent  Transfer Status: Independent  Ambulation: Independent  Distance Ambulation (Feet): (short household)  Assistive Devices Used: 4-Wheel Walker  Wheelchair: (used ramp )  Current Level of Function:   Gait Level Of Assist: Minimal Assist  Assistive Device: Front Wheel Walker  Distance (Feet): 5  Deviation: Shuffled Gait, Bradykinetic, Decreased Base Of Support  # of Stairs Climbed: 0  Weight Bearing Status: WBAT  Skilled Intervention: Verbal Cuing  Supine to Sit: Minimal Assist  Sit to Supine: (chair )  Scooting: Minimal Assist  Skilled Intervention: Verbal Cuing  Comments: HOB raised  Sit to Stand: Minimal Assist  Bed, Chair, Wheelchair Transfer: Minimal Assist  Transfer Method: Stand Step  Skilled Intervention: Verbal Cuing  Sitting in Chair: > 1 hr  Standin min   Occupational Therapy:   Self Feeding: Independent  Grooming / Hygiene: Independent  Bathing: Independent  Dressing: Independent  Toileting: Independent  Medication Management: Independent  Laundry: Independent  Kitchen Mobility: Independent  Finances: Independent  Home Management: Independent  Shopping: Independent  Prior Level Of Mobility:  Independent With Device in Community  Prior Services: Home-Independent  Housing / Facility: [address] [place] [address]  Current Level of Function:   Upper Body Dressing: Minimal Assist  Lower Body Dressing: Maximal Assist  Toileting: (NT condom cath in place )  Speech Language Pathology:      Rehabilitation Prognosis/Potential: Good  Estimated Length of Stay: 7-10 days    Nursing:   Orientation : Oriented x 4  Incontinent-external cath in place    Scope/Intensity of Services Recommended:  Physical Therapy: 1.5 hr / day  5 days / week. Therapeutic Interventions Required: Maximize Endurance, Mobility, Strength and Safety  Occupational Therapy: 1.5 hr / day 5 days / week. Therapeutic Interventions Required: Maximize Self Care, ADLs, IADLs and Energy Conservation  Rehabilitation Nursin/7. Therapeutic Interventions Required: Monitor Pain, Skin, Vital Signs, Intake and Output, Labs, Safety and Family Training  Rehabilitation Physician: 3 - 5 days / week. Therapeutic Interventions Required: Medical Management  Respiratory Care: Pulmonary Toileting. Therapeutic Interventions Required: Pulmonary Toileting, O2 Weaning and Aspiration Risk    He requires 24-hour rehabilitation nursing to manage bowel and bladder function, skin care, nutrition and fluid intake, pulmonary hygiene, pain control, safety, medication management and patient/family goals. In addition, rehabilitation nursing will reiterate and reinforce therapy skills and equipment use, including ADLs, as well as provide education to the patient and family. Jeffrey Livingston Dl is willing to participate in and is able to tolerate the proposed plan of care.    Rehabilitation Goals and Plan (Expected frequency & duration of treatment in the IRF):   Return to the Community, Modified Independent Level of Care and Family Able to Provide 24/7 Assistance  Anticipated Date of Rehabilitation Admission: 21  Patient/Family oriented IRF level of care/facility/plan: Yes  Patient/Family  willing to participate in IRF care/facility/plan: Yes  Patient able to tolerate IRF level of care proposed: Yes  Patient has potential to benefit IRF level of care proposed: Yes  Comments: Not Applicable    Special Needs or Precautions - Medical Necessity:  Safety Concerns/Precautions:  Fall Risk / High Risk for Falls, Balance and Bed / Chair Alarm  Pain Management  IV Site: Peripheral  Requires Oxygen  Current Medications:    Current Facility-Administered Medications Ordered in Epic   Medication Dose Route Frequency Provider Last Rate Last Admin   • albuterol inhaler 2 Puff  2 Puff Inhalation Q2HRS PRN (RT) Toma Wylie M.D.       • enoxaparin (LOVENOX) inj 100 mg  1 mg/kg Subcutaneous Q12HRS Toma Wylie M.D.   100 mg at 04/29/21 0537   • calcium citrate (CALCITRATE) tablet 950 mg  950 mg Oral DAILY Toma Wylie M.D.   950 mg at 04/29/21 0537   • midodrine (PROAMATINE) tablet 2.5 mg  2.5 mg Oral TID WITH MEALS Toma Wylie M.D.   Stopped at 04/29/21 0730   • MD Alert...Warfarin per Pharmacy   Other PHARMACY TO DOSE Toma Wylie M.D.       • fluticasone (FLONASE) nasal spray 100 mcg  2 Spray Nasal DAILY Toma Wylie M.D.   100 mcg at 04/29/21 0534   • warfarin (COUMADIN) tablet 5 mg  5 mg Oral DAILY AT 1800 Toma Wylie M.D.   5 mg at 04/28/21 1736   • hydrocortisone (CORTEF) tablet 10 mg  10 mg Oral BID Randall Rosado D.OShannan   10 mg at 04/29/21 0536   • lidocaine (LIDODERM) 5 % 2 Patch  2 Patch Transdermal Q24HR Mireille Sapp M.D.   2 Patch at 04/28/21 1006   • benzonatate (TESSALON) capsule 100 mg  100 mg Oral TID PRN Steve Sanchez M.D.   100 mg at 04/28/21 2140   • ipratropium-albuterol (DUONEB) nebulizer solution  3 mL Nebulization Q4H PRN (RT) Mireille Sapp M.D.   3 mL at 04/28/21 1024   • testosterone cypionate (DEPO-TESTOSTERONE) injection 200 mg  200 mg Intramuscular Q21 DAYS Mireille Sapp M.D.   200 mg at 04/23/21 1654   • oxyCODONE immediate-release (ROXICODONE) tablet 5  mg  5 mg Oral Q4HRS PRN Luca Puckett M.D.   5 mg at 04/29/21 0849   • aspirin EC (ECOTRIN) tablet 81 mg  81 mg Oral DAILY José Miguel Byrd M.D.   81 mg at 04/29/21 0535   • clopidogrel (PLAVIX) tablet 75 mg  75 mg Oral DAILY José Miguel Byrd M.D.   75 mg at 04/29/21 0535   • atorvastatin (LIPITOR) tablet 80 mg  80 mg Oral Q EVENING Jeffrey Thompson M.D.   80 mg at 04/28/21 1736   • gabapentin (NEURONTIN) capsule 600 mg  600 mg Oral Q EVENING Arya Alonzo M.D.   600 mg at 04/28/21 1736   • levothyroxine (SYNTHROID) tablet 75 mcg  75 mcg Oral AM ES Arya Alonzo M.D.   75 mcg at 04/29/21 0536   • montelukast (SINGULAIR) tablet 10 mg  10 mg Oral DAILY Arya Alonzo M.D.   10 mg at 04/29/21 0535   • omeprazole (PRILOSEC) capsule 20 mg  20 mg Oral BID Arya Alonzo M.D.   20 mg at 04/29/21 0536   • sotalol (BETAPACE) tablet 40 mg  40 mg Oral BID Arya Alonzo M.D.   40 mg at 04/28/21 2132   • tamsulosin (FLOMAX) capsule 0.4 mg  0.4 mg Oral DAILY Arya Alonzo M.D.   0.4 mg at 04/29/21 0535   • acetaminophen (Tylenol) tablet 650 mg  650 mg Oral Q6HRS PRN Arya Alonzo M.D.   650 mg at 04/26/21 1427   • senna-docusate (PERICOLACE or SENOKOT S) 8.6-50 MG per tablet 2 tablet  2 tablet Oral BID Arya Alonzo M.D.   Stopped at 04/29/21 0600    And   • polyethylene glycol/lytes (MIRALAX) PACKET 1 Packet  1 Packet Oral QDAY PRN Arya Alonzo M.D.        And   • magnesium hydroxide (MILK OF MAGNESIA) suspension 30 mL  30 mL Oral QDAY PRN Arya Alonzo M.D.        And   • bisacodyl (DULCOLAX) suppository 10 mg  10 mg Rectal QDAY PRN Arya Alonzo M.D.         No current [facility]-ordered outpatient medications on file.     Diet:   DIET ORDERS (From admission to next 24h)     Start     Ordered    04/25/21 1455  Supplements  ALL MEALS     Question:  Which Supplement  Answer:  BOOST PLUS    04/25/21 1456    04/21/21 1115  Diet Order Diet: Cardiac (Soft/chopped please)  ALL MEALS     Question:  Diet:  Answer:  Cardiac  Comment:  Soft/chopped please     04/21/21 1114                Anticipated Discharge Destination / Patient/Family Goal:  Destination: Home with Assistance Support System: Spouse and Family   Anticipated home health services: OT and PT  Previously used HH service/ provider: Not Applicable  Anticipated DME Needs: Oxygen, Walker and Life Line  Outpatient Services: OT and PT  Alternative resources to address additional identified needs:     Pre-Screen Completed: 4/29/2021 10:02 AM Milan Michele L.P.N.

## 2021-04-29 NOTE — PROGRESS NOTES
Received bedside report from RN, pt care assumed, VSS, pt assessment complete. Pt AAOx4, 6/10 pain at this time. Pt to receive medication for pain. No signs of acute distress noted at this time. POC discussed with pt and verbalizes no questions. Pt denies any additional needs at this time. Bed in lowest position, bed alarm in place, pt educated on fall risk and verbalized understanding, call light within reach, hourly rounding initiated.

## 2021-04-29 NOTE — DISCHARGE SUMMARY
Discharge Summary    CHIEF COMPLAINT ON ADMISSION  Chief Complaint   Patient presents with   • Chest Pain       Reason for Admission  EMS     CODE STATUS  Full Code    HPI & HOSPITAL COURSE  This is 88 years old male who has past medical history of hypertrophic obstructive cardiomyopathy, history of paroxysmal A. 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  Not radiating.  No aggravating factors.  Lasted for an hour and decreased in intensity with rest.  Patient stated that he usually gets occasional chest pain but this time his pain lingered so he presented to the ER for further evaluation.  In ER noted to be hemodynamically stable.  Heart rate stable.  Afebrile.  Chest x-ray showed hypoinflation of the left lung base concerning for plexuses or infiltrate.  No pulmonary edema noted.  CT of the chest was negative for PE and showed 4.2 cm ascending thoracic aortic aneurysm and bibasilar atelectasis with no consolidation or infiltrate noted.  EKG showed paced rhythm with occasional PVCs.  Initially patient admitted for observation with troponin of 38.  However, he continued to have chest pain and troponin increased to 163.  Patient was started on heparin drip.  Echocardiogram showed ejection fraction 60% with akinesis of the apex and hypokinesis of the apical septal wall which might represent ventricular pacing or prior infarct.  Cardiology consulted.  Planning for cardiac cath in the a.m.  Discussed CODE STATUS with patient.  He wishes to continue to be full code for now.     This patient then developed hypotension which did not respond to  IV fluid resuscitation.  Was transferred to ICU and started on stress dose corticosteroids.  He was taken to cardiac cath which showed severe LAD stenosis had PTCA to LAD with 3 overlapping stents placed.  Started on aspirin, Plavix, and high intensity statin.  Patient also noted to have acute cystitis and was started on antibiotics.  Patient completed a course.  Was started on therapeutic Lovenox and warfarin was restarted.  However, patient developed hematuria so therapeutic Lovenox and Coumadin were held.  Patient was started on CBI with his hematuria cleared up.  Blood pressure improved then patient was transferred to floor.  However shortly after his blood pressure dropped again so he was transferred back to ICU and was placed on midodrine and stress dose corticosteroids.  His hypotension resolved.  Patient then was transferred outside of ICU.  Continue to be hemodynamically clinically stable.  Hematuria resolved.  Was restarted on therapeutic Lovenox along with Coumadin for bridging with no hematuria has been reported so far.  Patient has superficial skin tears which were bleeding but that was controlled with compression dressing.  Continue to be hemodynamically clinically stable.  Midodrine dose dropped from 5 mg 3 times daily to 2.5 mg twice daily.  Blood pressure continue to hold.  Was evaluated by acute rehab who felt that patient is a good candidate.  He continued to be hemodynamically clinically stable.  He was cleared for discharge from medical standpoint.    Therefore, he is discharged in guarded and stable condition to an inpatient rehabilitation hospital.    The patient met 2-midnight criteria for an inpatient stay at the time of discharge.      FOLLOW UP ITEMS POST DISCHARGE  Follow-up with MD at rehab as per recommendations  Follow-up with cardiology as per recommendations    DISCHARGE DIAGNOSES  Principal Problem (Resolved):    Hypotension POA: Unknown  Active Problems:    CAD (coronary artery  disease) POA: Unknown    Essential hypertension, benign POA: Yes    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HCC) POA: Yes      Overview: Resection pituitary adenoma 1998.  Treated with Cortef, testosterone       injections, levothyroxine.    History of pulmonary embolism POA: Yes      Overview: Multiple. S/P IVC filter    Anticoagulant long-term use POA: Yes    Obstructive hypertrophic cardiomyopathy (HCC) (Chronic) POA: Yes    Adrenal insufficiency (HCC) POA: Yes    Hypocalcemia POA: Yes    Cardiac pacemaker in situ POA: Yes    COPD (chronic obstructive pulmonary disease) (HCC) POA: Yes      Overview: PMA HOME O2    Chronic back pain (Chronic) POA: Yes    BPH (benign prostatic hypertrophy) (Chronic) POA: Yes      Overview: Treated withTerazosin and Proscar      IMO Update    SSS (sick sinus syndrome) ([facility]) (Chronic) POA: Yes    Frequent PVCs POA: Yes    Prolonged Q-T interval on ECG (Chronic) POA: Yes  Resolved Problems:    NSTEMI (non-ST elevated myocardial infarction) ([facility]) POA: Yes    Elevated troponin POA: Yes    Hematuria due to acute cystitis POA: Unknown    Aortic stenosis POA: Yes    UTI (urinary tract infection) POA: Yes    Hyponatremia (Chronic) POA: Yes      FOLLOW UP  No future appointments.  [facility]  [address]  [address]  [facility] [address] [phone]  Call  Your doctor has referred you to Cardiac Rehab, which is important to your recovery. Please call to make an appointment.    Whitney Valerio M.D.  [address] [place] [address] [phone]    Go on 5/7/2021  Please go to your Primary Care Provider on 5/7/21 at 1:45pm for follow up appointment. Thank You.      MEDICATIONS ON DISCHARGE     Medication List      START taking these medications      Instructions   aspirin 81 MG EC tablet  Start taking on: April 30, 2021   Take 1 tablet by mouth every day.  Dose: 81 mg     atorvastatin 80 MG tablet  Commonly known as: LIPITOR   Take 1 tablet by mouth every evening.  Dose: 80 mg      calcium citrate 950 (200 Ca) MG Tabs  Start taking on: April 30, 2021  Commonly known as: CALCITRATE   Take 1 tablet by mouth every day.  Dose: 950 mg     clopidogrel 75 MG Tabs  Start taking on: April 30, 2021  Commonly known as: PLAVIX   Take 1 tablet by mouth every day.  Dose: 75 mg     enoxaparin 100 MG/ML Soln inj  Commonly known as: LOVENOX   Inject 100 mg under the skin every 12 hours.  Dose: 1 mg/kg     ipratropium-albuterol 0.5-2.5 (3) MG/3ML nebulizer solution  Commonly known as: DUONEB   Take 3 mL by nebulization every 6 hours as needed for Shortness of Breath.  Dose: 3 mL     midodrine 2.5 MG Tabs  Commonly known as: PROAMATINE   Take 1 tablet by mouth 2 times a day.  Dose: 2.5 mg        CHANGE how you take these medications      Instructions   albuterol 108 (90 Base) MCG/ACT Aers inhalation aerosol  What changed: when to take this   Inhale 2 Puffs every four hours as needed for Shortness of Breath.  Dose: 2 Puff     gabapentin 600 MG tablet  What changed: how much to take  Commonly known as: NEURONTIN   Take 1.5 Tabs by mouth every bedtime.  Dose: 900 mg     hydrocortisone 10 MG Tabs  What changed: when to take this  Commonly known as: CORTEF   Take 1 Tab by mouth 3 times a day.  Dose: 10 mg     montelukast 10 MG Tabs  What changed: when to take this  Commonly known as: SINGULAIR   Take 1 Tab by mouth every day.  Dose: 10 mg     warfarin 5 MG Tabs  What changed: when to take this  Commonly known as: COUMADIN   Take 1 Tab by mouth every day.  Dose: 5 mg        CONTINUE taking these medications      Instructions   HYDROcodone-acetaminophen 5-325 MG Tabs per tablet  Commonly known as: NORCO   Take 1 tablet by mouth every 8 hours as needed. Indications: Pain  Dose: 1 tablet     levothyroxine 75 MCG Tabs  Commonly known as: SYNTHROID   Take 1 Tab by mouth Every morning on an empty stomach.  Dose: 75 mcg     omeprazole 20 MG delayed-release capsule  Commonly known as: PRILOSEC   Take 1 capsule by mouth 2  times a day.  Dose: 20 mg     sotalol 80 MG Tabs  Commonly known as: BETAPACE   Take 0.5 Tabs by mouth 2 times a day.  Dose: 40 mg     tamsulosin 0.4 MG capsule  Commonly known as: FLOMAX   Take 1 Cap by mouth every day.  Dose: 0.4 mg     tiotropium 18 MCG Caps  Commonly known as: SPIRIVA   Inhale 1 Cap by mouth every day.  Dose: 18 mcg     Vitamin D (Cholecalciferol) 50 MCG (2000 UT) Caps   Take 2,000 mcg by mouth every day.  Dose: 2,000 mcg            Allergies  Allergies   Allergen Reactions   • Lisinopril      Hypotension, 30 mg dose   • Pcn [Penicillins] Rash     Tolerates cephalosporins         DIET  Orders Placed This Encounter   Procedures   • Diet Order Diet: Cardiac (Soft/chopped please)     Standing Status:   Standing     Number of Occurrences:   1     Order Specific Question:   Diet:     Answer:   Cardiac [6]     Comments:   Soft/chopped please       ACTIVITY  As tolerated.  Weight bearing as tolerated    LINES, DRAINS, AND WOUNDS  This is an automated list. Peripheral IVs will be removed prior to discharge.  Peripheral IV 04/28/21 20 G Anterior;Left;Proximal Forearm (Active)   Site Assessment Clean;Dry;Intact 04/28/21 2200   Dressing Type Occlusive;Transparent 04/28/21 2200   Line Status Saline locked;Scrubbed the hub prior to access;Flushed 04/28/21 2200   Dressing Status Clean;Dry;Intact 04/28/21 2200   Dressing Intervention N/A 04/28/21 2200   Infiltration Grading ([facility]) 0 04/28/21 2200   Phlebitis Scale ([facility]) 0 04/28/21 2200     External Urinary Catheter ([facility]) (Active)   Collection Container Standard drainage bag 04/27/21 1909   Output (mL) 1200 mL 04/29/21 0545      Wound 04/18/21 Partial Thickness Wound Ear Outer (Active)   Wound Image   04/18/21 8496   Site Assessment Clean;Dry;Brown 04/28/21 2000   Periwound Assessment Clean;Dry;Intact 04/28/21 2000   Margins Unattached edges 04/28/21 2000   Closure None 04/28/21 2000   Drainage Amount Small 04/28/21 2000   Drainage  Description Serosanguineous 04/28/21 2000   Treatments Site care 04/28/21 2000   Wound Cleansing Normal Saline Irrigation 04/28/21 2000   Dressing Options Dry Gauze 04/28/21 2000   Dressing Status Clean;Dry;Intact 04/28/21 2000   Dressing Change/Treatment Frequency As Needed 04/28/21 2000       Wound 04/18/21 Partial Thickness Wound Ear Posterior (Active)   Wound Image   04/18/21 2321   Site Assessment Clean;Dry;Yellow 04/27/21 2000   Periwound Assessment Clean;Dry;Intact 04/27/21 2000   Margins Attached edges 04/27/21 2000   Closure Open to air 04/27/21 2000   Drainage Amount None 04/27/21 2000   Dressing Options Open to Air 04/27/21 2000   Dressing Status Open to Air 04/27/21 0835       Wound 04/26/21 Skin Tear Coccyx Mid Small skin tear in folds of skin on coccyx (Active)   Site Assessment Dry;Pink;Red 04/28/21 2000   Periwound Assessment Clean;Dry;Intact;Pink 04/28/21 2000   Margins Undefined edges 04/28/21 2000   Closure None 04/28/21 2000   Drainage Amount None 04/28/21 2000   Treatments Offloading 04/28/21 2000   Wound Cleansing Not Applicable 04/28/21 2000   Dressing Options Mepilex 04/28/21 2000   Dressing Changed Observed 04/28/21 2000   Dressing Status Clean;Dry;Intact 04/28/21 2000   Dressing Change/Treatment Frequency As Needed 04/28/21 2000       Wound 04/29/21 Skin Tear Arm Proximal skin tear (Active)       Peripheral IV 04/28/21 20 G Anterior;Left;Proximal Forearm (Active)   Site Assessment Clean;Dry;Intact 04/28/21 2200   Dressing Type Occlusive;Transparent 04/28/21 2200   Line Status Saline locked;Scrubbed the hub prior to access;Flushed 04/28/21 2200   Dressing Status Clean;Dry;Intact 04/28/21 2200   Dressing Intervention N/A 04/28/21 2200   Infiltration Grading ([facility]) 0 04/28/21 2200   Phlebitis Scale ([facility] Only) 0 04/28/21 2200               MENTAL STATUS ON TRANSFER  Level of Consciousness: Alert  Orientation : Oriented x 4       CONSULTATIONS  Cardiology  Critical  care    PROCEDURES  As above    LABORATORY  Lab Results   Component Value Date    SODIUM 136 04/29/2021    POTASSIUM 4.1 04/29/2021    CHLORIDE 96 04/29/2021    CO2 36 (H) 04/29/2021    GLUCOSE 72 04/29/2021    BUN 14 04/29/2021    CREATININE 0.73 04/29/2021    CREATININE 1.3 09/11/2008        Lab Results   Component Value Date    WBC 20.5 (H) 04/29/2021    HEMOGLOBIN 12.7 (L) 04/29/2021    HEMATOCRIT 43.7 04/29/2021    PLATELETCT 290 04/29/2021        Total time of the discharge process exceeds 33 minutes.

## 2021-04-29 NOTE — DISCHARGE PLANNING
Tcc/ rehab follow up.   Pt medically cleared for transfer to [facility].     Accepting MD is Dr Malachi Duke.     Transport  set up for 1230pm today.    I have message  Ade Ramos RN and Elizabeth TEJEDA.      Plan:  Transfer to IRF today @ 1230.

## 2021-04-29 NOTE — CARE PLAN
Problem: Respiratory:  Goal: Respiratory status will improve  Outcome: PROGRESSING AS EXPECTED     Problem: Pain Management  Goal: Pain level will decrease to patient's comfort goal  Outcome: PROGRESSING AS EXPECTED     Problem: Communication  Goal: The ability to communicate needs accurately and effectively will improve  Outcome: PROGRESSING AS EXPECTED     Problem: Safety  Goal: Will remain free from injury  Outcome: PROGRESSING AS EXPECTED  Goal: Will remain free from falls  Outcome: PROGRESSING AS EXPECTED

## 2021-04-29 NOTE — THERAPY
"Occupational Therapy   Initial Evaluation     Patient Name: Jeffrey Lizama  Age:  88 y.o., Sex:  male  Medical Record #: [id number]  Today's Date: 4/29/2021     Precautions  Precautions: Fall Risk, Cardiac Precautions (See Comments)  Comments: post PCI    Assessment  Patient is 88 y.o. male admitted for chest pain. PMHx: Afib, AV block s/p PPM, DVT/PE, COPD, hypertrophic obstructive caridomyopathy, and arthritis. This admission pt is dx w/hypotension, CAD, hematuria /t acute cystitis, NSTEMI and UTI. Pt is demonstrating generalized weaknss and deconditioning, impacting his participation in ADL's. Pt is easily SOB w/activity, but maintain O2 saturations throughout activities. OT will follow in this setting.     Plan  Recommend Occupational Therapy 4 times per week until therapy goals are met for the following treatments:  Adaptive Equipment, Community Re-integration, Self Care/Activities of Daily Living, Therapeutic Activities and Therapeutic Exercises.    DC Equipment Recommendations: Unable to determine at this time  Discharge Recommendations: Recommend post-acute placement for additional occupational therapy services prior to discharge home     Subjective  \"I'm going to need a little help to move out of this bed\"      Objective     04/29/21 0905   Prior Living Situation   Prior Services Home-Independent   Housing / Facility [address]   Steps Into Home 6   Steps In Home 0   Rail Left Rail  (Steps into Home)   Bathroom Set up Walk In Shower   Equipment Owned 4-Wheel Walker   Lives with - Patient's Self Care Capacity Spouse   Comments resides with spouse in [facility] of [facility]. supplemental O2 5 l/m continuous, pt reports SO has her own medical issues    Prior Level of ADL Function   Self Feeding Independent   Grooming / Hygiene Independent   Bathing Independent   Dressing Independent   Toileting Independent   Prior Level of IADL Function   Medication Management Independent   Laundry Independent   Kitchen Mobility " Independent   Finances Independent   Home Management Independent   Shopping Independent   Prior Level Of Mobility Independent With Device in Community   History of Falls   History of Falls No   Precautions   Precautions Fall Risk;Cardiac Precautions (See Comments)   Comments post PCI   Pain 0 - 10 Group   Therapist Pain Assessment During Activity;Nurse Notified;0   Cognition    Cognition / Consciousness WDL   Level of Consciousness Alert   Comments very pleasant and motivated    Passive ROM Upper Body   Passive ROM Upper Body WDL   Active ROM Upper Body   Active ROM Upper Body  X   Comments impaired proximally by arthritis    Strength Upper Body   Upper Body Strength  X   Gross Strength Generalized Weakness, Equal Bilaterally.    Sensation Upper Body   Upper Extremity Sensation  Not Tested   Upper Body Muscle Tone   Upper Body Muscle Tone  WDL   Neurological Concerns   Neurological Concerns No   Coordination Upper Body   Coordination X   Comments basline limited dexterity d/t arthritis    Balance Assessment   Sitting Balance (Static) Fair +   Sitting Balance (Dynamic) Fair   Standing Balance (Static) Fair   Standing Balance (Dynamic) Fair -   Weight Shift Sitting Fair   Weight Shift Standing Poor   Comments w/fww    Bed Mobility    Supine to Sit Minimal Assist   Sit to Supine   (chair )   Scooting Minimal Assist   ADL Assessment   Grooming Minimal Assist;Seated   Upper Body Dressing Minimal Assist   Lower Body Dressing Maximal Assist   Toileting   (NT condom cath in place )   How much help from another person does the patient currently need...   6 Clicks Daily Activity Score 19   Functional Mobility   Sit to Stand Minimal Assist   Bed, Chair, Wheelchair Transfer Minimal Assist   Mobility EOB > chair    Visual Perception   Visual Perception  Not Tested   Edema / Skin Assessment   Edema / Skin  X   Skin Assessment Wound Risk High;Impaired Skin Integrity Other (Comments)   Comments thin skin    Activity Tolerance    Comments easily SOB w/activity but O2 sats stable in 90's    Patient / Family Goals   Patient / Family Goal #1 to get stronger    Short Term Goals   Short Term Goal # 1 pt will complete FB dressing w/spv    Short Term Goal # 2 pt will complete toilet txf w/min A   Short Term Goal # 3 pt will complete grooming standing at sink w/min A    Education Group   Role of Occupational Therapist Patient Response Patient;Acceptance;Explanation   Problem List   Problem List Decreased Active Daily Living Skills;Decreased Upper Extremity Strength Right;Decreased Upper Extremity Strength Left;Decreased Upper Extremity AROM Right;Decreased Upper Extremity AROM Left;Decreased Functional Mobility;Decreased Activity Tolerance;Impaired Postural Control / Balance   Anticipated Discharge Equipment and Recommendations   DC Equipment Recommendations Unable to determine at this time   Discharge Recommendations Recommend post-acute placement for additional occupational therapy services prior to discharge home   Interdisciplinary Plan of Care Collaboration   IDT Collaboration with  Nursing   Patient Position at End of Therapy Seated;Chair Alarm On;Call Light within Reach;Tray Table within Reach;Phone within Reach   Collaboration Comments RN aware of OT eval and pts efforts    Session Information   Date / Session Number  4/29 #1 (1/4, 5/5)    Priority 3

## 2021-04-29 NOTE — CARE PLAN
Problem: Safety  Goal: Will remain free from injury  Outcome: PROGRESSING AS EXPECTED  Call light within reach, patient verbalized the understanding on the need to call when needing assistance. Bed locked in lowest position, non skid socks on, bed rails up x2, bed alarm in use, hourly rounding in place.     Problem: Knowledge Deficit  Goal: Knowledge of disease process/condition, treatment plan, diagnostic tests, and medications will improve  Outcome: PROGRESSING AS EXPECTED  Pt updated on POC  and medications. Pt verbalizes understanding and has no further questions at this time. Pt educated on calling for any more questions.

## 2021-04-29 NOTE — DISCHARGE PLANNING
Jeffrey remains medically cleared by Dr. Wylie.  Currently on O2 @ 4L.  Anticipate an admission pending an OT eval and Physiatry acceptance.  Elizabteh TEJEDA & Dr. Wylie are aware.     1010-Case is under review by Dr. Duke.     1049-Dr. Duke has accepted.  Transport has been arranged for 1230.  Msg placed to Dr. Wylie & Elizabeth TEJEDA.  Jacquie spouse & Naomy BSN are aware.

## 2021-04-29 NOTE — PROGRESS NOTES
Pharmacy Warfarin Consult   4/29/2021     88 y.o.   male     Indication for anticoagulation: Pulmonary Embolism, Deep Vein Thrombosis     Goal INR = 2 - 3    Recent Labs     04/27/21  1230 04/28/21  0101 04/29/21  0543   INR 1.11 1.06 1.09   HEMOGLOBIN  --  11.6* 12.7*   HEMATOCRIT  --  37.8* 43.7       Pertinent Drug/Drug Interactions:  Proton Pump Inhibitor, Statin, Aspirin, Clopidogrel  Outpatient Warfarin Regimen: 5 mg daily  Recent Warfarin Dosing:   Dose from last 7 days     Date/Time Dose (mg)    04/29/21 0916  5    04/28/21 1431  5    04/27/21 1432  5        INR from last 7 days     Date/Time INR Value    04/29/21 0543  1.09    04/28/21 0101  1.06    04/27/21 1230  1.11    04/25/21 0246  (!) 1.19    04/24/21 0339  (!) 1.16    04/23/21 0651  (!) 1.2        Bridge Therapy: Lovenox 100 mg q 12 h          1.  Coumadin 5 mg tonight per INR = 1.09        Mendez Valentino [facility]

## 2021-04-29 NOTE — PROGRESS NOTES
Report called to receiving RN.  Full Head to toe report given, including today's events, procedures, labwork etc. All questions and concerns addressed. Pt updated on plan of care and transfer.

## 2021-04-29 NOTE — PROGRESS NOTES
Patient admitted to facility at 1308 via wheelchair; accompanied by hospital transport.  Patient assisted to room and positioned in bed for comfort and safety; call light within reach.  Patient assisted with stowing belongings and oriented to room and facility.  Admission assessment performed and documented in computer.  Admission paperwork completed; signed copies placed in chart.  Will continue to monitor.    2 RN skin check done with admitting RN and Patsy RN. Face photo and skin photos documented in media. Appropriate LDAs opened.   Pt with Lloyd score of 18, RN wound protocol not needed at this time. Prevention measures in place including, waffle mattress.

## 2021-04-29 NOTE — PROGRESS NOTES
Inpatient Anticoagulation Service Note    Date: 4/29/2021  Reason for Anticoagulation: Pulmonary Embolism, Deep Vein Thrombosis           Hemoglobin Value: (!) 12.7  Hematocrit Value: 43.7  Lab Platelet Value: 290  Target INR: 2.0 to 3.0    INR from last 7 days     Date/Time INR Value    04/29/21 0543  1.09    04/28/21 0101  1.06    04/27/21 1230  1.11    04/25/21 0246  (!) 1.19    04/24/21 0339  (!) 1.16    04/23/21 0651  (!) 1.2        Dose from last 7 days     Date/Time Dose (mg)    04/29/21 0916  5    04/28/21 1431  5    04/27/21 1432  5    04/22/21 1114  0        Average Dose (mg): (Home Dose: 5 mg daily)  Significant Interactions: Proton Pump Inhibitor, Statin, Aspirin, Clopidogrel  Bridge Therapy: Yes(enoxaparin ~1 mg/kg/dose)  Bridge Therapy Start Date: 04/27/21  Days of Overlap Therapy: 2   Reversal Agent Administered: Not Applicable  Education Material Provided?: No(chronic warfarin patient)    Assessment: INR subtherapeutic. H/H anemic but stable. Per RN report patient has some bleeding from skin tears and cancer lesion behind ear but not with large increase in amounts from yesterday. Benefit outweighs risk for anticoagulation at this time. Will give warfarin 5mg again today, plan to dose conservatively given patient wound status. Pharmacy will continue to monitor daily. INR ordered daily through 5/2/21.    Plan:  Give warfarin 5mg PO tonight    Pharmacist suggested discharge dosing: Resume home dosing of warfarin 5mg PO daily and follow up within 24-48 hours s/p discharge for INR and bridge therapy evaluation.     Quita DobbsD

## 2021-04-29 NOTE — CARE PLAN
Problem: Safety  Goal: Will remain free from falls  Note: Pt educated to use call light when in need of assistance, bed alarm, chair alarm and non skid socks in used. Call light and personal belongings within reach.

## 2021-04-29 NOTE — PROGRESS NOTES
Pt picked up by transport for Rehab;   Discharge instructions complete. Telebox removed and monitor room notified. All asked questions answered. Safe discharge complete.     Myron Graves)

## 2021-04-29 NOTE — PROGRESS NOTES
Bedside report received, patient awake sitting up in bed, tele monitor in place, on 4 L O2 via NC. RN assessed needs, no additional needs at this time. Call light within reach, patient verbalized the understanding on the need to call when needing assistance. Bed locked in lowest position, non skid socks on, bed rails up x2, bed alarm in use, hourly rounding in place.

## 2021-04-29 NOTE — H&P
"REHABILITATION HISTORY AND PHYSICAL/POST ADMISSION EVALUATION    4/29/2021  4:31 PM  Jeffrey Lizama  [id number]  Admission  4/29/2021  1:16 PM  C Code/Reason for admission: 09 Cardiac  Etiologic diagnosis/problem: s/p NSTEMI  Chief Complaint: Shortness of breath    HPI:  Per Dr. Reno's consult \"The patient is a 88 y.o. 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 Byrd. Hospital course with hematuria/acute cystitis, managed with CBI, antibiotics, Pyridium; COPD/O2 titration, anemia, leukocytosis, hyponatremia, hypokalemia, and management of chronic back pain.\"    Patient current reports shortness of breath as well as a productive cough.  He also reports chronic joint pain in multiple areas of his body.  At home he takes Tylenol or Norco as needed for pain.  The opiates are prescribed by his primary care doctor. He denies any chest pain.  He moved his bowels today.  He does have a condom cath in place and denies any dysuria.  Patient reports as he is gotten older his thinking has declined.    Patient reports his wife fell and broke her hip in December and is still recovering so he will need to be as independent as possible.  His home is ramped and is wheelchair accessible.  He has a history of falling himself with a fracture of his left ankle without any hardware but does have chronic pain in that joint.    Patient was evaluated by Rehab Medicine physician and Physical Therapy and Occupational Therapy and determined to be appropriate for acute inpatient rehab and was transferred to [facility] on 4/29/2021  1:16 PM.    With this acute therapeutic intervention, this patient hopes to improve his functional status, and return to independent living with the supportive " "care of signigicant other.    REVIEW OF SYSTEMS:     A complete review of systems was performed and was negative in detail with the exception of items mentioned elsewhere in this document.    PMH:  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 (pouch) (Formerly [facility]) 4/1/2010   • BPH (benign prostatic hypertrophy) 4/1/2010   • Bradycardia    • Breath shortness    • Bronchitis    • Cardiac pacemaker 04 2010   • Cataract    • COPD (chronic obstructive pulmonary disease) (Formerly [facility])    • Diverticulitis    • DJD (degenerative joint disease)    • EMPHYSEMA    • Glaucoma    • Heart burn    • Heart murmur    • Hiatus hernia syndrome    • Hypertension    • 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Formerly [facility]) 1995   • Indigestion    • Knee arthroplasty 2002    right   • Obstructive hypertrophic cardiomyopathy (Formerly [facility]) 8/4/2011   • PAF (paroxysmal atrial fibrillation) (Formerly [facility])    • Paroxysmal atrial fibrillation (Formerly [facility]) 8/29/2011   • PE (pulmonary embolism)     IVC filter, states PE clots 8 times   • Phlebitis     chronic / recurrent   • Pituitary adenoma (Formerly [facility]) 1995    hypophysectomy   • Pituitary adenoma (Formerly [facility]) 1995    hypophysectomy   • Pneumonia    • Rheumatic fever    • S/P insertion of IVC (inferior vena caval) filter    • S/P parathyroidectomy    • S/P parathyroidectomy 2005   • Sleep apnea     no cpap machine   • SSS (sick sinus syndrome) ([facility]ly [facility]) 8/29/2011   • Third degree AV block (Formerly [facility]) 8/29/2011   • thyroidectomy 2005    benign   • Unspecified disorder of thyroid    • Unspecified hemorrhagic conditions    • Unspecified urinary incontinence    • Urinary bladder disorder        PSH:  Past Surgical History:   Procedure Laterality Date   • GASTROSCOPY-ENDO  1/22/2019    Procedure: GASTROSCOPY-ENDO;  Surgeon: Yoandy Mcelroy M.D.;  Location: ENDOSCOPY [facility];  Service: " Gastroenterology   • GASTROSCOPY-ENDO  1/21/2019    Procedure: GASTROSCOPY-ENDO;  Surgeon: Luca Mtz M.D.;  Location: ENDOSCOPY [facility];  Service: Gastroenterology   • KNEE REVISION TOTAL  6/20/2013    Performed by Ortega Vega M.D. at SURGERY [facility]   • CARPAL TUNNEL ENDOSCOPIC  9/30/2011    Performed by RONALD ENNIS at SURGERY SAME DAY [facility]   • PACEMAKER INSERTION Left 04/23/2010    [employer]a 60 [id number] implanted by Dr. Donaldson.   • CATARACT EXTRACTION WITH IOL      bilateral    • CERVICAL DISK AND FUSION ANTERIOR     • CERVICAL FUSION POSTERIOR     • CHOLECYSTECTOMY     • CHOLECYSTECTOMY     • OTHER      pituitary tumor removed   • OTHER CARDIAC SURGERY     • OTHER ORTHOPEDIC SURGERY      knee surgery left knee x 2   • PB REVISE ULNAR NERVE AT WRIST     • PB TOTAL KNEE ARTHROPLASTY      left   • PB TOTAL KNEE ARTHROPLASTY      right   • THYROIDECTOMY         Family History   Problem Relation Age of Onset   • Arthritis Mother    • Arthritis Father    • Cancer Neg Hx    • Heart Disease Neg Hx         MEDICATIONS:  Current Facility-Administered Medications   Medication Dose   • hydrOXYzine HCl (ATARAX) tablet 50 mg  50 mg   • melatonin tablet 3 mg  3 mg   • Respiratory Therapy Consult     • Pharmacy Consult Request ...Pain Management Review 1 Each  1 Each   • acetaminophen (Tylenol) tablet 650 mg  650 mg   • lactulose 20 GM/30ML solution 30 mL  30 mL   • docusate sodium (ENEMEEZ) enema 283 mg  283 mg   • fleet enema 133 mL  1 Each   • artificial tears ophthalmic solution 1 Drop  1 Drop   • benzocaine-menthol (CEPACOL) lozenge 1 Lozenge  1 Lozenge   • mag hydrox-al hydrox-simeth (MAALOX PLUS ES or MYLANTA DS) suspension 20 mL  20 mL   • ondansetron (ZOFRAN ODT) dispertab 4 mg  4 mg    Or   • ondansetron (ZOFRAN) syringe/vial injection 4 mg  4 mg   • traZODone (DESYREL) tablet 50 mg  50 mg   • sodium chloride (OCEAN) 0.65 % nasal spray 2 Spray  2 Spray   •  senna-docusate (PERICOLACE or SENOKOT S) 8.6-50 MG per tablet 2 tablet  2 tablet    And   • polyethylene glycol/lytes (MIRALAX) PACKET 1 Packet  1 Packet    And   • magnesium hydroxide (MILK OF MAGNESIA) suspension 30 mL  30 mL    And   • bisacodyl (DULCOLAX) suppository 10 mg  10 mg   • [START ON 4/30/2021] aspirin EC (ECOTRIN) tablet 81 mg  81 mg   • [START ON 4/30/2021] clopidogrel (PLAVIX) tablet 75 mg  75 mg   • albuterol inhaler 2 Puff  2 Puff   • atorvastatin (LIPITOR) tablet 80 mg  80 mg   • benzonatate (TESSALON) capsule 100 mg  100 mg   • [START ON 4/30/2021] calcium citrate (CALCITRATE) tablet 950 mg  950 mg   • enoxaparin (LOVENOX) inj 100 mg  1 mg/kg   • [START ON 4/30/2021] fluticasone (FLONASE) nasal spray 100 mcg  2 Spray   • gabapentin (NEURONTIN) capsule 600 mg  600 mg   • hydrocortisone (CORTEF) tablet 10 mg  10 mg   • ipratropium-albuterol (DUONEB) nebulizer solution  3 mL   • [START ON 4/30/2021] levothyroxine (SYNTHROID) tablet 75 mcg  75 mcg   • [START ON 4/30/2021] lidocaine (LIDODERM) 5 % 2 Patch  2 Patch   • MD Alert...Warfarin per Pharmacy     • midodrine (PROAMATINE) tablet 2.5 mg  2.5 mg   • [START ON 4/30/2021] montelukast (SINGULAIR) tablet 10 mg  10 mg   • omeprazole (PRILOSEC) capsule 20 mg  20 mg   • oxyCODONE immediate-release (ROXICODONE) tablet 5 mg  5 mg   • sotalol (BETAPACE) tablet 40 mg  40 mg   • [START ON 4/30/2021] tamsulosin (FLOMAX) capsule 0.4 mg  0.4 mg   • [START ON 5/14/2021] testosterone cypionate (DEPO-TESTOSTERONE) injection 200 mg  200 mg   • warfarin (COUMADIN) tablet 5 mg  5 mg       ALLERGIES:  Lisinopril and Pcn [penicillins]    PSYCHOSOCIAL HISTORY:  Pre-morbidly, this patient lived in:   1 story home  With 6 steps to enter, ramp access  Lives with spouse; daughter may also be able to assist    Patient been  to his second wife for 45 years.  They have 4 children between the 2 of them and multiple grandchildren.    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  He also has a degree in psychology.    For recreation patient likes to draw.    Patient denies tobacco alcohol or drugs.    LEVEL OF FUNCTION PRIOR TO DISABILTY:  Modified Independent, ambulated with walker or cane    LEVEL OF FUNCTION PRIOR TO ADMISSION to [facility]:  PT:    Analysis   Gait Level Of Assist Minimal Assist   Assistive Device Front Wheel Walker   Distance (Feet) 5   # of Times Distance was Traveled 3   Deviation Shuffled Gait;Bradykinetic;Decreased Base Of Support   # of Stairs Climbed 0   Weight Bearing Status WBAT   Skilled Intervention Verbal Cuing   Comments limited by fatigue   Bed Mobility    Supine to Sit Minimal Assist   Sit to Supine    (NT in chair)   Scooting Supervised   Skilled Intervention Verbal Cuing   Comments HOB raised   Functional Mobility   Sit to Stand Minimal Assist   Bed, Chair, Wheelchair Transfer Minimal Assist   Transfer Method Stand Step   Mobility in room with FWW   Skilled Intervention Verbal Cuing         OT:  Assessment   Sitting Balance (Static) Fair +   Sitting Balance (Dynamic) Fair   Standing Balance (Static) Fair   Standing Balance (Dynamic) Fair -   Weight Shift Sitting Fair   Weight Shift Standing Poor   Comments w/fww    Bed Mobility    Supine to Sit Minimal Assist   Sit to Supine    (chair )   Scooting Minimal Assist   ADL Assessment   Grooming Minimal Assist;Seated   Upper Body Dressing Minimal Assist   Lower Body Dressing Maximal Assist   Toileting    (NT condom cath in place )         SLP:    Not assessed    CURRENT LEVEL OF FUNCTION:   Same as level of function prior to admission to [facility]    PHYSICAL EXAM:     VITAL SIGNS:   height is 1.727 m (5' 8\") and weight is 94.3 kg (208 lb). His temporal temperature is 36.8 °C (98.2 °F). His blood pressure is 148/101 and his pulse is 80. His respiration is 18 and oxygen saturation is 98%.     GENERAL: No apparent " distress  HEENT: Normocephalic/atraumatic, moist mucous membranes  CARDIAC: Regular rate and rhythm, normal S1, S2, systolic murmur, right medial forearm edema, mild edema in bilateral feet  LUNGS: Loose productive cough, crackles in bases, on 5 L oxygen   ABDOMINAL: bowel sounds present, soft, nontender and nondistended    EXTREMITIES: Edema in all 4 extremities, intact pedal pulses bilaterally  MSK: Left foot with collapsed arch    NEURO:    Mental status:  A&Ox4 (person, place, date, situation) answers questions appropriately follows commands      Motor:  Shoulder flexors:  Right -  5/5, Left -  5/5  Elbow flexors:  Right -  5/5, Left -  5/5  Elbow extensors:  Right -  5/5, Left -  5/5  Symmetrical   Hip flexors:  Right -  4/5, Left -  4/5  Knee ext:  Right -  5/5, Left -  5/5  Dorsiflexors:  Right -  5/5, Left -  5/5  Plantar flexors:  Right -  5/5, Left -  5/5     RADIOLOGY:                      Results for orders placed during the hospital encounter of 04/18/21   CT-CHEST,ABDOMEN,PELVIS WITH    Impression No pleural effusion is identified.    Pleural calcification can be seen as sequelae of prior asbestos exposure.    Elevation of the left hemidiaphragm and bibasilar atelectasis, left greater than right.    Enlarged subcarinal lymph node is nonspecific and may be reactive. Follow-up is recommended as malignancy is not excluded.    Bilateral renal cysts and 3.4 cm lesion in the upper pole of the left kidney which is dense and may represent a hemorrhagic/proteinaceous cyst but a mass is not excluded and further evaluation with renal ultrasound is recommended.    Infrarenal aortic aneurysm measuring 4.3 x 4.2 cm.    Colonic diverticulosis.    Thick-walled bladder may be related to obstructive uropathy in the setting of a prominent prostate. There is surrounding mild stranding and cystitis is not excluded. Further evaluation with urinalysis is recommended.    Aneurysmal ascending aorta measuring 4.4 cm.    3  mm right middle lobe pulmonary nodule is unchanged.    Fleischner Society pulmonary nodule recommendations:  Low Risk: No routine follow-up    High Risk: Optional CT at 12 months    Comments: Nodules less than 6 mm do not require routine follow-up, but certain patients at high risk with suspicious nodule morphology, upper lobe location, or both may warrant 12-month follow-up.    Low Risk - Minimal or absent history of smoking and of other known risk factors.    High Risk - History of smoking or of other known risk factors.    Note: These recommendations do not apply to lung cancer screening, patients with immunosuppression, or patients with known primary cancer.    Fleischner Society 2017 Guidelines for Management of Incidentally Detected Pulmonary Nodules in Adults                           LABS:  Recent Labs     04/28/21  0101 04/29/21  0543   SODIUM 137 136   POTASSIUM 3.7 4.1   CHLORIDE 98 96   CO2 35* 36*   GLUCOSE 106* 72   BUN 14 14   CREATININE 0.67 0.73   CALCIUM 7.3* 7.7*     Recent Labs     04/28/21  0101 04/29/21  0543   WBC 18.3* 20.5*   RBC 4.54* 5.11   HEMOGLOBIN 11.6* 12.7*   HEMATOCRIT 37.8* 43.7   MCV 83.3 85.5   MCH 25.6* 24.9*   MCHC 30.7* 29.1*   RDW 51.2* 53.1*   PLATELETCT 259 290   MPV 9.5 10.0     Recent Labs     04/27/21  1230 04/28/21  0101 04/29/21  0543   APTT 35.8  --   --    INR 1.11 1.06 1.09       PRIMARY REHAB DIAGNOSIS:    This patient is a 88 y.o. male admitted for acute inpatient rehabilitation with Debility secondary to multiple medical comorbidities including recent NSTEMI, chronic respiratory failure, chronic pain.    IMPAIRMENTS:   Cognitive  ADLs/IADLs  Mobility    SECONDARY DIAGNOSIS/MEDICAL CO-MORBIDITIES AFFECTING FUNCTION:    Coronary artery disease  NSTEMI  Hypopituitarism   Adrenal insufficiency  Low testosterone  Hypothyroidism  Hypocalcemia  COPD  Chronic respiratory failure  Hypotension  Aortic stenosis  Thoracic abdominal aneurysm  Cardiomyopathy/HOCM  Paroxysmal  atrial fibrillation  Sick sinus syndrome status post pacemaker  History of PEs and DVTs  Edema   Cystitis  BPH  Skin tears  Chronic back pain  Hyponatremia  Leukocytosis  Normocytic anemia    RELEVANT CHANGES SINCE PREADMISSION EVALUATION:    Status unchanged    The patient's rehabilitation potential is Fair  The patient's medical prognosis is fair    PLAN:   Discussion and Recommendations, discussed with the patient and/or family:   1. The patient requires an acute inpatient rehabilitation program with a coordinated program of care at an intensity and frequency not available at a lower level of care. This recommendation is substantiated by the patient's medical physicians who recommend that the patient's intervention and assessment of medical issues needs to be done at an acute level of care for patient's safety and maximum outcome.     2. A coordinated program of care will be supplied by an interdisciplinary team of physical therapy, occupational therapy, rehab physician, rehab nursing, and, if needed, speech therapy and rehab psychology. Rehab team presents a patient-specific rehabilitation and education program concentrating on prevention of future problems related to accessibility, mobility, skin, bowel, bladder, sexuality, and psychosocial and medical/surgical problems.     3. Need for Rehabilitation Physician: The rehab physician will be evaluating the patient on a multi-weekly basis to help coordinate the program of care. The rehab physician communicates between medical physicians, therapists, and nurses to maximize the patient's potential outcome. Specific areas in which the rehab physician will be providing daily assessment include the following:   A. Assessing the patient's heart rate and blood pressure response (vitals monitoring) to activity and making adjustments in medications or conservative measures as needed.   B. The rehab physician will be assessing the frequency at which the program can be  increased to allow the patient to reach optimal functional outcome.   C. The rehab physician will also provide assessments in daily skin care, especially in light of patient's impairments in mobility.   D. The rehab physician will provide special expertise in understanding how to work with functional impairment and recommend appropriate interventions, compensatory techniques, and education that will facilitate the patient's outcome.     4. Rehab R.N.   The rehab RN will be working with patient to carry over in room mobility and activities of daily living when the patient is not in 3 hours of skilled therapy. Rehab nursing will be working in conjunction with rehab physician to address all the medical issues above and continue to assess laboratory work and discuss abnormalities with the treating physicians, assess vitals, and response to activity, and discuss and report abnormalities with the rehab physician. Rehab RN will also continue daily skin care, supervise bladder/bowel program, instruct in medication administration, and ensure patient safety.     5. Therapies to treat at intensity and frequency of (may change after completion of evaluation by all therapeutic disciplines):       PT:  Physical therapy to address mobility, transfer, gait training and evaluation for adaptive equipment needs 1hour/day at least 5 days/week for the duration of the ELOS (see below)       OT:  Occupational therapy to address ADLs, self-care, home management training, functional mobility/transfers and assistive device evaluation, and community re-integration 1hour/day at least 5 days/week for the duration of the ELOS (see below).        ST/Dysphagia:  Speech therapy to address speech, language, and cognitive deficits as well as swallowing difficulties with retraining/dysphagia management and community re-integration with comprehension, expression, cognitive training 1hour/day at least 5 days/week for the duration of the ELOS (see  below).     6. Medical management / Rehabilitation Issues/Adverse Potential affecting function as part of rehabilitation plan.    Coronary artery disease  NSTEMI  S/p PCI/LAD stent  Aspirin  Plavix  Statin  Outpatient follow-up with cardiology    Hypopituitarism   Adrenal insufficiency  Hydrocortisone    Low testosterone  Testosterone repletion    Hypothyroidism  Levothyroxine    Hypocalcemia  Calcium supplementation    COPD  Chronic respiratory failure  Continue oxygen  Flonase  Singulair  As needed duo parvez  Respiratory therapist to follow patient    Hypotension  Midodrine    Thoracic abdominal aneurysm  Outpatient follow-up    Cardiomyopathy/HOCM  Paroxysmal atrial fibrillation  Sick sinus syndrome status post pacemaker  Aortic stenosis  Sotalol  Outpatient follow-up with cardiology    History of PEs and DVTs  Lovenox bridge to Coumadin    Edema   On Lovenox bridge to Coumadin  Check BNP    Cystitis  Treated with antibiotics and acute, however no culture was performed  Recheck urinalysis    BPH  Flomax  Check PVRs  Continue condom cath for now    Skin tears  Wound care    Chronic back pain  Lidocaine patch  Gabapentin    Hyponatremia  Check morning labs    Leukocytosis  Recheck UA  Check chest x-ray    Normocytic anemia  Check morning labs  Check iron    GI prophylaxis  Omeprazole    I performed a complete drug regimen review and did not identify any potential clinically significant medication issues.    The patient's CODE STATUS was confirmed as FULL CODE on admission, with the patient and/or family at bedside.    REHABILITATION ISSUES/ADVERSE POTENTIAL:  1.  Debility: Patient demonstrates functional deficits in strength, balance, coordination, and ADL's. Patient is admitted to [facility] for comprehensive rehabilitation therapy as described below.   Rehabilitation nursing monitors bowel and bladder control, educates on medication administration, co-morbidities and monitors patient  safety.    2.  DVT prophylaxis:  Patient is on Lovenox bridge to Coumadin for anticoagulation upon transfer. Encourage OOB. Monitor daily for signs and symptoms of DVT including but not limited to swelling and pain to prevent the development of DVT that may interfere with therapies.    3.  Pain: See above.    4.  Nutrition/Dysphagia: Dietician monitors nutrient intake, recommend supplements prn and provide nutrition education to pt/family to promote optimal nutrition for wound healing/recovery.     5.  Bladder/bowel:  Start bowel and bladder program, to prevent constipation, urinary retention (which may lead to UTI), and urinary incontinence (which will impact upon pt's functional independence).   - TV Q3h while awake with post void bladder scans, I&O cath for PVRs >400  - up to commode after meal     6.  Skin/dermal ulcer prophylaxis: Monitor for new skin conditions with q.2 h. turns as required to prevent the development of skin breakdown.     7.  Cognition/Behavior:  Psychologist Dr. Carvalho provides adjustment counseling to illness and psychosocial barriers that may be potential barriers to rehabilitation.     8. Respiratory therapy: RT performs O2 management prn, breathing retraining, pulmonary hygiene and bronchospasm management prn to optimize participation in therapies.    Pt was seen today for 75 min, and entire time spent in face-to-face contact was >50% in counseling and coordination of care as detailed in A/P above.        GOALS/EXPECTED LEVEL OF FUNCTION BASED ON CURRENT MEDICAL AND FUNCTIONAL STATUS (may change based on patient's medical status and rate of impairment recovery):  Transfers:   Modified Independent  Mobility/Gait:   Modified Independent  ADL's:   Minimal Assistance  Cognition: Least verbal cues    DISPOSITION: Discharge to pre-morbid independent living setting with the supportive care of patient's spouse.      ELOS: 14 days    Kaley Duke M.D.  Physical Medicine and  Rehabilitation

## 2021-04-30 ENCOUNTER — APPOINTMENT (OUTPATIENT)
Dept: RADIOLOGY | Facility: REHABILITATION | Age: 86
DRG: 947 | End: 2021-04-30
Attending: PHYSICAL MEDICINE & REHABILITATION
Payer: MEDICARE

## 2021-04-30 PROBLEM — R05.9 COUGH: Status: ACTIVE | Noted: 2021-04-30

## 2021-04-30 PROBLEM — R58 BLEEDING: Status: ACTIVE | Noted: 2021-04-30

## 2021-04-30 LAB
25(OH)D3 SERPL-MCNC: 39 NG/ML (ref 30–80)
ALBUMIN SERPL BCP-MCNC: 2.5 G/DL (ref 3.2–4.9)
ALBUMIN/GLOB SERPL: 0.9 G/DL
ALP SERPL-CCNC: 68 U/L (ref 30–99)
ALT SERPL-CCNC: 38 U/L (ref 2–50)
ANION GAP SERPL CALC-SCNC: 3 MMOL/L (ref 7–16)
APPEARANCE UR: CLEAR
AST SERPL-CCNC: 23 U/L (ref 12–45)
BACTERIA #/AREA URNS HPF: ABNORMAL /HPF
BASOPHILS # BLD AUTO: 1 % (ref 0–1.8)
BASOPHILS # BLD: 0.22 K/UL (ref 0–0.12)
BILIRUB SERPL-MCNC: 0.3 MG/DL (ref 0.1–1.5)
BILIRUB UR QL STRIP.AUTO: NEGATIVE
BUN SERPL-MCNC: 14 MG/DL (ref 8–22)
CALCIUM SERPL-MCNC: 7.8 MG/DL (ref 8.5–10.5)
CHLORIDE SERPL-SCNC: 94 MMOL/L (ref 96–112)
CO2 SERPL-SCNC: 35 MMOL/L (ref 20–33)
COLOR UR: YELLOW
CREAT SERPL-MCNC: 0.63 MG/DL (ref 0.5–1.4)
EOSINOPHIL # BLD AUTO: 1.21 K/UL (ref 0–0.51)
EOSINOPHIL NFR BLD: 5.6 % (ref 0–6.9)
EPI CELLS #/AREA URNS HPF: ABNORMAL /HPF
ERYTHROCYTE [DISTWIDTH] IN BLOOD BY AUTOMATED COUNT: 56.3 FL (ref 35.9–50)
GLOBULIN SER CALC-MCNC: 2.7 G/DL (ref 1.9–3.5)
GLUCOSE SERPL-MCNC: 78 MG/DL (ref 65–99)
GLUCOSE UR STRIP.AUTO-MCNC: NEGATIVE MG/DL
GRAM STN SPEC: NORMAL
HCT VFR BLD AUTO: 40.1 % (ref 42–52)
HGB BLD-MCNC: 11.5 G/DL (ref 14–18)
IMM GRANULOCYTES # BLD AUTO: 0.86 K/UL (ref 0–0.11)
IMM GRANULOCYTES NFR BLD AUTO: 4 % (ref 0–0.9)
INR PPP: 1.13 (ref 0.87–1.13)
IRON SATN MFR SERPL: 18 % (ref 15–55)
IRON SERPL-MCNC: 33 UG/DL (ref 50–180)
KETONES UR STRIP.AUTO-MCNC: NEGATIVE MG/DL
LEUKOCYTE ESTERASE UR QL STRIP.AUTO: NEGATIVE
LYMPHOCYTES # BLD AUTO: 2.07 K/UL (ref 1–4.8)
LYMPHOCYTES NFR BLD: 9.6 % (ref 22–41)
MAGNESIUM SERPL-MCNC: 1.9 MG/DL (ref 1.5–2.5)
MCH RBC QN AUTO: 25.1 PG (ref 27–33)
MCHC RBC AUTO-ENTMCNC: 28.7 G/DL (ref 33.7–35.3)
MCV RBC AUTO: 87.6 FL (ref 81.4–97.8)
MICRO URNS: ABNORMAL
MONOCYTES # BLD AUTO: 1.48 K/UL (ref 0–0.85)
MONOCYTES NFR BLD AUTO: 6.9 % (ref 0–13.4)
NEUTROPHILS # BLD AUTO: 15.73 K/UL (ref 1.82–7.42)
NEUTROPHILS NFR BLD: 72.9 % (ref 44–72)
NITRITE UR QL STRIP.AUTO: NEGATIVE
NRBC # BLD AUTO: 0 K/UL
NRBC BLD-RTO: 0 /100 WBC
NT-PROBNP SERPL IA-MCNC: 4988 PG/ML (ref 0–125)
PH UR STRIP.AUTO: 7 [PH] (ref 5–8)
PLATELET # BLD AUTO: 268 K/UL (ref 164–446)
PMV BLD AUTO: 10.4 FL (ref 9–12.9)
POTASSIUM SERPL-SCNC: 4.3 MMOL/L (ref 3.6–5.5)
PROT SERPL-MCNC: 5.2 G/DL (ref 6–8.2)
PROT UR QL STRIP: NEGATIVE MG/DL
PROTHROMBIN TIME: 14.8 SEC (ref 12–14.6)
RBC # BLD AUTO: 4.58 M/UL (ref 4.7–6.1)
RBC # URNS HPF: ABNORMAL /HPF
RBC UR QL AUTO: ABNORMAL
SARS-COV-2 RNA RESP QL NAA+PROBE: NOTDETECTED
SIGNIFICANT IND 70042: NORMAL
SITE SITE: NORMAL
SODIUM SERPL-SCNC: 132 MMOL/L (ref 135–145)
SOURCE SOURCE: NORMAL
SP GR UR STRIP.AUTO: 1.01
SPECIMEN SOURCE: NORMAL
TIBC SERPL-MCNC: 183 UG/DL (ref 250–450)
UIBC SERPL-MCNC: 150 UG/DL (ref 110–370)
UROBILINOGEN UR STRIP.AUTO-MCNC: 0.2 MG/DL
WBC # BLD AUTO: 21.6 K/UL (ref 4.8–10.8)
WBC #/AREA URNS HPF: ABNORMAL /HPF

## 2021-04-30 PROCEDURE — 83540 ASSAY OF IRON: CPT

## 2021-04-30 PROCEDURE — 92523 SPEECH SOUND LANG COMPREHEN: CPT

## 2021-04-30 PROCEDURE — 71045 X-RAY EXAM CHEST 1 VIEW: CPT

## 2021-04-30 PROCEDURE — 87070 CULTURE OTHR SPECIMN AEROBIC: CPT

## 2021-04-30 PROCEDURE — 82306 VITAMIN D 25 HYDROXY: CPT

## 2021-04-30 PROCEDURE — 94760 N-INVAS EAR/PLS OXIMETRY 1: CPT

## 2021-04-30 PROCEDURE — 97110 THERAPEUTIC EXERCISES: CPT

## 2021-04-30 PROCEDURE — 99223 1ST HOSP IP/OBS HIGH 75: CPT | Performed by: HOSPITALIST

## 2021-04-30 PROCEDURE — 700102 HCHG RX REV CODE 250 W/ 637 OVERRIDE(OP): Performed by: PHYSICAL MEDICINE & REHABILITATION

## 2021-04-30 PROCEDURE — A9270 NON-COVERED ITEM OR SERVICE: HCPCS | Performed by: PHYSICAL MEDICINE & REHABILITATION

## 2021-04-30 PROCEDURE — 87205 SMEAR GRAM STAIN: CPT

## 2021-04-30 PROCEDURE — 87040 BLOOD CULTURE FOR BACTERIA: CPT | Mod: 91

## 2021-04-30 PROCEDURE — 83880 ASSAY OF NATRIURETIC PEPTIDE: CPT

## 2021-04-30 PROCEDURE — 80053 COMPREHEN METABOLIC PANEL: CPT

## 2021-04-30 PROCEDURE — 97162 PT EVAL MOD COMPLEX 30 MIN: CPT

## 2021-04-30 PROCEDURE — 97535 SELF CARE MNGMENT TRAINING: CPT

## 2021-04-30 PROCEDURE — 700111 HCHG RX REV CODE 636 W/ 250 OVERRIDE (IP): Performed by: PHYSICAL MEDICINE & REHABILITATION

## 2021-04-30 PROCEDURE — 94640 AIRWAY INHALATION TREATMENT: CPT

## 2021-04-30 PROCEDURE — 83735 ASSAY OF MAGNESIUM: CPT

## 2021-04-30 PROCEDURE — 85610 PROTHROMBIN TIME: CPT

## 2021-04-30 PROCEDURE — 99233 SBSQ HOSP IP/OBS HIGH 50: CPT | Performed by: PHYSICAL MEDICINE & REHABILITATION

## 2021-04-30 PROCEDURE — 36415 COLL VENOUS BLD VENIPUNCTURE: CPT

## 2021-04-30 PROCEDURE — 770010 HCHG ROOM/CARE - REHAB SEMI PRIVAT*

## 2021-04-30 PROCEDURE — 85025 COMPLETE CBC W/AUTO DIFF WBC: CPT

## 2021-04-30 PROCEDURE — 700102 HCHG RX REV CODE 250 W/ 637 OVERRIDE(OP): Performed by: HOSPITALIST

## 2021-04-30 PROCEDURE — 83550 IRON BINDING TEST: CPT

## 2021-04-30 PROCEDURE — 97166 OT EVAL MOD COMPLEX 45 MIN: CPT

## 2021-04-30 PROCEDURE — 700101 HCHG RX REV CODE 250: Performed by: PHYSICAL MEDICINE & REHABILITATION

## 2021-04-30 PROCEDURE — A9270 NON-COVERED ITEM OR SERVICE: HCPCS | Performed by: HOSPITALIST

## 2021-04-30 RX ORDER — ACETAZOLAMIDE 250 MG/1
250 TABLET ORAL
Status: DISCONTINUED | OUTPATIENT
Start: 2021-04-30 | End: 2021-05-05 | Stop reason: HOSPADM

## 2021-04-30 RX ORDER — WARFARIN SODIUM 5 MG/1
5 TABLET ORAL
Status: COMPLETED | OUTPATIENT
Start: 2021-04-30 | End: 2021-04-30

## 2021-04-30 RX ORDER — MIDODRINE HYDROCHLORIDE 2.5 MG/1
5 TABLET ORAL
Status: DISCONTINUED | OUTPATIENT
Start: 2021-04-30 | End: 2021-05-05 | Stop reason: HOSPADM

## 2021-04-30 RX ORDER — FERROUS GLUCONATE 324(38)MG
324 TABLET ORAL
Status: DISCONTINUED | OUTPATIENT
Start: 2021-05-01 | End: 2021-05-05 | Stop reason: HOSPADM

## 2021-04-30 RX ORDER — BUDESONIDE AND FORMOTEROL FUMARATE DIHYDRATE 80; 4.5 UG/1; UG/1
2 AEROSOL RESPIRATORY (INHALATION)
Status: DISCONTINUED | OUTPATIENT
Start: 2021-04-30 | End: 2021-05-05 | Stop reason: HOSPADM

## 2021-04-30 RX ADMIN — FLUTICASONE PROPIONATE 100 MCG: 50 SPRAY, METERED NASAL at 10:53

## 2021-04-30 RX ADMIN — HYDROCORTISONE 10 MG: 10 TABLET ORAL at 08:24

## 2021-04-30 RX ADMIN — CALCIUM CITRATE 200 MG (950 MG) TABLET 950 MG: at 08:23

## 2021-04-30 RX ADMIN — HYDROCORTISONE 10 MG: 10 TABLET ORAL at 20:29

## 2021-04-30 RX ADMIN — ENOXAPARIN SODIUM 100 MG: 100 INJECTION SUBCUTANEOUS at 20:28

## 2021-04-30 RX ADMIN — OXYCODONE HYDROCHLORIDE 5 MG: 5 TABLET ORAL at 20:33

## 2021-04-30 RX ADMIN — SOTALOL HYDROCHLORIDE 40 MG: 80 TABLET ORAL at 10:53

## 2021-04-30 RX ADMIN — ENOXAPARIN SODIUM 100 MG: 100 INJECTION SUBCUTANEOUS at 08:23

## 2021-04-30 RX ADMIN — ATORVASTATIN CALCIUM 80 MG: 40 TABLET, FILM COATED ORAL at 20:28

## 2021-04-30 RX ADMIN — SENNOSIDES AND DOCUSATE SODIUM 2 TABLET: 8.6; 5 TABLET ORAL at 20:28

## 2021-04-30 RX ADMIN — OMEPRAZOLE 20 MG: 20 CAPSULE, DELAYED RELEASE ORAL at 05:59

## 2021-04-30 RX ADMIN — GABAPENTIN 600 MG: 300 CAPSULE ORAL at 20:29

## 2021-04-30 RX ADMIN — MIDODRINE HYDROCHLORIDE 2.5 MG: 2.5 TABLET ORAL at 08:24

## 2021-04-30 RX ADMIN — BUDESONIDE AND FORMOTEROL FUMARATE DIHYDRATE 2 PUFF: 80; 4.5 AEROSOL RESPIRATORY (INHALATION) at 18:23

## 2021-04-30 RX ADMIN — LIDOCAINE 2 PATCH: 50 PATCH TOPICAL at 08:29

## 2021-04-30 RX ADMIN — SENNOSIDES AND DOCUSATE SODIUM 2 TABLET: 8.6; 5 TABLET ORAL at 08:23

## 2021-04-30 RX ADMIN — MONTELUKAST 10 MG: 10 TABLET, FILM COATED ORAL at 08:24

## 2021-04-30 RX ADMIN — OXYCODONE HYDROCHLORIDE 5 MG: 5 TABLET ORAL at 13:41

## 2021-04-30 RX ADMIN — WARFARIN SODIUM 5 MG: 5 TABLET ORAL at 17:37

## 2021-04-30 RX ADMIN — TIOTROPIUM BROMIDE INHALATION SPRAY 5 MCG: 3.12 SPRAY, METERED RESPIRATORY (INHALATION) at 18:21

## 2021-04-30 RX ADMIN — OMEPRAZOLE 20 MG: 20 CAPSULE, DELAYED RELEASE ORAL at 17:37

## 2021-04-30 RX ADMIN — ACETAZOLAMIDE 250 MG: 250 TABLET ORAL at 15:36

## 2021-04-30 RX ADMIN — MIDODRINE HYDROCHLORIDE 2.5 MG: 2.5 TABLET ORAL at 10:53

## 2021-04-30 RX ADMIN — TAMSULOSIN HYDROCHLORIDE 0.4 MG: 0.4 CAPSULE ORAL at 08:24

## 2021-04-30 RX ADMIN — CLOPIDOGREL BISULFATE 75 MG: 75 TABLET ORAL at 08:24

## 2021-04-30 RX ADMIN — LEVOTHYROXINE SODIUM 75 MCG: 75 TABLET ORAL at 05:59

## 2021-04-30 RX ADMIN — Medication 81 MG: at 08:24

## 2021-04-30 RX ADMIN — MIDODRINE HYDROCHLORIDE 5 MG: 2.5 TABLET ORAL at 17:36

## 2021-04-30 ASSESSMENT — PAIN DESCRIPTION - PAIN TYPE: TYPE: ACUTE PAIN

## 2021-04-30 ASSESSMENT — BRIEF INTERVIEW FOR MENTAL STATUS (BIMS)
ASKED TO RECALL SOCK: YES, AFTER CUEING (SOMETHING TO WEAR")"
ASKED TO RECALL BED: YES, AFTER CUEING (A PIECE OF FURNITURE")"
INITIAL REPETITION OF BED BLUE SOCK - FIRST ATTEMPT: 3
BIMS SUMMARY SCORE: 12
WHAT DAY OF THE WEEK IS IT: CORRECT
WHAT YEAR IS IT: CORRECT
ASKED TO RECALL BLUE: YES, AFTER CUEING (A COLOR")"
WHAT MONTH IS IT: ACCURATE WITHIN 5 DAYS

## 2021-04-30 ASSESSMENT — ACTIVITIES OF DAILY LIVING (ADL)
TUB_SHOWER_TRANSFER_DESCRIPTION: GRAB BAR;SHOWER BENCH;INCREASED TIME;INITIAL PREPARATION FOR TASK;REQUIRES LIFT;SET-UP OF EQUIPMENT;SUPERVISION FOR SAFETY;VERBAL CUEING
TOILETING_LEVEL_OF_ASSIST_DESCRIPTION: ASSIST TO PULL PANTS UP;ASSIST TO PULL PANTS DOWN;ASSIST FOR STANDING BALANCE;GRAB BAR;INCREASED TIME;INITIAL PREPARATION FOR TASK;SET-UP OF EQUIPMENT;SUPERVISION FOR SAFETY;VERBAL CUEING
TOILETING: INDEPENDENT
BED_CHAIR_WHEELCHAIR_TRANSFER_DESCRIPTION: ADAPTIVE EQUIPMENT;INCREASED TIME;SET-UP OF EQUIPMENT;REQUIRES LIFT

## 2021-04-30 ASSESSMENT — GAIT ASSESSMENTS
DISTANCE (FEET): 10
ASSISTIVE DEVICE: FRONT WHEEL WALKER
GAIT LEVEL OF ASSIST: MINIMAL ASSIST
DEVIATION: BRADYKINETIC;SHUFFLED GAIT

## 2021-04-30 NOTE — PROGRESS NOTES
Pharmacy Warfarin Consult   4/30/2021     88 y.o.   male     Indication for anticoagulation: Pulmonary Embolism, Deep Vein Thrombosis      Goal INR = 2 - 3    Recent Labs     04/28/21  0101 04/29/21  0543 04/30/21  0632   INR 1.06 1.09 1.13   HEMOGLOBIN 11.6* 12.7* 11.5*   HEMATOCRIT 37.8* 43.7 40.1*       Pertinent Drug/Drug Interactions:  Proton Pump Inhibitor, Statin, Aspirin, Clopidogrel, Cortef, thyroid, prn trazodone  Outpatient Warfarin Regimen: 5 mg daily  Recent Warfarin Dosing:  Dose from last 7 days     Date/Time Dose (mg)    04/30/21 0632  5    04/29/21 1400  5        Dose from last 7 days      Date/Time Dose (mg)           04/28/21 1431  5     04/27/21 1432  5               INR from last 7 days      Date/Time INR Value     04/29/21 0543  1.09     04/28/21 0101  1.06     04/27/21 1230  1.11     04/25/21 0246  (!) 1.19     04/24/21 0339  (!) 1.16     04/23/21 0651  (!) 1.2       Bridge Therapy: Lovenox 100 mg q 12 h             1.  Coumadin 5 mg tonight per INR = 1.13           Mendez Valentino [facility]

## 2021-04-30 NOTE — PROGRESS NOTES
"Rehab Progress Note     Date of Service: 2021  Chief Complaint: follow up debility    Interval Events (Subjective)    Patient seen and examined today in his room. He reports he slept well last night. He reports a cough, which he has had for a year or so. He continued to bleed from his right ear and the skin tear on his right arm, and the dressings were just changed this morning. He has not yet had his chest xray. He denies any dysuria. Case discussed with the hospitalist.     Objective:  VITAL SIGNS: /68   Pulse 77   Temp 36.2 °C (97.2 °F) (Oral)   Resp 18   Ht 1.727 m (5' 8\")   Wt 94.3 kg (208 lb)   SpO2 98%   BMI 31.63 kg/m²   Gen: alert, no apparent distress  Resp: loose productive cough with expiratory wheezing  Neuro: notable for generalized weakness, non-focal      Recent Results (from the past 72 hour(s))   EKG    Collection Time: 21  1:09 PM   Result Value Ref Range    Report       [facility]    Test Date:  2021  Pt Name:    MANOLO AUGUST                 Department: 171  MRN:        [id number]                      Room:       T736  Gender:     Male                         Technician: [facility]  :        [id number]                   Requested By:ARDEN LOPEZ  Order #:    [id number]                    Reading MD: Isa Yanez MD    Measurements  Intervals                                Axis  Rate:       77                           P:          -14  VT:         258                          QRS:        -32  QRSD:       148                          T:          147  QT:         423  QTc:        479    Interpretive Statements  SINUS RHYTHM  ATRIAL PREMATURE COMPLEXES  PROLONGED VT INTERVAL  LEFT ATRIAL ENLARGEMENT  LEFT BUNDLE BRANCH BLOCK  Compared to ECG 2021 08:34:26  Atrial premature complex(es) now present  First degree AV block now present  Atrial abnormality now present  Ectopic atrial rhythm no longer present  Electronically Signed On  14:49:31 PDT by " Isa Yanez MD     Heparin Xa (Unfractionated)    Collection Time: 04/27/21  6:02 PM   Result Value Ref Range    Heparin Xa (UFH) 0.31 IU/mL   Heparin Xa (Unfractionated)    Collection Time: 04/28/21  1:01 AM   Result Value Ref Range    Heparin Xa (UFH) 0.29 IU/mL   CBC WITH DIFFERENTIAL    Collection Time: 04/28/21  1:01 AM   Result Value Ref Range    WBC 18.3 (H) 4.8 - 10.8 K/uL    RBC 4.54 (L) 4.70 - 6.10 M/uL    Hemoglobin 11.6 (L) 14.0 - 18.0 g/dL    Hematocrit 37.8 (L) 42.0 - 52.0 %    MCV 83.3 81.4 - 97.8 fL    MCH 25.6 (L) 27.0 - 33.0 pg    MCHC 30.7 (L) 33.7 - 35.3 g/dL    RDW 51.2 (H) 35.9 - 50.0 fL    Platelet Count 259 164 - 446 K/uL    MPV 9.5 9.0 - 12.9 fL    Neutrophils-Polys 77.80 (H) 44.00 - 72.00 %    Lymphocytes 7.50 (L) 22.00 - 41.00 %    Monocytes 8.50 0.00 - 13.40 %    Eosinophils 1.50 0.00 - 6.90 %    Basophils 0.40 0.00 - 1.80 %    Immature Granulocytes 4.30 (H) 0.00 - 0.90 %    Nucleated RBC 0.00 /100 WBC    Neutrophils (Absolute) 14.24 (H) 1.82 - 7.42 K/uL    Lymphs (Absolute) 1.38 1.00 - 4.80 K/uL    Monos (Absolute) 1.55 (H) 0.00 - 0.85 K/uL    Eos (Absolute) 0.28 0.00 - 0.51 K/uL    Baso (Absolute) 0.07 0.00 - 0.12 K/uL    Immature Granulocytes (abs) 0.79 (H) 0.00 - 0.11 K/uL    NRBC (Absolute) 0.00 K/uL   Comp Metabolic Panel    Collection Time: 04/28/21  1:01 AM   Result Value Ref Range    Sodium 137 135 - 145 mmol/L    Potassium 3.7 3.6 - 5.5 mmol/L    Chloride 98 96 - 112 mmol/L    Co2 35 (H) 20 - 33 mmol/L    Anion Gap 4.0 (L) 7.0 - 16.0    Glucose 106 (H) 65 - 99 mg/dL    Bun 14 8 - 22 mg/dL    Creatinine 0.67 0.50 - 1.40 mg/dL    Calcium 7.3 (L) 8.5 - 10.5 mg/dL    AST(SGOT) 68 (H) 12 - 45 U/L    ALT(SGPT) 64 (H) 2 - 50 U/L    Alkaline Phosphatase 98 30 - 99 U/L    Total Bilirubin 0.2 0.1 - 1.5 mg/dL    Albumin 2.8 (L) 3.2 - 4.9 g/dL    Total Protein 5.5 (L) 6.0 - 8.2 g/dL    Globulin 2.7 1.9 - 3.5 g/dL    A-G Ratio 1.0 g/dL   Prothrombin Time    Collection Time: 04/28/21   1:01 AM   Result Value Ref Range    PT 14.1 12.0 - 14.6 sec    INR 1.06 0.87 - 1.13   ESTIMATED GFR    Collection Time: 21  1:01 AM   Result Value Ref Range    GFR If African American >60 >60 mL/min/1.73 m 2    GFR If Non African American >60 >60 mL/min/1.73 m 2   Tikosyn EKG    Collection Time: 21 12:19 PM   Result Value Ref Range    Report       [facility]    Test Date:  2021  Pt Name:    MANOLO AUGUST                 Department: 171  MRN:        [id number]                      Room:       [facility]  Gender:     Male                         Technician: YOLANDA  :        [id number]                   Requested By:ARDEN LOPEZ  Order #:    [id number]                    Reading MD: Barron Cosby MD    Measurements  Intervals                                Axis  Rate:       79                           P:          -42  NE:         257                          QRS:        -32  QRSD:       147                          T:          165  QT:         409  QTc:        469    Interpretive Statements  SINUS RHYTHM  PROLONGED NE INTERVAL  PROBABLE LEFT ATRIAL ENLARGEMENT  LEFT BUNDLE BRANCH BLOCK  ARTIFACT IN LEAD(S) II,III,aVR,aVL,aVF,V1,V3,V4,V5,V6  Compared to ECG 2021 13:09:34  Atrial premature complex(es) no longer present  Electronically Signed On 2021 13:31:35 PDT by Barron Cosby MD     VITAMIN D,25 HYDROXY    Collection Time: 21  5:52 PM   Result Value Ref Range    25-Hydroxy   Vitamin D 25 39 30 - 80 ng/mL   PTH WITH IONIZED CALCIUM    Collection Time: 21  5:52 PM   Result Value Ref Range    Pth, Intact 82.0 (H) 14.0 - 72.0 pg/mL    Ionized Calcium 1.0 (L) 1.1 - 1.3 mmol/L   PROTHROMBIN TIME    Collection Time: 21  5:43 AM   Result Value Ref Range    PT 14.4 12.0 - 14.6 sec    INR 1.09 0.87 - 1.13   Comp Metabolic Panel    Collection Time: 21  5:43 AM   Result Value Ref Range    Sodium 136 135 - 145 mmol/L    Potassium 4.1 3.6 - 5.5 mmol/L    Chloride 96 96 -  112 mmol/L    Co2 36 (H) 20 - 33 mmol/L    Anion Gap 4.0 (L) 7.0 - 16.0    Glucose 72 65 - 99 mg/dL    Bun 14 8 - 22 mg/dL    Creatinine 0.73 0.50 - 1.40 mg/dL    Calcium 7.7 (L) 8.5 - 10.5 mg/dL    AST(SGOT) 39 12 - 45 U/L    ALT(SGPT) 53 (H) 2 - 50 U/L    Alkaline Phosphatase 90 30 - 99 U/L    Total Bilirubin 0.4 0.1 - 1.5 mg/dL    Albumin 2.9 (L) 3.2 - 4.9 g/dL    Total Protein 5.7 (L) 6.0 - 8.2 g/dL    Globulin 2.8 1.9 - 3.5 g/dL    A-G Ratio 1.0 g/dL   CBC WITH DIFFERENTIAL    Collection Time: 04/29/21  5:43 AM   Result Value Ref Range    WBC 20.5 (H) 4.8 - 10.8 K/uL    RBC 5.11 4.70 - 6.10 M/uL    Hemoglobin 12.7 (L) 14.0 - 18.0 g/dL    Hematocrit 43.7 42.0 - 52.0 %    MCV 85.5 81.4 - 97.8 fL    MCH 24.9 (L) 27.0 - 33.0 pg    MCHC 29.1 (L) 33.7 - 35.3 g/dL    RDW 53.1 (H) 35.9 - 50.0 fL    Platelet Count 290 164 - 446 K/uL    MPV 10.0 9.0 - 12.9 fL    Neutrophils-Polys 71.40 44.00 - 72.00 %    Lymphocytes 10.10 (L) 22.00 - 41.00 %    Monocytes 7.10 0.00 - 13.40 %    Eosinophils 5.90 0.00 - 6.90 %    Basophils 0.90 0.00 - 1.80 %    Immature Granulocytes 4.60 (H) 0.00 - 0.90 %    Nucleated RBC 0.10 /100 WBC    Neutrophils (Absolute) 14.63 (H) 1.82 - 7.42 K/uL    Lymphs (Absolute) 2.07 1.00 - 4.80 K/uL    Monos (Absolute) 1.45 (H) 0.00 - 0.85 K/uL    Eos (Absolute) 1.20 (H) 0.00 - 0.51 K/uL    Baso (Absolute) 0.18 (H) 0.00 - 0.12 K/uL    Immature Granulocytes (abs) 0.94 (H) 0.00 - 0.11 K/uL    NRBC (Absolute) 0.03 K/uL    Anisocytosis 1+     Microcytosis 1+    ESTIMATED GFR    Collection Time: 04/29/21  5:43 AM   Result Value Ref Range    GFR If African American >60 >60 mL/min/1.73 m 2    GFR If Non African American >60 >60 mL/min/1.73 m 2   PERIPHERAL SMEAR REVIEW    Collection Time: 04/29/21  5:43 AM   Result Value Ref Range    Peripheral Smear Review see below    PLATELET ESTIMATE    Collection Time: 04/29/21  5:43 AM   Result Value Ref Range    Plt Estimation #CAC    MORPHOLOGY    Collection Time: 04/29/21   5:43 AM   Result Value Ref Range    RBC Morphology Present    DIFFERENTIAL COMMENT    Collection Time: 04/29/21  5:43 AM   Result Value Ref Range    Comments-Diff see below    SARS-CoV-2 PCR (24 hour In-House): Collect NP swab in VTM    Collection Time: 04/29/21  2:25 PM    Specimen: Nasopharyngeal; Respirate   Result Value Ref Range    SARS-CoV-2 Source NP Swab    URINALYSIS    Collection Time: 04/29/21 10:17 PM    Specimen: Urine, Clean Catch   Result Value Ref Range    Color Yellow     Character Clear     Specific Gravity 1.015 <1.035    Ph 7.0 5.0 - 8.0    Glucose Negative Negative mg/dL    Ketones Negative Negative mg/dL    Protein Negative Negative mg/dL    Bilirubin Negative Negative    Urobilinogen, Urine 0.2 Negative    Nitrite Negative Negative    Leukocyte Esterase Negative Negative    Occult Blood Small (A) Negative    Micro Urine Req Microscopic    URINE MICROSCOPIC (W/UA)    Collection Time: 04/29/21 10:17 PM   Result Value Ref Range    WBC 0-2 (A) /hpf    RBC 20-50 (A) /hpf    Bacteria Rare (A) None /hpf    Epithelial Cells Few /hpf   proBrain Natriuretic Peptide, NT    Collection Time: 04/30/21  6:32 AM   Result Value Ref Range    NT-proBNP 4988 (H) 0 - 125 pg/mL   CBC with Differential    Collection Time: 04/30/21  6:32 AM   Result Value Ref Range    WBC 21.6 (H) 4.8 - 10.8 K/uL    RBC 4.58 (L) 4.70 - 6.10 M/uL    Hemoglobin 11.5 (L) 14.0 - 18.0 g/dL    Hematocrit 40.1 (L) 42.0 - 52.0 %    MCV 87.6 81.4 - 97.8 fL    MCH 25.1 (L) 27.0 - 33.0 pg    MCHC 28.7 (L) 33.7 - 35.3 g/dL    RDW 56.3 (H) 35.9 - 50.0 fL    Platelet Count 268 164 - 446 K/uL    MPV 10.4 9.0 - 12.9 fL    Neutrophils-Polys 72.90 (H) 44.00 - 72.00 %    Lymphocytes 9.60 (L) 22.00 - 41.00 %    Monocytes 6.90 0.00 - 13.40 %    Eosinophils 5.60 0.00 - 6.90 %    Basophils 1.00 0.00 - 1.80 %    Immature Granulocytes 4.00 (H) 0.00 - 0.90 %    Nucleated RBC 0.00 /100 WBC    Neutrophils (Absolute) 15.73 (H) 1.82 - 7.42 K/uL    Lymphs  (Absolute) 2.07 1.00 - 4.80 K/uL    Monos (Absolute) 1.48 (H) 0.00 - 0.85 K/uL    Eos (Absolute) 1.21 (H) 0.00 - 0.51 K/uL    Baso (Absolute) 0.22 (H) 0.00 - 0.12 K/uL    Immature Granulocytes (abs) 0.86 (H) 0.00 - 0.11 K/uL    NRBC (Absolute) 0.00 K/uL   Comp Metabolic Panel (CMP)    Collection Time: 04/30/21  6:32 AM   Result Value Ref Range    Sodium 132 (L) 135 - 145 mmol/L    Potassium 4.3 3.6 - 5.5 mmol/L    Chloride 94 (L) 96 - 112 mmol/L    Co2 35 (H) 20 - 33 mmol/L    Anion Gap 3.0 (L) 7.0 - 16.0    Glucose 78 65 - 99 mg/dL    Bun 14 8 - 22 mg/dL    Creatinine 0.63 0.50 - 1.40 mg/dL    Calcium 7.8 (L) 8.5 - 10.5 mg/dL    AST(SGOT) 23 12 - 45 U/L    ALT(SGPT) 38 2 - 50 U/L    Alkaline Phosphatase 68 30 - 99 U/L    Total Bilirubin 0.3 0.1 - 1.5 mg/dL    Albumin 2.5 (L) 3.2 - 4.9 g/dL    Total Protein 5.2 (L) 6.0 - 8.2 g/dL    Globulin 2.7 1.9 - 3.5 g/dL    A-G Ratio 0.9 g/dL   Magnesium    Collection Time: 04/30/21  6:32 AM   Result Value Ref Range    Magnesium 1.9 1.5 - 2.5 mg/dL   Prothrombin Time    Collection Time: 04/30/21  6:32 AM   Result Value Ref Range    PT 14.8 (H) 12.0 - 14.6 sec    INR 1.13 0.87 - 1.13   IRON/TOTAL IRON BIND    Collection Time: 04/30/21  6:32 AM   Result Value Ref Range    Iron 33 (L) 50 - 180 ug/dL    Total Iron Binding 183 (L) 250 - 450 ug/dL    Unsat Iron Binding 150 110 - 370 ug/dL    % Saturation 18 15 - 55 %   ESTIMATED GFR    Collection Time: 04/30/21  6:32 AM   Result Value Ref Range    GFR If African American >60 >60 mL/min/1.73 m 2    GFR If Non African American >60 >60 mL/min/1.73 m 2       Current Facility-Administered Medications   Medication Frequency   • warfarin (COUMADIN) tablet 5 mg ONCE AT 1800   • hydrOXYzine HCl (ATARAX) tablet 50 mg Q6HRS PRN   • melatonin tablet 3 mg HS PRN   • Respiratory Therapy Consult Continuous RT   • Pharmacy Consult Request ...Pain Management Review 1 Each PHARMACY TO DOSE   • acetaminophen (Tylenol) tablet 650 mg Q4HRS PRN   •  lactulose 20 GM/30ML solution 30 mL QDAY PRN   • docusate sodium (ENEMEEZ) enema 283 mg QDAY PRN   • fleet enema 133 mL QDAY PRN   • artificial tears ophthalmic solution 1 Drop PRN   • benzocaine-menthol (CEPACOL) lozenge 1 Lozenge Q2HRS PRN   • mag hydrox-al hydrox-simeth (MAALOX PLUS ES or MYLANTA DS) suspension 20 mL Q2HRS PRN   • traZODone (DESYREL) tablet 50 mg QHS PRN   • sodium chloride (OCEAN) 0.65 % nasal spray 2 Spray PRN   • senna-docusate (PERICOLACE or SENOKOT S) 8.6-50 MG per tablet 2 tablet BID    And   • polyethylene glycol/lytes (MIRALAX) PACKET 1 Packet QDAY PRN    And   • magnesium hydroxide (MILK OF MAGNESIA) suspension 30 mL QDAY PRN    And   • bisacodyl (DULCOLAX) suppository 10 mg QDAY PRN   • aspirin EC (ECOTRIN) tablet 81 mg DAILY   • clopidogrel (PLAVIX) tablet 75 mg DAILY   • albuterol inhaler 2 Puff Q2HRS PRN   • atorvastatin (LIPITOR) tablet 80 mg Q EVENING   • benzonatate (TESSALON) capsule 100 mg TID PRN   • calcium citrate (CALCITRATE) tablet 950 mg DAILY   • enoxaparin (LOVENOX) inj 100 mg Q12HRS   • fluticasone (FLONASE) nasal spray 100 mcg DAILY   • gabapentin (NEURONTIN) capsule 600 mg Q EVENING   • hydrocortisone (CORTEF) tablet 10 mg BID   • ipratropium-albuterol (DUONEB) nebulizer solution Q4HRS PRN   • levothyroxine (SYNTHROID) tablet 75 mcg AM ES   • lidocaine (LIDODERM) 5 % 2 Patch Q24HR   • MD Alert...Warfarin per Pharmacy PHARMACY TO DOSE   • midodrine (PROAMATINE) tablet 2.5 mg TID WITH MEALS   • montelukast (SINGULAIR) tablet 10 mg DAILY   • omeprazole (PRILOSEC) capsule 20 mg BID   • oxyCODONE immediate-release (ROXICODONE) tablet 5 mg Q4HRS PRN   • sotalol (BETAPACE) tablet 40 mg BID   • tamsulosin (FLOMAX) capsule 0.4 mg DAILY   • [START ON 5/14/2021] testosterone cypionate (DEPO-TESTOSTERONE) injection 200 mg Q21 DAYS   • prochlorperazine (COMPAZINE) tablet 10 mg Q6HRS PRN       Orders Placed This Encounter   Procedures   • Diet Order Diet: Cardiac (Soft/chopped  please)     Standing Status:   Standing     Number of Occurrences:   1     Order Specific Question:   Diet:     Answer:   Cardiac [6]     Comments:   Soft/chopped please       Assessment:  Active Hospital Problems    Diagnosis    • *Debility    • CAD (coronary artery disease)    • NSTEMI (non-ST elevated myocardial infarction) ([facility])    • Acute on chronic respiratory failure with hypoxia (HCC)    • Adrenal insufficiency (HCC)    • Anticoagulant long-term use    • History of pulmonary embolism    • Obstructive hypertrophic cardiomyopathy ([facility])    • Essential hypertension, benign    • 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[facility])    • Prolonged Q-T interval on ECG    • Frequent PVCs    • Chronic back pain    • COPD (chronic obstructive pulmonary disease) ([facility])    • SSS (sick sinus syndrome) ([facility])    • BPH (benign prostatic hypertrophy)    • Cardiac pacemaker in situ    • Bleeding    • Impaired mobility and ADLs    • Thoracic aortic aneurysm without rupture ([facility])    • Gross hematuria    • Multiple skin tears    • Cystitis    • Leukocytosis    • Pulmonary nodule    • Hypocalcemia    • Normocytic anemia    • EVANGELINA (obstructive sleep apnea)      This patient is a 88 y.o. male admitted for acute inpatient rehabilitation with Debility secondary to multiple medical comorbidities including recent NSTEMI, chronic respiratory failure, chronic pain.    Medical Decision Making and Plan:    Debility  Generalized weakness  Poor cardio-pulmonary endurance  Mild cognitive deficits  Continue full rehab program  PT/OT/SLP, 1 hr each discipline, 5 days per week    Coronary artery disease  NSTEMI  S/p PCI/LAD stent  Aspirin  Plavix  Statin  Outpatient follow-up with cardiology     Hypopituitarism   Adrenal insufficiency  Hydrocortisone     Low testosterone  Testosterone repletion     Hypothyroidism  Levothyroxine     Hypocalcemia  Calcium supplementation     COPD  Chronic respiratory failure  Continue oxygen  Flonase  Singulair  As needed duo nebs  Respiratory  therapist to follow patient    Cough  CXR  Sputum sample     Hypotension  Midodrine     Thoracic abdominal aneurysm  Outpatient follow-up     Cardiomyopathy/HOCM  Paroxysmal atrial fibrillation  Sick sinus syndrome status post pacemaker  Aortic stenosis  Sotalol  Outpatient follow-up with cardiology     History of PEs and DVTs  Lovenox bridge to Coumadin     Edema   Elevated BNP, improved  On Lovenox bridge to Coumadin     Cystitis  Treated with antibiotics and acute, however no culture was performed  Rechecked urinalysis, negative     Skin tears  Ear wound  Bleeding  Wound care for dressing recommendation     Chronic back pain  Lidocaine patch  Gabapentin     Hyponatremia  Mild, monitor     Leukocytosis  Rechecked UA, negative  Checked chest x-ray, atelectasis versus infection  Consulted hospitalist, appreciate assistance     Normocytic anemia  Iron deficiency  Started on ferrous gluconate and ascorbic acid     GI prophylaxis  Omeprazole while on coumadin    Bowel program  Continue bowel medications  Last BM 4/28    Bladder program  BPH  Flomax  Check PVRs - 41, 19  Continue condom cath for now  Check PVRs    DVT prophylaxis  Lovenox bridge to Coumadin, pharmacy to dose    Total time:  45 minutes.  I spent greater than 50% of the time for patient care, counseling, and coordination on this date, including patient face-to face time, unit/floor time with review of records/pertinent lab data and studies, as well as discussing diagnostic evaluation/work up, planned therapeutic interventions, and future disposition of care, as per the interval events/subjective and the assessment and plan as noted above.    I have performed a physical exam, reviewed and updated ROS, as well as the assessment and plan today 4/30/2021. In review of note from 4/29/2021 there are no new changes except as documented above.          Kaley Duke M.D.   Physical Medicine and Rehabilitation

## 2021-04-30 NOTE — DISCHARGE PLANNING
"CASE MANAGEMENT INITIAL ASSESSMENT    Admit Date:  4/29/2021     I spoke with the patient by phone to discuss role of case management / discharge planning / team conference. I also followed up by phone with patient's wife.  Patient is a  88 y.o. male transferred from [facility]; was inpatient there 4/18-4/29/2021. Per Dr. Byrd's procedure note, \"Non-STEMI due to severe stenosis of the distal LAD \". Dr. Byrd performed \"Percutaneous coronary intervention - Stent Placement (JAS x3 to LAD)\" on 4/20/21. Please see his procedure note for additional information.   Patient was admitted to [facility] on 4/29/21.  The admitting physician is Dr. Kaley Duke.     Diagnosis: Debility [R53.81]    Co-morbidities:   Patient Active Problem List    Diagnosis Date Noted   • CAD (coronary artery disease) 04/25/2021   • NSTEMI (non-ST elevated myocardial infarction) ([facility]) 04/18/2021   • Acute on chronic respiratory failure with hypoxia ([facility]) 01/21/2019   • Sepsis ([facility]) 06/24/2013   • Severe sepsis with septic shock ([facility]) 06/24/2013   • Adrenal insufficiency ([facility]) 04/19/2019   • Advance care planning 01/27/2019   • GI bleed 01/21/2019   • Anticoagulant long-term use 07/02/2012   • History of pulmonary embolism 02/03/2012   • Obstructive hypertrophic cardiomyopathy ([facility]) 08/04/2011   • Essential hypertension, benign 04/14/2010   • 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[facility])    • Prolonged Q-T interval on ECG 04/19/2021   • Edema 01/24/2019   • Anemia due to GI blood loss 01/21/2019   • Candidal intertrigo 01/21/2019   • Frequent PVCs 07/26/2016   • COPD (chronic obstructive pulmonary disease) ([facility]) 02/03/2012   • Chronic back pain 02/03/2012   • Paroxysmal atrial fibrillation ([facility]) 08/29/2011   • SSS (sick sinus syndrome) ([facility]) 08/29/2011   • Cardiac pacemaker in situ 04/01/2010   • BPH (benign prostatic hypertrophy) 04/01/2010   • Bleeding 04/30/2021   • Cough 04/30/2021   • Impaired mobility and ADLs 04/29/2021   • Debility 04/29/2021   • " Thoracic aortic aneurysm without rupture (HCC) 04/29/2021   • Gross hematuria 04/29/2021   • Multiple skin tears 04/29/2021   • Cystitis 04/29/2021   • Leukocytosis 04/29/2021   • Pulmonary nodule 04/29/2021   • Hypocalcemia 04/28/2021   • Normocytic anemia 04/18/2019   • Pleural effusion 01/23/2019   • Supratherapeutic INR 01/21/2019   • Pulmonary embolism ([facility]) 08/18/2016   • Potential for deficient knowledge of chronic obstructive pulmonary disease 08/02/2016   • Chronic respiratory failure ([facility]) 07/26/2016   • Long term current use of antiarrhythmic drug 07/26/2016   • Ventricular ectopy 06/16/2016   • SOB (shortness of breath) 06/16/2016   • Peripheral neuropathy, idiopathic 05/26/2015   • ARF (acute renal failure) ([facility]) 06/24/2013   • 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 06/20/2013   • EVANGELINA (obstructive sleep apnea) 02/03/2012   • Third degree AV block ([facility]) 08/29/2011     Prior Living Situation:  Housing / Facility: [address]  Lives with - Patient's Self Care Capacity: Spouse    Prior Level of Function:  Medication Management: Independent  Finances: Independent  Home Management: Independent  Shopping: Independent  Prior Level Of Mobility: Independent With Device in Community, Independent With Device in Home(4WW or SPC)  Driving / Transportation: Driving Independent    Support Systems:  Primary : Jacquie Lizama, wife [phone]  Other support systems: Arya Lizama, son, [phone]       Previous Services Utilized:   Equipment Owned: Front-Wheel Walker, 4-Wheel Walker, Single Point Cane, Power Wheel Chair, Oxygen. DME provider for oxygen is out of [place], [place], but wife could not provide vendor name.   Prior Services: Intermittent Physical Support for ADL Per Family    Other Information:  Occupation (Pre-Hospital Vocational): Retired Due To Age     Primary Payor Source: Medicare A, Medicare B  Secondary Payor Source: Supplemental Insurance([place])  Primary Care Practitioner :  Bebe Banerjee MD  Other MDs: Dr. Maloney, Cardioloogy; Dr. Perry, Cardiology    Patient / Family Goal:  Patient / Family Goal: To have pain controlled. To get as strong as possible.     Patient and his wife live in [place], [place] in a single level home with ramp access. Patient's PLOF - used a walker or cane. Patient's wife is recovering from a hip fracture and is limited in her ability to physically assist patient. Wife is very hard of hearing. She has a phone that prints out what is said so communicating with her takes some time. Patient's son lives in [place], [place] and will assist with transport. Son is picking up his mother early next week and will bring her for visiting.     Additional Case Management Questions:  Have you ever received case management services for yourself or a family member? no  Do you feel you have and an understanding of what services  provide? yes    Do you have any additional questions regarding case management?   no       CASE MANAGEMENT PLAN OF CARE   Individualized Goals:   1. To have pain under control  2. To get as strong as possible.   3. To discharge home  Barriers:   1. Functional impairments  2. Lives in remote area with limited services  3. Wife is limited in ability to assist patient physically.      Plan:  1. Continue to follow patient through hospitalization and provide discharge planning in collaboration with patient, family, physicians and ancillary services.     2. Utilize community resources to ensure a safe discharge.

## 2021-04-30 NOTE — THERAPY
Physical Therapy   Initial Evaluation     Patient Name: Jeffrey Lizama  Age:  88 y.o., Sex:  male  Medical Record #: [id number]  Today's Date: 4/30/2021     Subjective    Pt resting in bed, willing to participate     Objective       04/30/21 1501   Prior Living Situation   Housing / Facility 1 Story House   Steps Into Home 0  (ramp)   Equipment Owned Front-Wheel Walker;4-Wheel Walker;Power Wheel Chair;Oxygen   Lives with - Patient's Self Care Capacity Spouse   Prior Level of Functional Mobility   Bed Mobility Independent   Transfer Status Independent   Ambulation Independent   Distance Ambulation (Feet)   (limited household)   Assistive Devices Used 4-Wheel Walker   Stairs Other (Comments)  (NA - pt has ramp)   Prior Functioning: Everyday Activities   Self Care Independent   Indoor Mobility (Ambulation) Independent   Stairs Not applicable   Functional Cognition Independent   Prior Device Use Walker  (4WW)   Passive ROM Lower Body   Passive ROM Lower Body X   Comments limited platar flexion B / foot/ankle deformity/ arthritis   Active ROM Lower Body    Active ROM Lower Body  WDL   Strength Lower Body   Lower Body Strength  WDL   Sensation Lower Body   Lower Extremity Sensation   WDL   Lower Body Muscle Tone   Lower Body Muscle Tone  WDL   Balance Assessment   Sitting Balance (Static) Fair   Sitting Balance (Dynamic) Fair -   Standing Balance (Static) Poor +   Standing Balance (Dynamic) Poor +   Weight Shift Sitting Fair   Weight Shift Standing Fair   Comments UE support required for standing   Bed Mobility    Supine to Sit Moderate Assist   Sit to Supine Moderate Assist   Sit to Stand Moderate Assist   Scooting Minimal Assist   Rolling Supervised  (with bed rails)   Neurological Concerns   Neurological Concerns No   Coordination Lower Body    Coordination Lower Body  Not Tested   Roll Left and Right   Assistance Needed Supervision   CARE Score 4   Roll Left and Right Discharge Goal   Discharge Goal 6   Sit to Lying    Assistance Needed Physical assistance   Physical Assistance Level 51%-75%   CARE Score 2   Sit to Lying Discharge Goal   Discharge Goal 6   Lying to Sitting on Side of Bed   Assistance Needed Physical assistance   Physical Assistance Level 26%-50%   CARE Score 3   Lying to Sitting on Side of Bed Discharge Goal   Discharge Goal 6   Sit to Stand   Assistance Needed Physical assistance   Physical Assistance Level 51%-75%   CARE Score 2   Sit to Stand Discharge Goal   Discharge Goal 4   Chair/Bed-to-Chair Transfer   Assistance Needed Physical assistance   Physical Assistance Level 51%-75%   CARE Score 2   Chair/Bed-to-Chair Transfer Discharge Goal   Discharge Goal 4   Car Transfer   Reason if not Attempted Safety concerns   CARE Score 88   Car Transfer Discharge Goal   Discharge Goal 4   Walk 10 Feet   Assistance Needed Physical assistance   Physical Assistance Level 26%-50%   CARE Score 3   Walk 10 Feet Discharge Goal   Discharge Goal 4   Walk 50 Feet with Two Turns   Reason if not Attempted Safety concerns   CARE Score 88   Walk 50 Feet with Two Turns Discharge Goal   Discharge Goal 4   Walk 150 Feet   Reason if not Attempted Safety concerns   CARE Score 88   Walk 150 Feet Discharge Goal   Discharge Goal 4   Walking 10 Feet on Uneven Surfaces   Reason if not Attempted Safety concerns   CARE Score 88   Walking 10 Feet on Uneven Surfaces Discharge Goal   Discharge Goal 4   1 Step (Curb)   Reason if not Attempted Safety concerns   CARE Score 88   1 Step (Curb) Discharge Goal   Discharge Goal 4   4 Steps   Reason if not Attempted Activity not applicable   CARE Score 9   4 Steps Discharge Goal   Discharge Goal 9   12 Steps   Reason if not Attempted Activity not applicable   CARE Score 9   12 Steps Discharge Goal   Discharge Goal 9   Picking Up Object   Reason if not Attempted Safety concerns   CARE Score 88   Picking Up Object Discharge Goal   Discharge Goal 3   Wheel 50 Feet with Two Turns   Assistance Needed Physical  assistance   Physical Assistance Level 25% or less   CARE Score 3   Type of Wheelchair/Scooter Manual   Wheel 50 Feet with Two Turns Discharge Goal   Discharge Goal 4   Wheel 150 Feet   Reason if not Attempted Medical concerns   CARE Score 88   Type of Wheelchair/Scooter Manual   Wheel 150 Feet Discharge Goal   Discharge Goal 3   Gait Functional Level of Assist    Gait Level Of Assist Minimal Assist   Assistive Device Front Wheel Walker   Distance (Feet) 10  (in addition to 5 ft x 2)   # of Times Distance was Traveled 1   Deviation Bradykinetic;Shuffled Gait   Wheelchair Functional Level of Assist   Wheelchair Assist Minimal Assist   Distance Wheelchair (Feet or Distance) 25   Wheelchair Description Extra time;Limited by fatigue   Stairs Functional Level of Assist   Level of Assist with Stairs Unable to Participate   Transfer Functional Level of Assist   Bed, Chair, Wheelchair Transfer Moderate Assist   Bed Chair Wheelchair Transfer Description Adaptive equipment;Increased time;Set-up of equipment;Requires lift   Problem List    Problems Pain;Impaired Bed Mobility;Impaired Transfers;Impaired Ambulation;Functional ROM Deficit;Impaired Balance;Decreased Activity Tolerance   Current Discharge Plan   Current Discharge Plan Return to Prior Living Situation   Benefit   Therapy Benefit Patient Would Benefit from Inpatient Rehabilitation Physical Therapy to Maximize Functional [place] with ADLs, IADLs and Mobility.   Strengths & Barriers   Strengths Able to follow instructions;Effective communication skills;Motivated for self care and independence;Pleasant and cooperative;Supportive family;Willingly participates in therapeutic activities   Barriers Decreased endurance;Fatigue;Generalized weakness;Pain;Limited mobility  (poly-arthiritis, limited respiratory function)   PT Total Time Spent   PT Individual Total Time Spent (Mins) 60   PT Charge Group   Charges Yes   PT Therapeutic Exercise 1   PT Evaluation PT Evaluation  Mod     Pt treatment initiated to include gait endurance as noted, seated therapeutic exercise 3 x 10 for ankle pumps, LAQ's, hamstring curls, hip flexion, abd/add.    Assessment  Patient is 88 y.o. male with a diagnosis of debility 2* NSTEMI 4/18 with stent placement.  Additional factors influencing patient status / progress : include PMH for A-fib, AV block, PE with IVC filter, COPD, HTN, pituitary adenoma, and poly-arthiris.  Pt currently presents with significant debility, impaired sitting / standing tolerance and balance, limited mobility requiring mod A, fragile skin, limited ROM/ strength BUE's with pain 2* arthiritis, and shortness of breath with minimal exertion.    Plan  Recommend Physical Therapy 30-60 minutes per day 5-6 days per week for 2-3 weeks for the following treatments:  PT Group Therapy, PT Gait Training, PT Self Care/Home Eval, PT Therapeutic Exercises, PT Neuro Re-Ed/Balance, PT Therapeutic Activity, PT Manual Therapy and PT Evaluation.    Passport items to be completed:  Passport items to be completed:  Get in/out of bed safely, in/out of a vehicle, safely use mobility device, walk or wheel around home/community, navigate up and down stairs, show how to get up/down from the ground, ensure home is accessible, demonstrate HEP, complete caregiver training    Goals:  Long term and short term goals have been discussed with patient and they are in agreement.    Physical Therapy Problems     Problem: Mobility     Dates: Start: 04/30/21       Goal: STG-Within one week, patient will propel wheelchair household distances     Dates: Start: 04/30/21       Description: 1) Individualized goal:  min A x 50 ft  2) Interventions:  PT Group Therapy, PT Gait Training, PT Self Care/Home Eval, PT Therapeutic Exercises, PT Neuro Re-Ed/Balance, PT Therapeutic Activity, and PT Evaluation            Goal: STG-Within one week, patient will ambulate household distance     Dates: Start: 04/30/21       Description: 1)  Individualized goal:  CGA with FWW x 25 ft  2) Interventions:  PT Group Therapy, PT Gait Training, PT Self Care/Home Eval, PT Therapeutic Exercises, PT Neuro Re-Ed/Balance, PT Therapeutic Activity, and PT Evaluation                  Problem: Mobility Transfers     Dates: Start: 04/30/21       Goal: STG-Within one week, patient will perform bed mobility     Dates: Start: 04/30/21       Description: 1) Individualized goal:  Min A with bed rail as needed  2) Interventions:  PT Group Therapy, PT Gait Training, PT Self Care/Home Eval, PT Therapeutic Exercises, PT Neuro Re-Ed/Balance, PT Therapeutic Activity, and PT Evaluation            Goal: STG-Within one week, patient will transfer bed to chair     Dates: Start: 04/30/21       Description: 1) Individualized goal:  min A sit<>stand - step transfer with FWW  2) Interventions:  PT Group Therapy, PT Gait Training, PT Self Care/Home Eval, PT Therapeutic Exercises, PT Neuro Re-Ed/Balance, PT Therapeutic Activity, and PT Evaluation                  Problem: PT-Long Term Goals     Dates: Start: 04/30/21       Goal: LTG-By discharge, patient will propel wheelchair     Dates: Start: 04/30/21       Description: 1) Individualized goal:  SPV x 50 ft  2) Interventions:  PT Group Therapy, PT Gait Training, PT Self Care/Home Eval, PT Therapeutic Exercises, PT Neuro Re-Ed/Balance, PT Therapeutic Activity, and PT Evaluation            Goal: LTG-By discharge, patient will ambulate     Dates: Start: 04/30/21       Description: 1) Individualized goal:  SPV with FWW 25 ft x 2  2) Interventions:  PT Group Therapy, PT Gait Training, PT Self Care/Home Eval, PT Therapeutic Exercises, PT Neuro Re-Ed/Balance, PT Therapeutic Activity, and PT Evaluation            Goal: LTG-By discharge, patient will transfer one surface to another     Dates: Start: 04/30/21       Description: 1) Individualized goal:  SPV with FWW  2) Interventions:  PT Group Therapy, PT Gait Training, PT Self Care/Home Eval, PT  Therapeutic Exercises, PT Neuro Re-Ed/Balance, PT Therapeutic Activity, and PT Evaluation            Goal: LTG-By discharge, patient will transfer in/out of a car     Dates: Start: 04/30/21       Description: 1) Individualized goal:  SPV with FWW  2) Interventions:  PT Group Therapy, PT Gait Training, PT Self Care/Home Eval, PT Therapeutic Exercises, PT Neuro Re-Ed/Balance, PT Therapeutic Activity, and PT Evaluation

## 2021-04-30 NOTE — THERAPY
Occupational Therapy   Initial Evaluation     Patient Name: Jeffrey Lizama  Age:  88 y.o., Sex:  male  Medical Record #: [id number]  Today's Date: 4/30/2021     Subjective    Pt resting in bed, agreeable to OT evaluation. Pt incontinent of bladder, reports bilateral shoulder and back pain d/t arthritis. Pt was here at [facility] in Feb 2019. A+O x4.      Objective       04/30/21 0701   Prior Living Situation   Prior Services None;Home-Independent   Housing / Facility [address] [facility]  ([place], [place])   Steps Into Home   (ramp)   Steps In Home 0   Elevator No   Bathroom Set up Walk In Shower   Equipment Owned Tub / Shower Seat   Lives with - Patient's Self Care Capacity Spouse   Comments Pt reports hx of falls, on 5L continuous O2 at home, spouse fell in December and broke her hip. Pt was using 4WW for mobility at baseline. Pt has hx of arthritis and pain, decreased shoulder ROM bilaterally. Home is w/c accessible.   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.)   Leisure Interests Reading;Other (Comments)  (drawing.)   Prior Functioning: Everyday Activities   Self Care Independent   Indoor Mobility (Ambulation) Independent   Stairs Independent   Functional Cognition Independent   Prior Device Use Walker  (4WW, SPC)   Vitals   Pulse Oximetry 97 %   O2 (LPM) 5   O2 Delivery Device Nasal Cannula   Vitals Comments SOB with activity but did not desat; no wean for O2   Pain  "0 - 10 Group   Location Hip;Shoulder;Back   Location Orientation Right;Left   Pain Rating Scale (NPRS) 4   Description Aching   Therapist Pain Assessment Post Activity Pain Same as Prior to Activity  (arthritis)   Cognition    Orientation Level Oriented x 4   Level of Consciousness Alert   ABS (Agitated Behavior Scale)   Agitated Behavior Scale Performed No   Cognitive Pattern Assessment   Cognitive Pattern Assessment Used BIMS   Brief Interview for Mental Status (BIMS)   Repetition of Three Words (First Attempt) 3   Temporal Orientation: Year Correct   Temporal Orientation: Month Accurate within 5 days   Temporal Orientation: Day Correct   Recall: \"Sock\" Yes, after cueing (\"something to wear\")   Recall: \"Blue\" Yes, after cueing (\"a color\")   Recall: \"Bed\" Yes, after cueing (\"a piece of furniture\")   BIMS Summary Score 12   Vision Screen   Vision Not tested  (wears glasses; reports no changes/difficulty w/ vision)   Passive ROM Upper Body   Passive ROM Upper Body X   Comments bilateral shoulders limited by pain   Active ROM Upper Body   Active ROM Upper Body  X   Dominant Hand Right   Comments bilateral shoulder ROM limited to ~90 deg d/t pain from arthritis   Strength Upper Body   Upper Body Strength  Not Tested   Comments deferred testing d/t pain and wounds; generalized weakness, equal bilaterally.   Sensation Upper Body   Upper Extremity Sensation  WDL   Upper Body Muscle Tone   Upper Body Muscle Tone  WDL   Balance Assessment   Sitting Balance (Static) Fair   Sitting Balance (Dynamic) Fair -   Standing Balance (Static) Poor +   Standing Balance (Dynamic) Poor   Weight Shift Sitting Fair   Weight Shift Standing Fair   Bed Mobility    Supine to Sit Moderate Assist   Sit to Stand Minimal Assist   Scooting Minimal Assist   Rolling Moderate Assist to Lt.   Coordination Upper Body   Coordination Not Tested   Eating   Assistance Needed Set-up / clean-up   Physical Assistance Level No physical assistance   CARE Score 5 "   Eating Discharge Goal   Discharge Goal 6   Oral Hygiene   Assistance Needed Set-up / clean-up;Supervision   Physical Assistance Level No physical assistance   CARE Score 4   Oral Hygiene Discharge Goal   Discharge Goal 6   Shower/Bathe Self   Assistance Needed Physical assistance   Physical Assistance Level 51%-75%   CARE Score 2   Shower/Bathe Self Discharge Goal   Discharge Goal 4   Upper Body Dressing   Assistance Needed Physical assistance   Physical Assistance Level 26%-50%   CARE Score 3   Upper Body Dressing Discharge Goal   Discharge Goal 6   Lower Body Dressing   Assistance Needed Physical assistance   Physical Assistance Level 76% or more   CARE Score 2   Lower Body Dressing Discharge Goal   Discharge Goal 4   Putting On/Taking Off Footwear   Assistance Needed Physical assistance   Physical Assistance Level Total assistance   CARE Score 1   Putting On/Taking Off Footwear Discharge Goal   Discharge Goal 4   Toileting Hygiene   Assistance Needed Physical assistance   Physical Assistance Level Total assistance   CARE Score 1   Toileting Hygiene Discharge Goal   Discharge Goal 4   Toilet Transfer   Assistance Needed Physical assistance   Physical Assistance Level 26%-50%   CARE Score 3   Toilet Transfer Discharge Goal   Discharge Goal 4   Hearing, Speech, and Vision   Expression of Ideas and Wants Without difficulty   Understanding Verbal and Non-Verbal Content Understands   Functional Level of Assist   Eating Supervision   Eating Description Increased time;Set-up of equipment or meal/tube feeding   Grooming Minimal Assist;Seated   Grooming Description   (SBA oral care, min A to comb hair d/t shldr pain/dec ROM)   Bathing Maximal Assist   Bathing Description Assit with back;Assit wtih lower extremities;Assit with perineal;Increased time;Set-up of equipment;Supervision for safety;Verbal cueing  (sponge bath seated EOB)   Upper Body Dressing Moderate Assist   Upper Body Dressing Description Assist with pulling  shirt over head;Increased time;Initial preparation for task;Set-up of equipment;Supervision for safety;Verbal cueing  (assist to pull shirt overhead and down in back)   Lower Body Dressing Maximal Assist   Lower Body Dressing Description Assist with threading into pant leg;Increased time;Initial preparation for task;Set-up of equipment;Supervision for safety;Verbal cueing  (A to thread LEs into brief/pants + to pull up; total A socks)   Toileting Total Assist   Toileting Description Assist to pull pants up;Assist to pull pants down;Assist for standing balance;Grab bar;Increased time;Initial preparation for task;Set-up of equipment;Supervision for safety;Verbal cueing   Toilet Transfers Moderate Assist   Toilet Transfer Description Grab bar;Increased time;Set-up of equipment;Supervision for safety;Verbal cueing;Requires lift  (w/c<>toilet stand step txfr; assist to manage O2 tank)   Tub / Shower Transfers Moderate Assist   Tub Shower Transfer Description Grab bar;Shower bench;Increased time;Initial preparation for task;Requires lift;Set-up of equipment;Supervision for safety;Verbal cueing  (dry shower transfer to fold down bench)   Problem List   Problem List Decreased Active Daily Living Skills;Decreased Homemaking Skills;Decreased Upper Extremity Strength Right;Decreased Upper Extremity Strength Left;Decreased Upper Extremity AROM Right;Decreased Upper Extremity AROM Left;Decreased Functional Mobility;Decreased Activity Tolerance;Impaired Postural Control / Balance;Other (Comments)  (pain, SOB with activity, supplemental O2, wounds, arthritis)   Precautions   Precautions Fall Risk   Comments admit droplet prec, 5L continuous O2-no wean, cardiac, wounds   Current Discharge Plan   Current Discharge Plan Return to Prior Living Situation   Benefit    Therapy Benefit Patient Would Benefit from Inpatient Rehab Occupational Therapy to Maximize [place] with ADLs, IADLs and Functional Mobility.   Interdisciplinary Plan  of Care Collaboration   IDT Collaboration with  Nursing;Physical Therapist   Patient Position at End of Therapy Seated;Chair Alarm On;Self Releasing Lap Belt Applied;Call Light within Reach;Tray Table within Reach;Phone within Reach   Collaboration Comments CLOF, POC, pain, wounds   Equipment Needs   Assistive Device / DME Parallel Bars;Front-Wheel Walker;Wheelchair;Shower Chair;Grab Bars In Shower / Tub;Grab Bars By Toilet   Adaptive Equipment Other (Comments)  (TBD)   Strengths & Barriers   Strengths Alert and oriented;Effective communication skills;Independent prior level of function;Motivated for self care and independence;Pleasant and cooperative;Supportive family;Willingly participates in therapeutic activities   Barriers Bladder incontinence;Decreased endurance;Fatigue;Generalized weakness;Impaired activity tolerance;Impaired balance;Limited mobility;Pain  (SOB with activity, supplemental O2, wounds, arthritis)   OT Total Time Spent   OT Individual Total Time Spent (Mins) 60   OT Charge Group   Charges Yes   OT Self Care / ADL 1   OT Evaluation OT Evaluation Mod       Assessment  Patient is 88 y.o. male with a diagnosis of Debility; NSTEMI s/p cath and 3 JAS to LAD on 4/20 with Dr. Byrd.  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    Pt lives in a [facility] with ramp entry with his spouse in [place], [place]. He reports independent PLOF for ADLs, IADLs and mobility with 4WW. Pt has a walk in shower with chair, no GB. He reports that his spouse fell and fx her hip in December, so he will need to be as independent as possible at d/c. Home is w/c accessible. Pt uses 5L continuous O2 at home and reports hx of falls.     During OT evaluation pt required total A to SBA for ADLs, and mod A for  transfers from w/c level. He presents with limited endurance, SOB with activity, generalized weakness, dec BUE ROM and pain d/t arthritis, impaired balance and standing tolerance, wounds, and functioning below his baseline for ADLs and mobility. OT services recommended to promote safe d/c home with family support.     Plan  Recommend Occupational Therapy  minutes per day 5-7 days per week for 2 weeks for the following treatments:  OT Group Therapy, OT Self Care/ADL, OT Community Reintegration, OT Neuro Re-Ed/Balance, OT Therapeutic Activity, OT Evaluation and OT Therapeutic Exercise.    Passport items to be completed:  Perform bathroom transfers, complete dressing, complete feeding, get ready for the day, prepare a simple meal, participate in household tasks, adapt home for safety needs, demonstrate home exercise program, complete caregiver training     Goals:  Long term and short term goals have been discussed with patient and they are in agreement.    Occupational Therapy Goals     Problem: Bathing     Dates: Start: 04/30/21       Goal: STG-Within one week, patient will bathe     Dates: Start: 04/30/21       Description: 1) Individualized Goal:  min A with AE as needed.  2) Interventions:  OT Group Therapy, OT Self Care/ADL, OT Community Reintegration, OT Neuro Re-Ed/Balance, OT Therapeutic Activity, OT Evaluation, and OT Therapeutic Exercise                  Problem: Dressing     Dates: Start: 04/30/21       Goal: STG-Within one week, patient will dress LB     Dates: Start: 04/30/21       Description: 1) Individualized Goal:  mod A with AE as needed.  2) Interventions:  OT Group Therapy, OT Self Care/ADL, OT Community Reintegration, OT Neuro Re-Ed/Balance, OT Therapeutic Activity, OT Evaluation, and OT Therapeutic Exercise                Problem: Functional Transfers     Dates: Start: 04/30/21       Goal: STG-Within one week, patient will transfer to toilet     Dates: Start: 04/30/21       Description: 1)  Individualized Goal:  CGA-SBA with AD/AE as needed.  2) Interventions:  OT Group Therapy, OT Self Care/ADL, OT Community Reintegration, OT Neuro Re-Ed/Balance, OT Therapeutic Activity, OT Evaluation, and OT Therapeutic Exercise                Problem: OT Long Term Goals     Dates: Start: 04/30/21       Goal: LTG-By discharge, patient will complete basic self care tasks     Dates: Start: 04/30/21       Description: 1) Individualized Goal:  Supervision to Mod I level with AE as needed.  2) Interventions:  OT Group Therapy, OT Self Care/ADL, OT Community Reintegration, OT Neuro Re-Ed/Balance, OT Therapeutic Activity, OT Evaluation, and OT Therapeutic Exercise          Goal: LTG-By discharge, patient will perform bathroom transfers     Dates: Start: 04/30/21       Description: 1) Individualized Goal:  Supervision to Mod I level with AD/AE as needed.  2) Interventions:  OT Group Therapy, OT Self Care/ADL, OT Community Reintegration, OT Neuro Re-Ed/Balance, OT Therapeutic Activity, OT Evaluation, and OT Therapeutic Exercise                Problem: Toileting     Dates: Start: 04/30/21       Goal: STG-Within one week, patient will complete toileting tasks     Dates: Start: 04/30/21       Description: 1) Individualized Goal:  min A with AE as needed.  2) Interventions:  OT Group Therapy, OT Self Care/ADL, OT Community Reintegration, OT Neuro Re-Ed/Balance, OT Therapeutic Activity, OT Evaluation, and OT Therapeutic Exercise

## 2021-04-30 NOTE — CONSULTS
"  HOSPITAL MEDICINE CONSULTATION    Requesting Physician:  Dr. 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  He was admitted to [facility] on 4/18/21 for chest pain.  The patient was diagnosed with a non-ST elevation myocardial infarction and underwent cardiac catheterization on 4/20/21 with placement of three drug eluting stents in the left anterior descending artery.  He was placed on dual antiplatelet therapy along with his chronic anticoagulation.  His hospital course was complicated by gross hematuria, requiring continuous bladder irrigation.  Due to his ongoing functional debility, the patient was transferred to [facility] on 4/29/21.  Hospital Medicine consultation is requested by Dr. Duke to assist in the management of this patient's multiple comorbidities as above.    Review of Systems:  Review of Systems   Constitutional: Negative for chills and fever.   HENT: Negative.    Eyes: Negative.    Respiratory: Negative for cough and shortness of breath.    Cardiovascular: Negative for chest pain and palpitations.   Gastrointestinal: Negative for abdominal pain, nausea and vomiting.   Skin: Negative for itching and rash.   Endo/Heme/Allergies: Negative for polydipsia. Does not bruise/bleed easily.   All other systems reviewed and are negative.      Allergies:  Allergies   Allergen Reactions   • Lisinopril      Hypotension, 30 mg dose   • Pcn " [Penicillins] Rash     Tolerates cephalosporins         Medications:    Current Facility-Administered Medications:   •  [MAR Hold - Suspended Admission] azithromycin (ZITHROMAX) tablet 250 mg, 250 mg, Oral, DAILY, Kesha Hua M.D.  •  [MAR Hold - Suspended Admission] oxyCODONE immediate release (ROXICODONE) tablet 10 mg, 10 mg, Oral, Once, Lazaro Quintanilla D.EVELIN  •  [MAR Hold - Suspended Admission] ferrous gluconate (FERGON) tablet 324 mg, 324 mg, Oral, QDAY with Breakfast, Kesha Hua M.D., 324 mg at 05/01/21 0846  •  [MAR Hold - Suspended Admission] budesonide-formoterol (SYMBICORT) 80-4.5 MCG/ACT inhaler 2 Puff, 2 Puff, Inhalation, BID (RT), Kesha Hua M.D., 2 Puff at 05/01/21 1004  •  [MAR Hold - Suspended Admission] tiotropium (Spiriva Respimat) 2.5 mcg/Act inhalation spray 5 mcg, 5 mcg, Inhalation, QDAILY (RT), Kesha Hua M.D., 5 mcg at 05/01/21 1005  •  [MAR Hold - Suspended Admission] acetaZOLAMIDE (DIAMOX) tablet 250 mg, 250 mg, Oral, BID DIURETIC, Kesha Hua M.D., 250 mg at 05/01/21 1720  •  [MAR Hold - Suspended Admission] midodrine (PROAMATINE) tablet 5 mg, 5 mg, Oral, TID WITH MEALS, Kesha Hua M.D., 5 mg at 05/01/21 1720  •  [MAR Hold - Suspended Admission] hydrOXYzine HCl (ATARAX) tablet 50 mg, 50 mg, Oral, Q6HRS PRN, Kaley Duke M.D.  •  [MAR Hold - Suspended Admission] melatonin tablet 3 mg, 3 mg, Oral, HS PRN, Kaley Duke M.D.  •  [MAR Hold - Suspended Admission] Respiratory Therapy Consult, , Nebulization, Continuous RT, Kaley Duke M.D.  •  [MAR Hold - Suspended Admission] Pharmacy Consult Request ...Pain Management Review 1 Each, 1 Each, Other, PHARMACY TO DOSE, Kaley Duke M.D.  •  [MAR Hold - Suspended Admission] acetaminophen (Tylenol) tablet 650 mg, 650 mg, Oral, Q4HRS PRN, Kaley Duke M.D.  •  [MAR Hold - Suspended Admission] lactulose 20 GM/30ML solution 30 mL, 30 mL, Oral, QDAY PRN, Kaley Duke M.D.  •  [MAR Hold - Suspended Admission] docusate sodium (ENEMEEZ)  enema 283 mg, 283 mg, Rectal, QDAY PRN, Kaley Duke M.D.  •  [MAR Hold - Suspended Admission] fleet enema 133 mL, 1 Each, Rectal, QDAY PRN, Kaley Duke M.D.  •  [MAR Hold - Suspended Admission] artificial tears ophthalmic solution 1 Drop, 1 Drop, Both Eyes, PRN, Kaley Duke M.D.  •  [MAR Hold - Suspended Admission] benzocaine-menthol (CEPACOL) lozenge 1 Lozenge, 1 Lozenge, Mouth/Throat, Q2HRS PRN, Kaley Duke M.D.  •  [MAR Hold - Suspended Admission] mag hydrox-al hydrox-simeth (MAALOX PLUS ES or MYLANTA DS) suspension 20 mL, 20 mL, Oral, Q2HRS PRN, Kaley Duke M.D.  •  [MAR Hold - Suspended Admission] traZODone (DESYREL) tablet 50 mg, 50 mg, Oral, QHS PRN, Kaley Duke M.D.  •  [MAR Hold - Suspended Admission] sodium chloride (OCEAN) 0.65 % nasal spray 2 Spray, 2 Spray, Nasal, PRN, Kaley Duke M.D.  •  [MAR Hold - Suspended Admission] senna-docusate (PERICOLACE or SENOKOT S) 8.6-50 MG per tablet 2 tablet, 2 tablet, Oral, BID, 2 tablet at 05/01/21 0846 **AND** [MAR Hold - Suspended Admission] polyethylene glycol/lytes (MIRALAX) PACKET 1 Packet, 1 Packet, Oral, QDAY PRN **AND** [MAR Hold - Suspended Admission] magnesium hydroxide (MILK OF MAGNESIA) suspension 30 mL, 30 mL, Oral, QDAY PRN **AND** [MAR Hold - Suspended Admission] bisacodyl (DULCOLAX) suppository 10 mg, 10 mg, Rectal, QDAY PRN, Kaley Duke M.D.  •  [MAR Hold - Suspended Admission] aspirin EC (ECOTRIN) tablet 81 mg, 81 mg, Oral, DAILY, Kaley Duke M.D., 81 mg at 05/01/21 0846  •  [MAR Hold - Suspended Admission] clopidogrel (PLAVIX) tablet 75 mg, 75 mg, Oral, DAILY, Kaley Duke M.D., 75 mg at 05/01/21 0846  •  [MAR Hold - Suspended Admission] albuterol inhaler 2 Puff, 2 Puff, Inhalation, Q2HRS PRN, Kaley Duke M.D., 2 Puff at 04/29/21 2310  •  [MAR Hold - Suspended Admission] atorvastatin (LIPITOR) tablet 80 mg, 80 mg, Oral, Q EVENING, Kaley Duke M.D., 80 mg at 04/30/21  2028  •  [MAR Hold - Suspended Admission] benzonatate (TESSALON) capsule 100 mg, 100 mg, Oral, TID PRN, Kaley Duke M.D.  •  [MAR Hold - Suspended Admission] calcium citrate (CALCITRATE) tablet 950 mg, 950 mg, Oral, DAILY, Kaley Duke M.D., 950 mg at 05/01/21 0846  •  [MAR Hold - Suspended Admission] enoxaparin (LOVENOX) inj 100 mg, 1 mg/kg, Subcutaneous, Q12HRS, Kaley Duke M.D., 100 mg at 05/01/21 0848  •  [MAR Hold - Suspended Admission] fluticasone (FLONASE) nasal spray 100 mcg, 2 Spray, Nasal, DAILY, Kaley Duke M.D., 100 mcg at 05/01/21 0848  •  [MAR Hold - Suspended Admission] gabapentin (NEURONTIN) capsule 600 mg, 600 mg, Oral, Q EVENING, Kaley Duke M.D., 600 mg at 04/30/21 2029  •  [MAR Hold - Suspended Admission] hydrocortisone (CORTEF) tablet 10 mg, 10 mg, Oral, BID, Kaley Duke M.D., 10 mg at 05/01/21 0846  •  [MAR Hold - Suspended Admission] ipratropium-albuterol (DUONEB) nebulizer solution, 3 mL, Nebulization, Q4HRS PRN, Kaley Duke M.D., 3 mL at 05/01/21 1023  •  [MAR Hold - Suspended Admission] levothyroxine (SYNTHROID) tablet 75 mcg, 75 mcg, Oral, AM ES, Kaley Duke M.D., 75 mcg at 05/01/21 0536  •  [MAR Hold - Suspended Admission] lidocaine (LIDODERM) 5 % 2 Patch, 2 Patch, Transdermal, Q24HR, Kaley Duke M.D., 2 Patch at 05/01/21 0846  •  [MAR Hold - Suspended Admission] MD Alert...Warfarin per Pharmacy, , Other, PHARMACY TO DOSE, Kaley Duke M.D.  •  [MAR Hold - Suspended Admission] montelukast (SINGULAIR) tablet 10 mg, 10 mg, Oral, DAILY, Kaley Duke M.D., 10 mg at 05/01/21 0846  •  [MAR Hold - Suspended Admission] omeprazole (PRILOSEC) capsule 20 mg, 20 mg, Oral, BID, Kaley Duke M.D., 20 mg at 05/01/21 1720  •  [MAR Hold - Suspended Admission] oxyCODONE immediate-release (ROXICODONE) tablet 5 mg, 5 mg, Oral, Q4HRS PRN, Kaley Duke M.D., 5 mg at 05/01/21 1722  •  [MAR Hold - Suspended Admission] sotalol  (BETAPACE) tablet 40 mg, 40 mg, Oral, BID, Kaley Duke M.D., 40 mg at 05/01/21 1046  •  [MAR Hold - Suspended Admission] tamsulosin (FLOMAX) capsule 0.4 mg, 0.4 mg, Oral, DAILY, Kaley Duke M.D., 0.4 mg at 05/01/21 0846  •  [MAR Hold - Suspended Admission] testosterone cypionate (DEPO-TESTOSTERONE) injection 200 mg, 200 mg, Intramuscular, Q21 DAYS, Kaley Duke M.D.  •  [MAR Hold - Suspended Admission] prochlorperazine (COMPAZINE) tablet 10 mg, 10 mg, Oral, Q6HRS PRN, Kaley Duke M.D.  No current outpatient medications on file.    Facility-Administered Medications Ordered in Other Encounters:   •  hydrocortisone sodium succinate PF (SOLU-CORTEF) 100 MG injection 50 mg, 50 mg, Intravenous, Q6HRS, Keny Madsen M.D., 50 mg at 05/02/21 0632  •  aspirin EC (ECOTRIN) tablet 81 mg, 81 mg, Oral, DAILY, Keny Madsen M.D., 81 mg at 05/02/21 0630  •  atorvastatin (LIPITOR) tablet 80 mg, 80 mg, Oral, Q EVENING, Keny Madsen M.D.  •  budesonide-formoterol (SYMBICORT) 80-4.5 MCG/ACT inhaler 2 Puff, 2 Puff, Inhalation, BID (RT), Keny Madsen M.D., 2 Puff at 05/02/21 0844  •  clopidogrel (PLAVIX) tablet 75 mg, 75 mg, Oral, DAILY, Keny Madsen M.D., 75 mg at 05/02/21 0630  •  levothyroxine (SYNTHROID) tablet 75 mcg, 75 mcg, Oral, AM ES, Keny Madsen M.D., 75 mcg at 05/02/21 0627  •  MD Alert...Warfarin per Pharmacy, , Other, PHARMACY TO DOSE, Keny Madsen M.D.  •  midodrine (PROAMATINE) tablet 5 mg, 5 mg, Oral, TID WITH MEALS, Keny Madsen M.D., 5 mg at 05/02/21 0829  •  montelukast (SINGULAIR) tablet 10 mg, 10 mg, Oral, DAILY, Keny Madsen M.D., 10 mg at 05/02/21 0627  •  omeprazole (PRILOSEC) capsule 20 mg, 20 mg, Oral, BID, Keny Madsen M.D., 20 mg at 05/02/21 0628  •  tamsulosin (FLOMAX) capsule 0.4 mg, 0.4 mg, Oral, DAILY, Keny Madsen M.D., 0.4 mg at 05/02/21 0629  •  tiotropium (Spiriva Respimat) 2.5 mcg/Act inhalation spray 5 mcg, 5 mcg, Inhalation, QDAILY (RT),  "Keny Madsen M.D., 5 mcg at 05/02/21 0844  •  benzonatate (TESSALON) capsule 100 mg, 100 mg, Oral, TID PRN, Keny Madsen M.D.  •  enoxaparin (LOVENOX) inj 100 mg, 100 mg, Subcutaneous, Q12HRS, Keny Madsen M.D., 100 mg at 05/02/21 0632  •  warfarin (COUMADIN) tablet 7.5 mg, 7.5 mg, Oral, DAILY AT 1800, Keny Madsen M.D.    Past Medical/Surgical History:  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 (pouch) ([facility]) 4/1/2010   • BPH (benign prostatic hypertrophy) 4/1/2010   • Bradycardia    • Breath shortness    • Bronchitis    • Cardiac pacemaker 04 2010   • Cataract    • COPD (chronic obstructive pulmonary disease) ([facility])    • Diverticulitis    • DJD (degenerative joint disease)    • EMPHYSEMA    • Glaucoma    • Heart burn    • Heart murmur    • Hiatus hernia syndrome    • Hypertension    • 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[facility]) 1995   • Indigestion    • Knee arthroplasty 2002    right   • Obstructive hypertrophic cardiomyopathy ([facility]) 8/4/2011   • PAF (paroxysmal atrial fibrillation) ([facility])    • Paroxysmal atrial fibrillation ([facility]) 8/29/2011   • PE (pulmonary embolism)     IVC filter, states PE clots 8 times   • Phlebitis     chronic / recurrent   • Pituitary adenoma ([facility]) 1995    hypophysectomy   • Pituitary adenoma ([facility]) 1995    hypophysectomy   • Pneumonia    • Rheumatic fever    • S/P insertion of IVC (inferior vena caval) filter    • S/P parathyroidectomy    • S/P parathyroidectomy 2005   • Sleep apnea     no cpap machine   • SSS (sick sinus syndrome) ([facility]) 8/29/2011   • Third degree AV block ([facility]) 8/29/2011   • thyroidectomy 2005    benign   • Unspecified disorder of thyroid    • Unspecified hemorrhagic conditions    • Unspecified urinary incontinence    • Urinary bladder disorder      Past Surgical History:   Procedure Laterality " Date   • GASTROSCOPY-ENDO  1/22/2019    Procedure: GASTROSCOPY-ENDO;  Surgeon: Yoandy Mcelroy M.D.;  Location: ENDOSCOPY [facility];  Service: Gastroenterology   • GASTROSCOPY-ENDO  1/21/2019    Procedure: GASTROSCOPY-ENDO;  Surgeon: Luca Mtz M.D.;  Location: ENDOSCOPY [facility];  Service: Gastroenterology   • KNEE REVISION TOTAL  6/20/2013    Performed by Ortega Vega M.D. at SURGERY [facility] ORS   • CARPAL TUNNEL ENDOSCOPIC  9/30/2011    Performed by RONALD ENNIS at SURGERY SAME DAY [facility] ORS   • PACEMAKER INSERTION Left 04/23/2010    [employer] Altrua [id number] implanted by Dr. Donaldson.   • CATARACT EXTRACTION WITH IOL      bilateral    • CERVICAL DISK AND FUSION ANTERIOR     • CERVICAL FUSION POSTERIOR     • CHOLECYSTECTOMY     • CHOLECYSTECTOMY     • OTHER      pituitary tumor removed   • OTHER CARDIAC SURGERY     • OTHER ORTHOPEDIC SURGERY      knee surgery left knee x 2   • PB REVISE ULNAR NERVE AT WRIST     • PB TOTAL KNEE ARTHROPLASTY      left   • PB TOTAL KNEE ARTHROPLASTY      right   • THYROIDECTOMY         Social History:  Social History     Socioeconomic History   • Marital status:      Spouse name: Not on file   • Number of children: Not on file   • Years of education: Not on file   • Highest education level: Not on file   Occupational History   • Not on file   Tobacco Use   • Smoking status: Never Smoker   • Smokeless tobacco: Never Used   Substance and Sexual Activity   • Alcohol use: No   • Drug use: No   • Sexual activity: Not on file   Other Topics Concern   • Not on file   Social History Narrative   • Not on file     Social Determinants of Health     Financial Resource Strain:    • Difficulty of Paying Living Expenses:    Food Insecurity:    • Worried About Running Out of Food in the Last Year:    • Ran Out of Food in the Last Year:    Transportation Needs:    • Lack of Transportation (Medical):    • Lack of Transportation (Non-Medical):    Physical  Activity:    • Days of Exercise per Week:    • Minutes of Exercise per Session:    Stress:    • Feeling of Stress :    Social Connections:    • Frequency of Communication with Friends and Family:    • Frequency of Social Gatherings with Friends and Family:    • Attends [employer] Services:    • Active Member of Clubs or Organizations:    • Attends Club or Organization Meetings:    • Marital Status:    Intimate Partner Violence:    • Fear of Current or Ex-Partner:    • Emotionally Abused:    • Physically Abused:    • Sexually Abused:        Family History  Family History   Problem Relation Age of Onset   • Arthritis Mother    • Arthritis Father    • Cancer Neg Hx    • Heart Disease Neg Hx        Physical Examination:   Vitals:    05/01/21 1004 05/01/21 1025 05/01/21 1400 05/01/21 1950   BP:   122/74 (!) 69/40   Pulse: 75 75 86 77   Resp: 18 18 18 20   Temp:   36.6 °C (97.8 °F) 36.6 °C (97.8 °F)   TempSrc:   Oral Oral   SpO2: 94% 94%     Weight:       Height:           Physical Exam   Constitutional: He is oriented to person, place, and time. No distress.   Cushingoid appearance   HENT:   Head: Normocephalic and atraumatic.   Right Ear: External ear normal.   Left Ear: External ear normal.   Eyes: Conjunctivae and EOM are normal. Right eye exhibits no discharge. Left eye exhibits no discharge.   Neck: No tracheal deviation present.   Cardiovascular:   irreg irreg   Pulmonary/Chest: No stridor. No respiratory distress. He has no wheezes.   Decreased BS    Abdominal: Soft. Bowel sounds are normal. He exhibits no distension. There is no abdominal tenderness.   Musculoskeletal:         General: Edema present.      Cervical back: Normal range of motion and neck supple.   Neurological: He is alert and oriented to person, place, and time.   Skin: Skin is warm and dry. He is not diaphoretic.   Vitals reviewed.      Laboratory Data:  Recent Labs     04/30/21  0632 05/01/21  0637 05/01/21  2048   WBC 21.6* 21.2* 20.4*   RBC  4.58* 4.47* 4.17*   HEMOGLOBIN 11.5* 11.4* 10.7*   HEMATOCRIT 40.1* 38.5* 36.1*   MCV 87.6 86.1 86.6   MCH 25.1* 25.5* 25.7*   MCHC 28.7* 29.6* 29.6*   RDW 56.3* 55.8* 57.6*   PLATELETCT 268 243 262   MPV 10.4 10.0 9.9     Recent Labs     04/30/21  0632 05/01/21  0637 05/01/21 2048   SODIUM 132* 129* 133*   POTASSIUM 4.3 4.1 3.8   CHLORIDE 94* 95* 97   CO2 35* 31 32   GLUCOSE 78 88 138*   BUN 14 14 19   CREATININE 0.63 0.79 1.02   CALCIUM 7.8* 8.2* 8.2*           Impressions/Recommendations:  SSS (sick sinus syndrome) ([facility])  H/O 3rd degree AVB  S/P PPM    Paroxysmal atrial fibrillation ([facility])  On Sotalol  Anticoagulated on Coumadin    NSTEMI (non-ST elevated myocardial infarction) ([facility])  Echo EF 55%  S/P LAD JAS x 3 on 4/20/21 by Dr. Byrd  On ASA, Plavix, Lipitor, and Sotalol  BNP 4988  Start short course low dose Diamox (also for metabolic alkalosis)  Increase Midodrine for blood pressure support  Monitor for orthostatic hypotension on Flomax    Normocytic anemia  Fe 33, start Fe/ Vit C supplements  Pt at very high risk for bleeding on DAPT and anticoagulation  Closely follow H/H    Leukocytosis  Elevated WBC appears to be chronic, at least since June 2013  UA 0-2 WBC and rare bacteria  CXR left basilar opacity vs atx  Request BCx x 2 and check PCT  Follow temp curve    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[facility])  S/P pituitary adenoma resection  On Hydrocortisone and Testosterone  Euthyroid on Synthroid    COPD (chronic obstructive pulmonary disease) ([facility])  Presently on O2 at 5 lpm via NC  On Flonase and Singulair  Add Symbicort and Spiriva to optimize management  Start short course low dose Diamox  Monitor for AECOPD    Pulmonary embolism ([facility])  S/P IVC Filter  Anticoagulated on Coumadin    Full Code    Thank you for the opportunity to assist in this patient's care.  We will continue to follow along with you.

## 2021-04-30 NOTE — THERAPY
"Speech Language Pathology   Initial Assessment     Patient Name: Jeffrey Lizama  AGE:  88 y.o., SEX:  male  Medical Record #: [id number]  Today's Date: 4/30/2021     Subjective    Per Dr. Reno's consult \"The patient is a 88 y.o. 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 Byrd. Hospital course with hematuria/acute cystitis, managed with CBI, antibiotics, Pyridium; COPD/O2 titration, anemia, leukocytosis, hyponatremia, hypokalemia, and management of chronic back pain.\"     Patient current reports shortness of breath as well as a productive cough.  He also reports chronic joint pain in multiple areas of his body.  At home he takes Tylenol or Norco as needed for pain.  The opiates are prescribed by his primary care doctor. He denies any chest pain.  He moved his bowels today.  He does have a condom cath in place and denies any dysuria.  Patient reports as he is gotten older his thinking has declined.     Patient reports his wife fell and broke her hip in December and is still recovering so he will need to be as independent as possible.  His home is ramped and is wheelchair accessible.  He has a history of falling himself with a fracture of his left ankle without any hardware but does have chronic pain in that joint.        Objective       04/30/21 0901   Prior Living Situation   Prior Services None;Home-Independent   Housing / Facility [address] [facility]   Lives with - Patient's Self Care Capacity Spouse   Prior Level Of Function   Communication Within Functional Limits   Swallow Within Functional Limits   Dentition Edentulous   Dentures Uppers;Lowers  (not present )   Hearing Within Functional Limits for Evaluation   Vision Distance;Reading ;Wears Corrective Lenses  (vision decreased since " hospitalization (per pt))   Patient's Primary Language English   Education Completed College   Occupation (Pre-Hospital Vocational) Retired Due To Age   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    Receptive Language / Auditory Comprehension   Understands Simple, Structured Conversation  Within Functional Limits (6-7)   Understands Complex Conversation Minimal (4)   Expressive Language   Expressive Language (WDL) WDL   Cognition   Moderate Attention Minimal (4)   Verbal Short Term Memory 5 Minutes   Visual Short Term Memory Minimal (4)   Clock Drawing Within Functional Limits   ABS (Agitated Behavior Scale)   Agitated Behavior Scale Performed No   Social / Pragmatic Communication   Social / Pragmatic Communication WDL   Tracheostomy   Tracheostomy No   Speech Mechanisms / Voice Production   Speech Mechanisms / Voice Production (WDL) WDL   Labial Function   Labial Function (WDL) WDL   Lingual Function   Lingual Function (WDL) WDL   Swallowing/Nutritional Status   Swallowing/Nutritional Status Regular food  (soft chopped for ease of managment(pts dentures not present))   Functional Level of Assist   Eating Supervision   Eating Description Increased time;Set-up of equipment or meal/tube feeding   Comprehension Minimal Assist   Comprehension Description Glasses;Verbal cues   Expression Independent   Social Interaction Independent   Problem Solving Minimal Assist   Problem Solving Description Seat belt;Therapy schedule;Verbal cueing;Increased time   Memory Moderate Assist   Memory Description Verbal cueing;Therapy schedule;Bed/chair alarm;Increased time;Supervision   Outcome Measures   Outcome Measures Utilized SCCAN   SCCAN (Scales of Cognitive and Communicative Ability for Neurorehabilitation)   Oral Expression - Raw Score 18   Oral Expression - Scale Performance Score 95   Orientation - Raw Score 12   Orientation - Scale Performance Score 100   Memory - Raw Score 7   Memory - Scale Performance Score 37   Speech Comprehension  - Raw Score 12   Speech Comprehension - Scale Performance Score 92   Problem List   Problem List Cognitive-Linguistic Deficits   Current Discharge Plan   Current Discharge Plan Return to Prior Living Situation   Benefit   Therapy Benefit Patient would benefit from Inpatient Rehab Speech-Language Pathology to address above identified deficits.   Interdisciplinary Plan of Care Collaboration   IDT Collaboration with  Physician   Collaboration Comments regarding results of the evalaution   Strengths & Barriers   Strengths Alert and oriented;Independent prior level of function;Motivated for self care and independence;Pleasant and cooperative;Willingly participates in therapeutic activities   Barriers Impaired functional cognition   Speech Language Pathologist Assigned   Assigned SLP / Extension CL/MP cog (SPLIT OK)    SLP Total Time Spent   SLP Individual Total Time Spent (Mins) 60   Evaluation Charges   SLP Speech Language Evaluation Speech Sound Language Comprehension       Assessment    Patient is 88 y.o. male with a diagnosis of debility.  Additional factors influencing patient status/progress (ie: cognitive factors, co-morbidities, social support, etc): SCCAN was initiated.  Pt presented with deficits in short term memory and attn to detail.  Pt reported decrease in vision since onset.  Pt reports managing medication and finances at home, prior to onset.  SCCAN to be completed and additional goals with be set as deemed appropriate.  Recommend skilled speech therapy services to target aforementioned deficits.     Plan  Recommend Speech Therapy 30-60 minutes per day 5-6 days per week for 4 weeks for the following treatments:  SLP Aphasia Evaluation, SLP Cognitive Skill Development and SLP Group Treatment.    Passport items to be completed:  Passport items to be completed:  Express basic needs, understand food/liquid recommendations, consistently follow swallow precautions, manage finances, manage medications, arrive  to therapy appointments on time, complete daily memory log entries, solve problems related to safety situations, review education related to hospitalization, complete caregiver training     Goals:  Long term and short term goals have been discussed with patient and they are in agreement.    Speech Therapy Problems     Problem: Memory STGs     Dates: Start: 04/30/21       Goal: STG-Within one week, patient will     Dates: Start: 04/30/21       Description: 1) Individualized goal:  recall daily events and safety strategies with 80% accuracy given min verbal cues, and use of memory book.   2) Interventions:  SLP Cognitive Skill Development                    Problem: Problem Solving STGs     Dates: Start: 04/30/21       Goal: STG-Within one week, patient will     Dates: Start: 04/30/21       Description: 1) Individualized goal:  complete SCCAN with additional goals as deemed appropriate.   2) Interventions:  SLP Cognitive Skill Development              Goal: STG-Within one week, patient will     Dates: Start: 04/30/21       Description: 1) Individualized goal:  complete medication, and financial, management tasks with 80% accuracy given min verbal cues.    2) Interventions:  SLP Cognitive Skill Development                    Problem: Speech/Swallowing LTGs     Dates: Start: 04/30/21       Goal: LTG-By discharge, patient will solve complex problem     Dates: Start: 04/30/21       Description: 1) Individualized goal:  [place] for safe discharge home.    2) Interventions:  SLP Aphasia Evaluation, SLP Cognitive Skill Development, and SLP Group Treatment

## 2021-05-01 ENCOUNTER — APPOINTMENT (OUTPATIENT)
Dept: RADIOLOGY | Facility: MEDICAL CENTER | Age: 86
DRG: 270 | End: 2021-05-01
Attending: EMERGENCY MEDICINE
Payer: MEDICARE

## 2021-05-01 ENCOUNTER — HOSPITAL ENCOUNTER (INPATIENT)
Facility: MEDICAL CENTER | Age: 86
LOS: 12 days | DRG: 270 | End: 2021-05-14
Attending: EMERGENCY MEDICINE | Admitting: INTERNAL MEDICINE
Payer: MEDICARE

## 2021-05-01 VITALS
RESPIRATION RATE: 20 BRPM | WEIGHT: 208 LBS | OXYGEN SATURATION: 94 % | BODY MASS INDEX: 31.52 KG/M2 | HEIGHT: 68 IN | SYSTOLIC BLOOD PRESSURE: 69 MMHG | HEART RATE: 77 BPM | DIASTOLIC BLOOD PRESSURE: 40 MMHG | TEMPERATURE: 97.8 F

## 2021-05-01 DIAGNOSIS — T14.8XXA MULTIPLE SKIN TEARS: ICD-10-CM

## 2021-05-01 DIAGNOSIS — E23.0 HYPOPITUITARISM (HCC): ICD-10-CM

## 2021-05-01 DIAGNOSIS — I25.10 CORONARY ARTERY DISEASE INVOLVING NATIVE HEART WITHOUT ANGINA PECTORIS, UNSPECIFIED VESSEL OR LESION TYPE: ICD-10-CM

## 2021-05-01 DIAGNOSIS — Z78.9 IMPAIRED MOBILITY AND ADLS: ICD-10-CM

## 2021-05-01 DIAGNOSIS — G60.9 PERIPHERAL NEUROPATHY, IDIOPATHIC: ICD-10-CM

## 2021-05-01 DIAGNOSIS — R53.81 DEBILITY: ICD-10-CM

## 2021-05-01 DIAGNOSIS — I25.118 CORONARY ARTERY DISEASE OF NATIVE HEART WITH STABLE ANGINA PECTORIS, UNSPECIFIED VESSEL OR LESION TYPE (HCC): ICD-10-CM

## 2021-05-01 DIAGNOSIS — I95.9 HYPOTENSION, UNSPECIFIED HYPOTENSION TYPE: ICD-10-CM

## 2021-05-01 DIAGNOSIS — N40.0 BENIGN PROSTATIC HYPERPLASIA WITHOUT LOWER URINARY TRACT SYMPTOMS: ICD-10-CM

## 2021-05-01 DIAGNOSIS — I71.20 THORACIC AORTIC ANEURYSM WITHOUT RUPTURE (HCC): ICD-10-CM

## 2021-05-01 DIAGNOSIS — I48.0 PAROXYSMAL ATRIAL FIBRILLATION (HCC): ICD-10-CM

## 2021-05-01 DIAGNOSIS — R07.9 ACUTE CHEST PAIN: ICD-10-CM

## 2021-05-01 DIAGNOSIS — G89.29 CHRONIC LOW BACK PAIN WITHOUT SCIATICA, UNSPECIFIED BACK PAIN LATERALITY: ICD-10-CM

## 2021-05-01 DIAGNOSIS — Z86.711 HISTORY OF PULMONARY EMBOLISM: ICD-10-CM

## 2021-05-01 DIAGNOSIS — R07.82 INTERCOSTAL PAIN: ICD-10-CM

## 2021-05-01 DIAGNOSIS — M54.50 CHRONIC LOW BACK PAIN WITHOUT SCIATICA, UNSPECIFIED BACK PAIN LATERALITY: ICD-10-CM

## 2021-05-01 DIAGNOSIS — Z95.0 CARDIAC PACEMAKER IN SITU: ICD-10-CM

## 2021-05-01 DIAGNOSIS — I95.89 OTHER SPECIFIED HYPOTENSION: ICD-10-CM

## 2021-05-01 DIAGNOSIS — E27.40 ADRENAL INSUFFICIENCY (HCC): ICD-10-CM

## 2021-05-01 DIAGNOSIS — I10 ESSENTIAL HYPERTENSION, BENIGN: ICD-10-CM

## 2021-05-01 DIAGNOSIS — Z74.09 IMPAIRED MOBILITY AND ADLS: ICD-10-CM

## 2021-05-01 DIAGNOSIS — J44.9 CHRONIC OBSTRUCTIVE PULMONARY DISEASE, UNSPECIFIED COPD TYPE (HCC): ICD-10-CM

## 2021-05-01 DIAGNOSIS — G47.33 OSA (OBSTRUCTIVE SLEEP APNEA): ICD-10-CM

## 2021-05-01 LAB
25(OH)D3 SERPL-MCNC: 38 NG/ML (ref 30–80)
ALBUMIN SERPL BCP-MCNC: 2.6 G/DL (ref 3.2–4.9)
ALBUMIN/GLOB SERPL: 0.9 G/DL
ALP SERPL-CCNC: 67 U/L (ref 30–99)
ALT SERPL-CCNC: 29 U/L (ref 2–50)
ANION GAP SERPL CALC-SCNC: 3 MMOL/L (ref 7–16)
ANION GAP SERPL CALC-SCNC: 4 MMOL/L (ref 7–16)
ANISOCYTOSIS BLD QL SMEAR: ABNORMAL
APPEARANCE UR: CLEAR
APTT PPP: 44.9 SEC (ref 24.7–36)
AST SERPL-CCNC: 21 U/L (ref 12–45)
BACTERIA #/AREA URNS HPF: ABNORMAL /HPF
BASOPHILS # BLD AUTO: 0 % (ref 0–1.8)
BASOPHILS # BLD: 0 K/UL (ref 0–0.12)
BILIRUB SERPL-MCNC: 0.3 MG/DL (ref 0.1–1.5)
BILIRUB UR QL STRIP.AUTO: NEGATIVE
BUN SERPL-MCNC: 14 MG/DL (ref 8–22)
BUN SERPL-MCNC: 19 MG/DL (ref 8–22)
CALCIUM SERPL-MCNC: 8.2 MG/DL (ref 8.5–10.5)
CALCIUM SERPL-MCNC: 8.2 MG/DL (ref 8.5–10.5)
CHLORIDE SERPL-SCNC: 95 MMOL/L (ref 96–112)
CHLORIDE SERPL-SCNC: 97 MMOL/L (ref 96–112)
CO2 SERPL-SCNC: 31 MMOL/L (ref 20–33)
CO2 SERPL-SCNC: 32 MMOL/L (ref 20–33)
COLOR UR: YELLOW
CREAT SERPL-MCNC: 0.79 MG/DL (ref 0.5–1.4)
CREAT SERPL-MCNC: 1.02 MG/DL (ref 0.5–1.4)
EKG IMPRESSION: NORMAL
EOSINOPHIL # BLD AUTO: 0.71 K/UL (ref 0–0.51)
EOSINOPHIL NFR BLD: 3.5 % (ref 0–6.9)
EPI CELLS #/AREA URNS HPF: NEGATIVE /HPF
ERYTHROCYTE [DISTWIDTH] IN BLOOD BY AUTOMATED COUNT: 55.8 FL (ref 35.9–50)
ERYTHROCYTE [DISTWIDTH] IN BLOOD BY AUTOMATED COUNT: 57.6 FL (ref 35.9–50)
GLOBULIN SER CALC-MCNC: 2.9 G/DL (ref 1.9–3.5)
GLUCOSE SERPL-MCNC: 138 MG/DL (ref 65–99)
GLUCOSE SERPL-MCNC: 88 MG/DL (ref 65–99)
GLUCOSE UR STRIP.AUTO-MCNC: NEGATIVE MG/DL
HCT VFR BLD AUTO: 36.1 % (ref 42–52)
HCT VFR BLD AUTO: 38.5 % (ref 42–52)
HGB BLD-MCNC: 10.7 G/DL (ref 14–18)
HGB BLD-MCNC: 11.4 G/DL (ref 14–18)
HYALINE CASTS #/AREA URNS LPF: ABNORMAL /LPF
INR PPP: 1.22 (ref 0.87–1.13)
INR PPP: 1.34 (ref 0.87–1.13)
KETONES UR STRIP.AUTO-MCNC: NEGATIVE MG/DL
LACTATE BLD-SCNC: 1.4 MMOL/L (ref 0.5–2)
LEUKOCYTE ESTERASE UR QL STRIP.AUTO: ABNORMAL
LYMPHOCYTES # BLD AUTO: 1.06 K/UL (ref 1–4.8)
LYMPHOCYTES NFR BLD: 5.2 % (ref 22–41)
MACROCYTES BLD QL SMEAR: ABNORMAL
MANUAL DIFF BLD: NORMAL
MCH RBC QN AUTO: 25.5 PG (ref 27–33)
MCH RBC QN AUTO: 25.7 PG (ref 27–33)
MCHC RBC AUTO-ENTMCNC: 29.6 G/DL (ref 33.7–35.3)
MCHC RBC AUTO-ENTMCNC: 29.6 G/DL (ref 33.7–35.3)
MCV RBC AUTO: 86.1 FL (ref 81.4–97.8)
MCV RBC AUTO: 86.6 FL (ref 81.4–97.8)
MICRO URNS: ABNORMAL
MICROCYTES BLD QL SMEAR: ABNORMAL
MONOCYTES # BLD AUTO: 0.71 K/UL (ref 0–0.85)
MONOCYTES NFR BLD AUTO: 3.5 % (ref 0–13.4)
MORPHOLOGY BLD-IMP: NORMAL
MYELOCYTES NFR BLD MANUAL: 1.7 %
NEUTROPHILS # BLD AUTO: 17.56 K/UL (ref 1.82–7.42)
NEUTROPHILS NFR BLD: 86.1 % (ref 44–72)
NITRITE UR QL STRIP.AUTO: NEGATIVE
NRBC # BLD AUTO: 0.02 K/UL
NRBC BLD-RTO: 0.1 /100 WBC
NT-PROBNP SERPL IA-MCNC: 5742 PG/ML (ref 0–125)
PH UR STRIP.AUTO: 8.5 [PH] (ref 5–8)
PLATELET # BLD AUTO: 243 K/UL (ref 164–446)
PLATELET # BLD AUTO: 262 K/UL (ref 164–446)
PLATELET BLD QL SMEAR: NORMAL
PMV BLD AUTO: 10 FL (ref 9–12.9)
PMV BLD AUTO: 9.9 FL (ref 9–12.9)
POLYCHROMASIA BLD QL SMEAR: NORMAL
POTASSIUM SERPL-SCNC: 3.8 MMOL/L (ref 3.6–5.5)
POTASSIUM SERPL-SCNC: 4.1 MMOL/L (ref 3.6–5.5)
PROCALCITONIN SERPL-MCNC: 0.06 NG/ML
PROT SERPL-MCNC: 5.5 G/DL (ref 6–8.2)
PROT UR QL STRIP: NEGATIVE MG/DL
PROTHROMBIN TIME: 15.8 SEC (ref 12–14.6)
PROTHROMBIN TIME: 17 SEC (ref 12–14.6)
RBC # BLD AUTO: 4.17 M/UL (ref 4.7–6.1)
RBC # BLD AUTO: 4.47 M/UL (ref 4.7–6.1)
RBC # URNS HPF: ABNORMAL /HPF
RBC BLD AUTO: PRESENT
RBC UR QL AUTO: NEGATIVE
SODIUM SERPL-SCNC: 129 MMOL/L (ref 135–145)
SODIUM SERPL-SCNC: 133 MMOL/L (ref 135–145)
SP GR UR STRIP.AUTO: 1.02
TROPONIN T SERPL-MCNC: 113 NG/L (ref 6–19)
UROBILINOGEN UR STRIP.AUTO-MCNC: 1 MG/DL
WBC # BLD AUTO: 20.4 K/UL (ref 4.8–10.8)
WBC # BLD AUTO: 21.2 K/UL (ref 4.8–10.8)
WBC #/AREA URNS HPF: ABNORMAL /HPF

## 2021-05-01 PROCEDURE — 96375 TX/PRO/DX INJ NEW DRUG ADDON: CPT

## 2021-05-01 PROCEDURE — A9270 NON-COVERED ITEM OR SERVICE: HCPCS | Performed by: HOSPITALIST

## 2021-05-01 PROCEDURE — 93005 ELECTROCARDIOGRAM TRACING: CPT | Performed by: EMERGENCY MEDICINE

## 2021-05-01 PROCEDURE — 96374 THER/PROPH/DIAG INJ IV PUSH: CPT

## 2021-05-01 PROCEDURE — 700102 HCHG RX REV CODE 250 W/ 637 OVERRIDE(OP): Performed by: PHYSICAL MEDICINE & REHABILITATION

## 2021-05-01 PROCEDURE — 700102 HCHG RX REV CODE 250 W/ 637 OVERRIDE(OP): Performed by: HOSPITALIST

## 2021-05-01 PROCEDURE — 85610 PROTHROMBIN TIME: CPT

## 2021-05-01 PROCEDURE — 80053 COMPREHEN METABOLIC PANEL: CPT

## 2021-05-01 PROCEDURE — 71045 X-RAY EXAM CHEST 1 VIEW: CPT

## 2021-05-01 PROCEDURE — 700111 HCHG RX REV CODE 636 W/ 250 OVERRIDE (IP): Performed by: EMERGENCY MEDICINE

## 2021-05-01 PROCEDURE — 94760 N-INVAS EAR/PLS OXIMETRY 1: CPT

## 2021-05-01 PROCEDURE — 84484 ASSAY OF TROPONIN QUANT: CPT

## 2021-05-01 PROCEDURE — 700105 HCHG RX REV CODE 258: Performed by: EMERGENCY MEDICINE

## 2021-05-01 PROCEDURE — 700102 HCHG RX REV CODE 250 W/ 637 OVERRIDE(OP)

## 2021-05-01 PROCEDURE — 36415 COLL VENOUS BLD VENIPUNCTURE: CPT

## 2021-05-01 PROCEDURE — 85610 PROTHROMBIN TIME: CPT | Mod: 91

## 2021-05-01 PROCEDURE — 700101 HCHG RX REV CODE 250: Performed by: PHYSICAL MEDICINE & REHABILITATION

## 2021-05-01 PROCEDURE — 85730 THROMBOPLASTIN TIME PARTIAL: CPT

## 2021-05-01 PROCEDURE — 81001 URINALYSIS AUTO W/SCOPE: CPT

## 2021-05-01 PROCEDURE — A9270 NON-COVERED ITEM OR SERVICE: HCPCS

## 2021-05-01 PROCEDURE — 700111 HCHG RX REV CODE 636 W/ 250 OVERRIDE (IP): Performed by: PHYSICAL MEDICINE & REHABILITATION

## 2021-05-01 PROCEDURE — 85027 COMPLETE CBC AUTOMATED: CPT | Mod: 91

## 2021-05-01 PROCEDURE — 87040 BLOOD CULTURE FOR BACTERIA: CPT

## 2021-05-01 PROCEDURE — A9270 NON-COVERED ITEM OR SERVICE: HCPCS | Performed by: PHYSICAL MEDICINE & REHABILITATION

## 2021-05-01 PROCEDURE — 85007 BL SMEAR W/DIFF WBC COUNT: CPT

## 2021-05-01 PROCEDURE — 770024 HCHG ROOM/CARE - REHAB LOA (180)

## 2021-05-01 PROCEDURE — 94640 AIRWAY INHALATION TREATMENT: CPT

## 2021-05-01 PROCEDURE — 83880 ASSAY OF NATRIURETIC PEPTIDE: CPT

## 2021-05-01 PROCEDURE — 74175 CTA ABDOMEN W/CONTRAST: CPT | Mod: ME

## 2021-05-01 PROCEDURE — 99233 SBSQ HOSP IP/OBS HIGH 50: CPT | Performed by: HOSPITALIST

## 2021-05-01 PROCEDURE — U0005 INFEC AGEN DETEC AMPLI PROBE: HCPCS

## 2021-05-01 PROCEDURE — U0003 INFECTIOUS AGENT DETECTION BY NUCLEIC ACID (DNA OR RNA); SEVERE ACUTE RESPIRATORY SYNDROME CORONAVIRUS 2 (SARS-COV-2) (CORONAVIRUS DISEASE [COVID-19]), AMPLIFIED PROBE TECHNIQUE, MAKING USE OF HIGH THROUGHPUT TECHNOLOGIES AS DESCRIBED BY CMS-2020-01-R: HCPCS

## 2021-05-01 PROCEDURE — 83605 ASSAY OF LACTIC ACID: CPT

## 2021-05-01 PROCEDURE — 85027 COMPLETE CBC AUTOMATED: CPT

## 2021-05-01 PROCEDURE — 700117 HCHG RX CONTRAST REV CODE 255: Performed by: EMERGENCY MEDICINE

## 2021-05-01 PROCEDURE — 84145 PROCALCITONIN (PCT): CPT

## 2021-05-01 PROCEDURE — 80048 BASIC METABOLIC PNL TOTAL CA: CPT

## 2021-05-01 PROCEDURE — 99285 EMERGENCY DEPT VISIT HI MDM: CPT

## 2021-05-01 RX ORDER — OXYCODONE HYDROCHLORIDE 10 MG/1
10 TABLET ORAL ONCE
Status: DISCONTINUED | OUTPATIENT
Start: 2021-05-01 | End: 2021-05-02 | Stop reason: HOSPADM

## 2021-05-01 RX ORDER — MORPHINE SULFATE 4 MG/ML
4 INJECTION, SOLUTION INTRAMUSCULAR; INTRAVENOUS ONCE
Status: COMPLETED | OUTPATIENT
Start: 2021-05-01 | End: 2021-05-01

## 2021-05-01 RX ORDER — OXYCODONE HYDROCHLORIDE 5 MG/1
5 TABLET ORAL EVERY 4 HOURS PRN
Status: ON HOLD | COMMUNITY
End: 2021-05-14 | Stop reason: SDUPTHER

## 2021-05-01 RX ORDER — AZITHROMYCIN 250 MG/1
250 TABLET, FILM COATED ORAL DAILY
Status: DISCONTINUED | OUTPATIENT
Start: 2021-05-02 | End: 2021-05-05 | Stop reason: HOSPADM

## 2021-05-01 RX ORDER — ACETAZOLAMIDE 250 MG/1
250 TABLET ORAL 2 TIMES DAILY
Status: ON HOLD | COMMUNITY
End: 2021-05-14

## 2021-05-01 RX ORDER — BUDESONIDE AND FORMOTEROL FUMARATE DIHYDRATE 80; 4.5 UG/1; UG/1
2 AEROSOL RESPIRATORY (INHALATION) 2 TIMES DAILY
COMMUNITY
End: 2021-05-20 | Stop reason: SDUPTHER

## 2021-05-01 RX ORDER — ONDANSETRON 2 MG/ML
4 INJECTION INTRAMUSCULAR; INTRAVENOUS ONCE
Status: COMPLETED | OUTPATIENT
Start: 2021-05-01 | End: 2021-05-01

## 2021-05-01 RX ORDER — AZITHROMYCIN 250 MG/1
500 TABLET, FILM COATED ORAL ONCE
Status: ON HOLD | COMMUNITY
End: 2021-05-14

## 2021-05-01 RX ORDER — AZITHROMYCIN 250 MG/1
500 TABLET, FILM COATED ORAL ONCE
Status: COMPLETED | OUTPATIENT
Start: 2021-05-01 | End: 2021-05-01

## 2021-05-01 RX ORDER — LIDOCAINE 50 MG/G
1 PATCH TOPICAL EVERY 12 HOURS
Status: ON HOLD | COMMUNITY
End: 2021-06-19

## 2021-05-01 RX ORDER — MIDODRINE HYDROCHLORIDE 5 MG/1
5 TABLET ORAL
Status: ON HOLD | COMMUNITY
End: 2021-05-14

## 2021-05-01 RX ORDER — FERROUS GLUCONATE 324(37.5)
324 TABLET ORAL
Status: ON HOLD | COMMUNITY
End: 2021-05-14

## 2021-05-01 RX ORDER — SODIUM CHLORIDE 9 MG/ML
1000 INJECTION, SOLUTION INTRAVENOUS CONTINUOUS
Status: ACTIVE | OUTPATIENT
Start: 2021-05-02 | End: 2021-05-02

## 2021-05-01 RX ORDER — WARFARIN SODIUM 5 MG/1
10 TABLET ORAL
Status: COMPLETED | OUTPATIENT
Start: 2021-05-01 | End: 2021-05-01

## 2021-05-01 RX ORDER — FLUTICASONE PROPIONATE 50 MCG
1 SPRAY, SUSPENSION (ML) NASAL 2 TIMES DAILY
COMMUNITY

## 2021-05-01 RX ORDER — OXYCODONE HYDROCHLORIDE 10 MG/1
TABLET ORAL
Status: COMPLETED
Start: 2021-05-01 | End: 2021-05-01

## 2021-05-01 RX ORDER — AMOXICILLIN 250 MG
1 CAPSULE ORAL 2 TIMES DAILY
COMMUNITY
End: 2021-05-20 | Stop reason: SDUPTHER

## 2021-05-01 RX ADMIN — LEVOTHYROXINE SODIUM 75 MCG: 75 TABLET ORAL at 05:36

## 2021-05-01 RX ADMIN — IOHEXOL 90 ML: 350 INJECTION, SOLUTION INTRAVENOUS at 21:37

## 2021-05-01 RX ADMIN — SENNOSIDES AND DOCUSATE SODIUM 2 TABLET: 8.6; 5 TABLET ORAL at 08:46

## 2021-05-01 RX ADMIN — MIDODRINE HYDROCHLORIDE 5 MG: 2.5 TABLET ORAL at 10:46

## 2021-05-01 RX ADMIN — WARFARIN SODIUM 10 MG: 5 TABLET ORAL at 17:20

## 2021-05-01 RX ADMIN — OMEPRAZOLE 20 MG: 20 CAPSULE, DELAYED RELEASE ORAL at 05:37

## 2021-05-01 RX ADMIN — OXYCODONE HYDROCHLORIDE 5 MG: 5 TABLET ORAL at 06:02

## 2021-05-01 RX ADMIN — OMEPRAZOLE 20 MG: 20 CAPSULE, DELAYED RELEASE ORAL at 17:20

## 2021-05-01 RX ADMIN — ACETAZOLAMIDE 250 MG: 250 TABLET ORAL at 17:20

## 2021-05-01 RX ADMIN — CLOPIDOGREL BISULFATE 75 MG: 75 TABLET ORAL at 08:46

## 2021-05-01 RX ADMIN — ONDANSETRON 4 MG: 2 INJECTION INTRAMUSCULAR; INTRAVENOUS at 21:15

## 2021-05-01 RX ADMIN — AZITHROMYCIN 500 MG: 250 TABLET, FILM COATED ORAL at 10:53

## 2021-05-01 RX ADMIN — IPRATROPIUM BROMIDE AND ALBUTEROL SULFATE 3 ML: .5; 2.5 SOLUTION RESPIRATORY (INHALATION) at 10:23

## 2021-05-01 RX ADMIN — ENOXAPARIN SODIUM 100 MG: 100 INJECTION SUBCUTANEOUS at 08:48

## 2021-05-01 RX ADMIN — MIDODRINE HYDROCHLORIDE 5 MG: 2.5 TABLET ORAL at 08:46

## 2021-05-01 RX ADMIN — Medication 81 MG: at 08:46

## 2021-05-01 RX ADMIN — SODIUM CHLORIDE 1000 ML: 9 INJECTION, SOLUTION INTRAVENOUS at 23:40

## 2021-05-01 RX ADMIN — HYDROCORTISONE 10 MG: 10 TABLET ORAL at 08:46

## 2021-05-01 RX ADMIN — MIDODRINE HYDROCHLORIDE 5 MG: 2.5 TABLET ORAL at 17:20

## 2021-05-01 RX ADMIN — FLUTICASONE PROPIONATE 100 MCG: 50 SPRAY, METERED NASAL at 08:48

## 2021-05-01 RX ADMIN — TIOTROPIUM BROMIDE INHALATION SPRAY 5 MCG: 3.12 SPRAY, METERED RESPIRATORY (INHALATION) at 10:05

## 2021-05-01 RX ADMIN — FERROUS GLUCONATE TAB 324 MG (37.5 MG ELEMENTAL IRON) 324 MG: 324 (37.5 FE) TAB at 08:46

## 2021-05-01 RX ADMIN — ACETAZOLAMIDE 250 MG: 250 TABLET ORAL at 05:36

## 2021-05-01 RX ADMIN — OXYCODONE HYDROCHLORIDE 5 MG: 5 TABLET ORAL at 17:22

## 2021-05-01 RX ADMIN — SOTALOL HYDROCHLORIDE 40 MG: 80 TABLET ORAL at 10:46

## 2021-05-01 RX ADMIN — CALCIUM CITRATE 200 MG (950 MG) TABLET 950 MG: at 08:46

## 2021-05-01 RX ADMIN — MONTELUKAST 10 MG: 10 TABLET, FILM COATED ORAL at 08:46

## 2021-05-01 RX ADMIN — LIDOCAINE 2 PATCH: 50 PATCH TOPICAL at 08:46

## 2021-05-01 RX ADMIN — TAMSULOSIN HYDROCHLORIDE 0.4 MG: 0.4 CAPSULE ORAL at 08:46

## 2021-05-01 RX ADMIN — MORPHINE SULFATE 4 MG: 4 INJECTION INTRAVENOUS at 21:15

## 2021-05-01 RX ADMIN — OXYCODONE HYDROCHLORIDE 5 MG: 5 TABLET ORAL at 10:53

## 2021-05-01 RX ADMIN — BUDESONIDE AND FORMOTEROL FUMARATE DIHYDRATE 2 PUFF: 80; 4.5 AEROSOL RESPIRATORY (INHALATION) at 10:04

## 2021-05-01 RX ADMIN — OXYCODONE HYDROCHLORIDE: 10 TABLET ORAL at 19:15

## 2021-05-01 ASSESSMENT — FIBROSIS 4 INDEX: FIB4 SCORE: 1.35

## 2021-05-01 ASSESSMENT — PAIN DESCRIPTION - DESCRIPTORS: DESCRIPTORS: ACHING

## 2021-05-01 NOTE — FLOWSHEET NOTE
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

## 2021-05-01 NOTE — FLOWSHEET NOTE
05/01/21 1024   Inhalation Therapy Treatment   $ SVN Performed Yes   Given By: Mouthpiece   Date SVN Next Change Due 05/08/21

## 2021-05-01 NOTE — CARE PLAN
Problem: Safety  Goal: Will remain free from injury  Note: Pt remains free of accidental injury at this time. Able to verbalize needs and does not attempt self transfer. Bed rails x2 secured for safety. Call light and personal belongings within reach.      Problem: Skin Integrity  Goal: Risk for impaired skin integrity will decrease  Note: Dressing changed on skin tear on right arm. Non adhesive foam applied and roll gauze. Pt denies pain on site. Will continue to monitor and change dressing as needed

## 2021-05-01 NOTE — PROGRESS NOTES
Inpatient Anticoagulation Service Note    Date: 5/1/2021    Reason for Anticoagulation: Deep Vein Thrombosis, Pulmonary Embolism   Target INR: 2.0 to 3.0         Hemoglobin Value: (!) 11.4  Hematocrit Value: (!) 38.5  Lab Platelet Value: 243    INR from last 7 days     Date/Time INR Value    05/01/21 0637  (!) 1.22    04/30/21 0632  1.13        Dose from last 7 days     Date/Time Dose (mg)    05/01/21 0748  10    04/30/21 0632  5    04/29/21 1400  5        Significant Interactions: Antiplatelet Medications, Aspirin, Corticosteroids, Proton Pump Inhibitor, Statin, Thyroid Medications  Bridge Therapy: Yes - Lovenox 100 mg q12hr  Bridge Therapy Start Date: 04/27/21  Days of Overlap Therapy: 2 (If less than 5 days and overlap therapy discontinued -- document reason (i.e. Bleed Risk))  INR Value Greater than 2 Prior to Discontinuation of Parenteral Anticoagulation: Yes (If still on overlap therapy, if No -- document reason (i.e. Bleed Risk))    Reversal Agent Administered: Not Applicable    Plan:  Please give Warfarin 10 mg orally this evening   Education Material Provided?: No     Bertram Saavedra [facility]

## 2021-05-01 NOTE — FLOWSHEET NOTE
05/01/21 1004   Events/Summary/Plan   Events/Summary/Plan MDI x 2, SpO2 check, Pt states he is mildly SOB, will give Duoneb Tx   Vital Signs   Pulse 75   Respiration 18   Pulse Oximetry 94 %   $ Pulse Oximetry (Spot Check) Yes   Oxygen   O2 (LPM) 5   O2 Delivery Device Nasal Cannula

## 2021-05-01 NOTE — CARE PLAN
Problem: Respiratory:  Goal: Respiratory status will improve  Outcome: PROGRESSING AS EXPECTED  Note: Pt is currently at his baseline for O2.     Problem: Infection  Goal: Will remain free from infection  Outcome: PROGRESSING SLOWER THAN EXPECTED  Note: Blood cultures were drawn for suspicion of infection.

## 2021-05-01 NOTE — FLOWSHEET NOTE
04/30/21 1821   Events/Summary/Plan   Events/Summary/Plan SpO2 check   Vital Signs   Pulse 75   Respiration 18   Pulse Oximetry 97 %   $ Pulse Oximetry (Spot Check) Yes   Oxygen   O2 (LPM) 5   O2 Delivery Device Nasal Cannula

## 2021-05-02 PROBLEM — R07.9 PAIN IN THE CHEST: Status: ACTIVE | Noted: 2021-05-02

## 2021-05-02 LAB
BACTERIA SPEC RESP CULT: NORMAL
BACTERIA UR CULT: NORMAL
GRAM STN SPEC: NORMAL
INR PPP: 1.47 (ref 0.87–1.13)
PROTHROMBIN TIME: 18.3 SEC (ref 12–14.6)
SARS-COV-2 RNA RESP QL NAA+PROBE: NOTDETECTED
SIGNIFICANT IND 70042: NORMAL
SIGNIFICANT IND 70042: NORMAL
SITE SITE: NORMAL
SITE SITE: NORMAL
SOURCE SOURCE: NORMAL
SOURCE SOURCE: NORMAL
SPECIMEN SOURCE: NORMAL
TROPONIN T SERPL-MCNC: 101 NG/L (ref 6–19)
TROPONIN T SERPL-MCNC: 103 NG/L (ref 6–19)

## 2021-05-02 PROCEDURE — 84484 ASSAY OF TROPONIN QUANT: CPT

## 2021-05-02 PROCEDURE — 700102 HCHG RX REV CODE 250 W/ 637 OVERRIDE(OP): Performed by: STUDENT IN AN ORGANIZED HEALTH CARE EDUCATION/TRAINING PROGRAM

## 2021-05-02 PROCEDURE — 96376 TX/PRO/DX INJ SAME DRUG ADON: CPT

## 2021-05-02 PROCEDURE — 85610 PROTHROMBIN TIME: CPT

## 2021-05-02 PROCEDURE — 96372 THER/PROPH/DIAG INJ SC/IM: CPT

## 2021-05-02 PROCEDURE — 99220 PR INITIAL OBSERVATION CARE,LEVL III: CPT | Performed by: STUDENT IN AN ORGANIZED HEALTH CARE EDUCATION/TRAINING PROGRAM

## 2021-05-02 PROCEDURE — 36415 COLL VENOUS BLD VENIPUNCTURE: CPT

## 2021-05-02 PROCEDURE — 97165 OT EVAL LOW COMPLEX 30 MIN: CPT

## 2021-05-02 PROCEDURE — 770024 HCHG ROOM/CARE - REHAB LOA (180)

## 2021-05-02 PROCEDURE — 700111 HCHG RX REV CODE 636 W/ 250 OVERRIDE (IP): Performed by: STUDENT IN AN ORGANIZED HEALTH CARE EDUCATION/TRAINING PROGRAM

## 2021-05-02 PROCEDURE — A9270 NON-COVERED ITEM OR SERVICE: HCPCS | Performed by: STUDENT IN AN ORGANIZED HEALTH CARE EDUCATION/TRAINING PROGRAM

## 2021-05-02 PROCEDURE — 94640 AIRWAY INHALATION TREATMENT: CPT

## 2021-05-02 PROCEDURE — 770020 HCHG ROOM/CARE - TELE (206)

## 2021-05-02 PROCEDURE — 94760 N-INVAS EAR/PLS OXIMETRY 1: CPT

## 2021-05-02 PROCEDURE — 97162 PT EVAL MOD COMPLEX 30 MIN: CPT

## 2021-05-02 RX ORDER — LEVOTHYROXINE SODIUM 0.07 MG/1
75 TABLET ORAL
Status: DISCONTINUED | OUTPATIENT
Start: 2021-05-02 | End: 2021-05-14 | Stop reason: HOSPADM

## 2021-05-02 RX ORDER — ACETAMINOPHEN 325 MG/1
650 TABLET ORAL EVERY 4 HOURS PRN
Status: DISCONTINUED | OUTPATIENT
Start: 2021-05-02 | End: 2021-05-07

## 2021-05-02 RX ORDER — CLOPIDOGREL BISULFATE 75 MG/1
75 TABLET ORAL DAILY
Status: DISCONTINUED | OUTPATIENT
Start: 2021-05-02 | End: 2021-05-06

## 2021-05-02 RX ORDER — MIDODRINE HYDROCHLORIDE 5 MG/1
5 TABLET ORAL
Status: DISCONTINUED | OUTPATIENT
Start: 2021-05-02 | End: 2021-05-06

## 2021-05-02 RX ORDER — OMEPRAZOLE 20 MG/1
20 CAPSULE, DELAYED RELEASE ORAL 2 TIMES DAILY
Status: DISCONTINUED | OUTPATIENT
Start: 2021-05-02 | End: 2021-05-14 | Stop reason: HOSPADM

## 2021-05-02 RX ORDER — OXYCODONE HYDROCHLORIDE 5 MG/1
5 TABLET ORAL EVERY 6 HOURS PRN
Status: DISCONTINUED | OUTPATIENT
Start: 2021-05-02 | End: 2021-05-04

## 2021-05-02 RX ORDER — BENZONATATE 100 MG/1
100 CAPSULE ORAL 3 TIMES DAILY PRN
Status: DISCONTINUED | OUTPATIENT
Start: 2021-05-02 | End: 2021-05-14 | Stop reason: HOSPADM

## 2021-05-02 RX ORDER — BUDESONIDE AND FORMOTEROL FUMARATE DIHYDRATE 80; 4.5 UG/1; UG/1
2 AEROSOL RESPIRATORY (INHALATION)
Status: DISCONTINUED | OUTPATIENT
Start: 2021-05-02 | End: 2021-05-08

## 2021-05-02 RX ORDER — ATORVASTATIN CALCIUM 80 MG/1
80 TABLET, FILM COATED ORAL EVERY EVENING
Status: DISCONTINUED | OUTPATIENT
Start: 2021-05-02 | End: 2021-05-14 | Stop reason: HOSPADM

## 2021-05-02 RX ORDER — TAMSULOSIN HYDROCHLORIDE 0.4 MG/1
0.4 CAPSULE ORAL DAILY
Status: DISCONTINUED | OUTPATIENT
Start: 2021-05-02 | End: 2021-05-14 | Stop reason: HOSPADM

## 2021-05-02 RX ORDER — WARFARIN SODIUM 7.5 MG/1
7.5 TABLET ORAL DAILY
Status: DISCONTINUED | OUTPATIENT
Start: 2021-05-02 | End: 2021-05-04

## 2021-05-02 RX ORDER — MONTELUKAST SODIUM 10 MG/1
10 TABLET ORAL DAILY
Status: DISCONTINUED | OUTPATIENT
Start: 2021-05-02 | End: 2021-05-14 | Stop reason: HOSPADM

## 2021-05-02 RX ADMIN — MONTELUKAST 10 MG: 10 TABLET, FILM COATED ORAL at 06:27

## 2021-05-02 RX ADMIN — MIDODRINE HYDROCHLORIDE 5 MG: 5 TABLET ORAL at 08:29

## 2021-05-02 RX ADMIN — ENOXAPARIN SODIUM 100 MG: 100 INJECTION SUBCUTANEOUS at 06:32

## 2021-05-02 RX ADMIN — OMEPRAZOLE 20 MG: 20 CAPSULE, DELAYED RELEASE ORAL at 06:28

## 2021-05-02 RX ADMIN — LEVOTHYROXINE SODIUM 75 MCG: 25 TABLET ORAL at 06:27

## 2021-05-02 RX ADMIN — HYDROCORTISONE SODIUM SUCCINATE 50 MG: 100 INJECTION, POWDER, FOR SOLUTION INTRAMUSCULAR; INTRAVENOUS at 01:19

## 2021-05-02 RX ADMIN — BUDESONIDE AND FORMOTEROL FUMARATE DIHYDRATE 2 PUFF: 80; 4.5 AEROSOL RESPIRATORY (INHALATION) at 08:44

## 2021-05-02 RX ADMIN — MIDODRINE HYDROCHLORIDE 5 MG: 5 TABLET ORAL at 03:14

## 2021-05-02 RX ADMIN — ATORVASTATIN CALCIUM 80 MG: 80 TABLET, FILM COATED ORAL at 18:29

## 2021-05-02 RX ADMIN — HYDROCORTISONE SODIUM SUCCINATE 50 MG: 100 INJECTION, POWDER, FOR SOLUTION INTRAMUSCULAR; INTRAVENOUS at 06:32

## 2021-05-02 RX ADMIN — ASPIRIN 81 MG: 81 TABLET, COATED ORAL at 06:30

## 2021-05-02 RX ADMIN — BUDESONIDE AND FORMOTEROL FUMARATE DIHYDRATE 2 PUFF: 80; 4.5 AEROSOL RESPIRATORY (INHALATION) at 20:55

## 2021-05-02 RX ADMIN — OXYCODONE 5 MG: 5 TABLET ORAL at 21:08

## 2021-05-02 RX ADMIN — MIDODRINE HYDROCHLORIDE 5 MG: 5 TABLET ORAL at 18:29

## 2021-05-02 RX ADMIN — HYDROCORTISONE SODIUM SUCCINATE 50 MG: 100 INJECTION, POWDER, FOR SOLUTION INTRAMUSCULAR; INTRAVENOUS at 12:51

## 2021-05-02 RX ADMIN — WARFARIN SODIUM 7.5 MG: 7.5 TABLET ORAL at 18:29

## 2021-05-02 RX ADMIN — CLOPIDOGREL BISULFATE 75 MG: 75 TABLET ORAL at 06:30

## 2021-05-02 RX ADMIN — TAMSULOSIN HYDROCHLORIDE 0.4 MG: 0.4 CAPSULE ORAL at 06:29

## 2021-05-02 RX ADMIN — ENOXAPARIN SODIUM 100 MG: 100 INJECTION SUBCUTANEOUS at 18:29

## 2021-05-02 RX ADMIN — OMEPRAZOLE 20 MG: 20 CAPSULE, DELAYED RELEASE ORAL at 18:30

## 2021-05-02 RX ADMIN — HYDROCORTISONE SODIUM SUCCINATE 50 MG: 100 INJECTION, POWDER, FOR SOLUTION INTRAMUSCULAR; INTRAVENOUS at 18:29

## 2021-05-02 RX ADMIN — TIOTROPIUM BROMIDE INHALATION SPRAY 5 MCG: 3.12 SPRAY, METERED RESPIRATORY (INHALATION) at 08:44

## 2021-05-02 RX ADMIN — MIDODRINE HYDROCHLORIDE 5 MG: 5 TABLET ORAL at 12:52

## 2021-05-02 ASSESSMENT — ENCOUNTER SYMPTOMS
HEADACHES: 0
NECK PAIN: 0
MYALGIAS: 0
PHOTOPHOBIA: 0
VOMITING: 0
COUGH: 0
DEPRESSION: 0
PALPITATIONS: 0
TINGLING: 0
VOMITING: 0
EYES NEGATIVE: 1
FEVER: 0
DOUBLE VISION: 0
CHILLS: 0
SPUTUM PRODUCTION: 0
BRUISES/BLEEDS EASILY: 0
CHILLS: 0
HEARTBURN: 0
SHORTNESS OF BREATH: 0
POLYDIPSIA: 0
NAUSEA: 0
DIZZINESS: 0
ABDOMINAL PAIN: 0
BLURRED VISION: 0
NAUSEA: 0
PALPITATIONS: 0
BRUISES/BLEEDS EASILY: 0
COUGH: 0
HEMOPTYSIS: 0
FEVER: 0

## 2021-05-02 ASSESSMENT — LIFESTYLE VARIABLES
EVER FELT BAD OR GUILTY ABOUT YOUR DRINKING: NO
AVERAGE NUMBER OF DAYS PER WEEK YOU HAVE A DRINK CONTAINING ALCOHOL: 0
EVER HAD A DRINK FIRST THING IN THE MORNING TO STEADY YOUR NERVES TO GET RID OF A HANGOVER: NO
TOTAL SCORE: 0
TOTAL SCORE: 0
HAVE YOU EVER FELT YOU SHOULD CUT DOWN ON YOUR DRINKING: NO
HOW MANY TIMES IN THE PAST YEAR HAVE YOU HAD 5 OR MORE DRINKS IN A DAY: 0
ON A TYPICAL DAY WHEN YOU DRINK ALCOHOL HOW MANY DRINKS DO YOU HAVE: 0
ALCOHOL_USE: NO
CONSUMPTION TOTAL: NEGATIVE
HAVE PEOPLE ANNOYED YOU BY CRITICIZING YOUR DRINKING: NO
TOTAL SCORE: 0

## 2021-05-02 ASSESSMENT — CHA2DS2 SCORE
PRIOR STROKE OR TIA OR THROMBOEMBOLISM: NO
VASCULAR DISEASE: YES
DIABETES: NO
HYPERTENSION: YES
CHF OR LEFT VENTRICULAR DYSFUNCTION: NO
AGE 65 TO 74: NO
AGE 75 OR GREATER: YES
SEX: MALE
CHA2DS2 VASC SCORE: 4

## 2021-05-02 ASSESSMENT — COGNITIVE AND FUNCTIONAL STATUS - GENERAL
SUGGESTED CMS G CODE MODIFIER MOBILITY: CM
PERSONAL GROOMING: A LITTLE
HELP NEEDED FOR BATHING: A LOT
EATING MEALS: A LITTLE
WALKING IN HOSPITAL ROOM: TOTAL
DRESSING REGULAR UPPER BODY CLOTHING: A LOT
TURNING FROM BACK TO SIDE WHILE IN FLAT BAD: UNABLE
MOBILITY SCORE: 8
DAILY ACTIVITIY SCORE: 10
MOVING FROM LYING ON BACK TO SITTING ON SIDE OF FLAT BED: UNABLE
DRESSING REGULAR LOWER BODY CLOTHING: A LOT
WALKING IN HOSPITAL ROOM: TOTAL
MOVING FROM LYING ON BACK TO SITTING ON SIDE OF FLAT BED: UNABLE
SUGGESTED CMS G CODE MODIFIER DAILY ACTIVITY: CK
SUGGESTED CMS G CODE MODIFIER DAILY ACTIVITY: CL
PERSONAL GROOMING: A LOT
MOBILITY SCORE: 7
EATING MEALS: A LOT
MOVING TO AND FROM BED TO CHAIR: UNABLE
TOILETING: A LOT
STANDING UP FROM CHAIR USING ARMS: TOTAL
TURNING FROM BACK TO SIDE WHILE IN FLAT BAD: A LOT
MOVING TO AND FROM BED TO CHAIR: A LOT
CLIMB 3 TO 5 STEPS WITH RAILING: TOTAL
DRESSING REGULAR UPPER BODY CLOTHING: TOTAL
SUGGESTED CMS G CODE MODIFIER MOBILITY: CM
DAILY ACTIVITIY SCORE: 14
TOILETING: A LOT
STANDING UP FROM CHAIR USING ARMS: A LOT
DRESSING REGULAR LOWER BODY CLOTHING: TOTAL
HELP NEEDED FOR BATHING: A LOT
CLIMB 3 TO 5 STEPS WITH RAILING: TOTAL

## 2021-05-02 ASSESSMENT — PAIN DESCRIPTION - PAIN TYPE
TYPE: CHRONIC PAIN
TYPE: ACUTE PAIN

## 2021-05-02 ASSESSMENT — GAIT ASSESSMENTS: GAIT LEVEL OF ASSIST: UNABLE TO PARTICIPATE

## 2021-05-02 ASSESSMENT — ACTIVITIES OF DAILY LIVING (ADL): TOILETING: INDEPENDENT

## 2021-05-02 NOTE — PROGRESS NOTES
Hospital Medicine Daily Progress Note      Chief Complaint  S/P NSTEMI  AFib  COPD  PE  Hypopituitarism  Leukocytosis    Interval Problem Update  Pt denies chest pain, shortness of breath, or palpitations.    Review of Systems  Review of Systems   Constitutional: Negative for chills and fever.   HENT: Negative.    Eyes: Negative.    Respiratory: Negative for cough and shortness of breath.    Cardiovascular: Negative for chest pain and palpitations.   Gastrointestinal: Negative for abdominal pain, nausea and vomiting.   Skin: Negative for itching and rash.   Endo/Heme/Allergies: Negative for polydipsia. Does not bruise/bleed easily.        Physical Exam  Temp:  [36.6 °C (97.8 °F)] 36.6 °C (97.8 °F)  Pulse:  [77-86] 77  Resp:  [18-20] 20  BP: ()/(40-74) 69/40    Physical Exam  Vitals reviewed.   Constitutional:       General: He is not in acute distress.     Appearance: He is not ill-appearing.      Comments: Cushingnoid appearance   HENT:      Head: Normocephalic and atraumatic.      Right Ear: External ear normal.      Left Ear: External ear normal.      Nose: Nose normal.      Mouth/Throat:      Pharynx: Oropharynx is clear.   Eyes:      General:         Right eye: No discharge.         Left eye: No discharge.      Extraocular Movements: Extraocular movements intact.      Conjunctiva/sclera: Conjunctivae normal.   Cardiovascular:      Comments: irreg irreg  Pulmonary:      Effort: No respiratory distress.      Breath sounds: No wheezing.      Comments: Decreased BS   Abdominal:      General: Bowel sounds are normal. There is no distension.      Palpations: Abdomen is soft.      Tenderness: There is no abdominal tenderness.   Musculoskeletal:      Cervical back: Normal range of motion and neck supple.      Right lower leg: Edema present.      Left lower leg: Edema present.   Skin:     General: Skin is warm and dry.   Neurological:      Mental Status: He is alert and oriented to person, place, and time.          Fluids    Intake/Output Summary (Last 24 hours) at 5/2/2021 1044  Last data filed at 5/2/2021 1000  Gross per 24 hour   Intake 425 ml   Output 350 ml   Net 75 ml       Laboratory  Recent Labs     04/30/21 0632 05/01/21  0637 05/01/21 2048   WBC 21.6* 21.2* 20.4*   RBC 4.58* 4.47* 4.17*   HEMOGLOBIN 11.5* 11.4* 10.7*   HEMATOCRIT 40.1* 38.5* 36.1*   MCV 87.6 86.1 86.6   MCH 25.1* 25.5* 25.7*   MCHC 28.7* 29.6* 29.6*   RDW 56.3* 55.8* 57.6*   PLATELETCT 268 243 262   MPV 10.4 10.0 9.9     Recent Labs     04/30/21 0632 05/01/21  0637 05/01/21 2048   SODIUM 132* 129* 133*   POTASSIUM 4.3 4.1 3.8   CHLORIDE 94* 95* 97   CO2 35* 31 32   GLUCOSE 78 88 138*   BUN 14 14 19   CREATININE 0.63 0.79 1.02   CALCIUM 7.8* 8.2* 8.2*     Recent Labs     04/30/21 0632 05/01/21  0637 05/01/21 2048   APTT  --   --  44.9*   INR 1.13 1.22* 1.34*             Assessment/Plan  NSTEMI (non-ST elevated myocardial infarction) ([facility])- (present on admission)  Assessment & Plan  Echo EF 55%  S/P LAD JAS x 3 on 4/20/21 by Dr. Byrd  On ASA, Plavix, Lipitor, and Sotalol  BNP 4988, on short course low dose Diamox (also for metabolic alkalosis)  Continue Midodrine for blood pressure support  Monitor for orthostatic hypotension on Flomax    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HCC)- (present on admission)  Assessment & Plan  S/P pituitary adenoma resection  On Hydrocortisone and Testosterone  Euthyroid on Synthroid    Leukocytosis- (present on admission)  Assessment & Plan  Elevated WBC appears to be chronic, at least since June 2013  UA 0-2 WBC and rare bacteria  CXR left basilar opacity vs atx  Sputum GS/Cx negative  SARS-CoV-2 PCR negative  BCx x 2 NGTD  PCT 0.06  Start Azithromycin for abnormal CXR    Normocytic anemia- (present on admission)  Assessment & Plan  Fe 33, continue Fe/ Vit C supplements  Pt at very high risk for bleeding on DAPT and anticoagulation  Closely follow H/H    Pulmonary embolism (HCC)- (present on admission)  Assessment & Plan  S/P  IVC Filter  Anticoagulated on Coumadin    SSS (sick sinus syndrome) ([facility])- (present on admission)  Assessment & Plan  H/O 3rd degree AVB  S/P PPM    Paroxysmal atrial fibrillation ([facility])- (present on admission)  Assessment & Plan  On Sotalol  Anticoagulated on Coumadin    COPD (chronic obstructive pulmonary disease) ([facility])- (present on admission)  Assessment & Plan  Presently on O2 at 5 lpm via NC  On Flonase, Symbicort, Spiriva, and Singulair  Continue short course low dose Diamox as tolerated by blood pressure  Monitor for AECOPD     Full Code

## 2021-05-02 NOTE — ED PROVIDER NOTES
"ED Provider Note    CHIEF COMPLAINT  Chief Complaint   Patient presents with   • Chest Pain     Pt came from a rehab, pt was recently admitted on 4/10 for a Post NSTEMI and PCI compelted. Pt is on coumadin, ASA, plavix   • Shortness of Breath     wears 3-4L of oxygen NC   • Hypotension     upon arrival hypotensive with SBP in 60s, upon arrival in ER, /59       HPI  Jeffrey Lizama is a 88 y.o. male who presents to the emergency room today with complaints of chest pain no shortness of breath and low blood pressure.  Patient recently hospitalized for STEMI had stents placed LAD on 4/18.  [facility]  Despite being seen emergency room today he developed chest pain rating to his back he has history of aneurysm.  Blood pressure was quite low at 60 systolic paramedics arrived to find patient in severe distress patient was given IV fluids.  He complains of shortness of breath and is on oxygen of 4 L.  No fever chills no nausea no vomiting.  He also has pain to his right wrist which he states is chronic but rated at 10/10.  Appears in severe distress secondary to hypotension, multiple medical problems.    REVIEW OF SYSTEMS  See HPI for further details. All other systems are negative.     PAST MEDICAL HISTORY  Past Medical History:   Diagnosis Date   • Paroxysmal atrial fibrillation ([facility]) 8/29/2011   • SSS (sick sinus syndrome) ([facility]) 8/29/2011   • Third degree AV block ([facility]) 8/29/2011   • Obstructive hypertrophic cardiomyopathy ([facility]) 8/4/2011   • Cardiac pacemaker 04 2010   • BPH (benign prostatic hypertrophy) 4/1/2010   • Benign neoplasm of pituitary gland and craniopharyngeal duct (pouch) ([facility]) 4/1/2010   • thyroidectomy 2005    benign   • S/P parathyroidectomy 2005   • Knee arthroplasty 2002    right   • Pituitary adenoma ([facility]) 1995    hypophysectomy   • 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   • Pituitary adenoma ([facility]) 1995    hypophysectomy   • Anesthesia     \"heart stopped\"   • Arrhythmia    • " Arthritis     hand, elbow   • Asbestos exposure    • ASTHMA    • Back pain    • Bradycardia    • Breath shortness    • Bronchitis    • Cataract    • COPD (chronic obstructive pulmonary disease) ([facility])    • Diverticulitis    • DJD (degenerative joint disease)    • EMPHYSEMA    • Glaucoma    • Heart burn    • Heart murmur    • Hiatus hernia syndrome    • Hypertension    • Indigestion    • PAF (paroxysmal atrial fibrillation) ([facility])    • PE (pulmonary embolism)     IVC filter, states PE clots 8 times   • Phlebitis     chronic / recurrent   • Pneumonia    • Rheumatic fever    • S/P insertion of IVC (inferior vena caval) filter    • S/P parathyroidectomy    • Sleep apnea     no cpap machine   • Unspecified disorder of thyroid    • Unspecified hemorrhagic conditions    • Unspecified urinary incontinence    • Urinary bladder disorder        FAMILY HISTORY  [unfilled]    SOCIAL HISTORY  Social History     Socioeconomic History   • Marital status:      Spouse name: Not on file   • Number of children: Not on file   • Years of education: Not on file   • Highest education level: Not on file   Occupational History   • Not on file   Tobacco Use   • Smoking status: Never Smoker   • Smokeless tobacco: Never Used   Substance and Sexual Activity   • Alcohol use: No   • Drug use: No   • Sexual activity: Not on file   Other Topics Concern   • Not on file   Social History Narrative   • Not on file     Social Determinants of Health     Financial Resource Strain:    • Difficulty of Paying Living Expenses:    Food Insecurity:    • Worried About Running Out of Food in the Last Year:    • Ran Out of Food in the Last Year:    Transportation Needs:    • Lack of Transportation (Medical):    • Lack of Transportation (Non-Medical):    Physical Activity:    • Days of Exercise per Week:    • Minutes of Exercise per Session:    Stress:    • Feeling of Stress :    Social Connections:    • Frequency of Communication with Friends and Family:    •  Frequency of Social Gatherings with Friends and Family:    • Attends Congregational Services:    • Active Member of Clubs or Organizations:    • Attends Club or Organization Meetings:    • Marital Status:    Intimate Partner Violence:    • Fear of Current or Ex-Partner:    • Emotionally Abused:    • Physically Abused:    • Sexually Abused:        SURGICAL HISTORY  Past Surgical History:   Procedure Laterality Date   • GASTROSCOPY-ENDO  1/22/2019    Procedure: GASTROSCOPY-ENDO;  Surgeon: Yoandy Mcelroy M.D.;  Location: ENDOSCOPY [facility];  Service: Gastroenterology   • GASTROSCOPY-ENDO  1/21/2019    Procedure: GASTROSCOPY-ENDO;  Surgeon: Luca Mtz M.D.;  Location: ENDOSCOPY [facility];  Service: Gastroenterology   • KNEE REVISION TOTAL  6/20/2013    Performed by Ortega Vega M.D. at SURGERY [facility] ORS   • CARPAL TUNNEL ENDOSCOPIC  9/30/2011    Performed by RONALD ENNIS at SURGERY [facility] ORS   • PACEMAKER INSERTION Left 04/23/2010    [employer] Altrua 60 [id number] implanted by Dr. Donaldson.   • CATARACT EXTRACTION WITH IOL      bilateral    • CERVICAL DISK AND FUSION ANTERIOR     • CERVICAL FUSION POSTERIOR     • CHOLECYSTECTOMY     • CHOLECYSTECTOMY     • OTHER      pituitary tumor removed   • OTHER CARDIAC SURGERY     • OTHER ORTHOPEDIC SURGERY      knee surgery left knee x 2   • PB REVISE ULNAR NERVE AT WRIST     • PB TOTAL KNEE ARTHROPLASTY      left   • PB TOTAL KNEE ARTHROPLASTY      right   • THYROIDECTOMY         CURRENT MEDICATIONS  Home Medications     Reviewed by William Owen (Pharmacy Tech) on 05/01/21 at 2201  Med List Status: Complete   Medication Last Dose Status   acetaZOLAMIDE (DIAMOX) 250 MG Tab 5/1/2021 Active   albuterol 108 (90 Base) MCG/ACT Aero Soln inhalation aerosol unknown Active   aspirin EC 81 MG EC tablet 5/1/2021 Active   atorvastatin (LIPITOR) 80 MG tablet 4/30/2021 Active   azithromycin (ZITHROMAX) 250 MG Tab 5/1/2021 Active  "  budesonide-formoterol (SYMBICORT) 80-4.5 MCG/ACT Aerosol 5/1/2021 Active   calcium citrate (CALCITRATE) 950 (200 Ca) MG Tab 5/1/2021 Active   clopidogrel (PLAVIX) 75 MG Tab 5/1/2021 Active   enoxaparin (LOVENOX) 100 MG/ML Solution inj 5/1/2021 Active   ferrous gluconate 324 (37.5 Fe) MG Tab 5/1/2021 Active   fluticasone (FLONASE) 50 MCG/ACT nasal spray 5/1/2021 Active   gabapentin (NEURONTIN) 600 MG tablet 4/30/2021 Active   hydrocortisone (CORTEF) 10 MG Tab 5/1/2021 Active   ipratropium-albuterol (DUONEB) 0.5-2.5 (3) MG/3ML nebulizer solution 5/1/2021 Active   levothyroxine (SYNTHROID) 75 MCG Tab 5/1/2021 Active   lidocaine (LIDODERM) 5 % Patch 5/1/2021 Active   midodrine (PROAMATINE) 5 MG Tab 5/1/2021 Active   montelukast (SINGULAIR) 10 MG Tab Not Taking Active   omeprazole (PRILOSEC) 20 MG delayed-release capsule 5/1/2021 Active   oxyCODONE immediate-release (ROXICODONE) 5 MG Tab 5/1/2021 Active   senna-docusate (PERICOLACE OR SENOKOT S) 8.6-50 MG Tab 5/1/2021 Active   sotalol (BETAPACE) 80 MG Tab 5/1/2021 Active   tamsulosin (FLOMAX) 0.4 MG capsule 5/1/2021 Active   tiotropium (SPIRIVA) 18 MCG Cap 5/1/2021 Active   warfarin (COUMADIN) 5 MG Tab 5/1/2021 Active                ALLERGIES  Allergies   Allergen Reactions   • Lisinopril      Hypotension, 30 mg dose   • Pcn [Penicillins] Rash     Tolerates cephalosporins         PHYSICAL EXAM  VITAL SIGNS: /53   Pulse 88   Temp 36.9 °C (98.5 °F) (Oral)   Resp 17   Ht 1.727 m (5' 8\")   Wt 94.3 kg (208 lb)   SpO2 93%   BMI 31.63 kg/m²       Constitutional: Severe distress, Non-toxic appearance.   HENT: Normocephalic, Atraumatic, Bilateral external ears normal, Oropharynx dry, No oral exudates, Nose normal.   Eyes:  Conjunctiva normal, No discharge.   Neck: Normal range of motion, No tenderness, Supple, No stridor.   Lymphatic: No lymphadenopathy noted.   Cardiovascular: Normal heart rate, Normal rhythm, No murmurs, No rubs, No gallops.   Thorax & Lungs: " Diminished breath sounds, minimal respiratory distress, No wheezing, No chest tenderness.   Abdomen: Bowel sounds normal, Soft, No tenderness, No masses, No pulsatile masses.   Skin: Warm, Dry, No erythema, No rash.   Patient has some oozing wounds to both wrists where he had PTCA and IV access bleeding is well controlled.  There is multiple areas of purpura noted to his arms  back: No tenderness, No CVA tenderness.   Genitalia: External genitalia appear normal, No masses or lesions. No discharge.     Extremities: Intact distal pulses, bilateral lower extremity 4+ edema, No tenderness, No cyanosis, No clubbing.  Edema is also noted over the upper extremities  Musculoskeletal: Patient is nonambulatory  Neurologic: Alert & oriented x 3, Normal motor function, Normal sensory function, No focal deficits noted.   Psychiatric: Affect normal, Judgment normal, Mood normal.     EKG  Normal sinus rhythm 80 bpm, no ST elevation or depression, there is widening of the QRS complex, poor R wave progression, left axis deviation secondary to left bundle branch block, LA intervals are normal.  This is a 12-lead EKG as compared to previous EKG dated 4/2021 there is no interval change    RADIOLOGY/PROCEDURES  DX-CHEST-PORTABLE (1 VIEW)   Final Result         Patchy bibasilar opacities, likely atelectasis.      CT-CTA COMPLETE THORACOABDOMINAL AORTA   Final Result         1. No significant interval change.      2. No evidence of aortic dissection identified.      3. Unchanged mild dilatation of the ascending aorta measuring 4.4 cm. Unchanged infrarenal abdominal aortic aneurysm with mural thrombus, measuring 4.2 cm.      4. Unchanged 3 mm right middle lobe nodule. Elevation of the left hemidiaphragm. Bibasilar atelectasis, left more than right.   Fleischner Society pulmonary nodule recommendations:   Low Risk: No routine follow-up      High Risk: Optional CT at 12 months      Comments: Nodules less than 6 mm do not require routine  follow-up, but certain patients at high risk with suspicious nodule morphology, upper lobe location, or both may warrant 12-month follow-up.      Low Risk - Minimal or absent history of smoking and of other known risk factors.      High Risk - History of smoking or of other known risk factors.      Note: These recommendations do not apply to lung cancer screening, patients with immunosuppression, or patients with known primary cancer.      Fleischner Society 2017 Guidelines for Management of Incidentally Detected Pulmonary Nodules in Adults               COURSE & MEDICAL DECISION MAKING  Pertinent Labs & Imaging studies reviewed. (See chart for details)  Concern was over possibility of dissecting thoracic aneurysm as he has a history of this CT scan performed was negative.  Blood pressure did improve with fluids although he did become hypotensive again appear to be in severe distress secondary to this troponins were elevated at 113 and discussed the case with cardiology, Dr. Donaldson.  Patient also has markedly elevated white blood cell count but this is been noted previously there is no signs of infection noted on urine or x-ray patient remains afebrile here in the emergency room.  Patient admitted to the hospital discussed case with hospitalist please see orders for further plan.    FINAL IMPRESSION  1.  Hypotension  2.  Acute chest pain  3.  Hypoxia with COPD exacerbation  4.  Elevated troponin  5.  History of recent STEMI with LAD stent placement 4/18  6.  Chronic pain  7.  Critical care time of 40 minutes; this includes multiple discussions with hospitalist, discussions with cardiology, review of records and discussion of treatment plan, interventions as discussed above.  This does not include any procedures.         Electronically signed by: Jeffrey Jones D.O., 5/2/2021 1:46 AM

## 2021-05-02 NOTE — ED NOTES
X 2 blood cultures collected and sent. Patient proved new linens, due to soiled with blood from wrists bilaterally. Patient's wrist cleaned and wrapped with dressing bilaterally. Condom cath in place, UA collected and sent. Patient has redness to groin, provided Anahy cream and powder to area. Bottom is red and blanching, small open wound to buttocks/coccyx area. Patient placed on 5L of oxygen NC, after pain medication administration. Patient has multipled scattered bruising all over his body. To bilateral arms, Bruising to groin area/pubis, bruise to right shoulder.

## 2021-05-02 NOTE — ASSESSMENT & PLAN NOTE
Hypotension most likely secondary to as patient was instructed to take Cortef 10 mg TID however, is taking BID.  Stress dose IV Hydrocortisone 50 mg every 6 hours  Monitor.

## 2021-05-02 NOTE — ED NOTES
Notified ERP of patient's BP 87/53, no new orders at this time. Re-check done on other arm for BP.

## 2021-05-02 NOTE — ASSESSMENT & PLAN NOTE
Serial Trops stable  EKG did not show any acute changes  Cardiology did not recommend any further work up

## 2021-05-02 NOTE — PROGRESS NOTES
2 RN skin check complete w/ Susanne RN.   Devices in place: silicone nasal cannula and glasses.  Skin assessed under devices: bilateral ears and cheeks.  Confirmed pressure ulcers found on n/a.  New potential pressure ulcers noted on n/a. Wound consult placed.    The following interventions in place: Q2hr turns in place, pillows in use for repositioning and comfort, barrier cream and paste in use.    Generalized skin: scattered bruising and scabs  Sacrum: red and slow to elida, barrier cream in use  Bilateral LE: dry, flaky and swelling noted  Bilateral breast and lower chest: bruising noted and dry and red, moisturizer applied  Right lower earlobe: blistered, scabbed and bleeding, dressing in place  Bilateral UE and wrists: red, bruised and bleeding, dressing in place  Facial: bruised, red   Pannus: bruised and red/pink

## 2021-05-02 NOTE — PROGRESS NOTES
Inpatient Anticoagulation Service Note    Date: 5/2/2021    Reason for Anticoagulation: Atrial Fibrillation, Pulmonary Embolism   Target INR: 2.0 to 3.0  [id number] VASc Score: 4  HAS-BLED Score: 3   Hemoglobin Value: (!) 10.7  Hematocrit Value: (!) 36.1  Lab Platelet Value: 262    INR from last 7 days     Date/Time INR Value    05/01/21 2048  (!) 1.34        Dose from last 7 days     Date/Time Dose (mg)    05/02/21 0153  7.5        Significant Interactions: Proton Pump Inhibitor, Aspirin, Clopidogrel, Statin, Corticosteroids  Bridge Therapy: Yes  Bridge Therapy Start Date: 04/29/21    INR Value Greater than 2 Prior to Discontinuation of Parenteral Anticoagulation: Not Applicable     Reversal Agent Administered: Not Applicable    Comments:   87 y/o M w/ PMHx of hypertrophic obstructive cardiomyopathy, history of paroxysmal A. 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 DDI noted. No documented signs of overt bleeding.     Plan:  Warfarin 7.5mg po daily , INR in AM.   Education Material Provided?: No  Pharmacist suggested discharge dosing: Warfarin 5-7.5mg po daily , follow up with anticoagulation clinic after discharge.      Ishan Johnson, PharmD, BCPS

## 2021-05-02 NOTE — ASSESSMENT & PLAN NOTE
Elevated WBC appears to be chronic, at least since June 2013  UA 0-2 WBC and rare bacteria  CXR left basilar opacity vs atx  Sputum GS/Cx negative  SARS-CoV-2 PCR negative  BCx x 2 NGTD  PCT 0.06  Start Azithromycin for abnormal CXR

## 2021-05-02 NOTE — CARE PLAN
Problem: Knowledge Deficit  Goal: Knowledge of disease process/condition, treatment plan, diagnostic tests, and medications will improve  Note: Updated on plan of care. Encouraged to ask questions and voice concerns. Questions and concerns addressed appropriately. Understanding of education assessed and gaps in understanding addressed.     Problem: Respiratory:  Goal: Respiratory status will improve  Note: Work of breathing, oxygenation status, and lung sounds assessed. Changes in assessment findings addressed.  in use. Oxygen being weaned as appropriate.

## 2021-05-02 NOTE — PROGRESS NOTES
Report received from Marilee ROLON. Patient arrived to unit via gurney. Transferred to bed via slide board. Assumed pt care. A/Ox4, lethargic. Pt is hypotensive, scheduled midodrine ordered. Pt oriented to unit and call light system. POC reviewed and white board updated. Tele box on. Monitor room notified. Pt has multiple bruising. Safety precautions in place. Call light in reach. Bed locked in lowest position with upper bed rails up. Bed alarm on, plugged in correctly, and placed correctly under pt.  Pt educated to call for assistance as needed. Verbalized understanding.

## 2021-05-02 NOTE — ED NOTES
Med rec updated and complete. Allergies reviewed. [facility].  Pt received a one time dose of Azithromycin 500 om on 05/01/21

## 2021-05-02 NOTE — ED NOTES
Pt to [id number] via Dawood ROLON. Pt with all belongings in possession. Pt is on cardiac monitoring and 5L of oxygen NC. Report has been given to Dawood ROLON.

## 2021-05-02 NOTE — PROGRESS NOTES
Received report from NOC RN. Assumed care of patient at 0700. Patient A&Ox 2, speaking in full sentences, follows commands and responds appropriately to questions. On 6L NC, mild work of breathing Patient states 4/10 pain at this time. POC discussed and agreed upon with patient. Call light and belongings within reach. Bed in lowest locked position. Upper side rails raised. Bed alarm on. Fall risk precautions in place. Hourly rounding. Will continue to monitor chest pain and blood pressure.

## 2021-05-02 NOTE — ASSESSMENT & PLAN NOTE
Hold antihypertensives.  Prioritize diuresis for now.  Restart home BP meds when more stable and fluid status optimized.

## 2021-05-02 NOTE — PROGRESS NOTES
Inpatient Anticoagulation Service Note    Date: 2021    Reason for Anticoagulation: Atrial Fibrillation, Pulmonary Embolism   Target INR: 2.0 to 3.0  [id number] VASc Score: 4  HAS-BLED Score: 3   Hemoglobin Value: (!) 10.7  Hematocrit Value: (!) 36.1  Lab Platelet Value: 262    INR from last 7 days     Date/Time INR Value    21  (!) 1.34        Dose from last 7 days     Date/Time Dose (mg)    21 0153  7.5        Significant Interactions: Proton Pump Inhibitor, Aspirin, Clopidogrel, Statin, Corticosteroids  Bridge Therapy: Yes  Bridge Therapy Start Date: 21  Days of Overlap Therapy: 3   INR Value Greater than 2 Prior to Discontinuation of Parenteral Anticoagulation: Not Applicable     Reversal Agent Administered: Not Applicable  Comments: INR subtherapeutic, restarted warfarin prior to discharge in april. Lovenox bridge started at that time. Dose increased from 5 to 10mg on . Will start 7.5mg daily given lack of response to 5mg dose. No bleeding noted. DDI without change. Daily INR.    Plan:  7.5mg  Education Material Provided?: No  Pharmacist suggested discharge dosin.5mg daily. INR within 72 hours of discharge.      Nikhil Schaffer, PharmD

## 2021-05-02 NOTE — ASSESSMENT & PLAN NOTE
Fe 33, continue Fe/ Vit C supplements  Pt at very high risk for bleeding on DAPT and anticoagulation  Closely follow H/H

## 2021-05-02 NOTE — ED NOTES
Vinh from Lab called with critical result of Troponin 113 at 2148. Critical lab result read back to Vinh.   Dr. Jones notified of critical lab result at 2150.  Critical lab result read back by Dr. Jones.

## 2021-05-02 NOTE — ASSESSMENT & PLAN NOTE
Presently on O2 at 5 lpm via NC  On Flonase, Symbicort, Spiriva, and Singulair  Continue short course low dose Diamox as tolerated by blood pressure  Monitor for AECOPD

## 2021-05-02 NOTE — ASSESSMENT & PLAN NOTE
Echo EF 55%  S/P LAD JAS x 3 on 4/20/21 by Dr. Byrd  On ASA, Plavix, Lipitor, and Sotalol  BNP 4988, on short course low dose Diamox (also for metabolic alkalosis)  Continue Midodrine for blood pressure support  Monitor for orthostatic hypotension on Flomax

## 2021-05-02 NOTE — RESPIRATORY CARE
COPD EDUCATION by COPD CLINICAL EDUCATOR  5/2/2021 at 3:51 PM by Venecia Leon, RRT     Attempted to interview patient for COPD Education. Unable to get patient to participate. Our Team will revisit

## 2021-05-02 NOTE — ASSESSMENT & PLAN NOTE
History of PE/DVT and PAFib  Had been on coumadin but is now s/p reversal due to rectus sheath hematoma  Has IVC in place  Have resumed DAPT due to recent Sameera however will not be resuming Coumadin for now

## 2021-05-02 NOTE — ED TRIAGE NOTES
"Chief Complaint   Patient presents with   • Chest Pain     Pt came from a rehab, pt was recently admitted on 4/10 for a Post NSTEMI and PCI compelted. Pt is on coumadin, ASA, plavix   • Shortness of Breath     wears 3-4L of oxygen NC   • Hypotension     upon arrival hypotensive with SBP in 60s, upon arrival in ER, /59       BIB EMS to RD12, pt on monitor and in gown, labs drawn and sent. [facility], was admitted on 4/18 for a POST NSTEMI, pt had a PCI completed a couple of days ago. Pt states for a couple of days he has been experiencing chest pain and SOB. Pt is on ASA, coumadin and Plavix. Patient bleeding from bilateral wrist, states it is painful and also has arthritis in bilateral wrist. Per EMS possible 3 stents have been placed during PCI. Pt also has multiple aneurysms, particularly in the ascending thoracic. Upon arrival Rehab stated pt's SBP 60, upon arrival EMS gave patient 300 cc of fluids. GCS 15       /59   Pulse 77   Temp 36.9 °C (98.5 °F) (Oral)   Resp (!) 21   Ht 1.727 m (5' 8\")   Wt 94.3 kg (208 lb)   SpO2 95%   BMI 31.63 kg/m²      "

## 2021-05-02 NOTE — CARE PLAN
Problem: Safety  Goal: Will remain free from injury  Outcome: PROGRESSING AS EXPECTED  Educate pt to call for assistance as needed. Safety precautions in place: bed is locked and in lowest position, call light within reach, bed alarm is on, and hourly rounds in place.        Problem: Infection  Goal: Will remain free from infection  Outcome: PROGRESSING AS EXPECTED   Standard precautions in place. Hand hygiene performed before and after pt contact.       Problem: Skin Integrity  Goal: Risk for impaired skin integrity will decrease  Outcome: PROGRESSING AS EXPECTED   Patient skin assessed. Risk factors that lead to skin breakdown/impairment identified. Interventions to prevent skin breakdown are in place.Will continue to monitor skin integrity.

## 2021-05-02 NOTE — H&P
Hospital Medicine History & Physical Note    Date of Service  5/2/2021    Primary Care Physician  Bebe Banerjee M.D.    Consultants  Cardiology - Dr. Donaldson     Code Status  Full Code    Chief Complaint  Chief Complaint   Patient presents with   • Chest Pain     Pt came from a rehab, pt was recently admitted on 4/10 for a Post NSTEMI and PCI compelted. Pt is on coumadin, ASA, plavix   • Shortness of Breath     wears 3-4L of oxygen NC   • Hypotension     upon arrival hypotensive with SBP in 60s, upon arrival in ER, /59       History of Presenting Illness  88 y.o. male with past medical history of hypertrophic obstructive cardiomyopathy, history of paroxysmal A. 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  As per EMS his systolic blood pressure was in the 60s. His chest pain started earlier today, 5 out of 10 intensity, not relieved or exacerbated with anything.  Patient does state the pain is similar to during his last admission.  His blood pressure upon arrival to [facility] was 111/59, respiratory rate 21, heart rate 77, temperature 98.5 °F, saturating 95% on nasal cannula.  While in ER his blood pressure dropped to 87/53.  His initial troponin was 113 with a BNP of 5742.  He has intermittent chest pain while in ER.  Given his complex cardiac history, chest pain I have asked ER physician to obtain a cardiology consult.  EKG did not show any significant changes from prior EKG.  Of note during his last hospitalization in April he was noted to be hypotensive was transferred to the ICU and started on corticosteroids.  He will be observed on telemetry monitoring.    Review of Systems  Review of Systems   Constitutional: Negative for chills and fever.   HENT: Negative for ear pain,  hearing loss and tinnitus.    Eyes: Negative for blurred vision, double vision and photophobia.   Respiratory: Negative for cough, hemoptysis and sputum production.    Cardiovascular: Positive for chest pain. Negative for palpitations.   Gastrointestinal: Negative for heartburn, nausea and vomiting.   Genitourinary: Negative for dysuria and urgency.   Musculoskeletal: Negative for myalgias and neck pain.   Neurological: Negative for dizziness, tingling and headaches.   Endo/Heme/Allergies: Does not bruise/bleed easily.   Psychiatric/Behavioral: Negative for depression and suicidal ideas.   All other systems reviewed and are negative.      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), Indigestion, Knee arthroplasty (2002), Obstructive hypertrophic cardiomyopathy ([facility]) (8/4/2011), PAF (paroxysmal atrial fibrillation) (Formerly [facility]), Paroxysmal atrial fibrillation ([facility]) (8/29/2011), PE (pulmonary embolism), Phlebitis, Pituitary adenoma ([facility]) (1995), Pituitary adenoma ([facility]) (1995), Pneumonia, Rheumatic fever, S/P insertion of IVC (inferior vena caval) filter, S/P parathyroidectomy, S/P parathyroidectomy (2005), Sleep apnea, SSS (sick sinus syndrome) (Formerly [facility]) (8/29/2011), Third degree AV block ([facility]ly [facility]) (8/29/2011), thyroidectomy (2005), Unspecified disorder of thyroid, Unspecified hemorrhagic conditions, Unspecified urinary incontinence, and Urinary bladder disorder.    Surgical History   has a past surgical history that includes  other orthopedic surgery; cholecystectomy; pr total knee arthroplasty; other; cervical disk and fusion anterior; pr revise ulnar nerve at wrist; pr total knee arthroplasty; cataract extraction with iol; thyroidectomy; pacemaker insertion (Left, 04/23/2010); carpal tunnel endoscopic (9/30/2011); knee revision total (6/20/2013); cholecystectomy; cervical fusion posterior; gastroscopy-endo (1/21/2019); gastroscopy-endo (1/22/2019); and other cardiac surgery.     Family History  family history includes Arthritis in his father and mother.     Social History   reports that he has never smoked. He has never used smokeless tobacco. He reports that he does not drink alcohol and does not use drugs.    Allergies  Allergies   Allergen Reactions   • Lisinopril      Hypotension, 30 mg dose   • Pcn [Penicillins] Rash     Tolerates cephalosporins         Medications  Prior to Admission Medications   Prescriptions Last Dose Informant Patient Reported? Taking?   acetaZOLAMIDE (DIAMOX) 250 MG Tab 5/1/2021 at 1700  Yes Yes   Sig: Take 250 mg by mouth 2 times a day.   albuterol 108 (90 Base) MCG/ACT Aero Soln inhalation aerosol unknown at unknown  No No   Sig: Inhale 2 Puffs every four hours as needed for Shortness of Breath.   aspirin EC 81 MG EC tablet 5/1/2021 at 0846  No No   Sig: Take 1 tablet by mouth every day.   atorvastatin (LIPITOR) 80 MG tablet 4/30/2021 at 2028  No No   Sig: Take 1 tablet by mouth every evening.   azithromycin (ZITHROMAX) 250 MG Tab 5/1/2021 at 1053  Yes Yes   Sig: Take 500 mg by mouth one time.   budesonide-formoterol (SYMBICORT) 80-4.5 MCG/ACT Aerosol 5/1/2021 at 1004  Yes Yes   Sig: Inhale 2 Puffs 2 times a day.   calcium citrate (CALCITRATE) 950 (200 Ca) MG Tab 5/1/2021 at 0846  No No   Sig: Take 1 tablet by mouth every day.   clopidogrel (PLAVIX) 75 MG Tab 5/1/2021 at 0846  No No   Sig: Take 1 tablet by mouth every day.   enoxaparin (LOVENOX) 100 MG/ML Solution inj 5/1/2021 at 0846  No No   Sig: Inject  100 mg under the skin every 12 hours.   ferrous gluconate 324 (37.5 Fe) MG Tab 5/1/2021 at 0846  Yes Yes   Sig: Take 324 mg by mouth every morning with breakfast.   fluticasone (FLONASE) 50 MCG/ACT nasal spray 5/1/2021 at 0848  Yes Yes   Sig: Administer 1 Spray into affected nostril(S) every day.   gabapentin (NEURONTIN) 600 MG tablet 4/30/2021 at 2028 Patient No No   Sig: Take 1.5 Tabs by mouth every bedtime.   Patient taking differently: Take 600 mg by mouth at bedtime.   hydrocortisone (CORTEF) 10 MG Tab 5/1/2021 at 0846 Patient No No   Sig: Take 1 Tab by mouth 3 times a day.   Patient taking differently: Take 10 mg by mouth 2 times a day.   ipratropium-albuterol (DUONEB) 0.5-2.5 (3) MG/3ML nebulizer solution 5/1/2021 at 1023  No No   Sig: Take 3 mL by nebulization every 6 hours as needed for Shortness of Breath.   levothyroxine (SYNTHROID) 75 MCG Tab 5/1/2021 at 0536 Patient No No   Sig: Take 1 Tab by mouth Every morning on an empty stomach.   lidocaine (LIDODERM) 5 % Patch 5/1/2021 at 0846  Yes Yes   Sig: Place 1 Patch on the skin every 12 hours. Off for 12 hour    Apply to left rib cage and upper back   midodrine (PROAMATINE) 5 MG Tab 5/1/2021 at 1720  Yes Yes   Sig: Take 5 mg by mouth 3 times a day with meals.   montelukast (SINGULAIR) 10 MG Tab Not Taking at Unknown time Patient No No   Sig: Take 1 Tab by mouth every day.   Patient not taking: Reported on 5/1/2021   omeprazole (PRILOSEC) 20 MG delayed-release capsule 5/1/2021 at 1720  No No   Sig: Take 1 capsule by mouth 2 times a day.   oxyCODONE immediate-release (ROXICODONE) 5 MG Tab 5/1/2021 at 1722  Yes Yes   Sig: Take 5 mg by mouth every four hours as needed for Severe Pain.   senna-docusate (PERICOLACE OR SENOKOT S) 8.6-50 MG Tab 5/1/2021 at 0846  Yes Yes   Sig: Take 1 tablet by mouth 2 times a day.   sotalol (BETAPACE) 80 MG Tab 5/1/2021 at 1046 Patient No No   Sig: Take 0.5 Tabs by mouth 2 times a day.   tamsulosin (FLOMAX) 0.4 MG capsule 5/1/2021  at 0846 Patient No No   Sig: Take 1 Cap by mouth every day.   tiotropium (SPIRIVA) 18 MCG Cap 5/1/2021 at 1005 Patient No No   Sig: Inhale 1 Cap by mouth every day.   warfarin (COUMADIN) 5 MG Tab 5/1/2021 at 1730 Patient No No   Sig: Take 1 Tab by mouth every day.   Patient taking differently: Take 10 mg by mouth every evening.      Facility-Administered Medications: None       Physical Exam  Temp:  [36.9 °C (98.5 °F)] 36.9 °C (98.5 °F)  Pulse:  [77-93] 88  Resp:  [14-21] 18  BP: ()/(51-71) 92/52  SpO2:  [90 %-95 %] 91 %    Physical Exam  Constitutional:       General: He is not in acute distress.     Appearance: He is ill-appearing (Chronically ill-appearing).   HENT:      Head: Normocephalic and atraumatic.      Right Ear: Tympanic membrane normal.      Left Ear: Tympanic membrane normal.      Nose: Nose normal. No congestion or rhinorrhea.      Mouth/Throat:      Pharynx: Oropharynx is clear.   Eyes:      General:         Right eye: No discharge.         Left eye: No discharge.      Extraocular Movements: Extraocular movements intact.      Pupils: Pupils are equal, round, and reactive to light.   Neck:      Vascular: No carotid bruit.   Cardiovascular:      Rate and Rhythm: Normal rate.      Pulses: Normal pulses.      Heart sounds: Normal heart sounds.   Pulmonary:      Effort: Pulmonary effort is normal. No respiratory distress.      Breath sounds: No stridor. Rales present. No wheezing or rhonchi.   Abdominal:      General: Abdomen is flat. Bowel sounds are normal. There is no distension.      Palpations: Abdomen is soft. There is no mass.      Tenderness: There is no abdominal tenderness.      Hernia: No hernia is present.   Musculoskeletal:         General: Tenderness (bilateral upper extremeties wrapped in dressing) present.      Cervical back: Normal range of motion and neck supple. No rigidity or tenderness.   Lymphadenopathy:      Cervical: No cervical adenopathy.   Skin:     General: Skin is warm  and dry.      Capillary Refill: Capillary refill takes less than 2 seconds.      Findings: Bruising present.      Comments: Scattered ecchymosis noted   Neurological:      General: No focal deficit present.      Mental Status: He is alert and oriented to person, place, and time.      Cranial Nerves: No cranial nerve deficit.      Sensory: No sensory deficit.      Motor: No weakness.      Coordination: Coordination normal.   Psychiatric:         Mood and Affect: Mood normal.         Behavior: Behavior normal.         Thought Content: Thought content normal.         Laboratory:  Recent Labs     04/30/21 0632 05/01/21 0637 05/01/21 2048   WBC 21.6* 21.2* 20.4*   RBC 4.58* 4.47* 4.17*   HEMOGLOBIN 11.5* 11.4* 10.7*   HEMATOCRIT 40.1* 38.5* 36.1*   MCV 87.6 86.1 86.6   MCH 25.1* 25.5* 25.7*   MCHC 28.7* 29.6* 29.6*   RDW 56.3* 55.8* 57.6*   PLATELETCT 268 243 262   MPV 10.4 10.0 9.9     Recent Labs     04/30/21 0632 05/01/21 0637 05/01/21 2048   SODIUM 132* 129* 133*   POTASSIUM 4.3 4.1 3.8   CHLORIDE 94* 95* 97   CO2 35* 31 32   GLUCOSE 78 88 138*   BUN 14 14 19   CREATININE 0.63 0.79 1.02   CALCIUM 7.8* 8.2* 8.2*     Recent Labs     04/29/21 0543 04/29/21 0543 04/30/21 0632 05/01/21 0637 05/01/21 2048   ALTSGPT 53*  --  38  --  29   ASTSGOT 39  --  23  --  21   ALKPHOSPHAT 90  --  68  --  67   TBILIRUBIN 0.4  --  0.3  --  0.3   GLUCOSE 72   < > 78 88 138*    < > = values in this interval not displayed.     Recent Labs     04/30/21 0632 05/01/21 0637 05/01/21 2048   APTT  --   --  44.9*   INR 1.13 1.22* 1.34*     Recent Labs     04/30/21 0632 05/01/21 2048   NTPROBNP 4988* 5742*         Recent Labs     05/01/21  2048   TROPONINT 113*           Assessment/Plan:  I anticipate this patient is appropriate for observation status at this time.    * Hypotension  Assessment & Plan  Hypotension most likely secondary to as patient was instructed to take Cortef 10 mg TID however, is taking BID.  Stress dose IV  Hydrocortisone 50 mg every 6 hours  Monitor       Pain in the chest  Assessment & Plan  History of recent CAD s/p 3 stents to LAD. Given elevated troponin, hypotension and chest pain I requested ERP to obtain Cardiology consult, Dr. Donaldson, who suggested to continue serial troponin at this time.   Telemetry monitoring  Resume home cardiac medications ASA/Plavix  NTG prn for chest pain     Adrenal insufficiency (HCC)- (present on admission)  Assessment & Plan  Hypotension most likely secondary to as patient was instructed to take Cortef 10 mg TID however, is taking BID.  Stress dose IV Hydrocortisone 50 mg every 6 hours  Monitor.    Anticoagulant long-term use- (present on admission)  Assessment & Plan  History of PE/DVT. Continue with coumadin check INR.     Essential hypertension, benign- (present on admission)  Assessment & Plan  Hold antihypertensives as patient is hypotensive at this time.     Paroxysmal atrial fibrillation (HCC)- (present on admission)  Assessment & Plan  Telemetry monitoring     COPD (chronic obstructive pulmonary disease) (HCC)- (present on admission)  Assessment & Plan  Continue with home medications

## 2021-05-02 NOTE — PROGRESS NOTES
On call MD notified of pt change of status. Complaint of chest pain, SBP 60's-70's. New order to transfer to ED for eval & treat. Transferred via EMS transport.

## 2021-05-02 NOTE — ED NOTES
Patient stating he is feeling more comfortable. Discussed with patient POC, patient verbalized understanding. NS bolus administered per MAR.

## 2021-05-02 NOTE — ASSESSMENT & PLAN NOTE
Hemorrhagic  Now resolved  Was on stress dose Hydrocortisone but is now back to his baseline po regiemen of 10mg BID  Improving with home regimen for adrenal insufficiency which includes Cortef and midodrine  Continue to monitor

## 2021-05-02 NOTE — ASSESSMENT & PLAN NOTE
Cardiology on board, they held sotalol.   Patient is not candidate for anticoagulation due to risk of bleeding  Cardiology on board.

## 2021-05-02 NOTE — PROGRESS NOTES
The patient was admitted earlier today by my colleague for chest pain.  I saw and examined the patient by the bedside.  According to the nurse patient was a little bit confused earlier this morning.  He was able to answer some basic question to me.  She has medical history of hypertrophic obstructive cardiomyopathy, history of paroxysmal A. 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  Cardiology was consulted and recommended trending troponins which has been stable around 100.

## 2021-05-02 NOTE — PROGRESS NOTES
"received report from day RN Jeffrey that pt is complaining of wrist and back pain. Pt was medicated with Oxycodone 5mg at 1755 and pt was given another dose of Oxycodone 10mg again at 1915 by day nurse.    1950 - pt states that he is in a lot of pain and verbalizes that he has \"little bit\" of chest pain too. VS checked and as follows: T - 97.8, BP - 69/40, HR - 77, RR - 20, 98% O2 sats with 5L of oxgen. Charge RN at bedside.   2000 Informed on call MD - Dr. Quintanilla regarding patient's status and ordered to send pt to ED for cardiac work up and reevaluation.   2015 - pt out of rehab to [facility] ED.   Patient' s son Arya informed of transfer to ED.  "

## 2021-05-03 ENCOUNTER — APPOINTMENT (OUTPATIENT)
Dept: CARDIOLOGY | Facility: MEDICAL CENTER | Age: 86
DRG: 270 | End: 2021-05-03
Attending: INTERNAL MEDICINE
Payer: MEDICARE

## 2021-05-03 LAB
INR PPP: 1.76 (ref 0.87–1.13)
LV EJECT FRACT  99904: 60
PROTHROMBIN TIME: 21.1 SEC (ref 12–14.6)

## 2021-05-03 PROCEDURE — 93306 TTE W/DOPPLER COMPLETE: CPT

## 2021-05-03 PROCEDURE — 700102 HCHG RX REV CODE 250 W/ 637 OVERRIDE(OP): Performed by: STUDENT IN AN ORGANIZED HEALTH CARE EDUCATION/TRAINING PROGRAM

## 2021-05-03 PROCEDURE — 700111 HCHG RX REV CODE 636 W/ 250 OVERRIDE (IP): Performed by: STUDENT IN AN ORGANIZED HEALTH CARE EDUCATION/TRAINING PROGRAM

## 2021-05-03 PROCEDURE — 85610 PROTHROMBIN TIME: CPT

## 2021-05-03 PROCEDURE — A9270 NON-COVERED ITEM OR SERVICE: HCPCS | Performed by: STUDENT IN AN ORGANIZED HEALTH CARE EDUCATION/TRAINING PROGRAM

## 2021-05-03 PROCEDURE — 94664 DEMO&/EVAL PT USE INHALER: CPT

## 2021-05-03 PROCEDURE — 36415 COLL VENOUS BLD VENIPUNCTURE: CPT

## 2021-05-03 PROCEDURE — 700117 HCHG RX CONTRAST REV CODE 255: Performed by: INTERNAL MEDICINE

## 2021-05-03 PROCEDURE — 770020 HCHG ROOM/CARE - TELE (206)

## 2021-05-03 PROCEDURE — 93306 TTE W/DOPPLER COMPLETE: CPT | Mod: 26 | Performed by: INTERNAL MEDICINE

## 2021-05-03 PROCEDURE — 99222 1ST HOSP IP/OBS MODERATE 55: CPT | Performed by: INTERNAL MEDICINE

## 2021-05-03 PROCEDURE — 770024 HCHG ROOM/CARE - REHAB LOA (180)

## 2021-05-03 PROCEDURE — 99232 SBSQ HOSP IP/OBS MODERATE 35: CPT | Performed by: INTERNAL MEDICINE

## 2021-05-03 RX ADMIN — HYDROCORTISONE SODIUM SUCCINATE 50 MG: 100 INJECTION, POWDER, FOR SOLUTION INTRAMUSCULAR; INTRAVENOUS at 00:47

## 2021-05-03 RX ADMIN — BUDESONIDE AND FORMOTEROL FUMARATE DIHYDRATE 2 PUFF: 80; 4.5 AEROSOL RESPIRATORY (INHALATION) at 20:00

## 2021-05-03 RX ADMIN — OXYCODONE 5 MG: 5 TABLET ORAL at 22:39

## 2021-05-03 RX ADMIN — MIDODRINE HYDROCHLORIDE 5 MG: 5 TABLET ORAL at 11:30

## 2021-05-03 RX ADMIN — ASPIRIN 81 MG: 81 TABLET, COATED ORAL at 05:41

## 2021-05-03 RX ADMIN — ENOXAPARIN SODIUM 100 MG: 100 INJECTION SUBCUTANEOUS at 17:26

## 2021-05-03 RX ADMIN — ACETAMINOPHEN 650 MG: 325 TABLET, FILM COATED ORAL at 16:02

## 2021-05-03 RX ADMIN — MONTELUKAST 10 MG: 10 TABLET, FILM COATED ORAL at 05:41

## 2021-05-03 RX ADMIN — OMEPRAZOLE 20 MG: 20 CAPSULE, DELAYED RELEASE ORAL at 17:26

## 2021-05-03 RX ADMIN — CLOPIDOGREL BISULFATE 75 MG: 75 TABLET ORAL at 05:41

## 2021-05-03 RX ADMIN — MIDODRINE HYDROCHLORIDE 5 MG: 5 TABLET ORAL at 07:50

## 2021-05-03 RX ADMIN — ATORVASTATIN CALCIUM 80 MG: 80 TABLET, FILM COATED ORAL at 17:26

## 2021-05-03 RX ADMIN — ENOXAPARIN SODIUM 100 MG: 100 INJECTION SUBCUTANEOUS at 05:41

## 2021-05-03 RX ADMIN — OMEPRAZOLE 20 MG: 20 CAPSULE, DELAYED RELEASE ORAL at 05:41

## 2021-05-03 RX ADMIN — ACETAMINOPHEN 650 MG: 325 TABLET, FILM COATED ORAL at 21:34

## 2021-05-03 RX ADMIN — TIOTROPIUM BROMIDE INHALATION SPRAY 5 MCG: 3.12 SPRAY, METERED RESPIRATORY (INHALATION) at 08:00

## 2021-05-03 RX ADMIN — OXYCODONE 5 MG: 5 TABLET ORAL at 11:33

## 2021-05-03 RX ADMIN — HUMAN ALBUMIN MICROSPHERES AND PERFLUTREN 3 ML: 10; .22 INJECTION, SOLUTION INTRAVENOUS at 15:57

## 2021-05-03 RX ADMIN — WARFARIN SODIUM 7.5 MG: 7.5 TABLET ORAL at 17:26

## 2021-05-03 RX ADMIN — TAMSULOSIN HYDROCHLORIDE 0.4 MG: 0.4 CAPSULE ORAL at 05:41

## 2021-05-03 RX ADMIN — BUDESONIDE AND FORMOTEROL FUMARATE DIHYDRATE 2 PUFF: 80; 4.5 AEROSOL RESPIRATORY (INHALATION) at 08:00

## 2021-05-03 RX ADMIN — MIDODRINE HYDROCHLORIDE 5 MG: 5 TABLET ORAL at 17:26

## 2021-05-03 RX ADMIN — HYDROCORTISONE SODIUM SUCCINATE 50 MG: 100 INJECTION, POWDER, FOR SOLUTION INTRAMUSCULAR; INTRAVENOUS at 05:42

## 2021-05-03 RX ADMIN — LEVOTHYROXINE SODIUM 75 MCG: 25 TABLET ORAL at 05:41

## 2021-05-03 ASSESSMENT — PAIN DESCRIPTION - PAIN TYPE
TYPE: ACUTE PAIN

## 2021-05-03 ASSESSMENT — ENCOUNTER SYMPTOMS
PND: 0
DIZZINESS: 0
SPUTUM PRODUCTION: 1
ORTHOPNEA: 1
INSOMNIA: 0
DEPRESSION: 0
MYALGIAS: 0
VOMITING: 0
COUGH: 1
WHEEZING: 0
DOUBLE VISION: 0
SINUS PAIN: 0
SHORTNESS OF BREATH: 1
BLURRED VISION: 1
NECK PAIN: 0
NAUSEA: 0
EYE DISCHARGE: 0
CHILLS: 0
EYE PAIN: 0
WEIGHT LOSS: 0
HEADACHES: 0
HEARTBURN: 0
ORTHOPNEA: 0
BACK PAIN: 0
NERVOUS/ANXIOUS: 0
FOCAL WEAKNESS: 0
SPEECH CHANGE: 0
SORE THROAT: 0
DIAPHORESIS: 0
EYE REDNESS: 0
SEIZURES: 0
SHORTNESS OF BREATH: 0
ABDOMINAL PAIN: 0
PALPITATIONS: 0
SPUTUM PRODUCTION: 0
BRUISES/BLEEDS EASILY: 1
COUGH: 0
BLURRED VISION: 0
STRIDOR: 0
FEVER: 0
DIARRHEA: 0

## 2021-05-03 NOTE — ASSESSMENT & PLAN NOTE
Chronic and likely related to chronic steroid use for adrenal insufficiency monitor closely for possible infection  Follow cbc in am

## 2021-05-03 NOTE — RESPIRATORY CARE
"  COPD EDUCATION by COPD CLINICAL EDUCATOR  5/3/2021  at  8:53 AM by Sandy Yates, RRT     Patient interviewed by COPD education team.  Patient unable to participate in full program.  Short intervention has been conducted.  A comprehensive packet including information about COPD, treatments, and smoking cessation given.      COPD Assessment  COPD Clinical Specialists ONLY  COPD Education Initiated: Yes--Short Intervention:  Declined full, plan is to go to SNF  Pulmonary Rehab: No  Smoking Cessation: N/A  Hospice: No  Home Health Care: No(Going to SNF)  [facility]: No(Does not qualify)  [facility]: N/A  [facility]: N/A  Private In-Home Care Agency: N/A  Is this a COPD exacerbation patient?: No  $ Demo/Eval of SVN's, MDI's and Aerosols: Yes    Meds to Beds        MY COPD ACTION PLAN     It is recommended that patients and physicians /healthcare providers complete this action plan together. This plan should be discussed at each physician visit and updated as needed.    The green, yellow and red zones show groups of symptoms of COPD. This list of symptoms is not comprehensive, and you may experience other symptoms. In the \"Actions\" column, your healthcare provider has recommended actions for you to take based on your symptoms.    Patient Name: Jeffrey Lizama   YOB: 1932   Last Updated on:     Green Zone:  I am doing well today Actions   •  Usual activitiy and exercise level •  Take daily medications   •  Usual amounts of cough and phlegm/mucus •  Use oxygen as prescribed   •  Sleep well at night •  Continue regular exercise/diet plan   •  Appetite is good •  At all times avoid cigarette smoke, inhaled irritants     Daily Medications (these medications are taken every day):   Budesonide-Formoterol Fumarate (Symbicort) 2 Puffs Twice daily     Additional Information:  Use spacer with MDI and rinse mouth after.    Yellow Zone:  I am having a bad day or a COPD " "flare Actions   •  More breathless than usual •  Continue daily medications   •  I have less energy for my daily activities •  Use quick relief inhaler as ordered   •  Increased or thicker phlegm/mucus •  Use oxygen as prescribed   •  Using quick relief inhaler/nebulizer more often •  Get plenty of rest   •  Swelling of ankles more than usual •  Use pursed lip breathing   •  More coughing than usual •  At all times avoid cigarette smoke, inhaled irritants   •  I feel like I have a \"chest cold\"   •  Poor sleep and my symptoms woke me up   •  My appetite is not good   •  My medicine is not helping    •  Call provider immediately if symptoms don’t improve     Continue daily medications, add rescue medications:   Albuterol 2 Puffs Every 4 hours PRN       Medications to be used during a flare up, (as Discussed with Provider):              Red Zone:  I need urgent medical care Actions   •  Severe shortness of breath even at rest •  Call 911 or seek medical care immediately   •  Not able to do any activity because of breathing    •  Fever or shaking chills    •  Feeling confused or very drowsy     •  Chest pains    •  Coughing up blood              "

## 2021-05-03 NOTE — CONSULTS
Reason for Consult:  Asked by Dr Cristóbal Oswald M.D. to see this patient with chest pain    CC:   Chief Complaint   Patient presents with   • Chest Pain     Pt came from a rehab, pt was recently admitted on 4/10 for a Post NSTEMI and PCI compelted. 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    Describes was resting when chest pain started. Centrally located. No associated symptoms. Occurred after a meal. Alongside this issue has joint pain bilateral shoulders and elbows especially worsened with movement. In ED found troponin flat/continued downtrend from [facility] date. Also EKG with sinus, LBBB unchanged from prior. Chest pain now resolved.  In terms of hypotension, appears he was being under-dosed cortef for his adrenal insufficiency. Resolved with IVF and correction in dosing.     Medications / Drug list prior to admission:  Current Facility-Administered Medications on File Prior to Encounter   Medication Dose Route Frequency Provider Last Rate Last Admin   • [MAR Hold - Suspended Admission] azithromycin (ZITHROMAX) tablet 250 mg  250 mg Oral DAILY Kesha Hua M.D.       • [MAR Hold - Suspended Admission] ferrous gluconate (FERGON) tablet 324 mg  324 mg Oral QDAY with Breakfast Kesha Hua M.D.   324 mg at 05/01/21 0846   • [MAR Hold - Suspended Admission] budesonide-formoterol (SYMBICORT) 80-4.5 MCG/ACT inhaler 2 Puff  2 Puff Inhalation BID (RT) Kesha Hua M.D.   2 Puff at 05/01/21 1004   • [MAR Hold - Suspended Admission] tiotropium (Spiriva Respimat) 2.5 mcg/Act inhalation spray 5 mcg  5 mcg Inhalation QDAILY (RT) Kesha Hua M.D.   5 mcg at 05/01/21 1005   • [MAR Hold - Suspended Admission] acetaZOLAMIDE (DIAMOX)  tablet 250 mg  250 mg Oral BID DIURETIC Kesha Hua M.D.   250 mg at 05/01/21 1720   • [MAR Hold - Suspended Admission] midodrine (PROAMATINE) tablet 5 mg  5 mg Oral TID WITH MEALS Kesha Hua M.D.   5 mg at 05/01/21 1720   • [MAR Hold - Suspended Admission] hydrOXYzine HCl (ATARAX) tablet 50 mg  50 mg Oral Q6HRS PRN Kaley Duke M.D.       • [MAR Hold - Suspended Admission] melatonin tablet 3 mg  3 mg Oral HS PRN Kaley Duke M.D.       • [MAR Hold - Suspended Admission] Respiratory Therapy Consult   Nebulization Continuous RT Kaley Duke M.D.       • [MAR Hold - Suspended Admission] Pharmacy Consult Request ...Pain Management Review 1 Each  1 Each Other PHARMACY TO DOSE Kaley Duke M.D.       • [MAR Hold - Suspended Admission] acetaminophen (Tylenol) tablet 650 mg  650 mg Oral Q4HRS PRN Kaley Duke M.D.       • [MAR Hold - Suspended Admission] lactulose 20 GM/30ML solution 30 mL  30 mL Oral QDAY PRN Kaley Duke M.D.       • [MAR Hold - Suspended Admission] docusate sodium (ENEMEEZ) enema 283 mg  283 mg Rectal QDAY PRN Kaley Duke M.D.       • [MAR Hold - Suspended Admission] fleet enema 133 mL  1 Each Rectal QDAY PRN Kaley Duke M.D.       • [MAR Hold - Suspended Admission] artificial tears ophthalmic solution 1 Drop  1 Drop Both Eyes PRN Kaley Duke M.D.       • [MAR Hold - Suspended Admission] benzocaine-menthol (CEPACOL) lozenge 1 Lozenge  1 Lozenge Mouth/Throat Q2HRS PRN Kaley Duke M.D.       • [MAR Hold - Suspended Admission] mag hydrox-al hydrox-simeth (MAALOX PLUS ES or MYLANTA DS) suspension 20 mL  20 mL Oral Q2HRS PRN Kaley Duke M.D.       • [MAR Hold - Suspended Admission] traZODone (DESYREL) tablet 50 mg  50 mg Oral QHS PRN Klaey Duke M.D.       • [MAR Hold - Suspended Admission] sodium chloride (OCEAN) 0.65 % nasal spray 2 Spray  2 Spray Nasal PRN Kalye Duke M.D.       • [MAR Hold - Suspended Admission]  senna-docusate (PERICOLACE or SENOKOT S) 8.6-50 MG per tablet 2 tablet  2 tablet Oral BID Kaley Duke M.D.   2 tablet at 05/01/21 0846    And   • [MAR Hold - Suspended Admission] polyethylene glycol/lytes (MIRALAX) PACKET 1 Packet  1 Packet Oral QDAY PRN Kaley Duke M.D.        And   • [MAR Hold - Suspended Admission] magnesium hydroxide (MILK OF MAGNESIA) suspension 30 mL  30 mL Oral QDAY PRN Kaley Duke M.D.        And   • [MAR Hold - Suspended Admission] bisacodyl (DULCOLAX) suppository 10 mg  10 mg Rectal QDAY PRN Kaley Duke M.D.       • [MAR Hold - Suspended Admission] aspirin EC (ECOTRIN) tablet 81 mg  81 mg Oral DAILY Kaley Duke M.D.   81 mg at 05/01/21 0846   • [MAR Hold - Suspended Admission] clopidogrel (PLAVIX) tablet 75 mg  75 mg Oral DAILY Kaley Duke M.D.   75 mg at 05/01/21 0846   • [MAR Hold - Suspended Admission] albuterol inhaler 2 Puff  2 Puff Inhalation Q2HRS PRN Kaley Duke M.D.   2 Puff at 04/29/21 2310   • [MAR Hold - Suspended Admission] atorvastatin (LIPITOR) tablet 80 mg  80 mg Oral Q EVENING Kaley Duke M.D.   80 mg at 04/30/21 2028   • [MAR Hold - Suspended Admission] benzonatate (TESSALON) capsule 100 mg  100 mg Oral TID PRN Kaley Duke M.D.       • [MAR Hold - Suspended Admission] calcium citrate (CALCITRATE) tablet 950 mg  950 mg Oral DAILY Kaley Duke M.D.   950 mg at 05/01/21 0846   • [MAR Hold - Suspended Admission] enoxaparin (LOVENOX) inj 100 mg  1 mg/kg Subcutaneous Q12HRS Kaley Duke M.D.   100 mg at 05/01/21 0848   • [MAR Hold - Suspended Admission] fluticasone (FLONASE) nasal spray 100 mcg  2 Spray Nasal DAILY Kaley Duke M.D.   100 mcg at 05/01/21 0848   • [MAR Hold - Suspended Admission] gabapentin (NEURONTIN) capsule 600 mg  600 mg Oral Q EVENING Kaley Duke M.D.   600 mg at 04/30/21 2029   • [MAR Hold - Suspended Admission] hydrocortisone (CORTEF) tablet 10 mg  10 mg Oral BID  Kaley Duke M.D.   10 mg at 05/01/21 0846   • [MAR Hold - Suspended Admission] ipratropium-albuterol (DUONEB) nebulizer solution  3 mL Nebulization Q4HRS PRN Kaley Duke M.D.   3 mL at 05/01/21 1023   • [MAR Hold - Suspended Admission] levothyroxine (SYNTHROID) tablet 75 mcg  75 mcg Oral AM ES Kaley Duke M.D.   75 mcg at 05/01/21 0536   • [MAR Hold - Suspended Admission] lidocaine (LIDODERM) 5 % 2 Patch  2 Patch Transdermal Q24HR Kaley Duke M.D.   2 Patch at 05/01/21 0846   • [MAR Hold - Suspended Admission] MD Alert...Warfarin per Pharmacy   Other PHARMACY TO DOSE Kaley Duke M.D.       • [MAR Hold - Suspended Admission] montelukast (SINGULAIR) tablet 10 mg  10 mg Oral DAILY Kaley Duke M.D.   10 mg at 05/01/21 0846   • [MAR Hold - Suspended Admission] omeprazole (PRILOSEC) capsule 20 mg  20 mg Oral BID Kaley Duke M.D.   20 mg at 05/01/21 1720   • [MAR Hold - Suspended Admission] oxyCODONE immediate-release (ROXICODONE) tablet 5 mg  5 mg Oral Q4HRS PRN Kaley Duke M.D.   5 mg at 05/01/21 1722   • [MAR Hold - Suspended Admission] sotalol (BETAPACE) tablet 40 mg  40 mg Oral BID Kaley Duke M.D.   40 mg at 05/01/21 1046   • [MAR Hold - Suspended Admission] tamsulosin (FLOMAX) capsule 0.4 mg  0.4 mg Oral DAILY Kaley Duke M.D.   0.4 mg at 05/01/21 0846   • [MAR Hold - Suspended Admission] testosterone cypionate (DEPO-TESTOSTERONE) injection 200 mg  200 mg Intramuscular Q21 DAYS Kaley Duke M.D.       • [MAR Hold - Suspended Admission] prochlorperazine (COMPAZINE) tablet 10 mg  10 mg Oral Q6HRS PRN Kaley Duke M.D.         Current Outpatient Medications on File Prior to Encounter   Medication Sig Dispense Refill   • acetaZOLAMIDE (DIAMOX) 250 MG Tab Take 250 mg by mouth 2 times a day.     • senna-docusate (PERICOLACE OR SENOKOT S) 8.6-50 MG Tab Take 1 tablet by mouth 2 times a day.     • budesonide-formoterol (SYMBICORT) 80-4.5  MCG/ACT Aerosol Inhale 2 Puffs 2 times a day.     • ferrous gluconate 324 (37.5 Fe) MG Tab Take 324 mg by mouth every morning with breakfast.     • fluticasone (FLONASE) 50 MCG/ACT nasal spray Administer 1 Spray into affected nostril(S) every day.     • lidocaine (LIDODERM) 5 % Patch Place 1 Patch on the skin every 12 hours. Off for 12 hour    Apply to left rib cage and upper back     • midodrine (PROAMATINE) 5 MG Tab Take 5 mg by mouth 3 times a day with meals.     • oxyCODONE immediate-release (ROXICODONE) 5 MG Tab Take 5 mg by mouth every four hours as needed for Severe Pain.     • azithromycin (ZITHROMAX) 250 MG Tab Take 500 mg by mouth one time.     • aspirin EC 81 MG EC tablet Take 1 tablet by mouth every day. 30 tablet    • atorvastatin (LIPITOR) 80 MG tablet Take 1 tablet by mouth every evening. 30 tablet    • calcium citrate (CALCITRATE) 950 (200 Ca) MG Tab Take 1 tablet by mouth every day. 30 tablet    • clopidogrel (PLAVIX) 75 MG Tab Take 1 tablet by mouth every day. 30 tablet    • enoxaparin (LOVENOX) 100 MG/ML Solution inj Inject 100 mg under the skin every 12 hours.     • ipratropium-albuterol (DUONEB) 0.5-2.5 (3) MG/3ML nebulizer solution Take 3 mL by nebulization every 6 hours as needed for Shortness of Breath.     • omeprazole (PRILOSEC) 20 MG delayed-release capsule Take 1 capsule by mouth 2 times a day. 30 capsule    • albuterol 108 (90 Base) MCG/ACT Aero Soln inhalation aerosol Inhale 2 Puffs every four hours as needed for Shortness of Breath. 8.5 g    • gabapentin (NEURONTIN) 600 MG tablet Take 1.5 Tabs by mouth every bedtime. (Patient taking differently: Take 600 mg by mouth at bedtime.) 90 Tab 2   • levothyroxine (SYNTHROID) 75 MCG Tab Take 1 Tab by mouth Every morning on an empty stomach. 30 Tab 2   • montelukast (SINGULAIR) 10 MG Tab Take 1 Tab by mouth every day. (Patient not taking: Reported on 5/1/2021) 30 Tab 2   • sotalol (BETAPACE) 80 MG Tab Take 0.5 Tabs by mouth 2 times a day. 60 Tab  2   • tamsulosin (FLOMAX) 0.4 MG capsule Take 1 Cap by mouth every day. 30 Cap 2   • tiotropium (SPIRIVA) 18 MCG Cap Inhale 1 Cap by mouth every day. 30 Cap 3   • warfarin (COUMADIN) 5 MG Tab Take 1 Tab by mouth every day. (Patient taking differently: Take 10 mg by mouth every evening.) 30 Tab 1   • hydrocortisone (CORTEF) 10 MG Tab Take 1 Tab by mouth 3 times a day. (Patient taking differently: Take 10 mg by mouth 2 times a day.) 90 Tab 2       Current list of administered Medications:    Current Facility-Administered Medications:   •  aspirin EC (ECOTRIN) tablet 81 mg, 81 mg, Oral, DAILY, Keny Madsen M.D., 81 mg at 05/03/21 0541  •  atorvastatin (LIPITOR) tablet 80 mg, 80 mg, Oral, Q EVENING, Keny Madsen M.D., 80 mg at 05/02/21 1829  •  budesonide-formoterol (SYMBICORT) 80-4.5 MCG/ACT inhaler 2 Puff, 2 Puff, Inhalation, BID (RT), Keny Madsen M.D., 2 Puff at 05/03/21 0800  •  clopidogrel (PLAVIX) tablet 75 mg, 75 mg, Oral, DAILY, Keny Madsen M.D., 75 mg at 05/03/21 0541  •  levothyroxine (SYNTHROID) tablet 75 mcg, 75 mcg, Oral, AM ES, Keny Madsen M.D., 75 mcg at 05/03/21 0541  •  MD Alert...Warfarin per Pharmacy, , Other, PHARMACY TO DOSE, Keny Madsen M.D.  •  midodrine (PROAMATINE) tablet 5 mg, 5 mg, Oral, TID WITH MEALS, Keny Madsen M.D., 5 mg at 05/03/21 1130  •  montelukast (SINGULAIR) tablet 10 mg, 10 mg, Oral, DAILY, Keny Madsen M.D., 10 mg at 05/03/21 0541  •  omeprazole (PRILOSEC) capsule 20 mg, 20 mg, Oral, BID, Keny Madsen M.D., 20 mg at 05/03/21 0541  •  tamsulosin (FLOMAX) capsule 0.4 mg, 0.4 mg, Oral, DAILY, Keny Madsen M.D., 0.4 mg at 05/03/21 0541  •  tiotropium (Spiriva Respimat) 2.5 mcg/Act inhalation spray 5 mcg, 5 mcg, Inhalation, QDAILY (RT), Keny Madsen M.D., 5 mcg at 05/03/21 0800  •  benzonatate (TESSALON) capsule 100 mg, 100 mg, Oral, TID PRN, Keny Madsen M.D.  •  enoxaparin (LOVENOX) inj 100 mg, 100 mg, Subcutaneous, Q12HRS, Keny Madsen,  M.D., 100 mg at 05/03/21 0541  •  warfarin (COUMADIN) tablet 7.5 mg, 7.5 mg, Oral, DAILY AT 1800, Keny Madsen M.D., 7.5 mg at 05/02/21 1829  •  acetaminophen (Tylenol) tablet 650 mg, 650 mg, Oral, Q4HRS PRN, Yvonne Edmond M.D.  •  oxyCODONE immediate-release (ROXICODONE) tablet 5 mg, 5 mg, Oral, Q6HRS PRN, Yvonne Edmond M.D., 5 mg at 05/03/21 1133    Facility-Administered Medications Ordered in Other Encounters:   •  [MAR Hold - Suspended Admission] azithromycin (ZITHROMAX) tablet 250 mg, 250 mg, Oral, DAILY, Kesha Hua M.D.  •  [MAR Hold - Suspended Admission] ferrous gluconate (FERGON) tablet 324 mg, 324 mg, Oral, QDAY with Breakfast, Kesha Hua M.D., 324 mg at 05/01/21 0846  •  [MAR Hold - Suspended Admission] budesonide-formoterol (SYMBICORT) 80-4.5 MCG/ACT inhaler 2 Puff, 2 Puff, Inhalation, BID (RT), Kesha Hua M.D., 2 Puff at 05/01/21 1004  •  [MAR Hold - Suspended Admission] tiotropium (Spiriva Respimat) 2.5 mcg/Act inhalation spray 5 mcg, 5 mcg, Inhalation, QDAILY (RT), Kesha Hua M.D., 5 mcg at 05/01/21 1005  •  [MAR Hold - Suspended Admission] acetaZOLAMIDE (DIAMOX) tablet 250 mg, 250 mg, Oral, BID DIURETIC, Kesha Hua M.D., 250 mg at 05/01/21 1720  •  [MAR Hold - Suspended Admission] midodrine (PROAMATINE) tablet 5 mg, 5 mg, Oral, TID WITH MEALS, Kesha Hua M.D., 5 mg at 05/01/21 1720  •  [MAR Hold - Suspended Admission] hydrOXYzine HCl (ATARAX) tablet 50 mg, 50 mg, Oral, Q6HRS PRN, Kaley Duke M.D.  •  [MAR Hold - Suspended Admission] melatonin tablet 3 mg, 3 mg, Oral, HS PRN, Kaley Duke M.D.  •  [MAR Hold - Suspended Admission] Respiratory Therapy Consult, , Nebulization, Continuous RT, Kaley Duke M.D.  •  [MAR Hold - Suspended Admission] Pharmacy Consult Request ...Pain Management Review 1 Each, 1 Each, Other, PHARMACY TO DOSE, Kaley Duke M.D.  •  [MAR Hold - Suspended Admission] acetaminophen (Tylenol) tablet 650 mg, 650 mg, Oral, Q4HRS PRN, Kaley Duke M.D.  •  [MAR Hold  - Suspended Admission] lactulose 20 GM/30ML solution 30 mL, 30 mL, Oral, QDAY PRN, Kaley Duke M.D.  •  [MAR Hold - Suspended Admission] docusate sodium (ENEMEEZ) enema 283 mg, 283 mg, Rectal, QDAY PRN, Kaley Duke M.D.  •  [MAR Hold - Suspended Admission] fleet enema 133 mL, 1 Each, Rectal, QDAY PRN, Kaley Duke M.D.  •  [MAR Hold - Suspended Admission] artificial tears ophthalmic solution 1 Drop, 1 Drop, Both Eyes, PRN, Kaley Duke M.D.  •  [MAR Hold - Suspended Admission] benzocaine-menthol (CEPACOL) lozenge 1 Lozenge, 1 Lozenge, Mouth/Throat, Q2HRS PRN, Kaley Duke M.D.  •  [MAR Hold - Suspended Admission] mag hydrox-al hydrox-simeth (MAALOX PLUS ES or MYLANTA DS) suspension 20 mL, 20 mL, Oral, Q2HRS PRN, Kaley Duke M.D.  •  [MAR Hold - Suspended Admission] traZODone (DESYREL) tablet 50 mg, 50 mg, Oral, QHS PRN, Kaley Duke M.D.  •  [MAR Hold - Suspended Admission] sodium chloride (OCEAN) 0.65 % nasal spray 2 Spray, 2 Spray, Nasal, PRN, Kaley Duke M.D.  •  [MAR Hold - Suspended Admission] senna-docusate (PERICOLACE or SENOKOT S) 8.6-50 MG per tablet 2 tablet, 2 tablet, Oral, BID, 2 tablet at 05/01/21 0846 **AND** [MAR Hold - Suspended Admission] polyethylene glycol/lytes (MIRALAX) PACKET 1 Packet, 1 Packet, Oral, QDAY PRN **AND** [MAR Hold - Suspended Admission] magnesium hydroxide (MILK OF MAGNESIA) suspension 30 mL, 30 mL, Oral, QDAY PRN **AND** [MAR Hold - Suspended Admission] bisacodyl (DULCOLAX) suppository 10 mg, 10 mg, Rectal, QDAY PRN, Kaley Duke M.D.  •  [MAR Hold - Suspended Admission] aspirin EC (ECOTRIN) tablet 81 mg, 81 mg, Oral, DAILY, Kaley Duke M.D., 81 mg at 05/01/21 0846  •  [MAR Hold - Suspended Admission] clopidogrel (PLAVIX) tablet 75 mg, 75 mg, Oral, DAILY, Kaley Duke M.D., 75 mg at 05/01/21 0846  •  [MAR Hold - Suspended Admission] albuterol inhaler 2 Puff, 2 Puff, Inhalation, Q2HRS PRN, Kaley Duke,  M.D., 2 Puff at 04/29/21 2310  •  [MAR Hold - Suspended Admission] atorvastatin (LIPITOR) tablet 80 mg, 80 mg, Oral, Q EVENING, Kaley Duke M.D., 80 mg at 04/30/21 2028  •  [MAR Hold - Suspended Admission] benzonatate (TESSALON) capsule 100 mg, 100 mg, Oral, TID PRN, Kaley Duke M.D.  •  [MAR Hold - Suspended Admission] calcium citrate (CALCITRATE) tablet 950 mg, 950 mg, Oral, DAILY, Kaley Duke M.D., 950 mg at 05/01/21 0846  •  [MAR Hold - Suspended Admission] enoxaparin (LOVENOX) inj 100 mg, 1 mg/kg, Subcutaneous, Q12HRS, Kaley Duke M.D., 100 mg at 05/01/21 0848  •  [MAR Hold - Suspended Admission] fluticasone (FLONASE) nasal spray 100 mcg, 2 Spray, Nasal, DAILY, Kaley Duke M.D., 100 mcg at 05/01/21 0848  •  [MAR Hold - Suspended Admission] gabapentin (NEURONTIN) capsule 600 mg, 600 mg, Oral, Q EVENING, Kaley Duke M.D., 600 mg at 04/30/21 2029  •  [MAR Hold - Suspended Admission] hydrocortisone (CORTEF) tablet 10 mg, 10 mg, Oral, BID, Kaley Duke M.D., 10 mg at 05/01/21 0846  •  [MAR Hold - Suspended Admission] ipratropium-albuterol (DUONEB) nebulizer solution, 3 mL, Nebulization, Q4HRS PRN, Kaley Duke M.D., 3 mL at 05/01/21 1023  •  [MAR Hold - Suspended Admission] levothyroxine (SYNTHROID) tablet 75 mcg, 75 mcg, Oral, AM ES, Kaley Duke M.D., 75 mcg at 05/01/21 0536  •  [MAR Hold - Suspended Admission] lidocaine (LIDODERM) 5 % 2 Patch, 2 Patch, Transdermal, Q24HR, Kaley Duke M.D., 2 Patch at 05/01/21 0846  •  [MAR Hold - Suspended Admission] MD Alert...Warfarin per Pharmacy, , Other, PHARMACY TO DOSE, Kaley Duke M.D.  •  [MAR Hold - Suspended Admission] montelukast (SINGULAIR) tablet 10 mg, 10 mg, Oral, DAILY, Kaley Duke M.D., 10 mg at 05/01/21 0846  •  [MAR Hold - Suspended Admission] omeprazole (PRILOSEC) capsule 20 mg, 20 mg, Oral, BID, Kaley Duke M.D., 20 mg at 05/01/21 1720  •  [MAR Hold - Suspended Admission]  "oxyCODONE immediate-release (ROXICODONE) tablet 5 mg, 5 mg, Oral, Q4HRS PRN, Kaley Duke M.D., 5 mg at 05/01/21 1722  •  [MAR Hold - Suspended Admission] sotalol (BETAPACE) tablet 40 mg, 40 mg, Oral, BID, Kaley Duke M.D., 40 mg at 05/01/21 1046  •  [MAR Hold - Suspended Admission] tamsulosin (FLOMAX) capsule 0.4 mg, 0.4 mg, Oral, DAILY, Kaley Duke M.D., 0.4 mg at 05/01/21 0846  •  [MAR Hold - Suspended Admission] testosterone cypionate (DEPO-TESTOSTERONE) injection 200 mg, 200 mg, Intramuscular, Q21 DAYS, Kaley Duke M.D.  •  [MAR Hold - Suspended Admission] prochlorperazine (COMPAZINE) tablet 10 mg, 10 mg, Oral, Q6HRS PRN, Kaley Duke M.D.    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 (pouch) ([facility]) 4/1/2010   • BPH (benign prostatic hypertrophy) 4/1/2010   • Bradycardia    • Breath shortness    • Bronchitis    • Cardiac pacemaker 04 2010   • Cataract    • COPD (chronic obstructive pulmonary disease) ([facility])    • Diverticulitis    • DJD (degenerative joint disease)    • EMPHYSEMA    • Glaucoma    • Heart burn    • Heart murmur    • Hiatus hernia syndrome    • Hypertension    • 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   • Indigestion    • Knee arthroplasty 2002    right   • Obstructive hypertrophic cardiomyopathy ([facility]) 8/4/2011   • PAF (paroxysmal atrial fibrillation) ([facility])    • Paroxysmal atrial fibrillation ([facility]) 8/29/2011   • PE (pulmonary embolism)     IVC filter, states PE clots 8 times   • Phlebitis     chronic / recurrent   • Pituitary adenoma ([facility]) 1995    hypophysectomy   • Pituitary adenoma ([facility]) 1995    hypophysectomy   • Pneumonia    • Rheumatic fever    • S/P insertion of IVC (inferior vena caval) filter    • S/P parathyroidectomy    • S/P parathyroidectomy 2005   • Sleep apnea     no cpap " machine   • SSS (sick sinus syndrome) ([facility]) 8/29/2011   • Third degree AV block ([facility]) 8/29/2011   • thyroidectomy 2005    benign   • Unspecified disorder of thyroid    • Unspecified hemorrhagic conditions    • Unspecified urinary incontinence    • Urinary bladder disorder        Past Surgical History:   Procedure Laterality Date   • GASTROSCOPY-ENDO  1/22/2019    Procedure: GASTROSCOPY-ENDO;  Surgeon: Yoandy Mcelroy M.D.;  Location: ENDOSCOPY [facility];  Service: Gastroenterology   • GASTROSCOPY-ENDO  1/21/2019    Procedure: GASTROSCOPY-ENDO;  Surgeon: Luca Mtz M.D.;  Location: ENDOSCOPY [facility];  Service: Gastroenterology   • KNEE REVISION TOTAL  6/20/2013    Performed by Ortega Vega M.D. at SURGERY [facility]   • CARPAL TUNNEL ENDOSCOPIC  9/30/2011    Performed by RONALD ENNIS at SURGERY SAME DAY [facility] ORS   • PACEMAKER INSERTION Left 04/23/2010    [employer] 60 [id number] implanted by Dr. Donaldson.   • CATARACT EXTRACTION WITH IOL      bilateral    • CERVICAL DISK AND FUSION ANTERIOR     • CERVICAL FUSION POSTERIOR     • CHOLECYSTECTOMY     • CHOLECYSTECTOMY     • OTHER      pituitary tumor removed   • OTHER CARDIAC SURGERY     • OTHER ORTHOPEDIC SURGERY      knee surgery left knee x 2   • PB REVISE ULNAR NERVE AT WRIST     • PB TOTAL KNEE ARTHROPLASTY      left   • PB TOTAL KNEE ARTHROPLASTY      right   • THYROIDECTOMY         Family History   Problem Relation Age of Onset   • Arthritis Mother    • Arthritis Father    • Cancer Neg Hx    • Heart Disease Neg Hx      Patient family history was personally reviewed, no pertinent family history to current presentation    Social History     Socioeconomic History   • Marital status:      Spouse name: Not on file   • Number of children: Not on file   • Years of education: Not on file   • Highest education level: Not on file   Occupational History   • Not on file   Tobacco Use   • Smoking status: Never Smoker   •  Smokeless tobacco: Never Used   Substance and Sexual Activity   • Alcohol use: No   • Drug use: No   • Sexual activity: Not on file   Other Topics Concern   • Not on file   Social History Narrative   • Not on file     Social Determinants of Health     Financial Resource Strain:    • Difficulty of Paying Living Expenses:    Food Insecurity:    • Worried About Running Out of Food in the Last Year:    • Ran Out of Food in the Last Year:    Transportation Needs:    • Lack of Transportation (Medical):    • Lack of Transportation (Non-Medical):    Physical Activity:    • Days of Exercise per Week:    • Minutes of Exercise per Session:    Stress:    • Feeling of Stress :    Social Connections:    • Frequency of Communication with Friends and Family:    • Frequency of Social Gatherings with Friends and Family:    • Attends Ten[employer] Services:    • Active Member of Clubs or Organizations:    • Attends Club or Organization Meetings:    • Marital Status:    Intimate Partner Violence:    • Fear of Current or Ex-Partner:    • Emotionally Abused:    • Physically Abused:    • Sexually Abused:        ALLERGIES:  Allergies   Allergen Reactions   • Lisinopril      Hypotension, 30 mg dose   • Pcn [Penicillins] Rash     Tolerates cephalosporins         Review of systems:  A complete review of symptoms was reviewed with patient. This is reviewed in H&P and PMH. ALL OTHERS reviewed and negatve    Physical exam:  Patient Vitals for the past 24 hrs:   BP Temp Temp src Pulse Resp SpO2   05/03/21 1214 125/64 36.2 °C (97.2 °F) Temporal 73 17 97 %   05/03/21 1130 118/74 -- -- -- -- --   05/03/21 0715 121/80 35.9 °C (96.7 °F) Temporal 75 19 96 %   05/03/21 0430 128/69 36.6 °C (97.8 °F) Temporal 73 18 96 %   05/03/21 0030 117/69 36.1 °C (96.9 °F) Temporal 80 18 96 %   05/02/21 2040 104/61 36.9 °C (98.5 °F) Temporal 74 18 95 %   05/02/21 2025 -- -- -- 82 16 95 %   05/02/21 1600 125/66 37.1 °C (98.8 °F) Temporal 69 16 95 %     General: No acute  distress.   EYES: no jaundice  HEENT: OP clear   Neck: No bruits No JVD.   CVS:  RRR. S1 + S2. No M/R/G. Trace edema.  Resp: CTAB. No wheezing or crackles/rhonchi.  Abdomen: Soft, NT, ND,  Skin: bruising, bleeding peripheral IV sites  Neurological: Alert, Moves all extremities, no cranial nerve defects on limited exam  Extremities: Pulse 2+ in b/l LE. No cyanosis.     Data:  Laboratory studies personally reviewed by me:  Recent Results (from the past 24 hour(s))   Prothrombin Time    Collection Time: 21  3:29 PM   Result Value Ref Range    PT 18.3 (H) 12.0 - 14.6 sec    INR 1.47 (H) 0.87 - 1.13   PROTHROMBIN TIME    Collection Time: 21  5:21 AM   Result Value Ref Range    PT 21.1 (H) 12.0 - 14.6 sec    INR 1.76 (H) 0.87 - 1.13     Results for orders placed or performed during the hospital encounter of 21   EKG   Result Value Ref Range    Report       [facility] Emergency [facility].    Test Date:  2021  Pt Name:    MANOLO AUGUST                 Department: ER  MRN:        [id number]                      Room:       RD 12  Gender:     Male                         Technician: [id number]  :        [id number]                   Requested By:ER TRIAGE PROTOCOL  Order #:    [id number]                    Reading MD:    Measurements  Intervals                                Axis  Rate:       78                           P:          3  CO:         208                          QRS:        -11  QRSD:       136                          T:          174  QT:         392  QTc:        447    Interpretive Statements  SINUS RHYTHM  PROBABLE LEFT ATRIAL ABNORMALITY  LEFT BUNDLE BRANCH BLOCK  Compared to ECG 2021 12:19:09  First degree AV block no longer present         All pertinent features of laboratory and imaging reviewed including primary images where applicable    TTE 21  CONCLUSIONS  Compared to the images of the prior study done 2021, grossly   unchanged.  Left ventricular ejection  fraction is visually estimated to be 55%.  Mid to apical septal wall segments appear hypokinetic.    Principal Problem:    Hypotension POA: Unknown  Active Problems:    Pain in the chest POA: Unknown    Essential hypertension, benign POA: Yes    Anticoagulant long-term use POA: Yes    Leukocytosis POA: Yes    COPD (chronic obstructive pulmonary disease) (HCC) POA: Yes      Overview: PMA HOME O2    Paroxysmal atrial fibrillation (HCC) POA: Yes  Resolved Problems:    * No resolved hospital problems. 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    -does not have signs of stent thrombosis. No repeat ischemic testing unless anginal equivalent recurs and troponin start to rise significantly. Unable to interpret EKG for ischemic changes due to LBBB.  -attempt maalox and titrate PPI for posprandial discomfort. PRN pain control with tylenol for joint pain. May consider tramadol. Please avoid nsaids.   -limited TTE to rule out LV outlet obstruction   -prn IVF for hypotension  -continue hydrocortisone and midodrine per IM  -DAPT equivalent; when INR therapeutic 2-3, can continue plavix alongside warfarin and d/c aspirin. Extensive skin breaks and bleeding. Please consider wound care.    I personally discussed his case with Dr Oswald. Will sign off. Please call back with further questions.    It is my pleasure to participate in the care of Mr. Lizama.  Please do not hesitate to contact me with questions or concerns.    Barron Cosby MD  Cardiologist [facility]

## 2021-05-03 NOTE — CARE PLAN
Problem: Communication  Goal: The ability to communicate needs accurately and effectively will improve  Outcome: PROGRESSING AS EXPECTED     Problem: Respiratory:  Goal: Respiratory status will improve  Outcome: PROGRESSING AS EXPECTED  Note: Pt is on 4L o2. This is pts baseline for home o2. Sating at 96%. No complaints of SOB.

## 2021-05-03 NOTE — THERAPY
"Occupational Therapy   Initial Evaluation     Patient Name: Jeffrey Lizama  Age:  88 y.o., Sex:  male  Medical Record #: [id number]  Today's Date: 5/2/2021     Precautions: Fall Risk    Assessment  Patient is 88 y.o. male admitted from rehab with chest pain, SOB and hypotension. Pt presents to OT eval limited by dizziness, pain in RUE, and general malaise. Pt agreeable to sit EOB for grooming ADLs however declined standing or transfer attempt d/t dizziness and fatigue. Pt currently requires assist for all dressing, toileting and grooming ADLs, spouse would be unable to provide necessary level of assist at this time. Recommend return to post-acute placement for additional therapy and strengthening prior to DC home. Acute OT will follow while in-house.     Plan    Recommend Occupational Therapy 3 times per week until therapy goals are met for the following treatments:  Adaptive Equipment, Self Care/Activities of Daily Living, Therapeutic Activities and Therapeutic Exercises.    DC Equipment Recommendations: Unable to determine at this time  Discharge Recommendations: Recommend post-acute placement for additional occupational therapy services prior to discharge home     Subjective    \"I feel really crappy but I'll try.\"     Objective       05/02/21 1651   Prior Living Situation   Housing / Facility [facility]   Steps Into Home   (ramp)   Bathroom Set up Walk In Shower;Shower Chair   Equipment Owned 4-Wheel Walker;Tub / Shower Seat   Lives with - Patient's Self Care Capacity Spouse   Comments pt reports spouse is very helpful, manages all IADLs   Prior Level of ADL Function   Bathing Requires Assist   Dressing Requires Assist   Toileting Independent   Comments wife assists PRN   Prior Level of IADL Function   Finances Independent   Home Management Requires Assist   Shopping Requires Assist   Prior Level Of Mobility Supervision With Device in Home   Precautions   Precautions Fall Risk   Cognition    Comments " cooperative with eval   Active ROM Upper Body   Comments testing limited by c/o RUE pain   Strength Upper Body   Comments testing limited by c/o RUE pain   Balance Assessment   Weight Shift Sitting Fair   Comments declined standing d/t general malaise and dizziness   Bed Mobility    Supine to Sit Moderate Assist   Sit to Supine Maximal Assist   Scooting Minimal Assist   ADL Assessment   Grooming Minimal Assist;Seated   Upper Body Dressing Moderate Assist   Lower Body Dressing Maximal Assist   Toileting   (refused)   Functional Mobility   Sit to Stand Refused   Bed, Chair, Wheelchair Transfer Refused   Toilet Transfers Refused   Short Term Goals   Short Term Goal # 1 pt will demo LB dress with AE PRN and Lex   Short Term Goal # 2 pt will demo functional transfer with Lex   Short Term Goal # 3 pt will tolerate >8min seated grooming ADLs with set-up assist   Problem List   Problem List Decreased Active Daily Living Skills;Decreased Homemaking Skills;Decreased Activity Tolerance;Decreased Functional Mobility;Impaired Cognitive Function;Impaired Postural Control / Balance

## 2021-05-03 NOTE — WOUND TEAM
[facility]  Inpatient Services  Initial Wound and Skin Care Evaluation  Diagnosis: Chest pain [R07.9]    Unit where seen by Wound Team: T723/01     WOUND CONSULT/FOLLOW UP RELATED TO:  Skin tear and right ear cancer     WOUND HISTORY:  Bilateral ears assessed pt has known skin cancer and is being treated by physician back home. Patient's skin is fragile, and he is on blood thinners.      WOUND ASSESSMENT/LDA  Wound 04/29/21 Skin Tear Arm Proximal skin tear (Active)   Wound Image      Site Assessment Red;Bleeding    Periwound Assessment Pink    Margins Defined edges;Attached edges    Closure Adhesive bandage    Drainage Amount Moderate    Drainage Description Sanguineous    Treatments Cleansed;Site care;Compression    Wound Cleansing Normal Saline Irrigation    Periwound Protectant Skin Protectant Wipes to Periwound    Dressing Cleansing/Solutions Not Applicable    Dressing Options Mepitel One;Nonadhesive Foam;Mepilex Heel    Dressing Changed New    Dressing Status Clean;Dry;Intact    Dressing Change/Treatment Frequency Every 72 hrs, and As Needed    WOUND NURSE ONLY - Time Spent with Patient (mins) 45    Number of days: 4       Wound 05/02/21 Ear Right Skin cancer (Active)   Wound Image      Site Assessment Black;Purple    Periwound Assessment Pink    Margins Attached edges;Defined edges    Closure Open to air    Drainage Amount None    Treatments Cleansed;Site care    Wound Cleansing Not Applicable    Periwound Protectant Not Applicable    Dressing Cleansing/Solutions Not Applicable    Dressing Options Open to Air    Dressing Status Open to Air    Shape circular    Wound Odor None    Pulses N/A    Exposed Structures None    WOUND NURSE ONLY - Time Spent with Patient (mins) 45    Number of days: 1        Vascular:    FABRICIO:   No results found.    Lab Values:    Lab Results   Component Value Date/Time    WBC 20.4 (H) 05/01/2021 08:48 PM    RBC 4.17 (L) 05/01/2021 08:48 PM    HEMOGLOBIN 10.7 (L)  05/01/2021 08:48 PM    HEMATOCRIT 36.1 (L) 05/01/2021 08:48 PM    CREACTPROT 1.40 (H) 04/18/2021 11:30 PM    SEDRATEWES 7 04/18/2021 11:30 PM    HBA1C 5.2 01/29/2019 05:58 AM        Culture Results show:  No results found for this or any previous visit (from the past 720 hour(s)).    Pain Level/Medicated:  Patient denies pain       INTERVENTIONS BY WOUND TEAM:  Chart and images reviewed. Discussed with bedside RN. All areas of concern (based on picture review, LDA review and discussion with bedside RN) have been thoroughly assessed. Documentation of areas based on significant findings. This RN in to assess patient. Performed standard wound care which includes appropriate positioning, dressing removal and non-selective debridement. Pictures and measurements obtained weekly if/when required.  Preparation for Dressing removal: Dressing soaked with ns  Cleansed with:  ns and gauze.  Sharp debridement: n/a  Shavonne wound: Cleansed with ns, Prepped with no sting  Primary Dressing: mepitel one, non adhesive foam  Secondary (Outer) Dressing: mepilex    Interdisciplinary consultation: Patient, Bedside RN ,     EVALUATION / RATIONALE FOR TREATMENT:  Most Recent Date:  5/3/21: patient's skin tear was bleeding, applied pressure and replaced dressing.  Patient's ears are known to have skin cancer, patient is follow up outpatient to have spots removed.       Goals: Steady decrease in wound area and depth weekly.    WOUND TEAM PLAN OF CARE ([X] for frequency of wound follow up,):   Nursing to follow orders written for wound care. Contact wound team if area fails to progress, deteriorates or with any questions/concerns  Dressing changes by wound team:                   Follow up 3 times weekly:                NPWT change 3 times weekly:     Follow up 1-2 times weekly:      Follow up Bi-Monthly:                   Follow up as needed:     Other (explain): Wound team not following    NURSING PLAN OF CARE ORDERS (X):  Dressing changes:  See Dressing Care orders: X  Skin care: See Skin Care orders: X  RN Prevention Protocol: X  Rectal tube care: See Rectal Tube Care orders:   Other orders:    RSKIN:   CURRENTLY IN PLACE (X), APPLIED THIS VISIT (A), ORDERED (O):   Q shift Lloyd:  X  Q shift pressure point assessments:  X    Surface/Positioning X  Pressure redistribution mattress      X      Low Airloss          Bariatric foam      Bariatric ABRAHAM     Waffle cushion        Waffle Overlay      O    Reposition q 2 hours      TAPs Turning system     Z Jonathon Pillow     Offloading/Redistribution X  Sacral Mepilex (Silicone dressing)     Heel Mepilex (Silicone dressing)       O  Heel float boots (Prevalon boot)             Float Heels off Bed with Pillows           Respiratory X  Silicone O2 tubing         Gray Foam Ear protectors     Cannula fixation Device (Tender )          High flow offloading Clip    Elastic head band offloading device      Anchorfast                                                         Trach with Optifoam split foam             Containment/Moisture Prevention patient is continent    Rectal tube or BMS    Purwick/Condom Cath        Rodas Catheter    Barrier wipes           Barrier paste       Antifungal tx      Interdry        Mobilization X      Up to chair        Ambulate      PT/OT  X    Nutrition X      Dietician        Diabetes Education      PO   X  TF     TPN     NPO   # days     Other        Anticipated discharge plans: no advanced wound care at this time.   LTACH:        SNF/Rehab:                  Home Health Care:           Outpatient Wound Center:            Self/Family Care:        Other:

## 2021-05-03 NOTE — THERAPY
Physical Therapy   Initial Evaluation     Patient Name: Jeffrey Lizama  Age:  88 y.o., Sex:  male  Medical Record #: [id number]  Today's Date: 5/2/2021     Precautions: Fall Risk  Recent NSTEMI s/p PCI    Assessment  Patient is an 88 y.o. male admitted from rehab with chest pain, SOB, and hypotension. Pt recently admitted for NSTEMI s/p PCI. Pt seen for PT evaluation at this time. Activity tolerance limited with c/o generalized pain, fatigue, and mild dizziness. Pt required mod A to move from supine > sit, able to maintain balance at EOB but only able to tolerate ~3 minutes. Pt declined STS attempt d/t complaints. Pt will benefit from continued PT to progress mobility. Recommend postacute placement. Will follow.     Plan  Recommend Physical Therapy 3 times per week until therapy goals are met for the following treatments:  Bed Mobility, Gait Training, Neuro Re-Education / Balance, Self Care/Home Evaluation, Therapeutic Activities and Therapeutic Exercises  DC Equipment Recommendations: Unable to determine at this time  Discharge Recommendations: Recommend post-acute placement for additional physical therapy services prior to discharge home        05/02/21 1648   Prior Living Situation   Prior Services None   Housing / Facility 1 [facility]   Steps Into Home ramp   Steps In Home 0   Bathroom Set up Walk In Shower;Shower Chair   Equipment Owned 4-Wheel Walker;Tub / Shower Seat   Lives with -  Spouse   Comments reports spouse is able to assist, completes all IADLs   Prior Level of Functional Mobility   Bed Mobility Independent   Transfer Status Independent   Ambulation Independent   Distance Ambulation (Feet) household   Assistive Devices Used 4-Wheel Walker   Comments prior to last admit last month. was transferred from rehab following NSTEMI and PCI last month   Cognition    Level of Consciousness Alert   Comments pleasant and cooperative, c/o pain and demos self-limiting behaviors but does well with encourage to  try prior to asking for help.    Balance Assessment   Sitting Balance (Static) Fair   Sitting Balance (Dynamic) Fair -   Weight Shift Sitting Fair   Comments no LOB sitting EOB, declined standing d/t pain and fatigue   Gait Analysis   Gait Level Of Assist Unable to Participate   Comments declined STS this session   Bed Mobility    Supine to Sit Moderate Assist   Sit to Supine Maximal Assist   Scooting Minimal Assist   Comments HOB elevated, assist mostly at trunk and VCs for sequencing   Functional Mobility   Sit to Stand Refused   Bed, Chair, Wheelchair Transfer Refused   Comments EOB only, tolerated ~ 3 minutes d/t fatigue and generalized pain   Short Term Goals    Short Term Goal # 1 pt will perform supine <> sit with min A in 6 visits for improved independence   Short Term Goal # 2 pt will perform tolerate sitting EOB 5 min with SPV in 6 visits for improved activity tolerance   Short Term Goal # 3 pt will perform STS with min A in 6 visits to initiate xfrs and gait   Short Term Goal # 4 pt will perform SPT with mod A in 6 visits for improved OOB mobility   Short Term Goal # 5 pt will ambulate 50ft with FWW and min A in 6 visits for short household distances

## 2021-05-03 NOTE — CONSULTS
Cardiology Consult H&P      Referring Physician: Cristóbal Oswald M.D.    Chief Complaint   Patient presents with   • Chest Pain     Pt came from a rehab, pt was recently admitted on 4/10 for a Post NSTEMI and PCI compelted. Pt is on coumadin, ASA, plavix   • Shortness of Breath     wears 3-4L of oxygen NC   • Hypotension     upon arrival hypotensive with SBP in 60s, upon arrival in ER, /59       HPI: Jeffrey Lizama is a 88 y.o. 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 Patient's hospital stay was complicated by hypotensive episodes requiring transfer to ICU, higher dose of hydrocortisone and midodrine was  discharged to [facility] rehab. He however developed chest pain, shortness of breath and hypotension with SBP in 60s requiring readmission to the hospital.     He describes the chest pain as substernal to left sided pressure present also at rest, much less in severity compared to prior to angiogram.  chest pain onset  was when seated, happenend after dinner-  8/10 at worst,  4/10 when first assessed, however down to almost 0/10 when assessed later, radiating to bilateral shoulders/elbows ( however feels like that may be unrelated pain that has been ongoing for 3-4 months- continues to have this pain at the moment ), not associated with nausea, diaphoresis but with shortness of breath from cough and phlegm production,   Patient  Has currently been stabilised blood pressure wise with SBP of 120s, HR 70s-80s , sinus, paced rhythm on telemetry, on 4 L oxmask.   He is status post 1L NS and 6 doses of 100 mg hydrocort inj q6 hours.       Labs showing leukocytosis with elevated neutrophils and eosinophils.  Creatinine slightly trended up, normal lactate.   BNP of 5742 with troponins that trended down and stabilised.     EKG not showing any acute changes. Sinus, HR of 70s, LAD, LBBB, no acute ST twave changes.       Review of systems:     Review of Systems   Constitutional: Negative for chills, diaphoresis and fever.   HENT: Negative for sinus pain and sore throat.    Eyes: Positive for blurred vision (getting blurrier over the past one week. ). Negative for double vision and discharge.   Respiratory: Positive for cough, sputum production and shortness of breath. Negative for wheezing.    Cardiovascular: Positive for chest pain and orthopnea. Negative for palpitations, leg swelling and PND.   Gastrointestinal: Negative for abdominal pain, diarrhea, nausea and vomiting.   Genitourinary: Negative for dysuria and frequency.   Musculoskeletal: Positive for joint pain.   Skin: Negative for itching.   Neurological: Negative for dizziness and speech change.   Endo/Heme/Allergies: Bruises/bleeds easily.   Psychiatric/Behavioral: Negative for depression.       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  (pouch) ([facility]) (4/1/2010), BPH (benign prostatic hypertrophy) (4/1/2010), Bradycardia, Breath shortness, Bronchitis, Cardiac pacemaker (04 2010), Cataract, COPD (chronic obstructive pulmonary disease) ([facility]), Diverticulitis, DJD (degenerative joint disease), EMPHYSEMA, Glaucoma, Heart burn, Heart murmur, Hiatus hernia syndrome, Hypertension, 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[facility]) (1995), Indigestion, Knee arthroplasty (2002), Obstructive hypertrophic cardiomyopathy ([facility]) (8/4/2011), PAF (paroxysmal atrial fibrillation) ([facility]), Paroxysmal atrial fibrillation ([facility]) (8/29/2011), PE (pulmonary embolism), Phlebitis, Pituitary adenoma ([facility]) (1995), Pituitary adenoma ([facility]) (1995), Pneumonia, Rheumatic fever, S/P insertion of IVC (inferior vena caval) filter, S/P parathyroidectomy, S/P parathyroidectomy (2005), Sleep apnea, SSS (sick sinus syndrome) ([facility]) (8/29/2011), Third degree AV block ([facility]) (8/29/2011), thyroidectomy (2005), Unspecified disorder of thyroid, Unspecified hemorrhagic conditions, Unspecified urinary incontinence, and Urinary bladder disorder.    FHx:  family history includes Arthritis in his father and mother.    SHx:   reports that he has never smoked. He has never used smokeless tobacco. He reports that he does not drink alcohol and does not use drugs.    Allergies:  Allergies   Allergen Reactions   • Lisinopril      Hypotension, 30 mg dose   • Pcn [Penicillins] Rash     Tolerates cephalosporins         Medications:    Current Facility-Administered Medications:   •  aspirin EC (ECOTRIN) tablet 81 mg, 81 mg, Oral, DAILY, Keny Madsen M.D., 81 mg at 05/03/21 0541  •  atorvastatin (LIPITOR) tablet 80 mg, 80 mg, Oral, Q EVENING, Keny Madsen M.D., 80 mg at 05/02/21 1829  •  budesonide-formoterol (SYMBICORT) 80-4.5 MCG/ACT inhaler 2 Puff, 2 Puff, Inhalation, BID (RT), Keny Madsen M.D., 2 Puff at 05/03/21 0800  •  clopidogrel (PLAVIX) tablet 75 mg, 75 mg, Oral, DAILY, Keny Madsen M.D., 75 mg at 05/03/21 0541  •   levothyroxine (SYNTHROID) tablet 75 mcg, 75 mcg, Oral, AM ES, Keny Madsen M.D., 75 mcg at 05/03/21 0541  •  MD Alert...Warfarin per Pharmacy, , Other, PHARMACY TO DOSE, Keny Madsen M.D.  •  midodrine (PROAMATINE) tablet 5 mg, 5 mg, Oral, TID WITH MEALS, Keny Madsen M.D., 5 mg at 05/03/21 1130  •  montelukast (SINGULAIR) tablet 10 mg, 10 mg, Oral, DAILY, Keny Madsen M.D., 10 mg at 05/03/21 0541  •  omeprazole (PRILOSEC) capsule 20 mg, 20 mg, Oral, BID, Keny Madsen M.D., 20 mg at 05/03/21 0541  •  tamsulosin (FLOMAX) capsule 0.4 mg, 0.4 mg, Oral, DAILY, Keny Madsen M.D., 0.4 mg at 05/03/21 0541  •  tiotropium (Spiriva Respimat) 2.5 mcg/Act inhalation spray 5 mcg, 5 mcg, Inhalation, QDAILY (RT), Keny Madsen M.D., 5 mcg at 05/03/21 0800  •  benzonatate (TESSALON) capsule 100 mg, 100 mg, Oral, TID PRN, Keny Madsen M.D.  •  enoxaparin (LOVENOX) inj 100 mg, 100 mg, Subcutaneous, Q12HRS, Keny Madsen M.D., 100 mg at 05/03/21 0541  •  warfarin (COUMADIN) tablet 7.5 mg, 7.5 mg, Oral, DAILY AT 1800, Keny Madsen M.D., 7.5 mg at 05/02/21 1829  •  acetaminophen (Tylenol) tablet 650 mg, 650 mg, Oral, Q4HRS PRN, Yvonne Edmond M.D.  •  oxyCODONE immediate-release (ROXICODONE) tablet 5 mg, 5 mg, Oral, Q6HRS PRN, Yvonne Edmond M.D., 5 mg at 05/03/21 1133    Facility-Administered Medications Ordered in Other Encounters:   •  [MAR Hold - Suspended Admission] azithromycin (ZITHROMAX) tablet 250 mg, 250 mg, Oral, DAILY, Kesha Hua M.D.  •  [MAR Hold - Suspended Admission] ferrous gluconate (FERGON) tablet 324 mg, 324 mg, Oral, QDAY with Breakfast, Kesha Hua M.D., 324 mg at 05/01/21 0846  •  [MAR Hold - Suspended Admission] budesonide-formoterol (SYMBICORT) 80-4.5 MCG/ACT inhaler 2 Puff, 2 Puff, Inhalation, BID (RT), Kesha Hua M.D., 2 Puff at 05/01/21 1004  •  [MAR Hold - Suspended Admission] tiotropium (Spiriva Respimat) 2.5 mcg/Act inhalation spray 5 mcg, 5 mcg, Inhalation, QDAILY (RT), Kesha Hua M.D., 5  mcg at 05/01/21 1005  •  [MAR Hold - Suspended Admission] acetaZOLAMIDE (DIAMOX) tablet 250 mg, 250 mg, Oral, BID DIURETIC, Kesha Hua M.D., 250 mg at 05/01/21 1720  •  [MAR Hold - Suspended Admission] midodrine (PROAMATINE) tablet 5 mg, 5 mg, Oral, TID WITH MEALS, Kesha Hua M.D., 5 mg at 05/01/21 1720  •  [MAR Hold - Suspended Admission] hydrOXYzine HCl (ATARAX) tablet 50 mg, 50 mg, Oral, Q6HRS PRN, Kaley Duke M.D.  •  [MAR Hold - Suspended Admission] melatonin tablet 3 mg, 3 mg, Oral, HS PRN, Kaley Duke M.D.  •  [MAR Hold - Suspended Admission] Respiratory Therapy Consult, , Nebulization, Continuous RT, Kaley Duke M.D.  •  [MAR Hold - Suspended Admission] Pharmacy Consult Request ...Pain Management Review 1 Each, 1 Each, Other, PHARMACY TO DOSE, Kaley Duke M.D.  •  [MAR Hold - Suspended Admission] acetaminophen (Tylenol) tablet 650 mg, 650 mg, Oral, Q4HRS PRN, Kaley Duke M.D.  •  [MAR Hold - Suspended Admission] lactulose 20 GM/30ML solution 30 mL, 30 mL, Oral, QDAY PRN, Kaley Duke M.D.  •  [MAR Hold - Suspended Admission] docusate sodium (ENEMEEZ) enema 283 mg, 283 mg, Rectal, QDAY PRN, Kaley Duke M.D.  •  [MAR Hold - Suspended Admission] fleet enema 133 mL, 1 Each, Rectal, QDAY PRN, Kaley Duke M.D.  •  [MAR Hold - Suspended Admission] artificial tears ophthalmic solution 1 Drop, 1 Drop, Both Eyes, PRN, Kaley Duke M.D.  •  [MAR Hold - Suspended Admission] benzocaine-menthol (CEPACOL) lozenge 1 Lozenge, 1 Lozenge, Mouth/Throat, Q2HRS PRN, Kaley Duke M.D.  •  [MAR Hold - Suspended Admission] mag hydrox-al hydrox-simeth (MAALOX PLUS ES or MYLANTA DS) suspension 20 mL, 20 mL, Oral, Q2HRS PRN, Kaley Duke M.D.  •  [MAR Hold - Suspended Admission] traZODone (DESYREL) tablet 50 mg, 50 mg, Oral, QHS PRN, Kaley Duke M.D.  •  [MAR Hold - Suspended Admission] sodium chloride (OCEAN) 0.65 % nasal spray 2 Spray, 2 Spray, Nasal, PRN,  Kaley Duke M.D.  •  [MAR Hold - Suspended Admission] senna-docusate (PERICOLACE or SENOKOT S) 8.6-50 MG per tablet 2 tablet, 2 tablet, Oral, BID, 2 tablet at 05/01/21 0846 **AND** [MAR Hold - Suspended Admission] polyethylene glycol/lytes (MIRALAX) PACKET 1 Packet, 1 Packet, Oral, QDAY PRN **AND** [MAR Hold - Suspended Admission] magnesium hydroxide (MILK OF MAGNESIA) suspension 30 mL, 30 mL, Oral, QDAY PRN **AND** [MAR Hold - Suspended Admission] bisacodyl (DULCOLAX) suppository 10 mg, 10 mg, Rectal, QDAY PRN, Kaley Duke M.D.  •  [MAR Hold - Suspended Admission] aspirin EC (ECOTRIN) tablet 81 mg, 81 mg, Oral, DAILY, Kaley Duke M.D., 81 mg at 05/01/21 0846  •  [MAR Hold - Suspended Admission] clopidogrel (PLAVIX) tablet 75 mg, 75 mg, Oral, DAILY, Kaley Duke M.D., 75 mg at 05/01/21 0846  •  [MAR Hold - Suspended Admission] albuterol inhaler 2 Puff, 2 Puff, Inhalation, Q2HRS PRN, Kaley Duke M.D., 2 Puff at 04/29/21 2310  •  [MAR Hold - Suspended Admission] atorvastatin (LIPITOR) tablet 80 mg, 80 mg, Oral, Q EVENING, Kaley Duke M.D., 80 mg at 04/30/21 2028  •  [MAR Hold - Suspended Admission] benzonatate (TESSALON) capsule 100 mg, 100 mg, Oral, TID PRN, Kaley Duke M.D.  •  [MAR Hold - Suspended Admission] calcium citrate (CALCITRATE) tablet 950 mg, 950 mg, Oral, DAILY, Kaley Duke M.D., 950 mg at 05/01/21 0846  •  [MAR Hold - Suspended Admission] enoxaparin (LOVENOX) inj 100 mg, 1 mg/kg, Subcutaneous, Q12HRS, Kaley Duke M.D., 100 mg at 05/01/21 0848  •  [MAR Hold - Suspended Admission] fluticasone (FLONASE) nasal spray 100 mcg, 2 Spray, Nasal, DAILY, Kaley Duke M.D., 100 mcg at 05/01/21 0848  •  [MAR Hold - Suspended Admission] gabapentin (NEURONTIN) capsule 600 mg, 600 mg, Oral, Q EVENING, Kaley Duke M.D., 600 mg at 04/30/21 2029  •  [MAR Hold - Suspended Admission] hydrocortisone (CORTEF) tablet 10 mg, 10 mg, Oral, BID, Kaley CALABRESE  EMILY Duke, 10 mg at 05/01/21 0846  •  [MAR Hold - Suspended Admission] ipratropium-albuterol (DUONEB) nebulizer solution, 3 mL, Nebulization, Q4HRS PRN, Kaley Duke M.D., 3 mL at 05/01/21 1023  •  [MAR Hold - Suspended Admission] levothyroxine (SYNTHROID) tablet 75 mcg, 75 mcg, Oral, AM ES, Kaley Duke M.D., 75 mcg at 05/01/21 0536  •  [MAR Hold - Suspended Admission] lidocaine (LIDODERM) 5 % 2 Patch, 2 Patch, Transdermal, Q24HR, Kaley Duke M.D., 2 Patch at 05/01/21 0846  •  [MAR Hold - Suspended Admission] MD Alert...Warfarin per Pharmacy, , Other, PHARMACY TO DOSE, Kaley Duke M.D.  •  [MAR Hold - Suspended Admission] montelukast (SINGULAIR) tablet 10 mg, 10 mg, Oral, DAILY, Kaley Duke M.D., 10 mg at 05/01/21 0846  •  [MAR Hold - Suspended Admission] omeprazole (PRILOSEC) capsule 20 mg, 20 mg, Oral, BID, Kaley Duke M.D., 20 mg at 05/01/21 1720  •  [MAR Hold - Suspended Admission] oxyCODONE immediate-release (ROXICODONE) tablet 5 mg, 5 mg, Oral, Q4HRS PRN, Kaley Duke M.D., 5 mg at 05/01/21 1722  •  [MAR Hold - Suspended Admission] sotalol (BETAPACE) tablet 40 mg, 40 mg, Oral, BID, Kaley Duke M.D., 40 mg at 05/01/21 1046  •  [MAR Hold - Suspended Admission] tamsulosin (FLOMAX) capsule 0.4 mg, 0.4 mg, Oral, DAILY, Kaley Duke M.D., 0.4 mg at 05/01/21 0846  •  [MAR Hold - Suspended Admission] testosterone cypionate (DEPO-TESTOSTERONE) injection 200 mg, 200 mg, Intramuscular, Q21 DAYS, Kaley Duke M.D.  •  [MAR Hold - Suspended Admission] prochlorperazine (COMPAZINE) tablet 10 mg, 10 mg, Oral, Q6HRS PRN, Kaley Duke M.D.    Physical Examination:     Vitals:    05/03/21 0030 05/03/21 0430 05/03/21 0715 05/03/21 1130   BP: 117/69 128/69 121/80 118/74   Pulse: 80 73 75    Resp: 18 18 19    Temp: 36.1 °C (96.9 °F) 36.6 °C (97.8 °F) 35.9 °C (96.7 °F)    TempSrc: Temporal Temporal Temporal    SpO2: 96% 96% 96%    Weight:       Height:            Physical Exam   Constitutional: He is oriented to person, place, and time. No distress.   HENT:   Head: Normocephalic and atraumatic.   Eyes: Pupils are equal, round, and reactive to light. Conjunctivae and EOM are normal.   Cardiovascular:   Murmur (Systolic) heard.  Irregular   Pulmonary/Chest: No stridor. He has no wheezes.   Bibasilar crackles. Diminished breath sounds. On oxymask 4 L    Abdominal: He exhibits no distension. There is no abdominal tenderness.   Genitourinary:    Genitourinary Comments: Bruising on lower abdomen and pubic area, with no tenderness, loss of pulse or sensation, not expanding as per markings.     Musculoskeletal:         General: No edema.      Cervical back: Normal range of motion and neck supple.   Neurological: He is alert and oriented to person, place, and time.   Skin: Skin is warm and dry. He is not diaphoretic.   Bandage on left forearm.    Psychiatric: Affect normal.         Objective Data:    Labs:  Lab Results   Component Value Date/Time    PROTHROMBTM 21.1 (H) 05/03/2021 05:21 AM    INR 1.76 (H) 05/03/2021 05:21 AM      Lab Results   Component Value Date/Time    WBC 20.4 (H) 05/01/2021 08:48 PM    RBC 4.17 (L) 05/01/2021 08:48 PM    HEMOGLOBIN 10.7 (L) 05/01/2021 08:48 PM    HEMATOCRIT 36.1 (L) 05/01/2021 08:48 PM    MCV 86.6 05/01/2021 08:48 PM    MCH 25.7 (L) 05/01/2021 08:48 PM    MCHC 29.6 (L) 05/01/2021 08:48 PM    MPV 9.9 05/01/2021 08:48 PM    NEUTSPOLYS 86.10 (H) 05/01/2021 08:48 PM    LYMPHOCYTES 5.20 (L) 05/01/2021 08:48 PM    MONOCYTES 3.50 05/01/2021 08:48 PM    EOSINOPHILS 3.50 05/01/2021 08:48 PM    BASOPHILS 0.00 05/01/2021 08:48 PM    HYPOCHROMIA 1+ 07/11/2013 04:07 AM    ANISOCYTOSIS 1+ 05/01/2021 08:48 PM      Lab Results   Component Value Date/Time    SODIUM 133 (L) 05/01/2021 08:48 PM    POTASSIUM 3.8 05/01/2021 08:48 PM    CHLORIDE 97 05/01/2021 08:48 PM    CO2 32 05/01/2021 08:48 PM    GLUCOSE 138 (H) 05/01/2021 08:48 PM    BUN 19 05/01/2021  08:48 PM    CREATININE 1.02 05/01/2021 08:48 PM    CREATININE 1.3 09/11/2008 04:21 PM      Lab Results   Component Value Date/Time    CHOLSTRLTOT 124 01/29/2019 05:58 AM    LDL 69 01/29/2019 05:58 AM    HDL 42 01/29/2019 05:58 AM    TRIGLYCERIDE 67 01/29/2019 05:58 AM       Lab Results   Component Value Date/Time    ALKPHOSPHAT 67 05/01/2021 08:48 PM    ASTSGOT 21 05/01/2021 08:48 PM    ALTSGPT 29 05/01/2021 08:48 PM    TBILIRUBIN 0.3 05/01/2021 08:48 PM        Imaging/Testing:    I interpreted and/or reviewed the patient's imaging    DX-CHEST-PORTABLE (1 VIEW)   Final Result         Patchy bibasilar opacities, likely atelectasis.      CT-CTA COMPLETE THORACOABDOMINAL AORTA   Final Result         1. No significant interval change.      2. No evidence of aortic dissection identified.      3. Unchanged mild dilatation of the ascending aorta measuring 4.4 cm. Unchanged infrarenal abdominal aortic aneurysm with mural thrombus, measuring 4.2 cm.      4. Unchanged 3 mm right middle lobe nodule. Elevation of the left hemidiaphragm. Bibasilar atelectasis, left more than right.   Fleischner Society pulmonary nodule recommendations:   Low Risk: No routine follow-up      High Risk: Optional CT at 12 months      Comments: Nodules less than 6 mm do not require routine follow-up, but certain patients at high risk with suspicious nodule morphology, upper lobe location, or both may warrant 12-month follow-up.      Low Risk - Minimal or absent history of smoking and of other known risk factors.      High Risk - History of smoking or of other known risk factors.      Note: These recommendations do not apply to lung cancer screening, patients with immunosuppression, or patients with known primary cancer.      Fleischner Society 2017 Guidelines for Management of Incidentally Detected Pulmonary Nodules in Adults         EC-ECHOCARDIOGRAM LTD W/ CONT    (Results Pending)       Assessment and Plan:    Jeffrey Lizama is a 88 y.o. 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     #Hypotension   -Improved with fluid bolus , hydrocortisone , has history of hypopituitarism.  -Was taking 10 mg tid of hydrocort as per home med list.   -Unlikely ACS related in view of plateau ing troponins.   -Will consider repeat limited Echo to assess Left ventricular outflow and LVEF    #Chest pain  -Atypical  -would recommend maalox along with PPI,   -Chest pain seems to be resolving almost down to 0/10 as per patient, however shoulder, elbow and wrist pain persists   -Would recommend scheduled tyelenol dose (3gms total) along with current PRNs.  -Recommend EKG and troponin if chest pain were to recur.     #Recent NSTEMI   -mid to distal LAD stenosis with 3 overlapping JAS  on 04/2021,  -Currently on aspirin, plavix. Also on warfarin for history of PE/afib with easy bleeding, skin tears   -Will consider discontinuing aspirin once therapeutic on warfarin.   -In case of return of chest pain, will repeat troponin and EKG. Currently stable.         #Hypertrophic cardiomyopathy- repeat limited echo.  #Arotic aneurysm  stable as per CTA   #Paroxysmal atrial fibrillation-currently sinus    #History of PVCs- currently off of sotalol in view of hypotension, PVCs controlled.   #History of complete heart block/SSS, with pacemaker in place which was placed in 2010,   #COPD with chronic resp failure on 5 L of oxygen   #mild AS,   #History of hypopituitarism on solucortef  #History of DVT /multiple Pes who is currently on warfarin with IVC filter- INR 1.76

## 2021-05-03 NOTE — PROGRESS NOTES
Inpatient Anticoagulation Service Note    Date: 5/3/2021    Reason for Anticoagulation: Atrial Fibrillation, Pulmonary Embolism, Deep Vein Thrombosis   Target INR: 2.0 to 3.0  [id number] VASc Score: 4  HAS-BLED Score: 3   Hemoglobin Value: (!) 10.7  Hematocrit Value: (!) 36.1  Lab Platelet Value: 262    INR from last 7 days     Date/Time INR Value    05/03/21 0521  (!) 1.76    05/02/21 1529  (!) 1.47    05/01/21 2048  (!) 1.34        Dose from last 7 days     Date/Time Dose (mg)    05/03/21 1158  7.5    05/02/21 1400  7.5    05/02/21 0153  7.5        Significant Interactions: Other (Comments), Proton Pump Inhibitor, Thyroid Medications, Clopidogrel, Aspirin, Statin, Antibiotics(trazodone)  Bridge Therapy: Yes  Bridge Therapy Start Date: 04/29/21  Days of Overlap Therapy: 3   INR Value Greater than 2 Prior to Discontinuation of Parenteral Anticoagulation: Not Applicable   Reversal Agent Administered: Not Applicable    Comments: INR remains subtherapeutic, however trending up appropriately. No documented s/s of bleeding noted. DDIs above are all established interactions at this time. Patient remains on therapeutic LMWH until INR >2. Received warfarin 10 mg po x 1 on 5/1 given lack of response to 5 mg po daily. Will continue with warfarin 7.5 mg po daily and recheck INR with AM labs.    Education Material Provided?: No(No chronic warfarin patient )  Pharmacist suggested discharge dosing: Warfarin 7.5 mg po daily; repeat INR within 48-72 hours of discharge.      Kezia Ryder, PharmD, BCOP

## 2021-05-03 NOTE — DISCHARGE SUMMARY
Admission Date: 4/29/2021    Discharge Date: 5/1/2021    Attending Provider: Kaley Duke MD    Admission Diagnosis:   Active Hospital Problems    Diagnosis    • *Debility    • CAD (coronary artery disease)    • NSTEMI (non-ST elevated myocardial infarction) (HCC)    • Acute on chronic respiratory failure with hypoxia (HCC)    • Adrenal insufficiency (HCC)    • Anticoagulant long-term use    • History of pulmonary embolism    • Obstructive hypertrophic cardiomyopathy (HCC)    • Essential hypertension, benign    • 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HCC)    • Prolonged Q-T interval on ECG    • Frequent PVCs    • Chronic back pain    • COPD (chronic obstructive pulmonary disease) (HCC)    • SSS (sick sinus syndrome) (HCC)    • Paroxysmal atrial fibrillation (HCC)    • BPH (benign prostatic hypertrophy)    • Cardiac pacemaker in situ    • Bleeding    • Cough    • Impaired mobility and ADLs    • Thoracic aortic aneurysm without rupture (HCC)    • Gross hematuria    • Multiple skin tears    • Cystitis    • Leukocytosis    • Pulmonary nodule    • Hypocalcemia    • Normocytic anemia    • Pulmonary embolism (HCC)    • EVANGELINA (obstructive sleep apnea)        Discharge Diagnosis:  Active Hospital Problems    Diagnosis    • *Debility    • CAD (coronary artery disease)    • NSTEMI (non-ST elevated myocardial infarction) (HCC)    • Acute on chronic respiratory failure with hypoxia (HCC)    • Adrenal insufficiency (HCC)    • Anticoagulant long-term use    • History of pulmonary embolism    • Obstructive hypertrophic cardiomyopathy (HCC)    • Essential hypertension, benign    • 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HCC)    • Prolonged Q-T interval on ECG    • Frequent PVCs    • Chronic back pain    • COPD (chronic obstructive pulmonary disease) (HCC)    • SSS (sick sinus syndrome) (HCC)    • Paroxysmal atrial fibrillation (HCC)    • BPH (benign prostatic hypertrophy)    • Cardiac pacemaker in situ    • Bleeding    • Cough    • Impaired mobility and ADLs    • Thoracic  "aortic aneurysm without rupture (HCC)    • Gross hematuria    • Multiple skin tears    • Cystitis    • Leukocytosis    • Pulmonary nodule    • Hypocalcemia    • Normocytic anemia    • Pulmonary embolism (HCC)    • EVANGELINA (obstructive sleep apnea)        HPI per H&P:  Per Dr. Reno's consult \"The patient is a 88 y.o. 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 Byrd. Hospital course with hematuria/acute cystitis, managed with CBI, antibiotics, Pyridium; COPD/O2 titration, anemia, leukocytosis, hyponatremia, hypokalemia, and management of chronic back pain.\"     Patient current reports shortness of breath as well as a productive cough.  He also reports chronic joint pain in multiple areas of his body.  At home he takes Tylenol or Norco as needed for pain.  The opiates are prescribed by his primary care doctor. He denies any chest pain.  He moved his bowels today.  He does have a condom cath in place and denies any dysuria.  Patient reports as he is gotten older his thinking has declined.     Patient reports his wife fell and broke her hip in December and is still recovering so he will need to be as independent as possible.  His home is ramped and is wheelchair accessible.  He has a history of falling himself with a fracture of his left ankle without any hardware but does have chronic pain in that joint.     Patient was evaluated by Rehab Medicine physician and Physical Therapy and Occupational Therapy and determined to be appropriate for acute inpatient rehab and was transferred to [facility] on 4/29/2021  1:16 PM.    Rehab Hospital Course by Problem List:    Debility  Generalized weakness  Poor cardio-pulmonary endurance  Mild cognitive deficits  Continue full rehab " program  PT/OT/SLP, 1 hr each discipline, 5 days per week     Coronary artery disease  NSTEMI  S/p PCI/LAD stent  Aspirin  Plavix  Statin  Outpatient follow-up with cardiology    Patient transferred to ED on 5/1 for work up of his chest pain and hypotension     Hypopituitarism   Adrenal insufficiency  Hydrocortisone     Low testosterone  Testosterone repletion     Hypothyroidism  Levothyroxine     Hypocalcemia  Calcium supplementation     COPD  Chronic respiratory failure  Continue oxygen  Flonase  Singulair  As needed duo parvez  Respiratory therapist to follow patient     Cough  CXR  Sputum sample     Hypotension  Midodrine     Thoracic abdominal aneurysm  Outpatient follow-up     Cardiomyopathy/HOCM  Paroxysmal atrial fibrillation  Sick sinus syndrome status post pacemaker  Aortic stenosis  Sotalol  Outpatient follow-up with cardiology     History of PEs and DVTs  Lovenox bridge to Coumadin     Edema   Elevated BNP, improved  On Lovenox bridge to Coumadin     Cystitis  Treated with antibiotics and acute, however no culture was performed  Rechecked urinalysis, negative     Skin tears  Ear wound  Bleeding  Wound care for dressing recommendation     Chronic back pain  Lidocaine patch  Gabapentin     Hyponatremia  Mild, monitor     Leukocytosis  Rechecked UA, negative  Checked chest x-ray, atelectasis versus infection  Consulted hospitalist, appreciate assistance     Normocytic anemia  Iron deficiency  Started on ferrous gluconate and ascorbic acid     GI prophylaxis  Omeprazole while on coumadin     Bowel program  Continue bowel medications  Last BM 4/28     Bladder program  BPH  Flomax  Check PVRs - 41, 19  Continue condom cath for now  Check PVRs     DVT prophylaxis  Lovenox bridge to Coumadin, pharmacy to dose    Functional Status at Discharge  Eating:  Supervision  Eating Description:  Increased time, Set-up of equipment or meal/tube feeding  Grooming:  Minimal Assist, Seated  Grooming Description:  (SBA oral  care, min A to comb hair d/t shldr pain/dec ROM)  Bathing:  Maximal Assist  Bathing Description:  Assit with back, Assit wtih lower extremities, Assit with perineal, Increased time, Set-up of equipment, Supervision for safety, Verbal cueing(sponge bath seated EOB)  Upper Body Dressing:  Moderate Assist  Upper Body Dressing Description:  Assist with pulling shirt over head, Increased time, Initial preparation for task, Set-up of equipment, Supervision for safety, Verbal cueing(assist to pull shirt overhead and down in back)  Lower Body Dressing:  Maximal Assist  Lower Body Dressing Description:  Assist with threading into pant leg, Increased time, Initial preparation for task, Set-up of equipment, Supervision for safety, Verbal cueing(A to thread LEs into brief/pants + to pull up; total A socks)     Walk:  Minimal Assist  Distance Walked:  10(in addition to 5 ft x 2)  Number of Times Distance Was Traveled:  1  Assistive Device:  Front Wheel Walker  Gait Deviation:  Bradykinetic, Shuffled Gait  Wheelchair:  Minimal Assist  Distance Propelled:  25   Wheelchair Description:  Extra time, Limited by fatigue  Stairs Unable to Participate  Stairs Description       Comprehension:  Minimal Assist  Comprehension Description:  Glasses, Verbal cues  Expression:  Independent  Expression Description:     Social Interaction:  Independent  Social Interaction Description:     Problem Solving:  Minimal Assist  Problem Solving Description:  Seat belt, Therapy schedule, Verbal cueing, Increased time  Memory:  Moderate Assist  Memory Description:  Verbal cueing, Therapy schedule, Bed/chair alarm, Increased time, Supervision       Kaley BHAKTA M.D., personally performed a complete drug regimen review and no potential clinically significant medication issues were identified.     Discharge Medication:     Medication List      ASK your doctor about these medications      Instructions   acetaZOLAMIDE 250 MG Tabs  Commonly known as:  DIAMOX   Take 250 mg by mouth 2 times a day.  Dose: 250 mg     albuterol 108 (90 Base) MCG/ACT Aers inhalation aerosol   Inhale 2 Puffs every four hours as needed for Shortness of Breath.  Dose: 2 Puff     aspirin 81 MG EC tablet   Take 1 tablet by mouth every day.  Dose: 81 mg     atorvastatin 80 MG tablet  Commonly known as: LIPITOR   Take 1 tablet by mouth every evening.  Dose: 80 mg     azithromycin 250 MG Tabs  Commonly known as: ZITHROMAX   Take 500 mg by mouth one time.  Dose: 500 mg     budesonide-formoterol 80-4.5 MCG/ACT Aero  Commonly known as: SYMBICORT   Inhale 2 Puffs 2 times a day.  Dose: 2 Puff     calcium citrate 950 (200 Ca) MG Tabs  Commonly known as: CALCITRATE   Take 1 tablet by mouth every day.  Dose: 950 mg     clopidogrel 75 MG Tabs  Commonly known as: PLAVIX   Take 1 tablet by mouth every day.  Dose: 75 mg     enoxaparin 100 MG/ML Soln inj  Commonly known as: LOVENOX   Inject 100 mg under the skin every 12 hours.  Dose: 1 mg/kg     ferrous gluconate 324 (37.5 Fe) MG Tabs   Take 324 mg by mouth every morning with breakfast.  Dose: 324 mg     fluticasone 50 MCG/ACT nasal spray  Commonly known as: FLONASE   Administer 1 Spray into affected nostril(S) every day.  Dose: 1 Spray     gabapentin 600 MG tablet  Commonly known as: NEURONTIN   Take 1.5 Tabs by mouth every bedtime.  Dose: 900 mg     hydrocortisone 10 MG Tabs  Commonly known as: CORTEF   Take 1 Tab by mouth 3 times a day.  Dose: 10 mg     ipratropium-albuterol 0.5-2.5 (3) MG/3ML nebulizer solution  Commonly known as: DUONEB   Take 3 mL by nebulization every 6 hours as needed for Shortness of Breath.  Dose: 3 mL     levothyroxine 75 MCG Tabs  Commonly known as: SYNTHROID   Take 1 Tab by mouth Every morning on an empty stomach.  Dose: 75 mcg     lidocaine 5 % Ptch  Commonly known as: LIDODERM   Place 1 Patch on the skin every 12 hours. Off for 12 hour    Apply to left rib cage and upper back  Dose: 1 Patch     midodrine 5 MG Tabs  Commonly  known as: PROAMATINE   Take 5 mg by mouth 3 times a day with meals.  Dose: 5 mg     montelukast 10 MG Tabs  Commonly known as: SINGULAIR   Take 1 Tab by mouth every day.  Dose: 10 mg     omeprazole 20 MG delayed-release capsule  Commonly known as: PRILOSEC   Take 1 capsule by mouth 2 times a day.  Dose: 20 mg     oxyCODONE immediate-release 5 MG Tabs  Commonly known as: ROXICODONE   Take 5 mg by mouth every four hours as needed for Severe Pain.  Dose: 5 mg     senna-docusate 8.6-50 MG Tabs  Commonly known as: PERICOLACE or SENOKOT S   Take 1 tablet by mouth 2 times a day.  Dose: 1 tablet     sotalol 80 MG Tabs  Commonly known as: BETAPACE   Take 0.5 Tabs by mouth 2 times a day.  Dose: 40 mg     tamsulosin 0.4 MG capsule  Commonly known as: FLOMAX   Take 1 Cap by mouth every day.  Dose: 0.4 mg     tiotropium 18 MCG Caps  Commonly known as: SPIRIVA   Inhale 1 Cap by mouth every day.  Dose: 18 mcg     warfarin 5 MG Tabs  Commonly known as: COUMADIN   Take 1 Tab by mouth every day.  Dose: 5 mg            Disposition:  Patient to discharge to ED for further work up of his chest pain and hypotension.      Condition on Discharge:  Guarded      Kaley Duke M.D.    Date of Service: 5/3/2021    No new interval events.

## 2021-05-03 NOTE — PROGRESS NOTES
Pain and discomfort over left upper chest area.[facility] Medicine Daily Progress Note    Date of Service  5/3/2021    Chief Complaint  88 y.o. male admitted 5/1/2021 with chest pain    Hospital Course  88 y.o. male with past medical history of hypertrophic obstructive cardiomyopathy, history of paroxysmal A. 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  As per EMS his systolic blood pressure was in the 60s. His chest pain started earlier today, 5 out of 10 intensity, not relieved or exacerbated with anything.  Patient does state the pain is similar to during his last admission.  His blood pressure upon arrival to [facility] was 111/59, respiratory rate 21, heart rate 77, temperature 98.5 °F, saturating 95% on nasal cannula.  While in ER his blood pressure dropped to 87/53.  His initial troponin was 113 with a BNP of 5742.  He has intermittent chest pain while in ER.  Given his complex cardiac history, chest pain I have asked ER physician to obtain a cardiology consult.  EKG did not show any significant changes from prior EKG.  Of note during his last hospitalization in April he was noted to be hypotensive was transferred to the ICU and started on corticosteroids.  He will be observed on telemetry monitoring.    Interval Problem Update  The patient still complain about chest I discussed with cardiology and they will see the patient later today.    Consultants/Specialty  Cardiology    Code Status  Full Code    Disposition  Remain on the floor    Review of Systems  Review of Systems   Constitutional: Negative for chills, fever and weight loss.   HENT: Negative for congestion and nosebleeds.    Eyes: Negative for blurred vision, pain, discharge and redness.   Respiratory: Negative for cough,  sputum production, shortness of breath and stridor.    Cardiovascular: Positive for chest pain. Negative for palpitations and orthopnea.   Gastrointestinal: Negative for abdominal pain, diarrhea, heartburn, nausea and vomiting.   Genitourinary: Negative for dysuria, frequency and urgency.   Musculoskeletal: Negative for back pain, myalgias and neck pain.   Skin: Negative for itching and rash.   Neurological: Negative for dizziness, focal weakness, seizures and headaches.   Psychiatric/Behavioral: Negative for depression. The patient is not nervous/anxious and does not have insomnia.         Physical Exam  Temp:  [35.9 °C (96.7 °F)-37.1 °C (98.8 °F)] 35.9 °C (96.7 °F)  Pulse:  [69-82] 75  Resp:  [16-19] 19  BP: ()/(58-80) 121/80  SpO2:  [95 %-96 %] 96 %    Physical Exam  Vitals reviewed.   Constitutional:       General: He is not in acute distress.     Appearance: Normal appearance.   HENT:      Head: Normocephalic and atraumatic.      Nose: No congestion or rhinorrhea.   Eyes:      Extraocular Movements: Extraocular movements intact.      Pupils: Pupils are equal, round, and reactive to light.   Cardiovascular:      Rate and Rhythm: Normal rate and regular rhythm.      Pulses: Normal pulses.   Pulmonary:      Effort: Pulmonary effort is normal. No respiratory distress.      Breath sounds: Normal breath sounds.   Abdominal:      General: Bowel sounds are normal. There is no distension.      Palpations: Abdomen is soft.      Tenderness: There is no abdominal tenderness.   Musculoskeletal:         General: No swelling or tenderness.      Cervical back: Normal range of motion and neck supple.   Skin:     General: Skin is warm.      Findings: No erythema.   Neurological:      General: No focal deficit present.      Mental Status: He is alert and oriented to person, place, and time.         Fluids    Intake/Output Summary (Last 24 hours) at 5/3/2021 1027  Last data filed at 5/3/2021 0821  Gross per 24 hour   Intake  840 ml   Output 750 ml   Net 90 ml       Laboratory  Recent Labs     05/01/21  0637 05/01/21 2048   WBC 21.2* 20.4*   RBC 4.47* 4.17*   HEMOGLOBIN 11.4* 10.7*   HEMATOCRIT 38.5* 36.1*   MCV 86.1 86.6   MCH 25.5* 25.7*   MCHC 29.6* 29.6*   RDW 55.8* 57.6*   PLATELETCT 243 262   MPV 10.0 9.9     Recent Labs     05/01/21  0637 05/01/21 2048   SODIUM 129* 133*   POTASSIUM 4.1 3.8   CHLORIDE 95* 97   CO2 31 32   GLUCOSE 88 138*   BUN 14 19   CREATININE 0.79 1.02   CALCIUM 8.2* 8.2*     Recent Labs     05/01/21 2048 05/02/21  1529 05/03/21 0521   APTT 44.9*  --   --    INR 1.34* 1.47* 1.76*               Imaging  DX-CHEST-PORTABLE (1 VIEW)   Final Result         Patchy bibasilar opacities, likely atelectasis.      CT-CTA COMPLETE THORACOABDOMINAL AORTA   Final Result         1. No significant interval change.      2. No evidence of aortic dissection identified.      3. Unchanged mild dilatation of the ascending aorta measuring 4.4 cm. Unchanged infrarenal abdominal aortic aneurysm with mural thrombus, measuring 4.2 cm.      4. Unchanged 3 mm right middle lobe nodule. Elevation of the left hemidiaphragm. Bibasilar atelectasis, left more than right.   Fleischner Society pulmonary nodule recommendations:   Low Risk: No routine follow-up      High Risk: Optional CT at 12 months      Comments: Nodules less than 6 mm do not require routine follow-up, but certain patients at high risk with suspicious nodule morphology, upper lobe location, or both may warrant 12-month follow-up.      Low Risk - Minimal or absent history of smoking and of other known risk factors.      High Risk - History of smoking or of other known risk factors.      Note: These recommendations do not apply to lung cancer screening, patients with immunosuppression, or patients with known primary cancer.      Fleischner Society 2017 Guidelines for Management of Incidentally Detected Pulmonary Nodules in Adults              Assessment/Plan  *  Hypotension  Assessment & Plan  Hypotension most likely secondary to as patient was instructed to take Cortef 10 mg TID however, is taking BID.  Improving  Continue to monitor      Pain in the chest  Assessment & Plan  History of recent CAD s/p 3 stents to LAD. Given elevated troponin, hypotension and chest pain I requested ERP to obtain Cardiology consult, Dr. Donaldson, who suggested to continue serial troponin at this time.   Telemetry monitoring  Resume home cardiac medications ASA/Plavix  NTG prn for chest pain     Anticoagulant long-term use- (present on admission)  Assessment & Plan  History of PE/DVT. Continue with coumadin check INR.     Essential hypertension, benign- (present on admission)  Assessment & Plan  Hold antihypertensives as patient is hypotensive at this time.   Improving slowly  Restart as needed    Leukocytosis- (present on admission)  Assessment & Plan  Chronic  Monitor closely for possible infection  Follow cbc in am    Paroxysmal atrial fibrillation (HCC)- (present on admission)  Assessment & Plan  Telemetry monitoring     COPD (chronic obstructive pulmonary disease) (HCC)- (present on admission)  Assessment & Plan  Continue with home medications        VTE prophylaxis: warafin

## 2021-05-03 NOTE — PROGRESS NOTES
Assumed care of PT A&O 3. Pt resting in bed with no signs of labored breathing. On 4L. Tele monitor in place, cardiac rhythm being monitored. Call light within reach, bed in lowest position, upper bed rails up. Pt was updated on plan of care for the day. Will continue to monitor.

## 2021-05-04 ENCOUNTER — APPOINTMENT (OUTPATIENT)
Dept: RADIOLOGY | Facility: MEDICAL CENTER | Age: 86
DRG: 270 | End: 2021-05-04
Attending: INTERNAL MEDICINE
Payer: MEDICARE

## 2021-05-04 LAB
ALBUMIN SERPL BCP-MCNC: 2.5 G/DL (ref 3.2–4.9)
ALBUMIN/GLOB SERPL: 0.9 G/DL
ALP SERPL-CCNC: 45 U/L (ref 30–99)
ALT SERPL-CCNC: 23 U/L (ref 2–50)
ANION GAP SERPL CALC-SCNC: 5 MMOL/L (ref 7–16)
AST SERPL-CCNC: 25 U/L (ref 12–45)
BASOPHILS # BLD AUTO: 0.3 % (ref 0–1.8)
BASOPHILS # BLD: 0.06 K/UL (ref 0–0.12)
BILIRUB SERPL-MCNC: 0.3 MG/DL (ref 0.1–1.5)
BUN SERPL-MCNC: 26 MG/DL (ref 8–22)
CALCIUM SERPL-MCNC: 7.9 MG/DL (ref 8.5–10.5)
CHLORIDE SERPL-SCNC: 99 MMOL/L (ref 96–112)
CO2 SERPL-SCNC: 31 MMOL/L (ref 20–33)
CREAT SERPL-MCNC: 0.97 MG/DL (ref 0.5–1.4)
EKG IMPRESSION: NORMAL
EOSINOPHIL # BLD AUTO: 0.23 K/UL (ref 0–0.51)
EOSINOPHIL NFR BLD: 1.3 % (ref 0–6.9)
ERYTHROCYTE [DISTWIDTH] IN BLOOD BY AUTOMATED COUNT: 56.4 FL (ref 35.9–50)
GLOBULIN SER CALC-MCNC: 2.8 G/DL (ref 1.9–3.5)
GLUCOSE SERPL-MCNC: 81 MG/DL (ref 65–99)
HCT VFR BLD AUTO: 27.1 % (ref 42–52)
HGB BLD-MCNC: 8.2 G/DL (ref 14–18)
IMM GRANULOCYTES # BLD AUTO: 0.43 K/UL (ref 0–0.11)
IMM GRANULOCYTES NFR BLD AUTO: 2.4 % (ref 0–0.9)
INR PPP: 2.29 (ref 0.87–1.13)
LYMPHOCYTES # BLD AUTO: 1.72 K/UL (ref 1–4.8)
LYMPHOCYTES NFR BLD: 9.7 % (ref 22–41)
MCH RBC QN AUTO: 25.6 PG (ref 27–33)
MCHC RBC AUTO-ENTMCNC: 30.3 G/DL (ref 33.7–35.3)
MCV RBC AUTO: 84.7 FL (ref 81.4–97.8)
MONOCYTES # BLD AUTO: 1.52 K/UL (ref 0–0.85)
MONOCYTES NFR BLD AUTO: 8.6 % (ref 0–13.4)
NEUTROPHILS # BLD AUTO: 13.78 K/UL (ref 1.82–7.42)
NEUTROPHILS NFR BLD: 77.7 % (ref 44–72)
NRBC # BLD AUTO: 0 K/UL
NRBC BLD-RTO: 0 /100 WBC
PLATELET # BLD AUTO: 236 K/UL (ref 164–446)
PMV BLD AUTO: 10.5 FL (ref 9–12.9)
POTASSIUM SERPL-SCNC: 3.6 MMOL/L (ref 3.6–5.5)
PROT SERPL-MCNC: 5.3 G/DL (ref 6–8.2)
PROTHROMBIN TIME: 25.9 SEC (ref 12–14.6)
RBC # BLD AUTO: 3.2 M/UL (ref 4.7–6.1)
SODIUM SERPL-SCNC: 135 MMOL/L (ref 135–145)
TROPONIN T SERPL-MCNC: 110 NG/L (ref 6–19)
WBC # BLD AUTO: 17.7 K/UL (ref 4.8–10.8)

## 2021-05-04 PROCEDURE — 770020 HCHG ROOM/CARE - TELE (206)

## 2021-05-04 PROCEDURE — 700102 HCHG RX REV CODE 250 W/ 637 OVERRIDE(OP): Performed by: STUDENT IN AN ORGANIZED HEALTH CARE EDUCATION/TRAINING PROGRAM

## 2021-05-04 PROCEDURE — 99232 SBSQ HOSP IP/OBS MODERATE 35: CPT | Performed by: INTERNAL MEDICINE

## 2021-05-04 PROCEDURE — 93005 ELECTROCARDIOGRAM TRACING: CPT | Performed by: INTERNAL MEDICINE

## 2021-05-04 PROCEDURE — A9270 NON-COVERED ITEM OR SERVICE: HCPCS | Performed by: STUDENT IN AN ORGANIZED HEALTH CARE EDUCATION/TRAINING PROGRAM

## 2021-05-04 PROCEDURE — 76705 ECHO EXAM OF ABDOMEN: CPT

## 2021-05-04 PROCEDURE — 700111 HCHG RX REV CODE 636 W/ 250 OVERRIDE (IP): Performed by: INTERNAL MEDICINE

## 2021-05-04 PROCEDURE — 36415 COLL VENOUS BLD VENIPUNCTURE: CPT

## 2021-05-04 PROCEDURE — 97535 SELF CARE MNGMENT TRAINING: CPT

## 2021-05-04 PROCEDURE — 307059 PAD,EAR PROTECTOR: Performed by: INTERNAL MEDICINE

## 2021-05-04 PROCEDURE — 85025 COMPLETE CBC W/AUTO DIFF WBC: CPT

## 2021-05-04 PROCEDURE — 700105 HCHG RX REV CODE 258: Performed by: INTERNAL MEDICINE

## 2021-05-04 PROCEDURE — A9270 NON-COVERED ITEM OR SERVICE: HCPCS | Performed by: INTERNAL MEDICINE

## 2021-05-04 PROCEDURE — 84484 ASSAY OF TROPONIN QUANT: CPT

## 2021-05-04 PROCEDURE — 700102 HCHG RX REV CODE 250 W/ 637 OVERRIDE(OP): Performed by: INTERNAL MEDICINE

## 2021-05-04 PROCEDURE — 85610 PROTHROMBIN TIME: CPT

## 2021-05-04 PROCEDURE — 700111 HCHG RX REV CODE 636 W/ 250 OVERRIDE (IP): Performed by: STUDENT IN AN ORGANIZED HEALTH CARE EDUCATION/TRAINING PROGRAM

## 2021-05-04 PROCEDURE — 80053 COMPREHEN METABOLIC PANEL: CPT

## 2021-05-04 PROCEDURE — 76870 US EXAM SCROTUM: CPT

## 2021-05-04 PROCEDURE — 93010 ELECTROCARDIOGRAM REPORT: CPT | Performed by: INTERNAL MEDICINE

## 2021-05-04 RX ORDER — LACTULOSE 20 G/30ML
30 SOLUTION ORAL 3 TIMES DAILY
Status: DISCONTINUED | OUTPATIENT
Start: 2021-05-04 | End: 2021-05-08

## 2021-05-04 RX ORDER — NITROGLYCERIN 0.4 MG/1
0.4 TABLET SUBLINGUAL
Status: DISCONTINUED | OUTPATIENT
Start: 2021-05-04 | End: 2021-05-08

## 2021-05-04 RX ORDER — WARFARIN SODIUM 7.5 MG/1
7.5 TABLET ORAL
Status: DISCONTINUED | OUTPATIENT
Start: 2021-05-05 | End: 2021-05-05

## 2021-05-04 RX ORDER — SODIUM CHLORIDE 9 MG/ML
500 INJECTION, SOLUTION INTRAVENOUS ONCE
Status: COMPLETED | OUTPATIENT
Start: 2021-05-04 | End: 2021-05-04

## 2021-05-04 RX ORDER — MORPHINE SULFATE 4 MG/ML
2 INJECTION, SOLUTION INTRAMUSCULAR; INTRAVENOUS
Status: DISCONTINUED | OUTPATIENT
Start: 2021-05-04 | End: 2021-05-05

## 2021-05-04 RX ORDER — OXYCODONE HYDROCHLORIDE 5 MG/1
5 TABLET ORAL
Status: DISCONTINUED | OUTPATIENT
Start: 2021-05-04 | End: 2021-05-05

## 2021-05-04 RX ORDER — HYDROCORTISONE 10 MG/1
10 TABLET ORAL 3 TIMES DAILY
Status: DISCONTINUED | OUTPATIENT
Start: 2021-05-04 | End: 2021-05-05

## 2021-05-04 RX ORDER — OXYCODONE HYDROCHLORIDE 5 MG/1
2.5 TABLET ORAL
Status: DISCONTINUED | OUTPATIENT
Start: 2021-05-04 | End: 2021-05-05

## 2021-05-04 RX ORDER — WARFARIN SODIUM 5 MG/1
5 TABLET ORAL
Status: DISCONTINUED | OUTPATIENT
Start: 2021-05-04 | End: 2021-05-05

## 2021-05-04 RX ADMIN — OMEPRAZOLE 20 MG: 20 CAPSULE, DELAYED RELEASE ORAL at 17:42

## 2021-05-04 RX ADMIN — HYDROCORTISONE SODIUM SUCCINATE 50 MG: 100 INJECTION, POWDER, FOR SOLUTION INTRAMUSCULAR; INTRAVENOUS at 20:30

## 2021-05-04 RX ADMIN — ENOXAPARIN SODIUM 100 MG: 100 INJECTION SUBCUTANEOUS at 06:27

## 2021-05-04 RX ADMIN — HYDROCORTISONE 10 MG: 10 TABLET ORAL at 11:22

## 2021-05-04 RX ADMIN — ATORVASTATIN CALCIUM 80 MG: 80 TABLET, FILM COATED ORAL at 17:42

## 2021-05-04 RX ADMIN — LEVOTHYROXINE SODIUM 75 MCG: 25 TABLET ORAL at 06:27

## 2021-05-04 RX ADMIN — HYDROCORTISONE 10 MG: 10 TABLET ORAL at 09:27

## 2021-05-04 RX ADMIN — ASPIRIN 81 MG: 81 TABLET, COATED ORAL at 06:11

## 2021-05-04 RX ADMIN — OXYCODONE 5 MG: 5 TABLET ORAL at 19:42

## 2021-05-04 RX ADMIN — SODIUM CHLORIDE 500 ML: 9 INJECTION, SOLUTION INTRAVENOUS at 20:25

## 2021-05-04 RX ADMIN — MORPHINE SULFATE 2 MG: 4 INJECTION INTRAVENOUS at 15:29

## 2021-05-04 RX ADMIN — OMEPRAZOLE 20 MG: 20 CAPSULE, DELAYED RELEASE ORAL at 06:27

## 2021-05-04 RX ADMIN — LACTULOSE 30 ML: 20 SOLUTION ORAL at 17:42

## 2021-05-04 RX ADMIN — SODIUM CHLORIDE 500 ML: 9 INJECTION, SOLUTION INTRAVENOUS at 22:21

## 2021-05-04 RX ADMIN — TAMSULOSIN HYDROCHLORIDE 0.4 MG: 0.4 CAPSULE ORAL at 06:27

## 2021-05-04 RX ADMIN — CLOPIDOGREL BISULFATE 75 MG: 75 TABLET ORAL at 06:13

## 2021-05-04 RX ADMIN — OXYCODONE 5 MG: 5 TABLET ORAL at 14:21

## 2021-05-04 RX ADMIN — HYDROCORTISONE 10 MG: 10 TABLET ORAL at 17:42

## 2021-05-04 RX ADMIN — MONTELUKAST 10 MG: 10 TABLET, FILM COATED ORAL at 06:27

## 2021-05-04 RX ADMIN — BUDESONIDE AND FORMOTEROL FUMARATE DIHYDRATE 2 PUFF: 80; 4.5 AEROSOL RESPIRATORY (INHALATION) at 19:42

## 2021-05-04 RX ADMIN — OXYCODONE 5 MG: 5 TABLET ORAL at 09:05

## 2021-05-04 RX ADMIN — ACETAMINOPHEN 650 MG: 325 TABLET, FILM COATED ORAL at 11:57

## 2021-05-04 RX ADMIN — MIDODRINE HYDROCHLORIDE 5 MG: 5 TABLET ORAL at 11:21

## 2021-05-04 RX ADMIN — WARFARIN SODIUM 5 MG: 5 TABLET ORAL at 17:42

## 2021-05-04 ASSESSMENT — PAIN DESCRIPTION - PAIN TYPE
TYPE: ACUTE PAIN
TYPE: ACUTE PAIN;CHRONIC PAIN

## 2021-05-04 ASSESSMENT — ENCOUNTER SYMPTOMS
FEVER: 0
PHOTOPHOBIA: 0
MYALGIAS: 0
BLOOD IN STOOL: 0
VOMITING: 0
ABDOMINAL PAIN: 1
SORE THROAT: 0
SPEECH CHANGE: 0
SPUTUM PRODUCTION: 0
BRUISES/BLEEDS EASILY: 0
COUGH: 0
FLANK PAIN: 0
PALPITATIONS: 0
WHEEZING: 0
BACK PAIN: 0
CHILLS: 0
SHORTNESS OF BREATH: 0
SENSORY CHANGE: 0
HEARTBURN: 0
DIZZINESS: 0
NAUSEA: 0
BLURRED VISION: 0
WEAKNESS: 0
DIARRHEA: 0
EYE DISCHARGE: 0
HEADACHES: 0
ORTHOPNEA: 0
DOUBLE VISION: 0
DEPRESSION: 0
HEMOPTYSIS: 0
CLAUDICATION: 0

## 2021-05-04 ASSESSMENT — COGNITIVE AND FUNCTIONAL STATUS - GENERAL
DRESSING REGULAR UPPER BODY CLOTHING: A LITTLE
PERSONAL GROOMING: A LITTLE
SUGGESTED CMS G CODE MODIFIER DAILY ACTIVITY: CK
TOILETING: A LOT
DRESSING REGULAR LOWER BODY CLOTHING: A LOT
DAILY ACTIVITIY SCORE: 16
HELP NEEDED FOR BATHING: A LOT

## 2021-05-04 NOTE — PROGRESS NOTES
Esequiel from Lab called with critical result of Troponin 110 at 0710. Critical lab result read back to Esequiel.   Dr. Justin notified of critical lab result at 0711.  Critical lab result read back by Dr. Justin.

## 2021-05-04 NOTE — CARE PLAN
Problem: Communication  Goal: The ability to communicate needs accurately and effectively will improve  Outcome: PROGRESSING AS EXPECTED  Note: Patient educated to utilize call light. Patient and family oriented to hospital room. Patient encouraged to ask questions about plan of care. Patient effectively uses call light and is involved in POC.       Problem: Safety  Goal: Will remain free from injury  Outcome: PROGRESSING AS EXPECTED  Note: Patient's risk for injury and falls assessed. Appropriate safety precautions in place. Patient educated to utilize call light for needs. Patient verbalizes understanding.

## 2021-05-04 NOTE — PROGRESS NOTES
88-year-old with paroxysmal atrial fibrillation on Coumadin and recent NSTEMI on April 20 showing severe stenosis of distal LAD status post stent x3 readmitted for chest pain rule out ACS.  He was found to have elevated troponin which remained elevated but consistent for admission last collected 2 days ago.  I was called to see patient for reoccurring chest pain.    Upon seeing patient, he states that he has had right-sided chest pain since about 10 PM last night.  Describes as coming and going and sharp.  Denies exacerbating and alleviating factors.  Denies shortness of breath diaphoresis nausea vomiting.  Chest pain nonreproducible.  EKG done at bedside shows normal sinus rhythm with left bundle branch block and new repolarization abnormalities in V4 V5.    Assessment and plan:  Chest pain rule out ACS    Telemetry monitoring  Continue aspirin Plavix Lipitor  Troponin  Patient on Lovenox subcutaneous and heparin as well however INR subtherapeutic at this time

## 2021-05-04 NOTE — DISCHARGE PLANNING
Anticipated Discharge Disposition: Rehab vs SNF    Action: Patient discussed during IDT rounds.  Patient came over from [facility].  Patient will need a new PMR referral to see if he can go back to rehab.      Barriers to Discharge: Medical clearance     Plan: Case coordination to continue to follow up with medical team to discuss discharge barriers.

## 2021-05-04 NOTE — PROGRESS NOTES
Inpatient Anticoagulation Service Note    Date: 5/4/2021    Reason for Anticoagulation: Deep Vein Thrombosis, Atrial Fibrillation, Pulmonary Embolism   Target INR: 2.0 to 3.0  [id number] VASc Score: 4  HAS-BLED Score: 3   Hemoglobin Value: (!) 8.2  Hematocrit Value: (!) 27.1  Lab Platelet Value: 236    INR from last 7 days     Date/Time INR Value    05/04/21 0621  (!) 2.29    05/03/21 0521  (!) 1.76    05/02/21 1529  (!) 1.47    05/01/21 2048  (!) 1.34        Dose from last 7 days     Date/Time Dose (mg)    05/04/21 1212  5    05/03/21 1158  7.5    05/02/21 1400  7.5    05/02/21 0153  7.5        Significant Interactions: Clopidogrel, Statin, Thyroid Medications, Proton Pump Inhibitor, Corticosteroid, Other (Comments), Aspirin(Trazodone)  Bridge Therapy: No  Bridge Therapy Start Date: 04/29/21  Days of Overlap Therapy: 6  INR Value Greater than 2 Prior to Discontinuation of Parenteral Anticoagulation: Not Applicable   Reversal Agent Administered: Not Applicable    Comments: INR within therapeutic range today; therapeutic LMWH discontinued. Drop in H/H from 5/1, however there are no documented s/s of bleeding noted. ASA discontinued per cards recommendations and home hydrocortisone resumed today, otherwise there are no changes to warfarin-DDI profile at this time. INR trending up; note: patient received warfarin 10 mg po x 1 on 5/1 at rehab given lack of response to 5 mg. Will start warfarin 5 mg po daily, except 7.5 mg MWF. Repeat INR with AM labs.    Education Material Provided?: No(No chronic warfarin patient )  Pharmacist suggested discharge dosing: Warfarin 5-7.5 mg po daily; repeat INR within 48 hours of discharge.      Kezia Ryder, PharmD, BCOP

## 2021-05-04 NOTE — PROGRESS NOTES
Hospital Medicine Daily Progress Note    Date of Service  5/4/2021    Chief Complaint  88 y.o. male admitted 5/1/2021 with chest pain    Hospital Course    88 y.o. male with past medical history of hypertrophic obstructive cardiomyopathy, history of paroxysmal A. 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  As per EMS his systolic blood pressure was in the 60s. His chest pain started earlier today, 5 out of 10 intensity, not relieved or exacerbated with anything.  Patient does state the pain is similar to during his last admission.  His blood pressure upon arrival to [facility] was 111/59, respiratory rate 21, heart rate 77, temperature 98.5 °F, saturating 95% on nasal cannula.  While in ER his blood pressure dropped to 87/53.  His initial troponin was 113 with a BNP of 5742.  He has intermittent chest pain while in ER.  Given his complex cardiac history, chest pain I have asked ER physician to obtain a cardiology consult.  EKG did not show any significant changes from prior EKG.  Of note during his last hospitalization in April he was noted to be hypotensive was transferred to the ICU and started on corticosteroids.  He will be observed on telemetry monitoring.      Interval Problem Update    5/4/2021: The patient was seen and evaluated at bedside and states that he has persistent right upper quadrant pain.  Described as sharp and 8 out of 10 in intensity.  He states that the pain started while being repositioned in bed.  Pain may be musculoskeletal in nature.  According to nursing staff patient has not had a bowel movement in a few days.  Is been started on aggressive bowel regimen.  Previous CT imaging was reviewed without any acute abnormalities.  There is some concern for hematoma  formation as the patient is on chronic anticoagulation.  Ultrasound of the affected site as well as groin were negative for fluid collection.  Patient's INR is currently therapeutic and full dose Lovenox has since been discontinued.  The patient has been evaluated by cardiology who states no intervention to be done and to optimize goal-directed medical therapy.  Patient's troponins have plateaued and no new ischemic changes on repeat EKG.  Patient's chest pain likely musculoskeletal in nature.  Patient's blood pressure is well controlled on his home regimen for adrenal insufficiency with such consists of midodrine and hydrocortisone.  At this time we are pending repeat referral back to [facility] for possible discharge once approved.  No other overnight events reported.      Consultants/Specialty  Cardiology        Code Status  Full Code    Disposition  Pending referral back to [facility]    Review of Systems  Review of Systems   Constitutional: Negative for chills, fever and malaise/fatigue.   HENT: Negative for congestion, hearing loss, sore throat and tinnitus.    Eyes: Negative for blurred vision, double vision, photophobia and discharge.   Respiratory: Negative for cough, hemoptysis, sputum production, shortness of breath and wheezing.    Cardiovascular: Negative for chest pain, palpitations, orthopnea, claudication and leg swelling.   Gastrointestinal: Positive for abdominal pain. Negative for blood in stool, diarrhea, heartburn, melena, nausea and vomiting.   Genitourinary: Negative for dysuria, flank pain, hematuria and urgency.   Musculoskeletal: Negative for back pain, joint pain and myalgias.   Skin: Negative for itching and rash.   Neurological: Negative for dizziness, sensory change, speech change, weakness and headaches.   Endo/Heme/Allergies: Does not bruise/bleed easily.   Psychiatric/Behavioral: Negative for depression and suicidal ideas.        Physical Exam  Temp:  [35.8 °C  (96.5 °F)-36.8 °C (98.2 °F)] 36.2 °C (97.1 °F)  Pulse:  [] 100  Resp:  [16-18] 18  BP: ()/(62-89) 102/80  SpO2:  [96 %-99 %] 97 %    Physical Exam  Vitals reviewed.   Constitutional:       Appearance: Normal appearance. He is obese.   HENT:      Head: Normocephalic and atraumatic.      Nose: No congestion or rhinorrhea.      Mouth/Throat:      Mouth: Mucous membranes are moist.      Pharynx: Oropharynx is clear.   Eyes:      General: No scleral icterus.     Extraocular Movements: Extraocular movements intact.      Conjunctiva/sclera: Conjunctivae normal.      Pupils: Pupils are equal, round, and reactive to light.   Cardiovascular:      Rate and Rhythm: Normal rate and regular rhythm.      Heart sounds: No murmur. No friction rub. No gallop.    Pulmonary:      Effort: Pulmonary effort is normal. No respiratory distress.      Breath sounds: Normal breath sounds. No stridor. No wheezing, rhonchi or rales.   Abdominal:      General: Abdomen is flat. Bowel sounds are normal. There is no distension.      Palpations: Abdomen is soft. There is no mass.      Tenderness: There is abdominal tenderness. There is no guarding or rebound.   Musculoskeletal:         General: No swelling, tenderness or deformity.      Cervical back: Neck supple. No rigidity. No muscular tenderness.   Lymphadenopathy:      Cervical: No cervical adenopathy.   Skin:     General: Skin is warm and dry.      Findings: Bruising and lesion present. No erythema or rash.   Neurological:      General: No focal deficit present.      Mental Status: He is alert and oriented to person, place, and time. Mental status is at baseline.      Cranial Nerves: No cranial nerve deficit.      Sensory: No sensory deficit.      Motor: No weakness.   Psychiatric:         Mood and Affect: Mood normal.         Behavior: Behavior normal.         Thought Content: Thought content normal.         Fluids    Intake/Output Summary (Last 24 hours) at 5/4/2021 2452  Last  data filed at 5/4/2021 1355  Gross per 24 hour   Intake 240 ml   Output 750 ml   Net -510 ml       Laboratory  Recent Labs     05/01/21 2048 05/04/21 0621   WBC 20.4* 17.7*   RBC 4.17* 3.20*   HEMOGLOBIN 10.7* 8.2*   HEMATOCRIT 36.1* 27.1*   MCV 86.6 84.7   MCH 25.7* 25.6*   MCHC 29.6* 30.3*   RDW 57.6* 56.4*   PLATELETCT 262 236   MPV 9.9 10.5     Recent Labs     05/01/21 2048 05/04/21 0621   SODIUM 133* 135   POTASSIUM 3.8 3.6   CHLORIDE 97 99   CO2 32 31   GLUCOSE 138* 81   BUN 19 26*   CREATININE 1.02 0.97   CALCIUM 8.2* 7.9*     Recent Labs     05/01/21 2048 05/01/21 2048 05/02/21  1529 05/03/21 0521 05/04/21 0621   APTT 44.9*  --   --   --   --    INR 1.34*   < > 1.47* 1.76* 2.29*    < > = values in this interval not displayed.               Imaging  [id number]   Final Result      1.  Normal testicles. No testicular mass lesion. No testicular torsion.   2.  Benign 4 mm right epididymal cyst.      US-ABDOMEN LTD (SOFT TISSUE)   Final Result      No mass lesions or fluid collections in the lower abdominal wall, in the area of clinical concern. No lymphadenopathy detected.      EC-ECHOCARDIOGRAM COMPLETE W/ CONT   Final Result      DX-CHEST-PORTABLE (1 VIEW)   Final Result         Patchy bibasilar opacities, likely atelectasis.      CT-CTA COMPLETE THORACOABDOMINAL AORTA   Final Result         1. No significant interval change.      2. No evidence of aortic dissection identified.      3. Unchanged mild dilatation of the ascending aorta measuring 4.4 cm. Unchanged infrarenal abdominal aortic aneurysm with mural thrombus, measuring 4.2 cm.      4. Unchanged 3 mm right middle lobe nodule. Elevation of the left hemidiaphragm. Bibasilar atelectasis, left more than right.   Fleischner Society pulmonary nodule recommendations:   Low Risk: No routine follow-up      High Risk: Optional CT at 12 months      Comments: Nodules less than 6 mm do not require routine follow-up, but certain patients at high risk  with suspicious nodule morphology, upper lobe location, or both may warrant 12-month follow-up.      Low Risk - Minimal or absent history of smoking and of other known risk factors.      High Risk - History of smoking or of other known risk factors.      Note: These recommendations do not apply to lung cancer screening, patients with immunosuppression, or patients with known primary cancer.      Fleischner Society 2017 Guidelines for Management of Incidentally Detected Pulmonary Nodules in Adults              Assessment/Plan  * Hypotension  Assessment & Plan  Hypotension most likely secondary to as patient was instructed to take Cortef 10 mg TID however, is taking BID.  Improving  Continue to monitor      Pain in the chest  Assessment & Plan  History of recent CAD s/p 3 stents to LAD. Given elevated troponin, hypotension and chest pain I requested ERP to obtain Cardiology consult, Dr. Donaldson, who suggested to continue serial troponin at this time.   Telemetry monitoring  Resume home cardiac medications ASA/Plavix  NTG prn for chest pain     Anticoagulant long-term use- (present on admission)  Assessment & Plan  History of PE/DVT. Continue with coumadin check INR.     Essential hypertension, benign- (present on admission)  Assessment & Plan  Hold antihypertensives as patient is hypotensive at this time.   Improving slowly  Restart as needed    Leukocytosis- (present on admission)  Assessment & Plan  Chronic  Monitor closely for possible infection  Follow cbc in am    Paroxysmal atrial fibrillation (HCC)- (present on admission)  Assessment & Plan  Telemetry monitoring     COPD (chronic obstructive pulmonary disease) (HCC)- (present on admission)  Assessment & Plan  Continue with home medications          VTE prophylaxis: Coumadin

## 2021-05-04 NOTE — PROGRESS NOTES
Assumed care of PT A&O 4. Pt resting in bed with no signs of labored breathing. On 4L Oxymask. Tele monitor in place, cardiac rhythm being monitored. Call light within reach, bed in lowest position, upper bed rails up. Pt was updated on plan of care for the day . Will continue to monitor.

## 2021-05-05 ENCOUNTER — APPOINTMENT (OUTPATIENT)
Dept: RADIOLOGY | Facility: MEDICAL CENTER | Age: 86
DRG: 270 | End: 2021-05-05
Attending: INTERNAL MEDICINE
Payer: MEDICARE

## 2021-05-05 PROBLEM — S30.1XXA RECTUS SHEATH HEMATOMA: Status: ACTIVE | Noted: 2021-05-05

## 2021-05-05 PROBLEM — T14.8XXA HEMATOMA: Status: ACTIVE | Noted: 2021-05-05

## 2021-05-05 LAB
ABO GROUP BLD: NORMAL
ANION GAP SERPL CALC-SCNC: 7 MMOL/L (ref 7–16)
ANION GAP SERPL CALC-SCNC: 8 MMOL/L (ref 7–16)
BACTERIA BLD CULT: NORMAL
BACTERIA BLD CULT: NORMAL
BARCODED ABORH UBTYP: 5100
BARCODED ABORH UBTYP: 5100
BARCODED PRD CODE UBPRD: NORMAL
BARCODED PRD CODE UBPRD: NORMAL
BARCODED UNIT NUM UBUNT: NORMAL
BARCODED UNIT NUM UBUNT: NORMAL
BASOPHILS # BLD AUTO: 0.3 % (ref 0–1.8)
BASOPHILS # BLD: 0.07 K/UL (ref 0–0.12)
BLD GP AB SCN SERPL QL: NORMAL
BUN SERPL-MCNC: 30 MG/DL (ref 8–22)
BUN SERPL-MCNC: 31 MG/DL (ref 8–22)
CALCIUM SERPL-MCNC: 7.3 MG/DL (ref 8.5–10.5)
CALCIUM SERPL-MCNC: 7.6 MG/DL (ref 8.5–10.5)
CFT BLD TEG: 6.2 MIN (ref 5–10)
CHLORIDE SERPL-SCNC: 101 MMOL/L (ref 96–112)
CHLORIDE SERPL-SCNC: 98 MMOL/L (ref 96–112)
CLOT ANGLE BLD TEG: 71.3 DEGREES (ref 53–72)
CLOT LYSIS 30M P MA LENFR BLD TEG: 0 % (ref 0–8)
CO2 SERPL-SCNC: 25 MMOL/L (ref 20–33)
CO2 SERPL-SCNC: 27 MMOL/L (ref 20–33)
COMPONENT R 8504R: NORMAL
COMPONENT R 8504R: NORMAL
CREAT SERPL-MCNC: 1.06 MG/DL (ref 0.5–1.4)
CREAT SERPL-MCNC: 1.09 MG/DL (ref 0.5–1.4)
CT.EXTRINSIC BLD ROTEM: 1.2 MIN (ref 1–3)
EOSINOPHIL # BLD AUTO: 0.1 K/UL (ref 0–0.51)
EOSINOPHIL NFR BLD: 0.4 % (ref 0–6.9)
ERYTHROCYTE [DISTWIDTH] IN BLOOD BY AUTOMATED COUNT: 54 FL (ref 35.9–50)
ERYTHROCYTE [DISTWIDTH] IN BLOOD BY AUTOMATED COUNT: 56.7 FL (ref 35.9–50)
GLUCOSE SERPL-MCNC: 110 MG/DL (ref 65–99)
GLUCOSE SERPL-MCNC: 113 MG/DL (ref 65–99)
HCT VFR BLD AUTO: 22.3 % (ref 42–52)
HCT VFR BLD AUTO: 26.1 % (ref 42–52)
HGB BLD-MCNC: 6.7 G/DL (ref 14–18)
HGB BLD-MCNC: 8.2 G/DL (ref 14–18)
IMM GRANULOCYTES # BLD AUTO: 0.86 K/UL (ref 0–0.11)
IMM GRANULOCYTES NFR BLD AUTO: 3.3 % (ref 0–0.9)
INR PPP: 3.03 (ref 0.87–1.13)
LYMPHOCYTES # BLD AUTO: 0.88 K/UL (ref 1–4.8)
LYMPHOCYTES NFR BLD: 3.4 % (ref 22–41)
MAGNESIUM SERPL-MCNC: 2.1 MG/DL (ref 1.5–2.5)
MAGNESIUM SERPL-MCNC: 2.1 MG/DL (ref 1.5–2.5)
MCF BLD TEG: 71.7 MM (ref 50–70)
MCH RBC QN AUTO: 25.4 PG (ref 27–33)
MCH RBC QN AUTO: 27.2 PG (ref 27–33)
MCHC RBC AUTO-ENTMCNC: 30 G/DL (ref 33.7–35.3)
MCHC RBC AUTO-ENTMCNC: 31.4 G/DL (ref 33.7–35.3)
MCV RBC AUTO: 84.5 FL (ref 81.4–97.8)
MCV RBC AUTO: 86.4 FL (ref 81.4–97.8)
MONOCYTES # BLD AUTO: 2.3 K/UL (ref 0–0.85)
MONOCYTES NFR BLD AUTO: 8.9 % (ref 0–13.4)
NEUTROPHILS # BLD AUTO: 21.59 K/UL (ref 1.82–7.42)
NEUTROPHILS NFR BLD: 83.7 % (ref 44–72)
NRBC # BLD AUTO: 0.03 K/UL
NRBC BLD-RTO: 0.1 /100 WBC
PA AA BLD-ACNC: 70.1 %
PA ADP BLD-ACNC: 90.8 %
PHOSPHATE SERPL-MCNC: 3.3 MG/DL (ref 2.5–4.5)
PHOSPHATE SERPL-MCNC: 3.9 MG/DL (ref 2.5–4.5)
PLATELET # BLD AUTO: 226 K/UL (ref 164–446)
PLATELET # BLD AUTO: 262 K/UL (ref 164–446)
PMV BLD AUTO: 10.5 FL (ref 9–12.9)
PMV BLD AUTO: 10.5 FL (ref 9–12.9)
POTASSIUM SERPL-SCNC: 4 MMOL/L (ref 3.6–5.5)
POTASSIUM SERPL-SCNC: 4.7 MMOL/L (ref 3.6–5.5)
PRODUCT TYPE UPROD: NORMAL
PRODUCT TYPE UPROD: NORMAL
PROTHROMBIN TIME: 32.3 SEC (ref 12–14.6)
RBC # BLD AUTO: 2.64 M/UL (ref 4.7–6.1)
RBC # BLD AUTO: 3.02 M/UL (ref 4.7–6.1)
RH BLD: NORMAL
SIGNIFICANT IND 70042: NORMAL
SIGNIFICANT IND 70042: NORMAL
SITE SITE: NORMAL
SITE SITE: NORMAL
SODIUM SERPL-SCNC: 131 MMOL/L (ref 135–145)
SODIUM SERPL-SCNC: 135 MMOL/L (ref 135–145)
SOURCE SOURCE: NORMAL
SOURCE SOURCE: NORMAL
TEG ALGORITHM TGALG: ABNORMAL
UNIT STATUS USTAT: NORMAL
UNIT STATUS USTAT: NORMAL
WBC # BLD AUTO: 25.8 K/UL (ref 4.8–10.8)
WBC # BLD AUTO: 25.8 K/UL (ref 4.8–10.8)

## 2021-05-05 PROCEDURE — C9132 KCENTRA, PER I.U.: HCPCS | Mod: JG | Performed by: INTERNAL MEDICINE

## 2021-05-05 PROCEDURE — 700111 HCHG RX REV CODE 636 W/ 250 OVERRIDE (IP): Performed by: INTERNAL MEDICINE

## 2021-05-05 PROCEDURE — 75774 ARTERY X-RAY EACH VESSEL: CPT | Mod: XU

## 2021-05-05 PROCEDURE — A9270 NON-COVERED ITEM OR SERVICE: HCPCS | Performed by: STUDENT IN AN ORGANIZED HEALTH CARE EDUCATION/TRAINING PROGRAM

## 2021-05-05 PROCEDURE — 84100 ASSAY OF PHOSPHORUS: CPT

## 2021-05-05 PROCEDURE — A9270 NON-COVERED ITEM OR SERVICE: HCPCS | Performed by: INTERNAL MEDICINE

## 2021-05-05 PROCEDURE — 99291 CRITICAL CARE FIRST HOUR: CPT | Performed by: INTERNAL MEDICINE

## 2021-05-05 PROCEDURE — 86923 COMPATIBILITY TEST ELECTRIC: CPT

## 2021-05-05 PROCEDURE — 700102 HCHG RX REV CODE 250 W/ 637 OVERRIDE(OP): Performed by: INTERNAL MEDICINE

## 2021-05-05 PROCEDURE — 80048 BASIC METABOLIC PNL TOTAL CA: CPT

## 2021-05-05 PROCEDURE — P9016 RBC LEUKOCYTES REDUCED: HCPCS

## 2021-05-05 PROCEDURE — 85384 FIBRINOGEN ACTIVITY: CPT

## 2021-05-05 PROCEDURE — 700117 HCHG RX CONTRAST REV CODE 255: Performed by: INTERNAL MEDICINE

## 2021-05-05 PROCEDURE — B41C1ZZ FLUOROSCOPY OF PELVIC ARTERIES USING LOW OSMOLAR CONTRAST: ICD-10-PCS | Performed by: RADIOLOGY

## 2021-05-05 PROCEDURE — 4410465 IR-EMBOLIZATION

## 2021-05-05 PROCEDURE — 85610 PROTHROMBIN TIME: CPT

## 2021-05-05 PROCEDURE — 30283B1 TRANSFUSION OF NONAUTOLOGOUS 4-FACTOR PROTHROMBIN COMPLEX CONCENTRATE INTO VEIN, PERCUTANEOUS APPROACH: ICD-10-PCS | Performed by: INTERNAL MEDICINE

## 2021-05-05 PROCEDURE — 85027 COMPLETE CBC AUTOMATED: CPT

## 2021-05-05 PROCEDURE — 36430 TRANSFUSION BLD/BLD COMPNT: CPT

## 2021-05-05 PROCEDURE — 94640 AIRWAY INHALATION TREATMENT: CPT

## 2021-05-05 PROCEDURE — 36247 INS CATH ABD/L-EXT ART 3RD: CPT

## 2021-05-05 PROCEDURE — 770022 HCHG ROOM/CARE - ICU (200)

## 2021-05-05 PROCEDURE — 71045 X-RAY EXAM CHEST 1 VIEW: CPT

## 2021-05-05 PROCEDURE — 86850 RBC ANTIBODY SCREEN: CPT

## 2021-05-05 PROCEDURE — 700105 HCHG RX REV CODE 258: Performed by: INTERNAL MEDICINE

## 2021-05-05 PROCEDURE — 85025 COMPLETE CBC W/AUTO DIFF WBC: CPT

## 2021-05-05 PROCEDURE — 85576 BLOOD PLATELET AGGREGATION: CPT | Mod: 91

## 2021-05-05 PROCEDURE — B4101ZZ FLUOROSCOPY OF ABDOMINAL AORTA USING LOW OSMOLAR CONTRAST: ICD-10-PCS | Performed by: RADIOLOGY

## 2021-05-05 PROCEDURE — 86901 BLOOD TYPING SEROLOGIC RH(D): CPT

## 2021-05-05 PROCEDURE — 04L23DZ OCCLUSION OF GASTRIC ARTERY WITH INTRALUMINAL DEVICE, PERCUTANEOUS APPROACH: ICD-10-PCS | Performed by: RADIOLOGY

## 2021-05-05 PROCEDURE — 86900 BLOOD TYPING SEROLOGIC ABO: CPT

## 2021-05-05 PROCEDURE — 99233 SBSQ HOSP IP/OBS HIGH 50: CPT | Performed by: INTERNAL MEDICINE

## 2021-05-05 PROCEDURE — 85347 COAGULATION TIME ACTIVATED: CPT

## 2021-05-05 PROCEDURE — B41F1ZZ FLUOROSCOPY OF RIGHT LOWER EXTREMITY ARTERIES USING LOW OSMOLAR CONTRAST: ICD-10-PCS | Performed by: RADIOLOGY

## 2021-05-05 PROCEDURE — 700102 HCHG RX REV CODE 250 W/ 637 OVERRIDE(OP): Performed by: STUDENT IN AN ORGANIZED HEALTH CARE EDUCATION/TRAINING PROGRAM

## 2021-05-05 PROCEDURE — 74177 CT ABD & PELVIS W/CONTRAST: CPT | Mod: MG

## 2021-05-05 PROCEDURE — 700117 HCHG RX CONTRAST REV CODE 255: Performed by: RADIOLOGY

## 2021-05-05 PROCEDURE — 83735 ASSAY OF MAGNESIUM: CPT

## 2021-05-05 RX ORDER — SODIUM CHLORIDE 9 MG/ML
INJECTION, SOLUTION INTRAVENOUS CONTINUOUS
Status: ACTIVE | OUTPATIENT
Start: 2021-05-05 | End: 2021-05-05

## 2021-05-05 RX ORDER — OXYCODONE HYDROCHLORIDE 5 MG/1
2.5 TABLET ORAL
Status: DISCONTINUED | OUTPATIENT
Start: 2021-05-05 | End: 2021-05-06

## 2021-05-05 RX ORDER — OXYCODONE HYDROCHLORIDE 5 MG/1
5 TABLET ORAL
Status: DISCONTINUED | OUTPATIENT
Start: 2021-05-05 | End: 2021-05-06

## 2021-05-05 RX ORDER — MORPHINE SULFATE 4 MG/ML
2 INJECTION, SOLUTION INTRAMUSCULAR; INTRAVENOUS
Status: DISCONTINUED | OUTPATIENT
Start: 2021-05-05 | End: 2021-05-06

## 2021-05-05 RX ORDER — ONDANSETRON 2 MG/ML
4 INJECTION INTRAMUSCULAR; INTRAVENOUS PRN
Status: DISCONTINUED | OUTPATIENT
Start: 2021-05-05 | End: 2021-05-05 | Stop reason: HOSPADM

## 2021-05-05 RX ORDER — SODIUM CHLORIDE, SODIUM LACTATE, POTASSIUM CHLORIDE, AND CALCIUM CHLORIDE .6; .31; .03; .02 G/100ML; G/100ML; G/100ML; G/100ML
500 INJECTION, SOLUTION INTRAVENOUS ONCE
Status: COMPLETED | OUTPATIENT
Start: 2021-05-05 | End: 2021-05-05

## 2021-05-05 RX ORDER — SODIUM CHLORIDE 9 MG/ML
500 INJECTION, SOLUTION INTRAVENOUS
Status: DISCONTINUED | OUTPATIENT
Start: 2021-05-05 | End: 2021-05-05 | Stop reason: HOSPADM

## 2021-05-05 RX ORDER — MIDAZOLAM HYDROCHLORIDE 1 MG/ML
.5-2 INJECTION INTRAMUSCULAR; INTRAVENOUS PRN
Status: DISCONTINUED | OUTPATIENT
Start: 2021-05-05 | End: 2021-05-05 | Stop reason: HOSPADM

## 2021-05-05 RX ORDER — MIDAZOLAM HYDROCHLORIDE 1 MG/ML
INJECTION INTRAMUSCULAR; INTRAVENOUS
Status: DISPENSED
Start: 2021-05-05 | End: 2021-05-06

## 2021-05-05 RX ORDER — NALOXONE HYDROCHLORIDE 0.4 MG/ML
INJECTION, SOLUTION INTRAMUSCULAR; INTRAVENOUS; SUBCUTANEOUS
Status: DISPENSED
Start: 2021-05-05 | End: 2021-05-06

## 2021-05-05 RX ADMIN — LACTULOSE 30 ML: 20 SOLUTION ORAL at 10:55

## 2021-05-05 RX ADMIN — SODIUM CHLORIDE: 9 INJECTION, SOLUTION INTRAVENOUS at 11:29

## 2021-05-05 RX ADMIN — MORPHINE SULFATE 2 MG: 4 INJECTION INTRAVENOUS at 09:46

## 2021-05-05 RX ADMIN — SODIUM CHLORIDE, POTASSIUM CHLORIDE, SODIUM LACTATE AND CALCIUM CHLORIDE 500 ML: 600; 310; 30; 20 INJECTION, SOLUTION INTRAVENOUS at 12:08

## 2021-05-05 RX ADMIN — CLOPIDOGREL BISULFATE 75 MG: 75 TABLET ORAL at 05:54

## 2021-05-05 RX ADMIN — LEVOTHYROXINE SODIUM 75 MCG: 25 TABLET ORAL at 05:52

## 2021-05-05 RX ADMIN — PHYTONADIONE 10 MG: 10 INJECTION, EMULSION INTRAMUSCULAR; INTRAVENOUS; SUBCUTANEOUS at 12:18

## 2021-05-05 RX ADMIN — OXYCODONE 5 MG: 5 TABLET ORAL at 01:04

## 2021-05-05 RX ADMIN — HYDROCORTISONE 10 MG: 10 TABLET ORAL at 05:52

## 2021-05-05 RX ADMIN — TAMSULOSIN HYDROCHLORIDE 0.4 MG: 0.4 CAPSULE ORAL at 05:52

## 2021-05-05 RX ADMIN — MONTELUKAST 10 MG: 10 TABLET, FILM COATED ORAL at 05:53

## 2021-05-05 RX ADMIN — ATORVASTATIN CALCIUM 80 MG: 80 TABLET, FILM COATED ORAL at 17:20

## 2021-05-05 RX ADMIN — HYDROCORTISONE SODIUM SUCCINATE 100 MG: 100 INJECTION, POWDER, FOR SOLUTION INTRAMUSCULAR; INTRAVENOUS at 14:41

## 2021-05-05 RX ADMIN — HYDROCORTISONE SODIUM SUCCINATE 50 MG: 100 INJECTION, POWDER, FOR SOLUTION INTRAMUSCULAR; INTRAVENOUS at 22:09

## 2021-05-05 RX ADMIN — MORPHINE SULFATE 2 MG: 4 INJECTION INTRAVENOUS at 17:19

## 2021-05-05 RX ADMIN — PROTHROMBIN, COAGULATION FACTOR VII HUMAN, COAGULATION FACTOR IX HUMAN, COAGULATION FACTOR X HUMAN, PROTEIN C, PROTEIN S HUMAN, AND WATER 2000 UNITS: KIT at 13:00

## 2021-05-05 RX ADMIN — MIDODRINE HYDROCHLORIDE 5 MG: 5 TABLET ORAL at 05:52

## 2021-05-05 RX ADMIN — IOHEXOL 70 ML: 300 INJECTION, SOLUTION INTRAVENOUS at 16:46

## 2021-05-05 RX ADMIN — SODIUM CHLORIDE, POTASSIUM CHLORIDE, SODIUM LACTATE AND CALCIUM CHLORIDE 500 ML: 600; 310; 30; 20 INJECTION, SOLUTION INTRAVENOUS at 14:00

## 2021-05-05 RX ADMIN — LACTULOSE 30 ML: 20 SOLUTION ORAL at 05:55

## 2021-05-05 RX ADMIN — IOHEXOL 100 ML: 350 INJECTION, SOLUTION INTRAVENOUS at 09:15

## 2021-05-05 RX ADMIN — BUDESONIDE AND FORMOTEROL FUMARATE DIHYDRATE 2 PUFF: 80; 4.5 AEROSOL RESPIRATORY (INHALATION) at 08:12

## 2021-05-05 RX ADMIN — MORPHINE SULFATE 2 MG: 4 INJECTION INTRAVENOUS at 13:32

## 2021-05-05 RX ADMIN — MIDODRINE HYDROCHLORIDE 5 MG: 5 TABLET ORAL at 10:55

## 2021-05-05 RX ADMIN — OMEPRAZOLE 20 MG: 20 CAPSULE, DELAYED RELEASE ORAL at 17:21

## 2021-05-05 RX ADMIN — OMEPRAZOLE 20 MG: 20 CAPSULE, DELAYED RELEASE ORAL at 05:54

## 2021-05-05 RX ADMIN — LACTULOSE 30 ML: 20 SOLUTION ORAL at 17:20

## 2021-05-05 RX ADMIN — OXYCODONE 5 MG: 5 TABLET ORAL at 05:54

## 2021-05-05 RX ADMIN — OXYCODONE 5 MG: 5 TABLET ORAL at 22:09

## 2021-05-05 RX ADMIN — HYDROCORTISONE 10 MG: 10 TABLET ORAL at 10:55

## 2021-05-05 RX ADMIN — TIOTROPIUM BROMIDE INHALATION SPRAY 5 MCG: 3.12 SPRAY, METERED RESPIRATORY (INHALATION) at 08:12

## 2021-05-05 ASSESSMENT — ENCOUNTER SYMPTOMS
BACK PAIN: 0
FEVER: 0
BRUISES/BLEEDS EASILY: 0
NAUSEA: 0
SENSORY CHANGE: 0
DOUBLE VISION: 0
DIARRHEA: 0
FLANK PAIN: 0
SPEECH CHANGE: 0
EYE DISCHARGE: 0
BLURRED VISION: 0
DIZZINESS: 0
HEMOPTYSIS: 0
MYALGIAS: 0
COUGH: 0
VOMITING: 0
DEPRESSION: 0
SPUTUM PRODUCTION: 0
PALPITATIONS: 0
HEADACHES: 0
PHOTOPHOBIA: 0
CHILLS: 0
HEARTBURN: 0
SHORTNESS OF BREATH: 1
ABDOMINAL PAIN: 1

## 2021-05-05 ASSESSMENT — PAIN DESCRIPTION - PAIN TYPE
TYPE: ACUTE PAIN;SURGICAL PAIN
TYPE: ACUTE PAIN

## 2021-05-05 NOTE — ASSESSMENT & PLAN NOTE
Recent non-STEMI with successful PCI, JAS to LAD 4/20/21.  DAPT held temporarily for symptomatic rectus sheath hematoma; status post successful IR coil embolization of inferior epigastric artery on 5/5/21, serial H and H stable, DAPT resume 5/6/21.  Home sotalol and Diamox resumed 5/7/21.

## 2021-05-05 NOTE — THERAPY
"Occupational Therapy  Daily Treatment     Patient Name: Jeffrey Lizama  Age:  88 y.o., Sex:  male  Medical Record #: [id number]  Today's Date: 5/4/2021       Precautions: Fall Risk, Cardiac Precautions (See Comments)    Assessment    Pt seen for OT tx.  Pt did require Mod A for supine to sit EOB.  Pt had difficulty due to c/o pain in his back & leg/groin.  Pt became anxious once seated EOB but did better with slow deep breathing.  Pt stood with Mod A & was able to transfer to bedside chair with Mod A.  Pt able to groom with supervision.  Pt encouraged to sit up for lunch.  Pt will need Post Acute OT services.    Plan    Continue current treatment plan.    DC Equipment Recommendations: Unable to determine at this time  Discharge Recommendations: Recommend post-acute placement for additional occupational therapy services prior to discharge home    Subjective    \"I had to lay on that alarm all night & it hurt my back\"     Objective       05/04/21 1107   Cognition    Comments pt gets anxious with activity   Balance   Sitting Balance (Static) Fair +   Sitting Balance (Dynamic) Fair   Standing Balance (Static) Fair   Standing Balance (Dynamic) Fair -   Weight Shift Sitting Good   Weight Shift Standing Fair   Bed Mobility    Supine to Sit Moderate Assist   Sit to Supine   (pt left sitting up in chair)   Scooting Minimal Assist   Rolling Minimal Assist to Rt.   Activities of Daily Living   Eating Supervision   Grooming Minimal Assist;Seated   Upper Body Dressing Minimal Assist   Lower Body Dressing Maximal Assist   Toileting Maximal Assist   Functional Mobility   Sit to Stand Minimal Assist   Bed, Chair, Wheelchair Transfer Moderate Assist   Patient / Family Goals   Patient / Family Goal #1 to get stronger    Goal #1 Outcome Goal not met   Short Term Goals   Short Term Goal # 1 pt will demo LB dress with AE PRN and Lex   Goal Outcome # 1 Goal not met   Short Term Goal # 2 pt will demo functional transfer with Lex   Goal " Outcome # 2 Progressing as expected   Short Term Goal # 3 pt will tolerate >8min seated grooming ADLs with set-up assist   Goal Outcome # 3 Progressing as expected

## 2021-05-05 NOTE — ASSESSMENT & PLAN NOTE
Occurred in the setting of triple therapy with DAPT and Coumadin.  Reversed on 5/5/21 with Kcentra and vitamin K.  Status post emergent IR intervention with coil embolization of right inferior epigastric artery and controlled bleeding 5/5/21.  Coumadin held.  Serial H and H stable, DAPT resumed 5/6/21, CTM.

## 2021-05-05 NOTE — PROGRESS NOTES
Pt c/o of excruciating pain 10/10. Paged the MD. CT scan ordered showing active aneurysm. 2 units RBC given. 2 500cc bolus given. BP systolic ranging from 60-80. Rapid nurse notified. 2mg morphine given. Pt transferring down to IR with ОЛЬГА Jaramillo from rapid team.

## 2021-05-05 NOTE — PROGRESS NOTES
Bedside shift report received from ОЛЬГА Cedillo.  Safety check complete.  All patient needs met at this time.  Will continue to monitor.

## 2021-05-05 NOTE — CONSULTS
Critical Care Consultation    Date of consult: 5/5/2021    Referring Physician  Yo Justin M.D.    Reason for Consultation  Hypotension, acute rectus hematoma requiring urgent IR intervention    History of Presenting Illness  Mr. 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  He was receiving ongoing therapy with ASA, Plavix and on Coumadin.    He was discharged home on 4/29 then readmitted through the ED 5/2 in transfer from rehab facility with ongoing chest pain, dyspnea and hypotension.  He was monitored on telemetry and seen by cardiology with no concern for in-stent thrombosis or ACS.  INR was subtherapeutic and he was bridged with full dose Lovenox.  He has been restarted on Coumadin and most recent INR this morning was 3.03.    I was asked to evaluate the patient with ongoing hypotension and a newly identified right lower abdominal wall rectus sheath hematoma measuring 6.1 cm with ongoing arterial hemorrhage new from prior exam.  Patient is lethargic but follows commands complaining of some abdominal pain.    Code Status  Full Code    Review of Systems  Review of Systems   Unable to perform ROS: Acuity of condition       Past Medical History   has a past medical history of Anesthesia, Arrhythmia, Arthritis, Asbestos exposure, ASTHMA, Back pain, Benign neoplasm of pituitary gland and craniopharyngeal duct (pouch) (Formerly [facility]) (4/1/2010), BPH (benign prostatic hypertrophy) (4/1/2010), Bradycardia, Breath shortness, Bronchitis, Cardiac pacemaker (04 2010), Cataract, COPD (chronic obstructive pulmonary disease) (Formerly [facility]), Diverticulitis, DJD (degenerative joint disease), EMPHYSEMA, Glaucoma, Heart  burn, Heart murmur, Hiatus hernia syndrome, Hypertension, 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), Indigestion, Knee arthroplasty (2002), Obstructive hypertrophic cardiomyopathy (Formerly [facility]) (8/4/2011), PAF (paroxysmal atrial fibrillation) (Formerly [facility]), Paroxysmal atrial fibrillation ([facility]ly [facility]) (8/29/2011), PE (pulmonary embolism), Phlebitis, Pituitary adenoma (Formerly [facility]) (1995), Pituitary adenoma ([facility]ly [facility]) (1995), Pneumonia, Rheumatic fever, S/P insertion of IVC (inferior vena caval) filter, S/P parathyroidectomy, S/P parathyroidectomy (2005), Sleep apnea, SSS (sick sinus syndrome) (Formerly [facility]) (8/29/2011), Third degree AV block (Formerly [facility]) (8/29/2011), thyroidectomy (2005), Unspecified disorder of thyroid, Unspecified hemorrhagic conditions, Unspecified urinary incontinence, and Urinary bladder disorder.    Surgical History   has a past surgical history that includes other orthopedic surgery; cholecystectomy; pr total knee arthroplasty; other; cervical disk and fusion anterior; pr revise ulnar nerve at wrist; pr total knee arthroplasty; cataract extraction with iol; thyroidectomy; pacemaker insertion (Left, 04/23/2010); carpal tunnel endoscopic (9/30/2011); knee revision total (6/20/2013); cholecystectomy; cervical fusion posterior; gastroscopy-endo (1/21/2019); gastroscopy-endo (1/22/2019); and other cardiac surgery.    Family History  family history includes Arthritis in his father and mother.    Social History   reports that he has never smoked. He has never used smokeless tobacco. He reports that he does not drink alcohol and does not use drugs.    Medications  Home Medications     Reviewed by William Owen (Pharmacy Tech) on 05/01/21 at 2201  Med List Status: Complete   Medication Last Dose Status   acetaZOLAMIDE (DIAMOX) 250 MG Tab 5/1/2021 Active   albuterol 108 (90 Base) MCG/ACT Aero Soln inhalation aerosol unknown Active   aspirin EC 81 MG EC tablet 5/1/2021 Active   atorvastatin (LIPITOR) 80 MG tablet 4/30/2021 Active   azithromycin (ZITHROMAX) 250 MG  Tab 5/1/2021 Active   budesonide-formoterol (SYMBICORT) 80-4.5 MCG/ACT Aerosol 5/1/2021 Active   calcium citrate (CALCITRATE) 950 (200 Ca) MG Tab 5/1/2021 Active   clopidogrel (PLAVIX) 75 MG Tab 5/1/2021 Active   enoxaparin (LOVENOX) 100 MG/ML Solution inj 5/1/2021 Active   ferrous gluconate 324 (37.5 Fe) MG Tab 5/1/2021 Active   fluticasone (FLONASE) 50 MCG/ACT nasal spray 5/1/2021 Active   gabapentin (NEURONTIN) 600 MG tablet 4/30/2021 Active   hydrocortisone (CORTEF) 10 MG Tab 5/1/2021 Active   ipratropium-albuterol (DUONEB) 0.5-2.5 (3) MG/3ML nebulizer solution 5/1/2021 Active   levothyroxine (SYNTHROID) 75 MCG Tab 5/1/2021 Active   lidocaine (LIDODERM) 5 % Patch 5/1/2021 Active   midodrine (PROAMATINE) 5 MG Tab 5/1/2021 Active   montelukast (SINGULAIR) 10 MG Tab Not Taking Active   omeprazole (PRILOSEC) 20 MG delayed-release capsule 5/1/2021 Active   oxyCODONE immediate-release (ROXICODONE) 5 MG Tab 5/1/2021 Active   senna-docusate (PERICOLACE OR SENOKOT S) 8.6-50 MG Tab 5/1/2021 Active   sotalol (BETAPACE) 80 MG Tab 5/1/2021 Active   tamsulosin (FLOMAX) 0.4 MG capsule 5/1/2021 Active   tiotropium (SPIRIVA) 18 MCG Cap 5/1/2021 Active   warfarin (COUMADIN) 5 MG Tab 5/1/2021 Active              Current Facility-Administered Medications   Medication Dose Route Frequency Provider Last Rate Last Admin   • FENTANYL CITRATE (PF) 0.05 MG/ML INJ SOLN (WRAPPED)            • MIDAZOLAM HCL 2 MG/2ML INJ SOLN (WRAPPED)            • NALOXONE HCL 0.4 MG/ML INJ SOLN            • NS infusion   Intravenous Continuous Yo Justin M.D. 30 mL/hr at 05/05/21 1129 New Bag at 05/05/21 1129   • hydrocortisone sodium succinate PF (SOLU-CORTEF) 100 MG injection 50 mg  50 mg Intravenous Q8HRS Yo Justin M.D.       • NS (BOLUS) infusion 500 mL  500 mL Intravenous Once PRN Mariano Alfonso M.D.       • fentaNYL (SUBLIMAZE) injection 12.5-50 mcg  12.5-50 mcg Intravenous PRN Mariano Alfonso M.D.       • midazolam (VERSED) injection 0.5-2  mg  0.5-2 mg Intravenous PRN Mariano Alfonso M.D.       • ondansetron (ZOFRAN) syringe/vial injection 4 mg  4 mg Intravenous PRN Mariano Alfonso M.D.       • nitroglycerin (NITROSTAT) tablet 0.4 mg  0.4 mg Sublingual Q5 MIN PRN Josephine Hoffman M.D.       • lactulose 20 GM/30ML solution 30 mL  30 mL Oral TID Yo Justin M.D.   30 mL at 05/05/21 1055   • Pharmacy Consult Request ...Pain Management Review 1 Each  1 Each Other PHARMACY TO DOSE Yo Justin M.D.       • oxyCODONE immediate-release (ROXICODONE) tablet 2.5 mg  2.5 mg Oral Q3HRS PRN Yo Justin M.D.        Or   • oxyCODONE immediate-release (ROXICODONE) tablet 5 mg  5 mg Oral Q3HRS PRN Yo Justin M.D.   5 mg at 05/05/21 0554    Or   • morphine (pf) 4 mg/mL injection 2 mg  2 mg Intravenous Q3HRS PRN Yo Justin M.D.   2 mg at 05/05/21 1332   • atorvastatin (LIPITOR) tablet 80 mg  80 mg Oral Q EVENING Keny Madsen M.D.   80 mg at 05/04/21 1742   • budesonide-formoterol (SYMBICORT) 80-4.5 MCG/ACT inhaler 2 Puff  2 Puff Inhalation BID (RT) Keny Madsen M.D.   2 Puff at 05/05/21 0812   • [Held by provider] clopidogrel (PLAVIX) tablet 75 mg  75 mg Oral DAILY Keny Madsen M.D.   75 mg at 05/05/21 0554   • levothyroxine (SYNTHROID) tablet 75 mcg  75 mcg Oral AM ES Keny Madsen M.D.   75 mcg at 05/05/21 0552   • midodrine (PROAMATINE) tablet 5 mg  5 mg Oral TID WITH MEALS Keny Madsen M.D.   5 mg at 05/05/21 1055   • montelukast (SINGULAIR) tablet 10 mg  10 mg Oral DAILY Keny Madsen M.D.   10 mg at 05/05/21 0553   • omeprazole (PRILOSEC) capsule 20 mg  20 mg Oral BID Keny Madsen M.D.   20 mg at 05/05/21 0554   • tamsulosin (FLOMAX) capsule 0.4 mg  0.4 mg Oral DAILY Keny Madsen M.D.   0.4 mg at 05/05/21 0552   • tiotropium (Spiriva Respimat) 2.5 mcg/Act inhalation spray 5 mcg  5 mcg Inhalation QDAILY (RT) Keny Madsen M.D.   5 mcg at 05/05/21 0812   • benzonatate (TESSALON) capsule 100 mg  100 mg Oral TID PRN Keny Madsen M.D.        • acetaminophen (Tylenol) tablet 650 mg  650 mg Oral Q4HRS PRN Yvonne Edmond M.D.   650 mg at 21 1157       Allergies  Allergies   Allergen Reactions   • Lisinopril      Hypotension, 30 mg dose   • Pcn [Penicillins] Rash     Tolerates cephalosporins         Vital Signs last 24 hours  Temp:  [35.9 °C (96.6 °F)-36.4 °C (97.6 °F)] 36.4 °C (97.6 °F)  Pulse:  [] 104  Resp:  [14-20] 16  BP: ()/(47-73) 110/68  SpO2:  [97 %-100 %] 99 %    Physical Exam  Physical Exam  Vitals and nursing note reviewed.   Constitutional:       General: He is in acute distress.      Appearance: He is ill-appearing.   HENT:      Head: Normocephalic and atraumatic.      Nose: Nose normal.      Mouth/Throat:      Mouth: Mucous membranes are dry.   Eyes:      Pupils: Pupils are equal, round, and reactive to light.   Cardiovascular:      Rate and Rhythm: Regular rhythm.      Pulses: Normal pulses.   Pulmonary:      Breath sounds: No stridor.      Comments: Mildly diminished breath sounds, no wheezing  Abdominal:      General: There is distension.      Tenderness: There is abdominal tenderness.      Comments: Abdomen distended with some bruising, tenderness and guarding right mid and lower abdomen   Musculoskeletal:         General: No swelling or deformity.      Cervical back: Neck supple.   Skin:     General: Skin is warm and dry.      Capillary Refill: Capillary refill takes 2 to 3 seconds.   Neurological:      General: No focal deficit present.         Fluids  No intake or output data in the 24 hours ending 21 1629    Laboratory  Recent Results (from the past 48 hour(s))   EKG    Collection Time: 21  5:42 AM   Result Value Ref Range    Report       [facility]    Test Date:  2021  Pt Name:    MANOLO AUGUST                 Department: 171  MRN:        [id number]                      Room:       T723  Gender:     Male                         Technician: LYNN  :        [id number]                    Requested By:CHRISTOPHER KONG  Order #:    [id number]                    Reading MD: Josephine Hoffman MD    Measurements  Intervals                                Axis  Rate:       73                           P:          18  VT:         272                          QRS:        -35  QRSD:       141                          T:          141  QT:         397  QTc:        438    Interpretive Statements  SINUS RHYTHM  1ST DEGREE AV BLOCK  LEFT BUNDLE BRANCH BLOCK  LVH WITH SECONDARY REPOLARIZATION ABNORMALITY  Compared to ECG 05/01/2021 20:45:06  Left ventricular hypertrophy now present  Electronically Signed On 5-4-2021 6:39:41 PDT by Josephine Hoffman MD     PROTHROMBIN TIME    Collection Time: 05/04/21  6:21 AM   Result Value Ref Range    PT 25.9 (H) 12.0 - 14.6 sec    INR 2.29 (H) 0.87 - 1.13   CBC WITH DIFFERENTIAL    Collection Time: 05/04/21  6:21 AM   Result Value Ref Range    WBC 17.7 (H) 4.8 - 10.8 K/uL    RBC 3.20 (L) 4.70 - 6.10 M/uL    Hemoglobin 8.2 (L) 14.0 - 18.0 g/dL    Hematocrit 27.1 (L) 42.0 - 52.0 %    MCV 84.7 81.4 - 97.8 fL    MCH 25.6 (L) 27.0 - 33.0 pg    MCHC 30.3 (L) 33.7 - 35.3 g/dL    RDW 56.4 (H) 35.9 - 50.0 fL    Platelet Count 236 164 - 446 K/uL    MPV 10.5 9.0 - 12.9 fL    Neutrophils-Polys 77.70 (H) 44.00 - 72.00 %    Lymphocytes 9.70 (L) 22.00 - 41.00 %    Monocytes 8.60 0.00 - 13.40 %    Eosinophils 1.30 0.00 - 6.90 %    Basophils 0.30 0.00 - 1.80 %    Immature Granulocytes 2.40 (H) 0.00 - 0.90 %    Nucleated RBC 0.00 /100 WBC    Neutrophils (Absolute) 13.78 (H) 1.82 - 7.42 K/uL    Lymphs (Absolute) 1.72 1.00 - 4.80 K/uL    Monos (Absolute) 1.52 (H) 0.00 - 0.85 K/uL    Eos (Absolute) 0.23 0.00 - 0.51 K/uL    Baso (Absolute) 0.06 0.00 - 0.12 K/uL    Immature Granulocytes (abs) 0.43 (H) 0.00 - 0.11 K/uL    NRBC (Absolute) 0.00 K/uL   Comp Metabolic Panel    Collection Time: 05/04/21  6:21 AM   Result Value Ref Range    Sodium 135 135 - 145 mmol/L    Potassium 3.6 3.6 - 5.5 mmol/L    Chloride 99 96 - 112  mmol/L    Co2 31 20 - 33 mmol/L    Anion Gap 5.0 (L) 7.0 - 16.0    Glucose 81 65 - 99 mg/dL    Bun 26 (H) 8 - 22 mg/dL    Creatinine 0.97 0.50 - 1.40 mg/dL    Calcium 7.9 (L) 8.5 - 10.5 mg/dL    AST(SGOT) 25 12 - 45 U/L    ALT(SGPT) 23 2 - 50 U/L    Alkaline Phosphatase 45 30 - 99 U/L    Total Bilirubin 0.3 0.1 - 1.5 mg/dL    Albumin 2.5 (L) 3.2 - 4.9 g/dL    Total Protein 5.3 (L) 6.0 - 8.2 g/dL    Globulin 2.8 1.9 - 3.5 g/dL    A-G Ratio 0.9 g/dL   TROPONIN    Collection Time: 05/04/21  6:21 AM   Result Value Ref Range    Troponin T 110 (H) 6 - 19 ng/L   ESTIMATED GFR    Collection Time: 05/04/21  6:21 AM   Result Value Ref Range    GFR If African American >60 >60 mL/min/1.73 m 2    GFR If Non African American >60 >60 mL/min/1.73 m 2   PROTHROMBIN TIME    Collection Time: 05/05/21  6:18 AM   Result Value Ref Range    PT 32.3 (H) 12.0 - 14.6 sec    INR 3.03 (H) 0.87 - 1.13   Basic Metabolic Panel    Collection Time: 05/05/21  6:18 AM   Result Value Ref Range    Sodium 131 (L) 135 - 145 mmol/L    Potassium 4.7 3.6 - 5.5 mmol/L    Chloride 98 96 - 112 mmol/L    Co2 25 20 - 33 mmol/L    Glucose 113 (H) 65 - 99 mg/dL    Bun 31 (H) 8 - 22 mg/dL    Creatinine 1.06 0.50 - 1.40 mg/dL    Calcium 7.6 (L) 8.5 - 10.5 mg/dL    Anion Gap 8.0 7.0 - 16.0   CBC WITHOUT DIFFERENTIAL    Collection Time: 05/05/21  6:18 AM   Result Value Ref Range    WBC 25.8 (H) 4.8 - 10.8 K/uL    RBC 2.64 (L) 4.70 - 6.10 M/uL    Hemoglobin 6.7 (L) 14.0 - 18.0 g/dL    Hematocrit 22.3 (L) 42.0 - 52.0 %    MCV 84.5 81.4 - 97.8 fL    MCH 25.4 (L) 27.0 - 33.0 pg    MCHC 30.0 (L) 33.7 - 35.3 g/dL    RDW 56.7 (H) 35.9 - 50.0 fL    Platelet Count 262 164 - 446 K/uL    MPV 10.5 9.0 - 12.9 fL   MAGNESIUM    Collection Time: 05/05/21  6:18 AM   Result Value Ref Range    Magnesium 2.1 1.5 - 2.5 mg/dL   PHOSPHORUS    Collection Time: 05/05/21  6:18 AM   Result Value Ref Range    Phosphorus 3.9 2.5 - 4.5 mg/dL   ESTIMATED GFR    Collection Time: 05/05/21  6:18 AM    Result Value Ref Range    GFR If African American >60 >60 mL/min/1.73 m 2    GFR If Non African American >60 >60 mL/min/1.73 m 2   COD (Adult) - Type and Crossmatch only order if transfusing RBC'S    Collection Time: 05/05/21  6:18 AM   Result Value Ref Range    ABO Grouping Only O     Rh Grouping Only POS     Antibody Screen-Cod NEG     Component R       R99                 Red Cells, LR       [id number]   issued       05/05/21   10:51      Product Type R99     Dispense Status issued     Unit Number (Barcoded) [id number]     Product Code (Barcoded) X3321X75     Blood Type (Barcoded) 5100     Component R       R99                 Red Cells, LR       [id number]   issued       05/05/21   14:24      Product Type R99     Dispense Status issued     Unit Number (Barcoded) [id number]     Product Code (Barcoded) Y1612G19     Blood Type (Barcoded) 5100        Imaging  CT-ABDOMEN-PELVIS WITH   Final Result   Addendum 1 of 1   These findings were discussed with CHRISTOPHER KONG on 5/5/2021 10:40 AM.      Final      1.  RIGHT lower abdominal wall rectus sheath hematoma measuring 6.1 cm with ongoing arterial hemorrhage, new from prior exam.   2.  No other significant change.      CM-[id number]   Final Result      1.  Normal testicles. No testicular mass lesion. No testicular torsion.   2.  Benign 4 mm right epididymal cyst.      US-ABDOMEN LTD (SOFT TISSUE)   Final Result      No mass lesions or fluid collections in the lower abdominal wall, in the area of clinical concern. No lymphadenopathy detected.      EC-ECHOCARDIOGRAM COMPLETE W/ CONT   Final Result      DX-CHEST-PORTABLE (1 VIEW)   Final Result         Patchy bibasilar opacities, likely atelectasis.      CT-CTA COMPLETE THORACOABDOMINAL AORTA   Final Result         1. No significant interval change.      2. No evidence of aortic dissection identified.      3. Unchanged mild dilatation of the ascending aorta measuring 4.4 cm. Unchanged infrarenal abdominal  aortic aneurysm with mural thrombus, measuring 4.2 cm.      4. Unchanged 3 mm right middle lobe nodule. Elevation of the left hemidiaphragm. Bibasilar atelectasis, left more than right.   Fleischner Society pulmonary nodule recommendations:   Low Risk: No routine follow-up      High Risk: Optional CT at 12 months      Comments: Nodules less than 6 mm do not require routine follow-up, but certain patients at high risk with suspicious nodule morphology, upper lobe location, or both may warrant 12-month follow-up.      Low Risk - Minimal or absent history of smoking and of other known risk factors.      High Risk - History of smoking or of other known risk factors.      Note: These recommendations do not apply to lung cancer screening, patients with immunosuppression, or patients with known primary cancer.      Fleischner Society 2017 Guidelines for Management of Incidentally Detected Pulmonary Nodules in Adults         IR-EMBOLIZATION    (Results Pending)       Assessment/Plan  * Rectus sheath hematoma  Assessment & Plan  Patient has been on triple therapy with DAPT and Coumadin bridged with Lovenox  Reversed with Kcentra and vitamin K 5/5 at 1300  Sent for emergent IR intervention with coil embolization of right inferior epigastric artery and controlled bleeding 5/5  Added stress dose IV Solu-Cortef  Coumadin held and received Kcentra and vitamin K at 1300 today  Hold antiplatelets tonight but resume in a.m. as appropriate given recent cardiac stent  Monitor H&H, transfuse as needed    CAD (coronary artery disease)- (present on admission)  Assessment & Plan  Recent non-STEMI with successful PCI, JAS to LAD 4/20  DAPT held acutely with symptomatic rectus sheath hematoma  Successful IR coil embolization of inferior epigastric artery 5/5, resume DAPT tomorrow if clinically stable    Hypotension  Assessment & Plan  Secondary to acute blood loss with rectus sheath hematoma  Adrenal insufficiency with a history of hypopit  contributory  Clinically improved with transfusion PRBCs, IV fluid and stress dose corticosteroid IV    Adrenal insufficiency (HCC)- (present on admission)  Assessment & Plan  Continue IV stress dose Solu-Cortef    Paroxysmal atrial fibrillation (HCC)- (present on admission)  Assessment & Plan  Hold anticoagulation with acute rectus sheath hematoma    COPD (chronic obstructive pulmonary disease) (HCC)- (present on admission)  Assessment & Plan  Without exacerbation currently, continue titrated oxygen, RT protocols      Discussed patient condition and risk of morbidity and/or mortality with Hospitalist, RN, RT, Pharmacy and IR.    The patient remains critically ill.  Critical care time = 45 minutes in directly providing and coordinating critical care and extensive data review.  No time overlap and excludes procedures.

## 2021-05-05 NOTE — DISCHARGE PLANNING
[facility] Transitional Care Coordination    Referral from:  Dr Justin  Insurance Provider on Facesheet:  Medicare/[employer] [place]  Potential Rehab Diagnosis: Cardiac    Chart review indicates patient has on going medical management and therapy needs to possibly meet inpatient rehab facility criteria with the goal of returning to community.    D/C support: Spouse    Physiatry consult pended while waiting for additional information.  Would welcome PT update as clinically appropriate.  TCC following.       Last Covid test:  5/01/2021 not detected     Thank you for the referral.

## 2021-05-05 NOTE — DISCHARGE PLANNING
Care Transition Team Assessment    RHIANNON is patient's wife Jacquie at [phone]    Anticipated Discharge Disposition: TBD    Action: RN ILEANA spoke with patient at bedside.  Patient recently came over from [facility].  Patient lives at home with his wife Jacquie.  He has a cane and a FWW, and he was previously on service with [facility].  RN CM explained to the patient that he may no longer qualify for inpatient rehab.  Patient stated that he does not want to go to a SNF.  Patient wants to wait and see how things go when working with PT next.      Barriers to Discharge: Medical clearance     Plan: Case coordination to continue to follow up with medical team to discuss discharge barriers.     Information Source  Orientation Level: Oriented X4  Information Given By: Patient  Who is responsible for making decisions for patient? : Patient    Readmission Evaluation  Is this a readmission?: Yes - unplanned readmission  Why do you think you were readmitted?: Chest pain  Was an appointment arranged for you prior to discharge?: Yes, attended appointment(was at [facility])  Were there new prescriptions you were supposed to fill after you were discharged?: Yes, prescriptions filled  Did you understand your discharge instructions?: Yes  Did you have enough support after your last discharge?: Yes    Elopement Risk  Legal Hold: No  Ambulatory or Self Mobile in Wheelchair: No-Not an Elopement Risk  Disoriented: No  Psychiatric Symptoms: None  History of Wandering: No  Elopement this Admit: No  Vocalizing Wanting to Leave: No  Displays Behaviors, Body Language Wanting to Leave: No-Not at Risk for Elopement  Elopement Risk: Not at Risk for Elopement    Interdisciplinary Discharge Planning  Lives with - Patient's Self Care Capacity: Spouse  Patient or legal guardian wants to designate a caregiver: No  Housing / Facility: [address] [facility]  Prior Services: None    Discharge Preparedness  What is your plan after discharge?: Uncertain -  pending medical team collaboration  What are your discharge supports?: Child, Spouse  Prior Functional Level: Ambulatory, Uses Walker, Needs Assist with Activities of Daily Living, Independent with Medication Management  Difficulity with ADLs: Walking, Bathing, Dressing  Difficulity with IADLs: Cooking, Driving, Laundry, Shopping    Functional Assesment  Prior Functional Level: Ambulatory, Uses Walker, Needs Assist with Activities of Daily Living, Independent with Medication Management    Finances  Financial Barriers to Discharge: No  Prescription Coverage: Yes    Vision / Hearing Impairment  Right Eye Vision: Impaired, Wears Glasses  Left Eye Vision: Impaired, Wears Glasses  Hearing Impairment: Both Ears, Hearing Device Not Available    Advance Directive  Advance Directive?: None    Domestic Abuse  Have you ever been the victim of abuse or violence?: No  Physical Abuse or Sexual Abuse: No  Verbal Abuse or Emotional Abuse: No  Possible Abuse/Neglect Reported to:: Not Applicable    Psychological Assessment  History of Substance Abuse: None  History of Psychiatric Problems: No  Non-compliant with Treatment: No  Newly Diagnosed Illness: Yes    Discharge Risks or Barriers  Discharge risks or barriers?: Complex medical needs  Patient risk factors: Complex medical needs, Cognitive / sensory / physical deficit, Vulnerable adult    Anticipated Discharge Information  Discharge Disposition: Still a Patient (30)  Discharge Address: [address] [place] [id number]  Discharge Contact Phone Number: [phone]

## 2021-05-05 NOTE — CARE PLAN
Problem: Communication  Goal: The ability to communicate needs accurately and effectively will improve  Outcome: PROGRESSING AS EXPECTED     Problem: Venous Thromboembolism (VTW)/Deep Vein Thrombosis (DVT) Prevention:  Goal: Patient will participate in Venous Thrombosis (VTE)/Deep Vein Thrombosis (DVT)Prevention Measures  Outcome: PROGRESSING AS EXPECTED     Problem: Bowel/Gastric:  Goal: Normal bowel function is maintained or improved  Outcome: PROGRESSING SLOWER THAN EXPECTED   Pt has not had a bowl movement yet. Bowel regimen is currently in  process. Will continue to monitor    Problem: Pain Management  Goal: Pain level will decrease to patient's comfort goal  Outcome: PROGRESSING SLOWER THAN EXPECTED   Pt still c/o of 9/10 pain in right lower quadrant. Pain meds have been changed and will continue to monitor and assess appropriately.

## 2021-05-05 NOTE — OR SURGEON
Immediate Post- Operative Note        PostOp Diagnosis: RECTUS SHEATH HEMATOMA WITH ACTIVE EXTRAVASATION SEEN ON CT WITH CONTRAST ARISING FROM RIGHT INFERIOR EPIGASTRIC ARTERY      Procedure(s): R SFA PUNCTURE   AORTO-ILIAC ANGIOGRAPHY  RIGHT EXTERNAL ILIAC ANGIO  RIGHT INFERIOR EPIGASTRIC SELECTIVE MICROCATHETER ANGIOGRAPHY  RIGHT INFERIOR EPIGASTRIC COIL EMBOLIZATION  CONTROL ANGIOGRAM THRU EXISTING CATHETER POST EMBOLIZATION R INF EPIGASTRIC A X 2  R ILIAC -FEMORAL ANGIO FOR ANGIOSEAL SITE  6F ANGIOSEAL PLACED R SFA      Estimated Blood Loss: Less than 15 ml (FLUSHES)        Complications: None            5/5/2021     4:39 PM     Mariano Alfonso M.D.

## 2021-05-05 NOTE — DISCHARGE PLANNING
Follow up for post acute services case reviewed anticipate limited tolerance to IRF level of care, and limited improvement with in the time allowed for a cardiac IGC. Recommending skilled nursing for post acute services.

## 2021-05-05 NOTE — ASSESSMENT & PLAN NOTE
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

## 2021-05-05 NOTE — PROGRESS NOTES
Hospital Medicine Daily Progress Note    Date of Service  5/5/2021    Chief Complaint  88 y.o. male admitted 5/1/2021 with chest pain    Hospital Course    88 y.o. male with past medical history of hypertrophic obstructive cardiomyopathy, history of paroxysmal A. 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  As per EMS his systolic blood pressure was in the 60s. His chest pain started earlier today, 5 out of 10 intensity, not relieved or exacerbated with anything.  Patient does state the pain is similar to during his last admission.  His blood pressure upon arrival to [facility] was 111/59, respiratory rate 21, heart rate 77, temperature 98.5 °F, saturating 95% on nasal cannula.  While in ER his blood pressure dropped to 87/53.  His initial troponin was 113 with a BNP of 5742.  He has intermittent chest pain while in ER.  Given his complex cardiac history, chest pain I have asked ER physician to obtain a cardiology consult.  EKG did not show any significant changes from prior EKG.  Of note during his last hospitalization in April he was noted to be hypotensive was transferred to the ICU and started on corticosteroids.  He will be observed on telemetry monitoring.      Interval Problem Update    5/4/2021: The patient was seen and evaluated at bedside and states that he has persistent right upper quadrant pain.  Described as sharp and 8 out of 10 in intensity.  He states that the pain started while being repositioned in bed.  Pain may be musculoskeletal in nature.  According to nursing staff patient has not had a bowel movement in a few days.  Is been started on aggressive bowel regimen.  Previous CT imaging was reviewed without any acute abnormalities.  There is some concern for hematoma  formation as the patient is on chronic anticoagulation.  Ultrasound of the affected site as well as groin were negative for fluid collection.  Patient's INR is currently therapeutic and full dose Lovenox has since been discontinued.  The patient has been evaluated by cardiology who states no intervention to be done and to optimize goal-directed medical therapy.  Patient's troponins have plateaued and no new ischemic changes on repeat EKG.  Patient's chest pain likely musculoskeletal in nature.  Patient's blood pressure is well controlled on his home regimen for adrenal insufficiency with such consists of midodrine and hydrocortisone.  At this time we are pending repeat referral back to [facility] for possible discharge once approved.  No other overnight events reported.    5/5/2021: The patient was seen and evaluated at bedside and was noted to have persistent right lower quadrant abdominal pain in the setting of episodes of hypotension as well as hemoglobin of 6.9.  Given recent bridging to warfarin and mildly supratherapeutic INR there was concern for bleeding episode.  CT scan of the abdomen revealed 4 x 6 cm hematoma in the right lower quadrant with extravasation of contrast.  Warfarin was promptly discontinued and INR reversed actively with Kcentra and vitamin K.  Case was discussed with interventional radiology with plans for IR embolization of the inferior mesenteric artery.  Due to persistent hypotension, critical care was consulted for pressor support.  They had recommendations to transfuse 1 more unit of packed RBCs and start patient on stress dose steroids.  He will be transferred to the MICU for pressor support after IR embolization.  No other overnight events reported.      Consultants/Specialty  Cardiology  Critical care  Interventional radiology    Code Status  Full Code    Disposition  Pending referral back to [facility]    Review of Systems  Review of Systems   Constitutional:  Negative for chills, fever and malaise/fatigue.   HENT: Negative for hearing loss and tinnitus.    Eyes: Negative for blurred vision, double vision, photophobia and discharge.   Respiratory: Positive for shortness of breath. Negative for cough, hemoptysis and sputum production.    Cardiovascular: Negative for chest pain and palpitations.   Gastrointestinal: Positive for abdominal pain. Negative for diarrhea, heartburn, nausea and vomiting.   Genitourinary: Negative for dysuria, flank pain, hematuria and urgency.   Musculoskeletal: Negative for back pain and myalgias.   Skin: Negative for itching and rash.   Neurological: Negative for dizziness, sensory change, speech change and headaches.   Endo/Heme/Allergies: Does not bruise/bleed easily.   Psychiatric/Behavioral: Negative for depression and suicidal ideas.        Physical Exam  Temp:  [35.9 °C (96.6 °F)-36.4 °C (97.6 °F)] 36.4 °C (97.6 °F)  Pulse:  [] 106  Resp:  [14-20] 15  BP: ()/(47-73) 95/63  SpO2:  [97 %-100 %] 98 %    Physical Exam  Vitals reviewed.   Constitutional:       Appearance: Normal appearance. He is obese.   HENT:      Head: Normocephalic and atraumatic.      Nose: No congestion or rhinorrhea.      Mouth/Throat:      Mouth: Mucous membranes are moist.      Pharynx: Oropharynx is clear.   Eyes:      General: No scleral icterus.     Extraocular Movements: Extraocular movements intact.      Conjunctiva/sclera: Conjunctivae normal.      Pupils: Pupils are equal, round, and reactive to light.   Cardiovascular:      Rate and Rhythm: Normal rate and regular rhythm.      Pulses: Normal pulses.      Heart sounds: Normal heart sounds. No murmur. No friction rub.   Pulmonary:      Effort: Pulmonary effort is normal. No respiratory distress.      Breath sounds: Normal breath sounds. No stridor.   Abdominal:      General: Abdomen is flat. Bowel sounds are normal. There is no distension.      Palpations: Abdomen is soft. There is no mass.       Tenderness: There is abdominal tenderness.   Musculoskeletal:         General: No swelling, tenderness or deformity.      Cervical back: Neck supple. No rigidity. No muscular tenderness.   Lymphadenopathy:      Cervical: No cervical adenopathy.   Skin:     General: Skin is warm and dry.      Findings: Bruising and lesion present. No erythema or rash.   Neurological:      General: No focal deficit present.      Mental Status: He is alert and oriented to person, place, and time. Mental status is at baseline.   Psychiatric:         Mood and Affect: Mood normal.         Behavior: Behavior normal.         Thought Content: Thought content normal.         Fluids  No intake or output data in the 24 hours ending 05/05/21 1558    Laboratory  Recent Labs     05/04/21 0621 05/05/21  0618   WBC 17.7* 25.8*   RBC 3.20* 2.64*   HEMOGLOBIN 8.2* 6.7*   HEMATOCRIT 27.1* 22.3*   MCV 84.7 84.5   MCH 25.6* 25.4*   MCHC 30.3* 30.0*   RDW 56.4* 56.7*   PLATELETCT 236 262   MPV 10.5 10.5     Recent Labs     05/04/21 0621 05/05/21 0618   SODIUM 135 131*   POTASSIUM 3.6 4.7   CHLORIDE 99 98   CO2 31 25   GLUCOSE 81 113*   BUN 26* 31*   CREATININE 0.97 1.06   CALCIUM 7.9* 7.6*     Recent Labs     05/03/21 0521 05/04/21  0621 05/05/21  0618   INR 1.76* 2.29* 3.03*               Imaging  CT-ABDOMEN-PELVIS WITH   Final Result   Addendum 1 of 1   These findings were discussed with CHRISTOPHER KONG on 5/5/2021 10:40 AM.      Final      1.  RIGHT lower abdominal wall rectus sheath hematoma measuring 6.1 cm with ongoing arterial hemorrhage, new from prior exam.   2.  No other significant change.      [id number]   Final Result      1.  Normal testicles. No testicular mass lesion. No testicular torsion.   2.  Benign 4 mm right epididymal cyst.      US-ABDOMEN LTD (SOFT TISSUE)   Final Result      No mass lesions or fluid collections in the lower abdominal wall, in the area of clinical concern. No lymphadenopathy detected.       EC-ECHOCARDIOGRAM COMPLETE W/ CONT   Final Result      DX-CHEST-PORTABLE (1 VIEW)   Final Result         Patchy bibasilar opacities, likely atelectasis.      CT-CTA COMPLETE THORACOABDOMINAL AORTA   Final Result         1. No significant interval change.      2. No evidence of aortic dissection identified.      3. Unchanged mild dilatation of the ascending aorta measuring 4.4 cm. Unchanged infrarenal abdominal aortic aneurysm with mural thrombus, measuring 4.2 cm.      4. Unchanged 3 mm right middle lobe nodule. Elevation of the left hemidiaphragm. Bibasilar atelectasis, left more than right.   Fleischner Society pulmonary nodule recommendations:   Low Risk: No routine follow-up      High Risk: Optional CT at 12 months      Comments: Nodules less than 6 mm do not require routine follow-up, but certain patients at high risk with suspicious nodule morphology, upper lobe location, or both may warrant 12-month follow-up.      Low Risk - Minimal or absent history of smoking and of other known risk factors.      High Risk - History of smoking or of other known risk factors.      Note: These recommendations do not apply to lung cancer screening, patients with immunosuppression, or patients with known primary cancer.      Fleischner Society 2017 Guidelines for Management of Incidentally Detected Pulmonary Nodules in Adults         IR-EMBOLIZATION    (Results Pending)        Assessment/Plan  * Hypotension  Assessment & Plan  Hypotension most likely secondary to as patient was instructed to take Cortef 10 mg TID however, is taking BID.  Improving with home regimen for adrenal insufficiency which includes Cortef and midodrine  Continue to monitor      Pain in the chest  Assessment & Plan  History of recent CAD s/p 3 stents to LAD. Given elevated troponin, hypotension and chest pain I requested ERP to obtain Cardiology consult, Dr. Donaldson, who suggested to continue serial troponin at this time.   Cardiology has evaluated the  patient with recommendations to continue goal-directed medical therapy.  Troponin levels have plateaued without any new ischemic changes.  Likely musculoskeletal in nature.  Telemetry monitoring  Resume home cardiac medications  NTG prn for chest pain     Anticoagulant long-term use- (present on admission)  Assessment & Plan  History of PE/DVT. Continue with coumadin check INR.     Essential hypertension, benign- (present on admission)  Assessment & Plan  Hold antihypertensives as patient is hypotensive at this time.   Improving slowly  Restart as needed    Hematoma  Assessment & Plan  The patient was noted to have persistent right lower quadrant abdominal pain.  Repeat hemoglobin was less than 7 requiring transfusion of 2 units of packed RBCs.  Patient also with persistent hypotension which may be related to acute volume loss versus need for stress dose steroids in the setting of active bleed.  CT scan of the abdomen was performed which showed a 4 x 6 cm hematoma within the rectus sheath.  This case was discussed with interventional radiology with current plans for IR embolization of the inferior mesenteric artery.  INR was noted to be mildly supratherapeutic at 3.03.  Warfarin was discontinued and actively reversed with one-time dose of Kcentra and vitamin K IV.  Case was discussed with critical care due to persistent hypotension.  He will be transferred to the MICU for pressor support and close monitoring.    Leukocytosis- (present on admission)  Assessment & Plan  Chronic and likely related to chronic steroid use for adrenal insufficiency monitor closely for possible infection  Follow cbc in am    Paroxysmal atrial fibrillation (HCC)- (present on admission)  Assessment & Plan  Telemetry monitoring   Currently rate controlled  Continue Coumadin    COPD (chronic obstructive pulmonary disease) (HCC)- (present on admission)  Assessment & Plan  Continue with home medications   Patient currently on baseline home  oxygen levels  Continue to monitor oxygenation and titrate as needed  Not in acute exacerbation       VTE prophylaxis: Coumadin

## 2021-05-05 NOTE — PROGRESS NOTES
Dr. Pelaez notified of patient's low blood pressure.  Patient currently asymptomatic.  See new orders.

## 2021-05-05 NOTE — THERAPY
Missed Therapy     Patient Name: Jeffrey Lizama  Age:  88 y.o., Sex:  male  Medical Record #: [id number]  Today's Date: 5/5/2021    Discussed missed therapy with Rn. Attempted therapy follow up session, Pt currently at IR and pt currently with low hgb below therapeutic range. Will follow up as able.

## 2021-05-05 NOTE — CARE PLAN
Problem: Communication  Goal: The ability to communicate needs accurately and effectively will improve  Outcome: PROGRESSING AS EXPECTED     Problem: Safety  Goal: Will remain free from injury  Outcome: PROGRESSING AS EXPECTED  Goal: Will remain free from falls  Outcome: PROGRESSING AS EXPECTED     Problem: Infection  Goal: Will remain free from infection  Outcome: PROGRESSING AS EXPECTED     Problem: Venous Thromboembolism (VTW)/Deep Vein Thrombosis (DVT) Prevention:  Goal: Patient will participate in Venous Thrombosis (VTE)/Deep Vein Thrombosis (DVT)Prevention Measures  Outcome: PROGRESSING AS EXPECTED     Problem: Bowel/Gastric:  Goal: Normal bowel function is maintained or improved  Outcome: PROGRESSING AS EXPECTED  Goal: Will not experience complications related to bowel motility  Outcome: PROGRESSING AS EXPECTED     Problem: Knowledge Deficit  Goal: Knowledge of disease process/condition, treatment plan, diagnostic tests, and medications will improve  Outcome: PROGRESSING AS EXPECTED  Goal: Knowledge of the prescribed therapeutic regimen will improve  Outcome: PROGRESSING AS EXPECTED     Problem: Discharge Barriers/Planning  Goal: Patient's continuum of care needs will be met  Outcome: PROGRESSING AS EXPECTED     Problem: Respiratory:  Goal: Respiratory status will improve  Outcome: PROGRESSING AS EXPECTED     Problem: Urinary Elimination:  Goal: Ability to reestablish a normal urinary elimination pattern will improve  Outcome: PROGRESSING AS EXPECTED     Problem: Mobility  Goal: Risk for activity intolerance will decrease  Outcome: PROGRESSING AS EXPECTED     Problem: Skin Integrity  Goal: Risk for impaired skin integrity will decrease  Outcome: PROGRESSING AS EXPECTED     Problem: Psychosocial Needs:  Goal: Level of anxiety will decrease  Outcome: PROGRESSING AS EXPECTED     Problem: Pain Management  Goal: Pain level will decrease to patient's comfort goal  Outcome: PROGRESSING SLOWER THAN EXPECTED

## 2021-05-05 NOTE — ASSESSMENT & PLAN NOTE
Spontaneous rectus sheath hematoma now s/p embolization of inf epigastric artery  Hgb has remained stable  Now off coumadin  Will resume DAPT given recent JAS placement

## 2021-05-05 NOTE — ASSESSMENT & PLAN NOTE
Without exacerbation currently.  Continue home inhalers, home O2 for goal SpO2 88-92, and RT protocol.

## 2021-05-05 NOTE — PROGRESS NOTES
Dr. Pelaez notified of patient's low blood pressure s/p 500cc bolus.  Patient remains asymptomatic.  See new order.

## 2021-05-06 ENCOUNTER — APPOINTMENT (OUTPATIENT)
Dept: RADIOLOGY | Facility: MEDICAL CENTER | Age: 86
DRG: 270 | End: 2021-05-06
Attending: STUDENT IN AN ORGANIZED HEALTH CARE EDUCATION/TRAINING PROGRAM
Payer: MEDICARE

## 2021-05-06 LAB
ALBUMIN SERPL BCP-MCNC: 2.6 G/DL (ref 3.2–4.9)
ALBUMIN/GLOB SERPL: 1 G/DL
ALP SERPL-CCNC: 48 U/L (ref 30–99)
ALT SERPL-CCNC: 27 U/L (ref 2–50)
ANION GAP SERPL CALC-SCNC: 7 MMOL/L (ref 7–16)
AST SERPL-CCNC: 21 U/L (ref 12–45)
BACTERIA BLD CULT: NORMAL
BILIRUB SERPL-MCNC: 0.7 MG/DL (ref 0.1–1.5)
BUN SERPL-MCNC: 28 MG/DL (ref 8–22)
CALCIUM SERPL-MCNC: 7.6 MG/DL (ref 8.5–10.5)
CHLORIDE SERPL-SCNC: 100 MMOL/L (ref 96–112)
CO2 SERPL-SCNC: 27 MMOL/L (ref 20–33)
CREAT SERPL-MCNC: 1.04 MG/DL (ref 0.5–1.4)
ERYTHROCYTE [DISTWIDTH] IN BLOOD BY AUTOMATED COUNT: 52.8 FL (ref 35.9–50)
ERYTHROCYTE [DISTWIDTH] IN BLOOD BY AUTOMATED COUNT: 53.3 FL (ref 35.9–50)
ERYTHROCYTE [DISTWIDTH] IN BLOOD BY AUTOMATED COUNT: 54.6 FL (ref 35.9–50)
GLOBULIN SER CALC-MCNC: 2.7 G/DL (ref 1.9–3.5)
GLUCOSE SERPL-MCNC: 116 MG/DL (ref 65–99)
HCT VFR BLD AUTO: 23.1 % (ref 42–52)
HCT VFR BLD AUTO: 23.9 % (ref 42–52)
HCT VFR BLD AUTO: 25.2 % (ref 42–52)
HGB BLD-MCNC: 7.3 G/DL (ref 14–18)
HGB BLD-MCNC: 7.5 G/DL (ref 14–18)
HGB BLD-MCNC: 7.9 G/DL (ref 14–18)
INR PPP: 1.27 (ref 0.87–1.13)
MAGNESIUM SERPL-MCNC: 2.2 MG/DL (ref 1.5–2.5)
MCH RBC QN AUTO: 26.6 PG (ref 27–33)
MCH RBC QN AUTO: 27 PG (ref 27–33)
MCH RBC QN AUTO: 27 PG (ref 27–33)
MCHC RBC AUTO-ENTMCNC: 31.3 G/DL (ref 33.7–35.3)
MCHC RBC AUTO-ENTMCNC: 31.4 G/DL (ref 33.7–35.3)
MCHC RBC AUTO-ENTMCNC: 31.6 G/DL (ref 33.7–35.3)
MCV RBC AUTO: 84.8 FL (ref 81.4–97.8)
MCV RBC AUTO: 85.6 FL (ref 81.4–97.8)
MCV RBC AUTO: 86 FL (ref 81.4–97.8)
PHOSPHATE SERPL-MCNC: 3.6 MG/DL (ref 2.5–4.5)
PLATELET # BLD AUTO: 209 K/UL (ref 164–446)
PLATELET # BLD AUTO: 220 K/UL (ref 164–446)
PLATELET # BLD AUTO: 230 K/UL (ref 164–446)
PMV BLD AUTO: 10.2 FL (ref 9–12.9)
PMV BLD AUTO: 10.3 FL (ref 9–12.9)
PMV BLD AUTO: 10.7 FL (ref 9–12.9)
POTASSIUM SERPL-SCNC: 4.2 MMOL/L (ref 3.6–5.5)
PROT SERPL-MCNC: 5.3 G/DL (ref 6–8.2)
PROTHROMBIN TIME: 16.3 SEC (ref 12–14.6)
RBC # BLD AUTO: 2.7 M/UL (ref 4.7–6.1)
RBC # BLD AUTO: 2.78 M/UL (ref 4.7–6.1)
RBC # BLD AUTO: 2.97 M/UL (ref 4.7–6.1)
SIGNIFICANT IND 70042: NORMAL
SITE SITE: NORMAL
SODIUM SERPL-SCNC: 134 MMOL/L (ref 135–145)
SOURCE SOURCE: NORMAL
WBC # BLD AUTO: 22.1 K/UL (ref 4.8–10.8)
WBC # BLD AUTO: 22.5 K/UL (ref 4.8–10.8)
WBC # BLD AUTO: 26.2 K/UL (ref 4.8–10.8)

## 2021-05-06 PROCEDURE — 700111 HCHG RX REV CODE 636 W/ 250 OVERRIDE (IP): Performed by: INTERNAL MEDICINE

## 2021-05-06 PROCEDURE — 83735 ASSAY OF MAGNESIUM: CPT

## 2021-05-06 PROCEDURE — 84100 ASSAY OF PHOSPHORUS: CPT

## 2021-05-06 PROCEDURE — 700102 HCHG RX REV CODE 250 W/ 637 OVERRIDE(OP): Performed by: STUDENT IN AN ORGANIZED HEALTH CARE EDUCATION/TRAINING PROGRAM

## 2021-05-06 PROCEDURE — 80053 COMPREHEN METABOLIC PANEL: CPT

## 2021-05-06 PROCEDURE — 85027 COMPLETE CBC AUTOMATED: CPT

## 2021-05-06 PROCEDURE — A9270 NON-COVERED ITEM OR SERVICE: HCPCS | Performed by: INTERNAL MEDICINE

## 2021-05-06 PROCEDURE — 94640 AIRWAY INHALATION TREATMENT: CPT

## 2021-05-06 PROCEDURE — 700102 HCHG RX REV CODE 250 W/ 637 OVERRIDE(OP): Performed by: PSYCHIATRY & NEUROLOGY

## 2021-05-06 PROCEDURE — 74018 RADEX ABDOMEN 1 VIEW: CPT

## 2021-05-06 PROCEDURE — 700102 HCHG RX REV CODE 250 W/ 637 OVERRIDE(OP): Performed by: INTERNAL MEDICINE

## 2021-05-06 PROCEDURE — 770022 HCHG ROOM/CARE - ICU (200)

## 2021-05-06 PROCEDURE — A9270 NON-COVERED ITEM OR SERVICE: HCPCS | Performed by: STUDENT IN AN ORGANIZED HEALTH CARE EDUCATION/TRAINING PROGRAM

## 2021-05-06 PROCEDURE — 85610 PROTHROMBIN TIME: CPT

## 2021-05-06 PROCEDURE — 700105 HCHG RX REV CODE 258: Performed by: PSYCHIATRY & NEUROLOGY

## 2021-05-06 PROCEDURE — 99291 CRITICAL CARE FIRST HOUR: CPT | Performed by: PSYCHIATRY & NEUROLOGY

## 2021-05-06 PROCEDURE — 94669 MECHANICAL CHEST WALL OSCILL: CPT

## 2021-05-06 PROCEDURE — 51798 US URINE CAPACITY MEASURE: CPT

## 2021-05-06 PROCEDURE — 99498 ADVNCD CARE PLAN ADDL 30 MIN: CPT | Performed by: FAMILY MEDICINE

## 2021-05-06 PROCEDURE — 99497 ADVNCD CARE PLAN 30 MIN: CPT | Performed by: FAMILY MEDICINE

## 2021-05-06 PROCEDURE — A9270 NON-COVERED ITEM OR SERVICE: HCPCS | Performed by: PSYCHIATRY & NEUROLOGY

## 2021-05-06 PROCEDURE — 700111 HCHG RX REV CODE 636 W/ 250 OVERRIDE (IP): Performed by: PSYCHIATRY & NEUROLOGY

## 2021-05-06 RX ORDER — MORPHINE SULFATE 4 MG/ML
4 INJECTION, SOLUTION INTRAMUSCULAR; INTRAVENOUS
Status: DISCONTINUED | OUTPATIENT
Start: 2021-05-06 | End: 2021-05-07

## 2021-05-06 RX ORDER — HYDROCORTISONE 10 MG/1
10 TABLET ORAL 2 TIMES DAILY
Status: DISCONTINUED | OUTPATIENT
Start: 2021-05-06 | End: 2021-05-14 | Stop reason: HOSPADM

## 2021-05-06 RX ORDER — SODIUM CHLORIDE, SODIUM LACTATE, POTASSIUM CHLORIDE, AND CALCIUM CHLORIDE .6; .31; .03; .02 G/100ML; G/100ML; G/100ML; G/100ML
500 INJECTION, SOLUTION INTRAVENOUS ONCE
Status: COMPLETED | OUTPATIENT
Start: 2021-05-06 | End: 2021-05-06

## 2021-05-06 RX ORDER — ASPIRIN 81 MG/1
81 TABLET, CHEWABLE ORAL DAILY
Status: DISCONTINUED | OUTPATIENT
Start: 2021-05-07 | End: 2021-05-14 | Stop reason: HOSPADM

## 2021-05-06 RX ORDER — CLOPIDOGREL BISULFATE 75 MG/1
75 TABLET ORAL DAILY
Status: DISCONTINUED | OUTPATIENT
Start: 2021-05-07 | End: 2021-05-14 | Stop reason: HOSPADM

## 2021-05-06 RX ORDER — OXYCODONE HYDROCHLORIDE 10 MG/1
10 TABLET ORAL
Status: DISCONTINUED | OUTPATIENT
Start: 2021-05-06 | End: 2021-05-07

## 2021-05-06 RX ORDER — OXYCODONE HYDROCHLORIDE 5 MG/1
5 TABLET ORAL
Status: DISCONTINUED | OUTPATIENT
Start: 2021-05-06 | End: 2021-05-07

## 2021-05-06 RX ORDER — MIDODRINE HYDROCHLORIDE 5 MG/1
2.5 TABLET ORAL
Status: DISCONTINUED | OUTPATIENT
Start: 2021-05-06 | End: 2021-05-07

## 2021-05-06 RX ADMIN — LACTULOSE 30 ML: 20 SOLUTION ORAL at 06:01

## 2021-05-06 RX ADMIN — TAMSULOSIN HYDROCHLORIDE 0.4 MG: 0.4 CAPSULE ORAL at 06:01

## 2021-05-06 RX ADMIN — LACTULOSE 30 ML: 20 SOLUTION ORAL at 11:22

## 2021-05-06 RX ADMIN — LACTULOSE 30 ML: 20 SOLUTION ORAL at 17:33

## 2021-05-06 RX ADMIN — OXYCODONE 5 MG: 5 TABLET ORAL at 02:01

## 2021-05-06 RX ADMIN — MORPHINE SULFATE 4 MG: 4 INJECTION INTRAVENOUS at 22:56

## 2021-05-06 RX ADMIN — OMEPRAZOLE 20 MG: 20 CAPSULE, DELAYED RELEASE ORAL at 06:02

## 2021-05-06 RX ADMIN — MONTELUKAST 10 MG: 10 TABLET, FILM COATED ORAL at 06:02

## 2021-05-06 RX ADMIN — ATORVASTATIN CALCIUM 80 MG: 80 TABLET, FILM COATED ORAL at 17:33

## 2021-05-06 RX ADMIN — MORPHINE SULFATE 4 MG: 4 INJECTION INTRAVENOUS at 12:37

## 2021-05-06 RX ADMIN — BUDESONIDE AND FORMOTEROL FUMARATE DIHYDRATE 2 PUFF: 80; 4.5 AEROSOL RESPIRATORY (INHALATION) at 21:45

## 2021-05-06 RX ADMIN — LEVOTHYROXINE SODIUM 75 MCG: 25 TABLET ORAL at 06:02

## 2021-05-06 RX ADMIN — HYDROCORTISONE SODIUM SUCCINATE 50 MG: 100 INJECTION, POWDER, FOR SOLUTION INTRAMUSCULAR; INTRAVENOUS at 06:01

## 2021-05-06 RX ADMIN — SODIUM CHLORIDE, POTASSIUM CHLORIDE, SODIUM LACTATE AND CALCIUM CHLORIDE 500 ML: 600; 310; 30; 20 INJECTION, SOLUTION INTRAVENOUS at 17:34

## 2021-05-06 RX ADMIN — MORPHINE SULFATE 4 MG: 4 INJECTION INTRAVENOUS at 15:54

## 2021-05-06 RX ADMIN — OMEPRAZOLE 20 MG: 20 CAPSULE, DELAYED RELEASE ORAL at 17:33

## 2021-05-06 RX ADMIN — ACETAMINOPHEN 650 MG: 325 TABLET, FILM COATED ORAL at 11:21

## 2021-05-06 RX ADMIN — MORPHINE SULFATE 2 MG: 4 INJECTION INTRAVENOUS at 09:13

## 2021-05-06 RX ADMIN — OXYCODONE HYDROCHLORIDE 10 MG: 10 TABLET ORAL at 18:29

## 2021-05-06 RX ADMIN — MAGNESIUM CITRATE 296 ML: 1.75 LIQUID ORAL at 21:51

## 2021-05-06 RX ADMIN — OXYCODONE 5 MG: 5 TABLET ORAL at 11:21

## 2021-05-06 RX ADMIN — OXYCODONE HYDROCHLORIDE 10 MG: 10 TABLET ORAL at 21:54

## 2021-05-06 RX ADMIN — HYDROCORTISONE 10 MG: 10 TABLET ORAL at 17:34

## 2021-05-06 RX ADMIN — OXYCODONE HYDROCHLORIDE 10 MG: 10 TABLET ORAL at 14:41

## 2021-05-06 RX ADMIN — OXYCODONE 5 MG: 5 TABLET ORAL at 08:14

## 2021-05-06 ASSESSMENT — PAIN DESCRIPTION - PAIN TYPE
TYPE: ACUTE PAIN

## 2021-05-06 ASSESSMENT — COPD QUESTIONNAIRES
HAVE YOU SMOKED AT LEAST 100 CIGARETTES IN YOUR ENTIRE LIFE: YES
COPD SCREENING SCORE: 6
DURING THE PAST 4 WEEKS HOW MUCH DID YOU FEEL SHORT OF BREATH: SOME OF THE TIME
DO YOU EVER COUGH UP ANY MUCUS OR PHLEGM?: NO/ONLY WITH OCCASIONAL COLDS OR INFECTIONS

## 2021-05-06 ASSESSMENT — FIBROSIS 4 INDEX: FIB4 SCORE: 1.701663951295739095

## 2021-05-06 NOTE — CARE PLAN
Problem: Bowel/Gastric:  Goal: Normal bowel function is maintained or improved  Intervention: Educate patient and significant other/support system about diet, fluid intake, medications and activity to promote bowel function  Note: Lactulose as ordered       Problem: Pain Management  Goal: Pain level will decrease to patient's comfort goal  Intervention: Follow pain managment plan developed in collaboration with patient and Interdisciplinary Team  Note: Medications as ordered

## 2021-05-06 NOTE — PROGRESS NOTES
IR Nursing Note  ABDOMINAL ANGIOGRAM WITH COIL EMBOLIZATION BY MD CRIS ASSISTED BY RT HARITHA.    Right femoral artery access site; closed with angioseal, dressed with tegaderm and gauze, CDI.     3 coils deployed to right inferior epigastric artery per MD  Penumbra Alia Coil LP (1mm X 5cm)  Penumbra Packing Coil LP (10cm)  Penumbra Packing Coil LP (30cm)    Pulses post op: Right DP/PT not palpable, heard on doppler.    PIVx2 in left arm stopped functioning throughout procedure. Unable to finish second unit of PRBC.    Report to ОЛЬГА [place] in RICU. Patient transported on monitor to R102 per ОЛЬГА Mishra/RT Jennifer

## 2021-05-06 NOTE — CONSULTS
"Reason for Palliative Care Consult: Advance Care Planning    Consulted by: Dr. Sanchez    HPI: Jeffrey Lizama is an 88 year old male with afib on anticoagulation, who had a STEMI 4/20 and had stents x3. He was discharged to [facility] rehab 4/29 and re-admitted 5/1. During workup for hypotension, worsening anemia and abdominal pain, initial CT was unremarkable, however, repeat CT yesterday showed right lower abdominal wall rectus sheath hematoma 6.1 cm with ongoing arterial hemorrhage. IR was consulted and patient had embolization and was transferred to the ICU. Palliative Care was consulted for Advance Care Planning. At the time of consult, patient FULL CODE and no advance directive on file.     Past medical/surgical history:   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 (pouch) ([facility]) 4/1/2010   • BPH (benign prostatic hypertrophy) 4/1/2010   • Bradycardia    • Breath shortness    • Bronchitis    • Cardiac pacemaker 04 2010   • Cataract    • COPD (chronic obstructive pulmonary disease) ([facility])    • Diverticulitis    • DJD (degenerative joint disease)    • EMPHYSEMA    • Glaucoma    • Heart burn    • Heart murmur    • Hiatus hernia syndrome    • Hypertension    • 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   • Indigestion    • Knee arthroplasty 2002    right   • Obstructive hypertrophic cardiomyopathy ([facility]) 8/4/2011   • PAF (paroxysmal atrial fibrillation) ([facility])    • Paroxysmal atrial fibrillation ([facility]) 8/29/2011   • PE (pulmonary embolism)     IVC filter, states PE clots 8 times   • Phlebitis     chronic / recurrent   • Pituitary adenoma ([facility]) 1995    hypophysectomy   • Pituitary adenoma ([facility]) 1995    hypophysectomy   • Pneumonia    • Rheumatic fever    • S/P insertion of IVC (inferior vena caval) filter    • S/P parathyroidectomy    • S/P " "parathyroidectomy 2005   • Sleep apnea     no cpap machine   • SSS (sick sinus syndrome) ([facility]) 8/29/2011   • Third degree AV block ([facility]) 8/29/2011   • thyroidectomy 2005    benign   • Unspecified disorder of thyroid    • Unspecified hemorrhagic conditions    • Unspecified urinary incontinence    • Urinary bladder disorder      Past Surgical History:   Procedure Laterality Date   • GASTROSCOPY-ENDO  1/22/2019    Procedure: GASTROSCOPY-ENDO;  Surgeon: Yoandy Mcelroy M.D.;  Location: ENDOSCOPY [facility];  Service: Gastroenterology   • GASTROSCOPY-ENDO  1/21/2019    Procedure: GASTROSCOPY-ENDO;  Surgeon: Luca Mtz M.D.;  Location: ENDOSCOPY [facility];  Service: Gastroenterology   • KNEE REVISION TOTAL  6/20/2013    Performed by Ortega Vega M.D. at SURGERY [facility]   • CARPAL TUNNEL ENDOSCOPIC  9/30/2011    Performed by RONALD ENNIS at SURGERY SAME DAY [facility] ORS   • PACEMAKER INSERTION Left 04/23/2010    [employer] Altrua [id number] implanted by Dr. Donaldson.   • CATARACT EXTRACTION WITH IOL      bilateral    • CERVICAL DISK AND FUSION ANTERIOR     • CERVICAL FUSION POSTERIOR     • CHOLECYSTECTOMY     • CHOLECYSTECTOMY     • OTHER      pituitary tumor removed   • OTHER CARDIAC SURGERY     • OTHER ORTHOPEDIC SURGERY      knee surgery left knee x 2   • PB REVISE ULNAR NERVE AT WRIST     • PB TOTAL KNEE ARTHROPLASTY      left   • PB TOTAL KNEE ARTHROPLASTY      right   • THYROIDECTOMY       Additional consults:   Crit care, cardiology, IR    Assessment:  Neuro: Alert and Oriented x2-3  Dyspnea: Yes- mild  Last BM: (PTA)- patient reports constipation. Primary team ordered Lactulose 30 mL TID.  Pain: Yes- chronic back pain and acute abdominal pain (Spoke to resident about increasing pain medications)  Depression: Mood appropriate for situation   Dementia: No       Living situation & psychosocial: Patient lives with his wife in [place], [place]. They have a dog named \"BB\". He has grown " "children but couldn't remember how many. He states, \"I'm trying to remember\". He states that his son Jair and his wife Jacquie visited, couldn't remember if it was yesterday or the day before. 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     Spiritual:  Is Amish or spirituality important for coping with this illness? Yes, patient states he is Nondenominational but does not go to [facility] and does not have a Latter day community     Has a  or spiritual provider visit been requested? Yes, patient states he would like a visit from the spiritual care team    Palliative Performance Scale: ~40%    Advance Directive: None on file    DPOA: Not on file, NOK: wife Jacquie Lizama  POLST: No      Code Status: DNR/ Intubation, ok    Outcome:  Met with Jeffrey at bedside with Geriatrics Fellow Dr. Brandt. Introduced myself and explained the role of palliative care. Asked patient if he would like to have his wife on speaker phone to participate in our meeting today. Patient declined the offer but asked me to call her after and update her. Attempted to assess patient's understanding of his medical conditions. He talked about his pain but was unable to express much about his hospital course. When I gave very brief summary, he stated, \"oh yes, I know about that\". Asked questions about his family, hobbies, pets and career in order to ascertain values related to goals of care. He stated that his goal is to be able to return home with his wife and enjoy more time together. Discussed current plan of care and he wants to continue FULL TREATMENT. Discussed life-sustaining treatments and asked patient about an advance directive/living will. He states that he has filled one out and that his wife has a copy. Asked patient about whether or not he would be ok with intubation/artificial ventilation in the setting of respiratory distress. He said he would be in favor of ventilatory support if needed. Discussed code " "status and educated patient about term. Described measures employed in a full code including chest compressions, intubation/artificial ventilation, and using IV medications and \"shocking\" to attempt to restart the heart. Discussed that due to patient's current health status I recommend DNR. Patient stated that he agrees and would want to allow a natural death in the event of cardiopulmonary arrest. I received patient's permission to change code status from Full to DNR, I ok.     Discussed goal of increasing strength and patient state he would agree to go back to acute rehab but would not be willing to go to SNF. He was also in favor of having home health upon discharge. He states that he has had services through [facility] in the past and enjoyed seeing his  nurses \"Randall & Mary Ellen\". Patient appeared tired and requested rest. I assured him that I would speak to his primary team regarding pain management and update his wife.    I called and spoke to patient's wife, Jacquie. She verified that she has his advance directive and that she is DPOA-HC. I requested that she bring it with her on the next visit for our records and she was happy to do so. She states she will be back to visit in the next 2-3 days. I updated her about our conversation regarding code status. She states, \"I want to talk to him about that to verify for myself\". I agreed that she should speak to him about it. I asked what her understanding is of his wishes. She stated, \"If he were brain-dead, he wouldn't want to be on machines\". Acknowledged that this seems reasonable. She thanked me for the call and again stated that she would bring his advance directive for our records.     Provided therapeutic communication including open-ended questions, therapeutic silence, reflective listening and empathetic statements throughout encounter.     Plan: Change code status to DNAR, I ok. Palliative Care will continue to monitor remotely and will follow up " with patient/family as the clinical picture unfolds. Plan for POLST completion prior to discharge.    Recommendations: I do not recommend an ethics or hospice consult at this time because patient would like to continue full treatment.     #Pain Management Recommendations:  Increase Oxycodone to 10 mg PO Q3 hours PRN     Updated: Primary team resident (Dr. Babin), bedside RN and ILEANA Vaz    Thank you for allowing Palliative Care to participate in this patient's care. Please call our team with questions and/or additional needs.    Total visit time was 50 minutes discussing advance care planning.     Ade Juarez, DO  Palliative Medicine  [phone]

## 2021-05-06 NOTE — CARE PLAN
Problem: Communication  Goal: The ability to communicate needs accurately and effectively will improve  Outcome: PROGRESSING AS EXPECTED     Problem: Safety  Goal: Will remain free from injury  Outcome: PROGRESSING AS EXPECTED  Goal: Will remain free from falls  Outcome: PROGRESSING AS EXPECTED     Problem: Infection  Goal: Will remain free from infection  Outcome: PROGRESSING AS EXPECTED     Problem: Bowel/Gastric:  Goal: Normal bowel function is maintained or improved  Outcome: PROGRESSING SLOWER THAN EXPECTED   Pt attempted to have BM twice last night on bedpan. Scheduled lactulose given this AM. No BM this admission yet

## 2021-05-06 NOTE — PALLIATIVE CARE
Spiritual Care Note        Patient's Name: Jeffrey Lizama   MRN: [id number]    YOB: 1932   Age and Gender: 88 y.o. male   Service Area/Unit: [facility]   Room (and Bed): 02/   Primary Language: English   Latter-day/Spiritual Preference: Caodaism   Place of Residence: [place], [place]   Medical Diagnoses: rectus sheath hematoma  coronary artery disease  hypotension  adrenal insufficiency  paroxysmal atrial fibrillation   COPD    Code Status: DNAR, I OK    Date of Admission: 5/1/2021   Length of Stay: 4 days        Spiritual Screen   Cultural/Spiritual Needs During Care:     Ceremonial  Ceremonial Considerations:     Prayer     Is your spiritual health or inner well-being important to you as you cope with your medical condition?     Yes  Would you like to receive a visit from our Spiritual Care team or your own Hindu or spiritual leader?     Yes      Visited with:     Patient    Nature of the Visit:     Initial, On shift    Continue Visiting:     Yes    General Visit:     Yes    Referral from/Origin of Request:     [facility] physician order (palliative care)    Observations/Symptoms:     Patient sitting up in bed, alert, pleasant, appeared sad.    Interaction/Conversation:     Patient talked about the difficulty of his diagnoses, not knowing if he will improve of if he's facing death, feeling overwhelmed.  He requested prayer.  He also asked for a visit from a [facility].    Assessment:     Need, Distress    Need:     Seeking Spiritual Assistance and Support    Distress:     Anxiety about the Future, Overwhelmed    Latter-day Needs:     Prayer    Interventions:     compassionate presence, reflective listening, emotional support, prayer    Outcomes:     Emotional Release, Connectedness with God, Progress with Trust, Spiritual Comfort    Referral to:     Community clergy ([facility])    Plan:     Continue to follow.      Spiritual Care Provider  horace Kevin  [phone]    [email or web address]

## 2021-05-06 NOTE — PROGRESS NOTES
Critical Care Progress Note    Date of admission  5/1/2021    Chief Complaint  88 y.o. male admitted 5/1/2021 with Hypotension, acute rectus hematoma requiring urgent IR intervention    Hospital Course  No notes on file  Mr. Lizama is an 88 year old male, PMH oxygen dependent COPD on 5 LPM, hypopit on Cortef, atrial fibrillation, hypertrophic cardiomyopathy, history of CHB with prior PPM placement, DVT PE with prior IVC filter and ongoing Coumadin therapy, recent admission to [facility] for hypotension and NSTEMI with [facility] 4/20/2021 with successful PCI to the mid/distal LAD with 3 overlapping JAS.  He was receiving ongoing therapy with ASA, Plavix and on Coumadin.     He was discharged home on 4/29 then readmitted through the ED 5/2 in transfer from rehab facility with ongoing chest pain, dyspnea and hypotension.  He was monitored on telemetry and seen by cardiology with no concern for in-stent thrombosis or ACS.  INR was subtherapeutic and he was bridged with full dose Lovenox.  He has been restarted on Coumadin and most recent INR this morning was 3.03.     I was asked to evaluate the patient with ongoing hypotension and a newly identified right lower abdominal wall rectus sheath hematoma measuring 6.1 cm with ongoing arterial hemorrhage new from prior exam.  Patient is lethargic but follows commands complaining of some abdominal pain.     Interval Problem Update  Reviewed last 24 hour events:  S/p embolization of R inf epigastric artery  Afebrile  HR 70-70s  -160s  Hgb stable at 7.3  Good urine output    Review of Systems  Review of Systems   Unable to perform ROS: Acuity of condition        Vital Signs for last 24 hours   Temp:  [35.8 °C (96.5 °F)-37 °C (98.6 °F)] 36.2 °C (97.2 °F)  Pulse:  [] 93  Resp:  [13-36] 18  BP: (102-165)/() 148/80  SpO2:  [94 %-100 %] 97 %    Hemodynamic parameters for last 24 hours       Respiratory Information for the last 24 hours       Physical Exam   Physical  Exam  Constitutional:       General: He is not in acute distress.     Appearance: He is ill-appearing.   Eyes:      Pupils: Pupils are equal, round, and reactive to light.   Cardiovascular:      Rate and Rhythm: Normal rate.      Pulses: Normal pulses.   Pulmonary:      Effort: Pulmonary effort is normal. No respiratory distress.   Abdominal:      General: Bowel sounds are normal. There is distension.      Tenderness: There is abdominal tenderness.      Comments: Multiple bruises   Musculoskeletal:         General: Swelling present.   Skin:     General: Skin is warm and dry.      Comments: Large amount of bruising left arm   Neurological:      General: No focal deficit present.      Mental Status: He is oriented to person, place, and time. Mental status is at baseline.   Psychiatric:         Mood and Affect: Mood normal.         Behavior: Behavior normal.         Medications  Current Facility-Administered Medications   Medication Dose Route Frequency Provider Last Rate Last Admin   • midodrine (PROAMATINE) tablet 2.5 mg  2.5 mg Oral TID WITH MEALS Pat Babin M.D.   Stopped at 05/06/21 1130   • hydrocortisone (CORTEF) tablet 10 mg  10 mg Oral BID Steve Sanchez M.D.       • [START ON 5/7/2021] aspirin (ASA) chewable tab 81 mg  81 mg Oral DAILY Steve Sanchez M.D.       • [START ON 5/7/2021] clopidogrel (PLAVIX) tablet 75 mg  75 mg Oral DAILY Steve Sanchez M.D.       • oxyCODONE immediate-release (ROXICODONE) tablet 5 mg  5 mg Oral Q3HRS PRN Steve Sanchez M.D.        Or   • oxyCODONE immediate release (ROXICODONE) tablet 10 mg  10 mg Oral Q3HRS PRN Steve Sanchez M.D.   10 mg at 05/06/21 1441    Or   • morphine (pf) 4 mg/mL injection 4 mg  4 mg Intravenous Q2HRS PRN Steve Sanchez M.D.   4 mg at 05/06/21 1237   • nitroglycerin (NITROSTAT) tablet 0.4 mg  0.4 mg Sublingual Q5 MIN PRN Josephine Hoffman M.D.       • lactulose 20 GM/30ML solution 30 mL  30 mL Oral TID Yo Justin M.D.   30 mL at 05/06/21 1122   •  Pharmacy Consult Request ...Pain Management Review 1 Each  1 Each Other PHARMACY TO DOSE Yo Justin M.D.       • atorvastatin (LIPITOR) tablet 80 mg  80 mg Oral Q EVENING Keny Madsen M.D.   80 mg at 05/05/21 1720   • budesonide-formoterol (SYMBICORT) 80-4.5 MCG/ACT inhaler 2 Puff  2 Puff Inhalation BID (RT) Keny Madsen M.D.   Stopped at 05/05/21 2000   • levothyroxine (SYNTHROID) tablet 75 mcg  75 mcg Oral AM ES Keny Madsen M.D.   75 mcg at 05/06/21 0602   • montelukast (SINGULAIR) tablet 10 mg  10 mg Oral DAILY Keny Madsen M.D.   10 mg at 05/06/21 0602   • omeprazole (PRILOSEC) capsule 20 mg  20 mg Oral BID Keny Madsen M.D.   20 mg at 05/06/21 0602   • tamsulosin (FLOMAX) capsule 0.4 mg  0.4 mg Oral DAILY Keny Madsen M.D.   0.4 mg at 05/06/21 0601   • tiotropium (Spiriva Respimat) 2.5 mcg/Act inhalation spray 5 mcg  5 mcg Inhalation QDAILY (RT) Keny Madsen M.D.   Stopped at 05/06/21 0909   • benzonatate (TESSALON) capsule 100 mg  100 mg Oral TID PRN Keny Madsen M.D.       • acetaminophen (Tylenol) tablet 650 mg  650 mg Oral Q4HRS PRN Yvonne Edmond M.D.   650 mg at 05/06/21 1121       Fluids    Intake/Output Summary (Last 24 hours) at 5/6/2021 1539  Last data filed at 5/6/2021 1200  Gross per 24 hour   Intake 2965.5 ml   Output 950 ml   Net 2015.5 ml       Laboratory          Recent Labs     05/05/21  0618 05/05/21  1728 05/06/21  0145   SODIUM 131* 135 134*   POTASSIUM 4.7 4.0 4.2   CHLORIDE 98 101 100   CO2 25 27 27   BUN 31* 30* 28*   CREATININE 1.06 1.09 1.04   MAGNESIUM 2.1 2.1 2.2   PHOSPHORUS 3.9 3.3 3.6   CALCIUM 7.6* 7.3* 7.6*     Recent Labs     05/04/21  0621 05/04/21 0621 05/05/21 0618 05/05/21  1728 05/06/21  0145   ALTSGPT 23  --   --   --  27   ASTSGOT 25  --   --   --  21   ALKPHOSPHAT 45  --   --   --  48   TBILIRUBIN 0.3  --   --   --  0.7   GLUCOSE 81   < > 113* 110* 116*    < > = values in this interval not displayed.     Recent Labs     05/04/21 0621  05/05/21 0618 05/05/21 1728 05/06/21 0145 05/06/21  0941   WBC 17.7*   < > 25.8* 22.1* 22.5*   NEUTSPOLYS 77.70*  --  83.70*  --   --    LYMPHOCYTES 9.70*  --  3.40*  --   --    MONOCYTES 8.60  --  8.90  --   --    EOSINOPHILS 1.30  --  0.40  --   --    BASOPHILS 0.30  --  0.30  --   --    ASTSGOT 25  --   --  21  --    ALTSGPT 23  --   --  27  --    ALKPHOSPHAT 45  --   --  48  --    TBILIRUBIN 0.3  --   --  0.7  --     < > = values in this interval not displayed.     Recent Labs     05/04/21 0621 05/04/21 0621 05/05/21 0618 05/05/21 0618 05/05/21 1728 05/06/21 0145 05/06/21  0941   RBC 3.20*   < > 2.64*   < > 3.02* 2.97* 2.70*   HEMOGLOBIN 8.2*   < > 6.7*   < > 8.2* 7.9* 7.3*   HEMATOCRIT 27.1*   < > 22.3*   < > 26.1* 25.2* 23.1*   PLATELETCT 236   < > 262   < > 226 220 209   PROTHROMBTM 25.9*  --  32.3*  --   --  16.3*  --    INR 2.29*  --  3.03*  --   --  1.27*  --     < > = values in this interval not displayed.       Imaging  CT:    Reviewed    Assessment/Plan  * Rectus sheath hematoma  Assessment & Plan  Patient has been on triple therapy with DAPT and Coumadin bridged with Lovenox  Reversed with Kcentra and vitamin K 5/5   S/p emergent IR intervention with coil embolization of right inferior epigastric artery and controlled bleeding 5/5  Coumadin held and received Kcentra and vitamin K   Monitor H&H, transfuse as needed  Resume DAPT 5/7 if no signs of active hemmorhage    CAD (coronary artery disease)- (present on admission)  Assessment & Plan  Recent non-STEMI with successful PCI, JAS to LAD 4/20  DAPT held acutely with symptomatic rectus sheath hematoma  Successful IR coil embolization of inferior epigastric artery 5/5, resume DAPT 5/7 if clinically stable    Hypotension  Assessment & Plan  Improved after product and embolization    Adrenal insufficiency (HCC)- (present on admission)  Assessment & Plan  Continue IV stress dose Solu-Cortef    Paroxysmal atrial fibrillation (HCC)- (present on  admission)  Assessment & Plan  Hold anticoagulation for now    COPD (chronic obstructive pulmonary disease) (HCC)- (present on admission)  Assessment & Plan  Without exacerbation currently, continue titrated oxygen, RT protocols       VTE:  Contraindicated  Ulcer: Not Indicated  Lines: Central Line  Ongoing indication addressed, Arterial Line  Ongoing indication addressed and Rodas Catheter  Ongoing indication addressed    I have performed a physical exam and reviewed and updated ROS and Plan today (5/6/2021). In review of yesterday's note (5/5/2021), there are no changes except as documented above.     Discussed patient condition and risk of morbidity and/or mortality with RN, Pharmacy, Code status disscussed, Charge nurse / hot rounds and Patient  The patient remains critically ill.  Critical care time = 33 minutes in directly providing and coordinating critical care and extensive data review.  No time overlap and excludes procedures.

## 2021-05-07 ENCOUNTER — APPOINTMENT (OUTPATIENT)
Dept: RADIOLOGY | Facility: MEDICAL CENTER | Age: 86
DRG: 270 | End: 2021-05-07
Attending: STUDENT IN AN ORGANIZED HEALTH CARE EDUCATION/TRAINING PROGRAM
Payer: MEDICARE

## 2021-05-07 PROBLEM — K56.7 ILEUS (HCC): Status: ACTIVE | Noted: 2021-05-07

## 2021-05-07 LAB
ALBUMIN SERPL BCP-MCNC: 2.9 G/DL (ref 3.2–4.9)
ALBUMIN/GLOB SERPL: 1.1 G/DL
ALP SERPL-CCNC: 50 U/L (ref 30–99)
ALT SERPL-CCNC: 33 U/L (ref 2–50)
ANION GAP SERPL CALC-SCNC: 6 MMOL/L (ref 7–16)
AST SERPL-CCNC: 28 U/L (ref 12–45)
BACTERIA BLD CULT: ABNORMAL
BILIRUB SERPL-MCNC: 0.7 MG/DL (ref 0.1–1.5)
BUN SERPL-MCNC: 28 MG/DL (ref 8–22)
CALCIUM SERPL-MCNC: 7.8 MG/DL (ref 8.5–10.5)
CHLORIDE SERPL-SCNC: 99 MMOL/L (ref 96–112)
CO2 SERPL-SCNC: 30 MMOL/L (ref 20–33)
CREAT SERPL-MCNC: 0.89 MG/DL (ref 0.5–1.4)
EKG IMPRESSION: NORMAL
ERYTHROCYTE [DISTWIDTH] IN BLOOD BY AUTOMATED COUNT: 56.9 FL (ref 35.9–50)
GLOBULIN SER CALC-MCNC: 2.6 G/DL (ref 1.9–3.5)
GLUCOSE SERPL-MCNC: 90 MG/DL (ref 65–99)
HCT VFR BLD AUTO: 24.9 % (ref 42–52)
HGB BLD-MCNC: 7.8 G/DL (ref 14–18)
INR PPP: 1.15 (ref 0.87–1.13)
MAGNESIUM SERPL-MCNC: 2.3 MG/DL (ref 1.5–2.5)
MCH RBC QN AUTO: 27.6 PG (ref 27–33)
MCHC RBC AUTO-ENTMCNC: 31.3 G/DL (ref 33.7–35.3)
MCV RBC AUTO: 88 FL (ref 81.4–97.8)
PHOSPHATE SERPL-MCNC: 2.5 MG/DL (ref 2.5–4.5)
PLATELET # BLD AUTO: 244 K/UL (ref 164–446)
PMV BLD AUTO: 10.4 FL (ref 9–12.9)
POTASSIUM SERPL-SCNC: 3.7 MMOL/L (ref 3.6–5.5)
PROT SERPL-MCNC: 5.5 G/DL (ref 6–8.2)
PROTHROMBIN TIME: 15 SEC (ref 12–14.6)
RBC # BLD AUTO: 2.83 M/UL (ref 4.7–6.1)
SIGNIFICANT IND 70042: ABNORMAL
SITE SITE: ABNORMAL
SODIUM SERPL-SCNC: 135 MMOL/L (ref 135–145)
SOURCE SOURCE: ABNORMAL
WBC # BLD AUTO: 25.5 K/UL (ref 4.8–10.8)

## 2021-05-07 PROCEDURE — 83735 ASSAY OF MAGNESIUM: CPT

## 2021-05-07 PROCEDURE — 51798 US URINE CAPACITY MEASURE: CPT

## 2021-05-07 PROCEDURE — 84100 ASSAY OF PHOSPHORUS: CPT

## 2021-05-07 PROCEDURE — 700102 HCHG RX REV CODE 250 W/ 637 OVERRIDE(OP): Performed by: INTERNAL MEDICINE

## 2021-05-07 PROCEDURE — A9270 NON-COVERED ITEM OR SERVICE: HCPCS | Performed by: PSYCHIATRY & NEUROLOGY

## 2021-05-07 PROCEDURE — 85610 PROTHROMBIN TIME: CPT

## 2021-05-07 PROCEDURE — 99232 SBSQ HOSP IP/OBS MODERATE 35: CPT | Mod: GC | Performed by: EMERGENCY MEDICINE

## 2021-05-07 PROCEDURE — 700111 HCHG RX REV CODE 636 W/ 250 OVERRIDE (IP): Performed by: STUDENT IN AN ORGANIZED HEALTH CARE EDUCATION/TRAINING PROGRAM

## 2021-05-07 PROCEDURE — 80053 COMPREHEN METABOLIC PANEL: CPT

## 2021-05-07 PROCEDURE — A9270 NON-COVERED ITEM OR SERVICE: HCPCS | Performed by: STUDENT IN AN ORGANIZED HEALTH CARE EDUCATION/TRAINING PROGRAM

## 2021-05-07 PROCEDURE — A9270 NON-COVERED ITEM OR SERVICE: HCPCS | Performed by: INTERNAL MEDICINE

## 2021-05-07 PROCEDURE — 700102 HCHG RX REV CODE 250 W/ 637 OVERRIDE(OP): Performed by: STUDENT IN AN ORGANIZED HEALTH CARE EDUCATION/TRAINING PROGRAM

## 2021-05-07 PROCEDURE — 93010 ELECTROCARDIOGRAM REPORT: CPT | Performed by: INTERNAL MEDICINE

## 2021-05-07 PROCEDURE — 700105 HCHG RX REV CODE 258: Performed by: STUDENT IN AN ORGANIZED HEALTH CARE EDUCATION/TRAINING PROGRAM

## 2021-05-07 PROCEDURE — 770020 HCHG ROOM/CARE - TELE (206)

## 2021-05-07 PROCEDURE — 99233 SBSQ HOSP IP/OBS HIGH 50: CPT | Performed by: HOSPITALIST

## 2021-05-07 PROCEDURE — 700102 HCHG RX REV CODE 250 W/ 637 OVERRIDE(OP): Performed by: PSYCHIATRY & NEUROLOGY

## 2021-05-07 PROCEDURE — 700111 HCHG RX REV CODE 636 W/ 250 OVERRIDE (IP): Performed by: PSYCHIATRY & NEUROLOGY

## 2021-05-07 PROCEDURE — 770001 HCHG ROOM/CARE - MED/SURG/GYN PRIV*

## 2021-05-07 PROCEDURE — 85027 COMPLETE CBC AUTOMATED: CPT

## 2021-05-07 PROCEDURE — 71045 X-RAY EXAM CHEST 1 VIEW: CPT

## 2021-05-07 PROCEDURE — 94640 AIRWAY INHALATION TREATMENT: CPT

## 2021-05-07 PROCEDURE — 93005 ELECTROCARDIOGRAM TRACING: CPT | Performed by: STUDENT IN AN ORGANIZED HEALTH CARE EDUCATION/TRAINING PROGRAM

## 2021-05-07 RX ORDER — SODIUM CHLORIDE 9 MG/ML
500 INJECTION, SOLUTION INTRAVENOUS ONCE
Status: COMPLETED | OUTPATIENT
Start: 2021-05-07 | End: 2021-05-07

## 2021-05-07 RX ORDER — MORPHINE SULFATE 4 MG/ML
2 INJECTION, SOLUTION INTRAMUSCULAR; INTRAVENOUS
Status: DISCONTINUED | OUTPATIENT
Start: 2021-05-07 | End: 2021-05-08

## 2021-05-07 RX ORDER — ACETAMINOPHEN 500 MG
1000 TABLET ORAL 3 TIMES DAILY
Status: DISCONTINUED | OUTPATIENT
Start: 2021-05-07 | End: 2021-05-14 | Stop reason: HOSPADM

## 2021-05-07 RX ORDER — BISACODYL 10 MG
10 SUPPOSITORY, RECTAL RECTAL
Status: DISCONTINUED | OUTPATIENT
Start: 2021-05-07 | End: 2021-05-08

## 2021-05-07 RX ORDER — SOTALOL HYDROCHLORIDE 80 MG/1
40 TABLET ORAL 2 TIMES DAILY
Status: DISCONTINUED | OUTPATIENT
Start: 2021-05-07 | End: 2021-05-07

## 2021-05-07 RX ORDER — SODIUM CHLORIDE, SODIUM LACTATE, POTASSIUM CHLORIDE, CALCIUM CHLORIDE 600; 310; 30; 20 MG/100ML; MG/100ML; MG/100ML; MG/100ML
INJECTION, SOLUTION INTRAVENOUS CONTINUOUS
Status: DISCONTINUED | OUTPATIENT
Start: 2021-05-07 | End: 2021-05-07

## 2021-05-07 RX ORDER — POTASSIUM CHLORIDE 20 MEQ/1
20 TABLET, EXTENDED RELEASE ORAL ONCE
Status: COMPLETED | OUTPATIENT
Start: 2021-05-07 | End: 2021-05-07

## 2021-05-07 RX ORDER — ACETAZOLAMIDE 250 MG/1
250 TABLET ORAL 2 TIMES DAILY
Status: DISCONTINUED | OUTPATIENT
Start: 2021-05-07 | End: 2021-05-08

## 2021-05-07 RX ORDER — OXYCODONE HYDROCHLORIDE 5 MG/1
5 TABLET ORAL
Status: DISCONTINUED | OUTPATIENT
Start: 2021-05-07 | End: 2021-05-08

## 2021-05-07 RX ORDER — LORAZEPAM 2 MG/ML
0.5 INJECTION INTRAMUSCULAR EVERY 6 HOURS PRN
Status: CANCELLED | OUTPATIENT
Start: 2021-05-07

## 2021-05-07 RX ORDER — AMOXICILLIN 250 MG
2 CAPSULE ORAL 2 TIMES DAILY
Status: DISCONTINUED | OUTPATIENT
Start: 2021-05-07 | End: 2021-05-08

## 2021-05-07 RX ORDER — SOTALOL HYDROCHLORIDE 80 MG/1
40 TABLET ORAL 2 TIMES DAILY
Status: DISCONTINUED | OUTPATIENT
Start: 2021-05-08 | End: 2021-05-11

## 2021-05-07 RX ORDER — POLYETHYLENE GLYCOL 3350 17 G/17G
1 POWDER, FOR SOLUTION ORAL
Status: DISCONTINUED | OUTPATIENT
Start: 2021-05-07 | End: 2021-05-08

## 2021-05-07 RX ORDER — OXYCODONE HYDROCHLORIDE 10 MG/1
10 TABLET ORAL
Status: DISCONTINUED | OUTPATIENT
Start: 2021-05-07 | End: 2021-05-08

## 2021-05-07 RX ADMIN — MORPHINE SULFATE 4 MG: 4 INJECTION INTRAVENOUS at 06:02

## 2021-05-07 RX ADMIN — OMEPRAZOLE 20 MG: 20 CAPSULE, DELAYED RELEASE ORAL at 17:19

## 2021-05-07 RX ADMIN — ACETAMINOPHEN 1000 MG: 500 TABLET ORAL at 20:19

## 2021-05-07 RX ADMIN — POTASSIUM CHLORIDE 20 MEQ: 20 TABLET, EXTENDED RELEASE ORAL at 07:20

## 2021-05-07 RX ADMIN — TAMSULOSIN HYDROCHLORIDE 0.4 MG: 0.4 CAPSULE ORAL at 05:37

## 2021-05-07 RX ADMIN — MORPHINE SULFATE 2 MG: 4 INJECTION INTRAVENOUS at 20:19

## 2021-05-07 RX ADMIN — LACTULOSE 30 ML: 20 SOLUTION ORAL at 05:37

## 2021-05-07 RX ADMIN — MONTELUKAST 10 MG: 10 TABLET, FILM COATED ORAL at 05:36

## 2021-05-07 RX ADMIN — BENZONATATE 100 MG: 100 CAPSULE ORAL at 15:38

## 2021-05-07 RX ADMIN — ASPIRIN 81 MG: 81 TABLET, CHEWABLE ORAL at 05:36

## 2021-05-07 RX ADMIN — ATORVASTATIN CALCIUM 80 MG: 80 TABLET, FILM COATED ORAL at 17:19

## 2021-05-07 RX ADMIN — ACETAZOLAMIDE 250 MG: 250 TABLET ORAL at 12:31

## 2021-05-07 RX ADMIN — TIOTROPIUM BROMIDE INHALATION SPRAY 5 MCG: 3.12 SPRAY, METERED RESPIRATORY (INHALATION) at 10:42

## 2021-05-07 RX ADMIN — ACETAMINOPHEN 1000 MG: 500 TABLET ORAL at 15:11

## 2021-05-07 RX ADMIN — OXYCODONE 5 MG: 5 TABLET ORAL at 16:11

## 2021-05-07 RX ADMIN — OXYCODONE HYDROCHLORIDE 10 MG: 10 TABLET ORAL at 01:25

## 2021-05-07 RX ADMIN — ACETAMINOPHEN 1000 MG: 500 TABLET ORAL at 08:49

## 2021-05-07 RX ADMIN — ACETAZOLAMIDE 250 MG: 250 TABLET ORAL at 17:19

## 2021-05-07 RX ADMIN — OXYCODONE HYDROCHLORIDE 10 MG: 10 TABLET ORAL at 08:49

## 2021-05-07 RX ADMIN — OMEPRAZOLE 20 MG: 20 CAPSULE, DELAYED RELEASE ORAL at 05:36

## 2021-05-07 RX ADMIN — HYDROCORTISONE 10 MG: 10 TABLET ORAL at 05:37

## 2021-05-07 RX ADMIN — POLYETHYLENE GLYCOL 3350 1 PACKET: 17 POWDER, FOR SOLUTION ORAL at 01:25

## 2021-05-07 RX ADMIN — SODIUM CHLORIDE, POTASSIUM CHLORIDE, SODIUM LACTATE AND CALCIUM CHLORIDE: 600; 310; 30; 20 INJECTION, SOLUTION INTRAVENOUS at 07:21

## 2021-05-07 RX ADMIN — BUDESONIDE AND FORMOTEROL FUMARATE DIHYDRATE 2 PUFF: 80; 4.5 AEROSOL RESPIRATORY (INHALATION) at 07:54

## 2021-05-07 RX ADMIN — MORPHINE SULFATE 4 MG: 4 INJECTION INTRAVENOUS at 03:17

## 2021-05-07 RX ADMIN — BISACODYL 10 MG: 10 SUPPOSITORY RECTAL at 05:37

## 2021-05-07 RX ADMIN — LEVOTHYROXINE SODIUM 75 MCG: 25 TABLET ORAL at 05:37

## 2021-05-07 RX ADMIN — SODIUM CHLORIDE 500 ML: 9 INJECTION, SOLUTION INTRAVENOUS at 01:39

## 2021-05-07 RX ADMIN — CLOPIDOGREL BISULFATE 75 MG: 75 TABLET ORAL at 05:36

## 2021-05-07 RX ADMIN — MORPHINE SULFATE 2 MG: 4 INJECTION INTRAVENOUS at 17:43

## 2021-05-07 RX ADMIN — OXYCODONE HYDROCHLORIDE 10 MG: 10 TABLET ORAL at 04:51

## 2021-05-07 RX ADMIN — BUDESONIDE AND FORMOTEROL FUMARATE DIHYDRATE 2 PUFF: 80; 4.5 AEROSOL RESPIRATORY (INHALATION) at 20:00

## 2021-05-07 RX ADMIN — DOCUSATE SODIUM 50 MG AND SENNOSIDES 8.6 MG 2 TABLET: 8.6; 5 TABLET, FILM COATED ORAL at 05:36

## 2021-05-07 RX ADMIN — MAGNESIUM HYDROXIDE 30 ML: 400 SUSPENSION ORAL at 03:17

## 2021-05-07 RX ADMIN — HYDROCORTISONE 10 MG: 10 TABLET ORAL at 17:19

## 2021-05-07 ASSESSMENT — ENCOUNTER SYMPTOMS
BACK PAIN: 0
DOUBLE VISION: 0
ABDOMINAL PAIN: 0
SHORTNESS OF BREATH: 1
BLURRED VISION: 1
FEVER: 0
WEAKNESS: 1
PALPITATIONS: 0
BLURRED VISION: 0
CONSTIPATION: 1
DIARRHEA: 0
NAUSEA: 0
SORE THROAT: 0
LOSS OF CONSCIOUSNESS: 0
DIZZINESS: 1
COUGH: 0
SENSORY CHANGE: 0
HEADACHES: 0
CHILLS: 0
VOMITING: 0
ABDOMINAL PAIN: 1
DIZZINESS: 0
ORTHOPNEA: 0
NAUSEA: 1

## 2021-05-07 ASSESSMENT — COGNITIVE AND FUNCTIONAL STATUS - GENERAL
MOBILITY SCORE: 6
SUGGESTED CMS G CODE MODIFIER MOBILITY: CN
TOILETING: TOTAL
PERSONAL GROOMING: TOTAL
SUGGESTED CMS G CODE MODIFIER DAILY ACTIVITY: CN
CLIMB 3 TO 5 STEPS WITH RAILING: TOTAL
MOVING TO AND FROM BED TO CHAIR: UNABLE
WALKING IN HOSPITAL ROOM: TOTAL
EATING MEALS: TOTAL
DRESSING REGULAR LOWER BODY CLOTHING: TOTAL
STANDING UP FROM CHAIR USING ARMS: TOTAL
HELP NEEDED FOR BATHING: TOTAL
DAILY ACTIVITIY SCORE: 6
DRESSING REGULAR UPPER BODY CLOTHING: TOTAL
TURNING FROM BACK TO SIDE WHILE IN FLAT BAD: UNABLE
MOVING FROM LYING ON BACK TO SITTING ON SIDE OF FLAT BED: UNABLE

## 2021-05-07 ASSESSMENT — PAIN DESCRIPTION - PAIN TYPE
TYPE: ACUTE PAIN

## 2021-05-07 NOTE — CARE PLAN
Problem: Safety  Goal: Will remain free from injury  Outcome: PROGRESSING AS EXPECTED     Problem: Urinary Elimination:  Goal: Ability to reestablish a normal urinary elimination pattern will improve  Outcome: PROGRESSING AS EXPECTED   Pt given 500cc bolus overnight to increase urine output. Voided 75cc + 1 unmeasured occurrence when condom catheter fell off.  Problem: Bowel/Gastric:  Goal: Normal bowel function is maintained or improved  Outcome: PROGRESSING SLOWER THAN EXPECTED  Pt unable to have BM overnight. Mag citrate, Miralax, milk of mag, suppository, and senna given. Dig stim performed, no apparent stool burden found.  Goal: Will not experience complications related to bowel motility  Outcome: PROGRESSING SLOWER THAN EXPECTED  Ileus and distension shown on abdominal xray.  Problem: Pain Management  Goal: Pain level will decrease to patient's comfort goal  Outcome: PROGRESSING SLOWER THAN EXPECTED  Hard to control pt's abdominal pain d/t inability to have BM. Oxy 10mg given 3x and morphine 4mg IV given 3x.

## 2021-05-07 NOTE — PROGRESS NOTES
Patient complains that he can't breathe, oxygen saturation 99% on 2 L, no increased work of breathing, able to speak in complete sentences. MD reviewed xray and ordered diamox.

## 2021-05-07 NOTE — ASSESSMENT & PLAN NOTE
In the setting of DAPT and coumadin.    S/p KCentra and IR embolization of inf epigastric artery.  Patient is in high risk for bleeding, hemoglobin has been stable.

## 2021-05-07 NOTE — PROGRESS NOTES
After mag citrate given pt has had no BM. Attempted BM on bedpan with no success. Still having abdominal pain. Dr. Madsen updated, orders to start bowel regimen. Okay to give all PRNs this shift (not to wait 24hrs if first med unsuccessful as order is written).     Pt has only voided 25ml this shift, bladder scan reads 72ml. Dr. Madsen updated and gave orders for 500ml NS bolus.

## 2021-05-07 NOTE — PROGRESS NOTES
Dr. Boaz Musa updated on patient anxiety, poor urine out put, lack of motivation to participate in activity. MD okay with not bolusing fluids for low urine output and will reassess in the morning.

## 2021-05-07 NOTE — PROGRESS NOTES
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 The patient was discharged to acute rehab on 4/29/21 on DAPT and Coumadin. He was sent to the ED from acute rehab on 5/2/21 for ongoing hypotension, dyspnea and chest pain. He was monitored on tele and evalauted by Cardiology with no concern for in-stent stenosis or ACS. He was found to have a subtheraputic INR and bridged with full dose Lovenox. Critical Care was consulted on 5/5/21 for ongoing hypotension and a newly identified right lower abdominal wall rectus sheath hematoma measuring 6.1 cm with ongoing arterial hemorrhage.        Interval Problem Update  Reviewed last 24 hour events:  Status post embolization of R inferior epigastric artery on 5/5/21.  Hypertensive and tachycardic overnight.  KUB done 5/6/21 evening with evidence of adynamic ileus, bowel protocol escalated, had a small BM this morning.    Hgb stable on serial H and H.   Leukocytosis and CMP stable.   Low UOP overnight, given a 500 mL fluid bolus, bladder scan negative for retention overnight.       Review of Systems  Review of Systems   Unable to perform ROS: Acuity of condition   HENT: Positive for hearing loss (chronic). Negative for nosebleeds.    Eyes: Positive for blurred vision (chronic).   Respiratory: Positive for shortness of breath (chronic).    Cardiovascular: Positive for chest pain (occasional, chronic). Negative for orthopnea.   Gastrointestinal: Positive for abdominal pain, constipation and nausea.        Reports improvement in  abdominal pain.   Genitourinary: Negative for dysuria and hematuria.   Musculoskeletal: Positive for joint pain (chronic).   Neurological: Positive for dizziness (occasional, chronic) and weakness. Negative for sensory change.        Vital Signs Last 24 hours   Temp:  [35.8 °C (96.4 °F)-36.7 °C (98.1 °F)] 36.3 °C (97.3 °F)  Pulse:  [] 95  Resp:  [14-44] 16  BP: (130-181)/() 157/74  SpO2:  [90 %-100 %] 95 %      Hemodynamic Parameters Last 24 hours         Respiratory Information Last 24 hours         Physical Exam   Physical Exam  Constitutional:       General: He is not in acute distress.     Appearance: He is ill-appearing.   Eyes:      Conjunctiva/sclera: Conjunctivae normal.      Pupils: Pupils are equal, round, and reactive to light.   Cardiovascular:      Rate and Rhythm: Normal rate.      Pulses: Normal pulses.      Comments: Has a PPM for history of CHB.  Pulmonary:      Effort: Pulmonary effort is normal. No respiratory distress.      Breath sounds: No wheezing.   Abdominal:      General: Bowel sounds are normal. There is distension.      Tenderness: There is abdominal tenderness. There is no guarding or rebound.      Comments: Multiple bruises   Genitourinary:     Comments: EUD in place.  Musculoskeletal:         General: Swelling present.   Skin:     General: Skin is warm and dry.      Findings: Bruising and lesion present.      Comments: Large amount of bruising left arm   Neurological:      General: No focal deficit present.      Mental Status: He is oriented to person, place, and time. Mental status is at baseline.   Psychiatric:         Mood and Affect: Mood normal.         Behavior: Behavior normal.       Medications  Current Facility-Administered Medications   Medication Dose Route Frequency Provider Last Rate Last Admin   • senna-docusate (PERICOLACE or SENOKOT S) 8.6-50 MG per tablet 2 tablet  2 tablet Oral BID Lexii Madsen M.D.   2 tablet at 05/07/21 0536    And   • polyethylene  glycol/lytes (MIRALAX) PACKET 1 Packet  1 Packet Oral QDAY PRN Lexii Madsen M.D.   1 Packet at 05/07/21 0125    And   • magnesium hydroxide (MILK OF MAGNESIA) suspension 30 mL  30 mL Oral QDAY PRN Lexii Madsen M.D.   30 mL at 05/07/21 0317    And   • bisacodyl (DULCOLAX) suppository 10 mg  10 mg Rectal QDAY PRN Lexii Madsen M.D.   10 mg at 05/07/21 0537   • acetaminophen (TYLENOL) tablet 1,000 mg  1,000 mg Oral TID Pat Babin M.D.   1,000 mg at 05/07/21 0849   • morphine (pf) 4 mg/mL injection 2 mg  2 mg Intravenous Q2HRS PRN Pat Babin M.D.        Or   • oxyCODONE immediate-release (ROXICODONE) tablet 5 mg  5 mg Oral Q3HRS YOVANA Babin M.D.        Or   • oxyCODONE immediate release (ROXICODONE) tablet 10 mg  10 mg Oral Q3HRS PRN Pat Babin M.D.   10 mg at 05/07/21 0849   • acetaZOLAMIDE (DIAMOX) tablet 250 mg  250 mg Oral BID Pat Babin M.D.       • sotalol (BETAPACE) tablet 40 mg  40 mg Oral BID Pat Babin M.D.       • hydrocortisone (CORTEF) tablet 10 mg  10 mg Oral BID Steve Sanchez M.D.   10 mg at 05/07/21 0537   • aspirin (ASA) chewable tab 81 mg  81 mg Oral DAILY Steve Sanchez M.D.   81 mg at 05/07/21 0536   • clopidogrel (PLAVIX) tablet 75 mg  75 mg Oral DAILY Steve Sanchez M.D.   75 mg at 05/07/21 0536   • nitroglycerin (NITROSTAT) tablet 0.4 mg  0.4 mg Sublingual Q5 MIN PRN Josephine Hoffman M.D.       • lactulose 20 GM/30ML solution 30 mL  30 mL Oral TID Yo Justin M.D.   30 mL at 05/07/21 0537   • Pharmacy Consult Request ...Pain Management Review 1 Each  1 Each Other PHARMACY TO DOSE Yo Justin M.D.       • atorvastatin (LIPITOR) tablet 80 mg  80 mg Oral Q EVENING Keny Madsen M.D.   80 mg at 05/06/21 1733   • budesonide-formoterol (SYMBICORT) 80-4.5 MCG/ACT inhaler 2 Puff  2 Puff Inhalation BID (RT) Keny Madsen M.D.   2 Puff at 05/07/21 0839   • levothyroxine (SYNTHROID) tablet 75 mcg  75 mcg Oral AM ES Keny Madsen M.D.   75 mcg at 05/07/21 0898    • montelukast (SINGULAIR) tablet 10 mg  10 mg Oral DAILY Keny Madsen M.D.   10 mg at 05/07/21 0536   • omeprazole (PRILOSEC) capsule 20 mg  20 mg Oral BID Keny Madsen M.D.   20 mg at 05/07/21 0536   • tamsulosin (FLOMAX) capsule 0.4 mg  0.4 mg Oral DAILY Keny Madsen M.D.   0.4 mg at 05/07/21 0537   • tiotropium (Spiriva Respimat) 2.5 mcg/Act inhalation spray 5 mcg  5 mcg Inhalation QDAILY (RT) Keny Madsen M.D.   5 mcg at 05/07/21 1042   • benzonatate (TESSALON) capsule 100 mg  100 mg Oral TID PRN Keny Madsen M.D.           Fluids    Intake/Output Summary (Last 24 hours) at 5/7/2021 1055  Last data filed at 5/7/2021 1000  Gross per 24 hour   Intake 2088.75 ml   Output 250 ml   Net 1838.75 ml       Laboratory    Recent Labs     05/05/21 1728 05/06/21 0145 05/07/21  0145   SODIUM 135 134* 135   POTASSIUM 4.0 4.2 3.7   CHLORIDE 101 100 99   CO2 27 27 30   BUN 30* 28* 28*   CREATININE 1.09 1.04 0.89   MAGNESIUM 2.1 2.2 2.3   PHOSPHORUS 3.3 3.6 2.5   CALCIUM 7.3* 7.6* 7.8*     Recent Labs     05/05/21 1728 05/06/21 0145 05/07/21  0145   ALTSGPT  --  27 33   ASTSGOT  --  21 28   ALKPHOSPHAT  --  48 50   TBILIRUBIN  --  0.7 0.7   GLUCOSE 110* 116* 90     Recent Labs     05/05/21 1728 05/05/21 1728 05/06/21 0145 05/06/21  0145 05/06/21  0941 05/06/21  1730 05/07/21  0145   WBC 25.8*   < > 22.1*   < > 22.5* 26.2* 25.5*   NEUTSPOLYS 83.70*  --   --   --   --   --   --    LYMPHOCYTES 3.40*  --   --   --   --   --   --    MONOCYTES 8.90  --   --   --   --   --   --    EOSINOPHILS 0.40  --   --   --   --   --   --    BASOPHILS 0.30  --   --   --   --   --   --    ASTSGOT  --   --  21  --   --   --  28   ALTSGPT  --   --  27  --   --   --  33   ALKPHOSPHAT  --   --  48  --   --   --  50   TBILIRUBIN  --   --  0.7  --   --   --  0.7    < > = values in this interval not displayed.     Recent Labs     05/05/21 0618 05/05/21  1728 05/06/21  0145 05/06/21  0145 05/06/21  0941 05/06/21 1730 05/07/21 0145    RBC 2.64*   < > 2.97*   < > 2.70* 2.78* 2.83*   HEMOGLOBIN 6.7*   < > 7.9*   < > 7.3* 7.5* 7.8*   HEMATOCRIT 22.3*   < > 25.2*   < > 23.1* 23.9* 24.9*   PLATELETCT 262   < > 220   < > 209 230 244   PROTHROMBTM 32.3*  --  16.3*  --   --   --  15.0*   INR 3.03*  --  1.27*  --   --   --  1.15*    < > = values in this interval not displayed.       Imaging  5/6/21 abdominal and 5/7/21 chest XR reviewed.     [id number] VIEW  5/6/2021 7:45 PM  REASON FOR EXAM: Distention.  EXAM DESCRIPTION AND NUMBER OF VIEWS: 4 views of the abdomen.  COMPARISON: 1/21/2019  FINDINGS:  There is moderate gas distention of colon and to lesser degree, small bowel, throughout the abdomen. No evidence of soft tissue mass effect or wall thickening. Contrast material is present within the bladder, which is a grossly normal appearance.   Ventricular quadrant surgical clips, suggest previous cholecystectomy. An IVC filter is present, which is centered at L2.  IMPRESSION:  Moderate nonspecific large and small bowel distention. Adynamic ileus would be a possibility.    DX-CHEST-LIMITED-1-VIEW  5/7/2021 9:34 AM  REASON FOR EXAM: Cough.  EXAM DESCRIPTION AND NUMBER OF VIEWS: Single AP view of the chest.  COMPARISON: 5/5/2021 and 5/1/2021  FINDINGS:  Lines, tubes: Left bipolar transvenous pacing device remains in place.  Lungs: His persistent opacity in the left lower lobe consistent with atelectasis or pneumonia. Minor linear atelectasis is present in the right lower lobe.  Pleura: A small left pleural effusion may be present. No pneumothorax is identified.  Heart and mediastinum: Cardiomegaly is stable.  Bones: Fusion hardware is present in the anterior aspect of the mid/distal cervical spine.  IMPRESSION:  Persistent atelectasis or pneumonia in the left lower lobe with possible small left pleural effusion. Minimal linear atelectasis in the right lower lobe. Stable cardiomegaly.      Assessment and Plan    * Rectus sheath hematoma  Assessment &  Plan  Occurred in the setting of triple therapy with DAPT and Coumadin.  Reversed on 5/5/21 with Kcentra and vitamin K.  Status post emergent IR intervention with coil embolization of right inferior epigastric artery and controlled bleeding 5/5/21.  Coumadin held.  Serial H and H stable, DAPT resumed 5/6/21, CTM.    Ileus  Assessment & Plan  Likely secondary to opioid use. Bowel protocol was escalated and the patient had a small BM this morning.     Plan:  Advance diet as tolerated to Cardiac diet    mIVF PRN  Continue bowel protocol  Tylenol 1000 mg TID for pain, minimize opioid use  CTM    Coronary artery disease- (present on admission)  Assessment & Plan  Recent non-STEMI with successful PCI, JAS to LAD 4/20/21.  DAPT held temporarily for symptomatic rectus sheath hematoma; status post successful IR coil embolization of inferior epigastric artery on 5/5/21, serial H and H stable, DAPT resume 5/6/21.  Home sotalol and Diamox resumed 5/7/21.    Hypotension  Assessment & Plan  Improved after product and embolization; weaned off midodrine, on home steroid for hypopituitarism.     Adrenal insufficiency (HCC)- (present on admission)  Assessment & Plan  Received IV stress dose Solu-Cortef during acute bleed. Transitioned to home Cortef on 5/6/21.    Counseling regarding advance care planning and goals of care  Assessment & Plan  Palliative Care was consulted and code status was changed on 5/6/21 to DNR, I OK.     Paroxysmal atrial fibrillation (HCC)- (present on admission)  Assessment & Plan  Holding Coumadin given acute bleed.     COPD (chronic obstructive pulmonary disease) (HCC)- (present on admission)  Assessment & Plan  Without exacerbation currently.  Continue home inhalers, home O2 for goal SpO2 88-92, and RT protocol.        Core Measures:  VTE: Contraindicated  Ulcer: PPI  Lines: 2 PIVs  Antibiotics: NA      Disposition:  Medically stable within his chronic conditions to be transferred to the medical floor; PT  and OT consulted for discharge recommendations.

## 2021-05-07 NOTE — DIETARY
"Nutrition services: Day 5 of admit.  Jeffrey Lizama is a 88 y.o. male with admitting DX of Chest Pain.  Poor PO intake noted per chart review. Supplements ordered per RD.    Assessment:  Height: 172.7 cm (5' 8\")  Weight: 101 kg (221 lb 9 oz)  Body mass index is 33.69 kg/m²., BMI classification: Class 1 Obesity  Diet/Intake: Cardiac (to start at dinner 5/7), supplements per RD.    Evaluation:   1. Pt with poor PO intake, refused meals x 3 on 5/6. Pt ate <25% at lunch today, he was NPO at Breakfast. Pt does like chocolate Boost and supplements per RD were ordered. PO intake prior to 5/5 was good with pt usually eating 50-75% and % of meals.  2. Pt with Ileus - resolved per MD note. Bowel protocol was escalated and pt with multiple BM today. LBM previously had been prior to admit.  3. Medications include Lactulose, Senna  4. Weight stable since last admit in April.  5. 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    Malnutrition Risk: Criteria not met.    Recommendations/Plan:  1. Chocolate Boost Plus with meals.   2. Encourage intake of meals and supplements.  3. Document intake of all meals and supplements as % taken in ADL's to provide interdisciplinary communication across all shifts.   4. Monitor weight.  5. Nutrition rep will continue to see patient for ongoing meal and snack preferences.     RD following          "

## 2021-05-07 NOTE — PROGRESS NOTES
Hospital Medicine Daily Progress Note    Date of Service  5/7/2021    Chief Complaint  88 y.o. male admitted 5/1/2021 with hypotension at Rehab    Hospital Course  89yo PMHx COPD on 5LNC HOT, hypoptiuitarism, AFib, HOCM, PPM for complete heart block, DVT/PE, IVC, AC on coumadin.  DC'd from here in April after stay for NSTEMI with placement on 3 JAS to LAD 4/20.  Was at Rehab where he was noted to be hypotensive and sent here.  Here found to have R rectus abd hematoma.  Reversed with KCentra and Vit K 5/5.  Went to IR where he was treated with coil embolization of R inf epigastric artery.  Course since complicated by ileus    Interval Problem Update  Pt c/o feeling very fatigued.  Depressed due to the setbacks he's had of late.  Notes he fatigues quickly when he moves around.  Has delcined to work with therapy    Consultants/Specialty      Code Status  DNAR, I OK    Disposition  OK to floor    Review of Systems  Review of Systems   Constitutional: Negative for chills and fever.   HENT: Negative for nosebleeds and sore throat.    Eyes: Negative for blurred vision and double vision.   Respiratory: Positive for shortness of breath. Negative for cough.    Cardiovascular: Positive for leg swelling. Negative for chest pain and palpitations.   Gastrointestinal: Negative for abdominal pain, diarrhea, nausea and vomiting.   Genitourinary: Negative for dysuria and urgency.   Musculoskeletal: Negative for back pain.   Skin: Negative for rash.   Neurological: Positive for weakness. Negative for dizziness, loss of consciousness and headaches.        Physical Exam  Temp:  [35.8 °C (96.4 °F)-36.7 °C (98.1 °F)] 36.3 °C (97.3 °F)  Pulse:  [] 95  Resp:  [14-44] 16  BP: (130-181)/() 157/74  SpO2:  [90 %-100 %] 95 %    Physical Exam  Constitutional:       General: He is not in acute distress.     Appearance: He is well-developed. He is obese. He is not diaphoretic.   HENT:      Head: Normocephalic and atraumatic.   Eyes:       Conjunctiva/sclera: Conjunctivae normal.   Neck:      Vascular: No JVD.   Cardiovascular:      Rate and Rhythm: Normal rate.      Heart sounds: Murmur present. No gallop.    Pulmonary:      Effort: Pulmonary effort is normal. No respiratory distress.      Breath sounds: No stridor. No wheezing or rales.   Abdominal:      Palpations: Abdomen is soft.      Tenderness: There is no abdominal tenderness. There is no guarding or rebound.      Comments: Hematoma noted in RLQ, TTP over this area   Musculoskeletal:         General: No tenderness.      Right lower leg: Edema present.      Left lower leg: Edema present.   Skin:     General: Skin is warm and dry.      Findings: No rash.   Neurological:      General: No focal deficit present.      Mental Status: He is alert and oriented to person, place, and time.   Psychiatric:         Thought Content: Thought content normal.         Fluids    Intake/Output Summary (Last 24 hours) at 5/7/2021 1041  Last data filed at 5/7/2021 1000  Gross per 24 hour   Intake 2088.75 ml   Output 250 ml   Net 1838.75 ml       Laboratory  Recent Labs     05/06/21  0941 05/06/21  1730 05/07/21  0145   WBC 22.5* 26.2* 25.5*   RBC 2.70* 2.78* 2.83*   HEMOGLOBIN 7.3* 7.5* 7.8*   HEMATOCRIT 23.1* 23.9* 24.9*   MCV 85.6 86.0 88.0   MCH 27.0 27.0 27.6   MCHC 31.6* 31.4* 31.3*   RDW 53.3* 54.6* 56.9*   PLATELETCT 209 230 244   MPV 10.2 10.3 10.4     Recent Labs     05/05/21  1728 05/06/21  0145 05/07/21  0145   SODIUM 135 134* 135   POTASSIUM 4.0 4.2 3.7   CHLORIDE 101 100 99   CO2 27 27 30   GLUCOSE 110* 116* 90   BUN 30* 28* 28*   CREATININE 1.09 1.04 0.89   CALCIUM 7.3* 7.6* 7.8*     Recent Labs     05/05/21  0618 05/06/21  0145 05/07/21  0145   INR 3.03* 1.27* 1.15*               Imaging  DX-CHEST-LIMITED (1 VIEW)   Final Result      1.  Persistent atelectasis or pneumonia in the left lower lobe with possible small left pleural effusion.      2.  Minimal linear atelectasis in the right lower lobe.       3.  Stable cardiomegaly.      [id number] VIEW   Final Result      Moderate nonspecific large and small bowel distention. Adynamic ileus would be a possibility..      DX-CHEST-PORTABLE (1 VIEW)   Final Result      Bibasilar atelectasis and small bilateral pleural effusions.      Again seen cardiomegaly.      IR-EMBOLIZATION   Final Result      1.  Pelvic angiography and selective microcatheter angiography of the right inferior epigastric artery which was shown to be the source of active extravasation responsible for rectus sheath hematoma on the CT of the abdomen and pelvis from earlier today.    No active extravasation on the catheter angiography.   2.  Empirical embolization of the right inferior epigastric artery with detachable coils. Satisfactory occlusion.                  INTERPRETING LOCATION: [facility], [facility], [address]      CT-ABDOMEN-PELVIS WITH   Final Result   Addendum 1 of 1   These findings were discussed with CHRISTOPHER KONG on 5/5/2021 10:40 AM.      Final      1.  RIGHT lower abdominal wall rectus sheath hematoma measuring 6.1 cm with ongoing arterial hemorrhage, new from prior exam.   2.  No other significant change.      [id number]   Final Result      1.  Normal testicles. No testicular mass lesion. No testicular torsion.   2.  Benign 4 mm right epididymal cyst.      US-ABDOMEN LTD (SOFT TISSUE)   Final Result      No mass lesions or fluid collections in the lower abdominal wall, in the area of clinical concern. No lymphadenopathy detected.      EC-ECHOCARDIOGRAM COMPLETE W/ CONT   Final Result      DX-CHEST-PORTABLE (1 VIEW)   Final Result         Patchy bibasilar opacities, likely atelectasis.      CT-CTA COMPLETE THORACOABDOMINAL AORTA   Final Result         1. No significant interval change.      2. No evidence of aortic dissection identified.      3. Unchanged mild dilatation of the ascending aorta measuring 4.4 cm. Unchanged infrarenal abdominal aortic aneurysm with mural  thrombus, measuring 4.2 cm.      4. Unchanged 3 mm right middle lobe nodule. Elevation of the left hemidiaphragm. Bibasilar atelectasis, left more than right.   Fleischner Society pulmonary nodule recommendations:   Low Risk: No routine follow-up      High Risk: Optional CT at 12 months      Comments: Nodules less than 6 mm do not require routine follow-up, but certain patients at high risk with suspicious nodule morphology, upper lobe location, or both may warrant 12-month follow-up.      Low Risk - Minimal or absent history of smoking and of other known risk factors.      High Risk - History of smoking or of other known risk factors.      Note: These recommendations do not apply to lung cancer screening, patients with immunosuppression, or patients with known primary cancer.      Fleischner Society 2017 Guidelines for Management of Incidentally Detected Pulmonary Nodules in Adults              Assessment/Plan  * Rectus sheath hematoma  Assessment & Plan  In the setting of DAPT and Ac on coumadin  Now s/p KCentra and IR embolization of inf epigastric artery  Hgb stable today  Con to follow H&H    Ileus  Assessment & Plan  resolved    Pain in the chest  Assessment & Plan  Serial Trops stable  EKG did not show any acute changes  Cardiology did not recommend any further work up    Coronary artery disease- (present on admission)  Assessment & Plan  JAS x 3 to LAD in April of this year  Have resumed DAPT (ASA, plavix)  Cont statin    Hypotension  Assessment & Plan  Hemorrhagic  Now resolved  Was on stress dose Hydrocortisone but is now back to his baseline po regiemen of 10mg BID  Improving with home regimen for adrenal insufficiency which includes Cortef and midodrine  Continue to monitor      Adrenal insufficiency (HCC)- (present on admission)  Assessment & Plan  Back to baseline Hydrocortisone and levothyroxine  Have DC'd midodrine as pressures do not require it    Anticoagulant long-term use- (present on  admission)  Assessment & Plan  History of PE/DVT and PAFib  Had been on coumadin but is now s/p reversal due to rectus sheath hematoma  Has IVC in place  Have resumed DAPT due to recent Sameera however will not be resuming Coumadin for now    Essential hypertension, benign- (present on admission)  Assessment & Plan  Hold antihypertensives as patient is hypotensive at this time.   Improving slowly  Restart as needed    Hematoma  Assessment & Plan  Spontaneous rectus sheath hematoma now s/p embolization of inf epigastric artery  Hgb has remained stable  Now off coumadin  Will resume DAPT given recent JAS placement    Leukocytosis- (present on admission)  Assessment & Plan  Chronic and likely related to chronic steroid use for adrenal insufficiency monitor closely for possible infection  Follow cbc in am    Paroxysmal atrial fibrillation (HCC)- (present on admission)  Assessment & Plan  Telemetry monitoring   Currently rate controlled  Will remain off coumadin for the moment    COPD (chronic obstructive pulmonary disease) (HCC)- (present on admission)  Assessment & Plan  Continue with home medications   Patient currently on baseline home oxygen levels  Continue to monitor oxygenation and titrate as needed  Not in acute exacerbation       VTE prophylaxis: SCDs

## 2021-05-07 NOTE — PROGRESS NOTES
Sat patient up to edge or bed with 2 person assist. Patient a little dizzy at first. At almost 5 minutes patient began to be tachycardic in the 140's. Assisted patient back to bed. Heart rate back into the low 100's.

## 2021-05-07 NOTE — ASSESSMENT & PLAN NOTE
JAS x 3 to LAD in April of this year  Have resumed DAPT (ASA, plavix)  Cont statin  Developed chest pain on 5/10 with worsening troponin; cardiology reconsulted patient is not candidate for cardiac cath, continue medication treatment

## 2021-05-07 NOTE — ASSESSMENT & PLAN NOTE
Back to baseline Hydrocortisone and levothyroxine  Have DC'd midodrine as pressures do not require it  continue Cortef 10 mg twice daily  Stable

## 2021-05-07 NOTE — ASSESSMENT & PLAN NOTE
Likely secondary to opioid use. Bowel protocol was escalated and the patient had a small BM this morning.     Plan:  Advance diet as tolerated to Cardiac diet    mIVF PRN  Continue bowel protocol  Tylenol 1000 mg TID for pain, minimize opioid use  CTM

## 2021-05-07 NOTE — PROGRESS NOTES
Updated Dr. Madsen on patient continued increased abdominal pain and blood pressure. Also updated MD on patient procedure yesterday, hemoglobin, and lack of bowel movement despite lactulose. Updated on fluid bolus infusing due to low urine output. Orders received.

## 2021-05-07 NOTE — ASSESSMENT & PLAN NOTE
This was reportedly resolved while in ICU.  CT chest 5/9 noted some air-fluid level.  Confirmed on KUB 5/9.  Resolved  Continue scheduling stool softener

## 2021-05-08 ENCOUNTER — APPOINTMENT (OUTPATIENT)
Dept: RADIOLOGY | Facility: MEDICAL CENTER | Age: 86
DRG: 270 | End: 2021-05-08
Attending: INTERNAL MEDICINE
Payer: MEDICARE

## 2021-05-08 PROBLEM — R60.1 ANASARCA: Status: ACTIVE | Noted: 2021-05-08

## 2021-05-08 PROBLEM — K56.7 ILEUS (HCC): Status: RESOLVED | Noted: 2021-05-07 | Resolved: 2021-05-08

## 2021-05-08 LAB
ALBUMIN SERPL BCP-MCNC: 2.8 G/DL (ref 3.2–4.9)
ALBUMIN/GLOB SERPL: 1 G/DL
ALP SERPL-CCNC: 49 U/L (ref 30–99)
ALT SERPL-CCNC: 37 U/L (ref 2–50)
ANION GAP SERPL CALC-SCNC: 6 MMOL/L (ref 7–16)
APPEARANCE UR: ABNORMAL
AST SERPL-CCNC: 34 U/L (ref 12–45)
BACTERIA #/AREA URNS HPF: NEGATIVE /HPF
BASOPHILS # BLD AUTO: 0.5 % (ref 0–1.8)
BASOPHILS # BLD: 0.13 K/UL (ref 0–0.12)
BILIRUB SERPL-MCNC: 1.2 MG/DL (ref 0.1–1.5)
BILIRUB UR QL STRIP.AUTO: NEGATIVE
BUN SERPL-MCNC: 30 MG/DL (ref 8–22)
CALCIUM SERPL-MCNC: 8 MG/DL (ref 8.5–10.5)
CHLORIDE SERPL-SCNC: 101 MMOL/L (ref 96–112)
CO2 SERPL-SCNC: 28 MMOL/L (ref 20–33)
COLOR UR: ABNORMAL
CREAT SERPL-MCNC: 1.04 MG/DL (ref 0.5–1.4)
EKG IMPRESSION: NORMAL
EOSINOPHIL # BLD AUTO: 0.89 K/UL (ref 0–0.51)
EOSINOPHIL NFR BLD: 3.5 % (ref 0–6.9)
EPI CELLS #/AREA URNS HPF: ABNORMAL /HPF
ERYTHROCYTE [DISTWIDTH] IN BLOOD BY AUTOMATED COUNT: 59.1 FL (ref 35.9–50)
FERRITIN SERPL-MCNC: 144 NG/ML (ref 22–322)
GLOBULIN SER CALC-MCNC: 2.7 G/DL (ref 1.9–3.5)
GLUCOSE SERPL-MCNC: 75 MG/DL (ref 65–99)
GLUCOSE UR STRIP.AUTO-MCNC: NEGATIVE MG/DL
GRAN CASTS #/AREA URNS LPF: ABNORMAL /LPF
HCT VFR BLD AUTO: 25.2 % (ref 42–52)
HGB BLD-MCNC: 7.7 G/DL (ref 14–18)
HYALINE CASTS #/AREA URNS LPF: ABNORMAL /LPF
IMM GRANULOCYTES # BLD AUTO: 0.43 K/UL (ref 0–0.11)
IMM GRANULOCYTES NFR BLD AUTO: 1.7 % (ref 0–0.9)
IRON SATN MFR SERPL: 10 % (ref 15–55)
IRON SERPL-MCNC: 22 UG/DL (ref 50–180)
KETONES UR STRIP.AUTO-MCNC: ABNORMAL MG/DL
LACTATE BLD-SCNC: 1.1 MMOL/L (ref 0.5–2)
LACTATE BLD-SCNC: 1.5 MMOL/L (ref 0.5–2)
LEUKOCYTE ESTERASE UR QL STRIP.AUTO: NEGATIVE
LYMPHOCYTES # BLD AUTO: 1.48 K/UL (ref 1–4.8)
LYMPHOCYTES NFR BLD: 5.8 % (ref 22–41)
MAGNESIUM SERPL-MCNC: 2.5 MG/DL (ref 1.5–2.5)
MCH RBC QN AUTO: 27.1 PG (ref 27–33)
MCHC RBC AUTO-ENTMCNC: 30.6 G/DL (ref 33.7–35.3)
MCV RBC AUTO: 88.7 FL (ref 81.4–97.8)
MICRO URNS: ABNORMAL
MONOCYTES # BLD AUTO: 1.58 K/UL (ref 0–0.85)
MONOCYTES NFR BLD AUTO: 6.2 % (ref 0–13.4)
MRSA DNA SPEC QL NAA+PROBE: ABNORMAL
NEUTROPHILS # BLD AUTO: 21.1 K/UL (ref 1.82–7.42)
NEUTROPHILS NFR BLD: 82.3 % (ref 44–72)
NITRITE UR QL STRIP.AUTO: NEGATIVE
NRBC # BLD AUTO: 0.05 K/UL
NRBC BLD-RTO: 0.2 /100 WBC
PH UR STRIP.AUTO: 5.5 [PH] (ref 5–8)
PLATELET # BLD AUTO: 273 K/UL (ref 164–446)
PMV BLD AUTO: 10.2 FL (ref 9–12.9)
POTASSIUM SERPL-SCNC: 3.9 MMOL/L (ref 3.6–5.5)
PROCALCITONIN SERPL-MCNC: 0.12 NG/ML
PROCALCITONIN SERPL-MCNC: 0.17 NG/ML
PROT SERPL-MCNC: 5.5 G/DL (ref 6–8.2)
PROT UR QL STRIP: NEGATIVE MG/DL
RBC # BLD AUTO: 2.84 M/UL (ref 4.7–6.1)
RBC # URNS HPF: ABNORMAL /HPF
RBC UR QL AUTO: NEGATIVE
SIGNIFICANT IND 70042: ABNORMAL
SITE SITE: ABNORMAL
SODIUM SERPL-SCNC: 135 MMOL/L (ref 135–145)
SOURCE SOURCE: ABNORMAL
SP GR UR STRIP.AUTO: 1.03
TIBC SERPL-MCNC: 226 UG/DL (ref 250–450)
UIBC SERPL-MCNC: 204 UG/DL (ref 110–370)
UROBILINOGEN UR STRIP.AUTO-MCNC: 0.2 MG/DL
WBC # BLD AUTO: 25.6 K/UL (ref 4.8–10.8)
WBC #/AREA URNS HPF: ABNORMAL /HPF

## 2021-05-08 PROCEDURE — 700102 HCHG RX REV CODE 250 W/ 637 OVERRIDE(OP): Performed by: STUDENT IN AN ORGANIZED HEALTH CARE EDUCATION/TRAINING PROGRAM

## 2021-05-08 PROCEDURE — 71275 CT ANGIOGRAPHY CHEST: CPT

## 2021-05-08 PROCEDURE — 99233 SBSQ HOSP IP/OBS HIGH 50: CPT | Performed by: INTERNAL MEDICINE

## 2021-05-08 PROCEDURE — 36415 COLL VENOUS BLD VENIPUNCTURE: CPT

## 2021-05-08 PROCEDURE — 93010 ELECTROCARDIOGRAM REPORT: CPT | Performed by: INTERNAL MEDICINE

## 2021-05-08 PROCEDURE — 94640 AIRWAY INHALATION TREATMENT: CPT

## 2021-05-08 PROCEDURE — 83550 IRON BINDING TEST: CPT

## 2021-05-08 PROCEDURE — 700102 HCHG RX REV CODE 250 W/ 637 OVERRIDE(OP): Performed by: INTERNAL MEDICINE

## 2021-05-08 PROCEDURE — 93005 ELECTROCARDIOGRAM TRACING: CPT | Performed by: INTERNAL MEDICINE

## 2021-05-08 PROCEDURE — 71045 X-RAY EXAM CHEST 1 VIEW: CPT

## 2021-05-08 PROCEDURE — 94760 N-INVAS EAR/PLS OXIMETRY 1: CPT

## 2021-05-08 PROCEDURE — A9270 NON-COVERED ITEM OR SERVICE: HCPCS | Performed by: STUDENT IN AN ORGANIZED HEALTH CARE EDUCATION/TRAINING PROGRAM

## 2021-05-08 PROCEDURE — A9270 NON-COVERED ITEM OR SERVICE: HCPCS | Performed by: PSYCHIATRY & NEUROLOGY

## 2021-05-08 PROCEDURE — 93010 ELECTROCARDIOGRAM REPORT: CPT | Mod: 77 | Performed by: INTERNAL MEDICINE

## 2021-05-08 PROCEDURE — 93970 EXTREMITY STUDY: CPT

## 2021-05-08 PROCEDURE — 85025 COMPLETE CBC W/AUTO DIFF WBC: CPT

## 2021-05-08 PROCEDURE — 80053 COMPREHEN METABOLIC PANEL: CPT

## 2021-05-08 PROCEDURE — 700111 HCHG RX REV CODE 636 W/ 250 OVERRIDE (IP): Performed by: INTERNAL MEDICINE

## 2021-05-08 PROCEDURE — 700105 HCHG RX REV CODE 258: Performed by: INTERNAL MEDICINE

## 2021-05-08 PROCEDURE — 93010 ELECTROCARDIOGRAM REPORT: CPT | Mod: 76 | Performed by: INTERNAL MEDICINE

## 2021-05-08 PROCEDURE — 81001 URINALYSIS AUTO W/SCOPE: CPT

## 2021-05-08 PROCEDURE — 83540 ASSAY OF IRON: CPT

## 2021-05-08 PROCEDURE — A9270 NON-COVERED ITEM OR SERVICE: HCPCS | Performed by: INTERNAL MEDICINE

## 2021-05-08 PROCEDURE — 87324 CLOSTRIDIUM AG IA: CPT

## 2021-05-08 PROCEDURE — A9270 NON-COVERED ITEM OR SERVICE: HCPCS | Performed by: HOSPITALIST

## 2021-05-08 PROCEDURE — 93005 ELECTROCARDIOGRAM TRACING: CPT | Performed by: HOSPITALIST

## 2021-05-08 PROCEDURE — 84145 PROCALCITONIN (PCT): CPT

## 2021-05-08 PROCEDURE — 700101 HCHG RX REV CODE 250: Performed by: INTERNAL MEDICINE

## 2021-05-08 PROCEDURE — 87641 MR-STAPH DNA AMP PROBE: CPT

## 2021-05-08 PROCEDURE — 700111 HCHG RX REV CODE 636 W/ 250 OVERRIDE (IP): Performed by: STUDENT IN AN ORGANIZED HEALTH CARE EDUCATION/TRAINING PROGRAM

## 2021-05-08 PROCEDURE — 700102 HCHG RX REV CODE 250 W/ 637 OVERRIDE(OP): Performed by: PSYCHIATRY & NEUROLOGY

## 2021-05-08 PROCEDURE — 770020 HCHG ROOM/CARE - TELE (206)

## 2021-05-08 PROCEDURE — 82728 ASSAY OF FERRITIN: CPT

## 2021-05-08 PROCEDURE — 700117 HCHG RX CONTRAST REV CODE 255: Performed by: INTERNAL MEDICINE

## 2021-05-08 PROCEDURE — 83735 ASSAY OF MAGNESIUM: CPT

## 2021-05-08 PROCEDURE — 83605 ASSAY OF LACTIC ACID: CPT | Mod: 91

## 2021-05-08 PROCEDURE — 87493 C DIFF AMPLIFIED PROBE: CPT

## 2021-05-08 PROCEDURE — 87040 BLOOD CULTURE FOR BACTERIA: CPT

## 2021-05-08 PROCEDURE — 302146: Performed by: INTERNAL MEDICINE

## 2021-05-08 PROCEDURE — 700102 HCHG RX REV CODE 250 W/ 637 OVERRIDE(OP): Performed by: HOSPITALIST

## 2021-05-08 RX ORDER — OXYCODONE HYDROCHLORIDE 5 MG/1
5 TABLET ORAL
Status: DISCONTINUED | OUTPATIENT
Start: 2021-05-08 | End: 2021-05-14 | Stop reason: HOSPADM

## 2021-05-08 RX ORDER — SODIUM CHLORIDE, SODIUM LACTATE, POTASSIUM CHLORIDE, AND CALCIUM CHLORIDE .6; .31; .03; .02 G/100ML; G/100ML; G/100ML; G/100ML
1000 INJECTION, SOLUTION INTRAVENOUS
Status: DISCONTINUED | OUTPATIENT
Start: 2021-05-08 | End: 2021-05-14 | Stop reason: HOSPADM

## 2021-05-08 RX ORDER — SODIUM CHLORIDE, SODIUM LACTATE, POTASSIUM CHLORIDE, CALCIUM CHLORIDE 600; 310; 30; 20 MG/100ML; MG/100ML; MG/100ML; MG/100ML
INJECTION, SOLUTION INTRAVENOUS CONTINUOUS
Status: DISCONTINUED | OUTPATIENT
Start: 2021-05-08 | End: 2021-05-08

## 2021-05-08 RX ORDER — SODIUM CHLORIDE, SODIUM LACTATE, POTASSIUM CHLORIDE, AND CALCIUM CHLORIDE .6; .31; .03; .02 G/100ML; G/100ML; G/100ML; G/100ML
30 INJECTION, SOLUTION INTRAVENOUS
Status: DISCONTINUED | OUTPATIENT
Start: 2021-05-08 | End: 2021-05-14 | Stop reason: HOSPADM

## 2021-05-08 RX ORDER — LORAZEPAM 2 MG/ML
2 INJECTION INTRAMUSCULAR ONCE
Status: COMPLETED | OUTPATIENT
Start: 2021-05-08 | End: 2021-05-08

## 2021-05-08 RX ORDER — IPRATROPIUM BROMIDE AND ALBUTEROL SULFATE 2.5; .5 MG/3ML; MG/3ML
3 SOLUTION RESPIRATORY (INHALATION)
Status: DISCONTINUED | OUTPATIENT
Start: 2021-05-08 | End: 2021-05-09

## 2021-05-08 RX ORDER — FERROUS SULFATE 325(65) MG
325 TABLET ORAL
Status: DISCONTINUED | OUTPATIENT
Start: 2021-05-09 | End: 2021-05-13

## 2021-05-08 RX ADMIN — OXYCODONE 5 MG: 5 TABLET ORAL at 22:05

## 2021-05-08 RX ADMIN — LACTULOSE 30 ML: 20 SOLUTION ORAL at 09:07

## 2021-05-08 RX ADMIN — ACETAMINOPHEN 1000 MG: 500 TABLET ORAL at 16:02

## 2021-05-08 RX ADMIN — MONTELUKAST 10 MG: 10 TABLET, FILM COATED ORAL at 09:06

## 2021-05-08 RX ADMIN — MORPHINE SULFATE 2 MG: 4 INJECTION INTRAVENOUS at 01:04

## 2021-05-08 RX ADMIN — ASPIRIN 81 MG: 81 TABLET, CHEWABLE ORAL at 09:06

## 2021-05-08 RX ADMIN — ATORVASTATIN CALCIUM 80 MG: 80 TABLET, FILM COATED ORAL at 17:35

## 2021-05-08 RX ADMIN — BUDESONIDE AND FORMOTEROL FUMARATE DIHYDRATE 2 PUFF: 80; 4.5 AEROSOL RESPIRATORY (INHALATION) at 07:58

## 2021-05-08 RX ADMIN — TAMSULOSIN HYDROCHLORIDE 0.4 MG: 0.4 CAPSULE ORAL at 09:06

## 2021-05-08 RX ADMIN — PIPERACILLIN AND TAZOBACTAM 3.38 G: 3; .375 INJECTION, POWDER, LYOPHILIZED, FOR SOLUTION INTRAVENOUS; PARENTERAL at 11:13

## 2021-05-08 RX ADMIN — OMEPRAZOLE 20 MG: 20 CAPSULE, DELAYED RELEASE ORAL at 17:35

## 2021-05-08 RX ADMIN — IOHEXOL 60 ML: 350 INJECTION, SOLUTION INTRAVENOUS at 20:33

## 2021-05-08 RX ADMIN — ACETAMINOPHEN 1000 MG: 500 TABLET ORAL at 09:06

## 2021-05-08 RX ADMIN — CLOPIDOGREL BISULFATE 75 MG: 75 TABLET ORAL at 09:06

## 2021-05-08 RX ADMIN — HYDROCORTISONE 10 MG: 10 TABLET ORAL at 17:35

## 2021-05-08 RX ADMIN — CEFEPIME 2 G: 2 INJECTION, POWDER, FOR SOLUTION INTRAVENOUS at 17:39

## 2021-05-08 RX ADMIN — ACETAZOLAMIDE 250 MG: 250 TABLET ORAL at 09:17

## 2021-05-08 RX ADMIN — TIOTROPIUM BROMIDE INHALATION SPRAY 5 MCG: 3.12 SPRAY, METERED RESPIRATORY (INHALATION) at 07:58

## 2021-05-08 RX ADMIN — IPRATROPIUM BROMIDE AND ALBUTEROL SULFATE 3 ML: .5; 2.5 SOLUTION RESPIRATORY (INHALATION) at 13:59

## 2021-05-08 RX ADMIN — PIPERACILLIN AND TAZOBACTAM 3.38 G: 3; .375 INJECTION, POWDER, LYOPHILIZED, FOR SOLUTION INTRAVENOUS; PARENTERAL at 13:54

## 2021-05-08 RX ADMIN — VANCOMYCIN HYDROCHLORIDE 2500 MG: 500 INJECTION, POWDER, LYOPHILIZED, FOR SOLUTION INTRAVENOUS at 18:46

## 2021-05-08 RX ADMIN — OMEPRAZOLE 20 MG: 20 CAPSULE, DELAYED RELEASE ORAL at 09:05

## 2021-05-08 RX ADMIN — HYDROCORTISONE 10 MG: 10 TABLET ORAL at 09:08

## 2021-05-08 RX ADMIN — LORAZEPAM 2 MG: 2 INJECTION INTRAMUSCULAR; INTRAVENOUS at 02:42

## 2021-05-08 RX ADMIN — DOCUSATE SODIUM 50 MG AND SENNOSIDES 8.6 MG 2 TABLET: 8.6; 5 TABLET, FILM COATED ORAL at 09:06

## 2021-05-08 RX ADMIN — DOCUSATE SODIUM 50 MG AND SENNOSIDES 8.6 MG 2 TABLET: 8.6; 5 TABLET, FILM COATED ORAL at 17:35

## 2021-05-08 RX ADMIN — SOTALOL HYDROCHLORIDE 40 MG: 80 TABLET ORAL at 12:37

## 2021-05-08 RX ADMIN — ACETAMINOPHEN 1000 MG: 500 TABLET ORAL at 20:51

## 2021-05-08 RX ADMIN — LEVOTHYROXINE SODIUM 75 MCG: 25 TABLET ORAL at 09:06

## 2021-05-08 RX ADMIN — SODIUM CHLORIDE, POTASSIUM CHLORIDE, SODIUM LACTATE AND CALCIUM CHLORIDE: 600; 310; 30; 20 INJECTION, SOLUTION INTRAVENOUS at 13:54

## 2021-05-08 RX ADMIN — IPRATROPIUM BROMIDE AND ALBUTEROL SULFATE 3 ML: .5; 2.5 SOLUTION RESPIRATORY (INHALATION) at 19:22

## 2021-05-08 ASSESSMENT — PAIN DESCRIPTION - PAIN TYPE
TYPE: ACUTE PAIN
TYPE: ACUTE PAIN

## 2021-05-08 NOTE — PROGRESS NOTES
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  The hypotension was felt to be secondary to his adrenal insufficiency.  Therefore, he was treated with stress dose steroids.  Ultimately, hypotension was found secondary to a rectus sheath hematoma with acute hemorrhagic anemia.  He was admitted and was transfused 1 unit of packed red blood cell for a hemoglobin of 6.7.  Anticoagulation was reversed with Kcentra and vitamin K.  The right inferior epigastric artery was treated with coil embolization by interventional radiology on 5/5/2021.  Post procedure course complicated by ileus, which has resolved.    Overnight of 5/8/2021, the patient was transferred from the ICU to telemetry floor.  I assume his clinical care on the morning of 5/8/2021.  Reviewed the patient chart, patient has had elevated leukocytosis on presentation.  Chest x-ray obtained on 5/7/2021 show increasing right lower lobe infiltrate concerning for pneumonia.  Patient has not been on diabetic during this admission.          Interval History Updates:  No event overnight.  Afebrile.    Patient was transferred to the floor overnight.  Patient was assigned to me as his attending this morning.    Consultants/Specialty  None    Review of Systems   Review of Systems   Unable to perform ROS: Mental acuity        Medications:  • oxyCODONE immediate-release  5 mg Q3HRS PRN    Or   • oxyCODONE immediate-release  5 mg Q3HRS PRN   • piperacillin-tazobactam  3.375 g Once    And   • piperacillin-tazobactam  3.375 g Q8HRS   • senna-docusate  2 tablet BID    And   • polyethylene glycol/lytes  1 Packet QDAY PRN    And   • magnesium hydroxide  30 mL QDAY PRN    And   • bisacodyl  10  mg QDAY PRN   • acetaminophen  1,000 mg TID   • sotalol  40 mg BID   • hydrocortisone  10 mg BID   • aspirin  81 mg DAILY   • clopidogrel  75 mg DAILY   • nitroglycerin  0.4 mg Q5 MIN PRN   • atorvastatin  80 mg Q EVENING   • levothyroxine  75 mcg AM ES   • montelukast  10 mg DAILY   • omeprazole  20 mg BID   • tamsulosin  0.4 mg DAILY   • tiotropium  5 mcg QDAILY (RT)   • benzonatate  100 mg TID PRN           Physical Exam   Vitals:    05/08/21 0800 05/08/21 0819 05/08/21 0822 05/08/21 0916   BP:  (!) 86/53 (!) 95/64 108/81   Pulse: 91 99     Resp: 17 20     Temp:  37.1 °C (98.7 °F)     TempSrc:  Temporal     SpO2: 95% 93%     Weight:       Height:           Physical Exam   Constitutional: No distress.   Eyes: Pupils are equal, round, and reactive to light. No scleral icterus.   Cardiovascular: Normal rate.   No murmur heard.  Pulmonary/Chest: He has wheezes. He has rales. He exhibits tenderness.   Abdominal: Soft. He exhibits no mass. There is abdominal tenderness. There is no rebound and no guarding.   Genitourinary:    Penis normal.     Musculoskeletal:         General: Edema present. No deformity.      Comments: 2+ pitting edema up to the lower thigh bilaterally.  Both arms are edematous  Anasarca   Neurological: He is alert.   Patient is alert, not oriented.   Skin: Skin is warm. Rash noted. He is not diaphoretic.   Bruising everywhere   Nursing note and vitals reviewed.         Laboratory   Recent Labs     05/06/21  0941 05/06/21  1730 05/07/21  0145   WBC 22.5* 26.2* 25.5*   RBC 2.70* 2.78* 2.83*   HEMOGLOBIN 7.3* 7.5* 7.8*   HEMATOCRIT 23.1* 23.9* 24.9*   PLATELETCT 209 230 244     Recent Labs     05/05/21  1728 05/06/21  0145 05/07/21  0145   SODIUM 135 134* 135   POTASSIUM 4.0 4.2 3.7   CHLORIDE 101 100 99   CO2 27 27 30   GLUCOSE 110* 116* 90   BUN 30* 28* 28*   CREATININE 1.09 1.04 0.89      Recent Labs     05/05/21  1728 05/06/21  0145 05/07/21  0145   ALTSGPT  --  27 33   ASTSGOT  --  21 28  "  ALKPHOSPHAT  --  48 50   TBILIRUBIN  --  0.7 0.7   GLUCOSE 110* 116* 90      Recent Labs     05/06/21  0145 05/07/21  0145   INR 1.27* 1.15*           Microbiology  Results     Procedure Component Value Units Date/Time    URINALYSIS [[id number]]     Order Status: No result Specimen: Urine, Cath     BLOOD CULTURE [[id number]]     Order Status: Sent Specimen: Blood from Peripheral     BLOOD CULTURE [[id number]]     Order Status: Sent Specimen: Blood from Peripheral     MRSA By PCR (Amp) [[id number]]     Order Status: No result Specimen: Respirate from Nares     CULTURE RESPIRATORY W/ GRM STN [[id number]]     Order Status: No result Specimen: Respirate from Sputum     BLOOD CULTURE [[id number]]  (Abnormal) Collected: 05/01/21 1940    Order Status: Completed Specimen: Blood from Peripheral Updated: 05/07/21 1034     Significant Indicator POS     Source BLD     Site PERIPHERAL     Culture Result No growth after 5 days of incubation.    Narrative:      Per Hospital Policy: Only change Specimen Src: to \"Line\" if  specified by physician order.  Per Hospital Policy: Only change Specimen Src: to \"Line\" if    BLOOD CULTURE [[id number]] Collected: 05/01/21 2143    Order Status: Completed Specimen: Blood from Peripheral Updated: 05/06/21 2300     Significant Indicator NEG     Source BLD     Site PERIPHERAL     Culture Result No growth after 5 days of incubation.    Narrative:      Per Hospital Policy: Only change Specimen Src: to \"Line\" if  specified by physician order.  No site indicated    SARS-CoV-2 PCR (24 hour In-House): Collect NP swab in [facility] [[id number]] Collected: 05/01/21 2119    Order Status: Completed Specimen: Respirate from Nasopharyngeal Updated: 05/02/21 1623     SARS-CoV-2 Source NP Swab     SARS-CoV-2 by PCR NotDetected     Comment: PATIENTS: Important information regarding your results and instructions can  be found at [email or web address]   \"After your  Covid-19 Test\"  [facility] providers: " PLEASE REFER TO DE-ESCALATION AND RETESTING PROTOCOL  on [email or web address]  **The TaqPath COVID-19 SARS-CoV-2 RT-PCR test has been made available for  use under the Emergency Use Authorization (EUA) only.         Narrative:      Have you been in close contact with a person who is suspected  or known to be positive for COVID-19 within the last 30 days  (e.g. last seen that person < 30 days ago)->Unknown    URINALYSIS CULTURE, IF INDICATED [[id number]]  (Abnormal) Collected: 05/01/21 2206    Order Status: Completed Specimen: Urine Updated: 05/01/21 2311     Color Yellow     Character Clear     Specific Gravity 1.017     Ph 8.5     Glucose Negative mg/dL      Ketones Negative mg/dL      Protein Negative mg/dL      Bilirubin Negative     Urobilinogen, Urine 1.0     Nitrite Negative     Leukocyte Esterase Trace     Occult Blood Negative     Micro Urine Req Microscopic    Narrative:      Indication for culture:->Evaluation for sepsis without a  clear source of infection    URINALYSIS,CULTURE IF INDICATED [[id number]]     Order Status: Canceled Specimen: Urine              Labs reviewed by me and noted above.    Radiology  DX-CHEST-LIMITED (1 VIEW)  Narrative: 5/7/2021 9:34 AM    HISTORY/REASON FOR EXAM:  Cough.    TECHNIQUE/EXAM DESCRIPTION AND NUMBER OF VIEWS:  Single AP view of the chest.    COMPARISON: 5/5/2021 and 5/1/2021    FINDINGS:  Lines/tubes:  Left bipolar transvenous pacing device remains in place.    Lungs:  His persistent opacity in the left lower lobe consistent with atelectasis or pneumonia.  Minor linear atelectasis is present in the right lower lobe.    Pleura:  A small left pleural effusion may be present.  No pneumothorax is identified.    Heart and mediastinum:  Cardiomegaly is stable.    Bones:  Fusion hardware is present in the anterior aspect of the mid/distal cervical spine.  Impression: 1.  Persistent atelectasis or pneumonia in the left lower lobe with possible small left pleural effusion.    2.   Minimal linear atelectasis in the right lower lobe.    3.  Stable cardiomegaly.      Results for orders placed or performed during the hospital encounter of 07/26/16   ECHOCARDIOGRAM COMP W/O CONT   Result Value Ref Range    Eject.Frac. MOD BP 53.75     Eject.Frac. MOD 4C 57.39     Eject.Frac. MOD 2C 52.64           Assessment and Plan    Principal Problem:    Rectus sheath hematoma POA: Yes  Active Problems:    Sepsis (HCC) POA: Yes    Essential hypertension, benign POA: Yes    Pneumonia POA: No    Counseling regarding advance care planning and goals of care POA: No    Adrenal insufficiency (HCC) POA: Yes    Pulmonary embolism (HCC) POA: Yes      Overview: Pt states 8 different PE's.    Anasarca POA: No    COPD (chronic obstructive pulmonary disease) (HCC) POA: Yes      Overview: PMA HOME O2    Paroxysmal atrial fibrillation (HCC) POA: Yes    Coronary artery disease POA: Yes  Resolved Problems:    Ileus POA: No      1. I order CXR.  CXR 5/7 personally reviewed, showing LLL infiltrate.  In setting of persistent leukocytosis (chart review that the patient has previously prior to this admission normal WBC), most definitely represent infection.  The right upper quadrant/right lower lobe tenderness most likely related to his pneumonia.  2. Starting Zosyn.  Hold off on starting vancomycin in favor of MRSA screening.  If MRSA screening is positive, will start gram-positive/MRSA coverage.  3. Order CTA chest to rule out PE and PNA  4. US LE doppler to rule out DVT  5. Start Sepsis protocol  6. Check CBC, chemistry panel STAT.  Check procalcitonin.  7. Blood cultures x2 now  8. Sputum culture  9. Start DuoNeb around-the-clock, albuterol every 4 hours as needed.  Continue supplemental oxygen.  10. Decrease lactulose to once daily dosing.  11. DC Diamox given hypotension.  When his blood pressure is more stable, he can no need diuresis for anasarca and to optimize his volume status.  12. Continue sotalol as directed by  cardiology.  Sotalol has almost No BP effect.  13. Continue aspirin, Plavix given his history of recent PCI for coronary artery disease.  14. On Synthroid for history of hypo-thyroidism  15. On hydrocortisone for history of adrenal insufficiency.  16. Tamsulosin for BPH.      DVT prophylaxis: SCD    CODE STATUS:  Intubation OK.  No CPR.    Disposition: TBD.

## 2021-05-08 NOTE — PROGRESS NOTES
Bedside report received from ОЛЬГА De Leon upon transfer to unit. Pt sleeping, arousable, with no signs of distress or discomfort. Tele monitor placed. Monitors notified, pt ST with BBB, HR of 107. VSS, on 3L oxymask. Fall precautions in place. Call light within reach.

## 2021-05-08 NOTE — PROGRESS NOTES
Patient A&O x1. Patient continues to yell that he can not breathe. Patient saturating 94% 3 L NC. MD notified. Patient reoriented.

## 2021-05-08 NOTE — CARE PLAN
Problem: Communication  Goal: The ability to communicate needs accurately and effectively will improve  Outcome: PROGRESSING SLOWER THAN EXPECTED  Patient disoriented, patient reoriented and educated on POC.      Problem: Safety  Goal: Will remain free from injury  Outcome: PROGRESSING AS EXPECTED     Problem: Skin Integrity  Goal: Risk for impaired skin integrity will decrease  Outcome: PROGRESSING AS EXPECTED   Pt turned and positioned every two hours. Incontinence care provided and barrier paste applied as needed. Waffle overlay in use. TAPS system ordered.

## 2021-05-08 NOTE — PROGRESS NOTES
Patient complaining of nausea and shortness or breath. He feels like he just can't breath. Also complaining of pain and just can't get comfortable.  Dr. Boone called and updated. Orders to be placed.

## 2021-05-08 NOTE — PROGRESS NOTES
2 RN skin check complete with ОЛЬГА Spence.   Devices in place oxymask, PIV, tele monitor, condom catheter.  Skin assessed under devices: YES  Devices in place nasal cannula, PIV, tele monitor, condom catheter.  Confirmed pressure ulcers found on none.  New potential pressure ulcers noted on none. Wound consult placed NA.  The following interventions in place pillows for support and comfort, pillows to float heels. Moisturizer offered. Gray foam pads on silicone oxygen tubing, Barrier cream and waffle overlay.      R ear posterior lobe black discoloration and dry spot noted.   L ear intact, pink, and blanching.   Bilat elbows intact, red, and slow to elida   Generalized bruising and edema, to upper extremities. R and L upper inner arm draining serous drainage.   Sacrum intact, red, and blanching   Bilat heels, intact, dry, pink and blanching.   Abdominal perfuse bruising to entire R quadrant.   Bruising to R posterior upper thigh and groin

## 2021-05-08 NOTE — PROGRESS NOTES
2 RN skin check complete with ОЛЬГА Brasher.   Devices in place oxymask, PIV, tele monitor, condom catheter.  Skin assessed under devices yes.  Confirmed pressure ulcers found on none.  New potential pressure ulcers noted on none. Wound consult placed NA.  The following interventions in place pillows for support and comfort, pillows to float heels. Moisturizer offered.     R ear posterior lobe black discoloration/dry spot noted. L ear intact, pink, and blanching.   Bilat elbows intact, red, and slow to elida   Generalized bruising and edema, to upper extremities. R upper inner arm draining serous drainage.   Sacrum intact, red, and blanching   Bilat heels, intact, dry, pink and blanching.   Abdominal perfuse bruising to entire R quadrant.   Bruising to R posterior upper thigh and groin.

## 2021-05-08 NOTE — PROGRESS NOTES
Assumed care of patient. Bedside report, received from Beverly ROLON. Updated POC, call light within reach, and fall precautions in place. Bed locked and and in lowest position. Patient instructed to call for assistance before getting out of bed. All questions answered, no further needs at this time.

## 2021-05-08 NOTE — DISCHARGE PLANNING
[facility] Transitional Care Coordination  TCC continues to follow.  Would appreciate updated PT/OT notes when medically appropriate.

## 2021-05-09 ENCOUNTER — APPOINTMENT (OUTPATIENT)
Dept: RADIOLOGY | Facility: MEDICAL CENTER | Age: 86
DRG: 270 | End: 2021-05-09
Attending: INTERNAL MEDICINE
Payer: MEDICARE

## 2021-05-09 LAB
ABO GROUP BLD: NORMAL
ALBUMIN SERPL BCP-MCNC: 2.3 G/DL (ref 3.2–4.9)
ALBUMIN/GLOB SERPL: 0.9 G/DL
ALP SERPL-CCNC: 50 U/L (ref 30–99)
ALT SERPL-CCNC: 32 U/L (ref 2–50)
ANION GAP SERPL CALC-SCNC: 6 MMOL/L (ref 7–16)
AST SERPL-CCNC: 34 U/L (ref 12–45)
BARCODED ABORH UBTYP: 9500
BARCODED PRD CODE UBPRD: NORMAL
BARCODED UNIT NUM UBUNT: NORMAL
BILIRUB SERPL-MCNC: 1 MG/DL (ref 0.1–1.5)
BLD GP AB SCN SERPL QL: NORMAL
BUN SERPL-MCNC: 32 MG/DL (ref 8–22)
C DIFF DNA SPEC QL NAA+PROBE: NEGATIVE
C DIFF TOX A+B STL QL IA: NEGATIVE
C DIFF TOX GENS STL QL NAA+PROBE: NORMAL
CALCIUM SERPL-MCNC: 7.5 MG/DL (ref 8.5–10.5)
CHLORIDE SERPL-SCNC: 104 MMOL/L (ref 96–112)
CO2 SERPL-SCNC: 24 MMOL/L (ref 20–33)
COMPONENT R 8504R: NORMAL
CREAT SERPL-MCNC: 1.18 MG/DL (ref 0.5–1.4)
ERYTHROCYTE [DISTWIDTH] IN BLOOD BY AUTOMATED COUNT: 69.1 FL (ref 35.9–50)
GLOBULIN SER CALC-MCNC: 2.5 G/DL (ref 1.9–3.5)
GLUCOSE SERPL-MCNC: 85 MG/DL (ref 65–99)
HCT VFR BLD AUTO: 24.7 % (ref 42–52)
HGB BLD-MCNC: 6.5 G/DL (ref 14–18)
MAGNESIUM SERPL-MCNC: 2.4 MG/DL (ref 1.5–2.5)
MCH RBC QN AUTO: 26.3 PG (ref 27–33)
MCHC RBC AUTO-ENTMCNC: 26.3 G/DL (ref 33.7–35.3)
MCV RBC AUTO: 100 FL (ref 81.4–97.8)
NT-PROBNP SERPL IA-MCNC: 4536 PG/ML (ref 0–125)
PLATELET # BLD AUTO: 242 K/UL (ref 164–446)
PMV BLD AUTO: 10 FL (ref 9–12.9)
POTASSIUM SERPL-SCNC: 3.8 MMOL/L (ref 3.6–5.5)
PROCALCITONIN SERPL-MCNC: 0.13 NG/ML
PRODUCT TYPE UPROD: NORMAL
PROT SERPL-MCNC: 4.8 G/DL (ref 6–8.2)
RBC # BLD AUTO: 2.47 M/UL (ref 4.7–6.1)
RH BLD: NORMAL
SODIUM SERPL-SCNC: 134 MMOL/L (ref 135–145)
UNIT STATUS USTAT: NORMAL
VANCOMYCIN TROUGH SERPL-MCNC: 14.7 UG/ML (ref 10–20)
WBC # BLD AUTO: 21.2 K/UL (ref 4.8–10.8)

## 2021-05-09 PROCEDURE — 700111 HCHG RX REV CODE 636 W/ 250 OVERRIDE (IP): Performed by: STUDENT IN AN ORGANIZED HEALTH CARE EDUCATION/TRAINING PROGRAM

## 2021-05-09 PROCEDURE — 74018 RADEX ABDOMEN 1 VIEW: CPT

## 2021-05-09 PROCEDURE — 700111 HCHG RX REV CODE 636 W/ 250 OVERRIDE (IP): Performed by: INTERNAL MEDICINE

## 2021-05-09 PROCEDURE — 700105 HCHG RX REV CODE 258: Performed by: INTERNAL MEDICINE

## 2021-05-09 PROCEDURE — 36430 TRANSFUSION BLD/BLD COMPNT: CPT

## 2021-05-09 PROCEDURE — 700102 HCHG RX REV CODE 250 W/ 637 OVERRIDE(OP): Performed by: STUDENT IN AN ORGANIZED HEALTH CARE EDUCATION/TRAINING PROGRAM

## 2021-05-09 PROCEDURE — A9270 NON-COVERED ITEM OR SERVICE: HCPCS | Performed by: INTERNAL MEDICINE

## 2021-05-09 PROCEDURE — 84145 PROCALCITONIN (PCT): CPT

## 2021-05-09 PROCEDURE — 86901 BLOOD TYPING SEROLOGIC RH(D): CPT

## 2021-05-09 PROCEDURE — 700102 HCHG RX REV CODE 250 W/ 637 OVERRIDE(OP): Performed by: INTERNAL MEDICINE

## 2021-05-09 PROCEDURE — 700102 HCHG RX REV CODE 250 W/ 637 OVERRIDE(OP): Performed by: PSYCHIATRY & NEUROLOGY

## 2021-05-09 PROCEDURE — 99233 SBSQ HOSP IP/OBS HIGH 50: CPT | Performed by: INTERNAL MEDICINE

## 2021-05-09 PROCEDURE — 94760 N-INVAS EAR/PLS OXIMETRY 1: CPT

## 2021-05-09 PROCEDURE — 770020 HCHG ROOM/CARE - TELE (206)

## 2021-05-09 PROCEDURE — 85027 COMPLETE CBC AUTOMATED: CPT

## 2021-05-09 PROCEDURE — 85025 COMPLETE CBC W/AUTO DIFF WBC: CPT

## 2021-05-09 PROCEDURE — 83880 ASSAY OF NATRIURETIC PEPTIDE: CPT

## 2021-05-09 PROCEDURE — 94640 AIRWAY INHALATION TREATMENT: CPT

## 2021-05-09 PROCEDURE — 700101 HCHG RX REV CODE 250: Performed by: INTERNAL MEDICINE

## 2021-05-09 PROCEDURE — A9270 NON-COVERED ITEM OR SERVICE: HCPCS | Performed by: STUDENT IN AN ORGANIZED HEALTH CARE EDUCATION/TRAINING PROGRAM

## 2021-05-09 PROCEDURE — 86923 COMPATIBILITY TEST ELECTRIC: CPT

## 2021-05-09 PROCEDURE — P9016 RBC LEUKOCYTES REDUCED: HCPCS

## 2021-05-09 PROCEDURE — 30233N1 TRANSFUSION OF NONAUTOLOGOUS RED BLOOD CELLS INTO PERIPHERAL VEIN, PERCUTANEOUS APPROACH: ICD-10-PCS | Performed by: INTERNAL MEDICINE

## 2021-05-09 PROCEDURE — 80202 ASSAY OF VANCOMYCIN: CPT

## 2021-05-09 PROCEDURE — 86850 RBC ANTIBODY SCREEN: CPT

## 2021-05-09 PROCEDURE — A9270 NON-COVERED ITEM OR SERVICE: HCPCS | Performed by: PSYCHIATRY & NEUROLOGY

## 2021-05-09 PROCEDURE — 86900 BLOOD TYPING SEROLOGIC ABO: CPT

## 2021-05-09 PROCEDURE — 83735 ASSAY OF MAGNESIUM: CPT | Mod: 91

## 2021-05-09 PROCEDURE — 36415 COLL VENOUS BLD VENIPUNCTURE: CPT

## 2021-05-09 PROCEDURE — 80053 COMPREHEN METABOLIC PANEL: CPT

## 2021-05-09 RX ORDER — SODIUM CHLORIDE 9 MG/ML
INJECTION, SOLUTION INTRAVENOUS CONTINUOUS
Status: ACTIVE | OUTPATIENT
Start: 2021-05-09 | End: 2021-05-09

## 2021-05-09 RX ORDER — FUROSEMIDE 10 MG/ML
20 INJECTION INTRAMUSCULAR; INTRAVENOUS 2 TIMES DAILY
Status: DISCONTINUED | OUTPATIENT
Start: 2021-05-09 | End: 2021-05-14 | Stop reason: HOSPADM

## 2021-05-09 RX ORDER — LORAZEPAM 2 MG/ML
2 INJECTION INTRAMUSCULAR ONCE
Status: COMPLETED | OUTPATIENT
Start: 2021-05-09 | End: 2021-05-09

## 2021-05-09 RX ADMIN — HYDROCORTISONE 10 MG: 10 TABLET ORAL at 17:26

## 2021-05-09 RX ADMIN — IPRATROPIUM BROMIDE AND ALBUTEROL SULFATE 3 ML: .5; 2.5 SOLUTION RESPIRATORY (INHALATION) at 07:47

## 2021-05-09 RX ADMIN — LORAZEPAM 2 MG: 2 INJECTION INTRAMUSCULAR; INTRAVENOUS at 20:57

## 2021-05-09 RX ADMIN — SOTALOL HYDROCHLORIDE 40 MG: 80 TABLET ORAL at 20:26

## 2021-05-09 RX ADMIN — ACETAMINOPHEN 1000 MG: 500 TABLET ORAL at 20:26

## 2021-05-09 RX ADMIN — OMEPRAZOLE 20 MG: 20 CAPSULE, DELAYED RELEASE ORAL at 05:09

## 2021-05-09 RX ADMIN — FUROSEMIDE 20 MG: 10 INJECTION, SOLUTION INTRAMUSCULAR; INTRAVENOUS at 17:28

## 2021-05-09 RX ADMIN — ASPIRIN 81 MG: 81 TABLET, CHEWABLE ORAL at 05:09

## 2021-05-09 RX ADMIN — CEFEPIME 2 G: 2 INJECTION, POWDER, FOR SOLUTION INTRAVENOUS at 05:06

## 2021-05-09 RX ADMIN — ACETAMINOPHEN 1000 MG: 500 TABLET ORAL at 15:24

## 2021-05-09 RX ADMIN — TAMSULOSIN HYDROCHLORIDE 0.4 MG: 0.4 CAPSULE ORAL at 05:09

## 2021-05-09 RX ADMIN — CLOPIDOGREL BISULFATE 75 MG: 75 TABLET ORAL at 05:09

## 2021-05-09 RX ADMIN — LEVOTHYROXINE SODIUM 75 MCG: 25 TABLET ORAL at 05:09

## 2021-05-09 RX ADMIN — FUROSEMIDE 20 MG: 10 INJECTION, SOLUTION INTRAMUSCULAR; INTRAVENOUS at 12:47

## 2021-05-09 RX ADMIN — HYDROCORTISONE 10 MG: 10 TABLET ORAL at 05:09

## 2021-05-09 RX ADMIN — ACETAMINOPHEN 1000 MG: 500 TABLET ORAL at 08:50

## 2021-05-09 RX ADMIN — ATORVASTATIN CALCIUM 80 MG: 80 TABLET, FILM COATED ORAL at 17:26

## 2021-05-09 RX ADMIN — SOTALOL HYDROCHLORIDE 40 MG: 80 TABLET ORAL at 08:51

## 2021-05-09 RX ADMIN — MONTELUKAST 10 MG: 10 TABLET, FILM COATED ORAL at 05:09

## 2021-05-09 RX ADMIN — OXYCODONE 5 MG: 5 TABLET ORAL at 01:19

## 2021-05-09 RX ADMIN — OMEPRAZOLE 20 MG: 20 CAPSULE, DELAYED RELEASE ORAL at 17:26

## 2021-05-09 RX ADMIN — IPRATROPIUM BROMIDE AND ALBUTEROL SULFATE 3 ML: .5; 2.5 SOLUTION RESPIRATORY (INHALATION) at 01:28

## 2021-05-09 RX ADMIN — FERROUS SULFATE TAB 325 MG (65 MG ELEMENTAL FE) 325 MG: 325 (65 FE) TAB at 08:50

## 2021-05-09 ASSESSMENT — PAIN DESCRIPTION - PAIN TYPE
TYPE: ACUTE PAIN

## 2021-05-09 NOTE — FLOWSHEET NOTE
2 RN skin check complete w/ ОЛЬГА Tam  Devices in place: oxymask, PIV, telemetry, condom catheter.  Skin assessed under devices: yes.  Confirmed pressure ulcers found on: none.  New potential pressure ulcers noted on: none. Wound consult placed n/a.  The following interventions in place pillows for comfort, support & floating; barrier paste; prevlon boots to float heels; mepilex on bilateral heels; gray foam pads on silicone oxygen tubing when on nasal cannula; waffle overlay.    R posterior earlobe- black; discoloration with dry spot noted.  L ear- pink, intact, & blanchable.  Bilateral elbows- intact, red, slow to elida.  Generalized bruising and edema- BUE. R and L arm draining serous fluid. Pillow cases changed w/ disposable cierra for collection of moisture.   Trunk- scattered bruising throughout. RUQ ecchymosis prevalent.  Sacrum- intact, redness but slow to elida.  Bilateral heels- intact, dry, pink and blanching  BLE bruising scattered throughout R posterior upper thigh and groin area.

## 2021-05-09 NOTE — PROGRESS NOTES
Patient down to CT.     2040: Patient back from CT    2330: Bladder scan result 188. Patient hasn't voided since afternoon. Patient also having multiple loose stools. Informed Dr. Palumbo about bladder scan result. MD ordered for Cdiff testing. RBAV.     0231: Informed Dr. Palumbo lab result of hemoglobin 6.5. Also informed patient still has no urine output. Waiting for orders.     0445: Call made to Jacquie Lizama. No answer. Attempted 3 times. Informed Dr. Palumbo regarding unable to obtain consent for transfusion.

## 2021-05-09 NOTE — PROGRESS NOTES
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  The hypotension was felt to be secondary to his adrenal insufficiency.  Therefore, he was treated with stress dose steroids.  Ultimately, hypotension was found secondary to a rectus sheath hematoma with acute hemorrhagic anemia.  He was admitted and was transfused 1 unit of packed red blood cell for a hemoglobin of 6.7.  Anticoagulation was reversed with Kcentra and vitamin K.  The right inferior epigastric artery was treated with coil embolization by interventional radiology on 5/5/2021.  Post procedure course complicated by ileus, which has resolved.    Overnight of 5/8/2021, the patient was transferred from the ICU to telemetry floor.  I assume his clinical care on the morning of 5/8/2021.  Reviewed the patient chart, patient has had elevated leukocytosis on presentation.  Chest x-ray obtained on 5/7/2021 show increasing right lower lobe infiltrate concerning for pneumonia.  Patient has not been on diabetic during this admission.         Interval History Updates:  No event overnight.  Afebrile.    Hg 6.5 this AM.  RBC transfusion ordered.  Night RN noted foul smelling stool, so sent stool for C dif r/o.  CT chest angio NEG for PE, unlikely PNA, but air fluid levels bowel gas pattern noted.    Consultants/Specialty  None    Review of Systems   Review of Systems   Unable to perform ROS: Mental acuity        Medications:  • NS   Continuous   • oxyCODONE immediate-release  5 mg Q3HRS PRN    Or   • oxyCODONE immediate-release  5 mg Q3HRS PRN   • ipratropium-albuterol  3 mL Q6HRS (RT)   • albuterol  2.5 mg Q4H PRN (RT)   • LR  30 mL/kg Once PRN   • LR  1,000 mL Once PRN   • ferrous  sulfate  325 mg QDAY with Breakfast   • Pharmacy  1 Each PHARMACY TO DOSE   • acetaminophen  1,000 mg TID   • sotalol  40 mg BID   • hydrocortisone  10 mg BID   • aspirin  81 mg DAILY   • clopidogrel  75 mg DAILY   • atorvastatin  80 mg Q EVENING   • levothyroxine  75 mcg AM ES   • montelukast  10 mg DAILY   • omeprazole  20 mg BID   • tamsulosin  0.4 mg DAILY   • benzonatate  100 mg TID PRN           Physical Exam   Vitals:    05/09/21 0642 05/09/21 0750 05/09/21 0842 05/09/21 0848   BP: (!) 95/63  111/68 104/75   Pulse: 77  87 72   Resp: 18 18 18 18   Temp: 36.4 °C (97.5 °F)  36.2 °C (97.1 °F) 36.2 °C (97.2 °F)   TempSrc: Temporal   Temporal   SpO2: 98%  92% 94%   Weight:       Height:           Physical Exam   Constitutional: No distress.   Eyes: Pupils are equal, round, and reactive to light. No scleral icterus.   Cardiovascular: Normal rate.   No murmur heard.  Pulmonary/Chest: He has wheezes. He has rales. He exhibits tenderness.   Abdominal: Soft. He exhibits no mass. There is abdominal tenderness. There is no rebound and no guarding.   Genitourinary:    Penis normal.     Musculoskeletal:         General: Edema present. No deformity.      Comments: 2+ pitting edema up to the lower thigh bilaterally.  Both arms are edematous  Anasarca   Neurological: He is alert.   Patient is alert, not oriented.   Skin: Skin is warm. Rash noted. He is not diaphoretic.   Bruising everywhere   Nursing note and vitals reviewed.         Laboratory   Recent Labs     05/07/21  0145 05/08/21  1003 05/09/21  0146   WBC 25.5* 25.6* 21.2*   RBC 2.83* 2.84* 2.47*   HEMOGLOBIN 7.8* 7.7* 6.5*   HEMATOCRIT 24.9* 25.2* 24.7*   PLATELETCT 244 273 242     Recent Labs     05/07/21  0145 05/08/21  1003 05/09/21  0146   SODIUM 135 135 134*   POTASSIUM 3.7 3.9 3.8   CHLORIDE 99 101 104   CO2 30 28 24   GLUCOSE 90 75 85   BUN 28* 30* 32*   CREATININE 0.89 1.04 1.18      Recent Labs     05/07/21  0145 05/08/21  1003 05/09/21  0146   ALTSGPT 33 37  "32   ASTSGOT 28 34 34   ALKPHOSPHAT 50 49 50   TBILIRUBIN 0.7 1.2 1.0   GLUCOSE 90 75 85      Recent Labs     05/07/21  0145   INR 1.15*           Microbiology  Results     Procedure Component Value Units Date/Time    BLOOD CULTURE [[id number]] Collected: 05/08/21 1019    Order Status: Completed Specimen: Blood from Peripheral Updated: 05/09/21 0719     Significant Indicator NEG     Source BLD     Site PERIPHERAL     Culture Result No Growth  Note: Blood cultures are incubated for 5 days and  are monitored continuously.Positive blood cultures  are called to the RN and reported as soon as  they are identified.      Narrative:      Per Hospital Policy: Only change Specimen Src: to \"Line\" if  specified by physician order.  No site indicated    BLOOD CULTURE [[id number]] Collected: 05/08/21 1003    Order Status: Completed Specimen: Blood from Peripheral Updated: 05/09/21 0719     Significant Indicator NEG     Source BLD     Site PERIPHERAL     Culture Result No Growth  Note: Blood cultures are incubated for 5 days and  are monitored continuously.Positive blood cultures  are called to the RN and reported as soon as  they are identified.      Narrative:      Per Hospital Policy: Only change Specimen Src: to \"Line\" if  specified by physician order.  Right Hand    C Diff by PCR rflx Toxin [[id number]] Collected: 05/08/21 2353    Order Status: Completed Specimen: Stool Updated: 05/09/21 0014    Narrative:      Special Contact [id number] QUIRIMIT JACQUI JORDAN.  Does this patient have risk factors for C-diff?->Yes  C-Diff Risk Factors->antibiotic exposure    MRSA By PCR (Amp) [[id number]]  (Abnormal) Collected: 05/08/21 1050    Order Status: Completed Specimen: Respirate from Nares Updated: 05/08/21 1700     Significant Indicator POS     Source RESP     Site NARES     MRSA PCR POSITIVE for MRSA by PCR.    Narrative:      CALL  [place]  183 tel. [phone],  CALLED  183 tel. [phone] 05/08/2021, 16:59, RB PERF. RESULTS " "CALLED  TO:[id number]  Collected By:[id number] LUIS ALBERTO BROWN  In and out cath once for sample  Collected By:[id number] LUIS ALBERTO BROWN    URINALYSIS [[id number]]  (Abnormal) Collected: 05/08/21 1050    Order Status: Completed Specimen: Urine, Cath Updated: 05/08/21 1225     Color DK Yellow     Character Cloudy     Specific Gravity 1.034     Ph 5.5     Glucose Negative mg/dL      Ketones Trace mg/dL      Protein Negative mg/dL      Bilirubin Negative     Urobilinogen, Urine 0.2     Nitrite Negative     Leukocyte Esterase Negative     Occult Blood Negative     Micro Urine Req Microscopic    Narrative:      Collected By:[id number] LUIS ALBERTO RODRIGUEZ A  In and out cath once for sample    CULTURE RESPIRATORY W/ GRM STN [[id number]]     Order Status: No result Specimen: Respirate from Sputum     BLOOD CULTURE [[id number]]  (Abnormal) Collected: 05/01/21 1940    Order Status: Completed Specimen: Blood from Peripheral Updated: 05/07/21 1034     Significant Indicator POS     Source BLD     Site PERIPHERAL     Culture Result No growth after 5 days of incubation.    Narrative:      Per Hospital Policy: Only change Specimen Src: to \"Line\" if  specified by physician order.  Per Hospital Policy: Only change Specimen Src: to \"Line\" if    BLOOD CULTURE [[id number]] Collected: 05/01/21 2143    Order Status: Completed Specimen: Blood from Peripheral Updated: 05/06/21 2300     Significant Indicator NEG     Source BLD     Site PERIPHERAL     Culture Result No growth after 5 days of incubation.    Narrative:      Per Hospital Policy: Only change Specimen Src: to \"Line\" if  specified by physician order.  No site indicated    SARS-CoV-2 PCR (24 hour In-House): Collect NP swab in [facility] [[id number]] Collected: 05/01/21 2119    Order Status: Completed Specimen: Respirate from Nasopharyngeal Updated: 05/02/21 1623     SARS-CoV-2 Source NP Swab     SARS-CoV-2 by PCR NotDetected     Comment: PATIENTS: Important information regarding your results and instructions can  be " "found at [email or web address]   \"After your  Covid-19 Test\"  [facility] providers: PLEASE REFER TO DE-ESCALATION AND RETESTING PROTOCOL  on [email or web address]  **The TaqPath COVID-19 SARS-CoV-2 RT-PCR test has been made available for  use under the Emergency Use Authorization (EUA) only.         Narrative:      Have you been in close contact with a person who is suspected  or known to be positive for COVID-19 within the last 30 days  (e.g. last seen that person < 30 days ago)->Unknown             Labs reviewed by me and noted above.    Radiology  CT-CTA CHEST PULMONARY ARTERY W/ RECONS  Narrative: 5/8/2021 7:57 PM    HISTORY/REASON FOR EXAM:  PE suspected, high pretest prob    TECHNIQUE/EXAM DESCRIPTION:  CT angiogram scan for pulmonary embolism with contrast, with reconstructions.    1.25 mm helical sections were obtained from the lung apices through the lung bases following the rapid bolus administration of 60 mL of Omnipaque 350 nonionic contrast. Thin-section overlapping reconstruction interval was utilized. Coronal   reconstructions were generated from the axial data. MIP post processing was performed and utilized for the interpretation.    Low dose optimization technique was utilized for this CT exam including automated exposure control and adjustment of the mA and/or kV according to patient size.    COMPARISON: None    FINDINGS:  Pulmonary Embolism: No.    Main Pulmonary Arteries: No.    Segmental branches: No.    Subsegmental branches: No.    Additional Comments: None.    Lungs: Patchy bibasilar opacities.    Airway: Patent.    Pleura: No pleural effusion or pneumothorax.    Nodes: No enlarged mediastinal or hilar lymph nodes.    Additional findings:    Thoracic aorta and great vessels:  No aneurysm.    Heart and pericardium:   Severe coronary artery calcification. No pericardial effusion.    Thoracic spine:  No fracture or malalignment. No compression deformity.    Chest wall:   " Unremarkable.    Visualized upper abdomen: Air-fluid levels throughout the colon.  Impression: 1. No CT evidence of pulmonary embolism.    2. Patchy bibasilar opacities, likely atelectasis.  3.Air-fluid levels throughout the colon could relate to ileus or diarrhea disease.  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     MANOLO AUGUST     Exam Date:     2021 14:10     Room #:     Inpatient     Priority:     Routine     Ht (in):             Wt (lb):     Ordering Physician:        SAMARA LEDBETTER     Referring Physician:       [id number]     Sonographer:               Kerline Donaldson RVLLOYD     Study Type:                Complete Bilateral     Technical Quality:         Poor     Age:    88    Gender:     M     MRN:    [id number]     :    1932      BSA:     Indications:     Edema, unspecified     CPT Codes:       84932     ICD Codes:       R60.9     History:         Edema of legs. Prior bilateral duplex 3/2007.     Limitations:     Body habitus.     PROCEDURES:   Bilateral lower extremity venous duplex imaging.    The following venous structures were evaluated: common femoral, profunda    femoral, proximal greater saphenous, femoral, popliteal, peroneal and    posterior tibial veins.    Serial compression, augmentation maneuvers, color and spectral Doppler flow    evaluations were performed.      FINDINGS:   Right lower extremity -   No evidence of deep venous thrombosis in the visualized veins of the right    lower extremity.   Unable to perform complete compressions due to patient pain and resistance.    The peroneal and posterior tibial veins are difficult to assess for    compressibility, some flow response to augmentation is demonstrated.    INCIDENTAL FINDING: enlargement of the popliteal artery to a diameter of    1.8 cm with intraluminal thrombus noted and triphasic waveforms     demonstrated.     Left lower extremity -   No evidence of deep venous thrombosis in the visualized veins of the left    lower extremity.   Unable to perform complete compressions due to patient pain and resistance.    The peroneal and posterior tibial veins are difficult to assess for    compressibility, some flow response to augmentation is demonstrated in the    posterior tibial artery. The peroneal arteries are not visualized.     Dennis Vasquezoff   (Electronically Signed)   Final Date:      08 May 2021 15:20  DX-CHEST-PORTABLE (1 VIEW)  Narrative: 5/8/2021 9:41 AM    HISTORY/REASON FOR EXAM:  Shortness of Breath.    TECHNIQUE/EXAM DESCRIPTION AND NUMBER OF VIEWS:  Single portable view of the chest.    COMPARISON: One day prior    FINDINGS:    Cardiomediastinal silhouette is stable. Aortic calcified atherosclerotic plaque. There is a dual-lead cardiac pacemaker.    Worsening lung aeration with increased bibasilar opacities particularly on the right likely increasing atelectasis. Cannot exclude underlying edema or infection. No large pleural effusion or pneumothorax.    Postsurgical changes ACDF.  Impression: Worsening aeration with increased bibasilar opacities likely worsening atelectasis.      Results for orders placed or performed during the hospital encounter of 07/26/16   ECHOCARDIOGRAM COMP W/O CONT   Result Value Ref Range    Eject.Frac. MOD BP 53.75     Eject.Frac. MOD 4C 57.39     Eject.Frac. MOD 2C 52.64           Assessment and Plan    Principal Problem:    Rectus sheath hematoma POA: Yes  Active Problems:    Sepsis (HCC) POA: Yes    Essential hypertension, benign POA: Yes    Pneumonia POA: No    Counseling regarding advance care planning and goals of care POA: No    Adrenal insufficiency (HCC) POA: Yes    Pulmonary embolism (HCC) POA: Yes      Overview: Pt states 8 different PE's.    Anasarca POA: No    COPD (chronic obstructive pulmonary disease) (HCC) POA: Yes      Overview: PMA HOME O2    Paroxysmal  atrial fibrillation (HCC) POA: Yes    Coronary artery disease POA: Yes  Resolved Problems:    Ileus POA: No      1. Given the results of his chest CT angio, will stop all antibiotics and observe  2. KUB to assess for obstruction/ileus.  3. 1 packed red blood cell transfusion for anemia.  Monitor bowel movement for melena.  4. Follow-up stool study for rule out C. difficile  5. Continue sotalol as directed by cardiology.  Sotalol has almost No BP effect.  6. Continue aspirin, Plavix given his history of recent PCI/JAS for coronary artery disease.  7. On Synthroid for history of hypo-thyroidism  8. On hydrocortisone for history of adrenal insufficiency.  9. Tamsulosin for BPH.      DVT prophylaxis: SCD    CODE STATUS:  Intubation OK.  No CPR.    Disposition: TBD.

## 2021-05-09 NOTE — PROGRESS NOTES
Lab notified of positive MRSA in the nares. Result read back to lab. Dr. Acosta notified at [id number].

## 2021-05-09 NOTE — PROGRESS NOTES
"Pharmacy Kinetics 88 y.o. male on vancomycin day # 1 2021    Vancomycin 2500 mg x1  @ 1815 (not yet given)     Indication for Treatment: Possible HAP    Proposed duration of treatment: TBD, 7 days entered empirically     Pertinent history per medical record: Admitted on 2021 for CHF exacerbation vs ACS. Patient was recently discharged for NSTEMI w/ JAS placed on . 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 On , CXR revealed worsening aeration with increased bibasilar opacities worse on right, and patient with some hypotension. MD with concern for developing RLL pneumonia. Broad spectrum antibiotics have been started empirically for healthcare-associated pneumonia, and vancomycin started given positive MRSA nares.    Other antibiotics: Cefepime 2 g Q12H    Allergies: Lisinopril and Pcn [penicillins]     List concerns for renal function: BUN/SCr ratio > 20:1, CHF, obesity, malnutrition/low albumin, contrast (), age >65    Pertinent cultures to date:    sputum cx: negative   blood cx: negative x2   blood cx: negative x2   MRSA nares: positive    Recent Labs     21  0145 21  0941 21  1730 21  0145 21  1003   WBC 22.1* 22.5* 26.2* 25.5* 25.6*   NEUTSPOLYS  --   --   --   --  82.30*     Recent Labs     21  0145 21  0145 21  1003   BUN 28* 28* 30*   CREATININE 1.04 0.89 1.04   ALBUMIN 2.6* 2.9* 2.8*     Intake/Output Summary (Last 24 hours) at 2021 1741  Last data filed at 2021 1245  Gross per 24 hour   Intake 340 ml   Output 0 ml   Net 340 ml      /53   Pulse 94   Temp 37.2 °C (98.9 °F) (Temporal)   Resp 16   Ht 1.727 m (5' 8\")   Wt 101 kg (221 lb 9 oz)   SpO2 95%  Temp (24hrs), Av.8 °C (98.2 °F), Min:36.2 °C (97.1 °F), Max:37.2 °C (98.9 °F)      A/P   1. Vancomycin dose change: start Vancomycin pulse dosing  2. Next vancomycin level:  @ 0600 (12 hour random " level)  3. Goal trough: 15-20 mcg/ml  4. Comments: Currently afebrile. WBC elevated, though patient on chronic steroids for hypopituitarism. PCT < 0.25. Scr stable - however UOP over last couple days appears low, <0.5 mL/kg/h. Paged RN, who stated that while patient had 400 mL UOP this morning, he is currently having difficulty urinating so a bladder scan has been ordered. Will order random level tomorrow AM to gauge how patient is clearing vancomycin, hold off on scheduling doses for now. Recommend de-escalation of vancomycin as dictated by cultures. Pharmacy will follow renal function, cultures, troughs, and patient's clinical status to guide subsequent recommendations.      Samaria Amor, PharmD  PGY1 Pharmacy Practice Resident

## 2021-05-09 NOTE — CARE PLAN
Problem: Bowel/Gastric:  Goal: Will not experience complications related to bowel motility  Outcome: NOT MET     Problem: Communication  Goal: The ability to communicate needs accurately and effectively will improve  Outcome: PROGRESSING AS EXPECTED     Problem: Safety  Goal: Will remain free from injury  Outcome: PROGRESSING AS EXPECTED  Goal: Will remain free from falls  Outcome: PROGRESSING AS EXPECTED     Problem: Pain Management  Goal: Pain level will decrease to patient's comfort goal  Outcome: PROGRESSING AS EXPECTED

## 2021-05-09 NOTE — FLOWSHEET NOTE
Assumed care of patient at bedside report from Flores GLEASON RN. Updated on POC. Patient currently A & O x 2-3, sleeping; on 6 L O2 NC; up max assist/BR; No complaints of acute pain. Blood infusing. Call light within reach. Whiteboard updated. Fall precautions in place. Bed locked and in lowest position. All questions answered. No other needs indicated at this time.

## 2021-05-09 NOTE — CARE PLAN
Problem: Urinary Elimination:  Goal: Ability to reestablish a normal urinary elimination pattern will improve  Outcome: PROGRESSING AS EXPECTED  Note: Patient is due to void. Bladder scan performed. MD aware of patient's decrease urine output.      Problem: Skin Integrity  Goal: Risk for impaired skin integrity will decrease  Outcome: PROGRESSING AS EXPECTED  Note: No new skin breakdown noted. Continue to assess pt skin. Turn Q2H, assess and address bowel incontinence. Waffle overlay in place. Continue to educate pt on risk for skin breakdown. Utilize specialty beds, overlays, and barriers for prevention.      Problem: Psychosocial Needs:  Goal: Level of anxiety will decrease  Outcome: PROGRESSING AS EXPECTED  Flowsheets (Taken 5/8/2021 2045)  Patient Behaviors:   Anxious   Confused  Note: Listened to patient's concerns and anxiety. Relaxation techniques guidance in order to help with anxiety. Frequent reorientation and education required.      Problem: Respiratory:  Goal: Respiratory status will improve  Outcome: PROGRESSING SLOWER THAN EXPECTED  Note: Patient has SOB at rest, very anxious. Educated patient about oxygen, IS and relaxation techniques. Frequent education and redemonstration required. Continue  in place. Continue to monitor respiratory status.

## 2021-05-10 ENCOUNTER — APPOINTMENT (OUTPATIENT)
Dept: RADIOLOGY | Facility: MEDICAL CENTER | Age: 86
DRG: 270 | End: 2021-05-10
Attending: INTERNAL MEDICINE
Payer: MEDICARE

## 2021-05-10 PROBLEM — D62 ANEMIA ASSOCIATED WITH ACUTE BLOOD LOSS: Status: ACTIVE | Noted: 2021-05-10

## 2021-05-10 PROBLEM — D72.829 LEUKOCYTOSIS: Status: ACTIVE | Noted: 2021-05-10

## 2021-05-10 LAB
BASOPHILS # BLD AUTO: 0.2 % (ref 0–1.8)
BASOPHILS # BLD: 0.03 K/UL (ref 0–0.12)
EKG IMPRESSION: NORMAL
EOSINOPHIL # BLD AUTO: 0.17 K/UL (ref 0–0.51)
EOSINOPHIL NFR BLD: 1 % (ref 0–6.9)
ERYTHROCYTE [DISTWIDTH] IN BLOOD BY AUTOMATED COUNT: 60.5 FL (ref 35.9–50)
HCT VFR BLD AUTO: 24.6 % (ref 42–52)
HGB BLD-MCNC: 7.5 G/DL (ref 14–18)
IMM GRANULOCYTES # BLD AUTO: 0.19 K/UL (ref 0–0.11)
IMM GRANULOCYTES NFR BLD AUTO: 1.1 % (ref 0–0.9)
LYMPHOCYTES # BLD AUTO: 0.64 K/UL (ref 1–4.8)
LYMPHOCYTES NFR BLD: 3.7 % (ref 22–41)
MAGNESIUM SERPL-MCNC: 2.3 MG/DL (ref 1.5–2.5)
MCH RBC QN AUTO: 27.4 PG (ref 27–33)
MCHC RBC AUTO-ENTMCNC: 30.5 G/DL (ref 33.7–35.3)
MCV RBC AUTO: 89.8 FL (ref 81.4–97.8)
MONOCYTES # BLD AUTO: 0.65 K/UL (ref 0–0.85)
MONOCYTES NFR BLD AUTO: 3.7 % (ref 0–13.4)
NEUTROPHILS # BLD AUTO: 15.77 K/UL (ref 1.82–7.42)
NEUTROPHILS NFR BLD: 90.3 % (ref 44–72)
NRBC # BLD AUTO: 0.02 K/UL
NRBC BLD-RTO: 0.1 /100 WBC
PLATELET # BLD AUTO: 251 K/UL (ref 164–446)
PMV BLD AUTO: 9.8 FL (ref 9–12.9)
RBC # BLD AUTO: 2.74 M/UL (ref 4.7–6.1)
WBC # BLD AUTO: 17.5 K/UL (ref 4.8–10.8)

## 2021-05-10 PROCEDURE — 700102 HCHG RX REV CODE 250 W/ 637 OVERRIDE(OP): Performed by: STUDENT IN AN ORGANIZED HEALTH CARE EDUCATION/TRAINING PROGRAM

## 2021-05-10 PROCEDURE — 700111 HCHG RX REV CODE 636 W/ 250 OVERRIDE (IP): Performed by: INTERNAL MEDICINE

## 2021-05-10 PROCEDURE — 700102 HCHG RX REV CODE 250 W/ 637 OVERRIDE(OP): Performed by: INTERNAL MEDICINE

## 2021-05-10 PROCEDURE — A9270 NON-COVERED ITEM OR SERVICE: HCPCS | Performed by: INTERNAL MEDICINE

## 2021-05-10 PROCEDURE — 74018 RADEX ABDOMEN 1 VIEW: CPT

## 2021-05-10 PROCEDURE — A9270 NON-COVERED ITEM OR SERVICE: HCPCS | Performed by: STUDENT IN AN ORGANIZED HEALTH CARE EDUCATION/TRAINING PROGRAM

## 2021-05-10 PROCEDURE — 99233 SBSQ HOSP IP/OBS HIGH 50: CPT | Performed by: INTERNAL MEDICINE

## 2021-05-10 PROCEDURE — 770020 HCHG ROOM/CARE - TELE (206)

## 2021-05-10 PROCEDURE — 700102 HCHG RX REV CODE 250 W/ 637 OVERRIDE(OP): Performed by: PSYCHIATRY & NEUROLOGY

## 2021-05-10 PROCEDURE — 93010 ELECTROCARDIOGRAM REPORT: CPT | Performed by: INTERNAL MEDICINE

## 2021-05-10 PROCEDURE — 97530 THERAPEUTIC ACTIVITIES: CPT

## 2021-05-10 PROCEDURE — 93005 ELECTROCARDIOGRAM TRACING: CPT | Performed by: INTERNAL MEDICINE

## 2021-05-10 PROCEDURE — A9270 NON-COVERED ITEM OR SERVICE: HCPCS | Performed by: PSYCHIATRY & NEUROLOGY

## 2021-05-10 RX ORDER — POTASSIUM CHLORIDE 20 MEQ/1
20 TABLET, EXTENDED RELEASE ORAL DAILY
Status: DISPENSED | OUTPATIENT
Start: 2021-05-10 | End: 2021-05-12

## 2021-05-10 RX ADMIN — POTASSIUM CHLORIDE 20 MEQ: 1500 TABLET, EXTENDED RELEASE ORAL at 09:22

## 2021-05-10 RX ADMIN — CLOPIDOGREL BISULFATE 75 MG: 75 TABLET ORAL at 05:18

## 2021-05-10 RX ADMIN — LEVOTHYROXINE SODIUM 75 MCG: 25 TABLET ORAL at 05:18

## 2021-05-10 RX ADMIN — ASPIRIN 81 MG: 81 TABLET, CHEWABLE ORAL at 05:18

## 2021-05-10 RX ADMIN — SOTALOL HYDROCHLORIDE 40 MG: 80 TABLET ORAL at 09:22

## 2021-05-10 RX ADMIN — ACETAMINOPHEN 1000 MG: 500 TABLET ORAL at 17:22

## 2021-05-10 RX ADMIN — ACETAMINOPHEN 1000 MG: 500 TABLET ORAL at 21:22

## 2021-05-10 RX ADMIN — MONTELUKAST 10 MG: 10 TABLET, FILM COATED ORAL at 05:18

## 2021-05-10 RX ADMIN — FUROSEMIDE 20 MG: 10 INJECTION, SOLUTION INTRAMUSCULAR; INTRAVENOUS at 17:22

## 2021-05-10 RX ADMIN — FERROUS SULFATE TAB 325 MG (65 MG ELEMENTAL FE) 325 MG: 325 (65 FE) TAB at 09:22

## 2021-05-10 RX ADMIN — FUROSEMIDE 20 MG: 10 INJECTION, SOLUTION INTRAMUSCULAR; INTRAVENOUS at 05:17

## 2021-05-10 RX ADMIN — OMEPRAZOLE 20 MG: 20 CAPSULE, DELAYED RELEASE ORAL at 17:22

## 2021-05-10 RX ADMIN — ACETAMINOPHEN 1000 MG: 500 TABLET ORAL at 09:22

## 2021-05-10 RX ADMIN — HYDROCORTISONE 10 MG: 10 TABLET ORAL at 17:22

## 2021-05-10 RX ADMIN — HYDROCORTISONE 10 MG: 10 TABLET ORAL at 05:20

## 2021-05-10 RX ADMIN — OMEPRAZOLE 20 MG: 20 CAPSULE, DELAYED RELEASE ORAL at 05:18

## 2021-05-10 RX ADMIN — TAMSULOSIN HYDROCHLORIDE 0.4 MG: 0.4 CAPSULE ORAL at 05:18

## 2021-05-10 RX ADMIN — SOTALOL HYDROCHLORIDE 40 MG: 80 TABLET ORAL at 21:21

## 2021-05-10 RX ADMIN — ATORVASTATIN CALCIUM 80 MG: 80 TABLET, FILM COATED ORAL at 17:22

## 2021-05-10 RX ADMIN — OXYCODONE 5 MG: 5 TABLET ORAL at 18:48

## 2021-05-10 ASSESSMENT — COGNITIVE AND FUNCTIONAL STATUS - GENERAL
SUGGESTED CMS G CODE MODIFIER MOBILITY: CL
CLIMB 3 TO 5 STEPS WITH RAILING: A LOT
PERSONAL GROOMING: TOTAL
MOVING TO AND FROM BED TO CHAIR: A LOT
EATING MEALS: TOTAL
DRESSING REGULAR UPPER BODY CLOTHING: TOTAL
HELP NEEDED FOR BATHING: TOTAL
TOILETING: TOTAL
SUGGESTED CMS G CODE MODIFIER DAILY ACTIVITY: CN
DAILY ACTIVITIY SCORE: 6
DRESSING REGULAR LOWER BODY CLOTHING: TOTAL
MOVING FROM LYING ON BACK TO SITTING ON SIDE OF FLAT BED: A LITTLE
MOBILITY SCORE: 13
TURNING FROM BACK TO SIDE WHILE IN FLAT BAD: A LOT
STANDING UP FROM CHAIR USING ARMS: A LOT
WALKING IN HOSPITAL ROOM: A LOT

## 2021-05-10 ASSESSMENT — PAIN DESCRIPTION - PAIN TYPE
TYPE: ACUTE PAIN
TYPE: CHRONIC PAIN;ACUTE PAIN
TYPE: ACUTE PAIN

## 2021-05-10 ASSESSMENT — FIBROSIS 4 INDEX: FIB4 SCORE: 2.11

## 2021-05-10 ASSESSMENT — GAIT ASSESSMENTS: GAIT LEVEL OF ASSIST: UNABLE TO PARTICIPATE

## 2021-05-10 NOTE — DISCHARGE PLANNING
[facility] Transitional Care Coordination    Follow up for Rehab.  Would welcome PT/OT updates to assist with determination for post acute care needs.  Physiatry consult pending while following for therapy updates.   Voalte update to T8 CM Whitney.  TCC following.

## 2021-05-10 NOTE — THERAPY
Occupational Therapy  Daily Treatment     Patient Name: Jeffrey Lizama  Age:  88 y.o., Sex:  male  Medical Record #: [id number]  Today's Date: 5/10/2021     Precautions  Precautions: (P) Fall Risk, Cardiac Precautions (See Comments)    Assessment     Pt currently limited by decreased functional mobility, activity tolerance, cognition, strength, AROM, balance, which are affecting pt's ability to complete ADLs/IADLs at baseline. Pt would benefit from OT services in the acute care setting to maximize functional recovery.     Plan    Continue current treatment plan.    DC Equipment Recommendations: Unable to determine at this time  Discharge Recommendations: (P) Recommend post-acute placement for additional occupational therapy services prior to discharge home       05/10/21 0749   Activities of Daily Living   Grooming Minimal Assist   Upper Body Dressing Minimal Assist   Lower Body Dressing Maximal Assist   Toileting Maximal Assist   Functional Mobility   Sit to Stand Moderate Assist   Bed, Chair, Wheelchair Transfer Unable to Participate   Short Term Goals   Short Term Goal # 1 pt will demo LB dress with AE PRN and Lex   Goal Outcome # 1 Progressing slower than expected   Short Term Goal # 2 pt will demo functional transfer with Lex   Goal Outcome # 2 Progressing slower than expected   Short Term Goal # 3 pt will tolerate >8min seated grooming ADLs with set-up assist   Goal Outcome # 3 Progressing slower than expected

## 2021-05-10 NOTE — PROGRESS NOTES
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  The hypotension was felt to be secondary to his adrenal insufficiency.  Therefore, he was treated with stress dose steroids.  Ultimately, hypotension was found secondary to a rectus sheath hematoma with acute hemorrhagic anemia.  He was admitted and was transfused 1 unit of packed red blood cell for a hemoglobin of 6.7.  Anticoagulation was reversed with Kcentra and vitamin K.  The right inferior epigastric artery was treated with coil embolization by interventional radiology on 5/5/2021.  Post procedure course complicated by ileus, which has resolved while in ICU per PN, but unfortunately recurred.    Overnight of 5/8/2021, the patient was transferred from the ICU to telemetry floor.  I assume his clinical care on the morning of 5/8/2021.  Reviewed the patient chart, patient has had elevated leukocytosis on presentation.  Chest x-ray obtained on 5/7/2021 show increasing right lower lobe infiltrate concerning for pneumonia, which is ruled out with chest CTA.  Also negative for PE.       Interval History Updates:  No event overnight.  Afebrile.    3 stools yesterday.    Addendum  3:00 PM  KUB done.  I review it.  The bowel gas pattern is improved.  So, I think he can have some clear liquid diet to give him some nutrition and close monitor of his ileus that is resolving.  PT is working with him so things are likely to improve with some mobilization.    Consultants/Specialty  Cardiology  Critical Care  Interventional Radiology    Review of Systems   Review of Systems   Unable to perform ROS: Mental acuity        Medications:  • potassium chloride SA  20 mEq DAILY   • furosemide  20 mg  BID   • oxyCODONE immediate-release  5 mg Q3HRS PRN    Or   • oxyCODONE immediate-release  5 mg Q3HRS PRN   • albuterol  2.5 mg Q4H PRN (RT)   • LR  30 mL/kg Once PRN   • LR  1,000 mL Once PRN   • ferrous sulfate  325 mg QDAY with Breakfast   • acetaminophen  1,000 mg TID   • sotalol  40 mg BID   • hydrocortisone  10 mg BID   • aspirin  81 mg DAILY   • clopidogrel  75 mg DAILY   • atorvastatin  80 mg Q EVENING   • levothyroxine  75 mcg AM ES   • montelukast  10 mg DAILY   • omeprazole  20 mg BID   • tamsulosin  0.4 mg DAILY   • benzonatate  100 mg TID PRN           Physical Exam   Vitals:    05/10/21 0024 05/10/21 0500 05/10/21 0727 05/10/21 1116   BP: 116/70 108/65 126/59 117/64   Pulse: 74 75 70 71   Resp: 18 18 17 16   Temp: 36.1 °C (97 °F) 36.1 °C (97 °F) 36.3 °C (97.4 °F) 36.6 °C (97.8 °F)   TempSrc: Temporal Temporal Temporal Temporal   SpO2: 97% 98% 98% 99%   Weight:       Height:           Physical Exam   Constitutional: No distress.   Eyes: Pupils are equal, round, and reactive to light. No scleral icterus.   Cardiovascular: Normal rate.   No murmur heard.  Pulmonary/Chest: He has no wheezes. He has rales. He exhibits no tenderness.   Abdominal: Soft. He exhibits no mass. There is abdominal tenderness. There is no rebound and no guarding.   Genitourinary:    Penis normal.     Musculoskeletal:         General: Edema present. No deformity.      Comments: 2+ pitting edema up to the lower thigh bilaterally.  Both arms are edematous  Anasarca   Neurological: He is alert.   Patient is alert, oriented to self, location.  He is coherent with rational thought process.   Skin: Skin is warm. Rash noted. He is not diaphoretic.   Bruising everywhere   Nursing note and vitals reviewed.         Laboratory   Recent Labs     05/08/21  1003 05/09/21  0146 05/09/21  2357   WBC 25.6* 21.2* 17.5*   RBC 2.84* 2.47* 2.74*   HEMOGLOBIN 7.7* 6.5* 7.5*   HEMATOCRIT 25.2* 24.7* 24.6*   PLATELETCT 273 242 251     Recent Labs      "05/08/21  1003 05/09/21  0146   SODIUM 135 134*   POTASSIUM 3.9 3.8   CHLORIDE 101 104   CO2 28 24   GLUCOSE 75 85   BUN 30* 32*   CREATININE 1.04 1.18      Recent Labs     05/08/21  1003 05/09/21  0146   ALTSGPT 37 32   ASTSGOT 34 34   ALKPHOSPHAT 49 50   TBILIRUBIN 1.2 1.0   GLUCOSE 75 85                Microbiology  Results     Procedure Component Value Units Date/Time    C Diff Toxin [[id number]] Collected: 05/08/21 2353    Order Status: Completed Updated: 05/09/21 1127     C.Diff Toxin A&B Negative     Comment: Treatment for C. difficile not recommended.  TOXIN NEGATIVE  Toxin negative by EIA; C. difficile detected by PCR.  Indicates colonization, infection unlikely. If clinical  suspicion persists, consider ID or GI Consult.  Repeat testing not indicated within 7 days.         Narrative:      Special Contact [id number] QUIRIMIT JACQUI JORDAN.  Does this patient have risk factors for C-diff?->Yes  C-Diff Risk Factors->antibiotic exposure    C Diff by PCR rflx Toxin [[id number]] Collected: 05/08/21 2353    Order Status: Completed Specimen: Stool Updated: 05/09/21 1126     C Diff by PCR See Toxin     027-NAP1-BI Presumptive Negative     Comment: Presumptive 027/NAP1/BI target DNA sequences are NOT DETECTED.       Narrative:      Special Contact [id number] QUIRIMIT JACQUI JORDAN.  Does this patient have risk factors for C-diff?->Yes  C-Diff Risk Factors->antibiotic exposure    BLOOD CULTURE [[id number]] Collected: 05/08/21 1019    Order Status: Completed Specimen: Blood from Peripheral Updated: 05/09/21 0719     Significant Indicator NEG     Source BLD     Site PERIPHERAL     Culture Result No Growth  Note: Blood cultures are incubated for 5 days and  are monitored continuously.Positive blood cultures  are called to the RN and reported as soon as  they are identified.      Narrative:      Per Hospital Policy: Only change Specimen Src: to \"Line\" if  specified by physician order.  No site indicated    BLOOD CULTURE " "[[id number]] Collected: 05/08/21 1003    Order Status: Completed Specimen: Blood from Peripheral Updated: 05/09/21 0719     Significant Indicator NEG     Source BLD     Site PERIPHERAL     Culture Result No Growth  Note: Blood cultures are incubated for 5 days and  are monitored continuously.Positive blood cultures  are called to the RN and reported as soon as  they are identified.      Narrative:      Per Hospital Policy: Only change Specimen Src: to \"Line\" if  specified by physician order.  Right Hand    MRSA By PCR (Amp) [[id number]]  (Abnormal) Collected: 05/08/21 1050    Order Status: Completed Specimen: Respirate from Nares Updated: 05/08/21 1700     Significant Indicator POS     Source RESP     Site NARES     MRSA PCR POSITIVE for MRSA by PCR.    Narrative:      CALL  [place]  183 tel. [phone],  CALLED  183 tel. [phone] 05/08/2021, 16:59, RB PERF. RESULTS CALLED  TO:[id number]  Collected By:[id number] MUDKELSEY [employer] A  In and out cath once for sample  Collected By:[id number] [employer] A    URINALYSIS [[id number]]  (Abnormal) Collected: 05/08/21 1050    Order Status: Completed Specimen: Urine, Cath Updated: 05/08/21 1225     Color DK Yellow     Character Cloudy     Specific Gravity 1.034     Ph 5.5     Glucose Negative mg/dL      Ketones Trace mg/dL      Protein Negative mg/dL      Bilirubin Negative     Urobilinogen, Urine 0.2     Nitrite Negative     Leukocyte Esterase Negative     Occult Blood Negative     Micro Urine Req Microscopic    Narrative:      Collected By:[id number] [employer] A  In and out cath once for sample    CULTURE RESPIRATORY W/ GRM STN [[id number]]     Order Status: Completed Specimen: Respirate from Sputum     BLOOD CULTURE [[id number]]  (Abnormal) Collected: 05/01/21 1940    Order Status: Completed Specimen: Blood from Peripheral Updated: 05/07/21 1034     Significant Indicator POS     Source BLD     Site PERIPHERAL     Culture Result No growth after 5 days of incubation.    Narrative:      Per " "Hospital Policy: Only change Specimen Src: to \"Line\" if  specified by physician order.  Per Hospital Policy: Only change Specimen Src: to \"Line\" if    BLOOD CULTURE [[id number]] Collected: 05/01/21 2143    Order Status: Completed Specimen: Blood from Peripheral Updated: 05/06/21 2300     Significant Indicator NEG     Source BLD     Site PERIPHERAL     Culture Result No growth after 5 days of incubation.    Narrative:      Per Hospital Policy: Only change Specimen Src: to \"Line\" if  specified by physician order.  No site indicated             Labs reviewed by me and noted above.    Radiology  [id number] VIEW  Narrative: 5/9/2021 10:23 AM    HISTORY/REASON FOR EXAM:  Abdominal Pain.    TECHNIQUE/EXAM DESCRIPTION AND NUMBER OF VIEWS:  1 view(s) of the abdomen.    COMPARISON: 5/6/2021    FINDINGS:  No free air is identified on this supine exam.  An IVC filter is present.  There is residual contrast within the bladder.  There is minor gaseous distention of the small bowel in the left midabdomen.  There is moderate gaseous distention of the colon diffusely which may represent ileus.  Impression: 1.  Moderate diffuse gaseous distention of the colon which represent colonic ileus.    2.  No small bowel obstruction identified.      Results for orders placed or performed during the hospital encounter of 07/26/16   ECHOCARDIOGRAM COMP W/O CONT   Result Value Ref Range    Eject.Frac. MOD BP 53.75     Eject.Frac. MOD 4C 57.39     Eject.Frac. MOD 2C 52.64           Assessment and Plan    * Rectus sheath hematoma- (present on admission)  Assessment & Plan  In the setting of DAPT and coumadin.  S/p KCentra and IR embolization of inf epigastric artery.    Ileus  Assessment & Plan  This was reportedly resolved while in ICU.  CT chest 5/9 noted some air-fluid level.  Confirmed on KUB 5/9.  Repeat KUB today.  Had 3 stools yesterday.    If this does not resolved in the next few days, then address nutrion (e.g. " TPN?)    Leukocytosis  Assessment & Plan  CTA negative for consolidation.  Was on empiric abx Zosyn, vanc for 1 day, but stopped given neg CT chest.  Urine clean.  No other source of infection suspected.  Suspect stress response at this time.  Monitor closely.    Adrenal insufficiency (HCC)- (present on admission)  Assessment & Plan  Back to baseline Hydrocortisone and levothyroxine  Have DC'd midodrine as pressures do not require it    Anemia associated with acute blood loss- (present on admission)  Assessment & Plan  There is also a component of Fe-deficiency.  Transfuse for Hg <7.  Cont supplement iron.      Anasarca  Assessment & Plan  Diuresis gently with Lasix 20mg IV daily.  Watch BP.  Insensible volume loss from NPO status will contribute to net daily negative volume loss.      Pulmonary embolism (HCC)- (present on admission)  Assessment & Plan  History of.  CTA here negative.    Coronary artery disease- (present on admission)  Assessment & Plan  JAS x 3 to LAD in April of this year  Have resumed DAPT (ASA, plavix)  Cont statin    Paroxysmal atrial fibrillation (HCC)- (present on admission)  Assessment & Plan  Cont sotalol.  Off of coumadin due to bleeding.  Not safe to restart until more stable.  Telemetry monitoring.      COPD (chronic obstructive pulmonary disease) (HCC)- (present on admission)  Assessment & Plan  Stable.  NEBs PRN.  O2 supplement at baseline.    Essential hypertension, benign- (present on admission)  Assessment & Plan  Hold antihypertensives.  Prioritize diuresis for now.  Restart home BP meds when more stable and fluid status optimized.            Principal Problem:    Rectus sheath hematoma POA: Yes  Active Problems:    Ileus POA: No    Adrenal insufficiency (HCC) POA: Yes    Leukocytosis POA: No    Pulmonary embolism (HCC) POA: Yes      Overview: Pt states 8 different PE's.    Anasarca POA: No    Essential hypertension, benign POA: Yes    COPD (chronic obstructive pulmonary disease)  (HCC) POA: Yes      Overview: PMA HOME O2    Paroxysmal atrial fibrillation (HCC) POA: Yes    Coronary artery disease POA: Yes  Resolved Problems:    Sepsis (HCC) POA: Yes        DVT prophylaxis: SCD    CODE STATUS:  Intubation OK.  No CPR.    Disposition: TBD.  He will most likely need SNF.

## 2021-05-10 NOTE — ASSESSMENT & PLAN NOTE
There is also a component of Fe-deficiency.    Transfuse for Hg <7.  IV iron was ordered, discontinue oral iron  Resume anticoagulation with close monitoring.

## 2021-05-10 NOTE — FLOWSHEET NOTE
Assumed care of patient at bedside report from ROSIBEL Mcgill RN. Updated on POC. Patient currently A & O x 2-3, sleeping soundly  in place; on 0.5 L O2 oxymask; up max assist/BR TQ2; No complaints of acute pain. Call light within reach. Whiteboard updated. Fall precautions in place. Bed locked and in lowest position. All questions answered. No other needs indicated at this time.

## 2021-05-10 NOTE — ASSESSMENT & PLAN NOTE
Due to low albumin and heart failure  IV Lasix gently  Ultrasound did not show any DVT in upper or lower extremities.

## 2021-05-10 NOTE — ASSESSMENT & PLAN NOTE
CTA negative for consolidation.  Was on empiric abx Zosyn, vanc for 1 day, but stopped given neg CT chest.  Urine clean.   No other source of infection suspected.    Suspect stress response at this time.    Improving monitor closely.

## 2021-05-10 NOTE — FLOWSHEET NOTE
2 RN skin check complete w/ ОЛЬГА Skinner  Devices in place: oxymask, PIV, telemetry, condom catheter.  Skin assessed under devices: yes.  Confirmed pressure ulcers found on: none.  New potential pressure ulcers noted on: none. Wound consult placed n/a.  The following interventions in place pillows for comfort, support & floating; barrier paste; prevlon boots to float heels; mepilex on bilateral heels; gray foam pads on silicone oxygen tubing when on nasal cannula; waffle overlay.     R posterior earlobe- black; discoloration with dry spot noted.  L ear- pink, intact, & blanchable.  Bilateral elbows- intact, red, slow to elida.  Generalized bruising and edema- BUE. R and L arm draining serous fluid. Pillow cases changed w/ disposable cierra for collection of moisture.   Trunk- scattered bruising throughout. RUQ ecchymosis prevalent.  Skin tear - R groin area where tegaderm was removed from embolization- scabbed.  Sacrum- intact, redness but slow to elida.  Bilateral heels- intact, dry, pink and blanching  BLE bruising scattered throughout R posterior upper thigh and groin area.

## 2021-05-11 ENCOUNTER — APPOINTMENT (OUTPATIENT)
Dept: CARDIOLOGY | Facility: MEDICAL CENTER | Age: 86
DRG: 270 | End: 2021-05-11
Attending: STUDENT IN AN ORGANIZED HEALTH CARE EDUCATION/TRAINING PROGRAM
Payer: MEDICARE

## 2021-05-11 PROBLEM — I24.9 ACS (ACUTE CORONARY SYNDROME) (HCC): Status: ACTIVE | Noted: 2021-05-11

## 2021-05-11 PROBLEM — E87.6 HYPOKALEMIA: Status: ACTIVE | Noted: 2021-05-11

## 2021-05-11 PROBLEM — I50.9 CHF (CONGESTIVE HEART FAILURE) (HCC): Status: ACTIVE | Noted: 2021-05-11

## 2021-05-11 LAB
ALBUMIN SERPL BCP-MCNC: 2.5 G/DL (ref 3.2–4.9)
ALBUMIN SERPL BCP-MCNC: 2.6 G/DL (ref 3.2–4.9)
ALBUMIN/GLOB SERPL: 1.1 G/DL
ALP SERPL-CCNC: 46 U/L (ref 30–99)
ALT SERPL-CCNC: 21 U/L (ref 2–50)
ANION GAP SERPL CALC-SCNC: 7 MMOL/L (ref 7–16)
ANION GAP SERPL CALC-SCNC: 7 MMOL/L (ref 7–16)
ANISOCYTOSIS BLD QL SMEAR: ABNORMAL
AST SERPL-CCNC: 23 U/L (ref 12–45)
BASOPHILS # BLD AUTO: 0.3 % (ref 0–1.8)
BASOPHILS # BLD AUTO: 0.4 % (ref 0–1.8)
BASOPHILS # BLD: 0.04 K/UL (ref 0–0.12)
BASOPHILS # BLD: 0.06 K/UL (ref 0–0.12)
BILIRUB SERPL-MCNC: 1.2 MG/DL (ref 0.1–1.5)
BUN SERPL-MCNC: 31 MG/DL (ref 8–22)
BUN SERPL-MCNC: 31 MG/DL (ref 8–22)
BURR CELLS BLD QL SMEAR: NORMAL
CALCIUM SERPL-MCNC: 7.2 MG/DL (ref 8.5–10.5)
CALCIUM SERPL-MCNC: 7.3 MG/DL (ref 8.5–10.5)
CHLORIDE SERPL-SCNC: 100 MMOL/L (ref 96–112)
CHLORIDE SERPL-SCNC: 102 MMOL/L (ref 96–112)
CK MB SERPL-MCNC: 2.5 NG/ML (ref 0–5)
CO2 SERPL-SCNC: 27 MMOL/L (ref 20–33)
CO2 SERPL-SCNC: 28 MMOL/L (ref 20–33)
COMMENT 1642: NORMAL
CORTIS SERPL-MCNC: 9.1 UG/DL (ref 0–23)
CREAT SERPL-MCNC: 1.08 MG/DL (ref 0.5–1.4)
CREAT SERPL-MCNC: 1.12 MG/DL (ref 0.5–1.4)
EKG IMPRESSION: NORMAL
EOSINOPHIL # BLD AUTO: 0.55 K/UL (ref 0–0.51)
EOSINOPHIL # BLD AUTO: 0.59 K/UL (ref 0–0.51)
EOSINOPHIL NFR BLD: 3.9 % (ref 0–6.9)
EOSINOPHIL NFR BLD: 4.4 % (ref 0–6.9)
ERYTHROCYTE [DISTWIDTH] IN BLOOD BY AUTOMATED COUNT: 60.9 FL (ref 35.9–50)
ERYTHROCYTE [DISTWIDTH] IN BLOOD BY AUTOMATED COUNT: 62.5 FL (ref 35.9–50)
GLOBULIN SER CALC-MCNC: 2.4 G/DL (ref 1.9–3.5)
GLUCOSE SERPL-MCNC: 88 MG/DL (ref 65–99)
GLUCOSE SERPL-MCNC: 92 MG/DL (ref 65–99)
HCT VFR BLD AUTO: 25.5 % (ref 42–52)
HCT VFR BLD AUTO: 27.5 % (ref 42–52)
HEMOCCULT STL QL: NEGATIVE
HGB BLD-MCNC: 7.8 G/DL (ref 14–18)
HGB BLD-MCNC: 8.2 G/DL (ref 14–18)
IMM GRANULOCYTES # BLD AUTO: 0.13 K/UL (ref 0–0.11)
IMM GRANULOCYTES # BLD AUTO: 0.14 K/UL (ref 0–0.11)
IMM GRANULOCYTES NFR BLD AUTO: 1 % (ref 0–0.9)
IMM GRANULOCYTES NFR BLD AUTO: 1 % (ref 0–0.9)
LV EJECT FRACT  99904: 45
LV EJECT FRACT MOD 2C 99903: 52.32
LV EJECT FRACT MOD 4C 99902: 47.09
LV EJECT FRACT MOD BP 99901: 49.67
LYMPHOCYTES # BLD AUTO: 0.71 K/UL (ref 1–4.8)
LYMPHOCYTES # BLD AUTO: 0.81 K/UL (ref 1–4.8)
LYMPHOCYTES NFR BLD: 5.1 % (ref 22–41)
LYMPHOCYTES NFR BLD: 6 % (ref 22–41)
MACROCYTES BLD QL SMEAR: ABNORMAL
MAGNESIUM SERPL-MCNC: 2.1 MG/DL (ref 1.5–2.5)
MCH RBC QN AUTO: 27 PG (ref 27–33)
MCH RBC QN AUTO: 27.7 PG (ref 27–33)
MCHC RBC AUTO-ENTMCNC: 29.8 G/DL (ref 33.7–35.3)
MCHC RBC AUTO-ENTMCNC: 30.6 G/DL (ref 33.7–35.3)
MCV RBC AUTO: 90.4 FL (ref 81.4–97.8)
MCV RBC AUTO: 90.5 FL (ref 81.4–97.8)
MONOCYTES # BLD AUTO: 0.7 K/UL (ref 0–0.85)
MONOCYTES # BLD AUTO: 0.81 K/UL (ref 0–0.85)
MONOCYTES NFR BLD AUTO: 5.2 % (ref 0–13.4)
MONOCYTES NFR BLD AUTO: 5.8 % (ref 0–13.4)
MORPHOLOGY BLD-IMP: NORMAL
NEUTROPHILS # BLD AUTO: 11.21 K/UL (ref 1.82–7.42)
NEUTROPHILS # BLD AUTO: 11.7 K/UL (ref 1.82–7.42)
NEUTROPHILS NFR BLD: 83.1 % (ref 44–72)
NEUTROPHILS NFR BLD: 83.8 % (ref 44–72)
NRBC # BLD AUTO: 0 K/UL
NRBC # BLD AUTO: 0 K/UL
NRBC BLD-RTO: 0 /100 WBC
NRBC BLD-RTO: 0 /100 WBC
OVALOCYTES BLD QL SMEAR: NORMAL
PLATELET # BLD AUTO: 281 K/UL (ref 164–446)
PLATELET # BLD AUTO: 288 K/UL (ref 164–446)
PLATELET BLD QL SMEAR: NORMAL
PMV BLD AUTO: 9.6 FL (ref 9–12.9)
PMV BLD AUTO: 9.9 FL (ref 9–12.9)
POIKILOCYTOSIS BLD QL SMEAR: NORMAL
POLYCHROMASIA BLD QL SMEAR: NORMAL
POTASSIUM SERPL-SCNC: 2.7 MMOL/L (ref 3.6–5.5)
POTASSIUM SERPL-SCNC: 2.8 MMOL/L (ref 3.6–5.5)
POTASSIUM SERPL-SCNC: 3.2 MMOL/L (ref 3.6–5.5)
PROT SERPL-MCNC: 5 G/DL (ref 6–8.2)
RBC # BLD AUTO: 2.82 M/UL (ref 4.7–6.1)
RBC # BLD AUTO: 3.04 M/UL (ref 4.7–6.1)
RBC BLD AUTO: PRESENT
SODIUM SERPL-SCNC: 135 MMOL/L (ref 135–145)
SODIUM SERPL-SCNC: 136 MMOL/L (ref 135–145)
SPHEROCYTES BLD QL SMEAR: NORMAL
TROPONIN T SERPL-MCNC: 464 NG/L (ref 6–19)
TROPONIN T SERPL-MCNC: 533 NG/L (ref 6–19)
WBC # BLD AUTO: 13.5 K/UL (ref 4.8–10.8)
WBC # BLD AUTO: 14 K/UL (ref 4.8–10.8)

## 2021-05-11 PROCEDURE — 80048 BASIC METABOLIC PNL TOTAL CA: CPT

## 2021-05-11 PROCEDURE — 700102 HCHG RX REV CODE 250 W/ 637 OVERRIDE(OP): Performed by: INTERNAL MEDICINE

## 2021-05-11 PROCEDURE — 700111 HCHG RX REV CODE 636 W/ 250 OVERRIDE (IP): Performed by: INTERNAL MEDICINE

## 2021-05-11 PROCEDURE — 36415 COLL VENOUS BLD VENIPUNCTURE: CPT

## 2021-05-11 PROCEDURE — 82533 TOTAL CORTISOL: CPT

## 2021-05-11 PROCEDURE — 99233 SBSQ HOSP IP/OBS HIGH 50: CPT | Performed by: INTERNAL MEDICINE

## 2021-05-11 PROCEDURE — 83735 ASSAY OF MAGNESIUM: CPT

## 2021-05-11 PROCEDURE — A9270 NON-COVERED ITEM OR SERVICE: HCPCS | Performed by: INTERNAL MEDICINE

## 2021-05-11 PROCEDURE — 700117 HCHG RX CONTRAST REV CODE 255: Performed by: STUDENT IN AN ORGANIZED HEALTH CARE EDUCATION/TRAINING PROGRAM

## 2021-05-11 PROCEDURE — 93010 ELECTROCARDIOGRAM REPORT: CPT | Mod: 77 | Performed by: INTERNAL MEDICINE

## 2021-05-11 PROCEDURE — 82272 OCCULT BLD FECES 1-3 TESTS: CPT

## 2021-05-11 PROCEDURE — 93005 ELECTROCARDIOGRAM TRACING: CPT | Performed by: INTERNAL MEDICINE

## 2021-05-11 PROCEDURE — 85025 COMPLETE CBC W/AUTO DIFF WBC: CPT

## 2021-05-11 PROCEDURE — 700102 HCHG RX REV CODE 250 W/ 637 OVERRIDE(OP): Performed by: STUDENT IN AN ORGANIZED HEALTH CARE EDUCATION/TRAINING PROGRAM

## 2021-05-11 PROCEDURE — 93308 TTE F-UP OR LMTD: CPT | Mod: 26 | Performed by: INTERNAL MEDICINE

## 2021-05-11 PROCEDURE — 700105 HCHG RX REV CODE 258: Performed by: INTERNAL MEDICINE

## 2021-05-11 PROCEDURE — 92610 EVALUATE SWALLOWING FUNCTION: CPT

## 2021-05-11 PROCEDURE — 84132 ASSAY OF SERUM POTASSIUM: CPT

## 2021-05-11 PROCEDURE — A9270 NON-COVERED ITEM OR SERVICE: HCPCS | Performed by: PSYCHIATRY & NEUROLOGY

## 2021-05-11 PROCEDURE — A9270 NON-COVERED ITEM OR SERVICE: HCPCS | Performed by: STUDENT IN AN ORGANIZED HEALTH CARE EDUCATION/TRAINING PROGRAM

## 2021-05-11 PROCEDURE — 700111 HCHG RX REV CODE 636 W/ 250 OVERRIDE (IP): Performed by: STUDENT IN AN ORGANIZED HEALTH CARE EDUCATION/TRAINING PROGRAM

## 2021-05-11 PROCEDURE — 770020 HCHG ROOM/CARE - TELE (206)

## 2021-05-11 PROCEDURE — 80053 COMPREHEN METABOLIC PANEL: CPT

## 2021-05-11 PROCEDURE — 700102 HCHG RX REV CODE 250 W/ 637 OVERRIDE(OP): Performed by: PSYCHIATRY & NEUROLOGY

## 2021-05-11 PROCEDURE — 93010 ELECTROCARDIOGRAM REPORT: CPT | Performed by: INTERNAL MEDICINE

## 2021-05-11 PROCEDURE — 93010 ELECTROCARDIOGRAM REPORT: CPT | Mod: 76 | Performed by: INTERNAL MEDICINE

## 2021-05-11 PROCEDURE — 93005 ELECTROCARDIOGRAM TRACING: CPT

## 2021-05-11 PROCEDURE — 99223 1ST HOSP IP/OBS HIGH 75: CPT | Performed by: INTERNAL MEDICINE

## 2021-05-11 PROCEDURE — 93308 TTE F-UP OR LMTD: CPT

## 2021-05-11 PROCEDURE — 82040 ASSAY OF SERUM ALBUMIN: CPT

## 2021-05-11 PROCEDURE — 84484 ASSAY OF TROPONIN QUANT: CPT

## 2021-05-11 PROCEDURE — 82553 CREATINE MB FRACTION: CPT

## 2021-05-11 RX ORDER — POLYETHYLENE GLYCOL 3350 17 G/17G
1 POWDER, FOR SOLUTION ORAL 2 TIMES DAILY
Status: DISCONTINUED | OUTPATIENT
Start: 2021-05-11 | End: 2021-05-14 | Stop reason: HOSPADM

## 2021-05-11 RX ORDER — MORPHINE SULFATE 4 MG/ML
1 INJECTION, SOLUTION INTRAMUSCULAR; INTRAVENOUS ONCE
Status: COMPLETED | OUTPATIENT
Start: 2021-05-11 | End: 2021-05-11

## 2021-05-11 RX ORDER — POTASSIUM CHLORIDE 7.45 MG/ML
10 INJECTION INTRAVENOUS
Status: COMPLETED | OUTPATIENT
Start: 2021-05-11 | End: 2021-05-11

## 2021-05-11 RX ORDER — POTASSIUM CHLORIDE 20 MEQ/1
40 TABLET, EXTENDED RELEASE ORAL ONCE
Status: DISCONTINUED | OUTPATIENT
Start: 2021-05-11 | End: 2021-05-12

## 2021-05-11 RX ORDER — POTASSIUM CHLORIDE 20 MEQ/1
20 TABLET, EXTENDED RELEASE ORAL 2 TIMES DAILY
Status: DISCONTINUED | OUTPATIENT
Start: 2021-05-12 | End: 2021-05-13

## 2021-05-11 RX ORDER — POTASSIUM CHLORIDE 7.45 MG/ML
10 INJECTION INTRAVENOUS
Status: ACTIVE | OUTPATIENT
Start: 2021-05-11 | End: 2021-05-11

## 2021-05-11 RX ORDER — MORPHINE SULFATE 4 MG/ML
1 INJECTION, SOLUTION INTRAMUSCULAR; INTRAVENOUS ONCE
Status: DISCONTINUED | OUTPATIENT
Start: 2021-05-11 | End: 2021-05-11

## 2021-05-11 RX ADMIN — POTASSIUM CHLORIDE 10 MEQ: 7.46 INJECTION, SOLUTION INTRAVENOUS at 08:12

## 2021-05-11 RX ADMIN — POTASSIUM CHLORIDE 10 MEQ: 7.46 INJECTION, SOLUTION INTRAVENOUS at 09:17

## 2021-05-11 RX ADMIN — HYDROCORTISONE 10 MG: 10 TABLET ORAL at 17:30

## 2021-05-11 RX ADMIN — ATORVASTATIN CALCIUM 80 MG: 80 TABLET, FILM COATED ORAL at 17:30

## 2021-05-11 RX ADMIN — POTASSIUM CHLORIDE 10 MEQ: 10 INJECTION, SOLUTION INTRAVENOUS at 12:00

## 2021-05-11 RX ADMIN — OMEPRAZOLE 20 MG: 20 CAPSULE, DELAYED RELEASE ORAL at 11:59

## 2021-05-11 RX ADMIN — POTASSIUM CHLORIDE 10 MEQ: 10 INJECTION, SOLUTION INTRAVENOUS at 11:00

## 2021-05-11 RX ADMIN — SOTALOL HYDROCHLORIDE 40 MG: 80 TABLET ORAL at 12:00

## 2021-05-11 RX ADMIN — POTASSIUM CHLORIDE 10 MEQ: 10 INJECTION, SOLUTION INTRAVENOUS at 12:06

## 2021-05-11 RX ADMIN — ASPIRIN 81 MG: 81 TABLET, CHEWABLE ORAL at 12:01

## 2021-05-11 RX ADMIN — HUMAN ALBUMIN MICROSPHERES AND PERFLUTREN 3 ML: 10; .22 INJECTION, SOLUTION INTRAVENOUS at 06:10

## 2021-05-11 RX ADMIN — MORPHINE SULFATE 1 MG: 4 INJECTION INTRAVENOUS at 05:12

## 2021-05-11 RX ADMIN — ACETAMINOPHEN 1000 MG: 500 TABLET ORAL at 17:30

## 2021-05-11 RX ADMIN — TAMSULOSIN HYDROCHLORIDE 0.4 MG: 0.4 CAPSULE ORAL at 12:01

## 2021-05-11 RX ADMIN — MONTELUKAST 10 MG: 10 TABLET, FILM COATED ORAL at 12:01

## 2021-05-11 RX ADMIN — OXYCODONE 5 MG: 5 TABLET ORAL at 13:16

## 2021-05-11 RX ADMIN — HYDROCORTISONE 10 MG: 10 TABLET ORAL at 11:59

## 2021-05-11 RX ADMIN — OMEPRAZOLE 20 MG: 20 CAPSULE, DELAYED RELEASE ORAL at 17:29

## 2021-05-11 RX ADMIN — CLOPIDOGREL BISULFATE 75 MG: 75 TABLET ORAL at 12:00

## 2021-05-11 RX ADMIN — FERROUS SULFATE TAB 325 MG (65 MG ELEMENTAL FE) 325 MG: 325 (65 FE) TAB at 11:59

## 2021-05-11 RX ADMIN — SODIUM CHLORIDE 125 MG: 9 INJECTION, SOLUTION INTRAVENOUS at 20:44

## 2021-05-11 RX ADMIN — MORPHINE SULFATE 1 MG: 4 INJECTION INTRAVENOUS at 04:50

## 2021-05-11 RX ADMIN — POTASSIUM CHLORIDE 10 MEQ: 7.46 INJECTION, SOLUTION INTRAVENOUS at 06:30

## 2021-05-11 RX ADMIN — ACETAMINOPHEN 1000 MG: 500 TABLET ORAL at 12:00

## 2021-05-11 RX ADMIN — LEVOTHYROXINE SODIUM 75 MCG: 25 TABLET ORAL at 12:01

## 2021-05-11 ASSESSMENT — ENCOUNTER SYMPTOMS
COUGH: 0
SPUTUM PRODUCTION: 0
DIZZINESS: 1
PALPITATIONS: 0
HEMOPTYSIS: 0
WEAKNESS: 1
MUSCULOSKELETAL NEGATIVE: 1
ORTHOPNEA: 0
PND: 1
GASTROINTESTINAL NEGATIVE: 1
SENSORY CHANGE: 0
EYES NEGATIVE: 1
CHILLS: 0
CLAUDICATION: 1
TINGLING: 0
WEIGHT LOSS: 1
FOCAL WEAKNESS: 0
HEADACHES: 0
SHORTNESS OF BREATH: 1
PSYCHIATRIC NEGATIVE: 1

## 2021-05-11 ASSESSMENT — PAIN DESCRIPTION - PAIN TYPE
TYPE: ACUTE PAIN
TYPE: ACUTE PAIN;CHRONIC PAIN
TYPE: ACUTE PAIN;CHRONIC PAIN
TYPE: CHRONIC PAIN;ACUTE PAIN

## 2021-05-11 NOTE — PROGRESS NOTES
Pt currently NPO due to weak cough effort, lethargy and unstable status. Physician (Dr. Palumbo aware of held meds)

## 2021-05-11 NOTE — PROGRESS NOTES
2 RN skin check completed with ОЛЬГА Booker.   Devices in place oxygen mask, pulse ox, heart monitor, condom catheter.  Skin assessed under devices red and blanching.  Confirmed pressure ulcers found on  N/A.  New potential pressure ulcers noted on N/A. Wound consult placed N/A.  The following interventions in place waffle mattress, Q2 turns, gray foam pads on oxygen mask, mepilex on bilateral heels, heel float boots, pillows for support and positioning, barrier cream on buttocks and sacrum.    Bilateral ears are pink and blanching.  Bilateral elbows are pink and blanching.  Generalized bruising, scabbing, and hematomas all over body.  Bilateral arms, legs, and abdomen are edematous.  Skin is fragile.  Sacrum is red and slow to elida.  Bilateral heels are boggy.  Multiple black growths on patient's arms, legs, and behind right ear.

## 2021-05-11 NOTE — PROGRESS NOTES
Received report from day shift RNTerra. Assumed care of pt. Pt restless at this time. Updated pt on plan of care. Pt resting in bed. Fall and skin precautions in place. Educated on use of call light. Hourly rounding and continuous monitoring in place.

## 2021-05-11 NOTE — PROGRESS NOTES
Updated by ОЛЬГА Paul regarding pt's lack of cardiology oversight despite active sotalol orders. EKG ordered per protocol for this evenings dose. Charge RN aware of both lack of consistent EKG's, pt current dose of sotalol, and the lack of cardiology oversight. Charge recommends following up with day team for potential updating cardiology consult.

## 2021-05-11 NOTE — PROGRESS NOTES
EKG reviewed with physician and physician calling cardiology for consult due to concerns of potentially evolving MI. Echo to be performed and tropes resulted for confirmation due to pt pacemaker function possibly obscuring EKG findings. Pt given IV morphine for pain. BP stable but soft; satting % on 2 liters. Will continue to monitor. Physician nearby and rapid RN's at bedside for monitoring.

## 2021-05-11 NOTE — CARE PLAN
Problem: Nutritional:  Goal: Achieve adequate nutritional intake  Description: Patient will consume >50% of meals  Outcome: PROGRESSING AS EXPECTED    SLP assessed and recommends clear mildly thick liquids and RN reports pt ate 100% of lunch tray. Advance diet as tolerated. Pt is NPO/clears x 2 days currently.

## 2021-05-11 NOTE — PROGRESS NOTES
2 RN skin check complete.     Devices in place: SCD's, oxymask, peripheral IV, condom cath    Skin assessed under devices:  -Ear pink, intact, and blanching under oxymask,   -Black scabbing growth present on posterior portion of right ear (skin cancer)  -Trace black scabbing to nose  -Bilateral arms bruising and edematous throughout; elbows intact and blanching, black scabbing throughout from skin cancer  -trunk heavily bruised throughout, pannus pink, intact and blanching with trace white discharge beneath skin folds  -Sacrum/coccyx red, fragile and slow blanching with moisture-associated redness. Heavy bruising throughout groin and thighs  -Penis pink intact beneath condom cath  -Scabbed over skin tear on upper right anterior thigh  Bilateral legs edematous with heavy bruising. Bilateral shins flaky, red and fragile.  -Bilayt heels pink, intact and blanching      Confirmed pressure ulcers found on NA.  New potential pressure ulcers noted on Sacrum coccyx. Wound consult placed 5/11.  The following interventions in place:  -Q 2 hr. Turns  -Waffle matress  -Offloading ears  -Pillows for support and positioning and floating elbows/heels  -Barrier paste  -Bilat heel mepilex     Ida Cruz MD

## 2021-05-11 NOTE — DISCHARGE PLANNING
[facility] Transitional [facility] Coordination    Follow up for Rehab.  After review of updated therapy notes,  Physiatry Dr. Duke recommending patient has limited tolerance for acute rehab 3hr/day therapy regimen, and patient too low level functionally to anticipate practical improvement to facilitate return to community in amount of time allowed for inpatient rehab for current diagnosis.  Recommending skilled nursing for post acute transitional care.  Voalte message update to T8 ILEANA Curry.

## 2021-05-11 NOTE — PROGRESS NOTES
"Pt anxious and stating,\"help me.\" Impending doom. Pt stating sudden anterior chest pain radiating to left shoulder. Pt satting 97% on RA- 1 Liter. Pt hypotensive. 76 systolic first attempt. Second attempt 84/48. Stat EKG ordered.     Rapid SICU RN at bedside for assessment. Physician coming to bedside  "

## 2021-05-11 NOTE — PROGRESS NOTES
Received report from previous nurse, Nano, regarding prior 12 hours.  Pt sleeping, white board updated, call light within reach.  Bed in lowest position, treaded slippers on.

## 2021-05-11 NOTE — DISCHARGE PLANNING
Anticipated Discharge Disposition: SNF    Action: Spoke to pt and spouse at bedside who gave verbal choice for SNF. Choice form faxed to Carol [facility]ITMAN. #1 [facility], #2 [facility]    Barriers to Discharge: SNF acceptance, medical clearance    Plan: f/u with medical team, DPA for SNF acceptance

## 2021-05-11 NOTE — CONSULTS
Cardiology Consult Note:    Isa Yanez M.D.  Date & Time note created:    5/11/2021   11:45 AM     Referring MD:  Dr. Arambula    Patient ID:   Name:             Jeffrey Lizama   YOB: 1932  Age:                 88 y.o.  male   MRN:               [id number]                                                             Chief Complaint / Reason for consult:  Elevated troponin and chest pain.    History of Present Illness:    This is an 88 years old man with prior history of hokum, paroxysmal atrial fibrillation, status post PPM, hypertension, established coronary arterial disease status post recent 3 coronary stents of the LAD, presented to the hospital after recent hospitalization due to hypotension secondary to adrenal insufficiency.  Cardiology has been consulted due to chest pain and elevated troponin.  Chest pain was last night.  EKG tracing showed paced rhythm.  Repeated transthoracic echocardiogram showed LV function of 45% which is a new reduction from prior study last month.  I personally interpreted images of the transthoracic echocardiograms.    Of note, patient also had GI bleeding while he was on anticoagulation therapy due to prior history of pulmonary embolism.    He is on sotalol for treatment of paroxysmal atrial fibrillation for rhythm control strategy.    Patient's initial troponin T at 4:22 AM was 464 and repeated at 12:32 PM was 533.    Patient is not having active chest pain at this time.  He does not feel well overall however.  He does endorse to me that he does not want to have any invasive procedure or surgery at this time.    Review of Systems:      Constitutional: Denies fevers, Denies weight changes  Eyes: Denies changes in vision, no eye pain  Ears/Nose/Throat/Mouth: Denies nasal congestion or sore throat   Cardiovascular: yes chest pain, no palpitations   Respiratory: yes shortness of breath , Denies cough  Gastrointestinal/Hepatic: + diarrhea.  Genitourinary:  "Denies dysuria or frequency  Musculoskeletal/Rheum: Denies  joint pain and swelling   Skin: Denies rash  Neurological: Denies headache, confusion, memory loss or focal weakness/parasthesias  Psychiatric: denies mood disorder   Endocrine: Bharati thyroid problems  Heme/Oncology/Lymph Nodes: Denies enlarged lymph nodes, denies brusing or known bleeding disorder  All other systems were reviewed and are negative (AMA/CMS criteria)                Past Medical History:   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 (pouch) ([facility]) 4/1/2010   • BPH (benign prostatic hypertrophy) 4/1/2010   • Bradycardia    • Breath shortness    • Bronchitis    • Cardiac pacemaker 04 2010   • Cataract    • COPD (chronic obstructive pulmonary disease) ([facility])    • Diverticulitis    • DJD (degenerative joint disease)    • EMPHYSEMA    • Glaucoma    • Heart burn    • Heart murmur    • Hiatus hernia syndrome    • Hypertension    • 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   • Indigestion    • Knee arthroplasty 2002    right   • Obstructive hypertrophic cardiomyopathy ([facility]) 8/4/2011   • PAF (paroxysmal atrial fibrillation) ([facility])    • Paroxysmal atrial fibrillation ([facility]) 8/29/2011   • PE (pulmonary embolism)     IVC filter, states PE clots 8 times   • Phlebitis     chronic / recurrent   • Pituitary adenoma ([facility]) 1995    hypophysectomy   • Pituitary adenoma ([facility]) 1995    hypophysectomy   • Pneumonia    • Rheumatic fever    • S/P insertion of IVC (inferior vena caval) filter    • S/P parathyroidectomy    • S/P parathyroidectomy 2005   • Sleep apnea     no cpap machine   • SSS (sick sinus syndrome) ([facility]) 8/29/2011   • Third degree AV block ([facility]) 8/29/2011   • thyroidectomy 2005    benign   • Unspecified disorder of thyroid    • Unspecified hemorrhagic conditions    • Unspecified " urinary incontinence    • Urinary bladder disorder      Active Hospital Problems    Diagnosis    • Ileus [K56.7]      Priority: High   • Leukocytosis [D72.829]      Priority: Medium   • Rectus sheath hematoma [S30.1XXA]      Priority: Medium   • Adrenal insufficiency (HCC) [E27.40]      Priority: Medium   • Coronary artery disease [I25.10]      Priority: Low   • COPD (chronic obstructive pulmonary disease) (HCC) [J44.9]      Priority: Low   • Paroxysmal atrial fibrillation (HCC) [I48.0]      Priority: Low   • Essential hypertension, benign [I10]      Priority: Low   • Anemia associated with acute blood loss [D62]    • Anasarca [R60.1]    • Pulmonary embolism (HCC) [I26.99]        Past Surgical History:  Past Surgical History:   Procedure Laterality Date   • GASTROSCOPY-ENDO  1/22/2019    Procedure: GASTROSCOPY-ENDO;  Surgeon: Yoandy Mcelroy M.D.;  Location: ENDOSCOPY [facility];  Service: Gastroenterology   • GASTROSCOPY-ENDO  1/21/2019    Procedure: GASTROSCOPY-ENDO;  Surgeon: Luca Mtz M.D.;  Location: ENDOSCOPY [facility];  Service: Gastroenterology   • KNEE REVISION TOTAL  6/20/2013    Performed by Ortega Vega M.D. at SURGERY [facility]   • CARPAL TUNNEL ENDOSCOPIC  9/30/2011    Performed by RONALD ENNIS at SURGERY SAME DAY [facility]   • PACEMAKER INSERTION Left 04/23/2010    [employer] Altrua 60 [id number] implanted by Dr. Donaldson.   • CATARACT EXTRACTION WITH IOL      bilateral    • CERVICAL DISK AND FUSION ANTERIOR     • CERVICAL FUSION POSTERIOR     • CHOLECYSTECTOMY     • CHOLECYSTECTOMY     • OTHER      pituitary tumor removed   • OTHER CARDIAC SURGERY     • OTHER ORTHOPEDIC SURGERY      knee surgery left knee x 2   • PB REVISE ULNAR NERVE AT WRIST     • PB TOTAL KNEE ARTHROPLASTY      left   • PB TOTAL KNEE ARTHROPLASTY      right   • THYROIDECTOMY         Hospital Medications:    Current Facility-Administered Medications:   •  potassium chloride SA (Kdur) tablet 40 mEq,  40 mEq, Oral, Once, Lindsey Arambula M.D., Stopped at 05/11/21 0830  •  potassium chloride (KCL) ivpb 10 mEq, 10 mEq, Intravenous, Q HOUR, Lindsey Arambula M.D.  •  potassium chloride SA (Kdur) tablet 20 mEq, 20 mEq, Oral, DAILY, Derek Acosta M.D., Stopped at 05/11/21 0600  •  furosemide (LASIX) injection 20 mg, 20 mg, Intravenous, BID, Derek Acosta M.D., Stopped at 05/11/21 0600  •  oxyCODONE immediate-release (ROXICODONE) tablet 5 mg, 5 mg, Oral, Q3HRS PRN **OR** oxyCODONE immediate-release (ROXICODONE) tablet 5 mg, 5 mg, Oral, Q3HRS PRN, 5 mg at 05/10/21 1848 **OR** [DISCONTINUED] morphine (pf) 4 mg/mL injection 2 mg, 2 mg, Intravenous, Q2HRS PRN, Pat Babin M.D., 2 mg at 05/08/21 0104  •  albuterol (PROVENTIL) 2.5mg/0.5ml nebulizer solution 2.5 mg, 2.5 mg, Nebulization, Q4H PRN (RT), Derek Acosta M.D., Stopped at 05/09/21 0653  •  lactated ringers infusion (BOLUS): BMI less than or equal to 30, 30 mL/kg, Intravenous, Once PRN, Derek Acosta M.D.  •  lactated ringers infusion (BOLUS), 1,000 mL, Intravenous, Once PRN, Derek Acosta M.D.  •  ferrous sulfate tablet 325 mg, 325 mg, Oral, QDAY with Breakfast, Derek Acosta M.D., Stopped at 05/11/21 0730  •  acetaminophen (TYLENOL) tablet 1,000 mg, 1,000 mg, Oral, TID, Pat Babin M.D., Stopped at 05/11/21 0900  •  sotalol (BETAPACE) tablet 40 mg, 40 mg, Oral, BID, Derek Acosta M.D., Stopped at 05/11/21 0800  •  hydrocortisone (CORTEF) tablet 10 mg, 10 mg, Oral, BID, Steve Sanchez M.D., Stopped at 05/11/21 0600  •  aspirin (ASA) chewable tab 81 mg, 81 mg, Oral, DAILY, Steve Sanchez M.D., Stopped at 05/11/21 0600  •  clopidogrel (PLAVIX) tablet 75 mg, 75 mg, Oral, DAILY, Steve Sanchez M.D., Stopped at 05/11/21 0600  •  atorvastatin (LIPITOR) tablet 80 mg, 80 mg, Oral, Q EVENING, Keny Madsen M.D., 80 mg at 05/10/21 1722  •  levothyroxine (SYNTHROID) tablet 75 mcg, 75 mcg, Oral, AM ES, Keny Madsen M.D., Stopped at 05/11/21 0600  •  montelukast  (SINGULAIR) tablet 10 mg, 10 mg, Oral, DAILY, Keny Madsen M.D., Stopped at 05/11/21 0600  •  omeprazole (PRILOSEC) capsule 20 mg, 20 mg, Oral, BID, Keny Madsen M.D., Stopped at 05/11/21 0600  •  tamsulosin (FLOMAX) capsule 0.4 mg, 0.4 mg, Oral, DAILY, Keny Madsen M.D., Stopped at 05/11/21 0600  •  benzonatate (TESSALON) capsule 100 mg, 100 mg, Oral, TID PRN, Keny Madsen M.D., 100 mg at 05/07/21 1538    Current Outpatient Medications:  Medications Prior to Admission   Medication Sig Dispense Refill Last Dose   • acetaZOLAMIDE (DIAMOX) 250 MG Tab Take 250 mg by mouth 2 times a day.   5/1/2021 at 1700   • senna-docusate (PERICOLACE OR SENOKOT S) 8.6-50 MG Tab Take 1 tablet by mouth 2 times a day.   5/1/2021 at 0846   • budesonide-formoterol (SYMBICORT) 80-4.5 MCG/ACT Aerosol Inhale 2 Puffs 2 times a day.   5/1/2021 at 1004   • ferrous gluconate 324 (37.5 Fe) MG Tab Take 324 mg by mouth every morning with breakfast.   5/1/2021 at 0846   • fluticasone (FLONASE) 50 MCG/ACT nasal spray Administer 1 Spray into affected nostril(S) every day.   5/1/2021 at 0848   • lidocaine (LIDODERM) 5 % Patch Place 1 Patch on the skin every 12 hours. Off for 12 hour    Apply to left rib cage and upper back   5/1/2021 at 0846   • midodrine (PROAMATINE) 5 MG Tab Take 5 mg by mouth 3 times a day with meals.   5/1/2021 at 1720   • oxyCODONE immediate-release (ROXICODONE) 5 MG Tab Take 5 mg by mouth every four hours as needed for Severe Pain.   5/1/2021 at 1722   • azithromycin (ZITHROMAX) 250 MG Tab Take 500 mg by mouth one time.   5/1/2021 at 1053   • aspirin EC 81 MG EC tablet Take 1 tablet by mouth every day. 30 tablet  5/1/2021 at 0846   • atorvastatin (LIPITOR) 80 MG tablet Take 1 tablet by mouth every evening. 30 tablet  4/30/2021 at 2028   • calcium citrate (CALCITRATE) 950 (200 Ca) MG Tab Take 1 tablet by mouth every day. 30 tablet  5/1/2021 at 0846   • clopidogrel (PLAVIX) 75 MG Tab Take 1 tablet by mouth every day. 30  tablet  5/1/2021 at 0846   • enoxaparin (LOVENOX) 100 MG/ML Solution inj Inject 100 mg under the skin every 12 hours.   5/1/2021 at 0846   • ipratropium-albuterol (DUONEB) 0.5-2.5 (3) MG/3ML nebulizer solution Take 3 mL by nebulization every 6 hours as needed for Shortness of Breath.   5/1/2021 at 1023   • omeprazole (PRILOSEC) 20 MG delayed-release capsule Take 1 capsule by mouth 2 times a day. 30 capsule  5/1/2021 at 1720   • albuterol 108 (90 Base) MCG/ACT Aero Soln inhalation aerosol Inhale 2 Puffs every four hours as needed for Shortness of Breath. 8.5 g  unknown at unknown   • gabapentin (NEURONTIN) 600 MG tablet Take 1.5 Tabs by mouth every bedtime. (Patient taking differently: Take 600 mg by mouth at bedtime.) 90 Tab 2 4/30/2021 at 2028   • levothyroxine (SYNTHROID) 75 MCG Tab Take 1 Tab by mouth Every morning on an empty stomach. 30 Tab 2 5/1/2021 at 0536   • montelukast (SINGULAIR) 10 MG Tab Take 1 Tab by mouth every day. (Patient not taking: Reported on 5/1/2021) 30 Tab 2 Not Taking at Unknown time   • sotalol (BETAPACE) 80 MG Tab Take 0.5 Tabs by mouth 2 times a day. 60 Tab 2 5/1/2021 at 1046   • tamsulosin (FLOMAX) 0.4 MG capsule Take 1 Cap by mouth every day. 30 Cap 2 5/1/2021 at 0846   • tiotropium (SPIRIVA) 18 MCG Cap Inhale 1 Cap by mouth every day. 30 Cap 3 5/1/2021 at 1005   • warfarin (COUMADIN) 5 MG Tab Take 1 Tab by mouth every day. (Patient taking differently: Take 10 mg by mouth every evening.) 30 Tab 1 5/1/2021 at 1730   • hydrocortisone (CORTEF) 10 MG Tab Take 1 Tab by mouth 3 times a day. (Patient taking differently: Take 10 mg by mouth 2 times a day.) 90 Tab 2 5/1/2021 at 0846       Medication Allergy:  Allergies   Allergen Reactions   • Lisinopril      Hypotension, 30 mg dose   • Pcn [Penicillins] Rash     Tolerates cephalosporins         Family History:  Family History   Problem Relation Age of Onset   • Arthritis Mother    • Arthritis Father    • Cancer Neg Hx    • Heart Disease Neg Hx  "       Social History:  Social History     Socioeconomic History   • Marital status:      Spouse name: Not on file   • Number of children: Not on file   • Years of education: Not on file   • Highest education level: Not on file   Occupational History   • Not on file   Tobacco Use   • Smoking status: Never Smoker   • Smokeless tobacco: Never Used   Substance and Sexual Activity   • Alcohol use: No   • Drug use: No   • Sexual activity: Not on file   Other Topics Concern   • Not on file   Social History Narrative   • Not on file     Social Determinants of Health     Financial Resource Strain:    • Difficulty of Paying Living Expenses:    Food Insecurity:    • Worried About Running Out of Food in the Last Year:    • Ran Out of Food in the Last Year:    Transportation Needs:    • Lack of Transportation (Medical):    • Lack of Transportation (Non-Medical):    Physical Activity:    • Days of Exercise per Week:    • Minutes of Exercise per Session:    Stress:    • Feeling of Stress :    Social Connections:    • Frequency of Communication with Friends and Family:    • Frequency of Social Gatherings with Friends and Family:    • Attends Worship Services:    • Active Member of Clubs or Organizations:    • Attends Club or Organization Meetings:    • Marital Status:    Intimate Partner Violence:    • Fear of Current or Ex-Partner:    • Emotionally Abused:    • Physically Abused:    • Sexually Abused:          Physical Exam:  Vitals/ General Appearance:   Weight/BMI: Body mass index is 33.69 kg/m².  /61   Pulse 72   Temp 36.8 °C (98.2 °F) (Temporal)   Resp 18   Ht 1.727 m (5' 8\")   Wt 101 kg (221 lb 9 oz)   SpO2 99%   Vitals:    05/11/21 0400 05/11/21 0435 05/11/21 0456 05/11/21 0800   BP: (!) 84/48 (!) 98/51 (!) 94/48 107/61   Pulse:  68 (!) 58 72   Resp:  18 18 18   Temp:    36.8 °C (98.2 °F)   TempSrc:    Temporal   SpO2: 97%  97% 99%   Weight:       Height:         Oxygen Therapy:  Pulse Oximetry: 99 %, " O2 (LPM): 4, O2 Delivery Device: Oxymask    Constitutional:   No acute distress  HENMT:  Normocephalic, Atraumatic, Oropharynx moist mucous membranes, No oral exudates, Nose normal.  No thyromegaly.  Eyes:  EOMI, Conjunctiva normal, No discharge.  Neck:  Normal range of motion, No cervical tenderness,  no JVD.  Cardiovascular:  Normal heart rate, Normal rhythm.  Extremitites with intact distal pulses, no cyanosis, or edema.  Lungs:  no wheezing.   Abdomen: Bowel sounds normal, Soft, No tenderness, No guarding, No rebound, No masses, No hepatosplenomegaly.  Skin: Warm, Dry, + bruises diffusely.  Neurologic: Alert & oriented x 3, No focal deficits noted, cranial nerves II through X are intact.  Psychiatric: Affect normal, Judgment normal, Mood normal.      MDM (Data Review):     Records reviewed and summarized in current documentation    Lab Data Review:  Recent Results (from the past 24 hour(s))   EKG    Collection Time: 05/10/21  5:28 PM   Result Value Ref Range    Report       [facility]    Test Date:  2021-05-10  Pt Name:    MANOLO AUGUST                 Department: 183  MRN:        [id number]                      Room:       T830  Gender:     Male                         Technician: PEP  :        1932                   Requested By:SAMARA LEDBETTER  Order #:    [id number]                    Reading MD: Magno Ahn MD    Measurements  Intervals                                Axis  Rate:       72                           P:          -35  NM:         215                          QRS:        -90  QRSD:       193                          T:          55  QT:         523  QTc:        573    Interpretive Statements  ATRIAL-VENTRICULAR DUAL-PACED COMPLEXES  NO FURTHER ANALYSIS ATTEMPTED DUE TO PACED RHYTHM  BASELINE WANDER IN LEAD(S) V6  Compared to ECG 2021 15:03:50  Sinus rhythm no longer present  NO SIGNFICANT CHANGE  Electronically Signed On 5- 18:32:54 PDT by Magno Ahn MD      [facility] EKG    Collection Time: 05/10/21 11:23 PM   Result Value Ref Range    Report       [facility]    Test Date:  2021-05-10  Pt Name:    MANOLO AUGUST                 Department: 183  MRN:        [id number]                      Room:       T830  Gender:     Male                         Technician: MONALISA  :        [id number]                   Requested By:SAMARA LEDBETTER  Order #:    [id number]                    Reading MD: Thomas Bermudez MD    Measurements  Intervals                                Axis  Rate:       70                           P:          53  AL:         221                          QRS:        -77  QRSD:       197                          T:          82  QT:         493  QTc:        533    Interpretive Statements  A-V DUAL-PACED RHYTHM WITH SOME INHIBITION  NO FURTHER ANALYSIS ATTEMPTED DUE TO PACED RHYTHM  Compared to ECG 05/10/2021 17:28:26  AV dual-paced complex(es) or rhythm no longer present  Electronically Signed On 2021 6:55:57 PDT by Thomas Bermudez MD     OCCULT BLOOD STOOL    Collection Time: 21  1:02 AM   Result Value Ref Range    Occult Blood Feces Negative Negative   EKG    Collection Time: 21  4:09 AM   Result Value Ref Range    Report       [facility]    Test Date:  2021  Pt Name:    MANOLO AUGUST                 Department: 183  MRN:        [id number]                      Room:       T830  Gender:     Male                         Technician: [facility]  :        1932                   Requested By:SAMARA LEDBETTER  Order #:    [id number]                    Reading MD: Thomas Bermudez MD    Measurements  Intervals                                Axis  Rate:       77                           P:          27  AL:         193                          QRS:        -84  QRSD:       197                          T:          69  QT:         493  QTc:        559    Interpretive Statements  A-V DUAL-PACED RHYTHM WITH SOME INHIBITION  NO FURTHER ANALYSIS  ATTEMPTED DUE TO PACED RHYTHM  Compared to ECG 05/10/2021 23:23:35  No significant changes  Electronically Signed On 5- 6:55:42 PDT by Thomas Bermudez MD     MAGNESIUM    Collection Time: 05/11/21  4:22 AM   Result Value Ref Range    Magnesium 2.1 1.5 - 2.5 mg/dL   CBC WITH DIFFERENTIAL    Collection Time: 05/11/21  4:22 AM   Result Value Ref Range    WBC 14.0 (H) 4.8 - 10.8 K/uL    RBC 2.82 (L) 4.70 - 6.10 M/uL    Hemoglobin 7.8 (L) 14.0 - 18.0 g/dL    Hematocrit 25.5 (L) 42.0 - 52.0 %    MCV 90.4 81.4 - 97.8 fL    MCH 27.7 27.0 - 33.0 pg    MCHC 30.6 (L) 33.7 - 35.3 g/dL    RDW 60.9 (H) 35.9 - 50.0 fL    Platelet Count 288 164 - 446 K/uL    MPV 9.6 9.0 - 12.9 fL    Neutrophils-Polys 83.80 (H) 44.00 - 72.00 %    Lymphocytes 5.10 (L) 22.00 - 41.00 %    Monocytes 5.80 0.00 - 13.40 %    Eosinophils 3.90 0.00 - 6.90 %    Basophils 0.40 0.00 - 1.80 %    Immature Granulocytes 1.00 (H) 0.00 - 0.90 %    Nucleated RBC 0.00 /100 WBC    Neutrophils (Absolute) 11.70 (H) 1.82 - 7.42 K/uL    Lymphs (Absolute) 0.71 (L) 1.00 - 4.80 K/uL    Monos (Absolute) 0.81 0.00 - 0.85 K/uL    Eos (Absolute) 0.55 (H) 0.00 - 0.51 K/uL    Baso (Absolute) 0.06 0.00 - 0.12 K/uL    Immature Granulocytes (abs) 0.14 (H) 0.00 - 0.11 K/uL    NRBC (Absolute) 0.00 K/uL   Basic Metabolic Panel    Collection Time: 05/11/21  4:22 AM   Result Value Ref Range    Sodium 136 135 - 145 mmol/L    Potassium 2.8 (L) 3.6 - 5.5 mmol/L    Chloride 102 96 - 112 mmol/L    Co2 27 20 - 33 mmol/L    Glucose 92 65 - 99 mg/dL    Bun 31 (H) 8 - 22 mg/dL    Creatinine 1.12 0.50 - 1.40 mg/dL    Calcium 7.2 (L) 8.5 - 10.5 mg/dL    Anion Gap 7.0 7.0 - 16.0   ALBUMIN    Collection Time: 05/11/21  4:22 AM   Result Value Ref Range    Albumin 2.5 (L) 3.2 - 4.9 g/dL   TROPONIN    Collection Time: 05/11/21  4:22 AM   Result Value Ref Range    Troponin T 464 (H) 6 - 19 ng/L   ESTIMATED GFR    Collection Time: 05/11/21  4:22 AM   Result Value Ref Range    GFR If   American >60 >60 mL/min/1.73 m 2    GFR If Non African American >60 >60 mL/min/1.73 m 2   CBC WITH DIFFERENTIAL    Collection Time: 05/11/21  5:30 AM   Result Value Ref Range    WBC 13.5 (H) 4.8 - 10.8 K/uL    RBC 3.04 (L) 4.70 - 6.10 M/uL    Hemoglobin 8.2 (L) 14.0 - 18.0 g/dL    Hematocrit 27.5 (L) 42.0 - 52.0 %    MCV 90.5 81.4 - 97.8 fL    MCH 27.0 27.0 - 33.0 pg    MCHC 29.8 (L) 33.7 - 35.3 g/dL    RDW 62.5 (H) 35.9 - 50.0 fL    Platelet Count 281 164 - 446 K/uL    MPV 9.9 9.0 - 12.9 fL    Neutrophils-Polys 83.10 (H) 44.00 - 72.00 %    Lymphocytes 6.00 (L) 22.00 - 41.00 %    Monocytes 5.20 0.00 - 13.40 %    Eosinophils 4.40 0.00 - 6.90 %    Basophils 0.30 0.00 - 1.80 %    Immature Granulocytes 1.00 (H) 0.00 - 0.90 %    Nucleated RBC 0.00 /100 WBC    Neutrophils (Absolute) 11.21 (H) 1.82 - 7.42 K/uL    Lymphs (Absolute) 0.81 (L) 1.00 - 4.80 K/uL    Monos (Absolute) 0.70 0.00 - 0.85 K/uL    Eos (Absolute) 0.59 (H) 0.00 - 0.51 K/uL    Baso (Absolute) 0.04 0.00 - 0.12 K/uL    Immature Granulocytes (abs) 0.13 (H) 0.00 - 0.11 K/uL    NRBC (Absolute) 0.00 K/uL    Anisocytosis 1+     Macrocytosis 1+    Comp Metabolic Panel    Collection Time: 05/11/21  5:30 AM   Result Value Ref Range    Sodium 135 135 - 145 mmol/L    Potassium 2.7 (LL) 3.6 - 5.5 mmol/L    Chloride 100 96 - 112 mmol/L    Co2 28 20 - 33 mmol/L    Anion Gap 7.0 7.0 - 16.0    Glucose 88 65 - 99 mg/dL    Bun 31 (H) 8 - 22 mg/dL    Creatinine 1.08 0.50 - 1.40 mg/dL    Calcium 7.3 (L) 8.5 - 10.5 mg/dL    AST(SGOT) 23 12 - 45 U/L    ALT(SGPT) 21 2 - 50 U/L    Alkaline Phosphatase 46 30 - 99 U/L    Total Bilirubin 1.2 0.1 - 1.5 mg/dL    Albumin 2.6 (L) 3.2 - 4.9 g/dL    Total Protein 5.0 (L) 6.0 - 8.2 g/dL    Globulin 2.4 1.9 - 3.5 g/dL    A-G Ratio 1.1 g/dL   CORTISOL    Collection Time: 05/11/21  5:30 AM   Result Value Ref Range    Cortisol 9.1 0.0 - 23.0 ug/dL   ESTIMATED GFR    Collection Time: 05/11/21  5:30 AM   Result Value Ref Range    GFR If   American >60 >60 mL/min/1.73 m 2    GFR If Non African American >60 >60 mL/min/1.73 m 2   PERIPHERAL SMEAR REVIEW    Collection Time: 05/11/21  5:30 AM   Result Value Ref Range    Peripheral Smear Review see below    PLATELET ESTIMATE    Collection Time: 05/11/21  5:30 AM   Result Value Ref Range    Plt Estimation Normal    MORPHOLOGY    Collection Time: 05/11/21  5:30 AM   Result Value Ref Range    RBC Morphology Present     Polychromia 1+     Poikilocytosis 1+     Ovalocytes 1+     Echinocytes 1+     Spherocytes 1+    DIFFERENTIAL COMMENT    Collection Time: 05/11/21  5:30 AM   Result Value Ref Range    Comments-Diff see below    EC-ECHOCARDIOGRAM LTD W/ CONT    Collection Time: 05/11/21  6:09 AM   Result Value Ref Range    Eject.Frac. MOD BP 49.67     Eject.Frac. MOD 4C 47.09     Eject.Frac. MOD 2C 52.32     Left Ventrical Ejection Fraction 45        Imaging/Procedures Review:    Chest Xray:  Reviewed    EKG:   As in HPI.     MDM (Assessment and Plan):     Active Hospital Problems    Diagnosis    • Ileus [K56.7]      Priority: High   • Leukocytosis [D72.829]      Priority: Medium   • Rectus sheath hematoma [S30.1XXA]      Priority: Medium   • Adrenal insufficiency (HCC) [E27.40]      Priority: Medium   • Coronary artery disease [I25.10]      Priority: Low   • COPD (chronic obstructive pulmonary disease) (HCC) [J44.9]      Priority: Low   • Paroxysmal atrial fibrillation (HCC) [I48.0]      Priority: Low   • Essential hypertension, benign [I10]      Priority: Low   • Anemia associated with acute blood loss [D62]    • Anasarca [R60.1]    • Pulmonary embolism (HCC) [I26.99]          At this time, we will optimize medical therapy.  Patient is not a candidate for any further invasive cardiac work-up at this time.  Further invasive cardiac work-up will bring more harm than benefits at this time.  In addition, patient requests not to be put through any further invasive cardiac procedure or surgery.  We will stop sotalol  therapy at this time due to prolonged QT secondary to hypokalemia.  Optimize potassium replacement with potassium goal of 4.5 and magnesium of 2.5.  Okay to stop trending troponin at this time.  Continue aspirin and clopidogrel for dual antiplatelet therapy.  Closely monitor for any signs of bleeding.  Once patient is more stable clinically, consider starting patient on beta-blocker, Arni and spironolactone for LV dysfunction.      Thank you for referring this patient to our cardiology service.  We will follow patient with you.      Isa Yanez MD.   Cardiology Inpatient Service.  [facility].  [phone].  [place].

## 2021-05-11 NOTE — PROGRESS NOTES
Desmond from Lab called with critical result of Tropinin 464 at 0558. Critical lab result read back to Desmond.   Dr. Palumbo notified of critical lab result at 0600.  Critical lab result read back by Dr. Palumbo.

## 2021-05-11 NOTE — PROGRESS NOTES
Called by nursing for new onset chest pain reported by patient 8 out of 10 in severity.  Patient stated that he felt the pain was similar to previous heart attacks that he had experienced.  EKG was ordered showing concern for STEMI, however patient has pacemaker in place which could be confounding the results per cardiology, Dr. Bermudez who was consulted at this time.  Stat echo and troponin as well as other labs are ordered at this time.  Morphine 2 mg is given for alleviation of pain.      We will continue to monitor with telemetry and waiting results of labs as well as cardiology recommendations.

## 2021-05-11 NOTE — PROGRESS NOTES
Desmond from Lab called with critical result of Potassium 2.7 at 0623. Critical lab result read back to Desmond.   Dr. Palumbo notified of critical lab result at 0625.  Critical lab result read back by Dr. Palumbo.

## 2021-05-11 NOTE — PROGRESS NOTES
"Spoke with [facility]bi Pharmacist regarding pts sotalol administration and no order for EKG's, with pts most recent QTc of 573. Per pharmacist okay to continue sotalol per MAR with no need to follow up 2 hr post admin EKG. Abbi stated \"monitor pts electrolytes and kidney status and continue sotalol as scheduled.\"   PM dose administered. Nano sim RN updated regarding what pharmacist said.   "

## 2021-05-11 NOTE — THERAPY
"Speech Language Pathology   Clinical Swallow Evaluation     Patient Name: Jeffrey Lizama  AGE:  88 y.o., SEX:  male  Medical Record #: [id number]  Today's Date: 5/11/2021     Precautions  Precautions: Fall Risk, Cardiac Precautions (See Comments), Swallow Precautions ( See Comments)    Assessment  Patient is 88 y.o. male admitted with chest pain, SOB, and hypotension. S/p PCI. PMHx of multiple cardiac issues, emphysema, pacemaker, hiatal hernia, etc. Please see EMR for full details. Chest CT 5/8 reports \"No CT evidence of pulmonary embolism. Patchy bibasilar opacities, likely atelectasis. Air-fluid levels throughout the colon could relate to ileus or diarrhea disease.\" Seen by SLP at [facility] last month for cognitive tx only, no dysphagia.     Patient seen this date for clinical swallow evaluation. Patient was recently on clear liquid diet d/t possible ileus, and per RN, patient had coughing/choking w/ med pass this morning. Patient is not a great historian and reported \"sometimes\" having trouble w/ swallowing, but was unable to specify with what or how often. Patient was resistant to HOB elevation and only allowed HOB to be elevated to 45 degrees despite max cues and education. Patient edentulous and reported dentures are at home. Patient with no other gross deficits noted during oral motor evaluation. Patient consumed PO trials of single ice chips, MTL via tsp, cup sip, and straw, liquidized purees, jello, and thin liquids via tsp and cup sip. Patient presented with coughing on MTL via straw and thins via cup sip, which is concerning for penetration/aspiration. No other overt s/sx of aspiration were noted on any other textures trialed, however, solids and upgrades could not be tested d/t limitations on diet. Laryngeal elevation palpated as weak. Initiation of swallow trigger was delayed intermittently.     At this time, recommend patient switch to clear MTL diet (clear mildly thick liquids) w/ assistance as needed. " No straws. Float or crush meds in puree. SLP to follow.     Plan  Recommend Speech Therapy 3 times per week until therapy goals are met for the following treatments:  Dysphagia Training and Patient / Family / Caregiver Education.    Discharge Recommendations: Recommend post-acute placement for additional speech therapy services prior to discharge home     Objective     05/11/21 1125   Precautions   Precautions Fall Risk;Cardiac Precautions (See Comments);Swallow Precautions ( See Comments)   Vitals   O2 (LPM) 4   O2 Delivery Device Oxymask   Oral Motor Eval    Is Patient Able to Complete Oral Motor Eval Yes, Within Normal Limits   Laryngeal Function   Voice Quality Within Functional Limits   Volutional Cough Within Functional Limits   Excursion Upon Swallow Weak    Max Phonation Time (Seconds) 5   Oral Food Presentation   Ice Chips Within Functional Limits   Single Swallow Mildly Thick (2) - (Nectar Thick)  Within Functional Limits   Serial Swallow Mildly Thick (2) - (Nectar Thick) Minimal  (Cough x1 with straw sips of MTL )   Single Swallow Thin (0) Moderate   Liquidised (3) Within Functional Limits   Self Feeding Needs Assistance   Dysphagia Strategies / Recommendations   Strategies / Interventions Recommended (Yes / No) Yes   Compensatory Strategies Head of Bed 90 Degrees During Eating / Drinking;Assistance Needed for Meal Tray Set-up;Monitor During Meals;No Straws;Single Sips / Bites   Diet / Liquid Recommendation Mildly Thick (2) - (Nectar Thick);Liquidised (3) - (Nectar Thick Full Liquid);Other (Comments)  (Clear MTL )   Medication Administration  Float Whole with Puree;Crush all Medications in Puree   Therapy Interventions Dysphagia Therapy By Speech Language Pathologist   Short Term Goals   Short Term Goal # 1 Patient will consume clear MTL diet with assistance with no overt s/sx of aspiration.    Anticipated Discharge Needs   Discharge Recommendations Recommend post-acute placement for additional speech  therapy services prior to discharge home

## 2021-05-11 NOTE — CARE PLAN
Problem: Pain Management  Goal: Pain level will decrease to patient's comfort goal  Outcome: PROGRESSING SLOWER THAN EXPECTED   _pt stating new severe chest pain, with minimal relief from morphine  Problem: Skin Integrity  Goal: Risk for impaired skin integrity will decrease  Outcome: PROGRESSING SLOWER THAN EXPECTED  -Pt demonstrating new non-blanching redness and fragile sacrum injury     Problem: Fluid Volume:  Goal: Will maintain balanced intake and output  Outcome: PROGRESSING SLOWER THAN EXPECTED  -Pt remains edematous; hypotensive and unable to receive IV lasix

## 2021-05-12 ENCOUNTER — PATIENT OUTREACH (OUTPATIENT)
Dept: HEALTH INFORMATION MANAGEMENT | Facility: OTHER | Age: 86
End: 2021-05-12

## 2021-05-12 ENCOUNTER — APPOINTMENT (OUTPATIENT)
Dept: RADIOLOGY | Facility: MEDICAL CENTER | Age: 86
DRG: 270 | End: 2021-05-12
Attending: INTERNAL MEDICINE
Payer: MEDICARE

## 2021-05-12 LAB
ALBUMIN SERPL BCP-MCNC: 2.3 G/DL (ref 3.2–4.9)
ALBUMIN/GLOB SERPL: 0.9 G/DL
ALP SERPL-CCNC: 42 U/L (ref 30–99)
ALT SERPL-CCNC: 21 U/L (ref 2–50)
ANION GAP SERPL CALC-SCNC: 6 MMOL/L (ref 7–16)
AST SERPL-CCNC: 33 U/L (ref 12–45)
BASOPHILS # BLD AUTO: 0.5 % (ref 0–1.8)
BASOPHILS # BLD: 0.06 K/UL (ref 0–0.12)
BILIRUB SERPL-MCNC: 1.1 MG/DL (ref 0.1–1.5)
BUN SERPL-MCNC: 23 MG/DL (ref 8–22)
CALCIUM SERPL-MCNC: 6.9 MG/DL (ref 8.5–10.5)
CHLORIDE SERPL-SCNC: 102 MMOL/L (ref 96–112)
CO2 SERPL-SCNC: 24 MMOL/L (ref 20–33)
CREAT SERPL-MCNC: 0.8 MG/DL (ref 0.5–1.4)
EOSINOPHIL # BLD AUTO: 0.29 K/UL (ref 0–0.51)
EOSINOPHIL NFR BLD: 2.4 % (ref 0–6.9)
ERYTHROCYTE [DISTWIDTH] IN BLOOD BY AUTOMATED COUNT: 63.8 FL (ref 35.9–50)
GLOBULIN SER CALC-MCNC: 2.5 G/DL (ref 1.9–3.5)
GLUCOSE SERPL-MCNC: 89 MG/DL (ref 65–99)
HCT VFR BLD AUTO: 26.3 % (ref 42–52)
HGB BLD-MCNC: 7.9 G/DL (ref 14–18)
IMM GRANULOCYTES # BLD AUTO: 0.1 K/UL (ref 0–0.11)
IMM GRANULOCYTES NFR BLD AUTO: 0.8 % (ref 0–0.9)
LYMPHOCYTES # BLD AUTO: 0.66 K/UL (ref 1–4.8)
LYMPHOCYTES NFR BLD: 5.5 % (ref 22–41)
MAGNESIUM SERPL-MCNC: 2 MG/DL (ref 1.5–2.5)
MCH RBC QN AUTO: 27.7 PG (ref 27–33)
MCHC RBC AUTO-ENTMCNC: 30 G/DL (ref 33.7–35.3)
MCV RBC AUTO: 92.3 FL (ref 81.4–97.8)
MONOCYTES # BLD AUTO: 0.65 K/UL (ref 0–0.85)
MONOCYTES NFR BLD AUTO: 5.5 % (ref 0–13.4)
NEUTROPHILS # BLD AUTO: 10.16 K/UL (ref 1.82–7.42)
NEUTROPHILS NFR BLD: 85.3 % (ref 44–72)
NRBC # BLD AUTO: 0 K/UL
NRBC BLD-RTO: 0 /100 WBC
PHOSPHATE SERPL-MCNC: 1.6 MG/DL (ref 2.5–4.5)
PLATELET # BLD AUTO: 260 K/UL (ref 164–446)
PMV BLD AUTO: 9.7 FL (ref 9–12.9)
POTASSIUM SERPL-SCNC: 3.1 MMOL/L (ref 3.6–5.5)
POTASSIUM SERPL-SCNC: 3.3 MMOL/L (ref 3.6–5.5)
POTASSIUM SERPL-SCNC: 3.4 MMOL/L (ref 3.6–5.5)
PROT SERPL-MCNC: 4.8 G/DL (ref 6–8.2)
RBC # BLD AUTO: 2.85 M/UL (ref 4.7–6.1)
SODIUM SERPL-SCNC: 132 MMOL/L (ref 135–145)
TROPONIN T SERPL-MCNC: 452 NG/L (ref 6–19)
WBC # BLD AUTO: 11.9 K/UL (ref 4.8–10.8)

## 2021-05-12 PROCEDURE — 700105 HCHG RX REV CODE 258: Performed by: STUDENT IN AN ORGANIZED HEALTH CARE EDUCATION/TRAINING PROGRAM

## 2021-05-12 PROCEDURE — A9270 NON-COVERED ITEM OR SERVICE: HCPCS | Performed by: PSYCHIATRY & NEUROLOGY

## 2021-05-12 PROCEDURE — 99232 SBSQ HOSP IP/OBS MODERATE 35: CPT | Performed by: INTERNAL MEDICINE

## 2021-05-12 PROCEDURE — 36415 COLL VENOUS BLD VENIPUNCTURE: CPT

## 2021-05-12 PROCEDURE — 700111 HCHG RX REV CODE 636 W/ 250 OVERRIDE (IP): Performed by: STUDENT IN AN ORGANIZED HEALTH CARE EDUCATION/TRAINING PROGRAM

## 2021-05-12 PROCEDURE — 80053 COMPREHEN METABOLIC PANEL: CPT

## 2021-05-12 PROCEDURE — 84100 ASSAY OF PHOSPHORUS: CPT

## 2021-05-12 PROCEDURE — 700102 HCHG RX REV CODE 250 W/ 637 OVERRIDE(OP): Performed by: INTERNAL MEDICINE

## 2021-05-12 PROCEDURE — 700105 HCHG RX REV CODE 258: Performed by: INTERNAL MEDICINE

## 2021-05-12 PROCEDURE — 84132 ASSAY OF SERUM POTASSIUM: CPT

## 2021-05-12 PROCEDURE — 700111 HCHG RX REV CODE 636 W/ 250 OVERRIDE (IP): Performed by: INTERNAL MEDICINE

## 2021-05-12 PROCEDURE — 700101 HCHG RX REV CODE 250: Performed by: INTERNAL MEDICINE

## 2021-05-12 PROCEDURE — 700102 HCHG RX REV CODE 250 W/ 637 OVERRIDE(OP): Performed by: STUDENT IN AN ORGANIZED HEALTH CARE EDUCATION/TRAINING PROGRAM

## 2021-05-12 PROCEDURE — 83735 ASSAY OF MAGNESIUM: CPT

## 2021-05-12 PROCEDURE — 85025 COMPLETE CBC W/AUTO DIFF WBC: CPT

## 2021-05-12 PROCEDURE — 84484 ASSAY OF TROPONIN QUANT: CPT

## 2021-05-12 PROCEDURE — 700102 HCHG RX REV CODE 250 W/ 637 OVERRIDE(OP): Performed by: PSYCHIATRY & NEUROLOGY

## 2021-05-12 PROCEDURE — A9270 NON-COVERED ITEM OR SERVICE: HCPCS | Performed by: STUDENT IN AN ORGANIZED HEALTH CARE EDUCATION/TRAINING PROGRAM

## 2021-05-12 PROCEDURE — 770020 HCHG ROOM/CARE - TELE (206)

## 2021-05-12 PROCEDURE — A9270 NON-COVERED ITEM OR SERVICE: HCPCS | Performed by: INTERNAL MEDICINE

## 2021-05-12 PROCEDURE — 93971 EXTREMITY STUDY: CPT | Mod: LT

## 2021-05-12 RX ORDER — POTASSIUM CHLORIDE 7.45 MG/ML
10 INJECTION INTRAVENOUS
Status: COMPLETED | OUTPATIENT
Start: 2021-05-12 | End: 2021-05-13

## 2021-05-12 RX ORDER — POTASSIUM CHLORIDE 7.45 MG/ML
10 INJECTION INTRAVENOUS
Status: DISCONTINUED | OUTPATIENT
Start: 2021-05-12 | End: 2021-05-12

## 2021-05-12 RX ORDER — CALCIUM CARBONATE 500 MG/1
500 TABLET, CHEWABLE ORAL 2 TIMES DAILY
Status: DISCONTINUED | OUTPATIENT
Start: 2021-05-12 | End: 2021-05-14 | Stop reason: HOSPADM

## 2021-05-12 RX ADMIN — ATORVASTATIN CALCIUM 80 MG: 80 TABLET, FILM COATED ORAL at 16:52

## 2021-05-12 RX ADMIN — FUROSEMIDE 20 MG: 10 INJECTION, SOLUTION INTRAMUSCULAR; INTRAVENOUS at 06:53

## 2021-05-12 RX ADMIN — DIBASIC SODIUM PHOSPHATE, MONOBASIC POTASSIUM PHOSPHATE AND MONOBASIC SODIUM PHOSPHATE 250 MG: 852; 155; 130 TABLET ORAL at 23:07

## 2021-05-12 RX ADMIN — POTASSIUM CHLORIDE 10 MEQ: 7.46 INJECTION, SOLUTION INTRAVENOUS at 21:52

## 2021-05-12 RX ADMIN — OXYCODONE 5 MG: 5 TABLET ORAL at 21:55

## 2021-05-12 RX ADMIN — DIBASIC SODIUM PHOSPHATE, MONOBASIC POTASSIUM PHOSPHATE AND MONOBASIC SODIUM PHOSPHATE 250 MG: 852; 155; 130 TABLET ORAL at 07:55

## 2021-05-12 RX ADMIN — ACETAMINOPHEN 1000 MG: 500 TABLET ORAL at 16:51

## 2021-05-12 RX ADMIN — POTASSIUM PHOSPHATE, MONOBASIC AND POTASSIUM PHOSPHATE, DIBASIC 30 MMOL: 224; 236 INJECTION, SOLUTION, CONCENTRATE INTRAVENOUS at 09:09

## 2021-05-12 RX ADMIN — OXYCODONE 5 MG: 5 TABLET ORAL at 03:01

## 2021-05-12 RX ADMIN — OMEPRAZOLE 20 MG: 20 CAPSULE, DELAYED RELEASE ORAL at 06:44

## 2021-05-12 RX ADMIN — ANTACID TABLETS 500 MG: 500 TABLET, CHEWABLE ORAL at 08:52

## 2021-05-12 RX ADMIN — POTASSIUM CHLORIDE 20 MEQ: 1500 TABLET, EXTENDED RELEASE ORAL at 06:49

## 2021-05-12 RX ADMIN — POTASSIUM CHLORIDE 10 MEQ: 7.46 INJECTION, SOLUTION INTRAVENOUS at 23:12

## 2021-05-12 RX ADMIN — FUROSEMIDE 20 MG: 10 INJECTION, SOLUTION INTRAMUSCULAR; INTRAVENOUS at 18:46

## 2021-05-12 RX ADMIN — CLOPIDOGREL BISULFATE 75 MG: 75 TABLET ORAL at 06:44

## 2021-05-12 RX ADMIN — TAMSULOSIN HYDROCHLORIDE 0.4 MG: 0.4 CAPSULE ORAL at 06:44

## 2021-05-12 RX ADMIN — DIBASIC SODIUM PHOSPHATE, MONOBASIC POTASSIUM PHOSPHATE AND MONOBASIC SODIUM PHOSPHATE 250 MG: 852; 155; 130 TABLET ORAL at 12:05

## 2021-05-12 RX ADMIN — OXYCODONE 5 MG: 5 TABLET ORAL at 10:53

## 2021-05-12 RX ADMIN — ANTACID TABLETS 500 MG: 500 TABLET, CHEWABLE ORAL at 16:52

## 2021-05-12 RX ADMIN — MONTELUKAST 10 MG: 10 TABLET, FILM COATED ORAL at 06:44

## 2021-05-12 RX ADMIN — SODIUM CHLORIDE 125 MG: 9 INJECTION, SOLUTION INTRAVENOUS at 19:30

## 2021-05-12 RX ADMIN — POTASSIUM CHLORIDE 20 MEQ: 1500 TABLET, EXTENDED RELEASE ORAL at 16:52

## 2021-05-12 RX ADMIN — ACETAMINOPHEN 1000 MG: 500 TABLET ORAL at 12:05

## 2021-05-12 RX ADMIN — DIBASIC SODIUM PHOSPHATE, MONOBASIC POTASSIUM PHOSPHATE AND MONOBASIC SODIUM PHOSPHATE 250 MG: 852; 155; 130 TABLET ORAL at 16:51

## 2021-05-12 RX ADMIN — FERROUS SULFATE TAB 325 MG (65 MG ELEMENTAL FE) 325 MG: 325 (65 FE) TAB at 07:55

## 2021-05-12 RX ADMIN — OMEPRAZOLE 20 MG: 20 CAPSULE, DELAYED RELEASE ORAL at 16:51

## 2021-05-12 RX ADMIN — LEVOTHYROXINE SODIUM 75 MCG: 25 TABLET ORAL at 06:44

## 2021-05-12 RX ADMIN — HYDROCORTISONE 10 MG: 10 TABLET ORAL at 16:51

## 2021-05-12 RX ADMIN — HYDROCORTISONE 10 MG: 10 TABLET ORAL at 06:49

## 2021-05-12 RX ADMIN — CALCIUM GLUCONATE 1 G: 98 INJECTION, SOLUTION INTRAVENOUS at 07:59

## 2021-05-12 RX ADMIN — ACETAMINOPHEN 1000 MG: 500 TABLET ORAL at 06:44

## 2021-05-12 RX ADMIN — ASPIRIN 81 MG: 81 TABLET, CHEWABLE ORAL at 06:44

## 2021-05-12 ASSESSMENT — PAIN DESCRIPTION - PAIN TYPE
TYPE: ACUTE PAIN

## 2021-05-12 ASSESSMENT — ENCOUNTER SYMPTOMS
SPUTUM PRODUCTION: 0
PND: 0
TINGLING: 0
ORTHOPNEA: 0
HEMOPTYSIS: 0
EYES NEGATIVE: 1
SHORTNESS OF BREATH: 0
MUSCULOSKELETAL NEGATIVE: 1
CHILLS: 0
GASTROINTESTINAL NEGATIVE: 1
WEIGHT LOSS: 0
WEAKNESS: 1
WHEEZING: 0
PSYCHIATRIC NEGATIVE: 1
PALPITATIONS: 0
SENSORY CHANGE: 0
COUGH: 0
FOCAL WEAKNESS: 0
DIZZINESS: 0
HEADACHES: 0
CLAUDICATION: 0

## 2021-05-12 ASSESSMENT — FIBROSIS 4 INDEX: FIB4 SCORE: 2.44

## 2021-05-12 NOTE — ASSESSMENT & PLAN NOTE
Repeat echo showed ejection fraction 45% with apical akinesis  Cardiologist did not recommend cardiac cath  Continue Lasix with atorvastatin, aspirin and Plavix  Patient has poor prognosis  Consider beta-blocker after improving on his situation and more stable

## 2021-05-12 NOTE — PROGRESS NOTES
Received report from day shift RNAreli. Assumed care of pt. Pt reports no needs at this time. Updated pt on plan of care. Pt resting comfortably in bed. Skin, aspiration, and fall precautions in place. Educated on use of call light. Hourly rounding and continuous monitoring in place.

## 2021-05-12 NOTE — WOUND TEAM
Wound team consulted for slow to elida area of redness to sacrococcygeal area. Upon assessment, sacrum with blanchable erythema. Interventions such as waffle overlay, q2h turns, and barrier paste already in place. Patient does not require advanced wound care at this time, bedside RN notified. Will order RN protocol.     Please re-consult wound team should wound fail to progress or worsen.

## 2021-05-12 NOTE — DIETARY
Nutrition services: Brief Update    Consult received for Failure to Thrive. RD is currently following pt and last saw yesterday. Pt is currently on NPO/clears x 3 days. SLP saw yesterday and reports need for mildly thick liquids. Pt had supplements in place, however unfortunately boost Breeze cannot be thickened. Will order supplements once diet advanced, updated MD. MD does note per discussion with family, consider comfort care if pt declines.    RD continues to follow.

## 2021-05-12 NOTE — HEART FAILURE PROGRAM
Admission on 5/1/21 looks like GI Bleed was the presenting problem. Since then heart failure with preserved ejection fraction (HFpEF), borderline has become an active problem. This is new for the patient. Dr. Yanez consulted yesterday.     Nursing: please complete the HF Discharge Checklist (pink sheet in hard chart) and have it cosigned by your charge RN before patient leaves the hospital.    Providers: below are all Guideline Directed Medical Therapy (GDMT) indicated for HFpEF. If any can not be prescribed by discharge, please note the clinical reason for each in your discharge summary.    QUICK SUMMARY OF HFpEF MEASURES    -- Anticoagulation for atrial arrhythmia, if applicable  -- Glycemic control for DM + HF, if diabetic  -- Lipid lowering medication for DM + HF, if diabetic  -- Pneumococcal vaccine if not previously received  -- Influenza vaccine if not previously received for the current flu season  -- Smoking cessation counseling documented if applicable    Daily Nurse: please begin to fill out the HF checklist (pink sheet in hard chart) and use it to guide your daily care.    Discharge Nurse: please ensure completeness of the HF checklist (pink sheet in hard chart) and have it co-signed by the charge RN before the patient leaves the hospital.    Thank you, Nano, Cardio RN Navigator [id number]      DETAILED EXPLANATION OF HF MEASURES:  1. Documentation of LV systolic function (echo or cath) PTA, during this hospitalization, or plan to assess post discharge or reason for not assessing documented  2. Documentation of fluid intake and urine output every nursing shift  3. 2 hour post diuretic assessment documented 2 hours after diuretic given  4. HF Patient Education using the Living Well With Heart Failure Booklet and Symptom Tracker documented every nursing shift  5. Nutrition consult for diet education  6. Daily weights (one weight documented every 24 hours) on a standing scale unless standing is contraindicated in  which case bed scale can be used - have patient write weight on symptom tracker  7. For LVEF less than or equal to 40%, ACE-I, ARNI or ARB prescribed at discharge   8. For LVEF less than or equal to 40%, an Evidence Based Beta Blocker (bisoprolol, carvedilol, toprol xl) must be prescribed at discharge  9. For LVEF less than or equal to 35% aldosterone blockade prescribed at discharge  10. The combination of hydralazine and isosorbide dinitrate is recommended to reduce morbidity and mortality for patients self-described  Americans with NYHA class III-IV HFrEF (EF 40% or less), receiving optimal therapy with ACE inhibitors and beta blockers, unless contraindicated (Class I, LAZARA: A).  11. If a HF patient is diabetic or is newly diagnosed with DM: prescribed diabetes treatment at discharge in the form of glycemic control (diet or anti-hyperglycemic medication) or f/u appointment for diabetes management scheduled at discharge.  12. If a HF patient has diabetes: prescribed lipid lowering medication at discharge  13. Documented smoking cessation advice or counseling  14. If a HF patient has a-fib: anticoagulation is prescribed upon discharge or contraindication is documented  15. Screening for and administering immunizations as long as no contraindications: Pneumonia (regardless of age) and Influenza  16. Written discharge instructions include:  ? Daily weights  ? Record weight on tracker  ? Bring tracker to appointments  ? Call MD for weight gain of 3lb /day or 5lb/week  ? HF medication teaching  ? Low sodium diet  ? Follow up appointment within seven calendar days of d/c must include: date, time and location  ? Activity  ? Worsening symptoms    What if any of the above HF measures are contraindicated?  ? Request that the discharging provider document the medication/intervention and the contraindication specifically in a progress note  ? For example: “no CHF meds due to hypotension” is not enough. It needs to say:  “No ACE-I, ARNI, ARB due to hypotension”; “No Beta Blockade due to bradycardia”…

## 2021-05-12 NOTE — CARE PLAN
Problem: Nutritional:  Goal: Achieve adequate nutritional intake  Description: Patient will consume >50% of meals  Outcome: Not Progressing

## 2021-05-12 NOTE — PROGRESS NOTES
[facility] Cardiology Follow-up Note    Date of Service:    5/12/2021      Name:   Jeffrey Lizama   YOB: 1932  Age:   88 y.o.  male   MRN:   [id number]      Chief Complaint: PAF, HOCM    Primary Cardiologist:  Dr. Maloney    HPI:  Mr Lizama is an 87 y/o fellow with PMH including HOCM, PAF on sotalol with tachy nico s/p [place] PPM, hx of GIB while on anticoagulation, CAD with hx of stent to the LAD in 4/2021, Essential hypertension who now presents with hypotension suspected to be due to adrenal insufficiency.  Cardiology was consulted for newly reduced EF 45% and elevated troponin.    Interim Events:  A little shortness of breath this afternoon after eating - had his O2 mask off.   Denies chest pain, some abd pain with palpiation  His L calf is sore as well      ROS  Constitutional:  + fatigue.  Respiratory:  + shortness of breath, no cough.  Cardiovascular:  No chest pain.  no lower extremity edema.  Denies orthopnea or PND.  : denies polyuria, no dysuria.  GI:  Denies nausea/vomiting.  No abdominal distention.  Neuro:  Denies dizziness, syncope.  Hem/lymph: Denies easy bleeding/bruising.      All other review of systems reviewed and negative.    Past medical, surgical, social, and family history reviewed and unchanged from admission except as noted in HPI.    Medications: Reviewed in MAR  Current Facility-Administered Medications   Medication Dose Frequency Provider Last Rate Last Admin   • phosphorus (K-Phos-Neutral) per tablet 250 mg  1 tablet Q6HRS Keny Madsen M.D.   250 mg at 05/12/21 0755   • potassium phosphate IVPB 30 mmol in 500 mL D5W (premix)  30 mmol Once Lindsey Arambula M.D.   Paused at 05/12/21 1057   • calcium carbonate (TUMS) chewable tab 500 mg  500 mg BID Lindsey Arambula M.D.   500 mg at 05/12/21 0852   • potassium chloride SA (Kdur) tablet 20 mEq  20 mEq BID Lindsey Arambula M.D.   20 mEq at 05/12/21 0649   • ferric gluconate complex (FERRLECIT) 125 mg in NS  100 mL IVPB  125 mg Q24HR Lindsey Arambula M.D.        Followed by   • [START ON 5/13/2021] ferric gluconate complex (FERRLECIT) 250 mg in  mL IVPB  250 mg Q24HR Lindsey Arambula M.D.       • polyethylene glycol/lytes (MIRALAX) PACKET 1 Packet  1 Packet BID Lindsey Arambula M.D.       • furosemide (LASIX) injection 20 mg  20 mg BID Derek Acosta M.D.   20 mg at 05/12/21 0653   • oxyCODONE immediate-release (ROXICODONE) tablet 5 mg  5 mg Q3HRS PRN Derek Acosta M.D.   5 mg at 05/12/21 1053    Or   • oxyCODONE immediate-release (ROXICODONE) tablet 5 mg  5 mg Q3HRS PRN Derek Acosta M.D.   5 mg at 05/11/21 1316   • albuterol (PROVENTIL) 2.5mg/0.5ml nebulizer solution 2.5 mg  2.5 mg Q4H PRN (RT) Derek Acosta M.D.       • lactated ringers infusion (BOLUS): BMI less than or equal to 30  30 mL/kg Once PRN Derek Acosta M.D.       • lactated ringers infusion (BOLUS)  1,000 mL Once PRN Derek Acosta M.D.       • ferrous sulfate tablet 325 mg  325 mg QDAY with Breakfast Derek Acosta M.D.   325 mg at 05/12/21 0755   • acetaminophen (TYLENOL) tablet 1,000 mg  1,000 mg TID Pat Babin M.D.   1,000 mg at 05/12/21 0644   • hydrocortisone (CORTEF) tablet 10 mg  10 mg BID Steve Sanchez M.D.   10 mg at 05/12/21 0649   • aspirin (ASA) chewable tab 81 mg  81 mg DAILY Steve Sanchez M.D.   81 mg at 05/12/21 0644   • clopidogrel (PLAVIX) tablet 75 mg  75 mg DAILY Steve Sanchez M.D.   75 mg at 05/12/21 0644   • atorvastatin (LIPITOR) tablet 80 mg  80 mg Q EVENING Keny Madsen M.D.   80 mg at 05/11/21 1730   • levothyroxine (SYNTHROID) tablet 75 mcg  75 mcg AM ES Keny Madsen M.D.   75 mcg at 05/12/21 0644   • montelukast (SINGULAIR) tablet 10 mg  10 mg DAILY Keny Madsen M.D.   10 mg at 05/12/21 0644   • omeprazole (PRILOSEC) capsule 20 mg  20 mg BID Keny Madsen M.D.   20 mg at 05/12/21 0644   • tamsulosin (FLOMAX) capsule 0.4 mg  0.4 mg DAILY Keny Madsen M.D.   0.4 mg at 05/12/21 0644   • benzonatate  "(TESSALON) capsule 100 mg  100 mg TID PRN Keny Madsen M.D.   100 mg at 05/07/21 1538   Last reviewed on 5/1/2021 10:01 PM by William Owen    Allergies   Allergen Reactions   • Lisinopril      Hypotension, 30 mg dose   • Pcn [Penicillins] Rash     Tolerates cephalosporins         Physical Exam  Body mass index is 33.69 kg/m². /73   Pulse 70   Temp 36.6 °C (97.8 °F) (Temporal)   Resp 16   Ht 1.727 m (5' 8\")   Wt 101 kg (221 lb 9 oz)   SpO2 100%    Vitals:    05/12/21 0457 05/12/21 0638 05/12/21 0700 05/12/21 1105   BP: 129/78 149/67 137/75 138/73   Pulse: 88  70 70   Resp: 17  18 16   Temp: 36.9 °C (98.4 °F)  36.1 °C (96.9 °F) 36.6 °C (97.8 °F)   TempSrc: Temporal  Temporal Temporal   SpO2: 99%  100% 100%   Weight:       Height:        Oxygen Therapy:  Pulse Oximetry: 100 %, O2 (LPM): 3, O2 Delivery Device: Oxymask    General: no apparent distress  Neck: no JVD   Lungs: normal effort,  without crackles, no wheezing or rhonchi  Heart: normal rate, regular rhythm, 2/6 holosystolic murmur at the LSB, no rub  EXT: no lower extremity edema  Abdomen: soft, tenderness with palpation of the RLQ  Neurological: No focal deficits, no facial asymmetry.  Normal speech  Psychiatric: Appropriate affect, alert and oriented x 3  Skin: Warm extremities, no rash, scattered ecchymosis especially about the LUE    Labs (personally reviewed):     Lab Results   Component Value Date/Time    SODIUM 132 (L) 05/12/2021 02:53 AM    POTASSIUM 3.4 (L) 05/12/2021 02:53 AM    CHLORIDE 102 05/12/2021 02:53 AM    CO2 24 05/12/2021 02:53 AM    GLUCOSE 89 05/12/2021 02:53 AM    BUN 23 (H) 05/12/2021 02:53 AM    CREATININE 0.80 05/12/2021 02:53 AM    CREATININE 1.3 09/11/2008 04:21 PM     Lab Results   Component Value Date/Time    ALKPHOSPHAT 42 05/12/2021 02:53 AM    ASTSGOT 33 05/12/2021 02:53 AM    ALTSGPT 21 05/12/2021 02:53 AM    TBILIRUBIN 1.1 05/12/2021 02:53 AM      Lab Results   Component Value Date/Time    CHOLSTRLTOT 124 " 2019 05:58 AM    LDL 69 2019 05:58 AM    HDL 42 2019 05:58 AM    TRIGLYCERIDE 67 2019 05:58 AM         Cardiac Imaging and Procedures Review:      Personal Telemetry Review:  Paced 70s    Echo 2021:  CONCLUSIONS  Limited echo to evaluate left ventricular systolic function.  Left ventricular ejection fraction is visually estimated to be 45%.  Dyskinesis of the apex and hypokinesis of the distal/apical inferior   and septal walls.      [facility] 2021:  IMPRESSIONS:  1.  Non-STEMI due to severe stenosis of the distal LAD with heavy calcification throughout.  2.  Complex but successful PCI of the mid to distal LAD using 3 overlapping drug-eluting stents  3.  Residual total occlusion of the OM 2  4.  Normal LVEDP    [facility] DEVICE FLOWSHEET 3/11/2019   Device History: Sick Sinus Syndrome   Device Type: Pacemaker   Mode: D D D R   Rate: 70   Presenting Rhythm: ApVp   Atrially Paced: (%) 73   Ventricularly Paced: (%) 69   Mode Switching Details: No episodes   Atrial Lead Threshold: (Volts) 0.7   Atrial Lead Sensing: (mV) 2.8   Atrial Lead Impedance: (Ohms) 626   Right Ventricular Lead Threshold: (Volts) 1   Right Ventricular Lead Sensing: (mV) 6.6   Right Ventricular Lead Impedance: (Ohms) 479   Battery Estimated Longevity: 7.5 Years   Changes Made: No changes made       Assessment and Medical Decision Makin  Elevated troponin with CAD and recent JAS to the LAD 2021  -   Unlikely ACS, troponin likely elevated due to demand in the setting of active bleed and anemia   -   Patient is not really a candidate for further invasive cardiac procedures at this time given his bleeding.  -   Must continue DAPT  Unless life threathing bleeding is ongoing given LAD stents within the past 1 month.   -   Continue high intensity statin, not on BB currently due to hypotension    2  Ischemic cardiomyopathy, newly reduced EF 45%  -   Intolerant to GDMT at this time due to hypotension, consider adding in the  future if tolerates.    3  Hypertrophic obstructive cardiomyopathy    4  Paroxysmal AFIB.  -   Holding OAC given his bleed.  -   No sotalol given long QT and hypokalemia, risks outweigh benefits.    5  Chronic anemia with Hx of GIB on OAC in the past.    6   Rectus sheath hematoma s/p reversal and IR embolization of inf epigastric artery    7  Essential hypertension now with hypotension.    8  Hypokalemia.  -   Replacement in progress    Conservative medical therapy recommended at this time.   Cardiology will sign off at this time.  Please contact our service directly with further questions/concerns.      Pepper Meade PA-C  [facility]

## 2021-05-12 NOTE — PROGRESS NOTES
Repeat troponin results showed increase from 464 to 533.  Updated Dr. Arambula and Cardiology Nurse Practioners.

## 2021-05-12 NOTE — PROGRESS NOTES
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  The hypotension was felt to be secondary to his adrenal insufficiency.  Therefore, he was treated with stress dose steroids.  Ultimately, hypotension was found secondary to a rectus sheath hematoma with acute hemorrhagic anemia.  He was admitted and was transfused 1 unit of packed red blood cell for a hemoglobin of 6.7.  Anticoagulation was reversed with Kcentra and vitamin K.  The right inferior epigastric artery was treated with coil embolization by interventional radiology on 5/5/2021.  Post procedure course complicated by ileus, which has resolved while in ICU per PN, but unfortunately recurred.    Overnight of 5/8/2021, the patient was transferred from the ICU to telemetry floor.  I assume his clinical care on the morning of 5/8/2021.  Reviewed the patient chart, patient has had elevated leukocytosis on presentation.  Chest x-ray obtained on 5/7/2021 show increasing right lower lobe infiltrate concerning for pneumonia, which is ruled out with chest CTA.  Also negative for PE.       Due to comorbidities and poor prognosis, the case was discussed in detail with family, they understand the poor prognosis, answered all their questions.     Interval History Updates:  -Patient had chest pain yesterday, EKG pacing rhythm and troponin was elevated, case was discussed with cardiologist Dr. Yanez who does not feel the patient is candidate for cardiac cath to continue treatment with Plavix and aspirin.   -The patient is on high risk for bleeding, will hold on anticoagulation at this time.  -Sotalol was discontinued by cardiology  -Severe hypokalemia, resume IV and oral  -Speech evaluated  the patient and adjust the diet.  -Order IV iron.  -Discussed the case with family, consider comfort care if he starts getting worse.    Consultants/Specialty  Cardiology  Critical Care  Interventional Radiology    Review of Systems   Review of Systems   Constitutional: Positive for malaise/fatigue and weight loss. Negative for chills.   HENT: Negative.    Eyes: Negative.    Respiratory: Positive for shortness of breath. Negative for cough, hemoptysis and sputum production.    Cardiovascular: Positive for chest pain, claudication, leg swelling and PND. Negative for palpitations and orthopnea.   Gastrointestinal: Negative.    Genitourinary: Negative.    Musculoskeletal: Negative.    Neurological: Positive for dizziness and weakness. Negative for tingling, sensory change, focal weakness and headaches.   Endo/Heme/Allergies: Negative.    Psychiatric/Behavioral: Negative.         Medications:  • potassium chloride SA  40 mEq Once   • [START ON 5/12/2021] potassium chloride SA  20 mEq BID   • ferric gluconate (FERRLECIT) IV  125 mg Q24HR    Followed by   • [START ON 5/12/2021] ferric gluconate (FERRLECIT) IV  125 mg Q24HR    Followed by   • [START ON 5/13/2021] ferric gluconate (FERRLECIT) IV  250 mg Q24HR   • polyethylene glycol/lytes  1 Packet BID   • potassium chloride SA  20 mEq DAILY   • furosemide  20 mg BID   • oxyCODONE immediate-release  5 mg Q3HRS PRN    Or   • oxyCODONE immediate-release  5 mg Q3HRS PRN   • albuterol  2.5 mg Q4H PRN (RT)   • LR  30 mL/kg Once PRN   • LR  1,000 mL Once PRN   • ferrous sulfate  325 mg QDAY with Breakfast   • acetaminophen  1,000 mg TID   • hydrocortisone  10 mg BID   • aspirin  81 mg DAILY   • clopidogrel  75 mg DAILY   • atorvastatin  80 mg Q EVENING   • levothyroxine  75 mcg AM ES   • montelukast  10 mg DAILY   • omeprazole  20 mg BID   • tamsulosin  0.4 mg DAILY   • benzonatate  100 mg TID PRN           Physical Exam   Vitals:    05/11/21 0800 05/11/21 1300 05/11/21 1700  05/11/21 1723   BP: 107/61 109/72 (!) 84/51 119/62   Pulse: 72 63 71 70   Resp: 18 18  17   Temp: 36.8 °C (98.2 °F) 36.3 °C (97.4 °F)  36.7 °C (98 °F)   TempSrc: Temporal Temporal  Temporal   SpO2: 99% 92%  99%   Weight:       Height:           Physical Exam   Constitutional: No distress.   Eyes: Pupils are equal, round, and reactive to light. No scleral icterus.   Cardiovascular: Normal rate.   No murmur heard.  Pulmonary/Chest: He has no wheezes. He has rales. He exhibits no tenderness.   Abdominal: Soft. He exhibits no mass. There is abdominal tenderness. There is no rebound and no guarding.   Genitourinary:    Penis normal.     Musculoskeletal:         General: Edema present. No deformity.      Comments: 2+ pitting edema up to the lower thigh bilaterally.  Both arms are edematous  Anasarca   Neurological: He is alert.   Patient is alert, oriented to self, location.  He is coherent with rational thought process.   Skin: Skin is warm. Rash noted. He is not diaphoretic.   Bruising everywhere   Nursing note and vitals reviewed.         Laboratory   Recent Labs     05/09/21 2357 05/11/21 0422 05/11/21  0530   WBC 17.5* 14.0* 13.5*   RBC 2.74* 2.82* 3.04*   HEMOGLOBIN 7.5* 7.8* 8.2*   HEMATOCRIT 24.6* 25.5* 27.5*   PLATELETCT 251 288 281     Recent Labs     05/09/21  0146 05/11/21  0422 05/11/21  0530   SODIUM 134* 136 135   POTASSIUM 3.8 2.8* 2.7*   CHLORIDE 104 102 100   CO2 24 27 28   GLUCOSE 85 92 88   BUN 32* 31* 31*   CREATININE 1.18 1.12 1.08      Recent Labs     05/09/21  0146 05/11/21  0422 05/11/21  0530   ALTSGPT 32  --  21   ASTSGOT 34  --  23   ALKPHOSPHAT 50  --  46   TBILIRUBIN 1.0  --  1.2   GLUCOSE 85 92 88                Microbiology  Results     Procedure Component Value Units Date/Time    C Diff Toxin [[id number]] Collected: 05/08/21 2353    Order Status: Completed Updated: 05/09/21 1127     C.Diff Toxin A&B Negative     Comment: Treatment for C. difficile not recommended.  TOXIN NEGATIVE  Toxin  "negative by EIA; C. difficile detected by PCR.  Indicates colonization, infection unlikely. If clinical  suspicion persists, consider ID or GI Consult.  Repeat testing not indicated within 7 days.         Narrative:      Special Contact [id number] DELORIS OSMAN V.  Does this patient have risk factors for C-diff?->Yes  C-Diff Risk Factors->antibiotic exposure    C Diff by PCR rflx Toxin [[id number]] Collected: 05/08/21 2353    Order Status: Completed Specimen: Stool Updated: 05/09/21 1126     C Diff by PCR See Toxin     027-NAP1-BI Presumptive Negative     Comment: Presumptive 027/NAP1/BI target DNA sequences are NOT DETECTED.       Narrative:      Special Contact [id number] QUIRIDONTA OSMAN V.  Does this patient have risk factors for C-diff?->Yes  C-Diff Risk Factors->antibiotic exposure    BLOOD CULTURE [[id number]] Collected: 05/08/21 1019    Order Status: Completed Specimen: Blood from Peripheral Updated: 05/09/21 0719     Significant Indicator NEG     Source BLD     Site PERIPHERAL     Culture Result No Growth  Note: Blood cultures are incubated for 5 days and  are monitored continuously.Positive blood cultures  are called to the RN and reported as soon as  they are identified.      Narrative:      Per Hospital Policy: Only change Specimen Src: to \"Line\" if  specified by physician order.  No site indicated    BLOOD CULTURE [[id number]] Collected: 05/08/21 1003    Order Status: Completed Specimen: Blood from Peripheral Updated: 05/09/21 0719     Significant Indicator NEG     Source BLD     Site PERIPHERAL     Culture Result No Growth  Note: Blood cultures are incubated for 5 days and  are monitored continuously.Positive blood cultures  are called to the RN and reported as soon as  they are identified.      Narrative:      Per Hospital Policy: Only change Specimen Src: to \"Line\" if  specified by physician order.  Right Hand    MRSA By PCR (Amp) [[id number]]  (Abnormal) Collected: 05/08/21 1050    Order " "Status: Completed Specimen: Respirate from Nares Updated: 05/08/21 1700     Significant Indicator POS     Source RESP     Site NARES     MRSA PCR POSITIVE for MRSA by PCR.    Narrative:      CALL  [place]  183 tel. [phone],  CALLED  183 tel. [phone] 05/08/2021, 16:59, RB PERF. RESULTS CALLED  TO:[id number]  Collected By:[id number] [employer] A  In and out cath once for sample  Collected By:[id number] [employer] A    URINALYSIS [[id number]]  (Abnormal) Collected: 05/08/21 1050    Order Status: Completed Specimen: Urine, Cath Updated: 05/08/21 1225     Color DK Yellow     Character Cloudy     Specific Gravity 1.034     Ph 5.5     Glucose Negative mg/dL      Ketones Trace mg/dL      Protein Negative mg/dL      Bilirubin Negative     Urobilinogen, Urine 0.2     Nitrite Negative     Leukocyte Esterase Negative     Occult Blood Negative     Micro Urine Req Microscopic    Narrative:      Collected By:[id number] [employer] A  In and out cath once for sample    CULTURE RESPIRATORY W/ GRM STN [[id number]]     Order Status: Completed Specimen: Respirate from Sputum     BLOOD CULTURE [[id number]]  (Abnormal) Collected: 05/01/21 1940    Order Status: Completed Specimen: Blood from Peripheral Updated: 05/07/21 1034     Significant Indicator POS     Source BLD     Site PERIPHERAL     Culture Result No growth after 5 days of incubation.    Narrative:      Per Hospital Policy: Only change Specimen Src: to \"Line\" if  specified by physician order.  Per Hospital Policy: Only change Specimen Src: to \"Line\" if    BLOOD CULTURE [[id number]] Collected: 05/01/21 2143    Order Status: Completed Specimen: Blood from Peripheral Updated: 05/06/21 2300     Significant Indicator NEG     Source BLD     Site PERIPHERAL     Culture Result No growth after 5 days of incubation.    Narrative:      Per Hospital Policy: Only change Specimen Src: to \"Line\" if  specified by physician order.  No site indicated             Labs reviewed by me and noted " above.    Radiology  EC-ECHOCARDIOGRAM LTD W/ CONT  Transthoracic  Echo Report    Echocardiography Laboratory    CONCLUSIONS  Limited echo to evaluate left ventricular systolic function.  Left ventricular ejection fraction is visually estimated to be 45%.  Dyskinesis of the apex and hypokinesis of the distal/apical inferior   and septal baldwin.    MANOLO AUGUST  Exam Date:         2021                      05:41  Exam Location:     Inpatient  Priority:          Stat    Ordering Physician:        MICAH PATE  Referring Physician:  Sonographer:               Shivani Carrillo RVT, RDCS    Age:    88     Gender:    M  MRN:    [id number]  :    [id number]  BSA:    2.13   Ht (in):    68     Wt (lb):    221  Exam Type:     Limited, Contrast    Indications:     Acute MI  ICD Codes:       410.9    CPT Codes:       16236,     BP:   84     /   48     HR:   70  Technical Quality:       Technically difficult study                            incomplete information is                            obtained    MEASUREMENTS  (Male / Female) Normal Values  2D ECHO  Estimated LV Ejection Fraction    45 %                    LV Ejection Fraction MOD BP       49.7 %                >= 55  %  LV Ejection Fraction MOD 4C       47.1 %                  LV Ejection Fraction MOD 2C       52.3 %                    * Indicates values subject to auto-interpretation  LV EF:  45    %    FINDINGS  Left Ventricle  Normal left ventricular chamber size. Mildly reduced left ventricular   systolic function. Dyskinesis of the apex and hypokinesis of the   distal/apical inferior and septal walls. Left ventricular ejection   fraction is visually estimated to be 45%. 3 mL of contrast was used to   enhance visualization of the endocardial border. Existing IV was used   at the right forearm.    Right Ventricle    Right Atrium    Left Atrium    Mitral Valve    Aortic Valve    Tricuspid Valve    Pulmonic  Valve    Pericardium  Normal pericardium without effusion.    Aorta    Thomas Bermudez MD  (Electronically Signed)  Final Date:     11 May 2021 06:53      Results for orders placed or performed during the hospital encounter of 07/26/16   ECHOCARDIOGRAM COMP W/O CONT   Result Value Ref Range    Eject.Frac. MOD BP 53.75     Eject.Frac. MOD 4C 57.39     Eject.Frac. MOD 2C 52.64           Assessment and Plan    * Rectus sheath hematoma- (present on admission)  Assessment & Plan  In the setting of DAPT and coumadin.    S/p KCentra and IR embolization of inf epigastric artery.  Patient is in high risk for bleeding, hemoglobin has been stable.    CHF (congestive heart failure) ([facility])  Assessment & Plan  Repeat echo showed ejection fraction 45% with apical akinesis  Cardiologist did not recommend cardiac cath  Continue Lasix with atorvastatin, aspirin and Plavix  Patient has poor prognosis  Consider beta-blocker after improving on his situation and more stable    ACS (acute coronary syndrome) ([facility])  Assessment & Plan  Patient with chest pain on 5/10  EKG pacing rhythm  Troponin elevated to 400 and then 500  Cardiologist  was consulted who feels the patient is not a candidate for cardiac cath  Continue medication treatment including aspirin Plavix and atorvastatin  The case was discussed in detail with the family who agreed to continue medical treatment.     Hypokalemia  Assessment & Plan  Severe hypokalemia  Replace with IV and oral  On telemetry  Check his magnesium    Ileus  Assessment & Plan  This was reportedly resolved while in ICU.  CT chest 5/9 noted some air-fluid level.  Confirmed on KUB 5/9.  Having bowel movement  Continue scheduling stool softener      Coronary artery disease- (present on admission)  Assessment & Plan  JAS x 3 to LAD in April of this year  Have resumed DAPT (ASA, plavix)  Cont statin  Developed chest pain on 5/10 with worsening troponin; cardiology reconsulted patient is not candidate  for cardiac cath, continue medication treatment    Anemia associated with acute blood loss- (present on admission)  Assessment & Plan  There is also a component of Fe-deficiency.    Transfuse for Hg <7.  IV iron was ordered, discontinue oral iron  Resume anticoagulation with close monitoring.      Leukocytosis  Assessment & Plan  CTA negative for consolidation.  Was on empiric abx Zosyn, vanc for 1 day, but stopped given neg CT chest.  Urine clean.   No other source of infection suspected.    Suspect stress response at this time.    Improving monitor closely.    Anasarca  Assessment & Plan  Due to low albumin and heart failure  IV Lasix gently  Ultrasound for right arm to rule out DVT    Adrenal insufficiency (HCC)- (present on admission)  Assessment & Plan  Back to baseline Hydrocortisone and levothyroxine  Have DC'd midodrine as pressures do not require it  continue Cortef 10 mg twice daily  Stable    ACP (advance care planning)  Assessment & Plan  On 5/11, long discussion with the family more than 30 minutes, discussed the plan of care, discussed the comorbidities and poor prognosis, discussed the new heart attack also the risk/benefit from cardiac cath, the family understands his situation they agreed to continue medical treatment at this time and they would consider comfort care if he got worse.    Pulmonary embolism (HCC)- (present on admission)  Assessment & Plan  History of.  CTA here negative.    Paroxysmal atrial fibrillation (HCC)- (present on admission)  Assessment & Plan  Cardiology on board, they held sotalol.   Resume Lovenox with close monitoring    COPD (chronic obstructive pulmonary disease) (HCC)- (present on admission)  Assessment & Plan  Stable.    No exacerbation and no wheezing  On his baseline 2 L nasal cannula    Essential hypertension, benign- (present on admission)  Assessment & Plan  Hold antihypertensives.  Prioritize diuresis for now.  Restart home BP meds when more stable and fluid  status optimized.          DVT prophylaxis: SCD    CODE STATUS:  Intubation OK.  No CPR.    Disposition: SNF however the patient is medically not cleared    The patient has very poor prognosis due to age comorbidities and recurrent acute coronary syndrome.    The patient is on high risk for delirium      Interventions to be considered in all patients in order to minimize the risk of delirium.   -do not disturb patient (vitals or lab draws) between the hours of 10 PM and 6 AM.  -ideally the patient should not sleep during the day and we should avoid day time naps.   -up in chair for meals  -ambulate at least three times daily, as able  -watch for constipation  -timed voiding - ask patient is she would like to go to the bathroom q 2-3 hours, except during the do not disturb hours.   -remove all necessary lines (central lines, peripheral IVs, feeding tubes, nam catheters)  -unless patient has shown harm to self or others I would recommend against use of restraints - either chemical or physical (antipsychotics)   -minimize polypharmacy, do not dose medication during sleep hours

## 2021-05-12 NOTE — PROGRESS NOTES
Desmond from Lab called with critical result of Calcium 6.9 at 0349. Critical lab result read back to Desmond.   Dr. Madsen notified of critical lab result at 0450.  Critical lab result read back by Dr. Madsen.

## 2021-05-12 NOTE — PROGRESS NOTES
2 RN skin check complete w/ RN Emmy    Bilateral ears pink, blanching.   -Black growth on back right upper ear and on nose due to skin cancer.  -Some black growths scattered throughout.  -Bruising throughout. Bilateral upper extremities are edematous and show scattered bruising, elbows intact/blanching.  -Sacrum red, slow to elida. Wound following, updated pictures in media.  -Bilateral lower extremities edematous with heavy bruising.  -Bilateral heels pink, intact, slow to elida.    New potential pressure ulcers noted on -N/A.   Wound consult placed-. Wound following.  See wound notes on consult. (sacrum)      The following interventions in place-  Skin assessment completed, Q2 turns in place. Pillows in use for positioning and to float heels. . Frequent linen changes to ensure patient stays dry. Hourly rounding in place.

## 2021-05-12 NOTE — PROGRESS NOTES
2 RN skin check complete.      Devices in place: SCD's, oxymask, peripheral IV, condom cath     Skin assessed under devices:  -Ear pink, intact, and blanching under oxymask,   -Black scabbing growth present on posterior portion of right ear (skin cancer)  -Trace black scabbing to nose  -Bilateral arms bruising and edematous throughout with serosanguinous weeping; elbows intact and blanching, black scabbing throughout from skin cancer  -trunk heavily bruised throughout, pannus pink, intact and blanching with trace white discharge beneath skin folds  -Sacrum/coccyx red, fragile and slow blanching with moisture-associated redness. Heavy bruising throughout groin and thighs  -Penis pink intact beneath condom cath  -Scabbed over skin tear on upper right anterior thigh  Bilateral legs edematous with heavy bruising. Bilateral shins flaky, red and fragile.  -Bilayt heels pink, intact and blanching        Confirmed pressure ulcers found on NA.  New potential pressure ulcers noted on Sacrum/ coccyx. Wound consult placed 5/11.  The following interventions in place:  -Q 2 hr. Turns  -Waffle matress  -Heel float boots  -Offloading ears  -Pillows for support and positioning and floating elbows/heels  -Barrier paste  -Bilat heel mepilex

## 2021-05-12 NOTE — ASSESSMENT & PLAN NOTE
On 5/11, long discussion with the family more than 30 minutes, discussed the plan of care, discussed the comorbidities and poor prognosis, discussed the new heart attack also the risk/benefit from cardiac cath, the family understands his situation they agreed to continue medical treatment at this time and they would consider comfort care if he got worse.

## 2021-05-12 NOTE — ASSESSMENT & PLAN NOTE
Patient with chest pain on 5/10  EKG pacing rhythm  Troponin elevated to 400 and then 500  Cardiologist  was consulted who feels the patient is not a candidate for cardiac cath  Continue medication treatment including aspirin Plavix and atorvastatin.  Patient is not candidate for anticoagulation due to risk of bleeding  The case was discussed in detail with the family who agreed to continue medical treatment.

## 2021-05-12 NOTE — CARE PLAN
Problem: Communication  Goal: The ability to communicate needs accurately and effectively will improve  Outcome: PROGRESSING AS EXPECTED   -Pt expressing needs and questions effectively; improved from last shift.  Problem: Pain Management  Goal: Pain level will decrease to patient's comfort goal  Outcome: PROGRESSING AS EXPECTED  -Pt resting comfortably with medication and positioning efforts  Problem: Urinary Elimination:  Goal: Ability to reestablish a normal urinary elimination pattern will improve  Outcome: PROGRESSING AS EXPECTED  -Pt output improved

## 2021-05-12 NOTE — PROGRESS NOTES
Received report from night staff. Assumed pt care. Patient is AOX4, able to answer orientation questions correctly. POC discussed. Tele monitor in place. Call light within reach, bed in lowest position, and personal items accessible.

## 2021-05-13 LAB
ALBUMIN SERPL BCP-MCNC: 2.7 G/DL (ref 3.2–4.9)
ALBUMIN/GLOB SERPL: 1.1 G/DL
ALP SERPL-CCNC: 47 U/L (ref 30–99)
ALT SERPL-CCNC: 19 U/L (ref 2–50)
ANION GAP SERPL CALC-SCNC: 6 MMOL/L (ref 7–16)
AST SERPL-CCNC: 22 U/L (ref 12–45)
BACTERIA BLD CULT: NORMAL
BACTERIA BLD CULT: NORMAL
BASOPHILS # BLD AUTO: 0.3 % (ref 0–1.8)
BASOPHILS # BLD: 0.03 K/UL (ref 0–0.12)
BILIRUB SERPL-MCNC: 1 MG/DL (ref 0.1–1.5)
BUN SERPL-MCNC: 18 MG/DL (ref 8–22)
CALCIUM SERPL-MCNC: 7.2 MG/DL (ref 8.5–10.5)
CHLORIDE SERPL-SCNC: 102 MMOL/L (ref 96–112)
CO2 SERPL-SCNC: 28 MMOL/L (ref 20–33)
CREAT SERPL-MCNC: 0.85 MG/DL (ref 0.5–1.4)
EOSINOPHIL # BLD AUTO: 0.18 K/UL (ref 0–0.51)
EOSINOPHIL NFR BLD: 1.6 % (ref 0–6.9)
ERYTHROCYTE [DISTWIDTH] IN BLOOD BY AUTOMATED COUNT: 63 FL (ref 35.9–50)
GLOBULIN SER CALC-MCNC: 2.5 G/DL (ref 1.9–3.5)
GLUCOSE SERPL-MCNC: 118 MG/DL (ref 65–99)
HCT VFR BLD AUTO: 27.7 % (ref 42–52)
HGB BLD-MCNC: 8.5 G/DL (ref 14–18)
IMM GRANULOCYTES # BLD AUTO: 0.16 K/UL (ref 0–0.11)
IMM GRANULOCYTES NFR BLD AUTO: 1.4 % (ref 0–0.9)
LYMPHOCYTES # BLD AUTO: 0.66 K/UL (ref 1–4.8)
LYMPHOCYTES NFR BLD: 5.9 % (ref 22–41)
MAGNESIUM SERPL-MCNC: 1.8 MG/DL (ref 1.5–2.5)
MCH RBC QN AUTO: 27.8 PG (ref 27–33)
MCHC RBC AUTO-ENTMCNC: 30.7 G/DL (ref 33.7–35.3)
MCV RBC AUTO: 90.5 FL (ref 81.4–97.8)
MONOCYTES # BLD AUTO: 0.6 K/UL (ref 0–0.85)
MONOCYTES NFR BLD AUTO: 5.3 % (ref 0–13.4)
NEUTROPHILS # BLD AUTO: 9.6 K/UL (ref 1.82–7.42)
NEUTROPHILS NFR BLD: 85.5 % (ref 44–72)
NRBC # BLD AUTO: 0 K/UL
NRBC BLD-RTO: 0 /100 WBC
PHOSPHATE SERPL-MCNC: 1.9 MG/DL (ref 2.5–4.5)
PLATELET # BLD AUTO: 276 K/UL (ref 164–446)
PMV BLD AUTO: 9.8 FL (ref 9–12.9)
POTASSIUM SERPL-SCNC: 3.4 MMOL/L (ref 3.6–5.5)
PROT SERPL-MCNC: 5.2 G/DL (ref 6–8.2)
RBC # BLD AUTO: 3.06 M/UL (ref 4.7–6.1)
SIGNIFICANT IND 70042: NORMAL
SIGNIFICANT IND 70042: NORMAL
SITE SITE: NORMAL
SITE SITE: NORMAL
SODIUM SERPL-SCNC: 136 MMOL/L (ref 135–145)
SOURCE SOURCE: NORMAL
SOURCE SOURCE: NORMAL
WBC # BLD AUTO: 11.2 K/UL (ref 4.8–10.8)

## 2021-05-13 PROCEDURE — 700102 HCHG RX REV CODE 250 W/ 637 OVERRIDE(OP): Performed by: STUDENT IN AN ORGANIZED HEALTH CARE EDUCATION/TRAINING PROGRAM

## 2021-05-13 PROCEDURE — 700102 HCHG RX REV CODE 250 W/ 637 OVERRIDE(OP): Performed by: INTERNAL MEDICINE

## 2021-05-13 PROCEDURE — 84100 ASSAY OF PHOSPHORUS: CPT

## 2021-05-13 PROCEDURE — 700111 HCHG RX REV CODE 636 W/ 250 OVERRIDE (IP): Performed by: INTERNAL MEDICINE

## 2021-05-13 PROCEDURE — A9270 NON-COVERED ITEM OR SERVICE: HCPCS | Performed by: PSYCHIATRY & NEUROLOGY

## 2021-05-13 PROCEDURE — 94640 AIRWAY INHALATION TREATMENT: CPT

## 2021-05-13 PROCEDURE — 700102 HCHG RX REV CODE 250 W/ 637 OVERRIDE(OP): Performed by: PSYCHIATRY & NEUROLOGY

## 2021-05-13 PROCEDURE — 99232 SBSQ HOSP IP/OBS MODERATE 35: CPT | Performed by: INTERNAL MEDICINE

## 2021-05-13 PROCEDURE — 770020 HCHG ROOM/CARE - TELE (206)

## 2021-05-13 PROCEDURE — 99497 ADVNCD CARE PLAN 30 MIN: CPT | Performed by: NURSE PRACTITIONER

## 2021-05-13 PROCEDURE — 99498 ADVNCD CARE PLAN ADDL 30 MIN: CPT | Performed by: NURSE PRACTITIONER

## 2021-05-13 PROCEDURE — A9270 NON-COVERED ITEM OR SERVICE: HCPCS | Performed by: INTERNAL MEDICINE

## 2021-05-13 PROCEDURE — 80053 COMPREHEN METABOLIC PANEL: CPT

## 2021-05-13 PROCEDURE — A9270 NON-COVERED ITEM OR SERVICE: HCPCS | Performed by: STUDENT IN AN ORGANIZED HEALTH CARE EDUCATION/TRAINING PROGRAM

## 2021-05-13 PROCEDURE — 700101 HCHG RX REV CODE 250: Performed by: INTERNAL MEDICINE

## 2021-05-13 PROCEDURE — 700105 HCHG RX REV CODE 258: Performed by: INTERNAL MEDICINE

## 2021-05-13 PROCEDURE — 85025 COMPLETE CBC W/AUTO DIFF WBC: CPT

## 2021-05-13 PROCEDURE — 36415 COLL VENOUS BLD VENIPUNCTURE: CPT

## 2021-05-13 PROCEDURE — 83735 ASSAY OF MAGNESIUM: CPT

## 2021-05-13 RX ORDER — MAGNESIUM SULFATE HEPTAHYDRATE 40 MG/ML
4 INJECTION, SOLUTION INTRAVENOUS ONCE
Status: COMPLETED | OUTPATIENT
Start: 2021-05-13 | End: 2021-05-13

## 2021-05-13 RX ORDER — POTASSIUM CHLORIDE 20 MEQ/1
40 TABLET, EXTENDED RELEASE ORAL 2 TIMES DAILY
Status: DISCONTINUED | OUTPATIENT
Start: 2021-05-13 | End: 2021-05-14 | Stop reason: HOSPADM

## 2021-05-13 RX ORDER — BUDESONIDE AND FORMOTEROL FUMARATE DIHYDRATE 80; 4.5 UG/1; UG/1
2 AEROSOL RESPIRATORY (INHALATION)
Status: DISCONTINUED | OUTPATIENT
Start: 2021-05-13 | End: 2021-05-14 | Stop reason: HOSPADM

## 2021-05-13 RX ADMIN — ACETAMINOPHEN 1000 MG: 500 TABLET ORAL at 12:01

## 2021-05-13 RX ADMIN — OMEPRAZOLE 20 MG: 20 CAPSULE, DELAYED RELEASE ORAL at 06:15

## 2021-05-13 RX ADMIN — ASPIRIN 81 MG: 81 TABLET, CHEWABLE ORAL at 06:14

## 2021-05-13 RX ADMIN — POTASSIUM PHOSPHATE, MONOBASIC AND POTASSIUM PHOSPHATE, DIBASIC 30 MMOL: 224; 236 INJECTION, SOLUTION, CONCENTRATE INTRAVENOUS at 09:26

## 2021-05-13 RX ADMIN — ACETAMINOPHEN 1000 MG: 500 TABLET ORAL at 06:14

## 2021-05-13 RX ADMIN — ACETAMINOPHEN 1000 MG: 500 TABLET ORAL at 18:11

## 2021-05-13 RX ADMIN — FUROSEMIDE 20 MG: 10 INJECTION, SOLUTION INTRAMUSCULAR; INTRAVENOUS at 18:12

## 2021-05-13 RX ADMIN — POTASSIUM CHLORIDE 40 MEQ: 1500 TABLET, EXTENDED RELEASE ORAL at 18:11

## 2021-05-13 RX ADMIN — BUDESONIDE AND FORMOTEROL FUMARATE DIHYDRATE 2 PUFF: 80; 4.5 AEROSOL RESPIRATORY (INHALATION) at 20:11

## 2021-05-13 RX ADMIN — DIBASIC SODIUM PHOSPHATE, MONOBASIC POTASSIUM PHOSPHATE AND MONOBASIC SODIUM PHOSPHATE 250 MG: 852; 155; 130 TABLET ORAL at 23:08

## 2021-05-13 RX ADMIN — FERROUS SULFATE TAB 325 MG (65 MG ELEMENTAL FE) 325 MG: 325 (65 FE) TAB at 06:21

## 2021-05-13 RX ADMIN — MAGNESIUM SULFATE IN WATER 4 G: 40 INJECTION, SOLUTION INTRAVENOUS at 09:55

## 2021-05-13 RX ADMIN — LEVOTHYROXINE SODIUM 75 MCG: 25 TABLET ORAL at 06:14

## 2021-05-13 RX ADMIN — ANTACID TABLETS 500 MG: 500 TABLET, CHEWABLE ORAL at 18:11

## 2021-05-13 RX ADMIN — POLYETHYLENE GLYCOL 3350 1 PACKET: 17 POWDER, FOR SOLUTION ORAL at 18:12

## 2021-05-13 RX ADMIN — HYDROCORTISONE 10 MG: 10 TABLET ORAL at 20:28

## 2021-05-13 RX ADMIN — ATORVASTATIN CALCIUM 80 MG: 80 TABLET, FILM COATED ORAL at 18:11

## 2021-05-13 RX ADMIN — POTASSIUM CHLORIDE 20 MEQ: 1500 TABLET, EXTENDED RELEASE ORAL at 06:15

## 2021-05-13 RX ADMIN — HYDROCORTISONE 10 MG: 10 TABLET ORAL at 06:21

## 2021-05-13 RX ADMIN — OXYCODONE 5 MG: 5 TABLET ORAL at 02:13

## 2021-05-13 RX ADMIN — ENOXAPARIN SODIUM 40 MG: 40 INJECTION SUBCUTANEOUS at 08:24

## 2021-05-13 RX ADMIN — MONTELUKAST 10 MG: 10 TABLET, FILM COATED ORAL at 06:14

## 2021-05-13 RX ADMIN — CLOPIDOGREL BISULFATE 75 MG: 75 TABLET ORAL at 06:14

## 2021-05-13 RX ADMIN — ANTACID TABLETS 500 MG: 500 TABLET, CHEWABLE ORAL at 06:15

## 2021-05-13 RX ADMIN — TAMSULOSIN HYDROCHLORIDE 0.4 MG: 0.4 CAPSULE ORAL at 06:14

## 2021-05-13 RX ADMIN — DIBASIC SODIUM PHOSPHATE, MONOBASIC POTASSIUM PHOSPHATE AND MONOBASIC SODIUM PHOSPHATE 250 MG: 852; 155; 130 TABLET ORAL at 18:12

## 2021-05-13 RX ADMIN — OXYCODONE 5 MG: 5 TABLET ORAL at 08:25

## 2021-05-13 RX ADMIN — FUROSEMIDE 20 MG: 10 INJECTION, SOLUTION INTRAMUSCULAR; INTRAVENOUS at 06:15

## 2021-05-13 RX ADMIN — OMEPRAZOLE 20 MG: 20 CAPSULE, DELAYED RELEASE ORAL at 18:11

## 2021-05-13 RX ADMIN — DIBASIC SODIUM PHOSPHATE, MONOBASIC POTASSIUM PHOSPHATE AND MONOBASIC SODIUM PHOSPHATE 250 MG: 852; 155; 130 TABLET ORAL at 12:01

## 2021-05-13 RX ADMIN — DIBASIC SODIUM PHOSPHATE, MONOBASIC POTASSIUM PHOSPHATE AND MONOBASIC SODIUM PHOSPHATE 250 MG: 852; 155; 130 TABLET ORAL at 06:14

## 2021-05-13 RX ADMIN — SODIUM CHLORIDE 250 MG: 9 INJECTION, SOLUTION INTRAVENOUS at 18:10

## 2021-05-13 ASSESSMENT — ENCOUNTER SYMPTOMS
CLAUDICATION: 0
SHORTNESS OF BREATH: 0
WEAKNESS: 1
SENSORY CHANGE: 0
CHILLS: 0
HEADACHES: 0
PSYCHIATRIC NEGATIVE: 1
DIZZINESS: 0
GASTROINTESTINAL NEGATIVE: 1
FOCAL WEAKNESS: 0
WHEEZING: 0
TINGLING: 0
PALPITATIONS: 0
MUSCULOSKELETAL NEGATIVE: 1
WEIGHT LOSS: 0
SPUTUM PRODUCTION: 0
HEMOPTYSIS: 0
ORTHOPNEA: 0
EYES NEGATIVE: 1
COUGH: 0
PND: 0

## 2021-05-13 ASSESSMENT — FIBROSIS 4 INDEX: FIB4 SCORE: 1.61

## 2021-05-13 ASSESSMENT — PAIN DESCRIPTION - PAIN TYPE: TYPE: ACUTE PAIN

## 2021-05-13 NOTE — PROGRESS NOTES
Assumed care at 1900, bedside report received from Torres RN. Pt. Is 100% paced on the monitor. Initial assessment completed, orders reviewed, call light within reach, bed alarm  in use, and hourly rounding in place. POC addressed with patient, no additional questions at this time.

## 2021-05-13 NOTE — PROGRESS NOTES
Palliative Care Advance Care Planning Progress Note    General: Jeffrey Lizama is an 88-year-old male referred from [facility] 5/1/2021 for chest pain where he was receiving rehabilitative care following STEMI 4/20/2021 status post stents x3.  Patient was originally seen by palliative care 5/6/2021 for advance care planning.  CODE STATUS changed from full to DNAR, I okay.  [facility].     Discussion: Met with patient and introduced myself.  Discussed discharge plan.  He confirmed wishes for DN AR, I okay.  POLST form completed with the following choices: Section A - DNR/allow natural death, patient B - Full treatment excluding advanced airway intervention (no tracheostomy, short term intubation only), Section C - trial of artificial nutrition/feeding tube and IV fluids.  Offered to reach out to patient's family to discuss discharge plan and POLST form.  He requested that I call his wife and his son.    Call placed to patient's wife Jacquie.  Introduced myself and discussed role of palliative care.  She reports hearing and writing handicap but has a dictation device on her phone.  I spoke slowly and deliberately and spelled things out on occasion for her.  Confirmed discharge plan and completion of POLST form.  She confirmed her understanding and requested that I call patient's son Arya and Gene Ubaldo.  He did not have Gene's phone number but confirmed she believes it is in patient's health record.  I provided palliative care phone number and encouraged her to call with any questions.    Call placed to patient's son Arya and provided overview of my discussion with patient and Jacquie.  He does not have Gene's phone number nor is it documented in the demographics of the chart.  Provided palliative care phone number and encouraged Arya to call with questions/needs    Provided therapeutic communication including open ended questions, therapeutic silence/presence,  reflective listening, and validation of thoughts/feelings throughout encounter.     Outcome: POLST completed and on hard chart.  Ensure patient is discharged with POLST form.  Instructions given to place on refrigerator once home. To locate the AD/POLST, please hover cursor over the patient's code status to find all linked ACP documents.    Plan: [facility] with goal to eventually go home to [facility].    Updated: RN    Please call our team with questions and/or additional needs.    Total visit time was 70 minutes discussing advance care planning.    Bebe Gallagher A.P.R.N.  Palliative Care Nurse Practitioner  [phone]

## 2021-05-13 NOTE — DISCHARGE PLANNING
Anticipated Discharge Disposition: SNF    Action: Per Dr. Arambula, patient is medically cleared. RN CM called and left voicemail for [facility] with [facility] requesting return call regarding bed availability.    Barriers to Discharge: bed availability at SNF    Plan: Case coordination to f/u with snf for open bed

## 2021-05-13 NOTE — PROGRESS NOTES
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  The hypotension was felt to be secondary to his adrenal insufficiency.  Therefore, he was treated with stress dose steroids.  Ultimately, hypotension was found secondary to a rectus sheath hematoma with acute hemorrhagic anemia.  He was admitted and was transfused 1 unit of packed red blood cell for a hemoglobin of 6.7.  Anticoagulation was reversed with Kcentra and vitamin K.  The right inferior epigastric artery was treated with coil embolization by interventional radiology on 5/5/2021.  Post procedure course complicated by ileus, which has resolved while in ICU per PN, but unfortunately recurred.    Overnight of 5/8/2021, the patient was transferred from the ICU to telemetry floor.  I assume his clinical care on the morning of 5/8/2021.  Reviewed the patient chart, patient has had elevated leukocytosis on presentation.  Chest x-ray obtained on 5/7/2021 show increasing right lower lobe infiltrate concerning for pneumonia, which is ruled out with chest CTA.  Also negative for PE.       Due to comorbidities and poor prognosis, the case was discussed in detail with family, they understand the poor prognosis.    Interval History Updates:  -Patient had chest pain yesterday, alert and oriented x4 however he has generalized weakness.  -Case was discussed with the cardiologist, to continue Plavix and aspirin and holding blood thinner, cardiology signed off.   -Sotalol was held by cardiology, cardiology signed off  -Continue IV and oral potassium supplementation  -Patient was visited by his son Mr. Koenig.   -Discussed the case with family, Ms. Dhillon wife and Mr. Koenig son, answered all  their questions, they appreciate our call.      Consultants/Specialty  Cardiology  Critical Care  Interventional Radiology    Review of Systems   Review of Systems   Constitutional: Positive for malaise/fatigue. Negative for chills and weight loss.   HENT: Negative.    Eyes: Negative.    Respiratory: Negative for cough, hemoptysis, sputum production, shortness of breath and wheezing.    Cardiovascular: Positive for leg swelling. Negative for chest pain, palpitations, orthopnea, claudication and PND.   Gastrointestinal: Negative.    Genitourinary: Negative.    Musculoskeletal: Negative.    Neurological: Positive for weakness. Negative for dizziness, tingling, sensory change, focal weakness and headaches.   Endo/Heme/Allergies: Negative.    Psychiatric/Behavioral: Negative.         Medications:  • phosphorus  1 tablet Q6HRS   • calcium carbonate  500 mg BID   • PEDS potassium chloride (KCL-PERIPHERAL) IV  10 mEq Q HOUR   • potassium chloride SA  20 mEq BID   • ferric gluconate (FERRLECIT) IV  125 mg Q24HR    Followed by   • [START ON 5/13/2021] ferric gluconate (FERRLECIT) IV  250 mg Q24HR   • polyethylene glycol/lytes  1 Packet BID   • furosemide  20 mg BID   • oxyCODONE immediate-release  5 mg Q3HRS PRN    Or   • oxyCODONE immediate-release  5 mg Q3HRS PRN   • albuterol  2.5 mg Q4H PRN (RT)   • LR  30 mL/kg Once PRN   • LR  1,000 mL Once PRN   • ferrous sulfate  325 mg QDAY with Breakfast   • acetaminophen  1,000 mg TID   • hydrocortisone  10 mg BID   • aspirin  81 mg DAILY   • clopidogrel  75 mg DAILY   • atorvastatin  80 mg Q EVENING   • levothyroxine  75 mcg AM ES   • montelukast  10 mg DAILY   • omeprazole  20 mg BID   • tamsulosin  0.4 mg DAILY   • benzonatate  100 mg TID PRN           Physical Exam   Vitals:    05/12/21 0638 05/12/21 0700 05/12/21 1105 05/12/21 1519   BP: 149/67 137/75 138/73 150/77   Pulse:  70 70 75   Resp:  18 16 16   Temp:  36.1 °C (96.9 °F) 36.6 °C (97.8 °F) 36.7 °C (98 °F)   TempSrc:   Temporal Temporal Temporal   SpO2:  100% 100% 99%   Weight:       Height:           Physical Exam  Vitals and nursing note reviewed.   Constitutional:       General: He is not in acute distress.     Appearance: He is not diaphoretic.   Eyes:      General: No scleral icterus.     Pupils: Pupils are equal, round, and reactive to light.   Cardiovascular:      Rate and Rhythm: Normal rate.      Heart sounds: No murmur heard.     Pulmonary:      Breath sounds: Rales present. No wheezing.   Chest:      Chest wall: No tenderness.   Abdominal:      General: There is no distension.      Palpations: Abdomen is soft. There is no mass.      Tenderness: There is no abdominal tenderness. There is no guarding or rebound.   Genitourinary:     Penis: Normal.    Musculoskeletal:         General: No deformity.      Right lower leg: No edema.      Left lower leg: No edema.      Comments: 2+ pitting edema up to the lower thigh bilaterally.  Both arms are edematous  Anasarca   Skin:     General: Skin is warm.      Coloration: Skin is not pale.      Findings: Rash present. No bruising or lesion.      Comments: Bruising everywhere   Neurological:      Mental Status: He is alert.      Comments: Patient is alert, oriented to self, location.  He is coherent with rational thought process.            Laboratory   Recent Labs     05/11/21  0422 05/11/21  0530 05/12/21  0253   WBC 14.0* 13.5* 11.9*   RBC 2.82* 3.04* 2.85*   HEMOGLOBIN 7.8* 8.2* 7.9*   HEMATOCRIT 25.5* 27.5* 26.3*   PLATELETCT 288 281 260     Recent Labs     05/11/21  0422 05/11/21  0422 05/11/21  0530 05/11/21  0530 05/11/21  1237 05/11/21  1729 05/12/21  0253   SODIUM 136  --  135  --   --   --  132*   POTASSIUM 2.8*   < > 2.7*   < > 3.1* 3.2* 3.4*   CHLORIDE 102  --  100  --   --   --  102   CO2 27  --  28  --   --   --  24   GLUCOSE 92  --  88  --   --   --  89   BUN 31*  --  31*  --   --   --  23*   CREATININE 1.12  --  1.08  --   --   --  0.80    < > = values in this  "interval not displayed.      Recent Labs     05/11/21  0422 05/11/21  0530 05/12/21  0253   ALTSGPT  --  21 21   ASTSGOT  --  23 33   ALKPHOSPHAT  --  46 42   TBILIRUBIN  --  1.2 1.1   GLUCOSE 92 88 89                Microbiology  Results     Procedure Component Value Units Date/Time    C Diff Toxin [[id number]] Collected: 05/08/21 2353    Order Status: Completed Updated: 05/09/21 1127     C.Diff Toxin A&B Negative     Comment: Treatment for C. difficile not recommended.  TOXIN NEGATIVE  Toxin negative by EIA; C. difficile detected by PCR.  Indicates colonization, infection unlikely. If clinical  suspicion persists, consider ID or GI Consult.  Repeat testing not indicated within 7 days.         Narrative:      Special Contact [id number] QUIRIMIT JACQUI JORDAN.  Does this patient have risk factors for C-diff?->Yes  C-Diff Risk Factors->antibiotic exposure    C Diff by PCR rflx Toxin [[id number]] Collected: 05/08/21 2353    Order Status: Completed Specimen: Stool Updated: 05/09/21 1126     C Diff by PCR See Toxin     027-NAP1-BI Presumptive Negative     Comment: Presumptive 027/NAP1/BI target DNA sequences are NOT DETECTED.       Narrative:      Special Contact [id number] QUIRIMIT JACQUI JORDAN.  Does this patient have risk factors for C-diff?->Yes  C-Diff Risk Factors->antibiotic exposure    BLOOD CULTURE [[id number]] Collected: 05/08/21 1019    Order Status: Completed Specimen: Blood from Peripheral Updated: 05/09/21 0719     Significant Indicator NEG     Source BLD     Site PERIPHERAL     Culture Result No Growth  Note: Blood cultures are incubated for 5 days and  are monitored continuously.Positive blood cultures  are called to the RN and reported as soon as  they are identified.      Narrative:      Per Hospital Policy: Only change Specimen Src: to \"Line\" if  specified by physician order.  No site indicated    BLOOD CULTURE [[id number]] Collected: 05/08/21 1003    Order Status: Completed Specimen: Blood from " "Peripheral Updated: 05/09/21 0719     Significant Indicator NEG     Source BLD     Site PERIPHERAL     Culture Result No Growth  Note: Blood cultures are incubated for 5 days and  are monitored continuously.Positive blood cultures  are called to the RN and reported as soon as  they are identified.      Narrative:      Per Hospital Policy: Only change Specimen Src: to \"Line\" if  specified by physician order.  Right Hand    MRSA By PCR (Amp) [[id number]]  (Abnormal) Collected: 05/08/21 1050    Order Status: Completed Specimen: Respirate from Nares Updated: 05/08/21 1700     Significant Indicator POS     Source RESP     Site NARES     MRSA PCR POSITIVE for MRSA by PCR.    Narrative:      CALL  [place]  183 tel. [phone],  CALLED  183 tel. [phone] 05/08/2021, 16:59, RB PERF. RESULTS CALLED  TO:[id number]  Collected By:[id number] MUDKELSEY MICHAEL A  In and out cath once for sample  Collected By:[id number] Netflix A    URINALYSIS [[id number]]  (Abnormal) Collected: 05/08/21 1050    Order Status: Completed Specimen: Urine, Cath Updated: 05/08/21 1225     Color DK Yellow     Character Cloudy     Specific Gravity 1.034     Ph 5.5     Glucose Negative mg/dL      Ketones Trace mg/dL      Protein Negative mg/dL      Bilirubin Negative     Urobilinogen, Urine 0.2     Nitrite Negative     Leukocyte Esterase Negative     Occult Blood Negative     Micro Urine Req Microscopic    Narrative:      Collected By:[id number] MUDKELSEY MICHAEL A  In and out cath once for sample    CULTURE RESPIRATORY W/ GRM STN [[id number]]     Order Status: Completed Specimen: Respirate from Sputum     BLOOD CULTURE [[id number]]  (Abnormal) Collected: 05/01/21 1940    Order Status: Completed Specimen: Blood from Peripheral Updated: 05/07/21 1034     Significant Indicator POS     Source BLD     Site PERIPHERAL     Culture Result No growth after 5 days of incubation.    Narrative:      Per Hospital Policy: Only change Specimen Src: to \"Line\" if  specified by physician " "order.  Per Hospital Policy: Only change Specimen Src: to \"Line\" if    BLOOD CULTURE [[id number]] Collected: 21 2143    Order Status: Completed Specimen: Blood from Peripheral Updated: 21 230     Significant Indicator NEG     Source BLD     Site PERIPHERAL     Culture Result No growth after 5 days of incubation.    Narrative:      Per Hospital Policy: Only change Specimen Src: to \"Line\" if  specified by physician order.  No site indicated             Labs reviewed by me and noted above.    Radiology  US-EXTREMITY VENOUS UPPER UNILAT LEFT   Upper Extremity   Venous Duplex Report     Vascular Laboratory   CONCLUSIONS   No acute thrombosis is identified.      MANOLO AUGUST     Exam Date:     2021 08:22     Room #:     Inpatient     Priority:     Routine     Ht (in):             Wt (lb):     Ordering Physician:        RENETTA ROBLERO     Referring Physician:       ANNIKA Olmos     Sonographer:               Kamala Sheridan RVT     Study Type:                Complete Unilateral     Technical Quality:         Adequate     Age:    88    Gender:     M     MRN:    [id number]     :    1932      BSA:     Indications:     Localized swelling, mass and lump, left upper limb, Edema     CPT Codes:       55380     ICD Codes:       R22.32  782.3     History:         Swelling/edema of the Left upper extremity, bruising   present;                     No previous exam.     Limitations:     PROCEDURES:   Left upper extremity venous duplex imaging.    The following venous structures were evaluated: internal jugular,    subclavian, axillary, brachial, radial, ulnar, cephalic and basilic veins.    Serial compression, augmentation maneuvers,  color and spectral Doppler    flow evaluations were performed.     FINDINGS:   Left upper extremity-   Complete color filling and compressibility with normal venous flow dynamics    including spontaneous flow, response to augmentation " maneuvers, and    respiratory phasicity.     Angelina Donaldson   (Electronically Signed)   Final Date:      12 May 2021 09:39      Results for orders placed or performed during the hospital encounter of 07/26/16   ECHOCARDIOGRAM COMP W/O CONT   Result Value Ref Range    Eject.Frac. MOD BP 53.75     Eject.Frac. MOD 4C 57.39     Eject.Frac. MOD 2C 52.64           Assessment and Plan    * Debility- (present on admission)  Assessment & Plan  Generalized weakness  Multifactorial including age and comorbidities  PT and OT evaluation needs SNF  Patient was accepted for SNF    ACS (acute coronary syndrome) (HCC)  Assessment & Plan  Patient with chest pain on 5/10  EKG pacing rhythm  Troponin elevated to 400 and then 500  Cardiologist  was consulted who feels the patient is not a candidate for cardiac cath  Continue medication treatment including aspirin Plavix and atorvastatin.  Patient is not candidate for anticoagulation due to risk of bleeding  The case was discussed in detail with the family who agreed to continue medical treatment.     Chronic respiratory failure (HCC)- (present on admission)  Assessment & Plan  Patient is on oxygen therapy baseline 3 L nasal cannula    Rectus sheath hematoma- (present on admission)  Assessment & Plan  In the setting of DAPT and coumadin.    S/p KCentra and IR embolization of inf epigastric artery.  Patient is in high risk for bleeding, hemoglobin has been stable.    Paroxysmal atrial fibrillation (HCC)- (present on admission)  Assessment & Plan  Cardiology on board, they held sotalol.   Patient is not candidate for anticoagulation due to risk of bleeding  Cardiology on board.    CHF (congestive heart failure) ([facility])  Assessment & Plan  Repeat echo showed ejection fraction 45% with apical akinesis  Cardiologist did not recommend cardiac cath  Continue Lasix with atorvastatin, aspirin and Plavix  Patient has poor prognosis  Consider beta-blocker after improving on his situation and  more stable    Hypokalemia  Assessment & Plan  Severe hypokalemia  Replace with IV and oral  On telemetry  Check his magnesium    Anemia associated with acute blood loss- (present on admission)  Assessment & Plan  There is also a component of Fe-deficiency.    Transfuse for Hg <7.  IV iron was ordered, discontinue oral iron  Resume anticoagulation with close monitoring.      Leukocytosis  Assessment & Plan  CTA negative for consolidation.  Was on empiric abx Zosyn, vanc for 1 day, but stopped given neg CT chest.  Urine clean.   No other source of infection suspected.    Suspect stress response at this time.    Improving monitor closely.    Anasarca  Assessment & Plan  Due to low albumin and heart failure  IV Lasix gently  Ultrasound did not show any DVT in upper or lower extremities.    Coronary artery disease- (present on admission)  Assessment & Plan  JAS x 3 to LAD in April of this year  Have resumed DAPT (ASA, plavix)  Cont statin  Developed chest pain on 5/10 with worsening troponin; cardiology reconsulted patient is not candidate for cardiac cath, continue medication treatment    Adrenal insufficiency (HCC)- (present on admission)  Assessment & Plan  Back to baseline Hydrocortisone and levothyroxine  Have DC'd midodrine as pressures do not require it  continue Cortef 10 mg twice daily  Stable    ACP (advance care planning)  Assessment & Plan  On 5/11, long discussion with the family more than 30 minutes, discussed the plan of care, discussed the comorbidities and poor prognosis, discussed the new heart attack also the risk/benefit from cardiac cath, the family understands his situation they agreed to continue medical treatment at this time and they would consider comfort care if he got worse.    Pulmonary embolism (HCC)- (present on admission)  Assessment & Plan  History of.  CTA here negative.    COPD (chronic obstructive pulmonary disease) (HCC)- (present on admission)  Assessment & Plan  Stable.    No  exacerbation and no wheezing  On his baseline 2 L nasal cannula    Essential hypertension, benign- (present on admission)  Assessment & Plan  Hold antihypertensives.  Prioritize diuresis for now.  Restart home BP meds when more stable and fluid status optimized.    Ileus  Assessment & Plan  This was reportedly resolved while in ICU.  CT chest 5/9 noted some air-fluid level.  Confirmed on KUB 5/9.  Resolved  Continue scheduling stool softener            DVT prophylaxis: SCD    CODE STATUS:  Intubation OK.  No CPR.    Disposition: SNF however the patient is medically not cleared    The patient has very poor prognosis due to age comorbidities and recurrent acute coronary syndrome.    The patient is on high risk for delirium      Interventions to be considered in all patients in order to minimize the risk of delirium.   -do not disturb patient (vitals or lab draws) between the hours of 10 PM and 6 AM.  -ideally the patient should not sleep during the day and we should avoid day time naps.   -up in chair for meals  -ambulate at least three times daily, as able  -watch for constipation  -timed voiding - ask patient is she would like to go to the bathroom q 2-3 hours, except during the do not disturb hours.   -remove all necessary lines (central lines, peripheral IVs, feeding tubes, nam catheters)  -unless patient has shown harm to self or others I would recommend against use of restraints - either chemical or physical (antipsychotics)   -minimize polypharmacy, do not dose medication during sleep hours

## 2021-05-13 NOTE — DISCHARGE PLANNING
Anticipated Discharge Disposition: [facility]    Action: [facility] accepted to [facility] and they can take him tomorrow. Per Nurse, patient requires remsa and cannot tolerate wheelchair transport safely. RN ILEANA sent remsa and transport form to ride line with voalte message to notify. RN ILEANA called patient's spouse, Jacquie, and gave her update on transport tomorrow. Patient agreeable.    Barriers to Discharge: no open bed until tomorrow    Plan: Case coordination to f/u for confirmation of transport

## 2021-05-13 NOTE — DISCHARGE PLANNING
Received Transport Form @ 1202  Spoke to Melva @ [facility]    Transport is scheduled for 5/14/2021 @ 1200 going to [facility].     Notified care team of scheduled transport via Voalte.

## 2021-05-13 NOTE — PROGRESS NOTES
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  The hypotension was felt to be secondary to his adrenal insufficiency.  Therefore, he was treated with stress dose steroids.  Ultimately, hypotension was found secondary to a rectus sheath hematoma with acute hemorrhagic anemia.  He was admitted and was transfused 1 unit of packed red blood cell for a hemoglobin of 6.7.  Anticoagulation was reversed with Kcentra and vitamin K.  The right inferior epigastric artery was treated with coil embolization by interventional radiology on 5/5/2021.  Post procedure course complicated by ileus, which was resolved with stool softener.    Patient was received IV iron, his hemoglobin has been stable with no signs of bleeding and his blood pressure around 120/70, tolerating Lasix 20 mg IV twice daily..     Overnight of 5/8/2021, the patient was transferred from the ICU to telemetry floor.  Chest x-ray obtained on 5/7/2021 show increasing right lower lobe infiltrate concerning for pneumonia, which is ruled out with chest CTA.  Also negative for PE, patient was treated with small dose of Lasix and inhalers, he is on baseline 3 L nasal cannula.    During this hospitalization, patient developed chest pain with elevated troponin around 500, EKG showed pacing rhythm, echo showed ejection fraction 45% with dyskinesis of the apex and hypokinesis of distal/apical inferior wall, patient was evaluated by cardiologist who does not feel patient is candidate for pacemaker, to continue aspirin and Plavix, since the patient has high risk of bleeding, risk/benefit from anticoagulation was discussed with the patient and family who agreed to continue only an aspirin  Health Maintenance Due   Topic Date Due   • Shingles Vaccine (2 of 3) 02/13/2012       Patient is due for topics as listed above but is not proceeding with Immunization(s) Shingles at this time.          and Plavix with no Eliquis or warfarin at this time, patient has improved    Due to comorbidities and poor prognosis, the case was discussed in detail with family, they understand the poor prognosis, to continue treatment at this time, patient was evaluated also by palliative team.    Patient was evaluated by PT and OT who recommended skilled nursing facility placement.  .           Interval History Updates:  -Patient had chest pain yesterday, alert and oriented x4   -His blood pressure and vital signs are stable, no events last night  -Continue replacing magnesium and potassium  -Patient is medically cleared for SNF placement.      Consultants/Specialty  Cardiology  Critical Care  Interventional Radiology    Review of Systems   Review of Systems   Constitutional: Positive for malaise/fatigue. Negative for chills and weight loss.   HENT: Negative.    Eyes: Negative.    Respiratory: Negative for cough, hemoptysis, sputum production, shortness of breath and wheezing.    Cardiovascular: Positive for leg swelling. Negative for chest pain, palpitations, orthopnea, claudication and PND.   Gastrointestinal: Negative.    Genitourinary: Negative.    Musculoskeletal: Negative.    Neurological: Positive for weakness. Negative for dizziness, tingling, sensory change, focal weakness and headaches.   Endo/Heme/Allergies: Negative.    Psychiatric/Behavioral: Negative.         Medications:  • potassium phosphate  30 mmol Once   • potassium chloride SA  40 mEq BID   • enoxaparin (LOVENOX) injection  40 mg DAILY   • budesonide-formoterol  2 Puff BID (RT)   • phosphorus  1 tablet Q6HRS   • calcium carbonate  500 mg BID   • ferric gluconate (FERRLECIT) IV  250 mg Q24HR   • polyethylene glycol/lytes  1 Packet BID   • furosemide  20 mg BID   • oxyCODONE immediate-release  5 mg Q3HRS PRN    Or   • oxyCODONE immediate-release  5 mg Q3HRS PRN   • albuterol  2.5 mg Q4H PRN (RT)   • LR  30 mL/kg Once PRN   • LR  1,000 mL Once PRN   •  acetaminophen  1,000 mg TID   • hydrocortisone  10 mg BID   • aspirin  81 mg DAILY   • clopidogrel  75 mg DAILY   • atorvastatin  80 mg Q EVENING   • levothyroxine  75 mcg AM ES   • montelukast  10 mg DAILY   • omeprazole  20 mg BID   • tamsulosin  0.4 mg DAILY   • benzonatate  100 mg TID PRN           Physical Exam   Vitals:    05/13/21 0021 05/13/21 0356 05/13/21 0747 05/13/21 1249   BP: 127/69 139/77 121/75 143/84   Pulse:   78 75   Resp: 18 18 18 18   Temp: 36.4 °C (97.5 °F) 37.1 °C (98.8 °F) 36.1 °C (97 °F) 36.3 °C (97.4 °F)   TempSrc: Temporal Temporal Temporal Temporal   SpO2: 95% 95% 100% 97%   Weight:       Height:           Physical Exam  Vitals and nursing note reviewed.   Constitutional:       General: He is not in acute distress.     Appearance: He is not diaphoretic.   Eyes:      General: No scleral icterus.     Pupils: Pupils are equal, round, and reactive to light.   Cardiovascular:      Rate and Rhythm: Normal rate.      Heart sounds: No murmur heard.     Pulmonary:      Breath sounds: Rales present. No wheezing.   Chest:      Chest wall: No tenderness.   Abdominal:      General: There is no distension.      Palpations: Abdomen is soft. There is no mass.      Tenderness: There is no abdominal tenderness. There is no guarding or rebound.   Genitourinary:     Penis: Normal.    Musculoskeletal:         General: No deformity.      Right lower leg: No edema.      Left lower leg: No edema.      Comments: 2+ pitting edema up to the lower thigh bilaterally.  Both arms are edematous  Anasarca   Skin:     General: Skin is warm.      Coloration: Skin is not pale.      Findings: Rash present. No bruising or lesion.      Comments: Bruising everywhere   Neurological:      Mental Status: He is alert.      Comments: Patient is alert, oriented to self, location.  He is coherent with rational thought process.            Laboratory   Recent Labs     05/11/21  0530 05/12/21  0253 05/13/21  0055   WBC 13.5* 11.9* 11.2*  "  RBC 3.04* 2.85* 3.06*   HEMOGLOBIN 8.2* 7.9* 8.5*   HEMATOCRIT 27.5* 26.3* 27.7*   PLATELETCT 281 260 276     Recent Labs     05/11/21  0530 05/11/21  1237 05/12/21  0253 05/12/21  1750 05/13/21  0055   SODIUM 135  --  132*  --  136   POTASSIUM 2.7*   < > 3.4* 3.3* 3.4*   CHLORIDE 100  --  102  --  102   CO2 28  --  24  --  28   GLUCOSE 88  --  89  --  118*   BUN 31*  --  23*  --  18   CREATININE 1.08  --  0.80  --  0.85    < > = values in this interval not displayed.      Recent Labs     05/11/21  0530 05/12/21  0253 05/13/21  0055   ALTSGPT 21 21 19   ASTSGOT 23 33 22   ALKPHOSPHAT 46 42 47   TBILIRUBIN 1.2 1.1 1.0   GLUCOSE 88 89 118*                Microbiology  Results     Procedure Component Value Units Date/Time    BLOOD CULTURE [[id number]] Collected: 05/08/21 1003    Order Status: Completed Specimen: Blood from Peripheral Updated: 05/13/21 1300     Significant Indicator NEG     Source BLD     Site PERIPHERAL     Culture Result No growth after 5 days of incubation.    Narrative:      Per Hospital Policy: Only change Specimen Src: to \"Line\" if  specified by physician order.  Right Hand    BLOOD CULTURE [[id number]] Collected: 05/08/21 1019    Order Status: Completed Specimen: Blood from Peripheral Updated: 05/13/21 1300     Significant Indicator NEG     Source BLD     Site PERIPHERAL     Culture Result No growth after 5 days of incubation.    Narrative:      Per Hospital Policy: Only change Specimen Src: to \"Line\" if  specified by physician order.  No site indicated    C Diff Toxin [[id number]] Collected: 05/08/21 2353    Order Status: Completed Updated: 05/09/21 1127     C.Diff Toxin A&B Negative     Comment: Treatment for C. difficile not recommended.  TOXIN NEGATIVE  Toxin negative by EIA; C. difficile detected by PCR.  Indicates colonization, infection unlikely. If clinical  suspicion persists, consider ID or GI Consult.  Repeat testing not indicated within 7 days.         Narrative:      Special Contact " [id number] QUIRIMIT JACQUI JORDAN.  Does this patient have risk factors for C-diff?->Yes  C-Diff Risk Factors->antibiotic exposure    C Diff by PCR rflx Toxin [[id number]] Collected: 05/08/21 2353    Order Status: Completed Specimen: Stool Updated: 05/09/21 1126     C Diff by PCR See Toxin     027-NAP1-BI Presumptive Negative     Comment: Presumptive 027/NAP1/BI target DNA sequences are NOT DETECTED.       Narrative:      Special Contact [id number] QUIRIMIT JACQUI JORDAN.  Does this patient have risk factors for C-diff?->Yes  C-Diff Risk Factors->antibiotic exposure    MRSA By PCR (Amp) [[id number]]  (Abnormal) Collected: 05/08/21 1050    Order Status: Completed Specimen: Respirate from Nares Updated: 05/08/21 1700     Significant Indicator POS     Source RESP     Site NARES     MRSA PCR POSITIVE for MRSA by PCR.    Narrative:      CALL  [place]  183 tel. [phone],  CALLED  183 tel. [phone] 05/08/2021, 16:59, RB PERF. RESULTS CALLED  TO:[id number]  Collected By:[id number] LUIS ALBERTO BROWN  In and out cath once for sample  Collected By:[id number] LUIS ALBERTO MICHAEL KEVIN    URINALYSIS [[id number]]  (Abnormal) Collected: 05/08/21 1050    Order Status: Completed Specimen: Urine, Cath Updated: 05/08/21 1225     Color DK Yellow     Character Cloudy     Specific Gravity 1.034     Ph 5.5     Glucose Negative mg/dL      Ketones Trace mg/dL      Protein Negative mg/dL      Bilirubin Negative     Urobilinogen, Urine 0.2     Nitrite Negative     Leukocyte Esterase Negative     Occult Blood Negative     Micro Urine Req Microscopic    Narrative:      Collected By:[id number] LUIS ALBERTO BROWN  In and out cath once for sample    CULTURE RESPIRATORY W/ GRM STN [[id number]]     Order Status: Completed Specimen: Respirate from Sputum     BLOOD CULTURE [[id number]]  (Abnormal) Collected: 05/01/21 1940    Order Status: Completed Specimen: Blood from Peripheral Updated: 05/07/21 1034     Significant Indicator POS     Source BLD     Site PERIPHERAL     Culture Result  "No growth after 5 days of incubation.    Narrative:      Per Hospital Policy: Only change Specimen Src: to \"Line\" if  specified by physician order.  Per Hospital Policy: Only change Specimen Src: to \"Line\" if    BLOOD CULTURE [[id number]] Collected: 21    Order Status: Completed Specimen: Blood from Peripheral Updated: 21 230     Significant Indicator NEG     Source BLD     Site PERIPHERAL     Culture Result No growth after 5 days of incubation.    Narrative:      Per Hospital Policy: Only change Specimen Src: to \"Line\" if  specified by physician order.  No site indicated             Labs reviewed by me and noted above.    Radiology  US-EXTREMITY VENOUS UPPER UNILAT LEFT   Upper Extremity   Venous Duplex Report     Vascular Laboratory   CONCLUSIONS   No acute thrombosis is identified.      MANOLO AUGUST     Exam Date:     2021 08:22     Room #:     Inpatient     Priority:     Routine     Ht (in):             Wt (lb):     Ordering Physician:        RENETTA ROBLERO     Referring Physician:       ANNIKA Olmos     Sonographer:               Kamala Sheridan RVT     Study Type:                Complete Unilateral     Technical Quality:         Adequate     Age:    88    Gender:     M     MRN:    [id number]     :    1932      BSA:     Indications:     Localized swelling, mass and lump, left upper limb, Edema     CPT Codes:       64144     ICD Codes:       R22.32  782.3     History:         Swelling/edema of the Left upper extremity, bruising   present;                     No previous exam.     Limitations:     PROCEDURES:   Left upper extremity venous duplex imaging.    The following venous structures were evaluated: internal jugular,    subclavian, axillary, brachial, radial, ulnar, cephalic and basilic veins.    Serial compression, augmentation maneuvers,  color and spectral Doppler    flow evaluations were performed.     FINDINGS:   Left upper " extremity-   Complete color filling and compressibility with normal venous flow dynamics    including spontaneous flow, response to augmentation maneuvers, and    respiratory phasicity.     Angelina Donaldson   (Electronically Signed)   Final Date:      12 May 2021 09:39      Results for orders placed or performed during the hospital encounter of 07/26/16   ECHOCARDIOGRAM COMP W/O CONT   Result Value Ref Range    Eject.Frac. MOD BP 53.75     Eject.Frac. MOD 4C 57.39     Eject.Frac. MOD 2C 52.64           Assessment and Plan    * Debility- (present on admission)  Assessment & Plan  Generalized weakness  Multifactorial including age and comorbidities  PT and OT evaluation needs SNF  Patient was accepted for SNF    ACS (acute coronary syndrome) (HCC)  Assessment & Plan  Patient with chest pain on 5/10  EKG pacing rhythm  Troponin elevated to 400 and then 500  Cardiologist  was consulted who feels the patient is not a candidate for cardiac cath  Continue medication treatment including aspirin Plavix and atorvastatin.  Patient is not candidate for anticoagulation due to risk of bleeding  The case was discussed in detail with the family who agreed to continue medical treatment.     Chronic respiratory failure (HCC)- (present on admission)  Assessment & Plan  Patient is on oxygen therapy baseline 3 L nasal cannula    Rectus sheath hematoma- (present on admission)  Assessment & Plan  In the setting of DAPT and coumadin.    S/p KCentra and IR embolization of inf epigastric artery.  Patient is in high risk for bleeding, hemoglobin has been stable.    Paroxysmal atrial fibrillation (HCC)- (present on admission)  Assessment & Plan  Cardiology on board, they held sotalol.   Patient is not candidate for anticoagulation due to risk of bleeding  Cardiology on board.    CHF (congestive heart failure) ([facility])  Assessment & Plan  Repeat echo showed ejection fraction 45% with apical akinesis  Cardiologist did not recommend cardiac  cath  Continue Lasix with atorvastatin, aspirin and Plavix  Patient has poor prognosis  Consider beta-blocker after improving on his situation and more stable    Hypokalemia  Assessment & Plan  Severe hypokalemia  Replace with IV and oral  On telemetry  Check his magnesium    Anemia associated with acute blood loss- (present on admission)  Assessment & Plan  There is also a component of Fe-deficiency.    Transfuse for Hg <7.  IV iron was ordered, discontinue oral iron  Resume anticoagulation with close monitoring.      Leukocytosis  Assessment & Plan  CTA negative for consolidation.  Was on empiric abx Zosyn, vanc for 1 day, but stopped given neg CT chest.  Urine clean.   No other source of infection suspected.    Suspect stress response at this time.    Improving monitor closely.    Anasarca  Assessment & Plan  Due to low albumin and heart failure  IV Lasix gently  Ultrasound did not show any DVT in upper or lower extremities.    Coronary artery disease- (present on admission)  Assessment & Plan  JAS x 3 to LAD in April of this year  Have resumed DAPT (ASA, plavix)  Cont statin  Developed chest pain on 5/10 with worsening troponin; cardiology reconsulted patient is not candidate for cardiac cath, continue medication treatment    Adrenal insufficiency (HCC)- (present on admission)  Assessment & Plan  Back to baseline Hydrocortisone and levothyroxine  Have DC'd midodrine as pressures do not require it  continue Cortef 10 mg twice daily  Stable    ACP (advance care planning)  Assessment & Plan  On 5/11, long discussion with the family more than 30 minutes, discussed the plan of care, discussed the comorbidities and poor prognosis, discussed the new heart attack also the risk/benefit from cardiac cath, the family understands his situation they agreed to continue medical treatment at this time and they would consider comfort care if he got worse.    Pulmonary embolism (HCC)- (present on admission)  Assessment &  Plan  History of.  CTA here negative.    COPD (chronic obstructive pulmonary disease) (HCC)- (present on admission)  Assessment & Plan  Stable.    No exacerbation and no wheezing  On his baseline 2 L nasal cannula    Essential hypertension, benign- (present on admission)  Assessment & Plan  Hold antihypertensives.  Prioritize diuresis for now.  Restart home BP meds when more stable and fluid status optimized.    Ileus  Assessment & Plan  This was reportedly resolved while in ICU.  CT chest 5/9 noted some air-fluid level.  Confirmed on KUB 5/9.  Resolved  Continue scheduling stool softener            DVT prophylaxis: SCD    CODE STATUS:  Intubation OK.  No CPR.    Disposition: SNF however the patient is medically not cleared    The patient has very poor prognosis due to age comorbidities and recurrent acute coronary syndrome.    The patient is on high risk for delirium      Interventions to be considered in all patients in order to minimize the risk of delirium.   -do not disturb patient (vitals or lab draws) between the hours of 10 PM and 6 AM.  -ideally the patient should not sleep during the day and we should avoid day time naps.   -up in chair for meals  -ambulate at least three times daily, as able  -watch for constipation  -timed voiding - ask patient is she would like to go to the bathroom q 2-3 hours, except during the do not disturb hours.   -remove all necessary lines (central lines, peripheral IVs, feeding tubes, nam catheters)  -unless patient has shown harm to self or others I would recommend against use of restraints - either chemical or physical (antipsychotics)   -minimize polypharmacy, do not dose medication during sleep hours

## 2021-05-13 NOTE — CARE PLAN
The patient is Watcher - Medium risk of patient condition declining or worsening    Shift Goals  Clinical Goals: electrolyte replacement K+ 20 meqs and Fe Sulfate  Patient Goals: comfort, pain management and sleep     Summary of progress made towards problems/goals:  progressing as planned   Problem: Safety  Goal: Will remain free from injury  Outcome: Progressing  Goal: Will remain free from falls  Outcome: Progressing     Problem: Knowledge Deficit  Goal: Knowledge of disease process/condition, treatment plan, diagnostic tests, and medications will improve  Outcome: Progressing  Goal: Knowledge of the prescribed therapeutic regimen will improve  Outcome: Progressing     Problem: Pain Management  Goal: Pain level will decrease to patient's comfort goal  Outcome: Progressing     Problem: Urinary Elimination:  Goal: Ability to reestablish a normal urinary elimination pattern will improve  Outcome: Progressing     Problem: Mobility  Goal: Risk for activity intolerance will decrease  Outcome: Progressing

## 2021-05-13 NOTE — ASSESSMENT & PLAN NOTE
Generalized weakness  Multifactorial including age and comorbidities  PT and OT evaluation needs SNF  Patient was accepted for SNF

## 2021-05-14 VITALS
HEIGHT: 68 IN | TEMPERATURE: 98.1 F | BODY MASS INDEX: 36.25 KG/M2 | RESPIRATION RATE: 18 BRPM | SYSTOLIC BLOOD PRESSURE: 134 MMHG | OXYGEN SATURATION: 94 % | WEIGHT: 239.2 LBS | HEART RATE: 82 BPM | DIASTOLIC BLOOD PRESSURE: 69 MMHG

## 2021-05-14 PROBLEM — S30.1XXA RECTUS SHEATH HEMATOMA: Status: RESOLVED | Noted: 2021-05-05 | Resolved: 2021-05-14

## 2021-05-14 PROBLEM — K56.7 ILEUS (HCC): Status: RESOLVED | Noted: 2021-05-07 | Resolved: 2021-05-14

## 2021-05-14 PROBLEM — I24.9 ACS (ACUTE CORONARY SYNDROME) (HCC): Status: RESOLVED | Noted: 2021-05-11 | Resolved: 2021-05-14

## 2021-05-14 LAB
ALBUMIN SERPL BCP-MCNC: 2.9 G/DL (ref 3.2–4.9)
ALBUMIN/GLOB SERPL: 1.1 G/DL
ALP SERPL-CCNC: 51 U/L (ref 30–99)
ALT SERPL-CCNC: 18 U/L (ref 2–50)
ANION GAP SERPL CALC-SCNC: 7 MMOL/L (ref 7–16)
AST SERPL-CCNC: 22 U/L (ref 12–45)
BASOPHILS # BLD AUTO: 0.4 % (ref 0–1.8)
BASOPHILS # BLD: 0.06 K/UL (ref 0–0.12)
BILIRUB SERPL-MCNC: 1.5 MG/DL (ref 0.1–1.5)
BUN SERPL-MCNC: 11 MG/DL (ref 8–22)
CALCIUM SERPL-MCNC: 7.3 MG/DL (ref 8.5–10.5)
CHLORIDE SERPL-SCNC: 102 MMOL/L (ref 96–112)
CO2 SERPL-SCNC: 30 MMOL/L (ref 20–33)
CREAT SERPL-MCNC: 0.73 MG/DL (ref 0.5–1.4)
CRP SERPL HS-MCNC: 2.02 MG/DL (ref 0–0.75)
EOSINOPHIL # BLD AUTO: 0.2 K/UL (ref 0–0.51)
EOSINOPHIL NFR BLD: 1.5 % (ref 0–6.9)
ERYTHROCYTE [DISTWIDTH] IN BLOOD BY AUTOMATED COUNT: 62.5 FL (ref 35.9–50)
GLOBULIN SER CALC-MCNC: 2.6 G/DL (ref 1.9–3.5)
GLUCOSE SERPL-MCNC: 88 MG/DL (ref 65–99)
HCT VFR BLD AUTO: 30.4 % (ref 42–52)
HGB BLD-MCNC: 9.2 G/DL (ref 14–18)
IMM GRANULOCYTES # BLD AUTO: 0.19 K/UL (ref 0–0.11)
IMM GRANULOCYTES NFR BLD AUTO: 1.4 % (ref 0–0.9)
LYMPHOCYTES # BLD AUTO: 0.81 K/UL (ref 1–4.8)
LYMPHOCYTES NFR BLD: 5.9 % (ref 22–41)
MAGNESIUM SERPL-MCNC: 2.1 MG/DL (ref 1.5–2.5)
MCH RBC QN AUTO: 27.1 PG (ref 27–33)
MCHC RBC AUTO-ENTMCNC: 30.3 G/DL (ref 33.7–35.3)
MCV RBC AUTO: 89.7 FL (ref 81.4–97.8)
MONOCYTES # BLD AUTO: 0.83 K/UL (ref 0–0.85)
MONOCYTES NFR BLD AUTO: 6 % (ref 0–13.4)
NEUTROPHILS # BLD AUTO: 11.68 K/UL (ref 1.82–7.42)
NEUTROPHILS NFR BLD: 84.8 % (ref 44–72)
NRBC # BLD AUTO: 0 K/UL
NRBC BLD-RTO: 0 /100 WBC
PHOSPHATE SERPL-MCNC: 1.9 MG/DL (ref 2.5–4.5)
PLATELET # BLD AUTO: 321 K/UL (ref 164–446)
PMV BLD AUTO: 9.8 FL (ref 9–12.9)
POTASSIUM SERPL-SCNC: 4 MMOL/L (ref 3.6–5.5)
PROT SERPL-MCNC: 5.5 G/DL (ref 6–8.2)
RBC # BLD AUTO: 3.39 M/UL (ref 4.7–6.1)
SODIUM SERPL-SCNC: 139 MMOL/L (ref 135–145)
WBC # BLD AUTO: 13.8 K/UL (ref 4.8–10.8)

## 2021-05-14 PROCEDURE — A9270 NON-COVERED ITEM OR SERVICE: HCPCS | Performed by: STUDENT IN AN ORGANIZED HEALTH CARE EDUCATION/TRAINING PROGRAM

## 2021-05-14 PROCEDURE — 94640 AIRWAY INHALATION TREATMENT: CPT

## 2021-05-14 PROCEDURE — 700111 HCHG RX REV CODE 636 W/ 250 OVERRIDE (IP): Performed by: INTERNAL MEDICINE

## 2021-05-14 PROCEDURE — 85025 COMPLETE CBC W/AUTO DIFF WBC: CPT

## 2021-05-14 PROCEDURE — 84145 PROCALCITONIN (PCT): CPT

## 2021-05-14 PROCEDURE — 36415 COLL VENOUS BLD VENIPUNCTURE: CPT

## 2021-05-14 PROCEDURE — 86140 C-REACTIVE PROTEIN: CPT

## 2021-05-14 PROCEDURE — 83735 ASSAY OF MAGNESIUM: CPT

## 2021-05-14 PROCEDURE — 700102 HCHG RX REV CODE 250 W/ 637 OVERRIDE(OP): Performed by: INTERNAL MEDICINE

## 2021-05-14 PROCEDURE — 700102 HCHG RX REV CODE 250 W/ 637 OVERRIDE(OP): Performed by: STUDENT IN AN ORGANIZED HEALTH CARE EDUCATION/TRAINING PROGRAM

## 2021-05-14 PROCEDURE — 84100 ASSAY OF PHOSPHORUS: CPT

## 2021-05-14 PROCEDURE — A9270 NON-COVERED ITEM OR SERVICE: HCPCS | Performed by: PSYCHIATRY & NEUROLOGY

## 2021-05-14 PROCEDURE — A9270 NON-COVERED ITEM OR SERVICE: HCPCS | Performed by: INTERNAL MEDICINE

## 2021-05-14 PROCEDURE — 700102 HCHG RX REV CODE 250 W/ 637 OVERRIDE(OP): Performed by: PSYCHIATRY & NEUROLOGY

## 2021-05-14 PROCEDURE — 99239 HOSP IP/OBS DSCHRG MGMT >30: CPT | Performed by: INTERNAL MEDICINE

## 2021-05-14 PROCEDURE — 94760 N-INVAS EAR/PLS OXIMETRY 1: CPT

## 2021-05-14 PROCEDURE — 80053 COMPREHEN METABOLIC PANEL: CPT

## 2021-05-14 RX ORDER — OXYCODONE HYDROCHLORIDE 5 MG/1
5 TABLET ORAL EVERY 4 HOURS PRN
Qty: 5 TABLET | Refills: 0 | Status: SHIPPED | OUTPATIENT
Start: 2021-05-14 | End: 2021-05-16

## 2021-05-14 RX ORDER — FUROSEMIDE 20 MG/1
20 TABLET ORAL 2 TIMES DAILY
Qty: 30 TABLET | Refills: 1 | Status: SHIPPED | OUTPATIENT
Start: 2021-05-14 | End: 2021-05-20 | Stop reason: SDUPTHER

## 2021-05-14 RX ORDER — POTASSIUM CHLORIDE 20 MEQ/1
10 TABLET, EXTENDED RELEASE ORAL DAILY
Qty: 10 TABLET | Refills: 0 | Status: SHIPPED | OUTPATIENT
Start: 2021-05-14 | End: 2021-05-14

## 2021-05-14 RX ORDER — POLYETHYLENE GLYCOL 3350 17 G/17G
17 POWDER, FOR SOLUTION ORAL 2 TIMES DAILY
Qty: 30 EACH | Refills: 3 | Status: SHIPPED | OUTPATIENT
Start: 2021-05-14 | End: 2021-05-20 | Stop reason: SDUPTHER

## 2021-05-14 RX ADMIN — ACETAMINOPHEN 1000 MG: 500 TABLET ORAL at 05:21

## 2021-05-14 RX ADMIN — DIBASIC SODIUM PHOSPHATE, MONOBASIC POTASSIUM PHOSPHATE AND MONOBASIC SODIUM PHOSPHATE 250 MG: 852; 155; 130 TABLET ORAL at 12:03

## 2021-05-14 RX ADMIN — TAMSULOSIN HYDROCHLORIDE 0.4 MG: 0.4 CAPSULE ORAL at 05:21

## 2021-05-14 RX ADMIN — CLOPIDOGREL BISULFATE 75 MG: 75 TABLET ORAL at 05:21

## 2021-05-14 RX ADMIN — LEVOTHYROXINE SODIUM 75 MCG: 25 TABLET ORAL at 05:22

## 2021-05-14 RX ADMIN — HYDROCORTISONE 10 MG: 10 TABLET ORAL at 05:21

## 2021-05-14 RX ADMIN — POTASSIUM CHLORIDE 40 MEQ: 1500 TABLET, EXTENDED RELEASE ORAL at 05:21

## 2021-05-14 RX ADMIN — ASPIRIN 81 MG: 81 TABLET, CHEWABLE ORAL at 05:21

## 2021-05-14 RX ADMIN — DIBASIC SODIUM PHOSPHATE, MONOBASIC POTASSIUM PHOSPHATE AND MONOBASIC SODIUM PHOSPHATE 250 MG: 852; 155; 130 TABLET ORAL at 05:21

## 2021-05-14 RX ADMIN — ANTACID TABLETS 500 MG: 500 TABLET, CHEWABLE ORAL at 05:21

## 2021-05-14 RX ADMIN — MONTELUKAST 10 MG: 10 TABLET, FILM COATED ORAL at 05:21

## 2021-05-14 RX ADMIN — FUROSEMIDE 20 MG: 10 INJECTION, SOLUTION INTRAMUSCULAR; INTRAVENOUS at 05:22

## 2021-05-14 RX ADMIN — ACETAMINOPHEN 1000 MG: 500 TABLET ORAL at 12:03

## 2021-05-14 RX ADMIN — ENOXAPARIN SODIUM 40 MG: 40 INJECTION SUBCUTANEOUS at 05:22

## 2021-05-14 RX ADMIN — OMEPRAZOLE 20 MG: 20 CAPSULE, DELAYED RELEASE ORAL at 05:21

## 2021-05-14 RX ADMIN — BUDESONIDE AND FORMOTEROL FUMARATE DIHYDRATE 2 PUFF: 80; 4.5 AEROSOL RESPIRATORY (INHALATION) at 11:38

## 2021-05-14 ASSESSMENT — PAIN DESCRIPTION - PAIN TYPE: TYPE: ACUTE PAIN

## 2021-05-14 NOTE — FLOWSHEET NOTE
Telemetry Shift Summary     Rhythm: Paced  HR Range: 70-80  Ectopy: --  Measurements: --              Normal Values  Rhythm SR  HR Range    Measurements 0.12-0.20 / 0.06-0.10  / 0.30-0.52

## 2021-05-14 NOTE — DISCHARGE PLANNING
Anticipated Discharge Disposition: [facility]    Action: RN ILEANA placed completed cobra and transfer packet with patient' chart along with POLST and updated BS RN, Emmy who is aware that patient is being picked up at 1200 by [facility].    Barriers to Discharge: none    Plan: [facility] to discharge [facility] today

## 2021-05-14 NOTE — DISCHARGE PLANNING
Agency[facility] Name: [facility]  Outcome:Left vmail verifying transport at 1200    Ag[facility] Name: [facility]  Spoke To: Violet  Outcome: Violet stated she is aware of transport at 1200 and verified transfer of pt

## 2021-05-14 NOTE — PROGRESS NOTES
Assumed care of patient, bedside report received from ОЛЬГА Peace. Updated on POC, call light within reach and fall precautions in place. Bed locked and in lowest position. Patient instructed to call for assistance before getting out of bed. All questions answered, no other needs at this time.     Patient lethargic, only AAOx3. Patient states he is tired. RN to promote calm environment and continue to monitor.

## 2021-05-14 NOTE — PROGRESS NOTES
2 RN skin check completed with Binu RN.   Devices in place tele box, PIV, oxy mask.  Skin assessed under devices yes.  Confirmed pressure ulcers found on n/a.  New potential pressure ulcers noted on n/a. Wound consult: following  The following interventions in place: extra pillows used for support, moisturizer given, and pt is on q2 turns (currently refusing).     Gen bruising  Bilat ears: red, intact, blanching  Right ear and Nose: Black growth on back upper ear due to skin cancer  Bilat elbows: dry, intact, scabs, red  Sacrum: red, slow to elida, excoriated  Bilat heels: pink, intact, slow to elida, edema  Right side: bruise

## 2021-05-14 NOTE — DISCHARGE INSTRUCTIONS
Discharge Instructions    Discharged to other by ambulance with escort. Discharged via ambulance, hospital escort: Yes.  Special equipment needed: Oxygen    Be sure to schedule a follow-up appointment with your primary care doctor or any specialists as instructed.     Discharge Plan:   Diet Plan: Discussed  Activity Level: Discussed  Confirmed Follow up Appointment: Appointment Scheduled  Confirmed Symptoms Management: Discussed  Medication Reconciliation Updated: Yes    I understand that a diet low in cholesterol, fat, and sodium is recommended for good health. Unless I have been given specific instructions below for another diet, I accept this instruction as my diet prescription.   Other diet: heart healthy low sodium. thickened liquids. Pureed foods. FLOAT PILLS    Special Instructions:   HF Patient Discharge Instructions  · Monitor your weight daily, and maintain a weight chart, to track your weight changes.   · Activity as tolerated, unless your Doctor has ordered otherwise. Other activity order: as tolerated .  · Follow a low fat, low cholesterol, low salt diet unless instructed otherwise by your Doctor. Read the labels on the back of food products and track your intake of fat, cholesterol and salt.   · Fluid Restriction No. If a Fluid Restriction has been ordered by your Doctor, measure fluids with a measuring cup to ensure that you are not exceeding the restriction.   · No smoking.  · Oxygen Yes. If your Doctor has ordered that you wear Oxygen at home, it is important to wear it as ordered.  · Did you receive an explanation from staff on the importance of taking each of your medications and why it is necessary to keep taking them unless your doctor says to stop? Yes  · Were all of your questions answered about how to manage your heart failure and what to do if you have increased signs and symptoms after you go home? Yes  · Do you feel like your heart failure care team involved you in the care treatment plan  and allowed you to make decisions regarding your care while in the hospital and addressed any discharge needs you might have? Yes    See the educational handout provided at discharge for more information on monitoring your daily weight, activity and diet. This also explains more about Heart Failure, symptoms of a flare-up and some of the tests that you have undergone.     Warning Signs of a Flare-Up include:  · Swelling in the ankles or lower legs.  · Shortness of breath, while at rest, or while doing normal activities.   · Shortness of breath at night when in bed, or coughing in bed.   · Requiring more pillows to sleep at night, or needing to sit up at night to sleep.  · Feeling weak, dizzy or fatigued.     When to call your Doctor:  · Call [facility] seven days a week from 8:00 a.m. to 8:00 p.m. for medical questions [phone].  · Call your Primary Care Physician or Cardiologist if:   1. You experience any pain radiating to your jaw or neck.  2. You have any difficulty breathing.  3. You experience weight gain of 3 lbs in a day or 5 lbs in a week.   4. You feel any palpitations or irregular heartbeats.  5. You become dizzy or lose consciousness.   If you have had an angiogram or had a pacemaker or AICD placed, and experience:  1. Bleeding, drainage or swelling at the surgical / puncture site.  2. Fever greater than 100.0 F  3. Shock from internal defibrillator.  4. Cool and / or numb extremities.      · Is patient discharged on Warfarin / Coumadin?   No     Depression / Suicide Risk    As you are discharged from this [facility], it is important to learn how to keep safe from harming yourself.    Recognize the warning signs:  · Abrupt changes in personality, positive or negative- including increase in energy   · Giving away possessions  · Change in eating patterns- significant weight changes-  positive or negative  · Change in sleeping patterns- unable to sleep or sleeping all the  time   · Unwillingness or inability to communicate  · Depression  · Unusual sadness, discouragement and loneliness  · Talk of wanting to die  · Neglect of personal appearance   · Rebelliousness- reckless behavior  · Withdrawal from people/activities they love  · Confusion- inability to concentrate     If you or a loved one observes any of these behaviors or has concerns about self-harm, here's what you can do:  · Talk about it- your feelings and reasons for harming yourself  · Remove any means that you might use to hurt yourself (examples: pills, rope, extension cords, firearm)  · Get professional help from the community (Mental Health, Substance Abuse, psychological counseling)  · Do not be alone:Call your Safe Contact- someone whom you trust who will be there for you.  · Call your local CRISIS HOTLINE 379-4565 or 997-339-8302  · Call your local Children's Mobile Crisis Response Team [place] (388) 805-4084 or www.[employer]  · Call the toll free National Suicide Prevention Hotlines   · National Suicide Prevention Lifeline 943-482-ARUA (8642)  · [employer] Hope Line Network 800-SUICIDE (584-0042)    Chest Pain, Nonspecific  It is often hard to give a specific diagnosis for the cause of chest pain. There is always a chance that your pain could be related to something serious, like a heart attack or a blood clot in the lungs. You need to follow up with your caregiver for further evaluation. More lab tests or other studies such as X-rays, electrocardiography, stress testing, or cardiac imaging may be needed to find the cause of your pain.  Most of the time, nonspecific chest pain improves within 2 to 3 days with rest and mild pain medicine. For the next few days, avoid physical exertion or activities that bring on pain. Do not smoke. Avoid drinking alcohol. Call your caregiver for routine follow-up as advised.   SEEK IMMEDIATE MEDICAL CARE IF:  · You develop increased chest pain or pain that radiates to the  arm, neck, jaw, back, or abdomen.   · You develop shortness of breath, increased coughing, or you start coughing up blood.   · You have severe back or abdominal pain, nausea, or vomiting.   · You develop severe weakness, fainting, fever, or chills.   Document Released: 12/18/2006 Document Revised: 03/11/2013 Document Reviewed: 06/06/2008  [employer]® Patient Information ©2013 [employer].      Heart Failure, Diagnosis    Heart failure means that your heart is not able to pump blood in the right way. This makes it hard for your body to work well. Heart failure is usually a long-term (chronic) condition. You must take good care of yourself and follow your treatment plan from your doctor.  What are the causes?  This condition may be caused by:  · High blood pressure.  · Build up of cholesterol and fat in the arteries.  · Heart attack. This injures the heart muscle.  · Heart valves that do not open and close properly.  · Damage of the heart muscle. This is also called cardiomyopathy.  · Lung disease.  · Abnormal heart rhythms.  What increases the risk?  The risk of heart failure goes up as a person ages. This condition is also more likely to develop in people who:  · Are overweight.  · Are male.  · Smoke or chew tobacco.  · Abuse alcohol or illegal drugs.  · Have taken medicines that can damage the heart.  · Have diabetes.  · Have abnormal heart rhythms.  · Have thyroid problems.  · Have low blood counts (anemia).  What are the signs or symptoms?  Symptoms of this condition include:  · Shortness of breath.  · Coughing.  · Swelling of the feet, ankles, legs, or belly.  · Losing weight for no reason.  · Trouble breathing.  · Waking from sleep because of the need to sit up and get more air.  · Rapid heartbeat.  · Being very tired.  · Feeling dizzy, or feeling like you may pass out (faint).  · Having no desire to eat.  · Feeling like you may vomit (nauseous).  · Peeing (urinating) more at night.  · Feeling confused.  How is  this treated?         This condition may be treated with:  · Medicines. These can be given to treat blood pressure and to make the heart muscles stronger.  · Changes in your daily life. These may include eating a healthy diet, staying at a healthy body weight, quitting tobacco and illegal drug use, or doing exercises.  · Surgery. Surgery can be done to open blocked valves, or to put devices in the heart, such as pacemakers.  · A donor heart (heart transplant). You will receive a healthy heart from a donor.  Follow these instructions at home:  · Treat other conditions as told by your doctor. These may include high blood pressure, diabetes, thyroid disease, or abnormal heart rhythms.  · Learn as much as you can about heart failure.  · Get support as you need it.  · Keep all follow-up visits as told by your doctor. This is important.  Summary  · Heart failure means that your heart is not able to pump blood in the right way.  · This condition is caused by high blood pressure, heart attack, or damage of the heart muscle.  · Symptoms of this condition include shortness of breath and swelling of the feet, ankles, legs, or belly. You may also feel very tired or feel like you may vomit.  · You may be treated with medicines, surgery, or changes in your daily life.  · Treat other health conditions as told by your doctor.  This information is not intended to replace advice given to you by your health care provider. Make sure you discuss any questions you have with your health care provider.  Document Released: 09/26/2009 Document Revised: 03/06/2020 Document Reviewed: 03/06/2020  [employer] Patient Education © 2020 [employer]      Failure to Thrive, Adult  Failure to thrive is a group of symptoms that affect adults, including the elderly. These symptoms include eating too little and losing weight. People who have this may not want to be with friends, or they may not want to eat or drink. This condition is not a normal part of  getting older.  What are the causes?  · A disease, such as dementia, diabetes, cancer, or lung disease.  · A health problem, such as a vitamin deficiency or a heart problem.  · A disorder, such as sadness (depression).  · A disability.  · Medicines.  · Having tooth or mouth problems.  · Neglect or being treated badly.  · In some cases, the cause may not be known.  What are the signs or symptoms?  · Loss of more than 5% of your body weight.  · Being more tired than normal after an activity.  · Having trouble getting up after sitting.  · Not feeling hungry.  · Not getting out of bed.  · Not wanting to do your normal activities.  · Sadness.  · Getting infections often.  · Bedsores.  · Taking a long time to get better after an infection, injury, or a surgery.  · Weakness.  How is this treated?  Treatment for this condition depends on the cause. It may be treated by:  · Treating a disease or disorder that is causing symptoms.  · Having talk therapy or taking medicine to treat sadness.  · Eating better, such as eating more often or taking nutritional supplements.  · Changing or stopping a medicine.  · Having physical or occupational therapy.  It often takes a team of doctors to find the right treatment.  Follow these instructions at home:    · Take over-the-counter and prescription medicines only as told by your doctor.  · Eat a healthy diet. Make sure that you eat enough. Ask your doctor how much you should eat.  · Be active. Do exercises that make you stronger (strength training). A physical therapist can help to set up a program that fits you.  · Make sure that you are safe at home.  · Have a plan for what to do if you cannot make decisions for yourself.  Contact a doctor if you:  · Are not able to eat well.  · Are not able to move around.  · Feel very sad.  · Feel very hopeless.  Get help right away if:  · You think about ending your life.  · You cannot eat or drink.  · You do not get out of bed.  · Staying at home is  not safe.  · You have a fever.  Summary  · Failure to thrive is a group of symptoms that affect adults, including the elderly.  · Symptoms include eating too little and losing weight.  · Take all medicines only as told by your doctor.  · Eat a healthy diet. Make sure that you eat enough. Ask your doctor how much you should eat.  · Be active. Do exercises that make you stronger. A physical therapist can help to set up a program that is right for you.  This information is not intended to replace advice given to you by your health care provider. Make sure you discuss any questions you have with your health care provider.  Document Released: 12/06/2012 Document Revised: 08/20/2019 Document Reviewed: 08/20/2019  [employer] Patient Education © 2020 [employer]

## 2021-05-14 NOTE — DISCHARGE SUMMARY
,Discharge Summary    CHIEF COMPLAINT ON ADMISSION  Chief Complaint   Patient presents with   • Chest Pain     Pt came from a rehab, pt was recently admitted on 4/10 for a Post NSTEMI and PCI compelted. Pt is on coumadin, ASA, plavix   • Shortness of Breath     wears 3-4L of oxygen NC   • Hypotension     upon arrival hypotensive with SBP in 60s, upon arrival in ER, /59       Reason for Admission  Chest pain and acute coronary syndrome  Intra-abdominal bleeding.  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  The hypotension was felt to be secondary to his adrenal insufficiency.  Therefore, he was treated with stress dose steroids.  Ultimately, hypotension was found secondary to a rectus sheath hematoma with acute hemorrhagic anemia.  He was admitted and was transfused 1 unit of packed red blood cell for a hemoglobin of 6.7.  Anticoagulation was reversed with Kcentra and vitamin K.  The right inferior epigastric artery was treated with coil embolization by interventional radiology on 5/5/2021.  Post procedure course complicated by ileus, which was resolved with stool softener.     Patient was received IV iron, his hemoglobin has been stable with no signs of bleeding and his blood pressure around 120/70, tolerating Lasix 20 mg IV twice daily..      Overnight of 5/8/2021, the patient was transferred from the ICU to telemetry floor.  Chest x-ray obtained on 5/7/2021 show increasing right lower lobe infiltrate concerning for pneumonia, which is ruled out with chest CTA.  Also negative for PE,  patient was treated with small dose of Lasix and inhalers, he is on baseline 3 L nasal cannula.      The patient is on high risk for aspiration pneumonia, patient  was evaluated by speech therapist and recommended to switch to clear MDL diet     During this hospitalization, on 5/10, patient developed chest pain with elevated troponin around 500, EKG showed pacing rhythm, echo showed ejection fraction 45% with dyskinesis of the apex and hypokinesis of distal/apical inferior wall, patient has PCI on 4/20/2021 showed residual total occlusion of the OM 2, complex but successful PCI of the mid to distal LAD with drug-eluting stent placement, patient was evaluated by cardiologist again who does not feel patient is candidate for repeat cardiac cath, to continue aspirin and Plavix, since the patient has high risk of bleeding, risk/benefit from anticoagulation was discussed with the patient and family who agreed to continue only an aspirin and Plavix with no Eliquis or warfarin at this time, patient has improved.  And the patient needs to stay on Plavix with the aspirin for at least 1 year.      Due to comorbidities and poor prognosis, the case was discussed in detail with family, they understand the poor prognosis, to continue treatment at this time, patient was evaluated also by palliative team.  Patient is okay for intubation however he is DNR.     Patient was evaluated by PT and OT who recommended skilled nursing facility placement.    On the day of discharge the patient is alert and oriented x4, however the patient is in high risk for delirium, continue nonpharmacological treatment for that, patient denies significant chest pain, patient has generalized weakness, patient is okay for discharge to skilled nursing home to continue rehabilitation, patient is on high risk for readmission due to comorbidities and age.    Therefore, he is discharged in fair and stable condition to skilled nursing facility.    The patient met 2-midnight criteria for an inpatient stay at the time of discharge.      FOLLOW UP ITEMS POST DISCHARGE  Follow-up with PCP for comorbidities and palliative  care.  Repeat labs in couple days after discharge to check phosphorus potassium and sodium.    DISCHARGE DIAGNOSES  Principal Problem:    Debility POA: Yes  Active Problems:    Chronic respiratory failure (HCC) POA: Yes    Paroxysmal atrial fibrillation (HCC) POA: Yes    Essential hypertension, benign POA: Yes    COPD (chronic obstructive pulmonary disease) (HCC) POA: Yes      Overview: [facility] HOME O2    Pulmonary embolism (HCC) POA: Yes      Overview: Pt states 8 different PE's.    ACP (advance care planning) POA: Unknown    Adrenal insufficiency (HCC) POA: Yes    Coronary artery disease POA: Yes    Anasarca POA: No    Leukocytosis POA: No    Anemia associated with acute blood loss POA: Yes    Hypokalemia POA: Unknown    CHF (congestive heart failure) (HCC) POA: Unknown  Resolved Problems:    ACS (acute coronary syndrome) (HCC) POA: Unknown    Rectus sheath hematoma POA: Yes    Sepsis (HCC) POA: Yes    Ileus POA: No      FOLLOW UP  Future Appointments   Date Time Provider Department Center   6/2/2021  1:45 PM Ritika Miller A[facility]P.R[facility] None     Bebe Banerjee M.D.  [address] [place] [address] [phone]    Call  Please call to schedule a hospital follow up appointment. Thank You.    [facility] [address] [address] [phone]          MEDICATIONS ON DISCHARGE     Medication List      START taking these medications      Instructions   furosemide 20 MG Tabs  Commonly known as: LASIX   Take 1 tablet by mouth 2 times a day.  Dose: 20 mg     phosphorus 250 MG tablet  Commonly known as: K-Phos-Neutral   Take 1 tablet by mouth 2 times a day.  Dose: 1 tablet     polyethylene glycol/lytes 17 g Pack  Commonly known as: MIRALAX   Mix 1 Packet in liquid as directed and take by mouth 2 times a day.  Dose: 17 g        CHANGE how you take these medications      Instructions   hydrocortisone 10 MG Tabs  What changed: when to take this  Commonly known as: CORTEF   Take  1 Tab by mouth 3 times a day.  Dose: 10 mg        CONTINUE taking these medications      Instructions   albuterol 108 (90 Base) MCG/ACT Aers inhalation aerosol   Inhale 2 Puffs every four hours as needed for Shortness of Breath.  Dose: 2 Puff     aspirin 81 MG EC tablet   Take 1 tablet by mouth every day.  Dose: 81 mg     atorvastatin 80 MG tablet  Commonly known as: LIPITOR   Take 1 tablet by mouth every evening.  Dose: 80 mg     budesonide-formoterol 80-4.5 MCG/ACT Aero  Commonly known as: SYMBICORT   Inhale 2 Puffs 2 times a day.  Dose: 2 Puff     calcium citrate 950 (200 Ca) MG Tabs  Commonly known as: CALCITRATE   Take 1 tablet by mouth every day.  Dose: 950 mg     clopidogrel 75 MG Tabs  Commonly known as: PLAVIX   Take 1 tablet by mouth every day.  Dose: 75 mg     fluticasone 50 MCG/ACT nasal spray  Commonly known as: FLONASE   Administer 1 Spray into affected nostril(S) every day.  Dose: 1 Spray     ipratropium-albuterol 0.5-2.5 (3) MG/3ML nebulizer solution  Commonly known as: DUONEB   Take 3 mL by nebulization every 6 hours as needed for Shortness of Breath.  Dose: 3 mL     levothyroxine 75 MCG Tabs  Commonly known as: SYNTHROID   Take 1 Tab by mouth Every morning on an empty stomach.  Dose: 75 mcg     lidocaine 5 % Ptch  Commonly known as: LIDODERM   Place 1 Patch on the skin every 12 hours. Off for 12 hour    Apply to left rib cage and upper back  Dose: 1 Patch     montelukast 10 MG Tabs  Commonly known as: SINGULAIR   Take 1 Tab by mouth every day.  Dose: 10 mg     omeprazole 20 MG delayed-release capsule  Commonly known as: PRILOSEC   Take 1 capsule by mouth 2 times a day.  Dose: 20 mg     oxyCODONE immediate-release 5 MG Tabs  Commonly known as: ROXICODONE   Take 1 tablet by mouth every four hours as needed for Severe Pain for up to 2 days.  Dose: 5 mg     senna-docusate 8.6-50 MG Tabs  Commonly known as: PERICOLACE or SENOKOT S   Take 1 tablet by mouth 2 times a day.  Dose: 1 tablet     tamsulosin  0.4 MG capsule  Commonly known as: FLOMAX   Take 1 Cap by mouth every day.  Dose: 0.4 mg     tiotropium 18 MCG Caps  Commonly known as: SPIRIVA   Inhale 1 Cap by mouth every day.  Dose: 18 mcg        STOP taking these medications    acetaZOLAMIDE 250 MG Tabs  Commonly known as: DIAMOX     azithromycin 250 MG Tabs  Commonly known as: ZITHROMAX     enoxaparin 100 MG/ML Soln inj  Commonly known as: LOVENOX     ferrous gluconate 324 (37.5 Fe) MG Tabs     gabapentin 600 MG tablet  Commonly known as: NEURONTIN     midodrine 5 MG Tabs  Commonly known as: PROAMATINE     sotalol 80 MG Tabs  Commonly known as: BETAPACE     warfarin 5 MG Tabs  Commonly known as: COUMADIN            Allergies  Allergies   Allergen Reactions   • Lisinopril      Hypotension, 30 mg dose   • Pcn [Penicillins] Rash     Tolerates cephalosporins         DIET  Orders Placed This Encounter   Procedures   • Diet Order Diet: Level 3 - Liquidised (Float meds in puree if able ); Liquid level: Level 2 - Mildly Thick; Second Modifier: (optional): Clear Liquid; Tray Modifications (optional): No Straws     Standing Status:   Standing     Number of Occurrences:   1     Order Specific Question:   Diet:     Answer:   Level 3 - Liquidised [26]     Comments:   Float meds in puree if able      Order Specific Question:   Liquid level     Answer:   Level 2 - Mildly Thick     Order Specific Question:   Second Modifier: (optional)     Answer:   Clear Liquid [10]     Order Specific Question:   Tray Modifications (optional)     Answer:   No Straws       ACTIVITY  As tolerated.  Weight bearing as tolerated    LINES, DRAINS, AND WOUNDS  This is an automated list. Peripheral IVs will be removed prior to discharge.  Peripheral IV 05/14/21 20 G Anterior;Proximal;Right Forearm (Active)     External Urinary Catheter (Condom) (Active)   Collection Container Standard drainage bag 05/13/21 1900   Output (mL) 450 mL 05/14/21 0536      Moisture Associated Skin Damage 05/11/21  Buttock;Sacrum (Active)   Wound Image   05/11/21 0619   NEXT Weekly Photo (Inpatient Only) 05/19/21 05/10/21 2300   Drainage Amount None 05/12/21 2100   Drainage Description KING 05/10/21 2300   Periwound Assessment Dry;Red;Blanchable erythema;Fragile;Painful 05/13/21 1944   IAD Cleansing Foam Cleanser/Washcloth 05/12/21 2100   Periwound Protectant Barrier Paste 05/13/21 1944       Wound 04/29/21 Skin Tear Arm Proximal skin tear (Active)   Wound Image   05/02/21 1610   Site Assessment Red;Purple;Drainage;Fragile 05/13/21 1900   Periwound Assessment Cool;Red;Purple 05/13/21 1900   Margins Defined edges 05/13/21 1900   Closure Open to air 05/13/21 1900   Drainage Amount Small 05/12/21 2100   Drainage Description Serosanguineous 05/12/21 2100   Treatments Site care;Offloading 05/12/21 2100   Wound Cleansing Approved Wound Cleanser 05/12/21 2100   Periwound Protectant Mepitel 05/11/21 2000   Dressing Cleansing/Solutions Normal Saline 05/12/21 2100   Dressing Options Open to Air 05/12/21 2100   Dressing Changed Observed 05/12/21 2100   Dressing Status Open to Air 05/12/21 2100   Dressing Change/Treatment Frequency As Needed 05/10/21 2300   WOUND NURSE ONLY - Time Spent with Patient (mins) 45 05/03/21 1300       Wound 05/02/21 Ear Right Skin cancer (Active)   Wound Image   05/03/21 1300   Site Assessment Black;Purple 05/13/21 1900   Periwound Assessment [place]ut[place] 05/13/21 1900   Margins Defined edges 05/13/21 1900   Closure Open to air 05/13/21 1900   Drainage Amount None 05/13/21 1900   Treatments Offloading 05/12/21 2100   Wound Cleansing Normal Saline Irrigation 05/10/21 2300   Periwound Protectant Not Applicable 05/09/21 0855   Dressing Cleansing/Solutions Normal Saline 05/09/21 0855   Dressing Options Open to Air 05/13/21 1900   Dressing Status Open to Air 05/13/21 1900   Shape circular 05/12/21 2100   Wound Odor None 05/12/21 2100   Pulses N/A 05/12/21 2100   Exposed Structures None 05/12/21 2100   WOUND NURSE ONLY - Time  Spent with Patient (mins) 45 05/03/21 1300       Wound 05/05/21 Thigh Proximal Right Angioseal (Active)   Site Assessment Clean;Dry;Intact 05/13/21 1900   Periwound Assessment Red 05/13/21 1900   Drainage Amount None 05/13/21 1900   Dressing Options Open to Air 05/13/21 1900       Peripheral IV 05/14/21 20 G Anterior;Proximal;Right Forearm (Active)               MENTAL STATUS ON TRANSFER             CONSULTATIONS  Cardio   Intensivist   IR    PROCEDURES  Angiography on 5/5    LABORATORY  Lab Results   Component Value Date    SODIUM 139 05/14/2021    POTASSIUM 4.0 05/14/2021    CHLORIDE 102 05/14/2021    CO2 30 05/14/2021    GLUCOSE 88 05/14/2021    BUN 11 05/14/2021    CREATININE 0.73 05/14/2021    CREATININE 1.3 09/11/2008        Lab Results   Component Value Date    WBC 13.8 (H) 05/14/2021    HEMOGLOBIN 9.2 (L) 05/14/2021    HEMATOCRIT 30.4 (L) 05/14/2021    PLATELETCT 321 05/14/2021        Total time of the discharge process exceeds 36 minutes.

## 2021-05-14 NOTE — CARE PLAN
Problem: Communication  Goal: The ability to communicate needs accurately and effectively will improve  Outcome: Progressing     Problem: Safety  Goal: Will remain free from falls  Outcome: Progressing  Note: Fall precautions in place. Bed in lowest position. Non-skid socks in place. Personal possessions within reach. Mobility sign on door. Bed-alarm on. Call light within reach. Pt educated regarding fall prevention and states understanding.        Problem: Pain Management  Goal: Pain level will decrease to patient's comfort goal  Outcome: Progressing

## 2021-05-14 NOTE — CARE PLAN
"  Problem: Nutritional:  Goal: Achieve adequate nutritional intake  Description: Patient will consume >50% of meals  Outcome: Not Progressing     PO % x 3 meals per flow sheet since 5/12. Diet order has been NPO/clear liquid x 5 days. Please advance diet order as medically feasible since pt has not consumed adequate PO intake x 5 days. Pt reports \"not eating too much\" for breakfast. RN confirmed this and stated pt threw breakfast tray on the floor. Pt then was provided broth which he consumed.     Plan is for [facility] to [facility] to [facility].  "

## 2021-05-14 NOTE — PROGRESS NOTES
1130 called report to RN at [facility]. Answered all current questions.   Vitals stable at this time. Wife updated on patient transfer. Wife provided phone number and address for [facility].    [facility] discharged to [facility] via [facility]. Patient AOX3.  IV and tele box removed, discharge instructions provided to patient and reviewed with them/ packet also provided in cobra. All questions answered, patient provided copy of discharge instructions and instructed on when to F/U with MD. Patient wheeled off unit. All belonging and polst with patient on discharge. Patient discharged into the care of [facility].

## 2021-05-14 NOTE — CARE PLAN
The patient is Stable - Low risk of patient condition declining or worsening    Shift Goals  Clinical Goals: electrolyte replacement  Patient Goals: comfort and sleep     Progress made toward(s) clinical / shift goals: patient resting in bed. Patient updated on transfer to snf later today     Patient is not progressing towards the following goals:      Problem: Communication  Goal: The ability to communicate needs accurately and effectively will improve  Outcome: Progressing     Problem: Safety  Goal: Will remain free from injury  Outcome: Progressing  Goal: Will remain free from falls  Outcome: Progressing     Problem: Discharge Barriers/Planning  Goal: Patient's continuum of care needs will be met  Outcome: Progressing

## 2021-05-15 LAB — PROCALCITONIN SERPL-MCNC: 0.08 NG/ML

## 2021-05-20 ENCOUNTER — APPOINTMENT (OUTPATIENT)
Dept: RADIOLOGY | Facility: MEDICAL CENTER | Age: 86
DRG: 291 | End: 2021-05-20
Attending: EMERGENCY MEDICINE
Payer: MEDICARE

## 2021-05-20 ENCOUNTER — HOSPITAL ENCOUNTER (INPATIENT)
Facility: MEDICAL CENTER | Age: 86
LOS: 29 days | DRG: 291 | End: 2021-06-19
Attending: EMERGENCY MEDICINE | Admitting: INTERNAL MEDICINE
Payer: MEDICARE

## 2021-05-20 DIAGNOSIS — I25.10 CORONARY ARTERY DISEASE INVOLVING NATIVE CORONARY ARTERY OF NATIVE HEART WITHOUT ANGINA PECTORIS: ICD-10-CM

## 2021-05-20 DIAGNOSIS — J96.10 CHRONIC RESPIRATORY FAILURE, UNSPECIFIED WHETHER WITH HYPOXIA OR HYPERCAPNIA (HCC): ICD-10-CM

## 2021-05-20 DIAGNOSIS — S31.000A WOUND OF SACRAL REGION, INITIAL ENCOUNTER: ICD-10-CM

## 2021-05-20 DIAGNOSIS — I50.20 SYSTOLIC CONGESTIVE HEART FAILURE, UNSPECIFIED HF CHRONICITY (HCC): ICD-10-CM

## 2021-05-20 DIAGNOSIS — Z74.09 IMPAIRED MOBILITY AND ADLS: ICD-10-CM

## 2021-05-20 DIAGNOSIS — E27.40 ADRENAL INSUFFICIENCY (HCC): ICD-10-CM

## 2021-05-20 DIAGNOSIS — Z78.9 IMPAIRED MOBILITY AND ADLS: ICD-10-CM

## 2021-05-20 PROBLEM — E83.42 HYPOMAGNESEMIA: Status: ACTIVE | Noted: 2021-05-20

## 2021-05-20 PROBLEM — E83.39 HYPOPHOSPHATEMIA: Status: ACTIVE | Noted: 2021-05-20

## 2021-05-20 LAB
ALBUMIN SERPL BCP-MCNC: 3 G/DL (ref 3.2–4.9)
ALBUMIN/GLOB SERPL: 1 G/DL
ALP SERPL-CCNC: 66 U/L (ref 30–99)
ALT SERPL-CCNC: 18 U/L (ref 2–50)
ANION GAP SERPL CALC-SCNC: 9 MMOL/L (ref 7–16)
ANISOCYTOSIS BLD QL SMEAR: ABNORMAL
AST SERPL-CCNC: 22 U/L (ref 12–45)
BASOPHILS # BLD AUTO: 0.2 % (ref 0–1.8)
BASOPHILS # BLD: 0.02 K/UL (ref 0–0.12)
BILIRUB SERPL-MCNC: 1.1 MG/DL (ref 0.1–1.5)
BUN SERPL-MCNC: 12 MG/DL (ref 8–22)
CALCIUM SERPL-MCNC: 7.1 MG/DL (ref 8.5–10.5)
CHLORIDE SERPL-SCNC: 100 MMOL/L (ref 96–112)
CO2 SERPL-SCNC: 28 MMOL/L (ref 20–33)
COMMENT 1642: NORMAL
CREAT SERPL-MCNC: 0.81 MG/DL (ref 0.5–1.4)
EKG IMPRESSION: NORMAL
EOSINOPHIL # BLD AUTO: 0.02 K/UL (ref 0–0.51)
EOSINOPHIL NFR BLD: 0.2 % (ref 0–6.9)
ERYTHROCYTE [DISTWIDTH] IN BLOOD BY AUTOMATED COUNT: 80.4 FL (ref 35.9–50)
GLOBULIN SER CALC-MCNC: 2.9 G/DL (ref 1.9–3.5)
GLUCOSE SERPL-MCNC: 124 MG/DL (ref 65–99)
HCT VFR BLD AUTO: 29.9 % (ref 42–52)
HGB BLD-MCNC: 9.2 G/DL (ref 14–18)
IMM GRANULOCYTES # BLD AUTO: 0.07 K/UL (ref 0–0.11)
IMM GRANULOCYTES NFR BLD AUTO: 0.6 % (ref 0–0.9)
LYMPHOCYTES # BLD AUTO: 0.5 K/UL (ref 1–4.8)
LYMPHOCYTES NFR BLD: 4.3 % (ref 22–41)
MACROCYTES BLD QL SMEAR: ABNORMAL
MAGNESIUM SERPL-MCNC: 1.4 MG/DL (ref 1.5–2.5)
MAGNESIUM SERPL-MCNC: 1.7 MG/DL (ref 1.5–2.5)
MCH RBC QN AUTO: 29.6 PG (ref 27–33)
MCHC RBC AUTO-ENTMCNC: 30.8 G/DL (ref 33.7–35.3)
MCV RBC AUTO: 96.1 FL (ref 81.4–97.8)
MICROCYTES BLD QL SMEAR: ABNORMAL
MONOCYTES # BLD AUTO: 0.41 K/UL (ref 0–0.85)
MONOCYTES NFR BLD AUTO: 3.5 % (ref 0–13.4)
MORPHOLOGY BLD-IMP: NORMAL
NEUTROPHILS # BLD AUTO: 10.68 K/UL (ref 1.82–7.42)
NEUTROPHILS NFR BLD: 91.2 % (ref 44–72)
NRBC # BLD AUTO: 0 K/UL
NRBC BLD-RTO: 0 /100 WBC
NT-PROBNP SERPL IA-MCNC: 5740 PG/ML (ref 0–125)
PHOSPHATE SERPL-MCNC: 1.5 MG/DL (ref 2.5–4.5)
PHOSPHATE SERPL-MCNC: 2.2 MG/DL (ref 2.5–4.5)
PLATELET # BLD AUTO: 201 K/UL (ref 164–446)
PMV BLD AUTO: 10 FL (ref 9–12.9)
POLYCHROMASIA BLD QL SMEAR: NORMAL
POTASSIUM SERPL-SCNC: 3.3 MMOL/L (ref 3.6–5.5)
PROT SERPL-MCNC: 5.9 G/DL (ref 6–8.2)
RBC # BLD AUTO: 3.11 M/UL (ref 4.7–6.1)
RBC BLD AUTO: PRESENT
SODIUM SERPL-SCNC: 137 MMOL/L (ref 135–145)
WBC # BLD AUTO: 11.7 K/UL (ref 4.8–10.8)

## 2021-05-20 PROCEDURE — 84100 ASSAY OF PHOSPHORUS: CPT

## 2021-05-20 PROCEDURE — 73080 X-RAY EXAM OF ELBOW: CPT | Mod: LT

## 2021-05-20 PROCEDURE — 700111 HCHG RX REV CODE 636 W/ 250 OVERRIDE (IP): Performed by: EMERGENCY MEDICINE

## 2021-05-20 PROCEDURE — 96367 TX/PROPH/DG ADDL SEQ IV INF: CPT

## 2021-05-20 PROCEDURE — A9270 NON-COVERED ITEM OR SERVICE: HCPCS | Performed by: INTERNAL MEDICINE

## 2021-05-20 PROCEDURE — 700101 HCHG RX REV CODE 250: Performed by: EMERGENCY MEDICINE

## 2021-05-20 PROCEDURE — 96372 THER/PROPH/DIAG INJ SC/IM: CPT

## 2021-05-20 PROCEDURE — 85025 COMPLETE CBC W/AUTO DIFF WBC: CPT

## 2021-05-20 PROCEDURE — 83880 ASSAY OF NATRIURETIC PEPTIDE: CPT

## 2021-05-20 PROCEDURE — U0003 INFECTIOUS AGENT DETECTION BY NUCLEIC ACID (DNA OR RNA); SEVERE ACUTE RESPIRATORY SYNDROME CORONAVIRUS 2 (SARS-COV-2) (CORONAVIRUS DISEASE [COVID-19]), AMPLIFIED PROBE TECHNIQUE, MAKING USE OF HIGH THROUGHPUT TECHNOLOGIES AS DESCRIBED BY CMS-2020-01-R: HCPCS

## 2021-05-20 PROCEDURE — 93005 ELECTROCARDIOGRAM TRACING: CPT | Performed by: EMERGENCY MEDICINE

## 2021-05-20 PROCEDURE — 700102 HCHG RX REV CODE 250 W/ 637 OVERRIDE(OP): Performed by: INTERNAL MEDICINE

## 2021-05-20 PROCEDURE — 71045 X-RAY EXAM CHEST 1 VIEW: CPT

## 2021-05-20 PROCEDURE — 99220 PR INITIAL OBSERVATION CARE,LEVL III: CPT | Mod: AI | Performed by: INTERNAL MEDICINE

## 2021-05-20 PROCEDURE — 83735 ASSAY OF MAGNESIUM: CPT

## 2021-05-20 PROCEDURE — 96375 TX/PRO/DX INJ NEW DRUG ADDON: CPT

## 2021-05-20 PROCEDURE — 99285 EMERGENCY DEPT VISIT HI MDM: CPT

## 2021-05-20 PROCEDURE — 36415 COLL VENOUS BLD VENIPUNCTURE: CPT

## 2021-05-20 PROCEDURE — U0005 INFEC AGEN DETEC AMPLI PROBE: HCPCS

## 2021-05-20 PROCEDURE — G0378 HOSPITAL OBSERVATION PER HR: HCPCS

## 2021-05-20 PROCEDURE — 700101 HCHG RX REV CODE 250: Performed by: INTERNAL MEDICINE

## 2021-05-20 PROCEDURE — 93971 EXTREMITY STUDY: CPT | Mod: LT

## 2021-05-20 PROCEDURE — 93005 ELECTROCARDIOGRAM TRACING: CPT

## 2021-05-20 PROCEDURE — 700105 HCHG RX REV CODE 258: Performed by: INTERNAL MEDICINE

## 2021-05-20 PROCEDURE — 700105 HCHG RX REV CODE 258: Performed by: EMERGENCY MEDICINE

## 2021-05-20 PROCEDURE — 96368 THER/DIAG CONCURRENT INF: CPT

## 2021-05-20 PROCEDURE — 80053 COMPREHEN METABOLIC PANEL: CPT

## 2021-05-20 PROCEDURE — 96366 THER/PROPH/DIAG IV INF ADDON: CPT

## 2021-05-20 PROCEDURE — 96365 THER/PROPH/DIAG IV INF INIT: CPT

## 2021-05-20 RX ORDER — ALBUTEROL SULFATE 90 UG/1
2 AEROSOL, METERED RESPIRATORY (INHALATION) EVERY 4 HOURS PRN
Qty: 8.5 G | Refills: 0 | Status: ON HOLD | OUTPATIENT
Start: 2021-05-20 | End: 2021-09-26

## 2021-05-20 RX ORDER — TAMSULOSIN HYDROCHLORIDE 0.4 MG/1
0.4 CAPSULE ORAL DAILY
Status: DISCONTINUED | OUTPATIENT
Start: 2021-05-21 | End: 2021-06-19 | Stop reason: HOSPADM

## 2021-05-20 RX ORDER — ASPIRIN 81 MG/1
81 TABLET ORAL DAILY
Qty: 30 TABLET | Refills: 0 | Status: ON HOLD | OUTPATIENT
Start: 2021-05-20 | End: 2021-09-26

## 2021-05-20 RX ORDER — ALBUTEROL SULFATE 90 UG/1
2 AEROSOL, METERED RESPIRATORY (INHALATION) EVERY 4 HOURS PRN
Status: DISCONTINUED | OUTPATIENT
Start: 2021-05-20 | End: 2021-06-19 | Stop reason: HOSPADM

## 2021-05-20 RX ORDER — ATORVASTATIN CALCIUM 40 MG/1
80 TABLET, FILM COATED ORAL EVERY EVENING
Status: DISCONTINUED | OUTPATIENT
Start: 2021-05-20 | End: 2021-06-19 | Stop reason: HOSPADM

## 2021-05-20 RX ORDER — ACETAMINOPHEN 325 MG/1
650 TABLET ORAL EVERY 6 HOURS PRN
Status: DISCONTINUED | OUTPATIENT
Start: 2021-05-20 | End: 2021-06-19 | Stop reason: HOSPADM

## 2021-05-20 RX ORDER — ACETAMINOPHEN 325 MG/1
650 TABLET ORAL 3 TIMES DAILY
Status: ON HOLD | COMMUNITY
End: 2021-09-26

## 2021-05-20 RX ORDER — DOXYCYCLINE HYCLATE 100 MG
100 TABLET ORAL 2 TIMES DAILY
Status: ON HOLD | COMMUNITY
Start: 2021-05-18 | End: 2021-06-19

## 2021-05-20 RX ORDER — CLOPIDOGREL BISULFATE 75 MG/1
75 TABLET ORAL DAILY
Qty: 30 TABLET | Refills: 0 | Status: ON HOLD | OUTPATIENT
Start: 2021-05-20 | End: 2021-09-26

## 2021-05-20 RX ORDER — TAMSULOSIN HYDROCHLORIDE 0.4 MG/1
0.4 CAPSULE ORAL DAILY
Qty: 30 CAPSULE | Refills: 2 | Status: ON HOLD | OUTPATIENT
Start: 2021-05-20 | End: 2021-09-26

## 2021-05-20 RX ORDER — MORPHINE SULFATE 4 MG/ML
2 INJECTION, SOLUTION INTRAMUSCULAR; INTRAVENOUS
Status: DISCONTINUED | OUTPATIENT
Start: 2021-05-20 | End: 2021-06-19 | Stop reason: HOSPADM

## 2021-05-20 RX ORDER — OMEPRAZOLE 20 MG/1
20 CAPSULE, DELAYED RELEASE ORAL 2 TIMES DAILY
Qty: 30 CAPSULE | Refills: 0 | Status: ON HOLD | OUTPATIENT
Start: 2021-05-20 | End: 2021-09-26

## 2021-05-20 RX ORDER — OMEPRAZOLE 20 MG/1
20 CAPSULE, DELAYED RELEASE ORAL 2 TIMES DAILY
Status: DISCONTINUED | OUTPATIENT
Start: 2021-05-20 | End: 2021-06-19 | Stop reason: HOSPADM

## 2021-05-20 RX ORDER — ONDANSETRON 4 MG/1
4 TABLET, ORALLY DISINTEGRATING ORAL EVERY 4 HOURS PRN
Status: DISCONTINUED | OUTPATIENT
Start: 2021-05-20 | End: 2021-06-19 | Stop reason: HOSPADM

## 2021-05-20 RX ORDER — ATORVASTATIN CALCIUM 80 MG/1
80 TABLET, FILM COATED ORAL EVERY EVENING
Qty: 30 TABLET | Refills: 0 | Status: ON HOLD | OUTPATIENT
Start: 2021-05-20 | End: 2021-09-26

## 2021-05-20 RX ORDER — OXYCODONE HYDROCHLORIDE 5 MG/1
5 TABLET ORAL
Status: DISCONTINUED | OUTPATIENT
Start: 2021-05-20 | End: 2021-06-19 | Stop reason: HOSPADM

## 2021-05-20 RX ORDER — CARBOXYMETHYLCELLULOSE SODIUM 5 MG/ML
1 SOLUTION/ DROPS OPHTHALMIC PRN
Status: DISCONTINUED | OUTPATIENT
Start: 2021-05-20 | End: 2021-05-21

## 2021-05-20 RX ORDER — LEVOTHYROXINE SODIUM 0.07 MG/1
75 TABLET ORAL
Qty: 30 TABLET | Refills: 2 | Status: ON HOLD | OUTPATIENT
Start: 2021-05-20 | End: 2021-09-26

## 2021-05-20 RX ORDER — MAGNESIUM SULFATE HEPTAHYDRATE 40 MG/ML
2 INJECTION, SOLUTION INTRAVENOUS ONCE
Status: DISCONTINUED | OUTPATIENT
Start: 2021-05-20 | End: 2021-05-21

## 2021-05-20 RX ORDER — POLYETHYLENE GLYCOL 3350 17 G/17G
17 POWDER, FOR SOLUTION ORAL 2 TIMES DAILY
Qty: 30 EACH | Refills: 3 | Status: ON HOLD | OUTPATIENT
Start: 2021-05-20 | End: 2021-09-26

## 2021-05-20 RX ORDER — AMOXICILLIN 250 MG
2 CAPSULE ORAL 2 TIMES DAILY
Status: DISCONTINUED | OUTPATIENT
Start: 2021-05-20 | End: 2021-06-19 | Stop reason: HOSPADM

## 2021-05-20 RX ORDER — CLOPIDOGREL BISULFATE 75 MG/1
75 TABLET ORAL DAILY
Status: DISCONTINUED | OUTPATIENT
Start: 2021-05-21 | End: 2021-06-19 | Stop reason: HOSPADM

## 2021-05-20 RX ORDER — FUROSEMIDE 20 MG/1
20 TABLET ORAL 2 TIMES DAILY
Qty: 30 TABLET | Refills: 1 | Status: SHIPPED | OUTPATIENT
Start: 2021-05-20 | End: 2021-05-20 | Stop reason: SDUPTHER

## 2021-05-20 RX ORDER — FUROSEMIDE 10 MG/ML
40 INJECTION INTRAMUSCULAR; INTRAVENOUS ONCE
Status: COMPLETED | OUTPATIENT
Start: 2021-05-20 | End: 2021-05-20

## 2021-05-20 RX ORDER — AMOXICILLIN 250 MG
1 CAPSULE ORAL 2 TIMES DAILY
Qty: 30 TABLET | Refills: 0 | Status: ON HOLD | OUTPATIENT
Start: 2021-05-20 | End: 2021-09-26

## 2021-05-20 RX ORDER — BUDESONIDE AND FORMOTEROL FUMARATE DIHYDRATE 80; 4.5 UG/1; UG/1
2 AEROSOL RESPIRATORY (INHALATION) 2 TIMES DAILY
Qty: 1 EACH | Refills: 0 | Status: ON HOLD | OUTPATIENT
Start: 2021-05-20 | End: 2021-09-26

## 2021-05-20 RX ORDER — OXYCODONE HYDROCHLORIDE 5 MG/1
5 TABLET ORAL EVERY 4 HOURS PRN
Status: ON HOLD | COMMUNITY
End: 2021-06-19

## 2021-05-20 RX ORDER — LABETALOL HYDROCHLORIDE 5 MG/ML
10 INJECTION, SOLUTION INTRAVENOUS EVERY 4 HOURS PRN
Status: DISCONTINUED | OUTPATIENT
Start: 2021-05-20 | End: 2021-06-19 | Stop reason: HOSPADM

## 2021-05-20 RX ORDER — POTASSIUM CHLORIDE 20 MEQ/1
20 TABLET, EXTENDED RELEASE ORAL DAILY
Status: DISCONTINUED | OUTPATIENT
Start: 2021-05-21 | End: 2021-05-27

## 2021-05-20 RX ORDER — POLYETHYLENE GLYCOL 3350 17 G/17G
1 POWDER, FOR SOLUTION ORAL
Status: DISCONTINUED | OUTPATIENT
Start: 2021-05-20 | End: 2021-06-19 | Stop reason: HOSPADM

## 2021-05-20 RX ORDER — OXYCODONE HYDROCHLORIDE 5 MG/1
2.5 TABLET ORAL
Status: DISCONTINUED | OUTPATIENT
Start: 2021-05-20 | End: 2021-06-19 | Stop reason: HOSPADM

## 2021-05-20 RX ORDER — MAGNESIUM SULFATE HEPTAHYDRATE 40 MG/ML
2 INJECTION, SOLUTION INTRAVENOUS ONCE
Status: COMPLETED | OUTPATIENT
Start: 2021-05-20 | End: 2021-05-20

## 2021-05-20 RX ORDER — IBUPROFEN 200 MG
950 CAPSULE ORAL DAILY
Status: DISCONTINUED | OUTPATIENT
Start: 2021-05-21 | End: 2021-06-19 | Stop reason: HOSPADM

## 2021-05-20 RX ORDER — POTASSIUM CHLORIDE 20 MEQ/1
20 TABLET, EXTENDED RELEASE ORAL DAILY
Status: ON HOLD | COMMUNITY
End: 2021-06-19

## 2021-05-20 RX ORDER — FUROSEMIDE 20 MG/1
20 TABLET ORAL 2 TIMES DAILY
Qty: 30 TABLET | Refills: 1 | Status: ON HOLD | OUTPATIENT
Start: 2021-05-20 | End: 2021-09-26

## 2021-05-20 RX ORDER — BUDESONIDE AND FORMOTEROL FUMARATE DIHYDRATE 80; 4.5 UG/1; UG/1
2 AEROSOL RESPIRATORY (INHALATION) 2 TIMES DAILY
Status: DISCONTINUED | OUTPATIENT
Start: 2021-05-20 | End: 2021-06-19 | Stop reason: HOSPADM

## 2021-05-20 RX ORDER — BISACODYL 10 MG
10 SUPPOSITORY, RECTAL RECTAL
Status: DISCONTINUED | OUTPATIENT
Start: 2021-05-20 | End: 2021-06-19 | Stop reason: HOSPADM

## 2021-05-20 RX ORDER — IPRATROPIUM BROMIDE AND ALBUTEROL SULFATE 2.5; .5 MG/3ML; MG/3ML
3 SOLUTION RESPIRATORY (INHALATION) EVERY 6 HOURS PRN
Status: DISCONTINUED | OUTPATIENT
Start: 2021-05-20 | End: 2021-06-19 | Stop reason: HOSPADM

## 2021-05-20 RX ORDER — LEVOTHYROXINE SODIUM 0.07 MG/1
75 TABLET ORAL
Status: DISCONTINUED | OUTPATIENT
Start: 2021-05-21 | End: 2021-06-19 | Stop reason: HOSPADM

## 2021-05-20 RX ORDER — MONTELUKAST SODIUM 10 MG/1
10 TABLET ORAL DAILY
Status: DISCONTINUED | OUTPATIENT
Start: 2021-05-21 | End: 2021-06-19 | Stop reason: HOSPADM

## 2021-05-20 RX ORDER — ONDANSETRON 2 MG/ML
4 INJECTION INTRAMUSCULAR; INTRAVENOUS EVERY 4 HOURS PRN
Status: DISCONTINUED | OUTPATIENT
Start: 2021-05-20 | End: 2021-06-19 | Stop reason: HOSPADM

## 2021-05-20 RX ORDER — ALPRAZOLAM 0.25 MG/1
0.25 TABLET ORAL 4 TIMES DAILY PRN
Status: DISCONTINUED | OUTPATIENT
Start: 2021-05-20 | End: 2021-06-19 | Stop reason: HOSPADM

## 2021-05-20 RX ORDER — HYDROCORTISONE 20 MG/1
10 TABLET ORAL 3 TIMES DAILY
Status: DISCONTINUED | OUTPATIENT
Start: 2021-05-20 | End: 2021-05-27

## 2021-05-20 RX ORDER — FLUTICASONE PROPIONATE 50 MCG
1 SPRAY, SUSPENSION (ML) NASAL DAILY
Status: DISCONTINUED | OUTPATIENT
Start: 2021-05-21 | End: 2021-06-19 | Stop reason: HOSPADM

## 2021-05-20 RX ORDER — FUROSEMIDE 20 MG/1
20 TABLET ORAL 2 TIMES DAILY
Status: DISCONTINUED | OUTPATIENT
Start: 2021-05-20 | End: 2021-05-27

## 2021-05-20 RX ADMIN — FUROSEMIDE 40 MG: 10 INJECTION, SOLUTION INTRAMUSCULAR; INTRAVENOUS at 18:38

## 2021-05-20 RX ADMIN — ATORVASTATIN CALCIUM 80 MG: 40 TABLET, FILM COATED ORAL at 18:38

## 2021-05-20 RX ADMIN — ALPRAZOLAM 0.25 MG: 0.25 TABLET ORAL at 21:46

## 2021-05-20 RX ADMIN — MAGNESIUM SULFATE 2 G: 2 INJECTION INTRAVENOUS at 11:02

## 2021-05-20 RX ADMIN — BUDESONIDE AND FORMOTEROL FUMARATE DIHYDRATE 2 PUFF: 80; 4.5 AEROSOL RESPIRATORY (INHALATION) at 21:54

## 2021-05-20 RX ADMIN — POTASSIUM PHOSPHATE, MONOBASIC AND POTASSIUM PHOSPHATE, DIBASIC 15 MMOL: 224; 236 INJECTION, SOLUTION, CONCENTRATE INTRAVENOUS at 11:44

## 2021-05-20 RX ADMIN — POTASSIUM PHOSPHATE, MONOBASIC AND POTASSIUM PHOSPHATE, DIBASIC 15 MMOL: 224; 236 INJECTION, SOLUTION, CONCENTRATE INTRAVENOUS at 21:48

## 2021-05-20 RX ADMIN — TIOTROPIUM BROMIDE INHALATION SPRAY 5 MCG: 3.12 SPRAY, METERED RESPIRATORY (INHALATION) at 22:09

## 2021-05-20 RX ADMIN — HYDROCORTISONE 10 MG: 20 TABLET ORAL at 18:38

## 2021-05-20 RX ADMIN — OMEPRAZOLE 20 MG: 20 CAPSULE, DELAYED RELEASE ORAL at 18:38

## 2021-05-20 RX ADMIN — FUROSEMIDE 20 MG: 20 TABLET ORAL at 21:47

## 2021-05-20 ASSESSMENT — ENCOUNTER SYMPTOMS
PALPITATIONS: 0
COUGH: 0
SPUTUM PRODUCTION: 0
NAUSEA: 0
VOMITING: 0
FLANK PAIN: 0
BLURRED VISION: 0
FOCAL WEAKNESS: 0
SHORTNESS OF BREATH: 1
NERVOUS/ANXIOUS: 1
SPEECH CHANGE: 0
HALLUCINATIONS: 0
ORTHOPNEA: 0
PHOTOPHOBIA: 0
CHILLS: 0
BACK PAIN: 0
WEIGHT LOSS: 0
TREMORS: 0
HEADACHES: 0
DOUBLE VISION: 0
HEARTBURN: 0
POLYDIPSIA: 0
FEVER: 0
NECK PAIN: 0
HEMOPTYSIS: 0
BRUISES/BLEEDS EASILY: 0

## 2021-05-20 ASSESSMENT — COGNITIVE AND FUNCTIONAL STATUS - GENERAL
WALKING IN HOSPITAL ROOM: TOTAL
MOBILITY SCORE: 6
CLIMB 3 TO 5 STEPS WITH RAILING: TOTAL
TURNING FROM BACK TO SIDE WHILE IN FLAT BAD: UNABLE
DRESSING REGULAR LOWER BODY CLOTHING: TOTAL
MOVING TO AND FROM BED TO CHAIR: UNABLE
DAILY ACTIVITIY SCORE: 8
MOVING FROM LYING ON BACK TO SITTING ON SIDE OF FLAT BED: UNABLE
HELP NEEDED FOR BATHING: TOTAL
PERSONAL GROOMING: TOTAL
TOILETING: TOTAL
SUGGESTED CMS G CODE MODIFIER DAILY ACTIVITY: CM
DRESSING REGULAR UPPER BODY CLOTHING: TOTAL
EATING MEALS: A LITTLE
SUGGESTED CMS G CODE MODIFIER MOBILITY: CN
STANDING UP FROM CHAIR USING ARMS: TOTAL

## 2021-05-20 ASSESSMENT — FIBROSIS 4 INDEX
FIB4 SCORE: 2.27
FIB4 SCORE: 1.42

## 2021-05-20 ASSESSMENT — LIFESTYLE VARIABLES
ALCOHOL_USE: NO
TOTAL SCORE: 0
DO YOU DRINK ALCOHOL: NO
EVER FELT BAD OR GUILTY ABOUT YOUR DRINKING: NO
SUBSTANCE_ABUSE: 0
HAVE PEOPLE ANNOYED YOU BY CRITICIZING YOUR DRINKING: NO
EVER HAD A DRINK FIRST THING IN THE MORNING TO STEADY YOUR NERVES TO GET RID OF A HANGOVER: NO
DOES PATIENT WANT TO STOP DRINKING: NO
CONSUMPTION TOTAL: NEGATIVE
TOTAL SCORE: 0
DOES PATIENT WANT TO STOP DRINKING: CANNOT ASSESS
ON A TYPICAL DAY WHEN YOU DRINK ALCOHOL HOW MANY DRINKS DO YOU HAVE: 0
HOW MANY TIMES IN THE PAST YEAR HAVE YOU HAD 5 OR MORE DRINKS IN A DAY: 0
TOTAL SCORE: 0
HAVE YOU EVER FELT YOU SHOULD CUT DOWN ON YOUR DRINKING: NO
AVERAGE NUMBER OF DAYS PER WEEK YOU HAVE A DRINK CONTAINING ALCOHOL: 0

## 2021-05-20 ASSESSMENT — PAIN DESCRIPTION - PAIN TYPE
TYPE: ACUTE PAIN
TYPE: ACUTE PAIN

## 2021-05-20 NOTE — ED NOTES
Spoke with pts wife via telephone.  Wife is concerned about pt returning to [facility] and would like pt transferred directly to [facility]

## 2021-05-20 NOTE — ED NOTES
Med rec complete per MAR from transferring facility ([facility]). Allergies reviewed. Pt was started on a 7 day course of doxycycline 100 mg 1 cap twice daily on 05/18/21 and most recent dose was this morning.

## 2021-05-20 NOTE — ASSESSMENT & PLAN NOTE
Continue RT protocol, breathing treatment   --Titrate O2 as needed    -- ON 4 L of O2  And denied any complain of sob

## 2021-05-20 NOTE — ASSESSMENT & PLAN NOTE
Warfarin was discontinued during previous recent hospitalization due to blood loss anemia, patient knows   monitor hemoglobin hematocrit, so far stabel  Transfuse if less than 7  Recent CT of the chest done on 5/8/2021 was negative for PE.

## 2021-05-20 NOTE — H&P
Hospital Medicine History & Physical Note    Date of Service  5/20/2021    Primary Care Physician  Bebe Banerjee M.D.    Consultants  None    Code Status  DNAR, I OK    Chief Complaint  Chief Complaint   Patient presents with   • Peripheral Edema     [facility], hypokalemia       History of Presenting Illness  88 y.o. male with complex medical history, including history of DVT/PE, chronic anticoagulation with Coumadin, CAD, stent placement 1 month ago, chronic adrenal insufficiency, chronic hypoxia on 5 L, recent admission to this hospital with hypotension with ACS and rectus sheath hematoma with acute anemia requiring coil embolization of right inferior epigastric artery by interventional radiology on 5/5 and blood transfusion, who presented 5/20/2021 with with hypokalemia.  He was found to the emergency department by [facility].  All patient symptoms, including chronic lower extremity edema, chronic shortness of breath are stable.  In ER here found to have low potassium, magnesium and phosphorus, which were replaced.  [facility], however [facility] no [facility] have a [facility] for him and patient does not want to [facility].  His wife has broken hip and unable to assist him at home.  The patient himself is severely debilitated and nonambulatory.  Therefore decision was made to admit him to the hospital for placement to SNF.  He would like to go home, but he understands his limitation and the fact that his wife could not help him with fecal incontinence that he experiences.    Review of Systems  Review of Systems   Constitutional: Negative for chills, fever and weight loss.   HENT: Negative for ear pain, hearing loss and tinnitus.    Eyes: Negative for blurred vision, double vision and photophobia.   Respiratory: Positive for shortness of breath. Negative for cough, hemoptysis and sputum production.    Cardiovascular: Positive for leg swelling.  Negative for chest pain, palpitations and orthopnea.   Gastrointestinal: Negative for heartburn, nausea and vomiting.   Genitourinary: Negative for dysuria, flank pain, frequency and hematuria.   Musculoskeletal: Negative for back pain, joint pain and neck pain.   Skin: Negative for itching and rash.   Neurological: Negative for tremors, speech change, focal weakness and headaches.   Endo/Heme/Allergies: Negative for environmental allergies and polydipsia. Does not bruise/bleed easily.   Psychiatric/Behavioral: Negative for hallucinations and substance abuse. The patient is nervous/anxious.        Past Medical History   has a past medical history of Anesthesia, Arrhythmia, Arthritis, Asbestos exposure, ASTHMA, Back pain, Benign neoplasm of pituitary gland and craniopharyngeal duct (pouch) ([facility]) (4/1/2010), BPH (benign prostatic hypertrophy) (4/1/2010), Bradycardia, Breath shortness, Bronchitis, Cardiac pacemaker (04 2010), Cataract, COPD (chronic obstructive pulmonary disease) (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) (8/4/2011), PAF (paroxysmal atrial fibrillation) ([facility]), Paroxysmal atrial fibrillation ([facility]) (8/29/2011), PE (pulmonary embolism), Phlebitis, Pituitary adenoma ([facility]) (1995), Pituitary adenoma ([facility]) (1995), Pneumonia, Rheumatic fever, S/P insertion of IVC (inferior vena caval) filter, S/P parathyroidectomy ([facility]), S/P parathyroidectomy ([facility]) (2005), Sleep apnea, SSS (sick sinus syndrome) ([facility]) (8/29/2011), Third degree AV block ([facility]) (8/29/2011), thyroidectomy (2005), Unspecified disorder of thyroid, Unspecified hemorrhagic conditions, Unspecified urinary incontinence, and Urinary bladder disorder.    Surgical History   has a past surgical history that includes other orthopedic surgery;  cholecystectomy; pr total knee arthroplasty; other; cervical disk and fusion anterior; pr revise ulnar nerve at wrist; pr total knee arthroplasty; cataract extraction with iol; thyroidectomy; pacemaker insertion (Left, 04/23/2010); carpal tunnel endoscopic (9/30/2011); knee revision total (6/20/2013); cholecystectomy; cervical fusion posterior; gastroscopy-endo (1/21/2019); gastroscopy-endo (1/22/2019); and other cardiac surgery.     Family History  family history includes Arthritis in his father and mother.     Social History   reports that he has never smoked. He has never used smokeless tobacco. He reports that he does not drink alcohol and does not use drugs.    Allergies  Allergies   Allergen Reactions   • Lisinopril      Hypotension, 30 mg dose   • Pcn [Penicillins] Rash     Tolerates cephalosporins         Medications  Prior to Admission Medications   Prescriptions Last Dose Informant Patient Reported? Taking?   Umeclidinium Bromide (INCRUSE ELLIPTA) 62.5 MCG/INH AEROSOL POWDER, BREATH ACTIVATED 5/20/2021 at 0850 MAR from Other Facility Yes Yes   Sig: Inhale 1 Puff every day.   acetaminophen (TYLENOL) 325 MG Tab 5/20/2021 at 0850 MAR from Other Facility Yes Yes   Sig: Take 650 mg by mouth 3 times a day.   albuterol 108 (90 Base) MCG/ACT Aero Soln inhalation aerosol   No No   Sig: Inhale 2 Puffs every four hours as needed for Shortness of Breath.   albuterol 108 (90 Base) MCG/ACT Aero Soln inhalation aerosol Not Taking at Unknown time MAR from Other Facility No No   Sig: Inhale 2 Puffs every four hours as needed for Shortness of Breath.   Patient not taking: Reported on 5/20/2021   aspirin 81 MG EC tablet 5/20/2021 at 0850 MAR from Other Facility No Yes   Sig: Take 1 tablet by mouth every day.   aspirin EC 81 MG EC tablet   No No   Sig: Take 1 tablet by mouth every day.   atorvastatin (LIPITOR) 80 MG tablet   No No   Sig: Take 1 tablet by mouth every evening.   atorvastatin (LIPITOR) 80 MG tablet 5/19/2021 at  2145 MAR from Other Facility No Yes   Sig: Take 1 tablet by mouth every evening.   budesonide-formoterol (SYMBICORT) 80-4.5 MCG/ACT Aerosol   Yes No   Sig: Inhale 2 Puffs 2 times a day.   budesonide-formoterol (SYMBICORT) 80-4.5 MCG/ACT Aerosol Not Taking at Unknown time MAR from Other Facility No No   Sig: Inhale 2 Puffs 2 times a day.   Patient not taking: Reported on 5/20/2021   calcium citrate (CALCITRATE) 950 (200 Ca) MG Tab 5/20/2021 at 0850 MAR from Other Facility No No   Sig: Take 1 tablet by mouth every day.   clopidogrel (PLAVIX) 75 MG Tab   No No   Sig: Take 1 tablet by mouth every day.   clopidogrel (PLAVIX) 75 MG Tab 5/20/2021 at 0850 MAR from Other Facility No Yes   Sig: Take 1 tablet by mouth every day.   doxycycline (VIBRAMYCIN) 100 MG Tab 5/20/2021 at 0850 MAR from Other Facility Yes Yes   Sig: Take 100 mg by mouth 2 times a day. 7 day course   fluticasone (FLONASE) 50 MCG/ACT nasal spray 5/20/2021 at 0850 MAR from Other Facility Yes No   Sig: Administer 1 Spray into affected nostril(S) every day.   fluticasone-salmeterol (WIXELA INHUB) 100-50 MCG/DOSE AEROSOL POWDER, BREATH ACTIVATED 5/20/2021 at 0850 MAR from Other Facility Yes Yes   Sig: Inhale 1 Puff every 12 hours.   furosemide (LASIX) 20 MG Tab   No No   Sig: Take 1 tablet by mouth 2 times a day.   furosemide (LASIX) 20 MG Tab 5/20/2021 at 0850 MAR from Other Facility No Yes   Sig: Take 2 tablets by mouth IN THE MORNING and 1 tab in the afternoon.   Patient taking differently: Take 20 mg by mouth 2 times a day. Takes 1 tab twice daily   hydrocortisone (CORTEF) 10 MG Tab 5/20/2021 at 0850 MAR from Other Facility No No   Sig: Take 1 Tab by mouth 3 times a day.   ipratropium-albuterol (DUONEB) 0.5-2.5 (3) MG/3ML nebulizer solution 5/20/2021 at 0850 MAR from Other Facility No No   Sig: Take 3 mL by nebulization every 6 hours as needed for Shortness of Breath.   levothyroxine (SYNTHROID) 75 MCG Tab  Patient No No   Sig: Take 1 Tab by mouth Every  morning on an empty stomach.   levothyroxine (SYNTHROID) 75 MCG Tab 5/20/2021 at 0555 MAR from Other Facility No Yes   Sig: Take 1 tablet by mouth every morning on an empty stomach.   lidocaine (LIDODERM) 5 % Patch 5/19/2021 at 2150 applied MAR from Other Facility Yes No   Sig: Place 1 Patch on the skin every 12 hours. Off for 12 hour    Apply to left rib cage and upper back   montelukast (SINGULAIR) 10 MG Tab 5/20/2021 at 0850 MAR from Other Facility No No   Sig: Take 1 Tab by mouth every day.   omeprazole (PRILOSEC) 20 MG delayed-release capsule   No No   Sig: Take 1 capsule by mouth 2 times a day.   omeprazole (PRILOSEC) 20 MG delayed-release capsule 5/20/2021 at 0850 MAR from Other Facility No Yes   Sig: Take 1 capsule by mouth 2 times a day.   oxyCODONE immediate-release (ROXICODONE) 5 MG Tab 5/19/2021 at 2240 MAR from Other Facility Yes Yes   Sig: Take 5 mg by mouth every four hours as needed for Severe Pain.   phosphorus (K-PHOS-NEUTRAL) 250 MG tablet   No No   Sig: Take 1 tablet by mouth 2 times a day.   phosphorus (K-PHOS-NEUTRAL) 250 MG tablet 5/20/2021 at 0850 MAR from Other Facility No Yes   Sig: Take 1 tablet by mouth 2 times a day.   polyethylene glycol/lytes (MIRALAX) 17 g Pack   No No   Sig: Mix 1 Packet in liquid as directed and take by mouth 2 times a day.   polyethylene glycol/lytes (MIRALAX) 17 g Pack 5/18/2021 at 1100 MAR from Other Facility No Yes   Sig: Mix 1 Packet in liquid as directed and take by mouth 2 times a day.   potassium chloride SA (KDUR) 20 MEQ Tab CR 5/20/2021 at 0850 MAR from Other Facility Yes Yes   Sig: Take 20 mEq by mouth every day.   senna-docusate (PERICOLACE OR SENOKOT S) 8.6-50 MG Tab   Yes No   Sig: Take 1 tablet by mouth 2 times a day.   senna-docusate (PERICOLACE OR SENOKOT S) 8.6-50 MG Tab 5/18/2021 at 1100 MAR from Other Facility No Yes   Sig: Take 1 tablet by mouth 2 times a day.   tamsulosin (FLOMAX) 0.4 MG capsule  Patient No No   Sig: Take 1 Cap by mouth every  day.   tamsulosin (FLOMAX) 0.4 MG capsule 5/20/2021 at 0850 MAR from Other Facility No Yes   Sig: Take 1 capsule by mouth every day.   tiotropium (SPIRIVA) 18 MCG Cap Not Taking at Unknown time MAR from Other Facility No No   Sig: Inhale 1 Cap by mouth every day.   Patient not taking: Reported on 5/20/2021      Facility-Administered Medications: None       Physical Exam  Temp:  [36.3 °C (97.3 °F)] 36.3 °C (97.3 °F)  Pulse:  [] 96  Resp:  [18-24] 24  BP: (121-146)/(63-94) 146/73  SpO2:  [91 %-98 %] 91 %    Physical Exam  Vitals and nursing note reviewed.   Constitutional:       General: He is not in acute distress.     Appearance: Normal appearance.   HENT:      Head: Normocephalic and atraumatic.      Nose: Nose normal.      Mouth/Throat:      Mouth: Mucous membranes are moist.   Eyes:      Extraocular Movements: Extraocular movements intact.      Pupils: Pupils are equal, round, and reactive to light.   Cardiovascular:      Rate and Rhythm: Normal rate and regular rhythm.   Pulmonary:      Effort: Pulmonary effort is normal.      Breath sounds: Rales present.      Comments: Mild dyspnea  Abdominal:      General: Abdomen is flat. There is no distension.      Tenderness: There is no abdominal tenderness.   Musculoskeletal:         General: No swelling or deformity. Normal range of motion.      Cervical back: Normal range of motion and neck supple.      Right lower leg: Edema present.      Left lower leg: Edema present.   Skin:     General: Skin is warm and dry.   Neurological:      General: No focal deficit present.      Mental Status: He is alert and oriented to person, place, and time.      Motor: Tremor present.      Comments: Generalized weakness   Psychiatric:         Mood and Affect: Mood is anxious.         Cognition and Memory: Cognition is impaired.         Laboratory:  Recent Labs     05/20/21  0956   WBC 11.7*   RBC 3.11*   HEMOGLOBIN 9.2*   HEMATOCRIT 29.9*   MCV 96.1   MCH 29.6   MCHC 30.8*   RDW  80.4*   PLATELETCT 201   MPV 10.0     Recent Labs     05/20/21  0956   SODIUM 137   POTASSIUM 3.3*   CHLORIDE 100   CO2 28   GLUCOSE 124*   BUN 12   CREATININE 0.81   CALCIUM 7.1*     Recent Labs     05/20/21  0956   ALTSGPT 18   ASTSGOT 22   ALKPHOSPHAT 66   TBILIRUBIN 1.1   GLUCOSE 124*         Recent Labs     05/20/21  0956   NTPROBNP 5740*         No results for input(s): TROPONINT in the last 72 hours.    Imaging:  US-EXTREMITY VENOUS LOWER UNILAT LEFT   Final Result      DX-CHEST-PORTABLE (1 VIEW)   Final Result      Bibasilar opacities may represent atelectasis or consolidation.      Small left pleural effusion may be present.      Pulmonary interstitial edema.      Stable prominence of the cardiomediastinal silhouette.      Atherosclerotic calcification is seen.      DX-ELBOW-COMPLETE 3+ LEFT   Final Result      1.  No fracture or dislocation of LEFT elbow.   2.  Severe degenerative change.   3.  Diffuse subcutaneous edema.   4.  Limited by positioning.            Assessment/Plan:  I anticipate this patient is appropriate for observation status at this time.    Hypokalemia- (present on admission)  Assessment & Plan  Replaced p.o.    Debility- (present on admission)  Assessment & Plan  PT OT evaluation, placement    Anasarca- (present on admission)  Assessment & Plan  Continue p.o. Lasix    Adrenal insufficiency (HCC)- (present on admission)  Assessment & Plan  Resume hydrocortisone    History of pulmonary embolism- (present on admission)  Assessment & Plan  Warfarin was discontinued during previous recent hospitalization due to blood loss anemia    Hypophosphatemia  Assessment & Plan  Replaced IV with potassium phosphate    Hypomagnesemia  Assessment & Plan  Replaced IV    Chronic respiratory failure (HCC)- (present on admission)  Assessment & Plan  Continue supplemental oxygen    BPH (benign prostatic hypertrophy)- (present on admission)  Assessment & Plan  Resume Flomax    COPD (chronic obstructive  pulmonary disease) (HCC)- (present on admission)  Assessment & Plan  Stable  Continue home inhalers  Albuterol and DuoNeb as needed    Essential hypertension, benign- (present on admission)  Assessment & Plan  Blood pressure is stable  Labetalol as needed

## 2021-05-20 NOTE — DISCHARGE PLANNING
MSW received notification from JARET Phan that the transfer center spoke to [facility] and they declined the transfer. MSW called and updated pt's wife Jacquie. Jacquie became upset at [facility] and stated she will get a .     MSW received call from CNO from [facility]. They wanted to speak with pt regarding returning. MSW put pt on speaker with Kierra. Kierra asked pt about returning to [facility]. Pt stated he does not want to return. Pt wants to get back home or go to [facility] or long term care.     MSW met with pt with pt's wife on speaker phone. They both agreed that they wanted pt to return home. They both understood the risks of pt returning home at this time. MSW explained process to get pt home. Pt and pt's wife understood that they would need to pay for transport home. MSW to call [facility] for transportation.     Shortly after conversation pt expressed to bedside RN that he does not want to go home. Pt's wife cannot care for pt. MSW and ERP met with pt. ERP to admit pt to find another placement for pt. Pt expressed that he knows his wife cannot care for him. Pt wants to go to [place] for a long term bed. Pt states he should already have a bed waiting for him. MSW called [facility] ([phone]) and left  for their admissions department. MSW also faxed choice to [facility].     MSW called and updated pt's wife Jacquie that does not want to return home. Jacquie requested MSW to call pt's son Arya. MSW called and updated pt's son Arya. Arya is understanding that pt needs more care. He will be up to visit pt tomorrow.

## 2021-05-20 NOTE — ED PROVIDER NOTES
ED Provider Note    CHIEF COMPLAINT  Chief Complaint   Patient presents with   • Peripheral Edema     pt from [facility], hypokalemia       HPI  Jeffrey Lizama is a 88 y.o. male with hx of Afib, PE, heart block s/p pacemaker placement, parathyroidectomy, CAD, heart failure with 45% EF, adrenal insufficiency here sent from his rehab facility for hypokalemia.  Patient has ongoing complaints of peripheral edema which he reports is stable from when he was last admitted earlier this month.  He has ongoing left elbow pain, patient had an ultrasound of his left upper extremity which was unremarkable within the last 10 days.  Patient denies any worsening shortness of breath, he has chronic shortness of breath and is on 5 L of supplemental oxygen by nasal cannula, this has not had to be increased.  Patient denies any associated chest pain or abdominal pain.  Patient denies any nausea vomiting or diarrhea.  Patient denies any fevers or constitutional symptoms.    REVIEW OF SYSTEMS  ROS    See HPI for further details. All other systems are negative.     PAST MEDICAL HISTORY   has a past medical history of Anesthesia, Arrhythmia, Arthritis, Asbestos exposure, ASTHMA, Back pain, Benign neoplasm of pituitary gland and craniopharyngeal duct (pouch) (Formerly [facility]) (4/1/2010), BPH (benign prostatic hypertrophy) (4/1/2010), Bradycardia, Breath shortness, Bronchitis, Cardiac pacemaker (04 2010), Cataract, COPD (chronic obstructive pulmonary disease) (Formerly [facility]), Diverticulitis, DJD (degenerative joint disease), EMPHYSEMA, Glaucoma, Heart burn, Heart murmur, Hiatus hernia syndrome, Hypertension, 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), Indigestion, Knee arthroplasty (2002), Obstructive hypertrophic cardiomyopathy (Formerly [facility]) (8/4/2011), PAF (paroxysmal atrial fibrillation) (Formerly [facility]), Paroxysmal atrial fibrillation (Formerly [facility]) (8/29/2011), PE (pulmonary embolism), Phlebitis, Pituitary adenoma (Formerly [facility]) (1995), Pituitary adenoma (Formerly [facility]) (1995), Pneumonia, Rheumatic  fever, S/P insertion of IVC (inferior vena caval) filter, S/P parathyroidectomy (Formerly [facility]), S/P parathyroidectomy (Formerly [facility]) (2005), Sleep apnea, SSS (sick sinus syndrome) (Formerly [facility]) (8/29/2011), Third degree AV block (Formerly [facility]) (8/29/2011), thyroidectomy (2005), Unspecified disorder of thyroid, Unspecified hemorrhagic conditions, Unspecified urinary incontinence, and Urinary bladder disorder.    SOCIAL HISTORY  Social History     Tobacco Use   • Smoking status: Never Smoker   • Smokeless tobacco: Never Used   Substance and Sexual Activity   • Alcohol use: No   • Drug use: No   • Sexual activity: Not on file       SURGICAL HISTORY   has a past surgical history that includes other orthopedic surgery; cholecystectomy; total knee arthroplasty; other; cervical disk and fusion anterior; revise ulnar nerve at wrist; total knee arthroplasty; cataract extraction with iol; thyroidectomy; pacemaker insertion (Left, 04/23/2010); carpal tunnel endoscopic (9/30/2011); knee revision total (6/20/2013); cholecystectomy; cervical fusion posterior; gastroscopy-endo (1/21/2019); gastroscopy-endo (1/22/2019); and other cardiac surgery.    CURRENT MEDICATIONS  Home Medications    **Home medications have not yet been reviewed for this encounter**         ALLERGIES  Allergies   Allergen Reactions   • Lisinopril      Hypotension, 30 mg dose   • Pcn [Penicillins] Rash     Tolerates cephalosporins         PHYSICAL EXAM  Vitals:    05/20/21 0954   BP: 132/63   Pulse: 90   Resp: 20   Temp: 36.3 °C (97.3 °F)   SpO2: 96%       Physical Exam  Constitutional:       Appearance: He is well-developed.   HENT:      Head: Normocephalic and atraumatic.   Eyes:      Conjunctiva/sclera: Conjunctivae normal.      Pupils: Pupils are equal, round, and reactive to light.   Cardiovascular:      Rate and Rhythm: Normal rate and regular rhythm.      Heart sounds: No murmur heard.   No friction rub. No gallop.       Comments: Bilateral peripheral edema in lower extremity, 1+ to mid  calfs.  Left upper extremity with 2+ edema of forearm and wrist without any overlying skin changes.  Distal pulses are 2+.  Pulmonary:      Effort: Pulmonary effort is normal. No respiratory distress.      Breath sounds: Normal breath sounds. No wheezing.   Abdominal:      General: Bowel sounds are normal. There is no distension.      Palpations: Abdomen is soft.      Tenderness: There is no abdominal tenderness. There is no rebound.   Musculoskeletal:      Cervical back: Normal range of motion and neck supple.   Skin:     General: Skin is warm and dry.   Neurological:      Mental Status: He is alert and oriented to person, place, and time.   Psychiatric:         Behavior: Behavior normal.           DIAGNOSTIC STUDIES / PROCEDURES    EKG  See below    LABS  Results for orders placed or performed during the hospital encounter of 05/20/21   CBC WITH DIFFERENTIAL   Result Value Ref Range    WBC 11.7 (H) 4.8 - 10.8 K/uL    RBC 3.11 (L) 4.70 - 6.10 M/uL    Hemoglobin 9.2 (L) 14.0 - 18.0 g/dL    Hematocrit 29.9 (L) 42.0 - 52.0 %    MCV 96.1 81.4 - 97.8 fL    MCH 29.6 27.0 - 33.0 pg    MCHC 30.8 (L) 33.7 - 35.3 g/dL    RDW 80.4 (H) 35.9 - 50.0 fL    Platelet Count 201 164 - 446 K/uL    MPV 10.0 9.0 - 12.9 fL    Neutrophils-Polys 91.20 (H) 44.00 - 72.00 %    Lymphocytes 4.30 (L) 22.00 - 41.00 %    Monocytes 3.50 0.00 - 13.40 %    Eosinophils 0.20 0.00 - 6.90 %    Basophils 0.20 0.00 - 1.80 %    Immature Granulocytes 0.60 0.00 - 0.90 %    Nucleated RBC 0.00 /100 WBC    Neutrophils (Absolute) 10.68 (H) 1.82 - 7.42 K/uL    Lymphs (Absolute) 0.50 (L) 1.00 - 4.80 K/uL    Monos (Absolute) 0.41 0.00 - 0.85 K/uL    Eos (Absolute) 0.02 0.00 - 0.51 K/uL    Baso (Absolute) 0.02 0.00 - 0.12 K/uL    Immature Granulocytes (abs) 0.07 0.00 - 0.11 K/uL    NRBC (Absolute) 0.00 K/uL    Anisocytosis 2+ (A)     Macrocytosis 1+     Microcytosis 1+    CMP   Result Value Ref Range    Sodium 137 135 - 145 mmol/L    Potassium 3.3 (L) 3.6 - 5.5  mmol/L    Chloride 100 96 - 112 mmol/L    Co2 28 20 - 33 mmol/L    Anion Gap 9.0 7.0 - 16.0    Glucose 124 (H) 65 - 99 mg/dL    Bun 12 8 - 22 mg/dL    Creatinine 0.81 0.50 - 1.40 mg/dL    Calcium 7.1 (L) 8.5 - 10.5 mg/dL    AST(SGOT) 22 12 - 45 U/L    ALT(SGPT) 18 2 - 50 U/L    Alkaline Phosphatase 66 30 - 99 U/L    Total Bilirubin 1.1 0.1 - 1.5 mg/dL    Albumin 3.0 (L) 3.2 - 4.9 g/dL    Total Protein 5.9 (L) 6.0 - 8.2 g/dL    Globulin 2.9 1.9 - 3.5 g/dL    A-G Ratio 1.0 g/dL   MAGNESIUM   Result Value Ref Range    Magnesium 1.4 (L) 1.5 - 2.5 mg/dL   PHOSPHORUS   Result Value Ref Range    Phosphorus 1.5 (L) 2.5 - 4.5 mg/dL   proBrain Natriuretic Peptide, NT   Result Value Ref Range    NT-proBNP 5740 (H) 0 - 125 pg/mL   ESTIMATED GFR   Result Value Ref Range    GFR If African American >60 >60 mL/min/1.73 m 2    GFR If Non African American >60 >60 mL/min/1.73 m 2   PERIPHERAL SMEAR REVIEW   Result Value Ref Range    Peripheral Smear Review see below    MORPHOLOGY   Result Value Ref Range    RBC Morphology Present     Polychromia 1+    DIFFERENTIAL COMMENT   Result Value Ref Range    Comments-Diff see below    EKG (NOW)   Result Value Ref Range    Report       [facility] Emergency Dept.    Test Date:  2021  Pt Name:    MANOLO AUGUST                 Department: ER  MRN:        [id number]                      Room:       [facility]  Gender:     Male                         Technician: [id number]  :        1932                   Requested By:ER TRIAGE PROTOCOL  Order #:    [id number]                    Reading MD: Jamil Regalado MD    Measurements  Intervals                                Axis  Rate:       98                           P:          -58  NY:         162                          QRS:        -30  QRSD:       146                          T:          157  QT:         404  QTc:        516    Interpretive Statements  EKG is vent paced rhythm, L bundle morphology with occational PVCs,  unchanged  outside of PVCs  Electronically Signed On 5- 10:02:52 PDT by Jamil Regalado MD           RADIOLOGY  US-EXTREMITY VENOUS LOWER UNILAT LEFT   Final Result      DX-CHEST-PORTABLE (1 VIEW)   Final Result      Bibasilar opacities may represent atelectasis or consolidation.      Small left pleural effusion may be present.      Pulmonary interstitial edema.      Stable prominence of the cardiomediastinal silhouette.      Atherosclerotic calcification is seen.      DX-ELBOW-COMPLETE 3+ LEFT   Final Result      1.  No fracture or dislocation of LEFT elbow.   2.  Severe degenerative change.   3.  Diffuse subcutaneous edema.   4.  Limited by positioning.              COURSE & MEDICAL DECISION MAKING  Pertinent Labs & Imaging studies reviewed. (See chart for details)    Well-appearing patient here sent from nursing home for treatment and management of possible hyperkalemia.  Will check basic labs here.  Patient's exam and complaints do not appear significantly changed from when he was here within the last week.  At this point he does not appear to be in any severe heart failure exacerbation from his baseline.  Patient's EKG shows some increased PVCs which may be secondary to electrolyte abnormality, I have also added a phosphorus and magnesium.    Patient basic labs reveal some very minimal hypokalemia, hypophosphatemia and hypomagnesia.  These will be repleted.  Patient also does appear to have a very mild exacerbation of his heart failure as he does have edema on his chest x-ray and his BNP is mildly elevated.  I did believe that patient was most appropriate for outpatient management of this exacerbation however the nursing facility that sent patient here has discharged him from their care no longer have any beds available for him, his wife initially wanted patient discharged to her care which patient initially agreed upon although we were very hesitant about this given patient's wife is elderly and patient  does require some assistance with ambulation; prior to discharge patient reports that he no longer wants to be discharged as he does not feel like this would be a safe plan, therefore patient will be admitted for placement.  He has been given some Lasix.  I discussed the case with hospitalist who has agreed to admit.     The patient will return for worsening symptoms and is stable at the time of discharge. The patient verbalizes understanding and will comply.    FINAL IMPRESSION  1.  Volume overload, hypo phosphatemia, chronic weakness      Electronically signed by: Fabricio Negron M.D., 5/20/2021 10:03 AM

## 2021-05-20 NOTE — ED TRIAGE NOTES
Chief Complaint   Patient presents with   • Peripheral Edema     pt from [facility], hypokalemia

## 2021-05-20 NOTE — ASSESSMENT & PLAN NOTE
At baseline for now.  Continue supplemental oxygen, on 4 to 5 L  Encourage incentive spirometry as atelectasis seen on chest x-ray.

## 2021-05-20 NOTE — DISCHARGE PLANNING
MSW spoke to Violet at [facility]. She spoke with pt's wife and she does not want pt to return to [facility]. MSW called pt's wife Jacquie. Jacquie wants pt transferred to [facility] and does want pt to go back to a SNF. MSW explained process and that pt's insurance and faciltiy may not accept pt and pay for transfer. Jacquie stated that insurance would pay as pt has Medicare and [employer]. MSW explained to Jacquie that MSW will initiate  transfer. MSW asked Jacquie that if pt unable to transfer then what would she like to do. Jacquie stated she would like to to come home. MSW will call Jacquie back after speaking with [facility].     MSW called Rosario at [facility] to start transfer process.

## 2021-05-20 NOTE — DISCHARGE PLANNING
MSW spoke to Violet at [facility] regarding pt's direct transfer to [facility]. Violet to call MSW back after she speaks with their .

## 2021-05-20 NOTE — PROGRESS NOTES
Spiritual Care Note    Patient Information     Patient's Name: Jeffrey Lizama   MRN: [id number]    YOB: 1932   Age and Gender: 88 y.o. male   Service Area: ED C   Room (and Bed):  25/25 [facility]   Ethnicity or Nationality:     Primary Language: English   Orthodox/Spiritual preference: Buddhist   Place of Residence: [place]   Family/Friends/Others Present: No   Clinical Team Present: No   Medical Diagnosis(-es)/Procedure(s): Peripheral Edema   Code Status: Prior    Date of Admission: 5/20/2021   Length of Stay: 0 days        Spiritual Care Provider Information:  Name of Spiritual Care Provider: Anna Briones  Title of Spiritual Care Provider: Associate   Phone Number: [phone]  E-mail: [email or web address]  Total time : 10 minutes    Encounter/Request Information  Encounter/Request Type   Visited With: Patient  Nature of the Visit: Initial, On shift  General Visit: Yes  Referral From/ Origin of Request: [place] rounds, Verbal patient    Religous Needs/Values  Orthodox Needs Visit  Orthodox Needs: Prayer    Spiritual Assessment     Spiritual Care Encounters    Observations/Symptoms: Anxious, Discouraged, Frustration    Interaction/Conversation: Pt very frustrated by lack of information; he states that he is supposed to be transferred to an LTC in [place], and wanted to talk to someone about next steps. He also requested prayer and thanked the , who texted ОЛЬГА Fox about the pt's desire for information.    Assessment: Need, Distress    Need: Seeking Spiritual Assistance and Support    Distress: Coping    Interventions: Compassionate presence, active listening, prayer.    Outcomes: Ability to Communicate with Truth and Honesty, Spiritual Comfort    Plan: Visit Upon Request    Notes:

## 2021-05-21 ENCOUNTER — APPOINTMENT (OUTPATIENT)
Dept: RADIOLOGY | Facility: MEDICAL CENTER | Age: 86
DRG: 291 | End: 2021-05-21
Attending: INTERNAL MEDICINE
Payer: MEDICARE

## 2021-05-21 PROBLEM — S31.000A SACRAL WOUND: Status: ACTIVE | Noted: 2021-05-21

## 2021-05-21 LAB
ALBUMIN SERPL BCP-MCNC: 2.6 G/DL (ref 3.2–4.9)
ALBUMIN/GLOB SERPL: 0.9 G/DL
ALP SERPL-CCNC: 56 U/L (ref 30–99)
ALT SERPL-CCNC: 22 U/L (ref 2–50)
ANION GAP SERPL CALC-SCNC: 9 MMOL/L (ref 7–16)
AST SERPL-CCNC: 41 U/L (ref 12–45)
BASOPHILS # BLD AUTO: 0.2 % (ref 0–1.8)
BASOPHILS # BLD: 0.02 K/UL (ref 0–0.12)
BILIRUB SERPL-MCNC: 0.9 MG/DL (ref 0.1–1.5)
BUN SERPL-MCNC: 11 MG/DL (ref 8–22)
CALCIUM SERPL-MCNC: 7.1 MG/DL (ref 8.5–10.5)
CHLORIDE SERPL-SCNC: 101 MMOL/L (ref 96–112)
CO2 SERPL-SCNC: 31 MMOL/L (ref 20–33)
CREAT SERPL-MCNC: 0.81 MG/DL (ref 0.5–1.4)
EOSINOPHIL # BLD AUTO: 0.07 K/UL (ref 0–0.51)
EOSINOPHIL NFR BLD: 0.8 % (ref 0–6.9)
ERYTHROCYTE [DISTWIDTH] IN BLOOD BY AUTOMATED COUNT: 76.8 FL (ref 35.9–50)
GLOBULIN SER CALC-MCNC: 2.8 G/DL (ref 1.9–3.5)
GLUCOSE SERPL-MCNC: 107 MG/DL (ref 65–99)
HCT VFR BLD AUTO: 28.2 % (ref 42–52)
HGB BLD-MCNC: 8.7 G/DL (ref 14–18)
IMM GRANULOCYTES # BLD AUTO: 0.03 K/UL (ref 0–0.11)
IMM GRANULOCYTES NFR BLD AUTO: 0.3 % (ref 0–0.9)
LYMPHOCYTES # BLD AUTO: 0.45 K/UL (ref 1–4.8)
LYMPHOCYTES NFR BLD: 5.1 % (ref 22–41)
MCH RBC QN AUTO: 29.1 PG (ref 27–33)
MCHC RBC AUTO-ENTMCNC: 30.9 G/DL (ref 33.7–35.3)
MCV RBC AUTO: 94.3 FL (ref 81.4–97.8)
MONOCYTES # BLD AUTO: 0.46 K/UL (ref 0–0.85)
MONOCYTES NFR BLD AUTO: 5.2 % (ref 0–13.4)
NEUTROPHILS # BLD AUTO: 7.83 K/UL (ref 1.82–7.42)
NEUTROPHILS NFR BLD: 88.4 % (ref 44–72)
NRBC # BLD AUTO: 0 K/UL
NRBC BLD-RTO: 0 /100 WBC
PLATELET # BLD AUTO: 206 K/UL (ref 164–446)
PMV BLD AUTO: 10.2 FL (ref 9–12.9)
POTASSIUM SERPL-SCNC: 3.2 MMOL/L (ref 3.6–5.5)
PROT SERPL-MCNC: 5.4 G/DL (ref 6–8.2)
RBC # BLD AUTO: 2.99 M/UL (ref 4.7–6.1)
SARS-COV-2 RNA RESP QL NAA+PROBE: NOTDETECTED
SODIUM SERPL-SCNC: 141 MMOL/L (ref 135–145)
SPECIMEN SOURCE: NORMAL
WBC # BLD AUTO: 8.9 K/UL (ref 4.8–10.8)

## 2021-05-21 PROCEDURE — 700102 HCHG RX REV CODE 250 W/ 637 OVERRIDE(OP): Performed by: STUDENT IN AN ORGANIZED HEALTH CARE EDUCATION/TRAINING PROGRAM

## 2021-05-21 PROCEDURE — A9270 NON-COVERED ITEM OR SERVICE: HCPCS | Performed by: INTERNAL MEDICINE

## 2021-05-21 PROCEDURE — A9270 NON-COVERED ITEM OR SERVICE: HCPCS | Performed by: STUDENT IN AN ORGANIZED HEALTH CARE EDUCATION/TRAINING PROGRAM

## 2021-05-21 PROCEDURE — 700102 HCHG RX REV CODE 250 W/ 637 OVERRIDE(OP): Performed by: INTERNAL MEDICINE

## 2021-05-21 PROCEDURE — 97161 PT EVAL LOW COMPLEX 20 MIN: CPT

## 2021-05-21 PROCEDURE — 36415 COLL VENOUS BLD VENIPUNCTURE: CPT

## 2021-05-21 PROCEDURE — 700111 HCHG RX REV CODE 636 W/ 250 OVERRIDE (IP): Performed by: STUDENT IN AN ORGANIZED HEALTH CARE EDUCATION/TRAINING PROGRAM

## 2021-05-21 PROCEDURE — 85025 COMPLETE CBC W/AUTO DIFF WBC: CPT

## 2021-05-21 PROCEDURE — 99233 SBSQ HOSP IP/OBS HIGH 50: CPT | Performed by: STUDENT IN AN ORGANIZED HEALTH CARE EDUCATION/TRAINING PROGRAM

## 2021-05-21 PROCEDURE — 770006 HCHG ROOM/CARE - MED/SURG/GYN SEMI*

## 2021-05-21 PROCEDURE — 80053 COMPREHEN METABOLIC PANEL: CPT

## 2021-05-21 PROCEDURE — 700111 HCHG RX REV CODE 636 W/ 250 OVERRIDE (IP): Performed by: INTERNAL MEDICINE

## 2021-05-21 PROCEDURE — 94664 DEMO&/EVAL PT USE INHALER: CPT

## 2021-05-21 PROCEDURE — 97166 OT EVAL MOD COMPLEX 45 MIN: CPT

## 2021-05-21 RX ORDER — CARBOXYMETHYLCELLULOSE SODIUM 5 MG/ML
1 SOLUTION/ DROPS OPHTHALMIC 2 TIMES DAILY
Status: DISCONTINUED | OUTPATIENT
Start: 2021-05-21 | End: 2021-06-19 | Stop reason: HOSPADM

## 2021-05-21 RX ORDER — MAGNESIUM SULFATE 1 G/100ML
1 INJECTION INTRAVENOUS ONCE
Status: COMPLETED | OUTPATIENT
Start: 2021-05-21 | End: 2021-05-21

## 2021-05-21 RX ADMIN — OMEPRAZOLE 20 MG: 20 CAPSULE, DELAYED RELEASE ORAL at 06:03

## 2021-05-21 RX ADMIN — MAGNESIUM SULFATE IN DEXTROSE 1 G: 10 INJECTION, SOLUTION INTRAVENOUS at 14:12

## 2021-05-21 RX ADMIN — LEVOTHYROXINE SODIUM 75 MCG: 0.07 TABLET ORAL at 06:05

## 2021-05-21 RX ADMIN — TAMSULOSIN HYDROCHLORIDE 0.4 MG: 0.4 CAPSULE ORAL at 06:03

## 2021-05-21 RX ADMIN — FUROSEMIDE 20 MG: 20 TABLET ORAL at 06:04

## 2021-05-21 RX ADMIN — CLOPIDOGREL BISULFATE 75 MG: 75 TABLET ORAL at 06:05

## 2021-05-21 RX ADMIN — CARBOXYMETHYLCELLULOSE SODIUM 1 DROP: 5 SOLUTION/ DROPS OPHTHALMIC at 06:21

## 2021-05-21 RX ADMIN — OXYCODONE 5 MG: 5 TABLET ORAL at 00:26

## 2021-05-21 RX ADMIN — FUROSEMIDE 20 MG: 20 TABLET ORAL at 17:04

## 2021-05-21 RX ADMIN — HYDROCORTISONE 10 MG: 20 TABLET ORAL at 17:04

## 2021-05-21 RX ADMIN — ENOXAPARIN SODIUM 40 MG: 40 INJECTION SUBCUTANEOUS at 06:05

## 2021-05-21 RX ADMIN — CARBOXYMETHYLCELLULOSE SODIUM 1 DROP: 5 SOLUTION/ DROPS OPHTHALMIC at 17:08

## 2021-05-21 RX ADMIN — FLUTICASONE PROPIONATE 50 MCG: 50 SPRAY, METERED NASAL at 06:08

## 2021-05-21 RX ADMIN — DIBASIC SODIUM PHOSPHATE, MONOBASIC POTASSIUM PHOSPHATE AND MONOBASIC SODIUM PHOSPHATE 250 MG: 852; 155; 130 TABLET ORAL at 17:04

## 2021-05-21 RX ADMIN — TIOTROPIUM BROMIDE INHALATION SPRAY 5 MCG: 3.12 SPRAY, METERED RESPIRATORY (INHALATION) at 06:07

## 2021-05-21 RX ADMIN — HYDROCORTISONE 10 MG: 20 TABLET ORAL at 12:00

## 2021-05-21 RX ADMIN — MONTELUKAST 10 MG: 10 TABLET, FILM COATED ORAL at 06:03

## 2021-05-21 RX ADMIN — OXYCODONE 5 MG: 5 TABLET ORAL at 07:41

## 2021-05-21 RX ADMIN — Medication 950 MG: at 06:06

## 2021-05-21 RX ADMIN — BUDESONIDE AND FORMOTEROL FUMARATE DIHYDRATE 2 PUFF: 80; 4.5 AEROSOL RESPIRATORY (INHALATION) at 06:07

## 2021-05-21 RX ADMIN — HYDROCORTISONE 10 MG: 20 TABLET ORAL at 06:03

## 2021-05-21 RX ADMIN — ATORVASTATIN CALCIUM 80 MG: 40 TABLET, FILM COATED ORAL at 17:04

## 2021-05-21 RX ADMIN — BUDESONIDE AND FORMOTEROL FUMARATE DIHYDRATE 2 PUFF: 80; 4.5 AEROSOL RESPIRATORY (INHALATION) at 17:06

## 2021-05-21 RX ADMIN — POTASSIUM CHLORIDE 20 MEQ: 1500 TABLET, EXTENDED RELEASE ORAL at 06:04

## 2021-05-21 RX ADMIN — ASPIRIN 81 MG: 81 TABLET, COATED ORAL at 06:20

## 2021-05-21 RX ADMIN — OXYCODONE 5 MG: 5 TABLET ORAL at 19:58

## 2021-05-21 RX ADMIN — OMEPRAZOLE 20 MG: 20 CAPSULE, DELAYED RELEASE ORAL at 17:05

## 2021-05-21 RX ADMIN — OXYCODONE 5 MG: 5 TABLET ORAL at 14:11

## 2021-05-21 RX ADMIN — CARBOXYMETHYLCELLULOSE SODIUM 1 DROP: 5 SOLUTION/ DROPS OPHTHALMIC at 00:17

## 2021-05-21 ASSESSMENT — GAIT ASSESSMENTS: GAIT LEVEL OF ASSIST: UNABLE TO PARTICIPATE

## 2021-05-21 ASSESSMENT — ENCOUNTER SYMPTOMS
WEAKNESS: 1
DIARRHEA: 0
BLURRED VISION: 0
ORTHOPNEA: 0
BRUISES/BLEEDS EASILY: 1
COUGH: 0
TREMORS: 0
HEMOPTYSIS: 0
SINUS PAIN: 0
VOMITING: 0
CHILLS: 0
MYALGIAS: 0
FOCAL WEAKNESS: 0
HEADACHES: 0
NAUSEA: 0
FEVER: 0
EYE PAIN: 0

## 2021-05-21 ASSESSMENT — ACTIVITIES OF DAILY LIVING (ADL): TOILETING: REQUIRES ASSIST

## 2021-05-21 ASSESSMENT — COGNITIVE AND FUNCTIONAL STATUS - GENERAL
MOVING TO AND FROM BED TO CHAIR: UNABLE
MOBILITY SCORE: 6
EATING MEALS: A LITTLE
TURNING FROM BACK TO SIDE WHILE IN FLAT BAD: UNABLE
WALKING IN HOSPITAL ROOM: TOTAL
HELP NEEDED FOR BATHING: A LOT
MOVING FROM LYING ON BACK TO SITTING ON SIDE OF FLAT BED: UNABLE
TOILETING: TOTAL
SUGGESTED CMS G CODE MODIFIER MOBILITY: CN
PERSONAL GROOMING: A LOT
DRESSING REGULAR LOWER BODY CLOTHING: TOTAL
DRESSING REGULAR UPPER BODY CLOTHING: A LOT
CLIMB 3 TO 5 STEPS WITH RAILING: TOTAL
STANDING UP FROM CHAIR USING ARMS: TOTAL
DAILY ACTIVITIY SCORE: 11
SUGGESTED CMS G CODE MODIFIER DAILY ACTIVITY: CL

## 2021-05-21 ASSESSMENT — PAIN DESCRIPTION - PAIN TYPE
TYPE: ACUTE PAIN

## 2021-05-21 NOTE — RESPIRATORY CARE
"COPD EDUCATION by COPD CLINICAL EDUCATOR  5/21/2021  at  2:35 PM by Shantel Larkin, RRT     Patient interviewed by COPD education team.  Patient unable to participate in full program.  Short intervention has been conducted.  A comprehensive packet including information about COPD and home treatment with nebulizer cleaning.    COPD Assessment  COPD Clinical Specialists ONLY  COPD Education Initiated: Yes--Short Intervention  Physician Name: SNF placement  Pulmonologist Name: SNF placement  Referrals Initiated: Yes  Pulmonary Rehab: N/A  Smoking Cessation: N/A (non-smoker)  Hospice: N/A  Home Health Care: N/A (SNF placement)  [facility] Outreach: N/A  [facility]: N/A  [facility]: N/A  Private In-Home Care Agency: N/A  Is this a COPD exacerbation patient?: No  $ Demo/Eval of SVN's, MDI's and Aerosols: Yes  (OP) Pulmonary Function Testing:  (PFT 8/12/2016)    Meds to Beds  Would the patient like to opt in for Bedside Medication Delivery at Discharge?: Yes, interested     MY COPD ACTION PLAN     It is recommended that patients and physicians /healthcare providers complete this action plan together. This plan should be discussed at each physician visit and updated as needed.    The green, yellow and red zones show groups of symptoms of COPD. This list of symptoms is not comprehensive, and you may experience other symptoms. In the \"Actions\" column, your healthcare provider has recommended actions for you to take based on your symptoms.    Patient Name: Jeffrey Lizama   YOB: 1932   Last Updated on: 5/21/2021  2:34 PM   Green Zone:  I am doing well today Actions   •  Usual activitiy and exercise level •  Take daily medications   •  Usual amounts of cough and phlegm/mucus •  Use oxygen as prescribed   •  Sleep well at night •  Continue regular exercise/diet plan   •  Appetite is good •  At all times avoid cigarette smoke, inhaled irritants     Daily Medications (these " "medications are taken every day):   Budesonide-Formoterol Fumarate (Symbicort)  Tiotropium Bromide Monohydrate (Spiriva) 2 Puffs  1 capsule Twice daily  Once daily     Additional Information:  Use spacer with MDI and rinse mouth after.    Yellow Zone:  I am having a bad day or a COPD flare Actions   •  More breathless than usual •  Continue daily medications   •  I have less energy for my daily activities •  Use quick relief inhaler as ordered   •  Increased or thicker phlegm/mucus •  Use oxygen as prescribed   •  Using quick relief inhaler/nebulizer more often •  Get plenty of rest   •  Swelling of ankles more than usual •  Use pursed lip breathing   •  More coughing than usual •  At all times avoid cigarette smoke, inhaled irritants   •  I feel like I have a \"chest cold\"   •  Poor sleep and my symptoms woke me up   •  My appetite is not good   •  My medicine is not helping    •  Call provider immediately if symptoms don’t improve     Continue daily medications, add rescue medications:   Albuterol  Albuterol/Ipratropium (Combivent, Duoneb) 2 Puffs  3mL via nebulizer Every 4 hours PRN  Every 4 hours PRN       Medications to be used during a flare up, (as Discussed with Provider):           Additional Information:  Use albuterol inhaler with spacer and rinse nebulizer per  recommendation    Red Zone:  I need urgent medical care Actions   •  Severe shortness of breath even at rest •  Call 911 or seek medical care immediately   •  Not able to do any activity because of breathing    •  Fever or shaking chills    •  Feeling confused or very drowsy     •  Chest pains    •  Coughing up blood              "

## 2021-05-21 NOTE — PROGRESS NOTES
Assumed care at 1900, report received from Aishwarya ROLON.  Pt A&O x 4. Pt very anxious about wear abouts of wife. This RN and charge nurse RN attempted to call wife. Wife did not answer. This RN called and left message for both pt and wife son. Ania, charge RN, called [facility] for wellness check. Pt complains of 4/10 pain to his left hand and arm. Pt repositioned and arm repositioned onto pillow. This RN and pt son assisted pt onto bedpan and assisted with urinal. Barrier cream applied. Pt sitting up in bed. Bed locked, 2 rails up, bed in lowest position. Call light in place, belongings at bedside, no needs at this time and hourly rounding in place.

## 2021-05-21 NOTE — PROGRESS NOTES
Pt calling wife and pt wife not answering the phone. Pt concerned wife might be driving here to [facility]. Pt wife had recent hip replacement surgery. Called [facility] office for a wellness check on wife, per pt request.     [facility] deputy contacted the unit. Per deputy, pt wife was sleeping and did not hear the phone. Wife called and pt able to speak with her.

## 2021-05-21 NOTE — DISCHARGE PLANNING
Anticipated Discharge Disposition: SNF    Action: LSW met with Pt and his son Arya to discuss d/c plan. LSW discussed with Pt that they will try to find placement closer to his home but that we would also have to be realistic about trying to send him to smaller SNFs [place] areas and how they might not be able to take him. Pt is understanding but would like to try. LSW informed him SNF referral is pending. LSW als informed Pt that a big factor in getting him accepted will be his cooperation with PT/OT and showing that he's willign to work with them. Pt reported that he has pain when trying to move around and would like pain medication before working with therapies. He is also feeling very shaky and thinks that maybe with a bigger person he would feel safer moving.     Barriers to Discharge: SNF acceptance    Plan: LSW to f/u with SNF referral      Care Transition Team Assessment    Information Source  Orientation Level: Oriented X4  Information Given By: Patient  Who is responsible for making decisions for patient? : Patient    Readmission Evaluation  Is this a readmission?: No    Elopement Risk  Legal Hold: No  Ambulatory or Self Mobile in Wheelchair: No-Not an Elopement Risk  Elopement Risk: Not at Risk for Elopement    Interdisciplinary Discharge Planning  Lives with - Patient's Self Care Capacity:  (from SNF)  Patient or legal guardian wants to designate a caregiver: No  Support Systems: Other (Comments) (from a facility)  H[facility] / Facility[facility]  [facility]  Prior Services: Intermittent Physical Support for ADL Per Service  Patient Prefers to be Discharged to:: [facility]  Durable Medical Equipment: Not Applicable    Discharge Preparedness  What is your plan after discharge?: Skilled nursing facility  What are your discharge supports?: Spouse, Child  Prior Functional Level: Needs Assist with Activities of Daily Living, Needs Assist with  Medication Management, Total Assist  Difficulity with ADLs: Bathing, Dressing, Toileting, Walking  Difficulity with IADLs: Driving, Laundry, Shopping    Functional Assesment  Prior Functional Level: Needs Assist with Activities of Daily Living, Needs Assist with Medication Management, Total Assist    Finances  Financial Barriers to Discharge: No  Prescription Coverage: Yes    Vision / Hearing Impairment  Vision Impairment : Yes  Right Eye Vision: Impaired, Wears Glasses  Left Eye Vision: Impaired, Wears Glasses  Hearing Impairment : Yes  Hearing Impairment: Both Ears  Does Pt Need Special Equipment for the Hearing Impaired?: No         Advance Directive  Advance Directive?: None    Domestic Abuse  Have you ever been the victim of abuse or violence?: No  Physical Abuse or Sexual Abuse: No  Verbal Abuse or Emotional Abuse: No  Possible Abuse/Neglect Reported to:: Not Applicable    Psychological Assessment  History of Substance Abuse: None  History of Psychiatric Problems: No  Non-compliant with Treatment: No  Newly Diagnosed Illness: No    Discharge Risks or Barriers  Discharge risks or barriers?: No    Anticipated Discharge Information  Discharge Disposition: D/T to SNF with Medicare cert in anticipation of skilled care (03)  Discharge Contact Phone Number: [phone]

## 2021-05-21 NOTE — THERAPY
"Physical Therapy   Initial Evaluation     Patient Name: Jeffrey Lizama  Age:  88 y.o., Sex:  male  Medical Record #: [id number]  Today's Date: 5/21/2021     Precautions: Fall Risk    Assessment  Patient is 88 y.o. male w/ hx of DVT/PE, on coumadin, CAD s/p stent x one month ago, adrenal insufficiency, chronic hypoxia on home o2, and s/p coiling right epigastric artery.  He was transferred from snf due to hypokalemia.  Prior to snf, he was living w/ his wife (who recently had a hip fx per pt), in a single story house w/ several steps to enter.  Today, he is rec'd alert in bed.  He wants to transfer to a \"hospital\" near his home and is not interested in snf.  He is self limiting and limited in his participation w/ today's eval.  He is able to sit eob w/ use of bed features, then wishes to no longer participate.  After some time, he did agree to attempt standing, needing total assist, unable to stand, and demonstrated little to no effort.  PT will follow and address goals to improve fxl indep, however progress will directly be related to pts participation    Plan    Recommend Physical Therapy 2 times per week until therapy goals are met for the following treatments:  Bed Mobility, Gait Training and Therapeutic Activities    DC Equipment Recommendations: Unable to determine at this time  Discharge Recommendations: Recommend post-acute placement for additional physical therapy services prior to discharge home         Objective       05/21/21 0731   Prior Living Situation   Housing / Facility 1 Story House   Steps Into Home 2   Steps In Home 0   Equipment Owned Front-Wheel Walker   Lives with - Patient's Self Care Capacity Significant Other   Comments Most recently in a snf   Prior Level of Functional Mobility   Bed Mobility Required Assist   Transfer Status Required Assist   Ambulation Dependent   Assistive Devices Used Front-Wheel Walker   Balance Assessment   Sitting Balance (Static) Fair +   Sitting Balance " (Dynamic) Fair +   Standing Balance (Static)   (unable to stand)   Weight Shift Sitting Poor   Weight Shift Standing Absent   Gait Analysis   Gait Level Of Assist Unable to Participate   Bed Mobility    Supine to Sit Supervised   Sit to Supine Minimal Assist   Comments w/ HOB at 45 degrees and use of side rail   Functional Mobility   Sit to Stand Total Assist   Bed, Chair, Wheelchair Transfer Unable to Participate   Comments able to elevate ischials off the bed only   Short Term Goals    Short Term Goal # 1 Pt to move supine to/from eob w/ spv, no bed features in 6 visits to improve fxl indep   Short Term Goal # 2 Pt to move sit to/from stand w/ spv in 6 visits to improve fxl indep   Short Term Goal # 3 Pt to ambulate 75 ft w/ fww and spv in 6 visits to improve fxl indep   Anticipated Discharge Equipment and Recommendations   DC Equipment Recommendations Unable to determine at this time   Discharge Recommendations Recommend post-acute placement for additional physical therapy services prior to discharge home

## 2021-05-21 NOTE — PROGRESS NOTES
Assume care of pt at 0700. Report received from NOC RN. Pt is A/O x4. Pain is 9/10. Pt is resting in bed. Bed in lowest and locked position, call light in reach, hourly rounding in place. Labs reviewed. Communication board updated. Will continue to monitor.

## 2021-05-21 NOTE — CARE PLAN
Problem: Knowledge Deficit - Standard  Goal: Patient and family/care givers will demonstrate understanding of plan of care, disease process/condition, diagnostic tests and medications  Outcome: Progressing     Problem: Pain - Standard  Goal: Alleviation of pain or a reduction in pain to the patient’s comfort goal  Outcome: Progressing     The patient is Stable - Low risk of patient condition declining or worsening    Shift Goals  Clinical Goals: Pt will decrease edema  Patient Goals: Pt will sleep comfortably  Family Goals: n/a    Progress made toward(s) clinical / shift goals:  Pt on lasixs. Pt has had 700 mL output from 1900 to 0130. Pt given antianxiety medication to aid in sleep.     Patient is not progressing towards the following goals:n/a

## 2021-05-21 NOTE — PROGRESS NOTES
Arrived to floor via gurney with transport. Patient assisted into bed with assistance of staff and sliding board. Orientated to room. Call light within reach. Hourly rounds. Bed locked in lowest position

## 2021-05-21 NOTE — DISCHARGE PLANNING
Received Choice form 5/20 at 8853  Agency/Facility Name: [facility]  Referral sent per Choice form 5/21 @ 1007

## 2021-05-21 NOTE — THERAPY
Occupational Therapy   Initial Evaluation     Patient Name: Jeffrey Lizama  Age:  88 y.o., Sex:  male  Medical Record #: [id number]  Today's Date: 5/21/2021     Precautions  Precautions: Fall Risk    Assessment  Patient is 88 y.o. male w/ hx of DVT/PE,  CAD s/p stent a month ago, adrenal insufficiency, chronic hypoxia on 5L of home o2, and s/p coiling right epigastric artery.  He was transferred from snf due to hypokalemia. Pt lives with his wife at baseline who can provided limited assist (recent hip fx) and was most recently at a SNF where he received assist for most ADLs. Pt presented today with limited L UE use d/t swelling, and limited ADL participation and functional mobility d/t low activity tolerance, weakness, and pain. Pt required max A today with dressing and demonstrated self limiting behaviors. Will continue to follow for OT while in house to progress activity tolerance, and improve functional independence.     Plan    Recommend Occupational Therapy 2 times per week until therapy goals are met for the following treatments:  Adaptive Equipment, Self Care/Activities of Daily Living, Therapeutic Activities and Therapeutic Exercises.    DC Equipment Recommendations: (P) Unable to determine at this time  Discharge Recommendations: (P) Recommend post-acute placement for additional occupational therapy services prior to discharge home        05/21/21 1018   Initial Contact Note    Initial Contact Note Order Received and Verified, Occupational Therapy Evaluation in Progress with Full Report to Follow.   Prior Living Situation   Prior Services Intermittent Physical Support for ADL Per Service   Housing / Facility Skilled Nursing Facility  ([facility])   Steps Into Home 0   Steps In Home 0   Bathroom Set up Walk In Shower;Shower Chair;Grab Bars  (sponge bath)   Equipment Owned Front-Wheel Walker;Wheelchair   Lives with - Patient's Self Care Capacity   (from SNF)   Comments Recently at a SNF and recieved assist  for most self cares   Prior Level of ADL Function   Self Feeding Independent   Grooming / Hygiene Requires Assist   Bathing Requires Assist   Dressing Requires Assist   Toileting Requires Assist   Prior Level of IADL Function   Medication Management Requires Assist   Laundry Requires Assist   Kitchen Mobility Requires Assist   Finances Requires Assist   Home Management Requires Assist   Shopping Requires Assist   Prior Level Of Mobility Uses Wheel Chair for in Home Mobility  (requires assist)   Comments able to complete txfs WC<>bed at skilled   Precautions   Precautions Fall Risk   Pain   Pain Scales 0 to 10 Scale    Pain 0 - 10 Group   Location Knee   Location Orientation Left;Right   Pain Rating Scale (NPRS) 4   Cognition    Cognition / Consciousness WDL   Active ROM Upper Body   Active ROM Upper Body  X   Comments L UE limited mobility d/t edema; able to raise 60 degrees of shoulder flex   Strength Upper Body   Upper Body Strength  X   Comments L UE same as above   Balance Assessment   Sitting Balance (Static) Fair +   Sitting Balance (Dynamic) Fair +   Standing Balance (Static)   (NT)   Standing Balance (Dynamic)   (NT)   Weight Shift Sitting Poor   Weight Shift Standing Absent   Comments reports WC level at baseline   Bed Mobility    Supine to Sit Supervised   Sit to Supine Minimal Assist   Scooting Moderate Assist   Rolling Supervised   Comments assist with LB from EOB, used side rails, HOB raised   ADL Assessment   Grooming Minimal Assist   Lower Body Dressing Total Assist   Comments trailed multiple postions for LB dressing; total A d/t knee pain and limited flexion   How much help from another person does the patient currently need...   Putting on and taking off regular lower body clothing? 1   Bathing (including washing, rinsing, and drying)? 2   Toileting, which includes using a toilet, bedpan, or urinal? 1   Putting on and taking off regular upper body clothing? 2   Taking care of personal grooming such  as brushing teeth? 2   Eating meals? 3   6 Clicks Daily Activity Score 11   Functional Mobility   Sit to Stand   (NT)   Bed, Chair, Wheelchair Transfer Unable to Participate   Comments unable to participate in sit>stand   Activity Tolerance   Sitting Edge of Bed 15 min   Comments limited d/t deconditioning   Patient / Family Goals   Patient / Family Goal #1 Go to rehab   Short Term Goals   Short Term Goal # 1 Pt will demo LB dressing with AE as needed and mod A   Short Term Goal # 2 Pt will demo ADL txf with mod A   Short Term Goal # 3 Pt will complete G/H seated SUP   Education Group   Role of Occupational Therapist Patient Response Patient;Acceptance;Explanation   Problem List   Problem List Decreased Upper Extremity Strength Left;Decreased Upper Extremity AROM Left;Decreased Active Daily Living Skills;Decreased Activity Tolerance;Decreased Functional Mobility;Impaired Postural Control / Balance   Anticipated Discharge Equipment and Recommendations   DC Equipment Recommendations Unable to determine at this time   Discharge Recommendations Recommend post-acute placement for additional occupational therapy services prior to discharge home   Interdisciplinary Plan of Care Collaboration   IDT Collaboration with  Nursing;Physical Therapist   Patient Position at End of Therapy In Bed;Bed Alarm On;Phone within Reach;Tray Table within Reach;Call Light within Reach   Collaboration Comments OT findings and recs   Session Information   Date / Session Number  5/21 1 (1/2, 5/27)   Priority 2

## 2021-05-21 NOTE — PROGRESS NOTES
· 2 RN skin check complete with ОЛЬГА Cedillo.   · Devices in place oxygen tubing.  · Skin assessed under devices yes.  · Confirmed pressure ulcers found on n/a.  · New potential pressure ulcers noted on sacrum. Wound consult placed? yes. Photo uploaded? No-pt refused at this time.   · The following interventions are in place waffle cushion, educated patient on repositioning at least every 2 hours - patient refused, wound consult, heel lift boots ordered, barrier cream.

## 2021-05-21 NOTE — DIETARY
Nutrition services: Day 1 of admit.  89 yo male admitted with FTT.    Evaluation:  1. Pt admitted from SNF for hypokalemia  2. Upon admit found to have low potassium, magnesium and phosphorus which were replaced.  3. Attempt to discharge back to SNF however the no longer had a bed for him.   4. His wife is unable to care for him at home.  5. Admitted to hospital for SNF placement.   6. Currently on a regular diet taking % of meals.  7. Stage 2 pressure ulcer noted to sacrum - seen by wound team yesterday. Pt should meet nutritional needs for healing taking % of his meals.   8. No nutritional concerns at this time.     Malnutrition risk: na    Recommendations/Plan:  1. Continue to monitor and replace electrolytes as appropriate  2. encourage intake of meals   3. document intake of all meals as % taken in ADL's to provide interdisciplinary communication across all shifts   4. monitor daily weights  5. Nutrition rep will continue to see patient for ongoing meal and snack preferences.

## 2021-05-21 NOTE — WOUND TEAM
[facility]  Inpatient Services  Initial Wound and Skin Care Evaluation    Admission Date: 5/20/2021     Last order of IP CONSULT TO WOUND CARE was found on 5/20/2021 from Hospital Encounter on 5/20/2021     HPI, PMH, SH: Reviewed    Past Surgical History:   Procedure Laterality Date   • GASTROSCOPY-ENDO  1/22/2019    Procedure: GASTROSCOPY-ENDO;  Surgeon: Yoandy Mcelroy M.D.;  Location: ENDOSCOPY [facility];  Service: Gastroenterology   • GASTROSCOPY-ENDO  1/21/2019    Procedure: GASTROSCOPY-ENDO;  Surgeon: Luca Mtz M.D.;  Location: ENDOSCOPY [facility];  Service: Gastroenterology   • KNEE REVISION TOTAL  6/20/2013    Performed by Ortega Vega M.D. at SURGERY [facility]   • CARPAL TUNNEL ENDOSCOPIC  9/30/2011    Performed by RONALD ENNIS at SURGERY SAME DAY [facility] ORS   • PACEMAKER INSERTION Left 04/23/2010    [employer] Altrua 60 [id number] implanted by Dr. Donaldson.   • CATARACT EXTRACTION WITH IOL      bilateral    • CERVICAL DISK AND FUSION ANTERIOR     • CERVICAL FUSION POSTERIOR     • CHOLECYSTECTOMY     • CHOLECYSTECTOMY     • OTHER      pituitary tumor removed   • OTHER CARDIAC SURGERY     • OTHER ORTHOPEDIC SURGERY      knee surgery left knee x 2   • PB REVISE ULNAR NERVE AT WRIST     • PB TOTAL KNEE ARTHROPLASTY      left   • PB TOTAL KNEE ARTHROPLASTY      right   • THYROIDECTOMY       Social History     Tobacco Use   • Smoking status: Never Smoker   • Smokeless tobacco: Never Used   Substance Use Topics   • Alcohol use: No     Chief Complaint   Patient presents with   • Peripheral Edema     [facility], [facility]     Diagnosis: Failure to thrive in adult [R62.7]    Unit where seen by Wound Team: S523/01     WOUND CONSULT/FOLLOW UP RELATED TO:  Sacral wound     WOUND HISTORY:  Pt is an 89 y/o male who was admitted from [facility] for hypokalemia. Pt says that his mobility has been poor lately due to a series of hospitalizations.  Pt has  fecal incontinence and stated that he would sometimes be soiled for hours at the SNF.        ** While assessing pt's sacrum this RN also noted patch of eschar on pt's L ear.  Per pt this is skin cancer and pt has f/u appt to have it addressed.  There was a suture still in R ear from previous skin cancer removal >2mo old per pt.  Removed intact.     WOUND ASSESSMENT/LDA    Wound 05/21/21 Pressure Injury Sacrum Left POA (Active)   Wound Image   05/21/21 1300   Site Assessment Carroll 05/21/21 1300   Periwound Assessment Blanchable erythema 05/21/21 1300   Margins Attached edges 05/21/21 1300   Closure Open to air 05/21/21 1300   Drainage Amount None 05/21/21 1300   Treatments Cleansed 05/21/21 1300   Wound Cleansing Foam Cleanser/Washcloth 05/21/21 1300   Periwound Protectant Barrier Paste 05/21/21 1300   Dressing Cleansing/Solutions Not Applicable 05/21/21 1300   Dressing Options Mepilex 05/21/21 1300   Dressing Changed Changed 05/21/21 1300   Dressing Status Clean;Dry;Intact 05/21/21 1300   Dressing Change/Treatment Frequency Every 72 hrs, and As Needed 05/21/21 1300   NEXT Dressing Change/Treatment Date 05/24/21 05/21/21 1300   NEXT Weekly Photo (Inpatient Only) 05/21/21 05/21/21 1300   Pressure Injury Stage 2 05/21/21 1300   Wound Length (cm) 0.7 cm 05/21/21 1300   Wound Width (cm) 0.7 cm 05/21/21 1300   Wound Surface Area (cm^2) 0.49 cm^2 05/21/21 1300   Shape round 05/21/21 1300   Wound Odor None 05/21/21 1300   Pulses N/A 05/21/21 1300   Exposed Structures None 05/21/21 1300   WOUND NURSE ONLY - Time Spent with Patient (mins) 45 05/21/21 1300     Vascular:    FABRICIO:   No results found.    Lab Values:    Lab Results   Component Value Date/Time    WBC 8.9 05/21/2021 01:02 AM    RBC 2.99 (L) 05/21/2021 01:02 AM    HEMOGLOBIN 8.7 (L) 05/21/2021 01:02 AM    HEMATOCRIT 28.2 (L) 05/21/2021 01:02 AM    CREACTPROT 2.02 (H) 05/14/2021 08:31 AM    SEDRATEWES 7 04/18/2021 11:30 PM    HBA1C 5.2 01/29/2019 05:58 AM        Culture  Results show:  No results found for this or any previous visit (from the past 720 hour(s)).    Pain Level/Medicated:  Soreness over sacrum not requiring intervention       INTERVENTIONS BY WOUND TEAM:  Chart and images reviewed. Discussed with bedside RN. All areas of concern (based on picture review, LDA review and discussion with bedside RN) have been thoroughly assessed. Documentation of areas based on significant findings. This RN in to assess patient. Performed standard wound care which includes appropriate positioning, dressing removal and non-selective debridement. Pictures and measurements obtained weekly if/when required.  Preparation for Dressing removal: NA  Cleansed with:  Soap and water.  Sharp debridement: NA  Shavonne wound: Cleansed with soap and water, Prepped with barrier cream  Primary Dressing: Mepilex      Interdisciplinary consultation: Patient, Bedside RN     EVALUATION / RATIONALE FOR TREATMENT:  Most Recent Date:  5/21/21: Small patch of partial skin loss just L of sacrum, determined to be pressure related due to non-blanching redness.  Offloaded with mepilex, wound should resolve with proper offloading and prompt cleansing following BMs.      Goals: Steady decrease in wound area and depth weekly.    WOUND TEAM PLAN OF CARE ([X] for frequency of wound follow up,):   Nursing to follow orders written for wound care. Contact wound team if area fails to progress, deteriorates or with any questions/concerns  Dressing changes by wound team:                   Follow up 3 times weekly:                NPWT change 3 times weekly:     Follow up 1-2 times weekly:      Follow up Bi-Monthly:                   Follow up as needed:  X   Other (explain):     NURSING PLAN OF CARE ORDERS (X):  Dressing changes: See Dressing Care orders:   Skin care: See Skin Care orders:   RN Prevention Protocol: X  Rectal tube care: See Rectal Tube Care orders:   Other orders:    RSKIN:   CURRENTLY IN PLACE (X), APPLIED THIS  VISIT (A), ORDERED (O):   Q shift Lloyd:  X  Q shift pressure point assessments:  X    Surface/Positioning   Pressure redistribution mattress          X  Low Airloss          Bariatric foam      Bariatric ABRAHAM     Waffle cushion        Waffle Overlay          Reposition q 2 hours    X  TAPs Turning system     Z Jonathon Pillow     Offloading/Redistribution   Sacral Mepilex (Silicone dressing)   Placed   Heel Mepilex (Silicone dressing)      Placed   Heel float boots (Prevalon boot)        Refused     Float Heels off Bed with Pillows     X      Respiratory   Silicone O2 tubing   X      Gray Foam Ear protectors   X  Cannula fixation Device (Tender )          High flow offloading Clip    Elastic head band offloading device      Anchorfast                                                         Trach with Optifoam split foam             Containment/Moisture Prevention     Rectal tube or BMS    Purwick/Condom Cath        Rodas Catheter    Barrier wipes           Barrier paste     X  Antifungal tx      Interdry        Mobilization       Up to chair        Ambulate      PT/OT  X    Nutrition       Dietician        Diabetes Education      PO  X   TF     TPN     NPO   # days     Other        Anticipated discharge plans: No advanced wound care needs anticipated at [place].  LTACH:        SNF/Rehab:                  Home Health Care:           Outpatient Wound Center:            Self/Family Care:        Other:

## 2021-05-22 ENCOUNTER — APPOINTMENT (OUTPATIENT)
Dept: RADIOLOGY | Facility: MEDICAL CENTER | Age: 86
DRG: 291 | End: 2021-05-22
Attending: STUDENT IN AN ORGANIZED HEALTH CARE EDUCATION/TRAINING PROGRAM
Payer: MEDICARE

## 2021-05-22 PROBLEM — M79.89 PAIN AND SWELLING OF LEFT UPPER EXTREMITY: Status: ACTIVE | Noted: 2021-05-22

## 2021-05-22 PROBLEM — M79.602 PAIN AND SWELLING OF LEFT UPPER EXTREMITY: Status: ACTIVE | Noted: 2021-05-22

## 2021-05-22 LAB
ANION GAP SERPL CALC-SCNC: 7 MMOL/L (ref 7–16)
BUN SERPL-MCNC: 11 MG/DL (ref 8–22)
CALCIUM SERPL-MCNC: 7 MG/DL (ref 8.5–10.5)
CHLORIDE SERPL-SCNC: 101 MMOL/L (ref 96–112)
CO2 SERPL-SCNC: 33 MMOL/L (ref 20–33)
CREAT SERPL-MCNC: 0.91 MG/DL (ref 0.5–1.4)
ERYTHROCYTE [DISTWIDTH] IN BLOOD BY AUTOMATED COUNT: 75.5 FL (ref 35.9–50)
GLUCOSE SERPL-MCNC: 122 MG/DL (ref 65–99)
HCT VFR BLD AUTO: 28.1 % (ref 42–52)
HGB BLD-MCNC: 8.8 G/DL (ref 14–18)
MAGNESIUM SERPL-MCNC: 1.8 MG/DL (ref 1.5–2.5)
MCH RBC QN AUTO: 29.5 PG (ref 27–33)
MCHC RBC AUTO-ENTMCNC: 31.3 G/DL (ref 33.7–35.3)
MCV RBC AUTO: 94.3 FL (ref 81.4–97.8)
PHOSPHATE SERPL-MCNC: 1.9 MG/DL (ref 2.5–4.5)
PLATELET # BLD AUTO: 239 K/UL (ref 164–446)
PMV BLD AUTO: 10.4 FL (ref 9–12.9)
POTASSIUM SERPL-SCNC: 3.1 MMOL/L (ref 3.6–5.5)
RBC # BLD AUTO: 2.98 M/UL (ref 4.7–6.1)
SODIUM SERPL-SCNC: 141 MMOL/L (ref 135–145)
WBC # BLD AUTO: 8.4 K/UL (ref 4.8–10.8)

## 2021-05-22 PROCEDURE — 700111 HCHG RX REV CODE 636 W/ 250 OVERRIDE (IP): Performed by: INTERNAL MEDICINE

## 2021-05-22 PROCEDURE — 84100 ASSAY OF PHOSPHORUS: CPT

## 2021-05-22 PROCEDURE — 700102 HCHG RX REV CODE 250 W/ 637 OVERRIDE(OP): Performed by: INTERNAL MEDICINE

## 2021-05-22 PROCEDURE — B54MZZA ULTRASONOGRAPHY OF RIGHT UPPER EXTREMITY VEINS, GUIDANCE: ICD-10-PCS | Performed by: STUDENT IN AN ORGANIZED HEALTH CARE EDUCATION/TRAINING PROGRAM

## 2021-05-22 PROCEDURE — 73201 CT UPPER EXTREMITY W/DYE: CPT | Mod: LT

## 2021-05-22 PROCEDURE — 700105 HCHG RX REV CODE 258: Performed by: STUDENT IN AN ORGANIZED HEALTH CARE EDUCATION/TRAINING PROGRAM

## 2021-05-22 PROCEDURE — 36415 COLL VENOUS BLD VENIPUNCTURE: CPT

## 2021-05-22 PROCEDURE — 76937 US GUIDE VASCULAR ACCESS: CPT

## 2021-05-22 PROCEDURE — 770006 HCHG ROOM/CARE - MED/SURG/GYN SEMI*

## 2021-05-22 PROCEDURE — 83735 ASSAY OF MAGNESIUM: CPT

## 2021-05-22 PROCEDURE — 700117 HCHG RX CONTRAST REV CODE 255: Performed by: STUDENT IN AN ORGANIZED HEALTH CARE EDUCATION/TRAINING PROGRAM

## 2021-05-22 PROCEDURE — A9270 NON-COVERED ITEM OR SERVICE: HCPCS | Performed by: INTERNAL MEDICINE

## 2021-05-22 PROCEDURE — 99233 SBSQ HOSP IP/OBS HIGH 50: CPT | Performed by: STUDENT IN AN ORGANIZED HEALTH CARE EDUCATION/TRAINING PROGRAM

## 2021-05-22 PROCEDURE — 700102 HCHG RX REV CODE 250 W/ 637 OVERRIDE(OP): Performed by: STUDENT IN AN ORGANIZED HEALTH CARE EDUCATION/TRAINING PROGRAM

## 2021-05-22 PROCEDURE — 80048 BASIC METABOLIC PNL TOTAL CA: CPT

## 2021-05-22 PROCEDURE — A9270 NON-COVERED ITEM OR SERVICE: HCPCS | Performed by: STUDENT IN AN ORGANIZED HEALTH CARE EDUCATION/TRAINING PROGRAM

## 2021-05-22 PROCEDURE — 85027 COMPLETE CBC AUTOMATED: CPT

## 2021-05-22 PROCEDURE — 700101 HCHG RX REV CODE 250: Performed by: STUDENT IN AN ORGANIZED HEALTH CARE EDUCATION/TRAINING PROGRAM

## 2021-05-22 PROCEDURE — 05HY33Z INSERTION OF INFUSION DEVICE INTO UPPER VEIN, PERCUTANEOUS APPROACH: ICD-10-PCS | Performed by: STUDENT IN AN ORGANIZED HEALTH CARE EDUCATION/TRAINING PROGRAM

## 2021-05-22 RX ADMIN — OMEPRAZOLE 20 MG: 20 CAPSULE, DELAYED RELEASE ORAL at 17:18

## 2021-05-22 RX ADMIN — HYDROCORTISONE 10 MG: 20 TABLET ORAL at 17:19

## 2021-05-22 RX ADMIN — CLOPIDOGREL BISULFATE 75 MG: 75 TABLET ORAL at 05:07

## 2021-05-22 RX ADMIN — CARBOXYMETHYLCELLULOSE SODIUM 1 DROP: 5 SOLUTION/ DROPS OPHTHALMIC at 05:10

## 2021-05-22 RX ADMIN — POTASSIUM PHOSPHATE, MONOBASIC AND POTASSIUM PHOSPHATE, DIBASIC 30 MMOL: 224; 236 INJECTION, SOLUTION, CONCENTRATE INTRAVENOUS at 08:23

## 2021-05-22 RX ADMIN — HYDROCORTISONE 10 MG: 20 TABLET ORAL at 12:34

## 2021-05-22 RX ADMIN — ASPIRIN 81 MG: 81 TABLET, COATED ORAL at 05:07

## 2021-05-22 RX ADMIN — OMEPRAZOLE 20 MG: 20 CAPSULE, DELAYED RELEASE ORAL at 05:09

## 2021-05-22 RX ADMIN — FUROSEMIDE 20 MG: 20 TABLET ORAL at 05:08

## 2021-05-22 RX ADMIN — IOHEXOL 100 ML: 350 INJECTION, SOLUTION INTRAVENOUS at 11:32

## 2021-05-22 RX ADMIN — BUDESONIDE AND FORMOTEROL FUMARATE DIHYDRATE 2 PUFF: 80; 4.5 AEROSOL RESPIRATORY (INHALATION) at 05:09

## 2021-05-22 RX ADMIN — DIBASIC SODIUM PHOSPHATE, MONOBASIC POTASSIUM PHOSPHATE AND MONOBASIC SODIUM PHOSPHATE 250 MG: 852; 155; 130 TABLET ORAL at 17:19

## 2021-05-22 RX ADMIN — TAMSULOSIN HYDROCHLORIDE 0.4 MG: 0.4 CAPSULE ORAL at 05:07

## 2021-05-22 RX ADMIN — Medication 950 MG: at 05:08

## 2021-05-22 RX ADMIN — BUDESONIDE AND FORMOTEROL FUMARATE DIHYDRATE 2 PUFF: 80; 4.5 AEROSOL RESPIRATORY (INHALATION) at 17:19

## 2021-05-22 RX ADMIN — DIBASIC SODIUM PHOSPHATE, MONOBASIC POTASSIUM PHOSPHATE AND MONOBASIC SODIUM PHOSPHATE 250 MG: 852; 155; 130 TABLET ORAL at 05:07

## 2021-05-22 RX ADMIN — LEVOTHYROXINE SODIUM 75 MCG: 0.07 TABLET ORAL at 05:08

## 2021-05-22 RX ADMIN — FLUTICASONE PROPIONATE 50 MCG: 50 SPRAY, METERED NASAL at 05:09

## 2021-05-22 RX ADMIN — ENOXAPARIN SODIUM 40 MG: 40 INJECTION SUBCUTANEOUS at 05:07

## 2021-05-22 RX ADMIN — OXYCODONE 5 MG: 5 TABLET ORAL at 22:28

## 2021-05-22 RX ADMIN — HYDROCORTISONE 10 MG: 20 TABLET ORAL at 05:08

## 2021-05-22 RX ADMIN — FUROSEMIDE 20 MG: 20 TABLET ORAL at 17:18

## 2021-05-22 RX ADMIN — POTASSIUM CHLORIDE 20 MEQ: 1500 TABLET, EXTENDED RELEASE ORAL at 05:07

## 2021-05-22 RX ADMIN — TIOTROPIUM BROMIDE INHALATION SPRAY 5 MCG: 3.12 SPRAY, METERED RESPIRATORY (INHALATION) at 05:09

## 2021-05-22 RX ADMIN — MONTELUKAST 10 MG: 10 TABLET, FILM COATED ORAL at 05:09

## 2021-05-22 RX ADMIN — CARBOXYMETHYLCELLULOSE SODIUM 1 DROP: 5 SOLUTION/ DROPS OPHTHALMIC at 17:22

## 2021-05-22 RX ADMIN — ATORVASTATIN CALCIUM 80 MG: 40 TABLET, FILM COATED ORAL at 17:18

## 2021-05-22 RX ADMIN — OXYCODONE 5 MG: 5 TABLET ORAL at 17:18

## 2021-05-22 ASSESSMENT — ENCOUNTER SYMPTOMS
FOCAL WEAKNESS: 0
BRUISES/BLEEDS EASILY: 1
BLURRED VISION: 0
SINUS PAIN: 0
COUGH: 0
DIARRHEA: 0
VOMITING: 0
TREMORS: 0
ORTHOPNEA: 0
WEAKNESS: 1
NAUSEA: 0
MYALGIAS: 0
HEMOPTYSIS: 0
CHILLS: 0
HEADACHES: 0
EYE PAIN: 0
FEVER: 0

## 2021-05-22 ASSESSMENT — PAIN DESCRIPTION - PAIN TYPE
TYPE: ACUTE PAIN

## 2021-05-22 ASSESSMENT — FIBROSIS 4 INDEX: FIB4 SCORE: 3.73

## 2021-05-22 NOTE — PROGRESS NOTES
Hospital Medicine Daily Progress Note    Date of Service  5/21/2021    Chief Complaint  88 y.o. male admitted 5/20/2021 with generalized weakness and multiple electrolyte deficiency    Hospital Course  No notes on file  88 y.o. male with complex medical history, including history of DVT/PE, chronic anticoagulation with Coumadin, CAD, stent placement 1 month ago, chronic adrenal insufficiency, chronic hypoxia on 5 L, recent admission to this hospital with hypotension with ACS and rectus sheath hematoma with acute anemia requiring coil embolization of right inferior epigastric artery by interventional radiology on 5/5 and blood transfusion, who presented 5/20/2021 with with hypokalemia. He electrolytes were supplemented in ER and plan was to dc back but pt refused to [facility] resulting in admission for SNF placement. Chest xry was found to have mild pulmonary edema and he was treated with his home lasix.   Interval Problem Update   Aao*3, vss, sat 98 % on 5L endorses pain and swelling to L UE for 5 days after his PT sessions at SNF, denies any bleeding, chest pain   Labs Mag 1.7 , K 3.2, phosphorus 2.2 supplemented   Pt with minimal participation with PT sessions, PT/OT rec SNF, referral placed  Sacral wound mx by Wound team in the setting of fecal incontinence        Consultants/Specialty  None    Code Status  DNAR, I OK    Disposition  Pending SNF acceptance    Review of Systems  Review of Systems   Constitutional: Positive for malaise/fatigue. Negative for chills and fever.   HENT: Negative for ear pain and sinus pain.    Eyes: Negative for blurred vision and pain.   Respiratory: Negative for cough and hemoptysis.    Cardiovascular: Negative for chest pain and orthopnea.   Gastrointestinal: Negative for diarrhea, nausea and vomiting.   Genitourinary: Negative for dysuria and hematuria.   Musculoskeletal: Positive for joint pain. Negative for myalgias.   Skin: Negative for itching.   Neurological:  Positive for weakness. Negative for tremors, focal weakness and headaches.   Endo/Heme/Allergies: Bruises/bleeds easily.   Psychiatric/Behavioral: Negative for suicidal ideas.        Physical Exam  Temp:  [36.5 °C (97.7 °F)-37.4 °C (99.3 °F)] 37.4 °C (99.3 °F)  Pulse:  [74-97] 91  Resp:  [16-18] 16  BP: (127-151)/(71-96) 143/87  SpO2:  [90 %-99 %] 98 %    Physical Exam  Vitals reviewed.   Constitutional:       Appearance: Normal appearance.   HENT:      Head: Normocephalic and atraumatic.      Right Ear: External ear normal.      Left Ear: External ear normal.      Mouth/Throat:      Mouth: Mucous membranes are moist.   Eyes:      Extraocular Movements: Extraocular movements intact.      Conjunctiva/sclera: Conjunctivae normal.      Pupils: Pupils are equal, round, and reactive to light.   Cardiovascular:      Rate and Rhythm: Normal rate and regular rhythm.      Pulses: Normal pulses.      Heart sounds: Normal heart sounds.   Pulmonary:      Effort: Pulmonary effort is normal.      Breath sounds: Normal breath sounds.      Comments: Multiple ecchymotic rashes over chest   Unable to auscultate posterior lung due to patient posture and limited mobility  Abdominal:      General: Abdomen is flat. Bowel sounds are normal.      Palpations: Abdomen is soft.   Musculoskeletal:         General: Normal range of motion.      Comments: LUE edematous, erythematous, from elbow distally, skin over forearm with echymosis, non tender     Skin:     General: Skin is warm.      Capillary Refill: Capillary refill takes less than 2 seconds.      Findings: Erythema and rash present.   Neurological:      General: No focal deficit present.      Mental Status: He is alert and oriented to person, place, and time.   Psychiatric:         Mood and Affect: Mood normal.         Fluids    Intake/Output Summary (Last 24 hours) at 5/21/2021 1903  Last data filed at 5/21/2021 1819  Gross per 24 hour   Intake 720 ml   Output 2125 ml   Net -1405 ml        Laboratory  Recent Labs     05/20/21  0956 05/21/21  0102   WBC 11.7* 8.9   RBC 3.11* 2.99*   HEMOGLOBIN 9.2* 8.7*   HEMATOCRIT 29.9* 28.2*   MCV 96.1 94.3   MCH 29.6 29.1   MCHC 30.8* 30.9*   RDW 80.4* 76.8*   PLATELETCT 201 206   MPV 10.0 10.2     Recent Labs     05/20/21  0956 05/21/21  0102   SODIUM 137 141   POTASSIUM 3.3* 3.2*   CHLORIDE 100 101   CO2 28 31   GLUCOSE 124* 107*   BUN 12 11   CREATININE 0.81 0.81   CALCIUM 7.1* 7.1*                   Imaging  IR-US GUIDED PIV   Final Result    Ultrasound-guided PERIPHERAL IV INSERTION performed by    qualified nursing staff as above.      US-EXTREMITY VENOUS LOWER UNILAT LEFT   Final Result      DX-CHEST-PORTABLE (1 VIEW)   Final Result      Bibasilar opacities may represent atelectasis or consolidation.      Small left pleural effusion may be present.      Pulmonary interstitial edema.      Stable prominence of the cardiomediastinal silhouette.      Atherosclerotic calcification is seen.      DX-ELBOW-COMPLETE 3+ LEFT   Final Result      1.  No fracture or dislocation of LEFT elbow.   2.  Severe degenerative change.   3.  Diffuse subcutaneous edema.   4.  Limited by positioning.           Assessment/Plan  Hypokalemia- (present on admission)  Assessment & Plan  Replaced p.o.    Debility- (present on admission)  Assessment & Plan  PT OT evaluation, placement    Coronary artery disease- (present on admission)  Assessment & Plan  S/p PCI with stent placement 4 weeks ago  C/w dapt and high intensity statin    Anasarca- (present on admission)  Assessment & Plan  Continue p.o. Lasix    Adrenal insufficiency (HCC)- (present on admission)  Assessment & Plan  Resume hydrocortisone    History of pulmonary embolism- (present on admission)  Assessment & Plan  Warfarin was discontinued during previous recent hospitalization due to blood loss anemia    Sacral wound- (present on admission)  Assessment & Plan  Wound mx as per wound team recs  Offloading due to risk of  pressure ulcers    Hypophosphatemia  Assessment & Plan  Replaced IV with potassium phosphate    Hypomagnesemia  Assessment & Plan  Replaced IV    Chronic respiratory failure (HCC)- (present on admission)  Assessment & Plan  Continue supplemental oxygen    BPH (benign prostatic hypertrophy)- (present on admission)  Assessment & Plan  Resume Flomax    COPD (chronic obstructive pulmonary disease) ([facility])- (present on admission)  Assessment & Plan  Stable  Continue home inhalers  Albuterol and DuoNeb as needed    Essential hypertension, benign- (present on admission)  Assessment & Plan  Blood pressure is stable  Labetalol as needed       VTE prophylaxis: Lovenox

## 2021-05-22 NOTE — PROGRESS NOTES
Hospital Medicine Daily Progress Note    Date of Service  5/22/2021    Chief Complaint  88 y.o. male admitted 5/20/2021 with generalized weakness and multiple electrolyte deficiency    Hospital Course  No notes on file  88 y.o. male with complex medical history, including history of DVT/PE, chronic anticoagulation with Coumadin, CAD, stent placement 1 month ago, chronic adrenal insufficiency, chronic hypoxia on 5 L, recent admission to this hospital with hypotension with ACS and rectus sheath hematoma with acute anemia requiring coil embolization of right inferior epigastric artery by interventional radiology on 5/5 and blood transfusion, who presented 5/20/2021 with with hypokalemia. He electrolytes were supplemented in ER and plan was to dc back but pt refused to return to [facility] resulting in admission for SNF placement. Chest xry was found to have mild pulmonary edema and he was treated with his home lasix.   Interval Problem Update   Aao*3, vss, sat 98 % on 5L endorses pain and swelling to L UE for 5 days after his PT sessions at SNF, denies any bleeding, chest pain   Labs Mag 1.7 , K 3.2, phosphorus 2.2 supplemented   Pt with minimal participation with PT sessions, PT/OT rec SNF, referral placed  Sacral wound mx by Wound team in the setting of fecal incontinence  Will get CT L UE to evaluate for persistent soft tissue swelling after trauma        Consultants/Specialty  None    Code Status  DNAR, I OK    Disposition  Pending SNF acceptance    Review of Systems  Review of Systems   Constitutional: Positive for malaise/fatigue. Negative for chills and fever.   HENT: Negative for ear pain and sinus pain.    Eyes: Negative for blurred vision and pain.   Respiratory: Negative for cough and hemoptysis.    Cardiovascular: Negative for chest pain and orthopnea.   Gastrointestinal: Negative for diarrhea, nausea and vomiting.   Genitourinary: Negative for dysuria and hematuria.   Musculoskeletal: Positive for joint  pain. Negative for myalgias.   Skin: Negative for itching.   Neurological: Positive for weakness. Negative for tremors, focal weakness and headaches.   Endo/Heme/Allergies: Bruises/bleeds easily.   Psychiatric/Behavioral: Negative for suicidal ideas.        Physical Exam  Temp:  [36.4 °C (97.5 °F)-37.4 °C (99.3 °F)] 36.6 °C (97.8 °F)  Pulse:  [68-91] 78  Resp:  [16-18] 18  BP: (119-146)/(69-87) 146/86  SpO2:  [96 %-99 %] 97 %    Physical Exam  Vitals reviewed.   Constitutional:       Appearance: Normal appearance.   HENT:      Head: Normocephalic and atraumatic.      Right Ear: External ear normal.      Left Ear: External ear normal.      Mouth/Throat:      Mouth: Mucous membranes are moist.   Eyes:      Extraocular Movements: Extraocular movements intact.      Conjunctiva/sclera: Conjunctivae normal.      Pupils: Pupils are equal, round, and reactive to light.   Cardiovascular:      Rate and Rhythm: Normal rate and regular rhythm.      Pulses: Normal pulses.      Heart sounds: Normal heart sounds.   Pulmonary:      Effort: Pulmonary effort is normal.      Breath sounds: Normal breath sounds.      Comments: Multiple ecchymotic rashes over chest   Unable to auscultate posterior lung due to patient posture and limited mobility  Abdominal:      General: Abdomen is flat. Bowel sounds are normal.      Palpations: Abdomen is soft.   Musculoskeletal:         General: Normal range of motion.      Comments: LUE edematous, erythematous, from elbow distally, skin over forearm with echymosis, non tender     Skin:     General: Skin is warm.      Capillary Refill: Capillary refill takes less than 2 seconds.      Findings: Erythema and rash present.   Neurological:      General: No focal deficit present.      Mental Status: He is alert and oriented to person, place, and time.   Psychiatric:         Mood and Affect: Mood normal.         Fluids    Intake/Output Summary (Last 24 hours) at 5/22/2021 2463  Last data filed at 5/22/2021  0900  Gross per 24 hour   Intake 480 ml   Output 425 ml   Net 55 ml       Laboratory  Recent Labs     05/20/21  0956 05/21/21  0102 05/22/21  0036   WBC 11.7* 8.9 8.4   RBC 3.11* 2.99* 2.98*   HEMOGLOBIN 9.2* 8.7* 8.8*   HEMATOCRIT 29.9* 28.2* 28.1*   MCV 96.1 94.3 94.3   MCH 29.6 29.1 29.5   MCHC 30.8* 30.9* 31.3*   RDW 80.4* 76.8* 75.5*   PLATELETCT 201 206 239   MPV 10.0 10.2 10.4     Recent Labs     05/20/21  0956 05/21/21  0102 05/22/21  0036   SODIUM 137 141 141   POTASSIUM 3.3* 3.2* 3.1*   CHLORIDE 100 101 101   CO2 28 31 33   GLUCOSE 124* 107* 122*   BUN 12 11 11   CREATININE 0.81 0.81 0.91   CALCIUM 7.1* 7.1* 7.0*                   Imaging  CT-EXTREMITY, UPPER WITH LEFT   Final Result      No acute fracture or dislocation.      Severe osteoarthritis of the elbow joint.      IR-MIDLINE CATHETER INSERTION WO GUIDANCE > AGE 3   Final Result                  Ultrasound-guided midline placement performed by qualified nursing staff    as above.          IR-US GUIDED PIV   Final Result    Ultrasound-guided PERIPHERAL IV INSERTION performed by    qualified nursing staff as above.      US-EXTREMITY VENOUS LOWER UNILAT LEFT   Final Result      DX-CHEST-PORTABLE (1 VIEW)   Final Result      Bibasilar opacities may represent atelectasis or consolidation.      Small left pleural effusion may be present.      Pulmonary interstitial edema.      Stable prominence of the cardiomediastinal silhouette.      Atherosclerotic calcification is seen.      DX-ELBOW-COMPLETE 3+ LEFT   Final Result      1.  No fracture or dislocation of LEFT elbow.   2.  Severe degenerative change.   3.  Diffuse subcutaneous edema.   4.  Limited by positioning.           Assessment/Plan  Hypokalemia- (present on admission)  Assessment & Plan  Replaced p.o.    Debility- (present on admission)  Assessment & Plan  PT OT evaluation, placement    Coronary artery disease- (present on admission)  Assessment & Plan  S/p PCI with stent placement 4 weeks  ago  C/w dapt and high intensity statin    Anasarca- (present on admission)  Assessment & Plan  Continue p.o. Lasix    Adrenal insufficiency (HCC)- (present on admission)  Assessment & Plan  Resume hydrocortisone    History of pulmonary embolism- (present on admission)  Assessment & Plan  Warfarin was discontinued during previous recent hospitalization due to blood loss anemia    Pain and swelling of left upper extremity- (present on admission)  Assessment & Plan  Pt reports trauma and popping sound about 5 days ago, following which he noticed swelling and pain to his LUE  F/u CT to rule out soft tissue injury    Sacral wound- (present on admission)  Assessment & Plan  Wound mx as per wound team recs  Offloading due to risk of pressure ulcers    Hypophosphatemia  Assessment & Plan  Replaced IV with potassium phosphate    Hypomagnesemia  Assessment & Plan  Replaced IV    Chronic respiratory failure (HCC)- (present on admission)  Assessment & Plan  Continue supplemental oxygen    BPH (benign prostatic hypertrophy)- (present on admission)  Assessment & Plan  Resume Flomax    COPD (chronic obstructive pulmonary disease) ([facility])- (present on admission)  Assessment & Plan  Stable  Continue home inhalers  Albuterol and DuoNeb as needed    Essential hypertension, benign- (present on admission)  Assessment & Plan  Blood pressure is stable  Labetalol as needed       VTE prophylaxis: Lovenox

## 2021-05-22 NOTE — PROCEDURES
Vascular Access Team    Date of Insertion: May 22, 2021  Arm Circumference: n/a  Line Length: 10 cm  Line Size: 20 G  Vein Occupancy %: 24%  Reason for Midline: Access  Labs: WBC 8.4, , BUN 11, Cr 0.91, GFR >60, INR 1.15 (5/7/21)    Orders confirmed, vessel patency confirmed with ultrasound. Risks and benefits of procedure explained to patient and education regarding line associated bloodstream infections provided. Questions answered.     PowerGlide Midline placed in RUE per licensed provider order with ultrasound guidance. 20g, 10 cm line placed in brachial vein after 1 attempt(s).  Catheter inserted with brisk blood return. Secured with 0cm external from insertion site.  Line flushed without resistance with 10 mL 0.9% normal saline.  Midline secured with Biopatch and Tegaderm.     Midline placement is confirmed by nurse using ultrasound and ability to flush and draw blood. Midline is appropriate for use at this time.  No X-ray is needed for placement confirmation. Pt tolerated procedure well.  Patient condition relayed to unit RN or ordering physician via this post procedure note in the EMR.    Ultrasound images uploaded to PACS and viewable in the EMR - yes  Ultrasound imaged printed and placed in paper chart - no      BARD PowerGlide Midline ref # [id number], Lot # [id number], Expiration Date 12/31/2021

## 2021-05-22 NOTE — PROGRESS NOTES
Assume care of pt at 0700. Report received from NOC RN. Pt is A/O x4. Pain is 4/10. Pt is resting in bed. Bed in lowest and locked position, call light in reach, hourly rounding in place. Labs reviewed. Communication board updated. Will continue to monitor.     No events overnight.

## 2021-05-22 NOTE — CARE PLAN
The patient is Stable - Low risk of patient condition declining or worsening    Shift Goals  Clinical Goals: decreased pain  Patient Goals: sleep comfortably   Family Goals: N/A    Progress made toward(s) clinical / shift goals:  Pt medicated per MAR. Pt educated on nonpharmacologic pain management.    Patient is not progressing towards the following goals: N/A

## 2021-05-22 NOTE — CARE PLAN
The patient is Stable - Low risk of patient condition declining or worsening    Shift Goals  Clinical Goals: decreased pain  Patient Goals: sleep comfortably   Family Goals: N/A    Progress made toward(s) clinical / shift goals:  Medicated per MAR. Educated on non pharmacological pain management.     Patient is not progressing towards the following goals: N/A      Problem: Knowledge Deficit - Standard  Goal: Patient and family/care givers will demonstrate understanding of plan of care, disease process/condition, diagnostic tests and medications  5/22/2021 0226 by Torres Tinajero R.N.  Outcome: Progressing  5/22/2021 0224 by Torres Tinajero R.N.  Outcome: Progressing  5/22/2021 0017 by Torres Tinajero R.N.  Outcome: Progressing

## 2021-05-22 NOTE — PROGRESS NOTES
Assumed care of pt at 1900.   Report received from ОЛЬГА Laird. Assessment completed.  Pt in bed, A/O x  4. Pt reports pain 8 /10. Bed in lowest and locked position, fall precautions in place, call light within pt reach. Hourly rounding in place. Pt denies other needs at this time.

## 2021-05-23 ENCOUNTER — APPOINTMENT (OUTPATIENT)
Dept: RADIOLOGY | Facility: MEDICAL CENTER | Age: 86
DRG: 291 | End: 2021-05-23
Attending: STUDENT IN AN ORGANIZED HEALTH CARE EDUCATION/TRAINING PROGRAM
Payer: MEDICARE

## 2021-05-23 PROBLEM — I50.22 CHRONIC SYSTOLIC CONGESTIVE HEART FAILURE (HCC): Status: ACTIVE | Noted: 2019-01-21

## 2021-05-23 PROBLEM — J96.11 CHRONIC RESPIRATORY FAILURE WITH HYPOXIA (HCC): Status: ACTIVE | Noted: 2019-01-21

## 2021-05-23 LAB
ALBUMIN SERPL BCP-MCNC: 2.6 G/DL (ref 3.2–4.9)
ANION GAP SERPL CALC-SCNC: 4 MMOL/L (ref 7–16)
BUN SERPL-MCNC: 11 MG/DL (ref 8–22)
CALCIUM SERPL-MCNC: 6.7 MG/DL (ref 8.5–10.5)
CHLORIDE SERPL-SCNC: 95 MMOL/L (ref 96–112)
CO2 SERPL-SCNC: 34 MMOL/L (ref 20–33)
CREAT SERPL-MCNC: 0.7 MG/DL (ref 0.5–1.4)
GLUCOSE SERPL-MCNC: 103 MG/DL (ref 65–99)
PHOSPHATE SERPL-MCNC: 2.8 MG/DL (ref 2.5–4.5)
POTASSIUM SERPL-SCNC: 3.4 MMOL/L (ref 3.6–5.5)
PROCALCITONIN SERPL-MCNC: <0.05 NG/ML
SODIUM SERPL-SCNC: 133 MMOL/L (ref 135–145)

## 2021-05-23 PROCEDURE — 700102 HCHG RX REV CODE 250 W/ 637 OVERRIDE(OP): Performed by: STUDENT IN AN ORGANIZED HEALTH CARE EDUCATION/TRAINING PROGRAM

## 2021-05-23 PROCEDURE — 36415 COLL VENOUS BLD VENIPUNCTURE: CPT

## 2021-05-23 PROCEDURE — 82040 ASSAY OF SERUM ALBUMIN: CPT

## 2021-05-23 PROCEDURE — 84145 PROCALCITONIN (PCT): CPT

## 2021-05-23 PROCEDURE — 700102 HCHG RX REV CODE 250 W/ 637 OVERRIDE(OP): Performed by: INTERNAL MEDICINE

## 2021-05-23 PROCEDURE — 84100 ASSAY OF PHOSPHORUS: CPT

## 2021-05-23 PROCEDURE — 700111 HCHG RX REV CODE 636 W/ 250 OVERRIDE (IP): Performed by: INTERNAL MEDICINE

## 2021-05-23 PROCEDURE — 770006 HCHG ROOM/CARE - MED/SURG/GYN SEMI*

## 2021-05-23 PROCEDURE — 700105 HCHG RX REV CODE 258: Performed by: STUDENT IN AN ORGANIZED HEALTH CARE EDUCATION/TRAINING PROGRAM

## 2021-05-23 PROCEDURE — A9270 NON-COVERED ITEM OR SERVICE: HCPCS | Performed by: STUDENT IN AN ORGANIZED HEALTH CARE EDUCATION/TRAINING PROGRAM

## 2021-05-23 PROCEDURE — A9270 NON-COVERED ITEM OR SERVICE: HCPCS | Performed by: INTERNAL MEDICINE

## 2021-05-23 PROCEDURE — 700111 HCHG RX REV CODE 636 W/ 250 OVERRIDE (IP): Performed by: STUDENT IN AN ORGANIZED HEALTH CARE EDUCATION/TRAINING PROGRAM

## 2021-05-23 PROCEDURE — 99233 SBSQ HOSP IP/OBS HIGH 50: CPT | Performed by: STUDENT IN AN ORGANIZED HEALTH CARE EDUCATION/TRAINING PROGRAM

## 2021-05-23 PROCEDURE — 93971 EXTREMITY STUDY: CPT | Mod: LT

## 2021-05-23 PROCEDURE — 80048 BASIC METABOLIC PNL TOTAL CA: CPT

## 2021-05-23 RX ORDER — FUROSEMIDE 10 MG/ML
40 INJECTION INTRAMUSCULAR; INTRAVENOUS ONCE
Status: COMPLETED | OUTPATIENT
Start: 2021-05-23 | End: 2021-05-23

## 2021-05-23 RX ADMIN — HYDROCORTISONE 10 MG: 20 TABLET ORAL at 05:16

## 2021-05-23 RX ADMIN — BUDESONIDE AND FORMOTEROL FUMARATE DIHYDRATE 2 PUFF: 80; 4.5 AEROSOL RESPIRATORY (INHALATION) at 17:14

## 2021-05-23 RX ADMIN — TAMSULOSIN HYDROCHLORIDE 0.4 MG: 0.4 CAPSULE ORAL at 05:16

## 2021-05-23 RX ADMIN — OMEPRAZOLE 20 MG: 20 CAPSULE, DELAYED RELEASE ORAL at 17:10

## 2021-05-23 RX ADMIN — OXYCODONE 5 MG: 5 TABLET ORAL at 14:22

## 2021-05-23 RX ADMIN — OXYCODONE 5 MG: 5 TABLET ORAL at 21:29

## 2021-05-23 RX ADMIN — FUROSEMIDE 40 MG: 10 INJECTION, SOLUTION INTRAMUSCULAR; INTRAVENOUS at 12:04

## 2021-05-23 RX ADMIN — FLUTICASONE PROPIONATE 50 MCG: 50 SPRAY, METERED NASAL at 05:15

## 2021-05-23 RX ADMIN — CALCIUM GLUCONATE 1 G: 98 INJECTION, SOLUTION INTRAVENOUS at 08:39

## 2021-05-23 RX ADMIN — ALPRAZOLAM 0.25 MG: 0.25 TABLET ORAL at 01:58

## 2021-05-23 RX ADMIN — DIBASIC SODIUM PHOSPHATE, MONOBASIC POTASSIUM PHOSPHATE AND MONOBASIC SODIUM PHOSPHATE 250 MG: 852; 155; 130 TABLET ORAL at 05:17

## 2021-05-23 RX ADMIN — CARBOXYMETHYLCELLULOSE SODIUM 1 DROP: 5 SOLUTION/ DROPS OPHTHALMIC at 17:14

## 2021-05-23 RX ADMIN — LEVOTHYROXINE SODIUM 75 MCG: 0.07 TABLET ORAL at 05:16

## 2021-05-23 RX ADMIN — ASPIRIN 81 MG: 81 TABLET, COATED ORAL at 05:17

## 2021-05-23 RX ADMIN — POTASSIUM CHLORIDE 20 MEQ: 1500 TABLET, EXTENDED RELEASE ORAL at 05:16

## 2021-05-23 RX ADMIN — MONTELUKAST 10 MG: 10 TABLET, FILM COATED ORAL at 05:16

## 2021-05-23 RX ADMIN — ALPRAZOLAM 0.25 MG: 0.25 TABLET ORAL at 23:46

## 2021-05-23 RX ADMIN — HYDROCORTISONE 10 MG: 20 TABLET ORAL at 12:05

## 2021-05-23 RX ADMIN — BUDESONIDE AND FORMOTEROL FUMARATE DIHYDRATE 2 PUFF: 80; 4.5 AEROSOL RESPIRATORY (INHALATION) at 05:15

## 2021-05-23 RX ADMIN — Medication 950 MG: at 05:15

## 2021-05-23 RX ADMIN — FUROSEMIDE 20 MG: 20 TABLET ORAL at 05:16

## 2021-05-23 RX ADMIN — ENOXAPARIN SODIUM 40 MG: 40 INJECTION SUBCUTANEOUS at 05:14

## 2021-05-23 RX ADMIN — OMEPRAZOLE 20 MG: 20 CAPSULE, DELAYED RELEASE ORAL at 05:17

## 2021-05-23 RX ADMIN — CLOPIDOGREL BISULFATE 75 MG: 75 TABLET ORAL at 05:17

## 2021-05-23 RX ADMIN — HYDROCORTISONE 10 MG: 20 TABLET ORAL at 17:10

## 2021-05-23 RX ADMIN — ALPRAZOLAM 0.25 MG: 0.25 TABLET ORAL at 14:51

## 2021-05-23 RX ADMIN — FUROSEMIDE 20 MG: 20 TABLET ORAL at 17:10

## 2021-05-23 RX ADMIN — CARBOXYMETHYLCELLULOSE SODIUM 1 DROP: 5 SOLUTION/ DROPS OPHTHALMIC at 05:15

## 2021-05-23 RX ADMIN — ATORVASTATIN CALCIUM 80 MG: 40 TABLET, FILM COATED ORAL at 17:10

## 2021-05-23 RX ADMIN — TIOTROPIUM BROMIDE INHALATION SPRAY 5 MCG: 3.12 SPRAY, METERED RESPIRATORY (INHALATION) at 05:15

## 2021-05-23 ASSESSMENT — ENCOUNTER SYMPTOMS
SINUS PAIN: 0
ORTHOPNEA: 0
NAUSEA: 0
DIARRHEA: 0
TREMORS: 0
CHILLS: 0
BLURRED VISION: 0
FEVER: 0
MYALGIAS: 0
EYE PAIN: 0
FOCAL WEAKNESS: 0
BRUISES/BLEEDS EASILY: 1
HEMOPTYSIS: 0
VOMITING: 0
COUGH: 0
WEAKNESS: 1
HEADACHES: 0

## 2021-05-23 ASSESSMENT — PAIN DESCRIPTION - PAIN TYPE
TYPE: ACUTE PAIN

## 2021-05-23 NOTE — ASSESSMENT & PLAN NOTE
Patient on 5L oxygen via nc at baseline  Echo showed EF of 45% on 5/12, I/os , daily weight and fluid restriction to 1.5 L  Continue BB , losartan and  Lasix  Reached euvolemic status.  Cardiology out-patient follow-up.

## 2021-05-23 NOTE — PROGRESS NOTES
Assumed care of pt at 1900.   Report received from ОЛЬГА Laird. Assessment completed.  Pt in bed, A/O x  4. Pt reports pain  4/10. Bed in lowest and locked position, fall precautions in place, call light within pt reach. Hourly rounding in place. Pt denies other needs at this time.

## 2021-05-23 NOTE — CARE PLAN
Problem: Fall Risk  Goal: Patient will remain free from falls  Outcome: Progressing  Note: Patient is in bed with 3 rails placed up and bed alarm used. Patient belongings and call light are within reach. [place] socks are worn and bed is at the lowest position.      Problem: Psychosocial  Goal: Patient's level of anxiety will decrease  Outcome: Progressing  Note: The patient will verbalize a relief of anxiety during the shift. All interventions are administered as prescribed.      The patient is Stable - Low risk of patient condition declining or worsening    Shift Goals  Clinical Goals: Manage Pain  Patient Goals: Contact Son  Family Goals: N/A    Progress made toward(s) clinical / shift goals:  The patient has verbalized a relief from anxiety    Patient is not progressing towards the following goals: The patient vocally expresses a will to stand up at inappropriate or unsafe times.

## 2021-05-23 NOTE — PROGRESS NOTES
Hospital Medicine Daily Progress Note    Date of Service  5/23/2021    Chief Complaint  88 y.o. male admitted 5/20/2021 with generalized weakness and multiple electrolyte deficiency    Hospital Course  No notes on file  88 y.o. male with complex medical history, including history of DVT/PE, chronic anticoagulation with Coumadin, CAD, stent placement 1 month ago, chronic adrenal insufficiency, chronic hypoxia on 5 L, recent admission to this hospital with hypotension with ACS and rectus sheath hematoma with acute anemia requiring coil embolization of right inferior epigastric artery by interventional radiology on 5/5 and blood transfusion, who presented 5/20/2021 with with hypokalemia. He electrolytes were supplemented in ER and plan was to dc back but pt refused to return to [facility] resulting in admission for SNF placement. Chest xry was found to have mild pulmonary edema and he was treated with his home lasix.   Interval Problem Update   Aao*3, vss, sat 98 % on 5L endorses pain and swelling to L UE for 5 days after his PT sessions at SNF, denies any bleeding, chest pain   Labs Mag 1.7 , K 3.2, phosphorus 2.2 supplemented   Pt with minimal participation with PT sessions, PT/OT rec SNF, referral placed  Sacral wound mx by Wound team in the setting of fecal incontinence  5/23: reports SOB overnight, , will do IV lasix trial, CXR from 5/20 with interstitial pulmonary edema, CT LUE with diffuse edema and severe osteoarthritis of elbow, wrist and glenohumeral joint w/o acute fractures  Awaiting LUE venous ULS, procal negative, no leukocytosis  C/w pain management        Consultants/Specialty  None    Code Status  DNAR, I OK    Disposition  Pending SNF acceptance    Review of Systems  Review of Systems   Constitutional: Positive for malaise/fatigue. Negative for chills and fever.   HENT: Negative for ear pain and sinus pain.    Eyes: Negative for blurred vision and pain.   Respiratory: Negative for cough and  hemoptysis.    Cardiovascular: Negative for chest pain and orthopnea.   Gastrointestinal: Negative for diarrhea, nausea and vomiting.   Genitourinary: Negative for dysuria and hematuria.   Musculoskeletal: Positive for joint pain. Negative for myalgias.   Skin: Negative for itching.   Neurological: Positive for weakness. Negative for tremors, focal weakness and headaches.   Endo/Heme/Allergies: Bruises/bleeds easily.   Psychiatric/Behavioral: Negative for suicidal ideas.        Physical Exam  Temp:  [36.2 °C (97.1 °F)-36.8 °C (98.2 °F)] 36.2 °C (97.1 °F)  Pulse:  [73-86] 86  Resp:  [16-17] 17  BP: (116-150)/(74-96) 148/85  SpO2:  [97 %-99 %] 98 %    Physical Exam  Vitals reviewed.   Constitutional:       Appearance: Normal appearance.   HENT:      Head: Normocephalic and atraumatic.      Right Ear: External ear normal.      Left Ear: External ear normal.      Mouth/Throat:      Mouth: Mucous membranes are moist.   Eyes:      Extraocular Movements: Extraocular movements intact.      Conjunctiva/sclera: Conjunctivae normal.      Pupils: Pupils are equal, round, and reactive to light.   Cardiovascular:      Rate and Rhythm: Normal rate and regular rhythm.      Pulses: Normal pulses.      Heart sounds: Normal heart sounds.   Pulmonary:      Effort: Pulmonary effort is normal.      Breath sounds: Normal breath sounds.      Comments: Multiple ecchymotic rashes over chest   Unable to auscultate posterior lung due to patient posture and limited mobility  Abdominal:      General: Abdomen is flat. Bowel sounds are normal.      Palpations: Abdomen is soft.   Musculoskeletal:         General: Normal range of motion.      Comments: LUE edematous, erythematous, from elbow distally, skin over forearm with echymosis, non tender     Skin:     General: Skin is warm.      Capillary Refill: Capillary refill takes less than 2 seconds.      Findings: Erythema and rash present.   Neurological:      General: No focal deficit present.       Mental Status: He is alert and oriented to person, place, and time.   Psychiatric:         Mood and Affect: Mood normal.         Fluids    Intake/Output Summary (Last 24 hours) at 5/23/2021 1043  Last data filed at 5/23/2021 0900  Gross per 24 hour   Intake 1080 ml   Output 525 ml   Net 555 ml       Laboratory  Recent Labs     05/21/21  0102 05/22/21  0036   WBC 8.9 8.4   RBC 2.99* 2.98*   HEMOGLOBIN 8.7* 8.8*   HEMATOCRIT 28.2* 28.1*   MCV 94.3 94.3   MCH 29.1 29.5   MCHC 30.9* 31.3*   RDW 76.8* 75.5*   PLATELETCT 206 239   MPV 10.2 10.4     Recent Labs     05/21/21  0102 05/22/21  0036 05/23/21  0458   SODIUM 141 141 133*   POTASSIUM 3.2* 3.1* 3.4*   CHLORIDE 101 101 95*   CO2 31 33 34*   GLUCOSE 107* 122* 103*   BUN 11 11 11   CREATININE 0.81 0.91 0.70   CALCIUM 7.1* 7.0* 6.7*                   Imaging  CT-EXTREMITY, UPPER WITH LEFT   Final Result      No acute fracture or dislocation.      Severe osteoarthritis of the elbow joint.      IR-MIDLINE CATHETER INSERTION WO GUIDANCE > AGE 3   Final Result                  Ultrasound-guided midline placement performed by qualified nursing staff    as above.          IR-US GUIDED PIV   Final Result    Ultrasound-guided PERIPHERAL IV INSERTION performed by    qualified nursing staff as above.      US-EXTREMITY VENOUS LOWER UNILAT LEFT   Final Result      DX-CHEST-PORTABLE (1 VIEW)   Final Result      Bibasilar opacities may represent atelectasis or consolidation.      Small left pleural effusion may be present.      Pulmonary interstitial edema.      Stable prominence of the cardiomediastinal silhouette.      Atherosclerotic calcification is seen.      DX-ELBOW-COMPLETE 3+ LEFT   Final Result      1.  No fracture or dislocation of LEFT elbow.   2.  Severe degenerative change.   3.  Diffuse subcutaneous edema.   4.  Limited by positioning.      US-EXTREMITY VENOUS UPPER UNILAT LEFT    (Results Pending)        Assessment/Plan  Hypokalemia- (present on  admission)  Assessment & Plan  Replaced p.o.    Debility- (present on admission)  Assessment & Plan  PT OT evaluation, placement    Coronary artery disease- (present on admission)  Assessment & Plan  S/p PCI with stent placement 4 weeks ago  C/w dapt and high intensity statin    Chronic systolic congestive heart failure (HCC)- (present on admission)  Assessment & Plan  Patient on 5L oxygen via nc at baseline  cxr with increased interstitial edema, ef 45% on 5/12, , will do IV lasix trial   Strict I and O, cardiac 2 gm diet and fluid restriction to 1800   Daily wt    Anasarca- (present on admission)  Assessment & Plan  Continue p.o. Lasix    Adrenal insufficiency (HCC)- (present on admission)  Assessment & Plan  Resume hydrocortisone    History of pulmonary embolism- (present on admission)  Assessment & Plan  Warfarin was discontinued during previous recent hospitalization due to blood loss anemia    Pain and swelling of left upper extremity- (present on admission)  Assessment & Plan  Pt reports trauma and popping sound about 5 days ago, following which he noticed swelling and pain to his LUE  F/u CT to rule out soft tissue injury    Sacral wound- (present on admission)  Assessment & Plan  Wound mx as per wound team recs  Offloading due to risk of pressure ulcers    Hypophosphatemia  Assessment & Plan  Replaced IV with potassium phosphate    Hypomagnesemia  Assessment & Plan  Replaced IV    Chronic respiratory failure (HCC)- (present on admission)  Assessment & Plan  Continue supplemental oxygen    BPH (benign prostatic hypertrophy)- (present on admission)  Assessment & Plan  Resume Flomax    COPD (chronic obstructive pulmonary disease) ([facility])- (present on admission)  Assessment & Plan  Stable  Continue home inhalers  Albuterol and DuoNeb as needed    Essential hypertension, benign- (present on admission)  Assessment & Plan  Blood pressure is stable  Labetalol as needed       VTE prophylaxis: Lovenox

## 2021-05-23 NOTE — CARE PLAN
The patient is Stable - Low risk of patient condition declining or worsening    Shift Goals  Clinical Goals: Manage Pain  Patient Goals: Sleep comfortably   Family Goals: N/A    Progress made toward(s) clinical / shift goals: Pt medicated per MAR. Educated on non pharmacologic pain management.    Patient is not progressing towards the following goals: N/A      Problem: Knowledge Deficit - Standard  Goal: Patient and family/care givers will demonstrate understanding of plan of care, disease process/condition, diagnostic tests and medications  Outcome: Progressing     Problem: Pain - Standard  Goal: Alleviation of pain or a reduction in pain to the patient’s comfort goal  Outcome: Progressing     Problem: Skin Integrity  Goal: Skin integrity is maintained or improved  Outcome: Progressing     Problem: Fall Risk  Goal: Patient will remain free from falls  Outcome: Progressing

## 2021-05-24 PROBLEM — I50.23 ACUTE ON CHRONIC SYSTOLIC CONGESTIVE HEART FAILURE (HCC): Status: ACTIVE | Noted: 2019-01-21

## 2021-05-24 LAB
ANION GAP SERPL CALC-SCNC: 6 MMOL/L (ref 7–16)
BUN SERPL-MCNC: 14 MG/DL (ref 8–22)
CA-I SERPL-SCNC: 0.9 MMOL/L (ref 1.1–1.3)
CALCIUM SERPL-MCNC: 6.9 MG/DL (ref 8.5–10.5)
CHLORIDE SERPL-SCNC: 95 MMOL/L (ref 96–112)
CO2 SERPL-SCNC: 34 MMOL/L (ref 20–33)
CREAT SERPL-MCNC: 1 MG/DL (ref 0.5–1.4)
GLUCOSE SERPL-MCNC: 106 MG/DL (ref 65–99)
PHOSPHATE SERPL-MCNC: 3 MG/DL (ref 2.5–4.5)
POTASSIUM SERPL-SCNC: 3.4 MMOL/L (ref 3.6–5.5)
PTH-INTACT SERPL-MCNC: 76.5 PG/ML (ref 14–72)
SODIUM SERPL-SCNC: 135 MMOL/L (ref 135–145)

## 2021-05-24 PROCEDURE — 700102 HCHG RX REV CODE 250 W/ 637 OVERRIDE(OP): Performed by: INTERNAL MEDICINE

## 2021-05-24 PROCEDURE — 97530 THERAPEUTIC ACTIVITIES: CPT

## 2021-05-24 PROCEDURE — 80048 BASIC METABOLIC PNL TOTAL CA: CPT

## 2021-05-24 PROCEDURE — A9270 NON-COVERED ITEM OR SERVICE: HCPCS | Performed by: INTERNAL MEDICINE

## 2021-05-24 PROCEDURE — 770006 HCHG ROOM/CARE - MED/SURG/GYN SEMI*

## 2021-05-24 PROCEDURE — 84100 ASSAY OF PHOSPHORUS: CPT

## 2021-05-24 PROCEDURE — 83970 ASSAY OF PARATHORMONE: CPT

## 2021-05-24 PROCEDURE — 82330 ASSAY OF CALCIUM: CPT

## 2021-05-24 PROCEDURE — 700111 HCHG RX REV CODE 636 W/ 250 OVERRIDE (IP): Performed by: STUDENT IN AN ORGANIZED HEALTH CARE EDUCATION/TRAINING PROGRAM

## 2021-05-24 PROCEDURE — 99233 SBSQ HOSP IP/OBS HIGH 50: CPT | Performed by: STUDENT IN AN ORGANIZED HEALTH CARE EDUCATION/TRAINING PROGRAM

## 2021-05-24 PROCEDURE — 700111 HCHG RX REV CODE 636 W/ 250 OVERRIDE (IP): Performed by: INTERNAL MEDICINE

## 2021-05-24 PROCEDURE — 700102 HCHG RX REV CODE 250 W/ 637 OVERRIDE(OP): Performed by: STUDENT IN AN ORGANIZED HEALTH CARE EDUCATION/TRAINING PROGRAM

## 2021-05-24 PROCEDURE — 700105 HCHG RX REV CODE 258: Performed by: STUDENT IN AN ORGANIZED HEALTH CARE EDUCATION/TRAINING PROGRAM

## 2021-05-24 PROCEDURE — A9270 NON-COVERED ITEM OR SERVICE: HCPCS | Performed by: STUDENT IN AN ORGANIZED HEALTH CARE EDUCATION/TRAINING PROGRAM

## 2021-05-24 RX ORDER — CARVEDILOL 3.12 MG/1
3.12 TABLET ORAL 2 TIMES DAILY WITH MEALS
Status: DISCONTINUED | OUTPATIENT
Start: 2021-05-24 | End: 2021-06-19 | Stop reason: HOSPADM

## 2021-05-24 RX ORDER — LISINOPRIL 5 MG/1
5 TABLET ORAL
Status: DISCONTINUED | OUTPATIENT
Start: 2021-05-24 | End: 2021-05-24

## 2021-05-24 RX ORDER — LOSARTAN POTASSIUM 25 MG/1
25 TABLET ORAL
Status: DISCONTINUED | OUTPATIENT
Start: 2021-05-24 | End: 2021-06-19 | Stop reason: HOSPADM

## 2021-05-24 RX ADMIN — BUDESONIDE AND FORMOTEROL FUMARATE DIHYDRATE 2 PUFF: 80; 4.5 AEROSOL RESPIRATORY (INHALATION) at 04:58

## 2021-05-24 RX ADMIN — ATORVASTATIN CALCIUM 80 MG: 40 TABLET, FILM COATED ORAL at 17:03

## 2021-05-24 RX ADMIN — HYDROCORTISONE 10 MG: 20 TABLET ORAL at 11:53

## 2021-05-24 RX ADMIN — OXYCODONE 5 MG: 5 TABLET ORAL at 21:16

## 2021-05-24 RX ADMIN — OMEPRAZOLE 20 MG: 20 CAPSULE, DELAYED RELEASE ORAL at 17:03

## 2021-05-24 RX ADMIN — Medication 950 MG: at 04:59

## 2021-05-24 RX ADMIN — MONTELUKAST 10 MG: 10 TABLET, FILM COATED ORAL at 04:59

## 2021-05-24 RX ADMIN — ASPIRIN 81 MG: 81 TABLET, COATED ORAL at 05:00

## 2021-05-24 RX ADMIN — FLUTICASONE PROPIONATE 50 MCG: 50 SPRAY, METERED NASAL at 04:58

## 2021-05-24 RX ADMIN — LEVOTHYROXINE SODIUM 75 MCG: 0.07 TABLET ORAL at 05:00

## 2021-05-24 RX ADMIN — FUROSEMIDE 20 MG: 20 TABLET ORAL at 17:04

## 2021-05-24 RX ADMIN — TAMSULOSIN HYDROCHLORIDE 0.4 MG: 0.4 CAPSULE ORAL at 05:00

## 2021-05-24 RX ADMIN — HYDROCORTISONE 10 MG: 20 TABLET ORAL at 17:03

## 2021-05-24 RX ADMIN — OMEPRAZOLE 20 MG: 20 CAPSULE, DELAYED RELEASE ORAL at 04:59

## 2021-05-24 RX ADMIN — HYDROCORTISONE 10 MG: 20 TABLET ORAL at 04:59

## 2021-05-24 RX ADMIN — POTASSIUM CHLORIDE 20 MEQ: 1500 TABLET, EXTENDED RELEASE ORAL at 04:59

## 2021-05-24 RX ADMIN — OXYCODONE 5 MG: 5 TABLET ORAL at 00:52

## 2021-05-24 RX ADMIN — FUROSEMIDE 20 MG: 20 TABLET ORAL at 05:00

## 2021-05-24 RX ADMIN — ENOXAPARIN SODIUM 40 MG: 40 INJECTION SUBCUTANEOUS at 04:59

## 2021-05-24 RX ADMIN — CARBOXYMETHYLCELLULOSE SODIUM 1 DROP: 5 SOLUTION/ DROPS OPHTHALMIC at 04:58

## 2021-05-24 RX ADMIN — CALCIUM GLUCONATE 1 G: 98 INJECTION, SOLUTION INTRAVENOUS at 12:37

## 2021-05-24 RX ADMIN — ALPRAZOLAM 0.25 MG: 0.25 TABLET ORAL at 11:53

## 2021-05-24 RX ADMIN — LOSARTAN POTASSIUM 25 MG: 25 TABLET, FILM COATED ORAL at 17:04

## 2021-05-24 RX ADMIN — CLOPIDOGREL BISULFATE 75 MG: 75 TABLET ORAL at 05:00

## 2021-05-24 RX ADMIN — BUDESONIDE AND FORMOTEROL FUMARATE DIHYDRATE 2 PUFF: 80; 4.5 AEROSOL RESPIRATORY (INHALATION) at 17:07

## 2021-05-24 RX ADMIN — TIOTROPIUM BROMIDE INHALATION SPRAY 5 MCG: 3.12 SPRAY, METERED RESPIRATORY (INHALATION) at 04:58

## 2021-05-24 RX ADMIN — CARBOXYMETHYLCELLULOSE SODIUM 1 DROP: 5 SOLUTION/ DROPS OPHTHALMIC at 17:07

## 2021-05-24 RX ADMIN — CARVEDILOL 3.12 MG: 3.12 TABLET, FILM COATED ORAL at 17:03

## 2021-05-24 ASSESSMENT — GAIT ASSESSMENTS: GAIT LEVEL OF ASSIST: UNABLE TO PARTICIPATE

## 2021-05-24 ASSESSMENT — ENCOUNTER SYMPTOMS
WEAKNESS: 1
FOCAL WEAKNESS: 0
BRUISES/BLEEDS EASILY: 1
CHILLS: 0
EYE PAIN: 0
ORTHOPNEA: 0
TREMORS: 0
NAUSEA: 0
MYALGIAS: 0
FEVER: 0
HEADACHES: 0
SINUS PAIN: 0
DIARRHEA: 0
HEMOPTYSIS: 0
COUGH: 0
VOMITING: 0
BLURRED VISION: 0

## 2021-05-24 ASSESSMENT — PAIN DESCRIPTION - PAIN TYPE
TYPE: ACUTE PAIN

## 2021-05-24 ASSESSMENT — COGNITIVE AND FUNCTIONAL STATUS - GENERAL
CLIMB 3 TO 5 STEPS WITH RAILING: TOTAL
WALKING IN HOSPITAL ROOM: TOTAL
MOVING TO AND FROM BED TO CHAIR: UNABLE
TURNING FROM BACK TO SIDE WHILE IN FLAT BAD: A LITTLE
MOVING FROM LYING ON BACK TO SITTING ON SIDE OF FLAT BED: UNABLE
SUGGESTED CMS G CODE MODIFIER MOBILITY: CM
MOBILITY SCORE: 8
STANDING UP FROM CHAIR USING ARMS: TOTAL

## 2021-05-24 NOTE — DISCHARGE PLANNING
Agency/Facility Name: [facility]   Outcome: Left vmail for admissions regarding reception of referral

## 2021-05-24 NOTE — HEART FAILURE PROGRAM
"This readmission represents the second time patient has had to come back to the hospital from an outpatient rehabilitation program (5/2/21 brought back to ER from [facility]; 5/20/21 brought back to ER from [facility]).    This is also patient's fourth admission since the end of April. On the prior admission, it was noted that patient had a very poor prognosis. This was discussed with patient and family who opted to continue with full treatment.    Provider notes for this admission say that patient presented to our ED on 5/20/21 with \"electrolyte disturbance\" and was going to have potassium repleted in the ER with return to [facility] but patient refused to return there. He was therefore per the note, admitted for \"SNF referral\".    Per yesterday's provider note, patient is being treated for \"increased interstitial edema\" with his \"home lasix\". Indeed, patient is currently on a regimen of 20 mg furosemide PO BID.    Patient did receive some IV furosemide in the ER. At any rate, patient was ultimately admitted for refusal to go back to the SNF that sent him to us.    I've messaged Dr. Padilla to inquire as to whether or not patient has an acute HF decompensation and as to what we are going to do about his frequent readmissions from outpatient facilities.    Thank you, Nano, Cardio RN Navigator [phone]          "

## 2021-05-24 NOTE — PROGRESS NOTES
Hospital Medicine Daily Progress Note    Date of Service  5/24/2021    Chief Complaint  88 y.o. male admitted 5/20/2021 with generalized weakness and multiple electrolyte deficiency    Hospital Course  No notes on file  88 y.o. male with complex medical history, including history of DVT/PE, chronic anticoagulation with Coumadin, CAD, stent placement 1 month ago, chronic adrenal insufficiency, chronic hypoxia on 5 L, recent admission to this hospital with hypotension with ACS and rectus sheath hematoma with acute anemia requiring coil embolization of right inferior epigastric artery by interventional radiology on 5/5 and blood transfusion, who presented 5/20/2021 with with hypokalemia. He electrolytes were supplemented in ER and plan was to dc back but pt refused to return to [facility] resulting in admission for SNF placement. Chest xry was found to have mild pulmonary edema and he was treated with his home lasix.   Interval Problem Update   Aao*3, vss, sat 98 % on 5L endorses pain and swelling to L UE for 5 days after his PT sessions at SNF, denies any bleeding, chest pain   Labs Mag 1.7 , K 3.2, phosphorus 2.2 supplemented   Pt with minimal participation with PT sessions, PT/OT rec SNF, referral placed  Sacral wound mx by Wound team in the setting of fecal incontinence  5/23: reports SOB overnight, , will do IV lasix trial, CXR from 5/20 with interstitial pulmonary edema, CT LUE with diffuse edema and severe osteoarthritis of elbow, wrist and glenohumeral joint w/o acute fractures  Awaiting LUE venous ULS, procal negative, no leukocytosis  C/w pain management  5/24: Had good UOP with iv lasix, will c/w po lasix, reviewed chart previous cardiology note recommending Beta blocker, ARNI and aldactone, started on Losartan and Coreg, may add aldactone if patient tolerating above.   Hypocalcemia with low ionized calcium supplemented  LUE w/o DVT    Consultants/Specialty  None    Code Status  DNAR, I  OK    Disposition  Pending SNF acceptance    Review of Systems  Review of Systems   Constitutional: Positive for malaise/fatigue. Negative for chills and fever.   HENT: Negative for ear pain and sinus pain.    Eyes: Negative for blurred vision and pain.   Respiratory: Negative for cough and hemoptysis.    Cardiovascular: Negative for chest pain and orthopnea.   Gastrointestinal: Negative for diarrhea, nausea and vomiting.   Genitourinary: Negative for dysuria and hematuria.   Musculoskeletal: Positive for joint pain. Negative for myalgias.   Skin: Negative for itching.   Neurological: Positive for weakness. Negative for tremors, focal weakness and headaches.   Endo/Heme/Allergies: Bruises/bleeds easily.   Psychiatric/Behavioral: Negative for suicidal ideas.        Physical Exam  Temp:  [36.2 °C (97.1 °F)-36.7 °C (98.1 °F)] 36.7 °C (98.1 °F)  Pulse:  [65-93] 78  Resp:  [16-19] 19  BP: (126-149)/(79-86) 141/81  SpO2:  [93 %-97 %] 93 %    Physical Exam  Vitals reviewed.   Constitutional:       Appearance: Normal appearance.   HENT:      Head: Normocephalic and atraumatic.      Right Ear: External ear normal.      Left Ear: External ear normal.      Mouth/Throat:      Mouth: Mucous membranes are moist.   Eyes:      Extraocular Movements: Extraocular movements intact.      Conjunctiva/sclera: Conjunctivae normal.      Pupils: Pupils are equal, round, and reactive to light.   Cardiovascular:      Rate and Rhythm: Normal rate and regular rhythm.      Pulses: Normal pulses.      Heart sounds: Normal heart sounds.   Pulmonary:      Effort: Pulmonary effort is normal.      Breath sounds: Normal breath sounds.      Comments: Multiple ecchymotic rashes over chest   Unable to auscultate posterior lung due to patient posture and limited mobility  Abdominal:      General: Abdomen is flat. Bowel sounds are normal.      Palpations: Abdomen is soft.   Musculoskeletal:         General: Normal range of motion.      Comments: PRECIOUS  edematous, erythematous, from elbow distally, skin over forearm with echymosis, non tender     Skin:     General: Skin is warm.      Capillary Refill: Capillary refill takes less than 2 seconds.      Findings: Erythema and rash present.   Neurological:      General: No focal deficit present.      Mental Status: He is alert and oriented to person, place, and time.   Psychiatric:         Mood and Affect: Mood normal.         Fluids    Intake/Output Summary (Last 24 hours) at 5/24/2021 1437  Last data filed at 5/24/2021 1400  Gross per 24 hour   Intake 960 ml   Output 1300 ml   Net -340 ml       Laboratory  Recent Labs     05/22/21  0036   WBC 8.4   RBC 2.98*   HEMOGLOBIN 8.8*   HEMATOCRIT 28.1*   MCV 94.3   MCH 29.5   MCHC 31.3*   RDW 75.5*   PLATELETCT 239   MPV 10.4     Recent Labs     05/22/21  0036 05/23/21  0458 05/24/21  0130   SODIUM 141 133* 135   POTASSIUM 3.1* 3.4* 3.4*   CHLORIDE 101 95* 95*   CO2 33 34* 34*   GLUCOSE 122* 103* 106*   BUN 11 11 14   CREATININE 0.91 0.70 1.00   CALCIUM 7.0* 6.7* 6.9*                   Imaging  US-EXTREMITY VENOUS UPPER UNILAT LEFT   Final Result      CT-EXTREMITY, UPPER WITH LEFT   Final Result      No acute fracture or dislocation.      Severe osteoarthritis of the elbow joint.      IR-MIDLINE CATHETER INSERTION WO GUIDANCE > AGE 3   Final Result                  Ultrasound-guided midline placement performed by qualified nursing staff    as above.          IR-US GUIDED PIV   Final Result    Ultrasound-guided PERIPHERAL IV INSERTION performed by    qualified nursing staff as above.      US-EXTREMITY VENOUS LOWER UNILAT LEFT   Final Result      DX-CHEST-PORTABLE (1 VIEW)   Final Result      Bibasilar opacities may represent atelectasis or consolidation.      Small left pleural effusion may be present.      Pulmonary interstitial edema.      Stable prominence of the cardiomediastinal silhouette.      Atherosclerotic calcification is seen.      DX-ELBOW-COMPLETE 3+ LEFT   Final  Result      1.  No fracture or dislocation of LEFT elbow.   2.  Severe degenerative change.   3.  Diffuse subcutaneous edema.   4.  Limited by positioning.           Assessment/Plan  Hypokalemia- (present on admission)  Assessment & Plan  Replaced p.o.    Debility- (present on admission)  Assessment & Plan  PT OT evaluation, placement    Coronary artery disease- (present on admission)  Assessment & Plan  S/p PCI with stent placement 4 weeks ago  C/w dapt and high intensity statin    Acute on chronic systolic congestive heart failure (HCC)- (present on admission)  Assessment & Plan  Patient on 5L oxygen via nc at baseline  cxr with increased interstitial edema, ef 45% on 5/12, pt s/p IV lasix 40 mg once, c/w lasix 20 mg po q12h  Strict I and O, cardiac 2 gm diet and fluid restriction to 1800   Daily wt  Reviewed previous admission from 5/10, cardiology recommendation to start on Bblocker,  ACEI and aldactone  Start Coreg and losartan, can add aldactone later on    Anasarca- (present on admission)  Assessment & Plan  Continue p.o. Lasix    Adrenal insufficiency (HCC)- (present on admission)  Assessment & Plan  Resume hydrocortisone    History of pulmonary embolism- (present on admission)  Assessment & Plan  Warfarin was discontinued during previous recent hospitalization due to blood loss anemia    Pain and swelling of left upper extremity- (present on admission)  Assessment & Plan  Pt reports trauma and popping sound about 5 days ago, following which he noticed swelling and pain to his LUE  F/u CT to rule out soft tissue injury    Sacral wound- (present on admission)  Assessment & Plan  Wound mx as per wound team recs  Offloading due to risk of pressure ulcers    Hypophosphatemia  Assessment & Plan  Replaced IV with potassium phosphate    Hypomagnesemia  Assessment & Plan  Replaced IV    Chronic respiratory failure (HCC)- (present on admission)  Assessment & Plan  Continue supplemental oxygen    BPH (benign prostatic  hypertrophy)- (present on admission)  Assessment & Plan  Resume Flomax    COPD (chronic obstructive pulmonary disease) (HCC)- (present on admission)  Assessment & Plan  Stable  Continue home inhalers  Albuterol and DuoNeb as needed    Essential hypertension, benign- (present on admission)  Assessment & Plan  Blood pressure is stable  Labetalol as needed       VTE prophylaxis: Lovenox

## 2021-05-24 NOTE — PROGRESS NOTES
Assumed care of pt at 1900.   Report received from ОЛЬГА Regalado. Assessment completed.  Pt in bed, A/O x  3. Pt reports pain 7 /10 and refuses pain medication at this time. Bed in lowest and locked position, fall precautions in place, call light within pt reach. Hourly rounding in place. Pt denies other needs at this time.

## 2021-05-24 NOTE — CARE PLAN
Problem: Skin Integrity  Goal: Skin integrity is maintained or improved  Outcome: Progressing  Note: All documented wounds are managed as prescribed. All skin interventions are administered on time and as prescribed. Hourly rounding is in place.      Problem: Fall Risk  Goal: Patient will remain free from falls  Outcome: Progressing  Note: Patient is in bed with 3 rails placed up and bed alarm used. Patient belongings and call light are within reach. Nonslip socks are worn and bed is at the lowest position.      The patient is Stable - Low risk of patient condition declining or worsening    Shift Goals  Clinical Goals: manage pain  Patient Goals: sleep comfortably  Family Goals: N/A    Progress made toward(s) clinical / shift goals:  Patient's skin integrity is maintained or improved.

## 2021-05-24 NOTE — THERAPY
Physical Therapy   Daily Treatment     Patient Name: Jeffrey Lizama  Age:  88 y.o., Sex:  male  Medical Record #: [id number]  Today's Date: 5/24/2021     Precautions: Fall Risk    Assessment  Pt with good participation during today's session, demonstrating fair mobility and activity tolerance. Pt with LUE edema/swelling and refuses using it for scooting in bed and refusing STS d/t LUE swelling/discomfort. Pt with no respiratory distress during session and reports no SOB throughout. Static sit EOB then pt reports fatigue and requesting to lay back down. WES for LE support for sit<>supine. Unable to effectively scoot, requiring assist towards HOB. Slow progress towards therapy goals and continued need for skilled PT to prevent functional decline and to improve functional status. Will progress POC as tolerated.       Plan    Continue current treatment plan.    DC Equipment Recommendations: Unable to determine at this time  Discharge Recommendations:  Recommend post-acute placement for additional physical therapy services prior to discharge home      Subjective  Pt pleasant and agreeable to PT session reporting no pain     Objective  Bed mobility sup<>sit SPV, sit<>sup WES, Sat EOB >10 mins, unable to stand     05/24/21 1605   Total Time Spent   Total Time Spent (Mins) 16   Charge Group   Charges  Yes   PT Therapeutic Activities 1   Precautions   Precautions Fall Risk   Vitals   Vitals Comments WNL throughout   Pain 0 - 10 Group   Therapist Pain Assessment Post Activity Pain Same as Prior to Activity;0  (No pain)   Non Verbal Descriptors   Non Verbal Scale  Calm;Unlabored Breathing   Cognition    Cognition / Consciousness WDL   Level of Consciousness Alert   Balance   Sitting Balance (Static) Fair +   Sitting Balance (Dynamic) Fair +   Weight Shift Sitting Fair   Weight Shift Standing Absent   Gait Analysis   Gait Level Of Assist Unable to Participate   Bed Mobility    Supine to Sit Supervised   Sit to Supine  Minimal Assist   Scooting Moderate Assist   Rolling Supervised   Comments LE assist sit<>supine   Functional Mobility   Sit to Stand Unable to Participate   Bed, Chair, Wheelchair Transfer Unable to Participate   Mobility EOB >10 mins   How much difficulty does the patient currently have...   Turning over in bed (including adjusting bedclothes, sheets and blankets)? 3   Sitting down on and standing up from a chair with arms (e.g., wheelchair, bedside commode, etc.) 1   Moving from lying on back to sitting on the side of the bed? 1   How much help from another person does the patient currently need...   Moving to and from a bed to a chair (including a wheelchair)? 1   Need to walk in a hospital room? 1   Climbing 3-5 steps with a railing? 1   6 clicks Mobility Score 8   Activity Tolerance   Sitting Edge of Bed >10   Patient / Family Goals    Patient / Family Goal #1 Home or TF to hospital near his home   Short Term Goals    Short Term Goal # 1 Pt to move supine to/from eob w/ spv, no bed features in 6 visits to improve fxl indep   Goal Outcome # 1 goal not met   Short Term Goal # 2 Pt to move sit to/from stand w/ spv in 6 visits to improve fxl indep   Goal Outcome # 2 Goal not met   Short Term Goal # 3 Pt to ambulate 75 ft w/ fww and spv in 6 visits to improve fxl indep   Goal Outcome # 3 Goal not met   Education Group   Education Provided Role of Physical Therapist   Role of Physical Therapist Patient Response Patient;Acceptance;Explanation;Verbal Demonstration   Anticipated Discharge Equipment and Recommendations   DC Equipment Recommendations Unable to determine at this time   Discharge Recommendations Recommend post-acute placement for additional physical therapy services prior to discharge home   Interdisciplinary Plan of Care Collaboration   IDT Collaboration with  Nursing   Patient Position at End of Therapy In Bed;Call Light within Reach;Tray Table within Reach;Phone within Reach   Collaboration Comments RN  updated   Session Information   Date / Session Number  5/24-2(2/2, 5/27)

## 2021-05-24 NOTE — PROGRESS NOTES
Lab called with critical result of  Ca= 6.9 at 0227. Critical lab result read back to lab.   Dr. Morrell notified of critical lab result at 0235.  Critical lab result read back by Dr. Morrell.    No new orders received at this time.

## 2021-05-24 NOTE — PROGRESS NOTES
Assumed care of patient at 0700 from Torres Tinajero RN. Patient is A&O x 3, states pain level is 2/10. Bed locked in lowest position with 3 rails up. Call light in place, belongings at bedside. Patient expresses no needs at this time and hourly rounding is in place.

## 2021-05-24 NOTE — CARE PLAN
The patient is Stable - Low risk of patient condition declining or worsening    Shift Goals  Clinical Goals: manage pain  Patient Goals: sleep comfortably  Family Goals: N/A    Progress made toward(s) clinical / shift goals:  Medicated per MAR. Educated on non Pharmacologic pain management.     Patient is not progressing towards the following goals: N/A      Problem: Knowledge Deficit - Standard  Goal: Patient and family/care givers will demonstrate understanding of plan of care, disease process/condition, diagnostic tests and medications  Outcome: Progressing     Problem: Pain - Standard  Goal: Alleviation of pain or a reduction in pain to the patient’s comfort goal  Outcome: Progressing     Problem: Skin Integrity  Goal: Skin integrity is maintained or improved  Outcome: Progressing     Problem: Fall Risk  Goal: Patient will remain free from falls  Outcome: Progressing     Problem: Psychosocial  Goal: Patient's level of anxiety will decrease  Outcome: Progressing  Goal: Patient's ability to verbalize feelings about condition will improve  Outcome: Progressing  Goal: Patient's ability to re-evaluate and adapt role responsibilities will improve  Outcome: Progressing  Goal: Patient and family will demonstrate ability to cope with life altering diagnosis and/or procedure  Outcome: Progressing  Goal: Spiritual and cultural needs incorporated into hospitalization  Outcome: Progressing

## 2021-05-25 ENCOUNTER — PATIENT OUTREACH (OUTPATIENT)
Dept: HEALTH INFORMATION MANAGEMENT | Facility: OTHER | Age: 86
End: 2021-05-25

## 2021-05-25 LAB
ANION GAP SERPL CALC-SCNC: 5 MMOL/L (ref 7–16)
BUN SERPL-MCNC: 16 MG/DL (ref 8–22)
CALCIUM SERPL-MCNC: 7 MG/DL (ref 8.5–10.5)
CHLORIDE SERPL-SCNC: 95 MMOL/L (ref 96–112)
CO2 SERPL-SCNC: 37 MMOL/L (ref 20–33)
CREAT SERPL-MCNC: 0.83 MG/DL (ref 0.5–1.4)
ERYTHROCYTE [DISTWIDTH] IN BLOOD BY AUTOMATED COUNT: 76.2 FL (ref 35.9–50)
GLUCOSE SERPL-MCNC: 104 MG/DL (ref 65–99)
HCT VFR BLD AUTO: 31.4 % (ref 42–52)
HGB BLD-MCNC: 9.1 G/DL (ref 14–18)
MAGNESIUM SERPL-MCNC: 1.6 MG/DL (ref 1.5–2.5)
MCH RBC QN AUTO: 27.7 PG (ref 27–33)
MCHC RBC AUTO-ENTMCNC: 29 G/DL (ref 33.7–35.3)
MCV RBC AUTO: 95.7 FL (ref 81.4–97.8)
PHOSPHATE SERPL-MCNC: 2.7 MG/DL (ref 2.5–4.5)
PLATELET # BLD AUTO: 235 K/UL (ref 164–446)
PMV BLD AUTO: 10 FL (ref 9–12.9)
POTASSIUM SERPL-SCNC: 3.6 MMOL/L (ref 3.6–5.5)
RBC # BLD AUTO: 3.28 M/UL (ref 4.7–6.1)
SODIUM SERPL-SCNC: 137 MMOL/L (ref 135–145)
WBC # BLD AUTO: 8.6 K/UL (ref 4.8–10.8)

## 2021-05-25 PROCEDURE — 700102 HCHG RX REV CODE 250 W/ 637 OVERRIDE(OP): Performed by: INTERNAL MEDICINE

## 2021-05-25 PROCEDURE — 85027 COMPLETE CBC AUTOMATED: CPT

## 2021-05-25 PROCEDURE — 770006 HCHG ROOM/CARE - MED/SURG/GYN SEMI*

## 2021-05-25 PROCEDURE — 99233 SBSQ HOSP IP/OBS HIGH 50: CPT | Performed by: HOSPITALIST

## 2021-05-25 PROCEDURE — A9270 NON-COVERED ITEM OR SERVICE: HCPCS | Performed by: STUDENT IN AN ORGANIZED HEALTH CARE EDUCATION/TRAINING PROGRAM

## 2021-05-25 PROCEDURE — A9270 NON-COVERED ITEM OR SERVICE: HCPCS | Performed by: INTERNAL MEDICINE

## 2021-05-25 PROCEDURE — 83735 ASSAY OF MAGNESIUM: CPT

## 2021-05-25 PROCEDURE — 80048 BASIC METABOLIC PNL TOTAL CA: CPT

## 2021-05-25 PROCEDURE — 84100 ASSAY OF PHOSPHORUS: CPT

## 2021-05-25 PROCEDURE — 700102 HCHG RX REV CODE 250 W/ 637 OVERRIDE(OP): Performed by: STUDENT IN AN ORGANIZED HEALTH CARE EDUCATION/TRAINING PROGRAM

## 2021-05-25 PROCEDURE — 700111 HCHG RX REV CODE 636 W/ 250 OVERRIDE (IP): Performed by: INTERNAL MEDICINE

## 2021-05-25 RX ADMIN — ALPRAZOLAM 0.25 MG: 0.25 TABLET ORAL at 17:04

## 2021-05-25 RX ADMIN — TIOTROPIUM BROMIDE INHALATION SPRAY 5 MCG: 3.12 SPRAY, METERED RESPIRATORY (INHALATION) at 05:50

## 2021-05-25 RX ADMIN — OXYCODONE 5 MG: 5 TABLET ORAL at 10:07

## 2021-05-25 RX ADMIN — FUROSEMIDE 20 MG: 20 TABLET ORAL at 05:49

## 2021-05-25 RX ADMIN — TAMSULOSIN HYDROCHLORIDE 0.4 MG: 0.4 CAPSULE ORAL at 05:49

## 2021-05-25 RX ADMIN — MONTELUKAST 10 MG: 10 TABLET, FILM COATED ORAL at 05:48

## 2021-05-25 RX ADMIN — FLUTICASONE PROPIONATE 50 MCG: 50 SPRAY, METERED NASAL at 05:50

## 2021-05-25 RX ADMIN — OXYCODONE 5 MG: 5 TABLET ORAL at 20:14

## 2021-05-25 RX ADMIN — FUROSEMIDE 20 MG: 20 TABLET ORAL at 17:04

## 2021-05-25 RX ADMIN — BUDESONIDE AND FORMOTEROL FUMARATE DIHYDRATE 2 PUFF: 80; 4.5 AEROSOL RESPIRATORY (INHALATION) at 07:33

## 2021-05-25 RX ADMIN — ATORVASTATIN CALCIUM 80 MG: 40 TABLET, FILM COATED ORAL at 17:04

## 2021-05-25 RX ADMIN — OMEPRAZOLE 20 MG: 20 CAPSULE, DELAYED RELEASE ORAL at 05:48

## 2021-05-25 RX ADMIN — ALPRAZOLAM 0.25 MG: 0.25 TABLET ORAL at 07:32

## 2021-05-25 RX ADMIN — CARBOXYMETHYLCELLULOSE SODIUM 1 DROP: 5 SOLUTION/ DROPS OPHTHALMIC at 05:49

## 2021-05-25 RX ADMIN — ASPIRIN 81 MG: 81 TABLET, COATED ORAL at 05:49

## 2021-05-25 RX ADMIN — CARBOXYMETHYLCELLULOSE SODIUM 1 DROP: 5 SOLUTION/ DROPS OPHTHALMIC at 17:05

## 2021-05-25 RX ADMIN — BUDESONIDE AND FORMOTEROL FUMARATE DIHYDRATE 2 PUFF: 80; 4.5 AEROSOL RESPIRATORY (INHALATION) at 17:05

## 2021-05-25 RX ADMIN — OMEPRAZOLE 20 MG: 20 CAPSULE, DELAYED RELEASE ORAL at 17:04

## 2021-05-25 RX ADMIN — POTASSIUM CHLORIDE 20 MEQ: 1500 TABLET, EXTENDED RELEASE ORAL at 05:48

## 2021-05-25 RX ADMIN — HYDROCORTISONE 10 MG: 20 TABLET ORAL at 05:48

## 2021-05-25 RX ADMIN — CARVEDILOL 3.12 MG: 3.12 TABLET, FILM COATED ORAL at 07:32

## 2021-05-25 RX ADMIN — CARVEDILOL 3.12 MG: 3.12 TABLET, FILM COATED ORAL at 17:04

## 2021-05-25 RX ADMIN — Medication 950 MG: at 05:48

## 2021-05-25 RX ADMIN — HYDROCORTISONE 10 MG: 20 TABLET ORAL at 17:04

## 2021-05-25 RX ADMIN — CLOPIDOGREL BISULFATE 75 MG: 75 TABLET ORAL at 05:48

## 2021-05-25 RX ADMIN — LOSARTAN POTASSIUM 25 MG: 25 TABLET, FILM COATED ORAL at 05:49

## 2021-05-25 RX ADMIN — HYDROCORTISONE 10 MG: 20 TABLET ORAL at 13:09

## 2021-05-25 RX ADMIN — ENOXAPARIN SODIUM 40 MG: 40 INJECTION SUBCUTANEOUS at 05:48

## 2021-05-25 RX ADMIN — LEVOTHYROXINE SODIUM 75 MCG: 0.07 TABLET ORAL at 05:49

## 2021-05-25 ASSESSMENT — PAIN DESCRIPTION - PAIN TYPE
TYPE: ACUTE PAIN
TYPE: ACUTE PAIN

## 2021-05-25 ASSESSMENT — ENCOUNTER SYMPTOMS
DEPRESSION: 0
FEVER: 0
COUGH: 0
NAUSEA: 0
MYALGIAS: 0
ABDOMINAL PAIN: 0
HEADACHES: 0
PALPITATIONS: 0
HEMOPTYSIS: 0
VOMITING: 0
DIZZINESS: 0
DOUBLE VISION: 0
WEIGHT LOSS: 0
BLURRED VISION: 0
CHILLS: 0
HEARTBURN: 0

## 2021-05-25 NOTE — HOSPITAL COURSE
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     During the stay in the hospital, patient electrolytes continue to be repleted.  Patient is also noted to have acute on chronic systolic congestive heart failure, started on IV Lasix--> later switched po, I/Os, daily weight, fluid restriction.  Regarding CAD, patient is on aspirin, Plavix, Coreg, losartan, and statin. Patient is to follow-up with cardiology as an outpatient.     Patient has history of adrenal insufficiency,on hydrocortisone 10 bid. PT/OT has evaluated, recommended discharging the patient to skilled nursing facility.  During hospital course patient noted to have UA positive for UTI.  Urine culture positive for Klebsiella .

## 2021-05-25 NOTE — HEART FAILURE PROGRAM
Although electrolyte imbalance and refusal to return to SNF are the primary reasons for admission,  heart failure with preserved ejection fraction (HFpEF) is noted to be acute.     Nursing: please complete the HF Discharge Checklist (pink sheet in hard chart) and have it cosigned by your charge RN before patient leaves the hospital.    Providers: below are all Guideline Directed Medical Therapy (GDMT) indicated for HFpEF. If any can not be prescribed by discharge, please note the clinical reason for each in your discharge summary.    QUICK SUMMARY OF HFpEF MEASURES    -- Anticoagulation for atrial arrhythmia, if applicable  -- Glycemic control for DM + HF, if diabetic  -- Lipid lowering medication for DM + HF, if diabetic  -- Pneumococcal vaccine if not previously received  -- Influenza vaccine if not previously received for the current flu season  -- Smoking cessation counseling documented if applicable    Daily Nurse: please begin to fill out the HF checklist (pink sheet in hard chart) and use it to guide your daily care.    Discharge Nurse: please ensure completeness of the HF checklist (pink sheet in hard chart) and have it co-signed by the charge RN before the patient leaves the hospital.    Thank you, Nano, Cardio RN Navigator [id number]      DETAILED EXPLANATION OF HF MEASURES:  1. Documentation of LV systolic function (echo or cath) PTA, during this hospitalization, or plan to assess post discharge or reason for not assessing documented  2. Documentation of fluid intake and urine output every nursing shift  3. 2 hour post diuretic assessment documented 2 hours after diuretic given  4. HF Patient Education using the Living Well With Heart Failure Booklet and Symptom Tracker documented every nursing shift  5. Nutrition consult for diet education  6. Daily weights (one weight documented every 24 hours) on a standing scale unless standing is contraindicated in which case bed scale can be used - have patient write  weight on symptom tracker  7. For LVEF less than or equal to 40%, ACE-I, ARNI or ARB prescribed at discharge   8. For LVEF less than or equal to 40%, an Evidence Based Beta Blocker (bisoprolol, carvedilol, toprol xl) must be prescribed at discharge  9. For LVEF less than or equal to 35% aldosterone blockade prescribed at discharge  10. The combination of hydralazine and isosorbide dinitrate is recommended to reduce morbidity and mortality for patients self-described  Americans with NYHA class III-IV HFrEF (EF 40% or less), receiving optimal therapy with ACE inhibitors and beta blockers, unless contraindicated (Class I, LAZARA: A).  11. If a HF patient is diabetic or is newly diagnosed with DM: prescribed diabetes treatment at discharge in the form of glycemic control (diet or anti-hyperglycemic medication) or f/u appointment for diabetes management scheduled at discharge.  12. If a HF patient has diabetes: prescribed lipid lowering medication at discharge  13. Documented smoking cessation advice or counseling  14. If a HF patient has a-fib: anticoagulation is prescribed upon discharge or contraindication is documented  15. Screening for and administering immunizations as long as no contraindications: Pneumonia (regardless of age) and Influenza  16. Written discharge instructions include:  ? Daily weights  ? Record weight on tracker  ? Bring tracker to appointments  ? Call MD for weight gain of 3lb /day or 5lb/week  ? HF medication teaching  ? Low sodium diet  ? Follow up appointment within seven calendar days of d/c must include: date, time and location  ? Activity  ? Worsening symptoms    What if any of the above HF measures are contraindicated?  ? Request that the discharging provider document the medication/intervention and the contraindication specifically in a progress note  ? For example: “no CHF meds due to hypotension” is not enough. It needs to say: “No ACE-I, ARNI, ARB due to hypotension”; “No Beta  Blockade due to bradycardia”…

## 2021-05-25 NOTE — DISCHARGE PLANNING
Agency/Facility Name: [facility]  Outcome: Left vmail for admissions regarding referral     [phone]  Agency/Facility Name: [facility]   Spoke To: Gisella   Outcome: Per Gisella, the admissions coordinator is busy, and will not be able to call this DPA back until tomorrow 05/26    RN CM notified

## 2021-05-25 NOTE — DISCHARGE PLANNING
Anticipated Discharge Disposition: skilled.     Action: Returned  call to son Gene Conner ([phone]) asking what is the status of the snf referral. Left Gene a voice mail. Referral in progress to [facility]. ANTONETTE Macdonald, has left 2 messages and is awaiting a call back to see if they will be able to accept pt.     Barriers to Discharge: await snf acceptance.     Plan: referral in progress to [facility]-await their return call.     Addendum: calls placed x 2 by ANTONETTE Macdonald, to [facility]-awaiting call back to see if they are able to accept pt. Rec'd a call from fran Mills stating the pt is telling his wife, over the phone, that he is dc'd today. CM confirmed with GILBERTO Regalado that pt is not discharged, pt is confused. Call placed back to Gene to clarify pt is not discharged today. Gene will call SW in am to see if [facility] has called us back.

## 2021-05-25 NOTE — CARE PLAN
The patient is Stable - Low risk of patient condition declining or worsening    Shift Goals  Clinical Goals: Pain management  Patient Goals: Pain management/ Sleep comfortably  Family Goals: N/A    Progress made toward(s) clinical / shift goals:  PRN pain medications administered as ordered for LUE pain. LUE elevated and pillows place under arm for comfort. Educated Pt on importance of sleep during the night. Continue to reassess for pain and comfort.     Problem: Pain - Standard  Goal: Alleviation of pain or a reduction in pain to the patient’s comfort goal  Outcome: Progressing     Problem: Skin Integrity  Goal: Skin integrity is maintained or improved  Outcome: Progressing

## 2021-05-25 NOTE — PROGRESS NOTES
Assumed care of patient at 0700 from Fang Jones RN. Patient is A&O x 4, states pain level is 6/10. Bed locked in lowest position with 3 rails up. Call light in place, belongings at bedside. Patient expresses no needs at this time and hourly rounding is in place.

## 2021-05-25 NOTE — PROGRESS NOTES
Assumed care at 1900, report received from Day RN.  Pt A&O x 3 disoriented to time, denies pain at this time. Pt on 4L O2. Condom cath in place. Bed locked, 2 rails up, bed in lowest position. Call light in place, belongings at bedside, no needs at this time and hourly rounding in place.

## 2021-05-25 NOTE — PROGRESS NOTES
[facility] Medicine Daily Progress Note    Date of Service  5/25/2021    Chief Complaint  88 y.o. 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    During the stay in the hospital, patient electrolytes continue to be repleted.  Patient is also noted to have acute on chronic systolic congestive heart failure, started on IV Lasix, I/os, daily weight, fluid restriction.  Regarding CAD, patient is on aspirin, Plavix, Coreg, losartan, and statin. Patient is to follow-up with cardiology as an outpatient.    Patient has history of adrenal insufficiency,on hydrocortisone 10 3 times daily, need to be gradually weaned off.  PT/OT has evaluated, recommended discharging the patient to skilled nursing facility.    During the hospital, his hemoglobin hematocrit, continues to be monitored, today noted to be at 9.1  For all other chronic medical condition, patient will resume his home medication.        Interval Problem Update  No acute events overnight, laying in bed, denies any complaint.  Patient states that he is hungry and wanted to have yogurt.  PT/OT has evaluated, recommended discharging the  patient discussing facility, case management aware.  Patient has been medically cleared to be discharged    Consultants/Specialty  None    Code Status  DNAR, I OK    Disposition  Skilled nursing home    Review of Systems  Review of Systems   Constitutional: Negative for chills, fever, malaise/fatigue and weight loss.   HENT: Negative for congestion.    Eyes: Negative for blurred vision and double vision.   Respiratory: Negative for cough and hemoptysis.    Cardiovascular: Positive for leg swelling. Negative for chest pain and palpitations.   Gastrointestinal: Negative for abdominal pain, heartburn, nausea and vomiting.   Genitourinary: Negative for dysuria.   Musculoskeletal: Negative for myalgias.   Neurological: Negative for dizziness and headaches.   Psychiatric/Behavioral: Negative for depression.        Physical Exam  Temp:  [36.3 °C (97.4 °F)-36.9 °C (98.5 °F)] 36.3 °C (97.4 °F)  Pulse:  [73-75] 75  Resp:  [18-19] 18  BP: (112-159)/(56-84) 159/84  SpO2:  [96 %-98 %] 96 %    Physical Exam  Vitals and nursing note reviewed.   Constitutional:       Appearance: He is obese.   HENT:      Head: Normocephalic and atraumatic.   Eyes:      Extraocular Movements: Extraocular movements intact.   Cardiovascular:      Rate and Rhythm: Normal rate.      Pulses: Normal pulses.   Pulmonary:      Comments: Decreased breath sound at the bases  Abdominal:      General: There is no distension.   Musculoskeletal:      Cervical back: Neck supple.      Right lower leg: Edema present.      Left lower leg: Edema present.   Skin:     General: Skin is warm.   Neurological:      Mental Status: He is alert and oriented to person, place, and time.      Cranial Nerves: No cranial nerve deficit.   Psychiatric:         Mood and Affect: Mood normal.         Fluids    Intake/Output Summary (Last 24 hours) at 5/25/2021 1455  Last data filed at 5/25/2021 0600  Gross per 24 hour   Intake 690 ml   Output 400 ml   Net 290 ml       Laboratory  Recent  Labs     05/25/21  0020   WBC 8.6   RBC 3.28*   HEMOGLOBIN 9.1*   HEMATOCRIT 31.4*   MCV 95.7   MCH 27.7   MCHC 29.0*   RDW 76.2*   PLATELETCT 235   MPV 10.0     Recent Labs     05/23/21  0458 05/24/21  0130 05/25/21  0020   SODIUM 133* 135 137   POTASSIUM 3.4* 3.4* 3.6   CHLORIDE 95* 95* 95*   CO2 34* 34* 37*   GLUCOSE 103* 106* 104*   BUN 11 14 16   CREATININE 0.70 1.00 0.83   CALCIUM 6.7* 6.9* 7.0*                   Imaging  US-EXTREMITY VENOUS UPPER UNILAT LEFT   Final Result      CT-EXTREMITY, UPPER WITH LEFT   Final Result      No acute fracture or dislocation.      Severe osteoarthritis of the elbow joint.      IR-MIDLINE CATHETER INSERTION WO GUIDANCE > AGE 3   Final Result                  Ultrasound-guided midline placement performed by qualified nursing staff    as above.          IR-US GUIDED PIV   Final Result    Ultrasound-guided PERIPHERAL IV INSERTION performed by    qualified nursing staff as above.      US-EXTREMITY VENOUS LOWER UNILAT LEFT   Final Result      DX-CHEST-PORTABLE (1 VIEW)   Final Result      Bibasilar opacities may represent atelectasis or consolidation.      Small left pleural effusion may be present.      Pulmonary interstitial edema.      Stable prominence of the cardiomediastinal silhouette.      Atherosclerotic calcification is seen.      DX-ELBOW-COMPLETE 3+ LEFT   Final Result      1.  No fracture or dislocation of LEFT elbow.   2.  Severe degenerative change.   3.  Diffuse subcutaneous edema.   4.  Limited by positioning.           Assessment/Plan  Hypokalemia- (present on admission)  Assessment & Plan  Replete and monitor    Debility- (present on admission)  Assessment & Plan  PT OT evaluation, placement    Coronary artery disease- (present on admission)  Assessment & Plan  S/p PCI with stent placement 4 weeks ago  Aspirin, Plavix, Coreg, atorvastatin, losartan, Lasix.    Acute on chronic systolic congestive heart failure (HCC)- (present on admission)  Assessment &  Plan  Patient on 5L oxygen via nc at baseline  Echo showed EF of 45% on 5/12, I/os , daily weight and fluid restriction to 1.5 L  Continue BB , losartan and  lasix    Anasarca- (present on admission)  Assessment & Plan  Continue p.o. Lasix    Adrenal insufficiency (HCC)- (present on admission)  Assessment & Plan  Resume hydrocortisone, eventually need to be weaned off    History of pulmonary embolism- (present on admission)  Assessment & Plan  Warfarin was discontinued during previous recent hospitalization due to blood loss anemia   monitor hemoglobin hematocrit      Pain and swelling of left upper extremity- (present on admission)  Assessment & Plan  Pt reports trauma and popping sound about 5 days ago, following which he noticed swelling and pain to his LUE      Sacral wound- (present on admission)  Assessment & Plan  Wound mx as per wound team recs  Offloading due to risk of pressure ulcers    Hypophosphatemia  Assessment & Plan  Replete and monitor    Hypomagnesemia  Assessment & Plan  Replete and monitor    Chronic respiratory failure (HCC)- (present on admission)  Assessment & Plan  Continue supplemental oxygen    BPH (benign prostatic hypertrophy)- (present on admission)  Assessment & Plan  continiue Flomax    COPD (chronic obstructive pulmonary disease) (HCC)- (present on admission)  Assessment & Plan  Continue RT protocol, breathing treatment  Titrate O2 as needed   ON 4 L of O2     Essential hypertension, benign- (present on admission)  Assessment & Plan  Blood pressure noted to be well controlled, monitor       VTE prophylaxis: Lovenox

## 2021-05-26 LAB
ERYTHROCYTE [DISTWIDTH] IN BLOOD BY AUTOMATED COUNT: 75.2 FL (ref 35.9–50)
HCT VFR BLD AUTO: 30.7 % (ref 42–52)
HGB BLD-MCNC: 9 G/DL (ref 14–18)
MCH RBC QN AUTO: 28 PG (ref 27–33)
MCHC RBC AUTO-ENTMCNC: 29.3 G/DL (ref 33.7–35.3)
MCV RBC AUTO: 95.6 FL (ref 81.4–97.8)
PLATELET # BLD AUTO: 237 K/UL (ref 164–446)
PMV BLD AUTO: 9.9 FL (ref 9–12.9)
RBC # BLD AUTO: 3.21 M/UL (ref 4.7–6.1)
WBC # BLD AUTO: 8.5 K/UL (ref 4.8–10.8)

## 2021-05-26 PROCEDURE — A9270 NON-COVERED ITEM OR SERVICE: HCPCS | Performed by: STUDENT IN AN ORGANIZED HEALTH CARE EDUCATION/TRAINING PROGRAM

## 2021-05-26 PROCEDURE — 700111 HCHG RX REV CODE 636 W/ 250 OVERRIDE (IP): Performed by: INTERNAL MEDICINE

## 2021-05-26 PROCEDURE — 99232 SBSQ HOSP IP/OBS MODERATE 35: CPT | Performed by: HOSPITALIST

## 2021-05-26 PROCEDURE — A9270 NON-COVERED ITEM OR SERVICE: HCPCS | Performed by: INTERNAL MEDICINE

## 2021-05-26 PROCEDURE — 770006 HCHG ROOM/CARE - MED/SURG/GYN SEMI*

## 2021-05-26 PROCEDURE — 700102 HCHG RX REV CODE 250 W/ 637 OVERRIDE(OP): Performed by: INTERNAL MEDICINE

## 2021-05-26 PROCEDURE — 97530 THERAPEUTIC ACTIVITIES: CPT

## 2021-05-26 PROCEDURE — 85027 COMPLETE CBC AUTOMATED: CPT

## 2021-05-26 PROCEDURE — 700102 HCHG RX REV CODE 250 W/ 637 OVERRIDE(OP): Performed by: STUDENT IN AN ORGANIZED HEALTH CARE EDUCATION/TRAINING PROGRAM

## 2021-05-26 RX ADMIN — LOSARTAN POTASSIUM 25 MG: 25 TABLET, FILM COATED ORAL at 05:22

## 2021-05-26 RX ADMIN — OMEPRAZOLE 20 MG: 20 CAPSULE, DELAYED RELEASE ORAL at 17:25

## 2021-05-26 RX ADMIN — HYDROCORTISONE 10 MG: 20 TABLET ORAL at 11:56

## 2021-05-26 RX ADMIN — FLUTICASONE PROPIONATE 50 MCG: 50 SPRAY, METERED NASAL at 05:27

## 2021-05-26 RX ADMIN — TIOTROPIUM BROMIDE INHALATION SPRAY 5 MCG: 3.12 SPRAY, METERED RESPIRATORY (INHALATION) at 05:27

## 2021-05-26 RX ADMIN — OXYCODONE 5 MG: 5 TABLET ORAL at 08:21

## 2021-05-26 RX ADMIN — BUDESONIDE AND FORMOTEROL FUMARATE DIHYDRATE 2 PUFF: 80; 4.5 AEROSOL RESPIRATORY (INHALATION) at 05:27

## 2021-05-26 RX ADMIN — Medication 950 MG: at 05:22

## 2021-05-26 RX ADMIN — ALPRAZOLAM 0.25 MG: 0.25 TABLET ORAL at 08:21

## 2021-05-26 RX ADMIN — CARVEDILOL 3.12 MG: 3.12 TABLET, FILM COATED ORAL at 08:06

## 2021-05-26 RX ADMIN — CARBOXYMETHYLCELLULOSE SODIUM 1 DROP: 5 SOLUTION/ DROPS OPHTHALMIC at 17:29

## 2021-05-26 RX ADMIN — TAMSULOSIN HYDROCHLORIDE 0.4 MG: 0.4 CAPSULE ORAL at 05:22

## 2021-05-26 RX ADMIN — HYDROCORTISONE 10 MG: 20 TABLET ORAL at 05:21

## 2021-05-26 RX ADMIN — OXYCODONE 5 MG: 5 TABLET ORAL at 21:34

## 2021-05-26 RX ADMIN — FUROSEMIDE 20 MG: 20 TABLET ORAL at 17:26

## 2021-05-26 RX ADMIN — MONTELUKAST 10 MG: 10 TABLET, FILM COATED ORAL at 05:22

## 2021-05-26 RX ADMIN — ALPRAZOLAM 0.25 MG: 0.25 TABLET ORAL at 21:34

## 2021-05-26 RX ADMIN — CLOPIDOGREL BISULFATE 75 MG: 75 TABLET ORAL at 05:21

## 2021-05-26 RX ADMIN — ASPIRIN 81 MG: 81 TABLET, COATED ORAL at 05:22

## 2021-05-26 RX ADMIN — ALPRAZOLAM 0.25 MG: 0.25 TABLET ORAL at 11:56

## 2021-05-26 RX ADMIN — HYDROCORTISONE 10 MG: 20 TABLET ORAL at 17:26

## 2021-05-26 RX ADMIN — LEVOTHYROXINE SODIUM 75 MCG: 0.07 TABLET ORAL at 05:22

## 2021-05-26 RX ADMIN — FUROSEMIDE 20 MG: 20 TABLET ORAL at 05:21

## 2021-05-26 RX ADMIN — OMEPRAZOLE 20 MG: 20 CAPSULE, DELAYED RELEASE ORAL at 05:22

## 2021-05-26 RX ADMIN — ENOXAPARIN SODIUM 40 MG: 40 INJECTION SUBCUTANEOUS at 05:21

## 2021-05-26 RX ADMIN — ATORVASTATIN CALCIUM 80 MG: 40 TABLET, FILM COATED ORAL at 17:25

## 2021-05-26 RX ADMIN — BUDESONIDE AND FORMOTEROL FUMARATE DIHYDRATE 2 PUFF: 80; 4.5 AEROSOL RESPIRATORY (INHALATION) at 17:29

## 2021-05-26 RX ADMIN — POTASSIUM CHLORIDE 20 MEQ: 1500 TABLET, EXTENDED RELEASE ORAL at 05:21

## 2021-05-26 RX ADMIN — CARBOXYMETHYLCELLULOSE SODIUM 1 DROP: 5 SOLUTION/ DROPS OPHTHALMIC at 05:27

## 2021-05-26 RX ADMIN — CARVEDILOL 3.12 MG: 3.12 TABLET, FILM COATED ORAL at 17:29

## 2021-05-26 ASSESSMENT — COGNITIVE AND FUNCTIONAL STATUS - GENERAL
TOILETING: TOTAL
EATING MEALS: A LITTLE
TURNING FROM BACK TO SIDE WHILE IN FLAT BAD: UNABLE
STANDING UP FROM CHAIR USING ARMS: A LOT
PERSONAL GROOMING: A LOT
DRESSING REGULAR LOWER BODY CLOTHING: TOTAL
MOVING TO AND FROM BED TO CHAIR: UNABLE
WALKING IN HOSPITAL ROOM: TOTAL
SUGGESTED CMS G CODE MODIFIER MOBILITY: CM
SUGGESTED CMS G CODE MODIFIER DAILY ACTIVITY: CL
MOBILITY SCORE: 7
MOVING FROM LYING ON BACK TO SITTING ON SIDE OF FLAT BED: UNABLE
HELP NEEDED FOR BATHING: TOTAL
DAILY ACTIVITIY SCORE: 10
DRESSING REGULAR UPPER BODY CLOTHING: A LOT
CLIMB 3 TO 5 STEPS WITH RAILING: TOTAL

## 2021-05-26 ASSESSMENT — ENCOUNTER SYMPTOMS
NAUSEA: 0
PALPITATIONS: 0
DIZZINESS: 0
ABDOMINAL PAIN: 0
MYALGIAS: 0
FEVER: 0
VOMITING: 0
DEPRESSION: 0
BLURRED VISION: 0
HEADACHES: 0
COUGH: 0
HEMOPTYSIS: 0
HEARTBURN: 0
SORE THROAT: 0

## 2021-05-26 ASSESSMENT — GAIT ASSESSMENTS
DISTANCE (FEET): 1
ASSISTIVE DEVICE: FRONT WHEEL WALKER
GAIT LEVEL OF ASSIST: MODERATE ASSIST

## 2021-05-26 ASSESSMENT — PAIN DESCRIPTION - PAIN TYPE: TYPE: ACUTE PAIN

## 2021-05-26 NOTE — PROGRESS NOTES
Assumed care of patient at 0700 from Fang Jones RN. Patient is A&O x 4, states pain level is 5/10. Bed locked in lowest position with 3 rails up. Call light in place, belongings at bedside. Patient expresses no needs at this time and hourly rounding is in place.

## 2021-05-26 NOTE — DISCHARGE PLANNING
Anticipated Discharge Disposition: SNF    Action: LSW met with Pt to inform him that [facility]'t [facility] to referral despite multiple attempts to contact them. Pt is agreeable to sending SNF referrals to [facility] and [facility] for SNF referrals.     Barriers to Discharge: SNF acceptance    Plan: LSW will make choice form for [facility] SNFs and fax to DPA

## 2021-05-26 NOTE — THERAPY
"Physical Therapy   Daily Treatment     Patient Name: Jeffrey Lizama  Age:  88 y.o., Sex:  male  Medical Record #: [id number]  Today's Date: 5/26/2021     Precautions: Fall Risk    Assessment    Rec'd pt alert, in bed.  States \"I can't, I can't\" t/o treatment, even before attempting the activity.  He needs assist to sit eob w/ HOB elevated, mod assist to return legs into the bed.  He agreed to attempt standing after verbal encouragement.  He needs min assist to stand twice.  Second attempt he was able to shuffle his feet to the left to move to the HOB w/ mod assist.  Progressing slowly.    Plan    Continue current treatment plan.    DC Equipment Recommendations: Unable to determine at this time  Discharge Recommendations: Recommend post-acute placement for additional physical therapy services prior to discharge home        Objective       05/26/21 0922   Balance   Sitting Balance (Static) Fair +   Sitting Balance (Dynamic) Fair +   Standing Balance (Static) Poor +   Standing Balance (Dynamic) Poor   Weight Shift Sitting Fair   Weight Shift Standing Poor   Skilled Intervention Verbal Cuing;Tactile Cuing;Postural Facilitation;Compensatory Strategies   Gait Analysis   Gait Level Of Assist Moderate Assist   Assistive Device Front Wheel Walker   Distance (Feet) 1   Deviation   (sidestepping left)   Skilled Intervention Verbal Cuing;Tactile Cuing;Postural Facilitation;Compensatory Strategies   Bed Mobility    Supine to Sit Supervised   Sit to Supine Moderate Assist   Scooting Maximal Assist   Skilled Intervention Verbal Cuing;Tactile Cuing;Compensatory Strategies;Facilitation   Comments w/ HOB at 45 degrees   Functional Mobility   Sit to Stand Minimal Assist   Bed, Chair, Wheelchair Transfer Refused   Skilled Intervention Verbal Cuing;Tactile Cuing;Facilitation   Short Term Goals    Short Term Goal # 1 Pt to move supine to/from eob w/ spv, no bed features in 6 visits to improve fxl indep   Goal Outcome # 1 goal not met "   Short Term Goal # 2 Pt to move sit to/from stand w/ spv in 6 visits to improve fxl indep   Goal Outcome # 2 Goal not met   Short Term Goal # 3 Pt to ambulate 75 ft w/ fww and spv in 6 visits to improve fxl indep   Goal Outcome # 3 Goal not met   Anticipated Discharge Equipment and Recommendations   DC Equipment Recommendations Unable to determine at this time   Discharge Recommendations Recommend post-acute placement for additional physical therapy services prior to discharge home

## 2021-05-26 NOTE — PROGRESS NOTES
Hospital Medicine Daily Progress Note    Date of Service  5/26/2021    Chief Complaint  88 y.o. 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    During the stay in the hospital, patient electrolytes continue to be repleted.  Patient is also noted to have acute on chronic systolic congestive heart failure, started on IV Lasix, I/os, daily weight, fluid restriction.  Regarding CAD, patient is on aspirin, Plavix, Coreg, losartan, and statin. Patient is to follow-up with cardiology as an outpatient.    Patient has history of adrenal insufficiency,on hydrocortisone 10 3 times daily, need to be gradually weaned off.  PT/OT has evaluated, recommended discharging the patient to skilled nursing facility.    During the hospital, his hemoglobin hematocrit, continues to be monitored.  For all other chronic medical condition, patient will resume his home medication. At this time, patient has been medically stable to be discharged home     Interval Problem Update  No acute events overnight, laying in bed, denies any complaint.  Patient states that he is hungry and wanted to have yogurt.  PT/OT has evaluated, recommended discharging the patient discussing facility, case management aware.  Patient has been medically cleared to be discharged    5/26:  --No acute events  overnight, laying in bed, denied any complaint.  Patient stated that he wanted to yogurt.  Medically stable to be discharged to skilled nursing facility    Consultants/Specialty  None    Code Status  DNAR, I OK    Disposition  Skilled nursing home    Review of Systems  Review of Systems   Constitutional: Negative for fever and malaise/fatigue.   HENT: Negative for congestion and sore throat.    Eyes: Negative for blurred vision.   Respiratory: Negative for cough and hemoptysis.    Cardiovascular: Positive for leg swelling. Negative for chest pain and palpitations.   Gastrointestinal: Negative for abdominal pain, heartburn, nausea and vomiting.   Genitourinary: Negative for dysuria.   Musculoskeletal: Negative for myalgias.   Neurological: Negative for dizziness and headaches.   Psychiatric/Behavioral: Negative for depression.        Physical Exam  Temp:  [36.2 °C (97.1 °F)-36.7 °C (98 °F)] 36.2 °C (97.1 °F)  Pulse:  [73-89] 89  Resp:  [16-19] 18  BP: (128-152)/(73-86) 150/84  SpO2:  [96 %-99 %] 97 %    Physical Exam  Vitals and nursing note reviewed.   Constitutional:       Appearance: He is obese.   HENT:      Head: Normocephalic and atraumatic.   Eyes:      Extraocular Movements: Extraocular movements intact.   Cardiovascular:      Rate and Rhythm: Normal rate.      Pulses: Normal pulses.   Pulmonary:      Comments: Decreased breath sound at the bases  Abdominal:      General: There is no distension.   Musculoskeletal:      Cervical back: Neck supple.      Right lower leg: Edema present.      Left lower leg: Edema present.   Skin:     General: Skin is warm.   Neurological:      Mental Status: He is alert and oriented to person, place, and time.      Cranial Nerves: No cranial nerve deficit.   Psychiatric:         Mood and Affect: Mood normal.         Fluids    Intake/Output Summary (Last 24 hours) at 5/26/2021 1230  Last data filed at 5/26/2021 1100  Gross per 24 hour   Intake 957 ml   Output 1800 ml   Net -843 ml        Laboratory  Recent Labs     05/25/21  0020 05/26/21  0105   WBC 8.6 8.5   RBC 3.28* 3.21*   HEMOGLOBIN 9.1* 9.0*   HEMATOCRIT 31.4* 30.7*   MCV 95.7 95.6   MCH 27.7 28.0   MCHC 29.0* 29.3*   RDW 76.2* 75.2*   PLATELETCT 235 237   MPV 10.0 9.9     Recent Labs     05/24/21  0130 05/25/21  0020   SODIUM 135 137   POTASSIUM 3.4* 3.6   CHLORIDE 95* 95*   CO2 34* 37*   GLUCOSE 106* 104*   BUN 14 16   CREATININE 1.00 0.83   CALCIUM 6.9* 7.0*                   Imaging  US-EXTREMITY VENOUS UPPER UNILAT LEFT   Final Result      CT-EXTREMITY, UPPER WITH LEFT   Final Result      No acute fracture or dislocation.      Severe osteoarthritis of the elbow joint.      IR-MIDLINE CATHETER INSERTION WO GUIDANCE > AGE 3   Final Result                  Ultrasound-guided midline placement performed by qualified nursing staff    as above.          IR-US GUIDED PIV   Final Result    Ultrasound-guided PERIPHERAL IV INSERTION performed by    qualified nursing staff as above.      US-EXTREMITY VENOUS LOWER UNILAT LEFT   Final Result      DX-CHEST-PORTABLE (1 VIEW)   Final Result      Bibasilar opacities may represent atelectasis or consolidation.      Small left pleural effusion may be present.      Pulmonary interstitial edema.      Stable prominence of the cardiomediastinal silhouette.      Atherosclerotic calcification is seen.      DX-ELBOW-COMPLETE 3+ LEFT   Final Result      1.  No fracture or dislocation of LEFT elbow.   2.  Severe degenerative change.   3.  Diffuse subcutaneous edema.   4.  Limited by positioning.           Assessment/Plan  Pain and swelling of left upper extremity- (present on admission)  Assessment & Plan  Pt reports trauma and popping sound about 5 days ago, following which he noticed swelling and pain to his LUE  US of the left upper ext didn't show any DVT   Ct reviewed and did show arthritis       Sacral wound- (present on admission)  Assessment & Plan  Wound mx as per wound team recs  Offloading due to  risk of pressure ulcers    Hypophosphatemia  Assessment & Plan  Replete and monitor    Hypomagnesemia  Assessment & Plan  Replete and monitor    Hypokalemia- (present on admission)  Assessment & Plan  Replete and monitor    Anasarca- (present on admission)  Assessment & Plan  Continue p.o. Lasix    Debility- (present on admission)  Assessment & Plan  PT OT evaluation, placement    Coronary artery disease- (present on admission)  Assessment & Plan  S/p PCI with stent placement 4 weeks ago  Aspirin, Plavix, Coreg, atorvastatin, losartan, Lasix.    Adrenal insufficiency (HCC)- (present on admission)  Assessment & Plan  Resume hydrocortisone, eventually need to be weaned off    Acute on chronic systolic congestive heart failure (HCC)- (present on admission)  Assessment & Plan  Patient on 5L oxygen via nc at baseline  Echo showed EF of 45% on 5/12, I/os , daily weight and fluid restriction to 1.5 L  Continue BB , losartan and  Lasix   -- need follow up with cards as an op    Chronic respiratory failure (HCC)- (present on admission)  Assessment & Plan  Continue supplemental oxygen    BPH (benign prostatic hypertrophy)- (present on admission)  Assessment & Plan  continiue Flomax    History of pulmonary embolism- (present on admission)  Assessment & Plan  Warfarin was discontinued during previous recent hospitalization due to blood loss anemia   monitor hemoglobin hematocrit, so far stabel  Transfuse if less than 7        COPD (chronic obstructive pulmonary disease) (HCC)- (present on admission)  Assessment & Plan  Continue RT protocol, breathing treatment  Titrate O2 as needed   ON 4 L of O2  And denied any complain of sob    Essential hypertension, benign- (present on admission)  Assessment & Plan  Blood pressure noted to be well controlled, monitor       VTE prophylaxis: Lovenox    I have performed a physical exam and reviewed and updated ROS and Plan today (5/26/2021). In review of yesterday's note (5/25/2021), there  are no changes except as documented above.

## 2021-05-26 NOTE — PROGRESS NOTES
Assumed care at 1900, report received from Day RN.  Pt A&O x 3 disoriented to time, states pain is 9/10. PRN pain medications administered as ordered. Condom cath in place. Bed locked, 2 rails up, bed in lowest position. Call light in place, belongings at bedside, no needs at this time and hourly rounding in place.

## 2021-05-26 NOTE — CARE PLAN
The patient is Stable - Low risk of patient condition declining or worsening    Shift Goals  Clinical Goals: Decreased edema  Patient Goals: Pain/ Anxiety management  Family Goals: N/A    Progress made toward(s) clinical / shift goals:  Pt receiving scheduled lasix. Extremities elevated to decrease swelling. PRN pain medication administered for LUE pain. Pillows in use for comfort.     Problem: Pain - Standard  Goal: Alleviation of pain or a reduction in pain to the patient’s comfort goal  Outcome: Progressing

## 2021-05-26 NOTE — THERAPY
Occupational Therapy  Daily Treatment     Patient Name: Jeffrey Lizama  Age:  88 y.o., Sex:  male  Medical Record #: [id number]  Today's Date: 5/26/2021     Precautions  Precautions: (P) Fall Risk    Assessment    Pt currently limited by decreased functional mobility, activity tolerance, strength, AROM, balance, and pain which are affecting pt's ability to complete ADLs/IADLs at baseline. Pt would benefit from OT services in the acute care setting to maximize functional recovery.     Plan    Continue current treatment plan.    DC Equipment Recommendations: Unable to determine at this time  Discharge Recommendations: (P) Recommend post-acute placement for additional occupational therapy services prior to discharge home       05/26/21 1028   Activities of Daily Living   Grooming Minimal Assist   Upper Body Dressing Minimal Assist   Lower Body Dressing Total Assist   Toileting Total Assist   Functional Mobility   Sit to Stand Minimal Assist   Short Term Goals   Short Term Goal # 1 Pt will demo LB dressing with AE as needed and mod A   Goal Outcome # 1 Goal not met   Short Term Goal # 2 Pt will demo ADL txf with mod A   Goal Outcome # 2 Progressing slower than expected   Short Term Goal # 3 Pt will complete G/H seated SUP   Goal Outcome # 3 Progressing slower than expected

## 2021-05-27 LAB
ANION GAP SERPL CALC-SCNC: 5 MMOL/L (ref 7–16)
BASOPHILS # BLD AUTO: 0.4 % (ref 0–1.8)
BASOPHILS # BLD: 0.04 K/UL (ref 0–0.12)
BUN SERPL-MCNC: 15 MG/DL (ref 8–22)
CALCIUM SERPL-MCNC: 7.4 MG/DL (ref 8.5–10.5)
CHLORIDE SERPL-SCNC: 97 MMOL/L (ref 96–112)
CO2 SERPL-SCNC: 38 MMOL/L (ref 20–33)
CREAT SERPL-MCNC: 0.87 MG/DL (ref 0.5–1.4)
EOSINOPHIL # BLD AUTO: 0.18 K/UL (ref 0–0.51)
EOSINOPHIL NFR BLD: 1.8 % (ref 0–6.9)
ERYTHROCYTE [DISTWIDTH] IN BLOOD BY AUTOMATED COUNT: 75.9 FL (ref 35.9–50)
GLUCOSE SERPL-MCNC: 103 MG/DL (ref 65–99)
HCT VFR BLD AUTO: 32.6 % (ref 42–52)
HGB BLD-MCNC: 9.8 G/DL (ref 14–18)
IMM GRANULOCYTES # BLD AUTO: 0.09 K/UL (ref 0–0.11)
IMM GRANULOCYTES NFR BLD AUTO: 0.9 % (ref 0–0.9)
LYMPHOCYTES # BLD AUTO: 0.62 K/UL (ref 1–4.8)
LYMPHOCYTES NFR BLD: 6.2 % (ref 22–41)
MAGNESIUM SERPL-MCNC: 1.6 MG/DL (ref 1.5–2.5)
MCH RBC QN AUTO: 28.7 PG (ref 27–33)
MCHC RBC AUTO-ENTMCNC: 30.1 G/DL (ref 33.7–35.3)
MCV RBC AUTO: 95.3 FL (ref 81.4–97.8)
MONOCYTES # BLD AUTO: 0.6 K/UL (ref 0–0.85)
MONOCYTES NFR BLD AUTO: 6 % (ref 0–13.4)
NEUTROPHILS # BLD AUTO: 8.49 K/UL (ref 1.82–7.42)
NEUTROPHILS NFR BLD: 84.7 % (ref 44–72)
NRBC # BLD AUTO: 0 K/UL
NRBC BLD-RTO: 0 /100 WBC
PHOSPHATE SERPL-MCNC: 2.2 MG/DL (ref 2.5–4.5)
PLATELET # BLD AUTO: 243 K/UL (ref 164–446)
PMV BLD AUTO: 9.7 FL (ref 9–12.9)
POTASSIUM SERPL-SCNC: 3.7 MMOL/L (ref 3.6–5.5)
RBC # BLD AUTO: 3.42 M/UL (ref 4.7–6.1)
SODIUM SERPL-SCNC: 140 MMOL/L (ref 135–145)
WBC # BLD AUTO: 10 K/UL (ref 4.8–10.8)

## 2021-05-27 PROCEDURE — 770006 HCHG ROOM/CARE - MED/SURG/GYN SEMI*

## 2021-05-27 PROCEDURE — 700111 HCHG RX REV CODE 636 W/ 250 OVERRIDE (IP): Performed by: INTERNAL MEDICINE

## 2021-05-27 PROCEDURE — 94760 N-INVAS EAR/PLS OXIMETRY 1: CPT

## 2021-05-27 PROCEDURE — 700102 HCHG RX REV CODE 250 W/ 637 OVERRIDE(OP): Performed by: HOSPITALIST

## 2021-05-27 PROCEDURE — 83735 ASSAY OF MAGNESIUM: CPT

## 2021-05-27 PROCEDURE — A9270 NON-COVERED ITEM OR SERVICE: HCPCS | Performed by: HOSPITALIST

## 2021-05-27 PROCEDURE — A9270 NON-COVERED ITEM OR SERVICE: HCPCS | Performed by: STUDENT IN AN ORGANIZED HEALTH CARE EDUCATION/TRAINING PROGRAM

## 2021-05-27 PROCEDURE — 85025 COMPLETE CBC W/AUTO DIFF WBC: CPT

## 2021-05-27 PROCEDURE — 700102 HCHG RX REV CODE 250 W/ 637 OVERRIDE(OP): Performed by: INTERNAL MEDICINE

## 2021-05-27 PROCEDURE — 80048 BASIC METABOLIC PNL TOTAL CA: CPT

## 2021-05-27 PROCEDURE — 99232 SBSQ HOSP IP/OBS MODERATE 35: CPT | Performed by: HOSPITALIST

## 2021-05-27 PROCEDURE — 700102 HCHG RX REV CODE 250 W/ 637 OVERRIDE(OP): Performed by: STUDENT IN AN ORGANIZED HEALTH CARE EDUCATION/TRAINING PROGRAM

## 2021-05-27 PROCEDURE — 84100 ASSAY OF PHOSPHORUS: CPT

## 2021-05-27 PROCEDURE — A9270 NON-COVERED ITEM OR SERVICE: HCPCS | Performed by: INTERNAL MEDICINE

## 2021-05-27 RX ORDER — FUROSEMIDE 20 MG/1
20 TABLET ORAL
Status: DISCONTINUED | OUTPATIENT
Start: 2021-05-28 | End: 2021-06-19 | Stop reason: HOSPADM

## 2021-05-27 RX ORDER — HYDROCORTISONE 20 MG/1
10 TABLET ORAL 2 TIMES DAILY
Status: DISCONTINUED | OUTPATIENT
Start: 2021-05-27 | End: 2021-06-19 | Stop reason: HOSPADM

## 2021-05-27 RX ADMIN — OXYCODONE 5 MG: 5 TABLET ORAL at 15:55

## 2021-05-27 RX ADMIN — HYDROCORTISONE 10 MG: 20 TABLET ORAL at 11:58

## 2021-05-27 RX ADMIN — ATORVASTATIN CALCIUM 80 MG: 40 TABLET, FILM COATED ORAL at 17:13

## 2021-05-27 RX ADMIN — LOSARTAN POTASSIUM 25 MG: 25 TABLET, FILM COATED ORAL at 05:38

## 2021-05-27 RX ADMIN — DIBASIC SODIUM PHOSPHATE, MONOBASIC POTASSIUM PHOSPHATE AND MONOBASIC SODIUM PHOSPHATE 250 MG: 852; 155; 130 TABLET ORAL at 14:28

## 2021-05-27 RX ADMIN — TIOTROPIUM BROMIDE INHALATION SPRAY 5 MCG: 3.12 SPRAY, METERED RESPIRATORY (INHALATION) at 05:43

## 2021-05-27 RX ADMIN — TAMSULOSIN HYDROCHLORIDE 0.4 MG: 0.4 CAPSULE ORAL at 05:38

## 2021-05-27 RX ADMIN — ASPIRIN 81 MG: 81 TABLET, COATED ORAL at 05:38

## 2021-05-27 RX ADMIN — Medication 950 MG: at 05:39

## 2021-05-27 RX ADMIN — OXYCODONE 5 MG: 5 TABLET ORAL at 08:03

## 2021-05-27 RX ADMIN — POTASSIUM CHLORIDE 20 MEQ: 1500 TABLET, EXTENDED RELEASE ORAL at 05:38

## 2021-05-27 RX ADMIN — FUROSEMIDE 20 MG: 20 TABLET ORAL at 05:39

## 2021-05-27 RX ADMIN — CARBOXYMETHYLCELLULOSE SODIUM 1 DROP: 5 SOLUTION/ DROPS OPHTHALMIC at 17:14

## 2021-05-27 RX ADMIN — OMEPRAZOLE 20 MG: 20 CAPSULE, DELAYED RELEASE ORAL at 17:13

## 2021-05-27 RX ADMIN — CARVEDILOL 3.12 MG: 3.12 TABLET, FILM COATED ORAL at 08:00

## 2021-05-27 RX ADMIN — ENOXAPARIN SODIUM 40 MG: 40 INJECTION SUBCUTANEOUS at 05:39

## 2021-05-27 RX ADMIN — DIBASIC SODIUM PHOSPHATE, MONOBASIC POTASSIUM PHOSPHATE AND MONOBASIC SODIUM PHOSPHATE 250 MG: 852; 155; 130 TABLET ORAL at 17:15

## 2021-05-27 RX ADMIN — FLUTICASONE PROPIONATE 50 MCG: 50 SPRAY, METERED NASAL at 05:43

## 2021-05-27 RX ADMIN — HYDROCORTISONE 10 MG: 20 TABLET ORAL at 05:38

## 2021-05-27 RX ADMIN — OMEPRAZOLE 20 MG: 20 CAPSULE, DELAYED RELEASE ORAL at 05:38

## 2021-05-27 RX ADMIN — ALPRAZOLAM 0.25 MG: 0.25 TABLET ORAL at 19:51

## 2021-05-27 RX ADMIN — CARBOXYMETHYLCELLULOSE SODIUM 1 DROP: 5 SOLUTION/ DROPS OPHTHALMIC at 05:43

## 2021-05-27 RX ADMIN — HYDROCORTISONE 10 MG: 20 TABLET ORAL at 17:13

## 2021-05-27 RX ADMIN — CARVEDILOL 3.12 MG: 3.12 TABLET, FILM COATED ORAL at 17:13

## 2021-05-27 RX ADMIN — ALPRAZOLAM 0.25 MG: 0.25 TABLET ORAL at 15:55

## 2021-05-27 RX ADMIN — CLOPIDOGREL BISULFATE 75 MG: 75 TABLET ORAL at 05:38

## 2021-05-27 RX ADMIN — BUDESONIDE AND FORMOTEROL FUMARATE DIHYDRATE 2 PUFF: 80; 4.5 AEROSOL RESPIRATORY (INHALATION) at 05:43

## 2021-05-27 RX ADMIN — BUDESONIDE AND FORMOTEROL FUMARATE DIHYDRATE 2 PUFF: 80; 4.5 AEROSOL RESPIRATORY (INHALATION) at 17:14

## 2021-05-27 RX ADMIN — MONTELUKAST 10 MG: 10 TABLET, FILM COATED ORAL at 05:38

## 2021-05-27 RX ADMIN — OXYCODONE 5 MG: 5 TABLET ORAL at 19:51

## 2021-05-27 RX ADMIN — LEVOTHYROXINE SODIUM 75 MCG: 0.07 TABLET ORAL at 05:38

## 2021-05-27 ASSESSMENT — ENCOUNTER SYMPTOMS
NERVOUS/ANXIOUS: 1
FOCAL WEAKNESS: 0
COUGH: 0
VOMITING: 0
ORTHOPNEA: 0
FEVER: 0
HEMOPTYSIS: 0
HEADACHES: 0
CHILLS: 0
HEARTBURN: 0
PHOTOPHOBIA: 0
ABDOMINAL PAIN: 0
NAUSEA: 0
DEPRESSION: 0
MYALGIAS: 0
PALPITATIONS: 0
SORE THROAT: 0

## 2021-05-27 ASSESSMENT — PAIN DESCRIPTION - PAIN TYPE
TYPE: ACUTE PAIN;SURGICAL PAIN
TYPE: ACUTE PAIN
TYPE: ACUTE PAIN;CHRONIC PAIN

## 2021-05-27 NOTE — PROGRESS NOTES
Rec'd report from day shift RN. Assumed pt care. Assessment completed. AA&OX3, reoriented to date. Denies pain at this time. No s/s of discomfort or distress. Q2 hour turns enforced. Noted generalized edema to LUE, BLE. Condom cath in use. Bed in lowest position, bed locked, bed alarm on for safety, RN and CNA numbers provided, call light within reach.

## 2021-05-27 NOTE — CARE PLAN
Problem: Pain - Standard  Goal: Alleviation of pain or a reduction in pain to the patient’s comfort goal  Outcome: Progressing  Note: Pt with 8/10 pain, prn pain medication given. Per chart, patient is able to get to comfort level using prns     Problem: Fall Risk  Goal: Patient will remain free from falls  Note: All fall precautions in place based on pt risk   The patient is Stable - Low risk of patient condition declining or worsening    Shift Goals  Clinical Goals: pain management  Patient Goals: discharge to SNF  Family Goals: N/A    Progress made toward(s) clinical / shift goals:  pain management progressing, pt to get to comfort goal. Await SNF acceptance    Patient is not progressing towards the following goals:

## 2021-05-27 NOTE — PROGRESS NOTES
Assumed care at 0700 after report received from night RN. Pt A/Ox3 (reoriented to time), on 4L oxygen (pt baseline), midline site WNL, pain 8/10 and medicated per MAR. Awaiting SNF acceptance for discharge

## 2021-05-27 NOTE — DISCHARGE PLANNING
Received Choice form at [id number]  Agency/Facility Name: [facility]  Referral sent per Choice form @ [phone]

## 2021-05-27 NOTE — PROGRESS NOTES
Hospital Medicine Daily Progress Note    Date of Service  5/27/2021    Chief Complaint  88 y.o. 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    During the stay in the hospital, patient electrolytes continue to be repleted.  Patient is also noted to have acute on chronic systolic congestive heart failure, started on IV Lasix, I/os, daily weight, fluid restriction.  Regarding CAD, patient is on aspirin, Plavix, Coreg, losartan, and statin. Patient is to follow-up with cardiology as an outpatient.    Patient has history of adrenal insufficiency,on hydrocortisone 10 3 times daily, need to be gradually weaned off.  PT/OT has evaluated, recommended discharging the patient to skilled nursing facility.    During the hospital, his hemoglobin hematocrit, continues to be monitored.  For all other chronic medical condition, patient will resume his home medication. At this time, patient has been medically stable to be discharged home     Interval Problem Update  No acute events overnight, laying in bed, denies any complaint.  Patient states that he is hungry and wanted to have yogurt.  PT/OT has evaluated, recommended discharging the patient discussing facility, case management aware.  Patient has been medically cleared to be discharged    5/26:  --No acute events  overnight, laying in bed, denied any complaint.  Patient stated that he wanted to yogurt.  Medically stable to be discharged to skilled nursing facility    5/27:   No acute events overnight, patient laying in bed, denies any complaint.  He is alert and oriented x4, answering questions appropriately.  His left upper extremity is noted to be swollen, recommended elevation.  -- noted to have low phos     Consultants/Specialty  None    Code Status  DNAR, I OK    Disposition  Skilled nursing home    Review of Systems  Review of Systems   Constitutional: Negative for chills and fever.   HENT: Negative for congestion and sore throat.    Eyes: Negative for photophobia.   Respiratory: Negative for cough and hemoptysis.    Cardiovascular: Positive for leg swelling. Negative for chest pain, palpitations and orthopnea.   Gastrointestinal: Negative for abdominal pain, heartburn, nausea and vomiting.   Genitourinary: Negative for dysuria.   Musculoskeletal: Negative for myalgias.   Neurological: Negative for focal weakness and headaches.   Psychiatric/Behavioral: Negative for depression. The patient is nervous/anxious.         Physical Exam  Temp:  [36.6 °C (97.8 °F)-36.9 °C (98.5 °F)] 36.9 °C (98.5 °F)  Pulse:  [73-81] 81  Resp:  [16-18] 18  BP: (122-160)/(73-94) 147/94  SpO2:  [95 %-97 %] 95 %    Physical Exam  Vitals and nursing note reviewed.   Constitutional:       Appearance: He is obese.   HENT:      Head: Normocephalic and atraumatic.   Eyes:      Extraocular Movements: Extraocular movements intact.      Pupils: Pupils are equal, round, and reactive to light.   Cardiovascular:      Rate and Rhythm: Normal rate.      Pulses: Normal pulses.   Pulmonary:      Comments: Decreased breath sound at the bases  Abdominal:      General: There is no distension.   Musculoskeletal:      Cervical back: Neck supple.      Right lower leg: Edema present.      Left lower leg: Edema present.      Comments: Swollen Left upper ext   Skin:      General: Skin is warm.   Neurological:      Mental Status: He is alert and oriented to person, place, and time.      Cranial Nerves: No cranial nerve deficit.   Psychiatric:         Mood and Affect: Mood normal.         Fluids    Intake/Output Summary (Last 24 hours) at 5/27/2021 1222  Last data filed at 5/27/2021 0800  Gross per 24 hour   Intake 1025 ml   Output 1175 ml   Net -150 ml       Laboratory  Recent Labs     05/25/21  0020 05/26/21  0105 05/27/21  0243   WBC 8.6 8.5 10.0   RBC 3.28* 3.21* 3.42*   HEMOGLOBIN 9.1* 9.0* 9.8*   HEMATOCRIT 31.4* 30.7* 32.6*   MCV 95.7 95.6 95.3   MCH 27.7 28.0 28.7   MCHC 29.0* 29.3* 30.1*   RDW 76.2* 75.2* 75.9*   PLATELETCT 235 237 243   MPV 10.0 9.9 9.7     Recent Labs     05/25/21  0020 05/27/21  0243   SODIUM 137 140   POTASSIUM 3.6 3.7   CHLORIDE 95* 97   CO2 37* 38*   GLUCOSE 104* 103*   BUN 16 15   CREATININE 0.83 0.87   CALCIUM 7.0* 7.4*                   Imaging  US-EXTREMITY VENOUS UPPER UNILAT LEFT   Final Result      CT-EXTREMITY, UPPER WITH LEFT   Final Result      No acute fracture or dislocation.      Severe osteoarthritis of the elbow joint.      IR-MIDLINE CATHETER INSERTION WO GUIDANCE > AGE 3   Final Result                  Ultrasound-guided midline placement performed by qualified nursing staff    as above.          IR-US GUIDED PIV   Final Result    Ultrasound-guided PERIPHERAL IV INSERTION performed by    qualified nursing staff as above.      US-EXTREMITY VENOUS LOWER UNILAT LEFT   Final Result      DX-CHEST-PORTABLE (1 VIEW)   Final Result      Bibasilar opacities may represent atelectasis or consolidation.      Small left pleural effusion may be present.      Pulmonary interstitial edema.      Stable prominence of the cardiomediastinal silhouette.      Atherosclerotic calcification is seen.      DX-ELBOW-COMPLETE 3+ LEFT   Final Result      1.  No fracture or dislocation of LEFT elbow.   2.  Severe degenerative change.   3.  Diffuse subcutaneous edema.    4.  Limited by positioning.           Assessment/Plan  Pain and swelling of left upper extremity- (present on admission)  Assessment & Plan  Pt reports trauma and popping sound about 5 days ago, following which he noticed swelling and pain to his LUE  US of the left upper ext didn't show any DVT   Ct reviewed and did show arthritis       Sacral wound- (present on admission)  Assessment & Plan  Wound mx as per wound team recs  Offloading due to risk of pressure ulcers    Hypophosphatemia  Assessment & Plan  Replete and monitor    Hypomagnesemia  Assessment & Plan  Replete and monitor    Hypokalemia- (present on admission)  Assessment & Plan  Replete and monitor    Anasarca- (present on admission)  Assessment & Plan  Continue p.o. Lasix    Debility- (present on admission)  Assessment & Plan  PT OT evaluation, placement    Coronary artery disease- (present on admission)  Assessment & Plan  S/p PCI with stent placement 4 weeks ago  Aspirin, Plavix, Coreg, atorvastatin, losartan, Lasix.    Adrenal insufficiency (HCC)- (present on admission)  Assessment & Plan   Will decrease hydrocortisone 10 mg po bid    Acute on chronic systolic congestive heart failure (HCC)- (present on admission)  Assessment & Plan  Patient on 5L oxygen via nc at baseline  Echo showed EF of 45% on 5/12, I/os , daily weight and fluid restriction to 1.5 L  Continue BB , losartan and  Lasix   -- need follow up with cards as an op    Chronic respiratory failure (HCC)- (present on admission)  Assessment & Plan  Continue supplemental oxygen    BPH (benign prostatic hypertrophy)- (present on admission)  Assessment & Plan  continiue Flomax    History of pulmonary embolism- (present on admission)  Assessment & Plan  Warfarin was discontinued during previous recent hospitalization due to blood loss anemia   monitor hemoglobin hematocrit, so far stabel  Transfuse if less than 7        COPD (chronic obstructive pulmonary disease) (HCC)- (present on  admission)  Assessment & Plan  Continue RT protocol, breathing treatment   --Titrate O2 as needed    -- ON 4 L of O2  And denied any complain of sob    Essential hypertension, benign- (present on admission)  Assessment & Plan  Blood pressure noted to be well controlled, monitor  Continue coreg and losartan       VTE prophylaxis: Lovenox    I have performed a physical exam and reviewed and updated ROS and Plan today (5/27/2021). In review of yesterday's note (5/26/2021), there are no changes except as documented above.

## 2021-05-27 NOTE — CARE PLAN
The patient is Stable - Low risk of patient condition declining or worsening    Shift Goals  Clinical Goals: pain management  Patient Goals: sleep comfortably  Family Goals: N/A    Progress made toward(s) clinical / shift goals:  Medicated per MAR, LUE elevated on pillows. Q2 hour turns enforced, pillows used for positioning, pt made comfortable in bed.    Patient is not progressing towards the following goals: Pt has a difficult time sleeping, tends to nap.

## 2021-05-27 NOTE — DISCHARGE PLANNING
Anticipated Discharge Disposition: SNF    Action: LSW faxed new choice form for [facility] (Not same facility as [facility]), [facility], and [facility] to [facility].     Barriers to Discharge: SNF placement    Plan: LSW to f/u with referrals.

## 2021-05-28 LAB
ANION GAP SERPL CALC-SCNC: 5 MMOL/L (ref 7–16)
ANISOCYTOSIS BLD QL SMEAR: ABNORMAL
BASOPHILS # BLD AUTO: 0.3 % (ref 0–1.8)
BASOPHILS # BLD: 0.04 K/UL (ref 0–0.12)
BUN SERPL-MCNC: 15 MG/DL (ref 8–22)
CALCIUM SERPL-MCNC: 7.3 MG/DL (ref 8.5–10.5)
CHLORIDE SERPL-SCNC: 98 MMOL/L (ref 96–112)
CO2 SERPL-SCNC: 36 MMOL/L (ref 20–33)
COMMENT 1642: NORMAL
CREAT SERPL-MCNC: 0.71 MG/DL (ref 0.5–1.4)
EOSINOPHIL # BLD AUTO: 0.1 K/UL (ref 0–0.51)
EOSINOPHIL NFR BLD: 0.8 % (ref 0–6.9)
ERYTHROCYTE [DISTWIDTH] IN BLOOD BY AUTOMATED COUNT: 75.3 FL (ref 35.9–50)
GLUCOSE SERPL-MCNC: 118 MG/DL (ref 65–99)
HCT VFR BLD AUTO: 32.6 % (ref 42–52)
HGB BLD-MCNC: 9.8 G/DL (ref 14–18)
IMM GRANULOCYTES # BLD AUTO: 0.11 K/UL (ref 0–0.11)
IMM GRANULOCYTES NFR BLD AUTO: 0.9 % (ref 0–0.9)
LYMPHOCYTES # BLD AUTO: 0.54 K/UL (ref 1–4.8)
LYMPHOCYTES NFR BLD: 4.5 % (ref 22–41)
MACROCYTES BLD QL SMEAR: ABNORMAL
MCH RBC QN AUTO: 28.8 PG (ref 27–33)
MCHC RBC AUTO-ENTMCNC: 30.1 G/DL (ref 33.7–35.3)
MCV RBC AUTO: 95.9 FL (ref 81.4–97.8)
MICROCYTES BLD QL SMEAR: ABNORMAL
MONOCYTES # BLD AUTO: 0.82 K/UL (ref 0–0.85)
MONOCYTES NFR BLD AUTO: 6.9 % (ref 0–13.4)
MORPHOLOGY BLD-IMP: NORMAL
NEUTROPHILS # BLD AUTO: 10.35 K/UL (ref 1.82–7.42)
NEUTROPHILS NFR BLD: 86.6 % (ref 44–72)
NRBC # BLD AUTO: 0 K/UL
NRBC BLD-RTO: 0 /100 WBC
PLATELET # BLD AUTO: 221 K/UL (ref 164–446)
PLATELET BLD QL SMEAR: NORMAL
PMV BLD AUTO: 9.6 FL (ref 9–12.9)
POTASSIUM SERPL-SCNC: 3.5 MMOL/L (ref 3.6–5.5)
RBC # BLD AUTO: 3.4 M/UL (ref 4.7–6.1)
RBC BLD AUTO: PRESENT
SODIUM SERPL-SCNC: 139 MMOL/L (ref 135–145)
WBC # BLD AUTO: 12 K/UL (ref 4.8–10.8)

## 2021-05-28 PROCEDURE — 700102 HCHG RX REV CODE 250 W/ 637 OVERRIDE(OP): Performed by: STUDENT IN AN ORGANIZED HEALTH CARE EDUCATION/TRAINING PROGRAM

## 2021-05-28 PROCEDURE — A9270 NON-COVERED ITEM OR SERVICE: HCPCS | Performed by: INTERNAL MEDICINE

## 2021-05-28 PROCEDURE — 97530 THERAPEUTIC ACTIVITIES: CPT | Mod: CO

## 2021-05-28 PROCEDURE — A9270 NON-COVERED ITEM OR SERVICE: HCPCS | Performed by: STUDENT IN AN ORGANIZED HEALTH CARE EDUCATION/TRAINING PROGRAM

## 2021-05-28 PROCEDURE — A9270 NON-COVERED ITEM OR SERVICE: HCPCS | Performed by: HOSPITALIST

## 2021-05-28 PROCEDURE — 85025 COMPLETE CBC W/AUTO DIFF WBC: CPT

## 2021-05-28 PROCEDURE — 700102 HCHG RX REV CODE 250 W/ 637 OVERRIDE(OP): Performed by: INTERNAL MEDICINE

## 2021-05-28 PROCEDURE — 36415 COLL VENOUS BLD VENIPUNCTURE: CPT

## 2021-05-28 PROCEDURE — 700102 HCHG RX REV CODE 250 W/ 637 OVERRIDE(OP): Performed by: HOSPITALIST

## 2021-05-28 PROCEDURE — 99232 SBSQ HOSP IP/OBS MODERATE 35: CPT | Performed by: HOSPITALIST

## 2021-05-28 PROCEDURE — 770006 HCHG ROOM/CARE - MED/SURG/GYN SEMI*

## 2021-05-28 PROCEDURE — 80048 BASIC METABOLIC PNL TOTAL CA: CPT

## 2021-05-28 PROCEDURE — 97535 SELF CARE MNGMENT TRAINING: CPT | Mod: CO

## 2021-05-28 PROCEDURE — 700111 HCHG RX REV CODE 636 W/ 250 OVERRIDE (IP): Performed by: INTERNAL MEDICINE

## 2021-05-28 RX ADMIN — FUROSEMIDE 20 MG: 20 TABLET ORAL at 05:03

## 2021-05-28 RX ADMIN — OXYCODONE 5 MG: 5 TABLET ORAL at 12:20

## 2021-05-28 RX ADMIN — DOCUSATE SODIUM 50 MG AND SENNOSIDES 8.6 MG 2 TABLET: 8.6; 5 TABLET, FILM COATED ORAL at 17:34

## 2021-05-28 RX ADMIN — CARBOXYMETHYLCELLULOSE SODIUM 1 DROP: 5 SOLUTION/ DROPS OPHTHALMIC at 04:58

## 2021-05-28 RX ADMIN — POLYETHYLENE GLYCOL 3350 1 PACKET: 17 POWDER, FOR SOLUTION ORAL at 08:10

## 2021-05-28 RX ADMIN — BUDESONIDE AND FORMOTEROL FUMARATE DIHYDRATE 2 PUFF: 80; 4.5 AEROSOL RESPIRATORY (INHALATION) at 04:58

## 2021-05-28 RX ADMIN — DIBASIC SODIUM PHOSPHATE, MONOBASIC POTASSIUM PHOSPHATE AND MONOBASIC SODIUM PHOSPHATE 250 MG: 852; 155; 130 TABLET ORAL at 12:19

## 2021-05-28 RX ADMIN — CLOPIDOGREL BISULFATE 75 MG: 75 TABLET ORAL at 04:56

## 2021-05-28 RX ADMIN — CARVEDILOL 3.12 MG: 3.12 TABLET, FILM COATED ORAL at 08:10

## 2021-05-28 RX ADMIN — ALPRAZOLAM 0.25 MG: 0.25 TABLET ORAL at 19:48

## 2021-05-28 RX ADMIN — DIBASIC SODIUM PHOSPHATE, MONOBASIC POTASSIUM PHOSPHATE AND MONOBASIC SODIUM PHOSPHATE 250 MG: 852; 155; 130 TABLET ORAL at 04:55

## 2021-05-28 RX ADMIN — LEVOTHYROXINE SODIUM 75 MCG: 0.07 TABLET ORAL at 04:55

## 2021-05-28 RX ADMIN — OXYCODONE 5 MG: 5 TABLET ORAL at 19:48

## 2021-05-28 RX ADMIN — HYDROCORTISONE 10 MG: 20 TABLET ORAL at 04:55

## 2021-05-28 RX ADMIN — TIOTROPIUM BROMIDE INHALATION SPRAY 5 MCG: 3.12 SPRAY, METERED RESPIRATORY (INHALATION) at 04:58

## 2021-05-28 RX ADMIN — CARVEDILOL 3.12 MG: 3.12 TABLET, FILM COATED ORAL at 17:34

## 2021-05-28 RX ADMIN — TAMSULOSIN HYDROCHLORIDE 0.4 MG: 0.4 CAPSULE ORAL at 04:55

## 2021-05-28 RX ADMIN — HYDROCORTISONE 10 MG: 20 TABLET ORAL at 17:33

## 2021-05-28 RX ADMIN — ALPRAZOLAM 0.25 MG: 0.25 TABLET ORAL at 08:10

## 2021-05-28 RX ADMIN — OXYCODONE 5 MG: 5 TABLET ORAL at 08:10

## 2021-05-28 RX ADMIN — OMEPRAZOLE 20 MG: 20 CAPSULE, DELAYED RELEASE ORAL at 17:34

## 2021-05-28 RX ADMIN — CARBOXYMETHYLCELLULOSE SODIUM 1 DROP: 5 SOLUTION/ DROPS OPHTHALMIC at 17:38

## 2021-05-28 RX ADMIN — Medication 950 MG: at 04:57

## 2021-05-28 RX ADMIN — ATORVASTATIN CALCIUM 80 MG: 40 TABLET, FILM COATED ORAL at 17:34

## 2021-05-28 RX ADMIN — FLUTICASONE PROPIONATE 50 MCG: 50 SPRAY, METERED NASAL at 04:58

## 2021-05-28 RX ADMIN — ALPRAZOLAM 0.25 MG: 0.25 TABLET ORAL at 14:29

## 2021-05-28 RX ADMIN — MONTELUKAST 10 MG: 10 TABLET, FILM COATED ORAL at 04:55

## 2021-05-28 RX ADMIN — DOCUSATE SODIUM 50 MG AND SENNOSIDES 8.6 MG 2 TABLET: 8.6; 5 TABLET, FILM COATED ORAL at 04:55

## 2021-05-28 RX ADMIN — LOSARTAN POTASSIUM 25 MG: 25 TABLET, FILM COATED ORAL at 04:55

## 2021-05-28 RX ADMIN — DIBASIC SODIUM PHOSPHATE, MONOBASIC POTASSIUM PHOSPHATE AND MONOBASIC SODIUM PHOSPHATE 250 MG: 852; 155; 130 TABLET ORAL at 17:34

## 2021-05-28 RX ADMIN — BUDESONIDE AND FORMOTEROL FUMARATE DIHYDRATE 2 PUFF: 80; 4.5 AEROSOL RESPIRATORY (INHALATION) at 17:37

## 2021-05-28 RX ADMIN — ENOXAPARIN SODIUM 40 MG: 40 INJECTION SUBCUTANEOUS at 05:05

## 2021-05-28 RX ADMIN — ASPIRIN 81 MG: 81 TABLET, COATED ORAL at 04:55

## 2021-05-28 RX ADMIN — OMEPRAZOLE 20 MG: 20 CAPSULE, DELAYED RELEASE ORAL at 04:55

## 2021-05-28 ASSESSMENT — ENCOUNTER SYMPTOMS
ABDOMINAL PAIN: 0
SORE THROAT: 0
VOMITING: 0
FOCAL WEAKNESS: 0
SHORTNESS OF BREATH: 0
NERVOUS/ANXIOUS: 1
ORTHOPNEA: 0
COUGH: 0
PALPITATIONS: 0
NAUSEA: 0
DIZZINESS: 0
HEARTBURN: 0
DEPRESSION: 0
MYALGIAS: 0
FEVER: 0

## 2021-05-28 ASSESSMENT — COGNITIVE AND FUNCTIONAL STATUS - GENERAL
HELP NEEDED FOR BATHING: TOTAL
PERSONAL GROOMING: A LOT
SUGGESTED CMS G CODE MODIFIER DAILY ACTIVITY: CL
DRESSING REGULAR LOWER BODY CLOTHING: TOTAL
DRESSING REGULAR UPPER BODY CLOTHING: A LOT
DAILY ACTIVITIY SCORE: 10
EATING MEALS: A LITTLE
TOILETING: TOTAL

## 2021-05-28 ASSESSMENT — PAIN DESCRIPTION - PAIN TYPE
TYPE: ACUTE PAIN;CHRONIC PAIN
TYPE: ACUTE PAIN;CHRONIC PAIN
TYPE: ACUTE PAIN
TYPE: ACUTE PAIN;CHRONIC PAIN
TYPE: ACUTE PAIN

## 2021-05-28 NOTE — THERAPY
"Occupational Therapy  Daily Treatment     Patient Name: Jeffrey Lizama  Age:  88 y.o., Sex:  male  Medical Record #: [id number]  Today's Date: 5/28/2021     Precautions  Precautions: (P) Fall Risk    Assessment    Pt was seen for OT treatment. Pt needed increased encouragement to participate but did agree to attempt self care tasks. Mod A for supine to sit at EOB with HOB elevated and use of bed rail. Pt demo increased static sitting balance from last session. Pt needed increased encouragement to attempt simple self care tasks. Pt demo low initiation  for most tasks.  Pt stated, \"My wife can help me with that\". Informed pt he needed to try due to MD is asking how much he can do on his own. \". OH, OK\". Pt agreed. Min/Mod A for H/G seated EOB post set up . Pt quickly fatigues due to deconditioning. Pt needs rest periods and requires extended time to complete. Pt focused on \"I can't breathe\". Pt tends to hold his breath with activities and reminder cues for breath control. Total A for LB dressing and Min/Mod A with fatigue for UB dressing. Pt gave good effort with encouragement. Pt required Mod A for BTB and Max A for bed mobility. RN updated on OT treatment findings and recommendations . Will continue OT POC.     Plan    Continue current treatment plan.    DC Equipment Recommendations: (P) Unable to determine at this time  Discharge Recommendations: (P) Recommend post-acute placement for additional occupational therapy services prior to discharge home    Subjective    \"I can't do any of the things you said\". Informed pt he needed to try. Pt can do more for himself than he states with encouragement and extended time. .      Objective       05/28/21 1414   Cognition    Cognition / Consciousness X   Level of Consciousness Alert  (intermittent confusion. )   Safety Awareness Impaired   New Learning Impaired  (decreased STM for simple new techs )   Attention Impaired   Initiation Impaired   Comments Pt states he \"cannot " "do tasks\" suggested without even trying. .    Passive ROM Upper Body   Comments WFL for simple self care tasks.    Active ROM Upper Body   Comments UE ROM limited due to edema and deconditioning/weakness.    Strength Upper Body   Gross Strength Generalized Weakness, Equal Bilaterally.    Comments See note above.    Other Treatments   Other Treatments Provided Psychosocial intervention addressed. Pt stated, \"You do it for me\". Informed /Educated pt in the need for attempting more of his simple self care tasks. Pt can do more than he states.    Balance   Sitting Balance (Static) Fair +   Sitting Balance (Dynamic) Fair +   Standing Balance (Static) Poor +   Standing Balance (Dynamic) Poor   Weight Shift Sitting Fair   Weight Shift Standing Poor   Skilled Intervention Verbal Cuing;Tactile Cuing;Sequencing;Postural Facilitation;Compensatory Strategies   Comments HHA at EOB   Bed Mobility    Supine to Sit Moderate Assist   Sit to Supine Moderate Assist   Scooting Moderate Assist   Rolling Supervised   Skilled Intervention Verbal Cuing;Tactile Cuing;Sequencing;Compensatory Strategies   Comments HOB elevated and use of bedrail    Activities of Daily Living   Grooming Minimal Assist;Seated   Bathing   (declined to attempt)   Upper Body Dressing Minimal Assist   Lower Body Dressing Total Assist   Toileting Total Assist   Skilled Intervention Verbal Cuing;Sequencing;Tactile Cuing;Postural Facilitation;Compensatory Strategies   Comments Pt will need assist with most self care tasks at this time. Pt stated his \"wife can do it for him\" . This is questionable. Wife does not sound well over phone per RN.    Functional Mobility   Sit to Stand Minimal Assist   Bed, Chair, Wheelchair Transfer Refused   Toilet Transfers Refused   Mobility EOB sitting 20 mins    Skilled Intervention Verbal Cuing;Tactile Cuing;Sequencing;Compensatory Strategies   Comments Not fully assessed.    Visual Perception   Comments wears glasses.    Activity " "Tolerance   Comments limited to deconditioning and weakness. Pt states frequently, \"I can't breathe\". Worked on breath control .    Patient / Family Goals   Patient / Family Goal #1 Go to rehab   Goal #1 Outcome Progressing as expected   Short Term Goals   Short Term Goal # 1 Pt will demo LB dressing with AE as needed and mod A   Goal Outcome # 1 Goal not met   Short Term Goal # 2 Pt will demo ADL txf with mod A   Goal Outcome # 2 Progressing slower than expected   Short Term Goal # 3 Pt will complete G/H seated SUP   Goal Outcome # 3 Progressing slower than expected   Anticipated Discharge Equipment and Recommendations   DC Equipment Recommendations Unable to determine at this time   Discharge Recommendations Recommend post-acute placement for additional occupational therapy services prior to discharge home   Interdisciplinary Plan of Care Collaboration   IDT Collaboration with  Nursing;Certified Nursing Assistant   Collaboration Comments RN updated         "

## 2021-05-28 NOTE — PROGRESS NOTES
Hospital Medicine Daily Progress Note    Date of Service  5/28/2021    Chief Complaint  88 y.o. 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    During the stay in the hospital, patient electrolytes continue to be repleted.  Patient is also noted to have acute on chronic systolic congestive heart failure, started on IV Lasix, I/os, daily weight, fluid restriction.  Regarding CAD, patient is on aspirin, Plavix, Coreg, losartan, and statin. Patient is to follow-up with cardiology as an outpatient.    Patient has history of adrenal insufficiency,on hydrocortisone 10 3 times daily, need to be gradually weaned off.  PT/OT has evaluated, recommended discharging the patient to skilled nursing facility.    During the hospital, his hemoglobin hematocrit, continues to be monitored.  For all other chronic medical condition, patient will resume his home medication. At this time, patient has been medically stable to be discharged home     Interval Problem Update  No acute events overnight, laying in bed, denies any complaint.  Patient states that he is hungry and wanted to have yogurt.  PT/OT has evaluated, recommended discharging the patient discussing facility, case management aware.  Patient has been medically cleared to be discharged    5/26:  --No acute events  overnight, laying in bed, denied any complaint.  Patient stated that he wanted to yogurt.  Medically stable to be discharged to skilled nursing facility    5/27:   No acute events overnight, patient laying in bed, denies any complaint.  He is alert and oriented x4, answering questions appropriately.  His left upper extremity is noted to be swollen, recommended elevation.  -- noted to have low phos     5/28:   No acute events overnight, laying in bed, denies any complaint.  Patient is alert and oriented x4.  --We recommend elevating his LUE  -- Vitals sign stable     Consultants/Specialty  None    Code Status  DNAR, I OK    Disposition  Skilled nursing home  Medically cleared    Review of Systems  Review of Systems   Constitutional: Negative for fever and malaise/fatigue.   HENT: Negative for sore throat.    Respiratory: Negative for cough and shortness of breath.    Cardiovascular: Positive for leg swelling. Negative for chest pain, palpitations and orthopnea.   Gastrointestinal: Negative for abdominal pain, heartburn, nausea and vomiting.   Genitourinary: Negative for dysuria.   Musculoskeletal: Negative for myalgias.   Neurological: Negative for dizziness and focal weakness.   Psychiatric/Behavioral: Negative for depression. The patient is nervous/anxious.         Physical Exam  Temp:  [36.5 °C (97.7 °F)-36.9 °C (98.4 °F)] 36.9 °C (98.4 °F)  Pulse:  [65-74] 68  Resp:  [16-18] 16  BP: (135-146)/(65-96) 141/74  SpO2:  [94 %-98 %] 94 %    Physical Exam  Vitals and nursing note reviewed.   Constitutional:       Appearance: He is obese.   HENT:      Head: Normocephalic and atraumatic.   Eyes:      Extraocular Movements: Extraocular movements intact.      Pupils: Pupils are equal, round, and reactive to light.   Cardiovascular:      Rate and Rhythm: Normal rate.      Pulses: Normal pulses.   Pulmonary:      Comments: Decreased breath sound at the bases  Abdominal:      General: There is no distension.   Musculoskeletal:       Cervical back: Neck supple.      Right lower leg: Edema present.      Left lower leg: Edema present.      Comments: Swollen Left upper ext  edema   Skin:     General: Skin is warm.   Neurological:      Mental Status: He is alert and oriented to person, place, and time.      Cranial Nerves: No cranial nerve deficit.   Psychiatric:         Mood and Affect: Mood normal.         Fluids    Intake/Output Summary (Last 24 hours) at 5/28/2021 1204  Last data filed at 5/28/2021 0852  Gross per 24 hour   Intake 940 ml   Output 1150 ml   Net -210 ml       Laboratory  Recent Labs     05/26/21  0105 05/27/21  0243 05/28/21  0155   WBC 8.5 10.0 12.0*   RBC 3.21* 3.42* 3.40*   HEMOGLOBIN 9.0* 9.8* 9.8*   HEMATOCRIT 30.7* 32.6* 32.6*   MCV 95.6 95.3 95.9   MCH 28.0 28.7 28.8   MCHC 29.3* 30.1* 30.1*   RDW 75.2* 75.9* 75.3*   PLATELETCT 237 243 221   MPV 9.9 9.7 9.6     Recent Labs     05/27/21  0243 05/28/21  0155   SODIUM 140 139   POTASSIUM 3.7 3.5*   CHLORIDE 97 98   CO2 38* 36*   GLUCOSE 103* 118*   BUN 15 15   CREATININE 0.87 0.71   CALCIUM 7.4* 7.3*                   Imaging  US-EXTREMITY VENOUS UPPER UNILAT LEFT   Final Result      CT-EXTREMITY, UPPER WITH LEFT   Final Result      No acute fracture or dislocation.      Severe osteoarthritis of the elbow joint.      IR-MIDLINE CATHETER INSERTION WO GUIDANCE > AGE 3   Final Result                  Ultrasound-guided midline placement performed by qualified nursing staff    as above.          IR-US GUIDED PIV   Final Result    Ultrasound-guided PERIPHERAL IV INSERTION performed by    qualified nursing staff as above.      US-EXTREMITY VENOUS LOWER UNILAT LEFT   Final Result      DX-CHEST-PORTABLE (1 VIEW)   Final Result      Bibasilar opacities may represent atelectasis or consolidation.      Small left pleural effusion may be present.      Pulmonary interstitial edema.      Stable prominence of the cardiomediastinal silhouette.      Atherosclerotic calcification is seen.       DX-ELBOW-COMPLETE 3+ LEFT   Final Result      1.  No fracture or dislocation of LEFT elbow.   2.  Severe degenerative change.   3.  Diffuse subcutaneous edema.   4.  Limited by positioning.           Assessment/Plan  Pain and swelling of left upper extremity- (present on admission)  Assessment & Plan  Pt reports trauma and popping sound about 5 days ago, following which he noticed swelling and pain to his LUE  US of the left upper ext didn't show any DVT   Ct reviewed and did show arthritis       Sacral wound- (present on admission)  Assessment & Plan  Wound mx as per wound team recs  Offloading due to risk of pressure ulcers    Hypophosphatemia  Assessment & Plan  Replete and monitor    Hypomagnesemia  Assessment & Plan  Replete and monitor    Hypokalemia- (present on admission)  Assessment & Plan  Replete and monitor    Anasarca- (present on admission)  Assessment & Plan  Continue p.o. Lasix    Debility- (present on admission)  Assessment & Plan  PT OT evaluation, recs post-acute    Coronary artery disease- (present on admission)  Assessment & Plan  S/p PCI with stent placement 4 weeks ago  Aspirin, Plavix, Coreg, atorvastatin, losartan, Lasix.    Adrenal insufficiency (HCC)- (present on admission)  Assessment & Plan   Will decrease hydrocortisone 10 mg po bid  Monitor Bp    Acute on chronic systolic congestive heart failure (HCC)- (present on admission)  Assessment & Plan  Patient on 5L oxygen via nc at baseline  Echo showed EF of 45% on 5/12, I/os , daily weight and fluid restriction to 1.5 L  Continue BB , losartan and  Lasix   -- need follow up with cards as an op    Chronic respiratory failure (HCC)- (present on admission)  Assessment & Plan  Continue supplemental oxygen    BPH (benign prostatic hypertrophy)- (present on admission)  Assessment & Plan  continiue Flomax    History of pulmonary embolism- (present on admission)  Assessment & Plan  Warfarin was discontinued during previous recent  hospitalization due to blood loss anemia, patient knows   monitor hemoglobin hematocrit, so far stabel  Transfuse if less than 7        COPD (chronic obstructive pulmonary disease) (HCC)- (present on admission)  Assessment & Plan  Continue RT protocol, breathing treatment   --Titrate O2 as needed    -- ON 4 L of O2  And denied any complain of sob    Essential hypertension, benign- (present on admission)  Assessment & Plan  Blood pressure noted to be well controlled, monitor  Continue coreg and losartan       VTE prophylaxis: Lovenox    I have performed a physical exam and reviewed and updated ROS and Plan today (5/28/2021). In review of yesterday's note (5/27/2021), there are no changes except as documented above.

## 2021-05-28 NOTE — PROGRESS NOTES
Rec'd report from day shift RN. Assumed pt care. Assessment completed. AA&OX3, reoriented to date. Reports pain to shoulders and back, readjusted pt in bed, medicated per MAR. No s/s of discomfort or distress. Q2 hour turns enforced. Noted generalized edema to LUE, BLE. Condom cath in use. Bed in lowest position, bed locked, bed alarm on for safety, RN and CNA numbers provided, call light within reach.

## 2021-05-28 NOTE — CARE PLAN
The patient is Stable - Low risk of patient condition declining or worsening    Shift Goals  Clinical Goals: discharge to SNF  Patient Goals: pain management  Family Goals: N/A    Progress made toward(s) clinical / shift goals:  Medicated per MAR for pain management. Q2 hour turns provided, pillows used for positioning.    Patient is not progressing towards the following goals: Referrals sent to SNFs, pending acceptance.

## 2021-05-28 NOTE — CARE PLAN
Problem: Skin Integrity  Goal: Skin integrity is maintained or improved  Outcome: Progressing  Note: Pt participates in Q2HR turns, frequently cleaned and barrier cream applied     Problem: Psychosocial  Goal: Patient's level of anxiety will decrease  Outcome: Progressing  Note: Prn anxiety medications given.    The patient is Stable - Low risk of patient condition declining or worsening    Shift Goals  Clinical Goals: SNF  Patient Goals: pain management  Family Goals: N/A    Progress made toward(s) clinical / shift goals:  await SNF acceptance    Patient is not progressing towards the following goals:

## 2021-05-28 NOTE — DISCHARGE PLANNING
Agency/Facility Name: [facility]  Outcome: Left vmail for admissions regarding referral     Agency/Facility Name: [facility]   Spoke To: Debora  Outcome: Per Debora, the fax number listed in EPIC is the wrong fax number   F:     Agency/Facility Name: [facility]  Spoke To: Gisella   Outcome: Referral was not received and admissions is not in today 05/28    Agency/Facility Name: [facility]   Spoke To: Rosas  Outcome: Per Rosas, fax for admissions is   F: [phone]  Agency/Facility Name: [facility]   Outcome: Left vmail for admissions regarding referral     Agency/Facility Name: [facility]   Outcome: Left vmail for admissions regarding referral     Agency/Facility Name: [facility]  Outcome: Left vmail for admissions regarding reception of referral      [phone]  Agency/Facility Name: [facility]   Spoke To: [facility]  Outcome: Per Debora, as she was trying to verify insurance, she was informed she needed to call the number on the back of the Pt's insurance card.  ANTONETTE faxed card from media to this facility to determine if insurance is accepted

## 2021-05-28 NOTE — PROGRESS NOTES
Assumed care at 0700 after report received from night RN. Pt A/Ox3 (reoriented to time, occassionally situation), on 4L oxygen (pt baseline), midline site WNL, pain 7/10 and medicated per MAR. Awaiting SNF acceptance for discharge.

## 2021-05-29 PROCEDURE — 700102 HCHG RX REV CODE 250 W/ 637 OVERRIDE(OP): Performed by: HOSPITALIST

## 2021-05-29 PROCEDURE — 700102 HCHG RX REV CODE 250 W/ 637 OVERRIDE(OP): Performed by: STUDENT IN AN ORGANIZED HEALTH CARE EDUCATION/TRAINING PROGRAM

## 2021-05-29 PROCEDURE — A9270 NON-COVERED ITEM OR SERVICE: HCPCS | Performed by: HOSPITALIST

## 2021-05-29 PROCEDURE — 99232 SBSQ HOSP IP/OBS MODERATE 35: CPT | Performed by: HOSPITALIST

## 2021-05-29 PROCEDURE — A9270 NON-COVERED ITEM OR SERVICE: HCPCS | Performed by: STUDENT IN AN ORGANIZED HEALTH CARE EDUCATION/TRAINING PROGRAM

## 2021-05-29 PROCEDURE — 700102 HCHG RX REV CODE 250 W/ 637 OVERRIDE(OP): Performed by: INTERNAL MEDICINE

## 2021-05-29 PROCEDURE — 700111 HCHG RX REV CODE 636 W/ 250 OVERRIDE (IP): Performed by: INTERNAL MEDICINE

## 2021-05-29 PROCEDURE — 770006 HCHG ROOM/CARE - MED/SURG/GYN SEMI*

## 2021-05-29 PROCEDURE — A9270 NON-COVERED ITEM OR SERVICE: HCPCS | Performed by: INTERNAL MEDICINE

## 2021-05-29 RX ADMIN — MONTELUKAST 10 MG: 10 TABLET, FILM COATED ORAL at 04:11

## 2021-05-29 RX ADMIN — HYDROCORTISONE 10 MG: 20 TABLET ORAL at 04:12

## 2021-05-29 RX ADMIN — CARBOXYMETHYLCELLULOSE SODIUM 1 DROP: 5 SOLUTION/ DROPS OPHTHALMIC at 04:19

## 2021-05-29 RX ADMIN — TAMSULOSIN HYDROCHLORIDE 0.4 MG: 0.4 CAPSULE ORAL at 04:11

## 2021-05-29 RX ADMIN — CARVEDILOL 3.12 MG: 3.12 TABLET, FILM COATED ORAL at 17:02

## 2021-05-29 RX ADMIN — OXYCODONE 5 MG: 5 TABLET ORAL at 03:12

## 2021-05-29 RX ADMIN — LEVOTHYROXINE SODIUM 75 MCG: 0.07 TABLET ORAL at 04:11

## 2021-05-29 RX ADMIN — BUDESONIDE AND FORMOTEROL FUMARATE DIHYDRATE 2 PUFF: 80; 4.5 AEROSOL RESPIRATORY (INHALATION) at 17:03

## 2021-05-29 RX ADMIN — ASPIRIN 81 MG: 81 TABLET, COATED ORAL at 04:11

## 2021-05-29 RX ADMIN — FUROSEMIDE 20 MG: 20 TABLET ORAL at 04:11

## 2021-05-29 RX ADMIN — DIBASIC SODIUM PHOSPHATE, MONOBASIC POTASSIUM PHOSPHATE AND MONOBASIC SODIUM PHOSPHATE 250 MG: 852; 155; 130 TABLET ORAL at 11:29

## 2021-05-29 RX ADMIN — BUDESONIDE AND FORMOTEROL FUMARATE DIHYDRATE 2 PUFF: 80; 4.5 AEROSOL RESPIRATORY (INHALATION) at 04:19

## 2021-05-29 RX ADMIN — ATORVASTATIN CALCIUM 80 MG: 40 TABLET, FILM COATED ORAL at 17:03

## 2021-05-29 RX ADMIN — DIBASIC SODIUM PHOSPHATE, MONOBASIC POTASSIUM PHOSPHATE AND MONOBASIC SODIUM PHOSPHATE 250 MG: 852; 155; 130 TABLET ORAL at 17:03

## 2021-05-29 RX ADMIN — HYDROCORTISONE 10 MG: 20 TABLET ORAL at 17:02

## 2021-05-29 RX ADMIN — OMEPRAZOLE 20 MG: 20 CAPSULE, DELAYED RELEASE ORAL at 17:03

## 2021-05-29 RX ADMIN — OMEPRAZOLE 20 MG: 20 CAPSULE, DELAYED RELEASE ORAL at 04:11

## 2021-05-29 RX ADMIN — DIBASIC SODIUM PHOSPHATE, MONOBASIC POTASSIUM PHOSPHATE AND MONOBASIC SODIUM PHOSPHATE 250 MG: 852; 155; 130 TABLET ORAL at 04:11

## 2021-05-29 RX ADMIN — MAGNESIUM HYDROXIDE 30 ML: 400 SUSPENSION ORAL at 17:03

## 2021-05-29 RX ADMIN — Medication 950 MG: at 04:12

## 2021-05-29 RX ADMIN — TIOTROPIUM BROMIDE INHALATION SPRAY 5 MCG: 3.12 SPRAY, METERED RESPIRATORY (INHALATION) at 04:19

## 2021-05-29 RX ADMIN — ENOXAPARIN SODIUM 40 MG: 40 INJECTION SUBCUTANEOUS at 04:18

## 2021-05-29 RX ADMIN — DOCUSATE SODIUM 50 MG AND SENNOSIDES 8.6 MG 2 TABLET: 8.6; 5 TABLET, FILM COATED ORAL at 17:02

## 2021-05-29 RX ADMIN — CARVEDILOL 3.12 MG: 3.12 TABLET, FILM COATED ORAL at 08:23

## 2021-05-29 RX ADMIN — CARBOXYMETHYLCELLULOSE SODIUM 1 DROP: 5 SOLUTION/ DROPS OPHTHALMIC at 17:03

## 2021-05-29 RX ADMIN — OXYCODONE 5 MG: 5 TABLET ORAL at 17:03

## 2021-05-29 RX ADMIN — CLOPIDOGREL BISULFATE 75 MG: 75 TABLET ORAL at 04:11

## 2021-05-29 RX ADMIN — DOCUSATE SODIUM 50 MG AND SENNOSIDES 8.6 MG 2 TABLET: 8.6; 5 TABLET, FILM COATED ORAL at 04:11

## 2021-05-29 RX ADMIN — LOSARTAN POTASSIUM 25 MG: 25 TABLET, FILM COATED ORAL at 04:11

## 2021-05-29 RX ADMIN — ALPRAZOLAM 0.25 MG: 0.25 TABLET ORAL at 08:23

## 2021-05-29 RX ADMIN — FLUTICASONE PROPIONATE 50 MCG: 50 SPRAY, METERED NASAL at 04:19

## 2021-05-29 ASSESSMENT — ENCOUNTER SYMPTOMS
ABDOMINAL PAIN: 0
COUGH: 0
NERVOUS/ANXIOUS: 1
DIZZINESS: 0
BLURRED VISION: 0
DEPRESSION: 0
SHORTNESS OF BREATH: 0
MYALGIAS: 0
FEVER: 0
ORTHOPNEA: 0
NAUSEA: 0
HEARTBURN: 0
SORE THROAT: 0
VOMITING: 0
FOCAL WEAKNESS: 0

## 2021-05-29 ASSESSMENT — PAIN DESCRIPTION - PAIN TYPE
TYPE: ACUTE PAIN
TYPE: ACUTE PAIN
TYPE: ACUTE PAIN;CHRONIC PAIN
TYPE: ACUTE PAIN
TYPE: ACUTE PAIN

## 2021-05-29 NOTE — DISCHARGE PLANNING
Agency/Facility Name: [facility]   Outcome: LVM for admissions regarding referral      Agency/Facility Name: [facility]   Outcome: LVM for admissions regarding referral      Agency/Facility Name: [facility]  Outcome: LVM

## 2021-05-29 NOTE — CARE PLAN
The patient is Stable - Low risk of patient condition declining or worsening    Shift Goals  Clinical Goals: Discharge to facility  Patient Goals: have a bowel movement  Family Goals: N/A    Progress made toward(s) clinical / shift goals:  Continuing to find patient placement. Patient had a bowel movement this shift.       Problem: Skin Integrity  Goal: Skin integrity is maintained or improved  Outcome: Progressing     Problem: Fall Risk  Goal: Patient will remain free from falls  Outcome: Progressing     Problem: Psychosocial  Goal: Patient's level of anxiety will decrease  Outcome: Progressing

## 2021-05-29 NOTE — PROGRESS NOTES
Rec'd report from day shift RN. Assumed pt care. Assessment completed. AA&OX3, reoriented to date. Reports pain to shoulders and back, readjusted pt in bed, medicated per MAR. No s/s of discomfort or distress. Q2 hour turns enforced. Snacks provided per pt request. Noted generalized edema to LUE, BLE. Condom cath in use. Bed in lowest position, bed locked, bed alarm on for safety, RN and CNA numbers provided, call light within reach.

## 2021-05-29 NOTE — PROGRESS NOTES
Hospital Medicine Daily Progress Note    Date of Service  5/29/2021    Chief Complaint  88 y.o. 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    During the stay in the hospital, patient electrolytes continue to be repleted.  Patient is also noted to have acute on chronic systolic congestive heart failure, started on IV Lasix, I/os, daily weight, fluid restriction.  Regarding CAD, patient is on aspirin, Plavix, Coreg, losartan, and statin. Patient is to follow-up with cardiology as an outpatient.    Patient has history of adrenal insufficiency,on hydrocortisone 10 3 times daily, need to be gradually weaned off.  PT/OT has evaluated, recommended discharging the patient to skilled nursing facility.    During the hospital, his hemoglobin hematocrit, continues to be monitored.  For all other chronic medical condition, patient will resume his home medication. At this time, patient has been medically stable to be discharged home     Interval Problem Update  No acute events overnight, laying in bed, denies any complaint.  Patient states that he is hungry and wanted to have yogurt.  PT/OT has evaluated, recommended discharging the patient discussing facility, case management aware.  Patient has been medically cleared to be discharged    5/26:  --No acute events  overnight, laying in bed, denied any complaint.  Patient stated that he wanted to yogurt.  Medically stable to be discharged to skilled nursing facility    5/27:   No acute events overnight, patient laying in bed, denies any complaint.  He is alert and oriented x4, answering questions appropriately.  His left upper extremity is noted to be swollen, recommended elevation.  -- noted to have low phos     5/28:   No acute events overnight, laying in bed, denies any complaint.  Patient is alert and oriented x4.  --We recommend elevating his LUE  -- Vitals sign stable     5/29:  No acute events overnight, laying in the bed, denies any complaint.  Patient left upper extremity swelling has improved but is still swollen  --Medically cleared to be discharged to skilled nursing facility    Consultants/Specialty  None    Code Status  DNAR, I OK    Disposition  Skilled nursing home  Medically cleared    Review of Systems  Review of Systems   Constitutional: Negative for fever and malaise/fatigue.   HENT: Negative for sore throat.    Eyes: Negative for blurred vision.   Respiratory: Negative for cough and shortness of breath.    Cardiovascular: Positive for leg swelling. Negative for chest pain and orthopnea.   Gastrointestinal: Negative for abdominal pain, heartburn, nausea and vomiting.   Genitourinary: Negative for dysuria.   Musculoskeletal: Negative for myalgias.   Neurological: Negative for dizziness and focal weakness.   Psychiatric/Behavioral: Negative for depression. The patient is nervous/anxious.         Physical Exam  Temp:  [36.3 °C (97.3 °F)-37.1 °C (98.8 °F)] 36.3 °C (97.3 °F)  Pulse:  [69-84] 69  Resp:  [16-18] 18  BP: (121-155)/(63-85) 155/78  SpO2:  [91 %-94 %] 94 %    Physical Exam  Vitals and nursing note reviewed.   Constitutional:       Appearance: He is obese.   HENT:      Head: Normocephalic and atraumatic.   Eyes:      Extraocular Movements: Extraocular movements intact.   Cardiovascular:      Rate and  Rhythm: Normal rate.      Pulses: Normal pulses.   Pulmonary:      Comments: Decreased breath sound at the bases  Abdominal:      General: There is no distension.      Palpations: Abdomen is soft.   Musculoskeletal:      Cervical back: Neck supple.      Right lower leg: Edema present.      Left lower leg: Edema present.      Comments: Swollen Left upper ext  edema   Skin:     General: Skin is warm.   Neurological:      Mental Status: He is alert and oriented to person, place, and time.      Cranial Nerves: No cranial nerve deficit.   Psychiatric:         Mood and Affect: Mood normal.         Fluids    Intake/Output Summary (Last 24 hours) at 5/29/2021 1252  Last data filed at 5/28/2021 1800  Gross per 24 hour   Intake --   Output 500 ml   Net -500 ml       Laboratory  Recent Labs     05/27/21  0243 05/28/21  0155   WBC 10.0 12.0*   RBC 3.42* 3.40*   HEMOGLOBIN 9.8* 9.8*   HEMATOCRIT 32.6* 32.6*   MCV 95.3 95.9   MCH 28.7 28.8   MCHC 30.1* 30.1*   RDW 75.9* 75.3*   PLATELETCT 243 221   MPV 9.7 9.6     Recent Labs     05/27/21  0243 05/28/21  0155   SODIUM 140 139   POTASSIUM 3.7 3.5*   CHLORIDE 97 98   CO2 38* 36*   GLUCOSE 103* 118*   BUN 15 15   CREATININE 0.87 0.71   CALCIUM 7.4* 7.3*                   Imaging  US-EXTREMITY VENOUS UPPER UNILAT LEFT   Final Result      CT-EXTREMITY, UPPER WITH LEFT   Final Result      No acute fracture or dislocation.      Severe osteoarthritis of the elbow joint.      IR-MIDLINE CATHETER INSERTION WO GUIDANCE > AGE 3   Final Result                  Ultrasound-guided midline placement performed by qualified nursing staff    as above.          IR-US GUIDED PIV   Final Result    Ultrasound-guided PERIPHERAL IV INSERTION performed by    qualified nursing staff as above.      US-EXTREMITY VENOUS LOWER UNILAT LEFT   Final Result      DX-CHEST-PORTABLE (1 VIEW)   Final Result      Bibasilar opacities may represent atelectasis or consolidation.      Small left pleural effusion may be  present.      Pulmonary interstitial edema.      Stable prominence of the cardiomediastinal silhouette.      Atherosclerotic calcification is seen.      DX-ELBOW-COMPLETE 3+ LEFT   Final Result      1.  No fracture or dislocation of LEFT elbow.   2.  Severe degenerative change.   3.  Diffuse subcutaneous edema.   4.  Limited by positioning.           Assessment/Plan  Pain and swelling of left upper extremity- (present on admission)  Assessment & Plan  Pt reports trauma and popping sound about 5 days ago, following which he noticed swelling and pain to his LUE  US of the left upper ext didn't show any DVT   Ct reviewed and did show arthritis       Sacral wound- (present on admission)  Assessment & Plan  Wound mx as per wound team recs   --Offloading due to risk of pressure ulcers    Hypophosphatemia  Assessment & Plan  Replete and monitor  At 2.2    Hypomagnesemia  Assessment & Plan  Replete and monitor  At 1.6    Hypokalemia- (present on admission)  Assessment & Plan  Replete and monitor  At 3.5, provide  40 Meq    Anasarca- (present on admission)  Assessment & Plan  Continue p.o. Lasix    Debility- (present on admission)  Assessment & Plan  PT OT evaluation, recs post-acute    Coronary artery disease- (present on admission)  Assessment & Plan  S/p PCI with stent placement 4 weeks ago  Aspirin, Plavix, Coreg, atorvastatin, losartan, Lasix.    Adrenal insufficiency (HCC)- (present on admission)  Assessment & Plan   Will decrease hydrocortisone 10 mg po bid  Monitor Bp    Acute on chronic systolic congestive heart failure (HCC)- (present on admission)  Assessment & Plan  Patient on 5L oxygen via nc at baseline  Echo showed EF of 45% on 5/12, I/os , daily weight and fluid restriction to 1.5 L  Continue BB , losartan and  Lasix   -- need follow up with cards as an op    Chronic respiratory failure (HCC)- (present on admission)  Assessment & Plan  Continue supplemental oxygen, on 4 L    BPH (benign prostatic  hypertrophy)- (present on admission)  Assessment & Plan  continiue Flomax    History of pulmonary embolism- (present on admission)  Assessment & Plan  Warfarin was discontinued during previous recent hospitalization due to blood loss anemia, patient knows   monitor hemoglobin hematocrit, so far stabel  Transfuse if less than 7        COPD (chronic obstructive pulmonary disease) (HCC)- (present on admission)  Assessment & Plan  Continue RT protocol, breathing treatment   --Titrate O2 as needed    -- ON 4 L of O2  And denied any complain of sob    Essential hypertension, benign- (present on admission)  Assessment & Plan  Blood pressure noted to be well controlled, monitor  Continue coreg and losartan       VTE prophylaxis: Lovenox    I have performed a physical exam and reviewed and updated ROS and Plan today (5/29/2021). In review of yesterday's note (5/28/2021), there are no changes except as documented above.

## 2021-05-29 NOTE — CARE PLAN
The patient is Stable - Low risk of patient condition declining or worsening    Shift Goals  Clinical Goals: Discharge  Patient Goals: have a BM  Family Goals: N/A    Progress made toward(s) clinical / shift goals:  Bowel protocol started    Patient is not progressing towards the following goals: No BM after Miralax was given on day shift, will continue bowel protocol for next step.   Pt is pending acceptance to a facility.

## 2021-05-30 LAB
ANION GAP SERPL CALC-SCNC: 7 MMOL/L (ref 7–16)
BUN SERPL-MCNC: 12 MG/DL (ref 8–22)
CALCIUM SERPL-MCNC: 7.3 MG/DL (ref 8.5–10.5)
CHLORIDE SERPL-SCNC: 96 MMOL/L (ref 96–112)
CO2 SERPL-SCNC: 38 MMOL/L (ref 20–33)
CREAT SERPL-MCNC: 0.68 MG/DL (ref 0.5–1.4)
GLUCOSE SERPL-MCNC: 89 MG/DL (ref 65–99)
MAGNESIUM SERPL-MCNC: 1.7 MG/DL (ref 1.5–2.5)
PHOSPHATE SERPL-MCNC: 2.5 MG/DL (ref 2.5–4.5)
POTASSIUM SERPL-SCNC: 3.2 MMOL/L (ref 3.6–5.5)
SODIUM SERPL-SCNC: 141 MMOL/L (ref 135–145)

## 2021-05-30 PROCEDURE — 700102 HCHG RX REV CODE 250 W/ 637 OVERRIDE(OP): Performed by: HOSPITALIST

## 2021-05-30 PROCEDURE — 700111 HCHG RX REV CODE 636 W/ 250 OVERRIDE (IP): Performed by: INTERNAL MEDICINE

## 2021-05-30 PROCEDURE — A9270 NON-COVERED ITEM OR SERVICE: HCPCS | Performed by: HOSPITALIST

## 2021-05-30 PROCEDURE — 700102 HCHG RX REV CODE 250 W/ 637 OVERRIDE(OP): Performed by: INTERNAL MEDICINE

## 2021-05-30 PROCEDURE — A9270 NON-COVERED ITEM OR SERVICE: HCPCS | Performed by: INTERNAL MEDICINE

## 2021-05-30 PROCEDURE — 84100 ASSAY OF PHOSPHORUS: CPT

## 2021-05-30 PROCEDURE — 770006 HCHG ROOM/CARE - MED/SURG/GYN SEMI*

## 2021-05-30 PROCEDURE — 700102 HCHG RX REV CODE 250 W/ 637 OVERRIDE(OP): Performed by: STUDENT IN AN ORGANIZED HEALTH CARE EDUCATION/TRAINING PROGRAM

## 2021-05-30 PROCEDURE — 80048 BASIC METABOLIC PNL TOTAL CA: CPT

## 2021-05-30 PROCEDURE — 83735 ASSAY OF MAGNESIUM: CPT

## 2021-05-30 PROCEDURE — A9270 NON-COVERED ITEM OR SERVICE: HCPCS | Performed by: STUDENT IN AN ORGANIZED HEALTH CARE EDUCATION/TRAINING PROGRAM

## 2021-05-30 PROCEDURE — 99232 SBSQ HOSP IP/OBS MODERATE 35: CPT | Performed by: HOSPITALIST

## 2021-05-30 RX ORDER — POTASSIUM CHLORIDE 20 MEQ/1
40 TABLET, EXTENDED RELEASE ORAL ONCE
Status: COMPLETED | OUTPATIENT
Start: 2021-05-30 | End: 2021-05-30

## 2021-05-30 RX ADMIN — Medication 950 MG: at 04:50

## 2021-05-30 RX ADMIN — BUDESONIDE AND FORMOTEROL FUMARATE DIHYDRATE 2 PUFF: 80; 4.5 AEROSOL RESPIRATORY (INHALATION) at 17:07

## 2021-05-30 RX ADMIN — CARBOXYMETHYLCELLULOSE SODIUM 1 DROP: 5 SOLUTION/ DROPS OPHTHALMIC at 04:50

## 2021-05-30 RX ADMIN — CLOPIDOGREL BISULFATE 75 MG: 75 TABLET ORAL at 04:48

## 2021-05-30 RX ADMIN — FUROSEMIDE 20 MG: 20 TABLET ORAL at 04:48

## 2021-05-30 RX ADMIN — POTASSIUM CHLORIDE 40 MEQ: 1500 TABLET, EXTENDED RELEASE ORAL at 17:04

## 2021-05-30 RX ADMIN — MONTELUKAST 10 MG: 10 TABLET, FILM COATED ORAL at 04:48

## 2021-05-30 RX ADMIN — DOCUSATE SODIUM 50 MG AND SENNOSIDES 8.6 MG 2 TABLET: 8.6; 5 TABLET, FILM COATED ORAL at 17:04

## 2021-05-30 RX ADMIN — LEVOTHYROXINE SODIUM 75 MCG: 0.07 TABLET ORAL at 04:48

## 2021-05-30 RX ADMIN — TAMSULOSIN HYDROCHLORIDE 0.4 MG: 0.4 CAPSULE ORAL at 04:48

## 2021-05-30 RX ADMIN — HYDROCORTISONE 10 MG: 20 TABLET ORAL at 17:04

## 2021-05-30 RX ADMIN — OMEPRAZOLE 20 MG: 20 CAPSULE, DELAYED RELEASE ORAL at 17:03

## 2021-05-30 RX ADMIN — DIBASIC SODIUM PHOSPHATE, MONOBASIC POTASSIUM PHOSPHATE AND MONOBASIC SODIUM PHOSPHATE 250 MG: 852; 155; 130 TABLET ORAL at 17:04

## 2021-05-30 RX ADMIN — ATORVASTATIN CALCIUM 80 MG: 40 TABLET, FILM COATED ORAL at 17:03

## 2021-05-30 RX ADMIN — CARVEDILOL 3.12 MG: 3.12 TABLET, FILM COATED ORAL at 08:41

## 2021-05-30 RX ADMIN — HYDROCORTISONE 10 MG: 20 TABLET ORAL at 04:47

## 2021-05-30 RX ADMIN — OXYCODONE 5 MG: 5 TABLET ORAL at 17:03

## 2021-05-30 RX ADMIN — OXYCODONE 5 MG: 5 TABLET ORAL at 20:37

## 2021-05-30 RX ADMIN — ALPRAZOLAM 0.25 MG: 0.25 TABLET ORAL at 11:57

## 2021-05-30 RX ADMIN — CARVEDILOL 3.12 MG: 3.12 TABLET, FILM COATED ORAL at 17:03

## 2021-05-30 RX ADMIN — TIOTROPIUM BROMIDE INHALATION SPRAY 5 MCG: 3.12 SPRAY, METERED RESPIRATORY (INHALATION) at 04:50

## 2021-05-30 RX ADMIN — BUDESONIDE AND FORMOTEROL FUMARATE DIHYDRATE 2 PUFF: 80; 4.5 AEROSOL RESPIRATORY (INHALATION) at 04:50

## 2021-05-30 RX ADMIN — ALBUTEROL SULFATE 2 PUFF: 90 AEROSOL, METERED RESPIRATORY (INHALATION) at 05:47

## 2021-05-30 RX ADMIN — OMEPRAZOLE 20 MG: 20 CAPSULE, DELAYED RELEASE ORAL at 04:47

## 2021-05-30 RX ADMIN — ALPRAZOLAM 0.25 MG: 0.25 TABLET ORAL at 00:43

## 2021-05-30 RX ADMIN — CARBOXYMETHYLCELLULOSE SODIUM 1 DROP: 5 SOLUTION/ DROPS OPHTHALMIC at 17:07

## 2021-05-30 RX ADMIN — LOSARTAN POTASSIUM 25 MG: 25 TABLET, FILM COATED ORAL at 04:48

## 2021-05-30 RX ADMIN — ASPIRIN 81 MG: 81 TABLET, COATED ORAL at 04:48

## 2021-05-30 RX ADMIN — DIBASIC SODIUM PHOSPHATE, MONOBASIC POTASSIUM PHOSPHATE AND MONOBASIC SODIUM PHOSPHATE 250 MG: 852; 155; 130 TABLET ORAL at 11:58

## 2021-05-30 RX ADMIN — FLUTICASONE PROPIONATE 50 MCG: 50 SPRAY, METERED NASAL at 04:50

## 2021-05-30 RX ADMIN — ENOXAPARIN SODIUM 40 MG: 40 INJECTION SUBCUTANEOUS at 04:48

## 2021-05-30 RX ADMIN — ALPRAZOLAM 0.25 MG: 0.25 TABLET ORAL at 20:37

## 2021-05-30 RX ADMIN — POTASSIUM CHLORIDE 40 MEQ: 20 TABLET, EXTENDED RELEASE ORAL at 17:00

## 2021-05-30 RX ADMIN — OXYCODONE 5 MG: 5 TABLET ORAL at 06:19

## 2021-05-30 RX ADMIN — DIBASIC SODIUM PHOSPHATE, MONOBASIC POTASSIUM PHOSPHATE AND MONOBASIC SODIUM PHOSPHATE 250 MG: 852; 155; 130 TABLET ORAL at 04:48

## 2021-05-30 ASSESSMENT — ENCOUNTER SYMPTOMS
HEMOPTYSIS: 0
FOCAL WEAKNESS: 0
FEVER: 0
DEPRESSION: 0
VOMITING: 0
SPUTUM PRODUCTION: 0
NAUSEA: 0
HEARTBURN: 0
DIZZINESS: 0
PND: 0
BACK PAIN: 0
COUGH: 0
NERVOUS/ANXIOUS: 1
ABDOMINAL PAIN: 0
PHOTOPHOBIA: 0
SHORTNESS OF BREATH: 0
SORE THROAT: 0
ORTHOPNEA: 0

## 2021-05-30 ASSESSMENT — PAIN DESCRIPTION - PAIN TYPE
TYPE: ACUTE PAIN

## 2021-05-30 NOTE — PROGRESS NOTES
Hospital Medicine Daily Progress Note    Date of Service  5/30/2021    Chief Complaint  88 y.o. 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    During the stay in the hospital, patient electrolytes continue to be repleted.  Patient is also noted to have acute on chronic systolic congestive heart failure, started on IV Lasix, I/os, daily weight, fluid restriction.  Regarding CAD, patient is on aspirin, Plavix, Coreg, losartan, and statin. Patient is to follow-up with cardiology as an outpatient.    Patient has history of adrenal insufficiency,on hydrocortisone 10 3 times daily, need to be gradually weaned off.  PT/OT has evaluated, recommended discharging the patient to skilled nursing facility.    During the hospital, his hemoglobin hematocrit, continues to be monitored.  For all other chronic medical condition, patient will resume his home medication. At this time, patient has been medically stable to be discharged home     Interval Problem Update  No acute events overnight, laying in bed, denies any complaint.  Patient states that he is hungry and wanted to have yogurt.  PT/OT has evaluated, recommended discharging the patient discussing facility, case management aware.  Patient has been medically cleared to be discharged    5/26:  --No acute events  overnight, laying in bed, denied any complaint.  Patient stated that he wanted to yogurt.  Medically stable to be discharged to skilled nursing facility    5/27:   No acute events overnight, patient laying in bed, denies any complaint.  He is alert and oriented x4, answering questions appropriately.  His left upper extremity is noted to be swollen, recommended elevation.  -- noted to have low phos     5/28:   No acute events overnight, laying in bed, denies any complaint.  Patient is alert and oriented x4.  --We recommend elevating his LUE  -- Vitals sign stable     5/29:  No acute events overnight, laying in the bed, denies any complaint.  Patient left upper extremity swelling has improved but is still swollen  --Medically cleared to be discharged to skilled nursing facility    5/30:  No acute events overnight, laying in bed, denies any complaint.  Patient has been medically stable to discharge.  Case management working on dispo plan   --Vital signs stable, his left upper extremity swelling has decreased    Consultants/Specialty  None    Code Status  DNAR, I OK    Disposition  Skilled nursing home  Medically cleared    Review of Systems  Review of Systems   Constitutional: Negative for fever and malaise/fatigue.   HENT: Negative for sore throat.    Eyes: Negative for photophobia.   Respiratory: Negative for cough, hemoptysis, sputum production and shortness of breath.    Cardiovascular: Positive for leg swelling. Negative for chest pain, orthopnea and PND.   Gastrointestinal: Negative for abdominal pain, heartburn, nausea and vomiting.   Genitourinary: Negative for dysuria.   Musculoskeletal: Negative for back pain.   Neurological: Negative for dizziness and focal weakness.   Psychiatric/Behavioral: Negative for depression. The patient is nervous/anxious.         Physical Exam  Temp:  [36.2 °C (97.2 °F)-36.3 °C (97.4 °F)] 36.2 °C (97.2 °F)  Pulse:  [66-92] 69  Resp:  [16-18] 18  BP: (121-156)/(70-96) 133/70  SpO2:   [93 %-96 %] 94 %    Physical Exam  Vitals and nursing note reviewed.   Constitutional:       Appearance: He is obese.   HENT:      Head: Normocephalic and atraumatic.   Eyes:      Extraocular Movements: Extraocular movements intact.      Pupils: Pupils are equal, round, and reactive to light.   Cardiovascular:      Rate and Rhythm: Normal rate.      Pulses: Normal pulses.   Pulmonary:      Comments: Decreased breath sound at the bases  Abdominal:      General: There is no distension.      Palpations: Abdomen is soft.   Musculoskeletal:      Cervical back: Neck supple.      Right lower leg: Edema present.      Left lower leg: Edema present.      Comments: Swollen Left upper ext  edema   Skin:     General: Skin is warm.   Neurological:      Mental Status: He is alert and oriented to person, place, and time.      Cranial Nerves: No cranial nerve deficit.   Psychiatric:         Mood and Affect: Mood normal.         Fluids    Intake/Output Summary (Last 24 hours) at 5/30/2021 1343  Last data filed at 5/30/2021 0855  Gross per 24 hour   Intake 520 ml   Output 1700 ml   Net -1180 ml       Laboratory  Recent Labs     05/28/21  0155   WBC 12.0*   RBC 3.40*   HEMOGLOBIN 9.8*   HEMATOCRIT 32.6*   MCV 95.9   MCH 28.8   MCHC 30.1*   RDW 75.3*   PLATELETCT 221   MPV 9.6     Recent Labs     05/28/21  0155 05/30/21  0440   SODIUM 139 141   POTASSIUM 3.5* 3.2*   CHLORIDE 98 96   CO2 36* 38*   GLUCOSE 118* 89   BUN 15 12   CREATININE 0.71 0.68   CALCIUM 7.3* 7.3*                   Imaging  US-EXTREMITY VENOUS UPPER UNILAT LEFT   Final Result      CT-EXTREMITY, UPPER WITH LEFT   Final Result      No acute fracture or dislocation.      Severe osteoarthritis of the elbow joint.      IR-MIDLINE CATHETER INSERTION WO GUIDANCE > AGE 3   Final Result                  Ultrasound-guided midline placement performed by qualified nursing staff    as above.          IR-US GUIDED PIV   Final Result    Ultrasound-guided PERIPHERAL IV INSERTION  performed by    qualified nursing staff as above.      US-EXTREMITY VENOUS LOWER UNILAT LEFT   Final Result      DX-CHEST-PORTABLE (1 VIEW)   Final Result      Bibasilar opacities may represent atelectasis or consolidation.      Small left pleural effusion may be present.      Pulmonary interstitial edema.      Stable prominence of the cardiomediastinal silhouette.      Atherosclerotic calcification is seen.      DX-ELBOW-COMPLETE 3+ LEFT   Final Result      1.  No fracture or dislocation of LEFT elbow.   2.  Severe degenerative change.   3.  Diffuse subcutaneous edema.   4.  Limited by positioning.           Assessment/Plan  Pain and swelling of left upper extremity- (present on admission)  Assessment & Plan  Pt reports trauma and popping sound about 5 days ago, following which he noticed swelling and pain to his LUE  US of the left upper ext didn't show any DVT   Ct reviewed and did show arthritis       Sacral wound- (present on admission)  Assessment & Plan  Wound mx as per wound team recs   --Offloading due to risk of pressure ulcers    Hypophosphatemia  Assessment & Plan  Replete and monitor  2.5      Hypomagnesemia  Assessment & Plan  Replete and monitor  At 1.7    Hypokalemia- (present on admission)  Assessment & Plan  Replete and monitor    Anasarca- (present on admission)  Assessment & Plan  Continue p.o. Lasix    Debility- (present on admission)  Assessment & Plan  PT OT evaluation, recs post-acute  Medically cleared to be discharged to skilled nursing     Coronary artery disease- (present on admission)  Assessment & Plan  S/p PCI with stent placement 4 weeks ago  Aspirin, Plavix, Coreg, atorvastatin, losartan, Lasix.    Adrenal insufficiency (HCC)- (present on admission)  Assessment & Plan   Will decrease hydrocortisone 10 mg po bid  Monitor Bp    Acute on chronic systolic congestive heart failure (HCC)- (present on admission)  Assessment & Plan  Patient on 5L oxygen via nc at baseline  Echo showed EF of  45% on 5/12, I/os , daily weight and fluid restriction to 1.5 L  Continue BB , losartan and  Lasix   -- need follow up with cards as an op    Chronic respiratory failure (HCC)- (present on admission)  Assessment & Plan  Continue supplemental oxygen, on 4 to 5 L    BPH (benign prostatic hypertrophy)- (present on admission)  Assessment & Plan  continiue Flomax    History of pulmonary embolism- (present on admission)  Assessment & Plan  Warfarin was discontinued during previous recent hospitalization due to blood loss anemia, patient knows   monitor hemoglobin hematocrit, so far stabel  Transfuse if less than 7        COPD (chronic obstructive pulmonary disease) (HCC)- (present on admission)  Assessment & Plan  Continue RT protocol, breathing treatment   --Titrate O2 as needed    -- ON 4 L of O2  And denied any complain of sob    Essential hypertension, benign- (present on admission)  Assessment & Plan  Blood pressure noted to be well controlled, monitor  Continue coreg and losartan       VTE prophylaxis: Lovenox    I have performed a physical exam and reviewed and updated ROS and Plan today (5/30/2021). In review of yesterday's note (5/29/2021), there are no changes except as documented above.

## 2021-05-30 NOTE — CARE PLAN
The patient is Stable - Low risk of patient condition declining or worsening    Shift Goals  Clinical Goals: decrease anxiety   Patient Goals: have a bowel movement  Family Goals: N/A    Progress made toward(s) clinical / shift goals:  Patient expressed concerns, this RN explained plan of care including delay due to holiday weekend. Verbalized understanding. Patient had bowel movement this shift.       Problem: Pain - Standard  Goal: Alleviation of pain or a reduction in pain to the patient’s comfort goal  Outcome: Progressing  Note: Repositioning, medication PRN     Problem: Skin Integrity  Goal: Skin integrity is maintained or improved  Outcome: Progressing     Problem: Psychosocial  Goal: Patient's level of anxiety will decrease  Outcome: Progressing

## 2021-05-30 NOTE — CARE PLAN
Problem: Psychosocial  Goal: Patient's level of anxiety will decrease  Outcome: Progressing     The patient is Stable - Low risk of patient condition declining or worsening    Shift Goals  Clinical Goals: decrease anxiety   Patient Goals: have a bowel movement  Family Goals: N/A    Progress made toward(s) clinical / shift goals: hourly rounding on patient    Patient is not progressing towards the following goals:

## 2021-05-30 NOTE — PROGRESS NOTES
Report received from ОЛЬГА Murphy at 0700. Assumed care, assessment complete. Pt is A & O x 3.  Pt denies having any pain at this time. Fall precautions and appropriate signs in place. Pt oriented to unit routine, call light/phone system and CNA and RN extension number provided. Pt educated regarding fall precautions. Bed locked in lowest position. Pt denies any additional needs at this time. Call light within reach.

## 2021-05-30 NOTE — PROGRESS NOTES
Received report from Nguyen ROLON. Assumed pt care at 1900. Pt had small brown BM during change of shift. No complaints of pain. Bed is locked, lowered, and call light is in reach.

## 2021-05-31 LAB
ALBUMIN SERPL BCP-MCNC: 2.8 G/DL (ref 3.2–4.9)
BASOPHILS # BLD AUTO: 0.7 % (ref 0–1.8)
BASOPHILS # BLD: 0.07 K/UL (ref 0–0.12)
BUN SERPL-MCNC: 12 MG/DL (ref 8–22)
CALCIUM SERPL-MCNC: 7.5 MG/DL (ref 8.5–10.5)
CHLORIDE SERPL-SCNC: 97 MMOL/L (ref 96–112)
CO2 SERPL-SCNC: 35 MMOL/L (ref 20–33)
CREAT SERPL-MCNC: 0.72 MG/DL (ref 0.5–1.4)
EOSINOPHIL # BLD AUTO: 0.34 K/UL (ref 0–0.51)
EOSINOPHIL NFR BLD: 3.2 % (ref 0–6.9)
ERYTHROCYTE [DISTWIDTH] IN BLOOD BY AUTOMATED COUNT: 76.4 FL (ref 35.9–50)
GLUCOSE SERPL-MCNC: 92 MG/DL (ref 65–99)
HCT VFR BLD AUTO: 34.7 % (ref 42–52)
HGB BLD-MCNC: 10.1 G/DL (ref 14–18)
IMM GRANULOCYTES # BLD AUTO: 0.07 K/UL (ref 0–0.11)
IMM GRANULOCYTES NFR BLD AUTO: 0.7 % (ref 0–0.9)
LYMPHOCYTES # BLD AUTO: 0.74 K/UL (ref 1–4.8)
LYMPHOCYTES NFR BLD: 6.9 % (ref 22–41)
MCH RBC QN AUTO: 28.5 PG (ref 27–33)
MCHC RBC AUTO-ENTMCNC: 29.1 G/DL (ref 33.7–35.3)
MCV RBC AUTO: 97.7 FL (ref 81.4–97.8)
MONOCYTES # BLD AUTO: 0.75 K/UL (ref 0–0.85)
MONOCYTES NFR BLD AUTO: 7 % (ref 0–13.4)
NEUTROPHILS # BLD AUTO: 8.76 K/UL (ref 1.82–7.42)
NEUTROPHILS NFR BLD: 81.5 % (ref 44–72)
NRBC # BLD AUTO: 0 K/UL
NRBC BLD-RTO: 0 /100 WBC
PHOSPHATE SERPL-MCNC: 2.9 MG/DL (ref 2.5–4.5)
PLATELET # BLD AUTO: 165 K/UL (ref 164–446)
PMV BLD AUTO: 10 FL (ref 9–12.9)
POTASSIUM SERPL-SCNC: 4.1 MMOL/L (ref 3.6–5.5)
RBC # BLD AUTO: 3.55 M/UL (ref 4.7–6.1)
SODIUM SERPL-SCNC: 138 MMOL/L (ref 135–145)
WBC # BLD AUTO: 10.7 K/UL (ref 4.8–10.8)

## 2021-05-31 PROCEDURE — A9270 NON-COVERED ITEM OR SERVICE: HCPCS | Performed by: INTERNAL MEDICINE

## 2021-05-31 PROCEDURE — 700102 HCHG RX REV CODE 250 W/ 637 OVERRIDE(OP): Performed by: STUDENT IN AN ORGANIZED HEALTH CARE EDUCATION/TRAINING PROGRAM

## 2021-05-31 PROCEDURE — 80069 RENAL FUNCTION PANEL: CPT

## 2021-05-31 PROCEDURE — 700111 HCHG RX REV CODE 636 W/ 250 OVERRIDE (IP): Performed by: INTERNAL MEDICINE

## 2021-05-31 PROCEDURE — 700102 HCHG RX REV CODE 250 W/ 637 OVERRIDE(OP): Performed by: INTERNAL MEDICINE

## 2021-05-31 PROCEDURE — A9270 NON-COVERED ITEM OR SERVICE: HCPCS | Performed by: HOSPITALIST

## 2021-05-31 PROCEDURE — 97530 THERAPEUTIC ACTIVITIES: CPT

## 2021-05-31 PROCEDURE — 85025 COMPLETE CBC W/AUTO DIFF WBC: CPT

## 2021-05-31 PROCEDURE — 700111 HCHG RX REV CODE 636 W/ 250 OVERRIDE (IP): Performed by: HOSPITALIST

## 2021-05-31 PROCEDURE — A9270 NON-COVERED ITEM OR SERVICE: HCPCS | Performed by: STUDENT IN AN ORGANIZED HEALTH CARE EDUCATION/TRAINING PROGRAM

## 2021-05-31 PROCEDURE — 700102 HCHG RX REV CODE 250 W/ 637 OVERRIDE(OP): Performed by: HOSPITALIST

## 2021-05-31 PROCEDURE — 770006 HCHG ROOM/CARE - MED/SURG/GYN SEMI*

## 2021-05-31 PROCEDURE — 99232 SBSQ HOSP IP/OBS MODERATE 35: CPT | Performed by: HOSPITALIST

## 2021-05-31 RX ORDER — FUROSEMIDE 10 MG/ML
20 INJECTION INTRAMUSCULAR; INTRAVENOUS ONCE
Status: COMPLETED | OUTPATIENT
Start: 2021-05-31 | End: 2021-05-31

## 2021-05-31 RX ADMIN — ENOXAPARIN SODIUM 40 MG: 40 INJECTION SUBCUTANEOUS at 04:37

## 2021-05-31 RX ADMIN — CARBOXYMETHYLCELLULOSE SODIUM 1 DROP: 5 SOLUTION/ DROPS OPHTHALMIC at 04:36

## 2021-05-31 RX ADMIN — ASPIRIN 81 MG: 81 TABLET, COATED ORAL at 04:38

## 2021-05-31 RX ADMIN — DOCUSATE SODIUM 50 MG AND SENNOSIDES 8.6 MG 2 TABLET: 8.6; 5 TABLET, FILM COATED ORAL at 17:04

## 2021-05-31 RX ADMIN — LOSARTAN POTASSIUM 25 MG: 25 TABLET, FILM COATED ORAL at 04:38

## 2021-05-31 RX ADMIN — ALPRAZOLAM 0.25 MG: 0.25 TABLET ORAL at 22:55

## 2021-05-31 RX ADMIN — OMEPRAZOLE 20 MG: 20 CAPSULE, DELAYED RELEASE ORAL at 04:37

## 2021-05-31 RX ADMIN — ATORVASTATIN CALCIUM 80 MG: 40 TABLET, FILM COATED ORAL at 17:05

## 2021-05-31 RX ADMIN — FUROSEMIDE 20 MG: 20 TABLET ORAL at 04:38

## 2021-05-31 RX ADMIN — LEVOTHYROXINE SODIUM 75 MCG: 0.07 TABLET ORAL at 04:38

## 2021-05-31 RX ADMIN — DIBASIC SODIUM PHOSPHATE, MONOBASIC POTASSIUM PHOSPHATE AND MONOBASIC SODIUM PHOSPHATE 250 MG: 852; 155; 130 TABLET ORAL at 17:05

## 2021-05-31 RX ADMIN — DIBASIC SODIUM PHOSPHATE, MONOBASIC POTASSIUM PHOSPHATE AND MONOBASIC SODIUM PHOSPHATE 250 MG: 852; 155; 130 TABLET ORAL at 11:19

## 2021-05-31 RX ADMIN — BUDESONIDE AND FORMOTEROL FUMARATE DIHYDRATE 2 PUFF: 80; 4.5 AEROSOL RESPIRATORY (INHALATION) at 17:07

## 2021-05-31 RX ADMIN — FLUTICASONE PROPIONATE 50 MCG: 50 SPRAY, METERED NASAL at 04:36

## 2021-05-31 RX ADMIN — CARVEDILOL 3.12 MG: 3.12 TABLET, FILM COATED ORAL at 07:59

## 2021-05-31 RX ADMIN — OXYCODONE 5 MG: 5 TABLET ORAL at 17:03

## 2021-05-31 RX ADMIN — BUDESONIDE AND FORMOTEROL FUMARATE DIHYDRATE 2 PUFF: 80; 4.5 AEROSOL RESPIRATORY (INHALATION) at 04:36

## 2021-05-31 RX ADMIN — OXYCODONE 5 MG: 5 TABLET ORAL at 05:44

## 2021-05-31 RX ADMIN — DIBASIC SODIUM PHOSPHATE, MONOBASIC POTASSIUM PHOSPHATE AND MONOBASIC SODIUM PHOSPHATE 250 MG: 852; 155; 130 TABLET ORAL at 04:38

## 2021-05-31 RX ADMIN — HYDROCORTISONE 10 MG: 20 TABLET ORAL at 04:37

## 2021-05-31 RX ADMIN — OXYCODONE 5 MG: 5 TABLET ORAL at 21:12

## 2021-05-31 RX ADMIN — TAMSULOSIN HYDROCHLORIDE 0.4 MG: 0.4 CAPSULE ORAL at 04:38

## 2021-05-31 RX ADMIN — ALPRAZOLAM 0.25 MG: 0.25 TABLET ORAL at 17:03

## 2021-05-31 RX ADMIN — Medication 950 MG: at 04:37

## 2021-05-31 RX ADMIN — FUROSEMIDE 20 MG: 10 INJECTION, SOLUTION INTRAMUSCULAR; INTRAVENOUS at 08:45

## 2021-05-31 RX ADMIN — MONTELUKAST 10 MG: 10 TABLET, FILM COATED ORAL at 04:38

## 2021-05-31 RX ADMIN — TIOTROPIUM BROMIDE INHALATION SPRAY 5 MCG: 3.12 SPRAY, METERED RESPIRATORY (INHALATION) at 04:36

## 2021-05-31 RX ADMIN — CARVEDILOL 3.12 MG: 3.12 TABLET, FILM COATED ORAL at 17:03

## 2021-05-31 RX ADMIN — ALBUTEROL SULFATE 2 PUFF: 90 AEROSOL, METERED RESPIRATORY (INHALATION) at 04:56

## 2021-05-31 RX ADMIN — OMEPRAZOLE 20 MG: 20 CAPSULE, DELAYED RELEASE ORAL at 17:06

## 2021-05-31 RX ADMIN — OXYCODONE 5 MG: 5 TABLET ORAL at 10:04

## 2021-05-31 RX ADMIN — ALPRAZOLAM 0.25 MG: 0.25 TABLET ORAL at 10:04

## 2021-05-31 RX ADMIN — HYDROCORTISONE 10 MG: 20 TABLET ORAL at 17:03

## 2021-05-31 RX ADMIN — ALPRAZOLAM 0.25 MG: 0.25 TABLET ORAL at 06:30

## 2021-05-31 RX ADMIN — CARBOXYMETHYLCELLULOSE SODIUM 1 DROP: 5 SOLUTION/ DROPS OPHTHALMIC at 17:07

## 2021-05-31 RX ADMIN — CLOPIDOGREL BISULFATE 75 MG: 75 TABLET ORAL at 04:38

## 2021-05-31 ASSESSMENT — ENCOUNTER SYMPTOMS
VOMITING: 0
HEADACHES: 0
HEARTBURN: 0
SHORTNESS OF BREATH: 0
PHOTOPHOBIA: 0
ORTHOPNEA: 0
NERVOUS/ANXIOUS: 1
FOCAL WEAKNESS: 0
ABDOMINAL PAIN: 0
PALPITATIONS: 0
NAUSEA: 0
BACK PAIN: 0
SORE THROAT: 0
HEMOPTYSIS: 0
DEPRESSION: 0
COUGH: 0
CHILLS: 0

## 2021-05-31 ASSESSMENT — PAIN DESCRIPTION - PAIN TYPE
TYPE: ACUTE PAIN
TYPE: ACUTE PAIN;CHRONIC PAIN
TYPE: ACUTE PAIN;CHRONIC PAIN
TYPE: ACUTE PAIN
TYPE: ACUTE PAIN;CHRONIC PAIN
TYPE: ACUTE PAIN

## 2021-05-31 ASSESSMENT — COGNITIVE AND FUNCTIONAL STATUS - GENERAL
TURNING FROM BACK TO SIDE WHILE IN FLAT BAD: UNABLE
WALKING IN HOSPITAL ROOM: TOTAL
CLIMB 3 TO 5 STEPS WITH RAILING: TOTAL
MOBILITY SCORE: 6
STANDING UP FROM CHAIR USING ARMS: TOTAL
SUGGESTED CMS G CODE MODIFIER MOBILITY: CN
MOVING FROM LYING ON BACK TO SITTING ON SIDE OF FLAT BED: UNABLE
MOVING TO AND FROM BED TO CHAIR: UNABLE

## 2021-05-31 ASSESSMENT — GAIT ASSESSMENTS: GAIT LEVEL OF ASSIST: UNABLE TO PARTICIPATE

## 2021-05-31 NOTE — PROGRESS NOTES
Received report from Nguyen ROLON. Assumed pt care at 1900. Patient has no complaint of pain at this time. Bed is locked, lowered, and call light is in reach.

## 2021-05-31 NOTE — CARE PLAN
Problem: Psychosocial  Goal: Patient's level of anxiety will decrease  Outcome: Not Progressing  Flowsheets (Taken 5/31/2021 0818)  Decrease Anxiety Level:   Collaborated with patient to identify and develop coping strategies   Pharmacologic management per MD order  Note: Pt has prn xanax for anxiety, pt often very anxious. Prn medication given this AM and patient seems more relaxed. Continue admin of prn meds as needed to ease anxiety     Problem: Skin Integrity  Goal: Skin integrity is maintained or improved  Outcome: Progressing  Note: Pt skin is improving with frequent turning, cleaning and barrier cream. Continue interventions   The patient is Stable - Low risk of patient condition declining or worsening    Shift Goals  Clinical Goals: lower anxiety  Patient Goals: discharge to rehab  Family Goals: N/A    Progress made toward(s) clinical / shift goals:  pt slept following anti-anxiety medication    Patient is not progressing towards the following goals:      Problem: Psychosocial  Goal: Patient's level of anxiety will decrease  Outcome: Not Progressing  Flowsheets (Taken 5/31/2021 0818)  Decrease Anxiety Level:   Collaborated with patient to identify and develop coping strategies   Pharmacologic management per MD order  Note: Pt has prn xanax for anxiety, pt often very anxious. Prn medication given this AM and patient seems more relaxed. Continue admin of prn meds as needed to ease anxiety

## 2021-05-31 NOTE — CARE PLAN
Problem: Psychosocial  Goal: Patient's level of anxiety will decrease  Outcome: Progressing     The patient is Stable - Low risk of patient condition declining or worsening    Shift Goals  Clinical Goals: decrease anxiety  Patient Goals: have a bowel movement  Family Goals: N/A    Progress made toward(s) clinical / shift goals:  patient educated on hourly rounding     Patient is not progressing towards the following goals:

## 2021-05-31 NOTE — PROGRESS NOTES
Hospital Medicine Daily Progress Note    Date of Service  5/31/2021    Chief Complaint  88 y.o. male admitted 5/20/2021 with   Chief Complaint   Patient presents with   • Peripheral Edema     [facility], hypokalemia         Hospital Course  This is 82-year-old male with a complex past medical history including DVT/PE (stop anticoagulation in the last visit secondary to rectus sheath hematoma status post coil embolization by IR on 5/5); CAD status post stent in 4/20 on ASA/Plavix; history of chronic adrenal insufficiency on chronic steroid, chronic respiratory failure on 4 L of oxygen, hypertension, COPD, BPH , chronic sacral wound, chronic systolic congestive heart failure with EF of 45% presented to the ER on 5/20/2022 from skilled nursing facility with electrolyte abnormalities.     During the stay in the hospital, patient electrolytes continue to be repleted.  Patient is also noted to have acute on chronic systolic congestive heart failure, started on IV Lasix--> later switched po, I/Os, daily weight, fluid restriction.  Regarding CAD, patient is on aspirin, Plavix, Coreg, losartan, and statin. Patient is to follow-up with cardiology as an outpatient.     Patient has history of adrenal insufficiency,on hydrocortisone 10 bid. PT/OT has evaluated, recommended discharging the patient to skilled nursing facility.     During the hospital, his hemoglobin hematocrit, continues to be monitored.For all other chronic medical condition, patient will resume his home medication. At this time, patient has been medically stable to be discharged home .    Interval Problem Update  No Acute events overnight, laying in bed.  Patient always complains of not being well and confused.  He was able to answer questions without any problem.  --He is left upper extremity swelling has been improving  Bit still swollen  Has been medically cleared to be discharged, waiting for skilled nursing facility to be  accepted    Consultants/Specialty  None    Code Status  DNAR, I OK    Disposition  Skilled nursing home  Medically cleared    Review of Systems  Review of Systems   Constitutional: Negative for chills and malaise/fatigue.   HENT: Negative for congestion and sore throat.    Eyes: Negative for photophobia.   Respiratory: Negative for cough, hemoptysis and shortness of breath.    Cardiovascular: Positive for leg swelling. Negative for chest pain, palpitations and orthopnea.   Gastrointestinal: Negative for abdominal pain, heartburn, nausea and vomiting.   Genitourinary: Negative for dysuria.   Musculoskeletal: Negative for back pain.   Neurological: Negative for focal weakness and headaches.   Psychiatric/Behavioral: Negative for depression. The patient is nervous/anxious.         Physical Exam  Temp:  [36.3 °C (97.4 °F)-36.5 °C (97.7 °F)] 36.4 °C (97.5 °F)  Pulse:  [84-89] 89  Resp:  [17-22] 22  BP: (132-152)/(67-88) 152/88  SpO2:  [92 %-96 %] 92 %    Physical Exam  Vitals and nursing note reviewed.   Constitutional:       Appearance: He is obese.   HENT:      Head: Normocephalic and atraumatic.   Eyes:      Extraocular Movements: Extraocular movements intact.   Cardiovascular:      Rate and Rhythm: Normal rate.      Pulses: Normal pulses.   Pulmonary:      Breath sounds: No rales.      Comments: Decreased breath sound at the bases  Abdominal:      General: There is no distension.      Palpations: Abdomen is soft.   Musculoskeletal:      Cervical back: Neck supple.      Right lower leg: Edema present.      Left lower leg: Edema present.      Comments: Swollen Left upper ext  edema   Skin:     General: Skin is warm.   Neurological:      Mental Status: He is alert and oriented to person, place, and time.      Cranial Nerves: No cranial nerve deficit.   Psychiatric:         Mood and Affect: Mood normal.         Fluids    Intake/Output Summary (Last 24 hours) at 5/31/2021 1407  Last data filed at 5/31/2021 1336  Gross per  24 hour   Intake 970 ml   Output 2100 ml   Net -1130 ml       Laboratory  Recent Labs     05/31/21  0355   WBC 10.7   RBC 3.55*   HEMOGLOBIN 10.1*   HEMATOCRIT 34.7*   MCV 97.7   MCH 28.5   MCHC 29.1*   RDW 76.4*   PLATELETCT 165   MPV 10.0     Recent Labs     05/30/21  0440 05/31/21  0355   SODIUM 141 138   POTASSIUM 3.2* 4.1   CHLORIDE 96 97   CO2 38* 35*   GLUCOSE 89 92   BUN 12 12   CREATININE 0.68 0.72   CALCIUM 7.3* 7.5*                   Imaging  US-EXTREMITY VENOUS UPPER UNILAT LEFT   Final Result      CT-EXTREMITY, UPPER WITH LEFT   Final Result      No acute fracture or dislocation.      Severe osteoarthritis of the elbow joint.      IR-MIDLINE CATHETER INSERTION WO GUIDANCE > AGE 3   Final Result                  Ultrasound-guided midline placement performed by qualified nursing staff    as above.          IR-US GUIDED PIV   Final Result    Ultrasound-guided PERIPHERAL IV INSERTION performed by    qualified nursing staff as above.      US-EXTREMITY VENOUS LOWER UNILAT LEFT   Final Result      DX-CHEST-PORTABLE (1 VIEW)   Final Result      Bibasilar opacities may represent atelectasis or consolidation.      Small left pleural effusion may be present.      Pulmonary interstitial edema.      Stable prominence of the cardiomediastinal silhouette.      Atherosclerotic calcification is seen.      DX-ELBOW-COMPLETE 3+ LEFT   Final Result      1.  No fracture or dislocation of LEFT elbow.   2.  Severe degenerative change.   3.  Diffuse subcutaneous edema.   4.  Limited by positioning.           Assessment/Plan  Pain and swelling of left upper extremity- (present on admission)  Assessment & Plan  Pt reports trauma and popping sound about 5 days ago, following which he noticed swelling and pain to his LUE  US of the left upper ext didn't show any DVT   Ct reviewed and did show arthritis    recs elevation of the eft upper ext    Sacral wound- (present on admission)  Assessment & Plan  Wound mx as per wound team  recs   --Offloading due to risk of pressure ulcers    Hypophosphatemia  Assessment & Plan  Replete and monitor  2.5      Hypomagnesemia  Assessment & Plan  Replete and monitor      Hypokalemia- (present on admission)  Assessment & Plan  Replete and monitor    Anasarca- (present on admission)  Assessment & Plan  Continue p.o. Lasix    Debility- (present on admission)  Assessment & Plan  PT OT evaluation, recs post-acute  Medically cleared to be discharged to skilled nursing     Coronary artery disease- (present on admission)  Assessment & Plan  S/p PCI with stent placement 4 weeks ago  Aspirin, Plavix, Coreg, atorvastatin, losartan, Lasix.    Adrenal insufficiency (HCC)- (present on admission)  Assessment & Plan   Will decrease hydrocortisone 10 mg po bid  Monitor Bp    Acute on chronic systolic congestive heart failure (HCC)- (present on admission)  Assessment & Plan  Patient on 5L oxygen via nc at baseline  Echo showed EF of 45% on 5/12, I/os , daily weight and fluid restriction to 1.5 L  Continue BB , losartan and  Lasix   -- need follow up with cards as an op    Chronic respiratory failure (HCC)- (present on admission)  Assessment & Plan  Continue supplemental oxygen, on 4 to 5 L    BPH (benign prostatic hypertrophy)- (present on admission)  Assessment & Plan  continiue Flomax and finasteride    History of pulmonary embolism- (present on admission)  Assessment & Plan  Warfarin was discontinued during previous recent hospitalization due to blood loss anemia, patient knows   monitor hemoglobin hematocrit, so far stabel  Transfuse if less than 7        COPD (chronic obstructive pulmonary disease) (HCC)- (present on admission)  Assessment & Plan  Continue RT protocol, breathing treatment   --Titrate O2 as needed    -- ON 4 L of O2  And denied any complain of sob    Essential hypertension, benign- (present on admission)  Assessment & Plan  Blood pressure noted to be well controlled, monitor  Continue coreg and  losartan       VTE prophylaxis: Lovenox    I have performed a physical exam and reviewed and updated ROS and Plan today (5/31/2021). In review of yesterday's note (5/30/2021), there are no changes except as documented above.

## 2021-05-31 NOTE — PROGRESS NOTES
Assumed care at 0700 after report received from night RN. Pt A/Ox3 (reoriented to time, occassionally situation), on 6L oxygen (pt baseline in 4L, pt often anxious, sats are 92% on 6L), midline site WNL, no complaints of pain. Awaiting SNF acceptance for discharge.

## 2021-05-31 NOTE — THERAPY
"Physical Therapy   Daily Treatment     Patient Name: Jeffrey Lizama  Age:  88 y.o., Sex:  male  Medical Record #: [id number]  Today's Date: 5/31/2021     Precautions: Fall Risk    Assessment    Pt appears to be rather self-limiting which seems to be impairing his progress. While he was able to move B LE toward EOB during transition from supine to sit, he reported he could not move them over bedside. When asked to sit up, he reported, \"I can't,\" and would respond this way to any request to move his own body. Maximal encouragement provided. Pt was able to hold himself upright EOB and initiated sit to stand at max A with FWW (despite repeating, \"I can't do that\"). With an elevated bedside, he was able to clear buttocks from bed briefly. Will follow to address weakness and instability. Recommend placement.      Plan    Continue current treatment plan.    DC Equipment Recommendations: Unable to determine at this time  Discharge Recommendations: Recommend post-acute placement for additional physical therapy services prior to discharge home       Objective       05/31/21 1035   Balance   Comments Seated EOB at SBA for safety. Standing at max A.    Gait Analysis   Gait Level Of Assist Unable to Participate   Bed Mobility    Supine to Sit Moderate Assist   Sit to Supine Minimal Assist   Scooting Moderate Assist   Comments Assist for LE and trunk to upright during sup to sit. Upon return to supine, assist for R LE onto bed.    Functional Mobility   Sit to Stand Maximal Assist   Bed, Chair, Wheelchair Transfer Unable to Participate   Toilet Transfers Unable to Participate   Comments STS x3 trials; with third trial and elevated bedside, pt able to gait clearance of buttocks from bed.    Short Term Goals    Short Term Goal # 1 Pt to move supine to/from eob w/ spv, no bed features in 6 visits to improve fxl indep   Goal Outcome # 1 goal not met   Short Term Goal # 2 Pt to move sit to/from stand w/ spv in 6 visits to improve fxl " indep   Goal Outcome # 2 Goal not met   Short Term Goal # 3 Pt to ambulate 75 ft w/ fww and spv in 6 visits to improve fxl indep   Goal Outcome # 3 Goal not met   Anticipated Discharge Equipment and Recommendations   DC Equipment Recommendations Unable to determine at this time   Discharge Recommendations Recommend post-acute placement for additional physical therapy services prior to discharge home

## 2021-06-01 LAB
ALBUMIN SERPL BCP-MCNC: 2.8 G/DL (ref 3.2–4.9)
BUN SERPL-MCNC: 15 MG/DL (ref 8–22)
CALCIUM SERPL-MCNC: 7.9 MG/DL (ref 8.5–10.5)
CHLORIDE SERPL-SCNC: 97 MMOL/L (ref 96–112)
CO2 SERPL-SCNC: 36 MMOL/L (ref 20–33)
CREAT SERPL-MCNC: 0.77 MG/DL (ref 0.5–1.4)
GLUCOSE SERPL-MCNC: 97 MG/DL (ref 65–99)
PHOSPHATE SERPL-MCNC: 2.8 MG/DL (ref 2.5–4.5)
POTASSIUM SERPL-SCNC: 3.7 MMOL/L (ref 3.6–5.5)
SODIUM SERPL-SCNC: 138 MMOL/L (ref 135–145)

## 2021-06-01 PROCEDURE — 36415 COLL VENOUS BLD VENIPUNCTURE: CPT

## 2021-06-01 PROCEDURE — 700102 HCHG RX REV CODE 250 W/ 637 OVERRIDE(OP): Performed by: HOSPITALIST

## 2021-06-01 PROCEDURE — A9270 NON-COVERED ITEM OR SERVICE: HCPCS | Performed by: HOSPITALIST

## 2021-06-01 PROCEDURE — 700111 HCHG RX REV CODE 636 W/ 250 OVERRIDE (IP): Performed by: INTERNAL MEDICINE

## 2021-06-01 PROCEDURE — 80069 RENAL FUNCTION PANEL: CPT

## 2021-06-01 PROCEDURE — 700102 HCHG RX REV CODE 250 W/ 637 OVERRIDE(OP): Performed by: STUDENT IN AN ORGANIZED HEALTH CARE EDUCATION/TRAINING PROGRAM

## 2021-06-01 PROCEDURE — 700102 HCHG RX REV CODE 250 W/ 637 OVERRIDE(OP): Performed by: INTERNAL MEDICINE

## 2021-06-01 PROCEDURE — 99232 SBSQ HOSP IP/OBS MODERATE 35: CPT | Performed by: STUDENT IN AN ORGANIZED HEALTH CARE EDUCATION/TRAINING PROGRAM

## 2021-06-01 PROCEDURE — A9270 NON-COVERED ITEM OR SERVICE: HCPCS | Performed by: INTERNAL MEDICINE

## 2021-06-01 PROCEDURE — A9270 NON-COVERED ITEM OR SERVICE: HCPCS | Performed by: STUDENT IN AN ORGANIZED HEALTH CARE EDUCATION/TRAINING PROGRAM

## 2021-06-01 PROCEDURE — 770006 HCHG ROOM/CARE - MED/SURG/GYN SEMI*

## 2021-06-01 RX ADMIN — ASPIRIN 81 MG: 81 TABLET, COATED ORAL at 04:30

## 2021-06-01 RX ADMIN — OXYCODONE 5 MG: 5 TABLET ORAL at 14:46

## 2021-06-01 RX ADMIN — HYDROCORTISONE 10 MG: 20 TABLET ORAL at 17:35

## 2021-06-01 RX ADMIN — Medication 950 MG: at 04:30

## 2021-06-01 RX ADMIN — ATORVASTATIN CALCIUM 80 MG: 40 TABLET, FILM COATED ORAL at 17:35

## 2021-06-01 RX ADMIN — DIBASIC SODIUM PHOSPHATE, MONOBASIC POTASSIUM PHOSPHATE AND MONOBASIC SODIUM PHOSPHATE 250 MG: 852; 155; 130 TABLET ORAL at 11:32

## 2021-06-01 RX ADMIN — TIOTROPIUM BROMIDE INHALATION SPRAY 5 MCG: 3.12 SPRAY, METERED RESPIRATORY (INHALATION) at 04:26

## 2021-06-01 RX ADMIN — LEVOTHYROXINE SODIUM 75 MCG: 0.07 TABLET ORAL at 04:30

## 2021-06-01 RX ADMIN — OMEPRAZOLE 20 MG: 20 CAPSULE, DELAYED RELEASE ORAL at 04:30

## 2021-06-01 RX ADMIN — CLOPIDOGREL BISULFATE 75 MG: 75 TABLET ORAL at 04:30

## 2021-06-01 RX ADMIN — FUROSEMIDE 20 MG: 20 TABLET ORAL at 04:30

## 2021-06-01 RX ADMIN — BUDESONIDE AND FORMOTEROL FUMARATE DIHYDRATE 2 PUFF: 80; 4.5 AEROSOL RESPIRATORY (INHALATION) at 17:35

## 2021-06-01 RX ADMIN — ALPRAZOLAM 0.25 MG: 0.25 TABLET ORAL at 14:46

## 2021-06-01 RX ADMIN — LOSARTAN POTASSIUM 25 MG: 25 TABLET, FILM COATED ORAL at 04:30

## 2021-06-01 RX ADMIN — FLUTICASONE PROPIONATE 50 MCG: 50 SPRAY, METERED NASAL at 04:27

## 2021-06-01 RX ADMIN — DOCUSATE SODIUM 50 MG AND SENNOSIDES 8.6 MG 2 TABLET: 8.6; 5 TABLET, FILM COATED ORAL at 04:30

## 2021-06-01 RX ADMIN — ENOXAPARIN SODIUM 40 MG: 40 INJECTION SUBCUTANEOUS at 04:30

## 2021-06-01 RX ADMIN — OXYCODONE 5 MG: 5 TABLET ORAL at 17:36

## 2021-06-01 RX ADMIN — OXYCODONE 5 MG: 5 TABLET ORAL at 04:14

## 2021-06-01 RX ADMIN — DOCUSATE SODIUM 50 MG AND SENNOSIDES 8.6 MG 2 TABLET: 8.6; 5 TABLET, FILM COATED ORAL at 17:37

## 2021-06-01 RX ADMIN — MONTELUKAST 10 MG: 10 TABLET, FILM COATED ORAL at 04:38

## 2021-06-01 RX ADMIN — OMEPRAZOLE 20 MG: 20 CAPSULE, DELAYED RELEASE ORAL at 17:37

## 2021-06-01 RX ADMIN — CARVEDILOL 3.12 MG: 3.12 TABLET, FILM COATED ORAL at 17:37

## 2021-06-01 RX ADMIN — DIBASIC SODIUM PHOSPHATE, MONOBASIC POTASSIUM PHOSPHATE AND MONOBASIC SODIUM PHOSPHATE 250 MG: 852; 155; 130 TABLET ORAL at 04:30

## 2021-06-01 RX ADMIN — TAMSULOSIN HYDROCHLORIDE 0.4 MG: 0.4 CAPSULE ORAL at 04:30

## 2021-06-01 RX ADMIN — CARBOXYMETHYLCELLULOSE SODIUM 1 DROP: 5 SOLUTION/ DROPS OPHTHALMIC at 04:27

## 2021-06-01 RX ADMIN — HYDROCORTISONE 10 MG: 20 TABLET ORAL at 04:30

## 2021-06-01 RX ADMIN — OXYCODONE 5 MG: 5 TABLET ORAL at 08:24

## 2021-06-01 RX ADMIN — ALPRAZOLAM 0.25 MG: 0.25 TABLET ORAL at 08:24

## 2021-06-01 RX ADMIN — BUDESONIDE AND FORMOTEROL FUMARATE DIHYDRATE 2 PUFF: 80; 4.5 AEROSOL RESPIRATORY (INHALATION) at 04:26

## 2021-06-01 RX ADMIN — OXYCODONE 5 MG: 5 TABLET ORAL at 23:16

## 2021-06-01 RX ADMIN — CARVEDILOL 3.12 MG: 3.12 TABLET, FILM COATED ORAL at 08:23

## 2021-06-01 ASSESSMENT — ENCOUNTER SYMPTOMS
DEPRESSION: 0
NAUSEA: 0
DOUBLE VISION: 0
FEVER: 0
DIZZINESS: 0
COUGH: 0
BLURRED VISION: 0
HEADACHES: 0
VOMITING: 0
PALPITATIONS: 0
MYALGIAS: 0

## 2021-06-01 ASSESSMENT — PAIN DESCRIPTION - PAIN TYPE
TYPE: ACUTE PAIN
TYPE: ACUTE PAIN;CHRONIC PAIN
TYPE: ACUTE PAIN;CHRONIC PAIN
TYPE: ACUTE PAIN
TYPE: ACUTE PAIN
TYPE: ACUTE PAIN;CHRONIC PAIN
TYPE: ACUTE PAIN

## 2021-06-01 NOTE — CARE PLAN
Problem: Skin Integrity  Goal: Skin integrity is maintained or improved  Outcome: Progressing  Note: Mepilex in place over sacral wound, wound improving. Q2HR turns in place with waffle overlay     Problem: Fall Risk  Goal: Patient will remain free from falls  Outcome: Progressing  Note: All fall precautions in place   The patient is Stable - Low risk of patient condition declining or worsening    Shift Goals  Clinical Goals: decrease anxiety  Patient Goals: d/c to SNF  Family Goals: N/A    Progress made toward(s) clinical / shift goals:  no falls    Patient is not progressing towards the following goals:

## 2021-06-01 NOTE — PROGRESS NOTES
Received report from Shivani ROLON. Assumed pt care at 1900. Patient has complaint of pain, repositioned for comfort. Bed is locked, lowered, and call light is in reach.

## 2021-06-01 NOTE — PROGRESS NOTES
Assumed care at 0700 after report received from night RN. Pt A/Ox3 (reoriented to time, occassionally situation), on 4L oxygen (pt baseline in 4L, pt often anxious, sats are 92% on 4L), midline site WNL, pt anxious and complaining of pain, prns given. Awaiting SNF acceptance for discharge.

## 2021-06-01 NOTE — PROGRESS NOTES
Hospital Medicine Daily Progress Note    Date of Service  6/1/2021    Chief Complaint  88 y.o. male admitted 5/20/2021 with electrolyte abnormalities     Hospital Course  This is 82-year-old male with a complex past medical history including DVT/PE (stop anticoagulation in the last visit secondary to rectus sheath hematoma status post coil embolization by IR on 5/5); CAD status post stent in 4/20 on ASA/Plavix; history of chronic adrenal insufficiency on chronic steroid, chronic respiratory failure on 4 L of oxygen, hypertension, COPD, BPH , chronic sacral wound, chronic systolic congestive heart failure with EF of 45% presented to the ER on 5/20/2022 from skilled nursing facility with electrolyte abnormalities.     During the stay in the hospital, patient electrolytes continue to be repleted.  Patient is also noted to have acute on chronic systolic congestive heart failure, started on IV Lasix--> later switched po, I/Os, daily weight, fluid restriction.  Regarding CAD, patient is on aspirin, Plavix, Coreg, losartan, and statin. Patient is to follow-up with cardiology as an outpatient.     Patient has history of adrenal insufficiency,on hydrocortisone 10 bid. PT/OT has evaluated, recommended discharging the patient to skilled nursing facility.     During the hospital, his hemoglobin hematocrit, continues to be monitored.For all other chronic medical condition, patient will resume his home medication. At this time, patient has been medically stable to be discharged home       Interval Problem Update  6/1/2021  Vital stable  Labs unremarkable. meds reviewed    assisting with placement      Consultants/Specialty  N/A    Code Status  DNAR, I OK    Disposition  SNF     Review of Systems  Review of Systems   Constitutional: Negative for fever.   HENT: Negative for hearing loss.    Eyes: Negative for blurred vision and double vision.   Respiratory: Negative for cough.    Cardiovascular: Negative for chest pain  and palpitations.   Gastrointestinal: Negative for nausea and vomiting.   Genitourinary: Negative for dysuria.   Musculoskeletal: Negative for myalgias.   Skin: Negative for rash.   Neurological: Negative for dizziness and headaches.   Psychiatric/Behavioral: Negative for depression.        Physical Exam  Temp:  [36.2 °C (97.2 °F)-37.1 °C (98.8 °F)] 36.2 °C (97.2 °F)  Pulse:  [71-82] 78  Resp:  [16-21] 16  BP: (125-156)/(59-92) 156/92  SpO2:  [93 %-96 %] 96 %    Physical Exam  Constitutional:       Appearance: He is obese.   HENT:      Head: Normocephalic and atraumatic.      Right Ear: Tympanic membrane normal.      Left Ear: Tympanic membrane normal.      Nose: Nose normal.      Mouth/Throat:      Mouth: Mucous membranes are moist.   Eyes:      Pupils: Pupils are equal, round, and reactive to light.   Cardiovascular:      Rate and Rhythm: Normal rate and regular rhythm.      Pulses: Normal pulses.      Heart sounds: Normal heart sounds. No murmur heard.     Pulmonary:      Effort: Pulmonary effort is normal. No respiratory distress.   Abdominal:      General: Abdomen is flat.      Palpations: Abdomen is soft.      Tenderness: There is no abdominal tenderness.   Musculoskeletal:      Right lower leg: Edema present.      Left lower leg: Edema present.   Skin:     Coloration: Skin is not jaundiced.   Neurological:      General: No focal deficit present.      Mental Status: He is alert and oriented to person, place, and time. Mental status is at baseline.   Psychiatric:         Mood and Affect: Mood normal.         Fluids    Intake/Output Summary (Last 24 hours) at 6/1/2021 1333  Last data filed at 6/1/2021 0927  Gross per 24 hour   Intake 1390 ml   Output 700 ml   Net 690 ml       Laboratory  Recent Labs     05/31/21  0355   WBC 10.7   RBC 3.55*   HEMOGLOBIN 10.1*   HEMATOCRIT 34.7*   MCV 97.7   MCH 28.5   MCHC 29.1*   RDW 76.4*   PLATELETCT 165   MPV 10.0     Recent Labs     05/30/21  0440 05/31/21  0354  06/01/21  0415   SODIUM 141 138 138   POTASSIUM 3.2* 4.1 3.7   CHLORIDE 96 97 97   CO2 38* 35* 36*   GLUCOSE 89 92 97   BUN 12 12 15   CREATININE 0.68 0.72 0.77   CALCIUM 7.3* 7.5* 7.9*                   Imaging  US-EXTREMITY VENOUS UPPER UNILAT LEFT   Final Result      CT-EXTREMITY, UPPER WITH LEFT   Final Result      No acute fracture or dislocation.      Severe osteoarthritis of the elbow joint.      IR-MIDLINE CATHETER INSERTION WO GUIDANCE > AGE 3   Final Result                  Ultrasound-guided midline placement performed by qualified nursing staff    as above.          IR-US GUIDED PIV   Final Result    Ultrasound-guided PERIPHERAL IV INSERTION performed by    qualified nursing staff as above.      US-EXTREMITY VENOUS LOWER UNILAT LEFT   Final Result      DX-CHEST-PORTABLE (1 VIEW)   Final Result      Bibasilar opacities may represent atelectasis or consolidation.      Small left pleural effusion may be present.      Pulmonary interstitial edema.      Stable prominence of the cardiomediastinal silhouette.      Atherosclerotic calcification is seen.      DX-ELBOW-COMPLETE 3+ LEFT   Final Result      1.  No fracture or dislocation of LEFT elbow.   2.  Severe degenerative change.   3.  Diffuse subcutaneous edema.   4.  Limited by positioning.           Assessment/Plan  Pain and swelling of left upper extremity- (present on admission)  Assessment & Plan  Pt reports trauma and popping sound about 5 days ago, following which he noticed swelling and pain to his LUE  US of the left upper ext didn't show any DVT   Ct reviewed and did show arthritis    recs elevation of the eft upper ext    Sacral wound- (present on admission)  Assessment & Plan  Wound mx as per wound team recs   --Offloading due to risk of pressure ulcers    Hypophosphatemia  Assessment & Plan  Replete and monitor  2.5      Hypomagnesemia  Assessment & Plan  Replete and monitor      Hypokalemia- (present on admission)  Assessment & Plan  Replete and  monitor    Anasarca- (present on admission)  Assessment & Plan  Continue p.o. Lasix    Debility- (present on admission)  Assessment & Plan  PT OT evaluation, recs post-acute  Medically cleared to be discharged to skilled nursing     Coronary artery disease- (present on admission)  Assessment & Plan  S/p PCI with stent placement 4 weeks ago  Aspirin, Plavix, Coreg, atorvastatin, losartan, Lasix.    Adrenal insufficiency (HCC)- (present on admission)  Assessment & Plan   Will decrease hydrocortisone 10 mg po bid  Monitor Bp    Acute on chronic systolic congestive heart failure (HCC)- (present on admission)  Assessment & Plan  Patient on 5L oxygen via nc at baseline  Echo showed EF of 45% on 5/12, I/os , daily weight and fluid restriction to 1.5 L  Continue BB , losartan and  Lasix   -- need follow up with cards as an op    Chronic respiratory failure (HCC)- (present on admission)  Assessment & Plan  Continue supplemental oxygen, on 4 to 5 L    BPH (benign prostatic hypertrophy)- (present on admission)  Assessment & Plan  continiue Flomax and finasteride    History of pulmonary embolism- (present on admission)  Assessment & Plan  Warfarin was discontinued during previous recent hospitalization due to blood loss anemia, patient knows   monitor hemoglobin hematocrit, so far stabel  Transfuse if less than 7        COPD (chronic obstructive pulmonary disease) (HCC)- (present on admission)  Assessment & Plan  Continue RT protocol, breathing treatment   --Titrate O2 as needed    -- ON 4 L of O2  And denied any complain of sob    Essential hypertension, benign- (present on admission)  Assessment & Plan  Blood pressure noted to be well controlled, monitor  Continue coreg and losartan         VTE prophylaxis: lovenox

## 2021-06-01 NOTE — CARE PLAN
Problem: Psychosocial  Goal: Patient's level of anxiety will decrease  Outcome: Progressing     The patient is Stable - Low risk of patient condition declining or worsening    Shift Goals  Clinical Goals: decrease anxiety   Patient Goals: discharge to rehab  Family Goals: N/A    Progress made toward(s) clinical / shift goals:  Patient educated on hourly rounding and medication available if feeling anxious     Patient is not progressing towards the following goals:

## 2021-06-01 NOTE — DISCHARGE PLANNING
Agency/Facility Name: [facility]   Spoke To: Loida   Outcome: Per Loida, there are no male beds avail for the near future     Agency/Facility Name: [facility]   Spoke To: Shivani   Outcome: Per Shivani, [facility] with a current waiting list.  Facility is not accepting admissions

## 2021-06-02 ENCOUNTER — APPOINTMENT (OUTPATIENT)
Dept: RADIOLOGY | Facility: MEDICAL CENTER | Age: 86
DRG: 291 | End: 2021-06-02
Attending: STUDENT IN AN ORGANIZED HEALTH CARE EDUCATION/TRAINING PROGRAM
Payer: MEDICARE

## 2021-06-02 ENCOUNTER — APPOINTMENT (OUTPATIENT)
Dept: CARDIOLOGY | Facility: MEDICAL CENTER | Age: 86
End: 2021-06-02
Payer: MEDICARE

## 2021-06-02 PROCEDURE — A9270 NON-COVERED ITEM OR SERVICE: HCPCS | Performed by: STUDENT IN AN ORGANIZED HEALTH CARE EDUCATION/TRAINING PROGRAM

## 2021-06-02 PROCEDURE — 71045 X-RAY EXAM CHEST 1 VIEW: CPT

## 2021-06-02 PROCEDURE — A9270 NON-COVERED ITEM OR SERVICE: HCPCS | Performed by: HOSPITALIST

## 2021-06-02 PROCEDURE — A9270 NON-COVERED ITEM OR SERVICE: HCPCS | Performed by: INTERNAL MEDICINE

## 2021-06-02 PROCEDURE — 700102 HCHG RX REV CODE 250 W/ 637 OVERRIDE(OP): Performed by: HOSPITALIST

## 2021-06-02 PROCEDURE — 700111 HCHG RX REV CODE 636 W/ 250 OVERRIDE (IP): Performed by: INTERNAL MEDICINE

## 2021-06-02 PROCEDURE — 700102 HCHG RX REV CODE 250 W/ 637 OVERRIDE(OP): Performed by: STUDENT IN AN ORGANIZED HEALTH CARE EDUCATION/TRAINING PROGRAM

## 2021-06-02 PROCEDURE — 770006 HCHG ROOM/CARE - MED/SURG/GYN SEMI*

## 2021-06-02 PROCEDURE — 700102 HCHG RX REV CODE 250 W/ 637 OVERRIDE(OP): Performed by: INTERNAL MEDICINE

## 2021-06-02 PROCEDURE — 700105 HCHG RX REV CODE 258: Performed by: STUDENT IN AN ORGANIZED HEALTH CARE EDUCATION/TRAINING PROGRAM

## 2021-06-02 PROCEDURE — 99232 SBSQ HOSP IP/OBS MODERATE 35: CPT | Performed by: STUDENT IN AN ORGANIZED HEALTH CARE EDUCATION/TRAINING PROGRAM

## 2021-06-02 RX ORDER — CYCLOBENZAPRINE HCL 10 MG
5 TABLET ORAL EVERY 8 HOURS PRN
Status: DISCONTINUED | OUTPATIENT
Start: 2021-06-02 | End: 2021-06-13

## 2021-06-02 RX ORDER — SODIUM CHLORIDE 9 MG/ML
250 INJECTION, SOLUTION INTRAVENOUS ONCE
Status: COMPLETED | OUTPATIENT
Start: 2021-06-02 | End: 2021-06-03

## 2021-06-02 RX ADMIN — TAMSULOSIN HYDROCHLORIDE 0.4 MG: 0.4 CAPSULE ORAL at 06:06

## 2021-06-02 RX ADMIN — FUROSEMIDE 20 MG: 20 TABLET ORAL at 06:06

## 2021-06-02 RX ADMIN — OXYCODONE 5 MG: 5 TABLET ORAL at 09:12

## 2021-06-02 RX ADMIN — BUDESONIDE AND FORMOTEROL FUMARATE DIHYDRATE 2 PUFF: 80; 4.5 AEROSOL RESPIRATORY (INHALATION) at 06:05

## 2021-06-02 RX ADMIN — ENOXAPARIN SODIUM 40 MG: 40 INJECTION SUBCUTANEOUS at 06:07

## 2021-06-02 RX ADMIN — ATORVASTATIN CALCIUM 80 MG: 40 TABLET, FILM COATED ORAL at 18:07

## 2021-06-02 RX ADMIN — CARVEDILOL 3.12 MG: 3.12 TABLET, FILM COATED ORAL at 07:34

## 2021-06-02 RX ADMIN — CLOPIDOGREL BISULFATE 75 MG: 75 TABLET ORAL at 06:06

## 2021-06-02 RX ADMIN — HYDROCORTISONE 10 MG: 20 TABLET ORAL at 18:08

## 2021-06-02 RX ADMIN — ASPIRIN 81 MG: 81 TABLET, COATED ORAL at 06:06

## 2021-06-02 RX ADMIN — CARBOXYMETHYLCELLULOSE SODIUM 1 DROP: 5 SOLUTION/ DROPS OPHTHALMIC at 18:23

## 2021-06-02 RX ADMIN — OMEPRAZOLE 20 MG: 20 CAPSULE, DELAYED RELEASE ORAL at 18:18

## 2021-06-02 RX ADMIN — OMEPRAZOLE 20 MG: 20 CAPSULE, DELAYED RELEASE ORAL at 06:06

## 2021-06-02 RX ADMIN — MONTELUKAST 10 MG: 10 TABLET, FILM COATED ORAL at 06:06

## 2021-06-02 RX ADMIN — FLUTICASONE PROPIONATE 50 MCG: 50 SPRAY, METERED NASAL at 06:05

## 2021-06-02 RX ADMIN — DOCUSATE SODIUM 50 MG AND SENNOSIDES 8.6 MG 2 TABLET: 8.6; 5 TABLET, FILM COATED ORAL at 18:09

## 2021-06-02 RX ADMIN — TIOTROPIUM BROMIDE INHALATION SPRAY 5 MCG: 3.12 SPRAY, METERED RESPIRATORY (INHALATION) at 06:05

## 2021-06-02 RX ADMIN — BUDESONIDE AND FORMOTEROL FUMARATE DIHYDRATE 2 PUFF: 80; 4.5 AEROSOL RESPIRATORY (INHALATION) at 18:24

## 2021-06-02 RX ADMIN — HYDROCORTISONE 10 MG: 20 TABLET ORAL at 06:06

## 2021-06-02 RX ADMIN — OXYCODONE 5 MG: 5 TABLET ORAL at 06:06

## 2021-06-02 RX ADMIN — LEVOTHYROXINE SODIUM 75 MCG: 0.07 TABLET ORAL at 06:06

## 2021-06-02 RX ADMIN — LOSARTAN POTASSIUM 25 MG: 25 TABLET, FILM COATED ORAL at 06:06

## 2021-06-02 RX ADMIN — CYCLOBENZAPRINE 5 MG: 10 TABLET, FILM COATED ORAL at 21:11

## 2021-06-02 RX ADMIN — CARBOXYMETHYLCELLULOSE SODIUM 1 DROP: 5 SOLUTION/ DROPS OPHTHALMIC at 06:05

## 2021-06-02 RX ADMIN — MORPHINE SULFATE 2 MG: 4 INJECTION INTRAVENOUS at 13:23

## 2021-06-02 RX ADMIN — Medication 950 MG: at 06:05

## 2021-06-02 RX ADMIN — SODIUM CHLORIDE: 9 INJECTION, SOLUTION INTRAVENOUS at 20:58

## 2021-06-02 ASSESSMENT — PAIN DESCRIPTION - PAIN TYPE
TYPE: ACUTE PAIN

## 2021-06-02 NOTE — DISCHARGE PLANNING
Anticipated Discharge Disposition: SNF    Action: LSW met with Pt and explained SNF declined in [place] area. Pt agreed to send referrals to [facility] but does not want to go back to [facility]. LSW faxed choice.     Barriers to Discharge: SNF acceptance    Plan: LSW to update Pt's son.

## 2021-06-02 NOTE — CARE PLAN
Problem: Knowledge Deficit - Standard  Goal: Patient and family/care givers will demonstrate understanding of plan of care, disease process/condition, diagnostic tests and medications  Outcome: Not Progressing     Problem: Pain - Standard  Goal: Alleviation of pain or a reduction in pain to the patient’s comfort goal  Outcome: Progressing   The patient is Watcher - Medium risk of patient condition declining or worsening    Shift Goals  Clinical Goals: Decrease swelling  Patient Goals: Discharge  Family Goals: N/A    Progress made toward(s) clinical / shift goals:  Pt reporting being comfortable at 6/10 pain.     Patient is not progressing towards the following goals: Pt requires reorientation and reeducation about q2h turns and mobility.       Problem: Knowledge Deficit - Standard  Goal: Patient and family/care givers will demonstrate understanding of plan of care, disease process/condition, diagnostic tests and medications  Outcome: Not Progressing

## 2021-06-02 NOTE — PROGRESS NOTES
Bedside report received from Radha ROLON. Assumed care of pt at 0645 . Pt is awake at this time with no signs of distress. Plan of care discussed with pt. Pt is A&O x 4. Pt is on 4L nasal cannula. Bed alarm is on, bed in lowest position, bed rails up x 2, belongings and call light within reach. Hourly rounding in place.

## 2021-06-02 NOTE — PROGRESS NOTES
Assumed care at 1900. Received report from day shift RN. Pt is awake and fatigued in bed, A&Ox 3, disoriented to time, on 4L NC, reports a pain level of 6/10 but comfortable. Fall precautions and appropriate signs in place. Call light and belongings within reach. Bed alarm and hourly rounding in place. Communication board updated. Pt denies any additional needs at this time.

## 2021-06-02 NOTE — PROGRESS NOTES
Hospital Medicine Daily Progress Note    Date of Service  6/2/2021  Chief Complaint  88 y.o. male admitted 5/20/2021 with electrolyte abnormalities      Hospital Course  This is 82-year-old male with a complex past medical history including DVT/PE (stop anticoagulation in the last visit secondary to rectus sheath hematoma status post coil embolization by IR on 5/5); CAD status post stent in 4/20 on ASA/Plavix; history of chronic adrenal insufficiency on chronic steroid, chronic respiratory failure on 4 L of oxygen, hypertension, COPD, BPH , chronic sacral wound, chronic systolic congestive heart failure with EF of 45% presented to the ER on 5/20/2022 from skilled nursing facility with electrolyte abnormalities.     During the stay in the hospital, patient electrolytes continue to be repleted.  Patient is also noted to have acute on chronic systolic congestive heart failure, started on IV Lasix--> later switched po, I/Os, daily weight, fluid restriction.  Regarding CAD, patient is on aspirin, Plavix, Coreg, losartan, and statin. Patient is to follow-up with cardiology as an outpatient.     Patient has history of adrenal insufficiency,on hydrocortisone 10 bid. PT/OT has evaluated, recommended discharging the patient to skilled nursing facility.     During the hospital, his hemoglobin hematocrit, continues to be monitored.For all other chronic medical condition, patient will resume his home medication. At this time, patient has been medically stable to be discharged home         Interval Problem Update  6/1/2021  Vital stable  Labs unremarkable. meds reviewed    assisting with placement     6/2/2021  Vitals remained stable  Labs remain unremarkable  No events overnight   assisting with placement     Consultants/Specialty  N/A     Code Status  DNAR, I OK     Disposition        Review of Systems  Review of Systems   Constitutional: Negative for fever.   HENT: Negative for hearing loss.    Eyes:  Negative for blurred vision and double vision.   Respiratory: Negative for cough.    Cardiovascular: Negative for chest pain and palpitations.   Gastrointestinal: Negative for nausea and vomiting.   Genitourinary: Negative for dysuria.   Musculoskeletal: Negative for myalgias.   Skin: Negative for rash.   Neurological: Negative for dizziness and headaches.   Psychiatric/Behavioral: Negative for depression.         Physical Exam  Temp:  [36.2 °C (97.2 °F)-37.1 °C (98.8 °F)] 36.2 °C (97.2 °F)  Pulse:  [71-82] 78  Resp:  [16-21] 16  BP: (125-156)/(59-92) 156/92  SpO2:  [93 %-96 %] 96 %     Physical Exam  Constitutional:       Appearance: He is obese.   HENT:      Head: Normocephalic and atraumatic.      Right Ear: Tympanic membrane normal.      Left Ear: Tympanic membrane normal.      Nose: Nose normal.      Mouth/Throat:      Mouth: Mucous membranes are moist.   Eyes:      Pupils: Pupils are equal, round, and reactive to light.   Cardiovascular:      Rate and Rhythm: Normal rate and regular rhythm.      Pulses: Normal pulses.      Heart sounds: Normal heart sounds. No murmur heard.     Pulmonary:      Effort: Pulmonary effort is normal. No respiratory distress.   Abdominal:      General: Abdomen is flat.      Palpations: Abdomen is soft.      Tenderness: There is no abdominal tenderness.   Musculoskeletal:      Right lower leg: Edema present.      Left lower leg: Edema present.   Skin:     Coloration: Skin is not jaundiced.   Neurological:      General: No focal deficit present.      Mental Status: He is alert and oriented to person, place, and time. Mental status is at baseline.   Psychiatric:         Mood and Affect: Mood normal.        Fluids    Intake/Output Summary (Last 24 hours) at 6/2/2021 1215  Last data filed at 6/2/2021 0447  Gross per 24 hour   Intake 360 ml   Output 1200 ml   Net -840 ml       Laboratory  Recent Labs     05/31/21  0355   WBC 10.7   RBC 3.55*   HEMOGLOBIN 10.1*   HEMATOCRIT 34.7*   MCV  97.7   MCH 28.5   MCHC 29.1*   RDW 76.4*   PLATELETCT 165   MPV 10.0     Recent Labs     05/31/21  0355 06/01/21  0415   SODIUM 138 138   POTASSIUM 4.1 3.7   CHLORIDE 97 97   CO2 35* 36*   GLUCOSE 92 97   BUN 12 15   CREATININE 0.72 0.77   CALCIUM 7.5* 7.9*                   Imaging  US-EXTREMITY VENOUS UPPER UNILAT LEFT   Final Result      CT-EXTREMITY, UPPER WITH LEFT   Final Result      No acute fracture or dislocation.      Severe osteoarthritis of the elbow joint.      IR-MIDLINE CATHETER INSERTION WO GUIDANCE > AGE 3   Final Result                  Ultrasound-guided midline placement performed by qualified nursing staff    as above.          IR-US GUIDED PIV   Final Result    Ultrasound-guided PERIPHERAL IV INSERTION performed by    qualified nursing staff as above.      US-EXTREMITY VENOUS LOWER UNILAT LEFT   Final Result      DX-CHEST-PORTABLE (1 VIEW)   Final Result      Bibasilar opacities may represent atelectasis or consolidation.      Small left pleural effusion may be present.      Pulmonary interstitial edema.      Stable prominence of the cardiomediastinal silhouette.      Atherosclerotic calcification is seen.      DX-ELBOW-COMPLETE 3+ LEFT   Final Result      1.  No fracture or dislocation of LEFT elbow.   2.  Severe degenerative change.   3.  Diffuse subcutaneous edema.   4.  Limited by positioning.           Assessment/Plan  Pain and swelling of left upper extremity- (present on admission)  Assessment & Plan  Pt reports trauma and popping sound about 5 days ago, following which he noticed swelling and pain to his LUE  US of the left upper ext didn't show any DVT   Ct reviewed and did show arthritis    recs elevation of the eft upper ext    Sacral wound- (present on admission)  Assessment & Plan  Wound mx as per wound team recs   --Offloading due to risk of pressure ulcers    Hypophosphatemia  Assessment & Plan  Replete and monitor  2.5      Hypomagnesemia  Assessment & Plan  Replete and  monitor      Hypokalemia- (present on admission)  Assessment & Plan  Replete and monitor    Anasarca- (present on admission)  Assessment & Plan  Continue p.o. Lasix    Debility- (present on admission)  Assessment & Plan  PT OT evaluation, recs post-acute  Medically cleared to be discharged to skilled nursing     Coronary artery disease- (present on admission)  Assessment & Plan  S/p PCI with stent placement 4 weeks ago  Aspirin, Plavix, Coreg, atorvastatin, losartan, Lasix.    Adrenal insufficiency (HCC)- (present on admission)  Assessment & Plan   Will decrease hydrocortisone 10 mg po bid  Monitor Bp    Acute on chronic systolic congestive heart failure (HCC)- (present on admission)  Assessment & Plan  Patient on 5L oxygen via nc at baseline  Echo showed EF of 45% on 5/12, I/os , daily weight and fluid restriction to 1.5 L  Continue BB , losartan and  Lasix   -- need follow up with cards as an op    Chronic respiratory failure (HCC)- (present on admission)  Assessment & Plan  Continue supplemental oxygen, on 4 to 5 L    BPH (benign prostatic hypertrophy)- (present on admission)  Assessment & Plan  continiue Flomax and finasteride    History of pulmonary embolism- (present on admission)  Assessment & Plan  Warfarin was discontinued during previous recent hospitalization due to blood loss anemia, patient knows   monitor hemoglobin hematocrit, so far stabel  Transfuse if less than 7        COPD (chronic obstructive pulmonary disease) (HCC)- (present on admission)  Assessment & Plan  Continue RT protocol, breathing treatment   --Titrate O2 as needed    -- ON 4 L of O2  And denied any complain of sob    Essential hypertension, benign- (present on admission)  Assessment & Plan  Blood pressure noted to be well controlled, monitor  Continue coreg and losartan         VTE prophylaxis: lovenox    I have performed a physical exam and reviewed and updated ROS and Plan today (6/2/2021). In review of yesterday's note (6/1/2021),  there are no changes except as documented above.

## 2021-06-02 NOTE — CARE PLAN
The patient is Watcher - Medium risk of patient condition declining or worsening    Shift Goals  Clinical Goals: decrease edema   Patient Goals: discharge to ca   Family Goals: na    Progress made toward(s) clinical / shift goals:  progressing     Patient is not progressing towards the following goals: NA      Problem: Knowledge Deficit - Standard  Goal: Patient and family/care givers will demonstrate understanding of plan of care, disease process/condition, diagnostic tests and medications  Outcome: Progressing     Problem: Pain - Standard  Goal: Alleviation of pain or a reduction in pain to the patient’s comfort goal  Outcome: Progressing     Problem: Skin Integrity  Goal: Skin integrity is maintained or improved  Outcome: Progressing     Problem: Fall Risk  Goal: Patient will remain free from falls  Outcome: Progressing     Problem: Psychosocial  Goal: Patient's level of anxiety will decrease  Outcome: Progressing  Goal: Patient's ability to verbalize feelings about condition will improve  Outcome: Progressing  Goal: Patient's ability to re-evaluate and adapt role responsibilities will improve  Outcome: Progressing  Goal: Patient and family will demonstrate ability to cope with life altering diagnosis and/or procedure  Outcome: Progressing  Goal: Spiritual and cultural needs incorporated into hospitalization  Outcome: Progressing

## 2021-06-02 NOTE — PROGRESS NOTES
Midline dressing reported unable to flush/draw by primary RN Ella.  Came to assess line and dressing change was performed.  During dressing change, line hub was repositioned in order to ensure line catheter was not kinked and causing mechanical obstruction.  This intervention was successful in restoring line patency to flush and exhibit brisk blood return.  Dressing change due date changed to Wednesdays on a weekly basis and PRN if the dressing is soiled, wet, dirty, or not intact.  Please call [phone] for any other questions or other issues with this line.

## 2021-06-02 NOTE — DISCHARGE PLANNING
Agency/Facility Name: [facility]  Outcome: Left vmail for admissions regarding referral     Agency/Facility Name: [facility]  Spoke To: Admissions    Outcome: This facility is located in [place], [place]     LSW notified     -1245  Agency/Facility Name: [facility]  Spoke To: Debora  Outcome: Per Debora, there is not enough staffing avail to accept another referral     -1332  Received Choice form at 1302  Agency[facility]Facility Name: [facility]  Referral sent per Choice form @ 1332     -1503  Agency/Facility Name: [facility]   Spoke To: Kiet  Outcome: Per Kiet, if this Pt has a good D/C plan and a ride to [place], [facility] can accept.      LSW informed     Agency/Facility Name: [facility]   Outcome: Per EPIC response, Pt declined due to insurance     Agency/Facility Name: [facility]   Outcome: Left vmail for admissions regarding referral     Agency/Facility Name: [facility]  Spoke To: Cristhian   Outcome: Per Cristhian, this Pt has an open workers' comp case     -1532  Agency/Facility Name: [facility]   Spoke To: Nalini   Outcome: Per Nalini, [facility] can accept if there is confirmation that he has a ride back to his home     LSW notified

## 2021-06-03 LAB
ANION GAP SERPL CALC-SCNC: 7 MMOL/L (ref 7–16)
APPEARANCE UR: CLEAR
BACTERIA #/AREA URNS HPF: ABNORMAL /HPF
BASOPHILS # BLD AUTO: 0.3 % (ref 0–1.8)
BASOPHILS # BLD: 0.04 K/UL (ref 0–0.12)
BILIRUB UR QL STRIP.AUTO: NEGATIVE
BUN SERPL-MCNC: 25 MG/DL (ref 8–22)
CALCIUM SERPL-MCNC: 7.6 MG/DL (ref 8.5–10.5)
CHLORIDE SERPL-SCNC: 97 MMOL/L (ref 96–112)
CO2 SERPL-SCNC: 32 MMOL/L (ref 20–33)
COLOR UR: ABNORMAL
CREAT SERPL-MCNC: 1.18 MG/DL (ref 0.5–1.4)
EOSINOPHIL # BLD AUTO: 0.06 K/UL (ref 0–0.51)
EOSINOPHIL NFR BLD: 0.4 % (ref 0–6.9)
EPI CELLS #/AREA URNS HPF: ABNORMAL /HPF
ERYTHROCYTE [DISTWIDTH] IN BLOOD BY AUTOMATED COUNT: 73.3 FL (ref 35.9–50)
GLUCOSE SERPL-MCNC: 79 MG/DL (ref 65–99)
GLUCOSE UR STRIP.AUTO-MCNC: NEGATIVE MG/DL
HCT VFR BLD AUTO: 31 % (ref 42–52)
HGB BLD-MCNC: 9.4 G/DL (ref 14–18)
IMM GRANULOCYTES # BLD AUTO: 0.16 K/UL (ref 0–0.11)
IMM GRANULOCYTES NFR BLD AUTO: 1.1 % (ref 0–0.9)
KETONES UR STRIP.AUTO-MCNC: NEGATIVE MG/DL
LEUKOCYTE ESTERASE UR QL STRIP.AUTO: ABNORMAL
LYMPHOCYTES # BLD AUTO: 0.69 K/UL (ref 1–4.8)
LYMPHOCYTES NFR BLD: 4.7 % (ref 22–41)
MCH RBC QN AUTO: 29 PG (ref 27–33)
MCHC RBC AUTO-ENTMCNC: 30.3 G/DL (ref 33.7–35.3)
MCV RBC AUTO: 95.7 FL (ref 81.4–97.8)
MICRO URNS: ABNORMAL
MONOCYTES # BLD AUTO: 1.23 K/UL (ref 0–0.85)
MONOCYTES NFR BLD AUTO: 8.4 % (ref 0–13.4)
NEUTROPHILS # BLD AUTO: 12.42 K/UL (ref 1.82–7.42)
NEUTROPHILS NFR BLD: 85.1 % (ref 44–72)
NITRITE UR QL STRIP.AUTO: NEGATIVE
NRBC # BLD AUTO: 0 K/UL
NRBC BLD-RTO: 0 /100 WBC
PH UR STRIP.AUTO: 6.5 [PH] (ref 5–8)
PLATELET # BLD AUTO: 108 K/UL (ref 164–446)
PMV BLD AUTO: 10.5 FL (ref 9–12.9)
POTASSIUM SERPL-SCNC: 3.6 MMOL/L (ref 3.6–5.5)
PROCALCITONIN SERPL-MCNC: 0.94 NG/ML
PROT UR QL STRIP: 100 MG/DL
RBC # BLD AUTO: 3.24 M/UL (ref 4.7–6.1)
RBC # URNS HPF: ABNORMAL /HPF
RBC UR QL AUTO: ABNORMAL
SODIUM SERPL-SCNC: 136 MMOL/L (ref 135–145)
SP GR UR STRIP.AUTO: 1.02
UROBILINOGEN UR STRIP.AUTO-MCNC: 2 MG/DL
WBC # BLD AUTO: 14.6 K/UL (ref 4.8–10.8)
WBC #/AREA URNS HPF: ABNORMAL /HPF
YEAST BUDDING URNS QL: PRESENT /HPF

## 2021-06-03 PROCEDURE — 700105 HCHG RX REV CODE 258: Performed by: STUDENT IN AN ORGANIZED HEALTH CARE EDUCATION/TRAINING PROGRAM

## 2021-06-03 PROCEDURE — 700102 HCHG RX REV CODE 250 W/ 637 OVERRIDE(OP): Performed by: HOSPITALIST

## 2021-06-03 PROCEDURE — 87186 SC STD MICRODIL/AGAR DIL: CPT

## 2021-06-03 PROCEDURE — 80048 BASIC METABOLIC PNL TOTAL CA: CPT

## 2021-06-03 PROCEDURE — 85025 COMPLETE CBC W/AUTO DIFF WBC: CPT

## 2021-06-03 PROCEDURE — 700102 HCHG RX REV CODE 250 W/ 637 OVERRIDE(OP): Performed by: STUDENT IN AN ORGANIZED HEALTH CARE EDUCATION/TRAINING PROGRAM

## 2021-06-03 PROCEDURE — 84145 PROCALCITONIN (PCT): CPT

## 2021-06-03 PROCEDURE — 87077 CULTURE AEROBIC IDENTIFY: CPT

## 2021-06-03 PROCEDURE — A9270 NON-COVERED ITEM OR SERVICE: HCPCS | Performed by: INTERNAL MEDICINE

## 2021-06-03 PROCEDURE — 99233 SBSQ HOSP IP/OBS HIGH 50: CPT | Performed by: STUDENT IN AN ORGANIZED HEALTH CARE EDUCATION/TRAINING PROGRAM

## 2021-06-03 PROCEDURE — 87086 URINE CULTURE/COLONY COUNT: CPT

## 2021-06-03 PROCEDURE — A9270 NON-COVERED ITEM OR SERVICE: HCPCS | Performed by: STUDENT IN AN ORGANIZED HEALTH CARE EDUCATION/TRAINING PROGRAM

## 2021-06-03 PROCEDURE — 36415 COLL VENOUS BLD VENIPUNCTURE: CPT

## 2021-06-03 PROCEDURE — 700111 HCHG RX REV CODE 636 W/ 250 OVERRIDE (IP): Performed by: INTERNAL MEDICINE

## 2021-06-03 PROCEDURE — A9270 NON-COVERED ITEM OR SERVICE: HCPCS | Performed by: HOSPITALIST

## 2021-06-03 PROCEDURE — 81001 URINALYSIS AUTO W/SCOPE: CPT

## 2021-06-03 PROCEDURE — 700102 HCHG RX REV CODE 250 W/ 637 OVERRIDE(OP): Performed by: INTERNAL MEDICINE

## 2021-06-03 PROCEDURE — 770006 HCHG ROOM/CARE - MED/SURG/GYN SEMI*

## 2021-06-03 RX ORDER — SODIUM CHLORIDE, SODIUM LACTATE, POTASSIUM CHLORIDE, AND CALCIUM CHLORIDE .6; .31; .03; .02 G/100ML; G/100ML; G/100ML; G/100ML
250 INJECTION, SOLUTION INTRAVENOUS
Status: COMPLETED | OUTPATIENT
Start: 2021-06-03 | End: 2021-06-03

## 2021-06-03 RX ORDER — SODIUM CHLORIDE, SODIUM LACTATE, POTASSIUM CHLORIDE, AND CALCIUM CHLORIDE .6; .31; .03; .02 G/100ML; G/100ML; G/100ML; G/100ML
250 INJECTION, SOLUTION INTRAVENOUS
Status: DISCONTINUED | OUTPATIENT
Start: 2021-06-03 | End: 2021-06-19 | Stop reason: HOSPADM

## 2021-06-03 RX ADMIN — CARVEDILOL 3.12 MG: 3.12 TABLET, FILM COATED ORAL at 17:48

## 2021-06-03 RX ADMIN — CARBOXYMETHYLCELLULOSE SODIUM 1 DROP: 5 SOLUTION/ DROPS OPHTHALMIC at 06:10

## 2021-06-03 RX ADMIN — ENOXAPARIN SODIUM 40 MG: 40 INJECTION SUBCUTANEOUS at 06:11

## 2021-06-03 RX ADMIN — DOCUSATE SODIUM 50 MG AND SENNOSIDES 8.6 MG 2 TABLET: 8.6; 5 TABLET, FILM COATED ORAL at 06:12

## 2021-06-03 RX ADMIN — TAMSULOSIN HYDROCHLORIDE 0.4 MG: 0.4 CAPSULE ORAL at 06:12

## 2021-06-03 RX ADMIN — MONTELUKAST 10 MG: 10 TABLET, FILM COATED ORAL at 06:12

## 2021-06-03 RX ADMIN — SODIUM CHLORIDE, POTASSIUM CHLORIDE, SODIUM LACTATE AND CALCIUM CHLORIDE 250 ML: 600; 310; 30; 20 INJECTION, SOLUTION INTRAVENOUS at 16:58

## 2021-06-03 RX ADMIN — ACETAMINOPHEN 650 MG: 325 TABLET, FILM COATED ORAL at 20:03

## 2021-06-03 RX ADMIN — TIOTROPIUM BROMIDE INHALATION SPRAY 5 MCG: 3.12 SPRAY, METERED RESPIRATORY (INHALATION) at 06:39

## 2021-06-03 RX ADMIN — ASPIRIN 81 MG: 81 TABLET, COATED ORAL at 06:12

## 2021-06-03 RX ADMIN — ATORVASTATIN CALCIUM 80 MG: 40 TABLET, FILM COATED ORAL at 17:48

## 2021-06-03 RX ADMIN — HYDROCORTISONE 10 MG: 20 TABLET ORAL at 17:48

## 2021-06-03 RX ADMIN — CARBOXYMETHYLCELLULOSE SODIUM 1 DROP: 5 SOLUTION/ DROPS OPHTHALMIC at 17:49

## 2021-06-03 RX ADMIN — OMEPRAZOLE 20 MG: 20 CAPSULE, DELAYED RELEASE ORAL at 17:48

## 2021-06-03 RX ADMIN — Medication 950 MG: at 09:15

## 2021-06-03 RX ADMIN — CLOPIDOGREL BISULFATE 75 MG: 75 TABLET ORAL at 06:12

## 2021-06-03 RX ADMIN — HYDROCORTISONE 10 MG: 20 TABLET ORAL at 06:13

## 2021-06-03 RX ADMIN — DOCUSATE SODIUM 50 MG AND SENNOSIDES 8.6 MG 2 TABLET: 8.6; 5 TABLET, FILM COATED ORAL at 17:48

## 2021-06-03 RX ADMIN — FLUTICASONE PROPIONATE 50 MCG: 50 SPRAY, METERED NASAL at 06:11

## 2021-06-03 RX ADMIN — FUROSEMIDE 20 MG: 20 TABLET ORAL at 06:12

## 2021-06-03 RX ADMIN — BUDESONIDE AND FORMOTEROL FUMARATE DIHYDRATE 2 PUFF: 80; 4.5 AEROSOL RESPIRATORY (INHALATION) at 06:11

## 2021-06-03 RX ADMIN — LEVOTHYROXINE SODIUM 75 MCG: 0.07 TABLET ORAL at 06:12

## 2021-06-03 RX ADMIN — OMEPRAZOLE 20 MG: 20 CAPSULE, DELAYED RELEASE ORAL at 06:13

## 2021-06-03 RX ADMIN — LOSARTAN POTASSIUM 25 MG: 25 TABLET, FILM COATED ORAL at 06:12

## 2021-06-03 RX ADMIN — BUDESONIDE AND FORMOTEROL FUMARATE DIHYDRATE 2 PUFF: 80; 4.5 AEROSOL RESPIRATORY (INHALATION) at 17:49

## 2021-06-03 NOTE — THERAPY
PT Contact Note    PT tx attempted, pt declining to participate in OOB activity at this time stating he was too SOB from recent pericare. PT will re-attempt as able.    Nicole Mir, PT, DPT  Ext. [phone]

## 2021-06-03 NOTE — PROGRESS NOTES
Assume care of pt at 1900. Report received from Day shift RN. Pt is A/O 4 and forgetful. Pt reports pain.  Rest, education, repositioning, and heat therapy applied.  Discussed plan of care for the shift.  Pt denies any needs at this time.  Pt is resting in bed. Bed in lowest and locked position, call light in reach, hourly rounding in place. Labs reviewed. Communication board updated. Will continue to monitor.

## 2021-06-03 NOTE — PROGRESS NOTES
Assume care of pt at 0700. Report received from NOC RN. Pt is A/O x4. Pain is noted to R ankle, heat pack applied. Pt is resting in bed. Bed in lowest and locked position, call light in reach, hourly rounding in place, bed alarm in place. Labs reviewed. Communication board updated.

## 2021-06-03 NOTE — CARE PLAN
Problem: Pain - Standard  Goal: Alleviation of pain or a reduction in pain to the patient’s comfort goal  Outcome: Not Progressing  Note: Pt continues to complain of pain.  Pain  managed with muscle relaxer well.  Education and nonpharmacological interventions in place.      Problem: Knowledge Deficit - Standard  Goal: Patient and family/care givers will demonstrate understanding of plan of care, disease process/condition, diagnostic tests and medications  Outcome: Progressing     Problem: Skin Integrity  Goal: Skin integrity is maintained or improved  Outcome: Progressing     Problem: Fall Risk  Goal: Patient will remain free from falls  Outcome: Progressing   The patient is Stable - Low risk of patient condition declining or worsening    Shift Goals  Clinical Goals: oxygenation; hemodynamic stability  Patient Goals: discharge planing  Family Goals: na    Progress made toward(s) clinical / shift goals:  Patient maintaining saturations on 5 L O2.  No signs or symptoms of respiratory distress.     Patient is not progressing towards the following goals:      Problem: Pain - Standard  Goal: Alleviation of pain or a reduction in pain to the patient’s comfort goal  Outcome: Not Progressing  Note: Pt continues to complain of pain.  Pain  managed with muscle relaxer well.  Education and nonpharmacological interventions in place.

## 2021-06-03 NOTE — PROGRESS NOTES
Hospital Medicine Daily Progress Note    Date of Service  6/3/2021  Chief Complaint  88 y.o. 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     During the stay in the hospital, patient electrolytes continue to be repleted.  Patient is also noted to have acute on chronic systolic congestive heart failure, started on IV Lasix--> later switched po, I/Os, daily weight, fluid restriction.  Regarding CAD, patient is on aspirin, Plavix, Coreg, losartan, and statin. Patient is to follow-up with cardiology as an outpatient.     Patient has history of adrenal insufficiency,on hydrocortisone 10 bid. PT/OT has evaluated, recommended discharging the patient to skilled nursing facility.       Interval Problem Update  6/1/2021  Vital stable  Labs unremarkable. meds reviewed    assisting with placement     6/2/2021  Vitals remained stable  Labs remain unremarkable  No events overnight   assisting with placement     6/3/2021  Noted to have low blood pressure  Medication adjusted  Remain afebrile  Labs noted with elevated white count likely secondary to steroid use  Mild FABIAN.  Will hold Lasix and losartan.  Elevated pro-Magdi.  No sign of sepsis active infection  Check UA       Consultants/Specialty  N/A     Code Status  DNAR, I OK     Disposition  [facility]      Review of Systems  Review of Systems   Constitutional: Negative for fever.   HENT:  Negative for hearing loss.    Eyes: Negative for blurred vision and double vision.   Respiratory: Negative for cough.    Cardiovascular: Negative for chest pain and palpitations.   Gastrointestinal: Negative for nausea and vomiting.   Genitourinary: Negative for dysuria.   Musculoskeletal: Negative for myalgias.   Skin: Negative for rash.   Neurological: Negative for dizziness and headaches.   Psychiatric/Behavioral: Negative for depression.         Physical Exam  Temp:  [36.2 °C (97.2 °F)-37.1 °C (98.8 °F)] 36.2 °C (97.2 °F)  Pulse:  [71-82] 78  Resp:  [16-21] 16  BP: (125-156)/(59-92) 156/92  SpO2:  [93 %-96 %] 96 %     Physical Exam  Constitutional:       Appearance: He is obese.   HENT:      Head: Normocephalic and atraumatic.      Right Ear: Tympanic membrane normal.      Left Ear: Tympanic membrane normal.      Nose: Nose normal.      Mouth/Throat:      Mouth: Mucous membranes are moist.   Eyes:      Pupils: Pupils are equal, round, and reactive to light.   Cardiovascular:      Rate and Rhythm: Normal rate and regular rhythm.      Pulses: Normal pulses.      Heart sounds: Normal heart sounds. No murmur heard.     Pulmonary:      Effort: Pulmonary effort is normal. No respiratory distress.   Abdominal:      General: Abdomen is flat.      Palpations: Abdomen is soft.      Tenderness: There is no abdominal tenderness.   Musculoskeletal:      Right lower leg: Edema present.      Left lower leg: Edema present.   Skin:     Coloration: Skin is not jaundiced.   Neurological:      General: No focal deficit present.      Mental Status: He is alert and oriented to person, place, and time. Mental status is at baseline.   Psychiatric:         Mood and Affect: Mood normal.     Fluids    Intake/Output Summary (Last 24 hours) at 6/3/2021 1215  Last data filed at 6/3/2021 0600  Gross per 24 hour   Intake 240 ml   Output 700 ml   Net -460 ml       Laboratory  Recent Labs     06/03/21  0226   WBC 14.6*   RBC 3.24*   HEMOGLOBIN  9.4*   HEMATOCRIT 31.0*   MCV 95.7   MCH 29.0   MCHC 30.3*   RDW 73.3*   PLATELETCT 108*   MPV 10.5     Recent Labs     06/01/21  0415 06/03/21  0226   SODIUM 138 136   POTASSIUM 3.7 3.6   CHLORIDE 97 97   CO2 36* 32   GLUCOSE 97 79   BUN 15 25*   CREATININE 0.77 1.18   CALCIUM 7.9* 7.6*                   Imaging  DX-CHEST-LIMITED (1 VIEW)   Final Result         No significant interval change.      Small bilateral pleural effusion bibasilar opacities, similar to prior.      Stable cardiomegaly.      US-EXTREMITY VENOUS UPPER UNILAT LEFT   Final Result      CT-EXTREMITY, UPPER WITH LEFT   Final Result      No acute fracture or dislocation.      Severe osteoarthritis of the elbow joint.      IR-MIDLINE CATHETER INSERTION WO GUIDANCE > AGE 3   Final Result                  Ultrasound-guided midline placement performed by qualified nursing staff    as above.          IR-US GUIDED PIV   Final Result    Ultrasound-guided PERIPHERAL IV INSERTION performed by    qualified nursing staff as above.      US-EXTREMITY VENOUS LOWER UNILAT LEFT   Final Result      DX-CHEST-PORTABLE (1 VIEW)   Final Result      Bibasilar opacities may represent atelectasis or consolidation.      Small left pleural effusion may be present.      Pulmonary interstitial edema.      Stable prominence of the cardiomediastinal silhouette.      Atherosclerotic calcification is seen.      DX-ELBOW-COMPLETE 3+ LEFT   Final Result      1.  No fracture or dislocation of LEFT elbow.   2.  Severe degenerative change.   3.  Diffuse subcutaneous edema.   4.  Limited by positioning.           Assessment/Plan  Pain and swelling of left upper extremity- (present on admission)  Assessment & Plan  Pt reports trauma and popping sound about 5 days ago, following which he noticed swelling and pain to his LUE  US of the left upper ext didn't show any DVT   Ct reviewed and did show arthritis    recs elevation of the eft upper ext    Sacral wound- (present on  admission)  Assessment & Plan  Wound mx as per wound team recs   --Offloading due to risk of pressure ulcers    Hypophosphatemia  Assessment & Plan  Replete and monitor  2.5      Hypomagnesemia  Assessment & Plan  Replete and monitor      Hypokalemia- (present on admission)  Assessment & Plan  Replete and monitor    Anasarca- (present on admission)  Assessment & Plan  Continue p.o. Lasix    Debility- (present on admission)  Assessment & Plan  PT OT evaluation, recs post-acute  Medically cleared to be discharged to skilled nursing     Coronary artery disease- (present on admission)  Assessment & Plan  S/p PCI with stent placement 4 weeks ago  Aspirin, Plavix, Coreg, atorvastatin, losartan, Lasix.    Adrenal insufficiency (HCC)- (present on admission)  Assessment & Plan   Will decrease hydrocortisone 10 mg po bid  Monitor Bp    Acute on chronic systolic congestive heart failure (HCC)- (present on admission)  Assessment & Plan  Patient on 5L oxygen via nc at baseline  Echo showed EF of 45% on 5/12, I/os , daily weight and fluid restriction to 1.5 L  Continue BB , losartan and  Lasix   -- need follow up with cards as an op    Chronic respiratory failure (HCC)- (present on admission)  Assessment & Plan  Continue supplemental oxygen, on 4 to 5 L    BPH (benign prostatic hypertrophy)- (present on admission)  Assessment & Plan  continiue Flomax and finasteride    History of pulmonary embolism- (present on admission)  Assessment & Plan  Warfarin was discontinued during previous recent hospitalization due to blood loss anemia, patient knows   monitor hemoglobin hematocrit, so far stabel  Transfuse if less than 7        COPD (chronic obstructive pulmonary disease) (HCC)- (present on admission)  Assessment & Plan  Continue RT protocol, breathing treatment   --Titrate O2 as needed    -- ON 4 L of O2  And denied any complain of sob    Essential hypertension, benign- (present on admission)  Assessment & Plan  Blood pressure noted  to be well controlled, monitor  Continue coreg and losartan       VTE prophylaxis: lovenox

## 2021-06-03 NOTE — DISCHARGE PLANNING
Agency/Facility Name: [facility]   Outcome: Per EPIC response, Pt accepted     Agency/Facility Name: [facility]   Outcome: DPA unable to leave vmail

## 2021-06-03 NOTE — DISCHARGE PLANNING
Anticipated Discharge Disposition: SNF    Action: LSW updated that [facility] can accept Pt if he has transportation back to [place] after SNF. LSW called son Arya and confirmed that family can be transport after SNF. Per Arya he can either transport his father himself or make arrangements for his father's transport.     Barriers to Discharge: Bed availability    Plan: LSW to update Pt, pending bed availability.

## 2021-06-04 ENCOUNTER — APPOINTMENT (OUTPATIENT)
Dept: RADIOLOGY | Facility: MEDICAL CENTER | Age: 86
DRG: 291 | End: 2021-06-04
Attending: STUDENT IN AN ORGANIZED HEALTH CARE EDUCATION/TRAINING PROGRAM
Payer: MEDICARE

## 2021-06-04 LAB
ANION GAP SERPL CALC-SCNC: 8 MMOL/L (ref 7–16)
ANISOCYTOSIS BLD QL SMEAR: ABNORMAL
BASE EXCESS BLDA CALC-SCNC: 9 MMOL/L (ref -4–3)
BASOPHILS # BLD AUTO: 0.2 % (ref 0–1.8)
BASOPHILS # BLD: 0.03 K/UL (ref 0–0.12)
BODY TEMPERATURE: ABNORMAL CENTIGRADE
BUN SERPL-MCNC: 32 MG/DL (ref 8–22)
CALCIUM SERPL-MCNC: 7.5 MG/DL (ref 8.5–10.5)
CHLORIDE SERPL-SCNC: 97 MMOL/L (ref 96–112)
CO2 SERPL-SCNC: 34 MMOL/L (ref 20–33)
COMMENT 1642: NORMAL
CORTIS SERPL-MCNC: 103 UG/DL (ref 0–23)
CREAT SERPL-MCNC: 1.44 MG/DL (ref 0.5–1.4)
EKG IMPRESSION: NORMAL
EKG IMPRESSION: NORMAL
EOSINOPHIL # BLD AUTO: 0.04 K/UL (ref 0–0.51)
EOSINOPHIL NFR BLD: 0.3 % (ref 0–6.9)
ERYTHROCYTE [DISTWIDTH] IN BLOOD BY AUTOMATED COUNT: 74.5 FL (ref 35.9–50)
GLUCOSE BLD-MCNC: 81 MG/DL (ref 65–99)
GLUCOSE SERPL-MCNC: 82 MG/DL (ref 65–99)
HCO3 BLDA-SCNC: 34 MMOL/L (ref 17–25)
HCT VFR BLD AUTO: 29.1 % (ref 42–52)
HGB BLD-MCNC: 8.8 G/DL (ref 14–18)
IMM GRANULOCYTES # BLD AUTO: 0.13 K/UL (ref 0–0.11)
IMM GRANULOCYTES NFR BLD AUTO: 0.9 % (ref 0–0.9)
LYMPHOCYTES # BLD AUTO: 0.52 K/UL (ref 1–4.8)
LYMPHOCYTES NFR BLD: 3.5 % (ref 22–41)
MACROCYTES BLD QL SMEAR: ABNORMAL
MCH RBC QN AUTO: 28.9 PG (ref 27–33)
MCHC RBC AUTO-ENTMCNC: 30.2 G/DL (ref 33.7–35.3)
MCV RBC AUTO: 95.4 FL (ref 81.4–97.8)
MICROCYTES BLD QL SMEAR: ABNORMAL
MONOCYTES # BLD AUTO: 1.17 K/UL (ref 0–0.85)
MONOCYTES NFR BLD AUTO: 7.8 % (ref 0–13.4)
MORPHOLOGY BLD-IMP: NORMAL
NEUTROPHILS # BLD AUTO: 13.11 K/UL (ref 1.82–7.42)
NEUTROPHILS NFR BLD: 87.3 % (ref 44–72)
NRBC # BLD AUTO: 0 K/UL
NRBC BLD-RTO: 0 /100 WBC
PCO2 BLDA: 49.8 MMHG (ref 26–37)
PH BLDA: 7.46 [PH] (ref 7.4–7.5)
PLATELET # BLD AUTO: 159 K/UL (ref 164–446)
PLATELET BLD QL SMEAR: NORMAL
PMV BLD AUTO: 10.7 FL (ref 9–12.9)
PO2 BLDA: 84.1 MMHG (ref 64–87)
POTASSIUM SERPL-SCNC: 3.6 MMOL/L (ref 3.6–5.5)
RBC # BLD AUTO: 3.05 M/UL (ref 4.7–6.1)
RBC BLD AUTO: PRESENT
SAO2 % BLDA: 98.8 % (ref 93–99)
SODIUM SERPL-SCNC: 139 MMOL/L (ref 135–145)
TROPONIN T SERPL-MCNC: 120 NG/L (ref 6–19)
TROPONIN T SERPL-MCNC: 149 NG/L (ref 6–19)
TSH SERPL DL<=0.005 MIU/L-ACNC: 3.16 UIU/ML (ref 0.38–5.33)
WBC # BLD AUTO: 15 K/UL (ref 4.8–10.8)

## 2021-06-04 PROCEDURE — 99233 SBSQ HOSP IP/OBS HIGH 50: CPT | Performed by: STUDENT IN AN ORGANIZED HEALTH CARE EDUCATION/TRAINING PROGRAM

## 2021-06-04 PROCEDURE — 93010 ELECTROCARDIOGRAM REPORT: CPT | Performed by: INTERNAL MEDICINE

## 2021-06-04 PROCEDURE — 94640 AIRWAY INHALATION TREATMENT: CPT

## 2021-06-04 PROCEDURE — 84484 ASSAY OF TROPONIN QUANT: CPT | Mod: 91

## 2021-06-04 PROCEDURE — 85025 COMPLETE CBC W/AUTO DIFF WBC: CPT

## 2021-06-04 PROCEDURE — 82803 BLOOD GASES ANY COMBINATION: CPT

## 2021-06-04 PROCEDURE — A9270 NON-COVERED ITEM OR SERVICE: HCPCS | Performed by: HOSPITALIST

## 2021-06-04 PROCEDURE — 700111 HCHG RX REV CODE 636 W/ 250 OVERRIDE (IP): Performed by: INTERNAL MEDICINE

## 2021-06-04 PROCEDURE — 82962 GLUCOSE BLOOD TEST: CPT

## 2021-06-04 PROCEDURE — A9270 NON-COVERED ITEM OR SERVICE: HCPCS | Performed by: INTERNAL MEDICINE

## 2021-06-04 PROCEDURE — 71045 X-RAY EXAM CHEST 1 VIEW: CPT

## 2021-06-04 PROCEDURE — 93005 ELECTROCARDIOGRAM TRACING: CPT | Performed by: STUDENT IN AN ORGANIZED HEALTH CARE EDUCATION/TRAINING PROGRAM

## 2021-06-04 PROCEDURE — 700111 HCHG RX REV CODE 636 W/ 250 OVERRIDE (IP): Performed by: STUDENT IN AN ORGANIZED HEALTH CARE EDUCATION/TRAINING PROGRAM

## 2021-06-04 PROCEDURE — 306313 ANTI-EMBOLISM STOCKINGS XXXLG REG: Performed by: INTERNAL MEDICINE

## 2021-06-04 PROCEDURE — 94760 N-INVAS EAR/PLS OXIMETRY 1: CPT

## 2021-06-04 PROCEDURE — 700105 HCHG RX REV CODE 258: Performed by: INTERNAL MEDICINE

## 2021-06-04 PROCEDURE — 700102 HCHG RX REV CODE 250 W/ 637 OVERRIDE(OP): Performed by: STUDENT IN AN ORGANIZED HEALTH CARE EDUCATION/TRAINING PROGRAM

## 2021-06-04 PROCEDURE — 36415 COLL VENOUS BLD VENIPUNCTURE: CPT

## 2021-06-04 PROCEDURE — 80048 BASIC METABOLIC PNL TOTAL CA: CPT

## 2021-06-04 PROCEDURE — 700102 HCHG RX REV CODE 250 W/ 637 OVERRIDE(OP): Performed by: INTERNAL MEDICINE

## 2021-06-04 PROCEDURE — 97530 THERAPEUTIC ACTIVITIES: CPT

## 2021-06-04 PROCEDURE — 700105 HCHG RX REV CODE 258: Performed by: STUDENT IN AN ORGANIZED HEALTH CARE EDUCATION/TRAINING PROGRAM

## 2021-06-04 PROCEDURE — A9270 NON-COVERED ITEM OR SERVICE: HCPCS | Performed by: STUDENT IN AN ORGANIZED HEALTH CARE EDUCATION/TRAINING PROGRAM

## 2021-06-04 PROCEDURE — 36600 WITHDRAWAL OF ARTERIAL BLOOD: CPT

## 2021-06-04 PROCEDURE — 700102 HCHG RX REV CODE 250 W/ 637 OVERRIDE(OP): Performed by: HOSPITALIST

## 2021-06-04 PROCEDURE — 82533 TOTAL CORTISOL: CPT

## 2021-06-04 PROCEDURE — 93010 ELECTROCARDIOGRAM REPORT: CPT | Mod: 77 | Performed by: INTERNAL MEDICINE

## 2021-06-04 PROCEDURE — 84443 ASSAY THYROID STIM HORMONE: CPT

## 2021-06-04 PROCEDURE — 770006 HCHG ROOM/CARE - MED/SURG/GYN SEMI*

## 2021-06-04 RX ORDER — SULFAMETHOXAZOLE AND TRIMETHOPRIM 400; 80 MG/1; MG/1
1 TABLET ORAL EVERY 12 HOURS
Status: DISCONTINUED | OUTPATIENT
Start: 2021-06-04 | End: 2021-06-04

## 2021-06-04 RX ORDER — SODIUM CHLORIDE 9 MG/ML
500 INJECTION, SOLUTION INTRAVENOUS ONCE
Status: COMPLETED | OUTPATIENT
Start: 2021-06-04 | End: 2021-06-04

## 2021-06-04 RX ADMIN — OMEPRAZOLE 20 MG: 20 CAPSULE, DELAYED RELEASE ORAL at 18:06

## 2021-06-04 RX ADMIN — CLOPIDOGREL BISULFATE 75 MG: 75 TABLET ORAL at 05:45

## 2021-06-04 RX ADMIN — HYDROCORTISONE 10 MG: 20 TABLET ORAL at 05:44

## 2021-06-04 RX ADMIN — BUDESONIDE AND FORMOTEROL FUMARATE DIHYDRATE 2 PUFF: 80; 4.5 AEROSOL RESPIRATORY (INHALATION) at 18:07

## 2021-06-04 RX ADMIN — FLUTICASONE PROPIONATE 50 MCG: 50 SPRAY, METERED NASAL at 05:43

## 2021-06-04 RX ADMIN — SODIUM CHLORIDE 500 ML: 9 INJECTION, SOLUTION INTRAVENOUS at 00:58

## 2021-06-04 RX ADMIN — FUROSEMIDE 20 MG: 20 TABLET ORAL at 05:45

## 2021-06-04 RX ADMIN — OMEPRAZOLE 20 MG: 20 CAPSULE, DELAYED RELEASE ORAL at 05:45

## 2021-06-04 RX ADMIN — CARBOXYMETHYLCELLULOSE SODIUM 1 DROP: 5 SOLUTION/ DROPS OPHTHALMIC at 05:42

## 2021-06-04 RX ADMIN — HYDROCORTISONE SODIUM SUCCINATE 100 MG: 100 INJECTION, POWDER, FOR SOLUTION INTRAMUSCULAR; INTRAVENOUS at 01:06

## 2021-06-04 RX ADMIN — CARBOXYMETHYLCELLULOSE SODIUM 1 DROP: 5 SOLUTION/ DROPS OPHTHALMIC at 18:07

## 2021-06-04 RX ADMIN — ACETAMINOPHEN 650 MG: 325 TABLET, FILM COATED ORAL at 05:49

## 2021-06-04 RX ADMIN — CARVEDILOL 3.12 MG: 3.12 TABLET, FILM COATED ORAL at 08:30

## 2021-06-04 RX ADMIN — DOCUSATE SODIUM 50 MG AND SENNOSIDES 8.6 MG 2 TABLET: 8.6; 5 TABLET, FILM COATED ORAL at 05:45

## 2021-06-04 RX ADMIN — ASPIRIN 81 MG: 81 TABLET, COATED ORAL at 05:45

## 2021-06-04 RX ADMIN — Medication 950 MG: at 05:45

## 2021-06-04 RX ADMIN — ATORVASTATIN CALCIUM 80 MG: 40 TABLET, FILM COATED ORAL at 19:58

## 2021-06-04 RX ADMIN — LEVOTHYROXINE SODIUM 75 MCG: 0.07 TABLET ORAL at 05:45

## 2021-06-04 RX ADMIN — BUDESONIDE AND FORMOTEROL FUMARATE DIHYDRATE 2 PUFF: 80; 4.5 AEROSOL RESPIRATORY (INHALATION) at 05:41

## 2021-06-04 RX ADMIN — ENOXAPARIN SODIUM 40 MG: 40 INJECTION SUBCUTANEOUS at 05:43

## 2021-06-04 RX ADMIN — CEFTRIAXONE SODIUM 2 G: 2 INJECTION, POWDER, FOR SOLUTION INTRAMUSCULAR; INTRAVENOUS at 12:58

## 2021-06-04 RX ADMIN — TAMSULOSIN HYDROCHLORIDE 0.4 MG: 0.4 CAPSULE ORAL at 05:45

## 2021-06-04 RX ADMIN — ALBUTEROL SULFATE 2 PUFF: 90 AEROSOL, METERED RESPIRATORY (INHALATION) at 19:59

## 2021-06-04 RX ADMIN — TIOTROPIUM BROMIDE INHALATION SPRAY 5 MCG: 3.12 SPRAY, METERED RESPIRATORY (INHALATION) at 05:51

## 2021-06-04 RX ADMIN — MONTELUKAST 10 MG: 10 TABLET, FILM COATED ORAL at 05:45

## 2021-06-04 RX ADMIN — HYDROCORTISONE 10 MG: 20 TABLET ORAL at 18:06

## 2021-06-04 RX ADMIN — OXYCODONE 5 MG: 5 TABLET ORAL at 08:30

## 2021-06-04 ASSESSMENT — COPD QUESTIONNAIRES
COPD SCREENING SCORE: 2
DURING THE PAST 4 WEEKS HOW MUCH DID YOU FEEL SHORT OF BREATH: NONE/LITTLE OF THE TIME
DO YOU EVER COUGH UP ANY MUCUS OR PHLEGM?: NO/ONLY WITH OCCASIONAL COLDS OR INFECTIONS
HAVE YOU SMOKED AT LEAST 100 CIGARETTES IN YOUR ENTIRE LIFE: NO/DON'T KNOW

## 2021-06-04 ASSESSMENT — COGNITIVE AND FUNCTIONAL STATUS - GENERAL
MOVING FROM LYING ON BACK TO SITTING ON SIDE OF FLAT BED: UNABLE
CLIMB 3 TO 5 STEPS WITH RAILING: TOTAL
SUGGESTED CMS G CODE MODIFIER MOBILITY: CM
WALKING IN HOSPITAL ROOM: TOTAL
STANDING UP FROM CHAIR USING ARMS: A LOT
MOVING TO AND FROM BED TO CHAIR: UNABLE
TURNING FROM BACK TO SIDE WHILE IN FLAT BAD: UNABLE
MOBILITY SCORE: 7

## 2021-06-04 ASSESSMENT — GAIT ASSESSMENTS: GAIT LEVEL OF ASSIST: UNABLE TO PARTICIPATE

## 2021-06-04 ASSESSMENT — PAIN DESCRIPTION - PAIN TYPE: TYPE: ACUTE PAIN;CHRONIC PAIN

## 2021-06-04 NOTE — PROGRESS NOTES
Assume care of pt at 0700. Report received from NOC RN. Pt is A/O x4, but forgetful and calls out frequentl.. Patient reports pain, but does not rate on a scale. Bed in lowest and locked position, call light in reach, hourly rounding in place, repositioned. Noted a rapid response last night. Labs reviewed. Communication board updated.

## 2021-06-04 NOTE — DISCHARGE PLANNING
"Anticipated Discharge Disposition: SNF vs home with Hospice    Action: LSW met with Pt to tell him that [facility] and will [facility]. Pt agreed and asked LSW to update his wife.     LSW received update in IDT rounds that Pt's wife is unhappy with Pt's discharge plan and wants him to come home possibly on hospice. LSW called Pt's wife Jacquie who communicated with a closed [employer] device to speak over the phone. Jacquie explained that she does not believe that her  has agreed to to go SNF as he has been telling her the whole time he was in the hospital that he wants to come home. Jacquie believes that they can arrange the resources, including caregivers and hospice to facilitate this safely. Per Jacquie, she has already been speaking to a hospice in her area and wants him to be set up with that and come home. Jacquie believes that the Pt is being sedated by his pain medication and can't think clearly, and as his DPOA that makes her his decision maker. LSW tried to explain to Jacquie that Pt still has capacity to make his own decisions and has been participating in the discharge planning process. Jacquie still believes that it is her that will be making decisions for Pt and stated that she refuses to send him to any Skilled facility in the [facility]. LSW told Jacquie that they will talk with Pt about going home on hospice and everything discussed today. LSW asked for Phone number of Jacquie's son Gene to discuss discharge plan with him as well and Jacquie became angry and accused LSW of \"playing games\" because Gene's phone number is already in Pt's chart. LSW confirmed that there isn't a number in Pt's demographics for Gene and explained this to Jacquie. Jacquie told LSW she doesn't have Gene's phone number right now and LSW will have to find it because Gene has called many times and given his phone number.     LSW met with Pt to discuss what his wife talked to LSW with. He told LSW he " "isn't sure what he wants to do because he wants to go back home with his wife, but he also doesn't want to \"give up on life\" yet and go on hospice. LSW explained to him that The SNFs in the [facility] have declined him so going back there to rehab is not an option for him at this time. He told LSW he isn't sure what he wants to do because he wants to do rehab, but wants to go home as well. Pt is okay with LSW sharing information with Jacquie and son Gene.     LSW received voicemail from Jacquie's son Gene [phone] asking to be updated. LSW returned call and left voicemail.     LSW updated Dr. Redman and requested a consult with palliative to help Pt and his family reach a consensus on Pt's discharge plan and goals of care because family is asking for hospice and Pt is saying he's not sure that's what he wants yet.     LSW took call from Pt's son Gene and explained that Pt has capacity and has been participating in his own discharge plan to go to rehab. Pt has not brought up Hospice before this and when LSW explained hospice to him he told LSW he doesn't want to give up yet. LSW informed Gene of Palliative consult to help family coordinate and reach a consensus on the GOC.    Barriers to Discharge: Palliative to consult on Pt and help Pt and family to discuss Pt's actual goals for hospice vs skilled rehab at this time.     Plan: LSW to f/u with Pt and family for assistance in discharge planning.     "

## 2021-06-04 NOTE — PROGRESS NOTES
Pt's blood pressure was checked Q4 hours at 0000. It was initially low at 71/42. Pt was maintaining adequate oxygen saturation at greater than 90 percent. Pt was slightly tachycardic at 105. Pt oriented to self and place still, disoriented to time and event. Pt was responsive to voice and opens his eyes spontaneously. Pt is a little more fatigued compared to earlier in the shift and more sleepy. The concern was the blood pressure not going up and staying hypotensive. Rapid response called at 0022. MD Dr. Fabricio Kurtz notified immediately after about recent vital signs, history of patient, WBC trending up, pro calcitonin level, moderate bacteria in urine culture. MD paged to come at bedside.     MD ordered solu-cortef 100 mg injection, 500 ml NS bolus, repeat chest xray, EKG, TSH, cortisol, ABG and troponin. Pt also placed in trendelenburg position. SCDS and BRIDGETT hose applied to bilateral legs. Blood pressure was monitored continuously while rapid team was at bedside. Pt still alert and responsive to all questions, although confused.       0207: Vital signs rechecked after 500 ml fluid bolus. MD notified about vital signs. Lab had called at 0158 for critical troponin of 149. MD also notified of this. Chapis from rapid also notified. No further orders from the MD. Will continue to monitor and notify the appropriate providers. Pulse ox in place.

## 2021-06-04 NOTE — DISCHARGE PLANNING
Agency/Facility Name: [facility]  Outcome: Left vmail for admissions regarding bed avail     Agency/Facility Name: [facility]   Spoke To: Nalini  Outcome: Per Nalini, [facility] can  this Pt at 1230 today 06/04    LSW notified     Agency/Facility Name: [facility]   Spoke To: Meenu   Outcome: Per LSW Laura, Pt requires a gurney for transport.  DPA informed Meenu.  Per Meenu, 1230 is fine, and a later time also works.  Meenu requested DPA call and confirm the time of transport.      LSW notified     -1144  Agency/Facility Name: [facility]   Spoke To: Nalini  Outcome: Per LSW, this Pt is no longer medically cleared.  DPA informed Nalini.  Nalini stated [facility] will be open and ready for this Pt tomorrow 06/05 should Pt be ready then     LSW notified

## 2021-06-04 NOTE — PROGRESS NOTES
Art from Lab called with critical result of troponin at 1644. Critical lab result read back to Art.   Dr. Redman notified of critical lab result at 1648.

## 2021-06-04 NOTE — PROGRESS NOTES
Hospital Medicine Daily Progress Note    Date of Service  6/4/2021  Chief Complaint  88 y.o. 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     During the stay in the hospital, patient electrolytes continue to be repleted.  Patient is also noted to have acute on chronic systolic congestive heart failure, started on IV Lasix--> later switched po, I/Os, daily weight, fluid restriction.  Regarding CAD, patient is on aspirin, Plavix, Coreg, losartan, and statin. Patient is to follow-up with cardiology as an outpatient.     Patient has history of adrenal insufficiency,on hydrocortisone 10 bid. PT/OT has evaluated, recommended discharging the patient to skilled nursing facility.        Interval Problem Update  6/1/2021  Vital stable  Labs unremarkable. meds reviewed    assisting with placement     6/2/2021  Vitals remained stable  Labs remain unremarkable  No events overnight   assisting with placement     6/3/2021  Noted to have low blood pressure  Medication adjusted  Remain afebrile  Labs noted with elevated white count likely secondary to steroid use  Mild FABIAN.  Will hold Lasix and losartan.  Elevated pro-Magdi.  No sign of sepsis active infection  Check UA      6/4/2021  Had repaid response overnight for hypotension  Dry on examination .  Blood pressure improved  On Rocephin for UTI  urine culture and blood culture in  process  Losartan will Lasix on hold given low blood pressure and worsening kidney function  We will continue monitor kidney function  If labs remain unremarkable and  blood pressure remained stable can be transferred  to SNF tomorrow     Consultants/Specialty  N/A     Code Status  DNAR, I OK     Disposition  [facility]      Review of Systems  Review of Systems   Constitutional: Negative for fever.   HENT: Negative for hearing loss.    Eyes: Negative for blurred vision and double vision.   Respiratory: Negative for cough.    Cardiovascular: Negative for chest pain and palpitations.   Gastrointestinal: Negative for nausea and vomiting.   Genitourinary: Negative for dysuria.   Musculoskeletal: Negative for myalgias.   Skin: Negative for rash.   Neurological: Negative for dizziness and headaches.   Psychiatric/Behavioral: Negative for depression.         Physical Exam  Temp:  [36.2 °C (97.2 °F)-37.1 °C (98.8 °F)] 36.2 °C (97.2 °F)  Pulse:  [71-82] 78  Resp:  [16-21] 16  BP: (125-156)/(59-92) 156/92  SpO2:  [93 %-96 %] 96 %     Physical Exam  Constitutional:       Appearance: He is obese.   HENT:      Head: Normocephalic and atraumatic.      Right Ear: Tympanic membrane normal.      Left Ear: Tympanic membrane normal.      Nose: Nose normal.      Mouth/Throat:      Mouth: Mucous membranes are moist.   Eyes:      Pupils: Pupils are equal, round, and reactive to light.   Cardiovascular:      Rate and Rhythm: Normal rate and regular rhythm.      Pulses: Normal pulses.      Heart sounds: Normal heart sounds. No murmur heard.     Pulmonary:      Effort: Pulmonary effort is normal. No respiratory distress.   Abdominal:      General: Abdomen is flat.      Palpations: Abdomen is soft.      Tenderness: There is no abdominal tenderness.   Musculoskeletal:      Right lower leg: Edema present.      Left lower leg: Edema present.   Skin:     Coloration: Skin is not jaundiced.   Neurological:      General: No focal deficit present.       Mental Status: He is alert and oriented to person, place, and time. Mental status is at baseline.   Psychiatric:         Mood and Affect: Mood normal.   Fluids    Intake/Output Summary (Last 24 hours) at 6/4/2021 1129  Last data filed at 6/4/2021 0900  Gross per 24 hour   Intake 120 ml   Output 800 ml   Net -680 ml       Laboratory  Recent Labs     06/03/21 0226 06/04/21  0059   WBC 14.6* 15.0*   RBC 3.24* 3.05*   HEMOGLOBIN 9.4* 8.8*   HEMATOCRIT 31.0* 29.1*   MCV 95.7 95.4   MCH 29.0 28.9   MCHC 30.3* 30.2*   RDW 73.3* 74.5*   PLATELETCT 108* 159*   MPV 10.5 10.7     Recent Labs     06/03/21 0226 06/04/21  0059   SODIUM 136 139   POTASSIUM 3.6 3.6   CHLORIDE 97 97   CO2 32 34*   GLUCOSE 79 82   BUN 25* 32*   CREATININE 1.18 1.44*   CALCIUM 7.6* 7.5*                   Imaging  DX-CHEST-PORTABLE (1 VIEW)   Final Result      Findings consistent with mild pulmonary edema along with minimal bibasilar atelectasis and small left pleural effusion. No significant change.      DX-CHEST-LIMITED (1 VIEW)   Final Result         No significant interval change.      Small bilateral pleural effusion bibasilar opacities, similar to prior.      Stable cardiomegaly.      US-EXTREMITY VENOUS UPPER UNILAT LEFT   Final Result      CT-EXTREMITY, UPPER WITH LEFT   Final Result      No acute fracture or dislocation.      Severe osteoarthritis of the elbow joint.      IR-MIDLINE CATHETER INSERTION WO GUIDANCE > AGE 3   Final Result                  Ultrasound-guided midline placement performed by qualified nursing staff    as above.          IR-US GUIDED PIV   Final Result    Ultrasound-guided PERIPHERAL IV INSERTION performed by    qualified nursing staff as above.      US-EXTREMITY VENOUS LOWER UNILAT LEFT   Final Result      DX-CHEST-PORTABLE (1 VIEW)   Final Result      Bibasilar opacities may represent atelectasis or consolidation.      Small left pleural effusion may be present.      Pulmonary interstitial edema.      Stable  prominence of the cardiomediastinal silhouette.      Atherosclerotic calcification is seen.      DX-ELBOW-COMPLETE 3+ LEFT   Final Result      1.  No fracture or dislocation of LEFT elbow.   2.  Severe degenerative change.   3.  Diffuse subcutaneous edema.   4.  Limited by positioning.           Assessment/Plan  * Acute on chronic systolic congestive heart failure (HCC)- (present on admission)  Assessment & Plan  Patient on 5L oxygen via nc at baseline  Echo showed EF of 45% on 5/12, I/os , daily weight and fluid restriction to 1.5 L  Continue BB , losartan and  Lasix  Cardiology out-patient    Pain and swelling of left upper extremity- (present on admission)  Assessment & Plan  Pt reports trauma and popping sound about 5 days ago, following which he noticed swelling and pain to his LUE  US of the left upper ext didn't show any DVT   Ct reviewed and did show arthritis    recs elevation of the eft upper ext    Sacral wound- (present on admission)  Assessment & Plan  Wound mx as per wound team recs   --Offloading due to risk of pressure ulcers    Hypophosphatemia  Assessment & Plan  Replete and monitor  2.5      Hypomagnesemia  Assessment & Plan  Replete and monitor      Hypokalemia- (present on admission)  Assessment & Plan  Replete and monitor    Anasarca- (present on admission)  Assessment & Plan  Continue to monitor on diuretic     Debility- (present on admission)  Assessment & Plan  PT OT evaluation, recs post-acute  Medically cleared to be discharged to skilled nursing     Coronary artery disease- (present on admission)  Assessment & Plan  S/p PCI with stent placement 4 weeks ago  Aspirin, Plavix, Coreg, atorvastatin, losartan, Lasix.    Adrenal insufficiency (HCC)- (present on admission)  Assessment & Plan   On hydrocortisone    UTI (urinary tract infection)  Assessment & Plan  On rocephin   Follow urine culture     Chronic respiratory failure (HCC)- (present on admission)  Assessment & Plan  Continue  supplemental oxygen, on 4 to 5 L    FABIAN (acute kidney injury) ([facility])  Assessment & Plan  Hold lasix for now   Continue to monitor closely     Sepsis ([facility])  Assessment & Plan  This is Sepsis Not present on admission  SIRS criteria identified on my evaluation include: Tachycardia, with heart rate greater than 90 BPM, Tachypnea, with respirations greater than 20 per minute and Leukocytosis, with WBC greater than 12,000  Source is urine   Sepsis protocol initiated  Fluid resuscitation ordered per protocol  IV antibiotics as appropriate for source of sepsis  While organ dysfunction may be noted elsewhere in this problem list or in the chart, degree of organ dysfunction does not meet CMS criteria for severe sepsis          BPH (benign prostatic hypertrophy)- (present on admission)  Assessment & Plan  continiue Flomax and finasteride    History of pulmonary embolism- (present on admission)  Assessment & Plan  Warfarin was discontinued during previous recent hospitalization due to blood loss anemia, patient knows   monitor hemoglobin hematocrit, so far stabel  Transfuse if less than 7        COPD (chronic obstructive pulmonary disease) ([facility])- (present on admission)  Assessment & Plan  Continue RT protocol, breathing treatment   --Titrate O2 as needed    -- ON 4 L of O2  And denied any complain of sob    Essential hypertension, benign- (present on admission)  Assessment & Plan  Blood pressure noted to be well controlled, monitor  Continue coreg and losartan       VTE prophylaxis: lovenox

## 2021-06-04 NOTE — CODE DOCUMENTATION
RR called for hypotension. BP 77/46. Pt drowsy. Breathing laboured. Oriented to place. Maintaining airway. No breath sounds heard on left lower lobe. Diminished throughout.

## 2021-06-04 NOTE — THERAPY
"Physical Therapy   Daily Treatment     Patient Name: Jeffrey Lizama  Age:  88 y.o., Sex:  male  Medical Record #: [id number]  Today's Date: 6/4/2021     Precautions: Fall Risk    Assessment    Patient grossly agreeable to PT treatment session today.  Patient continues to demonstrate self-limiting behaviors, frequently stating \"I can't do it\", however stated he felt much better after participating in PT.  Patient requiring mod assist for supine > sit with verbal cues to reach for bed rail and physical assist for LEs.  Patient tolerated sitting EOB with sitting exercises including long arc quads and UE reaching.  Patient refused STS attempt, frequently requesting to lie down.  Will continue to follow.    Plan    Continue current treatment plan.    DC Equipment Recommendations: Unable to determine at this time  Discharge Recommendations: Recommend post-acute placement for additional physical therapy services prior to discharge home       Objective     06/04/21 1455   Precautions   Precautions Fall Risk   Pain   Pain Scales 0 to 10 Scale    Pain 0 - 10 Group   Location Back   Pain Rating Scale (NPRS) (Did not quantify)   Comfort Goal Comfort with Movement;Perform Activity   Therapist Pain Assessment Post Activity Pain Same as Prior to Activity   Cognition    Cognition / Consciousness X   Level of Consciousness Alert   Safety Awareness Impaired   New Learning Impaired   Initiation Impaired   Comments Pt grossly agreeable to treatment.   Sitting Lower Body Exercises   Long Arc Quad 1 set of 15   Comments UE reaching   Balance   Sitting Balance (Static) Fair -   Sitting Balance (Dynamic) Fair -   Weight Shift Sitting Poor   Skilled Intervention Verbal Cuing;Facilitation   Gait Analysis   Gait Level Of Assist Unable to Participate   Bed Mobility    Supine to Sit Moderate Assist   Sit to Supine Minimal Assist   Scooting Moderate Assist   Skilled Intervention Verbal Cuing;Postural Facilitation   Comments Assist for LE and " pushing trunk to upright during supine > sit.  Assist LEs for sit > sup   Functional Mobility   Sit to Stand Unable to Participate   Bed, Chair, Wheelchair Transfer Unable to Participate   How much help from another person does the patient currently need...   6 clicks Mobility Score 7   Activity Tolerance   Sitting Edge of Bed 12 min   Comments Limited by decreased activity tolerance   Patient / Family Goals    Patient / Family Goal #1 Home or TF to hospital near his home   Short Term Goals    Short Term Goal # 1 Pt to move supine to/from eob w/ spv, no bed features in 6 visits to improve fxl indep   Goal Outcome # 1 goal not met   Short Term Goal # 2 Pt to move sit to/from stand w/ spv in 6 visits to improve fxl indep   Goal Outcome # 2 Goal not met   Short Term Goal # 3 Pt to ambulate 75 ft w/ fww and spv in 6 visits to improve fxl indep   Goal Outcome # 3 Goal not met

## 2021-06-04 NOTE — CARE PLAN
The patient is Watcher - Medium risk of patient condition declining or worsening    Shift Goals  Clinical Goals: Pt's oxygen saturation will remain stable  Patient Goals: Pt's will do q2 turns   Family Goals: NA    Progress made toward(s) clinical / shift goals:     Problem: Knowledge Deficit - Standard  Goal: Patient and family/care givers will demonstrate understanding of plan of care, disease process/condition, diagnostic tests and medications  Outcome: Not Progressing  Note: RN disoriented to time and situation. Pt requires frequent reorientation and reeducation. Pt is forgetful.      Problem: Hemodynamics  Goal: Patient's hemodynamics, fluid balance and neurologic status will be stable or improve  Outcome: Not Progressing  Note: Pt has urine output measured through condom cath. Pt's blood pressure has been low. MD notified. Rapid team aware. Pt on 800 ml fluid restriction. Pt given 500 bolus. Will continue to monitor and reassess.

## 2021-06-04 NOTE — PROGRESS NOTES
Received bedside report from day RN.  Assumed care at 1900. Patient is awake/alert, disoriented to time and situation. Assessment completed. Pt on 5 liters oxymask, with  in place. Right midline flushed with blood return. Bed locked in lowest position. Call light within reach. Whiteboard updates with nurse's and CNA's numbers. Hourly rounding in place.

## 2021-06-04 NOTE — THERAPY
"Missed Therapy     Patient Name: Jeffrey Lizama  Age:  88 y.o., Sex:  male  Medical Record #: [id number]  Today's Date: 6/4/2021    Discussed missed therapy with RN/JARET/ILEANA. Attempted to see pt for OT treatment today X2. First attempt, pt refused stating, \"I want to eat first.\" Second attempt pt stating, \"I'm leaving\". RN stated pt in the process of d/cing to [facility]. Recommend continued OT services in SNF to assist pt to increase in strength, and Indep in simple self care tasks, ADL transfers and return to PLOF as able. RN/SW informed and agreed.        06/04/21 1024   Cognition    Cognition / Consciousness X   Level of Consciousness Alert   Comments Pt declined to attempt therapy today stating, \"I'm leaving\".    Anticipated Discharge Equipment and Recommendations   DC Equipment Recommendations Unable to determine at this time   Discharge Recommendations Recommend post-acute placement for additional occupational therapy services prior to discharge home   Interdisciplinary Plan of Care Collaboration   IDT Collaboration with  Nursing;;   Collaboration Comments Attempted OT tx X2 today. First attempt pt refused stating, \"I want to eat first.\" Seconed attempt pt in the process of d/cing to SNF. RN informed of Ot's attempt.    Session Information   Date / Session Number  Attempted X2 6/4. Last seen 5/28 #3 ( 1/2 / 6/3))   Priority 2  (will cont OT POC if pt does not d/c today as planned. )     "

## 2021-06-05 LAB
ANION GAP SERPL CALC-SCNC: 8 MMOL/L (ref 7–16)
BACTERIA UR CULT: ABNORMAL
BACTERIA UR CULT: ABNORMAL
BUN SERPL-MCNC: 36 MG/DL (ref 8–22)
CALCIUM SERPL-MCNC: 7.7 MG/DL (ref 8.5–10.5)
CHLORIDE SERPL-SCNC: 97 MMOL/L (ref 96–112)
CO2 SERPL-SCNC: 32 MMOL/L (ref 20–33)
CREAT SERPL-MCNC: 1.33 MG/DL (ref 0.5–1.4)
ERYTHROCYTE [DISTWIDTH] IN BLOOD BY AUTOMATED COUNT: 73.6 FL (ref 35.9–50)
GLUCOSE SERPL-MCNC: 117 MG/DL (ref 65–99)
HCT VFR BLD AUTO: 30.4 % (ref 42–52)
HGB BLD-MCNC: 9.3 G/DL (ref 14–18)
MAGNESIUM SERPL-MCNC: 2 MG/DL (ref 1.5–2.5)
MCH RBC QN AUTO: 29.2 PG (ref 27–33)
MCHC RBC AUTO-ENTMCNC: 30.6 G/DL (ref 33.7–35.3)
MCV RBC AUTO: 95.3 FL (ref 81.4–97.8)
PLATELET # BLD AUTO: 156 K/UL (ref 164–446)
PMV BLD AUTO: 10.7 FL (ref 9–12.9)
POTASSIUM SERPL-SCNC: 3.3 MMOL/L (ref 3.6–5.5)
RBC # BLD AUTO: 3.19 M/UL (ref 4.7–6.1)
SIGNIFICANT IND 70042: ABNORMAL
SITE SITE: ABNORMAL
SODIUM SERPL-SCNC: 137 MMOL/L (ref 135–145)
SOURCE SOURCE: ABNORMAL
WBC # BLD AUTO: 13 K/UL (ref 4.8–10.8)

## 2021-06-05 PROCEDURE — 700102 HCHG RX REV CODE 250 W/ 637 OVERRIDE(OP): Performed by: INTERNAL MEDICINE

## 2021-06-05 PROCEDURE — 700102 HCHG RX REV CODE 250 W/ 637 OVERRIDE(OP): Performed by: HOSPITALIST

## 2021-06-05 PROCEDURE — 36415 COLL VENOUS BLD VENIPUNCTURE: CPT

## 2021-06-05 PROCEDURE — A9270 NON-COVERED ITEM OR SERVICE: HCPCS | Performed by: HOSPITALIST

## 2021-06-05 PROCEDURE — 83735 ASSAY OF MAGNESIUM: CPT

## 2021-06-05 PROCEDURE — A9270 NON-COVERED ITEM OR SERVICE: HCPCS | Performed by: INTERNAL MEDICINE

## 2021-06-05 PROCEDURE — 94640 AIRWAY INHALATION TREATMENT: CPT

## 2021-06-05 PROCEDURE — 80048 BASIC METABOLIC PNL TOTAL CA: CPT

## 2021-06-05 PROCEDURE — 85027 COMPLETE CBC AUTOMATED: CPT

## 2021-06-05 PROCEDURE — 770006 HCHG ROOM/CARE - MED/SURG/GYN SEMI*

## 2021-06-05 PROCEDURE — A9270 NON-COVERED ITEM OR SERVICE: HCPCS | Performed by: STUDENT IN AN ORGANIZED HEALTH CARE EDUCATION/TRAINING PROGRAM

## 2021-06-05 PROCEDURE — 700111 HCHG RX REV CODE 636 W/ 250 OVERRIDE (IP): Performed by: INTERNAL MEDICINE

## 2021-06-05 PROCEDURE — 99233 SBSQ HOSP IP/OBS HIGH 50: CPT | Performed by: STUDENT IN AN ORGANIZED HEALTH CARE EDUCATION/TRAINING PROGRAM

## 2021-06-05 PROCEDURE — 700105 HCHG RX REV CODE 258: Performed by: STUDENT IN AN ORGANIZED HEALTH CARE EDUCATION/TRAINING PROGRAM

## 2021-06-05 PROCEDURE — 700111 HCHG RX REV CODE 636 W/ 250 OVERRIDE (IP): Performed by: STUDENT IN AN ORGANIZED HEALTH CARE EDUCATION/TRAINING PROGRAM

## 2021-06-05 PROCEDURE — 700102 HCHG RX REV CODE 250 W/ 637 OVERRIDE(OP): Performed by: STUDENT IN AN ORGANIZED HEALTH CARE EDUCATION/TRAINING PROGRAM

## 2021-06-05 RX ORDER — POTASSIUM CHLORIDE 20 MEQ/1
40 TABLET, EXTENDED RELEASE ORAL DAILY
Status: COMPLETED | OUTPATIENT
Start: 2021-06-05 | End: 2021-06-07

## 2021-06-05 RX ADMIN — CEFTRIAXONE SODIUM 2 G: 2 INJECTION, POWDER, FOR SOLUTION INTRAMUSCULAR; INTRAVENOUS at 06:12

## 2021-06-05 RX ADMIN — ENOXAPARIN SODIUM 40 MG: 40 INJECTION SUBCUTANEOUS at 06:14

## 2021-06-05 RX ADMIN — POTASSIUM CHLORIDE 40 MEQ: 1500 TABLET, EXTENDED RELEASE ORAL at 06:20

## 2021-06-05 RX ADMIN — BUDESONIDE AND FORMOTEROL FUMARATE DIHYDRATE 2 PUFF: 80; 4.5 AEROSOL RESPIRATORY (INHALATION) at 17:05

## 2021-06-05 RX ADMIN — ASPIRIN 81 MG: 81 TABLET, COATED ORAL at 06:13

## 2021-06-05 RX ADMIN — CARVEDILOL 3.12 MG: 3.12 TABLET, FILM COATED ORAL at 17:04

## 2021-06-05 RX ADMIN — CLOPIDOGREL BISULFATE 75 MG: 75 TABLET ORAL at 06:13

## 2021-06-05 RX ADMIN — LEVOTHYROXINE SODIUM 75 MCG: 0.07 TABLET ORAL at 06:14

## 2021-06-05 RX ADMIN — ACETAMINOPHEN 650 MG: 325 TABLET, FILM COATED ORAL at 22:55

## 2021-06-05 RX ADMIN — MONTELUKAST 10 MG: 10 TABLET, FILM COATED ORAL at 06:14

## 2021-06-05 RX ADMIN — FLUTICASONE PROPIONATE 50 MCG: 50 SPRAY, METERED NASAL at 06:11

## 2021-06-05 RX ADMIN — OMEPRAZOLE 20 MG: 20 CAPSULE, DELAYED RELEASE ORAL at 06:14

## 2021-06-05 RX ADMIN — TAMSULOSIN HYDROCHLORIDE 0.4 MG: 0.4 CAPSULE ORAL at 06:14

## 2021-06-05 RX ADMIN — OMEPRAZOLE 20 MG: 20 CAPSULE, DELAYED RELEASE ORAL at 17:05

## 2021-06-05 RX ADMIN — ATORVASTATIN CALCIUM 80 MG: 40 TABLET, FILM COATED ORAL at 17:04

## 2021-06-05 RX ADMIN — HYDROCORTISONE 10 MG: 20 TABLET ORAL at 17:04

## 2021-06-05 RX ADMIN — TIOTROPIUM BROMIDE INHALATION SPRAY 5 MCG: 3.12 SPRAY, METERED RESPIRATORY (INHALATION) at 06:12

## 2021-06-05 RX ADMIN — HYDROCORTISONE 10 MG: 20 TABLET ORAL at 06:13

## 2021-06-05 RX ADMIN — CARBOXYMETHYLCELLULOSE SODIUM 1 DROP: 5 SOLUTION/ DROPS OPHTHALMIC at 06:11

## 2021-06-05 RX ADMIN — OXYCODONE 5 MG: 5 TABLET ORAL at 07:57

## 2021-06-05 RX ADMIN — BUDESONIDE AND FORMOTEROL FUMARATE DIHYDRATE 2 PUFF: 80; 4.5 AEROSOL RESPIRATORY (INHALATION) at 06:10

## 2021-06-05 RX ADMIN — CARVEDILOL 3.12 MG: 3.12 TABLET, FILM COATED ORAL at 07:58

## 2021-06-05 RX ADMIN — CARBOXYMETHYLCELLULOSE SODIUM 1 DROP: 5 SOLUTION/ DROPS OPHTHALMIC at 17:05

## 2021-06-05 RX ADMIN — Medication 950 MG: at 06:13

## 2021-06-05 ASSESSMENT — PAIN DESCRIPTION - PAIN TYPE
TYPE: CHRONIC PAIN
TYPE: ACUTE PAIN
TYPE: CHRONIC PAIN
TYPE: CHRONIC PAIN
TYPE: ACUTE PAIN
TYPE: CHRONIC PAIN

## 2021-06-05 ASSESSMENT — FIBROSIS 4 INDEX: FIB4 SCORE: 4.93

## 2021-06-05 NOTE — CARE PLAN
The patient is Watcher - Medium risk of patient condition declining or worsening     Shift Goals  Clinical Goals: Pt's oxygen saturation will remain stable  Patient Goals: Pt's will do q2 turns   Family Goals: NA     Progress made toward(s) clinical / shift goals:      Problem: Knowledge Deficit - Standard  Goal: Patient and family/care givers will demonstrate understanding of plan of care, disease process/condition, diagnostic tests and medications  Outcome: Not Progressing  Note: Pt disoriented to situation. Uses call light appropriately throughout the shift. Reorientation provided.     Problem: Hemodynamics  Goal: Patient's hemodynamics, fluid balance and neurologic status will be stable or improve  Outcome: Progressing  Note: Pt has good oral intake, condom catheter for accurate output.

## 2021-06-05 NOTE — CONSULTS
Reason for PC Consult: Advance Care Planning    Consulted by: Dr Redman    Assessment:  General: 89 yo male admitted on 5/20/2021 for Failure to thrive in adult  PMH: Afib, PE, heart block s/p pacemaker placement, parathyroidectomy, CAD, heart failure with 45% EF, adrenal insufficiency   Admit 4/18/21 through 4/29/21 for Hypotension. STEMI 4/20 and had stents x3. DC to [facility]  Admit 4/29/21 through 5/1/21 for Debility to [facility].  Admit 5/1/21 through 5/14/21 for Debility. Stabilized and discharged to SNF    Pt presented to the ED from SNF due to peripheral edema, hypokalemia, and failure to thrive.     [place]: [place] [facility] [place]. He lives with his elderly wife who is afraid will not be able to provide care for him.     Consults:   Palliative Care     Dyspnea: No- denies  Last BM: 06/04/21-    Pain: No- currently denies  Depression: Mood appropriate for situation-    Dementia: No;       Spiritual:  Is Baptism or spirituality important for coping with this illness? Yes-    Has a  or spiritual provider visit been requested? No    Palliative Performance Scale: 40%    Advance Directive: None on file   DPOA: No, Jacquie Lizama [phone], spouse    POLST: Yes      Code Status: DNR- I ok     Outcome:  Pt seen by Palliative Care on previous admission-please also see note on 5/6/21 09:58    Met with pt at the bedside, introduced self and the role of Palliative Care. Spoke with pt about his current admission and confusion over GOC/POC. Pt feels sad and frustrated to be far from home and friends and wishes that he could DC to a skilled in [place]. We discussed that they have all denied him per the DC notes. He is aware.    We spoke about his wife's wish for him to be at home and that she is asking for Hospice. He states that he wants to go home but that he wants curative treatment and therapies. He says that he is not ready for hospice.   He would like to go home with HH but also  "states that his wife cannot meet his care needs. Asked if he felt they may be able to pay for extra caregivers. He says \"we have a little money\".  We spoke about the fact that if he discharges to home with HH or with Hospice,  neither service will provide 24/7 care and for him to DC to home the family will nee to come up with a plan to supplement his care giving needs in order to decrease the physical burden for his wife.     He acknowledges this. When discussing SNF in [place] he states that he is homesick and just wants to be closer to family and friends.   Let him know that we can reach out to [facility]  in order to determine if they may have a local list of caregiver services for his area. He is in agreement and asks that I update his wife.     Called [facility] [phone], they are closed for the weekend. Will need to follow up on Monday.   Called pts wife, busy signal. Tried to call 4x, over the course of over an hour, still busy signal.   Called pts son, Gene, who has spoke with Wander Sanchez on POC. NO answer, left message with request for call back and contact number provided.     Updated Pt that his wifes number is busy, that [facility] is closed for the weekend, and that I have left a message with his son Gene. Let him know that I have updated Dr Redman and that we will have to call the [facility] on Monday to look for any supportive options. He is very thankful.     Recommendations: I do not recommend an ethics or hospice consult at this time because options are being researched at this time.    Updated: Dr Redman, Weekend      Plan: TBD, will need to speak with pts wife to determine her supportive capabilities, and attempt to determine if there any supportive agencies for their area that they can afford. Palliative RN will continue to assist with POC/GOC.     Thank you for allowing Palliative Care to participate in this patient's care. Please feel free to call [phone] with any questions or concerns.  "

## 2021-06-05 NOTE — PROGRESS NOTES
Hospital Medicine Daily Progress Note    Date of Service  6/5/2021    Chief Complaint  88 y.o. 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     During the stay in the hospital, patient electrolytes continue to be repleted.  Patient is also noted to have acute on chronic systolic congestive heart failure, started on IV Lasix--> later switched po, I/Os, daily weight, fluid restriction.  Regarding CAD, patient is on aspirin, Plavix, Coreg, losartan, and statin. Patient is to follow-up with cardiology as an outpatient.     Patient has history of adrenal insufficiency,on hydrocortisone 10 bid. PT/OT has evaluated, recommended discharging the patient to skilled nursing facility.        Interval Problem Update  6/1/2021  Vital stable  Labs unremarkable. meds reviewed    assisting with placement     6/2/2021  Vitals remained stable  Labs remain unremarkable  No events overnight   assisting with placement     6/3/2021  Noted to have low blood pressure  Medication adjusted  Remain afebrile  Labs noted with elevated white count likely secondary to steroid use  Mild FABIAN.  Will hold Lasix and losartan.  Elevated pro-Magdi.  No sign of sepsis active infection  Check UA      6/4/2021  Had repaid response overnight for hypotension  Dry on examination .  Blood pressure improved  On Rocephin for UTI  urine culture and blood culture in  process  Losartan will Lasix on hold given low blood pressure and worsening kidney function  We will continue monitor kidney function  If labs remain unremarkable and  blood pressure remained stable can be transferred  to SNF tomorrow     6/5/2021  Vitals remained stable.  Requiring fiber oxygen to maintain saturation over 95  Kidney function improving.  KCl added for hyponatremia  Plan to discharge to a SNF versus hospice after discussion with the patient's spouse  Palliative evaluation requested for assistance    Consultants/Specialty  N/A     Code Status  DNAR, I OK     Disposition  SNF      Review of Systems  Review of Systems   Constitutional: Negative for fever.   HENT: Negative for hearing loss.    Eyes: Negative for blurred vision and double vision.   Respiratory: Negative for cough.    Cardiovascular: Negative for chest pain and palpitations.   Gastrointestinal: Negative for nausea and vomiting.   Genitourinary: Negative for dysuria.   Musculoskeletal: Negative for myalgias.   Skin: Negative for rash.   Neurological: Negative for dizziness and headaches.   Psychiatric/Behavioral: Negative for depression.         Physical Exam  Temp:  [36.2 °C (97.2 °F)-37.1 °C (98.8 °F)] 36.2 °C (97.2 °F)  Pulse:  [71-82] 78  Resp:  [16-21] 16  BP: (125-156)/(59-92) 156/92  SpO2:  [93 %-96 %] 96 %     Physical Exam  Constitutional:       Appearance: He is obese.   HENT:      Head: Normocephalic and atraumatic.      Right Ear: Tympanic membrane normal.      Left Ear: Tympanic membrane normal.      Nose: Nose normal.      Mouth/Throat:      Mouth: Mucous membranes are moist.   Eyes:      Pupils: Pupils are equal, round, and reactive to light.   Cardiovascular:      Rate and Rhythm: Normal rate and regular rhythm.      Pulses: Normal pulses.      Heart sounds: Normal heart sounds. No murmur heard.     Pulmonary:      Effort: Pulmonary effort is normal. No respiratory distress.   Abdominal:      General: Abdomen is flat.       Palpations: Abdomen is soft.      Tenderness: There is no abdominal tenderness.   Musculoskeletal:      Right lower leg: Edema present.      Left lower leg: Edema present.   Skin:     Coloration: Skin is not jaundiced.   Neurological:      General: No focal deficit present.      Mental Status: He is alert and oriented to person, place, and time. Mental status is at baseline.   Psychiatric:         Mood and Affect: Mood normal.   Fluids    Intake/Output Summary (Last 24 hours) at 6/5/2021 1335  Last data filed at 6/5/2021 1200  Gross per 24 hour   Intake 840 ml   Output --   Net 840 ml       Laboratory  Recent Labs     06/03/21 0226 06/04/21 0059 06/05/21  0153   WBC 14.6* 15.0* 13.0*   RBC 3.24* 3.05* 3.19*   HEMOGLOBIN 9.4* 8.8* 9.3*   HEMATOCRIT 31.0* 29.1* 30.4*   MCV 95.7 95.4 95.3   MCH 29.0 28.9 29.2   MCHC 30.3* 30.2* 30.6*   RDW 73.3* 74.5* 73.6*   PLATELETCT 108* 159* 156*   MPV 10.5 10.7 10.7     Recent Labs     06/03/21 0226 06/04/21  0059 06/05/21  0153   SODIUM 136 139 137   POTASSIUM 3.6 3.6 3.3*   CHLORIDE 97 97 97   CO2 32 34* 32   GLUCOSE 79 82 117*   BUN 25* 32* 36*   CREATININE 1.18 1.44* 1.33   CALCIUM 7.6* 7.5* 7.7*                   Imaging  DX-CHEST-PORTABLE (1 VIEW)   Final Result      Findings consistent with mild pulmonary edema along with minimal bibasilar atelectasis and small left pleural effusion. No significant change.      DX-CHEST-LIMITED (1 VIEW)   Final Result         No significant interval change.      Small bilateral pleural effusion bibasilar opacities, similar to prior.      Stable cardiomegaly.      US-EXTREMITY VENOUS UPPER UNILAT LEFT   Final Result      CT-EXTREMITY, UPPER WITH LEFT   Final Result      No acute fracture or dislocation.      Severe osteoarthritis of the elbow joint.      IR-MIDLINE CATHETER INSERTION WO GUIDANCE > AGE 3   Final Result                  Ultrasound-guided midline placement performed by qualified nursing staff    as above.           IR-US GUIDED PIV   Final Result    Ultrasound-guided PERIPHERAL IV INSERTION performed by    qualified nursing staff as above.      US-EXTREMITY VENOUS LOWER UNILAT LEFT   Final Result      DX-CHEST-PORTABLE (1 VIEW)   Final Result      Bibasilar opacities may represent atelectasis or consolidation.      Small left pleural effusion may be present.      Pulmonary interstitial edema.      Stable prominence of the cardiomediastinal silhouette.      Atherosclerotic calcification is seen.      DX-ELBOW-COMPLETE 3+ LEFT   Final Result      1.  No fracture or dislocation of LEFT elbow.   2.  Severe degenerative change.   3.  Diffuse subcutaneous edema.   4.  Limited by positioning.           Assessment/Plan  * Acute on chronic systolic congestive heart failure (HCC)- (present on admission)  Assessment & Plan  Patient on 5L oxygen via nc at baseline  Echo showed EF of 45% on 5/12, I/os , daily weight and fluid restriction to 1.5 L  Continue BB , losartan and  Lasix  Cardiology out-patient    Pain and swelling of left upper extremity- (present on admission)  Assessment & Plan  Pt reports trauma and popping sound about 5 days ago, following which he noticed swelling and pain to his LUE  US of the left upper ext didn't show any DVT   Ct reviewed and did show arthritis    recs elevation of the eft upper ext    Sacral wound- (present on admission)  Assessment & Plan  Wound mx as per wound team recs   --Offloading due to risk of pressure ulcers    Hypophosphatemia  Assessment & Plan  Replete and monitor  2.5      Hypomagnesemia  Assessment & Plan  Replete and monitor      Hypokalemia- (present on admission)  Assessment & Plan  Replete and monitor    Anasarca- (present on admission)  Assessment & Plan  Continue to monitor on diuretic     Debility- (present on admission)  Assessment & Plan  PT OT evaluation, recs post-acute  Medically cleared to be discharged to skilled nursing     Coronary artery disease- (present on  admission)  Assessment & Plan  S/p PCI with stent placement 4 weeks ago  Aspirin, Plavix, Coreg, atorvastatin, losartan, Lasix.    Adrenal insufficiency (HCC)- (present on admission)  Assessment & Plan   On hydrocortisone    UTI (urinary tract infection)  Assessment & Plan  On rocephine   Urine culture growing Klebsiella pneumonia  We will switch to oral antibiotic    Chronic respiratory failure (HCC)- (present on admission)  Assessment & Plan  Continue supplemental oxygen, on 4 to 5 L    FABIAN (acute kidney injury) ([facility])  Assessment & Plan  Improving    Continue to monitor closely     Sepsis ([facility])  Assessment & Plan  This is Sepsis Not present on admission  SIRS criteria identified on my evaluation include: Tachycardia, with heart rate greater than 90 BPM, Tachypnea, with respirations greater than 20 per minute and Leukocytosis, with WBC greater than 12,000  Source is urine   Sepsis protocol initiated  Fluid resuscitation ordered per protocol  IV antibiotics as appropriate for source of sepsis  While organ dysfunction may be noted elsewhere in this problem list or in the chart, degree of organ dysfunction does not meet CMS criteria for severe sepsis          BPH (benign prostatic hypertrophy)- (present on admission)  Assessment & Plan  continiue Flomax and finasteride    History of pulmonary embolism- (present on admission)  Assessment & Plan  Warfarin was discontinued during previous recent hospitalization due to blood loss anemia, patient knows   monitor hemoglobin hematocrit, so far stabel  Transfuse if less than 7        COPD (chronic obstructive pulmonary disease) ([facility])- (present on admission)  Assessment & Plan  Continue RT protocol, breathing treatment   --Titrate O2 as needed    -- ON 4 L of O2  And denied any complain of sob    Essential hypertension, benign- (present on admission)  Assessment & Plan  Blood pressure noted to be well controlled, monitor  Continue coreg and losartan       VTE prophylaxis:  lovenox

## 2021-06-05 NOTE — PALLIATIVE CARE
"Palliative Care follow-up  Received call back from pts son, Gene. Updated on conversation had with his father and that I have been unable to reach his mother for further discussion.   Gene states \"but that isn't what my father said yesterday\" in regards to no hospice.   Explained that in my conversation with his father he says that he will go home with hospice but he also stipulates \"that I want all the care to recover\". Explained that these are two different goals and that Hospice support is for end of life, and that they will not provide therapies.   Let Gene know that when this is explained to his father, he refuses hospice and wants therapies.    Explained that regardless of home with Hospice or home with HH, he will likely need more physical supportive care than his mother can provide and that some type of extra caregiver will be essential.   Gene states that he has a better understanding now of the obstacles.   He is unsure what they will be able to afford and states that due to where he lives and works he cannot physically or financially provide much assistance.    Plan: Palliative Team will follow up with Wander RAYGOZA on Monday to follow up with [facility] to determine what extra supportive agencies may be in the area to assist with DC plan.     Thank you for allowing Palliative Care to participate in this patient's care. Please feel free to call [phone] with any questions or concerns.  "

## 2021-06-05 NOTE — PROGRESS NOTES
Pt A&Ox3, disoriented to situation. No ambulation this shift. Turned every 2 hours for comfort. Pt c/o generalized pain, oxycodone given and effective. Pt has midline to right arm, does not draw blood. BLE and Left arm edema.

## 2021-06-06 LAB
ANION GAP SERPL CALC-SCNC: 7 MMOL/L (ref 7–16)
BUN SERPL-MCNC: 33 MG/DL (ref 8–22)
CALCIUM SERPL-MCNC: 7.7 MG/DL (ref 8.5–10.5)
CHLORIDE SERPL-SCNC: 98 MMOL/L (ref 96–112)
CO2 SERPL-SCNC: 34 MMOL/L (ref 20–33)
CREAT SERPL-MCNC: 0.98 MG/DL (ref 0.5–1.4)
ERYTHROCYTE [DISTWIDTH] IN BLOOD BY AUTOMATED COUNT: 70.8 FL (ref 35.9–50)
GLUCOSE SERPL-MCNC: 96 MG/DL (ref 65–99)
HCT VFR BLD AUTO: 29.6 % (ref 42–52)
HGB BLD-MCNC: 8.8 G/DL (ref 14–18)
MCH RBC QN AUTO: 28.1 PG (ref 27–33)
MCHC RBC AUTO-ENTMCNC: 29.7 G/DL (ref 33.7–35.3)
MCV RBC AUTO: 94.6 FL (ref 81.4–97.8)
PLATELET # BLD AUTO: 156 K/UL (ref 164–446)
PMV BLD AUTO: 10.6 FL (ref 9–12.9)
POTASSIUM SERPL-SCNC: 3.3 MMOL/L (ref 3.6–5.5)
RBC # BLD AUTO: 3.13 M/UL (ref 4.7–6.1)
SODIUM SERPL-SCNC: 139 MMOL/L (ref 135–145)
WBC # BLD AUTO: 8 K/UL (ref 4.8–10.8)

## 2021-06-06 PROCEDURE — 700102 HCHG RX REV CODE 250 W/ 637 OVERRIDE(OP): Performed by: INTERNAL MEDICINE

## 2021-06-06 PROCEDURE — A9270 NON-COVERED ITEM OR SERVICE: HCPCS | Performed by: STUDENT IN AN ORGANIZED HEALTH CARE EDUCATION/TRAINING PROGRAM

## 2021-06-06 PROCEDURE — 80048 BASIC METABOLIC PNL TOTAL CA: CPT

## 2021-06-06 PROCEDURE — A9270 NON-COVERED ITEM OR SERVICE: HCPCS | Performed by: INTERNAL MEDICINE

## 2021-06-06 PROCEDURE — 700102 HCHG RX REV CODE 250 W/ 637 OVERRIDE(OP): Performed by: STUDENT IN AN ORGANIZED HEALTH CARE EDUCATION/TRAINING PROGRAM

## 2021-06-06 PROCEDURE — 700105 HCHG RX REV CODE 258: Performed by: STUDENT IN AN ORGANIZED HEALTH CARE EDUCATION/TRAINING PROGRAM

## 2021-06-06 PROCEDURE — 99233 SBSQ HOSP IP/OBS HIGH 50: CPT | Performed by: STUDENT IN AN ORGANIZED HEALTH CARE EDUCATION/TRAINING PROGRAM

## 2021-06-06 PROCEDURE — 700111 HCHG RX REV CODE 636 W/ 250 OVERRIDE (IP): Performed by: STUDENT IN AN ORGANIZED HEALTH CARE EDUCATION/TRAINING PROGRAM

## 2021-06-06 PROCEDURE — 700111 HCHG RX REV CODE 636 W/ 250 OVERRIDE (IP): Performed by: INTERNAL MEDICINE

## 2021-06-06 PROCEDURE — A9270 NON-COVERED ITEM OR SERVICE: HCPCS | Performed by: HOSPITALIST

## 2021-06-06 PROCEDURE — 85027 COMPLETE CBC AUTOMATED: CPT

## 2021-06-06 PROCEDURE — 770006 HCHG ROOM/CARE - MED/SURG/GYN SEMI*

## 2021-06-06 PROCEDURE — 700102 HCHG RX REV CODE 250 W/ 637 OVERRIDE(OP): Performed by: HOSPITALIST

## 2021-06-06 RX ORDER — SULFAMETHOXAZOLE AND TRIMETHOPRIM 800; 160 MG/1; MG/1
1 TABLET ORAL EVERY 12 HOURS
Status: COMPLETED | OUTPATIENT
Start: 2021-06-07 | End: 2021-06-09

## 2021-06-06 RX ADMIN — CARBOXYMETHYLCELLULOSE SODIUM 1 DROP: 5 SOLUTION/ DROPS OPHTHALMIC at 05:28

## 2021-06-06 RX ADMIN — ALPRAZOLAM 0.25 MG: 0.25 TABLET ORAL at 21:41

## 2021-06-06 RX ADMIN — FUROSEMIDE 20 MG: 20 TABLET ORAL at 05:26

## 2021-06-06 RX ADMIN — CLOPIDOGREL BISULFATE 75 MG: 75 TABLET ORAL at 05:26

## 2021-06-06 RX ADMIN — CEFTRIAXONE SODIUM 2 G: 2 INJECTION, POWDER, FOR SOLUTION INTRAMUSCULAR; INTRAVENOUS at 05:28

## 2021-06-06 RX ADMIN — ENOXAPARIN SODIUM 40 MG: 40 INJECTION SUBCUTANEOUS at 05:27

## 2021-06-06 RX ADMIN — OXYCODONE 5 MG: 5 TABLET ORAL at 21:41

## 2021-06-06 RX ADMIN — TAMSULOSIN HYDROCHLORIDE 0.4 MG: 0.4 CAPSULE ORAL at 05:27

## 2021-06-06 RX ADMIN — ATORVASTATIN CALCIUM 80 MG: 40 TABLET, FILM COATED ORAL at 17:04

## 2021-06-06 RX ADMIN — FLUTICASONE PROPIONATE 50 MCG: 50 SPRAY, METERED NASAL at 05:28

## 2021-06-06 RX ADMIN — HYDROCORTISONE 10 MG: 20 TABLET ORAL at 05:26

## 2021-06-06 RX ADMIN — BUDESONIDE AND FORMOTEROL FUMARATE DIHYDRATE 2 PUFF: 80; 4.5 AEROSOL RESPIRATORY (INHALATION) at 17:04

## 2021-06-06 RX ADMIN — OMEPRAZOLE 20 MG: 20 CAPSULE, DELAYED RELEASE ORAL at 17:04

## 2021-06-06 RX ADMIN — OMEPRAZOLE 20 MG: 20 CAPSULE, DELAYED RELEASE ORAL at 05:28

## 2021-06-06 RX ADMIN — CARVEDILOL 3.12 MG: 3.12 TABLET, FILM COATED ORAL at 17:04

## 2021-06-06 RX ADMIN — ASPIRIN 81 MG: 81 TABLET, COATED ORAL at 05:27

## 2021-06-06 RX ADMIN — TIOTROPIUM BROMIDE INHALATION SPRAY 5 MCG: 3.12 SPRAY, METERED RESPIRATORY (INHALATION) at 05:29

## 2021-06-06 RX ADMIN — DOCUSATE SODIUM 50 MG AND SENNOSIDES 8.6 MG 2 TABLET: 8.6; 5 TABLET, FILM COATED ORAL at 05:27

## 2021-06-06 RX ADMIN — ALPRAZOLAM 0.25 MG: 0.25 TABLET ORAL at 00:15

## 2021-06-06 RX ADMIN — LEVOTHYROXINE SODIUM 75 MCG: 0.07 TABLET ORAL at 05:27

## 2021-06-06 RX ADMIN — CARBOXYMETHYLCELLULOSE SODIUM 1 DROP: 5 SOLUTION/ DROPS OPHTHALMIC at 17:04

## 2021-06-06 RX ADMIN — BUDESONIDE AND FORMOTEROL FUMARATE DIHYDRATE 2 PUFF: 80; 4.5 AEROSOL RESPIRATORY (INHALATION) at 05:27

## 2021-06-06 RX ADMIN — CARVEDILOL 3.12 MG: 3.12 TABLET, FILM COATED ORAL at 07:08

## 2021-06-06 RX ADMIN — ACETAMINOPHEN 650 MG: 325 TABLET, FILM COATED ORAL at 17:51

## 2021-06-06 RX ADMIN — HYDROCORTISONE 10 MG: 20 TABLET ORAL at 17:03

## 2021-06-06 RX ADMIN — Medication 950 MG: at 05:27

## 2021-06-06 RX ADMIN — MONTELUKAST 10 MG: 10 TABLET, FILM COATED ORAL at 05:26

## 2021-06-06 RX ADMIN — POTASSIUM CHLORIDE 40 MEQ: 1500 TABLET, EXTENDED RELEASE ORAL at 05:26

## 2021-06-06 ASSESSMENT — PAIN DESCRIPTION - PAIN TYPE
TYPE: ACUTE PAIN

## 2021-06-06 NOTE — PROGRESS NOTES
Report received from bartolo RN. Assumed care, assessment complete at 1950. Pt is A & O x 4.  Pt reports pain at this time to back, 3/10 see MAR. Fall precautions and appropriate signs in place. Pt oriented to unit routine, call light/phone system and RN extension number provided. Pt educated regarding fall precautions. Bed alarm in use. Pt denies any additional needs at this time. Call light within reach.

## 2021-06-06 NOTE — CARE PLAN
Problem: Pain - Standard  Goal: Alleviation of pain or a reduction in pain to the patient’s comfort goal  Outcome: Progressing     Problem: Fall Risk  Goal: Patient will remain free from falls  Outcome: Progressing   The patient is Watcher - Medium risk of patient condition declining or worsening    Shift Goals  Clinical Goals: Coughing/ deep breathing  Patient Goals: pain control  Family Goals: na    Progress made toward(s) clinical / shift goals:  Yes    Patient is not progressing towards the following goals:  NA

## 2021-06-06 NOTE — CARE PLAN
Problem: Knowledge Deficit - Standard  Goal: Patient and family/care givers will demonstrate understanding of plan of care, disease process/condition, diagnostic tests and medications  Outcome: Progressing  Note: Pt is aware of current treatment plan.      Problem: Pain - Standard  Goal: Alleviation of pain or a reduction in pain to the patient’s comfort goal  Outcome: Progressing  Flowsheets  Taken 6/6/2021 0700 by Missy Mcpherson RShannanNShannan  Pain Rating Scale (NPRS): 0  Taken 6/2/2021 1510 by Ella Klein RShannanNShannan  Non Verbal Scale: Sleeping  Note: Pt does not complain of pain.    The patient is Watcher - Medium risk of patient condition declining or worsening    Shift Goals  Clinical Goals: deep cough   Patient Goals: pain control   Family Goals: na    Progress made toward(s) clinical / shift goals:  deep cough     Patient is not progressing towards the following goals:

## 2021-06-06 NOTE — PROGRESS NOTES
Hospital Medicine Daily Progress Note    Date of Service  6/6/2021    Chief Complaint  88 y.o. 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     During the stay in the hospital, patient electrolytes continue to be repleted.  Patient is also noted to have acute on chronic systolic congestive heart failure, started on IV Lasix--> later switched po, I/Os, daily weight, fluid restriction.  Regarding CAD, patient is on aspirin, Plavix, Coreg, losartan, and statin. Patient is to follow-up with cardiology as an outpatient.     Patient has history of adrenal insufficiency,on hydrocortisone 10 bid. PT/OT has evaluated, recommended discharging the patient to skilled nursing facility.        Interval Problem Update  6/1/2021  Vital stable  Labs unremarkable. meds reviewed    assisting with placement     6/2/2021  Vitals remained stable  Labs remain unremarkable  No events overnight   assisting with placement     6/3/2021  Noted to have low blood pressure  Medication adjusted  Remain afebrile  Labs noted with elevated white count likely secondary to steroid use  Mild FABIAN.  Will hold Lasix and losartan.  Elevated pro-Magdi.  No sign of sepsis active infection  Check UA      6/4/2021  Had repaid response overnight for hypotension  Dry on examination .  Blood pressure improved  On Rocephin for UTI  urine culture and blood culture in  process  Losartan will Lasix on hold given low blood pressure and worsening kidney function  We will continue monitor kidney function  If labs remain unremarkable and  blood pressure remained stable can be transferred  to SNF tomorrow     6/5/2021  Vitals remained stable.  Requiring 5L oxygen to maintain saturation over 95  Kidney function improving.  KCl added for hyponatremia  Plan to discharge to a SNF versus hospice after discussion with the patient's spouse  Palliative evaluation requested for assistance     6/6/2021  Vitals remain stable .  KCl replaced for hypokalemia  Kidney function improved  Lasix resumed  Possible discharge SNF tomorrow   and palliative assisting with discharge    Consultants/Specialty  N/A     Code Status  DNAR, I OK     Disposition  SNF      Review of Systems  Review of Systems   Constitutional: Negative for fever.   HENT: Negative for hearing loss.    Eyes: Negative for blurred vision and double vision.   Respiratory: Negative for cough.    Cardiovascular: Negative for chest pain and palpitations.   Gastrointestinal: Negative for nausea and vomiting.   Genitourinary: Negative for dysuria.   Musculoskeletal: Negative for myalgias.   Skin: Negative for rash.   Neurological: Negative for dizziness and headaches.   Psychiatric/Behavioral: Negative for depression.         Physical Exam  Temp:  [36.2 °C (97.2 °F)-37.1 °C (98.8 °F)] 36.2 °C (97.2 °F)  Pulse:  [71-82] 78  Resp:  [16-21] 16  BP: (125-156)/(59-92) 156/92  SpO2:  [93 %-96 %] 96 %     Physical Exam  Constitutional:       Appearance: He is obese.   HENT:      Head: Normocephalic and atraumatic.      Right Ear: Tympanic membrane normal.      Left Ear: Tympanic membrane normal.      Nose: Nose normal.      Mouth/Throat:      Mouth: Mucous membranes are moist.   Eyes:      Pupils: Pupils are equal, round, and reactive to light.   Cardiovascular:      Rate and Rhythm: Normal rate and regular rhythm.      Pulses: Normal  pulses.      Heart sounds: Normal heart sounds. No murmur heard.     Pulmonary:      Effort: Pulmonary effort is normal. No respiratory distress.   Abdominal:      General: Abdomen is flat.      Palpations: Abdomen is soft.      Tenderness: There is no abdominal tenderness.   Musculoskeletal:      Right lower leg: Edema present.      Left lower leg: Edema present.   Skin:     Coloration: Skin is not jaundiced.   Neurological:      General: No focal deficit present.      Mental Status: He is alert and oriented to person, place, and time. Mental status is at baseline.   Psychiatric:         Mood and Affect: Mood normal.   Fluids    Intake/Output Summary (Last 24 hours) at 6/6/2021 1219  Last data filed at 6/6/2021 0911  Gross per 24 hour   Intake 480 ml   Output 1350 ml   Net -870 ml       Laboratory  Recent Labs     06/04/21 0059 06/05/21  0153 06/06/21  0230   WBC 15.0* 13.0* 8.0   RBC 3.05* 3.19* 3.13*   HEMOGLOBIN 8.8* 9.3* 8.8*   HEMATOCRIT 29.1* 30.4* 29.6*   MCV 95.4 95.3 94.6   MCH 28.9 29.2 28.1   MCHC 30.2* 30.6* 29.7*   RDW 74.5* 73.6* 70.8*   PLATELETCT 159* 156* 156*   MPV 10.7 10.7 10.6     Recent Labs     06/04/21 0059 06/05/21  0153 06/06/21  0230   SODIUM 139 137 139   POTASSIUM 3.6 3.3* 3.3*   CHLORIDE 97 97 98   CO2 34* 32 34*   GLUCOSE 82 117* 96   BUN 32* 36* 33*   CREATININE 1.44* 1.33 0.98   CALCIUM 7.5* 7.7* 7.7*                   Imaging  DX-CHEST-PORTABLE (1 VIEW)   Final Result      Findings consistent with mild pulmonary edema along with minimal bibasilar atelectasis and small left pleural effusion. No significant change.      DX-CHEST-LIMITED (1 VIEW)   Final Result         No significant interval change.      Small bilateral pleural effusion bibasilar opacities, similar to prior.      Stable cardiomegaly.      US-EXTREMITY VENOUS UPPER UNILAT LEFT   Final Result      CT-EXTREMITY, UPPER WITH LEFT   Final Result      No acute fracture or dislocation.      Severe osteoarthritis of the  elbow joint.      IR-MIDLINE CATHETER INSERTION WO GUIDANCE > AGE 3   Final Result                  Ultrasound-guided midline placement performed by qualified nursing staff    as above.          IR-US GUIDED PIV   Final Result    Ultrasound-guided PERIPHERAL IV INSERTION performed by    qualified nursing staff as above.      US-EXTREMITY VENOUS LOWER UNILAT LEFT   Final Result      DX-CHEST-PORTABLE (1 VIEW)   Final Result      Bibasilar opacities may represent atelectasis or consolidation.      Small left pleural effusion may be present.      Pulmonary interstitial edema.      Stable prominence of the cardiomediastinal silhouette.      Atherosclerotic calcification is seen.      DX-ELBOW-COMPLETE 3+ LEFT   Final Result      1.  No fracture or dislocation of LEFT elbow.   2.  Severe degenerative change.   3.  Diffuse subcutaneous edema.   4.  Limited by positioning.           Assessment/Plan  * Acute on chronic systolic congestive heart failure (HCC)- (present on admission)  Assessment & Plan  Patient on 5L oxygen via nc at baseline  Echo showed EF of 45% on 5/12, I/os , daily weight and fluid restriction to 1.5 L  Continue BB , losartan and  Lasix  Cardiology out-patient    Pain and swelling of left upper extremity- (present on admission)  Assessment & Plan  Pt reports trauma and popping sound about 5 days ago, following which he noticed swelling and pain to his LUE  US of the left upper ext didn't show any DVT   Ct reviewed and did show arthritis    recs elevation of the eft upper ext    Sacral wound- (present on admission)  Assessment & Plan  Wound mx as per wound team recs   --Offloading due to risk of pressure ulcers    Hypophosphatemia  Assessment & Plan  Replete and monitor  2.5      Hypomagnesemia  Assessment & Plan  Replete and monitor      Hypokalemia- (present on admission)  Assessment & Plan  Replete and monitor    Anasarca- (present on admission)  Assessment & Plan  Continue to monitor on diuretic      Debility- (present on admission)  Assessment & Plan  PT OT evaluation, recs post-acute  Medically cleared to be discharged to skilled nursing     Coronary artery disease- (present on admission)  Assessment & Plan  S/p PCI with stent placement 4 weeks ago  Aspirin, Plavix, Coreg, atorvastatin, losartan, Lasix.    Adrenal insufficiency (HCC)- (present on admission)  Assessment & Plan   On hydrocortisone    UTI (urinary tract infection)  Assessment & Plan  On rocephine   Urine culture growing Klebsiella pneumonia  We will switch to oral antibiotic    Chronic respiratory failure (HCC)- (present on admission)  Assessment & Plan  Continue supplemental oxygen, on 4 to 5 L    FABIAN (acute kidney injury) ([facility])  Assessment & Plan  Improving    Continue to monitor closely     Sepsis ([facility])  Assessment & Plan  Resolved           BPH (benign prostatic hypertrophy)- (present on admission)  Assessment & Plan  continiue Flomax and finasteride    History of pulmonary embolism- (present on admission)  Assessment & Plan  Warfarin was discontinued during previous recent hospitalization due to blood loss anemia, patient knows   monitor hemoglobin hematocrit, so far stabel  Transfuse if less than 7        COPD (chronic obstructive pulmonary disease) ([facility])- (present on admission)  Assessment & Plan  Continue RT protocol, breathing treatment   --Titrate O2 as needed    -- ON 4 L of O2  And denied any complain of sob    Essential hypertension, benign- (present on admission)  Assessment & Plan  Blood pressure noted to be well controlled, monitor  Continue coreg and losartan       VTE prophylaxis: lovenox    I have performed a physical exam and reviewed and updated ROS and Plan today (6/6/2021). In review of yesterday's note (6/5/2021), there are no changes except as documented above.

## 2021-06-06 NOTE — PROGRESS NOTES
Report received from Jeffrey ROLON. Assumed care at 0700, assessment complete. Pt is A & O x 4.  Pt denies having any pain at this time. Fall precautions and appropriate signs in place. Pt oriented to unit routine, call light/phone system and RN extension number provided. Pt educated regarding fall precautions. Pt denies any additional needs at this time. Call light within reach.

## 2021-06-07 LAB
ANION GAP SERPL CALC-SCNC: 4 MMOL/L (ref 7–16)
BUN SERPL-MCNC: 27 MG/DL (ref 8–22)
CALCIUM SERPL-MCNC: 8 MG/DL (ref 8.5–10.5)
CHLORIDE SERPL-SCNC: 97 MMOL/L (ref 96–112)
CO2 SERPL-SCNC: 35 MMOL/L (ref 20–33)
CREAT SERPL-MCNC: 0.78 MG/DL (ref 0.5–1.4)
ERYTHROCYTE [DISTWIDTH] IN BLOOD BY AUTOMATED COUNT: 70.2 FL (ref 35.9–50)
GLUCOSE SERPL-MCNC: 81 MG/DL (ref 65–99)
HCT VFR BLD AUTO: 31.2 % (ref 42–52)
HGB BLD-MCNC: 9.4 G/DL (ref 14–18)
MAGNESIUM SERPL-MCNC: 2 MG/DL (ref 1.5–2.5)
MCH RBC QN AUTO: 28.2 PG (ref 27–33)
MCHC RBC AUTO-ENTMCNC: 30.1 G/DL (ref 33.7–35.3)
MCV RBC AUTO: 93.7 FL (ref 81.4–97.8)
PLATELET # BLD AUTO: 167 K/UL (ref 164–446)
PMV BLD AUTO: 10.2 FL (ref 9–12.9)
POTASSIUM SERPL-SCNC: 3.8 MMOL/L (ref 3.6–5.5)
RBC # BLD AUTO: 3.33 M/UL (ref 4.7–6.1)
SODIUM SERPL-SCNC: 136 MMOL/L (ref 135–145)
WBC # BLD AUTO: 6.6 K/UL (ref 4.8–10.8)

## 2021-06-07 PROCEDURE — A9270 NON-COVERED ITEM OR SERVICE: HCPCS | Performed by: INTERNAL MEDICINE

## 2021-06-07 PROCEDURE — 99232 SBSQ HOSP IP/OBS MODERATE 35: CPT | Performed by: STUDENT IN AN ORGANIZED HEALTH CARE EDUCATION/TRAINING PROGRAM

## 2021-06-07 PROCEDURE — 36415 COLL VENOUS BLD VENIPUNCTURE: CPT

## 2021-06-07 PROCEDURE — 80048 BASIC METABOLIC PNL TOTAL CA: CPT

## 2021-06-07 PROCEDURE — 700102 HCHG RX REV CODE 250 W/ 637 OVERRIDE(OP): Performed by: STUDENT IN AN ORGANIZED HEALTH CARE EDUCATION/TRAINING PROGRAM

## 2021-06-07 PROCEDURE — 83735 ASSAY OF MAGNESIUM: CPT

## 2021-06-07 PROCEDURE — A9270 NON-COVERED ITEM OR SERVICE: HCPCS | Performed by: STUDENT IN AN ORGANIZED HEALTH CARE EDUCATION/TRAINING PROGRAM

## 2021-06-07 PROCEDURE — 700102 HCHG RX REV CODE 250 W/ 637 OVERRIDE(OP): Performed by: INTERNAL MEDICINE

## 2021-06-07 PROCEDURE — A9270 NON-COVERED ITEM OR SERVICE: HCPCS | Performed by: HOSPITALIST

## 2021-06-07 PROCEDURE — 770006 HCHG ROOM/CARE - MED/SURG/GYN SEMI*

## 2021-06-07 PROCEDURE — 700111 HCHG RX REV CODE 636 W/ 250 OVERRIDE (IP): Performed by: INTERNAL MEDICINE

## 2021-06-07 PROCEDURE — 700102 HCHG RX REV CODE 250 W/ 637 OVERRIDE(OP): Performed by: HOSPITALIST

## 2021-06-07 PROCEDURE — 85027 COMPLETE CBC AUTOMATED: CPT

## 2021-06-07 RX ADMIN — Medication 950 MG: at 05:56

## 2021-06-07 RX ADMIN — OMEPRAZOLE 20 MG: 20 CAPSULE, DELAYED RELEASE ORAL at 05:56

## 2021-06-07 RX ADMIN — POTASSIUM CHLORIDE 40 MEQ: 1500 TABLET, EXTENDED RELEASE ORAL at 05:57

## 2021-06-07 RX ADMIN — HYDROCORTISONE 10 MG: 20 TABLET ORAL at 17:03

## 2021-06-07 RX ADMIN — FUROSEMIDE 20 MG: 20 TABLET ORAL at 05:57

## 2021-06-07 RX ADMIN — MONTELUKAST 10 MG: 10 TABLET, FILM COATED ORAL at 05:58

## 2021-06-07 RX ADMIN — ATORVASTATIN CALCIUM 80 MG: 40 TABLET, FILM COATED ORAL at 17:03

## 2021-06-07 RX ADMIN — SULFAMETHOXAZOLE AND TRIMETHOPRIM 1 TABLET: 800; 160 TABLET ORAL at 17:05

## 2021-06-07 RX ADMIN — ASPIRIN 81 MG: 81 TABLET, COATED ORAL at 05:57

## 2021-06-07 RX ADMIN — FLUTICASONE PROPIONATE 50 MCG: 50 SPRAY, METERED NASAL at 05:58

## 2021-06-07 RX ADMIN — ALPRAZOLAM 0.25 MG: 0.25 TABLET ORAL at 12:40

## 2021-06-07 RX ADMIN — OXYCODONE 5 MG: 5 TABLET ORAL at 20:05

## 2021-06-07 RX ADMIN — CARBOXYMETHYLCELLULOSE SODIUM 1 DROP: 5 SOLUTION/ DROPS OPHTHALMIC at 17:03

## 2021-06-07 RX ADMIN — ENOXAPARIN SODIUM 40 MG: 40 INJECTION SUBCUTANEOUS at 05:56

## 2021-06-07 RX ADMIN — CARVEDILOL 3.12 MG: 3.12 TABLET, FILM COATED ORAL at 07:01

## 2021-06-07 RX ADMIN — OMEPRAZOLE 20 MG: 20 CAPSULE, DELAYED RELEASE ORAL at 17:03

## 2021-06-07 RX ADMIN — HYDROCORTISONE 10 MG: 20 TABLET ORAL at 05:57

## 2021-06-07 RX ADMIN — CARBOXYMETHYLCELLULOSE SODIUM 1 DROP: 5 SOLUTION/ DROPS OPHTHALMIC at 05:57

## 2021-06-07 RX ADMIN — LEVOTHYROXINE SODIUM 75 MCG: 0.07 TABLET ORAL at 05:57

## 2021-06-07 RX ADMIN — BUDESONIDE AND FORMOTEROL FUMARATE DIHYDRATE 2 PUFF: 80; 4.5 AEROSOL RESPIRATORY (INHALATION) at 17:04

## 2021-06-07 RX ADMIN — CARVEDILOL 3.12 MG: 3.12 TABLET, FILM COATED ORAL at 17:03

## 2021-06-07 RX ADMIN — SULFAMETHOXAZOLE AND TRIMETHOPRIM 1 TABLET: 800; 160 TABLET ORAL at 05:56

## 2021-06-07 RX ADMIN — TAMSULOSIN HYDROCHLORIDE 0.4 MG: 0.4 CAPSULE ORAL at 05:56

## 2021-06-07 RX ADMIN — DOCUSATE SODIUM 50 MG AND SENNOSIDES 8.6 MG 2 TABLET: 8.6; 5 TABLET, FILM COATED ORAL at 05:57

## 2021-06-07 RX ADMIN — BUDESONIDE AND FORMOTEROL FUMARATE DIHYDRATE 2 PUFF: 80; 4.5 AEROSOL RESPIRATORY (INHALATION) at 05:56

## 2021-06-07 RX ADMIN — CLOPIDOGREL BISULFATE 75 MG: 75 TABLET ORAL at 05:56

## 2021-06-07 RX ADMIN — TIOTROPIUM BROMIDE INHALATION SPRAY 5 MCG: 3.12 SPRAY, METERED RESPIRATORY (INHALATION) at 05:59

## 2021-06-07 ASSESSMENT — PAIN DESCRIPTION - PAIN TYPE
TYPE: ACUTE PAIN
TYPE: ACUTE PAIN

## 2021-06-07 NOTE — PROGRESS NOTES
Report received from night shift RN. Assumed care at 0700, assessment complete. Pt is A & O x 4.  Pt denies having any pain at this time. Fall precautions and appropriate signs in place. Pt oriented to unit routine, call light/phone system and RN extension number provided. Pt educated regarding fall precautions. Pt denies any additional needs at this time. Call light within reach.

## 2021-06-07 NOTE — CARE PLAN
Problem: Pain - Standard  Goal: Alleviation of pain or a reduction in pain to the patient’s comfort goal  Outcome: Progressing  Flowsheets  Taken 6/7/2021 0700 by Missy Mcpherson RLIZETTE  Pain Rating Scale (NPRS): 0  Taken 6/2/2021 1510 by Ella Klein R.N.  Non Verbal Scale: Sleeping  Note: Pt discomfort is relieved by tylenol and position change. Pt states he has no pain at this time.      Problem: Skin Integrity  Goal: Skin integrity is maintained or improved  Outcome: Progressing  Note: Pt is a Q2 turn.Pt uses moisturizer and barrier cream.    The patient is Stable - Low risk of patient condition declining or worsening    Shift Goals  Clinical Goals: deep cough  Patient Goals: pain control   Family Goals: NA    Progress made toward(s) clinical / shift goals:  pain management     Patient is not progressing towards the following goals: deep cough, patient refused to try

## 2021-06-07 NOTE — CARE PLAN
Problem: Knowledge Deficit - Standard  Goal: Patient and family/care givers will demonstrate understanding of plan of care, disease process/condition, diagnostic tests and medications  Outcome: Progressing     Problem: Pain - Standard  Goal: Alleviation of pain or a reduction in pain to the patient’s comfort goal  Outcome: Progressing   The patient is Watcher - Medium risk of patient condition declining or worsening    Shift Goals  Clinical Goals: deep coughing excercises  Patient Goals: pain control  Family Goals: NA    Progress made toward(s) clinical / shift goals:  yes    Patient is not progressing towards the following goals:  N/A

## 2021-06-07 NOTE — PROGRESS NOTES
Report received from biggft RN. Assumed care, assessment complete at 2210. Pt is A & O x 4.  Pt reports pain at this time to back, 7/10 see MAR. Fall precautions and appropriate signs in place. Pt oriented to unit routine, call light/phone system and RN extension number provided. Pt educated regarding fall precautions. Bed alarm in use. Pt denies any additional needs at this time. Call light within reach.

## 2021-06-07 NOTE — PROGRESS NOTES
Hospital Medicine Daily Progress Note    Date of Service  6/7/2021    Chief Complaint  88 y.o. 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     During the stay in the hospital, patient electrolytes continue to be repleted.  Patient is also noted to have acute on chronic systolic congestive heart failure, started on IV Lasix--> later switched po, I/Os, daily weight, fluid restriction.  Regarding CAD, patient is on aspirin, Plavix, Coreg, losartan, and statin. Patient is to follow-up with cardiology as an outpatient.     Patient has history of adrenal insufficiency,on hydrocortisone 10 bid. PT/OT has evaluated, recommended discharging the patient to skilled nursing facility.  During hospital course patient noted to have UA positive for UTI.  Urine culture positive for Klebsiella .    Interval Problem Update  6/1/2021  Vital stable  Labs unremarkable. meds reviewed    assisting with placement     6/2/2021  Vitals remained stable  Labs remain unremarkable  No events overnight   assisting with placement     6/3/2021  Noted to have low blood pressure  Medication adjusted  Remain afebrile  Labs noted with elevated white count likely secondary to steroid use  Mild FABIAN.  Will hold Lasix and losartan.  Elevated pro-Magdi.  No sign of sepsis active infection  Check UA      6/4/2021  Had repaid response overnight for hypotension  Dry  on examination .  Blood pressure improved  On Rocephin for UTI  urine culture and blood culture in process  Losartan will Lasix on hold given low blood pressure and worsening kidney function  We will continue monitor kidney function  If labs remain unremarkable and  blood pressure remained stable can be transferred  to SNF tomorrow     6/5/2021  Vitals remained stable.  Requiring 5L oxygen to maintain saturation over 95  Kidney function improving.  KCl added for hyponatremia  Plan to discharge to a SNF versus hospice after discussion with the patient's spouse  Palliative evaluation requested for assistance     6/6/2021  Vitals remain stable .  KCl replaced for hypokalemia  Kidney function improved  Lasix resumed  Possible discharge SNF tomorrow   and palliative assisting with discharge     6/7/2021    Vitals stable  Kidney function baseline now  Pending SNF placement//DC home with home health  Medically cleared for discharge  Consultants/Specialty  N/A     Code Status  DNAR, I OK     Disposition  SNF      Review of Systems  Review of Systems   Constitutional: Negative for fever.   HENT: Negative for hearing loss.    Eyes: Negative for blurred vision and double vision.   Respiratory: Negative for cough.    Cardiovascular: Negative for chest pain and palpitations.   Gastrointestinal: Negative for nausea and vomiting.   Genitourinary: Negative for dysuria.   Musculoskeletal: Negative for myalgias.   Skin: Negative for rash.   Neurological: Negative for dizziness and headaches.   Psychiatric/Behavioral: Negative for depression.         Physical Exam  Temp:  [36.2 °C (97.2 °F)-37.1 °C (98.8 °F)] 36.2 °C (97.2 °F)  Pulse:  [71-82] 78  Resp:  [16-21] 16  BP: (125-156)/(59-92) 156/92  SpO2:  [93 %-96 %] 96 %     Physical Exam  Constitutional:       Appearance: He is obese.   HENT:      Head: Normocephalic and atraumatic.      Right Ear: Tympanic membrane normal.      Left Ear: Tympanic membrane normal.       Nose: Nose normal.      Mouth/Throat:      Mouth: Mucous membranes are moist.   Eyes:      Pupils: Pupils are equal, round, and reactive to light.   Cardiovascular:      Rate and Rhythm: Normal rate and regular rhythm.      Pulses: Normal pulses.      Heart sounds: Normal heart sounds. No murmur heard.     Pulmonary:      Effort: Pulmonary effort is normal. No respiratory distress.   Abdominal:      General: Abdomen is flat.      Palpations: Abdomen is soft.      Tenderness: There is no abdominal tenderness.   Musculoskeletal:      Right lower leg: Edema present.      Left lower leg: Edema present.   Skin:     Coloration: Skin is not jaundiced.   Neurological:      General: No focal deficit present.      Mental Status: He is alert and oriented to person, place, and time. Mental status is at baseline.   Psychiatric:         Mood and Affect: Mood normal.   Fluids    Intake/Output Summary (Last 24 hours) at 6/7/2021 1349  Last data filed at 6/7/2021 0901  Gross per 24 hour   Intake 920 ml   Output 700 ml   Net 220 ml       Laboratory  Recent Labs     06/05/21  0153 06/06/21  0230 06/07/21  0454   WBC 13.0* 8.0 6.6   RBC 3.19* 3.13* 3.33*   HEMOGLOBIN 9.3* 8.8* 9.4*   HEMATOCRIT 30.4* 29.6* 31.2*   MCV 95.3 94.6 93.7   MCH 29.2 28.1 28.2   MCHC 30.6* 29.7* 30.1*   RDW 73.6* 70.8* 70.2*   PLATELETCT 156* 156* 167   MPV 10.7 10.6 10.2     Recent Labs     06/05/21  0153 06/06/21  0230 06/07/21  0454   SODIUM 137 139 136   POTASSIUM 3.3* 3.3* 3.8   CHLORIDE 97 98 97   CO2 32 34* 35*   GLUCOSE 117* 96 81   BUN 36* 33* 27*   CREATININE 1.33 0.98 0.78   CALCIUM 7.7* 7.7* 8.0*                   Imaging  DX-CHEST-PORTABLE (1 VIEW)   Final Result      Findings consistent with mild pulmonary edema along with minimal bibasilar atelectasis and small left pleural effusion. No significant change.      DX-CHEST-LIMITED (1 VIEW)   Final Result         No significant interval change.      Small bilateral pleural effusion bibasilar  opacities, similar to prior.      Stable cardiomegaly.      US-EXTREMITY VENOUS UPPER UNILAT LEFT   Final Result      CT-EXTREMITY, UPPER WITH LEFT   Final Result      No acute fracture or dislocation.      Severe osteoarthritis of the elbow joint.      IR-MIDLINE CATHETER INSERTION WO GUIDANCE > AGE 3   Final Result                  Ultrasound-guided midline placement performed by qualified nursing staff    as above.          IR-US GUIDED PIV   Final Result    Ultrasound-guided PERIPHERAL IV INSERTION performed by    qualified nursing staff as above.      US-EXTREMITY VENOUS LOWER UNILAT LEFT   Final Result      DX-CHEST-PORTABLE (1 VIEW)   Final Result      Bibasilar opacities may represent atelectasis or consolidation.      Small left pleural effusion may be present.      Pulmonary interstitial edema.      Stable prominence of the cardiomediastinal silhouette.      Atherosclerotic calcification is seen.      DX-ELBOW-COMPLETE 3+ LEFT   Final Result      1.  No fracture or dislocation of LEFT elbow.   2.  Severe degenerative change.   3.  Diffuse subcutaneous edema.   4.  Limited by positioning.           Assessment/Plan  * Acute on chronic systolic congestive heart failure (HCC)- (present on admission)  Assessment & Plan  Patient on 5L oxygen via nc at baseline  Echo showed EF of 45% on 5/12, I/os , daily weight and fluid restriction to 1.5 L  Continue BB , losartan and  Lasix  Cardiology out-patient    Pain and swelling of left upper extremity- (present on admission)  Assessment & Plan  Improving     Sacral wound- (present on admission)  Assessment & Plan  Wound mx as per wound team recs   --Offloading due to risk of pressure ulcers    Hypophosphatemia  Assessment & Plan  Replete and monitor  2.5      Hypomagnesemia  Assessment & Plan  Replete and monitor      Hypokalemia- (present on admission)  Assessment & Plan  Replete and monitor    Anasarca- (present on admission)  Assessment & Plan  Continue to monitor on  diuretic     Debility- (present on admission)  Assessment & Plan  SNF placement     Coronary artery disease- (present on admission)  Assessment & Plan  S/p PCI with stent placement 4 weeks ago  Aspirin, Plavix, Coreg, atorvastatin, losartan, Lasix.    Adrenal insufficiency (HCC)- (present on admission)  Assessment & Plan   On hydrocortisone    UTI (urinary tract infection)  Assessment & Plan  On rocephine   Urine culture growing Klebsiella pneumonia  We will switch to oral antibiotic    Chronic respiratory failure (HCC)- (present on admission)  Assessment & Plan  Continue supplemental oxygen, on 4 to 5 L    FABIAN (acute kidney injury) ([facility])  Assessment & Plan  Improved baseline now   Resume losartan     Sepsis ([facility])  Assessment & Plan  Resolved           BPH (benign prostatic hypertrophy)- (present on admission)  Assessment & Plan  continiue Flomax and finasteride    History of pulmonary embolism- (present on admission)  Assessment & Plan  Warfarin was discontinued during previous recent hospitalization due to blood loss anemia, patient knows   monitor hemoglobin hematocrit, so far stabel  Transfuse if less than 7        COPD (chronic obstructive pulmonary disease) ([facility])- (present on admission)  Assessment & Plan  Continue RT protocol, breathing treatment   --Titrate O2 as needed    -- ON 4 L of O2  And denied any complain of sob    Essential hypertension, benign- (present on admission)  Assessment & Plan  Blood pressure noted to be well controlled, monitor  Continue coreg and losartan         VTE prophylaxis: lovenox

## 2021-06-08 LAB
ANION GAP SERPL CALC-SCNC: 6 MMOL/L (ref 7–16)
BASOPHILS # BLD AUTO: 0.3 % (ref 0–1.8)
BASOPHILS # BLD: 0.02 K/UL (ref 0–0.12)
BUN SERPL-MCNC: 24 MG/DL (ref 8–22)
CALCIUM SERPL-MCNC: 7.9 MG/DL (ref 8.5–10.5)
CHLORIDE SERPL-SCNC: 100 MMOL/L (ref 96–112)
CO2 SERPL-SCNC: 33 MMOL/L (ref 20–33)
CREAT SERPL-MCNC: 0.93 MG/DL (ref 0.5–1.4)
EOSINOPHIL # BLD AUTO: 0.1 K/UL (ref 0–0.51)
EOSINOPHIL NFR BLD: 1.4 % (ref 0–6.9)
ERYTHROCYTE [DISTWIDTH] IN BLOOD BY AUTOMATED COUNT: 70.3 FL (ref 35.9–50)
GLUCOSE SERPL-MCNC: 78 MG/DL (ref 65–99)
HCT VFR BLD AUTO: 30.4 % (ref 42–52)
HGB BLD-MCNC: 9.1 G/DL (ref 14–18)
IMM GRANULOCYTES # BLD AUTO: 0.03 K/UL (ref 0–0.11)
IMM GRANULOCYTES NFR BLD AUTO: 0.4 % (ref 0–0.9)
LYMPHOCYTES # BLD AUTO: 0.91 K/UL (ref 1–4.8)
LYMPHOCYTES NFR BLD: 13 % (ref 22–41)
MCH RBC QN AUTO: 28.3 PG (ref 27–33)
MCHC RBC AUTO-ENTMCNC: 29.9 G/DL (ref 33.7–35.3)
MCV RBC AUTO: 94.4 FL (ref 81.4–97.8)
MONOCYTES # BLD AUTO: 0.34 K/UL (ref 0–0.85)
MONOCYTES NFR BLD AUTO: 4.9 % (ref 0–13.4)
NEUTROPHILS # BLD AUTO: 5.61 K/UL (ref 1.82–7.42)
NEUTROPHILS NFR BLD: 80 % (ref 44–72)
NRBC # BLD AUTO: 0 K/UL
NRBC BLD-RTO: 0 /100 WBC
PLATELET # BLD AUTO: 164 K/UL (ref 164–446)
PMV BLD AUTO: 10.2 FL (ref 9–12.9)
POTASSIUM SERPL-SCNC: 4.1 MMOL/L (ref 3.6–5.5)
RBC # BLD AUTO: 3.22 M/UL (ref 4.7–6.1)
SODIUM SERPL-SCNC: 139 MMOL/L (ref 135–145)
WBC # BLD AUTO: 7 K/UL (ref 4.8–10.8)

## 2021-06-08 PROCEDURE — 770006 HCHG ROOM/CARE - MED/SURG/GYN SEMI*

## 2021-06-08 PROCEDURE — 85025 COMPLETE CBC W/AUTO DIFF WBC: CPT

## 2021-06-08 PROCEDURE — 700102 HCHG RX REV CODE 250 W/ 637 OVERRIDE(OP): Performed by: STUDENT IN AN ORGANIZED HEALTH CARE EDUCATION/TRAINING PROGRAM

## 2021-06-08 PROCEDURE — A9270 NON-COVERED ITEM OR SERVICE: HCPCS | Performed by: INTERNAL MEDICINE

## 2021-06-08 PROCEDURE — 99232 SBSQ HOSP IP/OBS MODERATE 35: CPT | Performed by: FAMILY MEDICINE

## 2021-06-08 PROCEDURE — 700102 HCHG RX REV CODE 250 W/ 637 OVERRIDE(OP): Performed by: INTERNAL MEDICINE

## 2021-06-08 PROCEDURE — 700111 HCHG RX REV CODE 636 W/ 250 OVERRIDE (IP): Performed by: INTERNAL MEDICINE

## 2021-06-08 PROCEDURE — A9270 NON-COVERED ITEM OR SERVICE: HCPCS | Performed by: HOSPITALIST

## 2021-06-08 PROCEDURE — A9270 NON-COVERED ITEM OR SERVICE: HCPCS | Performed by: STUDENT IN AN ORGANIZED HEALTH CARE EDUCATION/TRAINING PROGRAM

## 2021-06-08 PROCEDURE — 700102 HCHG RX REV CODE 250 W/ 637 OVERRIDE(OP): Performed by: HOSPITALIST

## 2021-06-08 PROCEDURE — 80048 BASIC METABOLIC PNL TOTAL CA: CPT

## 2021-06-08 RX ADMIN — HYDROCORTISONE 10 MG: 20 TABLET ORAL at 05:51

## 2021-06-08 RX ADMIN — ALPRAZOLAM 0.25 MG: 0.25 TABLET ORAL at 21:01

## 2021-06-08 RX ADMIN — BUDESONIDE AND FORMOTEROL FUMARATE DIHYDRATE 2 PUFF: 80; 4.5 AEROSOL RESPIRATORY (INHALATION) at 16:56

## 2021-06-08 RX ADMIN — TIOTROPIUM BROMIDE INHALATION SPRAY 5 MCG: 3.12 SPRAY, METERED RESPIRATORY (INHALATION) at 05:55

## 2021-06-08 RX ADMIN — CLOPIDOGREL BISULFATE 75 MG: 75 TABLET ORAL at 05:50

## 2021-06-08 RX ADMIN — SULFAMETHOXAZOLE AND TRIMETHOPRIM 1 TABLET: 800; 160 TABLET ORAL at 16:57

## 2021-06-08 RX ADMIN — LOSARTAN POTASSIUM 25 MG: 25 TABLET, FILM COATED ORAL at 05:51

## 2021-06-08 RX ADMIN — CARVEDILOL 3.12 MG: 3.12 TABLET, FILM COATED ORAL at 08:09

## 2021-06-08 RX ADMIN — ACETAMINOPHEN 650 MG: 325 TABLET, FILM COATED ORAL at 02:12

## 2021-06-08 RX ADMIN — TAMSULOSIN HYDROCHLORIDE 0.4 MG: 0.4 CAPSULE ORAL at 05:51

## 2021-06-08 RX ADMIN — ATORVASTATIN CALCIUM 80 MG: 40 TABLET, FILM COATED ORAL at 18:16

## 2021-06-08 RX ADMIN — FLUTICASONE PROPIONATE 50 MCG: 50 SPRAY, METERED NASAL at 05:57

## 2021-06-08 RX ADMIN — ENOXAPARIN SODIUM 40 MG: 40 INJECTION SUBCUTANEOUS at 05:59

## 2021-06-08 RX ADMIN — LEVOTHYROXINE SODIUM 75 MCG: 0.07 TABLET ORAL at 05:51

## 2021-06-08 RX ADMIN — CARBOXYMETHYLCELLULOSE SODIUM 1 DROP: 5 SOLUTION/ DROPS OPHTHALMIC at 18:00

## 2021-06-08 RX ADMIN — OXYCODONE 5 MG: 5 TABLET ORAL at 18:22

## 2021-06-08 RX ADMIN — CARBOXYMETHYLCELLULOSE SODIUM 1 DROP: 5 SOLUTION/ DROPS OPHTHALMIC at 05:58

## 2021-06-08 RX ADMIN — ALPRAZOLAM 0.25 MG: 0.25 TABLET ORAL at 00:50

## 2021-06-08 RX ADMIN — MONTELUKAST 10 MG: 10 TABLET, FILM COATED ORAL at 05:51

## 2021-06-08 RX ADMIN — ACETAMINOPHEN 650 MG: 325 TABLET, FILM COATED ORAL at 21:01

## 2021-06-08 RX ADMIN — OMEPRAZOLE 20 MG: 20 CAPSULE, DELAYED RELEASE ORAL at 16:56

## 2021-06-08 RX ADMIN — Medication 950 MG: at 05:50

## 2021-06-08 RX ADMIN — CARVEDILOL 3.12 MG: 3.12 TABLET, FILM COATED ORAL at 16:56

## 2021-06-08 RX ADMIN — DOCUSATE SODIUM 50 MG AND SENNOSIDES 8.6 MG 2 TABLET: 8.6; 5 TABLET, FILM COATED ORAL at 05:51

## 2021-06-08 RX ADMIN — HYDROCORTISONE 10 MG: 20 TABLET ORAL at 16:56

## 2021-06-08 RX ADMIN — OMEPRAZOLE 20 MG: 20 CAPSULE, DELAYED RELEASE ORAL at 05:50

## 2021-06-08 RX ADMIN — FUROSEMIDE 20 MG: 20 TABLET ORAL at 05:50

## 2021-06-08 RX ADMIN — SULFAMETHOXAZOLE AND TRIMETHOPRIM 1 TABLET: 800; 160 TABLET ORAL at 05:50

## 2021-06-08 RX ADMIN — BUDESONIDE AND FORMOTEROL FUMARATE DIHYDRATE 2 PUFF: 80; 4.5 AEROSOL RESPIRATORY (INHALATION) at 05:55

## 2021-06-08 RX ADMIN — ASPIRIN 81 MG: 81 TABLET, COATED ORAL at 05:51

## 2021-06-08 ASSESSMENT — PAIN DESCRIPTION - PAIN TYPE
TYPE: ACUTE PAIN
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
## 2021-06-08 NOTE — DISCHARGE PLANNING
"LSW received a call from a Nikhil PetSitnStays who reports he is calling on behalf of pt's wife, Jacquie Lizama. LSW confirmed that Jacquie was there and I was able to speak to her directly with assistance from Nikhil, as Jacquie has a hearing disability.     Jacquie was very upset, explaining that \"someone\" has signed documents for pt to transfer to a SNF, however, she does not want pt to go to a SNF and is very adamant about this. LSW completed chart review. LSW explained that pt has no d/c order, and at this time, pt will not be d/cing, as care needs to be discussed further with palliative, family, and care team. Jacquie reports she has DPOA, however, we do not have this documentation. LSW advised Jacquie to provide us with these documents ASAP, as it is beneficial to his care here at [facility]. LSW also shared with Jacquie that pt has full capacity, is A&Ox4, meaning he has the right to make his own medical decisions at this time. Jacquie verbalized understanding, however, she is still adamant on her refusal of SNF. LSW reiterated that d/c planning and disposition is still being discussed between pt, care team, and family (including Jacquie).     Jacquie states she would like no information discussed with a \"Arya and Allyn\", as there is supposedly police and EPS hx with these two individuals.     Plan: LSW to update S5 ANDERSONW, Laura, on Tuesday morning regarding this conversation.   "

## 2021-06-08 NOTE — CARE PLAN
The patient is Stable - Low risk of patient condition declining or worsening    Shift Goals  Clinical Goals: decrease swelling   Patient Goals: discharge safely   Family Goals: NA    Progress made toward(s) clinical / shift goals: elevated patients arms/legs    Patient is not progressing towards the following goals:

## 2021-06-08 NOTE — PROGRESS NOTES
Assume care of pt at 1900. Report received from day RN. Pt is A/O x4, forgetful at times. Pt c/o 8/10, medicated per MAR. Pt is resting in bed. Bed in lowest and locked position, call light in reach, hourly rounding in place. Bed alarm in place. Labs reviewed. Communication board updated. Will continue to monitor.

## 2021-06-08 NOTE — CARE PLAN
The patient is Watcher - Medium risk of patient condition declining or worsening    Shift Goals  Clinical Goals: adequate oxygenation  Patient Goals: pain control  Family Goals: NA    Progress made toward(s) clinical / shift goals:  Pt remains on 5L of oxygen via oxymask. Pt denies any respiratory distress or shortness of breath. Reminded pt to perform deep breathing exercises while awake. Pt c/o pain on his lower back, administered pain medication with positive relief.     Patient is not progressing towards the following goals:

## 2021-06-08 NOTE — DISCHARGE PLANNING
Anticipated Discharge Disposition: Home with HH and Caregivers vs SNF    Action: LSW attempted to call Gene to discuss discharge plan and if he and Jacquie (Pt's wife) have been able to determine if hiring caregivers is feasible for Pt's discharge, Per his discussion with Palliative care this weekend.       Barriers to Discharge: Pt will need full assistance form caregivers at home as his is Max assist for standing and transfers.     Plan: LSW to continue trying to reach Gene to confirm family can set up caregivers for Pt to return home with care giving and HH. F/u with Pt's Wife Jacquie to update her on d/c plan.

## 2021-06-08 NOTE — PROGRESS NOTES
Hospital Medicine Daily Progress Note    Date of Service  6/8/2021    Chief Complaint  88 y.o. 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     During the stay in the hospital, patient electrolytes continue to be repleted.  Patient is also noted to have acute on chronic systolic congestive heart failure, started on IV Lasix--> later switched po, I/Os, daily weight, fluid restriction.  Regarding CAD, patient is on aspirin, Plavix, Coreg, losartan, and statin. Patient is to follow-up with cardiology as an outpatient.     Patient has history of adrenal insufficiency,on hydrocortisone 10 bid. PT/OT has evaluated, recommended discharging the patient to skilled nursing facility.  During hospital course patient noted to have UA positive for UTI.  Urine culture positive for Klebsiella .    Interval Problem Update  6/1/2021  Vital stable  Labs unremarkable. meds reviewed    assisting with placement     6/2/2021  Vitals remained stable  Labs remain unremarkable  No events overnight   assisting with placement     6/3/2021  Noted to have low blood pressure  Medication adjusted  Remain afebrile  Labs noted with elevated white count likely secondary to steroid use  Mild FABIAN.  Will hold Lasix and losartan.  Elevated pro-Magdi.  No sign of sepsis active infection  Check UA      6/4/2021  Had repaid response overnight for hypotension  Dry  on examination .  Blood pressure improved  On Rocephin for UTI  urine culture and blood culture in process  Losartan will Lasix on hold given low blood pressure and worsening kidney function  We will continue monitor kidney function  If labs remain unremarkable and  blood pressure remained stable can be transferred  to SNF tomorrow     6/5/2021  Vitals remained stable.  Requiring 5L oxygen to maintain saturation over 95  Kidney function improving.  KCl added for hyponatremia  Plan to discharge to a SNF versus hospice after discussion with the patient's spouse  Palliative evaluation requested for assistance     6/6/2021  Vitals remain stable .  KCl replaced for hypokalemia  Kidney function improved  Lasix resumed  Possible discharge SNF tomorrow   and palliative assisting with discharge     6/7/2021    Vitals stable  Kidney function baseline now  Pending SNF placement//DC home with home health  Medically cleared for discharge  6/8: Resting in bed comfortably.  Spoke with patient the need for placement considering the clinical complexity and the need for 24/7 supervision.  Patient stated that he will consider.  Wife however is declining placement.   to touch base with wife in that regard.  Consultants/Specialty  N/A     Code Status  DNAR, I OK     Disposition  SNF vs home with HH/DME      Review of Systems  Review of Systems   Constitutional: Negative for fever.   HENT: Negative for hearing loss.    Eyes: Negative for blurred vision and double vision.   Respiratory: Negative for cough.    Cardiovascular: Negative for chest pain and palpitations.   Gastrointestinal: Negative for nausea and vomiting.   Genitourinary: Negative for dysuria.   Musculoskeletal: Negative for myalgias.   Skin: Negative for rash.   Neurological: Negative for dizziness and headaches.   Psychiatric/Behavioral: Negative for depression.         Physical Exam  Temp:  [36.2 °C (97.2 °F)-37.1 °C (98.8 °F)] 36.2 °C (97.2  °F)  Pulse:  [71-82] 78  Resp:  [16-21] 16  BP: (125-156)/(59-92) 156/92  SpO2:  [93 %-96 %] 96 %     Physical Exam  Constitutional:       Appearance: He is obese.   HENT:      Head: Normocephalic and atraumatic.      Right Ear: Tympanic membrane normal.      Left Ear: Tympanic membrane normal.      Nose: Nose normal.      Mouth/Throat:      Mouth: Mucous membranes are moist.   Eyes:      Pupils: Pupils are equal, round, and reactive to light.   Cardiovascular:      Rate and Rhythm: Normal rate and regular rhythm.      Pulses: Normal pulses.      Heart sounds: Normal heart sounds. No murmur heard.     Pulmonary:      Effort: Pulmonary effort is normal. No respiratory distress.   Abdominal:      General: Abdomen is flat.      Palpations: Abdomen is soft.      Tenderness: There is no abdominal tenderness.   Musculoskeletal:    Left upper extremity swelling.  Skin:     Coloration: Skin is not jaundiced.   Neurological:      General: No focal deficit present.      Mental Status: He is alert and oriented to person, place, and time. Mental status is at baseline.   Psychiatric:         Mood and Affect: Mood normal.   Fluids    Intake/Output Summary (Last 24 hours) at 6/8/2021 1643  Last data filed at 6/8/2021 1545  Gross per 24 hour   Intake 120 ml   Output 1650 ml   Net -1530 ml       Laboratory  Recent Labs     06/06/21  0230 06/07/21  0454 06/08/21  0445   WBC 8.0 6.6 7.0   RBC 3.13* 3.33* 3.22*   HEMOGLOBIN 8.8* 9.4* 9.1*   HEMATOCRIT 29.6* 31.2* 30.4*   MCV 94.6 93.7 94.4   MCH 28.1 28.2 28.3   MCHC 29.7* 30.1* 29.9*   RDW 70.8* 70.2* 70.3*   PLATELETCT 156* 167 164   MPV 10.6 10.2 10.2     Recent Labs     06/06/21  0230 06/07/21  0454 06/08/21  0445   SODIUM 139 136 139   POTASSIUM 3.3* 3.8 4.1   CHLORIDE 98 97 100   CO2 34* 35* 33   GLUCOSE 96 81 78   BUN 33* 27* 24*   CREATININE 0.98 0.78 0.93   CALCIUM 7.7* 8.0* 7.9*                   Imaging  DX-CHEST-PORTABLE (1 VIEW)   Final Result      Findings consistent with  mild pulmonary edema along with minimal bibasilar atelectasis and small left pleural effusion. No significant change.      DX-CHEST-LIMITED (1 VIEW)   Final Result         No significant interval change.      Small bilateral pleural effusion bibasilar opacities, similar to prior.      Stable cardiomegaly.      US-EXTREMITY VENOUS UPPER UNILAT LEFT   Final Result      CT-EXTREMITY, UPPER WITH LEFT   Final Result      No acute fracture or dislocation.      Severe osteoarthritis of the elbow joint.      IR-MIDLINE CATHETER INSERTION WO GUIDANCE > AGE 3   Final Result                  Ultrasound-guided midline placement performed by qualified nursing staff    as above.          IR-US GUIDED PIV   Final Result    Ultrasound-guided PERIPHERAL IV INSERTION performed by    qualified nursing staff as above.      US-EXTREMITY VENOUS LOWER UNILAT LEFT   Final Result      DX-CHEST-PORTABLE (1 VIEW)   Final Result      Bibasilar opacities may represent atelectasis or consolidation.      Small left pleural effusion may be present.      Pulmonary interstitial edema.      Stable prominence of the cardiomediastinal silhouette.      Atherosclerotic calcification is seen.      DX-ELBOW-COMPLETE 3+ LEFT   Final Result      1.  No fracture or dislocation of LEFT elbow.   2.  Severe degenerative change.   3.  Diffuse subcutaneous edema.   4.  Limited by positioning.           Assessment/Plan  * Acute on chronic systolic congestive heart failure (HCC)- (present on admission)  Assessment & Plan  Patient on 5L oxygen via nc at baseline  Echo showed EF of 45% on 5/12, I/os , daily weight and fluid restriction to 1.5 L  Continue BB , losartan and  Lasix  Cardiology out-patient    Pain and swelling of left upper extremity- (present on admission)  Assessment & Plan  US negative for DVT   Improving     Sacral wound- (present on admission)  Assessment & Plan  Wound mx as per wound team recs   --Offloading and periodic turning      Hypophosphatemia  Assessment & Plan  Replete and monitor  2.5      Hypomagnesemia  Assessment & Plan  Replete and monitor      Hypokalemia- (present on admission)  Assessment & Plan  Replete and monitor    Anasarca- (present on admission)  Assessment & Plan  Continue to monitor on diuretic     Debility- (present on admission)  Assessment & Plan  SNF placement pending     Coronary artery disease- (present on admission)  Assessment & Plan  S/p PCI with stent placement 4 weeks ago  Aspirin, Plavix, Coreg, atorvastatin, losartan, Lasix.    Adrenal insufficiency (HCC)- (present on admission)  Assessment & Plan   On hydrocortisone    UTI (urinary tract infection)  Assessment & Plan  On rocephine   Urine culture growing Klebsiella pneumonia  We will switch to oral antibiotic    Chronic respiratory failure (HCC)- (present on admission)  Assessment & Plan  Continue supplemental oxygen, on 4 to 5 L    FABIAN (acute kidney injury) ([facility])  Assessment & Plan  Improved baseline now   Resume losartan     Sepsis ([facility])  Assessment & Plan  Resolved           BPH (benign prostatic hypertrophy)- (present on admission)  Assessment & Plan  continiue Flomax and finasteride    History of pulmonary embolism- (present on admission)  Assessment & Plan  Warfarin was discontinued during previous recent hospitalization due to blood loss anemia, patient knows   monitor hemoglobin hematocrit, so far stabel  Transfuse if less than 7        COPD (chronic obstructive pulmonary disease) ([facility])- (present on admission)  Assessment & Plan  Continue RT protocol, breathing treatment   --Titrate O2 as needed    -- ON 4 L of O2  And denied any complain of sob    Essential hypertension, benign- (present on admission)  Assessment & Plan  Blood pressure noted to be well controlled, monitor  Continue coreg and losartan       VTE prophylaxis: lovenox

## 2021-06-09 LAB
ALBUMIN SERPL BCP-MCNC: 2.8 G/DL (ref 3.2–4.9)
ALBUMIN/GLOB SERPL: 1 G/DL
ALP SERPL-CCNC: 97 U/L (ref 30–99)
ALT SERPL-CCNC: 27 U/L (ref 2–50)
ANION GAP SERPL CALC-SCNC: 5 MMOL/L (ref 7–16)
AST SERPL-CCNC: 28 U/L (ref 12–45)
BILIRUB SERPL-MCNC: 0.4 MG/DL (ref 0.1–1.5)
BUN SERPL-MCNC: 21 MG/DL (ref 8–22)
CALCIUM SERPL-MCNC: 7.8 MG/DL (ref 8.5–10.5)
CHLORIDE SERPL-SCNC: 99 MMOL/L (ref 96–112)
CO2 SERPL-SCNC: 33 MMOL/L (ref 20–33)
CREAT SERPL-MCNC: 0.85 MG/DL (ref 0.5–1.4)
GLOBULIN SER CALC-MCNC: 2.8 G/DL (ref 1.9–3.5)
GLUCOSE SERPL-MCNC: 79 MG/DL (ref 65–99)
POTASSIUM SERPL-SCNC: 4.4 MMOL/L (ref 3.6–5.5)
PROT SERPL-MCNC: 5.6 G/DL (ref 6–8.2)
SODIUM SERPL-SCNC: 137 MMOL/L (ref 135–145)

## 2021-06-09 PROCEDURE — 700102 HCHG RX REV CODE 250 W/ 637 OVERRIDE(OP): Performed by: HOSPITALIST

## 2021-06-09 PROCEDURE — 700102 HCHG RX REV CODE 250 W/ 637 OVERRIDE(OP): Performed by: INTERNAL MEDICINE

## 2021-06-09 PROCEDURE — A9270 NON-COVERED ITEM OR SERVICE: HCPCS | Performed by: FAMILY MEDICINE

## 2021-06-09 PROCEDURE — A9270 NON-COVERED ITEM OR SERVICE: HCPCS | Performed by: STUDENT IN AN ORGANIZED HEALTH CARE EDUCATION/TRAINING PROGRAM

## 2021-06-09 PROCEDURE — 700102 HCHG RX REV CODE 250 W/ 637 OVERRIDE(OP): Performed by: FAMILY MEDICINE

## 2021-06-09 PROCEDURE — 700102 HCHG RX REV CODE 250 W/ 637 OVERRIDE(OP): Performed by: STUDENT IN AN ORGANIZED HEALTH CARE EDUCATION/TRAINING PROGRAM

## 2021-06-09 PROCEDURE — 700111 HCHG RX REV CODE 636 W/ 250 OVERRIDE (IP): Performed by: INTERNAL MEDICINE

## 2021-06-09 PROCEDURE — A9270 NON-COVERED ITEM OR SERVICE: HCPCS | Performed by: INTERNAL MEDICINE

## 2021-06-09 PROCEDURE — 770006 HCHG ROOM/CARE - MED/SURG/GYN SEMI*

## 2021-06-09 PROCEDURE — 80053 COMPREHEN METABOLIC PANEL: CPT

## 2021-06-09 PROCEDURE — 99232 SBSQ HOSP IP/OBS MODERATE 35: CPT | Performed by: FAMILY MEDICINE

## 2021-06-09 PROCEDURE — A9270 NON-COVERED ITEM OR SERVICE: HCPCS | Performed by: HOSPITALIST

## 2021-06-09 RX ORDER — CHOLECALCIFEROL (VITAMIN D3) 125 MCG
5 CAPSULE ORAL NIGHTLY
Status: DISCONTINUED | OUTPATIENT
Start: 2021-06-09 | End: 2021-06-14

## 2021-06-09 RX ADMIN — CARVEDILOL 3.12 MG: 3.12 TABLET, FILM COATED ORAL at 08:11

## 2021-06-09 RX ADMIN — ATORVASTATIN CALCIUM 80 MG: 40 TABLET, FILM COATED ORAL at 17:24

## 2021-06-09 RX ADMIN — SULFAMETHOXAZOLE AND TRIMETHOPRIM 1 TABLET: 800; 160 TABLET ORAL at 05:10

## 2021-06-09 RX ADMIN — ENOXAPARIN SODIUM 40 MG: 40 INJECTION SUBCUTANEOUS at 05:20

## 2021-06-09 RX ADMIN — FUROSEMIDE 20 MG: 20 TABLET ORAL at 05:11

## 2021-06-09 RX ADMIN — CARVEDILOL 3.12 MG: 3.12 TABLET, FILM COATED ORAL at 17:23

## 2021-06-09 RX ADMIN — OXYCODONE 5 MG: 5 TABLET ORAL at 05:51

## 2021-06-09 RX ADMIN — TIOTROPIUM BROMIDE INHALATION SPRAY 5 MCG: 3.12 SPRAY, METERED RESPIRATORY (INHALATION) at 05:18

## 2021-06-09 RX ADMIN — DOCUSATE SODIUM 50 MG AND SENNOSIDES 8.6 MG 2 TABLET: 8.6; 5 TABLET, FILM COATED ORAL at 05:10

## 2021-06-09 RX ADMIN — MONTELUKAST 10 MG: 10 TABLET, FILM COATED ORAL at 05:10

## 2021-06-09 RX ADMIN — OXYCODONE 5 MG: 5 TABLET ORAL at 20:28

## 2021-06-09 RX ADMIN — HYDROCORTISONE 10 MG: 20 TABLET ORAL at 05:11

## 2021-06-09 RX ADMIN — BUDESONIDE AND FORMOTEROL FUMARATE DIHYDRATE 2 PUFF: 80; 4.5 AEROSOL RESPIRATORY (INHALATION) at 18:10

## 2021-06-09 RX ADMIN — CLOPIDOGREL BISULFATE 75 MG: 75 TABLET ORAL at 05:10

## 2021-06-09 RX ADMIN — LOSARTAN POTASSIUM 25 MG: 25 TABLET, FILM COATED ORAL at 05:11

## 2021-06-09 RX ADMIN — FLUTICASONE PROPIONATE 50 MCG: 50 SPRAY, METERED NASAL at 05:14

## 2021-06-09 RX ADMIN — ALPRAZOLAM 0.25 MG: 0.25 TABLET ORAL at 13:20

## 2021-06-09 RX ADMIN — CARBOXYMETHYLCELLULOSE SODIUM 1 DROP: 5 SOLUTION/ DROPS OPHTHALMIC at 05:19

## 2021-06-09 RX ADMIN — BUDESONIDE AND FORMOTEROL FUMARATE DIHYDRATE 2 PUFF: 80; 4.5 AEROSOL RESPIRATORY (INHALATION) at 05:15

## 2021-06-09 RX ADMIN — TAMSULOSIN HYDROCHLORIDE 0.4 MG: 0.4 CAPSULE ORAL at 05:11

## 2021-06-09 RX ADMIN — ASPIRIN 81 MG: 81 TABLET, COATED ORAL at 05:11

## 2021-06-09 RX ADMIN — SULFAMETHOXAZOLE AND TRIMETHOPRIM 1 TABLET: 800; 160 TABLET ORAL at 17:23

## 2021-06-09 RX ADMIN — CARBOXYMETHYLCELLULOSE SODIUM 1 DROP: 5 SOLUTION/ DROPS OPHTHALMIC at 17:23

## 2021-06-09 RX ADMIN — Medication 5 MG: at 20:27

## 2021-06-09 RX ADMIN — OMEPRAZOLE 20 MG: 20 CAPSULE, DELAYED RELEASE ORAL at 05:11

## 2021-06-09 RX ADMIN — Medication 950 MG: at 05:10

## 2021-06-09 RX ADMIN — HYDROCORTISONE 10 MG: 20 TABLET ORAL at 17:23

## 2021-06-09 RX ADMIN — OMEPRAZOLE 20 MG: 20 CAPSULE, DELAYED RELEASE ORAL at 17:23

## 2021-06-09 RX ADMIN — LEVOTHYROXINE SODIUM 75 MCG: 0.07 TABLET ORAL at 05:11

## 2021-06-09 ASSESSMENT — PAIN DESCRIPTION - PAIN TYPE
TYPE: ACUTE PAIN
TYPE: ACUTE PAIN

## 2021-06-09 NOTE — PALLIATIVE CARE
Palliative Care follow-up  Spoke with Unit JARET Sanchez on plan for family conference. Let her know that Palliative RN is available and to update with time/date. At this time, plan is for JARET Sanchez to speak further with pts wife to determine care giving capabilities and if needed to then arrange family conference.     Updated: Dr Wylie, Unit JARET Sanchez    Plan: PC RN remains available for pt and family for any needs    Thank you for allowing Palliative Care to participate in this patient's care. Please feel free to call [phone] with any questions or concerns.

## 2021-06-09 NOTE — PROGRESS NOTES
Assume care of pt at 0700. Report received from NOC RN. Pt is A/O x4. Pain is 9/10 to L hand and arm. Pt is resting in bed. Bed in lowest and locked position, call light within reach, hourly rounding in place. Labs reviewed. Communication board updated. Will continue to monitor.     No other complaints at this time.

## 2021-06-09 NOTE — CARE PLAN
The patient is Stable - Low risk of patient condition declining or worsening    Shift Goals  Clinical Goals: adequate oxygenation  Patient Goals: Discharge safely  Family Goals: NA  Progress made toward(s) clinical / shift goals:  Pt remains on 5L of oxygen via nasal cannula. No respiratory distress noted. Encouraged pt to perform deep breathing exercises. Staff and family will meet to discuss about pt's discharge plan.     Patient is not progressing towards the following goals:

## 2021-06-09 NOTE — DISCHARGE PLANNING
Anticipated Discharge Disposition: TBD    Action: Pt discussed in IDT rounds with medical team. Pt's MD and RN don't believe that there is a safe way for Pt to d/c home at this time as his family won't be able to afford 24/7 caregiver and his wife would not be able to provide transfers and other more physical care as Pt is max assist. Medical team agreed that a care conference with Pt's family is the next step.     LSW met with Pt to discuss concerns about d/c home. Pt told LSW that he understands his limitations right now and that he is an old man so he knows that it wouldn't be safe for his wife, Jacquie, to take care of him herself. He doesn't want to stay in [place] and would rather go to the [place], [place] area for SNF but understands that that may not be possible. He reported that he would also like to have a care conference because he believes that a doctor telling his wife it isn't safe for him to go home would help her understand because she is pretty set on him coming home now. Pt reports that he himself hs not been able to talk to his wife over the phone in a few days because she is hard of hearing and the closed caption device on her phone only works about half the time. He asked if LSW can try and coordinate with Gene to set up the care conference with his wife as he lives nearby.     LSW attempted to call Gene, Jacquie's son, to set up a time for a care conference. Left Voicemail.      Barriers to Discharge: Pt needs post acute level of care for discharge, family wants him to discharge home instead.     Plan: LSW to f/u with Pt's son Gene to set up time for care conference.

## 2021-06-09 NOTE — CARE PLAN
The patient is Stable - Low risk of patient condition declining or worsening    Shift Goals  Clinical Goals: (P) Adequate oxygenation  Patient Goals: (P) Discharge safely  Family Goals: (P) NA    Progress made toward(s) clinical / shift goals:  Progressing as expected    Problem: Knowledge Deficit - Standard  Goal: Patient and family/care givers will demonstrate understanding of plan of care, disease process/condition, diagnostic tests and medications  Outcome: Progressing   Pt is progressing as expected and knowledge deficit is decreasing with current plan of care. Education provided on current plan of care and specific interventions being utilized.   Problem: Respiratory  Goal: Patient will achieve/maintain optimum respiratory ventilation and gas exchange  Outcome: Progressing   Pt is progressing towards goal of achieve respiratory ventilation and gas exchange. Pt able to maintain goal with appropriate interventions.

## 2021-06-09 NOTE — PROGRESS NOTES
Assume care of pt at 1900. Report received from day RN. Pt is A/O x4. Pt c/o discomfort on his shoulder, medicated pt with Tylenol. Pt is resting in bed. Repositioned pt every 2 hours. Midline with blood return. Bed in lowest and locked position, call light in reach, hourly rounding in place. Bed alarm in place. Labs reviewed. Communication board updated. Will continue to monitor.

## 2021-06-10 LAB
ALBUMIN SERPL BCP-MCNC: 2.7 G/DL (ref 3.2–4.9)
ALBUMIN/GLOB SERPL: 1 G/DL
ALP SERPL-CCNC: 89 U/L (ref 30–99)
ALT SERPL-CCNC: 24 U/L (ref 2–50)
ANION GAP SERPL CALC-SCNC: 8 MMOL/L (ref 7–16)
ANISOCYTOSIS BLD QL SMEAR: ABNORMAL
AST SERPL-CCNC: 22 U/L (ref 12–45)
BASOPHILS # BLD AUTO: 0 % (ref 0–1.8)
BASOPHILS # BLD: 0 K/UL (ref 0–0.12)
BILIRUB SERPL-MCNC: 0.5 MG/DL (ref 0.1–1.5)
BUN SERPL-MCNC: 17 MG/DL (ref 8–22)
CALCIUM SERPL-MCNC: 7.9 MG/DL (ref 8.5–10.5)
CHLORIDE SERPL-SCNC: 97 MMOL/L (ref 96–112)
CO2 SERPL-SCNC: 31 MMOL/L (ref 20–33)
CREAT SERPL-MCNC: 0.82 MG/DL (ref 0.5–1.4)
EOSINOPHIL # BLD AUTO: 0.32 K/UL (ref 0–0.51)
EOSINOPHIL NFR BLD: 2.6 % (ref 0–6.9)
ERYTHROCYTE [DISTWIDTH] IN BLOOD BY AUTOMATED COUNT: 68.6 FL (ref 35.9–50)
GLOBULIN SER CALC-MCNC: 2.8 G/DL (ref 1.9–3.5)
GLUCOSE SERPL-MCNC: 123 MG/DL (ref 65–99)
HCT VFR BLD AUTO: 34.8 % (ref 42–52)
HGB BLD-MCNC: 10.6 G/DL (ref 14–18)
LYMPHOCYTES # BLD AUTO: 0.11 K/UL (ref 1–4.8)
LYMPHOCYTES NFR BLD: 0.9 % (ref 22–41)
MANUAL DIFF BLD: NORMAL
MCH RBC QN AUTO: 28 PG (ref 27–33)
MCHC RBC AUTO-ENTMCNC: 30.5 G/DL (ref 33.7–35.3)
MCV RBC AUTO: 91.8 FL (ref 81.4–97.8)
MONOCYTES # BLD AUTO: 0.21 K/UL (ref 0–0.85)
MONOCYTES NFR BLD AUTO: 1.7 % (ref 0–13.4)
MORPHOLOGY BLD-IMP: NORMAL
NEUTROPHILS # BLD AUTO: 11.76 K/UL (ref 1.82–7.42)
NEUTROPHILS NFR BLD: 94.8 % (ref 44–72)
NRBC # BLD AUTO: 0 K/UL
NRBC BLD-RTO: 0 /100 WBC
PLATELET # BLD AUTO: 208 K/UL (ref 164–446)
PLATELET BLD QL SMEAR: NORMAL
PMV BLD AUTO: 10.3 FL (ref 9–12.9)
POTASSIUM SERPL-SCNC: 3.9 MMOL/L (ref 3.6–5.5)
PROT SERPL-MCNC: 5.5 G/DL (ref 6–8.2)
RBC # BLD AUTO: 3.79 M/UL (ref 4.7–6.1)
RBC BLD AUTO: PRESENT
SODIUM SERPL-SCNC: 136 MMOL/L (ref 135–145)
TOXIC GRANULES BLD QL SMEAR: SLIGHT
WBC # BLD AUTO: 12.4 K/UL (ref 4.8–10.8)

## 2021-06-10 PROCEDURE — 700102 HCHG RX REV CODE 250 W/ 637 OVERRIDE(OP): Performed by: STUDENT IN AN ORGANIZED HEALTH CARE EDUCATION/TRAINING PROGRAM

## 2021-06-10 PROCEDURE — A9270 NON-COVERED ITEM OR SERVICE: HCPCS | Performed by: STUDENT IN AN ORGANIZED HEALTH CARE EDUCATION/TRAINING PROGRAM

## 2021-06-10 PROCEDURE — 770006 HCHG ROOM/CARE - MED/SURG/GYN SEMI*

## 2021-06-10 PROCEDURE — A9270 NON-COVERED ITEM OR SERVICE: HCPCS | Performed by: HOSPITALIST

## 2021-06-10 PROCEDURE — A9270 NON-COVERED ITEM OR SERVICE: HCPCS | Performed by: INTERNAL MEDICINE

## 2021-06-10 PROCEDURE — 700102 HCHG RX REV CODE 250 W/ 637 OVERRIDE(OP): Performed by: INTERNAL MEDICINE

## 2021-06-10 PROCEDURE — 700102 HCHG RX REV CODE 250 W/ 637 OVERRIDE(OP): Performed by: FAMILY MEDICINE

## 2021-06-10 PROCEDURE — 700111 HCHG RX REV CODE 636 W/ 250 OVERRIDE (IP): Performed by: INTERNAL MEDICINE

## 2021-06-10 PROCEDURE — 99232 SBSQ HOSP IP/OBS MODERATE 35: CPT | Performed by: FAMILY MEDICINE

## 2021-06-10 PROCEDURE — 80053 COMPREHEN METABOLIC PANEL: CPT

## 2021-06-10 PROCEDURE — 85027 COMPLETE CBC AUTOMATED: CPT

## 2021-06-10 PROCEDURE — 85007 BL SMEAR W/DIFF WBC COUNT: CPT

## 2021-06-10 PROCEDURE — A9270 NON-COVERED ITEM OR SERVICE: HCPCS | Performed by: FAMILY MEDICINE

## 2021-06-10 PROCEDURE — 94760 N-INVAS EAR/PLS OXIMETRY 1: CPT

## 2021-06-10 PROCEDURE — 700102 HCHG RX REV CODE 250 W/ 637 OVERRIDE(OP): Performed by: HOSPITALIST

## 2021-06-10 RX ADMIN — OXYCODONE 5 MG: 5 TABLET ORAL at 02:04

## 2021-06-10 RX ADMIN — ASPIRIN 81 MG: 81 TABLET, COATED ORAL at 05:38

## 2021-06-10 RX ADMIN — MONTELUKAST 10 MG: 10 TABLET, FILM COATED ORAL at 05:38

## 2021-06-10 RX ADMIN — TIOTROPIUM BROMIDE INHALATION SPRAY 5 MCG: 3.12 SPRAY, METERED RESPIRATORY (INHALATION) at 05:44

## 2021-06-10 RX ADMIN — OMEPRAZOLE 20 MG: 20 CAPSULE, DELAYED RELEASE ORAL at 16:57

## 2021-06-10 RX ADMIN — Medication 5 MG: at 20:17

## 2021-06-10 RX ADMIN — CARBOXYMETHYLCELLULOSE SODIUM 1 DROP: 5 SOLUTION/ DROPS OPHTHALMIC at 16:56

## 2021-06-10 RX ADMIN — OMEPRAZOLE 20 MG: 20 CAPSULE, DELAYED RELEASE ORAL at 05:38

## 2021-06-10 RX ADMIN — BUDESONIDE AND FORMOTEROL FUMARATE DIHYDRATE 2 PUFF: 80; 4.5 AEROSOL RESPIRATORY (INHALATION) at 16:56

## 2021-06-10 RX ADMIN — HYDROCORTISONE 10 MG: 20 TABLET ORAL at 16:56

## 2021-06-10 RX ADMIN — OXYCODONE 5 MG: 5 TABLET ORAL at 22:01

## 2021-06-10 RX ADMIN — BUDESONIDE AND FORMOTEROL FUMARATE DIHYDRATE 2 PUFF: 80; 4.5 AEROSOL RESPIRATORY (INHALATION) at 05:43

## 2021-06-10 RX ADMIN — OXYCODONE 5 MG: 5 TABLET ORAL at 13:57

## 2021-06-10 RX ADMIN — CARVEDILOL 3.12 MG: 3.12 TABLET, FILM COATED ORAL at 08:27

## 2021-06-10 RX ADMIN — FLUTICASONE PROPIONATE 50 MCG: 50 SPRAY, METERED NASAL at 05:41

## 2021-06-10 RX ADMIN — CLOPIDOGREL BISULFATE 75 MG: 75 TABLET ORAL at 05:37

## 2021-06-10 RX ADMIN — LOSARTAN POTASSIUM 25 MG: 25 TABLET, FILM COATED ORAL at 05:38

## 2021-06-10 RX ADMIN — Medication 950 MG: at 05:38

## 2021-06-10 RX ADMIN — CARVEDILOL 3.12 MG: 3.12 TABLET, FILM COATED ORAL at 16:57

## 2021-06-10 RX ADMIN — LEVOTHYROXINE SODIUM 75 MCG: 0.07 TABLET ORAL at 05:38

## 2021-06-10 RX ADMIN — ENOXAPARIN SODIUM 40 MG: 40 INJECTION SUBCUTANEOUS at 05:42

## 2021-06-10 RX ADMIN — HYDROCORTISONE 10 MG: 20 TABLET ORAL at 05:38

## 2021-06-10 RX ADMIN — ATORVASTATIN CALCIUM 80 MG: 40 TABLET, FILM COATED ORAL at 16:57

## 2021-06-10 RX ADMIN — FUROSEMIDE 20 MG: 20 TABLET ORAL at 05:38

## 2021-06-10 RX ADMIN — DOCUSATE SODIUM 50 MG AND SENNOSIDES 8.6 MG 2 TABLET: 8.6; 5 TABLET, FILM COATED ORAL at 05:38

## 2021-06-10 RX ADMIN — CARBOXYMETHYLCELLULOSE SODIUM 1 DROP: 5 SOLUTION/ DROPS OPHTHALMIC at 05:42

## 2021-06-10 RX ADMIN — TAMSULOSIN HYDROCHLORIDE 0.4 MG: 0.4 CAPSULE ORAL at 05:38

## 2021-06-10 ASSESSMENT — PAIN DESCRIPTION - PAIN TYPE
TYPE: ACUTE PAIN

## 2021-06-10 NOTE — PROGRESS NOTES
Assumed care of pt at 0700. Report received and bedside rounding completed with night RN. Pt is calm no SOB, no acute distress noted. Patient is sitting up in bed. Pain 7/10 in left arm.  Call light and pt belongings within reach - hourly rounding in place. See flowsheets for further assessment.     Pt is considered a HIGH fall risk. edu provided on risk level.   Fall precautions in place,  bed alarm on. Treaded non slip socks. Bed locked. Communication board updated with POC.     ambulatory

## 2021-06-10 NOTE — CARE PLAN
The patient is Stable - Low risk of patient condition declining or worsening    Shift Goals  Clinical Goals: pain control  Patient Goals: discharge safely    Progress made toward(s) clinical / shift goals:  Pt c/o pain on his left shoulder, medicated pt per MAR with positive relief. Pt able to sleep comfortably. Awaiting for discharge, family conference is needed for pt's discharge plan.    Patient is not progressing towards the following goals:

## 2021-06-10 NOTE — PROGRESS NOTES
Assume care of pt at 1900. Report received from day RN. Pt is A/O x4. Pain is 8/10, medicated per MAR. Remains on 5L of oxygen via nasal cannula. Midline on SANDY patent and flushing with positive blood return. Pt is resting in bed. Bed in lowest and locked position, call light in reach, hourly rounding in place. Bed alarm in place. Labs reviewed. Communication board updated. Will continue to monitor.

## 2021-06-10 NOTE — CARE PLAN
The patient is Stable - Low risk of patient condition declining or worsening    Shift Goals  Clinical Goals: Pain control and independence  Patient Goals: Discharge planning  Family Goals: NA    Progress made toward(s) clinical / shift goals:    Problem: Pain - Standard  Goal: Alleviation of pain or a reduction in pain to the patient’s comfort goal  Outcome: Progressing  Note: Patient reports pain in left arm. Encouraged rest and repositioned. Medicated per MAR.     Problem: Skin Integrity  Goal: Skin integrity is maintained or improved  Outcome: Progressing  Note: Patient needs assistance when turning in bed.       Patient is not progressing towards the following goals:

## 2021-06-10 NOTE — PROGRESS NOTES
Hospital Medicine Daily Progress Note    Date of Service  6/9/2021    Chief Complaint  88 y.o. 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     During the stay in the hospital, patient electrolytes continue to be repleted.  Patient is also noted to have acute on chronic systolic congestive heart failure, started on IV Lasix--> later switched po, I/Os, daily weight, fluid restriction.  Regarding CAD, patient is on aspirin, Plavix, Coreg, losartan, and statin. Patient is to follow-up with cardiology as an outpatient.     Patient has history of adrenal insufficiency,on hydrocortisone 10 bid. PT/OT has evaluated, recommended discharging the patient to skilled nursing facility.  During hospital course patient noted to have UA positive for UTI.  Urine culture positive for Klebsiella .    Interval Problem Update  6/1/2021  Vital stable  Labs unremarkable. meds reviewed    assisting with placement     6/2/2021  Vitals remained stable  Labs remain unremarkable  No events overnight   assisting with placement     6/3/2021  Noted to have low blood pressure  Medication adjusted  Remain afebrile  Labs noted with elevated white count likely secondary to steroid use  Mild FABIAN.  Will hold Lasix and losartan.  Elevated pro-Magdi.  No sign of sepsis active infection  Check UA      6/4/2021  Had repaid response overnight for hypotension  Dry  on examination .  Blood pressure improved  On Rocephin for UTI  urine culture and blood culture in process  Losartan will Lasix on hold given low blood pressure and worsening kidney function  We will continue monitor kidney function  If labs remain unremarkable and  blood pressure remained stable can be transferred  to SNF tomorrow     6/5/2021  Vitals remained stable.  Requiring 5L oxygen to maintain saturation over 95  Kidney function improving.  KCl added for hyponatremia  Plan to discharge to a SNF versus hospice after discussion with the patient's spouse  Palliative evaluation requested for assistance     6/6/2021  Vitals remain stable .  KCl replaced for hypokalemia  Kidney function improved  Lasix resumed  Possible discharge SNF tomorrow   and palliative assisting with discharge     6/7/2021    Vitals stable  Kidney function baseline now  Pending SNF placement//DC home with home health  Medically cleared for discharge  6/8: Resting in bed comfortably.  Spoke with patient the need for placement considering the clinical complexity and the need for 24/7 supervision.  Patient stated that he will consider.  Wife however is declining placement.   to touch base with wife in that regard.    6/9: Resting in bed comfortable.  Hemodynamically stable.  Respiratory status stable.  No acute distress noted.  Patient stated that he is considering hospice at home.  Palliative care team notified.  No acute distress noted.  No issues overnight per staff.   I certify that the patient requires continued medically necessary hospital services for the treatment of debility and pressure ulcers and will remain in the hospital for at least another 3 to 4 days days.  Discharge plan is anticipated to be to SNF versus home with home health.    Consultants/Specialty  N/A     Code Status  DNAR, I OK     Disposition  SNF vs home with HH/DME      Review of Systems  Review of Systems   Constitutional: Negative  for fever.   HENT: Negative for hearing loss.    Eyes: Negative for blurred vision and double vision.   Respiratory: Negative for cough.    Cardiovascular: Negative for chest pain and palpitations.   Gastrointestinal: Negative for nausea and vomiting.   Genitourinary: Negative for dysuria.   Musculoskeletal: Negative for myalgias.   Skin: Negative for rash.   Neurological: Negative for dizziness and headaches.   Psychiatric/Behavioral: Negative for depression.         Physical Exam  Temp:  [36.2 °C (97.2 °F)-37.1 °C (98.8 °F)] 36.2 °C (97.2 °F)  Pulse:  [71-82] 78  Resp:  [16-21] 16  BP: (125-156)/(59-92) 156/92  SpO2:  [93 %-96 %] 96 %     Physical Exam  Constitutional:       Appearance: He is obese.   HENT:      Head: Normocephalic and atraumatic.      Right Ear: Tympanic membrane normal.      Left Ear: Tympanic membrane normal.      Nose: Nose normal.      Mouth/Throat:      Mouth: Mucous membranes are moist.   Eyes:      Pupils: Pupils are equal, round, and reactive to light.   Cardiovascular:      Rate and Rhythm: Normal rate and regular rhythm.      Pulses: Normal pulses.      Heart sounds: Normal heart sounds. No murmur heard.     Pulmonary:      Effort: Pulmonary effort is normal. No respiratory distress.   Abdominal:      General: Abdomen is flat.      Palpations: Abdomen is soft.      Tenderness: There is no abdominal tenderness.   Musculoskeletal:    Left upper extremity swelling.  Skin:     Coloration: Skin is not jaundiced.   Neurological:      General: No focal deficit present.      Mental Status: He is alert and oriented to person, place, and time. Mental status is at baseline.   Psychiatric:         Mood and Affect: Mood normal.   Fluids    Intake/Output Summary (Last 24 hours) at 6/9/2021 1701  Last data filed at 6/9/2021 0534  Gross per 24 hour   Intake 120 ml   Output 650 ml   Net -530 ml       Laboratory  Recent Labs     06/07/21  0454 06/08/21  0445   WBC 6.6 7.0   RBC 3.33* 3.22*   HEMOGLOBIN  9.4* 9.1*   HEMATOCRIT 31.2* 30.4*   MCV 93.7 94.4   MCH 28.2 28.3   MCHC 30.1* 29.9*   RDW 70.2* 70.3*   PLATELETCT 167 164   MPV 10.2 10.2     Recent Labs     06/07/21  0454 06/08/21  0445 06/09/21  0221   SODIUM 136 139 137   POTASSIUM 3.8 4.1 4.4   CHLORIDE 97 100 99   CO2 35* 33 33   GLUCOSE 81 78 79   BUN 27* 24* 21   CREATININE 0.78 0.93 0.85   CALCIUM 8.0* 7.9* 7.8*                   Imaging  DX-CHEST-PORTABLE (1 VIEW)   Final Result      Findings consistent with mild pulmonary edema along with minimal bibasilar atelectasis and small left pleural effusion. No significant change.      DX-CHEST-LIMITED (1 VIEW)   Final Result         No significant interval change.      Small bilateral pleural effusion bibasilar opacities, similar to prior.      Stable cardiomegaly.      US-EXTREMITY VENOUS UPPER UNILAT LEFT   Final Result      CT-EXTREMITY, UPPER WITH LEFT   Final Result      No acute fracture or dislocation.      Severe osteoarthritis of the elbow joint.      IR-MIDLINE CATHETER INSERTION WO GUIDANCE > AGE 3   Final Result                  Ultrasound-guided midline placement performed by qualified nursing staff    as above.          IR-US GUIDED PIV   Final Result    Ultrasound-guided PERIPHERAL IV INSERTION performed by    qualified nursing staff as above.      US-EXTREMITY VENOUS LOWER UNILAT LEFT   Final Result      DX-CHEST-PORTABLE (1 VIEW)   Final Result      Bibasilar opacities may represent atelectasis or consolidation.      Small left pleural effusion may be present.      Pulmonary interstitial edema.      Stable prominence of the cardiomediastinal silhouette.      Atherosclerotic calcification is seen.      DX-ELBOW-COMPLETE 3+ LEFT   Final Result      1.  No fracture or dislocation of LEFT elbow.   2.  Severe degenerative change.   3.  Diffuse subcutaneous edema.   4.  Limited by positioning.           Assessment/Plan  * Acute on chronic systolic congestive heart failure (HCC)- (present on  admission)  Assessment & Plan  Patient on 5L oxygen via nc at baseline  Echo showed EF of 45% on 5/12, I/os , daily weight and fluid restriction to 1.5 L  Continue BB , losartan and  Lasix  Cardiology out-patient follow-up.    Pain and swelling of left upper extremity- (present on admission)  Assessment & Plan  US negative for DVT   Improving     Sacral wound- (present on admission)  Assessment & Plan  Wound mx as per wound team recs   --Offloading and periodic turning     Hypophosphatemia  Assessment & Plan  Replete and monitor  2.5      Hypomagnesemia  Assessment & Plan  Replete and monitor      Hypokalemia- (present on admission)  Assessment & Plan  Replete and monitor    Anasarca- (present on admission)  Assessment & Plan  Continue to monitor on diuretic     Debility- (present on admission)  Assessment & Plan  SNF placement pending     Coronary artery disease- (present on admission)  Assessment & Plan  S/p PCI with stent placement 4 weeks ago  Aspirin, Plavix, Coreg, atorvastatin, losartan, Lasix.    Adrenal insufficiency (HCC)- (present on admission)  Assessment & Plan   On hydrocortisone home dose.    UTI (urinary tract infection)  Assessment & Plan  On rocephine   Urine culture growing Klebsiella pneumonia  We will switch to oral antibiotic    Chronic respiratory failure (HCC)- (present on admission)  Assessment & Plan  Continue supplemental oxygen, on 4 to 5 L    FABIAN (acute kidney injury) (HCC)  Assessment & Plan  Resolved.  Resume losartan     Sepsis (HCC)  Assessment & Plan  Resolved           BPH (benign prostatic hypertrophy)- (present on admission)  Assessment & Plan  continiue Flomax and finasteride    History of pulmonary embolism- (present on admission)  Assessment & Plan  Warfarin was discontinued during previous recent hospitalization due to blood loss anemia, patient knows   monitor hemoglobin hematocrit, so far stabel  Transfuse if less than 7        COPD (chronic obstructive pulmonary disease)  (HCC)- (present on admission)  Assessment & Plan  Continue RT protocol, breathing treatment   --Titrate O2 as needed    -- ON 4 L of O2  And denied any complain of sob    Essential hypertension, benign- (present on admission)  Assessment & Plan  Blood pressure noted to be well controlled, monitor  Continue coreg and losartan       VTE prophylaxis: lovenox

## 2021-06-11 LAB
BASOPHILS # BLD AUTO: 0.4 % (ref 0–1.8)
BASOPHILS # BLD: 0.05 K/UL (ref 0–0.12)
EOSINOPHIL # BLD AUTO: 0.24 K/UL (ref 0–0.51)
EOSINOPHIL NFR BLD: 1.9 % (ref 0–6.9)
ERYTHROCYTE [DISTWIDTH] IN BLOOD BY AUTOMATED COUNT: 70.2 FL (ref 35.9–50)
HCT VFR BLD AUTO: 35 % (ref 42–52)
HGB BLD-MCNC: 10.4 G/DL (ref 14–18)
IMM GRANULOCYTES # BLD AUTO: 0.06 K/UL (ref 0–0.11)
IMM GRANULOCYTES NFR BLD AUTO: 0.5 % (ref 0–0.9)
LYMPHOCYTES # BLD AUTO: 1.05 K/UL (ref 1–4.8)
LYMPHOCYTES NFR BLD: 8.4 % (ref 22–41)
MCH RBC QN AUTO: 27.7 PG (ref 27–33)
MCHC RBC AUTO-ENTMCNC: 29.7 G/DL (ref 33.7–35.3)
MCV RBC AUTO: 93.1 FL (ref 81.4–97.8)
MONOCYTES # BLD AUTO: 0.55 K/UL (ref 0–0.85)
MONOCYTES NFR BLD AUTO: 4.4 % (ref 0–13.4)
NEUTROPHILS # BLD AUTO: 10.52 K/UL (ref 1.82–7.42)
NEUTROPHILS NFR BLD: 84.4 % (ref 44–72)
NRBC # BLD AUTO: 0 K/UL
NRBC BLD-RTO: 0 /100 WBC
PLATELET # BLD AUTO: 216 K/UL (ref 164–446)
PMV BLD AUTO: 10.4 FL (ref 9–12.9)
RBC # BLD AUTO: 3.76 M/UL (ref 4.7–6.1)
WBC # BLD AUTO: 12.5 K/UL (ref 4.8–10.8)

## 2021-06-11 PROCEDURE — 97535 SELF CARE MNGMENT TRAINING: CPT

## 2021-06-11 PROCEDURE — A9270 NON-COVERED ITEM OR SERVICE: HCPCS | Performed by: STUDENT IN AN ORGANIZED HEALTH CARE EDUCATION/TRAINING PROGRAM

## 2021-06-11 PROCEDURE — 700102 HCHG RX REV CODE 250 W/ 637 OVERRIDE(OP): Performed by: HOSPITALIST

## 2021-06-11 PROCEDURE — 97530 THERAPEUTIC ACTIVITIES: CPT

## 2021-06-11 PROCEDURE — 94760 N-INVAS EAR/PLS OXIMETRY 1: CPT

## 2021-06-11 PROCEDURE — 97760 ORTHOTIC MGMT&TRAING 1ST ENC: CPT

## 2021-06-11 PROCEDURE — 36415 COLL VENOUS BLD VENIPUNCTURE: CPT

## 2021-06-11 PROCEDURE — 700111 HCHG RX REV CODE 636 W/ 250 OVERRIDE (IP): Performed by: INTERNAL MEDICINE

## 2021-06-11 PROCEDURE — 700102 HCHG RX REV CODE 250 W/ 637 OVERRIDE(OP): Performed by: STUDENT IN AN ORGANIZED HEALTH CARE EDUCATION/TRAINING PROGRAM

## 2021-06-11 PROCEDURE — A9270 NON-COVERED ITEM OR SERVICE: HCPCS | Performed by: FAMILY MEDICINE

## 2021-06-11 PROCEDURE — 700102 HCHG RX REV CODE 250 W/ 637 OVERRIDE(OP): Performed by: INTERNAL MEDICINE

## 2021-06-11 PROCEDURE — 85025 COMPLETE CBC W/AUTO DIFF WBC: CPT

## 2021-06-11 PROCEDURE — 99231 SBSQ HOSP IP/OBS SF/LOW 25: CPT | Performed by: FAMILY MEDICINE

## 2021-06-11 PROCEDURE — 770006 HCHG ROOM/CARE - MED/SURG/GYN SEMI*

## 2021-06-11 PROCEDURE — 700102 HCHG RX REV CODE 250 W/ 637 OVERRIDE(OP): Performed by: FAMILY MEDICINE

## 2021-06-11 PROCEDURE — A9270 NON-COVERED ITEM OR SERVICE: HCPCS | Performed by: HOSPITALIST

## 2021-06-11 PROCEDURE — A9270 NON-COVERED ITEM OR SERVICE: HCPCS | Performed by: INTERNAL MEDICINE

## 2021-06-11 RX ADMIN — FUROSEMIDE 20 MG: 20 TABLET ORAL at 05:36

## 2021-06-11 RX ADMIN — TIOTROPIUM BROMIDE INHALATION SPRAY 5 MCG: 3.12 SPRAY, METERED RESPIRATORY (INHALATION) at 07:35

## 2021-06-11 RX ADMIN — CARVEDILOL 3.12 MG: 3.12 TABLET, FILM COATED ORAL at 07:35

## 2021-06-11 RX ADMIN — CLOPIDOGREL BISULFATE 75 MG: 75 TABLET ORAL at 05:35

## 2021-06-11 RX ADMIN — FLUTICASONE PROPIONATE 50 MCG: 50 SPRAY, METERED NASAL at 05:41

## 2021-06-11 RX ADMIN — LOSARTAN POTASSIUM 25 MG: 25 TABLET, FILM COATED ORAL at 05:35

## 2021-06-11 RX ADMIN — DOCUSATE SODIUM 50 MG AND SENNOSIDES 8.6 MG 2 TABLET: 8.6; 5 TABLET, FILM COATED ORAL at 05:35

## 2021-06-11 RX ADMIN — ACETAMINOPHEN 650 MG: 325 TABLET, FILM COATED ORAL at 22:22

## 2021-06-11 RX ADMIN — Medication 950 MG: at 09:25

## 2021-06-11 RX ADMIN — OXYCODONE 5 MG: 5 TABLET ORAL at 20:42

## 2021-06-11 RX ADMIN — ENOXAPARIN SODIUM 40 MG: 40 INJECTION SUBCUTANEOUS at 05:40

## 2021-06-11 RX ADMIN — LEVOTHYROXINE SODIUM 75 MCG: 0.07 TABLET ORAL at 05:35

## 2021-06-11 RX ADMIN — TAMSULOSIN HYDROCHLORIDE 0.4 MG: 0.4 CAPSULE ORAL at 05:35

## 2021-06-11 RX ADMIN — MONTELUKAST 10 MG: 10 TABLET, FILM COATED ORAL at 05:35

## 2021-06-11 RX ADMIN — OXYCODONE 5 MG: 5 TABLET ORAL at 07:43

## 2021-06-11 RX ADMIN — CARVEDILOL 3.12 MG: 3.12 TABLET, FILM COATED ORAL at 17:26

## 2021-06-11 RX ADMIN — ASPIRIN 81 MG: 81 TABLET, COATED ORAL at 05:35

## 2021-06-11 RX ADMIN — OMEPRAZOLE 20 MG: 20 CAPSULE, DELAYED RELEASE ORAL at 17:26

## 2021-06-11 RX ADMIN — CARBOXYMETHYLCELLULOSE SODIUM 1 DROP: 5 SOLUTION/ DROPS OPHTHALMIC at 05:40

## 2021-06-11 RX ADMIN — HYDROCORTISONE 10 MG: 20 TABLET ORAL at 17:26

## 2021-06-11 RX ADMIN — HYDROCORTISONE 10 MG: 20 TABLET ORAL at 05:35

## 2021-06-11 RX ADMIN — ATORVASTATIN CALCIUM 80 MG: 40 TABLET, FILM COATED ORAL at 17:26

## 2021-06-11 RX ADMIN — OMEPRAZOLE 20 MG: 20 CAPSULE, DELAYED RELEASE ORAL at 05:35

## 2021-06-11 RX ADMIN — BUDESONIDE AND FORMOTEROL FUMARATE DIHYDRATE 2 PUFF: 80; 4.5 AEROSOL RESPIRATORY (INHALATION) at 05:32

## 2021-06-11 RX ADMIN — DOCUSATE SODIUM 50 MG AND SENNOSIDES 8.6 MG 2 TABLET: 8.6; 5 TABLET, FILM COATED ORAL at 17:26

## 2021-06-11 RX ADMIN — BUDESONIDE AND FORMOTEROL FUMARATE DIHYDRATE 2 PUFF: 80; 4.5 AEROSOL RESPIRATORY (INHALATION) at 17:25

## 2021-06-11 RX ADMIN — OXYCODONE 5 MG: 5 TABLET ORAL at 14:06

## 2021-06-11 RX ADMIN — Medication 5 MG: at 20:42

## 2021-06-11 RX ADMIN — CARBOXYMETHYLCELLULOSE SODIUM 1 DROP: 5 SOLUTION/ DROPS OPHTHALMIC at 17:26

## 2021-06-11 ASSESSMENT — COGNITIVE AND FUNCTIONAL STATUS - GENERAL
MOVING FROM LYING ON BACK TO SITTING ON SIDE OF FLAT BED: UNABLE
EATING MEALS: A LITTLE
MOVING TO AND FROM BED TO CHAIR: UNABLE
DRESSING REGULAR LOWER BODY CLOTHING: TOTAL
TURNING FROM BACK TO SIDE WHILE IN FLAT BAD: UNABLE
PERSONAL GROOMING: A LOT
MOBILITY SCORE: 7
WALKING IN HOSPITAL ROOM: TOTAL
SUGGESTED CMS G CODE MODIFIER DAILY ACTIVITY: CL
CLIMB 3 TO 5 STEPS WITH RAILING: TOTAL
DRESSING REGULAR UPPER BODY CLOTHING: A LOT
TOILETING: TOTAL
STANDING UP FROM CHAIR USING ARMS: A LOT
HELP NEEDED FOR BATHING: A LOT
SUGGESTED CMS G CODE MODIFIER MOBILITY: CM
DAILY ACTIVITIY SCORE: 11

## 2021-06-11 ASSESSMENT — PAIN SCALES - WONG BAKER: WONGBAKER_NUMERICALRESPONSE: DOESN'T HURT AT ALL

## 2021-06-11 ASSESSMENT — GAIT ASSESSMENTS: GAIT LEVEL OF ASSIST: UNABLE TO PARTICIPATE

## 2021-06-11 ASSESSMENT — PAIN DESCRIPTION - PAIN TYPE
TYPE: ACUTE PAIN

## 2021-06-11 NOTE — CARE PLAN
The patient is Stable - Low risk of patient condition declining or worsening    Shift Goals  Clinical Goals: Pain control and independence  Patient Goals: Discharge planning  Family Goals: NA    Progress made toward(s) clinical / shift goals:  progressing       Problem: Knowledge Deficit - Standard  Goal: Patient and family/care givers will demonstrate understanding of plan of care, disease process/condition, diagnostic tests and medications  Outcome: Progressing     Problem: Pain - Standard  Goal: Alleviation of pain or a reduction in pain to the patient’s comfort goal  Outcome: Progressing     Problem: Skin Integrity  Goal: Skin integrity is maintained or improved  Outcome: Progressing     Problem: Fall Risk  Goal: Patient will remain free from falls  Outcome: Progressing

## 2021-06-11 NOTE — PROGRESS NOTES
Assumed care Patient AxO x 4  Respirations normal and unlabored on 5l. VSS.   Patient has complained of pain in left arm edema Fall risk protocol in place. Bed locked and in lowest position. Non-skid socks and bed alarm on.  Rounded on hourly. Call light with in reach.

## 2021-06-11 NOTE — THERAPY
"Occupational Therapy  Daily Treatment     Patient Name: Jeffrey Lizama  Age:  88 y.o., Sex:  male  Medical Record #: [id number]  Today's Date: 6/11/2021       Precautions: Fall Risk    Assessment    Pt seen for OT tx to include: bed mobility, partial sit to stands at FWW, seated grooming. Pt required min-mod A to mobilize to EOB, then able to maintain balance without assist. Total A to manage B socks. Pt's LUE presents with severe edema, erythema, hypersensitivity throughout limb. OT contacted hospitalist via Vo[employer] to inquire about compression garments. Hospitalist agrees with recommendation. OT will provide compression glove and sleeve. Pt demos improved participation this session. Continues to be limited by: generalized weakness, motor incoordination, balance impairment, LUE pain & edema. Will benefit from ongoing acute OT.     Addendum: OT provided and applied large L edema glove. Attempted to apply Tubigrip to L forearm, however edema too excessive. Wrapped forearm in Coban as alternative approach. Instructed pt on AROM to LUE. Will continue to address LUE edema control.     Plan    Continue current treatment plan.    DC Equipment Recommendations: Unable to determine at this time  Discharge Recommendations: Recommend post-acute placement for additional occupational therapy services prior to discharge home    Subjective    \"I hope to go to a rehab in [place].\"  \"It felt good to exercise.\"     Objective       06/11/21 1203   Cognition    Cognition / Consciousness WDL   Level of Consciousness Alert   Comments Cooperative this session    Active ROM Upper Body   Dominant Hand Right   Lt Shoulder Flexion Degrees 20   Comments L shoulder limited by pain; entire LUE very edematous    Strength Upper Body   Gross Strength Generalized Weakness, Equal Bilaterally.    Comments LUE 2-/5 throughout due to severe edema    Sensation Upper Body   Comments LUE hypersensitive    Balance   Sitting Balance (Static) Fair   Sitting " Balance (Dynamic) Fair -   Standing Balance (Static) Trace  (partial stand only )   Weight Shift Sitting Poor   Weight Shift Standing Absent   Comments 2-person assist for partial stands   Bed Mobility    Supine to Sit Moderate Assist  (used R rail )   Sit to Supine Maximal Assist  (assist with trunk and BLE)   Scooting Moderate Assist  (seated )   Rolling Minimum Assist to Lt.;Minimal Assist to Rt.  (using bed rails )   Activities of Daily Living   Grooming Moderate Assist;Seated  (supv oral care, face wiping EOB; max A hair combing )   Lower Body Dressing Total Assist  (sock management )   Toileting Total Assist  (total A hygiene for BM smear; condom cath )   Functional Mobility   Sit to Stand Maximal Assist  (partial stands only; 2-person assist )   Bed, Chair, Wheelchair Transfer Unable to Participate   Toilet Transfers Unable to Participate   Mobility Supine > < EOB, partial stands from EOB   Short Term Goals   Short Term Goal # 1 Pt will demo LB dressing with AE as needed and mod A   Goal Outcome # 1 Goal not met   Short Term Goal # 2 Pt will demo ADL txf with mod A   Goal Outcome # 2 Goal not met   Short Term Goal # 3 Pt will complete G/H seated SUP   Goal Outcome # 3 Goal not met

## 2021-06-11 NOTE — CARE PLAN
The patient is Stable - Low risk of patient condition declining or worsening    Shift Goals  Clinical Goals: Pain control  Patient Goals: Discharge planning  Family Goals: NA    Progress made toward(s) clinical / shift goals:    Problem: Knowledge Deficit - Standard  Goal: Patient and family/care givers will demonstrate understanding of plan of care, disease process/condition, diagnostic tests and medications  Outcome: Progressing  Note: POC discussed with patient. Verbalized understanding. Reinforcement needed.     Problem: Pain - Standard  Goal: Alleviation of pain or a reduction in pain to the patient’s comfort goal  Outcome: Progressing  Note: Patient reports 7/10 pain in left arm. Medicated per MAR. Pt also repositioned.       Patient is not progressing towards the following goals:

## 2021-06-11 NOTE — PROGRESS NOTES
Assumed care of pt at 0700. Report received and bedside rounding completed with night RN. Pt is calm no SOB, no acute distress noted. Patient is sitting up in bed. Pain 8/10 in left arm and back.  Call light and pt belongings within reach - hourly rounding in place. See flowsheets for further assessment.     Pt is considered a HIGH fall risk. edu provided on risk level.   Fall precautions in place,  bed alarm on. Treaded non slip socks. Bed locked. Communication board updated with POC.

## 2021-06-11 NOTE — THERAPY
"Physical Therapy   Daily Treatment     Patient Name: Jeffrey Lizama  Age:  88 y.o., Sex:  male  Medical Record #: [id number]  Today's Date: 6/11/2021     Precautions: Fall Risk    Assessment    Patient agreeable to physical therapy treatment session.  Patient sitting EOB with OT at start of session.  Patient with increased effort of participation during session.  He required total assist x 2 for STS attempt x 2 with FWW but was unable to fully clear buttocks from bed.  Patient performed supine exercises including SAQ, heel slides, glute sets, and ankle pumps, stating \"I actually like doing these.\"  Encouraged patient to perform exercises throughout the day. Will continue to follow.    Plan    Continue current treatment plan.    DC Equipment Recommendations: Unable to determine at this time  Discharge Recommendations: Recommend post-acute placement for additional physical therapy services prior to discharge home       Objective     06/11/21 1213   Precautions   Precautions Fall Risk   Vitals   O2 (LPM) 5   O2 Delivery Device Silicone Nasal Cannula   Pain 0 - 10 Group   Location Back   Pain Rating Scale (NPRS) 8   Comfort Goal Comfort with Movement;Perform Activity   Therapist Pain Assessment Post Activity Pain Same as Prior to Activity   Cognition    Level of Consciousness Alert   Comments Pleasant & cooperative   Supine Lower Body Exercise   Supine Lower Body Exercises Yes   Short Arc Quad 1 set of 10;Bilateral   Heel Slide 1 set of 10;Bilateral   Ankle Pumps Reciprocal;1 set of 10;Bilateral   Gluteal Isometrics 1 set of 10   Comments Instructed patient to perform exercises daily   Balance   Sitting Balance (Static) Fair -   Sitting Balance (Dynamic) Fair -   Standing Balance (Static) Trace +   Standing Balance (Dynamic) Trace +   Weight Shift Sitting Poor   Weight Shift Standing Poor   Skilled Intervention Verbal Cuing;Postural Facilitation   Comments w/ FWW, 2 person assisst for stand   Gait Analysis   Gait " Level Of Assist Unable to Participate   Bed Mobility    Supine to Sit (Sitting EOB w/ OT at start of session)   Sit to Supine Minimal Assist   Scooting Moderate Assist   Rolling Minimal Assist to Rt.;Minimum Assist to Lt.   Comments Assist for LE sit > supine   Functional Mobility   Sit to Stand Maximal Assistm(x 2, unable to fully clear bed)   Bed, Chair, Wheelchair Transfer Unable to Participate   Mobility EOB, STS attempt x 2, supine exercises   Skilled Intervention Verbal Cuing;Sequencing;Postural Facilitation   Comments STS attempt x 2 with FWW, unable to fully clear buttocks from bed   Activity Tolerance   Sitting Edge of Bed >10 min   Patient / Family Goals    Patient / Family Goal #1 Home or TF to hospital near his home   Short Term Goals    Short Term Goal # 1 Pt to move supine to/from eob w/ spv, no bed features in 6 visits to improve fxl indep   Goal Outcome # 1 goal not met   Short Term Goal # 2 Pt to move sit to/from stand w/ spv in 6 visits to improve fxl indep   Goal Outcome # 2 Goal not met   Short Term Goal # 3 Pt to ambulate 75 ft w/ fww and spv in 6 visits to improve fxl indep   Goal Outcome # 3 Goal not met

## 2021-06-11 NOTE — PROGRESS NOTES
Hospital Medicine Daily Progress Note    Date of Service  6/10/2021    Chief Complaint  88 y.o. 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     During the stay in the hospital, patient electrolytes continue to be repleted.  Patient is also noted to have acute on chronic systolic congestive heart failure, started on IV Lasix--> later switched po, I/Os, daily weight, fluid restriction.  Regarding CAD, patient is on aspirin, Plavix, Coreg, losartan, and statin. Patient is to follow-up with cardiology as an outpatient.     Patient has history of adrenal insufficiency,on hydrocortisone 10 bid. PT/OT has evaluated, recommended discharging the patient to skilled nursing facility.  During hospital course patient noted to have UA positive for UTI.  Urine culture positive for Klebsiella .    Interval Problem Update  6/1/2021  Vital stable  Labs unremarkable. meds reviewed    assisting with placement     6/2/2021  Vitals remained stable  Labs remain unremarkable  No events overnight   assisting with placement     6/3/2021  Noted to have low blood pressure  Medication adjusted  Remain afebrile  Labs noted with elevated white count likely secondary to steroid use  Mild FABIAN.  Will hold Lasix and losartan.  Elevated pro-Magdi.  No sign of sepsis active infection  Check UA      6/4/2021  Had repaid response overnight for hypotension  Dry  on examination .  Blood pressure improved  On Rocephin for UTI  urine culture and blood culture in process  Losartan will Lasix on hold given low blood pressure and worsening kidney function  We will continue monitor kidney function  If labs remain unremarkable and  blood pressure remained stable can be transferred  to SNF tomorrow     6/5/2021  Vitals remained stable.  Requiring 5L oxygen to maintain saturation over 95  Kidney function improving.  KCl added for hyponatremia  Plan to discharge to a SNF versus hospice after discussion with the patient's spouse  Palliative evaluation requested for assistance     6/6/2021  Vitals remain stable .  KCl replaced for hypokalemia  Kidney function improved  Lasix resumed  Possible discharge SNF tomorrow   and palliative assisting with discharge     6/7/2021    Vitals stable  Kidney function baseline now  Pending SNF placement//DC home with home health  Medically cleared for discharge  6/8: Resting in bed comfortably.  Spoke with patient the need for placement considering the clinical complexity and the need for 24/7 supervision.  Patient stated that he will consider.  Wife however is declining placement.   to touch base with wife in that regard.    6/9: Resting in bed comfortable.  Hemodynamically stable.  Respiratory status stable.  No acute distress noted.  Patient stated that he is considering hospice at home.  Palliative care team notified.  No acute distress noted.  No issues overnight per staff.   I certify that the patient requires continued medically necessary hospital services for the treatment of debility and pressure ulcers and will remain in the hospital for at least another 3 to 4 days days.  Discharge plan is anticipated to be to SNF versus home with home health.    6/10: Resting in bed comfortably.  Respiratory status stable.  Hemodynamically stable.  No acute distress noted.  No issues overnight per  staff.    Consultants/Specialty  N/A     Code Status  DNAR, I OK     Disposition  SNF vs home with HH/DME      Review of Systems  Review of Systems   Constitutional: Negative for fever.   HENT: Negative for hearing loss.    Eyes: Negative for blurred vision and double vision.   Respiratory: Negative for cough.    Cardiovascular: Negative for chest pain and palpitations.   Gastrointestinal: Negative for nausea and vomiting.   Genitourinary: Negative for dysuria.   Musculoskeletal: Negative for myalgias.   Skin: Negative for rash.   Neurological: Negative for dizziness and headaches.   Psychiatric/Behavioral: Negative for depression.         Physical Exam  Temp:  [36.2 °C (97.2 °F)-37.1 °C (98.8 °F)] 36.2 °C (97.2 °F)  Pulse:  [71-82] 78  Resp:  [16-21] 16  BP: (125-156)/(59-92) 156/92  SpO2:  [93 %-96 %] 96 %     Physical Exam  Constitutional:       Appearance: He is obese.   HENT:      Head: Normocephalic and atraumatic.      Right Ear: Tympanic membrane normal.      Left Ear: Tympanic membrane normal.      Nose: Nose normal.      Mouth/Throat:      Mouth: Mucous membranes are moist.   Eyes:      Pupils: Pupils are equal, round, and reactive to light.   Cardiovascular:      Rate and Rhythm: Normal rate and regular rhythm.      Pulses: Normal pulses.      Heart sounds: Normal heart sounds. No murmur heard.     Pulmonary:      Effort: Pulmonary effort is normal. No respiratory distress.   Abdominal:      General: Abdomen is flat.      Palpations: Abdomen is soft.      Tenderness: There is no abdominal tenderness.   Musculoskeletal:    Left upper extremity swelling.  Skin:     Coloration: Skin is not jaundiced.   Neurological:      General: No focal deficit present.      Mental Status: He is alert and oriented to person, place, and time. Mental status is at baseline.   Psychiatric:         Mood and Affect: Mood normal.   Fluids    Intake/Output Summary (Last 24 hours) at 6/10/2021 180  Last data filed at 6/10/2021  1600  Gross per 24 hour   Intake 360 ml   Output 2220 ml   Net -1860 ml       Laboratory  Recent Labs     06/08/21  0445 06/10/21  0945   WBC 7.0 12.4*   RBC 3.22* 3.79*   HEMOGLOBIN 9.1* 10.6*   HEMATOCRIT 30.4* 34.8*   MCV 94.4 91.8   MCH 28.3 28.0   MCHC 29.9* 30.5*   RDW 70.3* 68.6*   PLATELETCT 164 208   MPV 10.2 10.3     Recent Labs     06/08/21  0445 06/09/21  0221 06/10/21  0945   SODIUM 139 137 136   POTASSIUM 4.1 4.4 3.9   CHLORIDE 100 99 97   CO2 33 33 31   GLUCOSE 78 79 123*   BUN 24* 21 17   CREATININE 0.93 0.85 0.82   CALCIUM 7.9* 7.8* 7.9*                   Imaging  DX-CHEST-PORTABLE (1 VIEW)   Final Result      Findings consistent with mild pulmonary edema along with minimal bibasilar atelectasis and small left pleural effusion. No significant change.      DX-CHEST-LIMITED (1 VIEW)   Final Result         No significant interval change.      Small bilateral pleural effusion bibasilar opacities, similar to prior.      Stable cardiomegaly.      US-EXTREMITY VENOUS UPPER UNILAT LEFT   Final Result      CT-EXTREMITY, UPPER WITH LEFT   Final Result      No acute fracture or dislocation.      Severe osteoarthritis of the elbow joint.      IR-MIDLINE CATHETER INSERTION WO GUIDANCE > AGE 3   Final Result                  Ultrasound-guided midline placement performed by qualified nursing staff    as above.          IR-US GUIDED PIV   Final Result    Ultrasound-guided PERIPHERAL IV INSERTION performed by    qualified nursing staff as above.      US-EXTREMITY VENOUS LOWER UNILAT LEFT   Final Result      DX-CHEST-PORTABLE (1 VIEW)   Final Result      Bibasilar opacities may represent atelectasis or consolidation.      Small left pleural effusion may be present.      Pulmonary interstitial edema.      Stable prominence of the cardiomediastinal silhouette.      Atherosclerotic calcification is seen.      DX-ELBOW-COMPLETE 3+ LEFT   Final Result      1.  No fracture or dislocation of LEFT elbow.   2.  Severe  degenerative change.   3.  Diffuse subcutaneous edema.   4.  Limited by positioning.           Assessment/Plan  * Acute on chronic systolic congestive heart failure (HCC)- (present on admission)  Assessment & Plan  Patient on 5L oxygen via nc at baseline  Echo showed EF of 45% on 5/12, I/os , daily weight and fluid restriction to 1.5 L  Continue BB , losartan and  Lasix  Reach euvolemic status.  Cardiology out-patient follow-up.    Pain and swelling of left upper extremity- (present on admission)  Assessment & Plan  US negative for DVT   Improving     Sacral wound- (present on admission)  Assessment & Plan  Wound mx as per wound team recs   --Offloading and periodic turning     Hypophosphatemia  Assessment & Plan  Replete and monitor  2.5      Hypomagnesemia  Assessment & Plan  Replete and monitor      Hypokalemia- (present on admission)  Assessment & Plan  Replete and monitor    Anasarca- (present on admission)  Assessment & Plan  Continue to monitor on diuretic     Debility- (present on admission)  Assessment & Plan  SNF placement pending     Coronary artery disease- (present on admission)  Assessment & Plan  S/p PCI with stent placement 4 weeks ago  Aspirin, Plavix, Coreg, atorvastatin, losartan, Lasix.    Adrenal insufficiency (HCC)- (present on admission)  Assessment & Plan   On hydrocortisone home dose.    UTI (urinary tract infection)  Assessment & Plan  On rocephine   Urine culture growing Klebsiella pneumonia  Completed a course of antibiotics.    Chronic respiratory failure (HCC)- (present on admission)  Assessment & Plan  Continue supplemental oxygen, on 4 to 5 L    FABIAN (acute kidney injury) ([facility])  Assessment & Plan  Resolved.  Resume losartan     Sepsis (HCC)  Assessment & Plan  Resolved           BPH (benign prostatic hypertrophy)- (present on admission)  Assessment & Plan  continiue Flomax and finasteride    History of pulmonary embolism- (present on admission)  Assessment & Plan  Warfarin was  discontinued during previous recent hospitalization due to blood loss anemia, patient knows   monitor hemoglobin hematocrit, so far stabel  Transfuse if less than 7  Recent CT of the chest done on 5/8/2021 was negative for PE.        COPD (chronic obstructive pulmonary disease) (HCC)- (present on admission)  Assessment & Plan  Continue RT protocol, breathing treatment   --Titrate O2 as needed    -- ON 4 L of O2  And denied any complain of sob    Essential hypertension, benign- (present on admission)  Assessment & Plan  Blood pressure noted to be well controlled, monitor  Continue coreg and losartan       VTE prophylaxis: lovenox

## 2021-06-11 NOTE — CARE PLAN
The patient is Stable - Low risk of patient condition declining or worsening    Shift Goals  Clinical Goals: Pain control and independence  Patient Goals: Discharge planning  Family Goals: NA    Progress made toward(s) clinical / shift goals:  progressing    Problem: Pain - Standard  Goal: Alleviation of pain or a reduction in pain to the patient’s comfort goal  Outcome: Progressing     Problem: Skin Integrity  Goal: Skin integrity is maintained or improved  Outcome: Progressing     Problem: Fall Risk  Goal: Patient will remain free from falls  Outcome: Progressing

## 2021-06-11 NOTE — DISCHARGE PLANNING
Agency/Facility Name: Located within [facility]   Spoke To: Maya   Outcome: F:     Agency/Facility Name: [facility]  Outcome: Unable to leave vmail     Received Choice form at [phone]  Agency/Facility Name: [facility]  Referral sent per Choice form @ [phone]

## 2021-06-11 NOTE — DISCHARGE PLANNING
Anticipated Discharge Disposition: SNF    Action: LSW received voicemail from Pt's wife Jacquie updating LSW that she and Pt are agreeable to SNF referrals but would like to try and have him go to [facility] SNFs as Jacquie can visit Pt there.     LSW met with Pt and discussed and he confirmed that he is agreeable to [facility] for SNF referrals. No [facility] as that is too difficult a drive for Jacquie.     LSW called and updated Jacquie, her closed caption device was not working so LSW spoke slowly and loudly and she seemed to  most of what was said. She asked LSW to call Gene as well in case she missed anything LSW was saying. LSW left Voicemail for Gene.     Faxed Choice form for [facility]    Barriers to Discharge: SNF acceptanct    Plan: LSW to send referrals to [facility] SNFs.

## 2021-06-12 LAB — PHOSPHATE SERPL-MCNC: 2.7 MG/DL (ref 2.5–4.5)

## 2021-06-12 PROCEDURE — 700102 HCHG RX REV CODE 250 W/ 637 OVERRIDE(OP): Performed by: INTERNAL MEDICINE

## 2021-06-12 PROCEDURE — A9270 NON-COVERED ITEM OR SERVICE: HCPCS | Performed by: INTERNAL MEDICINE

## 2021-06-12 PROCEDURE — 770006 HCHG ROOM/CARE - MED/SURG/GYN SEMI*

## 2021-06-12 PROCEDURE — 700102 HCHG RX REV CODE 250 W/ 637 OVERRIDE(OP): Performed by: STUDENT IN AN ORGANIZED HEALTH CARE EDUCATION/TRAINING PROGRAM

## 2021-06-12 PROCEDURE — 700111 HCHG RX REV CODE 636 W/ 250 OVERRIDE (IP): Performed by: INTERNAL MEDICINE

## 2021-06-12 PROCEDURE — 700102 HCHG RX REV CODE 250 W/ 637 OVERRIDE(OP): Performed by: FAMILY MEDICINE

## 2021-06-12 PROCEDURE — 99232 SBSQ HOSP IP/OBS MODERATE 35: CPT | Performed by: FAMILY MEDICINE

## 2021-06-12 PROCEDURE — 36415 COLL VENOUS BLD VENIPUNCTURE: CPT

## 2021-06-12 PROCEDURE — A9270 NON-COVERED ITEM OR SERVICE: HCPCS | Performed by: FAMILY MEDICINE

## 2021-06-12 PROCEDURE — 700102 HCHG RX REV CODE 250 W/ 637 OVERRIDE(OP): Performed by: HOSPITALIST

## 2021-06-12 PROCEDURE — A9270 NON-COVERED ITEM OR SERVICE: HCPCS | Performed by: HOSPITALIST

## 2021-06-12 PROCEDURE — A9270 NON-COVERED ITEM OR SERVICE: HCPCS | Performed by: STUDENT IN AN ORGANIZED HEALTH CARE EDUCATION/TRAINING PROGRAM

## 2021-06-12 PROCEDURE — 94760 N-INVAS EAR/PLS OXIMETRY 1: CPT

## 2021-06-12 PROCEDURE — 84100 ASSAY OF PHOSPHORUS: CPT

## 2021-06-12 RX ADMIN — LOSARTAN POTASSIUM 25 MG: 25 TABLET, FILM COATED ORAL at 05:34

## 2021-06-12 RX ADMIN — LEVOTHYROXINE SODIUM 75 MCG: 0.07 TABLET ORAL at 05:35

## 2021-06-12 RX ADMIN — Medication 950 MG: at 05:40

## 2021-06-12 RX ADMIN — ENOXAPARIN SODIUM 40 MG: 40 INJECTION SUBCUTANEOUS at 05:33

## 2021-06-12 RX ADMIN — OXYCODONE 5 MG: 5 TABLET ORAL at 21:51

## 2021-06-12 RX ADMIN — OMEPRAZOLE 20 MG: 20 CAPSULE, DELAYED RELEASE ORAL at 18:26

## 2021-06-12 RX ADMIN — CARBOXYMETHYLCELLULOSE SODIUM 1 DROP: 5 SOLUTION/ DROPS OPHTHALMIC at 18:26

## 2021-06-12 RX ADMIN — ATORVASTATIN CALCIUM 80 MG: 40 TABLET, FILM COATED ORAL at 18:25

## 2021-06-12 RX ADMIN — ASPIRIN 81 MG: 81 TABLET, COATED ORAL at 05:34

## 2021-06-12 RX ADMIN — ACETAMINOPHEN 650 MG: 325 TABLET, FILM COATED ORAL at 20:14

## 2021-06-12 RX ADMIN — OMEPRAZOLE 20 MG: 20 CAPSULE, DELAYED RELEASE ORAL at 05:34

## 2021-06-12 RX ADMIN — BUDESONIDE AND FORMOTEROL FUMARATE DIHYDRATE 2 PUFF: 80; 4.5 AEROSOL RESPIRATORY (INHALATION) at 18:24

## 2021-06-12 RX ADMIN — CLOPIDOGREL BISULFATE 75 MG: 75 TABLET ORAL at 05:33

## 2021-06-12 RX ADMIN — MONTELUKAST 10 MG: 10 TABLET, FILM COATED ORAL at 05:34

## 2021-06-12 RX ADMIN — HYDROCORTISONE 10 MG: 20 TABLET ORAL at 18:25

## 2021-06-12 RX ADMIN — CARVEDILOL 3.12 MG: 3.12 TABLET, FILM COATED ORAL at 09:15

## 2021-06-12 RX ADMIN — FLUTICASONE PROPIONATE 50 MCG: 50 SPRAY, METERED NASAL at 05:42

## 2021-06-12 RX ADMIN — HYDROCORTISONE 10 MG: 20 TABLET ORAL at 05:34

## 2021-06-12 RX ADMIN — FUROSEMIDE 20 MG: 20 TABLET ORAL at 05:37

## 2021-06-12 RX ADMIN — CARBOXYMETHYLCELLULOSE SODIUM 1 DROP: 5 SOLUTION/ DROPS OPHTHALMIC at 05:35

## 2021-06-12 RX ADMIN — TIOTROPIUM BROMIDE INHALATION SPRAY 5 MCG: 3.12 SPRAY, METERED RESPIRATORY (INHALATION) at 05:42

## 2021-06-12 RX ADMIN — CARVEDILOL 3.12 MG: 3.12 TABLET, FILM COATED ORAL at 18:25

## 2021-06-12 RX ADMIN — TAMSULOSIN HYDROCHLORIDE 0.4 MG: 0.4 CAPSULE ORAL at 05:35

## 2021-06-12 RX ADMIN — OXYCODONE 5 MG: 5 TABLET ORAL at 09:15

## 2021-06-12 RX ADMIN — BUDESONIDE AND FORMOTEROL FUMARATE DIHYDRATE 2 PUFF: 80; 4.5 AEROSOL RESPIRATORY (INHALATION) at 05:42

## 2021-06-12 ASSESSMENT — PAIN DESCRIPTION - PAIN TYPE
TYPE: ACUTE PAIN;CHRONIC PAIN

## 2021-06-12 NOTE — PROGRESS NOTES
Assumed care at 0700 after report received from night RN. Pt A/Ox3 (reoriented to time). Pt midline in SANDY WNL, draws back blood. Pt on 6L oxygen (baseline is 4L). 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 Awaiting discharge

## 2021-06-12 NOTE — PROGRESS NOTES
Hospital Medicine Daily Progress Note    Date of Service  6/11/2021    Chief Complaint  88 y.o. 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     During the stay in the hospital, patient electrolytes continue to be repleted.  Patient is also noted to have acute on chronic systolic congestive heart failure, started on IV Lasix--> later switched po, I/Os, daily weight, fluid restriction.  Regarding CAD, patient is on aspirin, Plavix, Coreg, losartan, and statin. Patient is to follow-up with cardiology as an outpatient.     Patient has history of adrenal insufficiency,on hydrocortisone 10 bid. PT/OT has evaluated, recommended discharging the patient to skilled nursing facility.  During hospital course patient noted to have UA positive for UTI.  Urine culture positive for Klebsiella .    Interval Problem Update  6/1/2021  Vital stable  Labs unremarkable. meds reviewed    assisting with placement     6/2/2021  Vitals remained stable  Labs remain unremarkable  No events overnight   assisting with placement     6/3/2021  Noted to have low blood pressure  Medication adjusted  Remain afebrile  Labs noted with elevated white count likely secondary to steroid use  Mild FABIAN.  Will hold Lasix and losartan.  Elevated pro-Magdi.  No sign of sepsis active infection  Check UA      6/4/2021  Had repaid response overnight for hypotension  Dry  on examination .  Blood pressure improved  On Rocephin for UTI  urine culture and blood culture in process  Losartan will Lasix on hold given low blood pressure and worsening kidney function  We will continue monitor kidney function  If labs remain unremarkable and  blood pressure remained stable can be transferred  to SNF tomorrow     6/5/2021  Vitals remained stable.  Requiring 5L oxygen to maintain saturation over 95  Kidney function improving.  KCl added for hyponatremia  Plan to discharge to a SNF versus hospice after discussion with the patient's spouse  Palliative evaluation requested for assistance     6/6/2021  Vitals remain stable .  KCl replaced for hypokalemia  Kidney function improved  Lasix resumed  Possible discharge SNF tomorrow   and palliative assisting with discharge     6/7/2021    Vitals stable  Kidney function baseline now  Pending SNF placement//DC home with home health  Medically cleared for discharge  6/8: Resting in bed comfortably.  Spoke with patient the need for placement considering the clinical complexity and the need for 24/7 supervision.  Patient stated that he will consider.  Wife however is declining placement.   to touch base with wife in that regard.    6/9: Resting in bed comfortable.  Hemodynamically stable.  Respiratory status stable.  No acute distress noted.  Patient stated that he is considering hospice at home.  Palliative care team notified.  No acute distress noted.  No issues overnight per staff.   I certify that the patient requires continued medically necessary hospital services for the treatment of debility and pressure ulcers and will remain in the hospital for at least another 3 to 4 days days.  Discharge plan is anticipated to be to SNF versus home with home health.    6/10: Resting in bed comfortably.  Respiratory status stable.  Hemodynamically stable.  No acute distress noted.  No issues overnight per staff.  6/11: Resting in bed  comfortably.  Stated that his back hurts after had his physical therapy session.  Hemodynamically stable.  Story status stable.  No acute distress noted.  No new issues with per staff.  Consultants/Specialty  N/A     Code Status  DNAR, I OK     Disposition  SNF vs home with HH/DME      Review of Systems  Review of Systems   Constitutional: Negative for fever.   HENT: Negative for hearing loss.    Eyes: Negative for blurred vision and double vision.   Respiratory: Negative for cough.    Cardiovascular: Negative for chest pain and palpitations.   Gastrointestinal: Negative for nausea and vomiting.   Genitourinary: Negative for dysuria.   Musculoskeletal: Negative for myalgias.   Skin: Negative for rash.   Neurological: Negative for dizziness and headaches.   Psychiatric/Behavioral: Negative for depression.         Physical Exam  Temp:  [36.2 °C (97.2 °F)-37.1 °C (98.8 °F)] 36.2 °C (97.2 °F)  Pulse:  [71-82] 78  Resp:  [16-21] 16  BP: (125-156)/(59-92) 156/92  SpO2:  [93 %-96 %] 96 %     Physical Exam  Constitutional:       Appearance: He is obese.   HENT:      Head: Normocephalic and atraumatic.      Right Ear: Tympanic membrane normal.      Left Ear: Tympanic membrane normal.      Nose: Nose normal.      Mouth/Throat:      Mouth: Mucous membranes are moist.     Cardiovascular:      Rate and Rhythm: Normal rate and regular rhythm.      Pulses: Normal pulses.      Heart sounds: Normal heart sounds. No murmur heard.     Pulmonary:      Effort: Pulmonary effort is normal. No respiratory distress.   Abdominal:      General: Abdomen is flat.      Palpations: Abdomen is soft.      Tenderness: There is no abdominal tenderness.   Musculoskeletal:    Left upper extremity swelling.  Skin:     Coloration: Skin is not jaundiced.   Neurological:      General: No focal deficit present.      Mental Status: He is alert and oriented to person, place, and time. Mental status is at baseline.   Psychiatric:         Mood and Affect:  Mood normal.   Fluids    Intake/Output Summary (Last 24 hours) at 6/11/2021 1932  Last data filed at 6/11/2021 1600  Gross per 24 hour   Intake 600 ml   Output 1800 ml   Net -1200 ml       Laboratory  Recent Labs     06/10/21  0945 06/11/21  0104   WBC 12.4* 12.5*   RBC 3.79* 3.76*   HEMOGLOBIN 10.6* 10.4*   HEMATOCRIT 34.8* 35.0*   MCV 91.8 93.1   MCH 28.0 27.7   MCHC 30.5* 29.7*   RDW 68.6* 70.2*   PLATELETCT 208 216   MPV 10.3 10.4     Recent Labs     06/09/21  0221 06/10/21  0945   SODIUM 137 136   POTASSIUM 4.4 3.9   CHLORIDE 99 97   CO2 33 31   GLUCOSE 79 123*   BUN 21 17   CREATININE 0.85 0.82   CALCIUM 7.8* 7.9*                   Imaging  DX-CHEST-PORTABLE (1 VIEW)   Final Result      Findings consistent with mild pulmonary edema along with minimal bibasilar atelectasis and small left pleural effusion. No significant change.      DX-CHEST-LIMITED (1 VIEW)   Final Result         No significant interval change.      Small bilateral pleural effusion bibasilar opacities, similar to prior.      Stable cardiomegaly.      US-EXTREMITY VENOUS UPPER UNILAT LEFT   Final Result      CT-EXTREMITY, UPPER WITH LEFT   Final Result      No acute fracture or dislocation.      Severe osteoarthritis of the elbow joint.      IR-MIDLINE CATHETER INSERTION WO GUIDANCE > AGE 3   Final Result                  Ultrasound-guided midline placement performed by qualified nursing staff    as above.          IR-US GUIDED PIV   Final Result    Ultrasound-guided PERIPHERAL IV INSERTION performed by    qualified nursing staff as above.      US-EXTREMITY VENOUS LOWER UNILAT LEFT   Final Result      DX-CHEST-PORTABLE (1 VIEW)   Final Result      Bibasilar opacities may represent atelectasis or consolidation.      Small left pleural effusion may be present.      Pulmonary interstitial edema.      Stable prominence of the cardiomediastinal silhouette.      Atherosclerotic calcification is seen.      DX-ELBOW-COMPLETE 3+ LEFT   Final Result       1.  No fracture or dislocation of LEFT elbow.   2.  Severe degenerative change.   3.  Diffuse subcutaneous edema.   4.  Limited by positioning.           Assessment/Plan  * Acute on chronic systolic congestive heart failure (HCC)- (present on admission)  Assessment & Plan  Patient on 5L oxygen via nc at baseline  Echo showed EF of 45% on 5/12, I/os , daily weight and fluid restriction to 1.5 L  Continue BB , losartan and  Lasix  Reach euvolemic status.  Cardiology out-patient follow-up.    Pain and swelling of left upper extremity- (present on admission)  Assessment & Plan  US negative for DVT   Improving   Compression banding per OT.    Sacral wound- (present on admission)  Assessment & Plan  Wound mx as per wound team recs   --Offloading and periodic turning     Hypophosphatemia  Assessment & Plan  Replete and monitor  2.5      Hypomagnesemia  Assessment & Plan  Replete and monitor      Hypokalemia- (present on admission)  Assessment & Plan  Replete and monitor    Anasarca- (present on admission)  Assessment & Plan  Continue to monitor on diuretic     Debility- (present on admission)  Assessment & Plan  SNF placement pending     Coronary artery disease- (present on admission)  Assessment & Plan  S/p PCI with stent placement 4 weeks ago  Aspirin, Plavix, Coreg, atorvastatin, losartan, Lasix.    Adrenal insufficiency (HCC)- (present on admission)  Assessment & Plan   On hydrocortisone home dose.    UTI (urinary tract infection)  Assessment & Plan  On rocephine   Urine culture growing Klebsiella pneumonia  Completed a course of antibiotics.    Chronic respiratory failure (HCC)- (present on admission)  Assessment & Plan  Continue supplemental oxygen, on 4 to 5 L    FABIAN (acute kidney injury) ([facility])  Assessment & Plan  Resolved.  Resume losartan     Sepsis ([facility])  Assessment & Plan  Resolved           BPH (benign prostatic hypertrophy)- (present on admission)  Assessment & Plan  continiue Flomax and  finasteride    History of pulmonary embolism- (present on admission)  Assessment & Plan  Warfarin was discontinued during previous recent hospitalization due to blood loss anemia, patient knows   monitor hemoglobin hematocrit, so far stabel  Transfuse if less than 7  Recent CT of the chest done on 5/8/2021 was negative for PE.        COPD (chronic obstructive pulmonary disease) (HCC)- (present on admission)  Assessment & Plan  Continue RT protocol, breathing treatment   --Titrate O2 as needed    -- ON 4 L of O2  And denied any complain of sob    Essential hypertension, benign- (present on admission)  Assessment & Plan  Blood pressure noted to be well controlled, monitor  Continue coreg and losartan       VTE prophylaxis: lovenox

## 2021-06-12 NOTE — CARE PLAN
The patient is Stable - Low risk of patient condition declining or worsening    Shift Goals  Clinical Goals: Pt's pain will be well controlled and pt will be able to sleep and rest comfortably  Patient Goals: Pt will have a safe discharge to a SNF  Family Goals: NA     Progress made toward(s) clinical / shift goals:      Problem: Knowledge Deficit - Standard  Goal: Patient and family/care givers will demonstrate understanding of plan of care, disease process/condition, diagnostic tests and medications  Outcome: Progressing  Note: Pt educated on plan of care, assessments and medications. Pt is oriented, knows how to call for help. Pt is sometimes forgetful therefore needs frequent re-education and re-orientation.      Problem: Pain - Standard  Goal: Alleviation of pain or a reduction in pain to the patient’s comfort goal  Outcome: Progressing  Note: Patient pain level assessed using the 1-10 scale. Patient given medication as per MAR, encouraged position changes and rest. Pt has tylenol, oxycodone and morphine PRN. Pt was able to sleep and rest after tylenol and oxycodone. Will continue to monitor, assess and intervene as needed.

## 2021-06-12 NOTE — PROGRESS NOTES
Hospital Medicine Daily Progress Note    Date of Service  6/12/2021    Chief Complaint  88 y.o. 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     During the stay in the hospital, patient electrolytes continue to be repleted.  Patient is also noted to have acute on chronic systolic congestive heart failure, started on IV Lasix--> later switched po, I/Os, daily weight, fluid restriction.  Regarding CAD, patient is on aspirin, Plavix, Coreg, losartan, and statin. Patient is to follow-up with cardiology as an outpatient.     Patient has history of adrenal insufficiency,on hydrocortisone 10 bid. PT/OT has evaluated, recommended discharging the patient to skilled nursing facility.  During hospital course patient noted to have UA positive for UTI.  Urine culture positive for Klebsiella .    Interval Problem Update  6/1/2021  Vital stable  Labs unremarkable. meds reviewed    assisting with placement     6/2/2021  Vitals remained stable  Labs remain unremarkable  No events overnight   assisting with placement     6/3/2021  Noted to have low blood pressure  Medication adjusted  Remain afebrile  Labs noted with elevated white count likely secondary to steroid use  Mild FABIAN.  Will hold Lasix and losartan.  Elevated pro-Magdi.  No sign of sepsis active infection  Check UA      6/4/2021  Had repaid response overnight for hypotension  Dry  on examination .  Blood pressure improved  On Rocephin for UTI  urine culture and blood culture in process  Losartan will Lasix on hold given low blood pressure and worsening kidney function  We will continue monitor kidney function  If labs remain unremarkable and  blood pressure remained stable can be transferred  to SNF tomorrow     6/5/2021  Vitals remained stable.  Requiring 5L oxygen to maintain saturation over 95  Kidney function improving.  KCl added for hyponatremia  Plan to discharge to a SNF versus hospice after discussion with the patient's spouse  Palliative evaluation requested for assistance     6/6/2021  Vitals remain stable .  KCl replaced for hypokalemia  Kidney function improved  Lasix resumed  Possible discharge SNF tomorrow   and palliative assisting with discharge     6/7/2021    Vitals stable  Kidney function baseline now  Pending SNF placement//DC home with home health  Medically cleared for discharge  6/8: Resting in bed comfortably.  Spoke with patient the need for placement considering the clinical complexity and the need for 24/7 supervision.  Patient stated that he will consider.  Wife however is declining placement.   to touch base with wife in that regard.    6/9: Resting in bed comfortable.  Hemodynamically stable.  Respiratory status stable.  No acute distress noted.  Patient stated that he is considering hospice at home.  Palliative care team notified.  No acute distress noted.  No issues overnight per staff.   I certify that the patient requires continued medically necessary hospital services for the treatment of debility and pressure ulcers and will remain in the hospital for at least another 3 to 4 days days.  Discharge plan is anticipated to be to SNF versus home with home health.    6/10: Resting in bed comfortably.  Respiratory status stable.  Hemodynamically stable.  No acute distress noted.  No issues overnight per staff.  6/11: Resting in bed  comfortably.  Stated that his back hurts after had his physical therapy session.  Hemodynamically stable.  Story status stable.  No acute distress noted.  No new issues with per staff.    6/12: Resting in bed comfortably.  Had a compression glove on the left hand placed yesterday.  Swelling on his left arm is better he stated.  Has been afebrile.  Hemodynamically stable.  No acute distress noted.  No issues overnight per staff.  Consultants/Specialty  N/A     Code Status  DNAR, I OK     Disposition  SNF vs home with HH/DME      Review of Systems  Review of Systems   Constitutional: Negative for fever.   HENT: Negative for hearing loss.    Eyes: Negative for blurred vision and double vision.   Respiratory: Negative for cough.    Cardiovascular: Negative for chest pain and palpitations.   Gastrointestinal: Negative for nausea and vomiting.   Genitourinary: Negative for dysuria.   Musculoskeletal: Negative for myalgias.   Skin: Negative for rash.   Neurological: Negative for dizziness and headaches.   Psychiatric/Behavioral: Negative for depression.         Physical Exam  Temp:  [36.2 °C (97.2 °F)-37.1 °C (98.8 °F)] 36.2 °C (97.2 °F)  Pulse:  [71-82] 78  Resp:  [16-21] 16  BP: (125-156)/(59-92) 156/92  SpO2:  [93 %-96 %] 96 %     Physical Exam  Constitutional:       Appearance: He is obese.   HENT:      Head: Normocephalic and atraumatic.      Right Ear: Tympanic membrane normal.      Left Ear: Tympanic membrane normal.      Nose: Nose normal.      Mouth/Throat:      Mouth: Mucous membranes are moist.     Cardiovascular:      Rate and Rhythm: Normal rate and regular rhythm.      Pulses: Normal pulses.      Heart sounds: Normal heart sounds. No murmur heard.     Pulmonary:      Effort: Pulmonary effort is normal. No respiratory distress.   Abdominal:      General: Abdomen is flat.      Palpations: Abdomen is soft.      Tenderness: There is no abdominal tenderness.   Musculoskeletal:    Left upper extremity  swelling.  Skin:     Coloration: Skin is not jaundiced.   Neurological:      General: No focal deficit present.      Mental Status: He is alert and oriented to person, place, and time. Mental status is at baseline.   Psychiatric:         Mood and Affect: Mood normal.   Fluids    Intake/Output Summary (Last 24 hours) at 6/12/2021 1504  Last data filed at 6/12/2021 0930  Gross per 24 hour   Intake 480 ml   Output 1920 ml   Net -1440 ml       Laboratory  Recent Labs     06/10/21  0945 06/11/21  0104   WBC 12.4* 12.5*   RBC 3.79* 3.76*   HEMOGLOBIN 10.6* 10.4*   HEMATOCRIT 34.8* 35.0*   MCV 91.8 93.1   MCH 28.0 27.7   MCHC 30.5* 29.7*   RDW 68.6* 70.2*   PLATELETCT 208 216   MPV 10.3 10.4     Recent Labs     06/10/21  0945   SODIUM 136   POTASSIUM 3.9   CHLORIDE 97   CO2 31   GLUCOSE 123*   BUN 17   CREATININE 0.82   CALCIUM 7.9*                   Imaging  DX-CHEST-PORTABLE (1 VIEW)   Final Result      Findings consistent with mild pulmonary edema along with minimal bibasilar atelectasis and small left pleural effusion. No significant change.      DX-CHEST-LIMITED (1 VIEW)   Final Result         No significant interval change.      Small bilateral pleural effusion bibasilar opacities, similar to prior.      Stable cardiomegaly.      US-EXTREMITY VENOUS UPPER UNILAT LEFT   Final Result      CT-EXTREMITY, UPPER WITH LEFT   Final Result      No acute fracture or dislocation.      Severe osteoarthritis of the elbow joint.      IR-MIDLINE CATHETER INSERTION WO GUIDANCE > AGE 3   Final Result                  Ultrasound-guided midline placement performed by qualified nursing staff    as above.          IR-US GUIDED PIV   Final Result    Ultrasound-guided PERIPHERAL IV INSERTION performed by    qualified nursing staff as above.      US-EXTREMITY VENOUS LOWER UNILAT LEFT   Final Result      DX-CHEST-PORTABLE (1 VIEW)   Final Result      Bibasilar opacities may represent atelectasis or consolidation.      Small left pleural  effusion may be present.      Pulmonary interstitial edema.      Stable prominence of the cardiomediastinal silhouette.      Atherosclerotic calcification is seen.      DX-ELBOW-COMPLETE 3+ LEFT   Final Result      1.  No fracture or dislocation of LEFT elbow.   2.  Severe degenerative change.   3.  Diffuse subcutaneous edema.   4.  Limited by positioning.           Assessment/Plan  * Acute on chronic systolic congestive heart failure (HCC)- (present on admission)  Assessment & Plan  Patient on 5L oxygen via nc at baseline  Echo showed EF of 45% on 5/12, I/os , daily weight and fluid restriction to 1.5 L  Continue BB , losartan and  Lasix  Reach euvolemic status.  Cardiology out-patient follow-up.    Pain and swelling of left upper extremity- (present on admission)  Assessment & Plan  US negative for DVT   Improving   Compression banding per OT.    Sacral wound- (present on admission)  Assessment & Plan  Wound mx as per wound team recs   --Offloading and periodic turning     Hypophosphatemia  Assessment & Plan  Replete and monitor  2.5      Hypomagnesemia  Assessment & Plan  Replete and monitor      Hypokalemia- (present on admission)  Assessment & Plan  Replete and monitor    Anasarca- (present on admission)  Assessment & Plan  Continue to monitor on diuretic   Resolving.    Debility- (present on admission)  Assessment & Plan  SNF placement pending     Coronary artery disease- (present on admission)  Assessment & Plan  S/p PCI with stent placement 4 weeks ago  Aspirin, Plavix, Coreg, atorvastatin, losartan, Lasix.    Adrenal insufficiency (HCC)- (present on admission)  Assessment & Plan   On hydrocortisone home dose.    UTI (urinary tract infection)  Assessment & Plan  On rocephine   Urine culture growing Klebsiella pneumonia  Completed a course of antibiotics.    Chronic respiratory failure (HCC)- (present on admission)  Assessment & Plan  Continue supplemental oxygen, on 4 to 5 L    FABIAN (acute kidney injury)  ([facility])  Assessment & Plan  Resolved.  Resume losartan and Lasix    Sepsis ([facility])  Assessment & Plan  Resolved           BPH (benign prostatic hypertrophy)- (present on admission)  Assessment & Plan  continiue Flomax and finasteride    History of pulmonary embolism- (present on admission)  Assessment & Plan  Warfarin was discontinued during previous recent hospitalization due to blood loss anemia, patient knows   monitor hemoglobin hematocrit, so far stabel  Transfuse if less than 7  Recent CT of the chest done on 5/8/2021 was negative for PE.        COPD (chronic obstructive pulmonary disease) ([facility])- (present on admission)  Assessment & Plan  Continue RT protocol, breathing treatment   --Titrate O2 as needed    -- ON 4 L of O2  And denied any complain of sob    Essential hypertension, benign- (present on admission)  Assessment & Plan  Blood pressure noted to be well controlled, monitor  Continue coreg and losartan       VTE prophylaxis: lovenox

## 2021-06-12 NOTE — PROGRESS NOTES
Received bedside report from day RN. Assumed care at 1900. Patient is awake/alert, oriented x4. Pt on 6 liters of oxygen nasal cannula. Midline flushed and draws back blood. Assessment completed. Condom cath in place. Bed locked in lowest position. Call light within reach. Whiteboard updates with nurse's and CNA's numbers. Hourly rounding in place. Bed alarm in place.

## 2021-06-13 LAB
ALBUMIN SERPL BCP-MCNC: 3.2 G/DL (ref 3.2–4.9)
ALBUMIN/GLOB SERPL: 1.1 G/DL
ALP SERPL-CCNC: 84 U/L (ref 30–99)
ALT SERPL-CCNC: 22 U/L (ref 2–50)
ANION GAP SERPL CALC-SCNC: 8 MMOL/L (ref 7–16)
AST SERPL-CCNC: 20 U/L (ref 12–45)
BASOPHILS # BLD AUTO: 0.6 % (ref 0–1.8)
BASOPHILS # BLD: 0.05 K/UL (ref 0–0.12)
BILIRUB SERPL-MCNC: 0.5 MG/DL (ref 0.1–1.5)
BUN SERPL-MCNC: 16 MG/DL (ref 8–22)
CALCIUM SERPL-MCNC: 8.2 MG/DL (ref 8.5–10.5)
CHLORIDE SERPL-SCNC: 95 MMOL/L (ref 96–112)
CO2 SERPL-SCNC: 31 MMOL/L (ref 20–33)
COMMENT 1642: NORMAL
CREAT SERPL-MCNC: 0.69 MG/DL (ref 0.5–1.4)
EOSINOPHIL # BLD AUTO: 0.15 K/UL (ref 0–0.51)
EOSINOPHIL NFR BLD: 1.7 % (ref 0–6.9)
ERYTHROCYTE [DISTWIDTH] IN BLOOD BY AUTOMATED COUNT: 68.7 FL (ref 35.9–50)
GLOBULIN SER CALC-MCNC: 3 G/DL (ref 1.9–3.5)
GLUCOSE SERPL-MCNC: 107 MG/DL (ref 65–99)
HCT VFR BLD AUTO: 36.4 % (ref 42–52)
HGB BLD-MCNC: 11.5 G/DL (ref 14–18)
IMM GRANULOCYTES # BLD AUTO: 0.08 K/UL (ref 0–0.11)
IMM GRANULOCYTES NFR BLD AUTO: 0.9 % (ref 0–0.9)
LYMPHOCYTES # BLD AUTO: 1.05 K/UL (ref 1–4.8)
LYMPHOCYTES NFR BLD: 11.8 % (ref 22–41)
MCH RBC QN AUTO: 28.7 PG (ref 27–33)
MCHC RBC AUTO-ENTMCNC: 31.6 G/DL (ref 33.7–35.3)
MCV RBC AUTO: 90.8 FL (ref 81.4–97.8)
MONOCYTES # BLD AUTO: 0.33 K/UL (ref 0–0.85)
MONOCYTES NFR BLD AUTO: 3.7 % (ref 0–13.4)
MORPHOLOGY BLD-IMP: NORMAL
NEUTROPHILS # BLD AUTO: 7.25 K/UL (ref 1.82–7.42)
NEUTROPHILS NFR BLD: 81.3 % (ref 44–72)
NRBC # BLD AUTO: 0 K/UL
NRBC BLD-RTO: 0 /100 WBC
PLATELET # BLD AUTO: 186 K/UL (ref 164–446)
PLATELET BLD QL SMEAR: NORMAL
PMV BLD AUTO: 10.9 FL (ref 9–12.9)
POTASSIUM SERPL-SCNC: 4 MMOL/L (ref 3.6–5.5)
PROT SERPL-MCNC: 6.2 G/DL (ref 6–8.2)
RBC # BLD AUTO: 4.01 M/UL (ref 4.7–6.1)
SODIUM SERPL-SCNC: 134 MMOL/L (ref 135–145)
WBC # BLD AUTO: 8.9 K/UL (ref 4.8–10.8)

## 2021-06-13 PROCEDURE — A9270 NON-COVERED ITEM OR SERVICE: HCPCS | Performed by: INTERNAL MEDICINE

## 2021-06-13 PROCEDURE — A9270 NON-COVERED ITEM OR SERVICE: HCPCS | Performed by: FAMILY MEDICINE

## 2021-06-13 PROCEDURE — 770006 HCHG ROOM/CARE - MED/SURG/GYN SEMI*

## 2021-06-13 PROCEDURE — 700102 HCHG RX REV CODE 250 W/ 637 OVERRIDE(OP): Performed by: HOSPITALIST

## 2021-06-13 PROCEDURE — A9270 NON-COVERED ITEM OR SERVICE: HCPCS | Performed by: STUDENT IN AN ORGANIZED HEALTH CARE EDUCATION/TRAINING PROGRAM

## 2021-06-13 PROCEDURE — 36415 COLL VENOUS BLD VENIPUNCTURE: CPT

## 2021-06-13 PROCEDURE — 80053 COMPREHEN METABOLIC PANEL: CPT

## 2021-06-13 PROCEDURE — 700102 HCHG RX REV CODE 250 W/ 637 OVERRIDE(OP): Performed by: STUDENT IN AN ORGANIZED HEALTH CARE EDUCATION/TRAINING PROGRAM

## 2021-06-13 PROCEDURE — A9270 NON-COVERED ITEM OR SERVICE: HCPCS | Performed by: HOSPITALIST

## 2021-06-13 PROCEDURE — 700102 HCHG RX REV CODE 250 W/ 637 OVERRIDE(OP): Performed by: INTERNAL MEDICINE

## 2021-06-13 PROCEDURE — 700111 HCHG RX REV CODE 636 W/ 250 OVERRIDE (IP): Performed by: INTERNAL MEDICINE

## 2021-06-13 PROCEDURE — 700102 HCHG RX REV CODE 250 W/ 637 OVERRIDE(OP): Performed by: FAMILY MEDICINE

## 2021-06-13 PROCEDURE — 85025 COMPLETE CBC W/AUTO DIFF WBC: CPT

## 2021-06-13 PROCEDURE — 99232 SBSQ HOSP IP/OBS MODERATE 35: CPT | Performed by: FAMILY MEDICINE

## 2021-06-13 RX ADMIN — HYDROCORTISONE 10 MG: 20 TABLET ORAL at 05:35

## 2021-06-13 RX ADMIN — CARBOXYMETHYLCELLULOSE SODIUM 1 DROP: 5 SOLUTION/ DROPS OPHTHALMIC at 18:17

## 2021-06-13 RX ADMIN — LEVOTHYROXINE SODIUM 75 MCG: 0.07 TABLET ORAL at 05:35

## 2021-06-13 RX ADMIN — OXYCODONE 5 MG: 5 TABLET ORAL at 21:12

## 2021-06-13 RX ADMIN — FUROSEMIDE 20 MG: 20 TABLET ORAL at 05:35

## 2021-06-13 RX ADMIN — FLUTICASONE PROPIONATE 50 MCG: 50 SPRAY, METERED NASAL at 05:36

## 2021-06-13 RX ADMIN — CARVEDILOL 3.12 MG: 3.12 TABLET, FILM COATED ORAL at 18:17

## 2021-06-13 RX ADMIN — OMEPRAZOLE 20 MG: 20 CAPSULE, DELAYED RELEASE ORAL at 18:18

## 2021-06-13 RX ADMIN — ONDANSETRON 4 MG: 4 TABLET, ORALLY DISINTEGRATING ORAL at 09:01

## 2021-06-13 RX ADMIN — TAMSULOSIN HYDROCHLORIDE 0.4 MG: 0.4 CAPSULE ORAL at 05:34

## 2021-06-13 RX ADMIN — OXYCODONE 5 MG: 5 TABLET ORAL at 08:57

## 2021-06-13 RX ADMIN — BUDESONIDE AND FORMOTEROL FUMARATE DIHYDRATE 2 PUFF: 80; 4.5 AEROSOL RESPIRATORY (INHALATION) at 18:18

## 2021-06-13 RX ADMIN — CLOPIDOGREL BISULFATE 75 MG: 75 TABLET ORAL at 05:35

## 2021-06-13 RX ADMIN — TIOTROPIUM BROMIDE INHALATION SPRAY 5 MCG: 3.12 SPRAY, METERED RESPIRATORY (INHALATION) at 05:37

## 2021-06-13 RX ADMIN — ENOXAPARIN SODIUM 40 MG: 40 INJECTION SUBCUTANEOUS at 05:34

## 2021-06-13 RX ADMIN — Medication 950 MG: at 05:36

## 2021-06-13 RX ADMIN — LOSARTAN POTASSIUM 25 MG: 25 TABLET, FILM COATED ORAL at 05:34

## 2021-06-13 RX ADMIN — OMEPRAZOLE 20 MG: 20 CAPSULE, DELAYED RELEASE ORAL at 05:34

## 2021-06-13 RX ADMIN — ASPIRIN 81 MG: 81 TABLET, COATED ORAL at 05:34

## 2021-06-13 RX ADMIN — Medication 5 MG: at 20:37

## 2021-06-13 RX ADMIN — HYDROCORTISONE 10 MG: 20 TABLET ORAL at 18:17

## 2021-06-13 RX ADMIN — CARVEDILOL 3.12 MG: 3.12 TABLET, FILM COATED ORAL at 08:06

## 2021-06-13 RX ADMIN — BUDESONIDE AND FORMOTEROL FUMARATE DIHYDRATE 2 PUFF: 80; 4.5 AEROSOL RESPIRATORY (INHALATION) at 05:35

## 2021-06-13 RX ADMIN — CARBOXYMETHYLCELLULOSE SODIUM 1 DROP: 5 SOLUTION/ DROPS OPHTHALMIC at 05:36

## 2021-06-13 RX ADMIN — DOCUSATE SODIUM 50 MG AND SENNOSIDES 8.6 MG 2 TABLET: 8.6; 5 TABLET, FILM COATED ORAL at 18:17

## 2021-06-13 RX ADMIN — ATORVASTATIN CALCIUM 80 MG: 40 TABLET, FILM COATED ORAL at 18:18

## 2021-06-13 RX ADMIN — MONTELUKAST 10 MG: 10 TABLET, FILM COATED ORAL at 05:35

## 2021-06-13 ASSESSMENT — PAIN DESCRIPTION - PAIN TYPE
TYPE: ACUTE PAIN

## 2021-06-13 ASSESSMENT — PAIN SCALES - WONG BAKER: WONGBAKER_NUMERICALRESPONSE: HURTS JUST A LITTLE BIT

## 2021-06-13 NOTE — PROGRESS NOTES
Received bedside report from day RN. Assumed care at 1900. Patient is awake/alert, oriented x4. Pt on 6 liters of oxygen nasal cannula. No PIV access. Assessment completed. Condom cath in place. Bed locked in lowest position. Call light within reach. Whiteboard updates with nurse's and CNA's numbers. Hourly rounding in place. Bed alarm in place.

## 2021-06-13 NOTE — PROGRESS NOTES
Assumed care at 0700 following night nurse report, assessment completed. Patient is A&O X4. Patient complains of back pain at this time. Fall precautions and appropriate signs in place. Call light/phone system and RN extension updated on whiteboard. Bed alarm is in use, patient is 2 person assist. Patient denies any additional needs at this time, Hourly rounding in place.

## 2021-06-13 NOTE — CARE PLAN
Problem: Knowledge Deficit - Standard  Goal: Patient and family/care givers will demonstrate understanding of plan of care, disease process/condition, diagnostic tests and medications  Outcome: Progressing     Problem: Fall Risk  Goal: Patient will remain free from falls  Outcome: Progressing   The patient is Stable - Low risk of patient condition declining or worsening    Shift Goals  Clinical Goals: SNF acceptance  Patient Goals: pain control  Family Goals: NA     Progress made toward(s) clinical / shift goals: pain under control today    Patient is not progressing towards the following goals: SNF acceptance

## 2021-06-13 NOTE — CARE PLAN
The patient is Watcher - Medium risk of patient condition declining or worsening    Shift Goals  Clinical Goals: Patient will have pain managed, feed himself and remain sat >90%  Patient Goals: Patient will receive pain management and sleep  Family Goals: Wife notified via phone for updates    Progress made toward(s) clinical / shift goals:  Patient turned Q2 hour for pressure offloading    Patient is not progressing towards the following goals: Pain to back elbow persists, medication administered as ordered.      Problem: Skin Integrity  Goal: Skin integrity is maintained or improved  Outcome: Not Progressing     Problem: Fall Risk  Goal: Patient will remain free from falls  Outcome: Progressing

## 2021-06-13 NOTE — CARE PLAN
The patient is Watcher - Medium risk of patient condition declining or worsening    Shift Goals  Clinical Goals: Pt's pain will be well controlled and pt will be able to sleep and rest comfortably   Patient Goals: Pt will have a safe discharge to a SNF   Family Goals: NA    Progress made toward(s) clinical / shift goals:     Problem: Psychosocial  Goal: Patient's level of anxiety will decrease  Outcome: Progressing  Note: RN listened to pt needs and concerns. RN used therapeutic communication, distraction, music, re-education and re-orientation. Pt able to state needs and concerns verbally.      Problem: Respiratory  Goal: Patient will achieve/maintain optimum respiratory ventilation and gas exchange  Outcome: Progressing  Note: Patient currently on 6 L of oxygen nasal cannula. Patient encouraged to turn, deep breathe and cough. Patient also encouraged to sit up greater than 30 degrees. No signs of respiratory distress noted. Oxygen saturation within normal limits.

## 2021-06-13 NOTE — PROGRESS NOTES
Hospital Medicine Daily Progress Note    Date of Service  6/13/2021    Chief Complaint  88 y.o. 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     During the stay in the hospital, patient electrolytes continue to be repleted.  Patient is also noted to have acute on chronic systolic congestive heart failure, started on IV Lasix--> later switched po, I/Os, daily weight, fluid restriction.  Regarding CAD, patient is on aspirin, Plavix, Coreg, losartan, and statin. Patient is to follow-up with cardiology as an outpatient.     Patient has history of adrenal insufficiency,on hydrocortisone 10 bid. PT/OT has evaluated, recommended discharging the patient to skilled nursing facility.  During hospital course patient noted to have UA positive for UTI.  Urine culture positive for Klebsiella .    Interval Problem Update  6/1/2021  Vital stable  Labs unremarkable. meds reviewed    assisting with placement     6/2/2021  Vitals remained stable  Labs remain unremarkable  No events overnight   assisting with placement     6/3/2021  Noted to have low blood pressure  Medication adjusted  Remain afebrile  Labs noted with elevated white count likely secondary to steroid use  Mild FABIAN.  Will hold Lasix and losartan.  Elevated pro-Magdi.  No sign of sepsis active infection  Check UA      6/4/2021  Had repaid response overnight for hypotension  Dry  on examination .  Blood pressure improved  On Rocephin for UTI  urine culture and blood culture in process  Losartan will Lasix on hold given low blood pressure and worsening kidney function  We will continue monitor kidney function  If labs remain unremarkable and  blood pressure remained stable can be transferred  to SNF tomorrow     6/5/2021  Vitals remained stable.  Requiring 5L oxygen to maintain saturation over 95  Kidney function improving.  KCl added for hyponatremia  Plan to discharge to a SNF versus hospice after discussion with the patient's spouse  Palliative evaluation requested for assistance     6/6/2021  Vitals remain stable .  KCl replaced for hypokalemia  Kidney function improved  Lasix resumed  Possible discharge SNF tomorrow   and palliative assisting with discharge     6/7/2021    Vitals stable  Kidney function baseline now  Pending SNF placement//DC home with home health  Medically cleared for discharge  6/8: Resting in bed comfortably.  Spoke with patient the need for placement considering the clinical complexity and the need for 24/7 supervision.  Patient stated that he will consider.  Wife however is declining placement.   to touch base with wife in that regard.    6/9: Resting in bed comfortable.  Hemodynamically stable.  Respiratory status stable.  No acute distress noted.  Patient stated that he is considering hospice at home.  Palliative care team notified.  No acute distress noted.  No issues overnight per staff.   I certify that the patient requires continued medically necessary hospital services for the treatment of debility and pressure ulcers and will remain in the hospital for at least another 3 to 4 days days.  Discharge plan is anticipated to be to SNF versus home with home health.    6/10: Resting in bed comfortably.  Respiratory status stable.  Hemodynamically stable.  No acute distress noted.  No issues overnight per staff.  6/11: Resting in bed  comfortably.  Stated that his back hurts after had his physical therapy session.  Hemodynamically stable.  Story status stable.  No acute distress noted.  No new issues with per staff.    6/12: Resting in bed comfortably.  Had a compression glove on the left hand placed yesterday.  Swelling on his left arm is better he stated.  Has been afebrile.  Hemodynamically stable.  No acute distress noted.  No issues overnight per staff.  6/13: Resting in bed comfortably.  Stated that he feels tired.  Hemodynamically stable.  Afebrile.  Respiratory status at baseline.  No acute distress noted.  No issues overnight per staff.  Consultants/Specialty  N/A     Code Status  DNAR, I OK     Disposition  SNF vs home with HH/DME     Review of Systems  Review of Systems   Constitutional: Negative for fever.   HENT: Negative for hearing loss.    Eyes: Negative for blurred vision and double vision.   Respiratory: Negative for cough.    Cardiovascular: Negative for chest pain and palpitations.   Gastrointestinal: Negative for nausea and vomiting.   Genitourinary: Negative for dysuria.   Musculoskeletal: Negative for myalgias.   Skin: Negative for rash.   Neurological: Negative for dizziness and headaches.   Psychiatric/Behavioral: Negative for depression.         Physical Exam  Temp:  [36.2 °C (97.2 °F)-37.1 °C (98.8 °F)] 36.2 °C (97.2 °F)  Pulse:  [71-82] 78  Resp:  [16-21] 16  BP: (125-156)/(59-92) 156/92  SpO2:  [93 %-96 %] 96 %     Physical Exam  Constitutional:       Appearance: He is obese.   HENT:      Head: Normocephalic and atraumatic.      Right Ear: Tympanic membrane normal.      Left Ear: Tympanic membrane normal.      Nose: Nose normal.      Mouth/Throat:      Mouth: Mucous membranes are moist.     Cardiovascular:      Rate and Rhythm: Normal rate and regular rhythm.      Pulses: Normal pulses.      Heart sounds: Normal heart sounds. No murmur heard.     Pulmonary:      Effort: Pulmonary effort is normal. No respiratory  distress.   Abdominal:      General: Abdomen is flat.      Palpations: Abdomen is soft.      Tenderness: There is no abdominal tenderness.   Musculoskeletal:    Left upper extremity swelling.  Skin:     Coloration: Skin is not jaundiced.   Neurological:      General: No focal deficit present.      Mental Status: He is alert and oriented to person, place, and time. Mental status is at baseline.   Psychiatric:         Mood and Affect: Mood normal.   Fluids    Intake/Output Summary (Last 24 hours) at 6/13/2021 1623  Last data filed at 6/13/2021 0900  Gross per 24 hour   Intake 480 ml   Output 975 ml   Net -495 ml       Laboratory  Recent Labs     06/11/21  0104 06/13/21  0430   WBC 12.5* 8.9   RBC 3.76* 4.01*   HEMOGLOBIN 10.4* 11.5*   HEMATOCRIT 35.0* 36.4*   MCV 93.1 90.8   MCH 27.7 28.7   MCHC 29.7* 31.6*   RDW 70.2* 68.7*   PLATELETCT 216 186   MPV 10.4 10.9     Recent Labs     06/13/21  1348   SODIUM 134*   POTASSIUM 4.0   CHLORIDE 95*   CO2 31   GLUCOSE 107*   BUN 16   CREATININE 0.69   CALCIUM 8.2*                   Imaging  DX-CHEST-PORTABLE (1 VIEW)   Final Result      Findings consistent with mild pulmonary edema along with minimal bibasilar atelectasis and small left pleural effusion. No significant change.      DX-CHEST-LIMITED (1 VIEW)   Final Result         No significant interval change.      Small bilateral pleural effusion bibasilar opacities, similar to prior.      Stable cardiomegaly.      US-EXTREMITY VENOUS UPPER UNILAT LEFT   Final Result      CT-EXTREMITY, UPPER WITH LEFT   Final Result      No acute fracture or dislocation.      Severe osteoarthritis of the elbow joint.      IR-MIDLINE CATHETER INSERTION WO GUIDANCE > AGE 3   Final Result                  Ultrasound-guided midline placement performed by qualified nursing staff    as above.          IR-US GUIDED PIV   Final Result    Ultrasound-guided PERIPHERAL IV INSERTION performed by    qualified nursing staff as above.      US-EXTREMITY  VENOUS LOWER UNILAT LEFT   Final Result      DX-CHEST-PORTABLE (1 VIEW)   Final Result      Bibasilar opacities may represent atelectasis or consolidation.      Small left pleural effusion may be present.      Pulmonary interstitial edema.      Stable prominence of the cardiomediastinal silhouette.      Atherosclerotic calcification is seen.      DX-ELBOW-COMPLETE 3+ LEFT   Final Result      1.  No fracture or dislocation of LEFT elbow.   2.  Severe degenerative change.   3.  Diffuse subcutaneous edema.   4.  Limited by positioning.           Assessment/Plan  * Acute on chronic systolic congestive heart failure (HCC)- (present on admission)  Assessment & Plan  Patient on 5L oxygen via nc at baseline  Echo showed EF of 45% on 5/12, I/os , daily weight and fluid restriction to 1.5 L  Continue BB , losartan and  Lasix  Reached euvolemic status.  Cardiology out-patient follow-up.    Pain and swelling of left upper extremity- (present on admission)  Assessment & Plan  US negative for DVT   Improving   Compression banding per OT.    Sacral wound- (present on admission)  Assessment & Plan  Wound mx as per wound team recs   --Offloading and periodic turning     Hypophosphatemia  Assessment & Plan  Replete and monitor  2.5      Hypomagnesemia  Assessment & Plan  Replete and monitor      Hypokalemia- (present on admission)  Assessment & Plan  Replete and monitor    Anasarca- (present on admission)  Assessment & Plan  Continue to monitor on diuretic   Resolving.    Debility- (present on admission)  Assessment & Plan  SNF placement pending     Coronary artery disease- (present on admission)  Assessment & Plan  S/p PCI with stent placement 4 weeks ago  Aspirin, Plavix, atorvastatin    Adrenal insufficiency (HCC)- (present on admission)  Assessment & Plan   On hydrocortisone home dose.    UTI (urinary tract infection)  Assessment & Plan  On rocephine   Urine culture growing Klebsiella pneumonia  Completed a course of  antibiotics.    Chronic respiratory failure (HCC)- (present on admission)  Assessment & Plan  Continue supplemental oxygen, on 4 to 5 L    FABIAN (acute kidney injury) ([facility])  Assessment & Plan  Resolved.  Resume losartan and Lasix    Sepsis (HCC)  Assessment & Plan  Resolved           BPH (benign prostatic hypertrophy)- (present on admission)  Assessment & Plan  continiue Flomax and finasteride    History of pulmonary embolism- (present on admission)  Assessment & Plan  Warfarin was discontinued during previous recent hospitalization due to blood loss anemia, patient knows   monitor hemoglobin hematocrit, so far stabel  Transfuse if less than 7  Recent CT of the chest done on 5/8/2021 was negative for PE.        COPD (chronic obstructive pulmonary disease) ([facility])- (present on admission)  Assessment & Plan  Continue RT protocol, breathing treatment   --Titrate O2 as needed    -- ON 4 L of O2  And denied any complain of sob    Essential hypertension, benign- (present on admission)  Assessment & Plan  Blood pressure noted to be well controlled, monitor  Continue coreg and losartan       VTE prophylaxis: lovenox

## 2021-06-14 ENCOUNTER — APPOINTMENT (OUTPATIENT)
Dept: RADIOLOGY | Facility: MEDICAL CENTER | Age: 86
DRG: 291 | End: 2021-06-14
Attending: FAMILY MEDICINE
Payer: MEDICARE

## 2021-06-14 PROCEDURE — A9270 NON-COVERED ITEM OR SERVICE: HCPCS | Performed by: STUDENT IN AN ORGANIZED HEALTH CARE EDUCATION/TRAINING PROGRAM

## 2021-06-14 PROCEDURE — 770006 HCHG ROOM/CARE - MED/SURG/GYN SEMI*

## 2021-06-14 PROCEDURE — 700111 HCHG RX REV CODE 636 W/ 250 OVERRIDE (IP): Performed by: INTERNAL MEDICINE

## 2021-06-14 PROCEDURE — 700102 HCHG RX REV CODE 250 W/ 637 OVERRIDE(OP): Performed by: FAMILY MEDICINE

## 2021-06-14 PROCEDURE — 71045 X-RAY EXAM CHEST 1 VIEW: CPT

## 2021-06-14 PROCEDURE — 99232 SBSQ HOSP IP/OBS MODERATE 35: CPT | Performed by: FAMILY MEDICINE

## 2021-06-14 PROCEDURE — A9270 NON-COVERED ITEM OR SERVICE: HCPCS | Performed by: INTERNAL MEDICINE

## 2021-06-14 PROCEDURE — A9270 NON-COVERED ITEM OR SERVICE: HCPCS | Performed by: HOSPITALIST

## 2021-06-14 PROCEDURE — 700102 HCHG RX REV CODE 250 W/ 637 OVERRIDE(OP): Performed by: STUDENT IN AN ORGANIZED HEALTH CARE EDUCATION/TRAINING PROGRAM

## 2021-06-14 PROCEDURE — A9270 NON-COVERED ITEM OR SERVICE: HCPCS | Performed by: FAMILY MEDICINE

## 2021-06-14 PROCEDURE — 700102 HCHG RX REV CODE 250 W/ 637 OVERRIDE(OP): Performed by: HOSPITALIST

## 2021-06-14 PROCEDURE — 700102 HCHG RX REV CODE 250 W/ 637 OVERRIDE(OP): Performed by: INTERNAL MEDICINE

## 2021-06-14 RX ORDER — MICONAZOLE NITRATE 20 MG/G
CREAM TOPICAL 2 TIMES DAILY
Status: DISCONTINUED | OUTPATIENT
Start: 2021-06-14 | End: 2021-06-19 | Stop reason: HOSPADM

## 2021-06-14 RX ORDER — CHOLECALCIFEROL (VITAMIN D3) 125 MCG
10 CAPSULE ORAL NIGHTLY
Status: DISCONTINUED | OUTPATIENT
Start: 2021-06-14 | End: 2021-06-19 | Stop reason: HOSPADM

## 2021-06-14 RX ADMIN — OMEPRAZOLE 20 MG: 20 CAPSULE, DELAYED RELEASE ORAL at 17:42

## 2021-06-14 RX ADMIN — Medication 10 MG: at 20:30

## 2021-06-14 RX ADMIN — CARBOXYMETHYLCELLULOSE SODIUM 1 DROP: 5 SOLUTION/ DROPS OPHTHALMIC at 17:42

## 2021-06-14 RX ADMIN — LEVOTHYROXINE SODIUM 75 MCG: 0.07 TABLET ORAL at 05:53

## 2021-06-14 RX ADMIN — OXYCODONE 5 MG: 5 TABLET ORAL at 15:50

## 2021-06-14 RX ADMIN — CARVEDILOL 3.12 MG: 3.12 TABLET, FILM COATED ORAL at 09:43

## 2021-06-14 RX ADMIN — ACETAMINOPHEN 650 MG: 325 TABLET, FILM COATED ORAL at 18:11

## 2021-06-14 RX ADMIN — BUDESONIDE AND FORMOTEROL FUMARATE DIHYDRATE 2 PUFF: 80; 4.5 AEROSOL RESPIRATORY (INHALATION) at 06:03

## 2021-06-14 RX ADMIN — OMEPRAZOLE 20 MG: 20 CAPSULE, DELAYED RELEASE ORAL at 05:53

## 2021-06-14 RX ADMIN — ACETAMINOPHEN 650 MG: 325 TABLET, FILM COATED ORAL at 06:24

## 2021-06-14 RX ADMIN — HYDROCORTISONE 10 MG: 20 TABLET ORAL at 05:53

## 2021-06-14 RX ADMIN — CLOPIDOGREL BISULFATE 75 MG: 75 TABLET ORAL at 05:53

## 2021-06-14 RX ADMIN — CARVEDILOL 3.12 MG: 3.12 TABLET, FILM COATED ORAL at 17:41

## 2021-06-14 RX ADMIN — FUROSEMIDE 20 MG: 20 TABLET ORAL at 05:53

## 2021-06-14 RX ADMIN — TAMSULOSIN HYDROCHLORIDE 0.4 MG: 0.4 CAPSULE ORAL at 05:53

## 2021-06-14 RX ADMIN — ATORVASTATIN CALCIUM 80 MG: 40 TABLET, FILM COATED ORAL at 17:41

## 2021-06-14 RX ADMIN — MICONAZOLE NITRATE: 20 CREAM TOPICAL at 20:31

## 2021-06-14 RX ADMIN — DOCUSATE SODIUM 50 MG AND SENNOSIDES 8.6 MG 2 TABLET: 8.6; 5 TABLET, FILM COATED ORAL at 05:52

## 2021-06-14 RX ADMIN — BUDESONIDE AND FORMOTEROL FUMARATE DIHYDRATE 2 PUFF: 80; 4.5 AEROSOL RESPIRATORY (INHALATION) at 17:42

## 2021-06-14 RX ADMIN — OXYCODONE 5 MG: 5 TABLET ORAL at 06:24

## 2021-06-14 RX ADMIN — LOSARTAN POTASSIUM 25 MG: 25 TABLET, FILM COATED ORAL at 05:53

## 2021-06-14 RX ADMIN — ASPIRIN 81 MG: 81 TABLET, COATED ORAL at 05:53

## 2021-06-14 RX ADMIN — MONTELUKAST 10 MG: 10 TABLET, FILM COATED ORAL at 05:52

## 2021-06-14 RX ADMIN — TIOTROPIUM BROMIDE INHALATION SPRAY 5 MCG: 3.12 SPRAY, METERED RESPIRATORY (INHALATION) at 05:59

## 2021-06-14 RX ADMIN — CARBOXYMETHYLCELLULOSE SODIUM 1 DROP: 5 SOLUTION/ DROPS OPHTHALMIC at 05:59

## 2021-06-14 RX ADMIN — HYDROCORTISONE 10 MG: 20 TABLET ORAL at 17:41

## 2021-06-14 RX ADMIN — ENOXAPARIN SODIUM 40 MG: 40 INJECTION SUBCUTANEOUS at 06:00

## 2021-06-14 RX ADMIN — Medication 950 MG: at 05:58

## 2021-06-14 RX ADMIN — OXYCODONE 5 MG: 5 TABLET ORAL at 23:43

## 2021-06-14 RX ADMIN — FLUTICASONE PROPIONATE 50 MCG: 50 SPRAY, METERED NASAL at 05:59

## 2021-06-14 ASSESSMENT — PAIN DESCRIPTION - PAIN TYPE
TYPE: ACUTE PAIN
TYPE: ACUTE PAIN;CHRONIC PAIN
TYPE: ACUTE PAIN

## 2021-06-14 NOTE — CARE PLAN
The patient is Stable - Low risk of patient condition declining or worsening    Shift Goals  Clinical Goals: Patient will have pain managed, feed himself and remain sat >90%  Patient Goals: Patient will receive pain management and sleep  Family Goals: Wife notified via phone for updates    Progress made toward(s) clinical / shift goals:      Problem: Pain - Standard  Goal: Alleviation of pain or a reduction in pain to the patient’s comfort goal  Outcome: Progressing  Note: Patient pain level assessed using the 1-10 scale. Patient given medication as per MAR, encouraged position changes and rest. Pt has tylenol and oxycodone for pain. Pt has been sleeping and resting. Will continue to monitor and reassess.      Problem: Fluid Volume  Goal: Fluid volume balance will be maintained  Outcome: Progressing  Note: Pt on cardiac diet limited to 1.5 fluid restriction. Condom cath in place to measure urine output.

## 2021-06-14 NOTE — CARE PLAN
Problem: Pain - Standard  Goal: Alleviation of pain or a reduction in pain to the patient’s comfort goal  Outcome: Progressing  Flowsheets (Taken 6/14/2021 6124)  Non Verbal Scale: Calm  Note: Patient has been receiving pain medications per MAR     Problem: Fall Risk  Goal: Patient will remain free from falls  Outcome: Progressing  Note: Patient not impulsive, calls appropriately for assistance OOB   The patient is Stable - Low risk of patient condition declining or worsening    Shift Goals  Clinical Goals: Pain management, self feed, oxygen >90%  Patient Goals: pain managed and sleep  Family Goals: Wife notified via phone for updates    Progress made toward(s) clinical / shift goals:  Patient given medications per MAR, participated in therapies.

## 2021-06-14 NOTE — PROGRESS NOTES
Received bedside report from day RN. Assumed care at 1900. Patient is awake/alert, oriented x4. Pt on 5 liters of oxygen nasal cannula. No PIV access, MD aware. Assessment completed. Condom cath in place. Bed locked in lowest position. Call light within reach. Whiteboard updates with nurse's and CNA's numbers. Hourly rounding in place. Bed alarm in place.

## 2021-06-14 NOTE — PROGRESS NOTES
Assume care at 0700, report recieved from Noc RN. Pt A/Ox4, states pain is 7/10. Bed locked, 2 rails up, bed in lowest position. Call light in place, belongings at bedside, no needs at this time, and hourly rounding in place.

## 2021-06-14 NOTE — PROGRESS NOTES
Hospital Medicine Daily Progress Note    Date of Service  6/14/2021    Chief Complaint  88 y.o. 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     During the stay in the hospital, patient electrolytes continue to be repleted.  Patient is also noted to have acute on chronic systolic congestive heart failure, started on IV Lasix--> later switched po, I/Os, daily weight, fluid restriction.  Regarding CAD, patient is on aspirin, Plavix, Coreg, losartan, and statin. Patient is to follow-up with cardiology as an outpatient.     Patient has history of adrenal insufficiency,on hydrocortisone 10 bid. PT/OT has evaluated, recommended discharging the patient to skilled nursing facility.  During hospital course patient noted to have UA positive for UTI.  Urine culture positive for Klebsiella .    Interval Problem Update  6/1/2021  Vital stable  Labs unremarkable. meds reviewed    assisting with placement     6/2/2021  Vitals remained stable  Labs remain unremarkable  No events overnight   assisting with placement     6/3/2021  Noted to have low blood pressure  Medication adjusted  Remain afebrile  Labs noted with elevated white count likely secondary to steroid use  Mild FABIAN.  Will hold Lasix and losartan.  Elevated pro-Magdi.  No sign of sepsis active infection  Check UA      6/4/2021  Had repaid response overnight for hypotension  Dry  on examination .  Blood pressure improved  On Rocephin for UTI  urine culture and blood culture in process  Losartan will Lasix on hold given low blood pressure and worsening kidney function  We will continue monitor kidney function  If labs remain unremarkable and  blood pressure remained stable can be transferred  to SNF tomorrow     6/5/2021  Vitals remained stable.  Requiring 5L oxygen to maintain saturation over 95  Kidney function improving.  KCl added for hyponatremia  Plan to discharge to a SNF versus hospice after discussion with the patient's spouse  Palliative evaluation requested for assistance     6/6/2021  Vitals remain stable .  KCl replaced for hypokalemia  Kidney function improved  Lasix resumed  Possible discharge SNF tomorrow   and palliative assisting with discharge     6/7/2021    Vitals stable  Kidney function baseline now  Pending SNF placement//DC home with home health  Medically cleared for discharge  6/8: Resting in bed comfortably.  Spoke with patient the need for placement considering the clinical complexity and the need for 24/7 supervision.  Patient stated that he will consider.  Wife however is declining placement.   to touch base with wife in that regard.    6/9: Resting in bed comfortable.  Hemodynamically stable.  Respiratory status stable.  No acute distress noted.  Patient stated that he is considering hospice at home.  Palliative care team notified.  No acute distress noted.  No issues overnight per staff.   I certify that the patient requires continued medically necessary hospital services for the treatment of debility and pressure ulcers and will remain in the hospital for at least another 3 to 4 days days.  Discharge plan is anticipated to be to SNF versus home with home health.    6/10: Resting in bed comfortably.  Respiratory status stable.  Hemodynamically stable.  No acute distress noted.  No issues overnight per staff.  6/11: Resting in bed  comfortably.  Stated that his back hurts after had his physical therapy session.  Hemodynamically stable.  Story status stable.  No acute distress noted.  No new issues with per staff.    6/12: Resting in bed comfortably.  Had a compression glove on the left hand placed yesterday.  Swelling on his left arm is better he stated.  Has been afebrile.  Hemodynamically stable.  No acute distress noted.  No issues overnight per staff.  6/13: Resting in bed comfortably.  Stated that he feels tired.  Hemodynamically stable.  Afebrile.  Respiratory status at baseline.  No acute distress noted.  No issues overnight per staff.  6/14: Resting in bed comfortably.  Stated that he feels more short of breath today.  Oxygen requirement still the same.  Chest x-ray showed small bilateral pleural effusion and bibasilar atelectasis.  Has been afebrile.  Hemodynamically stable.  No other complaints to report.  No issues overnight per staff.  Consultants/Specialty  N/A     Code Status  DNAR, I OK     Disposition  SNF vs home with HH/DME     Review of Systems  Review of Systems   Constitutional: Negative for fever.   HENT: Negative for hearing loss.    Eyes: Negative for blurred vision and double vision.   Respiratory: Negative for cough.    Cardiovascular: Negative for chest pain and palpitations.   Gastrointestinal: Negative for nausea and vomiting.   Genitourinary: Negative for dysuria.   Musculoskeletal: Negative for myalgias.   Skin: Negative for rash.   Neurological: Negative for dizziness and headaches.   Psychiatric/Behavioral: Negative for depression.         Physical Exam  Temp:  [36.2 °C (97.2 °F)-37.1 °C (98.8 °F)] 36.2 °C (97.2 °F)  Pulse:  [71-82] 78  Resp:  [16-21] 16  BP: (125-156)/(59-92) 156/92  SpO2:  [93 %-96 %] 96 %     Physical Exam  Constitutional:       Appearance: He is obese.   HENT:      Head: Normocephalic and atraumatic.      Right Ear: Tympanic membrane normal.      Left Ear: Tympanic membrane normal.       Nose: Nose normal.      Mouth/Throat:      Mouth: Mucous membranes are moist.     Cardiovascular:      Rate and Rhythm: Normal rate and regular rhythm.      Pulses: Normal pulses.      Heart sounds: Normal heart sounds. No murmur heard.     Pulmonary:      Effort: Pulmonary effort is normal. No respiratory distress.   Abdominal:      General: Abdomen is flat.      Palpations: Abdomen is soft.      Tenderness: There is no abdominal tenderness.   Musculoskeletal:    Left upper extremity swelling.  Skin:     Coloration: Skin is not jaundiced.   Neurological:      General: No focal deficit present.      Mental Status: He is alert and oriented to person, place, and time. Mental status is at baseline.   Psychiatric:         Mood and Affect: Mood normal.   Fluids    Intake/Output Summary (Last 24 hours) at 6/14/2021 1538  Last data filed at 6/14/2021 0900  Gross per 24 hour   Intake 440 ml   Output 850 ml   Net -410 ml       Laboratory  Recent Labs     06/13/21  0430   WBC 8.9   RBC 4.01*   HEMOGLOBIN 11.5*   HEMATOCRIT 36.4*   MCV 90.8   MCH 28.7   MCHC 31.6*   RDW 68.7*   PLATELETCT 186   MPV 10.9     Recent Labs     06/13/21  1348   SODIUM 134*   POTASSIUM 4.0   CHLORIDE 95*   CO2 31   GLUCOSE 107*   BUN 16   CREATININE 0.69   CALCIUM 8.2*                   Imaging  DX-CHEST-PORTABLE (1 VIEW)   Final Result      1.  Bibasilar underinflation atelectasis which could obscure an additional process.   2.  Small BILATERAL pleural effusions      DX-CHEST-PORTABLE (1 VIEW)   Final Result      Findings consistent with mild pulmonary edema along with minimal bibasilar atelectasis and small left pleural effusion. No significant change.      DX-CHEST-LIMITED (1 VIEW)   Final Result         No significant interval change.      Small bilateral pleural effusion bibasilar opacities, similar to prior.      Stable cardiomegaly.      US-EXTREMITY VENOUS UPPER UNILAT LEFT   Final Result      CT-EXTREMITY, UPPER WITH LEFT   Final  Result      No acute fracture or dislocation.      Severe osteoarthritis of the elbow joint.      IR-MIDLINE CATHETER INSERTION WO GUIDANCE > AGE 3   Final Result                  Ultrasound-guided midline placement performed by qualified nursing staff    as above.          IR-US GUIDED PIV   Final Result    Ultrasound-guided PERIPHERAL IV INSERTION performed by    qualified nursing staff as above.      US-EXTREMITY VENOUS LOWER UNILAT LEFT   Final Result      DX-CHEST-PORTABLE (1 VIEW)   Final Result      Bibasilar opacities may represent atelectasis or consolidation.      Small left pleural effusion may be present.      Pulmonary interstitial edema.      Stable prominence of the cardiomediastinal silhouette.      Atherosclerotic calcification is seen.      DX-ELBOW-COMPLETE 3+ LEFT   Final Result      1.  No fracture or dislocation of LEFT elbow.   2.  Severe degenerative change.   3.  Diffuse subcutaneous edema.   4.  Limited by positioning.           Assessment/Plan  * Acute on chronic systolic congestive heart failure (HCC)- (present on admission)  Assessment & Plan  Patient on 5L oxygen via nc at baseline  Echo showed EF of 45% on 5/12, I/os , daily weight and fluid restriction to 1.5 L  Continue BB , losartan and  Lasix  Reached euvolemic status.  Cardiology out-patient follow-up.    Pain and swelling of left upper extremity- (present on admission)  Assessment & Plan  US negative for DVT   Improving   Compression banding per OT.    Sacral wound- (present on admission)  Assessment & Plan  Wound mx as per wound team recs   --Offloading and periodic turning     Hypophosphatemia  Assessment & Plan  Replete and monitor  2.5      Hypomagnesemia  Assessment & Plan  Replete and monitor      Hypokalemia- (present on admission)  Assessment & Plan  Replete and monitor    Anasarca- (present on admission)  Assessment & Plan  Continue to monitor on diuretic   Resolving.    Debility- (present on admission)  Assessment &  Plan  SNF placement pending     Coronary artery disease- (present on admission)  Assessment & Plan  S/p PCI with stent placement 4 weeks ago  Aspirin, Plavix, atorvastatin    Adrenal insufficiency ([facility])- (present on admission)  Assessment & Plan   On hydrocortisone home dose.    UTI (urinary tract infection)  Assessment & Plan  On rocephine   Urine culture growing Klebsiella pneumonia  Completed a course of antibiotics.    Chronic respiratory failure with hypoxia ([facility])  Assessment & Plan  At baseline for now.  Continue supplemental oxygen, on 4 to 5 L  Encourage incentive spirometry as atelectasis seen on chest x-ray.    FABIAN (acute kidney injury) ([facility])  Assessment & Plan  Resolved.  Resume losartan and Lasix    Sepsis ([facility])  Assessment & Plan  Resolved           BPH (benign prostatic hypertrophy)- (present on admission)  Assessment & Plan  continiue Flomax and finasteride    History of pulmonary embolism- (present on admission)  Assessment & Plan  Warfarin was discontinued during previous recent hospitalization due to blood loss anemia, patient knows   monitor hemoglobin hematocrit, so far stabel  Transfuse if less than 7  Recent CT of the chest done on 5/8/2021 was negative for PE.        COPD (chronic obstructive pulmonary disease) ([facility])- (present on admission)  Assessment & Plan  Continue RT protocol, breathing treatment   --Titrate O2 as needed    -- ON 4 L of O2  And denied any complain of sob    Essential hypertension, benign- (present on admission)  Assessment & Plan  Blood pressure noted to be well controlled, monitor  Continue coreg and losartan       VTE prophylaxis: lovenox

## 2021-06-14 NOTE — DISCHARGE PLANNING
"Agency/Facility Name: [facility]   Spoke To: Maya  Outcome: Per Maya, referral was received, and states this Pt \"looks good\".  ANTONETTE provided Maya with number for [facility] so she can find out how many SNF days are left      "

## 2021-06-15 PROBLEM — Z71.89 ADVANCED CARE PLANNING/COUNSELING DISCUSSION: Status: ACTIVE | Noted: 2021-06-15

## 2021-06-15 LAB
ALBUMIN SERPL BCP-MCNC: 2.9 G/DL (ref 3.2–4.9)
ALBUMIN/GLOB SERPL: 1 G/DL
ALP SERPL-CCNC: 77 U/L (ref 30–99)
ALT SERPL-CCNC: 22 U/L (ref 2–50)
ANION GAP SERPL CALC-SCNC: 5 MMOL/L (ref 7–16)
AST SERPL-CCNC: 18 U/L (ref 12–45)
BASOPHILS # BLD AUTO: 0.4 % (ref 0–1.8)
BASOPHILS # BLD: 0.04 K/UL (ref 0–0.12)
BILIRUB SERPL-MCNC: 0.5 MG/DL (ref 0.1–1.5)
BUN SERPL-MCNC: 16 MG/DL (ref 8–22)
CALCIUM SERPL-MCNC: 8.2 MG/DL (ref 8.5–10.5)
CHLORIDE SERPL-SCNC: 96 MMOL/L (ref 96–112)
CO2 SERPL-SCNC: 34 MMOL/L (ref 20–33)
CREAT SERPL-MCNC: 0.62 MG/DL (ref 0.5–1.4)
EOSINOPHIL # BLD AUTO: 0.21 K/UL (ref 0–0.51)
EOSINOPHIL NFR BLD: 2.3 % (ref 0–6.9)
ERYTHROCYTE [DISTWIDTH] IN BLOOD BY AUTOMATED COUNT: 70.5 FL (ref 35.9–50)
GLOBULIN SER CALC-MCNC: 3 G/DL (ref 1.9–3.5)
GLUCOSE SERPL-MCNC: 80 MG/DL (ref 65–99)
HCT VFR BLD AUTO: 35.7 % (ref 42–52)
HGB BLD-MCNC: 11.7 G/DL (ref 14–18)
IMM GRANULOCYTES # BLD AUTO: 0.05 K/UL (ref 0–0.11)
IMM GRANULOCYTES NFR BLD AUTO: 0.6 % (ref 0–0.9)
LYMPHOCYTES # BLD AUTO: 1.26 K/UL (ref 1–4.8)
LYMPHOCYTES NFR BLD: 14 % (ref 22–41)
MCH RBC QN AUTO: 30.5 PG (ref 27–33)
MCHC RBC AUTO-ENTMCNC: 32.8 G/DL (ref 33.7–35.3)
MCV RBC AUTO: 93 FL (ref 81.4–97.8)
MONOCYTES # BLD AUTO: 0.58 K/UL (ref 0–0.85)
MONOCYTES NFR BLD AUTO: 6.4 % (ref 0–13.4)
NEUTROPHILS # BLD AUTO: 6.87 K/UL (ref 1.82–7.42)
NEUTROPHILS NFR BLD: 76.3 % (ref 44–72)
NRBC # BLD AUTO: 0 K/UL
NRBC BLD-RTO: 0 /100 WBC
PLATELET # BLD AUTO: 253 K/UL (ref 164–446)
PMV BLD AUTO: 10 FL (ref 9–12.9)
POTASSIUM SERPL-SCNC: 3.6 MMOL/L (ref 3.6–5.5)
PROT SERPL-MCNC: 5.9 G/DL (ref 6–8.2)
RBC # BLD AUTO: 3.84 M/UL (ref 4.7–6.1)
SODIUM SERPL-SCNC: 135 MMOL/L (ref 135–145)
WBC # BLD AUTO: 9 K/UL (ref 4.8–10.8)

## 2021-06-15 PROCEDURE — 700111 HCHG RX REV CODE 636 W/ 250 OVERRIDE (IP): Performed by: INTERNAL MEDICINE

## 2021-06-15 PROCEDURE — 85025 COMPLETE CBC W/AUTO DIFF WBC: CPT

## 2021-06-15 PROCEDURE — A9270 NON-COVERED ITEM OR SERVICE: HCPCS | Performed by: FAMILY MEDICINE

## 2021-06-15 PROCEDURE — A9270 NON-COVERED ITEM OR SERVICE: HCPCS | Performed by: STUDENT IN AN ORGANIZED HEALTH CARE EDUCATION/TRAINING PROGRAM

## 2021-06-15 PROCEDURE — 700102 HCHG RX REV CODE 250 W/ 637 OVERRIDE(OP): Performed by: FAMILY MEDICINE

## 2021-06-15 PROCEDURE — 80053 COMPREHEN METABOLIC PANEL: CPT

## 2021-06-15 PROCEDURE — 36415 COLL VENOUS BLD VENIPUNCTURE: CPT

## 2021-06-15 PROCEDURE — 97110 THERAPEUTIC EXERCISES: CPT

## 2021-06-15 PROCEDURE — 99232 SBSQ HOSP IP/OBS MODERATE 35: CPT | Performed by: INTERNAL MEDICINE

## 2021-06-15 PROCEDURE — 770006 HCHG ROOM/CARE - MED/SURG/GYN SEMI*

## 2021-06-15 PROCEDURE — A9270 NON-COVERED ITEM OR SERVICE: HCPCS | Performed by: HOSPITALIST

## 2021-06-15 PROCEDURE — 97530 THERAPEUTIC ACTIVITIES: CPT

## 2021-06-15 PROCEDURE — 700102 HCHG RX REV CODE 250 W/ 637 OVERRIDE(OP): Performed by: HOSPITALIST

## 2021-06-15 PROCEDURE — 97140 MANUAL THERAPY 1/> REGIONS: CPT

## 2021-06-15 PROCEDURE — 700102 HCHG RX REV CODE 250 W/ 637 OVERRIDE(OP): Performed by: INTERNAL MEDICINE

## 2021-06-15 PROCEDURE — A9270 NON-COVERED ITEM OR SERVICE: HCPCS | Performed by: INTERNAL MEDICINE

## 2021-06-15 PROCEDURE — 700102 HCHG RX REV CODE 250 W/ 637 OVERRIDE(OP): Performed by: STUDENT IN AN ORGANIZED HEALTH CARE EDUCATION/TRAINING PROGRAM

## 2021-06-15 RX ADMIN — OMEPRAZOLE 20 MG: 20 CAPSULE, DELAYED RELEASE ORAL at 05:33

## 2021-06-15 RX ADMIN — ALPRAZOLAM 0.25 MG: 0.25 TABLET ORAL at 23:22

## 2021-06-15 RX ADMIN — HYDROCORTISONE 10 MG: 20 TABLET ORAL at 05:33

## 2021-06-15 RX ADMIN — Medication 950 MG: at 05:38

## 2021-06-15 RX ADMIN — FLUTICASONE PROPIONATE 50 MCG: 50 SPRAY, METERED NASAL at 09:12

## 2021-06-15 RX ADMIN — TIOTROPIUM BROMIDE INHALATION SPRAY 5 MCG: 3.12 SPRAY, METERED RESPIRATORY (INHALATION) at 05:41

## 2021-06-15 RX ADMIN — FUROSEMIDE 20 MG: 20 TABLET ORAL at 05:33

## 2021-06-15 RX ADMIN — OMEPRAZOLE 20 MG: 20 CAPSULE, DELAYED RELEASE ORAL at 17:55

## 2021-06-15 RX ADMIN — HYDROCORTISONE 10 MG: 20 TABLET ORAL at 17:55

## 2021-06-15 RX ADMIN — Medication 10 MG: at 20:50

## 2021-06-15 RX ADMIN — OXYCODONE 5 MG: 5 TABLET ORAL at 09:27

## 2021-06-15 RX ADMIN — CARBOXYMETHYLCELLULOSE SODIUM 1 DROP: 5 SOLUTION/ DROPS OPHTHALMIC at 17:55

## 2021-06-15 RX ADMIN — ENOXAPARIN SODIUM 40 MG: 40 INJECTION SUBCUTANEOUS at 05:33

## 2021-06-15 RX ADMIN — OXYCODONE 5 MG: 5 TABLET ORAL at 20:50

## 2021-06-15 RX ADMIN — MONTELUKAST 10 MG: 10 TABLET, FILM COATED ORAL at 05:33

## 2021-06-15 RX ADMIN — LEVOTHYROXINE SODIUM 75 MCG: 0.07 TABLET ORAL at 05:33

## 2021-06-15 RX ADMIN — DOCUSATE SODIUM 50 MG AND SENNOSIDES 8.6 MG 2 TABLET: 8.6; 5 TABLET, FILM COATED ORAL at 05:33

## 2021-06-15 RX ADMIN — LOSARTAN POTASSIUM 25 MG: 25 TABLET, FILM COATED ORAL at 05:33

## 2021-06-15 RX ADMIN — CARBOXYMETHYLCELLULOSE SODIUM 1 DROP: 5 SOLUTION/ DROPS OPHTHALMIC at 09:13

## 2021-06-15 RX ADMIN — DOCUSATE SODIUM 50 MG AND SENNOSIDES 8.6 MG 2 TABLET: 8.6; 5 TABLET, FILM COATED ORAL at 17:54

## 2021-06-15 RX ADMIN — CLOPIDOGREL BISULFATE 75 MG: 75 TABLET ORAL at 05:33

## 2021-06-15 RX ADMIN — CARVEDILOL 3.12 MG: 3.12 TABLET, FILM COATED ORAL at 17:55

## 2021-06-15 RX ADMIN — ASPIRIN 81 MG: 81 TABLET, COATED ORAL at 05:33

## 2021-06-15 RX ADMIN — CARVEDILOL 3.12 MG: 3.12 TABLET, FILM COATED ORAL at 09:11

## 2021-06-15 RX ADMIN — MICONAZOLE NITRATE: 20 CREAM TOPICAL at 17:59

## 2021-06-15 RX ADMIN — ATORVASTATIN CALCIUM 80 MG: 40 TABLET, FILM COATED ORAL at 17:55

## 2021-06-15 RX ADMIN — BUDESONIDE AND FORMOTEROL FUMARATE DIHYDRATE 2 PUFF: 80; 4.5 AEROSOL RESPIRATORY (INHALATION) at 05:41

## 2021-06-15 RX ADMIN — POLYETHYLENE GLYCOL 3350 1 PACKET: 17 POWDER, FOR SOLUTION ORAL at 10:36

## 2021-06-15 RX ADMIN — TAMSULOSIN HYDROCHLORIDE 0.4 MG: 0.4 CAPSULE ORAL at 05:33

## 2021-06-15 RX ADMIN — BUDESONIDE AND FORMOTEROL FUMARATE DIHYDRATE 2 PUFF: 80; 4.5 AEROSOL RESPIRATORY (INHALATION) at 17:54

## 2021-06-15 ASSESSMENT — COGNITIVE AND FUNCTIONAL STATUS - GENERAL
DAILY ACTIVITIY SCORE: 11
TURNING FROM BACK TO SIDE WHILE IN FLAT BAD: A LITTLE
SUGGESTED CMS G CODE MODIFIER DAILY ACTIVITY: CL
MOBILITY SCORE: 10
SUGGESTED CMS G CODE MODIFIER MOBILITY: CL
EATING MEALS: A LITTLE
DRESSING REGULAR LOWER BODY CLOTHING: TOTAL
TOILETING: TOTAL
PERSONAL GROOMING: A LOT
WALKING IN HOSPITAL ROOM: TOTAL
CLIMB 3 TO 5 STEPS WITH RAILING: TOTAL
DRESSING REGULAR UPPER BODY CLOTHING: A LOT
HELP NEEDED FOR BATHING: A LOT
MOVING TO AND FROM BED TO CHAIR: A LITTLE
MOVING FROM LYING ON BACK TO SITTING ON SIDE OF FLAT BED: UNABLE
STANDING UP FROM CHAIR USING ARMS: TOTAL

## 2021-06-15 ASSESSMENT — PAIN DESCRIPTION - PAIN TYPE
TYPE: ACUTE PAIN

## 2021-06-15 ASSESSMENT — GAIT ASSESSMENTS: GAIT LEVEL OF ASSIST: UNABLE TO PARTICIPATE

## 2021-06-15 NOTE — CARE PLAN
The patient is Stable - Low risk of patient condition declining or worsening    Shift Goals  Clinical Goals: Pain management  Patient Goals: Pain management and rest  Family Goals: Wife notified via phone for updates    Progress made toward(s) clinical / shift goals:  No complaints of pain at this time, only to be repositioned for comfort. Continue to assess for pain and reposition Q2 hours.     Problem: Pain - Standard  Goal: Alleviation of pain or a reduction in pain to the patient’s comfort goal  Outcome: Progressing

## 2021-06-15 NOTE — CARE PLAN
Problem: Pain - Standard  Goal: Alleviation of pain or a reduction in pain to the patient’s comfort goal  Outcome: Progressing  Flowsheets (Taken 6/15/2021 1510)  OB Pain Intervention:   Medication - See MAR   Breathing technique  Note: Pt educated on pain mgmt. Verbalized understanding.      Problem: Fall Risk  Goal: Patient will remain free from falls  Outcome: Progressing   The patient is Stable - Low risk of patient condition declining or worsening    Shift Goals  Clinical Goals: pain mgmt  Patient Goals: pain mgmt and sleep  Family Goals: NA    Progress made toward(s) clinical / shift goals:  medication provided per MAR.

## 2021-06-15 NOTE — PROGRESS NOTES
Assumed care at 0700, report received from NOC RN.  Pt A&O x 4, states pain is 09/10 in back and left arm. Medication provided per MAR. Pt is bed bound, q 2 turns in place. Pt on 5L o2 via nC at baseline. Condom cath in place. Bed locked, 2 rails up, bed in lowest position. Call light in place, belongings at bedside, no needs at this time and hourly rounding in place.

## 2021-06-15 NOTE — ASSESSMENT & PLAN NOTE
Discussion with patient and patient wife. Patient wife, Jacquie Greer, contact via phone. Discussion addressing patients goals of care and general disposition. Conversation regarding patient discharge to SNF. Jacquie was in agreement and understanding that patient would benefit from the first available bed including the [place] area. Jacquie stated she was concerned about affording transportation which was relayed to social work. Total of 21 minutes spent in discussion and coordination of advanced care planning.

## 2021-06-15 NOTE — PROGRESS NOTES
Assumed care at 1900, report received from Day RN.  Pt A&O x 4 though can be forget at times, Pt repositioned in bed for comfort. Condom cath in place. Bed locked, 2 rails up, bed in lowest position. Call light in place, belongings at bedside, no needs at this time and hourly rounding in place.

## 2021-06-15 NOTE — THERAPY
"Physical Therapy   Daily Treatment     Patient Name: Jeffrey Lizama  Age:  88 y.o., Sex:  male  Medical Record #: [id number]  Today's Date: 6/15/2021     Precautions: Fall Risk    Assessment    Pt agreeable to participate, but once up at EOB, refuses to attempt standing, saying that he is having a \"bad back pain day\". Therex done, sheets changed, back to bed. PT to continue.     Plan    Continue current treatment plan.    DC Equipment Recommendations: Unable to determine at this time  Discharge Recommendations: Recommend post-acute placement for additional physical therapy services prior to discharge home           Objective       06/15/21 1649   Cognition    Level of Consciousness Alert   New Learning Impaired   Initiation Impaired   Strength Lower Body   Lower Body Strength  X   Comments able to flex knees in bed without assist. Too fearful and painful to attempt standing   Supine Lower Body Exercise   Heel Slide 1 set of 10;Bilateral   Sitting Lower Body Exercises   Long Arc Quad 1 set of 15   Comments EOB   Gait Analysis   Gait Level Of Assist Unable to Participate   Bed Mobility    Supine to Sit Minimal Assist  (HOB up, assist to scoot)   Sit to Supine Minimal Assist   Scooting Moderate Assist   Rolling Minimal Assist to Rt.;Minimum Assist to Lt.   Functional Mobility   Sit to Stand Refused   Short Term Goals    Short Term Goal # 1 Pt to move supine to/from eob w/ spv, no bed features in 6 visits to improve fxl indep   Goal Outcome # 1 goal not met   Short Term Goal # 2 Pt to move sit to/from stand w/ spv in 6 visits to improve fxl indep   Goal Outcome # 2 Goal not met   Short Term Goal # 3 Pt to ambulate 75 ft w/ fww and spv in 6 visits to improve fxl indep   Goal Outcome # 3 Goal not met   Anticipated Discharge Equipment and Recommendations   DC Equipment Recommendations Unable to determine at this time   Discharge Recommendations Recommend post-acute placement for additional physical therapy services prior " to discharge home

## 2021-06-15 NOTE — WOUND TEAM
[facility]  Inpatient Services  Initial Wound and Skin Care Evaluation    Admission Date: 5/20/2021     Last order of IP CONSULT TO WOUND CARE was found on 5/20/2021 from Hospital Encounter on 5/20/2021     HPI, PMH, SH: Reviewed    Past Surgical History:   Procedure Laterality Date   • GASTROSCOPY-ENDO  1/22/2019    Procedure: GASTROSCOPY-ENDO;  Surgeon: Yoandy Mcelroy M.D.;  Location: ENDOSCOPY [facility];  Service: Gastroenterology   • GASTROSCOPY-ENDO  1/21/2019    Procedure: GASTROSCOPY-ENDO;  Surgeon: Luca Mtz M.D.;  Location: ENDOSCOPY [facility];  Service: Gastroenterology   • KNEE REVISION TOTAL  6/20/2013    Performed by Ortega Vega M.D. at SURGERY [facility]   • CARPAL TUNNEL ENDOSCOPIC  9/30/2011    Performed by RONALD ENNIS at SURGERY SAME DAY [facility] ORS   • PACEMAKER INSERTION Left 04/23/2010    [employer] Altrua 60 [id number] implanted by Dr. Donaldson.   • CATARACT EXTRACTION WITH IOL      bilateral    • CERVICAL DISK AND FUSION ANTERIOR     • CERVICAL FUSION POSTERIOR     • CHOLECYSTECTOMY     • CHOLECYSTECTOMY     • OTHER      pituitary tumor removed   • OTHER CARDIAC SURGERY     • OTHER ORTHOPEDIC SURGERY      knee surgery left knee x 2   • PB REVISE ULNAR NERVE AT WRIST     • PB TOTAL KNEE ARTHROPLASTY      left   • PB TOTAL KNEE ARTHROPLASTY      right   • THYROIDECTOMY       Social History     Tobacco Use   • Smoking status: Never Smoker   • Smokeless tobacco: Never Used   Substance Use Topics   • Alcohol use: No     Chief Complaint   Patient presents with   • Peripheral Edema     [facility], hyp[facility]alemia     Diagnosis: Failure to thrive in adult [R62.7]    Unit where seen by Wound Team: S523/01     WOUND CONSULT/FOLLOW UP RELATED TO:  Sacral wound     WOUND HISTORY:  Pt is an 89 y/o male who was admitted from [facility] for hypokalemia. Pt says that his mobility has been poor lately due to a series of hospitalizations.  Pt has  fecal incontinence and stated that he would sometimes be soiled for hours at the SNF.        ** While assessing pt's sacrum this RN also noted patch of eschar on pt's L ear.  Per pt this is skin cancer and pt has f/u appt to have it addressed.  There was a suture still in R ear from previous skin cancer removal >2mo old per pt.  Removed intact.     WOUND ASSESSMENT/LDA       Wound 05/21/21 Pressure Injury Sacrum Left POA moisture related.   (Active)   Wound Image    06/14/21 1800   Site Assessment Pink;Red 06/14/21 1800   Periwound Assessment [place] 06/14/21 1800   Margins Attached edges;Defined edges 06/14/21 1800   Closure Open to air 06/14/21 1800   Drainage Amount None 06/14/21 1800   Treatments Cleansed;Site care 06/14/21 1800   Wound Cleansing Foam Cleanser/Washcloth 06/14/21 1800   Periwound Protectant Antifungal Therapy 06/14/21 1800   Dressing Cleansing/Solutions Not Applicable 06/14/21 1800   Dressing Options Open to Air 06/14/21 1800   Dressing Changed New 06/14/21 1800   Dressing Status Open to Air 06/14/21 1800   Dressing Change/Treatment Frequency Every Shift, and As Needed 06/14/21 1800   NEXT Dressing Change/Treatment Date 06/14/21 06/14/21 1800   NEXT Weekly Photo (Inpatient Only) 06/21/21 06/14/21 1800   Pressure Injury Stage 2 06/14/21 1800                  Shape irregular 06/14/21 1800   Wound Odor None 06/14/21 1800   Pulses N/A 06/14/21 1800   Exposed Structures None 06/14/21 1800   WOUND NURSE ONLY - Time Spent with Patient (mins) 45 06/14/21 1800            Vascular:    FABRICIO:   No results found.    Lab Values:    Lab Results   Component Value Date/Time    WBC 8.9 06/13/2021 04:30 AM    RBC 4.01 (L) 06/13/2021 04:30 AM    HEMOGLOBIN 11.5 (L) 06/13/2021 04:30 AM    HEMATOCRIT 36.4 (L) 06/13/2021 04:30 AM    CREACTPROT 2.02 (H) 05/14/2021 08:31 AM    SEDRATEWES 7 04/18/2021 11:30 PM    HBA1C 5.2 01/29/2019 05:58 AM        Culture Results show:  No results found for this or any previous visit (from  the past 720 hour(s)).    Pain Level/Medicated:  Soreness over sacrum not requiring intervention       INTERVENTIONS BY WOUND TEAM:  Chart and images reviewed. Discussed with bedside RN. All areas of concern (based on picture review, LDA review and discussion with bedside RN) have been thoroughly assessed. Documentation of areas based on significant findings. This RN in to assess patient. Performed standard wound care which includes appropriate positioning, dressing removal and non-selective debridement. Pictures and measurements obtained weekly if/when required.  Preparation for Dressing removal: NA  Cleansed with:  Soap and water.  Sharp debridement: NA  Shavonne wound: Cleansed with soap and water, Prepped with barrier cream  Primary Dressing: antifungal paste      Interdisciplinary consultation: Patient, Bedside RN     EVALUATION / RATIONALE FOR TREATMENT:  Most Recent Date:  6/14/2021: Wound is looks worse than prior. Possible yeast related. Ordered antifungal cream.  Do not use mepilex. Ordered Taps.    5/21/21: Small patch of partial skin loss just L of sacrum, determined to be pressure related due to non-blanching redness.  Offloaded with mepilex, wound should resolve with proper offloading and prompt cleansing following BMs.      Goals: Steady decrease in wound area and depth weekly.    WOUND TEAM PLAN OF CARE ([X] for frequency of wound follow up,):   Nursing to follow orders written for wound care. Contact wound team if area fails to progress, deteriorates or with any questions/concerns  Dressing changes by wound team:                   Follow up 3 times weekly:                NPWT change 3 times weekly:     Follow up 1-2 times weekly:      Follow up Bi-Monthly:                   Follow up as needed:  X   Other (explain):     NURSING PLAN OF CARE ORDERS (X):  Dressing changes: See Dressing Care orders:   Skin care: See Skin Care orders:   RN Prevention Protocol: X  Rectal tube care: See Rectal Tube Care  orders:   Other orders:    RSKIN:   CURRENTLY IN PLACE (X), APPLIED THIS VISIT (A), ORDERED (O):   Q shift Lloyd:  X  Q shift pressure point assessments:  X    Surface/Positioning   Pressure redistribution mattress          X  Low Airloss          Bariatric foam      Bariatric ABRAHAM     Waffle cushion        Waffle Overlay          Reposition q 2 hours    X  TAPs Turning system   O  Z Jonathon Pillow     Offloading/Redistribution   Sacral Mepilex (Silicone dressing)   Placed   Heel Mepilex (Silicone dressing)      Placed   Heel float boots (Prevalon boot)        Refused     Float Heels off Bed with Pillows     X      Respiratory   Silicone O2 tubing   X      Gray Foam Ear protectors   X  Cannula fixation Device (Tender )          High flow offloading Clip    Elastic head band offloading device      Anchorfast                                                         Trach with Optifoam split foam             Containment/Moisture Prevention     Rectal tube or BMS    Purwick/Condom Cath        Rodas Catheter    Barrier wipes           Barrier paste     X  Antifungal tx      Interdry        Mobilization       Up to chair        Ambulate      PT/OT  X    Nutrition       Dietician        Diabetes Education      PO  X   TF     TPN     NPO   # days     Other        Anticipated discharge plans:KING.  LTACH:        SNF/Rehab:                  Home Health Care:           Outpatient Wound Center:            Self/Family Care:        Other:

## 2021-06-15 NOTE — DISCHARGE PLANNING
Received Choice form at 1300  Agency/Facility Name: [facility]  Referral sent per Choice form @ [phone] -[phone]  Agency/Facility Name: [facility]   Outcome: Left vmail for admissions regarding referral     Agency/Facility Name: [facility]   Outcome: Left vmail for admissions regarding referral

## 2021-06-15 NOTE — PROGRESS NOTES
Hospital Medicine Daily Progress Note    Date of Service  6/15/2021    Chief Complaint  88 y.o. 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     During the stay in the hospital, patient electrolytes continue to be repleted.  Patient is also noted to have acute on chronic systolic congestive heart failure, started on IV Lasix--> later switched po, I/Os, daily weight, fluid restriction.  Regarding CAD, patient is on aspirin, Plavix, Coreg, losartan, and statin. Patient is to follow-up with cardiology as an outpatient.     Patient has history of adrenal insufficiency,on hydrocortisone 10 bid. PT/OT has evaluated, recommended discharging the patient to skilled nursing facility.  During hospital course patient noted to have UA positive for UTI.  Urine culture positive for Klebsiella .    Interval Problem Update  Patient was seen and examined at bedside.  No acute events overnight. Patient is resting comfortably in bed and in no acute distress. Patient wife updated via phone.     Stable for discharge    Consultants/Specialty  N/A     Code Status  DNAR, I OK     Disposition  SNF      Review of Systems  Review of Systems   Constitutional: Negative for fever.   HENT: Negative for hearing loss.    Eyes: Negative for blurred vision and double vision.   Respiratory: Negative for cough.    Cardiovascular: Negative for chest pain and palpitations.    Gastrointestinal: Negative for nausea and vomiting.   Genitourinary: Negative for dysuria.   Musculoskeletal: Negative for myalgias.   Skin: Negative for rash.   Neurological: Negative for dizziness and headaches.   Psychiatric/Behavioral: Negative for depression.         Physical Exam  Temp:  [36.2 °C (97.2 °F)-37.1 °C (98.8 °F)] 36.2 °C (97.2 °F)  Pulse:  [71-82] 78  Resp:  [16-21] 16  BP: (125-156)/(59-92) 156/92  SpO2:  [93 %-96 %] 96 %     Physical Exam  Constitutional:       Appearance: He is obese. Chronically ill appearing, pleasant, conversational  HENT:      Head: Normocephalic and atraumatic.      Right Ear: Tympanic membrane normal.      Left Ear: Tympanic membrane normal.      Nose: Nose normal.      Mouth/Throat:      Mouth: Mucous membranes are moist.     Cardiovascular:      Rate and Rhythm: Normal rate and regular rhythm.      Pulses: Normal pulses.      Heart sounds: Normal heart sounds. No murmur heard.     Pulmonary:      Effort: Pulmonary effort is normal. No respiratory distress.   Abdominal:      General: Abdomen is flat.      Palpations: Abdomen is soft.      Tenderness: There is no abdominal tenderness.   Musculoskeletal:    Left upper extremity swelling.  Skin:     Coloration: Skin is not jaundiced.   Neurological:      General: No focal deficit present.      Mental Status: He is alert and oriented to person, place, and time. Mental status is at baseline.   Psychiatric:         Mood and Affect: Mood normal.     Fluids    Intake/Output Summary (Last 24 hours) at 6/15/2021 1431  Last data filed at 6/15/2021 1100  Gross per 24 hour   Intake 240 ml   Output 2550 ml   Net -2310 ml       Laboratory  Recent Labs     06/13/21  0430 06/15/21  0416   WBC 8.9 9.0   RBC 4.01* 3.84*   HEMOGLOBIN 11.5* 11.7*   HEMATOCRIT 36.4* 35.7*   MCV 90.8 93.0   MCH 28.7 30.5   MCHC 31.6* 32.8*   RDW 68.7* 70.5*   PLATELETCT 186 253   MPV 10.9 10.0     Recent Labs     06/13/21  1348 06/15/21  0416   SODIUM 134*  135   POTASSIUM 4.0 3.6   CHLORIDE 95* 96   CO2 31 34*   GLUCOSE 107* 80   BUN 16 16   CREATININE 0.69 0.62   CALCIUM 8.2* 8.2*                   Imaging  DX-CHEST-PORTABLE (1 VIEW)   Final Result      1.  Bibasilar underinflation atelectasis which could obscure an additional process.   2.  Small BILATERAL pleural effusions      DX-CHEST-PORTABLE (1 VIEW)   Final Result      Findings consistent with mild pulmonary edema along with minimal bibasilar atelectasis and small left pleural effusion. No significant change.      DX-CHEST-LIMITED (1 VIEW)   Final Result         No significant interval change.      Small bilateral pleural effusion bibasilar opacities, similar to prior.      Stable cardiomegaly.      US-EXTREMITY VENOUS UPPER UNILAT LEFT   Final Result      CT-EXTREMITY, UPPER WITH LEFT   Final Result      No acute fracture or dislocation.      Severe osteoarthritis of the elbow joint.      IR-MIDLINE CATHETER INSERTION WO GUIDANCE > AGE 3   Final Result                  Ultrasound-guided midline placement performed by qualified nursing staff    as above.          IR-US GUIDED PIV   Final Result    Ultrasound-guided PERIPHERAL IV INSERTION performed by    qualified nursing staff as above.      US-EXTREMITY VENOUS LOWER UNILAT LEFT   Final Result      DX-CHEST-PORTABLE (1 VIEW)   Final Result      Bibasilar opacities may represent atelectasis or consolidation.      Small left pleural effusion may be present.      Pulmonary interstitial edema.      Stable prominence of the cardiomediastinal silhouette.      Atherosclerotic calcification is seen.      DX-ELBOW-COMPLETE 3+ LEFT   Final Result      1.  No fracture or dislocation of LEFT elbow.   2.  Severe degenerative change.   3.  Diffuse subcutaneous edema.   4.  Limited by positioning.           Assessment/Plan  * Acute on chronic systolic congestive heart failure (HCC)- (present on admission)  Assessment & Plan  Patient on 5L oxygen via nc at baseline  Echo  showed EF of 45% on 5/12, I/os , daily weight and fluid restriction to 1.5 L  Continue BB , losartan and  Lasix  Reached euvolemic status.  Cardiology out-patient follow-up.    Pain and swelling of left upper extremity- (present on admission)  Assessment & Plan  US negative for DVT   Improving   Compression banding per OT.    Anasarca- (present on admission)  Assessment & Plan  Continue to monitor on diuretic   Resolving.    Debility- (present on admission)  Assessment & Plan  SNF placement pending     Adrenal insufficiency (HCC)- (present on admission)  Assessment & Plan   On hydrocortisone home dose.    UTI (urinary tract infection)  Assessment & Plan  On rocephine   Urine culture growing Klebsiella pneumonia  Completed a course of antibiotics.    Chronic respiratory failure with hypoxia (HCC)  Assessment & Plan  At baseline for now.  Continue supplemental oxygen, on 4 to 5 L  Encourage incentive spirometry as atelectasis seen on chest x-ray.    History of pulmonary embolism- (present on admission)  Assessment & Plan  Warfarin was discontinued during previous recent hospitalization due to blood loss anemia, patient knows   monitor hemoglobin hematocrit, so far stabel  Transfuse if less than 7  Recent CT of the chest done on 5/8/2021 was negative for PE.        COPD (chronic obstructive pulmonary disease) (HCC)- (present on admission)  Assessment & Plan  Continue RT protocol, breathing treatment   --Titrate O2 as needed    -- ON 4 L of O2  And denied any complain of sob    Essential hypertension, benign- (present on admission)  Assessment & Plan  Blood pressure noted to be well controlled, monitor  Continue coreg and losartan    Advanced care planning/counseling discussion  Assessment & Plan  Discussion with patient wife regarding patient placement. Wife agreeable to SNF placement and is willing to expand geographic area of placement including [place]. Total of 13 minutes spent in conversation and coordination of  advanced care planning.     Sacral wound- (present on admission)  Assessment & Plan  Wound mx as per wound team recs   --Offloading and periodic turning     Hypophosphatemia  Assessment & Plan  Replete and monitor  2.5      Hypomagnesemia  Assessment & Plan  Replete and monitor      Hypokalemia- (present on admission)  Assessment & Plan  Replete and monitor    Coronary artery disease- (present on admission)  Assessment & Plan  S/p PCI with stent placement 4 weeks ago  Aspirin, Plavix, atorvastatin    FABIAN (acute kidney injury) (HCC)  Assessment & Plan  Resolved.  Resume losartan and Lasix    Sepsis (HCC)  Assessment & Plan  Resolved           BPH (benign prostatic hypertrophy)- (present on admission)  Assessment & Plan  continiue Flomax and finasteride       VTE prophylaxis: lovenox

## 2021-06-15 NOTE — DISCHARGE PLANNING
Anticipated Discharge Disposition: snf    Action: Reviewed in IDT rounds. Pt med cleared awaiting snf placement. Referrals in progress to [facility] per wife’s request. Per Dr Morrison who spoke to pt’s wife on the phone, the wife is now open to expanding the snf search area-she is agreeable to [facility] being looked at. [facility] had previously accepted. Teams texted Torres WHITMAN to call [facility] and see if they are able to accept pt. Also the snf referrals to be sent to all snfs in [place] (except [place] who previously denied and [facility] was previously).     Barriers to Discharge: await accepting SNF    Plan: Care coordination to follow for dc planning needs.

## 2021-06-16 PROCEDURE — 700102 HCHG RX REV CODE 250 W/ 637 OVERRIDE(OP): Performed by: FAMILY MEDICINE

## 2021-06-16 PROCEDURE — 770006 HCHG ROOM/CARE - MED/SURG/GYN SEMI*

## 2021-06-16 PROCEDURE — 700102 HCHG RX REV CODE 250 W/ 637 OVERRIDE(OP): Performed by: STUDENT IN AN ORGANIZED HEALTH CARE EDUCATION/TRAINING PROGRAM

## 2021-06-16 PROCEDURE — 700102 HCHG RX REV CODE 250 W/ 637 OVERRIDE(OP): Performed by: HOSPITALIST

## 2021-06-16 PROCEDURE — 700102 HCHG RX REV CODE 250 W/ 637 OVERRIDE(OP): Performed by: INTERNAL MEDICINE

## 2021-06-16 PROCEDURE — 94640 AIRWAY INHALATION TREATMENT: CPT

## 2021-06-16 PROCEDURE — A9270 NON-COVERED ITEM OR SERVICE: HCPCS | Performed by: HOSPITALIST

## 2021-06-16 PROCEDURE — A9270 NON-COVERED ITEM OR SERVICE: HCPCS | Performed by: FAMILY MEDICINE

## 2021-06-16 PROCEDURE — 700111 HCHG RX REV CODE 636 W/ 250 OVERRIDE (IP): Performed by: INTERNAL MEDICINE

## 2021-06-16 PROCEDURE — A9270 NON-COVERED ITEM OR SERVICE: HCPCS | Performed by: STUDENT IN AN ORGANIZED HEALTH CARE EDUCATION/TRAINING PROGRAM

## 2021-06-16 PROCEDURE — 99232 SBSQ HOSP IP/OBS MODERATE 35: CPT | Performed by: INTERNAL MEDICINE

## 2021-06-16 PROCEDURE — A9270 NON-COVERED ITEM OR SERVICE: HCPCS | Performed by: INTERNAL MEDICINE

## 2021-06-16 RX ADMIN — MICONAZOLE NITRATE: 20 CREAM TOPICAL at 05:46

## 2021-06-16 RX ADMIN — MICONAZOLE NITRATE: 20 CREAM TOPICAL at 17:48

## 2021-06-16 RX ADMIN — BUDESONIDE AND FORMOTEROL FUMARATE DIHYDRATE 2 PUFF: 80; 4.5 AEROSOL RESPIRATORY (INHALATION) at 05:45

## 2021-06-16 RX ADMIN — OXYCODONE 5 MG: 5 TABLET ORAL at 20:04

## 2021-06-16 RX ADMIN — ASPIRIN 81 MG: 81 TABLET, COATED ORAL at 05:43

## 2021-06-16 RX ADMIN — CLOPIDOGREL BISULFATE 75 MG: 75 TABLET ORAL at 05:43

## 2021-06-16 RX ADMIN — BUDESONIDE AND FORMOTEROL FUMARATE DIHYDRATE 2 PUFF: 80; 4.5 AEROSOL RESPIRATORY (INHALATION) at 20:04

## 2021-06-16 RX ADMIN — TIOTROPIUM BROMIDE INHALATION SPRAY 5 MCG: 3.12 SPRAY, METERED RESPIRATORY (INHALATION) at 05:45

## 2021-06-16 RX ADMIN — TAMSULOSIN HYDROCHLORIDE 0.4 MG: 0.4 CAPSULE ORAL at 05:44

## 2021-06-16 RX ADMIN — Medication 950 MG: at 05:44

## 2021-06-16 RX ADMIN — DOCUSATE SODIUM 50 MG AND SENNOSIDES 8.6 MG 2 TABLET: 8.6; 5 TABLET, FILM COATED ORAL at 17:48

## 2021-06-16 RX ADMIN — ATORVASTATIN CALCIUM 80 MG: 40 TABLET, FILM COATED ORAL at 17:48

## 2021-06-16 RX ADMIN — FLUTICASONE PROPIONATE 50 MCG: 50 SPRAY, METERED NASAL at 05:46

## 2021-06-16 RX ADMIN — LEVOTHYROXINE SODIUM 75 MCG: 0.07 TABLET ORAL at 05:44

## 2021-06-16 RX ADMIN — MONTELUKAST 10 MG: 10 TABLET, FILM COATED ORAL at 05:43

## 2021-06-16 RX ADMIN — DOCUSATE SODIUM 50 MG AND SENNOSIDES 8.6 MG 2 TABLET: 8.6; 5 TABLET, FILM COATED ORAL at 05:44

## 2021-06-16 RX ADMIN — HYDROCORTISONE 10 MG: 20 TABLET ORAL at 05:43

## 2021-06-16 RX ADMIN — FUROSEMIDE 20 MG: 20 TABLET ORAL at 05:44

## 2021-06-16 RX ADMIN — CARVEDILOL 3.12 MG: 3.12 TABLET, FILM COATED ORAL at 17:48

## 2021-06-16 RX ADMIN — OMEPRAZOLE 20 MG: 20 CAPSULE, DELAYED RELEASE ORAL at 17:48

## 2021-06-16 RX ADMIN — HYDROCORTISONE 10 MG: 20 TABLET ORAL at 17:48

## 2021-06-16 RX ADMIN — CARVEDILOL 3.12 MG: 3.12 TABLET, FILM COATED ORAL at 08:26

## 2021-06-16 RX ADMIN — CARBOXYMETHYLCELLULOSE SODIUM 1 DROP: 5 SOLUTION/ DROPS OPHTHALMIC at 17:47

## 2021-06-16 RX ADMIN — CARBOXYMETHYLCELLULOSE SODIUM 1 DROP: 5 SOLUTION/ DROPS OPHTHALMIC at 05:46

## 2021-06-16 RX ADMIN — Medication 10 MG: at 20:04

## 2021-06-16 RX ADMIN — OMEPRAZOLE 20 MG: 20 CAPSULE, DELAYED RELEASE ORAL at 05:44

## 2021-06-16 RX ADMIN — LOSARTAN POTASSIUM 25 MG: 25 TABLET, FILM COATED ORAL at 05:43

## 2021-06-16 RX ADMIN — ENOXAPARIN SODIUM 40 MG: 40 INJECTION SUBCUTANEOUS at 05:45

## 2021-06-16 RX ADMIN — OXYCODONE 5 MG: 5 TABLET ORAL at 08:26

## 2021-06-16 ASSESSMENT — PAIN DESCRIPTION - PAIN TYPE
TYPE: ACUTE PAIN

## 2021-06-16 NOTE — CARE PLAN
Problem: Pain - Standard  Goal: Alleviation of pain or a reduction in pain to the patient’s comfort goal  Outcome: Progressing     Problem: Skin Integrity  Goal: Skin integrity is maintained or improved  Outcome: Progressing   The patient is Stable - Low risk of patient condition declining or worsening    Shift Goals  Clinical Goals: Sleep throughout shift  Patient Goals: Decreased pain and sleep  Family Goals: No family present    Progress made toward(s) clinical / shift goals:  Patient stated pain in his left arm. Patient advised on comfort measures. Repositioned and medication given per MAR. Patient was able to sleep throughout shift.     Patient is not progressing towards the following goals:

## 2021-06-16 NOTE — DISCHARGE PLANNING
Agency/Facility Name: [facility]   Outcome: DPA unable to leave vmail     -1227  Agency/Facility Name: [facility]   Outcome: Unable to leave vmail    -1335  Agency/Facility Name: [facility]  Spoke To: Meenu  Outcome: DPA informed Meenu that LSW agreed to give [place] SN[facility] one more day before sending to [facility]     -1527  Agency/Facility Name: [facility]   Outcome: Unable to leave vmail

## 2021-06-16 NOTE — CARE PLAN
The patient is Stable - Low risk of patient condition declining or worsening    Shift Goals  Clinical Goals: Q2 turns, placement  Patient Goals: pain control, have BM  Family Goals: No family present    Progress made toward(s) clinical / shift goals:    Problem: Knowledge Deficit - Standard  Goal: Patient and family/care givers will demonstrate understanding of plan of care, disease process/condition, diagnostic tests and medications  Outcome: Progressing     Problem: Pain - Standard  Goal: Alleviation of pain or a reduction in pain to the patient’s comfort goal  Outcome: Progressing     Problem: Skin Integrity  Goal: Skin integrity is maintained or improved  Outcome: Progressing       Patient is not progressing towards the following goals:NA

## 2021-06-16 NOTE — THERAPY
"Occupational Therapy  Daily Treatment     Patient Name: Jeffrey Lizama  Age:  88 y.o., Sex:  male  Medical Record #: [id number]  Today's Date: 6/15/2021       Precautions: Fall Risk    Assessment    Pt seen for OT tx with emphasis on LUE edema control. Pt received with edema glove donned, Coban wrap no longer in place. Pt reports wrap removed last night. Pt demos decreased edema entire LUE. Able to achieve 85% mass grasp L hand and full L elbow flexion. L wrist and shoulder limited by pain (but improved since last session). Engaged pt in AAROM all joints and instructed on AROM as able. Provided gentle retrograde massage, but pt only able to tolerate limited pressure due to sensitivity. Pt completed gown change with mod A, max cues to sequence and participate. Pt refused any EOB activity due to pain and feeling \"off\". C/o needing \"pituitary shot\" and describing vague symptoms such as \"foggy vision\". Pt demos decreased LUE edema this session, but increased self-limiting behavior. Will continue to follow for acute OT.     Plan    Continue current treatment plan.    DC Equipment Recommendations: Unable to determine at this time  Discharge Recommendations: Recommend post-acute placement for additional occupational therapy services prior to discharge home    Subjective    \"I need my pituitary shot. My vision is foggy. I can't do anything.\"     Objective       06/15/21 1514   Active ROM Upper Body   Dominant Hand Right   Lt Shoulder Flexion Degrees 120  (AAROM )   Comments Improved mass grasp (85% of normal), elbow flexion WNL    Supine Upper Body Exercises   Comments AAROM to LUE all joints for edema control, joint integrity    Bed Mobility    Supine to Sit Refused   Activities of Daily Living   Upper Body Dressing Moderate Assist  (gown change bed level )   Short Term Goals   Short Term Goal # 1 Pt will demo LB dressing with AE as needed and mod A   Goal Outcome # 1 Goal not met   Short Term Goal # 2 Pt will demo ADL " txf with mod A   Goal Outcome # 2 Goal not met   Short Term Goal # 3 Pt will complete G/H seated SUP   Goal Outcome # 3 Goal not met

## 2021-06-16 NOTE — DISCHARGE PLANNING
Anticipated Discharge Disposition: SNF    Action: LSW spoke with wife, Jacquie. She is okay with Pt going to SNF in [place] but still really wants him to be closer to her in [place]. LSW explained that there is still a chance as [facility] is looking into Pt's insurance. Jacquie gave LSW information for a workman's comp coverage that Pt has File # [id number], P: [phone] Jacquie wondered if this coverage can help Pt get SNF bed. LSW explained that most SNFs wouldn't accept workers comp insurance and that even if they did, Pt's issue is that there are no facilities in their area that have a bed available and will accept Pt regardless of insurance. Jacquie stated she understood but asked LSW to try and call to see if there was anything the worker's comp can do to help.     Barriers to Discharge: SNF acceptance, Pt's family still wants [facility] SNF if possible.     Plan: LSW to f/u with [facility]

## 2021-06-16 NOTE — PROGRESS NOTES
Received report from night shift RN.  Assumed care at 0700. Pt denies complaining of arm pain from compression wrap, loosened wrap, pt now more comfortable.  Call light within reach. Bed locked and in lowest position.  Non skid socks on, Belongings within  Reach.  Will continue to monitor hourly.

## 2021-06-16 NOTE — PROGRESS NOTES
Assumed care at 1900. Received bedside report from ОЛЬГА Jaramillo. Patient was awake and resting in bed. Patient alert and oriented x4. Patient stated pain in his left arm. Medication given per MAR. No signs of distress. Bed is in low and locked position. Non-slip socks in place. Call light is within reach. Bed alarm is on. Hourly rounding in place.

## 2021-06-17 PROCEDURE — 770006 HCHG ROOM/CARE - MED/SURG/GYN SEMI*

## 2021-06-17 PROCEDURE — A9270 NON-COVERED ITEM OR SERVICE: HCPCS | Performed by: FAMILY MEDICINE

## 2021-06-17 PROCEDURE — 700102 HCHG RX REV CODE 250 W/ 637 OVERRIDE(OP): Performed by: HOSPITALIST

## 2021-06-17 PROCEDURE — 700102 HCHG RX REV CODE 250 W/ 637 OVERRIDE(OP): Performed by: STUDENT IN AN ORGANIZED HEALTH CARE EDUCATION/TRAINING PROGRAM

## 2021-06-17 PROCEDURE — 99232 SBSQ HOSP IP/OBS MODERATE 35: CPT | Performed by: INTERNAL MEDICINE

## 2021-06-17 PROCEDURE — A9270 NON-COVERED ITEM OR SERVICE: HCPCS | Performed by: INTERNAL MEDICINE

## 2021-06-17 PROCEDURE — A9270 NON-COVERED ITEM OR SERVICE: HCPCS | Performed by: STUDENT IN AN ORGANIZED HEALTH CARE EDUCATION/TRAINING PROGRAM

## 2021-06-17 PROCEDURE — 700111 HCHG RX REV CODE 636 W/ 250 OVERRIDE (IP): Performed by: INTERNAL MEDICINE

## 2021-06-17 PROCEDURE — 700102 HCHG RX REV CODE 250 W/ 637 OVERRIDE(OP): Performed by: INTERNAL MEDICINE

## 2021-06-17 PROCEDURE — A9270 NON-COVERED ITEM OR SERVICE: HCPCS | Performed by: HOSPITALIST

## 2021-06-17 PROCEDURE — 700102 HCHG RX REV CODE 250 W/ 637 OVERRIDE(OP): Performed by: FAMILY MEDICINE

## 2021-06-17 RX ADMIN — MONTELUKAST 10 MG: 10 TABLET, FILM COATED ORAL at 05:51

## 2021-06-17 RX ADMIN — FUROSEMIDE 20 MG: 20 TABLET ORAL at 05:51

## 2021-06-17 RX ADMIN — OXYCODONE 5 MG: 5 TABLET ORAL at 21:06

## 2021-06-17 RX ADMIN — TIOTROPIUM BROMIDE INHALATION SPRAY 5 MCG: 3.12 SPRAY, METERED RESPIRATORY (INHALATION) at 05:51

## 2021-06-17 RX ADMIN — FLUTICASONE PROPIONATE 50 MCG: 50 SPRAY, METERED NASAL at 05:52

## 2021-06-17 RX ADMIN — Medication 950 MG: at 05:52

## 2021-06-17 RX ADMIN — DOCUSATE SODIUM 50 MG AND SENNOSIDES 8.6 MG 2 TABLET: 8.6; 5 TABLET, FILM COATED ORAL at 17:33

## 2021-06-17 RX ADMIN — TAMSULOSIN HYDROCHLORIDE 0.4 MG: 0.4 CAPSULE ORAL at 05:51

## 2021-06-17 RX ADMIN — CARBOXYMETHYLCELLULOSE SODIUM 1 DROP: 5 SOLUTION/ DROPS OPHTHALMIC at 17:33

## 2021-06-17 RX ADMIN — ATORVASTATIN CALCIUM 80 MG: 40 TABLET, FILM COATED ORAL at 17:33

## 2021-06-17 RX ADMIN — BUDESONIDE AND FORMOTEROL FUMARATE DIHYDRATE 2 PUFF: 80; 4.5 AEROSOL RESPIRATORY (INHALATION) at 05:51

## 2021-06-17 RX ADMIN — CLOPIDOGREL BISULFATE 75 MG: 75 TABLET ORAL at 05:51

## 2021-06-17 RX ADMIN — LOSARTAN POTASSIUM 25 MG: 25 TABLET, FILM COATED ORAL at 05:51

## 2021-06-17 RX ADMIN — CARVEDILOL 3.12 MG: 3.12 TABLET, FILM COATED ORAL at 17:33

## 2021-06-17 RX ADMIN — ASPIRIN 81 MG: 81 TABLET, COATED ORAL at 05:51

## 2021-06-17 RX ADMIN — OMEPRAZOLE 20 MG: 20 CAPSULE, DELAYED RELEASE ORAL at 17:33

## 2021-06-17 RX ADMIN — ENOXAPARIN SODIUM 40 MG: 40 INJECTION SUBCUTANEOUS at 05:50

## 2021-06-17 RX ADMIN — LEVOTHYROXINE SODIUM 75 MCG: 0.07 TABLET ORAL at 05:51

## 2021-06-17 RX ADMIN — MICONAZOLE NITRATE: 20 CREAM TOPICAL at 05:52

## 2021-06-17 RX ADMIN — HYDROCORTISONE 10 MG: 20 TABLET ORAL at 17:33

## 2021-06-17 RX ADMIN — OMEPRAZOLE 20 MG: 20 CAPSULE, DELAYED RELEASE ORAL at 05:51

## 2021-06-17 RX ADMIN — BUDESONIDE AND FORMOTEROL FUMARATE DIHYDRATE 2 PUFF: 80; 4.5 AEROSOL RESPIRATORY (INHALATION) at 17:33

## 2021-06-17 RX ADMIN — CARVEDILOL 3.12 MG: 3.12 TABLET, FILM COATED ORAL at 08:33

## 2021-06-17 RX ADMIN — CARBOXYMETHYLCELLULOSE SODIUM 1 DROP: 5 SOLUTION/ DROPS OPHTHALMIC at 05:52

## 2021-06-17 RX ADMIN — Medication 10 MG: at 21:06

## 2021-06-17 RX ADMIN — OXYCODONE 5 MG: 5 TABLET ORAL at 09:02

## 2021-06-17 RX ADMIN — HYDROCORTISONE 10 MG: 20 TABLET ORAL at 05:51

## 2021-06-17 ASSESSMENT — PAIN DESCRIPTION - PAIN TYPE: TYPE: ACUTE PAIN

## 2021-06-17 NOTE — PROGRESS NOTES
Hospital Medicine Daily Progress Note    Date of Service  6/17/2021    Chief Complaint  88 y.o. 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     During the stay in the hospital, patient electrolytes continue to be repleted.  Patient is also noted to have acute on chronic systolic congestive heart failure, started on IV Lasix--> later switched po, I/Os, daily weight, fluid restriction.  Regarding CAD, patient is on aspirin, Plavix, Coreg, losartan, and statin. Patient is to follow-up with cardiology as an outpatient.     Patient has history of adrenal insufficiency,on hydrocortisone 10 bid. PT/OT has evaluated, recommended discharging the patient to skilled nursing facility.  During hospital course patient noted to have UA positive for UTI.  Urine culture positive for Klebsiella .    Interval Problem Update  Patient was seen and examined at bedside.  No acute events overnight. Patient is resting comfortably in bed and in no acute distress.     Stable for discharge  Await placement    Consultants/Specialty  N/A     Code Status  DNAR, I OK     Disposition  [facility]      Review of Systems  Review of Systems   Constitutional: Negative for fever.   HENT: Negative for hearing loss.    Eyes: Negative for blurred vision and double vision.   Respiratory: Negative for cough.    Cardiovascular: Negative for chest pain and palpitations.   Gastrointestinal:  Negative for nausea and vomiting.   Genitourinary: Negative for dysuria.   Musculoskeletal: Negative for myalgias.   Skin: Negative for rash.   Neurological: Negative for dizziness and headaches.   Psychiatric/Behavioral: Negative for depression.         Physical Exam  Temp:  [36.2 °C (97.2 °F)-37.1 °C (98.8 °F)] 36.2 °C (97.2 °F)  Pulse:  [71-82] 78  Resp:  [16-21] 16  BP: (125-156)/(59-92) 156/92  SpO2:  [93 %-96 %] 96 %     Physical Exam  Constitutional:       Appearance: He is obese. Chronically ill appearing, pleasant, conversational  HENT:      Head: Normocephalic and atraumatic.      Right Ear: Tympanic membrane normal.      Left Ear: Tympanic membrane normal.      Nose: Nose normal.      Mouth/Throat:      Mouth: Mucous membranes are moist.     Cardiovascular:      Rate and Rhythm: Normal rate and regular rhythm.      Pulses: Normal pulses.      Heart sounds: Normal heart sounds. No murmur heard.     Pulmonary:      Effort: Pulmonary effort is normal. No respiratory distress.   Abdominal:      General: Abdomen is protuberant     Palpations: Abdomen is soft.      Tenderness: There is no abdominal tenderness. No guarding, no rebound  Musculoskeletal:    Left upper extremity swelling.  Skin:     Coloration: Skin is not jaundiced.      Bruising, and edema noted over right upper extremity  Neurological:      General: No focal deficit present.      Mental Status: He is alert and oriented to person, place, and time. Mental status is at baseline.   Psychiatric:         Mood and Affect: Mood normal.       Fluids    Intake/Output Summary (Last 24 hours) at 6/17/2021 1548  Last data filed at 6/17/2021 1300  Gross per 24 hour   Intake 660 ml   Output 1200 ml   Net -540 ml       Laboratory  Recent Labs     06/15/21  0416   WBC 9.0   RBC 3.84*   HEMOGLOBIN 11.7*   HEMATOCRIT 35.7*   MCV 93.0   MCH 30.5   MCHC 32.8*   RDW 70.5*   PLATELETCT 253   MPV 10.0     Recent Labs     06/15/21  0416   SODIUM 135   POTASSIUM 3.6    CHLORIDE 96   CO2 34*   GLUCOSE 80   BUN 16   CREATININE 0.62   CALCIUM 8.2*                   Imaging  DX-CHEST-PORTABLE (1 VIEW)   Final Result      1.  Bibasilar underinflation atelectasis which could obscure an additional process.   2.  Small BILATERAL pleural effusions      DX-CHEST-PORTABLE (1 VIEW)   Final Result      Findings consistent with mild pulmonary edema along with minimal bibasilar atelectasis and small left pleural effusion. No significant change.      DX-CHEST-LIMITED (1 VIEW)   Final Result         No significant interval change.      Small bilateral pleural effusion bibasilar opacities, similar to prior.      Stable cardiomegaly.      US-EXTREMITY VENOUS UPPER UNILAT LEFT   Final Result      CT-EXTREMITY, UPPER WITH LEFT   Final Result      No acute fracture or dislocation.      Severe osteoarthritis of the elbow joint.      IR-MIDLINE CATHETER INSERTION WO GUIDANCE > AGE 3   Final Result                  Ultrasound-guided midline placement performed by qualified nursing staff    as above.          IR-US GUIDED PIV   Final Result    Ultrasound-guided PERIPHERAL IV INSERTION performed by    qualified nursing staff as above.      US-EXTREMITY VENOUS LOWER UNILAT LEFT   Final Result      DX-CHEST-PORTABLE (1 VIEW)   Final Result      Bibasilar opacities may represent atelectasis or consolidation.      Small left pleural effusion may be present.      Pulmonary interstitial edema.      Stable prominence of the cardiomediastinal silhouette.      Atherosclerotic calcification is seen.      DX-ELBOW-COMPLETE 3+ LEFT   Final Result      1.  No fracture or dislocation of LEFT elbow.   2.  Severe degenerative change.   3.  Diffuse subcutaneous edema.   4.  Limited by positioning.           Assessment/Plan  * Acute on chronic systolic congestive heart failure (HCC)- (present on admission)  Assessment & Plan  Patient on 5L oxygen via nc at baseline  Echo showed EF of 45% on 5/12, I/os , daily weight and  fluid restriction to 1.5 L  Continue BB , losartan and  Lasix  Reached euvolemic status.  Cardiology out-patient follow-up.    Pain and swelling of left upper extremity- (present on admission)  Assessment & Plan  US negative for DVT   Improving   Compression banding per OT.    Anasarca- (present on admission)  Assessment & Plan  Continue to monitor on diuretic   Resolving.    Debility- (present on admission)  Assessment & Plan  SNF placement pending     Adrenal insufficiency (HCC)- (present on admission)  Assessment & Plan   On hydrocortisone home dose.    UTI (urinary tract infection)  Assessment & Plan  On rocephine   Urine culture growing Klebsiella pneumonia  Completed a course of antibiotics.    Chronic respiratory failure with hypoxia (HCC)  Assessment & Plan  At baseline for now.  Continue supplemental oxygen, on 4 to 5 L  Encourage incentive spirometry as atelectasis seen on chest x-ray.    History of pulmonary embolism- (present on admission)  Assessment & Plan  Warfarin was discontinued during previous recent hospitalization due to blood loss anemia, patient knows   monitor hemoglobin hematocrit, so far stabel  Transfuse if less than 7  Recent CT of the chest done on 5/8/2021 was negative for PE.        COPD (chronic obstructive pulmonary disease) (HCC)- (present on admission)  Assessment & Plan  Continue RT protocol, breathing treatment   --Titrate O2 as needed    -- ON 4 L of O2  And denied any complain of sob    Essential hypertension, benign- (present on admission)  Assessment & Plan  Blood pressure noted to be well controlled, monitor  Continue coreg and losartan    Advanced care planning/counseling discussion  Assessment & Plan  Discussion with patient wife regarding patient placement. Wife agreeable to SNF placement and is willing to expand geographic area of placement including [place]. Total of 13 minutes spent in conversation and coordination of advanced care planning.     Sacral wound- (present on  admission)  Assessment & Plan  Wound mx as per wound team recs   --Offloading and periodic turning     Hypophosphatemia  Assessment & Plan  Replete and monitor  2.5      Hypomagnesemia  Assessment & Plan  Replete and monitor      Hypokalemia- (present on admission)  Assessment & Plan  Replete and monitor    Coronary artery disease- (present on admission)  Assessment & Plan  S/p PCI with stent placement 4 weeks ago  Aspirin, Plavix, atorvastatin    FABIAN (acute kidney injury) (HCC)  Assessment & Plan  Resolved.  Resume losartan and Lasix    Sepsis (HCC)  Assessment & Plan  Resolved           BPH (benign prostatic hypertrophy)- (present on admission)  Assessment & Plan  continiue Flomax and finasteride       VTE prophylaxis: lovenox

## 2021-06-17 NOTE — DISCHARGE PLANNING
Anticipated Discharge Disposition: SNF    Action: LSW called [facility] to ask about referral, Spoke with Loida who will look for referral and call LSW back.     LSW attempted to call [facility], voicemail at facility full. LSW given Maya in admission's phone number, left message with request to call LSW back.    LSW called [facility], per admissions they have no open beds and are not accepting new patients at this time.     LSW took call from Pt's son Gene. Per Gene, his mother gordy meyer upset saying a medication was keeping Pt from being able to be d/c'd to [place]. LSW confirmed that this is not the case it was still a matter of trying to find an available SNF bed for Pt. LSW explained that [place] that [place] facilities so many of them are full and cannot take new Pts at this time. Gene told LSW that Pt and his mother are peas in a pod so he needs to d/c to a place where his wife can visit him. LSW explained that we have tried and there is still a facility pending so it's not out of the question yet, but if this facility says no there won't be another option, Pt will need to d/c. Gene says he understands but asked LSW to look at SNF in [facility].     Barriers to Discharge: Family still wanting [facility] SNF, Pending answer form [facility].     Plan: LSW to f/u with [facility] tomorrow, discuss with Pt's wife.

## 2021-06-17 NOTE — DISCHARGE PLANNING
"Agency/Facility Name: [facility]   Spoke To: Nalini  Outcome: Per Nalini, this Pt can admit to this facility today 06/17     LSW Laura notified     -1022  Agency/Facility Name: [facility]   Spoke To: Lodia  Outcome: Per Loida, referral was not received  F: [phone]  Agency/Facility Name: [facility]  Outcome: Left vmail for admissions regarding referral     -1316  Agency/Facility Name: [facility]   Outcome: Left vmail for admissions regarding referral    Agency/Facility Name: [facility]   Spoke To: Maya   Outcome: Per Maya, this facility has not heard back from [facility] yet regarding SNF days     -1427  Agency/Facility Name: [facility]   Spoke To: Nalini  Outcome: Per Nalini, [facility] is willing to accept this Pt for a short time while family follows up with [place] SNFs as long as its not \"like two days\"     LSW notified     "

## 2021-06-17 NOTE — PROGRESS NOTES
Assumed care at 1900. Received bedside report from ОЛЬГА Booker. Patient was awake and sitting up in bed. Patient was alert and oriented x4. No signs of distress. Bed is in low and locked position. Non-slip socks in place. Call light is within reach. Bed alarm is on. Hourly rounding in place.

## 2021-06-17 NOTE — PROGRESS NOTES
Assume care of pt at 0700. Report received from NOC RN. Pt is reportedly A/Ox4, but forgetful. Pt is resting in bed. Bed in lowest and locked position, call light in reach, hourly rounding in place. Labs reviewed. Communication board updated.

## 2021-06-17 NOTE — CARE PLAN
Problem: Pain - Standard  Goal: Alleviation of pain or a reduction in pain to the patient’s comfort goal  Outcome: Progressing     Problem: Fall Risk  Goal: Patient will remain free from falls  Outcome: Progressing   The patient is Stable - Low risk of patient condition declining or worsening    Shift Goals  Clinical Goals: Participate in ADLs, and q2 turns  Patient Goals: Sleep throughout shift  Family Goals: not present    Progress made toward(s) clinical / shift goals:  Patient was able to sleep throughout shift.    Patient is not progressing towards the following goals:Patient refused to participate in ADLs. Max assist for q2 turns due to patient refusal to turn.

## 2021-06-18 ENCOUNTER — APPOINTMENT (OUTPATIENT)
Dept: RADIOLOGY | Facility: MEDICAL CENTER | Age: 86
DRG: 291 | End: 2021-06-18
Attending: INTERNAL MEDICINE
Payer: MEDICARE

## 2021-06-18 PROCEDURE — 97530 THERAPEUTIC ACTIVITIES: CPT

## 2021-06-18 PROCEDURE — 74018 RADEX ABDOMEN 1 VIEW: CPT

## 2021-06-18 PROCEDURE — A9270 NON-COVERED ITEM OR SERVICE: HCPCS | Performed by: FAMILY MEDICINE

## 2021-06-18 PROCEDURE — 700102 HCHG RX REV CODE 250 W/ 637 OVERRIDE(OP): Performed by: HOSPITALIST

## 2021-06-18 PROCEDURE — 94760 N-INVAS EAR/PLS OXIMETRY 1: CPT

## 2021-06-18 PROCEDURE — 700102 HCHG RX REV CODE 250 W/ 637 OVERRIDE(OP): Performed by: STUDENT IN AN ORGANIZED HEALTH CARE EDUCATION/TRAINING PROGRAM

## 2021-06-18 PROCEDURE — A9270 NON-COVERED ITEM OR SERVICE: HCPCS | Performed by: HOSPITALIST

## 2021-06-18 PROCEDURE — 700102 HCHG RX REV CODE 250 W/ 637 OVERRIDE(OP): Performed by: INTERNAL MEDICINE

## 2021-06-18 PROCEDURE — A9270 NON-COVERED ITEM OR SERVICE: HCPCS | Performed by: STUDENT IN AN ORGANIZED HEALTH CARE EDUCATION/TRAINING PROGRAM

## 2021-06-18 PROCEDURE — 99232 SBSQ HOSP IP/OBS MODERATE 35: CPT | Mod: 25 | Performed by: INTERNAL MEDICINE

## 2021-06-18 PROCEDURE — 770006 HCHG ROOM/CARE - MED/SURG/GYN SEMI*

## 2021-06-18 PROCEDURE — 700111 HCHG RX REV CODE 636 W/ 250 OVERRIDE (IP): Performed by: INTERNAL MEDICINE

## 2021-06-18 PROCEDURE — 99497 ADVNCD CARE PLAN 30 MIN: CPT | Performed by: INTERNAL MEDICINE

## 2021-06-18 PROCEDURE — A9270 NON-COVERED ITEM OR SERVICE: HCPCS | Performed by: INTERNAL MEDICINE

## 2021-06-18 PROCEDURE — 700102 HCHG RX REV CODE 250 W/ 637 OVERRIDE(OP): Performed by: FAMILY MEDICINE

## 2021-06-18 RX ADMIN — CARVEDILOL 3.12 MG: 3.12 TABLET, FILM COATED ORAL at 08:15

## 2021-06-18 RX ADMIN — FUROSEMIDE 20 MG: 20 TABLET ORAL at 06:10

## 2021-06-18 RX ADMIN — OXYCODONE 2.5 MG: 5 TABLET ORAL at 21:50

## 2021-06-18 RX ADMIN — ENOXAPARIN SODIUM 40 MG: 40 INJECTION SUBCUTANEOUS at 06:12

## 2021-06-18 RX ADMIN — CARBOXYMETHYLCELLULOSE SODIUM 1 DROP: 5 SOLUTION/ DROPS OPHTHALMIC at 06:08

## 2021-06-18 RX ADMIN — POLYETHYLENE GLYCOL 3350 1 PACKET: 17 POWDER, FOR SOLUTION ORAL at 06:09

## 2021-06-18 RX ADMIN — Medication 950 MG: at 06:06

## 2021-06-18 RX ADMIN — Medication 10 MG: at 21:43

## 2021-06-18 RX ADMIN — LEVOTHYROXINE SODIUM 75 MCG: 0.07 TABLET ORAL at 06:09

## 2021-06-18 RX ADMIN — MONTELUKAST 10 MG: 10 TABLET, FILM COATED ORAL at 06:09

## 2021-06-18 RX ADMIN — DOCUSATE SODIUM 50 MG AND SENNOSIDES 8.6 MG 2 TABLET: 8.6; 5 TABLET, FILM COATED ORAL at 06:09

## 2021-06-18 RX ADMIN — OXYCODONE 5 MG: 5 TABLET ORAL at 17:05

## 2021-06-18 RX ADMIN — HYDROCORTISONE 10 MG: 20 TABLET ORAL at 06:07

## 2021-06-18 RX ADMIN — BUDESONIDE AND FORMOTEROL FUMARATE DIHYDRATE 2 PUFF: 80; 4.5 AEROSOL RESPIRATORY (INHALATION) at 18:26

## 2021-06-18 RX ADMIN — TAMSULOSIN HYDROCHLORIDE 0.4 MG: 0.4 CAPSULE ORAL at 06:11

## 2021-06-18 RX ADMIN — ASPIRIN 81 MG: 81 TABLET, COATED ORAL at 06:10

## 2021-06-18 RX ADMIN — MICONAZOLE NITRATE: 20 CREAM TOPICAL at 06:08

## 2021-06-18 RX ADMIN — CLOPIDOGREL BISULFATE 75 MG: 75 TABLET ORAL at 06:10

## 2021-06-18 RX ADMIN — LOSARTAN POTASSIUM 25 MG: 25 TABLET, FILM COATED ORAL at 06:10

## 2021-06-18 RX ADMIN — FLUTICASONE PROPIONATE 50 MCG: 50 SPRAY, METERED NASAL at 06:08

## 2021-06-18 RX ADMIN — BUDESONIDE AND FORMOTEROL FUMARATE DIHYDRATE 2 PUFF: 80; 4.5 AEROSOL RESPIRATORY (INHALATION) at 06:08

## 2021-06-18 RX ADMIN — DOCUSATE SODIUM 50 MG AND SENNOSIDES 8.6 MG 2 TABLET: 8.6; 5 TABLET, FILM COATED ORAL at 18:25

## 2021-06-18 RX ADMIN — OXYCODONE 5 MG: 5 TABLET ORAL at 06:09

## 2021-06-18 RX ADMIN — HYDROCORTISONE 10 MG: 20 TABLET ORAL at 18:26

## 2021-06-18 RX ADMIN — OMEPRAZOLE 20 MG: 20 CAPSULE, DELAYED RELEASE ORAL at 06:10

## 2021-06-18 RX ADMIN — CARBOXYMETHYLCELLULOSE SODIUM 1 DROP: 5 SOLUTION/ DROPS OPHTHALMIC at 18:26

## 2021-06-18 RX ADMIN — ATORVASTATIN CALCIUM 80 MG: 40 TABLET, FILM COATED ORAL at 18:25

## 2021-06-18 RX ADMIN — TIOTROPIUM BROMIDE INHALATION SPRAY 5 MCG: 3.12 SPRAY, METERED RESPIRATORY (INHALATION) at 06:11

## 2021-06-18 RX ADMIN — OMEPRAZOLE 20 MG: 20 CAPSULE, DELAYED RELEASE ORAL at 18:26

## 2021-06-18 RX ADMIN — CARVEDILOL 3.12 MG: 3.12 TABLET, FILM COATED ORAL at 18:26

## 2021-06-18 ASSESSMENT — COGNITIVE AND FUNCTIONAL STATUS - GENERAL
MOBILITY SCORE: 9
SUGGESTED CMS G CODE MODIFIER MOBILITY: CM
STANDING UP FROM CHAIR USING ARMS: TOTAL
MOVING FROM LYING ON BACK TO SITTING ON SIDE OF FLAT BED: UNABLE
CLIMB 3 TO 5 STEPS WITH RAILING: TOTAL
WALKING IN HOSPITAL ROOM: TOTAL
TURNING FROM BACK TO SIDE WHILE IN FLAT BAD: A LITTLE
MOVING TO AND FROM BED TO CHAIR: A LOT

## 2021-06-18 ASSESSMENT — GAIT ASSESSMENTS: GAIT LEVEL OF ASSIST: UNABLE TO PARTICIPATE

## 2021-06-18 ASSESSMENT — PAIN DESCRIPTION - PAIN TYPE
TYPE: ACUTE PAIN

## 2021-06-18 NOTE — DISCHARGE PLANNING
Anticipated Discharge Disposition: SNF    Action: Pt discussed in IDT rounds, Still pending one facility in [facility]. MD to discus with Pt's wife that Pt's rehabilitation has been delayed now and he has an accepting d/c to [facility] so Pt should go there to become stronger and d/c home faster.     MD updated Pt's wife Jacquie approved of Pt going to [facility]. LSW called and confirmed. Jacquie asked LSW to call Gene ([phone]) to tell him the address ad phone number as she doesn't have to captions working on her phone so she is afraid she'll miss hear the numbers.     LSW called Gene to update him. He asked LSW to email the information for SNF as he is out and doesn't have a pen to write it down. E-mailed [facility]'s information to Gene to share with his mother.       Updated Pt, he is agreeable to d/c tomorrow to [facility].     Barriers to Discharge: Bed availability tomorrow at 10AM    Plan: Transportation to be arranged tomorrow morning.

## 2021-06-18 NOTE — THERAPY
Physical Therapy   Daily Treatment     Patient Name: Jeffrey Lizama  Age:  88 y.o., Sex:  male  Medical Record #: [id number]  Today's Date: 6/18/2021     Precautions: Fall Risk    Assessment    Patient agreeable to physical therapy treatment session.  Patient performed supine exercises including SAQ, heel slides, ankle pumps, and SLR.  Patient was able to come to EOB with HOB slightly elevated, min assist for pushing trunk upright from sidelying.  In sitting patient performed long arc quads but quickly requesting to return to bed due to back pain.  Discussed importance of participation with therapy during inpatient stay as well as at SNF.  Will continue to follow.    Plan    Continue current treatment plan.    DC Equipment Recommendations: Unable to determine at this time  Discharge Recommendations: Recommend post-acute placement for additional physical therapy services prior to discharge home       Objective     06/18/21 1634   Precautions   Precautions Fall Risk   Pain   Pain Scales 0 to 10 Scale    Pain 0 - 10 Group   Pain Rating Scale (NPRS) (Did not quantify)   Comfort Goal Comfort with Movement;Perform Activity   Therapist Pain Assessment Post Activity Pain Same as Prior to Activity   Cognition    Cognition / Consciousness X   Level of Consciousness Alert   Supine Lower Body Exercise   Supine Lower Body Exercises Yes   Short Arc Quad (1 set of 20 Bilateral)   Heel Slide 1 set of 10;Bilateral   Straight Leg Raise 1 set of 10 Bilateral   Ankle Pumps 1 set of 10 (1 set of 20 Bilateral)   Sitting Lower Body Exercises   Sitting Lower Body Exercises Yes   Long Arc Quad 1 set of 15   Comments EOB   Balance   Sitting Balance (Static) Fair -   Sitting Balance (Dynamic) Fair -   Weight Shift Sitting Poor   Skilled Intervention Verbal Cuing   Comments Encouragement to paricipate   Gait Analysis   Gait Level Of Assist Unable to Participate   Bed Mobility    Supine to Sit Minimal Assist   Sit to Supine Minimal Assist    Scooting Moderate Assist   Rolling Supervised   Skilled Intervention Verbal Cuing;Facilitation   Comments Assist for trunk supine > sit, LEs sit > supine, HOB slightly elevated   Functional Mobility   Sit to Stand Refused   Activity Tolerance   Sitting Edge of Bed 3 min   Short Term Goals    Short Term Goal # 1 Pt to move supine to/from eob w/ spv, no bed features in 6 visits to improve fxl indep   Goal Outcome # 1 goal not met   Short Term Goal # 2 Pt to move sit to/from stand w/ spv in 6 visits to improve fxl indep   Goal Outcome # 2 Goal not met   Short Term Goal # 3 Pt to ambulate 75 ft w/ fww and spv in 6 visits to improve fxl indep   Goal Outcome # 3 Goal not met   Session Information   Date / Session Number  6/18 - 8 (2/2, 6/21)

## 2021-06-18 NOTE — PROGRESS NOTES
Pt A&OX4 with intermittent confusion. Pt on 5L NC O2.  Complains of pain, medicated per MAR. Pt presses call light several times througout this RN shift. Pt attempting to pass BM however states he feels constipated.  Pt educated on prescribed bowel regimen, verbalizes understanding. POC discussed.  Denies further needs at this time. Bed locked and in lowest position. Bed alarm on, call light within reach & hourly rounding in place

## 2021-06-18 NOTE — CARE PLAN
The patient is Stable - Low risk of patient condition declining or worsening    Shift Goals  Clinical Goals: Work with therapies, skin safety with turns and barrier cream  Patient Goals: Derease pain to L arm  Family Goals: not present    Progress made toward(s) clinical / shift goals:  Turns Q2, barrier cream applied, sacral skin looking very much improved    Patient is not progressing towards the following goals: Patient continues to call out incessantly and use call bell nonstop, despite staff reinforcing that they will round on him and make sure his needs are met.      Problem: Psychosocial  Goal: Patient's level of anxiety will decrease  Outcome: Not Progressing      Problem: Pain - Standard  Goal: Alleviation of pain or a reduction in pain to the patient’s comfort goal  Outcome: Progressing

## 2021-06-18 NOTE — DISCHARGE PLANNING
LSW requested DPA find out if [facility] can either pay for provide transport for Pt from SNF to [place]    Agency/Facility Name: [facility]   Spoke To: Nalini  Outcome: Per Nalini, [facility] cannot provide transport for Pt to D/C to [place].  It would be on the Pt or family    Agency/Facility Name: [facility]   Spoke To: Nalini  Outcome: Nalini will call this DPA back once there is confirmation of open bed     -1343  Agency/Facility Name: [facility]  Outcome: Left vmail for admissions regarding bed avail    Agency/Facility Name: [facility]  Spoke To: Meenu  Outcome: Per Meenu, [facility] can accept this Pt tomorrow 06/19 at 1000.  [facility] will not be able to provide transport     LSW notified

## 2021-06-18 NOTE — CARE PLAN
The patient is Stable - Low risk of patient condition declining or worsening    Shift Goals  Clinical Goals: Pts pain will remain managed   Patient Goals: Derease pain to L arm  Family Goals: not present    Progress made toward(s) clinical / shift goals:  Rounded on hourly.  Medication administered as necessary.    Patient is not progressing towards the following goals:  Problem: Skin Integrity  Goal: Skin integrity is maintained or improved  Outcome: Not Progressing  Sacrum red and flaky. Barrier cream applied, q2h turns in place.   Problem: Bowel Elimination  Goal: Establish and maintain regular bowel function  Outcome: Not Progressing  Pt unable to pass stool.  Complaints of pain.  Will admin appropriate bowel regimen as prescribed.

## 2021-06-18 NOTE — PROGRESS NOTES
Hospital Medicine Daily Progress Note    Date of Service  6/18/2021    Chief Complaint  88 y.o. 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     During the stay in the hospital, patient electrolytes continue to be repleted.  Patient is also noted to have acute on chronic systolic congestive heart failure, started on IV Lasix--> later switched po, I/Os, daily weight, fluid restriction.  Regarding CAD, patient is on aspirin, Plavix, Coreg, losartan, and statin. Patient is to follow-up with cardiology as an outpatient.     Patient has history of adrenal insufficiency,on hydrocortisone 10 bid. PT/OT has evaluated, recommended discharging the patient to skilled nursing facility.  During hospital course patient noted to have UA positive for UTI.  Urine culture positive for Klebsiella .    Interval Problem Update  Patient was seen and examined at bedside.  No acute events overnight. Patient is resting comfortably in bed and in no acute distress. Update provided to patient wife via phone.       Stable for discharge  Await placement    Consultants/Specialty  N/A     Code Status  DNAR, I OK     Disposition  [facility]      Review of Systems  Review of Systems   Constitutional: Negative for fever, chills  HENT: Negative for hearing loss.    Eyes: Negative for blurred vision and double vision.   Respiratory: Negative for cough. Endorses shortness of  breath  Cardiovascular: Negative for chest pain and palpitations.   Gastrointestinal: Negative for nausea and vomiting. Reports constipation  Genitourinary: Negative for dysuria.   Musculoskeletal: Negative for myalgias. Negative falls  Skin: Negative for rash.   Neurological: Negative for dizziness and headaches or LOC.   Psychiatric/Behavioral: Negative for depression.         Physical Exam  Temp:  [36.2 °C (97.2 °F)-37.1 °C (98.8 °F)] 36.2 °C (97.2 °F)  Pulse:  [71-82] 78  Resp:  [16-21] 16  BP: (125-156)/(59-92) 156/92  SpO2:  [93 %-96 %] 96 %     Physical Exam  Constitutional:       Appearance: He is obese. Chronically ill appearing, pleasant, conversational  HENT:      Head: Normocephalic and atraumatic.      Right Ear: Tympanic membrane normal.      Left Ear: Tympanic membrane normal.      Nose: Nose normal.      Mouth/Throat:      Mouth: Mucous membranes are moist.     Cardiovascular:      Rate and Rhythm: Normal rate and regular rhythm.      Pulses: Normal pulses.      Heart sounds: Normal heart sounds. no murmur.     Pulmonary:      Effort: Pulmonary effort is normal. No respiratory distress.   Abdominal:      General: Abdomen is protuberant     Palpations: Abdomen is soft.      Tenderness: There is no abdominal tenderness. No guarding, no rebound  Musculoskeletal:    Left upper extremity swelling.  Skin:     Coloration: Skin is not jaundiced.      Bruising, and edema noted over right upper extremity  Neurological:      General: No focal deficit present.      Mental Status: He is alert and oriented to person, place, and time. Mental status is at baseline.   Psychiatric:         Mood and Affect: Mood normal. Normal though content      Fluids    Intake/Output Summary (Last 24 hours) at 6/18/2021 1518  Last data filed at 6/18/2021 1000  Gross per 24 hour   Intake 460 ml   Output 1300 ml   Net -840 ml       Laboratory                        Imaging  DX-CHEST-PORTABLE (1 VIEW)   Final Result      1.  Bibasilar  underinflation atelectasis which could obscure an additional process.   2.  Small BILATERAL pleural effusions      DX-CHEST-PORTABLE (1 VIEW)   Final Result      Findings consistent with mild pulmonary edema along with minimal bibasilar atelectasis and small left pleural effusion. No significant change.      DX-CHEST-LIMITED (1 VIEW)   Final Result         No significant interval change.      Small bilateral pleural effusion bibasilar opacities, similar to prior.      Stable cardiomegaly.      US-EXTREMITY VENOUS UPPER UNILAT LEFT   Final Result      CT-EXTREMITY, UPPER WITH LEFT   Final Result      No acute fracture or dislocation.      Severe osteoarthritis of the elbow joint.      IR-MIDLINE CATHETER INSERTION WO GUIDANCE > AGE 3   Final Result                  Ultrasound-guided midline placement performed by qualified nursing staff    as above.          IR-US GUIDED PIV   Final Result    Ultrasound-guided PERIPHERAL IV INSERTION performed by    qualified nursing staff as above.      US-EXTREMITY VENOUS LOWER UNILAT LEFT   Final Result      DX-CHEST-PORTABLE (1 VIEW)   Final Result      Bibasilar opacities may represent atelectasis or consolidation.      Small left pleural effusion may be present.      Pulmonary interstitial edema.      Stable prominence of the cardiomediastinal silhouette.      Atherosclerotic calcification is seen.      DX-ELBOW-COMPLETE 3+ LEFT   Final Result      1.  No fracture or dislocation of LEFT elbow.   2.  Severe degenerative change.   3.  Diffuse subcutaneous edema.   4.  Limited by positioning.      TA-KIANNOQ-8 VIEW    (Results Pending)        Assessment/Plan  * Acute on chronic systolic congestive heart failure (HCC)- (present on admission)  Assessment & Plan  Patient on 5L oxygen via nc at baseline  Echo showed EF of 45% on 5/12, I/os , daily weight and fluid restriction to 1.5 L  Continue BB , losartan and  Lasix  Reached euvolemic status.  Cardiology out-patient  follow-up.    Pain and swelling of left upper extremity- (present on admission)  Assessment & Plan  US negative for DVT   Improving   Compression banding per OT.    Anasarca- (present on admission)  Assessment & Plan  Continue to monitor on diuretic   Resolving.    Debility- (present on admission)  Assessment & Plan  SNF placement pending     Adrenal insufficiency (HCC)- (present on admission)  Assessment & Plan   On hydrocortisone home dose.    UTI (urinary tract infection)  Assessment & Plan  On rocephine   Urine culture growing Klebsiella pneumonia  Completed a course of antibiotics.    Chronic respiratory failure with hypoxia (HCC)  Assessment & Plan  At baseline for now.  Continue supplemental oxygen, on 4 to 5 L  Encourage incentive spirometry as atelectasis seen on chest x-ray.    History of pulmonary embolism- (present on admission)  Assessment & Plan  Warfarin was discontinued during previous recent hospitalization due to blood loss anemia, patient knows   monitor hemoglobin hematocrit, so far stabel  Transfuse if less than 7  Recent CT of the chest done on 5/8/2021 was negative for PE.        COPD (chronic obstructive pulmonary disease) (HCC)- (present on admission)  Assessment & Plan  Continue RT protocol, breathing treatment   --Titrate O2 as needed    -- ON 4 L of O2  And denied any complain of sob    Essential hypertension, benign- (present on admission)  Assessment & Plan  Blood pressure noted to be well controlled, monitor  Continue coreg and losartan    Advanced care planning/counseling discussion  Assessment & Plan  Discussion with patient and patient wife. Patient wife, Jacquie Greer, contact via phone. Discussion addressing patients goals of care and general disposition. Conversation regarding patient discharge to SNF. Jacquie was in agreement and understanding that patient would benefit from the first available bed including the [place] area. Jacquie stated she was concerned about affording transportation  which was relayed to social work. Total of 21 minutes spent in discussion and coordination of advanced care planning.     Sacral wound- (present on admission)  Assessment & Plan  Wound mx as per wound team recs   --Offloading and periodic turning     Hypophosphatemia  Assessment & Plan  Replete and monitor  2.5      Hypomagnesemia  Assessment & Plan  Replete and monitor      Hypokalemia- (present on admission)  Assessment & Plan  Replete and monitor    Coronary artery disease- (present on admission)  Assessment & Plan  S/p PCI with stent placement 4 weeks ago  Aspirin, Plavix, atorvastatin    FABIAN (acute kidney injury) (HCC)  Assessment & Plan  Resolved.  Resume losartan and Lasix    Sepsis (HCC)  Assessment & Plan  Resolved           BPH (benign prostatic hypertrophy)- (present on admission)  Assessment & Plan  continiue Flomax and finasteride       VTE prophylaxis: lovenox

## 2021-06-19 VITALS
HEIGHT: 68 IN | SYSTOLIC BLOOD PRESSURE: 132 MMHG | WEIGHT: 240.3 LBS | RESPIRATION RATE: 16 BRPM | TEMPERATURE: 97.8 F | OXYGEN SATURATION: 97 % | DIASTOLIC BLOOD PRESSURE: 81 MMHG | HEART RATE: 88 BPM | BODY MASS INDEX: 36.42 KG/M2

## 2021-06-19 PROCEDURE — A9270 NON-COVERED ITEM OR SERVICE: HCPCS | Performed by: INTERNAL MEDICINE

## 2021-06-19 PROCEDURE — 99239 HOSP IP/OBS DSCHRG MGMT >30: CPT | Performed by: INTERNAL MEDICINE

## 2021-06-19 PROCEDURE — 700102 HCHG RX REV CODE 250 W/ 637 OVERRIDE(OP): Performed by: INTERNAL MEDICINE

## 2021-06-19 PROCEDURE — A9270 NON-COVERED ITEM OR SERVICE: HCPCS | Performed by: HOSPITALIST

## 2021-06-19 PROCEDURE — A9270 NON-COVERED ITEM OR SERVICE: HCPCS | Performed by: STUDENT IN AN ORGANIZED HEALTH CARE EDUCATION/TRAINING PROGRAM

## 2021-06-19 PROCEDURE — 700111 HCHG RX REV CODE 636 W/ 250 OVERRIDE (IP): Performed by: INTERNAL MEDICINE

## 2021-06-19 PROCEDURE — 700102 HCHG RX REV CODE 250 W/ 637 OVERRIDE(OP): Performed by: STUDENT IN AN ORGANIZED HEALTH CARE EDUCATION/TRAINING PROGRAM

## 2021-06-19 PROCEDURE — 700102 HCHG RX REV CODE 250 W/ 637 OVERRIDE(OP): Performed by: HOSPITALIST

## 2021-06-19 PROCEDURE — 94640 AIRWAY INHALATION TREATMENT: CPT

## 2021-06-19 RX ORDER — CLOPIDOGREL BISULFATE 75 MG/1
75 TABLET ORAL DAILY
Qty: 30 TABLET | Status: SHIPPED
Start: 2021-06-20

## 2021-06-19 RX ORDER — BUDESONIDE AND FORMOTEROL FUMARATE DIHYDRATE 80; 4.5 UG/1; UG/1
2 AEROSOL RESPIRATORY (INHALATION) 2 TIMES DAILY
Status: ON HOLD
Start: 2021-06-19 | End: 2021-09-26

## 2021-06-19 RX ORDER — CARBOXYMETHYLCELLULOSE SODIUM 5 MG/ML
1 SOLUTION/ DROPS OPHTHALMIC 2 TIMES DAILY
Status: SHIPPED
Start: 2021-06-19

## 2021-06-19 RX ORDER — OXYCODONE HYDROCHLORIDE 5 MG/1
2.5 TABLET ORAL EVERY 6 HOURS PRN
Qty: 12 TABLET | Refills: 0 | Status: SHIPPED | OUTPATIENT
Start: 2021-06-19 | End: 2021-06-22

## 2021-06-19 RX ORDER — FUROSEMIDE 20 MG/1
20 TABLET ORAL DAILY
Qty: 60 TABLET | Status: SHIPPED
Start: 2021-06-20

## 2021-06-19 RX ORDER — ATORVASTATIN CALCIUM 80 MG/1
80 TABLET, FILM COATED ORAL EVERY EVENING
Qty: 30 TABLET | Status: SHIPPED
Start: 2021-06-19

## 2021-06-19 RX ORDER — LEVOTHYROXINE SODIUM 0.07 MG/1
75 TABLET ORAL
Qty: 30 TABLET | Status: SHIPPED
Start: 2021-06-20

## 2021-06-19 RX ORDER — OMEPRAZOLE 20 MG/1
20 CAPSULE, DELAYED RELEASE ORAL 2 TIMES DAILY
Qty: 30 CAPSULE | Status: ON HOLD
Start: 2021-06-19 | End: 2021-09-26

## 2021-06-19 RX ORDER — LOSARTAN POTASSIUM 25 MG/1
25 TABLET ORAL DAILY
Qty: 30 TABLET | Status: ON HOLD
Start: 2021-06-20 | End: 2021-09-26

## 2021-06-19 RX ORDER — ALBUTEROL SULFATE 90 UG/1
2 AEROSOL, METERED RESPIRATORY (INHALATION) EVERY 4 HOURS PRN
Qty: 8.5 G | Status: SHIPPED
Start: 2021-06-19

## 2021-06-19 RX ORDER — POLYETHYLENE GLYCOL 3350 17 G/17G
17 POWDER, FOR SOLUTION ORAL DAILY
Status: ON HOLD
Start: 2021-06-19 | End: 2021-09-26

## 2021-06-19 RX ORDER — CARVEDILOL 3.12 MG/1
3.12 TABLET ORAL 2 TIMES DAILY WITH MEALS
Qty: 60 TABLET | Status: ON HOLD
Start: 2021-06-19 | End: 2021-09-26

## 2021-06-19 RX ORDER — ASPIRIN 81 MG/1
81 TABLET ORAL DAILY
Qty: 30 TABLET | Status: ON HOLD
Start: 2021-06-20 | End: 2021-09-26

## 2021-06-19 RX ORDER — TAMSULOSIN HYDROCHLORIDE 0.4 MG/1
0.4 CAPSULE ORAL DAILY
Qty: 30 CAPSULE | Status: SHIPPED
Start: 2021-06-20

## 2021-06-19 RX ADMIN — OXYCODONE 5 MG: 5 TABLET ORAL at 08:36

## 2021-06-19 RX ADMIN — LOSARTAN POTASSIUM 25 MG: 25 TABLET, FILM COATED ORAL at 05:52

## 2021-06-19 RX ADMIN — TAMSULOSIN HYDROCHLORIDE 0.4 MG: 0.4 CAPSULE ORAL at 05:52

## 2021-06-19 RX ADMIN — DOCUSATE SODIUM 50 MG AND SENNOSIDES 8.6 MG 2 TABLET: 8.6; 5 TABLET, FILM COATED ORAL at 05:53

## 2021-06-19 RX ADMIN — TIOTROPIUM BROMIDE INHALATION SPRAY 5 MCG: 3.12 SPRAY, METERED RESPIRATORY (INHALATION) at 05:54

## 2021-06-19 RX ADMIN — ENOXAPARIN SODIUM 40 MG: 40 INJECTION SUBCUTANEOUS at 05:53

## 2021-06-19 RX ADMIN — ALPRAZOLAM 0.25 MG: 0.25 TABLET ORAL at 03:58

## 2021-06-19 RX ADMIN — HYDROCORTISONE 10 MG: 20 TABLET ORAL at 05:53

## 2021-06-19 RX ADMIN — FLUTICASONE PROPIONATE 50 MCG: 50 SPRAY, METERED NASAL at 05:54

## 2021-06-19 RX ADMIN — OMEPRAZOLE 20 MG: 20 CAPSULE, DELAYED RELEASE ORAL at 05:52

## 2021-06-19 RX ADMIN — BUDESONIDE AND FORMOTEROL FUMARATE DIHYDRATE 2 PUFF: 80; 4.5 AEROSOL RESPIRATORY (INHALATION) at 05:54

## 2021-06-19 RX ADMIN — MONTELUKAST 10 MG: 10 TABLET, FILM COATED ORAL at 05:53

## 2021-06-19 RX ADMIN — OXYCODONE 5 MG: 5 TABLET ORAL at 03:58

## 2021-06-19 RX ADMIN — Medication 950 MG: at 05:52

## 2021-06-19 RX ADMIN — CARBOXYMETHYLCELLULOSE SODIUM 1 DROP: 5 SOLUTION/ DROPS OPHTHALMIC at 05:54

## 2021-06-19 RX ADMIN — FUROSEMIDE 20 MG: 20 TABLET ORAL at 05:52

## 2021-06-19 RX ADMIN — CARVEDILOL 3.12 MG: 3.12 TABLET, FILM COATED ORAL at 08:36

## 2021-06-19 RX ADMIN — LEVOTHYROXINE SODIUM 75 MCG: 0.07 TABLET ORAL at 05:52

## 2021-06-19 RX ADMIN — CLOPIDOGREL BISULFATE 75 MG: 75 TABLET ORAL at 05:53

## 2021-06-19 RX ADMIN — ASPIRIN 81 MG: 81 TABLET, COATED ORAL at 05:52

## 2021-06-19 ASSESSMENT — FIBROSIS 4 INDEX: FIB4 SCORE: 1.33

## 2021-06-19 ASSESSMENT — PAIN DESCRIPTION - PAIN TYPE
TYPE: ACUTE PAIN

## 2021-06-19 NOTE — DISCHARGE PLANNING
Rec'd teams message from Dionne WHITMAN that [facility] is calling stating they never rec'd a nursing report. Call placed to GILBERTO Braun requesting he call report to [phone].

## 2021-06-19 NOTE — CARE PLAN
Problem: Knowledge Deficit - Standard  Goal: Patient and family/care givers will demonstrate understanding of plan of care, disease process/condition, diagnostic tests and medications  Outcome: Progressing     Problem: Pain - Standard  Goal: Alleviation of pain or a reduction in pain to the patient’s comfort goal  Outcome: Progressing   The patient is Stable - Low risk of patient condition declining or worsening    Shift Goals  Clinical Goals: discharge planning  Patient Goals: Pain management   Family Goals: N/A    Progress made toward(s) clinical / shift goals:  Pt reporting 6/10 pain, medicated per MAR. Upon reassessment, pt reporting decreased pain.     Patient is not progressing towards the following goals: N/A

## 2021-06-19 NOTE — DISCHARGE PLANNING
Received Transport Form @ 2151  Spoke to Tomasa @ [facility]    Transport is scheduled for 6/19 @[id number] going to [facility]. Quoted $207.29    Meenu @ [facility] notified  ANGELITA Ray notified

## 2021-06-19 NOTE — DISCHARGE INSTRUCTIONS
Discharge Instructions    Discharged to other by medical transportation with escort. Discharged via ambulance, hospital escort: Yes.  Special equipment needed: Oxygen    Be sure to schedule a follow-up appointment with your primary care doctor or any specialists as instructed.     Discharge Plan:        I understand that a diet low in cholesterol, fat, and sodium is recommended for good health. Unless I have been given specific instructions below for another diet, I accept this instruction as my diet prescription.   Other diet: cardiac 2 gram sodium, 1800 mL fluid restriction    Special Instructions: None    · Is patient discharged on Warfarin / Coumadin?   No     Depression / Suicide Risk    As you are discharged from this [facility] facility, it is important to learn how to keep safe from harming yourself.    Recognize the warning signs:  · Abrupt changes in personality, positive or negative- including increase in energy   · Giving away possessions  · Change in eating patterns- significant weight changes-  positive or negative  · Change in sleeping patterns- unable to sleep or sleeping all the time   · Unwillingness or inability to communicate  · Depression  · Unusual sadness, discouragement and loneliness  · Talk of wanting to die  · Neglect of personal appearance   · Rebelliousness- reckless behavior  · Withdrawal from people/activities they love  · Confusion- inability to concentrate     If you or a loved one observes any of these behaviors or has concerns about self-harm, here's what you can do:  · Talk about it- your feelings and reasons for harming yourself  · Remove any means that you might use to hurt yourself (examples: pills, rope, extension cords, firearm)  · Get professional help from the community (Mental Health, Substance Abuse, psychological counseling)  · Do not be alone:Call your Safe Contact- someone whom you trust who will be there for you.  · Call your local CRISIS HOTLINE 691-7164 or  563.853.4178  · Call your local Children's Mobile Crisis Response Team [place] (278) 562-7744 or www."[employer]Compliance 360  · Call the toll free National Suicide Prevention Hotlines   · National Suicide Prevention Lifeline 266-075-XDAV (9141)  · [employer] Network 800-SUICIDE (541-5099)    Pt needs CHF education.    Patient's laboratory work-up today was consistent with some mild hypophosphatemia, his potassium was only minimally depressed at 3.3.  Please continue the K-Phos that he is on, I have also requested that you increase his Lasix to 40 mg in the morning 20 mg at night for his ongoing peripheral edema, I have placed a cardiology referral for patient so he can follow-up with them as an outpatient.

## 2021-06-19 NOTE — DISCHARGE PLANNING
Anticipated Discharge Disposition: [facility] skilled    Action: Pt to transfer via gurney transport to [facility] at 10:00. Transportation form faxed to Dionne WHITMAN to arrange transport. DC summary done per Dr Morrison. Chart copy and cobra to nurses station. IMM given to pt. Reviewed with GILBERTO Braun, pt is DNR POLST to go in dc packet. Also BSN to contact MD for narcotic RX to be added to packet. DC Summary to be faxed to [facility] [place].     Barriers to Discharge:     Plan: [facility] transport to [facility] skilled at 10:00.

## 2021-06-19 NOTE — DISCHARGE PLANNING
Agency/Facility Name: [facility]  Spoke To: Meenu  Outcome: Meenu requested that the d/c summary be submitted prior to transport    ANGELITA Lozano notified

## 2021-06-19 NOTE — PROGRESS NOTES
Assumed care at 1900. Received report from day shift RN. Pt is awake and alert in bed, A&Ox 4, on 4L NC, reports a pain level of 6/10. Fall precautions and appropriate signs in place. Call light and belongings within reach. Bed alarm and hourly rounding in place. Communication board updated. Pt educated on appropriate call light usage.

## 2021-06-19 NOTE — CARE PLAN
Problem: Knowledge Deficit - Standard  Goal: Patient and family/care givers will demonstrate understanding of plan of care, disease process/condition, diagnostic tests and medications  Outcome: Progressing     Problem: Pain - Standard  Goal: Alleviation of pain or a reduction in pain to the patient’s comfort goal  Outcome: Progressing   The patient is Stable - Low risk of patient condition declining or worsening    Shift Goals  Clinical Goals: pain control and safe discharge  Patient Goals: Pain management   Family Goals: N/A    Progress made toward(s) clinical / shift goals:  progressing    Patient is not progressing towards the following goals:

## 2021-06-30 NOTE — DOCUMENTATION QUERY
[facility]                                                                       Query Response Note      PATIENT:               MANOLO AUGUST  ACCT #:                  [id number]  MRN:                     [id number]  :                      1932  ADMIT DATE:       2021 9:51 AM  DISCH DATE:        2021 10:03 AM  RESPONDING  PROVIDER #:        [id number]           QUERY TEXT:    The  is unable to determine if the acute kidney failure is related to sepsis.  Please clarify the relationship, if any, between sepsis and acute kidney failure.       NOTE:  If an appropriate response is not listed below, please respond with a new note.          The patient's Clinical Indicators include:  *Clinical indicators  -admitted with peripheral edema, HTN, CHF    6/3-Mild FABIAN    -rapid response overnight for hypotension        Sepsis-Source is urine     *Treatment or Monitoring  hold Lasix and losartan  On rocephin     *Risk Factors  HTN, CHF, emphysema, Pressure ulcer, BPH, uti    Thank you  Linda Luo [facility]  Coding  1  [email or web address]  Options provided:   -- Acute kidney failure is due to or associated with sepsis   -- Unrelated to each other   -- Unable to determine      Query created by: Linda Lou on 2021 2:36 PM    RESPONSE TEXT:    Unrelated to each other          Electronically signed by:  LUIS E BAILEY MD 2021 5:18 PM

## 2021-07-07 NOTE — PROGRESS NOTES
Addended by: ROSA CHANG on: 7/7/2021 11:19 AM     Modules accepted: Orders     Updated Dr. Madsen on abdominal xray results. Report reads small and large bowel distension with possible adynamic ileus. Dr. Madsen reports from scans looks likely to be an ileus, orders for mag citrate. If pt becomes nauseous or vomits okay to place NG.     Pt states abdomen does not hurt when laying still, pain comes with turns and coughing. Updated MD on pt pain and SBP 140s-160s.

## 2021-09-25 ENCOUNTER — HOSPITAL ENCOUNTER (INPATIENT)
Facility: MEDICAL CENTER | Age: 86
LOS: 10 days | DRG: 193 | End: 2021-10-06
Attending: EMERGENCY MEDICINE | Admitting: INTERNAL MEDICINE
Payer: MEDICARE

## 2021-09-25 DIAGNOSIS — J96.21 ACUTE ON CHRONIC RESPIRATORY FAILURE WITH HYPOXIA (HCC): ICD-10-CM

## 2021-09-25 DIAGNOSIS — J18.9 PNEUMONIA OF BOTH LOWER LOBES DUE TO INFECTIOUS ORGANISM: ICD-10-CM

## 2021-09-25 DIAGNOSIS — Z74.09 IMPAIRED MOBILITY AND ADLS: ICD-10-CM

## 2021-09-25 DIAGNOSIS — Z78.9 IMPAIRED MOBILITY AND ADLS: ICD-10-CM

## 2021-09-25 DIAGNOSIS — R06.09 DYSPNEA ON EXERTION: ICD-10-CM

## 2021-09-25 DIAGNOSIS — J43.9 PULMONARY EMPHYSEMA, UNSPECIFIED EMPHYSEMA TYPE (HCC): ICD-10-CM

## 2021-09-25 DIAGNOSIS — I50.9 ACUTE ON CHRONIC CONGESTIVE HEART FAILURE, UNSPECIFIED HEART FAILURE TYPE (HCC): ICD-10-CM

## 2021-09-25 PROCEDURE — 99285 EMERGENCY DEPT VISIT HI MDM: CPT

## 2021-09-25 ASSESSMENT — FIBROSIS 4 INDEX: FIB4 SCORE: 1.33

## 2021-09-26 ENCOUNTER — APPOINTMENT (OUTPATIENT)
Dept: RADIOLOGY | Facility: MEDICAL CENTER | Age: 86
DRG: 193 | End: 2021-09-26
Attending: EMERGENCY MEDICINE
Payer: MEDICARE

## 2021-09-26 PROBLEM — E03.9 HYPOTHYROIDISM: Status: ACTIVE | Noted: 2021-09-26

## 2021-09-26 PROBLEM — K21.9 GERD (GASTROESOPHAGEAL REFLUX DISEASE): Status: ACTIVE | Noted: 2021-09-26

## 2021-09-26 PROBLEM — I50.20 SYSTOLIC CONGESTIVE HEART FAILURE (HCC): Status: ACTIVE | Noted: 2021-05-11

## 2021-09-26 PROBLEM — E78.5 DYSLIPIDEMIA: Status: ACTIVE | Noted: 2021-09-26

## 2021-09-26 PROBLEM — J18.9 PNEUMONIA: Status: ACTIVE | Noted: 2021-09-26

## 2021-09-26 PROBLEM — I50.22 CHRONIC SYSTOLIC CONGESTIVE HEART FAILURE (HCC): Status: ACTIVE | Noted: 2021-05-11

## 2021-09-26 PROBLEM — G93.40 ACUTE ENCEPHALOPATHY: Status: ACTIVE | Noted: 2021-09-26

## 2021-09-26 LAB
ALBUMIN SERPL BCP-MCNC: 3.4 G/DL (ref 3.2–4.9)
ALBUMIN/GLOB SERPL: 1.3 G/DL
ALP SERPL-CCNC: 64 U/L (ref 30–99)
ALT SERPL-CCNC: 16 U/L (ref 2–50)
ANION GAP SERPL CALC-SCNC: 10 MMOL/L (ref 7–16)
APTT PPP: 33.7 SEC (ref 24.7–36)
AST SERPL-CCNC: 17 U/L (ref 12–45)
BASE EXCESS BLDV CALC-SCNC: 2 MMOL/L
BASOPHILS # BLD AUTO: 0.5 % (ref 0–1.8)
BASOPHILS # BLD: 0.07 K/UL (ref 0–0.12)
BILIRUB SERPL-MCNC: 0.8 MG/DL (ref 0.1–1.5)
BODY TEMPERATURE: NORMAL CENTIGRADE
BUN SERPL-MCNC: 31 MG/DL (ref 8–22)
CALCIUM SERPL-MCNC: 9.1 MG/DL (ref 8.4–10.2)
CHLORIDE SERPL-SCNC: 95 MMOL/L (ref 96–112)
CO2 SERPL-SCNC: 27 MMOL/L (ref 20–33)
CREAT SERPL-MCNC: 1.04 MG/DL (ref 0.5–1.4)
D DIMER PPP IA.FEU-MCNC: 1.41 UG/ML (FEU) (ref 0–0.5)
EKG IMPRESSION: NORMAL
EOSINOPHIL # BLD AUTO: 0.47 K/UL (ref 0–0.51)
EOSINOPHIL NFR BLD: 3.6 % (ref 0–6.9)
ERYTHROCYTE [DISTWIDTH] IN BLOOD BY AUTOMATED COUNT: 58 FL (ref 35.9–50)
FLUAV RNA SPEC QL NAA+PROBE: NEGATIVE
FLUBV RNA SPEC QL NAA+PROBE: NEGATIVE
GLOBULIN SER CALC-MCNC: 2.6 G/DL (ref 1.9–3.5)
GLUCOSE SERPL-MCNC: 83 MG/DL (ref 65–99)
GRAM STN SPEC: NORMAL
HCO3 BLDV-SCNC: 28 MMOL/L (ref 24–28)
HCT VFR BLD AUTO: 34.7 % (ref 42–52)
HGB BLD-MCNC: 11.2 G/DL (ref 14–18)
IMM GRANULOCYTES # BLD AUTO: 0.13 K/UL (ref 0–0.11)
IMM GRANULOCYTES NFR BLD AUTO: 1 % (ref 0–0.9)
INR PPP: 1.09 (ref 0.87–1.13)
LYMPHOCYTES # BLD AUTO: 1.19 K/UL (ref 1–4.8)
LYMPHOCYTES NFR BLD: 9.2 % (ref 22–41)
MCH RBC QN AUTO: 28.9 PG (ref 27–33)
MCHC RBC AUTO-ENTMCNC: 32.3 G/DL (ref 33.7–35.3)
MCV RBC AUTO: 89.4 FL (ref 81.4–97.8)
MONOCYTES # BLD AUTO: 0.84 K/UL (ref 0–0.85)
MONOCYTES NFR BLD AUTO: 6.5 % (ref 0–13.4)
NEUTROPHILS # BLD AUTO: 10.2 K/UL (ref 1.82–7.42)
NEUTROPHILS NFR BLD: 79.2 % (ref 44–72)
NRBC # BLD AUTO: 0 K/UL
NRBC BLD-RTO: 0 /100 WBC
NT-PROBNP SERPL IA-MCNC: 4771 PG/ML (ref 0–125)
PCO2 BLDV: 47.5 MMHG (ref 41–51)
PH BLDV: 7.39 [PH] (ref 7.31–7.45)
PLATELET # BLD AUTO: 201 K/UL (ref 164–446)
PMV BLD AUTO: 9.2 FL (ref 9–12.9)
PO2 BLDV: 32.5 MMHG (ref 25–40)
POTASSIUM SERPL-SCNC: 4.5 MMOL/L (ref 3.6–5.5)
PROCALCITONIN SERPL-MCNC: 0.09 NG/ML
PROT SERPL-MCNC: 6 G/DL (ref 6–8.2)
PROTHROMBIN TIME: 13.3 SEC (ref 12–14.6)
RBC # BLD AUTO: 3.88 M/UL (ref 4.7–6.1)
RSV RNA SPEC QL NAA+PROBE: NEGATIVE
SAO2 % BLDV: 55.6 %
SARS-COV-2 RNA RESP QL NAA+PROBE: NOTDETECTED
SIGNIFICANT IND 70042: NORMAL
SITE SITE: NORMAL
SODIUM SERPL-SCNC: 132 MMOL/L (ref 135–145)
SOURCE SOURCE: NORMAL
SPECIMEN SOURCE: NORMAL
TROPONIN T SERPL-MCNC: 111 NG/L (ref 6–19)
TROPONIN T SERPL-MCNC: 115 NG/L (ref 6–19)
WBC # BLD AUTO: 12.9 K/UL (ref 4.8–10.8)

## 2021-09-26 PROCEDURE — 770020 HCHG ROOM/CARE - TELE (206)

## 2021-09-26 PROCEDURE — 87205 SMEAR GRAM STAIN: CPT

## 2021-09-26 PROCEDURE — 83880 ASSAY OF NATRIURETIC PEPTIDE: CPT

## 2021-09-26 PROCEDURE — 87040 BLOOD CULTURE FOR BACTERIA: CPT

## 2021-09-26 PROCEDURE — C9803 HOPD COVID-19 SPEC COLLECT: HCPCS | Performed by: EMERGENCY MEDICINE

## 2021-09-26 PROCEDURE — 700111 HCHG RX REV CODE 636 W/ 250 OVERRIDE (IP): Performed by: EMERGENCY MEDICINE

## 2021-09-26 PROCEDURE — A9270 NON-COVERED ITEM OR SERVICE: HCPCS | Performed by: EMERGENCY MEDICINE

## 2021-09-26 PROCEDURE — 700102 HCHG RX REV CODE 250 W/ 637 OVERRIDE(OP): Performed by: STUDENT IN AN ORGANIZED HEALTH CARE EDUCATION/TRAINING PROGRAM

## 2021-09-26 PROCEDURE — 84484 ASSAY OF TROPONIN QUANT: CPT | Mod: 91

## 2021-09-26 PROCEDURE — 84145 PROCALCITONIN (PCT): CPT

## 2021-09-26 PROCEDURE — 94760 N-INVAS EAR/PLS OXIMETRY 1: CPT

## 2021-09-26 PROCEDURE — 71045 X-RAY EXAM CHEST 1 VIEW: CPT

## 2021-09-26 PROCEDURE — 85379 FIBRIN DEGRADATION QUANT: CPT

## 2021-09-26 PROCEDURE — 99223 1ST HOSP IP/OBS HIGH 75: CPT | Mod: AI | Performed by: INTERNAL MEDICINE

## 2021-09-26 PROCEDURE — 96375 TX/PRO/DX INJ NEW DRUG ADDON: CPT

## 2021-09-26 PROCEDURE — 80053 COMPREHEN METABOLIC PANEL: CPT

## 2021-09-26 PROCEDURE — 96374 THER/PROPH/DIAG INJ IV PUSH: CPT

## 2021-09-26 PROCEDURE — 82803 BLOOD GASES ANY COMBINATION: CPT

## 2021-09-26 PROCEDURE — 87070 CULTURE OTHR SPECIMN AEROBIC: CPT

## 2021-09-26 PROCEDURE — A9270 NON-COVERED ITEM OR SERVICE: HCPCS | Performed by: STUDENT IN AN ORGANIZED HEALTH CARE EDUCATION/TRAINING PROGRAM

## 2021-09-26 PROCEDURE — 85730 THROMBOPLASTIN TIME PARTIAL: CPT

## 2021-09-26 PROCEDURE — 94640 AIRWAY INHALATION TREATMENT: CPT

## 2021-09-26 PROCEDURE — 0241U HCHG SARS-COV-2 COVID-19 NFCT DS RESP RNA 4 TRGT MIC: CPT

## 2021-09-26 PROCEDURE — 71275 CT ANGIOGRAPHY CHEST: CPT | Mod: ME

## 2021-09-26 PROCEDURE — 700117 HCHG RX CONTRAST REV CODE 255: Performed by: EMERGENCY MEDICINE

## 2021-09-26 PROCEDURE — 700102 HCHG RX REV CODE 250 W/ 637 OVERRIDE(OP): Performed by: EMERGENCY MEDICINE

## 2021-09-26 PROCEDURE — 700111 HCHG RX REV CODE 636 W/ 250 OVERRIDE (IP): Performed by: INTERNAL MEDICINE

## 2021-09-26 PROCEDURE — 85025 COMPLETE CBC W/AUTO DIFF WBC: CPT

## 2021-09-26 PROCEDURE — 85610 PROTHROMBIN TIME: CPT

## 2021-09-26 PROCEDURE — 700105 HCHG RX REV CODE 258: Performed by: INTERNAL MEDICINE

## 2021-09-26 PROCEDURE — 700101 HCHG RX REV CODE 250: Performed by: EMERGENCY MEDICINE

## 2021-09-26 PROCEDURE — A9270 NON-COVERED ITEM OR SERVICE: HCPCS | Performed by: INTERNAL MEDICINE

## 2021-09-26 PROCEDURE — 97162 PT EVAL MOD COMPLEX 30 MIN: CPT

## 2021-09-26 PROCEDURE — 36415 COLL VENOUS BLD VENIPUNCTURE: CPT

## 2021-09-26 PROCEDURE — 93005 ELECTROCARDIOGRAM TRACING: CPT | Performed by: EMERGENCY MEDICINE

## 2021-09-26 PROCEDURE — 700102 HCHG RX REV CODE 250 W/ 637 OVERRIDE(OP): Performed by: INTERNAL MEDICINE

## 2021-09-26 PROCEDURE — 94669 MECHANICAL CHEST WALL OSCILL: CPT

## 2021-09-26 RX ORDER — ACETAMINOPHEN 325 MG/1
650 TABLET ORAL EVERY 6 HOURS PRN
Status: DISCONTINUED | OUTPATIENT
Start: 2021-09-26 | End: 2021-10-06 | Stop reason: HOSPADM

## 2021-09-26 RX ORDER — CARVEDILOL 6.25 MG/1
6.25 TABLET ORAL 2 TIMES DAILY WITH MEALS
Status: ON HOLD | COMMUNITY
End: 2021-10-05

## 2021-09-26 RX ORDER — ALBUTEROL SULFATE 90 UG/1
2 AEROSOL, METERED RESPIRATORY (INHALATION) EVERY 4 HOURS PRN
Status: DISCONTINUED | OUTPATIENT
Start: 2021-09-26 | End: 2021-10-06 | Stop reason: HOSPADM

## 2021-09-26 RX ORDER — BISACODYL 10 MG
10 SUPPOSITORY, RECTAL RECTAL
Status: DISCONTINUED | OUTPATIENT
Start: 2021-09-26 | End: 2021-10-06 | Stop reason: HOSPADM

## 2021-09-26 RX ORDER — LOSARTAN POTASSIUM 25 MG/1
25 TABLET ORAL DAILY
Status: DISCONTINUED | OUTPATIENT
Start: 2021-09-26 | End: 2021-09-27

## 2021-09-26 RX ORDER — CLOPIDOGREL BISULFATE 75 MG/1
75 TABLET ORAL DAILY
Status: DISCONTINUED | OUTPATIENT
Start: 2021-09-26 | End: 2021-10-06 | Stop reason: HOSPADM

## 2021-09-26 RX ORDER — LABETALOL HYDROCHLORIDE 5 MG/ML
10 INJECTION, SOLUTION INTRAVENOUS EVERY 4 HOURS PRN
Status: DISCONTINUED | OUTPATIENT
Start: 2021-09-26 | End: 2021-10-06 | Stop reason: HOSPADM

## 2021-09-26 RX ORDER — POLYVINYL ALCOHOL 14 MG/ML
1 SOLUTION/ DROPS OPHTHALMIC 2 TIMES DAILY
Status: DISCONTINUED | OUTPATIENT
Start: 2021-09-26 | End: 2021-10-06 | Stop reason: HOSPADM

## 2021-09-26 RX ORDER — ALPRAZOLAM 0.25 MG/1
0.25 TABLET ORAL 3 TIMES DAILY PRN
COMMUNITY

## 2021-09-26 RX ORDER — HYDROCORTISONE 10 MG/1
10 TABLET ORAL 3 TIMES DAILY
Status: DISCONTINUED | OUTPATIENT
Start: 2021-09-26 | End: 2021-10-06 | Stop reason: HOSPADM

## 2021-09-26 RX ORDER — DOXYCYCLINE 100 MG/1
100 TABLET ORAL ONCE
Status: COMPLETED | OUTPATIENT
Start: 2021-09-26 | End: 2021-09-26

## 2021-09-26 RX ORDER — AZITHROMYCIN 250 MG/1
500 TABLET, FILM COATED ORAL DAILY
Status: COMPLETED | OUTPATIENT
Start: 2021-09-27 | End: 2021-09-28

## 2021-09-26 RX ORDER — LOSARTAN POTASSIUM 50 MG/1
50 TABLET ORAL DAILY
COMMUNITY

## 2021-09-26 RX ORDER — ATORVASTATIN CALCIUM 40 MG/1
80 TABLET, FILM COATED ORAL EVERY EVENING
Status: DISCONTINUED | OUTPATIENT
Start: 2021-09-26 | End: 2021-10-06 | Stop reason: HOSPADM

## 2021-09-26 RX ORDER — FUROSEMIDE 10 MG/ML
40 INJECTION INTRAMUSCULAR; INTRAVENOUS ONCE
Status: COMPLETED | OUTPATIENT
Start: 2021-09-26 | End: 2021-09-26

## 2021-09-26 RX ORDER — AMOXICILLIN 250 MG
2 CAPSULE ORAL 2 TIMES DAILY
Status: DISCONTINUED | OUTPATIENT
Start: 2021-09-26 | End: 2021-10-06 | Stop reason: HOSPADM

## 2021-09-26 RX ORDER — CARVEDILOL 6.25 MG/1
3.12 TABLET ORAL 2 TIMES DAILY WITH MEALS
Status: DISCONTINUED | OUTPATIENT
Start: 2021-09-26 | End: 2021-10-06 | Stop reason: HOSPADM

## 2021-09-26 RX ORDER — METHYLPREDNISOLONE SODIUM SUCCINATE 125 MG/2ML
125 INJECTION, POWDER, LYOPHILIZED, FOR SOLUTION INTRAMUSCULAR; INTRAVENOUS ONCE
Status: COMPLETED | OUTPATIENT
Start: 2021-09-26 | End: 2021-09-26

## 2021-09-26 RX ORDER — TAMSULOSIN HYDROCHLORIDE 0.4 MG/1
0.4 CAPSULE ORAL DAILY
Status: DISCONTINUED | OUTPATIENT
Start: 2021-09-26 | End: 2021-10-06 | Stop reason: HOSPADM

## 2021-09-26 RX ORDER — LEVOTHYROXINE SODIUM 0.05 MG/1
75 TABLET ORAL
Status: DISCONTINUED | OUTPATIENT
Start: 2021-09-26 | End: 2021-10-06 | Stop reason: HOSPADM

## 2021-09-26 RX ORDER — TRAMADOL HYDROCHLORIDE 50 MG/1
50 TABLET ORAL EVERY 6 HOURS PRN
COMMUNITY

## 2021-09-26 RX ORDER — POLYETHYLENE GLYCOL 3350 17 G/17G
1 POWDER, FOR SOLUTION ORAL
Status: DISCONTINUED | OUTPATIENT
Start: 2021-09-26 | End: 2021-10-06 | Stop reason: HOSPADM

## 2021-09-26 RX ORDER — BUDESONIDE AND FORMOTEROL FUMARATE DIHYDRATE 80; 4.5 UG/1; UG/1
2 AEROSOL RESPIRATORY (INHALATION)
Status: DISCONTINUED | OUTPATIENT
Start: 2021-09-26 | End: 2021-10-06 | Stop reason: HOSPADM

## 2021-09-26 RX ORDER — OMEPRAZOLE 20 MG/1
20 CAPSULE, DELAYED RELEASE ORAL 2 TIMES DAILY
Status: DISCONTINUED | OUTPATIENT
Start: 2021-09-26 | End: 2021-10-06 | Stop reason: HOSPADM

## 2021-09-26 RX ORDER — OXYCODONE HYDROCHLORIDE 5 MG/1
5 TABLET ORAL EVERY 6 HOURS PRN
Status: ON HOLD | COMMUNITY
End: 2021-10-05

## 2021-09-26 RX ADMIN — DIBASIC SODIUM PHOSPHATE, MONOBASIC POTASSIUM PHOSPHATE AND MONOBASIC SODIUM PHOSPHATE 250 MG: 852; 155; 130 TABLET ORAL at 09:23

## 2021-09-26 RX ADMIN — SENNOSIDES AND DOCUSATE SODIUM 2 TABLET: 50; 8.6 TABLET ORAL at 09:24

## 2021-09-26 RX ADMIN — ENOXAPARIN SODIUM 40 MG: 40 INJECTION SUBCUTANEOUS at 09:27

## 2021-09-26 RX ADMIN — CARVEDILOL 3.12 MG: 6.25 TABLET, FILM COATED ORAL at 09:25

## 2021-09-26 RX ADMIN — CEFTRIAXONE SODIUM 2 G: 2 INJECTION, POWDER, FOR SOLUTION INTRAMUSCULAR; INTRAVENOUS at 11:20

## 2021-09-26 RX ADMIN — DIBASIC SODIUM PHOSPHATE, MONOBASIC POTASSIUM PHOSPHATE AND MONOBASIC SODIUM PHOSPHATE 250 MG: 852; 155; 130 TABLET ORAL at 17:28

## 2021-09-26 RX ADMIN — IOHEXOL 61 ML: 350 INJECTION, SOLUTION INTRAVENOUS at 02:39

## 2021-09-26 RX ADMIN — BUDESONIDE AND FORMOTEROL FUMARATE DIHYDRATE 2 PUFF: 80; 4.5 AEROSOL RESPIRATORY (INHALATION) at 09:30

## 2021-09-26 RX ADMIN — LOSARTAN POTASSIUM 25 MG: 25 TABLET, FILM COATED ORAL at 09:26

## 2021-09-26 RX ADMIN — BUDESONIDE AND FORMOTEROL FUMARATE DIHYDRATE 2 PUFF: 80; 4.5 AEROSOL RESPIRATORY (INHALATION) at 19:31

## 2021-09-26 RX ADMIN — LEVOTHYROXINE SODIUM 75 MCG: 0.05 TABLET ORAL at 09:26

## 2021-09-26 RX ADMIN — ACETAMINOPHEN 650 MG: 325 TABLET, FILM COATED ORAL at 17:27

## 2021-09-26 RX ADMIN — FUROSEMIDE 40 MG: 10 INJECTION, SOLUTION INTRAMUSCULAR; INTRAVENOUS at 02:39

## 2021-09-26 RX ADMIN — HYDROCORTISONE 10 MG: 10 TABLET ORAL at 17:28

## 2021-09-26 RX ADMIN — IPRATROPIUM BROMIDE 0.5 MG: 0.5 SOLUTION RESPIRATORY (INHALATION) at 01:01

## 2021-09-26 RX ADMIN — POLYVINYL ALCOHOL 1 DROP: 14 SOLUTION/ DROPS OPHTHALMIC at 17:29

## 2021-09-26 RX ADMIN — POLYVINYL ALCOHOL 1 DROP: 14 SOLUTION/ DROPS OPHTHALMIC at 09:32

## 2021-09-26 RX ADMIN — ALBUTEROL SULFATE 15 MG/HR: 5 SOLUTION RESPIRATORY (INHALATION) at 01:01

## 2021-09-26 RX ADMIN — ATORVASTATIN CALCIUM 80 MG: 40 TABLET, FILM COATED ORAL at 17:28

## 2021-09-26 RX ADMIN — METHYLPREDNISOLONE SODIUM SUCCINATE 125 MG: 125 INJECTION, POWDER, FOR SOLUTION INTRAMUSCULAR; INTRAVENOUS at 01:14

## 2021-09-26 RX ADMIN — HYDROCORTISONE 10 MG: 10 TABLET ORAL at 09:26

## 2021-09-26 RX ADMIN — SENNOSIDES AND DOCUSATE SODIUM 2 TABLET: 50; 8.6 TABLET ORAL at 17:28

## 2021-09-26 RX ADMIN — TAMSULOSIN HYDROCHLORIDE 0.4 MG: 0.4 CAPSULE ORAL at 09:23

## 2021-09-26 RX ADMIN — OMEPRAZOLE 20 MG: 20 CAPSULE, DELAYED RELEASE ORAL at 17:29

## 2021-09-26 RX ADMIN — OMEPRAZOLE 20 MG: 20 CAPSULE, DELAYED RELEASE ORAL at 09:24

## 2021-09-26 RX ADMIN — CARVEDILOL 3.12 MG: 6.25 TABLET, FILM COATED ORAL at 17:29

## 2021-09-26 RX ADMIN — DOXYCYCLINE 100 MG: 100 TABLET, FILM COATED ORAL at 01:17

## 2021-09-26 RX ADMIN — CLOPIDOGREL BISULFATE 75 MG: 75 TABLET ORAL at 09:27

## 2021-09-26 ASSESSMENT — ENCOUNTER SYMPTOMS
HEADACHES: 0
ABDOMINAL PAIN: 0
CONSTIPATION: 0
FEVER: 0
BLURRED VISION: 0
FLANK PAIN: 0
SPUTUM PRODUCTION: 1
VOMITING: 0
SENSORY CHANGE: 0
CHILLS: 0
WHEEZING: 0
HEMOPTYSIS: 1
NECK PAIN: 0
DIARRHEA: 0
BLOOD IN STOOL: 0
TREMORS: 0
EYE PAIN: 0
PALPITATIONS: 0
LOSS OF CONSCIOUSNESS: 0
SHORTNESS OF BREATH: 1
DOUBLE VISION: 0
NAUSEA: 0
BACK PAIN: 0
COUGH: 1

## 2021-09-26 ASSESSMENT — FIBROSIS 4 INDEX: FIB4 SCORE: 1.86

## 2021-09-26 ASSESSMENT — PAIN DESCRIPTION - PAIN TYPE: TYPE: ACUTE PAIN

## 2021-09-26 NOTE — PROGRESS NOTES
[facility] Medicine Daily Progress Note    Date of Service  9/26/2021    Chief Complaint  Jeffrey Lizama is a 88 y.o. male admitted 9/25/2021 with dyspnea    Hospital Course  Jeffrey Lizama is a 88 y.o. male who presented 9/25/2021 with worsening shortness of breath and drowsy. Patient is on 4 to 5 L of oxygen at baseline but noticed worsening shortness of breath especially with exertion starting yesterday.  Patient was unsure of his oxygen concentrator was working because it is a new machine however he continued to turn up his oxygen.  When EMS arrived they placed him on 10 L mask.  Upon arrival to the ER, patient did have a CT scan which showed bilateral pneumonia.  He has been vaccinated against Covid. Covid test negative at ER    Interval Problem Update  Patient feels much better today. Oxygen down to 5L NC as his baseling.   A/o x4. Reported severe productive cough, light red color in sputum  Denies f/c/n/v, chest pain, abd pain.     On Rocephin and azithromycin  Patient is allergic to penicillin    BL pneumonia, need to r/o aspiration. Patient is not sure whether he has aspiration events or not. SLP ordered    PT OT ordered    He lives with his wife, who also has some medical conditons    I have personally seen and examined the patient at bedside. I discussed the plan of care with patient, bedside RN and .    Consultants/Specialty      Code Status  DNAR, I OK    Disposition  Patient is not medically cleared.   Anticipate discharge to pt ot .  I have placed the appropriate orders for post-discharge needs.    Review of Systems  Review of Systems   Constitutional: Positive for malaise/fatigue. Negative for chills and fever.   HENT: Negative for ear discharge and ear pain.    Eyes: Negative for blurred vision, double vision and pain.   Respiratory: Positive for cough, hemoptysis, sputum production and shortness of breath. Negative for wheezing.    Cardiovascular: Negative for chest pain and  palpitations.   Gastrointestinal: Negative for abdominal pain, blood in stool, constipation, diarrhea, nausea and vomiting.   Genitourinary: Negative for dysuria, flank pain, hematuria and urgency.   Musculoskeletal: Negative for back pain and neck pain.   Neurological: Negative for tremors, sensory change, loss of consciousness and headaches.        Physical Exam  Temp:  [36.7 °C (98 °F)-36.9 °C (98.4 °F)] 36.7 °C (98 °F)  Pulse:  [69-91] 91  Resp:  [18] 18  BP: (114-149)/(78-99) 131/84  SpO2:  [93 %-100 %] 95 %    Physical Exam  Vitals and nursing note reviewed.   Constitutional:       General: He is not in acute distress.     Appearance: He is obese. He is ill-appearing. He is not diaphoretic.   HENT:      Head: Normocephalic and atraumatic.      Nose: Nose normal.      Mouth/Throat:      Pharynx: Oropharynx is clear. No oropharyngeal exudate or posterior oropharyngeal erythema.   Eyes:      General:         Right eye: No discharge.         Left eye: No discharge.      Extraocular Movements: Extraocular movements intact.      Conjunctiva/sclera: Conjunctivae normal.   Cardiovascular:      Rate and Rhythm: Normal rate and regular rhythm.      Pulses: Normal pulses.      Heart sounds: Normal heart sounds. No gallop.    Pulmonary:      Effort: Pulmonary effort is normal. No respiratory distress.      Breath sounds: No stridor. No wheezing, rhonchi or rales.      Comments: Decreased breathing sounds  Chest:      Chest wall: No tenderness.   Abdominal:      General: Bowel sounds are normal. There is no distension.      Palpations: Abdomen is soft.      Tenderness: There is no abdominal tenderness. There is no guarding or rebound.   Musculoskeletal:         General: No swelling, tenderness, deformity or signs of injury.      Cervical back: No rigidity. No muscular tenderness.      Right lower leg: No edema.      Left lower leg: No edema.   Skin:     General: Skin is warm and dry.      Findings: Bruising present.    Neurological:      Mental Status: He is alert and oriented to person, place, and time. Mental status is at baseline.      Sensory: No sensory deficit.      Motor: No weakness.      Gait: Gait normal.   Psychiatric:         Mood and Affect: Mood normal.         Behavior: Behavior normal.         Thought Content: Thought content normal.         Fluids  No intake or output data in the 24 hours ending 09/26/21 1020    Laboratory  Recent Labs     09/26/21  0056   WBC 12.9*   RBC 3.88*   HEMOGLOBIN 11.2*   HEMATOCRIT 34.7*   MCV 89.4   MCH 28.9   MCHC 32.3*   RDW 58.0*   PLATELETCT 201   MPV 9.2     Recent Labs     09/26/21  0056   SODIUM 132*   POTASSIUM 4.5   CHLORIDE 95*   CO2 27   GLUCOSE 83   BUN 31*   CREATININE 1.04   CALCIUM 9.1     Recent Labs     09/26/21  0056   APTT 33.7   INR 1.09               Imaging  CT-CTA CHEST PULMONARY ARTERY W/ RECONS   Final Result      1.  No pulmonary embolism   2.  New bilateral groundglass and consolidative opacities, this is likely infectious given it is new from 4/18/21 however recommend short interval follow up in 3-6 months to ensure resolution.            DX-CHEST-PORTABLE (1 VIEW)   Final Result      Diffuse bilateral patchy parenchymal opacities. Small left pleural effusion.   Unchanged cardiomegaly           Assessment/Plan  * Pneumonia- (present on admission)  Assessment & Plan  -Bilateral groundglass and consolidative opacities noted on CT scan  -Start Rocephin and azithromycin, no recent hospitalization  -Causing leukocytosis but no sepsis  -Causing worsening hypoxia  -Await culture results  -Obtain procalcitonin    Acute on chronic respiratory failure with hypoxia (HCC)- (present on admission)  Assessment & Plan  -Due to pneumonia  -Continuous pulse ox monitoring  -Respiratory care per protocol    Acute encephalopathy  Assessment & Plan  Patient was drowsy, confusing  and was not able to provide information at admission  likeley due to hypoxia secondary to  pneumonia.     - resolved    GERD (gastroesophageal reflux disease)- (present on admission)  Assessment & Plan  -Continue home PPI    Hypothyroidism- (present on admission)  Assessment & Plan  -Continue home Synthroid at 75 mcg daily  -Most recent TSH was approximately 4 months ago was normal at 3.16    Dyslipidemia- (present on admission)  Assessment & Plan  -Continue home statin    Chronic systolic congestive heart failure (HCC)- (present on admission)  Assessment & Plan  -Continue medical management  -I do not feel there is any acute change, he does have an elevated troponin and BNP however this is chronic  -I will repeat a troponin to make sure it is not significantly worsening    Leukocytosis- (present on admission)  Assessment & Plan  -Due to pneumonia  -Repeat CBC in the morning    Normocytic anemia- (present on admission)  Assessment & Plan  -No sign of gross bleeding, chronic    Hyponatremia- (present on admission)  Assessment & Plan  -Mild, he does have some mild lower extremity edema and is chronically on Lasix however at this point in time Lasix will be held due to infectious process  -IV fluids were given in the ER  -If there is worsening of his sodium on repeat BMP in the morning, likely due to fluids and would recommend starting Lasix however again I will not started at this time    Paroxysmal atrial fibrillation (HCC)- (present on admission)  Assessment & Plan  -Also with a history of sick sinus syndrome, now with a pacemaker  -He is currently paced    BPH (benign prostatic hypertrophy)- (present on admission)  Assessment & Plan  -Continue home Flomax    COPD (chronic obstructive pulmonary disease) (HCC)- (present on admission)  Assessment & Plan  -No acute exacerbation  -He has however at significant risk of developing an acute exacerbation since he does have a bilateral pneumonia  -No need for steroids at this time    Essential hypertension, benign- (present on admission)  Assessment &  Plan  -Continue home Coreg and losartan  -Start as needed labetalol  -Adjust as needed       VTE prophylaxis: SCDs/TEDs and enoxaparin ppx    I have performed a physical exam and reviewed and updated ROS and Plan today (9/26/2021). In review of yesterday's note (9/25/2021), there are no changes except as documented above.

## 2021-09-26 NOTE — PROGRESS NOTES
COVID-19 surge in effect.    Assumed Pt. Care at 0710  Pt. A&Ox4  Patient oxygen saturation is between 90-96% on 10L oxymask.   Generalized pain is 7/10. Appropriate pain meds will be given per order.  Pt. Is lying in bed having breakfast.  Bed at lowest position.  Bed alarm is on. Call light and personal belonging within reach. Continue to monitor.

## 2021-09-26 NOTE — PROGRESS NOTES
Med rec updated and complete  Allergies reviewed  Pt does not know what medications he is taking, asked me to try to get a hold of his wife (Jacquie) at [facility]   Called [facility] ([phone]) to verify all medications, per staff look at pts medications bottles with pts wife.  Per wife has a hard time hearing on the phone.      No current facility-administered medications on file prior to encounter.     Current Outpatient Medications on File Prior to Encounter   Medication Sig Dispense Refill   • Fluticasone Furoate-Vilanterol (BREO ELLIPTA) 100-25 MCG/INH AEROSOL POWDER, BREATH ACTIVATED Inhale 1 Puff every evening.     • carvedilol (COREG) 6.25 MG Tab Take 6.25 mg by mouth 2 times a day with meals.     • losartan (COZAAR) 50 MG Tab Take 50 mg by mouth every day.     • oxyCODONE immediate-release (ROXICODONE) 5 MG Tab Take 5 mg by mouth every 6 hours as needed for Severe Pain.     • traMADol (ULTRAM) 50 MG Tab Take 50 mg by mouth every 6 hours as needed for Severe Pain.     • ALPRAZolam (XANAX) 0.25 MG Tab Take 0.25 mg by mouth 3 times a day as needed for Anxiety.     • carboxymethylcellulose (REFRESH TEARS) 0.5 % Solution Administer 1 Drop into both eyes 2 times a day.     • tamsulosin (FLOMAX) 0.4 MG capsule Take 1 capsule by mouth every day. 30 capsule    • furosemide (LASIX) 20 MG Tab Take 1 tablet by mouth every day. 60 tablet    • albuterol 108 (90 Base) MCG/ACT Aero Soln inhalation aerosol Inhale 2 Puffs every four hours as needed for Shortness of Breath. 8.5 g    • levothyroxine (SYNTHROID) 75 MCG Tab Take 1 tablet by mouth every morning on an empty stomach. 30 tablet    • atorvastatin (LIPITOR) 80 MG tablet Take 1 tablet by mouth every evening. 30 tablet    • clopidogrel (PLAVIX) 75 MG Tab Take 1 tablet by mouth every day. 30 tablet    • Umeclidinium Bromide (INCRUSE ELLIPTA) 62.5 MCG/INH AEROSOL POWDER, BREATH ACTIVATED Inhale 1 Puff every evening.     • fluticasone (FLONASE) 50 MCG/ACT nasal spray  Administer 1 Spray into affected nostril(S) 2 times a day.     • tiotropium (SPIRIVA) 18 MCG Cap Inhale 1 Cap by mouth every day. 30 Cap 3

## 2021-09-26 NOTE — ED PROVIDER NOTES
"ED Provider Note    CHIEF COMPLAINT  Chief Complaint   Patient presents with   • Shortness of Breath     worsening SOB with exertion today. Usually uses 5L at home now on 8L. Hx of COPD and states \"this usually happens with my exacerbations\"     HPI  Patient is a 88-year-old male with a complex past medical history including COPD, BPH, chronic wounds, systolic congestive heart failure with EF of approximately 45% along with prior DVT/PE who uncertain about him being on anticoagulation due to prior rectus sheath hematoma and coronary artery disease with prior stents who presents the emergency room for evaluation of worsening shortness of breath.  The patient is chronically on 4 to 5 L of oxygen and states that he has been having worsening shortness of breath with exertion today.  He believes that this has been progressively worsening since he got his new oxygen concentrator.  He is uncertain about its effectiveness and felt like he was having worsening shortness of breath throughout the day.  This is exacerbated by any movements or exertion and he associates this with some right-sided chest discomfort but no chest pressure pains or similar pains any prior myocardial infarctions.  Because of his perceived shortness of breath, he has been escalating up to 8 L and when EMS was called they placed him on 10 L by Venturi mask.  Patient endorses no leg swelling, no abdominal discomfort or diarrheal illness.  He is vaccinated against Covid.    PPE Note: I personally donned full PPE for all patient encounters during this visit, including being clean-shaven with an N95 respirator mask, gloves, and goggles.     REVIEW OF SYSTEMS  See HPI for further details. All other systems are negative.     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), Indigestion, Knee arthroplasty (2002), Obstructive hypertrophic cardiomyopathy ([facility]) (8/4/2011), PAF (paroxysmal atrial fibrillation) ([facility]), Paroxysmal atrial fibrillation ([facility]) (8/29/2011), PE (pulmonary embolism), Phlebitis, Pituitary adenoma ([facility]) (1995), Pituitary adenoma ([facility]) (1995), Pneumonia, Rheumatic fever, S/P insertion of IVC (inferior vena caval) filter, S/P parathyroidectomy ([facility]), S/P parathyroidectomy ([facility]) (2005), Sleep apnea, SSS (sick sinus syndrome) ([facility]) (8/29/2011), Third degree AV block ([facility]) (8/29/2011), thyroidectomy (2005), Unspecified disorder of thyroid, Unspecified hemorrhagic conditions, Unspecified urinary incontinence, and Urinary bladder disorder.    SOCIAL HISTORY  Social History     Tobacco Use   • Smoking status: Never Smoker   • Smokeless tobacco: Never Used   Substance and Sexual Activity   • Alcohol use: No   • Drug use: No   • Sexual activity: Not on file     SURGICAL HISTORY   has a past surgical history that includes other orthopedic surgery; cholecystectomy; total knee arthroplasty; other; cervical disk and fusion anterior; revise ulnar nerve at wrist; total knee arthroplasty; cataract extraction with iol; thyroidectomy; pacemaker insertion (Left, 04/23/2010); carpal tunnel endoscopic (9/30/2011); knee revision total (6/20/2013); cholecystectomy; cervical fusion posterior; gastroscopy-endo (1/21/2019); gastroscopy-endo (1/22/2019); and other cardiac surgery.    CURRENT MEDICATIONS  Home Medications    **Home medications have not yet been reviewed for this encounter**       ALLERGIES  Allergies   Allergen Reactions   • Lisinopril      Hypotension, 30 mg  "dose   • Pcn [Penicillins] Rash     Tolerates cephalosporins       PHYSICAL EXAM  VITAL SIGNS: /95   Pulse 74   Temp 36.9 °C (98.4 °F) (Temporal)   Resp 18   Ht 1.727 m (5' 8\")   Wt 88.9 kg (196 lb)   SpO2 99%   BMI 29.80 kg/m²   Pulse ox interpretation: I interpret this pulse ox as normal.  Genl: chronically ill M, sitting in gurney comfortably, speaking in short sentences, appears in milddistress   Head: NC/AT   ENT: Mucous membranes moist, posterior pharynx clear, uvula midline, nares patent bilaterally   Eyes: Normal sclera, pupils equal round reactive to light  Neck: Supple, FROM, no LAD appreciated   Pulmonary: Lungs somewhat diminished in the bilateral lower lung fields, slight end expiratory wheezes in the right upper lung field.  Chest: No TTP  CV:  RRR, no murmur appreciated, pulses 2+ in both upper and lower extremities,  Abdomen: soft, NT/ND; no rebound/guarding, no masses palpated, no HSM   : no CVA or suprapubic tenderness  Musculoskeletal: Pain free ROM of the neck. Moving upper and lower extremities and spontaneous in coordinated fashion.  Left ankle has a chronic deformity from prior injury.  All extremities are neurovascularly intact.  Neuro: A&Ox4 (person, place, time, situation), speech fluent, gait not assessed, no focal deficits appreciated, No cerebellar signs. Sensation is grossly intact in the distal upper and lower extremities.  5/5 strength in  and dorsiflexion/plantar flexion of the ankles  Psych: Patient has an appropriate affect and behavior  Skin: No rash or lesions.  No pallor or jaundice.  No cyanosis.  Warm and dry.     DIAGNOSTIC STUDIES / PROCEDURES    EKG  As interpreted by me at 0052: This is a ventricularly paced rhythm, wide complex, no further findings consistent with Sgarbossa criteria or evidence of acute ST segment elevations are noted based on the current rhythm.    LABS  Labs Reviewed   CBC WITH DIFFERENTIAL - Abnormal; Notable for the following " components:       Result Value    WBC 12.9 (*)     RBC 3.88 (*)     Hemoglobin 11.2 (*)     Hematocrit 34.7 (*)     MCHC 32.3 (*)     RDW 58.0 (*)     Neutrophils-Polys 79.20 (*)     Lymphocytes 9.20 (*)     Immature Granulocytes 1.00 (*)     Neutrophils (Absolute) 10.20 (*)     Immature Granulocytes (abs) 0.13 (*)     All other components within normal limits    Narrative:     Indicate which anticoagulants the patient is on:->UNKNOWN  ** poor hx, hx of DOAC use  1. Age less then 50  2. Heart reate less then 100  3. 02 sat > 94%  4. No prior history of DVT or PE  5. No recent trauma or surgery (2 weeks)  6. No hemoptisis.  7. No  or HRP  8. No un-lateral leg swelling.   COMP METABOLIC PANEL - Abnormal; Notable for the following components:    Sodium 132 (*)     Chloride 95 (*)     Bun 31 (*)     All other components within normal limits    Narrative:     Indicate which anticoagulants the patient is on:->UNKNOWN  ** poor hx, hx of DOAC use  1. Age less then 50  2. Heart reate less then 100  3. 02 sat > 94%  4. No prior history of DVT or PE  5. No recent trauma or surgery (2 weeks)  6. No hemoptisis.  7. No  or HRP  8. No un-lateral leg swelling.   PROBRAIN NATRIURETIC PEPTIDE, NT - Abnormal; Notable for the following components:    NT-proBNP 4771 (*)     All other components within normal limits    Narrative:     Indicate which anticoagulants the patient is on:->UNKNOWN  ** poor hx, hx of DOAC use  1. Age less then 50  2. Heart reate less then 100  3. 02 sat > 94%  4. No prior history of DVT or PE  5. No recent trauma or surgery (2 weeks)  6. No hemoptisis.  7. No  or HRP  8. No un-lateral leg swelling.   TROPONIN - Abnormal; Notable for the following components:    Troponin T 111 (*)     All other components within normal limits    Narrative:     Indicate which anticoagulants the patient is on:->UNKNOWN  ** poor hx, hx of DOAC use  1. Age less then 50  2. Heart reate less then 100  3. 02 sat > 94%  4. No  prior history of DVT or PE  5. No recent trauma or surgery (2 weeks)  6. No hemoptisis.  7. No  or HRP  8. No un-lateral leg swelling.   D-DIMER - Abnormal; Notable for the following components:    D-Dimer Screen 1.41 (*)     All other components within normal limits    Narrative:     Indicate which anticoagulants the patient is on:->UNKNOWN  ** poor hx, hx of DOAC use  1. Age less then 50  2. Heart reate less then 100  3. 02 sat > 94%  4. No prior history of DVT or PE  5. No recent trauma or surgery (2 weeks)  6. No hemoptisis.  7. No  or HRP  8. No un-lateral leg swelling.   TROPONIN - Abnormal; Notable for the following components:    Troponin T 115 (*)     All other components within normal limits   PROTHROMBIN TIME    Narrative:     Indicate which anticoagulants the patient is on:->UNKNOWN  ** poor hx, hx of DOAC use  1. Age less then 50  2. Heart reate less then 100  3. 02 sat > 94%  4. No prior history of DVT or PE  5. No recent trauma or surgery (2 weeks)  6. No hemoptisis.  7. No  or HRP  8. No un-lateral leg swelling.   APTT    Narrative:     Indicate which anticoagulants the patient is on:->UNKNOWN  ** poor hx, hx of DOAC use  1. Age less then 50  2. Heart reate less then 100  3. 02 sat > 94%  4. No prior history of DVT or PE  5. No recent trauma or surgery (2 weeks)  6. No hemoptisis.  7. No  or HRP  8. No un-lateral leg swelling.   VENOUS BLOOD GAS   ESTIMATED GFR    Narrative:     Indicate which anticoagulants the patient is on:->UNKNOWN  ** poor hx, hx of DOAC use  1. Age less then 50  2. Heart reate less then 100  3. 02 sat > 94%  4. No prior history of DVT or PE  5. No recent trauma or surgery (2 weeks)  6. No hemoptisis.  7. No  or HRP  8. No un-lateral leg swelling.   COV-2, FLU A/B, AND RSV BY PCR    Narrative:     Have you been in close contact with a person who is suspected  or known to be positive for COVID-19 within the last 30 days  (e.g. last seen that person < 30 days  ago)->Unknown     RADIOLOGY  CT-CTA CHEST PULMONARY ARTERY W/ RECONS   Final Result      1.  No pulmonary embolism   2.  New bilateral groundglass and consolidative opacities, this is likely infectious given it is new from 4/18/21 however recommend short interval follow up in 3-6 months to ensure resolution.            DX-CHEST-PORTABLE (1 VIEW)   Final Result      Diffuse bilateral patchy parenchymal opacities. Small left pleural effusion.   Unchanged cardiomegaly        COURSE & MEDICAL DECISION MAKING  Pertinent Labs & Imaging studies reviewed. (See chart for details)    DDX:  Pneumothorax  Pulmonary embolism  Pneumonia  COPD exacerbation  Asthma exacerbation  CHF exacerbation  Rib fractures  ACS  Anxiety    MDM    Initial evaluation at 0000:  Patient was seen and evaluated emergently as the patient had escalating oxygen demands and was initially noted to be in very mild respiratory distress. After receiving inline albuterol therapy patient's oxygen demands have back to baseline. You speaking full sentences and reported that he was having some worsening shortness of breath, some nonspecific complaints as described above but overall was not febrile but there was concerned as the patient is chronically ill, elderly and is oxygen dependent with both COPD, CHF with previous EF at approximately 45%. There is some prior DVT/PE and he is not currently on anticoagulation due to side effects of the medications. The patient had elevated troponin and delta per trauma went down with his chronic troponins being elevated between 104 100. He is not having chest pain consistent with acute ACS at this time. Similarly the patient has an elevated BNP of 4771, though this is decreased from his previous values his chest x-ray shows substantial increased interstitial prominence that could be CHF. Additionally he has focal consolidations in the bilateral lower lungs. He has scant leukocytosis and empiric antibiosis was initiated. He is  not currently septic or having signs of septic shock but with his longstanding heart and lung dysfunction he did receive empiric antibiosis and both steroids and beta adrenergic agents. With the fulminans and the bilateral prominence of the interstitial fluid I did give him a small amount of Lasix as he is due for this chronically.    The patient was evaluated in the emergency room and while he had some chronic values that were similar to his old longstanding lab values his D-dimer was positive at this point a CT scan was obtained. There is no acute evidence of pulmonary embolism at this time. The patient was then noted to have acute hypoxia into the 70s which was corrected with administration of 9 rebreather mask at 15. He was then titrated back down to 6L via nasal cannula. He does have this intermittent hypoxia in the setting of a bilateral new presumed infectious pneumonia. Patient should be admitted for further observation and trending of labs. Covid test is negative, VBG is reassuring, I discussed case with the hospitalist will admit the patient in guarded condition.      FINAL IMPRESSION  Visit Diagnoses     ICD-10-CM   1. Dyspnea on exertion  R06.00   2. Pneumonia of both lower lobes due to infectious organism  J18.9   3. Acute on chronic congestive heart failure, unspecified heart failure type (HCC)  I50.9     Electronically signed by: Levy Martinez M.D., 9/26/2021 12:00 AM

## 2021-09-26 NOTE — ASSESSMENT & PLAN NOTE
-Continue home Synthroid at 75 mcg daily  -Most recent TSH was approximately 4 months ago was normal at 3.16

## 2021-09-26 NOTE — THERAPY
"   09/26/21 5327   Total Time Spent   Total Time Spent (Mins) 15   Charge Group   PT Evaluation PT Evaluation Mod   Initial Contact Note    Initial Contact Note Order Received and Verified, Physical Therapy Evaluation in Progress with Full Report to Follow.   Precautions   Precautions Fall Risk   Prior Living Situation   Prior Services Home-Independent   Housing / Facility Independent Living Facility   Equipment Owned 4-Wheel Walker;Wheelchair   Lives with - Patient's Self Care Capacity Spouse   Prior Level of Functional Mobility   Bed Mobility Independent   Transfer Status Independent   Ambulation Independent   Distance Ambulation (Feet) 100   Assistive Devices Used Front-Wheel Walker   Wheelchair Independent   Comments lilves at [facility], uses W/C and 4WW to go for meals. indep with ADL's.  long standing knee issues.    History of Falls   History of Falls Yes   Date of Last Fall 09/25/21  (\"trip up\", knee buckle )   Cognition    Cognition / Consciousness WDL   Comments memory issues, pleasant and very concerned about welfare of spouse.  on call notified.    Strength Lower Body   Comments pt c/o of recent knee buckle and B knee issues that are long standing from a work injury   Education Group   Education Provided Exercises - Supine   Exercises - Supine Patient Response Patient;Acceptance;Explanation;Verbal Demonstration   Additional Comments ther ex given ankle pumps, heelslides   Interdisciplinary Plan of Care Collaboration   IDT Collaboration with  Nursing   Patient Position at End of Therapy In Bed   Collaboration Comments pt declined out of bed due to SOB and anxious about spouse wellbeing. SW on call notified.    Session Information   Date / Session Number  9/26/21 Pt refused full eval. Pt given HEP.      "

## 2021-09-26 NOTE — H&P
"Hospital Medicine History & Physical Note    Date of Service  9/26/2021    Primary Care Physician  Bebe Banerjee M.D.    Consultants  None    Specialist Names: None    Code Status  DNAR, I OK    Chief Complaint  Chief Complaint   Patient presents with   • Shortness of Breath     worsening SOB with exertion today. Usually uses 5L at home now on 8L. Hx of COPD and states \"this usually happens with my exacerbations\"       History of Presenting Illness  Jeffrey Lizama is a 88 y.o. male who presented 9/25/2021 with worsening shortness of breath.  I did awake patient for my exam, because of this, he was mostly nonverbal and I was unable to get information from him, information obtained from the chart.  Patient is on 4 to 5 L of oxygen at baseline but noticed worsening shortness of breath especially with exertion starting yesterday.  Patient was unsure of his oxygen concentrator was working because it is a new machine however he continued to turn up his oxygen.  When EMS arrived they placed him on 10 L mask.  Upon arrival to the ER, patient did have a CT scan which showed bilateral pneumonia.  He has been vaccinated against Covid.  I did discuss the case including labs and imaging with the ER physician.    I discussed the plan of care with ERP.    Review of Systems  Review of Systems   Unable to perform ROS: Patient nonverbal       Past Medical History   has a past medical history of Anesthesia, Arrhythmia, Arthritis, Asbestos exposure, ASTHMA, Back pain, Benign neoplasm of pituitary gland and craniopharyngeal duct (pouch) ([facility]) (4/1/2010), BPH (benign prostatic hypertrophy) (4/1/2010), Bradycardia, Breath shortness, Bronchitis, Cardiac pacemaker (04 2010), Cataract, COPD (chronic obstructive pulmonary disease) (HCC), Diverticulitis, DJD (degenerative joint disease), EMPHYSEMA, Glaucoma, Heart burn, Heart murmur, Hiatus hernia syndrome, Hypertension, 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), Indigestion, Knee arthroplasty " (2002), Obstructive hypertrophic cardiomyopathy ([facility]) (8/4/2011), PAF (paroxysmal atrial fibrillation) ([facility]), Paroxysmal atrial fibrillation ([facility]) (8/29/2011), PE (pulmonary embolism), Phlebitis, Pituitary adenoma ([facility]) (1995), Pituitary adenoma ([facility]) (1995), Pneumonia, Rheumatic fever, S/P insertion of IVC (inferior vena caval) filter, S/P parathyroidectomy ([facility]), S/P parathyroidectomy ([facility]) (2005), Sleep apnea, SSS (sick sinus syndrome) ([facility]) (8/29/2011), Third degree AV block ([facility]) (8/29/2011), thyroidectomy (2005), Unspecified disorder of thyroid, Unspecified hemorrhagic conditions, Unspecified urinary incontinence, and Urinary bladder disorder.    Surgical History   has a past surgical history that includes other orthopedic surgery; cholecystectomy; pr total knee arthroplasty; other; cervical disk and fusion anterior; pr revise ulnar nerve at wrist; pr total knee arthroplasty; cataract extraction with iol; thyroidectomy; pacemaker insertion (Left, 04/23/2010); carpal tunnel endoscopic (9/30/2011); knee revision total (6/20/2013); cholecystectomy; cervical fusion posterior; gastroscopy-endo (1/21/2019); gastroscopy-endo (1/22/2019); and other cardiac surgery.     Family History  family history includes Arthritis in his father and mother.   Family history reviewed with patient. There is no family history that is pertinent to the chief complaint.     Social History   reports that he has never smoked. He has never used smokeless tobacco. He reports that he does not drink alcohol and does not use drugs.    Allergies  Allergies   Allergen Reactions   • Lisinopril      Hypotension, 30 mg dose   • Pcn [Penicillins] Rash     Tolerates cephalosporins         Medications  Prior to Admission Medications   Prescriptions Last Dose Informant Patient Reported? Taking?   Umeclidinium Bromide (INCRUSE ELLIPTA) 62.5 MCG/INH AEROSOL POWDER, BREATH ACTIVATED  MAR from Other Facility Yes No   Sig: Inhale 1 Puff every day.    acetaminophen (TYLENOL) 325 MG Tab  MAR from Other Facility Yes No   Sig: Take 650 mg by mouth 3 times a day.   albuterol 108 (90 Base) MCG/ACT Aero Soln inhalation aerosol  MAR from Other Facility No No   Sig: Inhale 2 Puffs every four hours as needed for Shortness of Breath.   Patient not taking: Reported on 5/20/2021   albuterol 108 (90 Base) MCG/ACT Aero Soln inhalation aerosol   No No   Sig: Inhale 2 Puffs every four hours as needed for Shortness of Breath.   aspirin 81 MG EC tablet  MAR from Other Facility No No   Sig: Take 1 tablet by mouth every day.   aspirin EC 81 MG EC tablet   No No   Sig: Take 1 tablet by mouth every day.   atorvastatin (LIPITOR) 80 MG tablet  MAR from Other Facility No No   Sig: Take 1 tablet by mouth every evening.   atorvastatin (LIPITOR) 80 MG tablet   No No   Sig: Take 1 tablet by mouth every evening.   budesonide-formoterol (SYMBICORT) 80-4.5 MCG/ACT Aerosol  MAR from Other Facility No No   Sig: Inhale 2 Puffs 2 times a day.   Patient not taking: Reported on 5/20/2021   budesonide-formoterol (SYMBICORT) 80-4.5 MCG/ACT Aerosol   No No   Sig: Inhale 2 Puffs 2 times a day.   calcium citrate (CALCITRATE) 950 (200 Ca) MG Tab  MAR from Other Facility No No   Sig: Take 1 tablet by mouth every day.   carboxymethylcellulose (REFRESH TEARS) 0.5 % Solution   No No   Sig: Administer 1 Drop into both eyes 2 times a day.   carvedilol (COREG) 3.125 MG Tab   No No   Sig: Take 1 tablet by mouth 2 times a day with meals.   clopidogrel (PLAVIX) 75 MG Tab  MAR from Other Facility No No   Sig: Take 1 tablet by mouth every day.   clopidogrel (PLAVIX) 75 MG Tab   No No   Sig: Take 1 tablet by mouth every day.   enoxaparin (LOVENOX) 40 MG/0.4ML Solution inj   No No   Sig: Inject 40 mg under the skin every day.   fluticasone (FLONASE) 50 MCG/ACT nasal spray  MAR from Other Facility Yes No   Sig: Administer 1 Spray into affected nostril(S) every day.   furosemide (LASIX) 20 MG Tab  MAR from Other  Facility No No   Sig: Take 2 tablets by mouth IN THE MORNING and 1 tab in the afternoon.   Patient taking differently: Take 20 mg by mouth 2 times a day. Takes 1 tab twice daily   furosemide (LASIX) 20 MG Tab   No No   Sig: Take 1 tablet by mouth every day.   hydrocortisone (CORTEF) 10 MG Tab  MAR from Other Facility No No   Sig: Take 1 Tab by mouth 3 times a day.   ipratropium-albuterol (DUONEB) 0.5-2.5 (3) MG/3ML nebulizer solution  MAR from Other Facility No No   Sig: Take 3 mL by nebulization every 6 hours as needed for Shortness of Breath.   levothyroxine (SYNTHROID) 75 MCG Tab  MAR from Other Facility No No   Sig: Take 1 tablet by mouth every morning on an empty stomach.   levothyroxine (SYNTHROID) 75 MCG Tab   No No   Sig: Take 1 tablet by mouth every morning on an empty stomach.   losartan (COZAAR) 25 MG Tab   No No   Sig: Take 1 tablet by mouth every day.   montelukast (SINGULAIR) 10 MG Tab  MAR from Other Facility No No   Sig: Take 1 Tab by mouth every day.   omeprazole (PRILOSEC) 20 MG delayed-release capsule  MAR from Other Facility No No   Sig: Take 1 capsule by mouth 2 times a day.   omeprazole (PRILOSEC) 20 MG delayed-release capsule   No No   Sig: Take 1 capsule by mouth 2 times a day.   phosphorus (K-PHOS-NEUTRAL) 250 MG tablet  MAR from Other Facility No No   Sig: Take 1 tablet by mouth 2 times a day.   polyethylene glycol/lytes (MIRALAX) 17 g Pack  MAR from Other Facility No No   Sig: Mix 1 Packet in liquid as directed and take by mouth 2 times a day.   polyethylene glycol/lytes (MIRALAX) 17 g Pack   No No   Sig: Take 1 Packet by mouth every day.   senna-docusate (PERICOLACE OR SENOKOT S) 8.6-50 MG Tab  MAR from Other Facility No No   Sig: Take 1 tablet by mouth 2 times a day.   tamsulosin (FLOMAX) 0.4 MG capsule  MAR from Other Facility No No   Sig: Take 1 capsule by mouth every day.   tamsulosin (FLOMAX) 0.4 MG capsule   No No   Sig: Take 1 capsule by mouth every day.   tiotropium (SPIRIVA) 18  MCG Cap  MAR from Other Facility No No   Sig: Inhale 1 Cap by mouth every day.   Patient not taking: Reported on 5/20/2021      Facility-Administered Medications: None       Physical Exam  Temp:  [36.9 °C (98.4 °F)] 36.9 °C (98.4 °F)  Pulse:  [69-85] 69  Resp:  [18] 18  BP: (114-148)/(78-99) 114/78  SpO2:  [93 %-99 %] 93 %  Blood Pressure : 114/78   Temperature: 36.9 °C (98.4 °F)   Pulse: 69   Respiration: 18   Pulse Oximetry: 93 %       Physical Exam  Vitals and nursing note reviewed.   Constitutional:       General: He is not in acute distress.     Appearance: He is well-developed. He is not toxic-appearing or diaphoretic.   HENT:      Head: Normocephalic and atraumatic.      Right Ear: External ear normal.      Left Ear: External ear normal.      Nose: Nose normal. No congestion or rhinorrhea.      Mouth/Throat:      Mouth: Mucous membranes are moist.      Pharynx: No oropharyngeal exudate.   Eyes:      General:         Right eye: No discharge.         Left eye: No discharge.      Extraocular Movements: Extraocular movements intact.   Neck:      Trachea: No tracheal deviation.   Cardiovascular:      Rate and Rhythm: Normal rate and regular rhythm.      Heart sounds: Murmur heard.   No friction rub. No gallop.    Pulmonary:      Effort: Pulmonary effort is normal. No respiratory distress.      Breath sounds: No stridor. Rales present. No wheezing.   Abdominal:      General: Bowel sounds are normal. There is no distension.      Palpations: Abdomen is soft.   Musculoskeletal:      Cervical back: Normal range of motion and neck supple. No edema or erythema.      Right lower leg: Edema present.      Left lower leg: Edema present.   Lymphadenopathy:      Cervical: No cervical adenopathy.   Skin:     General: Skin is warm and dry.      Findings: No erythema or rash.   Neurological:      Comments: Somnolent, mostly nonverbal    Psychiatric:         Speech: He is noncommunicative.         Laboratory:  Recent Labs      09/26/21  0056   WBC 12.9*   RBC 3.88*   HEMOGLOBIN 11.2*   HEMATOCRIT 34.7*   MCV 89.4   MCH 28.9   MCHC 32.3*   RDW 58.0*   PLATELETCT 201   MPV 9.2     Recent Labs     09/26/21  0056   SODIUM 132*   POTASSIUM 4.5   CHLORIDE 95*   CO2 27   GLUCOSE 83   BUN 31*   CREATININE 1.04   CALCIUM 9.1     Recent Labs     09/26/21  0056   ALTSGPT 16   ASTSGOT 17   ALKPHOSPHAT 64   TBILIRUBIN 0.8   GLUCOSE 83     Recent Labs     09/26/21  0056   APTT 33.7   INR 1.09     Recent Labs     09/26/21  0056   NTPROBNP 4771*         Recent Labs     09/26/21  0056 09/26/21  0343   TROPONINT 111* 115*       Imaging:  CT-CTA CHEST PULMONARY ARTERY W/ RECONS   Final Result      1.  No pulmonary embolism   2.  New bilateral groundglass and consolidative opacities, this is likely infectious given it is new from 4/18/21 however recommend short interval follow up in 3-6 months to ensure resolution.            DX-CHEST-PORTABLE (1 VIEW)   Final Result      Diffuse bilateral patchy parenchymal opacities. Small left pleural effusion.   Unchanged cardiomegaly          X-Ray:  I have personally reviewed the images and compared with prior images.    Assessment/Plan:  I anticipate this patient will require at least two midnights for appropriate medical management, necessitating inpatient admission.    Pneumonia- (present on admission)  Assessment & Plan  -Bilateral groundglass and consolidative opacities noted on CT scan  -Start Rocephin and azithromycin, no recent hospitalization  -Causing leukocytosis but no sepsis  -Causing worsening hypoxia  -Await culture results  -Obtain procalcitonin    GERD (gastroesophageal reflux disease)- (present on admission)  Assessment & Plan  -Continue home PPI    Hypothyroidism- (present on admission)  Assessment & Plan  -Continue home Synthroid at 75 mcg daily  -Most recent TSH was approximately 4 months ago was normal at 3.16    Dyslipidemia- (present on admission)  Assessment & Plan  -Continue home  statin    Chronic systolic congestive heart failure (HCC)- (present on admission)  Assessment & Plan  -Continue medical management  -I do not feel there is any acute change, he does have an elevated troponin and BNP however this is chronic  -I will repeat a troponin to make sure it is not significantly worsening    Leukocytosis- (present on admission)  Assessment & Plan  -Due to pneumonia  -Repeat CBC in the morning    Normocytic anemia- (present on admission)  Assessment & Plan  -No sign of gross bleeding, chronic    Acute on chronic respiratory failure with hypoxia (HCC)- (present on admission)  Assessment & Plan  -Due to pneumonia  -Continuous pulse ox monitoring  -Respiratory care per protocol    Hyponatremia- (present on admission)  Assessment & Plan  -Mild, he does have some mild lower extremity edema and is chronically on Lasix however at this point in time Lasix will be held due to infectious process  -IV fluids were given in the ER  -If there is worsening of his sodium on repeat BMP in the morning, likely due to fluids and would recommend starting Lasix however again I will not started at this time    Paroxysmal atrial fibrillation (HCC)- (present on admission)  Assessment & Plan  -Also with a history of sick sinus syndrome, now with a pacemaker  -He is currently paced    BPH (benign prostatic hypertrophy)- (present on admission)  Assessment & Plan  -Continue home Flomax    COPD (chronic obstructive pulmonary disease) (HCC)- (present on admission)  Assessment & Plan  -No acute exacerbation  -He has however at significant risk of developing an acute exacerbation since he does have a bilateral pneumonia  -No need for steroids at this time    Essential hypertension, benign- (present on admission)  Assessment & Plan  -Continue home Coreg and losartan  -Start as needed labetalol  -Adjust as needed      VTE prophylaxis: SCDs/TEDs and enoxaparin ppx

## 2021-09-26 NOTE — ASSESSMENT & PLAN NOTE
-Due to pneumonia  -Continuous pulse ox monitoring  -Respiratory care per protocol  -Continues to be between 6 L up from 5L

## 2021-09-26 NOTE — ASSESSMENT & PLAN NOTE
-No acute exacerbation  -He has however at significant risk of developing an acute exacerbation since he does have a bilateral pneumonia  -Hydrocortisone was started on admission

## 2021-09-26 NOTE — ASSESSMENT & PLAN NOTE
-Bilateral groundglass and consolidative opacities noted on CT scan  -Causing leukocytosis but no sepsis  -Causing worsening hypoxia  - procalcitonin remains within normal ranges  -Covid screening negative    - finished unasyn and azithromycin and PO Augmentin.

## 2021-09-26 NOTE — ASSESSMENT & PLAN NOTE
proBNP 4771, seems is at his baseline  Echo 5/2021 showed EF 45%. Dyskinesis of the apex and hypokinesis of the distal/apical inferior and septal walls.    Continue to monitor decompensated signs

## 2021-09-27 LAB
ALBUMIN SERPL BCP-MCNC: 3.8 G/DL (ref 3.2–4.9)
ALBUMIN/GLOB SERPL: 1.5 G/DL
ALP SERPL-CCNC: 65 U/L (ref 30–99)
ALT SERPL-CCNC: 16 U/L (ref 2–50)
ANION GAP SERPL CALC-SCNC: 10 MMOL/L (ref 7–16)
AST SERPL-CCNC: 13 U/L (ref 12–45)
BASOPHILS # BLD AUTO: 0.2 % (ref 0–1.8)
BASOPHILS # BLD: 0.03 K/UL (ref 0–0.12)
BILIRUB SERPL-MCNC: 0.3 MG/DL (ref 0.1–1.5)
BUN SERPL-MCNC: 35 MG/DL (ref 8–22)
CALCIUM SERPL-MCNC: 8.7 MG/DL (ref 8.4–10.2)
CHLORIDE SERPL-SCNC: 96 MMOL/L (ref 96–112)
CO2 SERPL-SCNC: 31 MMOL/L (ref 20–33)
CREAT SERPL-MCNC: 1.3 MG/DL (ref 0.5–1.4)
EOSINOPHIL # BLD AUTO: 0 K/UL (ref 0–0.51)
EOSINOPHIL NFR BLD: 0 % (ref 0–6.9)
ERYTHROCYTE [DISTWIDTH] IN BLOOD BY AUTOMATED COUNT: 55.8 FL (ref 35.9–50)
GLOBULIN SER CALC-MCNC: 2.5 G/DL (ref 1.9–3.5)
GLUCOSE SERPL-MCNC: 121 MG/DL (ref 65–99)
HCT VFR BLD AUTO: 33.8 % (ref 42–52)
HGB BLD-MCNC: 11.2 G/DL (ref 14–18)
IMM GRANULOCYTES # BLD AUTO: 0.27 K/UL (ref 0–0.11)
IMM GRANULOCYTES NFR BLD AUTO: 1.5 % (ref 0–0.9)
LYMPHOCYTES # BLD AUTO: 1.18 K/UL (ref 1–4.8)
LYMPHOCYTES NFR BLD: 6.7 % (ref 22–41)
MCH RBC QN AUTO: 28.8 PG (ref 27–33)
MCHC RBC AUTO-ENTMCNC: 33.1 G/DL (ref 33.7–35.3)
MCV RBC AUTO: 86.9 FL (ref 81.4–97.8)
MONOCYTES # BLD AUTO: 0.98 K/UL (ref 0–0.85)
MONOCYTES NFR BLD AUTO: 5.6 % (ref 0–13.4)
NEUTROPHILS # BLD AUTO: 15.08 K/UL (ref 1.82–7.42)
NEUTROPHILS NFR BLD: 86 % (ref 44–72)
NRBC # BLD AUTO: 0 K/UL
NRBC BLD-RTO: 0 /100 WBC
PLATELET # BLD AUTO: 218 K/UL (ref 164–446)
PMV BLD AUTO: 9.3 FL (ref 9–12.9)
POTASSIUM SERPL-SCNC: 3.9 MMOL/L (ref 3.6–5.5)
PROT SERPL-MCNC: 6.3 G/DL (ref 6–8.2)
RBC # BLD AUTO: 3.89 M/UL (ref 4.7–6.1)
SODIUM SERPL-SCNC: 137 MMOL/L (ref 135–145)
WBC # BLD AUTO: 17.5 K/UL (ref 4.8–10.8)

## 2021-09-27 PROCEDURE — 700111 HCHG RX REV CODE 636 W/ 250 OVERRIDE (IP): Performed by: INTERNAL MEDICINE

## 2021-09-27 PROCEDURE — A9270 NON-COVERED ITEM OR SERVICE: HCPCS | Performed by: STUDENT IN AN ORGANIZED HEALTH CARE EDUCATION/TRAINING PROGRAM

## 2021-09-27 PROCEDURE — 80053 COMPREHEN METABOLIC PANEL: CPT

## 2021-09-27 PROCEDURE — 700105 HCHG RX REV CODE 258: Performed by: INTERNAL MEDICINE

## 2021-09-27 PROCEDURE — 36415 COLL VENOUS BLD VENIPUNCTURE: CPT

## 2021-09-27 PROCEDURE — 94760 N-INVAS EAR/PLS OXIMETRY 1: CPT

## 2021-09-27 PROCEDURE — 700102 HCHG RX REV CODE 250 W/ 637 OVERRIDE(OP): Performed by: STUDENT IN AN ORGANIZED HEALTH CARE EDUCATION/TRAINING PROGRAM

## 2021-09-27 PROCEDURE — 99233 SBSQ HOSP IP/OBS HIGH 50: CPT | Performed by: STUDENT IN AN ORGANIZED HEALTH CARE EDUCATION/TRAINING PROGRAM

## 2021-09-27 PROCEDURE — 97162 PT EVAL MOD COMPLEX 30 MIN: CPT

## 2021-09-27 PROCEDURE — 94640 AIRWAY INHALATION TREATMENT: CPT

## 2021-09-27 PROCEDURE — A9270 NON-COVERED ITEM OR SERVICE: HCPCS | Performed by: INTERNAL MEDICINE

## 2021-09-27 PROCEDURE — 92610 EVALUATE SWALLOWING FUNCTION: CPT

## 2021-09-27 PROCEDURE — 85025 COMPLETE CBC W/AUTO DIFF WBC: CPT

## 2021-09-27 PROCEDURE — 770020 HCHG ROOM/CARE - TELE (206)

## 2021-09-27 PROCEDURE — 97165 OT EVAL LOW COMPLEX 30 MIN: CPT

## 2021-09-27 PROCEDURE — 700105 HCHG RX REV CODE 258: Performed by: STUDENT IN AN ORGANIZED HEALTH CARE EDUCATION/TRAINING PROGRAM

## 2021-09-27 PROCEDURE — 700102 HCHG RX REV CODE 250 W/ 637 OVERRIDE(OP): Performed by: INTERNAL MEDICINE

## 2021-09-27 RX ORDER — TRAMADOL HYDROCHLORIDE 50 MG/1
50 TABLET ORAL EVERY 6 HOURS PRN
Status: DISCONTINUED | OUTPATIENT
Start: 2021-09-27 | End: 2021-10-06 | Stop reason: HOSPADM

## 2021-09-27 RX ORDER — AMLODIPINE BESYLATE 5 MG/1
5 TABLET ORAL
Status: DISCONTINUED | OUTPATIENT
Start: 2021-09-27 | End: 2021-09-28

## 2021-09-27 RX ORDER — SODIUM CHLORIDE 9 MG/ML
INJECTION, SOLUTION INTRAVENOUS CONTINUOUS
Status: ACTIVE | OUTPATIENT
Start: 2021-09-27 | End: 2021-09-27

## 2021-09-27 RX ADMIN — SENNOSIDES AND DOCUSATE SODIUM 2 TABLET: 50; 8.6 TABLET ORAL at 17:26

## 2021-09-27 RX ADMIN — AMLODIPINE BESYLATE 5 MG: 5 TABLET ORAL at 12:48

## 2021-09-27 RX ADMIN — HYDROCORTISONE 10 MG: 10 TABLET ORAL at 17:25

## 2021-09-27 RX ADMIN — DIBASIC SODIUM PHOSPHATE, MONOBASIC POTASSIUM PHOSPHATE AND MONOBASIC SODIUM PHOSPHATE 250 MG: 852; 155; 130 TABLET ORAL at 05:55

## 2021-09-27 RX ADMIN — OMEPRAZOLE 20 MG: 20 CAPSULE, DELAYED RELEASE ORAL at 05:55

## 2021-09-27 RX ADMIN — HYDROCORTISONE 10 MG: 10 TABLET ORAL at 05:55

## 2021-09-27 RX ADMIN — CARVEDILOL 3.12 MG: 6.25 TABLET, FILM COATED ORAL at 05:59

## 2021-09-27 RX ADMIN — SODIUM CHLORIDE: 9 INJECTION, SOLUTION INTRAVENOUS at 17:26

## 2021-09-27 RX ADMIN — CEFTRIAXONE SODIUM 2 G: 2 INJECTION, POWDER, FOR SOLUTION INTRAMUSCULAR; INTRAVENOUS at 05:52

## 2021-09-27 RX ADMIN — LOSARTAN POTASSIUM 25 MG: 25 TABLET, FILM COATED ORAL at 05:55

## 2021-09-27 RX ADMIN — ATORVASTATIN CALCIUM 80 MG: 40 TABLET, FILM COATED ORAL at 17:25

## 2021-09-27 RX ADMIN — DIBASIC SODIUM PHOSPHATE, MONOBASIC POTASSIUM PHOSPHATE AND MONOBASIC SODIUM PHOSPHATE 250 MG: 852; 155; 130 TABLET ORAL at 17:24

## 2021-09-27 RX ADMIN — HYDROCORTISONE 10 MG: 10 TABLET ORAL at 12:48

## 2021-09-27 RX ADMIN — CARVEDILOL 3.12 MG: 6.25 TABLET, FILM COATED ORAL at 17:25

## 2021-09-27 RX ADMIN — AZITHROMYCIN MONOHYDRATE 500 MG: 250 TABLET ORAL at 05:54

## 2021-09-27 RX ADMIN — POLYVINYL ALCOHOL 1 DROP: 14 SOLUTION/ DROPS OPHTHALMIC at 05:52

## 2021-09-27 RX ADMIN — BUDESONIDE AND FORMOTEROL FUMARATE DIHYDRATE 2 PUFF: 80; 4.5 AEROSOL RESPIRATORY (INHALATION) at 08:24

## 2021-09-27 RX ADMIN — POLYVINYL ALCOHOL 1 DROP: 14 SOLUTION/ DROPS OPHTHALMIC at 17:26

## 2021-09-27 RX ADMIN — TRAMADOL HYDROCHLORIDE 50 MG: 50 TABLET, FILM COATED ORAL at 23:16

## 2021-09-27 RX ADMIN — BUDESONIDE AND FORMOTEROL FUMARATE DIHYDRATE 2 PUFF: 80; 4.5 AEROSOL RESPIRATORY (INHALATION) at 20:11

## 2021-09-27 RX ADMIN — LEVOTHYROXINE SODIUM 75 MCG: 0.05 TABLET ORAL at 05:53

## 2021-09-27 RX ADMIN — OMEPRAZOLE 20 MG: 20 CAPSULE, DELAYED RELEASE ORAL at 17:25

## 2021-09-27 RX ADMIN — TAMSULOSIN HYDROCHLORIDE 0.4 MG: 0.4 CAPSULE ORAL at 06:12

## 2021-09-27 RX ADMIN — ACETAMINOPHEN 650 MG: 325 TABLET, FILM COATED ORAL at 12:48

## 2021-09-27 RX ADMIN — TRAMADOL HYDROCHLORIDE 50 MG: 50 TABLET, FILM COATED ORAL at 01:52

## 2021-09-27 ASSESSMENT — ENCOUNTER SYMPTOMS
DOUBLE VISION: 0
WHEEZING: 0
SENSORY CHANGE: 0
DIARRHEA: 0
BACK PAIN: 0
BLURRED VISION: 0
CHILLS: 0
NECK PAIN: 0
VOMITING: 0
SPUTUM PRODUCTION: 1
BLOOD IN STOOL: 0
CONSTIPATION: 0
FEVER: 0
SHORTNESS OF BREATH: 1
COUGH: 1
ABDOMINAL PAIN: 0
FLANK PAIN: 0
LOSS OF CONSCIOUSNESS: 0
TREMORS: 0
HEADACHES: 0
PALPITATIONS: 0
NAUSEA: 0
EYE PAIN: 0

## 2021-09-27 ASSESSMENT — COGNITIVE AND FUNCTIONAL STATUS - GENERAL
DRESSING REGULAR LOWER BODY CLOTHING: A LOT
SUGGESTED CMS G CODE MODIFIER DAILY ACTIVITY: CK
MOVING TO AND FROM BED TO CHAIR: UNABLE
WALKING IN HOSPITAL ROOM: A LOT
TOILETING: A LITTLE
DRESSING REGULAR UPPER BODY CLOTHING: A LITTLE
DAILY ACTIVITIY SCORE: 17
TURNING FROM BACK TO SIDE WHILE IN FLAT BAD: A LITTLE
CLIMB 3 TO 5 STEPS WITH RAILING: TOTAL
PERSONAL GROOMING: A LITTLE
STANDING UP FROM CHAIR USING ARMS: A LITTLE
SUGGESTED CMS G CODE MODIFIER MOBILITY: CL
HELP NEEDED FOR BATHING: A LOT
MOVING FROM LYING ON BACK TO SITTING ON SIDE OF FLAT BED: UNABLE
MOBILITY SCORE: 11

## 2021-09-27 ASSESSMENT — GAIT ASSESSMENTS
GAIT LEVEL OF ASSIST: UNABLE TO PARTICIPATE
DISTANCE (FEET): 3

## 2021-09-27 ASSESSMENT — PAIN DESCRIPTION - PAIN TYPE: TYPE: ACUTE PAIN;CHRONIC PAIN

## 2021-09-27 ASSESSMENT — ACTIVITIES OF DAILY LIVING (ADL): TOILETING: INDEPENDENT

## 2021-09-27 NOTE — ASSESSMENT & PLAN NOTE
Baseline 0.6  Resume lorsartan  Be conservative with IV fluid given HFrEF  Continue to monitor  Improved level

## 2021-09-27 NOTE — PROGRESS NOTES
Hospital Medicine Daily Progress Note    Date of Service  9/27/2021    Chief Complaint  Jeffrey Lizama is a 88 y.o. male admitted 9/25/2021 with dyspnea    Hospital Course  Jeffrey Lizama is a 88 y.o. male who presented 9/25/2021 with worsening shortness of breath and drowsy. Patient is on 4 to 5 L of oxygen at baseline but noticed worsening shortness of breath especially with exertion starting yesterday.  Patient was unsure of his oxygen concentrator was working because it is a new machine however he continued to turn up his oxygen.  When EMS arrived they placed him on 10 L mask.  Upon arrival to the ER, patient did have a CT scan which showed bilateral pneumonia.  He has been vaccinated against Covid. Covid test negative at ER    Interval Problem Update  Patient feels much better today. Oxygen down to 5L NC as his baseling.   A/o x4. Reported severe productive cough, light red color in sputum  Denies f/c/n/v, chest pain, abd pain.     FABIAN - discontinue lorsartan, ordered amlodipine.  Gentle fluid NS 75ML/h for 500ml. be conservative with IV fluid given HFrEF  Troponin 111-> 115, it seems as at his baseline  D-dimer 1.4 -ordered BL LE ultrasound, may consider VQ scan if concerned about PE  chronically elevated proBNP 4771   Echo 5/2021 showed EF 45%. Dyskinesis of the apex and hypokinesis of the distal/apical inferior and septal walls.      On Rocephin and azithromycin  Patient is allergic to penicillin    BL pneumonia, need to r/o aspiration.   SLP recommended downgrade to soft and bite size diet    PT OT recommended SNF  He lives with his wife, who also has some medical conditons    I have personally seen and examined the patient at bedside. I discussed the plan of care with patient, bedside RN and .    Consultants/Specialty      Code Status  DNAR, I OK    Disposition  Patient is not medically cleared.   Anticipate discharge to pt ot .  I have placed the appropriate orders for post-discharge  needs.    Review of Systems  Review of Systems   Constitutional: Positive for malaise/fatigue. Negative for chills and fever.   HENT: Negative for ear discharge and ear pain.    Eyes: Negative for blurred vision, double vision and pain.   Respiratory: Positive for cough, sputum production and shortness of breath. Negative for wheezing.    Cardiovascular: Negative for chest pain and palpitations.   Gastrointestinal: Negative for abdominal pain, blood in stool, constipation, diarrhea, nausea and vomiting.   Genitourinary: Negative for dysuria, flank pain, hematuria and urgency.   Musculoskeletal: Negative for back pain and neck pain.   Neurological: Negative for tremors, sensory change, loss of consciousness and headaches.        Physical Exam  Temp:  [36.1 °C (97 °F)-36.6 °C (97.9 °F)] 36.5 °C (97.7 °F)  Pulse:  [] 80  Resp:  [17-18] 18  BP: (105-150)/(57-87) 136/82  SpO2:  [91 %-100 %] 93 %    Physical Exam  Vitals and nursing note reviewed.   Constitutional:       General: He is not in acute distress.     Appearance: He is obese. He is ill-appearing. He is not diaphoretic.   HENT:      Head: Normocephalic and atraumatic.      Nose: Nose normal.      Mouth/Throat:      Pharynx: Oropharynx is clear. No oropharyngeal exudate or posterior oropharyngeal erythema.   Eyes:      General:         Right eye: No discharge.         Left eye: No discharge.      Extraocular Movements: Extraocular movements intact.      Conjunctiva/sclera: Conjunctivae normal.   Cardiovascular:      Rate and Rhythm: Normal rate and regular rhythm.      Pulses: Normal pulses.      Heart sounds: Normal heart sounds. No gallop.    Pulmonary:      Effort: Pulmonary effort is normal. No respiratory distress.      Breath sounds: No stridor. No wheezing, rhonchi or rales.      Comments: Decreased breathing sounds  Chest:      Chest wall: No tenderness.   Abdominal:      General: Bowel sounds are normal. There is no distension.      Palpations:  Abdomen is soft.      Tenderness: There is no abdominal tenderness. There is no guarding or rebound.   Musculoskeletal:         General: No swelling, tenderness, deformity or signs of injury.      Cervical back: No rigidity. No muscular tenderness.      Right lower leg: No edema.      Left lower leg: No edema.   Skin:     General: Skin is warm and dry.      Findings: Bruising present.   Neurological:      Mental Status: He is alert and oriented to person, place, and time. Mental status is at baseline.      Sensory: No sensory deficit.      Motor: No weakness.      Gait: Gait normal.   Psychiatric:         Mood and Affect: Mood normal.         Behavior: Behavior normal.         Thought Content: Thought content normal.         Fluids    Intake/Output Summary (Last 24 hours) at 9/27/2021 1609  Last data filed at 9/27/2021 1200  Gross per 24 hour   Intake 780 ml   Output 420 ml   Net 360 ml       Laboratory  Recent Labs     09/26/21  0056 09/27/21  0355   WBC 12.9* 17.5*   RBC 3.88* 3.89*   HEMOGLOBIN 11.2* 11.2*   HEMATOCRIT 34.7* 33.8*   MCV 89.4 86.9   MCH 28.9 28.8   MCHC 32.3* 33.1*   RDW 58.0* 55.8*   PLATELETCT 201 218   MPV 9.2 9.3     Recent Labs     09/26/21  0056 09/27/21  0355   SODIUM 132* 137   POTASSIUM 4.5 3.9   CHLORIDE 95* 96   CO2 27 31   GLUCOSE 83 121*   BUN 31* 35*   CREATININE 1.04 1.30   CALCIUM 9.1 8.7     Recent Labs     09/26/21  0056   APTT 33.7   INR 1.09               Imaging  CT-CTA CHEST PULMONARY ARTERY W/ RECONS   Final Result      1.  No pulmonary embolism   2.  New bilateral groundglass and consolidative opacities, this is likely infectious given it is new from 4/18/21 however recommend short interval follow up in 3-6 months to ensure resolution.            DX-CHEST-PORTABLE (1 VIEW)   Final Result      Diffuse bilateral patchy parenchymal opacities. Small left pleural effusion.   Unchanged cardiomegaly           Assessment/Plan  * Pneumonia- (present on admission)  Assessment &  Plan  -Bilateral groundglass and consolidative opacities noted on CT scan  -Start Rocephin and azithromycin, no recent hospitalization  -Causing leukocytosis but no sepsis  -Causing worsening hypoxia  -Await culture results  -Obtain procalcitonin    Acute on chronic respiratory failure with hypoxia (HCC)- (present on admission)  Assessment & Plan  -Due to pneumonia  -Continuous pulse ox monitoring  -Respiratory care per protocol    Acute encephalopathy  Assessment & Plan  Patient was drowsy, confusing  and was not able to provide information at admission  likeley due to hypoxia secondary to pneumonia.     - resolved    GERD (gastroesophageal reflux disease)- (present on admission)  Assessment & Plan  -Continue home PPI    Hypothyroidism- (present on admission)  Assessment & Plan  -Continue home Synthroid at 75 mcg daily  -Most recent TSH was approximately 4 months ago was normal at 3.16    Dyslipidemia- (present on admission)  Assessment & Plan  -Continue home statin    Chronic systolic congestive heart failure (HCC)- (present on admission)  Assessment & Plan  proBNP 4771, seems is at his baseline  Echo 5/2021 showed EF 45%. Dyskinesis of the apex and hypokinesis of the distal/apical inferior and septal walls.    Continue to monitor decompensated signs    Leukocytosis- (present on admission)  Assessment & Plan  -Due to pneumonia  -Repeat CBC in the morning    Elevated troponin  Assessment & Plan  Chronic   troponin 111-> 115, at his baseline    pt reported right upper chest pain for a few days while cough. EKG showed ventricular paced complexes.    Continue to monitor    Normocytic anemia- (present on admission)  Assessment & Plan  -No sign of gross bleeding, chronic    FABIAN (acute kidney injury) (HCC)- (present on admission)  Assessment & Plan  Baseline 0.6  discontinue lorsartan, ordered amlodipine.      Be conservative with IV fluid given HFrEF  Continue to monitor          Hyponatremia- (present on  admission)  Assessment & Plan  -Mild, he does have some mild lower extremity edema and is chronically on Lasix however at this point in time Lasix will be held due to infectious process  -IV fluids were given in the ER  -If there is worsening of his sodium on repeat BMP in the morning, likely due to fluids and would recommend starting Lasix however again I will not started at this time    Paroxysmal atrial fibrillation (HCC)- (present on admission)  Assessment & Plan  -Also with a history of sick sinus syndrome, now with a pacemaker  -He is currently paced    BPH (benign prostatic hypertrophy)- (present on admission)  Assessment & Plan  -Continue home Flomax    COPD (chronic obstructive pulmonary disease) (HCC)- (present on admission)  Assessment & Plan  -No acute exacerbation  -He has however at significant risk of developing an acute exacerbation since he does have a bilateral pneumonia  -No need for steroids at this time    Essential hypertension, benign- (present on admission)  Assessment & Plan  -Continue home Coreg and losartan  -Start as needed labetalol  -Adjust as needed       VTE prophylaxis: SCDs/TEDs and enoxaparin ppx    I have performed a physical exam and reviewed and updated ROS and Plan today (9/27/2021). In review of yesterday's note (9/26/2021), there are no changes except as documented above.

## 2021-09-27 NOTE — RESPIRATORY CARE
"   COPD EDUCATION by COPD CLINICAL EDUCATOR  9/27/2021 at 12:36 PM by Nisa Ibarra, RRT     Patient reviewed by COPD education team. Patient does have a history or diagnosis of COPD and is a non-smoker.  Patient is from [place], [place], therefore, patient does not qualify for the COPD program and was not interested in education information..    COPD Screen  COPD Risk Screening  Do you have a history of COPD?: Yes  Do you have a Pulmonologist?: No    COPD Assessment  COPD Clinical Specialists ONLY  COPD Education Initiated: No--Quick Screen (Pt not interested in COPD Education, has been dealing with it for years, takes medications but not sure what, did recognize Spirivia once daily, pt lives in [facility], will need to see PCP to get appt with Pulmonary near him)  Physician Name: SHERYL SANTANA M.D. - [facility]  Pulmonologist Name: needs a referral from PCP  Referrals Initiated:  (Patient in a facility needs to upgrade to [facility], from [facility])  Is this a COPD exacerbation patient?: No    Meds to Beds        MY COPD ACTION PLAN     It is recommended that patients and physicians /healthcare providers complete this action plan together. This plan should be discussed at each physician visit and updated as needed.    The green, yellow and red zones show groups of symptoms of COPD. This list of symptoms is not comprehensive, and you may experience other symptoms. In the \"Actions\" column, your healthcare provider has recommended actions for you to take based on your symptoms.    Patient Name: Jeffrey Lizama   YOB: 1932   Last Updated on: 9/27/2021 12:34 PM   Green Zone:  I am doing well today Actions   •  Usual activitiy and exercise level •  Take daily medications   •  Usual amounts of cough and phlegm/mucus •  Use oxygen as prescribed   •  Sleep well at night •  Continue regular exercise/diet plan   •  Appetite is good •  At all times avoid cigarette smoke, inhaled " "irritants     Daily Medications (these medications are taken every day):   Tiotropium Bromide Monohydrate (Spiriva) 1 capsule Once daily     Additional Information:  Use spacer with MDI and rinse mouth after.    Yellow Zone:  I am having a bad day or a COPD flare Actions   •  More breathless than usual •  Continue daily medications   •  I have less energy for my daily activities •  Use quick relief inhaler as ordered   •  Increased or thicker phlegm/mucus •  Use oxygen as prescribed   •  Using quick relief inhaler/nebulizer more often •  Get plenty of rest   •  Swelling of ankles more than usual •  Use pursed lip breathing   •  More coughing than usual •  At all times avoid cigarette smoke, inhaled irritants   •  I feel like I have a \"chest cold\"   •  Poor sleep and my symptoms woke me up   •  My appetite is not good   •  My medicine is not helping    •  Call provider immediately if symptoms don’t improve     Continue daily medications, add rescue medications:               Medications to be used during a flare up, (as Discussed with Provider):              Red Zone:  I need urgent medical care Actions   •  Severe shortness of breath even at rest •  Call 911 or seek medical care immediately   •  Not able to do any activity because of breathing    •  Fever or shaking chills    •  Feeling confused or very drowsy     •  Chest pains    •  Coughing up blood              "

## 2021-09-27 NOTE — THERAPY
"Occupational Therapy   Initial Evaluation     Patient Name: Jeffrey Lizama  Age:  88 y.o., Sex:  male  Medical Record #: [id number]  Today's Date: 9/27/2021     Precautions  Precautions: Fall Risk  Comments: poor activity tolerance; desats at 6L with activity    Assessment    Patient is 88 y.o. male with a diagnosis of PNA.  Additional factors influencing patient status / progress: COPD on 4-5L home O2 at baseline, BPH, CHF, DLD, HTD, Anemia, GERD, HTN. Pt lives with spouse at [facility], but states they are looking into moving to the assisted-living side, as wife is now also having difficulty with caring for them both. Pt was I with ADLs prior to this admission, however, very short distance walking within their room only. He reports he is usually wheeled down to dining room for meals prior to COVID, using a wheelchair. He now presents with decreased activity tolerance, reduced balance, and generalized weakness -- affecting his safety and I with self care task performance and ADL txfs. Pt will benefit from post acute placement prior to dc home, and will also continue to be seen for acute OT in house.    Plan    Recommend Occupational Therapy 3 times per week until therapy goals are met for the following treatments:  Adaptive Equipment, Cognitive Skill Development, Community Re-integration, Neuro Re-Education / Balance, Self Care/Activities of Daily Living, Therapeutic Activities and Therapeutic Exercises.    DC Equipment Recommendations: Unable to determine at this time  Discharge Recommendations: Recommend post-acute placement for additional occupational therapy services prior to discharge home     Subjective    \"My Oxygen won't stay up, and I panic.\"     Objective       09/27/21 0921   Prior Living Situation   Prior Services None   Housing / Facility Independent Living Facility   Steps Into Home 0   Steps In Home 0   Elevator Yes   Bathroom Set up Walk In Shower;Grab Bars   Equipment Owned 4-Wheel " Walker;Wheelchair;Grab Bar(s) In Tub / Shower;Grab Bar(s) By Toilet;Raised Toilet Seat Without Arms   Lives with - Patient's Self Care Capacity Spouse   Comments pt lives with spouse at [facility], but is looking into moving to [facility] side.   Prior Level of ADL Function   Self Feeding Independent   Grooming / Hygiene Independent   Bathing Independent   Dressing Independent   Toileting Independent   Prior Level of IADL Function   Medication Management Requires Assist   Laundry Requires Assist   Kitchen Mobility Requires Assist   Finances Requires Assist   Home Management Requires Assist   Shopping Requires Assist   Prior Level Of Mobility Independent With Device in Home;Uses Wheel Chair for Community Mobility   Driving / Transportation Relatives / Others Provide Transportation   Occupation (Pre-Hospital Vocational) Retired Due To Age;Retired Due To Disability   History of Falls   History of Falls Yes   Date of Last Fall 09/25/21   Precautions   Precautions Fall Risk   Comments poor activity tolerance; desats at 6L with activity   Pain   Pain Scales 0 to 10 Scale    Intervention Medication (see MAR);Rest   Pain 0 - 10 Group   Location Ankle;Back;Neck   Location Orientation Right;Lower   Pain Rating Scale (NPRS) 5   Description Aching;Constant   Comfort Goal Comfort with Movement;Sleep Comfortably   Therapist Pain Assessment Post Activity Pain Same as Prior to Activity   Non Verbal Descriptors   Non Verbal Scale  Calm;Unlabored Breathing   Cognition    Cognition / Consciousness WDL   Level of Consciousness Alert   Comments pleasant and cooperative   Active ROM Upper Body   Active ROM Upper Body  WDL   Strength Upper Body   Upper Body Strength  WDL   Upper Body Muscle Tone   Upper Body Muscle Tone  WDL   Balance Assessment   Sitting Balance (Static) Fair +   Sitting Balance (Dynamic) Fair   Standing Balance (Static) Fair -   Standing Balance (Dynamic) Fair -   Weight Shift Sitting Fair   Weight Shift Standing Fair    Comments with FWW   Bed Mobility    Supine to Sit Minimal Assist   Sit to Supine Minimal Assist   Scooting Minimal Assist   Rolling Minimal Assist to Rt.;Minimum Assist to Lt.   Comments HOB flat, rails utilized   ADL Assessment   Grooming Seated;Minimal Assist   Upper Body Dressing Minimal Assist   Lower Body Dressing Maximal Assist   How much help from another person does the patient currently need...   Putting on and taking off regular lower body clothing? 2   Bathing (including washing, rinsing, and drying)? 2   Toileting, which includes using a toilet, bedpan, or urinal? 3   Putting on and taking off regular upper body clothing? 3   Taking care of personal grooming such as brushing teeth? 3   Eating meals? 4   6 Clicks Daily Activity Score 17   Functional Mobility   Sit to Stand Minimal Assist   Bed, Chair, Wheelchair Transfer Minimal Assist   Transfer Method Stand Step   Mobility in room with FWW   Distance (Feet) 3   # of Times Distance was Traveled 1   Activity Tolerance   Sitting in Chair NT   Sitting Edge of Bed 10 mins   Standing 1-2 mins to scoot HOB   Comments limited by weakness and fatigue   Short Term Goals   Short Term Goal # 1 pt will complete ADL txfs using LRAD at spv level   Short Term Goal # 2 pt will complete FB dressing at min A level   Short Term Goal # 3 pt will complete G/H standing sinkside at spv level   Education Group   Education Provided Role of Occupational Therapist;Home Safety;Energy Conservation;Back Safety   Role of Occupational Therapist Patient Response Patient;Acceptance;Explanation   Back Safety Patient Response Patient;Acceptance;Demonstration;Reinforcement Needed   Energy Conservation Patient Response Patient;Acceptance;Explanation;Verbal Demonstration   Home Safety Patient Response Patient;Acceptance;Explanation;Verbal Demonstration;Action Demonstration   Problem List   Problem List Decreased Active Daily Living Skills;Decreased Functional Mobility;Decreased Activity  Tolerance;Safety Awareness Deficits / Cognition;Impaired Postural Control / Balance   Anticipated Discharge Equipment and Recommendations   DC Equipment Recommendations Unable to determine at this time   Discharge Recommendations Recommend post-acute placement for additional occupational therapy services prior to discharge home   Interdisciplinary Plan of Care Collaboration   IDT Collaboration with  Nursing;Physical Therapist   Patient Position at End of Therapy In Bed;Bed Alarm On;Call Light within Reach;Tray Table within Reach;Phone within Reach   Collaboration Comments RN aware of OT session and dc recs   Session Information   Date / Session Number  9/27 #1 (1/3, 10/3)   Priority 2

## 2021-09-27 NOTE — PROGRESS NOTES
Assumed care of patient at 1900. Vital signs are stable. Denies any nausea, vomiting, numbness, or tingling, or pain. Emphasized importance of calling for assistance. Bed locked in lowest position with call light in reach. Bed alarm on with upper rails up. Hourly rounding to continue.

## 2021-09-27 NOTE — THERAPY
"Speech Language Pathology   Clinical Swallow Evaluation     Patient Name: Jeffrey Lizama  AGE:  88 y.o., SEX:  male  Medical Record #: [id number]  Today's Date: 9/27/2021     Precautions  Precautions: Fall Risk    Assessment    87 y/o admitted on 9/26 for SOB. Last seen by SLP in May 2021 where a MTL clear liquid diet recommended 2/2 possible ileius. CT of chest found \"New bilateral groundglass and consolidative opacities, this is likely infectious given it is new from 4/18/21.\"    Pt seen on this date for a clinical swallow evaluation. Pt denied any hx of dysphagia but has difficulty at baseline with chewing as he does not wear his dentures during meals. He reported difficulty chewing hard meats and raw vegetables. He endorsed mild SOB and currently on 5L via nasal cannula. No overt s/sx of aspiration appreciated with trials of thin liquids via straw sip, soft solids, or solids. Mastication slightly prolonged with solids, but timely with soft solids. He c/o mild difficulty chewing solid texture during trials. Slight increase in WOB appreciated during trials. Discussed diet options (soft and bite size vs regular) and given edentulous state, would recommend downgrade to soft and bite sized (SB6)/thin liquid diet. Discussed recommendation to take breaks during meals to reduce SOB and he verbalized understanding. SLP to follow.    Plan    Downgrade to soft and bite sized (SB6)/TN0. Take breaks during meals as needed to reduced WOB    Recommend Speech Therapy 3 times per week until therapy goals are met for the following treatments:  Dysphagia Training and Patient / Family / Caregiver Education.    Discharge Recommendations: (P) Anticipate that the patient will have no further speech therapy needs after discharge from the hospital       Objective       09/27/21 0904   Vitals   O2 (LPM) 5   O2 Delivery Device Silicone Nasal Cannula   Pain 0 - 10 Group   Therapist Pain Assessment Post Activity Pain Same as Prior to " Activity;Nurse Notified;0   Prior Living Situation   Equipment Owned 4-Wheel Walker   Lives with - Patient's Self Care Capacity Spouse   Prior Level Of Function   Communication Within Functional Limits   Swallow Within Functional Limits   Dentition Edentulous   Dentures Lowers;Uppers  (at home)   Hearing Within Functional Limits for Evaluation   Vision Within Functional Limits for Evaluation   Patient's Primary Language English   Occupation (Pre-Hospital Vocational) Not Employed   Oral Motor Eval    Is Patient Able to Complete Oral Motor Eval Yes, Within Normal Limits   Laryngeal Function   Voice Quality Within Functional Limits   Volutional Cough Not Tested   Oral Food Presentation   Single Swallow Thin (0) Within Functional Limits   Serial Swallow Thin (0) Within Functional Limits   Soft & Bite-Sized (6) - (Dysphagia III) Within Functional Limits   Regular (7) Minimal   Tracheostomy   Tracheostomy  No   Dysphagia Strategies / Recommendations   Strategies / Interventions Recommended (Yes / No) Yes   Compensatory Strategies Head of Bed 90 Degrees During Eating / Drinking;No Talking During Eating / Drinking;Single Sips / Bites   Diet / Liquid Recommendation Soft & Bite-Sized (6) - (Dysphagia III);Thin (0)   Medication Administration  Whole with Liquid Wash   Therapy Interventions Dysphagia Therapy By Speech Language Pathologist   Short Term Goals   Short Term Goal # 1 Pt will consume an SB6/TN0 diet with no overt s/sx of aspiration

## 2021-09-27 NOTE — ASSESSMENT & PLAN NOTE
Chronic   troponin 111-> 115, at his baseline  Troponin down to 70 on last check.    pt reported right upper chest pain for a few days while cough. EKG showed ventricular paced complexes.    Continue to monitor

## 2021-09-27 NOTE — PROGRESS NOTES
COVID-19 surge in effect.    Patient has been concern about insurance issues and  has been informed that patient wanted some advice. Called [facility] in [place] 3 times per patients request to contact wife and informed that he is ok. Unable to contact the facility and unable to leave voicemail. Wifes phone number was contacted also but patient said that the phone number doesn't work anymore. He agreed to have us call again tomorrow.

## 2021-09-27 NOTE — PROGRESS NOTES
Telemetry Shift Summary    Rhythm underling SR with first degree block/ Paced on demand blocked/BBB  HR Range 80-90  Ectopy R-O PVCs, R trigem  Measurements .22/.14/.42        Normal Values  Rhythm SR  HR Range    Measurements 0.12-0.20 / 0.06-0.10  / 0.30-0.52

## 2021-09-27 NOTE — PROGRESS NOTES
Telemetry Shift Summary     Rhythm V-Paced on Demand/ SR  HR Range 70s  Ectopy R-PVCs           Normal Values  Rhythm SR  HR Range    Measurements 0.12-0.20 / 0.06-0.10  / 0.30-0.52

## 2021-09-27 NOTE — THERAPY
Physical Therapy   Initial Evaluation     Patient Name: Jeffrey Lizama  Age:  88 y.o., Sex:  male  Medical Record #: [id number]  Today's Date: 9/27/2021     Precautions  Precautions: (P) Fall Risk  Comments: (P) desats easily; poor activity tolerance    Assessment  Patient is 88 y.o. male who was recently admitted for SOB, with PNA, and GLF due to L knee buckling. Pt has a hx of COPD and at baseline is on 4 to 5 L O2. Pt presented to PT with impaired balance, impaired gait, weakness, pain, and poor upright activity tolerance. Pt was primarily limited due to fatigue and SOB. Pt presented with dec in SpO2 down to 86% with upright mobility while on 6 L O2, however, was able to recover back to 88 to 89% with deep breathing and rest break. Pt reported of increased dizziness/lightheadedness with upright activity. Pt was unable to ambulate at this time, however, was able to stand, march in place, and take a few side steps. Pt appears functionally capable of transfers at this time. Pt will continue to benefit from skilled PT while in house, with recommendation for post acute therapy prior to d/c home.     Plan    Recommend Physical Therapy 3 times per week until therapy goals are met for the following treatments:  Bed Mobility, Community Re-integration, Gait Training, Manual Therapy, Neuro Re-Education / Balance, Self Care/Home Evaluation, Stair Training, Therapeutic Activities and Therapeutic Exercises    DC Equipment Recommendations: (P) Unable to determine at this time  Discharge Recommendations: (P) Recommend post-acute placement for additional physical therapy services prior to discharge home     Objective       09/27/21 0830   Initial Contact Note    Initial Contact Note Order Received and Verified, Physical Therapy Evaluation in Progress with Full Report to Follow.   Precautions   Precautions Fall Risk   Comments desats easily; poor activity tolerance   Vitals   O2 (LPM) 6   O2 Delivery Device Silicone Nasal  Cannula   Pain 0 - 10 Group   Location Ankle;Back;Neck   Location Orientation Right;Lower   Description Aching;Constant   Therapist Pain Assessment Prior to Activity;Post Activity;During Activity;Nurse Notified  (c/o  minimal pain with activity; states due to GLF)   Prior Living Situation   Prior Services Home-Independent   Housing / Facility Independent Living Facility   Equipment Owned 4-Wheel Walker   Lives with - Patient's Self Care Capacity Spouse   Comments pt states he is looking into penitentiary   Prior Level of Functional Mobility   Bed Mobility Independent   Transfer Status Independent   Ambulation Independent   Distance Ambulation (Feet) 100   Assistive Devices Used Front-Wheel Walker   Wheelchair Independent   Comments lives at ILF, uses W/C and 4WW to go for meals. indep with ADL's.  long standing knee issues.    History of Falls   History of Falls Yes   Date of Last Fall   (recent admission for fall )   Cognition    Cognition / Consciousness WDL   Level of Consciousness Alert   Comments pleasant/cooperative; tangential at times    Passive ROM Lower Body   Passive ROM Lower Body WDL   Active ROM Lower Body    Active ROM Lower Body  X   Comments limited due to weakness in B LE; able to demo functional AROM for standing and marching; presents with deformity of L ankle, this is baseline    Strength Lower Body   Lower Body Strength  X   Gross Strength Generalized Weakness, Equal Bilaterally   Comments grossly 3+/5 in B LE    Sensation Lower Body   Lower Extremity Sensation   WDL   Lower Body Muscle Tone   Lower Body Muscle Tone  WDL   Strength Upper Body   Upper Body Strength  WDL   Upper Body Muscle Tone   Upper Body Muscle Tone  WDL   Neurological Concerns   Neurological Concerns No   Coordination Lower Body    Coordination Lower Body  WDL   Balance Assessment   Sitting Balance (Static) Fair +   Sitting Balance (Dynamic) Fair   Standing Balance (Static) Fair -   Standing Balance (Dynamic) Fair -   Weight Shift  Sitting Fair   Weight Shift Standing Fair   Comments w/fww    Gait Analysis   Gait Level Of Assist Unable to Participate   Weight Bearing Status fwb   Comments pt was able to march in place and take 1 side step; limited due to fatigue and SOB   Bed Mobility    Supine to Sit Minimal Assist   Scooting Minimal Assist   Rolling Minimal Assist to Rt.;Minimum Assist to Lt.   Comments HOB elevated and rails up    Functional Mobility   Sit to Stand Minimal Assist   Mobility EOB, sit<>stand, marching in place, side steps, back to EOB    Comments encouraged to sit up at EOB to increase upright activity tolerance; pt agreeable and RN aware of position    How much difficulty does the patient currently have...   Turning over in bed (including adjusting bedclothes, sheets and blankets)? 3   Sitting down on and standing up from a chair with arms (e.g., wheelchair, bedside commode, etc.) 1   Moving from lying on back to sitting on the side of the bed? 1   How much help from another person does the patient currently need...   Moving to and from a bed to a chair (including a wheelchair)? 3   Need to walk in a hospital room? 2   Climbing 3-5 steps with a railing? 1   6 clicks Mobility Score 11   Activity Tolerance   Sitting in Chair NT   Sitting Edge of Bed 20 mins    Standing 1-2 mins    Comments limited due to fatigue and SOB    Edema / Skin Assessment   Edema / Skin  Not Assessed   Patient / Family Goals    Patient / Family Goal #1 to go back to PLOF    Short Term Goals    Short Term Goal # 1 pt will go supine<>sit w/hob flat and rails down w/spv in 6tx    Short Term Goal # 2 pt will go sit<>stand w/fww w/spv in 6tx    Short Term Goal # 3 pt will transfer bed<>chair w/fww in 6tx    Short Term Goal # 4 pt will ambualte for 150ft w/fww w/spv in 6tx    Education Group   Education Provided Role of Physical Therapist   Role of Physical Therapist Patient Response Patient;Acceptance;Demonstration;Explanation;Action Demonstration;Verbal  Demonstration   Problem List    Problems Impaired Transfers;Pain;Impaired Bed Mobility;Impaired Ambulation;Functional Strength Deficit;Impaired Balance;Decreased Activity Tolerance   Anticipated Discharge Equipment and Recommendations   DC Equipment Recommendations Unable to determine at this time   Discharge Recommendations Recommend post-acute placement for additional physical therapy services prior to discharge home   Interdisciplinary Plan of Care Collaboration   IDT Collaboration with  Nursing   Patient Position at End of Therapy Seated;Edge of Bed;Bed Alarm On;Call Light within Reach;Tray Table within Reach;Phone within Reach;Other (Comments)  (RN aware of position )   Collaboration Comments aware of visit and recs    Session Information   Date / Session Number  9/27-1 (1/3, 10/2)

## 2021-09-28 LAB
ALBUMIN SERPL BCP-MCNC: 3.5 G/DL (ref 3.2–4.9)
ALBUMIN/GLOB SERPL: 1.5 G/DL
ALP SERPL-CCNC: 61 U/L (ref 30–99)
ALT SERPL-CCNC: 22 U/L (ref 2–50)
ANION GAP SERPL CALC-SCNC: 13 MMOL/L (ref 7–16)
AST SERPL-CCNC: 22 U/L (ref 12–45)
BACTERIA SPEC RESP CULT: NORMAL
BASOPHILS # BLD AUTO: 0.3 % (ref 0–1.8)
BASOPHILS # BLD: 0.04 K/UL (ref 0–0.12)
BILIRUB SERPL-MCNC: 0.2 MG/DL (ref 0.1–1.5)
BUN SERPL-MCNC: 29 MG/DL (ref 8–22)
CALCIUM SERPL-MCNC: 8.4 MG/DL (ref 8.4–10.2)
CHLORIDE SERPL-SCNC: 97 MMOL/L (ref 96–112)
CO2 SERPL-SCNC: 27 MMOL/L (ref 20–33)
CREAT SERPL-MCNC: 0.93 MG/DL (ref 0.5–1.4)
EOSINOPHIL # BLD AUTO: 0.34 K/UL (ref 0–0.51)
EOSINOPHIL NFR BLD: 2.4 % (ref 0–6.9)
ERYTHROCYTE [DISTWIDTH] IN BLOOD BY AUTOMATED COUNT: 56.7 FL (ref 35.9–50)
GLOBULIN SER CALC-MCNC: 2.4 G/DL (ref 1.9–3.5)
GLUCOSE SERPL-MCNC: 123 MG/DL (ref 65–99)
GRAM STN SPEC: NORMAL
HCT VFR BLD AUTO: 33.3 % (ref 42–52)
HGB BLD-MCNC: 10.8 G/DL (ref 14–18)
IMM GRANULOCYTES # BLD AUTO: 0.22 K/UL (ref 0–0.11)
IMM GRANULOCYTES NFR BLD AUTO: 1.5 % (ref 0–0.9)
LYMPHOCYTES # BLD AUTO: 0.83 K/UL (ref 1–4.8)
LYMPHOCYTES NFR BLD: 5.8 % (ref 22–41)
MCH RBC QN AUTO: 28.7 PG (ref 27–33)
MCHC RBC AUTO-ENTMCNC: 32.4 G/DL (ref 33.7–35.3)
MCV RBC AUTO: 88.6 FL (ref 81.4–97.8)
MONOCYTES # BLD AUTO: 0.4 K/UL (ref 0–0.85)
MONOCYTES NFR BLD AUTO: 2.8 % (ref 0–13.4)
NEUTROPHILS # BLD AUTO: 12.43 K/UL (ref 1.82–7.42)
NEUTROPHILS NFR BLD: 87.2 % (ref 44–72)
NRBC # BLD AUTO: 0 K/UL
NRBC BLD-RTO: 0 /100 WBC
PLATELET # BLD AUTO: 207 K/UL (ref 164–446)
PMV BLD AUTO: 9.7 FL (ref 9–12.9)
POTASSIUM SERPL-SCNC: 3.9 MMOL/L (ref 3.6–5.5)
PROCALCITONIN SERPL-MCNC: 0.07 NG/ML
PROT SERPL-MCNC: 5.9 G/DL (ref 6–8.2)
RBC # BLD AUTO: 3.76 M/UL (ref 4.7–6.1)
SIGNIFICANT IND 70042: NORMAL
SITE SITE: NORMAL
SODIUM SERPL-SCNC: 137 MMOL/L (ref 135–145)
SOURCE SOURCE: NORMAL
TROPONIN T SERPL-MCNC: 70 NG/L (ref 6–19)
WBC # BLD AUTO: 14.3 K/UL (ref 4.8–10.8)

## 2021-09-28 PROCEDURE — 94640 AIRWAY INHALATION TREATMENT: CPT

## 2021-09-28 PROCEDURE — 700105 HCHG RX REV CODE 258: Performed by: INTERNAL MEDICINE

## 2021-09-28 PROCEDURE — 84145 PROCALCITONIN (PCT): CPT

## 2021-09-28 PROCEDURE — 51798 US URINE CAPACITY MEASURE: CPT

## 2021-09-28 PROCEDURE — 84484 ASSAY OF TROPONIN QUANT: CPT

## 2021-09-28 PROCEDURE — A9270 NON-COVERED ITEM OR SERVICE: HCPCS | Performed by: STUDENT IN AN ORGANIZED HEALTH CARE EDUCATION/TRAINING PROGRAM

## 2021-09-28 PROCEDURE — 80053 COMPREHEN METABOLIC PANEL: CPT

## 2021-09-28 PROCEDURE — A9270 NON-COVERED ITEM OR SERVICE: HCPCS | Performed by: INTERNAL MEDICINE

## 2021-09-28 PROCEDURE — 700102 HCHG RX REV CODE 250 W/ 637 OVERRIDE(OP): Performed by: INTERNAL MEDICINE

## 2021-09-28 PROCEDURE — 94760 N-INVAS EAR/PLS OXIMETRY 1: CPT

## 2021-09-28 PROCEDURE — 99232 SBSQ HOSP IP/OBS MODERATE 35: CPT | Performed by: INTERNAL MEDICINE

## 2021-09-28 PROCEDURE — 700102 HCHG RX REV CODE 250 W/ 637 OVERRIDE(OP): Performed by: STUDENT IN AN ORGANIZED HEALTH CARE EDUCATION/TRAINING PROGRAM

## 2021-09-28 PROCEDURE — 700111 HCHG RX REV CODE 636 W/ 250 OVERRIDE (IP): Performed by: INTERNAL MEDICINE

## 2021-09-28 PROCEDURE — 770020 HCHG ROOM/CARE - TELE (206)

## 2021-09-28 PROCEDURE — 36415 COLL VENOUS BLD VENIPUNCTURE: CPT

## 2021-09-28 PROCEDURE — 85025 COMPLETE CBC W/AUTO DIFF WBC: CPT

## 2021-09-28 RX ORDER — ALPRAZOLAM 0.25 MG/1
0.25 TABLET ORAL 3 TIMES DAILY PRN
Status: DISCONTINUED | OUTPATIENT
Start: 2021-09-28 | End: 2021-10-06 | Stop reason: HOSPADM

## 2021-09-28 RX ORDER — AMLODIPINE BESYLATE 5 MG/1
10 TABLET ORAL
Status: DISCONTINUED | OUTPATIENT
Start: 2021-09-29 | End: 2021-10-06 | Stop reason: HOSPADM

## 2021-09-28 RX ORDER — ECHINACEA PURPUREA EXTRACT 125 MG
2 TABLET ORAL
Status: DISCONTINUED | OUTPATIENT
Start: 2021-09-28 | End: 2021-10-06 | Stop reason: HOSPADM

## 2021-09-28 RX ADMIN — ATORVASTATIN CALCIUM 80 MG: 40 TABLET, FILM COATED ORAL at 16:52

## 2021-09-28 RX ADMIN — OMEPRAZOLE 20 MG: 20 CAPSULE, DELAYED RELEASE ORAL at 05:34

## 2021-09-28 RX ADMIN — PHENYLEPHRINE HYDROCHLORIDE 1 SPRAY: 0.5 SPRAY NASAL at 16:53

## 2021-09-28 RX ADMIN — TAMSULOSIN HYDROCHLORIDE 0.4 MG: 0.4 CAPSULE ORAL at 05:25

## 2021-09-28 RX ADMIN — CARVEDILOL 3.12 MG: 6.25 TABLET, FILM COATED ORAL at 16:50

## 2021-09-28 RX ADMIN — AZITHROMYCIN MONOHYDRATE 500 MG: 250 TABLET ORAL at 05:26

## 2021-09-28 RX ADMIN — AMLODIPINE BESYLATE 5 MG: 5 TABLET ORAL at 05:26

## 2021-09-28 RX ADMIN — BUDESONIDE AND FORMOTEROL FUMARATE DIHYDRATE 2 PUFF: 80; 4.5 AEROSOL RESPIRATORY (INHALATION) at 21:11

## 2021-09-28 RX ADMIN — BUDESONIDE AND FORMOTEROL FUMARATE DIHYDRATE 2 PUFF: 80; 4.5 AEROSOL RESPIRATORY (INHALATION) at 07:38

## 2021-09-28 RX ADMIN — SENNOSIDES AND DOCUSATE SODIUM 2 TABLET: 50; 8.6 TABLET ORAL at 05:25

## 2021-09-28 RX ADMIN — DIBASIC SODIUM PHOSPHATE, MONOBASIC POTASSIUM PHOSPHATE AND MONOBASIC SODIUM PHOSPHATE 250 MG: 852; 155; 130 TABLET ORAL at 05:34

## 2021-09-28 RX ADMIN — DIBASIC SODIUM PHOSPHATE, MONOBASIC POTASSIUM PHOSPHATE AND MONOBASIC SODIUM PHOSPHATE 250 MG: 852; 155; 130 TABLET ORAL at 16:52

## 2021-09-28 RX ADMIN — HYDROCORTISONE 10 MG: 10 TABLET ORAL at 05:25

## 2021-09-28 RX ADMIN — HYDROCORTISONE 10 MG: 10 TABLET ORAL at 11:41

## 2021-09-28 RX ADMIN — HYDROCORTISONE 10 MG: 10 TABLET ORAL at 16:55

## 2021-09-28 RX ADMIN — LEVOTHYROXINE SODIUM 75 MCG: 0.05 TABLET ORAL at 05:26

## 2021-09-28 RX ADMIN — OMEPRAZOLE 20 MG: 20 CAPSULE, DELAYED RELEASE ORAL at 16:52

## 2021-09-28 RX ADMIN — POLYVINYL ALCOHOL 1 DROP: 14 SOLUTION/ DROPS OPHTHALMIC at 05:35

## 2021-09-28 RX ADMIN — POLYVINYL ALCOHOL 1 DROP: 14 SOLUTION/ DROPS OPHTHALMIC at 16:53

## 2021-09-28 RX ADMIN — CARVEDILOL 3.12 MG: 6.25 TABLET, FILM COATED ORAL at 07:41

## 2021-09-28 RX ADMIN — CEFTRIAXONE SODIUM 2 G: 2 INJECTION, POWDER, FOR SOLUTION INTRAMUSCULAR; INTRAVENOUS at 05:24

## 2021-09-28 RX ADMIN — ENOXAPARIN SODIUM 40 MG: 40 INJECTION SUBCUTANEOUS at 05:24

## 2021-09-28 RX ADMIN — CLOPIDOGREL BISULFATE 75 MG: 75 TABLET ORAL at 05:26

## 2021-09-28 RX ADMIN — ALPRAZOLAM 0.25 MG: 0.25 TABLET ORAL at 11:37

## 2021-09-28 ASSESSMENT — ENCOUNTER SYMPTOMS
VOMITING: 0
FEVER: 0
SHORTNESS OF BREATH: 0
BACK PAIN: 0
DIARRHEA: 0
DIZZINESS: 0
HEADACHES: 0
CONSTIPATION: 0
NAUSEA: 0
PALPITATIONS: 0
COUGH: 0
ABDOMINAL PAIN: 0
CHILLS: 0

## 2021-09-28 ASSESSMENT — PAIN DESCRIPTION - PAIN TYPE
TYPE: ACUTE PAIN;CHRONIC PAIN
TYPE: ACUTE PAIN

## 2021-09-28 NOTE — DISCHARGE PLANNING
Received Choice form at [phone]  Agency/Facility Name: [facility]  Referral sent per Choice form @ [phone]

## 2021-09-28 NOTE — FLOWSHEET NOTE
09/27/21 2011   Events/Summary/Plan   Events/Summary/Plan MDI w/spacer f/w rinsing of mouth   Skin Inspection Respiratory Device Intact   Vital Signs   Pulse 73   Respiration 18   Pulse Oximetry 95 %   $ Pulse Oximetry (Spot Check) Yes   Respiratory Assessment   Respiratory Pattern Within Normal Limits   Level of Consciousness Alert   Chest Exam   Work Of Breathing / Effort Within Normal Limits   Breath Sounds   RUL Breath Sounds Clear   RML Breath Sounds Diminished   RLL Breath Sounds Diminished   BERRY Breath Sounds Clear   LLL Breath Sounds Diminished   Secretions   Cough Productive   How Sputum Obtained Spontaneous   Sputum Amount Small   Sputum Color White;Yellow   Sputum Consistency Thick;Thin   Oxygen   O2 (LPM) 5   O2 Delivery Device Nasal Cannula

## 2021-09-28 NOTE — PROGRESS NOTES
Telemetry Shift Summary     Rhythm: V Paced  Rate: 72-88  Measurements: 0.16/0.12/0.40  Ectopy (reported by Monitor Tech): R coup, R-O PVC, R big, BBB     Normal Values  Rhythm: Sinus  HR:   Measurements: 0.12-0.20/0.06-0.10/0.30-0.52

## 2021-09-28 NOTE — DISCHARGE PLANNING
Anticipated Discharge Disposition: SNF for rehab services       Action: Met with pt at bedside to coordinate discharge planning services for Skilled Nursing. Explained level of care recommended for pt and need for pt to go to a SNF prior to returning home. Pt stated understanding. He chose:     1. [facility]   [facility]. [facility]   3. [facility]     Faxed to [facility] at [phone]    Upon talking with the pt, he stated he has an open workers comp claim since 1970's and has received coverage through his workers comp insurance, OW since then. Explained to him that SNF and workers comp often don't work together as the workers comp policy does not pay SNF.     Additionally, he stated that he and his wife moved to [facility] to be in their independent living community, but plans to move back to his home is [place], [place] once he is better. He acknowledges that he and his wife may need to transition to Assisted Living, states that he does not know if he handle the $1,000 price difference and ultimately plans to return to his home in [place], [place].     He asked for assistance in getting some clothes brought to him and that his wife, who resides in Independent Living with him needs help getting a phone set up. Explained to him that due to him being in independent living, there is not much support [facility] can offer him. He asked for LSW to reach out to [facility] anyway. LSW spoke with Carline, admin for [facility] to confirm that they cannot provide help due to the level of care he has.     Carline provided phone numbers for pt's son Gene to reach out to for assistance. Need to get pt's consent to reach out to Gene.     Update @ 4:05 pm: pt has been denied from SNF's he chose. Sent blanket snf referral per protocol for continuity of care.     Faxed to [facility] at [phone]       Barriers to Discharge: medical clearance, snf acceptance    Plan: LSW to assist as needed and monitor for barriers to discharge.            Emergency contact: Jacquie  Dl. Residing at [facility], independent Living. Pt states that he and his wife are DPOA for each other. He stated she does not have a phone at this time.     Pt confirms information on his facesheet. He states that he and his wife Jacquie [facility] off [address].     He is on service with [facility] for oxygen and his baseline is 5L o2.     He states his PCP, Bebe Banerjee is in [place] and he hasn't seen them in quite some time. He intended to establish with a pcp, prior to being admitted to the hospital.     He stated he was mostly independent with his ADL's and needs a little bit of help getting into bed, but independent otherwise.     He owns a 4WW, but would like to get a FWW as well.     His preferred pharmacy is [facility] on [address] and [place] while at [facility]. Care Transition Team Assessment    Information Source  Orientation Level: Oriented X4  Information Given By: Patient       Interdisciplinary Discharge Planning  Lives with - Patient's Self Care Capacity: Spouse  Housing / Facility: Independent Living Facility  Prior Services: None    Discharge Preparedness  What is your plan after discharge?: Skilled nursing facility  What are your discharge supports?: Spouse  Prior Functional Level: Independent with Activities of Daily Living  Difficulity with ADLs: Bathing, Dressing, Toileting, Walking    Functional Assesment  Prior Functional Level: Independent with Activities of Daily Living      Vision / Hearing Impairment  Right Eye Vision: Impaired, Wears Glasses, Other (Comments) (glasses unavailable)  Left Eye Vision: Impaired, Wears Glasses (glasses unavailable)      [facility] Directive  [facility]vance Directive?: DPOA for Health Care  Durable Power of  Name and Contact :  (Jacquie Lizama)    Domestic Abuse  Have you ever been the victim of abuse or violence?: No      Discharge Risks or Barriers  Discharge risks or barriers?: No PCP    Anticipated Discharge  Information  Discharge Disposition: D/T to SNF with Medicare cert in anticipation of skilled care (03)  Discharge Address: [address]   Discharge Contact Phone Number:  ([phone])

## 2021-09-28 NOTE — HOSPITAL COURSE
88-year-old male on chronic home oxygen presenting 9/25/2021 for worsening shortness of breath, dyspnea on exertion, on chronic home O2 4 to 5 L oxygen supplementation.  Patient found to have bilateral community-acquired pneumonia on CT scan on admission.  Covid screening was negative.

## 2021-09-28 NOTE — PROGRESS NOTES
Hospital Medicine Daily Progress Note    Date of Service  9/28/2021    Chief Complaint  Jeffrey Lizama is a 88 y.o. male admitted 9/25/2021 with dyspnea    Hospital Course  88-year-old male on chronic home oxygen presenting 9/25/2021 for worsening shortness of breath, dyspnea on exertion, on chronic home O2 4 to 5 L oxygen supplementation.  Patient found to have bilateral community-acquired pneumonia on CT scan on admission.  Covid screening was negative.    Interval Problem Update  Patient stated he had difficulty breathing through his nose.  Patient on 6 L nasal cannula.  Renal function improved after losartan was discontinued.  Pending SNF.    I have personally seen and examined the patient at bedside. I discussed the plan of care with patient, bedside RN, charge RN,  and pharmacy.    Consultants/Specialty  none    Code Status  DNAR, I OK    Disposition  Patient is medically cleared.   Anticipate discharge to to skilled nursing facility.  I have placed the appropriate orders for post-discharge needs.    Review of Systems  Review of Systems   Constitutional: Negative for chills, fever and malaise/fatigue.   HENT: Positive for congestion. Negative for nosebleeds.    Respiratory: Negative for cough and shortness of breath.    Cardiovascular: Negative for chest pain and palpitations.   Gastrointestinal: Negative for abdominal pain, constipation, diarrhea, nausea and vomiting.   Musculoskeletal: Negative for back pain and joint pain.   Neurological: Negative for dizziness and headaches.   All other systems reviewed and are negative.       Physical Exam  Temp:  [36.3 °C (97.4 °F)-36.6 °C (97.9 °F)] 36.6 °C (97.9 °F)  Pulse:  [70-89] 71  Resp:  [18-20] 18  BP: (117-168)/(65-89) 146/85  SpO2:  [93 %-99 %] 95 %    Physical Exam  Vitals and nursing note reviewed.   Constitutional:       General: He is not in acute distress.     Appearance: He is obese. He is not diaphoretic.   HENT:      Nose: Congestion  present. No rhinorrhea.      Comments: Left nose dried blood noted  Cardiovascular:      Rate and Rhythm: Normal rate and regular rhythm.      Pulses: Normal pulses.      Heart sounds: Normal heart sounds. No murmur heard.     Pulmonary:      Effort: Pulmonary effort is normal. No respiratory distress.      Breath sounds: Normal breath sounds.      Comments: Using supplemental oxygen via nasal cannula  Abdominal:      General: Abdomen is flat. Bowel sounds are normal. There is no distension.      Palpations: Abdomen is soft.      Tenderness: There is no abdominal tenderness.   Musculoskeletal:         General: No swelling or tenderness. Normal range of motion.   Skin:     General: Skin is warm.      Capillary Refill: Capillary refill takes less than 2 seconds.      Coloration: Skin is not jaundiced or pale.   Neurological:      General: No focal deficit present.      Mental Status: He is alert and oriented to person, place, and time. Mental status is at baseline.   Psychiatric:         Mood and Affect: Mood normal.         Behavior: Behavior normal.         Thought Content: Thought content normal.         Judgment: Judgment normal.         Fluids    Intake/Output Summary (Last 24 hours) at 9/28/2021 1607  Last data filed at 9/28/2021 0730  Gross per 24 hour   Intake 240 ml   Output 1200 ml   Net -960 ml       Laboratory  Recent Labs     09/26/21  0056 09/27/21  0355 09/28/21  0833   WBC 12.9* 17.5* 14.3*   RBC 3.88* 3.89* 3.76*   HEMOGLOBIN 11.2* 11.2* 10.8*   HEMATOCRIT 34.7* 33.8* 33.3*   MCV 89.4 86.9 88.6   MCH 28.9 28.8 28.7   MCHC 32.3* 33.1* 32.4*   RDW 58.0* 55.8* 56.7*   PLATELETCT 201 218 207   MPV 9.2 9.3 9.7     Recent Labs     09/26/21  0056 09/27/21  0355 09/28/21  0833   SODIUM 132* 137 137   POTASSIUM 4.5 3.9 3.9   CHLORIDE 95* 96 97   CO2 27 31 27   GLUCOSE 83 121* 123*   BUN 31* 35* 29*   CREATININE 1.04 1.30 0.93   CALCIUM 9.1 8.7 8.4     Recent Labs     09/26/21  0056   APTT 33.7   INR 1.09                Imaging  CT-CTA CHEST PULMONARY ARTERY W/ RECONS   Final Result      1.  No pulmonary embolism   2.  New bilateral groundglass and consolidative opacities, this is likely infectious given it is new from 4/18/21 however recommend short interval follow up in 3-6 months to ensure resolution.            DX-CHEST-PORTABLE (1 VIEW)   Final Result      Diffuse bilateral patchy parenchymal opacities. Small left pleural effusion.   Unchanged cardiomegaly           Assessment/Plan  * Pneumonia- (present on admission)  Assessment & Plan  -Bilateral groundglass and consolidative opacities noted on CT scan  -Continue Rocephin and azithromycin, no recent hospitalization  -Causing leukocytosis but no sepsis  -Causing worsening hypoxia  -Await culture results, sputum preliminary shows few gram-positive cocci  - procalcitonin 0.09  -Covid screening negative    Acute encephalopathy- (present on admission)  Assessment & Plan  Patient was drowsy, confusing  and was not able to provide information at admission  likeley due to hypoxia secondary to pneumonia.     - resolved    Acute on chronic respiratory failure with hypoxia (HCC)- (present on admission)  Assessment & Plan  -Due to pneumonia  -Continuous pulse ox monitoring  -Respiratory care per protocol  -Continues to be between 6 L up from 5L    FABIAN (acute kidney injury) (HCC)- (present on admission)  Assessment & Plan  Baseline 0.6  discontinue lorsartan, ordered amlodipine.      Be conservative with IV fluid given HFrEF  Continue to monitor  Improved level     Chronic systolic congestive heart failure (HCC)- (present on admission)  Assessment & Plan  proBNP 4771, seems is at his baseline  Echo 5/2021 showed EF 45%. Dyskinesis of the apex and hypokinesis of the distal/apical inferior and septal walls.    Continue to monitor decompensated signs    Hyponatremia- (present on admission)  Assessment & Plan  -Mild, he does have some mild lower extremity edema and is chronically on  Lasix however at this point in time Lasix will be held due to infectious process  -IV fluids were given in the ER  -If there is worsening of his sodium on repeat BMP in the morning, likely due to fluids and would recommend starting Lasix however again I will not started at this time    COPD (chronic obstructive pulmonary disease) (HCC)- (present on admission)  Assessment & Plan  -No acute exacerbation  -He has however at significant risk of developing an acute exacerbation since he does have a bilateral pneumonia  -Hydrocortisone was started on admission    GERD (gastroesophageal reflux disease)- (present on admission)  Assessment & Plan  -Continue home PPI    Hypothyroidism- (present on admission)  Assessment & Plan  -Continue home Synthroid at 75 mcg daily  -Most recent TSH was approximately 4 months ago was normal at 3.16    Dyslipidemia- (present on admission)  Assessment & Plan  -Continue home statin    Leukocytosis- (present on admission)  Assessment & Plan  -Due to pneumonia  -Repeat CBC in the morning    Elevated troponin- (present on admission)  Assessment & Plan  Chronic   troponin 111-> 115, at his baseline    pt reported right upper chest pain for a few days while cough. EKG showed ventricular paced complexes.    Continue to monitor    Normocytic anemia- (present on admission)  Assessment & Plan  -No sign of gross bleeding, chronic    Paroxysmal atrial fibrillation (HCC)- (present on admission)  Assessment & Plan  -Also with a history of sick sinus syndrome, now with a pacemaker  -He is currently paced    BPH (benign prostatic hypertrophy)- (present on admission)  Assessment & Plan  -Continue home Flomax    Essential hypertension, benign- (present on admission)  Assessment & Plan  -Continue home Coreg and losartan  -Start as needed labetalol  -Adjust as needed     VTE prophylaxis: SCDs/TEDs and enoxaparin ppx    I have performed a physical exam and reviewed and updated ROS and Plan today (9/28/2021). In  review of yesterday's note (9/27/2021), there are no changes except as documented above.

## 2021-09-28 NOTE — PROGRESS NOTES
Telemetry Shift Summary    Rhythm V Paced w/BBB  HR Range 68-81  Ectopy R PVCs, Couplets, Bigem  Measurements -/.12/.48        Normal Values  Rhythm SR  HR Range    Measurements 0.12-0.20 / 0.06-0.10  / 0.30-0.52

## 2021-09-29 LAB
ALBUMIN SERPL BCP-MCNC: 3.8 G/DL (ref 3.2–4.9)
ALBUMIN/GLOB SERPL: 1.5 G/DL
ALP SERPL-CCNC: 61 U/L (ref 30–99)
ALT SERPL-CCNC: 27 U/L (ref 2–50)
ANION GAP SERPL CALC-SCNC: 8 MMOL/L (ref 7–16)
AST SERPL-CCNC: 21 U/L (ref 12–45)
BASOPHILS # BLD AUTO: 0.4 % (ref 0–1.8)
BASOPHILS # BLD: 0.05 K/UL (ref 0–0.12)
BILIRUB SERPL-MCNC: 0.3 MG/DL (ref 0.1–1.5)
BUN SERPL-MCNC: 21 MG/DL (ref 8–22)
CALCIUM SERPL-MCNC: 8.6 MG/DL (ref 8.4–10.2)
CHLORIDE SERPL-SCNC: 98 MMOL/L (ref 96–112)
CO2 SERPL-SCNC: 31 MMOL/L (ref 20–33)
CREAT SERPL-MCNC: 0.86 MG/DL (ref 0.5–1.4)
EOSINOPHIL # BLD AUTO: 0.69 K/UL (ref 0–0.51)
EOSINOPHIL NFR BLD: 5.2 % (ref 0–6.9)
ERYTHROCYTE [DISTWIDTH] IN BLOOD BY AUTOMATED COUNT: 58.2 FL (ref 35.9–50)
GLOBULIN SER CALC-MCNC: 2.5 G/DL (ref 1.9–3.5)
GLUCOSE SERPL-MCNC: 79 MG/DL (ref 65–99)
HCT VFR BLD AUTO: 37.5 % (ref 42–52)
HGB BLD-MCNC: 11.9 G/DL (ref 14–18)
IMM GRANULOCYTES # BLD AUTO: 0.29 K/UL (ref 0–0.11)
IMM GRANULOCYTES NFR BLD AUTO: 2.2 % (ref 0–0.9)
LYMPHOCYTES # BLD AUTO: 0.95 K/UL (ref 1–4.8)
LYMPHOCYTES NFR BLD: 7.1 % (ref 22–41)
MAGNESIUM SERPL-MCNC: 1.9 MG/DL (ref 1.5–2.5)
MCH RBC QN AUTO: 28.8 PG (ref 27–33)
MCHC RBC AUTO-ENTMCNC: 31.7 G/DL (ref 33.7–35.3)
MCV RBC AUTO: 90.8 FL (ref 81.4–97.8)
MONOCYTES # BLD AUTO: 0.92 K/UL (ref 0–0.85)
MONOCYTES NFR BLD AUTO: 6.9 % (ref 0–13.4)
NEUTROPHILS # BLD AUTO: 10.48 K/UL (ref 1.82–7.42)
NEUTROPHILS NFR BLD: 78.2 % (ref 44–72)
NRBC # BLD AUTO: 0 K/UL
NRBC BLD-RTO: 0 /100 WBC
PHOSPHATE SERPL-MCNC: 3.1 MG/DL (ref 2.5–4.5)
PLATELET # BLD AUTO: 224 K/UL (ref 164–446)
PMV BLD AUTO: 9.9 FL (ref 9–12.9)
POTASSIUM SERPL-SCNC: 3.8 MMOL/L (ref 3.6–5.5)
PROT SERPL-MCNC: 6.3 G/DL (ref 6–8.2)
RBC # BLD AUTO: 4.13 M/UL (ref 4.7–6.1)
SODIUM SERPL-SCNC: 137 MMOL/L (ref 135–145)
WBC # BLD AUTO: 13.4 K/UL (ref 4.8–10.8)

## 2021-09-29 PROCEDURE — 36415 COLL VENOUS BLD VENIPUNCTURE: CPT

## 2021-09-29 PROCEDURE — 770020 HCHG ROOM/CARE - TELE (206)

## 2021-09-29 PROCEDURE — 94640 AIRWAY INHALATION TREATMENT: CPT

## 2021-09-29 PROCEDURE — A9270 NON-COVERED ITEM OR SERVICE: HCPCS | Performed by: INTERNAL MEDICINE

## 2021-09-29 PROCEDURE — 700105 HCHG RX REV CODE 258: Performed by: INTERNAL MEDICINE

## 2021-09-29 PROCEDURE — 700102 HCHG RX REV CODE 250 W/ 637 OVERRIDE(OP): Performed by: INTERNAL MEDICINE

## 2021-09-29 PROCEDURE — 99232 SBSQ HOSP IP/OBS MODERATE 35: CPT | Performed by: INTERNAL MEDICINE

## 2021-09-29 PROCEDURE — 94760 N-INVAS EAR/PLS OXIMETRY 1: CPT

## 2021-09-29 PROCEDURE — 85025 COMPLETE CBC W/AUTO DIFF WBC: CPT

## 2021-09-29 PROCEDURE — 84100 ASSAY OF PHOSPHORUS: CPT

## 2021-09-29 PROCEDURE — 80053 COMPREHEN METABOLIC PANEL: CPT

## 2021-09-29 PROCEDURE — 700111 HCHG RX REV CODE 636 W/ 250 OVERRIDE (IP): Performed by: INTERNAL MEDICINE

## 2021-09-29 PROCEDURE — 83735 ASSAY OF MAGNESIUM: CPT

## 2021-09-29 RX ADMIN — PHENYLEPHRINE HYDROCHLORIDE 1 SPRAY: 0.5 SPRAY NASAL at 20:16

## 2021-09-29 RX ADMIN — HYDROCORTISONE 10 MG: 10 TABLET ORAL at 05:15

## 2021-09-29 RX ADMIN — AMPICILLIN SODIUM AND SULBACTAM SODIUM 3 G: 2; 1 INJECTION, POWDER, FOR SOLUTION INTRAMUSCULAR; INTRAVENOUS at 08:35

## 2021-09-29 RX ADMIN — AMPICILLIN SODIUM AND SULBACTAM SODIUM 3 G: 2; 1 INJECTION, POWDER, FOR SOLUTION INTRAMUSCULAR; INTRAVENOUS at 17:49

## 2021-09-29 RX ADMIN — Medication 400 MG: at 09:59

## 2021-09-29 RX ADMIN — BUDESONIDE AND FORMOTEROL FUMARATE DIHYDRATE 2 PUFF: 80; 4.5 AEROSOL RESPIRATORY (INHALATION) at 07:46

## 2021-09-29 RX ADMIN — AMPICILLIN SODIUM AND SULBACTAM SODIUM 3 G: 2; 1 INJECTION, POWDER, FOR SOLUTION INTRAMUSCULAR; INTRAVENOUS at 12:24

## 2021-09-29 RX ADMIN — CARVEDILOL 3.12 MG: 6.25 TABLET, FILM COATED ORAL at 08:34

## 2021-09-29 RX ADMIN — CARVEDILOL 3.12 MG: 6.25 TABLET, FILM COATED ORAL at 17:46

## 2021-09-29 RX ADMIN — HYDROCORTISONE 10 MG: 10 TABLET ORAL at 17:48

## 2021-09-29 RX ADMIN — ENOXAPARIN SODIUM 40 MG: 40 INJECTION SUBCUTANEOUS at 05:16

## 2021-09-29 RX ADMIN — BUDESONIDE AND FORMOTEROL FUMARATE DIHYDRATE 2 PUFF: 80; 4.5 AEROSOL RESPIRATORY (INHALATION) at 20:22

## 2021-09-29 RX ADMIN — CLOPIDOGREL BISULFATE 75 MG: 75 TABLET ORAL at 05:16

## 2021-09-29 RX ADMIN — ALPRAZOLAM 0.25 MG: 0.25 TABLET ORAL at 17:48

## 2021-09-29 RX ADMIN — Medication 400 MG: at 17:48

## 2021-09-29 RX ADMIN — OMEPRAZOLE 20 MG: 20 CAPSULE, DELAYED RELEASE ORAL at 17:45

## 2021-09-29 RX ADMIN — HYDROCORTISONE 10 MG: 10 TABLET ORAL at 12:24

## 2021-09-29 RX ADMIN — LEVOTHYROXINE SODIUM 75 MCG: 0.05 TABLET ORAL at 05:16

## 2021-09-29 RX ADMIN — DIBASIC SODIUM PHOSPHATE, MONOBASIC POTASSIUM PHOSPHATE AND MONOBASIC SODIUM PHOSPHATE 250 MG: 852; 155; 130 TABLET ORAL at 17:46

## 2021-09-29 RX ADMIN — OMEPRAZOLE 20 MG: 20 CAPSULE, DELAYED RELEASE ORAL at 05:23

## 2021-09-29 RX ADMIN — ATORVASTATIN CALCIUM 80 MG: 40 TABLET, FILM COATED ORAL at 17:47

## 2021-09-29 RX ADMIN — TAMSULOSIN HYDROCHLORIDE 0.4 MG: 0.4 CAPSULE ORAL at 05:15

## 2021-09-29 RX ADMIN — AMLODIPINE BESYLATE 10 MG: 5 TABLET ORAL at 05:15

## 2021-09-29 RX ADMIN — AMPICILLIN SODIUM AND SULBACTAM SODIUM 3 G: 2; 1 INJECTION, POWDER, FOR SOLUTION INTRAMUSCULAR; INTRAVENOUS at 01:14

## 2021-09-29 RX ADMIN — TRAMADOL HYDROCHLORIDE 50 MG: 50 TABLET, FILM COATED ORAL at 08:35

## 2021-09-29 RX ADMIN — SENNOSIDES AND DOCUSATE SODIUM 2 TABLET: 50; 8.6 TABLET ORAL at 17:48

## 2021-09-29 RX ADMIN — DIBASIC SODIUM PHOSPHATE, MONOBASIC POTASSIUM PHOSPHATE AND MONOBASIC SODIUM PHOSPHATE 250 MG: 852; 155; 130 TABLET ORAL at 05:23

## 2021-09-29 RX ADMIN — POLYVINYL ALCOHOL 1 DROP: 14 SOLUTION/ DROPS OPHTHALMIC at 18:06

## 2021-09-29 RX ADMIN — POLYVINYL ALCOHOL 1 DROP: 14 SOLUTION/ DROPS OPHTHALMIC at 08:36

## 2021-09-29 RX ADMIN — PHENYLEPHRINE HYDROCHLORIDE 1 SPRAY: 0.5 SPRAY NASAL at 09:59

## 2021-09-29 RX ADMIN — TRAMADOL HYDROCHLORIDE 50 MG: 50 TABLET, FILM COATED ORAL at 21:56

## 2021-09-29 ASSESSMENT — PAIN DESCRIPTION - PAIN TYPE
TYPE: ACUTE PAIN

## 2021-09-29 ASSESSMENT — COGNITIVE AND FUNCTIONAL STATUS - GENERAL
EATING MEALS: A LITTLE
DRESSING REGULAR LOWER BODY CLOTHING: A LOT
DAILY ACTIVITIY SCORE: 13
WALKING IN HOSPITAL ROOM: A LOT
MOBILITY SCORE: 10
TOILETING: A LOT
STANDING UP FROM CHAIR USING ARMS: A LOT
PERSONAL GROOMING: A LOT
CLIMB 3 TO 5 STEPS WITH RAILING: TOTAL
TURNING FROM BACK TO SIDE WHILE IN FLAT BAD: A LITTLE
HELP NEEDED FOR BATHING: A LOT
MOVING TO AND FROM BED TO CHAIR: UNABLE
MOVING FROM LYING ON BACK TO SITTING ON SIDE OF FLAT BED: UNABLE
DRESSING REGULAR UPPER BODY CLOTHING: A LOT
SUGGESTED CMS G CODE MODIFIER DAILY ACTIVITY: CL
SUGGESTED CMS G CODE MODIFIER MOBILITY: CL

## 2021-09-29 ASSESSMENT — ENCOUNTER SYMPTOMS
HEADACHES: 0
CONSTIPATION: 0
DIZZINESS: 0
VOMITING: 0
BACK PAIN: 0
ABDOMINAL PAIN: 0
NAUSEA: 0
DIARRHEA: 0
PALPITATIONS: 0
FEVER: 0
SHORTNESS OF BREATH: 0
CHILLS: 0
COUGH: 0

## 2021-09-29 ASSESSMENT — PAIN SCALES - WONG BAKER: WONGBAKER_NUMERICALRESPONSE: DOESN'T HURT AT ALL

## 2021-09-29 NOTE — PROGRESS NOTES
Telemetry Shift Summary      Rhythm: Paced On Demand/ SR w/ BBB  HR Range: 71-80  Ectopy: R-O PVC, R Coup  Measurements: .20/.20/.46    Normal Values:  Rhythm: SR  HR Range:   Measurements: 0.12-0.20/ 0.06-0.10/ 0.30-0.52

## 2021-09-29 NOTE — DISCHARGE PLANNING
Received Choice form at [phone]  Agency/Facility Name: [facility]  Referral sent per Choice form @ [phone]

## 2021-09-29 NOTE — PROGRESS NOTES
Covid 19 surge in place     Patient AxO x 4  Respirations  unlabored on 6l NC VSS.  Patient has denied pain    Fall risk protocol in place. Bed locked and in lowest position. Non-skid socks and bed alarm on.  Rounded on hourly. Call light with in reach.

## 2021-09-29 NOTE — PROGRESS NOTES
COVID-19 surge in effect.    Patient was worried about wife that is staying at [facility] in [place]. Patient asked me to contact his wife but wife doesn't have working phone number. Contacted the facility and talk to one of the employe and patient was able to talk to his wife. Patient said he felt better that he was able to talk to his wife.

## 2021-09-29 NOTE — PROGRESS NOTES
Hospital Medicine Daily Progress Note    Date of Service  9/29/2021    Chief Complaint  Jeffrey Lizama is a 88 y.o. male admitted 9/25/2021 with dyspnea    Hospital Course  88-year-old male on chronic home oxygen presenting 9/25/2021 for worsening shortness of breath, dyspnea on exertion, on chronic home O2 4 to 5 L oxygen supplementation.  Patient found to have bilateral community-acquired pneumonia on CT scan on admission.  Covid screening was negative.    Interval Problem Update  Patient stated he was doing better with nasal spray.  Still reported feeling week.  Patient on 6 L nasal cannula.  Renal function stabilizing after losartan was discontinued. Cr 0.86.  WBC improving to 13.4, down from 14.3, 17.5. continued on IV unasyn.    I have personally seen and examined the patient at bedside. I discussed the plan of care with patient, bedside RN, charge RN,  and pharmacy.    Consultants/Specialty  none    Code Status  DNAR, I OK    Disposition  Patient is not medically cleared.   Anticipate discharge to to skilled nursing facility.  I have placed the appropriate orders for post-discharge needs.    Review of Systems  Review of Systems   Constitutional: Negative for chills, fever and malaise/fatigue.   HENT: Positive for congestion. Negative for nosebleeds.    Respiratory: Negative for cough and shortness of breath.    Cardiovascular: Negative for chest pain and palpitations.   Gastrointestinal: Negative for abdominal pain, constipation, diarrhea, nausea and vomiting.   Musculoskeletal: Negative for back pain and joint pain.   Neurological: Negative for dizziness and headaches.   All other systems reviewed and are negative.       Physical Exam  Temp:  [36.4 °C (97.5 °F)-37 °C (98.6 °F)] 37 °C (98.6 °F)  Pulse:  [66-86] 73  Resp:  [18-20] 18  BP: (117-165)/(74-89) 144/75  SpO2:  [93 %-97 %] 95 %    Physical Exam  Vitals and nursing note reviewed.   Constitutional:       General: He is not in acute  distress.     Appearance: He is obese. He is not diaphoretic.   HENT:      Nose: Congestion present. No rhinorrhea.      Comments: Left nose dried blood noted  Cardiovascular:      Rate and Rhythm: Normal rate and regular rhythm.      Pulses: Normal pulses.      Heart sounds: Normal heart sounds. No murmur heard.     Pulmonary:      Effort: Pulmonary effort is normal. No respiratory distress.      Breath sounds: Normal breath sounds.      Comments: Using supplemental oxygen via nasal cannula  Abdominal:      General: Abdomen is flat. Bowel sounds are normal. There is no distension.      Palpations: Abdomen is soft.      Tenderness: There is no abdominal tenderness.   Musculoskeletal:         General: No swelling or tenderness. Normal range of motion.   Skin:     General: Skin is warm.      Capillary Refill: Capillary refill takes less than 2 seconds.      Coloration: Skin is not jaundiced or pale.   Neurological:      General: No focal deficit present.      Mental Status: He is alert and oriented to person, place, and time. Mental status is at baseline.   Psychiatric:         Mood and Affect: Mood normal.         Behavior: Behavior normal.         Thought Content: Thought content normal.         Judgment: Judgment normal.         Fluids    Intake/Output Summary (Last 24 hours) at 9/29/2021 0729  Last data filed at 9/28/2021 1645  Gross per 24 hour   Intake 360 ml   Output 800 ml   Net -440 ml       Laboratory  Recent Labs     09/27/21  0355 09/28/21  0833 09/29/21  0456   WBC 17.5* 14.3* 13.4*   RBC 3.89* 3.76* 4.13*   HEMOGLOBIN 11.2* 10.8* 11.9*   HEMATOCRIT 33.8* 33.3* 37.5*   MCV 86.9 88.6 90.8   MCH 28.8 28.7 28.8   MCHC 33.1* 32.4* 31.7*   RDW 55.8* 56.7* 58.2*   PLATELETCT 218 207 224   MPV 9.3 9.7 9.9     Recent Labs     09/27/21  0355 09/28/21  0833 09/29/21  0456   SODIUM 137 137 137   POTASSIUM 3.9 3.9 3.8   CHLORIDE 96 97 98   CO2 31 27 31   GLUCOSE 121* 123* 79   BUN 35* 29* 21   CREATININE 1.30 0.93  0.86   CALCIUM 8.7 8.4 8.6                   Imaging  CT-CTA CHEST PULMONARY ARTERY W/ RECONS   Final Result      1.  No pulmonary embolism   2.  New bilateral groundglass and consolidative opacities, this is likely infectious given it is new from 4/18/21 however recommend short interval follow up in 3-6 months to ensure resolution.            DX-CHEST-PORTABLE (1 VIEW)   Final Result      Diffuse bilateral patchy parenchymal opacities. Small left pleural effusion.   Unchanged cardiomegaly           Assessment/Plan  * Pneumonia- (present on admission)  Assessment & Plan  -Bilateral groundglass and consolidative opacities noted on CT scan  -Continue Rocephin and azithromycin, no recent hospitalization  -Causing leukocytosis but no sepsis  -Causing worsening hypoxia  -Await culture results, sputum preliminary shows few gram-positive cocci  - procalcitonin 0.09  -Covid screening negative    Acute encephalopathy- (present on admission)  Assessment & Plan  Patient was drowsy, confusing  and was not able to provide information at admission  likeley due to hypoxia secondary to pneumonia.     - resolved    Acute on chronic respiratory failure with hypoxia (HCC)- (present on admission)  Assessment & Plan  -Due to pneumonia  -Continuous pulse ox monitoring  -Respiratory care per protocol  -Continues to be between 6 L up from 5L    FABIAN (acute kidney injury) (HCC)- (present on admission)  Assessment & Plan  Baseline 0.6  discontinue lorsartan, ordered amlodipine.      Be conservative with IV fluid given HFrEF  Continue to monitor  Improved level     Chronic systolic congestive heart failure (HCC)- (present on admission)  Assessment & Plan  proBNP 4771, seems is at his baseline  Echo 5/2021 showed EF 45%. Dyskinesis of the apex and hypokinesis of the distal/apical inferior and septal walls.    Continue to monitor decompensated signs    Hyponatremia- (present on admission)  Assessment & Plan  -Mild, he does have some mild lower  extremity edema and is chronically on Lasix however at this point in time Lasix will be held due to infectious process  -IV fluids were given in the ER  -If there is worsening of his sodium on repeat BMP in the morning, likely due to fluids and would recommend starting Lasix however again I will not started at this time    COPD (chronic obstructive pulmonary disease) (HCC)- (present on admission)  Assessment & Plan  -No acute exacerbation  -He has however at significant risk of developing an acute exacerbation since he does have a bilateral pneumonia  -Hydrocortisone was started on admission    GERD (gastroesophageal reflux disease)- (present on admission)  Assessment & Plan  -Continue home PPI    Hypothyroidism- (present on admission)  Assessment & Plan  -Continue home Synthroid at 75 mcg daily  -Most recent TSH was approximately 4 months ago was normal at 3.16    Dyslipidemia- (present on admission)  Assessment & Plan  -Continue home statin    Leukocytosis- (present on admission)  Assessment & Plan  -Due to pneumonia  -Repeat CBC in the morning    Elevated troponin- (present on admission)  Assessment & Plan  Chronic   troponin 111-> 115, at his baseline    pt reported right upper chest pain for a few days while cough. EKG showed ventricular paced complexes.    Continue to monitor    Normocytic anemia- (present on admission)  Assessment & Plan  -No sign of gross bleeding, chronic    Paroxysmal atrial fibrillation (HCC)- (present on admission)  Assessment & Plan  -Also with a history of sick sinus syndrome, now with a pacemaker  -He is currently paced    BPH (benign prostatic hypertrophy)- (present on admission)  Assessment & Plan  -Continue home Flomax    Essential hypertension, benign- (present on admission)  Assessment & Plan  -Continue home Coreg and losartan  -Start as needed labetalol  -Adjust as needed     VTE prophylaxis: SCDs/TEDs and enoxaparin ppx    I have performed a physical exam and reviewed and  updated ROS and Plan today (9/29/2021). In review of yesterday's note (9/28/2021), there are no changes except as documented above.

## 2021-09-30 ENCOUNTER — PATIENT OUTREACH (OUTPATIENT)
Dept: HEALTH INFORMATION MANAGEMENT | Facility: OTHER | Age: 86
End: 2021-09-30

## 2021-09-30 PROBLEM — Z02.9 DISCHARGE PLANNING ISSUES: Status: ACTIVE | Noted: 2021-09-30

## 2021-09-30 PROBLEM — Z75.8 DISCHARGE PLANNING ISSUES: Status: ACTIVE | Noted: 2021-09-30

## 2021-09-30 LAB
ALBUMIN SERPL BCP-MCNC: 3 G/DL (ref 3.2–4.9)
ALBUMIN/GLOB SERPL: 1.2 G/DL
ALP SERPL-CCNC: 60 U/L (ref 30–99)
ALT SERPL-CCNC: 29 U/L (ref 2–50)
ANION GAP SERPL CALC-SCNC: 10 MMOL/L (ref 7–16)
AST SERPL-CCNC: 18 U/L (ref 12–45)
BASOPHILS # BLD AUTO: 0.5 % (ref 0–1.8)
BASOPHILS # BLD: 0.06 K/UL (ref 0–0.12)
BILIRUB SERPL-MCNC: 0.4 MG/DL (ref 0.1–1.5)
BUN SERPL-MCNC: 22 MG/DL (ref 8–22)
CALCIUM SERPL-MCNC: 8.4 MG/DL (ref 8.4–10.2)
CHLORIDE SERPL-SCNC: 99 MMOL/L (ref 96–112)
CO2 SERPL-SCNC: 28 MMOL/L (ref 20–33)
CREAT SERPL-MCNC: 0.85 MG/DL (ref 0.5–1.4)
EOSINOPHIL # BLD AUTO: 0.52 K/UL (ref 0–0.51)
EOSINOPHIL NFR BLD: 4.1 % (ref 0–6.9)
ERYTHROCYTE [DISTWIDTH] IN BLOOD BY AUTOMATED COUNT: 57.2 FL (ref 35.9–50)
GLOBULIN SER CALC-MCNC: 2.5 G/DL (ref 1.9–3.5)
GLUCOSE SERPL-MCNC: 86 MG/DL (ref 65–99)
HCT VFR BLD AUTO: 35.8 % (ref 42–52)
HGB BLD-MCNC: 11.3 G/DL (ref 14–18)
IMM GRANULOCYTES # BLD AUTO: 0.23 K/UL (ref 0–0.11)
IMM GRANULOCYTES NFR BLD AUTO: 1.8 % (ref 0–0.9)
LYMPHOCYTES # BLD AUTO: 1.16 K/UL (ref 1–4.8)
LYMPHOCYTES NFR BLD: 9.2 % (ref 22–41)
MAGNESIUM SERPL-MCNC: 2 MG/DL (ref 1.5–2.5)
MCH RBC QN AUTO: 28.2 PG (ref 27–33)
MCHC RBC AUTO-ENTMCNC: 31.6 G/DL (ref 33.7–35.3)
MCV RBC AUTO: 89.3 FL (ref 81.4–97.8)
MONOCYTES # BLD AUTO: 0.84 K/UL (ref 0–0.85)
MONOCYTES NFR BLD AUTO: 6.6 % (ref 0–13.4)
NEUTROPHILS # BLD AUTO: 9.84 K/UL (ref 1.82–7.42)
NEUTROPHILS NFR BLD: 77.8 % (ref 44–72)
NRBC # BLD AUTO: 0 K/UL
NRBC BLD-RTO: 0 /100 WBC
PHOSPHATE SERPL-MCNC: 3 MG/DL (ref 2.5–4.5)
PLATELET # BLD AUTO: 204 K/UL (ref 164–446)
PMV BLD AUTO: 9.4 FL (ref 9–12.9)
POTASSIUM SERPL-SCNC: 4.2 MMOL/L (ref 3.6–5.5)
PROCALCITONIN SERPL-MCNC: 0.06 NG/ML
PROT SERPL-MCNC: 5.5 G/DL (ref 6–8.2)
RBC # BLD AUTO: 4.01 M/UL (ref 4.7–6.1)
SODIUM SERPL-SCNC: 137 MMOL/L (ref 135–145)
WBC # BLD AUTO: 12.7 K/UL (ref 4.8–10.8)

## 2021-09-30 PROCEDURE — 36415 COLL VENOUS BLD VENIPUNCTURE: CPT

## 2021-09-30 PROCEDURE — A9270 NON-COVERED ITEM OR SERVICE: HCPCS | Performed by: INTERNAL MEDICINE

## 2021-09-30 PROCEDURE — 700105 HCHG RX REV CODE 258: Performed by: INTERNAL MEDICINE

## 2021-09-30 PROCEDURE — 92526 ORAL FUNCTION THERAPY: CPT

## 2021-09-30 PROCEDURE — 85025 COMPLETE CBC W/AUTO DIFF WBC: CPT

## 2021-09-30 PROCEDURE — 97530 THERAPEUTIC ACTIVITIES: CPT

## 2021-09-30 PROCEDURE — 94760 N-INVAS EAR/PLS OXIMETRY 1: CPT

## 2021-09-30 PROCEDURE — 83735 ASSAY OF MAGNESIUM: CPT

## 2021-09-30 PROCEDURE — 700111 HCHG RX REV CODE 636 W/ 250 OVERRIDE (IP): Performed by: INTERNAL MEDICINE

## 2021-09-30 PROCEDURE — 770020 HCHG ROOM/CARE - TELE (206)

## 2021-09-30 PROCEDURE — 99232 SBSQ HOSP IP/OBS MODERATE 35: CPT | Performed by: INTERNAL MEDICINE

## 2021-09-30 PROCEDURE — 84145 PROCALCITONIN (PCT): CPT

## 2021-09-30 PROCEDURE — 97116 GAIT TRAINING THERAPY: CPT

## 2021-09-30 PROCEDURE — 700102 HCHG RX REV CODE 250 W/ 637 OVERRIDE(OP): Performed by: INTERNAL MEDICINE

## 2021-09-30 PROCEDURE — 84100 ASSAY OF PHOSPHORUS: CPT

## 2021-09-30 PROCEDURE — 97110 THERAPEUTIC EXERCISES: CPT

## 2021-09-30 PROCEDURE — 80053 COMPREHEN METABOLIC PANEL: CPT

## 2021-09-30 PROCEDURE — 94640 AIRWAY INHALATION TREATMENT: CPT

## 2021-09-30 RX ORDER — AMOXICILLIN AND CLAVULANATE POTASSIUM 875; 125 MG/1; MG/1
1 TABLET, FILM COATED ORAL EVERY 12 HOURS
Status: COMPLETED | OUTPATIENT
Start: 2021-09-30 | End: 2021-10-04

## 2021-09-30 RX ADMIN — AMLODIPINE BESYLATE 10 MG: 5 TABLET ORAL at 05:20

## 2021-09-30 RX ADMIN — BUDESONIDE AND FORMOTEROL FUMARATE DIHYDRATE 2 PUFF: 80; 4.5 AEROSOL RESPIRATORY (INHALATION) at 18:59

## 2021-09-30 RX ADMIN — AMOXICILLIN AND CLAVULANATE POTASSIUM 1 TABLET: 875; 125 TABLET, FILM COATED ORAL at 10:57

## 2021-09-30 RX ADMIN — LEVOTHYROXINE SODIUM 75 MCG: 0.05 TABLET ORAL at 05:19

## 2021-09-30 RX ADMIN — OMEPRAZOLE 20 MG: 20 CAPSULE, DELAYED RELEASE ORAL at 07:51

## 2021-09-30 RX ADMIN — HYDROCORTISONE 10 MG: 10 TABLET ORAL at 12:33

## 2021-09-30 RX ADMIN — TRAMADOL HYDROCHLORIDE 50 MG: 50 TABLET, FILM COATED ORAL at 07:44

## 2021-09-30 RX ADMIN — HYDROCORTISONE 10 MG: 10 TABLET ORAL at 18:00

## 2021-09-30 RX ADMIN — SENNOSIDES AND DOCUSATE SODIUM 2 TABLET: 50; 8.6 TABLET ORAL at 18:01

## 2021-09-30 RX ADMIN — POLYVINYL ALCOHOL 1 DROP: 14 SOLUTION/ DROPS OPHTHALMIC at 18:06

## 2021-09-30 RX ADMIN — Medication 400 MG: at 17:59

## 2021-09-30 RX ADMIN — SENNOSIDES AND DOCUSATE SODIUM 2 TABLET: 50; 8.6 TABLET ORAL at 05:20

## 2021-09-30 RX ADMIN — ENOXAPARIN SODIUM 40 MG: 40 INJECTION SUBCUTANEOUS at 05:19

## 2021-09-30 RX ADMIN — AMPICILLIN SODIUM AND SULBACTAM SODIUM 3 G: 2; 1 INJECTION, POWDER, FOR SOLUTION INTRAMUSCULAR; INTRAVENOUS at 05:19

## 2021-09-30 RX ADMIN — ALPRAZOLAM 0.25 MG: 0.25 TABLET ORAL at 18:00

## 2021-09-30 RX ADMIN — ATORVASTATIN CALCIUM 80 MG: 40 TABLET, FILM COATED ORAL at 18:00

## 2021-09-30 RX ADMIN — DIBASIC SODIUM PHOSPHATE, MONOBASIC POTASSIUM PHOSPHATE AND MONOBASIC SODIUM PHOSPHATE 250 MG: 852; 155; 130 TABLET ORAL at 18:00

## 2021-09-30 RX ADMIN — Medication 400 MG: at 05:21

## 2021-09-30 RX ADMIN — OMEPRAZOLE 20 MG: 20 CAPSULE, DELAYED RELEASE ORAL at 17:59

## 2021-09-30 RX ADMIN — HYDROCORTISONE 10 MG: 10 TABLET ORAL at 05:20

## 2021-09-30 RX ADMIN — CLOPIDOGREL BISULFATE 75 MG: 75 TABLET ORAL at 05:20

## 2021-09-30 RX ADMIN — PHENYLEPHRINE HYDROCHLORIDE 1 SPRAY: 0.5 SPRAY NASAL at 17:59

## 2021-09-30 RX ADMIN — CARVEDILOL 3.12 MG: 6.25 TABLET, FILM COATED ORAL at 17:59

## 2021-09-30 RX ADMIN — TAMSULOSIN HYDROCHLORIDE 0.4 MG: 0.4 CAPSULE ORAL at 05:21

## 2021-09-30 RX ADMIN — DIBASIC SODIUM PHOSPHATE, MONOBASIC POTASSIUM PHOSPHATE AND MONOBASIC SODIUM PHOSPHATE 250 MG: 852; 155; 130 TABLET ORAL at 07:44

## 2021-09-30 RX ADMIN — AMOXICILLIN AND CLAVULANATE POTASSIUM 1 TABLET: 875; 125 TABLET, FILM COATED ORAL at 18:06

## 2021-09-30 RX ADMIN — AMPICILLIN SODIUM AND SULBACTAM SODIUM 3 G: 2; 1 INJECTION, POWDER, FOR SOLUTION INTRAMUSCULAR; INTRAVENOUS at 00:27

## 2021-09-30 RX ADMIN — CARVEDILOL 3.12 MG: 6.25 TABLET, FILM COATED ORAL at 07:45

## 2021-09-30 RX ADMIN — BUDESONIDE AND FORMOTEROL FUMARATE DIHYDRATE 2 PUFF: 80; 4.5 AEROSOL RESPIRATORY (INHALATION) at 07:51

## 2021-09-30 RX ADMIN — POLYVINYL ALCOHOL 1 DROP: 14 SOLUTION/ DROPS OPHTHALMIC at 05:21

## 2021-09-30 ASSESSMENT — ENCOUNTER SYMPTOMS
COUGH: 0
FEVER: 0
SHORTNESS OF BREATH: 0
BACK PAIN: 0
WEAKNESS: 1
DIZZINESS: 0
CHILLS: 0
NAUSEA: 0
ABDOMINAL PAIN: 0
PALPITATIONS: 0
VOMITING: 0
CONSTIPATION: 0
DIARRHEA: 0
HEADACHES: 0

## 2021-09-30 ASSESSMENT — COGNITIVE AND FUNCTIONAL STATUS - GENERAL
STANDING UP FROM CHAIR USING ARMS: A LITTLE
CLIMB 3 TO 5 STEPS WITH RAILING: TOTAL
MOBILITY SCORE: 14
MOVING TO AND FROM BED TO CHAIR: A LOT
WALKING IN HOSPITAL ROOM: A LOT
DRESSING REGULAR UPPER BODY CLOTHING: A LITTLE
TURNING FROM BACK TO SIDE WHILE IN FLAT BAD: A LITTLE
TOILETING: A LOT
SUGGESTED CMS G CODE MODIFIER MOBILITY: CL
HELP NEEDED FOR BATHING: A LOT
MOVING FROM LYING ON BACK TO SITTING ON SIDE OF FLAT BED: A LITTLE
EATING MEALS: A LITTLE
PERSONAL GROOMING: A LITTLE
SUGGESTED CMS G CODE MODIFIER DAILY ACTIVITY: CK
DAILY ACTIVITIY SCORE: 15
DRESSING REGULAR LOWER BODY CLOTHING: A LOT

## 2021-09-30 ASSESSMENT — GAIT ASSESSMENTS
GAIT LEVEL OF ASSIST: MINIMAL ASSIST
DISTANCE (FEET): 10
ASSISTIVE DEVICE: FRONT WHEEL WALKER
DEVIATION: ANTALGIC;STEP TO;DECREASED BASE OF SUPPORT
DISTANCE (FEET): 13

## 2021-09-30 NOTE — THERAPY
"Speech Language Pathology  Daily Treatment     Patient Name: Jeffrey Lizama  Age:  88 y.o., Sex:  male  Medical Record #: [id number]  Today's Date: 9/30/2021     Precautions  Precautions: Fall Risk  Comments: poor activity tolerance    Assessment    The patient was seen on this date for a dysphagia treatment to ensure tolerance of soft and bite sized diet with thin liquids. The patient was repositioned into fowlers and willing to participate in dysphagia therapy. The patient endorsed a prefernce for soft food items. The patient demonstrated adequate acceptance and containment. Mastication was functional with timely swallow trigger. The patient consumed PO trials with no overt s/sx of aspiration.     Recommend continue soft and bite sized diet with thin liquids     Plan    Continue current treatment plan.    Discharge Recommendations: Anticipate that the patient will have no further speech therapy needs after discharge from the hospital     Objective       09/30/21 1327   Dysphagia    Diet / Liquid Recommendation Soft & Bite-Sized (6) - (Dysphagia III);Thin (0)   Nutritional Liquid Intake Rating Scale Non thickened beverages   Nutritional Food Intake Rating Scale Total oral diet with multiple consistencies but requiring special preparation or compensations   Recommended Route of Medication Administration   Medication Administration  Whole with Liquid Wash   Patient / Family Goals   Patient / Family Goal #1 \"I can only eat softer foods\"   Goal #1 Outcome Goal met   Short Term Goals   Short Term Goal # 1 Pt will consume an SB6/TN0 diet with no overt s/sx of aspiration   Goal Outcome # 1 Goal met         "

## 2021-09-30 NOTE — PROGRESS NOTES
Hospital Medicine Daily Progress Note    Date of Service  9/30/2021    Chief Complaint  Jeffrey Lizama is a 88 y.o. male admitted 9/25/2021 with dyspnea    Hospital Course  88-year-old male on chronic home oxygen presenting 9/25/2021 for worsening shortness of breath, dyspnea on exertion, on chronic home O2 4 to 5 L oxygen supplementation.  Patient found to have bilateral community-acquired pneumonia on CT scan on admission.  Covid screening was negative.    Interval Problem Update  Patient stated he was sttill feeling week.  Patient on 5-6 L nasal cannula.  Renal function stable  WBC improving to 12.7. downtrending.  Switching to PO Augmentin trial.  PT/OT recommending SNF.    I have personally seen and examined the patient at bedside. I discussed the plan of care with patient, bedside RN, charge RN,  and pharmacy.    Consultants/Specialty  none    Code Status  DNAR, I OK    Disposition  Patient is not medically cleared.   Anticipate discharge to to skilled nursing facility.  I have placed the appropriate orders for post-discharge needs.    Review of Systems  Review of Systems   Constitutional: Negative for chills, fever and malaise/fatigue.   HENT: Negative for congestion and nosebleeds.    Respiratory: Negative for cough and shortness of breath.    Cardiovascular: Negative for chest pain and palpitations.   Gastrointestinal: Negative for abdominal pain, constipation, diarrhea, nausea and vomiting.   Musculoskeletal: Negative for back pain and joint pain.   Neurological: Positive for weakness. Negative for dizziness and headaches.   All other systems reviewed and are negative.     Physical Exam  Temp:  [35.9 °C (96.6 °F)-36.8 °C (98.2 °F)] 36.7 °C (98.1 °F)  Pulse:  [71-79] 72  Resp:  [16-18] 18  BP: (101-151)/(60-95) 125/62  SpO2:  [89 %-98 %] 97 %    Physical Exam  Vitals and nursing note reviewed.   Constitutional:       General: He is not in acute distress.     Appearance: He is obese. He is  not diaphoretic.   HENT:      Nose: No congestion or rhinorrhea.      Comments: Clear nares  Cardiovascular:      Rate and Rhythm: Normal rate and regular rhythm.      Pulses: Normal pulses.      Heart sounds: Normal heart sounds. No murmur heard.     Pulmonary:      Effort: Pulmonary effort is normal. No respiratory distress.      Breath sounds: Normal breath sounds.      Comments: Using supplemental oxygen via nasal cannula  Abdominal:      General: Abdomen is flat. Bowel sounds are normal. There is no distension.      Palpations: Abdomen is soft.   Musculoskeletal:         General: No swelling or tenderness. Normal range of motion.      Comments: Chronic BLE/feet weakness   Skin:     General: Skin is warm.      Capillary Refill: Capillary refill takes less than 2 seconds.      Coloration: Skin is not jaundiced or pale.   Neurological:      Mental Status: He is alert and oriented to person, place, and time. Mental status is at baseline.      Motor: Weakness present.   Psychiatric:         Mood and Affect: Mood normal.         Behavior: Behavior normal.         Thought Content: Thought content normal.         Judgment: Judgment normal.       Fluids    Intake/Output Summary (Last 24 hours) at 9/30/2021 1236  Last data filed at 9/30/2021 0449  Gross per 24 hour   Intake 480 ml   Output 2300 ml   Net -1820 ml       Laboratory  Recent Labs     09/28/21  0833 09/29/21  0456 09/30/21  0515   WBC 14.3* 13.4* 12.7*   RBC 3.76* 4.13* 4.01*   HEMOGLOBIN 10.8* 11.9* 11.3*   HEMATOCRIT 33.3* 37.5* 35.8*   MCV 88.6 90.8 89.3   MCH 28.7 28.8 28.2   MCHC 32.4* 31.7* 31.6*   RDW 56.7* 58.2* 57.2*   PLATELETCT 207 224 204   MPV 9.7 9.9 9.4     Recent Labs     09/28/21  0833 09/29/21  0456 09/30/21  0515   SODIUM 137 137 137   POTASSIUM 3.9 3.8 4.2   CHLORIDE 97 98 99   CO2 27 31 28   GLUCOSE 123* 79 86   BUN 29* 21 22   CREATININE 0.93 0.86 0.85   CALCIUM 8.4 8.6 8.4                   Imaging  CT-CTA CHEST PULMONARY ARTERY W/  RECONS   Final Result      1.  No pulmonary embolism   2.  New bilateral groundglass and consolidative opacities, this is likely infectious given it is new from 4/18/21 however recommend short interval follow up in 3-6 months to ensure resolution.            DX-CHEST-PORTABLE (1 VIEW)   Final Result      Diffuse bilateral patchy parenchymal opacities. Small left pleural effusion.   Unchanged cardiomegaly           Assessment/Plan  * Pneumonia- (present on admission)  Assessment & Plan  -Bilateral groundglass and consolidative opacities noted on CT scan  -Causing leukocytosis but no sepsis  -Causing worsening hypoxia  - procalcitonin remains within normal ranges  -Covid screening negative    - finished unasyn and azithromycin. Switched to PO Augmentin.  -Await culture results, sputum preliminary shows few gram-positive cocci    Acute encephalopathy- (present on admission)  Assessment & Plan  Patient was drowsy, confusing  and was not able to provide information at admission  likeley due to hypoxia secondary to pneumonia.     - resolved    Acute on chronic respiratory failure with hypoxia (HCC)- (present on admission)  Assessment & Plan  -Due to pneumonia  -Continuous pulse ox monitoring  -Respiratory care per protocol  -Continues to be between 6 L up from 5L    FABIAN (acute kidney injury) (HCC)- (present on admission)  Assessment & Plan  Baseline 0.6  discontinue lorsartan, ordered amlodipine.      Be conservative with IV fluid given HFrEF  Continue to monitor  Improved level     Chronic systolic congestive heart failure (HCC)- (present on admission)  Assessment & Plan  proBNP 4771, seems is at his baseline  Echo 5/2021 showed EF 45%. Dyskinesis of the apex and hypokinesis of the distal/apical inferior and septal walls.    Continue to monitor decompensated signs    Hyponatremia- (present on admission)  Assessment & Plan  Improved levels    COPD (chronic obstructive pulmonary disease) (HCC)- (present on  admission)  Assessment & Plan  -No acute exacerbation  -He has however at significant risk of developing an acute exacerbation since he does have a bilateral pneumonia  -Hydrocortisone was started on admission    Discharge planning issues  Assessment & Plan  Pending SNF acceptance.  Wife lives at [facility] in [place], unable to care for patient.    GERD (gastroesophageal reflux disease)- (present on admission)  Assessment & Plan  -Continue home PPI    Hypothyroidism- (present on admission)  Assessment & Plan  -Continue home Synthroid at 75 mcg daily  -Most recent TSH was approximately 4 months ago was normal at 3.16    Dyslipidemia- (present on admission)  Assessment & Plan  -Continue home statin    Leukocytosis- (present on admission)  Assessment & Plan  -Due to pneumonia  -Repeat CBC in the morning    Elevated troponin- (present on admission)  Assessment & Plan  Chronic   troponin 111-> 115, at his baseline  Troponin down to 70 on last check.    pt reported right upper chest pain for a few days while cough. EKG showed ventricular paced complexes.    Continue to monitor    Normocytic anemia- (present on admission)  Assessment & Plan  -No sign of gross bleeding, chronic    Paroxysmal atrial fibrillation (HCC)- (present on admission)  Assessment & Plan  -Also with a history of sick sinus syndrome, now with a pacemaker  -He is currently paced    BPH (benign prostatic hypertrophy)- (present on admission)  Assessment & Plan  -Continue home Flomax    Essential hypertension, benign- (present on admission)  Assessment & Plan  -Continue home Coreg and losartan  -Start as needed labetalol  -Adjust as needed     VTE prophylaxis: SCDs/TEDs and enoxaparin ppx    I have performed a physical exam and reviewed and updated ROS and Plan today (9/30/2021). In review of yesterday's note (9/29/2021), there are no changes except as documented above.

## 2021-09-30 NOTE — THERAPY
Physical Therapy   Daily Treatment     Patient Name: Jeffrey Lizama  Age:  88 y.o., Sex:  male  Medical Record #: [id number]  Today's Date: 9/30/2021     Precautions  Precautions: Fall Risk  Comments: poor activity tolerance    Assessment    Pt is progressing well with bed mobility, however, slower than expected with upright mobility such as sit<>stands, transfers, and ambulation. Pt was able to participate with increased activity and tolerate ambulation for the first time and participate in standing/seated exercises, demonstrating with improved activity tolerance, however, requires lots of pacing and rest breaks to continue session. Pt was witnessed sitting up in chair with OT. Pt has demonstrated with improved activity tolerance and strength, however, continues to demonstrate poor functional mobility/general locomotion. Recommend post-acute placement for continued physical therapy services prior to discharge home.       Plan    Continue current treatment plan.    DC Equipment Recommendations: (P) Unable to determine at this time  Discharge Recommendations: (P) Recommend post-acute placement for additional physical therapy services prior to discharge home    Objective       09/30/21 1015   Precautions   Precautions Fall Risk   Pain 0 - 10 Group   Therapist Pain Assessment Nurse Notified;0   Cognition    Cognition / Consciousness WDL   Level of Consciousness Alert   Comments pleasant/cooperative    Sitting Lower Body Exercises   Sitting Lower Body Exercises Yes   Long Arc Quad 1 set of 10;Bilateral   Marching Reciprocal;1 set of 10   Sit to Stand Other Resistance (See Comments)  (5 reps with rest breaks )   Standing Lower Body Exercises   Standing Lower Body Exercises Yes   Marching 2 sets of 10   Comments with frequent rest breaks    Home Exercise Program   Home Exercise Program Patient Requires Assistance / Guarding to Complete Home Program   Neuro-Muscular Treatments   Neuro-Muscular Treatments Anterior weight  shift;Weight Shift Right;Weight Shift Left   Other Treatments   Other Treatments Provided pt encouraged to perform seated ther-ex on own    Balance   Sitting Balance (Static) Fair +   Sitting Balance (Dynamic) Fair   Standing Balance (Static) Fair -   Standing Balance (Dynamic) Fair -   Weight Shift Sitting Fair   Weight Shift Standing Fair   Skilled Intervention Verbal Cuing;Postural Facilitation;Facilitation   Comments w/fww    Gait Analysis   Gait Level Of Assist Minimal Assist   Assistive Device Front Wheel Walker   Distance (Feet) 13   # of Times Distance was Traveled 1   Deviation Antalgic;Step To;Decreased Base Of Support   Weight Bearing Status fwb   Skilled Intervention Verbal Cuing;Postural Facilitation;Facilitation   Comments cues for upright posture and navigation for safety    Bed Mobility    Supine to Sit Supervised   Scooting Minimal Assist   Rolling Supervised   Skilled Intervention Tactile Cuing;Verbal Cuing;Postural Facilitation   Comments HOB elevated and rails up; takes extra time and effort for bed mobility   Functional Mobility   Sit to Stand Minimal Assist   Bed, Chair, Wheelchair Transfer Minimal Assist   Transfer Method Stand Step   Mobility EOB, sit<>stand, ambulation, marching, back to bed seated EOB   Skilled Intervention Verbal Cuing;Postural Facilitation;Facilitation   How much difficulty does the patient currently have...   Turning over in bed (including adjusting bedclothes, sheets and blankets)? 3   Sitting down on and standing up from a chair with arms (e.g., wheelchair, bedside commode, etc.) 3   Moving from lying on back to sitting on the side of the bed? 2   How much help from another person does the patient currently need...   Moving to and from a bed to a chair (including a wheelchair)? 3   Need to walk in a hospital room? 2   Climbing 3-5 steps with a railing? 1   6 clicks Mobility Score 14   Activity Tolerance   Sitting in Chair 10 mins   Sitting Edge of Bed 10 mins    Standing 2-3 mins x 3    Comments limited by weakness and fatigue    Patient / Family Goals    Patient / Family Goal #1 to go back to PLOF    Short Term Goals    Short Term Goal # 1 pt will go supine<>sit w/hob flat and rails down w/spv in 6tx    Goal Outcome # 1 Progressing as expected   Short Term Goal # 2 pt will go sit<>stand w/fww w/spv in 6tx    Goal Outcome # 2 Progressing slower than expected   Short Term Goal # 3 pt will transfer bed<>chair w/fww in 6tx    Goal Outcome # 3 Progressing slower than expected   Short Term Goal # 4 pt will ambualte for 150ft w/fww w/spv in 6tx    Goal Outcome # 4 Progressing slower than expected   Education Group   Education Provided Role of Physical Therapist   Role of Physical Therapist Patient Response Patient;Acceptance;Demonstration;Explanation;Action Demonstration;Verbal Demonstration   Exercises - Supine Patient Response Patient;Acceptance;Explanation;Verbal Demonstration   Exercises - Seated Patient Response Patient;Acceptance;Explanation;Demonstration;Verbal Demonstration;Action Demonstration;Reinforcement Needed   Anticipated Discharge Equipment and Recommendations   DC Equipment Recommendations Unable to determine at this time   Discharge Recommendations Recommend post-acute placement for additional physical therapy services prior to discharge home   Interdisciplinary Plan of Care Collaboration   IDT Collaboration with  Nursing;Occupational Therapist   Patient Position at End of Therapy Seated;Edge of Bed;Other (Comments)  (OT with pt in room )   Collaboration Comments aware of visit and recs    Session Information   Date / Session Number  9/30-2 (2/3, 10/2)

## 2021-09-30 NOTE — PROGRESS NOTES
Telemetry Shift Summary     Rhythm: V Paced/SR  Rate: 71-92  Measurements: -/0.18/0.42  Ectopy (reported by Monitor Tech): R PVC, R big, R coup, accelerated idio 17 beats of VTach     Normal Values  Rhythm: Sinus  HR:   Measurements: 0.12-0.20/0.06-0.10/0.30-0.52

## 2021-09-30 NOTE — ASSESSMENT & PLAN NOTE
[facility] accepted, potential discharge 10/05  Wife lives at [facility] in [place], unable to care for patient.

## 2021-09-30 NOTE — PROGRESS NOTES
Telemetry Shift Summary     Rhythm V Paced, SR  HR Range 72-79  Ectopy  rPVCs  Measurements .-/.16/.-              Normal Values  Rhythm SR  HR Range    Measurements 0.12-0.20 / 0.06-0.10  / 0.30-0.52

## 2021-09-30 NOTE — THERAPY
"Occupational Therapy  Daily Treatment     Patient Name: Jeffrey Lizama  Age:  88 y.o., Sex:  male  Medical Record #: [id number]  Today's Date: 9/30/2021     Precautions  Precautions: Fall Risk  Comments: poor activity tolerance    Assessment    Pt seen for OT tx today, demos fair and slow but steady progress. Pt still requires 2-3min rest breaks in between activities d/t fatigue and SOB. pt's saturation level maintained > 90% during this OT session, at 6L via oxymask, continues to present with decreased activity tolerance, reduced balance, generalized weakness, and fatigue. Will continue to benefit from skilled OT in the acute setting, and post acute placement recommended as well.    Plan    Continue current treatment plan.    DC Equipment Recommendations: Unable to determine at this time  Discharge Recommendations: Recommend post-acute placement for additional occupational therapy services prior to discharge home    Subjective    \"Gimme a couple more minutes, will ya?\"     Objective       09/30/21 1046   Precautions   Precautions Fall Risk   Comments poor activity tolerance   Vitals   Patient BP Position Sitting   Pulse Oximetry 97 %   O2 (LPM) 6   O2 Delivery Device Oxymask   Pain   Pain Scales 0 to 10 Scale    Intervention Declines   Pain 0 - 10 Group   Pain Rating Scale (NPRS) 0   Therapist Pain Assessment Post Activity Pain Same as Prior to Activity   Cognition    Cognition / Consciousness WDL   Level of Consciousness Alert   Comments pleasant and cooperative   Sitting Upper Body Exercises   Sitting Upper Body Exercises Yes   Chest Fly 1 set of 10   Front Arm Raise 1 set of 10   Side Arm Raise 1 set of 10   Comments seated EOB   Other Treatments   Other Treatments Provided STS x 3 sets of 3 reps each, with 3-5second hold, functional txfs to/from bed and recliner chair. pt tolerates activities fairly well, though requires 2-3min rest breaks in between activities d/t fatigue and SOB. pt's saturation level " maintained > 90% during this OT session, at 6L via oxymask.    Balance   Sitting Balance (Static) Fair +   Sitting Balance (Dynamic) Fair   Standing Balance (Static) Fair -   Standing Balance (Dynamic) Fair -   Weight Shift Sitting Fair   Weight Shift Standing Fair   Skilled Intervention Verbal Cuing;Sequencing;Postural Facilitation   Comments with FWW   Bed Mobility    Comments pt seated EOB prior to OT session   Activities of Daily Living   Grooming Seated;Minimal Assist   Upper Body Dressing Minimal Assist   Lower Body Dressing Moderate Assist   Skilled Intervention Verbal Cuing;Postural Facilitation;Sequencing   How much help from another person does the patient currently need...   Putting on and taking off regular lower body clothing? 2   Bathing (including washing, rinsing, and drying)? 2   Toileting, which includes using a toilet, bedpan, or urinal? 2   Putting on and taking off regular upper body clothing? 3   Taking care of personal grooming such as brushing teeth? 3   Eating meals? 3   6 Clicks Daily Activity Score 15   Functional Mobility   Sit to Stand   (SBA)   Bed, Chair, Wheelchair Transfer Minimal Assist   Transfer Method Stand Step   Mobility in room with FWW   Distance (Feet) 10   # of Times Distance was Traveled 1   Skilled Intervention Verbal Cuing;Postural Facilitation   Activity Tolerance   Sitting in Chair 10 mins; up in recliner post session   Sitting Edge of Bed 12 mins   Standing 8 mins total   Comments limited by weakness/fatigue, and SOB   Short Term Goals   Short Term Goal # 1 pt will complete ADL txfs using LRAD at spv level   Goal Outcome # 1 Progressing as expected   Short Term Goal # 2 pt will complete FB dressing at min A level   Goal Outcome # 2 Progressing as expected   Short Term Goal # 3 pt will complete G/H standing sinkside at spv level   Goal Outcome # 3 Progressing slower than expected   Education Group   Energy Conservation Patient Response  Patient;Acceptance;Explanation;Verbal Demonstration   Anticipated Discharge Equipment and Recommendations   DC Equipment Recommendations Unable to determine at this time   Discharge Recommendations Recommend post-acute placement for additional occupational therapy services prior to discharge home   Interdisciplinary Plan of Care Collaboration   IDT Collaboration with  Nursing;Physical Therapist   Patient Position at End of Therapy Seated;Call Light within Reach;Tray Table within Reach;Phone within Reach   Collaboration Comments RN aware of OT session   Session Information   Date / Session Number  9/30 #2 (2/3, 10/3)   Priority 2

## 2021-09-30 NOTE — PROGRESS NOTES
Notified by monitor tech: Pt last paced at 1000 and appears to be in/out of afib. , highest 130's non-jorge alberto. Strip printed. MD notified

## 2021-10-01 LAB
ALBUMIN SERPL BCP-MCNC: 3 G/DL (ref 3.2–4.9)
BACTERIA BLD CULT: NORMAL
BACTERIA BLD CULT: NORMAL
BASOPHILS # BLD AUTO: 0.4 % (ref 0–1.8)
BASOPHILS # BLD: 0.05 K/UL (ref 0–0.12)
BUN SERPL-MCNC: 19 MG/DL (ref 8–22)
CALCIUM SERPL-MCNC: 8.1 MG/DL (ref 8.4–10.2)
CHLORIDE SERPL-SCNC: 95 MMOL/L (ref 96–112)
CO2 SERPL-SCNC: 30 MMOL/L (ref 20–33)
CREAT SERPL-MCNC: 0.75 MG/DL (ref 0.5–1.4)
EOSINOPHIL # BLD AUTO: 0.59 K/UL (ref 0–0.51)
EOSINOPHIL NFR BLD: 4.5 % (ref 0–6.9)
ERYTHROCYTE [DISTWIDTH] IN BLOOD BY AUTOMATED COUNT: 58 FL (ref 35.9–50)
GLUCOSE SERPL-MCNC: 100 MG/DL (ref 65–99)
HCT VFR BLD AUTO: 33.5 % (ref 42–52)
HGB BLD-MCNC: 10.7 G/DL (ref 14–18)
IMM GRANULOCYTES # BLD AUTO: 0.27 K/UL (ref 0–0.11)
IMM GRANULOCYTES NFR BLD AUTO: 2.1 % (ref 0–0.9)
LYMPHOCYTES # BLD AUTO: 1.12 K/UL (ref 1–4.8)
LYMPHOCYTES NFR BLD: 8.6 % (ref 22–41)
MAGNESIUM SERPL-MCNC: 2 MG/DL (ref 1.5–2.5)
MCH RBC QN AUTO: 29 PG (ref 27–33)
MCHC RBC AUTO-ENTMCNC: 31.9 G/DL (ref 33.7–35.3)
MCV RBC AUTO: 90.8 FL (ref 81.4–97.8)
MONOCYTES # BLD AUTO: 0.84 K/UL (ref 0–0.85)
MONOCYTES NFR BLD AUTO: 6.4 % (ref 0–13.4)
NEUTROPHILS # BLD AUTO: 10.22 K/UL (ref 1.82–7.42)
NEUTROPHILS NFR BLD: 78 % (ref 44–72)
NRBC # BLD AUTO: 0 K/UL
NRBC BLD-RTO: 0 /100 WBC
PHOSPHATE SERPL-MCNC: 2.7 MG/DL (ref 2.5–4.5)
PLATELET # BLD AUTO: 194 K/UL (ref 164–446)
PMV BLD AUTO: 10 FL (ref 9–12.9)
POTASSIUM SERPL-SCNC: 4 MMOL/L (ref 3.6–5.5)
RBC # BLD AUTO: 3.69 M/UL (ref 4.7–6.1)
SIGNIFICANT IND 70042: NORMAL
SIGNIFICANT IND 70042: NORMAL
SITE SITE: NORMAL
SITE SITE: NORMAL
SODIUM SERPL-SCNC: 132 MMOL/L (ref 135–145)
SOURCE SOURCE: NORMAL
SOURCE SOURCE: NORMAL
WBC # BLD AUTO: 13.1 K/UL (ref 4.8–10.8)

## 2021-10-01 PROCEDURE — 83735 ASSAY OF MAGNESIUM: CPT

## 2021-10-01 PROCEDURE — 770020 HCHG ROOM/CARE - TELE (206)

## 2021-10-01 PROCEDURE — 99232 SBSQ HOSP IP/OBS MODERATE 35: CPT | Performed by: INTERNAL MEDICINE

## 2021-10-01 PROCEDURE — A9270 NON-COVERED ITEM OR SERVICE: HCPCS | Performed by: INTERNAL MEDICINE

## 2021-10-01 PROCEDURE — 700102 HCHG RX REV CODE 250 W/ 637 OVERRIDE(OP): Performed by: INTERNAL MEDICINE

## 2021-10-01 PROCEDURE — 80069 RENAL FUNCTION PANEL: CPT

## 2021-10-01 PROCEDURE — 36415 COLL VENOUS BLD VENIPUNCTURE: CPT

## 2021-10-01 PROCEDURE — 94760 N-INVAS EAR/PLS OXIMETRY 1: CPT

## 2021-10-01 PROCEDURE — 94640 AIRWAY INHALATION TREATMENT: CPT

## 2021-10-01 PROCEDURE — 700111 HCHG RX REV CODE 636 W/ 250 OVERRIDE (IP): Performed by: INTERNAL MEDICINE

## 2021-10-01 PROCEDURE — 85025 COMPLETE CBC W/AUTO DIFF WBC: CPT

## 2021-10-01 RX ADMIN — HYDROCORTISONE 10 MG: 10 TABLET ORAL at 06:10

## 2021-10-01 RX ADMIN — HYDROCORTISONE 10 MG: 10 TABLET ORAL at 17:45

## 2021-10-01 RX ADMIN — OMEPRAZOLE 20 MG: 20 CAPSULE, DELAYED RELEASE ORAL at 17:46

## 2021-10-01 RX ADMIN — BUDESONIDE AND FORMOTEROL FUMARATE DIHYDRATE 2 PUFF: 80; 4.5 AEROSOL RESPIRATORY (INHALATION) at 20:00

## 2021-10-01 RX ADMIN — HYDROCORTISONE 10 MG: 10 TABLET ORAL at 12:25

## 2021-10-01 RX ADMIN — DIBASIC SODIUM PHOSPHATE, MONOBASIC POTASSIUM PHOSPHATE AND MONOBASIC SODIUM PHOSPHATE 250 MG: 852; 155; 130 TABLET ORAL at 17:46

## 2021-10-01 RX ADMIN — CARVEDILOL 3.12 MG: 6.25 TABLET, FILM COATED ORAL at 08:50

## 2021-10-01 RX ADMIN — POLYVINYL ALCOHOL 1 DROP: 14 SOLUTION/ DROPS OPHTHALMIC at 17:45

## 2021-10-01 RX ADMIN — CLOPIDOGREL BISULFATE 75 MG: 75 TABLET ORAL at 06:10

## 2021-10-01 RX ADMIN — ATORVASTATIN CALCIUM 80 MG: 40 TABLET, FILM COATED ORAL at 17:46

## 2021-10-01 RX ADMIN — ENOXAPARIN SODIUM 40 MG: 40 INJECTION SUBCUTANEOUS at 06:09

## 2021-10-01 RX ADMIN — ALPRAZOLAM 0.25 MG: 0.25 TABLET ORAL at 13:29

## 2021-10-01 RX ADMIN — BUDESONIDE AND FORMOTEROL FUMARATE DIHYDRATE 2 PUFF: 80; 4.5 AEROSOL RESPIRATORY (INHALATION) at 08:08

## 2021-10-01 RX ADMIN — ALPRAZOLAM 0.25 MG: 0.25 TABLET ORAL at 17:46

## 2021-10-01 RX ADMIN — TAMSULOSIN HYDROCHLORIDE 0.4 MG: 0.4 CAPSULE ORAL at 06:10

## 2021-10-01 RX ADMIN — DIBASIC SODIUM PHOSPHATE, MONOBASIC POTASSIUM PHOSPHATE AND MONOBASIC SODIUM PHOSPHATE 250 MG: 852; 155; 130 TABLET ORAL at 06:10

## 2021-10-01 RX ADMIN — SENNOSIDES AND DOCUSATE SODIUM 2 TABLET: 50; 8.6 TABLET ORAL at 17:46

## 2021-10-01 RX ADMIN — PHENYLEPHRINE HYDROCHLORIDE 1 SPRAY: 0.5 SPRAY NASAL at 10:40

## 2021-10-01 RX ADMIN — CARVEDILOL 3.12 MG: 6.25 TABLET, FILM COATED ORAL at 17:47

## 2021-10-01 RX ADMIN — LEVOTHYROXINE SODIUM 75 MCG: 0.05 TABLET ORAL at 06:10

## 2021-10-01 RX ADMIN — Medication 400 MG: at 17:46

## 2021-10-01 RX ADMIN — SENNOSIDES AND DOCUSATE SODIUM 2 TABLET: 50; 8.6 TABLET ORAL at 06:10

## 2021-10-01 RX ADMIN — Medication 400 MG: at 06:10

## 2021-10-01 RX ADMIN — OMEPRAZOLE 20 MG: 20 CAPSULE, DELAYED RELEASE ORAL at 06:10

## 2021-10-01 RX ADMIN — AMLODIPINE BESYLATE 10 MG: 5 TABLET ORAL at 06:10

## 2021-10-01 RX ADMIN — AMOXICILLIN AND CLAVULANATE POTASSIUM 1 TABLET: 875; 125 TABLET, FILM COATED ORAL at 17:45

## 2021-10-01 RX ADMIN — AMOXICILLIN AND CLAVULANATE POTASSIUM 1 TABLET: 875; 125 TABLET, FILM COATED ORAL at 06:10

## 2021-10-01 ASSESSMENT — ENCOUNTER SYMPTOMS
COUGH: 0
NAUSEA: 0
DIARRHEA: 0
SHORTNESS OF BREATH: 0
VOMITING: 0
CHILLS: 0
WEAKNESS: 1
HEADACHES: 0
PALPITATIONS: 0
FEVER: 0
ABDOMINAL PAIN: 0
DIZZINESS: 0
BACK PAIN: 0
CONSTIPATION: 0

## 2021-10-01 ASSESSMENT — PAIN DESCRIPTION - PAIN TYPE: TYPE: ACUTE PAIN

## 2021-10-01 NOTE — PROGRESS NOTES
Hospital Medicine Daily Progress Note    Date of Service  10/1/2021    Chief Complaint  Jeffrey Lizama is a 88 y.o. male admitted 9/25/2021 with dyspnea    Hospital Course  88-year-old male on chronic home oxygen presenting 9/25/2021 for worsening shortness of breath, dyspnea on exertion, on chronic home O2 4 to 5 L oxygen supplementation.  Patient found to have bilateral community-acquired pneumonia on CT scan on admission.  Covid screening was negative.    Interval Problem Update  Patient stated he was doing well, still feels weak.  Patient on 5-6 L nasal cannula.  Renal function stable.  WBC improving to 12.7. downtrending.  Switching to PO Augmentin trial.  PT/OT recommending SNF.  Patient stated today he had just moved on to the assisted living side at [facility] where his wife and himself are currently residing.  Tolerating PO Augmentin.    I have personally seen and examined the patient at bedside. I discussed the plan of care with patient, bedside RN, charge RN,  and pharmacy.    Consultants/Specialty  none    Code Status  DNAR, I OK    Disposition  Patient is not medically cleared.   Anticipate discharge to to skilled nursing facility.  I have placed the appropriate orders for post-discharge needs.    Review of Systems  Review of Systems   Constitutional: Negative for chills, fever and malaise/fatigue.   HENT: Negative for congestion and nosebleeds.    Respiratory: Negative for cough and shortness of breath.    Cardiovascular: Negative for chest pain and palpitations.   Gastrointestinal: Negative for abdominal pain, constipation, diarrhea, nausea and vomiting.   Musculoskeletal: Negative for back pain and joint pain.   Neurological: Positive for weakness. Negative for dizziness and headaches.   All other systems reviewed and are negative.     Physical Exam  Temp:  [36.3 °C (97.3 °F)-36.6 °C (97.8 °F)] 36.4 °C (97.5 °F)  Pulse:  [70-82] 82  Resp:  [17-20] 17  BP: (113-143)/(62-95)  141/93  SpO2:  [90 %-97 %] 90 %    Physical Exam  Vitals and nursing note reviewed.   Constitutional:       General: He is not in acute distress.     Appearance: He is obese. He is not diaphoretic.   HENT:      Nose: No congestion or rhinorrhea.      Comments: Clear nares  Cardiovascular:      Rate and Rhythm: Normal rate and regular rhythm.      Pulses: Normal pulses.      Heart sounds: Normal heart sounds. No murmur heard.     Pulmonary:      Effort: Pulmonary effort is normal. No respiratory distress.      Breath sounds: Normal breath sounds.      Comments: Using supplemental oxygen via nasal cannula  Abdominal:      General: Abdomen is flat. Bowel sounds are normal. There is no distension.      Palpations: Abdomen is soft.   Musculoskeletal:         General: No swelling or tenderness. Normal range of motion.      Comments: Chronic BLE/feet weakness   Skin:     General: Skin is warm.      Capillary Refill: Capillary refill takes less than 2 seconds.      Coloration: Skin is not jaundiced or pale.   Neurological:      Mental Status: He is alert and oriented to person, place, and time. Mental status is at baseline.      Motor: Weakness present.   Psychiatric:         Mood and Affect: Mood normal.         Behavior: Behavior normal.         Thought Content: Thought content normal.         Judgment: Judgment normal.       Fluids    Intake/Output Summary (Last 24 hours) at 10/1/2021 1326  Last data filed at 10/1/2021 1100  Gross per 24 hour   Intake 760 ml   Output 550 ml   Net 210 ml       Laboratory  Recent Labs     09/29/21  0456 09/30/21  0515 10/01/21  0420   WBC 13.4* 12.7* 13.1*   RBC 4.13* 4.01* 3.69*   HEMOGLOBIN 11.9* 11.3* 10.7*   HEMATOCRIT 37.5* 35.8* 33.5*   MCV 90.8 89.3 90.8   MCH 28.8 28.2 29.0   MCHC 31.7* 31.6* 31.9*   RDW 58.2* 57.2* 58.0*   PLATELETCT 224 204 194   MPV 9.9 9.4 10.0     Recent Labs     09/29/21  0456 09/30/21  0515 10/01/21  0420   SODIUM 137 137 132*   POTASSIUM 3.8 4.2 4.0    CHLORIDE 98 99 95*   CO2 31 28 30   GLUCOSE 79 86 100*   BUN 21 22 19   CREATININE 0.86 0.85 0.75   CALCIUM 8.6 8.4 8.1*                   Imaging  CT-CTA CHEST PULMONARY ARTERY W/ RECONS   Final Result      1.  No pulmonary embolism   2.  New bilateral groundglass and consolidative opacities, this is likely infectious given it is new from 4/18/21 however recommend short interval follow up in 3-6 months to ensure resolution.            DX-CHEST-PORTABLE (1 VIEW)   Final Result      Diffuse bilateral patchy parenchymal opacities. Small left pleural effusion.   Unchanged cardiomegaly         Assessment/Plan  * Pneumonia- (present on admission)  Assessment & Plan  -Bilateral groundglass and consolidative opacities noted on CT scan  -Causing leukocytosis but no sepsis  -Causing worsening hypoxia  - procalcitonin remains within normal ranges  -Covid screening negative    - finished unasyn and azithromycin. Switched to PO Augmentin.  -Await culture results, sputum preliminary shows few gram-positive cocci    Acute encephalopathy- (present on admission)  Assessment & Plan  Patient was drowsy, confusing  and was not able to provide information at admission  likeley due to hypoxia secondary to pneumonia.     - resolved    Acute on chronic respiratory failure with hypoxia (HCC)- (present on admission)  Assessment & Plan  -Due to pneumonia  -Continuous pulse ox monitoring  -Respiratory care per protocol  -Continues to be between 6 L up from 5L    FABIAN (acute kidney injury) (HCC)- (present on admission)  Assessment & Plan  Baseline 0.6  discontinue lorsartan, ordered amlodipine.      Be conservative with IV fluid given HFrEF  Continue to monitor  Improved level     Chronic systolic congestive heart failure (HCC)- (present on admission)  Assessment & Plan  proBNP 4771, seems is at his baseline  Echo 5/2021 showed EF 45%. Dyskinesis of the apex and hypokinesis of the distal/apical inferior and septal walls.    Continue to  monitor decompensated signs    Hyponatremia- (present on admission)  Assessment & Plan  Improved levels    COPD (chronic obstructive pulmonary disease) (HCC)- (present on admission)  Assessment & Plan  -No acute exacerbation  -He has however at significant risk of developing an acute exacerbation since he does have a bilateral pneumonia  -Hydrocortisone was started on admission    Discharge planning issues  Assessment & Plan  Pending SNF acceptance.  Wife lives at [facility] in [place], unable to care for patient.    GERD (gastroesophageal reflux disease)- (present on admission)  Assessment & Plan  -Continue home PPI    Hypothyroidism- (present on admission)  Assessment & Plan  -Continue home Synthroid at 75 mcg daily  -Most recent TSH was approximately 4 months ago was normal at 3.16    Dyslipidemia- (present on admission)  Assessment & Plan  -Continue home statin    Leukocytosis- (present on admission)  Assessment & Plan  -Due to pneumonia  -Repeat CBC in the morning    Elevated troponin- (present on admission)  Assessment & Plan  Chronic   troponin 111-> 115, at his baseline  Troponin down to 70 on last check.    pt reported right upper chest pain for a few days while cough. EKG showed ventricular paced complexes.    Continue to monitor    Normocytic anemia- (present on admission)  Assessment & Plan  -No sign of gross bleeding, chronic    Paroxysmal atrial fibrillation (HCC)- (present on admission)  Assessment & Plan  -Also with a history of sick sinus syndrome, now with a pacemaker  -He is currently paced    BPH (benign prostatic hypertrophy)- (present on admission)  Assessment & Plan  -Continue home Flomax    Essential hypertension, benign- (present on admission)  Assessment & Plan  -Continue home Coreg and losartan  -Start as needed labetalol  -Adjust as needed     VTE prophylaxis: SCDs/TEDs and enoxaparin ppx    I have performed a physical exam and reviewed and updated ROS and Plan today  (10/1/2021). In review of yesterday's note (9/30/2021), there are no changes except as documented above.

## 2021-10-01 NOTE — FLOWSHEET NOTE
10/01/21 0800   Events/Summary/Plan   Events/Summary/Plan patient wears 5 lpm at home   Skin Inspection Respiratory Device Intact   Vital Signs   Pulse 76   Respiration 20   Pulse Oximetry 96 %   $ Pulse Oximetry (Spot Check) Yes   Breath Sounds   RUL Breath Sounds Clear   RML Breath Sounds Diminished   RLL Breath Sounds Diminished   BERRY Breath Sounds Clear   LLL Breath Sounds Diminished   Oxygen   O2 (LPM) 5   O2 Delivery Device Nasal Cannula

## 2021-10-01 NOTE — DISCHARGE PLANNING
Anticipated Discharge Disposition: TBD, Likely [facility], either independent living or assisted living.    Action: LSW met with pt at bedside to answer questions. Pt stated the MD is sending pt home tomorrow. Pt states he does not feel comfortable going home tomorrow.     Pt states he is unsure if he is able to return to assisted living. Per pt, he only wants to go to assisted living at [facility] if he and his wife can have a room together. Pt requested this LSW call [facility].     Per pt he would like to talk to his wife.     LSW called [facility] at [phone], spoke to Chandni. Per Chandni, pt's wife does not have a working phone and is hard of hearing, which makes talking on the phone difficult. Per Carline Tariq ([facility] Director) is who we need to talk to about transitioning from independent to assisted. [facility] provided call back number [phone] for Carline to call tomorrow.     Barriers to Discharge: Safe d/c plan    Plan: CM to follow up with [facility] tomorrow to speak with Carline.

## 2021-10-01 NOTE — PROGRESS NOTES
Telemetry Shift Summary    Rhythm v paced  HR Range 82  Ectopy none  Measurements 0/0.16/0        Normal Values  Rhythm SR  HR Range    Measurements 0.12-0.20 / 0.06-0.10  / 0.30-0.52

## 2021-10-02 PROCEDURE — 700102 HCHG RX REV CODE 250 W/ 637 OVERRIDE(OP): Performed by: INTERNAL MEDICINE

## 2021-10-02 PROCEDURE — 770020 HCHG ROOM/CARE - TELE (206)

## 2021-10-02 PROCEDURE — A9270 NON-COVERED ITEM OR SERVICE: HCPCS | Performed by: INTERNAL MEDICINE

## 2021-10-02 PROCEDURE — 94760 N-INVAS EAR/PLS OXIMETRY 1: CPT

## 2021-10-02 PROCEDURE — 99232 SBSQ HOSP IP/OBS MODERATE 35: CPT | Performed by: INTERNAL MEDICINE

## 2021-10-02 PROCEDURE — 94640 AIRWAY INHALATION TREATMENT: CPT

## 2021-10-02 PROCEDURE — 700111 HCHG RX REV CODE 636 W/ 250 OVERRIDE (IP): Performed by: INTERNAL MEDICINE

## 2021-10-02 RX ADMIN — TRAMADOL HYDROCHLORIDE 50 MG: 50 TABLET, FILM COATED ORAL at 20:18

## 2021-10-02 RX ADMIN — CARVEDILOL 3.12 MG: 6.25 TABLET, FILM COATED ORAL at 17:01

## 2021-10-02 RX ADMIN — TAMSULOSIN HYDROCHLORIDE 0.4 MG: 0.4 CAPSULE ORAL at 06:22

## 2021-10-02 RX ADMIN — AMOXICILLIN AND CLAVULANATE POTASSIUM 1 TABLET: 875; 125 TABLET, FILM COATED ORAL at 17:02

## 2021-10-02 RX ADMIN — ENOXAPARIN SODIUM 40 MG: 40 INJECTION SUBCUTANEOUS at 06:23

## 2021-10-02 RX ADMIN — BUDESONIDE AND FORMOTEROL FUMARATE DIHYDRATE 2 PUFF: 80; 4.5 AEROSOL RESPIRATORY (INHALATION) at 08:47

## 2021-10-02 RX ADMIN — CARVEDILOL 3.12 MG: 6.25 TABLET, FILM COATED ORAL at 08:21

## 2021-10-02 RX ADMIN — ATORVASTATIN CALCIUM 80 MG: 40 TABLET, FILM COATED ORAL at 17:01

## 2021-10-02 RX ADMIN — POLYVINYL ALCOHOL 1 DROP: 14 SOLUTION/ DROPS OPHTHALMIC at 17:01

## 2021-10-02 RX ADMIN — HYDROCORTISONE 10 MG: 10 TABLET ORAL at 06:21

## 2021-10-02 RX ADMIN — OMEPRAZOLE 20 MG: 20 CAPSULE, DELAYED RELEASE ORAL at 08:25

## 2021-10-02 RX ADMIN — ALPRAZOLAM 0.25 MG: 0.25 TABLET ORAL at 20:18

## 2021-10-02 RX ADMIN — AMLODIPINE BESYLATE 10 MG: 5 TABLET ORAL at 06:22

## 2021-10-02 RX ADMIN — OMEPRAZOLE 20 MG: 20 CAPSULE, DELAYED RELEASE ORAL at 17:01

## 2021-10-02 RX ADMIN — DIBASIC SODIUM PHOSPHATE, MONOBASIC POTASSIUM PHOSPHATE AND MONOBASIC SODIUM PHOSPHATE 250 MG: 852; 155; 130 TABLET ORAL at 17:01

## 2021-10-02 RX ADMIN — SENNOSIDES AND DOCUSATE SODIUM 2 TABLET: 50; 8.6 TABLET ORAL at 06:22

## 2021-10-02 RX ADMIN — HYDROCORTISONE 10 MG: 10 TABLET ORAL at 17:01

## 2021-10-02 RX ADMIN — DIBASIC SODIUM PHOSPHATE, MONOBASIC POTASSIUM PHOSPHATE AND MONOBASIC SODIUM PHOSPHATE 250 MG: 852; 155; 130 TABLET ORAL at 08:24

## 2021-10-02 RX ADMIN — CLOPIDOGREL BISULFATE 75 MG: 75 TABLET ORAL at 06:22

## 2021-10-02 RX ADMIN — ALPRAZOLAM 0.25 MG: 0.25 TABLET ORAL at 09:30

## 2021-10-02 RX ADMIN — BUDESONIDE AND FORMOTEROL FUMARATE DIHYDRATE 2 PUFF: 80; 4.5 AEROSOL RESPIRATORY (INHALATION) at 19:23

## 2021-10-02 RX ADMIN — AMOXICILLIN AND CLAVULANATE POTASSIUM 1 TABLET: 875; 125 TABLET, FILM COATED ORAL at 06:22

## 2021-10-02 RX ADMIN — HYDROCORTISONE 10 MG: 10 TABLET ORAL at 12:22

## 2021-10-02 RX ADMIN — SALINE NASAL SPRAY 2 SPRAY: 1.5 SOLUTION NASAL at 18:44

## 2021-10-02 RX ADMIN — LEVOTHYROXINE SODIUM 75 MCG: 0.05 TABLET ORAL at 06:22

## 2021-10-02 ASSESSMENT — ENCOUNTER SYMPTOMS
NAUSEA: 0
CHILLS: 0
ABDOMINAL PAIN: 0
COUGH: 0
BACK PAIN: 0
FEVER: 0
WEAKNESS: 1
SHORTNESS OF BREATH: 0
CONSTIPATION: 0
PALPITATIONS: 0
VOMITING: 0
DIARRHEA: 0
DIZZINESS: 0
HEADACHES: 0

## 2021-10-02 ASSESSMENT — PAIN DESCRIPTION - PAIN TYPE: TYPE: ACUTE PAIN

## 2021-10-02 NOTE — PROGRESS NOTES
"Pt voiced to this RN that he \"did not want to live anymore.\" Pt denies SI and HI. Pt verbalized that he is upset about his \"future living situation\" and \"won't live anywhere without his wife and he won't live in a little shoe box just to die.\" Pt is visibly anxious and upset. Pt also voiced concern that he has not heard from his wife and has \"no idea where she is because she doesn't have a phone.\"  Pt engaged utilizing therapeutic communication and was reassured of the plan of care. Pt was able to be calmed and denied any further needs at this time.  "

## 2021-10-02 NOTE — PROGRESS NOTES
Received report from night shift RN. Patient reporting anxiety, medicated per MAR. Denies N/v at this time. Call light and belongings in reach. Bed in lowest, locked position.     COVID-19 surge in effect.

## 2021-10-02 NOTE — PROGRESS NOTES
Telemetry Shift Summary      Rhythm: VPOD w/ BBB  HR Range: 71-85  Ectopy: O PVC, R Coup  Measurements: -/.16/-    Normal Values:  Rhythm: SR  HR Range:   Measurements: 0.12-0.20/ 0.06-0.10/ 0.30-0.52;e

## 2021-10-02 NOTE — PROGRESS NOTES
Hospital Medicine Daily Progress Note    Date of Service  10/2/2021    Chief Complaint  Jeffrey Lizama is a 88 y.o. male admitted 9/25/2021 with dyspnea    Hospital Course  88-year-old male on chronic home oxygen presenting 9/25/2021 for worsening shortness of breath, dyspnea on exertion, on chronic home O2 4 to 5 L oxygen supplementation.  Patient found to have bilateral community-acquired pneumonia on CT scan on admission.  Covid screening was negative.    Interval Problem Update  Patient stated evaluated bedside at 915AM.  Patient agitated this morning was demanded to speak to his wife, was frustrated that he is unable to get a hold of her as she does not have a phone at [place].  Try to explain to the patient we are working on trying to find a solution to his discharge given his Worker's Compensation that has impeded any discharge plan.    Patient on 5-6 L nasal cannula baseline.  PT/OT recommending SNF.  Tolerating PO Augmentin.    I have personally seen and examined the patient at bedside. I discussed the plan of care with patient, bedside RN, charge RN,  and pharmacy.    Consultants/Specialty  none    Code Status  DNAR, I OK    Disposition  Patient is not medically cleared.   Anticipate discharge to to skilled nursing facility.  I have placed the appropriate orders for post-discharge needs.    Review of Systems  Review of Systems   Constitutional: Negative for chills, fever and malaise/fatigue.   HENT: Negative for congestion and nosebleeds.    Respiratory: Negative for cough and shortness of breath.    Cardiovascular: Negative for chest pain and palpitations.   Gastrointestinal: Negative for abdominal pain, constipation, diarrhea, nausea and vomiting.   Musculoskeletal: Negative for back pain and joint pain.   Neurological: Positive for weakness. Negative for dizziness and headaches.   All other systems reviewed and are negative.     Physical Exam  Temp:  [36.3 °C (97.3 °F)-36.7 °C (98  °F)] 36.7 °C (98 °F)  Pulse:  [69-78] 69  Resp:  [18-20] 20  BP: (111-137)/(64-83) 133/64  SpO2:  [91 %-95 %] 92 %    Physical Exam  Vitals and nursing note reviewed.   Constitutional:       General: He is not in acute distress.     Appearance: He is obese. He is not diaphoretic.   HENT:      Nose: No congestion or rhinorrhea.      Comments: Clear nares  Cardiovascular:      Rate and Rhythm: Normal rate and regular rhythm.      Pulses: Normal pulses.      Heart sounds: Normal heart sounds. No murmur heard.     Pulmonary:      Effort: Pulmonary effort is normal. No respiratory distress.      Breath sounds: Normal breath sounds.      Comments: Using supplemental oxygen via nasal cannula  Abdominal:      General: Abdomen is flat. Bowel sounds are normal. There is no distension.      Palpations: Abdomen is soft.   Musculoskeletal:         General: No swelling or tenderness. Normal range of motion.      Comments: Chronic BLE/feet weakness   Skin:     General: Skin is warm.      Capillary Refill: Capillary refill takes less than 2 seconds.      Coloration: Skin is not jaundiced or pale.   Neurological:      Mental Status: He is alert and oriented to person, place, and time. Mental status is at baseline.      Motor: Weakness present.   Psychiatric:         Mood and Affect: Mood normal.         Behavior: Behavior normal.         Thought Content: Thought content normal.         Judgment: Judgment normal.       Fluids    Intake/Output Summary (Last 24 hours) at 10/2/2021 1304  Last data filed at 10/2/2021 0925  Gross per 24 hour   Intake 480 ml   Output 650 ml   Net -170 ml       Laboratory  Recent Labs     09/30/21  0515 10/01/21  0420   WBC 12.7* 13.1*   RBC 4.01* 3.69*   HEMOGLOBIN 11.3* 10.7*   HEMATOCRIT 35.8* 33.5*   MCV 89.3 90.8   MCH 28.2 29.0   MCHC 31.6* 31.9*   RDW 57.2* 58.0*   PLATELETCT 204 194   MPV 9.4 10.0     Recent Labs     09/30/21  0515 10/01/21  0420   SODIUM 137 132*   POTASSIUM 4.2 4.0   CHLORIDE 99  95*   CO2 28 30   GLUCOSE 86 100*   BUN 22 19   CREATININE 0.85 0.75   CALCIUM 8.4 8.1*                   Imaging  CT-CTA CHEST PULMONARY ARTERY W/ RECONS   Final Result      1.  No pulmonary embolism   2.  New bilateral groundglass and consolidative opacities, this is likely infectious given it is new from 4/18/21 however recommend short interval follow up in 3-6 months to ensure resolution.            DX-CHEST-PORTABLE (1 VIEW)   Final Result      Diffuse bilateral patchy parenchymal opacities. Small left pleural effusion.   Unchanged cardiomegaly         Assessment/Plan  * Pneumonia- (present on admission)  Assessment & Plan  -Bilateral groundglass and consolidative opacities noted on CT scan  -Causing leukocytosis but no sepsis  -Causing worsening hypoxia  - procalcitonin remains within normal ranges  -Covid screening negative    - finished unasyn and azithromycin. Switched to PO Augmentin.  -Await culture results, sputum preliminary shows few gram-positive cocci    Acute encephalopathy- (present on admission)  Assessment & Plan  Patient was drowsy, confusing  and was not able to provide information at admission  likeley due to hypoxia secondary to pneumonia.     - resolved    Acute on chronic respiratory failure with hypoxia (HCC)- (present on admission)  Assessment & Plan  -Due to pneumonia  -Continuous pulse ox monitoring  -Respiratory care per protocol  -Continues to be between 6 L up from 5L    FABIAN (acute kidney injury) (HCC)- (present on admission)  Assessment & Plan  Baseline 0.6  discontinue lorsartan, ordered amlodipine.      Be conservative with IV fluid given HFrEF  Continue to monitor  Improved level     Chronic systolic congestive heart failure (HCC)- (present on admission)  Assessment & Plan  proBNP 4771, seems is at his baseline  Echo 5/2021 showed EF 45%. Dyskinesis of the apex and hypokinesis of the distal/apical inferior and septal walls.    Continue to monitor decompensated  signs    Hyponatremia- (present on admission)  Assessment & Plan  Improved levels    COPD (chronic obstructive pulmonary disease) (HCC)- (present on admission)  Assessment & Plan  -No acute exacerbation  -He has however at significant risk of developing an acute exacerbation since he does have a bilateral pneumonia  -Hydrocortisone was started on admission    Discharge planning issues  Assessment & Plan  Pending SNF acceptance.  Wife lives at [facility] in [place], unable to care for patient.    GERD (gastroesophageal reflux disease)- (present on admission)  Assessment & Plan  -Continue home PPI    Hypothyroidism- (present on admission)  Assessment & Plan  -Continue home Synthroid at 75 mcg daily  -Most recent TSH was approximately 4 months ago was normal at 3.16    Dyslipidemia- (present on admission)  Assessment & Plan  -Continue home statin    Leukocytosis- (present on admission)  Assessment & Plan  -Due to pneumonia  -Repeat CBC in the morning    Elevated troponin- (present on admission)  Assessment & Plan  Chronic   troponin 111-> 115, at his baseline  Troponin down to 70 on last check.    pt reported right upper chest pain for a few days while cough. EKG showed ventricular paced complexes.    Continue to monitor    Normocytic anemia- (present on admission)  Assessment & Plan  -No sign of gross bleeding, chronic    Paroxysmal atrial fibrillation (HCC)- (present on admission)  Assessment & Plan  -Also with a history of sick sinus syndrome, now with a pacemaker  -He is currently paced    BPH (benign prostatic hypertrophy)- (present on admission)  Assessment & Plan  -Continue home Flomax    Essential hypertension, benign- (present on admission)  Assessment & Plan  -Continue home Coreg and losartan  -Start as needed labetalol  -Adjust as needed     VTE prophylaxis: SCDs/TEDs and enoxaparin ppx    I have performed a physical exam and reviewed and updated ROS and Plan today (10/2/2021). In review of  yesterday's note (10/1/2021), there are no changes except as documented above.

## 2021-10-02 NOTE — PROGRESS NOTES
Report received from ОЛЬГА Busby. Plan of care discussed at patient's bedside. Whiteboard has been updated. Pt is resting comfortably in bed and is requesting a snack. Snack was brought to bedside. Safety precautions in place, bed alarm on, and call light within reach.

## 2021-10-03 PROBLEM — R07.89 OTHER CHEST PAIN: Status: ACTIVE | Noted: 2021-10-03

## 2021-10-03 LAB
EKG IMPRESSION: NORMAL
TROPONIN T SERPL-MCNC: 88 NG/L (ref 6–19)

## 2021-10-03 PROCEDURE — 700102 HCHG RX REV CODE 250 W/ 637 OVERRIDE(OP): Performed by: INTERNAL MEDICINE

## 2021-10-03 PROCEDURE — 99233 SBSQ HOSP IP/OBS HIGH 50: CPT | Performed by: INTERNAL MEDICINE

## 2021-10-03 PROCEDURE — 700111 HCHG RX REV CODE 636 W/ 250 OVERRIDE (IP): Performed by: INTERNAL MEDICINE

## 2021-10-03 PROCEDURE — 36415 COLL VENOUS BLD VENIPUNCTURE: CPT

## 2021-10-03 PROCEDURE — A9270 NON-COVERED ITEM OR SERVICE: HCPCS | Performed by: INTERNAL MEDICINE

## 2021-10-03 PROCEDURE — 700102 HCHG RX REV CODE 250 W/ 637 OVERRIDE(OP): Performed by: STUDENT IN AN ORGANIZED HEALTH CARE EDUCATION/TRAINING PROGRAM

## 2021-10-03 PROCEDURE — 93005 ELECTROCARDIOGRAM TRACING: CPT | Performed by: INTERNAL MEDICINE

## 2021-10-03 PROCEDURE — 93010 ELECTROCARDIOGRAM REPORT: CPT | Performed by: INTERNAL MEDICINE

## 2021-10-03 PROCEDURE — 770020 HCHG ROOM/CARE - TELE (206)

## 2021-10-03 PROCEDURE — A9270 NON-COVERED ITEM OR SERVICE: HCPCS | Performed by: STUDENT IN AN ORGANIZED HEALTH CARE EDUCATION/TRAINING PROGRAM

## 2021-10-03 PROCEDURE — 84484 ASSAY OF TROPONIN QUANT: CPT

## 2021-10-03 PROCEDURE — 94640 AIRWAY INHALATION TREATMENT: CPT

## 2021-10-03 PROCEDURE — 94760 N-INVAS EAR/PLS OXIMETRY 1: CPT

## 2021-10-03 RX ADMIN — ALPRAZOLAM 0.25 MG: 0.25 TABLET ORAL at 19:39

## 2021-10-03 RX ADMIN — AMLODIPINE BESYLATE 10 MG: 5 TABLET ORAL at 05:50

## 2021-10-03 RX ADMIN — HYDROCORTISONE 10 MG: 10 TABLET ORAL at 17:10

## 2021-10-03 RX ADMIN — CLOPIDOGREL BISULFATE 75 MG: 75 TABLET ORAL at 05:50

## 2021-10-03 RX ADMIN — ALBUTEROL SULFATE 2 PUFF: 90 AEROSOL, METERED RESPIRATORY (INHALATION) at 08:00

## 2021-10-03 RX ADMIN — DIBASIC SODIUM PHOSPHATE, MONOBASIC POTASSIUM PHOSPHATE AND MONOBASIC SODIUM PHOSPHATE 250 MG: 852; 155; 130 TABLET ORAL at 05:51

## 2021-10-03 RX ADMIN — OMEPRAZOLE 20 MG: 20 CAPSULE, DELAYED RELEASE ORAL at 17:09

## 2021-10-03 RX ADMIN — BUDESONIDE AND FORMOTEROL FUMARATE DIHYDRATE 2 PUFF: 80; 4.5 AEROSOL RESPIRATORY (INHALATION) at 19:30

## 2021-10-03 RX ADMIN — HYDROCORTISONE 10 MG: 10 TABLET ORAL at 11:21

## 2021-10-03 RX ADMIN — ATORVASTATIN CALCIUM 80 MG: 40 TABLET, FILM COATED ORAL at 17:09

## 2021-10-03 RX ADMIN — LEVOTHYROXINE SODIUM 75 MCG: 0.05 TABLET ORAL at 05:50

## 2021-10-03 RX ADMIN — TRAMADOL HYDROCHLORIDE 50 MG: 50 TABLET, FILM COATED ORAL at 05:50

## 2021-10-03 RX ADMIN — POLYVINYL ALCOHOL 1 DROP: 14 SOLUTION/ DROPS OPHTHALMIC at 17:09

## 2021-10-03 RX ADMIN — HYDROCORTISONE 10 MG: 10 TABLET ORAL at 05:50

## 2021-10-03 RX ADMIN — CARVEDILOL 3.12 MG: 6.25 TABLET, FILM COATED ORAL at 17:10

## 2021-10-03 RX ADMIN — TRAMADOL HYDROCHLORIDE 50 MG: 50 TABLET, FILM COATED ORAL at 23:14

## 2021-10-03 RX ADMIN — TAMSULOSIN HYDROCHLORIDE 0.4 MG: 0.4 CAPSULE ORAL at 05:51

## 2021-10-03 RX ADMIN — OMEPRAZOLE 20 MG: 20 CAPSULE, DELAYED RELEASE ORAL at 05:51

## 2021-10-03 RX ADMIN — AMOXICILLIN AND CLAVULANATE POTASSIUM 1 TABLET: 875; 125 TABLET, FILM COATED ORAL at 05:50

## 2021-10-03 RX ADMIN — AMOXICILLIN AND CLAVULANATE POTASSIUM 1 TABLET: 875; 125 TABLET, FILM COATED ORAL at 17:09

## 2021-10-03 RX ADMIN — CARVEDILOL 3.12 MG: 6.25 TABLET, FILM COATED ORAL at 07:51

## 2021-10-03 RX ADMIN — SALINE NASAL SPRAY 2 SPRAY: 1.5 SOLUTION NASAL at 14:40

## 2021-10-03 RX ADMIN — ENOXAPARIN SODIUM 40 MG: 40 INJECTION SUBCUTANEOUS at 05:49

## 2021-10-03 RX ADMIN — DIBASIC SODIUM PHOSPHATE, MONOBASIC POTASSIUM PHOSPHATE AND MONOBASIC SODIUM PHOSPHATE 250 MG: 852; 155; 130 TABLET ORAL at 17:10

## 2021-10-03 ASSESSMENT — ENCOUNTER SYMPTOMS
DIZZINESS: 0
ABDOMINAL PAIN: 0
HEADACHES: 0
FEVER: 0
CONSTIPATION: 0
COUGH: 0
WEAKNESS: 1
PALPITATIONS: 0
CHILLS: 0
BACK PAIN: 0
VOMITING: 0
SHORTNESS OF BREATH: 0
DIARRHEA: 0
NAUSEA: 0

## 2021-10-03 ASSESSMENT — PAIN DESCRIPTION - PAIN TYPE
TYPE: ACUTE PAIN
TYPE: ACUTE PAIN

## 2021-10-03 NOTE — DISCHARGE PLANNING
Care Transition Team Discharge Planning    Anticipated Discharge Disposition: d/c to SNF for rehab then possible transition from independent to assisted living at [facility]     Action: Lsw completed chart review. It appears both PT & OT recommended inpt therapy at d/c. MD's note from today indicates SNF placement.    Lsw sent text to MD requesting SNF order.    Barriers to Discharge: TBD    Plan: Lsw will continue to follow, and assist w/ d/c planning.    UPDATE:  Lsw noted pt was declined for previous 3 chosen SNFs. Pt has mentioned he might leave [facility] and return to home in [place], [place] as he is unsure if he can afford the extra monthly cost for more dependent services at the [facility].    LSw further noted a blanket referral placed for all local SNFs, and the following SNFs are still pending: [facility], [facility]    Lsw again noted last SNF order in  System is from 5.21.21 yet pt was admitted for current stay on 9.25.21-still need a SNF order for this d/c

## 2021-10-03 NOTE — PROGRESS NOTES
Received report from night shift RN. Patient A&Ox4, reports pain, declines medication at this time. Expiratory wheezes noted, medicated per MAR. Bed in lowest, locked position. Call light and belongings in reach.     COVID-19 surge in effect.

## 2021-10-03 NOTE — PROGRESS NOTES
Hospital Medicine Daily Progress Note    Date of Service  10/3/2021    Chief Complaint  Jeffrey Lizama is a 88 y.o. male admitted 9/25/2021 with dyspnea    Hospital Course  88-year-old male on chronic home oxygen presenting 9/25/2021 for worsening shortness of breath, dyspnea on exertion, on chronic home O2 4 to 5 L oxygen supplementation.  Patient found to have bilateral community-acquired pneumonia on CT scan on admission.  Covid screening was negative.    Interval Problem Update  Patient stated he had chest pain today, left sided. Patient has pacemaker on that side of chest.  - ordered ECG and Troponin    Patient on 5-6 L nasal cannula baseline.  PT/OT recommending SNF.  Tolerating PO Augmentin.    I have personally seen and examined the patient at bedside. I discussed the plan of care with patient, bedside RN, charge RN,  and pharmacy.    Consultants/Specialty  none    Code Status  DNAR, I OK    Disposition  Patient is not medically cleared.   Anticipate discharge to to skilled nursing facility.  I have placed the appropriate orders for post-discharge needs.    Review of Systems  Review of Systems   Constitutional: Negative for chills, fever and malaise/fatigue.   HENT: Negative for congestion and nosebleeds.    Respiratory: Negative for cough and shortness of breath.    Cardiovascular: Negative for chest pain and palpitations.   Gastrointestinal: Negative for abdominal pain, constipation, diarrhea, nausea and vomiting.   Musculoskeletal: Negative for back pain and joint pain.   Neurological: Positive for weakness. Negative for dizziness and headaches.   All other systems reviewed and are negative.     Physical Exam  Temp:  [36.2 °C (97.1 °F)-36.6 °C (97.8 °F)] 36.2 °C (97.1 °F)  Pulse:  [70-90] 70  Resp:  [17-20] 19  BP: (115-152)/(67-88) 127/79  SpO2:  [90 %-97 %] 90 %    Physical Exam  Vitals and nursing note reviewed.   Constitutional:       General: He is not in acute distress.      Appearance: He is obese. He is not diaphoretic.   HENT:      Nose: No congestion or rhinorrhea.      Comments: Clear nares  Cardiovascular:      Rate and Rhythm: Normal rate.      Pulses: Normal pulses.      Heart sounds: No murmur heard.        Comments: Pacemaker on left upper chest  Pulmonary:      Effort: Pulmonary effort is normal. No respiratory distress.      Comments: Using supplemental oxygen via nasal cannula  Abdominal:      General: Abdomen is flat. Bowel sounds are normal. There is no distension.      Palpations: Abdomen is soft.   Musculoskeletal:         General: No swelling or tenderness. Normal range of motion.      Comments: Chronic BLE/feet weakness   Skin:     General: Skin is warm.      Capillary Refill: Capillary refill takes less than 2 seconds.      Coloration: Skin is not jaundiced or pale.   Neurological:      Mental Status: He is alert and oriented to person, place, and time. Mental status is at baseline.      Motor: Weakness present.   Psychiatric:         Mood and Affect: Mood normal.         Behavior: Behavior normal.       Fluids    Intake/Output Summary (Last 24 hours) at 10/3/2021 0912  Last data filed at 10/2/2021 1357  Gross per 24 hour   Intake 480 ml   Output --   Net 480 ml       Laboratory  Recent Labs     10/01/21  0420   WBC 13.1*   RBC 3.69*   HEMOGLOBIN 10.7*   HEMATOCRIT 33.5*   MCV 90.8   MCH 29.0   MCHC 31.9*   RDW 58.0*   PLATELETCT 194   MPV 10.0     Recent Labs     10/01/21  0420   SODIUM 132*   POTASSIUM 4.0   CHLORIDE 95*   CO2 30   GLUCOSE 100*   BUN 19   CREATININE 0.75   CALCIUM 8.1*                   Imaging  CT-CTA CHEST PULMONARY ARTERY W/ RECONS   Final Result      1.  No pulmonary embolism   2.  New bilateral groundglass and consolidative opacities, this is likely infectious given it is new from 4/18/21 however recommend short interval follow up in 3-6 months to ensure resolution.            DX-CHEST-PORTABLE (1 VIEW)   Final Result      Diffuse bilateral  patchy parenchymal opacities. Small left pleural effusion.   Unchanged cardiomegaly         Assessment/Plan  * Pneumonia- (present on admission)  Assessment & Plan  -Bilateral groundglass and consolidative opacities noted on CT scan  -Causing leukocytosis but no sepsis  -Causing worsening hypoxia  - procalcitonin remains within normal ranges  -Covid screening negative    - finished unasyn and azithromycin. Switched to PO Augmentin.  -Await culture results, sputum preliminary shows few gram-positive cocci    Other chest pain  Assessment & Plan  Patient complained of chest pain today  - ordered ECG and troponin  - patient has pacemaker    Acute encephalopathy- (present on admission)  Assessment & Plan  Patient was drowsy, confusing  and was not able to provide information at admission  likeley due to hypoxia secondary to pneumonia.     - resolved    Acute on chronic respiratory failure with hypoxia (HCC)- (present on admission)  Assessment & Plan  -Due to pneumonia  -Continuous pulse ox monitoring  -Respiratory care per protocol  -Continues to be between 6 L up from 5L    FABIAN (acute kidney injury) (HCC)- (present on admission)  Assessment & Plan  Baseline 0.6  discontinue lorsartan, ordered amlodipine.      Be conservative with IV fluid given HFrEF  Continue to monitor  Improved level     Chronic systolic congestive heart failure (HCC)- (present on admission)  Assessment & Plan  proBNP 4771, seems is at his baseline  Echo 5/2021 showed EF 45%. Dyskinesis of the apex and hypokinesis of the distal/apical inferior and septal walls.    Continue to monitor decompensated signs    Hyponatremia- (present on admission)  Assessment & Plan  Improved levels    COPD (chronic obstructive pulmonary disease) (HCC)- (present on admission)  Assessment & Plan  -No acute exacerbation  -He has however at significant risk of developing an acute exacerbation since he does have a bilateral pneumonia  -Hydrocortisone was started on  admission    Discharge planning issues  Assessment & Plan  Pending SNF acceptance.  Wife lives at [facility] in [place], unable to care for patient.    GERD (gastroesophageal reflux disease)- (present on admission)  Assessment & Plan  -Continue home PPI    Hypothyroidism- (present on admission)  Assessment & Plan  -Continue home Synthroid at 75 mcg daily  -Most recent TSH was approximately 4 months ago was normal at 3.16    Dyslipidemia- (present on admission)  Assessment & Plan  -Continue home statin    Leukocytosis- (present on admission)  Assessment & Plan  -Due to pneumonia  -Repeat CBC in the morning    Elevated troponin- (present on admission)  Assessment & Plan  Chronic   troponin 111-> 115, at his baseline  Troponin down to 70 on last check.    pt reported right upper chest pain for a few days while cough. EKG showed ventricular paced complexes.    Continue to monitor    Normocytic anemia- (present on admission)  Assessment & Plan  -No sign of gross bleeding, chronic    Paroxysmal atrial fibrillation (HCC)- (present on admission)  Assessment & Plan  -Also with a history of sick sinus syndrome, now with a pacemaker  -He is currently paced    BPH (benign prostatic hypertrophy)- (present on admission)  Assessment & Plan  -Continue home Flomax    Essential hypertension, benign- (present on admission)  Assessment & Plan  -Continue home Coreg and losartan  -Start as needed labetalol  -Adjust as needed     VTE prophylaxis: SCDs/TEDs and enoxaparin ppx    I have performed a physical exam and reviewed and updated ROS and Plan today (10/3/2021). In review of yesterday's note (10/2/2021), there are no changes except as documented above.

## 2021-10-03 NOTE — DISCHARGE PLANNING
Agency/Facility Name: [facility]  Outcome: Left vmail regarding referral    Agency/Facility Name: [facility]  Spoke To: Pearl  Outcome: Pearl stated that admissions is not in yet. Pearl stated that she would have admissions staff call this DPA back regarding referral    Agency/Facility Name: [facility]  Spoke To: Yamilka  Outcome: Yamilka requested this DPA to resend the referral      Agency/Facility Name: [facility]  Spoke To: Pearl  Outcome: Pearl stated that admissions is still not in and if they are this late Pearl does not anticipate them coming in today    Agency/Facility Name: [facility]  Spoke To: Kiet  Outcome: Kiet stated that beds will be available tomorrow. Kiet is not at her computer but remembers that the pt is pending something and will f/u with Case Management

## 2021-10-04 PROCEDURE — 97530 THERAPEUTIC ACTIVITIES: CPT

## 2021-10-04 PROCEDURE — 700102 HCHG RX REV CODE 250 W/ 637 OVERRIDE(OP): Performed by: INTERNAL MEDICINE

## 2021-10-04 PROCEDURE — A9270 NON-COVERED ITEM OR SERVICE: HCPCS | Performed by: INTERNAL MEDICINE

## 2021-10-04 PROCEDURE — 94640 AIRWAY INHALATION TREATMENT: CPT

## 2021-10-04 PROCEDURE — 700111 HCHG RX REV CODE 636 W/ 250 OVERRIDE (IP): Performed by: INTERNAL MEDICINE

## 2021-10-04 PROCEDURE — 97116 GAIT TRAINING THERAPY: CPT

## 2021-10-04 PROCEDURE — 99231 SBSQ HOSP IP/OBS SF/LOW 25: CPT | Performed by: INTERNAL MEDICINE

## 2021-10-04 PROCEDURE — 770020 HCHG ROOM/CARE - TELE (206)

## 2021-10-04 PROCEDURE — 86480 TB TEST CELL IMMUN MEASURE: CPT

## 2021-10-04 PROCEDURE — 36415 COLL VENOUS BLD VENIPUNCTURE: CPT

## 2021-10-04 PROCEDURE — 94760 N-INVAS EAR/PLS OXIMETRY 1: CPT

## 2021-10-04 RX ADMIN — AMOXICILLIN AND CLAVULANATE POTASSIUM 1 TABLET: 875; 125 TABLET, FILM COATED ORAL at 05:04

## 2021-10-04 RX ADMIN — ALPRAZOLAM 0.25 MG: 0.25 TABLET ORAL at 15:22

## 2021-10-04 RX ADMIN — DIBASIC SODIUM PHOSPHATE, MONOBASIC POTASSIUM PHOSPHATE AND MONOBASIC SODIUM PHOSPHATE 250 MG: 852; 155; 130 TABLET ORAL at 05:05

## 2021-10-04 RX ADMIN — TRAMADOL HYDROCHLORIDE 50 MG: 50 TABLET, FILM COATED ORAL at 17:14

## 2021-10-04 RX ADMIN — ATORVASTATIN CALCIUM 80 MG: 40 TABLET, FILM COATED ORAL at 17:13

## 2021-10-04 RX ADMIN — OMEPRAZOLE 20 MG: 20 CAPSULE, DELAYED RELEASE ORAL at 19:42

## 2021-10-04 RX ADMIN — CLOPIDOGREL BISULFATE 75 MG: 75 TABLET ORAL at 05:05

## 2021-10-04 RX ADMIN — CARVEDILOL 3.12 MG: 6.25 TABLET, FILM COATED ORAL at 17:14

## 2021-10-04 RX ADMIN — OMEPRAZOLE 20 MG: 20 CAPSULE, DELAYED RELEASE ORAL at 05:04

## 2021-10-04 RX ADMIN — HYDROCORTISONE 10 MG: 10 TABLET ORAL at 15:20

## 2021-10-04 RX ADMIN — AMLODIPINE BESYLATE 10 MG: 5 TABLET ORAL at 05:04

## 2021-10-04 RX ADMIN — ACETAMINOPHEN 650 MG: 325 TABLET, FILM COATED ORAL at 09:53

## 2021-10-04 RX ADMIN — AMOXICILLIN AND CLAVULANATE POTASSIUM 1 TABLET: 875; 125 TABLET, FILM COATED ORAL at 17:13

## 2021-10-04 RX ADMIN — SENNOSIDES AND DOCUSATE SODIUM 2 TABLET: 50; 8.6 TABLET ORAL at 17:13

## 2021-10-04 RX ADMIN — HYDROCORTISONE 10 MG: 10 TABLET ORAL at 05:05

## 2021-10-04 RX ADMIN — CARVEDILOL 3.12 MG: 6.25 TABLET, FILM COATED ORAL at 09:53

## 2021-10-04 RX ADMIN — POLYVINYL ALCOHOL 1 DROP: 14 SOLUTION/ DROPS OPHTHALMIC at 10:00

## 2021-10-04 RX ADMIN — HYDROCORTISONE 10 MG: 10 TABLET ORAL at 17:14

## 2021-10-04 RX ADMIN — TAMSULOSIN HYDROCHLORIDE 0.4 MG: 0.4 CAPSULE ORAL at 05:05

## 2021-10-04 RX ADMIN — SALINE NASAL SPRAY 2 SPRAY: 1.5 SOLUTION NASAL at 02:44

## 2021-10-04 RX ADMIN — ENOXAPARIN SODIUM 40 MG: 40 INJECTION SUBCUTANEOUS at 05:04

## 2021-10-04 RX ADMIN — DIBASIC SODIUM PHOSPHATE, MONOBASIC POTASSIUM PHOSPHATE AND MONOBASIC SODIUM PHOSPHATE 250 MG: 852; 155; 130 TABLET ORAL at 19:42

## 2021-10-04 RX ADMIN — BUDESONIDE AND FORMOTEROL FUMARATE DIHYDRATE 2 PUFF: 80; 4.5 AEROSOL RESPIRATORY (INHALATION) at 20:03

## 2021-10-04 RX ADMIN — POLYVINYL ALCOHOL 1 DROP: 14 SOLUTION/ DROPS OPHTHALMIC at 19:42

## 2021-10-04 RX ADMIN — ALBUTEROL SULFATE 2 PUFF: 90 AEROSOL, METERED RESPIRATORY (INHALATION) at 20:03

## 2021-10-04 RX ADMIN — LEVOTHYROXINE SODIUM 75 MCG: 0.05 TABLET ORAL at 05:04

## 2021-10-04 RX ADMIN — SALINE NASAL SPRAY 2 SPRAY: 1.5 SOLUTION NASAL at 22:05

## 2021-10-04 RX ADMIN — ALPRAZOLAM 0.25 MG: 0.25 TABLET ORAL at 09:53

## 2021-10-04 RX ADMIN — BUDESONIDE AND FORMOTEROL FUMARATE DIHYDRATE 2 PUFF: 80; 4.5 AEROSOL RESPIRATORY (INHALATION) at 07:06

## 2021-10-04 ASSESSMENT — ENCOUNTER SYMPTOMS
DIARRHEA: 0
PALPITATIONS: 0
CONSTIPATION: 0
WEAKNESS: 1
HEADACHES: 0
CHILLS: 0
FEVER: 0
VOMITING: 0
ABDOMINAL PAIN: 0
COUGH: 0
BACK PAIN: 0
DIZZINESS: 0
NAUSEA: 0
SHORTNESS OF BREATH: 0

## 2021-10-04 ASSESSMENT — GAIT ASSESSMENTS
GAIT LEVEL OF ASSIST: MINIMAL ASSIST
DISTANCE (FEET): 15
ASSISTIVE DEVICE: FRONT WHEEL WALKER
DEVIATION: ANTALGIC;STEP TO;DECREASED BASE OF SUPPORT

## 2021-10-04 ASSESSMENT — PAIN SCALES - WONG BAKER: WONGBAKER_NUMERICALRESPONSE: DOESN'T HURT AT ALL

## 2021-10-04 ASSESSMENT — COGNITIVE AND FUNCTIONAL STATUS - GENERAL
CLIMB 3 TO 5 STEPS WITH RAILING: TOTAL
STANDING UP FROM CHAIR USING ARMS: A LITTLE
TURNING FROM BACK TO SIDE WHILE IN FLAT BAD: A LITTLE
SUGGESTED CMS G CODE MODIFIER MOBILITY: CL
MOVING TO AND FROM BED TO CHAIR: A LOT
MOVING FROM LYING ON BACK TO SITTING ON SIDE OF FLAT BED: A LITTLE
MOBILITY SCORE: 14
WALKING IN HOSPITAL ROOM: A LOT

## 2021-10-04 NOTE — PROGRESS NOTES
Telemetry Shift Summary    Rhythm Paced  HR Range 72-84  Ectopy R PVC, PAC  Measurements -/-/-        Normal Values  Rhythm SR  HR Range    Measurements 0.12-0.20 / 0.06-0.10  / 0.30-0.52

## 2021-10-04 NOTE — PROGRESS NOTES
Covid-19 surge in effect    Received report from am shift RN. Patient A&Ox4, on 6l  NC, reports pain at the back and ankles. Prn medication given per Mar. Bed in lowest position. Call light and belongings in reach.

## 2021-10-04 NOTE — DISCHARGE PLANNING
Anticipated Discharge Disposition: [facility] then home to [place]    Action: LSW discussed with Pt that [facility] has accepted him.  Pt will go there to work on his ADL's and then discharge home to [place] when cleared from [facility]. Pt reported he understands he is not strong enough to discharge home, but does not want to be at the SNF for months.  Pt reported he would miss his wife too much.  LSW provided Pt with support and guidance as needed.       Pt will be discharging 10/5/21@ 11:00-11:30am transport by [facility]. LSW updated Pt's bedside RN.          Barriers to Discharge: None    Plan: LSW will assist as needed

## 2021-10-04 NOTE — DISCHARGE PLANNING
Agency/Facility Name: [facility]  Outcome: DPA left v-mail for admissions regarding referral status with request for callback.     1150-  Agency/Facility Name: [facility]  Outcome: DPA left v-mail for admissions regarding referral status with request for callback.     1217-  Agency/Facility Name: [facility]  Spoke To: Jennifer   Outcome: [facility] accepted Pt pending transportation.    SW notified     1430-  Agency/Facility Name: [facility]  Spoke To: Jennifer  Outcome: DPA confirmed Pt acceptance pending updated d/c summary. DPA accommodated transport time of 11:00 tomorrow morning.     1623-  Agency/Facility Name: [facility]  Spoke To: Jennifer  Outcome: Facility will require rapid covid test before acceptance.

## 2021-10-04 NOTE — DISCHARGE PLANNING
Anticipated Discharge Disposition: Home to [facility], but to the assisted living side in [place], not Independent living where Pt currently lives.      Action: LSW spoke to Ingrid with [facility] ([phone]) who reported they are in the process of moving Pt over to the Assisted Living side of their facility.  She requested a 2-step TB test for acceptance and also reported if they have a bed it might not be available for a few days. Ingrid with contact this LSW with bed information when she finds out.      LSW sent a voalte message to Dr. Garcia reporting the above information and requesting a 2-step TB test.      Barriers to Discharge: Assisted Living bed availability at [facility], [facility] declining due to Workers Comp claim from 1970.       Plan: LSW will continue to assist as needed.

## 2021-10-04 NOTE — PROGRESS NOTES
Telemetry Shift Summary    Rhythm VPOD, BBB  HR Range 70s  Ectopy rare PVC  Measurements --/0.18/0.30        Normal Values  Rhythm SR  HR Range    Measurements 0.12-0.20 / 0.06-0.10  / 0.30-0.52

## 2021-10-04 NOTE — FACE TO FACE
Face to Face Supporting Documentation - Home Health    The encounter with this patient was in whole or in part the primary reason for home health admission.    Date of encounter:   Patient:                    MRN:                       YOB: 2021  Jeffrey Lizama  [id number]  12/23/1932     Home health to see patient for:  Skilled Nursing care for assessment, interventions & education, Home health aide, Physical Therapy evaluation and treatment and Occupational therapy evaluation and treatment    Skilled need for:  Exacerbation of Chronic Disease State acute on chronic respiratory failure and New Onset Medical Diagnosis pneumonia    Skilled nursing interventions to include:  Comment: medication management, pulse ox monitoring    Homebound status evidenced by:  Need the aid of supportive devices such as crutches, canes, wheelchairs or walkers, Require the use of special transportation or Needs the assistance of another person in order to leave the home. Leaving home requires a considerable and taxing effort. There is a normal inability to leave the home.    Community Physician to provide follow up care: Bebe Banerjee M.D.     Optional Interventions? No      I certify the face to face encounter for this home health care referral meets the CMS requirements and the encounter/clinical assessment with the patient was, in whole, or in part, for the medical condition(s) listed above, which is the primary reason for home health care. Based on my clinical findings: the service(s) are medically necessary, support the need for home health care, and the homebound criteria are met.  I certify that this patient has had a face to face encounter by myself.  Aneudy Garcia M.D. - NPI: [id number]

## 2021-10-04 NOTE — PROGRESS NOTES
Hospital Medicine Daily Progress Note    Date of Service  10/4/2021    Chief Complaint  Jeffrey Lizama is a 88 y.o. male admitted 9/25/2021 with dyspnea    Hospital Course  88-year-old male on chronic home oxygen presenting 9/25/2021 for worsening shortness of breath, dyspnea on exertion, on chronic home O2 4 to 5 L oxygen supplementation.  Patient found to have bilateral community-acquired pneumonia on CT scan on admission.  Covid screening was negative.    Interval Problem Update  Patient did not have chest pain today. Patient informed of discharge potential with [facility], if he improves possible return to [place].    Patient on 5-6 L nasal cannula baseline.  PT/OT recommending SNF.  Tolerating PO Augmentin.    I have personally seen and examined the patient at bedside. I discussed the plan of care with patient, bedside RN, charge RN,  and pharmacy.    Consultants/Specialty  none    Code Status  DNAR, I OK    Disposition  Patient is not medically cleared.   Anticipate discharge to to skilled nursing facility.  I have placed the appropriate orders for post-discharge needs.    Review of Systems  Review of Systems   Constitutional: Negative for chills, fever and malaise/fatigue.   HENT: Negative for congestion and nosebleeds.    Respiratory: Negative for cough and shortness of breath.    Cardiovascular: Negative for chest pain and palpitations.   Gastrointestinal: Negative for abdominal pain, constipation, diarrhea, nausea and vomiting.   Musculoskeletal: Negative for back pain and joint pain.   Neurological: Positive for weakness. Negative for dizziness and headaches.   All other systems reviewed and are negative.     Physical Exam  Temp:  [36.1 °C (96.9 °F)-36.7 °C (98 °F)] 36.4 °C (97.6 °F)  Pulse:  [71-78] 78  Resp:  [18] 18  BP: (118-143)/(66-86) 118/66  SpO2:  [92 %-97 %] 95 %    Physical Exam  Vitals and nursing note reviewed.   Constitutional:       General: He is not in acute distress.      Appearance: He is obese. He is not diaphoretic.   HENT:      Nose: No congestion or rhinorrhea.      Comments: Clear nares  Cardiovascular:      Rate and Rhythm: Normal rate.      Pulses: Normal pulses.      Heart sounds: No murmur heard.        Comments: Pacemaker on left upper chest  Pulmonary:      Effort: Pulmonary effort is normal. No respiratory distress.      Comments: Using supplemental oxygen via nasal cannula  Abdominal:      General: Abdomen is flat. Bowel sounds are normal. There is no distension.      Palpations: Abdomen is soft.   Musculoskeletal:         General: No swelling or tenderness. Normal range of motion.      Comments: Chronic BLE/feet weakness   Skin:     General: Skin is warm.      Capillary Refill: Capillary refill takes less than 2 seconds.      Coloration: Skin is not jaundiced or pale.   Neurological:      Mental Status: He is alert and oriented to person, place, and time. Mental status is at baseline.      Motor: Weakness present.   Psychiatric:         Mood and Affect: Mood normal.         Behavior: Behavior normal.       Fluids    Intake/Output Summary (Last 24 hours) at 10/4/2021 1454  Last data filed at 10/4/2021 1300  Gross per 24 hour   Intake 920 ml   Output 1450 ml   Net -530 ml       Laboratory                        Imaging  CT-CTA CHEST PULMONARY ARTERY W/ RECONS   Final Result      1.  No pulmonary embolism   2.  New bilateral groundglass and consolidative opacities, this is likely infectious given it is new from 4/18/21 however recommend short interval follow up in 3-6 months to ensure resolution.            DX-CHEST-PORTABLE (1 VIEW)   Final Result      Diffuse bilateral patchy parenchymal opacities. Small left pleural effusion.   Unchanged cardiomegaly         Assessment/Plan  * Pneumonia- (present on admission)  Assessment & Plan  -Bilateral groundglass and consolidative opacities noted on CT scan  -Causing leukocytosis but no sepsis  -Causing worsening hypoxia  -  procalcitonin remains within normal ranges  -Covid screening negative    - finished unasyn and azithromycin. Switched to PO Augmentin.  -Await culture results, sputum preliminary shows few gram-positive cocci    Other chest pain  Assessment & Plan  Patient complained of chest pain today  - Pacemaker on ECG and troponin 88, chronic elevation.  - patient has pacemaker    Acute encephalopathy- (present on admission)  Assessment & Plan  Patient was drowsy, confusing  and was not able to provide information at admission  likeley due to hypoxia secondary to pneumonia.     - resolved    Acute on chronic respiratory failure with hypoxia (HCC)- (present on admission)  Assessment & Plan  -Due to pneumonia  -Continuous pulse ox monitoring  -Respiratory care per protocol  -Continues to be between 6 L up from 5L    FABIAN (acute kidney injury) (HCC)- (present on admission)  Assessment & Plan  Baseline 0.6  discontinue lorsartan, ordered amlodipine.      Be conservative with IV fluid given HFrEF  Continue to monitor  Improved level     Chronic systolic congestive heart failure (HCC)- (present on admission)  Assessment & Plan  proBNP 4771, seems is at his baseline  Echo 5/2021 showed EF 45%. Dyskinesis of the apex and hypokinesis of the distal/apical inferior and septal walls.    Continue to monitor decompensated signs    Hyponatremia- (present on admission)  Assessment & Plan  Improved levels    COPD (chronic obstructive pulmonary disease) (HCC)- (present on admission)  Assessment & Plan  -No acute exacerbation  -He has however at significant risk of developing an acute exacerbation since he does have a bilateral pneumonia  -Hydrocortisone was started on admission    Discharge planning issues  Assessment & Plan  [facility] accepted, potential discharge 10/05  Wife lives at [facility] in [place], unable to care for patient.    GERD (gastroesophageal reflux disease)- (present on admission)  Assessment & Plan  -Continue home  PPI    Hypothyroidism- (present on admission)  Assessment & Plan  -Continue home Synthroid at 75 mcg daily  -Most recent TSH was approximately 4 months ago was normal at 3.16    Dyslipidemia- (present on admission)  Assessment & Plan  -Continue home statin    Leukocytosis- (present on admission)  Assessment & Plan  -Due to pneumonia  -Repeat CBC in the morning    Elevated troponin- (present on admission)  Assessment & Plan  Chronic   troponin 111-> 115, at his baseline  Troponin down to 70 on last check.    pt reported right upper chest pain for a few days while cough. EKG showed ventricular paced complexes.    Continue to monitor    Normocytic anemia- (present on admission)  Assessment & Plan  -No sign of gross bleeding, chronic    Paroxysmal atrial fibrillation (HCC)- (present on admission)  Assessment & Plan  -Also with a history of sick sinus syndrome, now with a pacemaker  -He is currently paced    BPH (benign prostatic hypertrophy)- (present on admission)  Assessment & Plan  -Continue home Flomax    Essential hypertension, benign- (present on admission)  Assessment & Plan  -Continue home Coreg and losartan  -Start as needed labetalol  -Adjust as needed     VTE prophylaxis: SCDs/TEDs and enoxaparin ppx    I have performed a physical exam and reviewed and updated ROS and Plan today (10/4/2021). In review of yesterday's note (10/3/2021), there are no changes except as documented above.

## 2021-10-04 NOTE — PROGRESS NOTES
Telemetry Shift Summary     Rhythm: V Paced 100%  Rate: 74-87  Measurements: -/0.18/0.44  Ectopy (reported by Monitor Tech): BBB, R-O PVC, O PAC     Normal Values  Rhythm: Sinus  HR:   Measurements: 0.12-0.20/0.06-0.10/0.30-0.52

## 2021-10-05 PROCEDURE — 94640 AIRWAY INHALATION TREATMENT: CPT

## 2021-10-05 PROCEDURE — 94760 N-INVAS EAR/PLS OXIMETRY 1: CPT

## 2021-10-05 PROCEDURE — 700111 HCHG RX REV CODE 636 W/ 250 OVERRIDE (IP): Performed by: INTERNAL MEDICINE

## 2021-10-05 PROCEDURE — A9270 NON-COVERED ITEM OR SERVICE: HCPCS | Performed by: INTERNAL MEDICINE

## 2021-10-05 PROCEDURE — 99232 SBSQ HOSP IP/OBS MODERATE 35: CPT | Performed by: INTERNAL MEDICINE

## 2021-10-05 PROCEDURE — 770020 HCHG ROOM/CARE - TELE (206)

## 2021-10-05 PROCEDURE — 700102 HCHG RX REV CODE 250 W/ 637 OVERRIDE(OP): Performed by: INTERNAL MEDICINE

## 2021-10-05 RX ORDER — CARVEDILOL 3.12 MG/1
3.12 TABLET ORAL 2 TIMES DAILY WITH MEALS
Qty: 60 TABLET | Status: SHIPPED
Start: 2021-10-05

## 2021-10-05 RX ADMIN — OMEPRAZOLE 20 MG: 20 CAPSULE, DELAYED RELEASE ORAL at 05:24

## 2021-10-05 RX ADMIN — POLYVINYL ALCOHOL 1 DROP: 14 SOLUTION/ DROPS OPHTHALMIC at 05:25

## 2021-10-05 RX ADMIN — BUDESONIDE AND FORMOTEROL FUMARATE DIHYDRATE 2 PUFF: 80; 4.5 AEROSOL RESPIRATORY (INHALATION) at 23:59

## 2021-10-05 RX ADMIN — POLYVINYL ALCOHOL 1 DROP: 14 SOLUTION/ DROPS OPHTHALMIC at 18:22

## 2021-10-05 RX ADMIN — BUDESONIDE AND FORMOTEROL FUMARATE DIHYDRATE 2 PUFF: 80; 4.5 AEROSOL RESPIRATORY (INHALATION) at 07:14

## 2021-10-05 RX ADMIN — TRAMADOL HYDROCHLORIDE 50 MG: 50 TABLET, FILM COATED ORAL at 04:03

## 2021-10-05 RX ADMIN — CLOPIDOGREL BISULFATE 75 MG: 75 TABLET ORAL at 05:24

## 2021-10-05 RX ADMIN — OMEPRAZOLE 20 MG: 20 CAPSULE, DELAYED RELEASE ORAL at 18:22

## 2021-10-05 RX ADMIN — HYDROCORTISONE 10 MG: 10 TABLET ORAL at 05:24

## 2021-10-05 RX ADMIN — SALINE NASAL SPRAY 2 SPRAY: 1.5 SOLUTION NASAL at 05:25

## 2021-10-05 RX ADMIN — TAMSULOSIN HYDROCHLORIDE 0.4 MG: 0.4 CAPSULE ORAL at 05:24

## 2021-10-05 RX ADMIN — CARVEDILOL 3.12 MG: 6.25 TABLET, FILM COATED ORAL at 18:22

## 2021-10-05 RX ADMIN — CARVEDILOL 3.12 MG: 6.25 TABLET, FILM COATED ORAL at 09:02

## 2021-10-05 RX ADMIN — LEVOTHYROXINE SODIUM 75 MCG: 0.05 TABLET ORAL at 05:25

## 2021-10-05 RX ADMIN — ALBUTEROL SULFATE 2 PUFF: 90 AEROSOL, METERED RESPIRATORY (INHALATION) at 23:59

## 2021-10-05 RX ADMIN — SENNOSIDES AND DOCUSATE SODIUM 2 TABLET: 50; 8.6 TABLET ORAL at 18:21

## 2021-10-05 RX ADMIN — DIBASIC SODIUM PHOSPHATE, MONOBASIC POTASSIUM PHOSPHATE AND MONOBASIC SODIUM PHOSPHATE 250 MG: 852; 155; 130 TABLET ORAL at 05:28

## 2021-10-05 RX ADMIN — ATORVASTATIN CALCIUM 80 MG: 40 TABLET, FILM COATED ORAL at 18:21

## 2021-10-05 RX ADMIN — ALPRAZOLAM 0.25 MG: 0.25 TABLET ORAL at 13:19

## 2021-10-05 RX ADMIN — SALINE NASAL SPRAY 2 SPRAY: 1.5 SOLUTION NASAL at 19:37

## 2021-10-05 RX ADMIN — ENOXAPARIN SODIUM 40 MG: 40 INJECTION SUBCUTANEOUS at 05:27

## 2021-10-05 RX ADMIN — HYDROCORTISONE 10 MG: 10 TABLET ORAL at 13:15

## 2021-10-05 RX ADMIN — SENNOSIDES AND DOCUSATE SODIUM 2 TABLET: 50; 8.6 TABLET ORAL at 05:24

## 2021-10-05 RX ADMIN — TRAMADOL HYDROCHLORIDE 50 MG: 50 TABLET, FILM COATED ORAL at 19:42

## 2021-10-05 RX ADMIN — DIBASIC SODIUM PHOSPHATE, MONOBASIC POTASSIUM PHOSPHATE AND MONOBASIC SODIUM PHOSPHATE 250 MG: 852; 155; 130 TABLET ORAL at 18:22

## 2021-10-05 RX ADMIN — AMLODIPINE BESYLATE 10 MG: 5 TABLET ORAL at 05:25

## 2021-10-05 RX ADMIN — ALBUTEROL SULFATE 2 PUFF: 90 AEROSOL, METERED RESPIRATORY (INHALATION) at 07:13

## 2021-10-05 RX ADMIN — HYDROCORTISONE 10 MG: 10 TABLET ORAL at 18:21

## 2021-10-05 ASSESSMENT — ENCOUNTER SYMPTOMS
CONSTIPATION: 0
SHORTNESS OF BREATH: 0
FEVER: 0
WEAKNESS: 0
DIARRHEA: 0
MYALGIAS: 0
COUGH: 0
BLURRED VISION: 0
NERVOUS/ANXIOUS: 1
ABDOMINAL PAIN: 0
CLAUDICATION: 0
SENSORY CHANGE: 0
PHOTOPHOBIA: 0
DIZZINESS: 0
VOMITING: 0
SPEECH CHANGE: 0
HEARTBURN: 0
DEPRESSION: 0
INSOMNIA: 0
CHILLS: 0
SORE THROAT: 0
HEADACHES: 0

## 2021-10-05 ASSESSMENT — PAIN DESCRIPTION - PAIN TYPE: TYPE: ACUTE PAIN

## 2021-10-05 NOTE — PROGRESS NOTES
Telemetry Shift Summary    Rhythm VPOD  HR Range 70-80  Ectopy R-PVC, PAC  Measurements -/0.16/-        Normal Values  Rhythm SR  HR Range    Measurements 0.12-0.20 / 0.06-0.10  / 0.30-0.52

## 2021-10-05 NOTE — DISCHARGE PLANNING
Agency/Facility Name: [facility]   Spoke To: Kiet   Outcome: Per Kiet Pt needs to have negative covid test 48 hours prior to admissions.   LSW Notified      Agency/Facility Name: [facility]   Spoke To: Kiet   Outcome: DPA canceled transport for Pt today. Per LSW.      1500  Received Choice form at 1435  Agency/Facility Name: [facility]   Referral sent per Choice form @ 1501     Received Choice form at 1439  Agency/Facility Name: [facility]   Referral sent per Choice form @ 1502     1600  Agency/Facility Name: [facility]    Spoke To: Black   Outcome: Black was wondering if pt was living in [place]. ANTONETTE stated pt lives in [place] at a Assisted living facility. Black was also wondering on if pt PCP was based in [place] or [place].  DPA will find out needed information and give Black a call back.  LSW Notified

## 2021-10-05 NOTE — PROGRESS NOTES
Hospital Medicine Daily Progress Note    Date of Service  10/5/2021    Chief Complaint  Jeffrey Lizama is a 88 y.o. male admitted 9/25/2021 with increasing shortness of breath.    Hospital Course  88-year-old male on chronic home oxygen presenting 9/25/2021 for worsening shortness of breath, dyspnea on exertion, on chronic home O2 4 to 5 L oxygen supplementation.  Patient found to have bilateral community-acquired pneumonia on CT scan on admission.  Covid screening was negative.      Interval Problem Update  10/5 Patient feeling anxious and wanted me to speak with his wife as he really doesn't want to go to SNF and only wants to return home.  He will need 24 hour nursing care in order to safely leave the hospital.  His [facility], [facility] will come by today to assess him for assisted living as he and his wife currently live in the independent living section.      I have personally seen and examined the patient at bedside. I discussed the plan of care with patient, family, bedside RN, charge RN,  and pharmacy.    Consultants/Specialty  none    Code Status  DNAR, I OK    Disposition  Patient is medically cleared.   Anticipate discharge to assisted living with 24 hour care provided.  I have placed the appropriate orders for post-discharge needs.    Review of Systems  Review of Systems   Constitutional: Negative for chills and fever.   HENT: Negative for congestion and sore throat.    Eyes: Negative for blurred vision and photophobia.   Respiratory: Negative for cough and shortness of breath.    Cardiovascular: Negative for chest pain, claudication and leg swelling.   Gastrointestinal: Negative for abdominal pain, constipation, diarrhea, heartburn and vomiting.   Genitourinary: Negative for dysuria and hematuria.   Musculoskeletal: Negative for joint pain and myalgias.   Skin: Negative for itching and rash.   Neurological: Negative for dizziness, sensory change, speech change, weakness and headaches.    Psychiatric/Behavioral: Negative for depression. The patient is nervous/anxious. The patient does not have insomnia.         Physical Exam  Temp:  [36.3 °C (97.3 °F)-36.4 °C (97.6 °F)] 36.3 °C (97.3 °F)  Pulse:  [71-82] 82  Resp:  [18-20] 18  BP: (118-147)/(66-88) 118/67  SpO2:  [90 %-96 %] 92 %    Physical Exam  Vitals and nursing note reviewed.   Constitutional:       General: He is not in acute distress.     Appearance: Normal appearance.   HENT:      Head: Normocephalic and atraumatic.   Eyes:      General: No scleral icterus.     Extraocular Movements: Extraocular movements intact.   Cardiovascular:      Rate and Rhythm: Normal rate and regular rhythm.      Pulses: Normal pulses.      Heart sounds: Normal heart sounds. No murmur heard.     Pulmonary:      Effort: Pulmonary effort is normal. No respiratory distress.      Breath sounds: Normal breath sounds. No wheezing, rhonchi or rales.   Abdominal:      General: Abdomen is flat. Bowel sounds are normal. There is no distension.      Palpations: Abdomen is soft.      Tenderness: There is no rebound.   Musculoskeletal:         General: No swelling or tenderness.      Cervical back: Normal range of motion and neck supple.   Lymphadenopathy:      Cervical: No cervical adenopathy.   Skin:     Coloration: Skin is not jaundiced.      Findings: No erythema.   Neurological:      General: No focal deficit present.      Mental Status: He is alert and oriented to person, place, and time. Mental status is at baseline.      Cranial Nerves: No cranial nerve deficit.   Psychiatric:         Mood and Affect: Mood normal.         Behavior: Behavior normal.         Fluids    Intake/Output Summary (Last 24 hours) at 10/5/2021 1236  Last data filed at 10/5/2021 1200  Gross per 24 hour   Intake 1320 ml   Output 850 ml   Net 470 ml       Laboratory                        Imaging  CT-CTA CHEST PULMONARY ARTERY W/ RECONS   Final Result      1.  No pulmonary embolism   2.  New  bilateral groundglass and consolidative opacities, this is likely infectious given it is new from 4/18/21 however recommend short interval follow up in 3-6 months to ensure resolution.            DX-CHEST-PORTABLE (1 VIEW)   Final Result      Diffuse bilateral patchy parenchymal opacities. Small left pleural effusion.   Unchanged cardiomegaly           Assessment/Plan  * Pneumonia- (present on admission)  Assessment & Plan  -Bilateral groundglass and consolidative opacities noted on CT scan  -Causing leukocytosis but no sepsis  -Causing worsening hypoxia  - procalcitonin remains within normal ranges  -Covid screening negative    - finished unasyn and azithromycin. Switched to PO Augmentin.  -Await culture results, sputum preliminary shows few gram-positive cocci    Other chest pain  Assessment & Plan  Denies chest pain, has been feeling anxious.      Discharge planning issues  Assessment & Plan  [facility] accepted, potential discharge 10/05  Wife lives at [facility] in [place], unable to care for patient.    Acute encephalopathy- (present on admission)  Assessment & Plan  Patient was drowsy, confusing  and was not able to provide information at admission  likeley due to hypoxia secondary to pneumonia.     - resolved    GERD (gastroesophageal reflux disease)- (present on admission)  Assessment & Plan  -Continue home PPI    Hypothyroidism- (present on admission)  Assessment & Plan  -Continue home Synthroid at 75 mcg daily  -Most recent TSH was approximately 4 months ago was normal at 3.16    Dyslipidemia- (present on admission)  Assessment & Plan  -Continue home statin    Chronic systolic congestive heart failure (HCC)- (present on admission)  Assessment & Plan  proBNP 4771, seems is at his baseline  Echo 5/2021 showed EF 45%. Dyskinesis of the apex and hypokinesis of the distal/apical inferior and septal walls.    Continue to monitor decompensated signs    Leukocytosis- (present on admission)  Assessment  & Plan  -Due to pneumonia  -Repeat CBC in the morning    Elevated troponin- (present on admission)  Assessment & Plan  Chronic   troponin 111-> 115, at his baseline  Troponin down to 70 on last check.    pt reported right upper chest pain for a few days while cough. EKG showed ventricular paced complexes.    Continue to monitor    Normocytic anemia- (present on admission)  Assessment & Plan  -No sign of gross bleeding, chronic    Acute on chronic respiratory failure with hypoxia (HCC)- (present on admission)  Assessment & Plan  -Due to pneumonia  -Continuous pulse ox monitoring  -Respiratory care per protocol  -Continues to be between 6 L up from 5L    FABIAN (acute kidney injury) (HCC)- (present on admission)  Assessment & Plan  Baseline 0.6  discontinue lorsartan, ordered amlodipine.      Be conservative with IV fluid given HFrEF  Continue to monitor  Improved level     Hyponatremia- (present on admission)  Assessment & Plan  Improved levels    Paroxysmal atrial fibrillation (HCC)- (present on admission)  Assessment & Plan  -Also with a history of sick sinus syndrome, now with a pacemaker  -He is currently paced    BPH (benign prostatic hypertrophy)- (present on admission)  Assessment & Plan  -Continue home Flomax    COPD (chronic obstructive pulmonary disease) (HCC)- (present on admission)  Assessment & Plan  -No acute exacerbation  -He has however at significant risk of developing an acute exacerbation since he does have a bilateral pneumonia  -Hydrocortisone was started on admission    Essential hypertension, benign- (present on admission)  Assessment & Plan  -Continue home Coreg and losartan  -Start as needed labetalol  -Adjust as needed         VTE prophylaxis: enoxaparin ppx    I have performed a physical exam and reviewed and updated ROS and Plan today (10/5/2021). In review of yesterday's note (10/4/2021), there are no changes except as documented above.

## 2021-10-05 NOTE — PROGRESS NOTES
Assumed care of patient. Bedside report received from night shift RN. Patient updated on plan of care; patient to be discharged to next level of care appoximately 1100/1130 per  progress note.    Patient instructed to call for assistance before getting out of bed. Patient verbalized understanding. Call light is within reach and fall precautions are in place. All needs met at this time. Hourly rounding in place.     COVID-19 surge in effect.

## 2021-10-05 NOTE — FACE TO FACE
Face to Face Supporting Documentation - Home Health    The encounter with this patient was in whole or in part the primary reason for home health admission.    Date of encounter:   Patient:                    MRN:                       YOB: 2021  Jeffrey Lizama  [id number]  12/23/1932     Home health to see patient for:  Skilled Nursing care for assessment, interventions & education, Physical Therapy evaluation and treatment and Occupational therapy evaluation and treatment    Skilled need for:  New Onset Medical Diagnosis community acquired pneumonia    Skilled nursing interventions to include:  Comment: .    Homebound status evidenced by:  Needs the assistance of another person in order to leave the home. Leaving home requires a considerable and taxing effort. There is a normal inability to leave the home.    Community Physician to provide follow up care: Bebe Banerjee M.D.     Optional Interventions? No      I certify the face to face encounter for this home health care referral meets the CMS requirements and the encounter/clinical assessment with the patient was, in whole, or in part, for the medical condition(s) listed above, which is the primary reason for home health care. Based on my clinical findings: the service(s) are medically necessary, support the need for home health care, and the homebound criteria are met.  I certify that this patient has had a face to face encounter by myself.  Pepper Ivey D.O. - NPI: [id number]

## 2021-10-05 NOTE — THERAPY
"Physical Therapy   Daily Treatment     Patient Name: Jeffrey Lizama  Age:  88 y.o., Sex:  male  Medical Record #: [id number]  Today's Date: 10/4/2021     Precautions  Precautions: Fall Risk  Comments: poor mm endurance    Assessment    Patient seen for PT follow up session and making slow progress towards functional mobility goals. Patient limited by anxiety and self limiting behavior. Initially stating, \"Im not able to stand or walk.\" With encouragement patient able to ambulate x15' with FWW and cues for deep breathing to prevent SOB. SPO2 >93% throughout on 6L NC. Reviewed seated and supine there-ex and willing to sit in chair at end of session. Recommend ambulation to bathroom for toileting to improve endurance/activity tolerance. Patient will continue to benefit from skilled IPPT in house.     Plan    Continue current treatment plan.    DC Equipment Recommendations: (P) Unable to determine at this time  Discharge Recommendations: (P) Recommend post-acute placement for additional physical therapy services prior to discharge home       Objective       10/04/21 1600   Gait Analysis   Gait Level Of Assist Minimal Assist   Assistive Device Front Wheel Walker   Distance (Feet) 15   # of Times Distance was Traveled 1   Deviation Antalgic;Step To;Decreased Base Of Support   Weight Bearing Status FWW   Skilled Intervention Verbal Cuing;Tactile Cuing;Sequencing;Facilitation   Comments cues for FWW management and upright posture    Bed Mobility    Supine to Sit Supervised   Sit to Supine Minimal Assist   Scooting Minimal Assist   Rolling Supervised   Skilled Intervention Tactile Cuing;Verbal Cuing;Facilitation   Comments req use of bed features   Functional Mobility   Sit to Stand Minimal Assist   Bed, Chair, Wheelchair Transfer Minimal Assist   Transfer Method Stand Step   Mobility w/ FWW   Skilled Intervention Verbal Cuing;Tactile Cuing;Sequencing;Facilitation   Comments cues for foot placement and to widen ANIRUDH for " stability   Patient / Family Goals    Patient / Family Goal #1 to go back to PLOF    Goal #1 Outcome Goal not met   Short Term Goals    Short Term Goal # 1 pt will go supine<>sit w/hob flat and rails down w/spv in 6tx    Goal Outcome # 1 Progressing as expected   Short Term Goal # 2 pt will go sit<>stand w/fww w/spv in 6tx    Goal Outcome # 2 Progressing slower than expected   Short Term Goal # 3 pt will transfer bed<>chair w/fww in 6tx    Goal Outcome # 3 Progressing slower than expected   Short Term Goal # 4 pt will ambualte for 150ft w/fww w/spv in 6tx    Goal Outcome # 4 Progressing slower than expected   Anticipated Discharge Equipment and Recommendations   DC Equipment Recommendations Unable to determine at this time   Discharge Recommendations Recommend post-acute placement for additional physical therapy services prior to discharge home

## 2021-10-05 NOTE — DISCHARGE PLANNING
Anticipated Discharge Disposition: [place] [facility]    Action: LSW completed chart review. LSW notified by Sunny WHITMAN, that pt will need negative covid test before d/c to [facility].     0918: LSW requested Dr. Ivey order a rapid covid test. Per MD, pt had negative covid test on admission (9/26)    0923: LSW requested Bharati WHITMAN, call [facility] to find out if they need a negative covid test.     0929: Per ANTONETTE, [facility] needs a negative covid test 48 hours prior to admission. LSW notified MD and requested covid test.     0933: Per MD, antigen test is ordered.    Barriers to Discharge: None    Plan: LSW to follow and assist as needed.    0940: LSW received message from MD. Per MD, pt states he does not want to go to [place]. Per MD, she is cancelling the antigen test.    0945: LSW met with pt at bedside. Pt states he only wants to return to [facility] to be with his wife. Per pt, he will be going to assisted living with his wife. Per pt, he is on 5-6L baseline oxygen and will need a FWW at d/c.      LSW to call Ingrid with [facility] to confirm pt will be returning to Assisted living.    LSW messaged ANTONETTE to cancel transport to [place].    1030: Discussed pt in IDT rounds. Per MD, wife is coordinating 24/7 nursing care.     1148: LSW called Pepper at [facility] ([phone]). Per Pepper, she can have an RN come out and assess pt at 1530 for assisted living. Per Pepper, RN will need H&P and medication list.     Per Pepper, they do accommodate HH and think pt would benefit from HH upon d/c.     LSW notified MD of RN assessment and need for HH. Per MD, HH is ordered.    1206: LSW messaged MD for a walker order.    1410: LSW met with pt at bedside to collect HH and DME walker choice. Pt signed consent to send HH referrals to 1) [facility] 2) [facility] and 3) [facility]. Pt signed consent to send DME walker referral to [facility].     1435: LSW faxed HH and DME choice forms to ANTONETTE.     1510: LSW received call from Lauren at  [facility]. Per Lauren, the RN will come about 1600 to complete bedside assessment. ANDERSONW informed Lauren that requested documentation will be at RN station.    1518: GUADALUPE Fischer, placed H&P and MAR at RN desk for Miguelito RN.    1558: ОЛЬГА Harris from [facility] stopped by CM office. Per Kimberly, he recommends pt go to SNF first before returning to [facility]. LSW to talk to pt tomorrow about SNF placement.

## 2021-10-05 NOTE — DISCHARGE SUMMARY
"Discharge Summary    CHIEF COMPLAINT ON ADMISSION  Chief Complaint   Patient presents with   • Shortness of Breath     worsening SOB with exertion today. Usually uses 5L at home now on 8L. Hx of COPD and states \"this usually happens with my exacerbations\"       Reason for Admission  EMS     CODE STATUS  DNAR, I OK    HPI & HOSPITAL COURSE  88-year-old male on chronic home oxygen presenting 9/25/2021 for worsening shortness of breath, dyspnea on exertion, on chronic home O2 4 to 5 L oxygen supplementation.  Patient found to have bilateral community-acquired pneumonia on CT scan on admission.  Covid screening was negative.  He has completed his antibiotics and is still very weak and requires additional therapies before returning back to assisted living.        Therefore, he is discharged in good and stable condition to skilled nursing facility.    The patient met 2-midnight criteria for an inpatient stay at the time of discharge.      FOLLOW UP ITEMS POST DISCHARGE  PCP - 2 weeks    DISCHARGE DIAGNOSES  Principal Problem:    Pneumonia POA: Yes  Active Problems:    Essential hypertension, benign POA: Yes    COPD (chronic obstructive pulmonary disease) (HCC) POA: Yes      Overview: PMA HOME O2    BPH (benign prostatic hypertrophy) (Chronic) POA: Yes      Overview: Treated withTerazosin and Proscar      IMO Update    Paroxysmal atrial fibrillation (HCC) POA: Yes    Hyponatremia (Chronic) POA: Yes    FABIAN (acute kidney injury) (HCC) POA: Yes    Acute on chronic respiratory failure with hypoxia (HCC) POA: Yes    Normocytic anemia POA: Yes    Elevated troponin POA: Yes    Leukocytosis POA: Yes    Chronic systolic congestive heart failure (HCC) POA: Yes    Dyslipidemia POA: Yes    Hypothyroidism POA: Yes    GERD (gastroesophageal reflux disease) POA: Yes    Acute encephalopathy POA: Yes    Discharge planning issues POA: No    Other chest pain POA: Clinically Undetermined  Resolved Problems:    * No resolved hospital problems. " *      FOLLOW UP  No future appointments.  Bebe Banerjee M.D.  [address] [place] [id number] [phone]      Please call your primary care provider to schedule a hospital follow up. Thank you.     [facility]  [address]  [place] [address]  [phone]          MEDICATIONS ON DISCHARGE     Medication List      CHANGE how you take these medications      Instructions   carvedilol 3.125 MG Tabs  What changed: Another medication with the same name was removed. Continue taking this medication, and follow the directions you see here.  Commonly known as: COREG   Take 1 Tablet by mouth 2 times a day with meals.  Dose: 3.125 mg        CONTINUE taking these medications      Instructions   albuterol 108 (90 Base) MCG/ACT Aers inhalation aerosol   Inhale 2 Puffs every four hours as needed for Shortness of Breath.  Dose: 2 Puff     ALPRAZolam 0.25 MG Tabs  Commonly known as: XANAX   Take 0.25 mg by mouth 3 times a day as needed for Anxiety.  Dose: 0.25 mg     atorvastatin 80 MG tablet  Commonly known as: LIPITOR   Take 1 tablet by mouth every evening.  Dose: 80 mg     Breo Ellipta 100-25 MCG/INH Aepb  Generic drug: Fluticasone Furoate-Vilanterol   Inhale 1 Puff every evening.  Dose: 1 Puff     carboxymethylcellulose 0.5 % Soln  Commonly known as: REFRESH TEARS   Administer 1 Drop into both eyes 2 times a day.  Dose: 1 Drop     clopidogrel 75 MG Tabs  Commonly known as: PLAVIX   Take 1 tablet by mouth every day.  Dose: 75 mg     fluticasone 50 MCG/ACT nasal spray  Commonly known as: FLONASE   Administer 1 Spray into affected nostril(S) 2 times a day.  Dose: 1 Spray     furosemide 20 MG Tabs  Commonly known as: LASIX   Take 1 tablet by mouth every day.  Dose: 20 mg     Incruse Ellipta 62.5 MCG/INH Aepb  Generic drug: Umeclidinium Bromide   Inhale 1 Puff every evening.  Dose: 1 Puff     levothyroxine 75 MCG Tabs  Commonly known as: SYNTHROID   Take 1 tablet by mouth every morning on an empty  stomach.  Dose: 75 mcg     losartan 50 MG Tabs  Commonly known as: COZAAR   Take 50 mg by mouth every day.  Dose: 50 mg     tamsulosin 0.4 MG capsule  Commonly known as: FLOMAX   Take 1 capsule by mouth every day.  Dose: 0.4 mg     tiotropium 18 MCG Caps  Commonly known as: SPIRIVA   Inhale 1 Cap by mouth every day.  Dose: 18 mcg     traMADol 50 MG Tabs  Commonly known as: ULTRAM   Take 50 mg by mouth every 6 hours as needed for Severe Pain.  Dose: 50 mg        STOP taking these medications    oxyCODONE immediate-release 5 MG Tabs  Commonly known as: ROXICODONE            Allergies  Allergies   Allergen Reactions   • Lisinopril      Hypotension, 30 mg dose   • Pcn [Penicillins] Rash     Tolerates cephalosporins         DIET  Orders Placed This Encounter   Procedures   • Diet Order Diet: Level 6 - Soft and Bite Sized; Liquid level: Level 0 - Thin; Second Modifier: (optional): Cardiac     Standing Status:   Standing     Number of Occurrences:   1     Order Specific Question:   Diet:     Answer:   Level 6 - Soft and Bite Sized [23]     Order Specific Question:   Liquid level     Answer:   Level 0 - Thin     Order Specific Question:   Second Modifier: (optional)     Answer:   Cardiac [6]       ACTIVITY  As tolerated and directed by skilled nursing.  Weight bearing as tolerated    LINES, DRAINS, AND WOUNDS  This is an automated list. Peripheral IVs will be removed prior to discharge.  Peripheral IV 09/26/21 Distal;Right;Posterior Forearm (Active)   Site Assessment Clean;Dry;Intact 10/04/21 2200   Dressing Type Transparent;Occlusive 10/04/21 2200   Line Status Scrubbed the hub prior to access;Flushed;Saline locked 10/04/21 2200   Dressing Status Clean;Dry;Intact 10/04/21 2200   Dressing Intervention N/A 10/04/21 2200   Dressing Change Due 10/10/21 10/04/21 2200   Infiltration Grading ([facility]) 0 10/04/21 2200   Phlebitis Scale ([facility] Only) 0 10/04/21 2200       Wound 04/29/21 Skin Tear Arm Proximal skin tear  (Active)       Wound 05/02/21 Ear Right Skin cancer (Active)       Wound 05/21/21 Pressure Injury Sacrum Left POA moisture related.   (Active)       Peripheral IV 09/26/21 Distal;Right;Posterior Forearm (Active)   Site Assessment Clean;Dry;Intact 10/04/21 2200   Dressing Type Transparent;Occlusive 10/04/21 2200   Line Status Scrubbed the hub prior to access;Flushed;Saline locked 10/04/21 2200   Dressing Status Clean;Dry;Intact 10/04/21 2200   Dressing Intervention N/A 10/04/21 2200   Dressing Change Due 10/10/21 10/04/21 2200   Infil[facility] Grading ([facility]) 0 10/04/21 2200   Phlebitis Scale ([facility] Only) 0 10/04/21 2200               MENTAL STATUS ON TRANSFER             CONSULTATIONS  none    PROCEDURES  none    LABORATORY  Lab Results   Component Value Date    SODIUM 132 (L) 10/01/2021    POTASSIUM 4.0 10/01/2021    CHLORIDE 95 (L) 10/01/2021    CO2 30 10/01/2021    GLUCOSE 100 (H) 10/01/2021    BUN 19 10/01/2021    CREATININE 0.75 10/01/2021    CREATININE 1.3 09/11/2008        Lab Results   Component Value Date    WBC 13.1 (H) 10/01/2021    HEMOGLOBIN 10.7 (L) 10/01/2021    HEMATOCRIT 33.5 (L) 10/01/2021    PLATELETCT 194 10/01/2021        Total time of the discharge process exceeds 35 minutes.

## 2021-10-06 VITALS
DIASTOLIC BLOOD PRESSURE: 70 MMHG | WEIGHT: 188 LBS | TEMPERATURE: 97.6 F | BODY MASS INDEX: 29.51 KG/M2 | OXYGEN SATURATION: 94 % | HEIGHT: 67 IN | HEART RATE: 74 BPM | SYSTOLIC BLOOD PRESSURE: 127 MMHG | RESPIRATION RATE: 18 BRPM

## 2021-10-06 LAB
ANION GAP SERPL CALC-SCNC: 8 MMOL/L (ref 7–16)
BUN SERPL-MCNC: 17 MG/DL (ref 8–22)
CALCIUM SERPL-MCNC: 8.5 MG/DL (ref 8.4–10.2)
CHLORIDE SERPL-SCNC: 95 MMOL/L (ref 96–112)
CO2 SERPL-SCNC: 29 MMOL/L (ref 20–33)
CREAT SERPL-MCNC: 0.75 MG/DL (ref 0.5–1.4)
ERYTHROCYTE [DISTWIDTH] IN BLOOD BY AUTOMATED COUNT: 57.5 FL (ref 35.9–50)
GAMMA INTERFERON BACKGROUND BLD IA-ACNC: 0.03 IU/ML
GLUCOSE SERPL-MCNC: 82 MG/DL (ref 65–99)
HCT VFR BLD AUTO: 34.7 % (ref 42–52)
HGB BLD-MCNC: 11 G/DL (ref 14–18)
M TB IFN-G BLD-IMP: NEGATIVE
M TB IFN-G CD4+ BCKGRND COR BLD-ACNC: 0 IU/ML
MCH RBC QN AUTO: 29 PG (ref 27–33)
MCHC RBC AUTO-ENTMCNC: 31.7 G/DL (ref 33.7–35.3)
MCV RBC AUTO: 91.6 FL (ref 81.4–97.8)
MITOGEN IGNF BCKGRD COR BLD-ACNC: >10 IU/ML
PLATELET # BLD AUTO: 176 K/UL (ref 164–446)
PMV BLD AUTO: 9.5 FL (ref 9–12.9)
POTASSIUM SERPL-SCNC: 3.7 MMOL/L (ref 3.6–5.5)
QFT TB2 - NIL TBQ2: 0 IU/ML
RBC # BLD AUTO: 3.79 M/UL (ref 4.7–6.1)
SODIUM SERPL-SCNC: 132 MMOL/L (ref 135–145)
WBC # BLD AUTO: 14.3 K/UL (ref 4.8–10.8)

## 2021-10-06 PROCEDURE — 94760 N-INVAS EAR/PLS OXIMETRY 1: CPT

## 2021-10-06 PROCEDURE — A9270 NON-COVERED ITEM OR SERVICE: HCPCS | Performed by: INTERNAL MEDICINE

## 2021-10-06 PROCEDURE — 97535 SELF CARE MNGMENT TRAINING: CPT

## 2021-10-06 PROCEDURE — 99239 HOSP IP/OBS DSCHRG MGMT >30: CPT | Performed by: INTERNAL MEDICINE

## 2021-10-06 PROCEDURE — 700102 HCHG RX REV CODE 250 W/ 637 OVERRIDE(OP): Performed by: INTERNAL MEDICINE

## 2021-10-06 PROCEDURE — 85027 COMPLETE CBC AUTOMATED: CPT

## 2021-10-06 PROCEDURE — 700111 HCHG RX REV CODE 636 W/ 250 OVERRIDE (IP): Performed by: INTERNAL MEDICINE

## 2021-10-06 PROCEDURE — 97530 THERAPEUTIC ACTIVITIES: CPT

## 2021-10-06 PROCEDURE — 94640 AIRWAY INHALATION TREATMENT: CPT

## 2021-10-06 PROCEDURE — 36415 COLL VENOUS BLD VENIPUNCTURE: CPT

## 2021-10-06 PROCEDURE — 80048 BASIC METABOLIC PNL TOTAL CA: CPT

## 2021-10-06 RX ORDER — LOSARTAN POTASSIUM 25 MG/1
50 TABLET ORAL DAILY
Status: DISCONTINUED | OUTPATIENT
Start: 2021-10-06 | End: 2021-10-06 | Stop reason: HOSPADM

## 2021-10-06 RX ORDER — FUROSEMIDE 10 MG/ML
40 INJECTION INTRAMUSCULAR; INTRAVENOUS ONCE
Status: COMPLETED | OUTPATIENT
Start: 2021-10-06 | End: 2021-10-06

## 2021-10-06 RX ORDER — FUROSEMIDE 20 MG/1
20 TABLET ORAL DAILY
Status: DISCONTINUED | OUTPATIENT
Start: 2021-10-06 | End: 2021-10-06 | Stop reason: HOSPADM

## 2021-10-06 RX ADMIN — LEVOTHYROXINE SODIUM 75 MCG: 0.05 TABLET ORAL at 05:22

## 2021-10-06 RX ADMIN — AMLODIPINE BESYLATE 10 MG: 5 TABLET ORAL at 05:21

## 2021-10-06 RX ADMIN — HYDROCORTISONE 10 MG: 10 TABLET ORAL at 05:22

## 2021-10-06 RX ADMIN — FUROSEMIDE 40 MG: 10 INJECTION, SOLUTION INTRAMUSCULAR; INTRAVENOUS at 10:59

## 2021-10-06 RX ADMIN — DIBASIC SODIUM PHOSPHATE, MONOBASIC POTASSIUM PHOSPHATE AND MONOBASIC SODIUM PHOSPHATE 250 MG: 852; 155; 130 TABLET ORAL at 05:38

## 2021-10-06 RX ADMIN — CLOPIDOGREL BISULFATE 75 MG: 75 TABLET ORAL at 05:21

## 2021-10-06 RX ADMIN — POLYVINYL ALCOHOL 1 DROP: 14 SOLUTION/ DROPS OPHTHALMIC at 05:20

## 2021-10-06 RX ADMIN — BUDESONIDE AND FORMOTEROL FUMARATE DIHYDRATE 2 PUFF: 80; 4.5 AEROSOL RESPIRATORY (INHALATION) at 07:20

## 2021-10-06 RX ADMIN — CARVEDILOL 3.12 MG: 6.25 TABLET, FILM COATED ORAL at 08:29

## 2021-10-06 RX ADMIN — SALINE NASAL SPRAY 2 SPRAY: 1.5 SOLUTION NASAL at 05:20

## 2021-10-06 RX ADMIN — TRAMADOL HYDROCHLORIDE 50 MG: 50 TABLET, FILM COATED ORAL at 03:49

## 2021-10-06 RX ADMIN — TAMSULOSIN HYDROCHLORIDE 0.4 MG: 0.4 CAPSULE ORAL at 05:22

## 2021-10-06 RX ADMIN — OMEPRAZOLE 20 MG: 20 CAPSULE, DELAYED RELEASE ORAL at 05:38

## 2021-10-06 RX ADMIN — ALPRAZOLAM 0.25 MG: 0.25 TABLET ORAL at 05:21

## 2021-10-06 RX ADMIN — ENOXAPARIN SODIUM 40 MG: 40 INJECTION SUBCUTANEOUS at 05:20

## 2021-10-06 ASSESSMENT — COGNITIVE AND FUNCTIONAL STATUS - GENERAL
SUGGESTED CMS G CODE MODIFIER DAILY ACTIVITY: CK
HELP NEEDED FOR BATHING: A LOT
DAILY ACTIVITIY SCORE: 15
DRESSING REGULAR UPPER BODY CLOTHING: A LITTLE
TOILETING: A LOT
DRESSING REGULAR LOWER BODY CLOTHING: A LOT
PERSONAL GROOMING: A LITTLE
EATING MEALS: A LITTLE

## 2021-10-06 ASSESSMENT — ENCOUNTER SYMPTOMS
SPEECH CHANGE: 0
VOMITING: 0
SHORTNESS OF BREATH: 0
CHILLS: 0
BLURRED VISION: 0
COUGH: 0
DEPRESSION: 0
SORE THROAT: 0
HEARTBURN: 0
DIARRHEA: 0
CLAUDICATION: 0
SENSORY CHANGE: 0
DIZZINESS: 0
FEVER: 0
PHOTOPHOBIA: 0
NERVOUS/ANXIOUS: 1
ABDOMINAL PAIN: 0
CONSTIPATION: 0
HEADACHES: 0
INSOMNIA: 0
WEAKNESS: 0
MYALGIAS: 0

## 2021-10-06 ASSESSMENT — PAIN DESCRIPTION - PAIN TYPE: TYPE: ACUTE PAIN

## 2021-10-06 NOTE — DISCHARGE PLANNING
Received Transport Form @ 9986  Spoke to Lucero @ [facility]    Transport is scheduled for 10/6/2021 @1500 going to [facility].    Informed care team of transport via Voalte.

## 2021-10-06 NOTE — DISCHARGE PLANNING
Agency/Facility Name: [facility]   Spoke To: K[facility]   Outcome: Per Kiet Pt is has bed available for today. Transportation is unavailable for today LSW will set up transport.    Agency/Facility Name: [facility]   Spoke To: Kiet  Outcome: DPA notified [facility] transportation is set up for 1500 today.

## 2021-10-06 NOTE — PROGRESS NOTES
Telemetry Shift Summary    Rhythm AV-Paced  HR Range 72-88  Ectopy r-oPVC, rPAC  Measurements 0.12/0.16/0.40        Normal Values  Rhythm SR  HR Range    Measurements 0.12-0.20 / 0.06-0.10  / 0.30-0.52

## 2021-10-06 NOTE — PROGRESS NOTES
Report given to Sandi ROLON at [place]. All questions answered. IV removed. Pt transferred from Chair to [facility]. Pt tolerated well.

## 2021-10-06 NOTE — PROGRESS NOTES
Hospital Medicine Daily Progress Note    Date of Service  10/6/2021    Chief Complaint  Jeffrey Lizama is a 88 y.o. male admitted 9/25/2021 with increasing shortness of breath.    Hospital Course  88-year-old male on chronic home oxygen presenting 9/25/2021 for worsening shortness of breath, dyspnea on exertion, on chronic home O2 4 to 5 L oxygen supplementation.  Patient found to have bilateral community-acquired pneumonia on CT scan on admission.  Covid screening was negative.      Interval Problem Update  10/5 Patient feeling anxious and wanted me to speak with his wife as he really doesn't want to go to SNF and only wants to return home.  He will need 24 hour nursing care in order to safely leave the hospital.  [facility] long term[facility] will come by today to assess him for assisted living as he and his wife currently live in the independent living section.    10/6 Patient again anxious as the RN from [facility] feels he is not appropriate to return to his independent living situation at this time and is supportive of SNF.  Patient states he feels he now has to go there.  He is feeling slightly more short of breath today and I do appreciate faint crackles on lung ascultation so will order one dose of IV lasix and resume his home dose of PO lasix 20mg.    I have personally seen and examined the patient at bedside. I discussed the plan of care with patient, family, bedside RN, charge RN,  and pharmacy.    Consultants/Specialty  none    Code Status  DNAR, I OK    Disposition  Patient is medically cleared.   Anticipate discharge to to skilled nursing facility.  I have placed the appropriate orders for post-discharge needs.    Review of Systems  Review of Systems   Constitutional: Negative for chills and fever.   HENT: Negative for congestion and sore throat.    Eyes: Negative for blurred vision and photophobia.   Respiratory: Negative for cough and shortness of breath.    Cardiovascular: Negative for chest  pain, claudication and leg swelling.   Gastrointestinal: Negative for abdominal pain, constipation, diarrhea, heartburn and vomiting.   Genitourinary: Negative for dysuria and hematuria.   Musculoskeletal: Negative for joint pain and myalgias.   Skin: Negative for itching and rash.   Neurological: Negative for dizziness, sensory change, speech change, weakness and headaches.   Psychiatric/Behavioral: Negative for depression. The patient is nervous/anxious. The patient does not have insomnia.         Physical Exam  Temp:  [36.3 °C (97.3 °F)-36.3 °C (97.4 °F)] 36.3 °C (97.4 °F)  Pulse:  [70-78] 74  Resp:  [18-20] 18  BP: (129-145)/(69-78) 134/69  SpO2:  [90 %-99 %] 93 %    Physical Exam  Vitals and nursing note reviewed.   Constitutional:       General: He is not in acute distress.     Appearance: Normal appearance.   HENT:      Head: Normocephalic and atraumatic.   Eyes:      General: No scleral icterus.     Extraocular Movements: Extraocular movements intact.   Cardiovascular:      Rate and Rhythm: Normal rate and regular rhythm.      Pulses: Normal pulses.      Heart sounds: Normal heart sounds. No murmur heard.     Pulmonary:      Effort: Pulmonary effort is normal. No respiratory distress.      Breath sounds: Rales (faint crackles upper/lower lung fields.) present. No wheezing or rhonchi.   Abdominal:      General: Abdomen is flat. Bowel sounds are normal. There is no distension.      Palpations: Abdomen is soft.      Tenderness: There is no rebound.   Musculoskeletal:         General: No swelling or tenderness.      Cervical back: Normal range of motion and neck supple.   Lymphadenopathy:      Cervical: No cervical adenopathy.   Skin:     Coloration: Skin is not jaundiced.      Findings: No erythema.   Neurological:      General: No focal deficit present.      Mental Status: He is alert and oriented to person, place, and time. Mental status is at baseline.      Cranial Nerves: No cranial nerve deficit.    Psychiatric:         Mood and Affect: Mood normal.         Behavior: Behavior normal.         Fluids    Intake/Output Summary (Last 24 hours) at 10/6/2021 1128  Last data filed at 10/6/2021 0520  Gross per 24 hour   Intake 480 ml   Output 1900 ml   Net -1420 ml       Laboratory  Recent Labs     10/06/21  0439   WBC 14.3*   RBC 3.79*   HEMOGLOBIN 11.0*   HEMATOCRIT 34.7*   MCV 91.6   MCH 29.0   MCHC 31.7*   RDW 57.5*   PLATELETCT 176   MPV 9.5     Recent Labs     10/06/21  0439   SODIUM 132*   POTASSIUM 3.7   CHLORIDE 95*   CO2 29   GLUCOSE 82   BUN 17   CREATININE 0.75   CALCIUM 8.5                   Imaging  CT-CTA CHEST PULMONARY ARTERY W/ RECONS   Final Result      1.  No pulmonary embolism   2.  New bilateral groundglass and consolidative opacities, this is likely infectious given it is new from 4/18/21 however recommend short interval follow up in 3-6 months to ensure resolution.            DX-CHEST-PORTABLE (1 VIEW)   Final Result      Diffuse bilateral patchy parenchymal opacities. Small left pleural effusion.   Unchanged cardiomegaly           Assessment/Plan  * Pneumonia- (present on admission)  Assessment & Plan  -Bilateral groundglass and consolidative opacities noted on CT scan  -Causing leukocytosis but no sepsis  -Causing worsening hypoxia  - procalcitonin remains within normal ranges  -Covid screening negative    - finished unasyn and azithromycin and PO Augmentin.      Other chest pain  Assessment & Plan  Denies chest pain, has been feeling anxious.      Discharge planning issues  Assessment & Plan  [facility] accepted, potential discharge 10/05  Wife lives at [facility] in [place], unable to care for patient.    Acute encephalopathy- (present on admission)  Assessment & Plan  Patient was drowsy, confusing  and was not able to provide information at admission  likeley due to hypoxia secondary to pneumonia.     - resolved    GERD (gastroesophageal reflux disease)- (present on  admission)  Assessment & Plan  -Continue home PPI    Hypothyroidism- (present on admission)  Assessment & Plan  -Continue home Synthroid at 75 mcg daily  -Most recent TSH was approximately 4 months ago was normal at 3.16    Dyslipidemia- (present on admission)  Assessment & Plan  -Continue home statin    Chronic systolic congestive heart failure (HCC)- (present on admission)  Assessment & Plan  proBNP 4771, seems is at his baseline  Echo 5/2021 showed EF 45%. Dyskinesis of the apex and hypokinesis of the distal/apical inferior and septal walls.    Continue to monitor decompensated signs    Leukocytosis- (present on admission)  Assessment & Plan  -Due to pneumonia  -Repeat CBC in the morning    Elevated troponin- (present on admission)  Assessment & Plan  Chronic   troponin 111-> 115, at his baseline  Troponin down to 70 on last check.    pt reported right upper chest pain for a few days while cough. EKG showed ventricular paced complexes.    Continue to monitor    Normocytic anemia- (present on admission)  Assessment & Plan  -No sign of gross bleeding, chronic    Acute on chronic respiratory failure with hypoxia (HCC)- (present on admission)  Assessment & Plan  -Due to pneumonia  -Continuous pulse ox monitoring  -Respiratory care per protocol  -Continues to be between 6 L up from 5L    FABIAN (acute kidney injury) ([facility])- (present on admission)  Assessment & Plan  Baseline 0.6  Resume lorsartan  Be conservative with IV fluid given HFrEF  Continue to monitor  Improved level     Hyponatremia- (present on admission)  Assessment & Plan  Improved levels    Paroxysmal atrial fibrillation (HCC)- (present on admission)  Assessment & Plan  -Also with a history of sick sinus syndrome, now with a pacemaker  -He is currently paced    BPH (benign prostatic hypertrophy)- (present on admission)  Assessment & Plan  -Continue home Flomax    COPD (chronic obstructive pulmonary disease) ([facility])- (present on admission)  Assessment &  Plan  -No acute exacerbation  -He has however at significant risk of developing an acute exacerbation since he does have a bilateral pneumonia  -Hydrocortisone was started on admission    Essential hypertension, benign- (present on admission)  Assessment & Plan  -Continue home Coreg and losartan  -Start as needed labetalol  -Adjust as needed       VTE prophylaxis: enoxaparin ppx    I have performed a physical exam and reviewed and updated ROS and Plan today (10/6/2021). In review of yesterday's note (10/5/2021), there are no changes except as documented above.

## 2021-10-06 NOTE — FLOWSHEET NOTE
10/06/21 0700   Vital Signs   Pulse 72   Respiration 20   Pulse Oximetry 94 %   $ Pulse Oximetry (Spot Check) Yes   Breath Sounds   RUL Breath Sounds Clear   RML Breath Sounds Diminished   RLL Breath Sounds Diminished   BERRY Breath Sounds Clear   LLL Breath Sounds Diminished   Oxygen   O2 (LPM) 6   O2 Delivery Device Silicone Nasal Cannula

## 2021-10-06 NOTE — PROGRESS NOTES
Telemetry Shift Summary    Rhythm V-Paced on demand/SR, BBB  HR Range 70s-80s  Ectopy occasional PVC/PAC, rare trigeminy  Measurements --/0.16/0.40        Normal Values  Rhythm SR  HR Range    Measurements 0.12-0.20 / 0.06-0.10  / 0.30-0.52

## 2021-10-06 NOTE — DISCHARGE PLANNING
Anticipated Discharge Disposition: SNF vs [facility] with HH    Action: Discussed pt in morning rounds. Pt is medically cleared for SNF/[facility].    LSW received call from Dinora at [facility]. Per Dinora, pt was on service with [facility]. Per Dinora, if pt chooses to go back to [facility], he can resume services with [facility].    Barriers to Discharge: POC    Plan: LSW to speak with pt about going to SNF prior to returning to [facility].    1115: LSW met with pt at bedside to discuss going to SNF before returning to [facility]. Pt is now agreeable to SNF.    LSW requested DPA follow up with [facility] to see if they can take pt today.    1136: Per DPA, she left  for admissions.    1258: Per DPA, [facility] can take pt today, anytime is okay.    1325: LSW faxed transport forms to [facility], requested 1500 transport.    1351: Transport confirmed for 1500. MD and ОЛЬГА Rhoades notified. Pt signed consent for transfer. LSW placed transfer packet in chart. RN notified.    1405: LSW called Lauren ([phone]) at [facility]. LSW left  notifying her that pt is transferring to [facility].

## 2021-10-06 NOTE — THERAPY
Occupational Therapy  Daily Treatment     Patient Name: Jeffrey Lizama  Age:  88 y.o., Sex:  male  Medical Record #: [id number]  Today's Date: 10/6/2021     Precautions: Fall Risk    Assessment    Pt agreeable to OT treatment today. Pt primarily limited by anxiety with movement, generalized weakness, and decreased tolerance for OOB activity. Pt demonstrated supervision for bed mob, max A for LB dressing, min A for STS from bed and from chair, tolerated short functional mobility (10 feet) but required max cues throughout for encouragment. Provided extensive education on benefits of OOB activity and recommend up in chair for meals 3x day (with short mobility prior to txf). Will continue to follow for skilled OT.     Plan    Continue current treatment plan.    DC Equipment Recommendations: (P) Unable to determine at this time  Discharge Recommendations: (P) Recommend post-acute placement for additional occupational therapy services prior to discharge home       10/06/21 1345   Precautions   Precautions Fall Risk   Cognition    Comments anxious with movement, self limiting   Balance   Sitting Balance (Static) Fair +   Sitting Balance (Dynamic) Fair   Standing Balance (Static) Fair -   Standing Balance (Dynamic) Fair -   Weight Shift Sitting Fair   Weight Shift Standing Fair   Skilled Intervention Verbal Cuing;Tactile Cuing;Sequencing;Postural Facilitation   Comments with FWW, max cues for encouragemnet   Bed Mobility    Supine to Sit Supervised   Sit to Supine   (up in chairt)   Scooting Supervised   Skilled Intervention Verbal Cuing;Tactile Cuing;Sequencing;Postural Facilitation   Comments used bed features   Activities of Daily Living   Grooming Supervision;Seated   Lower Body Dressing Maximal Assist   Toileting Moderate Assist   Skilled Intervention Verbal Cuing;Tactile Cuing;Sequencing   How much help from another person does the patient currently need...   Putting on and taking off regular lower body clothing? 2    Bathing (including washing, rinsing, and drying)? 2   Toileting, which includes using a toilet, bedpan, or urinal? 2   Putting on and taking off regular upper body clothing? 3   Taking care of personal grooming such as brushing teeth? 3   Eating meals? 3   6 Clicks Daily Activity Score 15   Functional Mobility   Sit to Stand Minimal Assist   Bed, Chair, Wheelchair Transfer Minimal Assist   Toilet Transfers Refused   Transfer Method Stand Step   Mobility with FWW and max encouragement   Short Term Goals   Short Term Goal # 1 pt will complete ADL txfs using LRAD at spv level   Goal Outcome # 1 Progressing slower than expected   Short Term Goal # 2 pt will complete FB dressing at min A level   Goal Outcome # 2 Progressing slower than expected   Short Term Goal # 3 pt will complete G/H standing sinkside at spv level   Goal Outcome # 3 Goal not met   Education Group   Education Provided Pathology of bedrest   Role of Occupational Therapist Patient Response Patient;Acceptance;Explanation   Back Safety Patient Response Patient;Acceptance;Demonstration;Reinforcement Needed   Energy Conservation Patient Response Patient;Acceptance;Explanation;Verbal Demonstration   Home Safety Patient Response Patient;Acceptance;Explanation;Verbal Demonstration;Action Demonstration   Pathology of Bedrest Patient Response Patient;Acceptance;Explanation;Reinforcement Needed   Anticipated Discharge Equipment and Recommendations   DC Equipment Recommendations Unable to determine at this time   Discharge Recommendations Recommend post-acute placement for additional occupational therapy services prior to discharge home   Interdisciplinary Plan of Care Collaboration   IDT Collaboration with  Nursing   Patient Position at End of Therapy Seated;Call Light within Reach;Tray Table within Reach;Phone within Reach   Collaboration Comments Ot tx and recs   Session Information   Date / Session Number  10/6 3 (1/3, 10/10)   Priority 2

## 2021-10-06 NOTE — PROGRESS NOTES
Assumed pt care. AOx4. Pt reports pain 7/10.  Denies any other distress.  Discussed POC.  Pt verbalized understanding.  Hourly rounding in place. Fall precautions in place and call lights w/in reach.

## 2021-10-13 ENCOUNTER — APPOINTMENT (OUTPATIENT)
Dept: RADIOLOGY | Facility: MEDICAL CENTER | Age: 86
End: 2021-10-13
Attending: EMERGENCY MEDICINE
Payer: MEDICARE

## 2021-10-13 ENCOUNTER — HOSPITAL ENCOUNTER (EMERGENCY)
Facility: MEDICAL CENTER | Age: 86
End: 2021-10-13
Attending: EMERGENCY MEDICINE
Payer: MEDICARE

## 2021-10-13 VITALS
HEIGHT: 67 IN | HEART RATE: 67 BPM | BODY MASS INDEX: 29.66 KG/M2 | RESPIRATION RATE: 20 BRPM | OXYGEN SATURATION: 96 % | DIASTOLIC BLOOD PRESSURE: 55 MMHG | SYSTOLIC BLOOD PRESSURE: 117 MMHG | WEIGHT: 189 LBS | TEMPERATURE: 97.8 F

## 2021-10-13 DIAGNOSIS — R79.89 ELEVATED TROPONIN: ICD-10-CM

## 2021-10-13 DIAGNOSIS — R07.89 CHEST WALL PAIN: ICD-10-CM

## 2021-10-13 LAB
ALBUMIN SERPL BCP-MCNC: 3.7 G/DL (ref 3.2–4.9)
ALBUMIN/GLOB SERPL: 1.7 G/DL
ALP SERPL-CCNC: 71 U/L (ref 30–99)
ALT SERPL-CCNC: 50 U/L (ref 2–50)
ANION GAP SERPL CALC-SCNC: 11 MMOL/L (ref 7–16)
AST SERPL-CCNC: 27 U/L (ref 12–45)
BASOPHILS # BLD AUTO: 0.2 % (ref 0–1.8)
BASOPHILS # BLD: 0.03 K/UL (ref 0–0.12)
BILIRUB SERPL-MCNC: 0.5 MG/DL (ref 0.1–1.5)
BUN SERPL-MCNC: 30 MG/DL (ref 8–22)
CALCIUM SERPL-MCNC: 8.6 MG/DL (ref 8.5–10.5)
CHLORIDE SERPL-SCNC: 95 MMOL/L (ref 96–112)
CO2 SERPL-SCNC: 32 MMOL/L (ref 20–33)
CREAT SERPL-MCNC: 0.99 MG/DL (ref 0.5–1.4)
EKG IMPRESSION: NORMAL
EOSINOPHIL # BLD AUTO: 0.6 K/UL (ref 0–0.51)
EOSINOPHIL NFR BLD: 4.2 % (ref 0–6.9)
ERYTHROCYTE [DISTWIDTH] IN BLOOD BY AUTOMATED COUNT: 58.3 FL (ref 35.9–50)
GLOBULIN SER CALC-MCNC: 2.2 G/DL (ref 1.9–3.5)
GLUCOSE SERPL-MCNC: 75 MG/DL (ref 65–99)
HCT VFR BLD AUTO: 35.3 % (ref 42–52)
HGB BLD-MCNC: 11.7 G/DL (ref 14–18)
IMM GRANULOCYTES # BLD AUTO: 0.24 K/UL (ref 0–0.11)
IMM GRANULOCYTES NFR BLD AUTO: 1.7 % (ref 0–0.9)
LYMPHOCYTES # BLD AUTO: 0.93 K/UL (ref 1–4.8)
LYMPHOCYTES NFR BLD: 6.5 % (ref 22–41)
MCH RBC QN AUTO: 29.5 PG (ref 27–33)
MCHC RBC AUTO-ENTMCNC: 33.1 G/DL (ref 33.7–35.3)
MCV RBC AUTO: 88.9 FL (ref 81.4–97.8)
MONOCYTES # BLD AUTO: 0.8 K/UL (ref 0–0.85)
MONOCYTES NFR BLD AUTO: 5.6 % (ref 0–13.4)
NEUTROPHILS # BLD AUTO: 11.71 K/UL (ref 1.82–7.42)
NEUTROPHILS NFR BLD: 81.8 % (ref 44–72)
NRBC # BLD AUTO: 0 K/UL
NRBC BLD-RTO: 0 /100 WBC
PLATELET # BLD AUTO: 178 K/UL (ref 164–446)
PMV BLD AUTO: 9.2 FL (ref 9–12.9)
POTASSIUM SERPL-SCNC: 3.6 MMOL/L (ref 3.6–5.5)
PROT SERPL-MCNC: 5.9 G/DL (ref 6–8.2)
RBC # BLD AUTO: 3.97 M/UL (ref 4.7–6.1)
SODIUM SERPL-SCNC: 138 MMOL/L (ref 135–145)
TROPONIN T SERPL-MCNC: 111 NG/L (ref 6–19)
TROPONIN T SERPL-MCNC: 118 NG/L (ref 6–19)
WBC # BLD AUTO: 14.3 K/UL (ref 4.8–10.8)

## 2021-10-13 PROCEDURE — 80053 COMPREHEN METABOLIC PANEL: CPT

## 2021-10-13 PROCEDURE — 99285 EMERGENCY DEPT VISIT HI MDM: CPT

## 2021-10-13 PROCEDURE — 84484 ASSAY OF TROPONIN QUANT: CPT

## 2021-10-13 PROCEDURE — 700117 HCHG RX CONTRAST REV CODE 255: Performed by: EMERGENCY MEDICINE

## 2021-10-13 PROCEDURE — 93005 ELECTROCARDIOGRAM TRACING: CPT | Performed by: EMERGENCY MEDICINE

## 2021-10-13 PROCEDURE — 85025 COMPLETE CBC W/AUTO DIFF WBC: CPT

## 2021-10-13 PROCEDURE — 71045 X-RAY EXAM CHEST 1 VIEW: CPT

## 2021-10-13 PROCEDURE — 93005 ELECTROCARDIOGRAM TRACING: CPT

## 2021-10-13 PROCEDURE — 71275 CT ANGIOGRAPHY CHEST: CPT | Mod: ME

## 2021-10-13 RX ADMIN — IOHEXOL 50 ML: 350 INJECTION, SOLUTION INTRAVENOUS at 19:15

## 2021-10-13 ASSESSMENT — LIFESTYLE VARIABLES: DO YOU DRINK ALCOHOL: NO

## 2021-10-13 ASSESSMENT — FIBROSIS 4 INDEX: FIB4 SCORE: 1.671258043593466769

## 2021-10-13 NOTE — ED NOTES
from Lab called with critical result of Troponin 118 at 1654. Critical lab result read back to .   Dr. Richardson notified of critical lab result at 1655.  Critical lab result read back by Dr. Richardson.

## 2021-10-13 NOTE — ED NOTES
Pt BIB EMS from [facility].  Pt reports that after PT today he has been getting chest pain progressively increasing.  Pt reports pain worse on right going into right side and to neck.  Pt has h/o chronic back and neck pain.   Pt also reports Dizziness and slight SOB.  Pt was treated last wk for PNA.  Pt in and out of paced rhythm.  Pt on baseline o2 5 liters.  ERP to see.

## 2021-10-13 NOTE — ED PROVIDER NOTES
ED Provider Note    Scribed for Brendan Richardson M.D. by Binh Hanks. 10/13/2021  3:55 PM    Primary care provider: Bebe Banerjee M.D.  Means of arrival: Walk-in  History obtained from: Patient  History limited by: None    CHIEF COMPLAINT  Chief Complaint   Patient presents with    Chest Pain    Shortness of Breath    Dizziness       HPI  Jeffrey Lizama is a 88 y.o. male who presents to the Emergency Department for evaluation of intermittent chest pain onset 10 AM today. He describes the pain as sharp. His pain came on after doing physical therapy. He has associated dyspnea, sweating, and dizziness. Deep breaths do not exacerbate his pain. He is on blood thinners, and he has a history of blood clots.    REVIEW OF SYSTEMS  Pertinent positives include chest pain, dyspnea, sweating, or dizziness. Pertinent negatives include no fever.  All other systems reviewed and negative.    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), Indigestion, Knee arthroplasty (2002), Obstructive hypertrophic cardiomyopathy ([facility]) (8/4/2011), PAF (paroxysmal atrial fibrillation) ([facility]), Paroxysmal atrial fibrillation ([facility]) (8/29/2011), PE (pulmonary embolism), Phlebitis, Pituitary adenoma ([facility]) (1995), Pituitary adenoma ([facility]) (1995), Pneumonia, Rheumatic fever, S/P insertion of IVC (inferior vena caval) filter, S/P parathyroidectomy ([facility]), S/P parathyroidectomy ([facility]) (2005), Sleep apnea, SSS (sick sinus syndrome) ([facility])  (8/29/2011), Third degree AV block ([facility]) (8/29/2011), thyroidectomy (2005), Unspecified disorder of thyroid, Unspecified hemorrhagic conditions, Unspecified urinary incontinence, and Urinary bladder disorder.    SURGICAL HISTORY   has a past surgical history that includes other orthopedic surgery; cholecystectomy; total knee arthroplasty; other; cervical disk and fusion anterior; revise ulnar nerve at wrist; total knee arthroplasty; cataract extraction with iol; thyroidectomy; pacemaker insertion (Left, 04/23/2010); carpal tunnel endoscopic (9/30/2011); knee revision total (6/20/2013); cholecystectomy; cervical fusion posterior; gastroscopy-endo (1/21/2019); gastroscopy-endo (1/22/2019); and other cardiac surgery.    SOCIAL HISTORY  Social History     Tobacco Use    Smoking status: Never Smoker    Smokeless tobacco: Never Used   Substance Use Topics    Alcohol use: No    Drug use: No      Social History     Substance and Sexual Activity   Drug Use No       FAMILY HISTORY  Family History   Problem Relation Age of Onset    Arthritis Mother     Arthritis Father     Cancer Neg Hx     Heart Disease Neg Hx        CURRENT MEDICATIONS  Current Outpatient Medications   Medication Instructions    albuterol 108 (90 Base) MCG/ACT Aero Soln inhalation aerosol 2 Puffs, Inhalation, EVERY 4 HOURS PRN    ALPRAZolam (XANAX) 0.25 mg, Oral, 3 TIMES DAILY PRN    atorvastatin (LIPITOR) 80 mg, Oral, EVERY EVENING    carboxymethylcellulose (REFRESH TEARS) 0.5 % Solution 1 Drop, Both Eyes, 2 TIMES DAILY    carvedilol (COREG) 3.125 mg, Oral, 2 TIMES DAILY WITH MEALS    clopidogrel (PLAVIX) 75 mg, Oral, DAILY    fluticasone (FLONASE) 50 MCG/ACT nasal spray 1 Spray, Nasal, 2 TIMES DAILY    Fluticasone Furoate-Vilanterol (BREO ELLIPTA) 100-25 MCG/INH AEROSOL POWDER, BREATH ACTIVATED 1 Puff, Inhalation, EVERY EVENING    furosemide (LASIX) 20 mg, Oral, DAILY    levothyroxine (SYNTHROID) 75 mcg, Oral, EACH MORNING ON EMPTY STOMACH    losartan  "(COZAAR) 50 mg, Oral, DAILY    tamsulosin (FLOMAX) 0.4 mg, Oral, DAILY    tiotropium (SPIRIVA) 18 MCG Cap 1 Capsule, Inhalation, DAILY    traMADol (ULTRAM) 50 mg, Oral, EVERY 6 HOURS PRN    Umeclidinium Bromide (INCRUSE ELLIPTA) 62.5 MCG/INH AEROSOL POWDER, BREATH ACTIVATED 1 Puff, Inhalation, EVERY EVENING       ALLERGIES  Allergies   Allergen Reactions    Lisinopril      Hypotension, 30 mg dose    Pcn [Penicillins] Rash     Tolerates cephalosporins         PHYSICAL EXAM  VITAL SIGNS: /71   Pulse 82   Temp 35.9 °C (96.7 °F) (Temporal)   Resp (!) 28   Ht 1.702 m (5' 7\")   Wt 85.7 kg (189 lb)   SpO2 94%   BMI 29.60 kg/m²     Constitutional: Well developed, Well nourished, mild distress, Non-toxic appearance.   HENT: Normocephalic, Atraumatic, Bilateral external ears normal, Oropharynx moist, No oral exudates.   Eyes: PERRLA, EOMI, Conjunctiva normal, No discharge.   Neck: No tenderness, Supple, No stridor.   Lymphatic: No lymphadenopathy noted.   Cardiovascular: Normal heart rate, Normal rhythm.   Thorax & Lungs: Clear to auscultation bilaterally, No respiratory distress, No wheezing, No crackles. Slight anterior chest wall tenderness to palpation.  Abdomen: Soft, No tenderness, No masses, No pulsatile masses.   Skin: Warm, Dry, No erythema, No rash.   Extremities: No edema No cyanosis. No lower extremity edema.   Musculoskeletal: No tenderness to palpation or major deformities noted.  Intact distal pulses  Neurologic: Awake, alert. Moves all extremities spontaneously.  Psychiatric: Affect normal, Judgment normal, Mood normal.     LABS  Results for orders placed or performed during the hospital encounter of 10/13/21   CBC with Differential   Result Value Ref Range    WBC 14.3 (H) 4.8 - 10.8 K/uL    RBC 3.97 (L) 4.70 - 6.10 M/uL    Hemoglobin 11.7 (L) 14.0 - 18.0 g/dL    Hematocrit 35.3 (L) 42.0 - 52.0 %    MCV 88.9 81.4 - 97.8 fL    MCH 29.5 27.0 - 33.0 pg    MCHC 33.1 (L) 33.7 - 35.3 g/dL    RDW 58.3 " (H) 35.9 - 50.0 fL    Platelet Count 178 164 - 446 K/uL    MPV 9.2 9.0 - 12.9 fL    Neutrophils-Polys 81.80 (H) 44.00 - 72.00 %    Lymphocytes 6.50 (L) 22.00 - 41.00 %    Monocytes 5.60 0.00 - 13.40 %    Eosinophils 4.20 0.00 - 6.90 %    Basophils 0.20 0.00 - 1.80 %    Immature Granulocytes 1.70 (H) 0.00 - 0.90 %    Nucleated RBC 0.00 /100 WBC    Neutrophils (Absolute) 11.71 (H) 1.82 - 7.42 K/uL    Lymphs (Absolute) 0.93 (L) 1.00 - 4.80 K/uL    Monos (Absolute) 0.80 0.00 - 0.85 K/uL    Eos (Absolute) 0.60 (H) 0.00 - 0.51 K/uL    Baso (Absolute) 0.03 0.00 - 0.12 K/uL    Immature Granulocytes (abs) 0.24 (H) 0.00 - 0.11 K/uL    NRBC (Absolute) 0.00 K/uL   Complete Metabolic Panel (CMP)   Result Value Ref Range    Sodium 138 135 - 145 mmol/L    Potassium 3.6 3.6 - 5.5 mmol/L    Chloride 95 (L) 96 - 112 mmol/L    Co2 32 20 - 33 mmol/L    Anion Gap 11.0 7.0 - 16.0    Glucose 75 65 - 99 mg/dL    Bun 30 (H) 8 - 22 mg/dL    Creatinine 0.99 0.50 - 1.40 mg/dL    Calcium 8.6 8.5 - 10.5 mg/dL    AST(SGOT) 27 12 - 45 U/L    ALT(SGPT) 50 2 - 50 U/L    Alkaline Phosphatase 71 30 - 99 U/L    Total Bilirubin 0.5 0.1 - 1.5 mg/dL    Albumin 3.7 3.2 - 4.9 g/dL    Total Protein 5.9 (L) 6.0 - 8.2 g/dL    Globulin 2.2 1.9 - 3.5 g/dL    A-G Ratio 1.7 g/dL   Troponin STAT   Result Value Ref Range    Troponin T 118 (H) 6 - 19 ng/L   Troponin in two (2) hours   Result Value Ref Range    Troponin T 111 (H) 6 - 19 ng/L   ESTIMATED GFR   Result Value Ref Range    GFR If African American >60 >60 mL/min/1.73 m 2    GFR If Non African American >60 >60 mL/min/1.73 m 2   EKG   Result Value Ref Range    Report       [facility] Emergency Dept.    Test Date:  2021-10-13  Pt Name:    MANOLO AUGUST                 Department: ER  MRN:        [id number]                      Room:        12  Gender:     Male                         Technician: [id number]  :        [id number]                   Requested By:ER TRIAGE PROTOCOL  Order #:     [id number]                    Reading MD: LIANET CALERO MD    Measurements  Intervals                                Axis  Rate:       91                           P:  OR:         140                          QRS:        -106  QRSD:       262                          T:          266  QT:         368  QTc:        453    Interpretive Statements  VENTRICULAR-PACED COMPLEXES  NO FURTHER RHYTHM ANALYSIS ATTEMPTED DUE TO PACED RHYTHM  RIGHT BUNDLE BRANCH BLOCK  Compared to ECG 10/03/2021 08:54:58  Right bundle-branch block now present  Atrial fibrillation no longer present  Ventricular premature complex(es) no longer present  Electronically Signed On 10-13 -2021 19:45:42 PDT by LIANET CALERO MD          RADIOLOGY  CT-CTA CHEST PULMONARY ARTERY W/ RECONS   Final Result      1.  No CT evidence of pulmonary embolism      2.  Improving multi focal consolidation especially in the upper lobes. Continued follow-up to resolution is recommended given the opacification could obscure malignant nodules and the abnormality is still considerable      3.  Ascending aortic and infrarenal aortic aneurysms, the latter incompletely visualized      4.  Multiple insufficiency fractures of the spine are without significant change.      5.  Inferior vena cava filter      DX-CHEST-PORTABLE (1 VIEW)   Final Result      Cardiomegaly and bilateral interstitial edema.           The radiologist's interpretation of all radiological studies have been reviewed by me.      COURSE & MEDICAL DECISION MAKING  Pertinent Labs & Imaging studies reviewed. (See chart for details)    I reviewed the patient's medical records which showed admitted and discharged 1 week ago with pneumonia. His COVID-19 test was negative, and he was discharged to [facility] facility.    4:15 PM - Patient seen and examined at bedside. Ordered CT-CTA Chest Pulmonary artery w/, DX-Chest, Troponin, CBC with diff., CMP, and EKG to evaluate his symptoms. The  differential diagnoses include but are not limited to: Chest wall pain, PE, ACS    Decision Making:  Patient has an extensive cardiac history with recent cardiac catheterization in April who is coming in with right-sided chest pain.  It seems very musculoskeletal in nature, the patient does have a history of pulmonary emboli, he is on Coumadin and Plavix.  Due to this I got a CT PE study that was negative, 2 troponins are both elevated but the patient has a baseline troponin that is elevated.  The troponins are not increasing.  When the patient recently had an NSTEMI his troponins were up in the 400s, the patient is at 100 right now.  I do not believe this represents ACS.  The patient is feeling improved, will be discharged home, return with worsening symptoms.      FINAL IMPRESSION  1. Chest wall pain    2. Elevated troponin          I, Binh Hanks (Scribe), am scribing for, and in the presence of, Brendan Richardson M.D..    Electronically signed by: Binh Hanks (Scribe), 10/13/2021    I, Brendan Richardson M.D. personally performed the services described in this documentation, as scribed by Binh Hanks in my presence, and it is both accurate and complete.    The note accurately reflects work and decisions made by me.  Brendan Richardson M.D.  10/13/2021  8:08 PM    C

## 2021-11-29 NOTE — ASSESSMENT & PLAN NOTE
"Problem(s) Oriented visit        SUBJECTIVE:                                                    Dione Villa is a 48 year old female who presents to clinic today for the following health issues :    Obesity management: Started Wegovy, now up to 0.5mg once per week, will be on week 4 of this dose with her dose this week. Generally doing well, had a little bit of a headache and nausea after going from 0.25mg to 0.5mg, but felt this was due to not adjusting her intake. Denies any other side effects, no longer having nausea. Weight down 6# from 8/21. There was an issue with insurance and this was started closer to October. More cognizant of diet and activity. , declines starting statin right now- would like to see if this improves with weight loss and healthy diet.    Due for Pap- will return for a physical with fasting labs in January      Problem list, Medication list, Allergies, and Medical/Social/Surgical histories reviewed in [facility] and updated as appropriate.   Additional history: as documented    ROS:  Gen, CV, resp, GI, , endo negative except as listed per HPI      OBJECTIVE:                                                    Physical Exam:    /88   Pulse 82   Resp 16   Ht 1.575 m (5' 2\")   Wt 74.4 kg (164 lb)   SpO2 99%   BMI 30.00 kg/m      CONSTITUTIONAL: Alert non-toxic appearing female in no acute distress  RESPIRATORY: Lungs clear to auscultation, respirations unlabored  CV: Regular rate and rhythm, S1S2, no clicks, murmurs, rubs, or gallops; BLE without edema  GASTROINTESTINAL: Abdomen soft, non-distended, and non-tender to palpation  NEUROLOGIC: No gross deficits  PSYCHIATRIC: Pleasant and interactive, affect euthymic, makes appropriate eye contact, thought process logical       ASSESSMENT/PLAN:                                                          Dione was seen today for weight check.    Diagnoses and all orders for this visit:    Class 1 obesity due to excess calories with " Denies chest pain, has been feeling anxious.     serious comorbidity and body mass index (BMI) of 30.0 to 30.9 in adult  -     Semaglutide-Weight Management (WEGOVY) 1 MG/0.5ML SOAJ; Inject 1 mg Subcutaneous every 7 days  -     Basic Metabolic Panel (8) ([facility])    Doing well on Wegovy. After this week, increase to 1mg weekly x4 weeks, then to 1.7mg weekly x4 weeks if tolerated, then ultimately to 2.4mg weekly thereafter if she continues to tolerate this. To update me with tolerance of 1mg dosing and will send in 1.7mg weekly dosing of Wegovy. Continue working on healthy diet and activity as well. Commended on weight loss of six pounds thus far. Suspect dyslipidemia will improve with treatment of obesity.    Dyslipidemia    Would like to hold off on statin right now and see if dyslipidemia improves with treatment of morbid obesity. Will recheck fasting lipid panel at upcoming physical.    Need for influenza vaccination  -     VACCINE ADMINISTRATION, INITIAL    Other orders  -     AR FLU VAC PRESRV FREE QUAD SPLIT VIR IM  MONTHS IM                The following health maintenance items are reviewed in Epic and correct as of today:  Health Maintenance   Topic Date Due     ADVANCE CARE PLANNING  Never done     HIV SCREENING  Never done     HEPATITIS C SCREENING  Never done     PREVENTIVE CARE VISIT  10/18/2019     INFLUENZA VACCINE (1) 09/01/2021     HPV TEST  09/02/2021     PAP  09/02/2021     COVID-19 Vaccine (3 - Booster for Moderna series) 09/30/2021     MAMMO SCREENING  07/28/2022     DTAP/TDAP/TD IMMUNIZATION (4 - Td or Tdap) 08/04/2026     LIPID  08/10/2026     PHQ-2  Completed     Pneumococcal Vaccine: Pediatrics (0 to 5 Years) and At-Risk Patients (6 to 64 Years)  Aged Out     IPV IMMUNIZATION  Aged Out     MENINGITIS IMMUNIZATION  Aged Out     HEPATITIS B IMMUNIZATION  Aged Out       Patient Instructions   Continue the Wegovy- go to the 1mg dosing for four weeks after this week's dose. Update me after the third 1mg dose on MyChart- if you're doing well,  I'll bump up the dose to 1.7mg weekly.    See me back in January-ish and we will do your physical and your Pap!    We'll check the cholesterol again at that time as well      SHAINA Arroyo CNP  [facility]  Family Practice  [facility]  [phone]    For any issues my office # is [phone]

## 2022-03-16 NOTE — PROGRESS NOTES
Cardiology Follow Up Progress Note    Date of Service  4/19/2021    Attending Physician  Salvador Corona M.D.    Chief Complaint   Ongoing chest pain and hypertension    HPI  Jeffrey Lizama is a 88 y.o. male admitted 4/18/2021 with CP, NSTEMI, treated with hep gtt, plans for LHC, echo with LAD WMA. Developed refractory hypotension after lisinopril    Interim Events  Telemetry- A paced, intermittent V paced  Called to the bedside for C 7/10 and hypotension, SBP 70-80.  Pt will central CP, has been a few hours  ECG with atypical LBBB and intermittent V pacing  Was given lisinopril 30 mg this morning before 6 am, progressive low BP throughout the day  Given 1 L NS bolus  CT PE last night, no PE  Recently stopped warfarin for dermatology procedure  EF this admit 60% with WMA and mild LVH  HS trop 447-> 516    Review of Systems  Review of Systems  No fevers/chills  No nausea/votiming      Vital signs in last 24 hours  Temp:  [36.1 °C (96.9 °F)-36.8 °C (98.2 °F)] 36.1 °C (97 °F)  Pulse:  [66-81] 70  Resp:  [15-21] 17  BP: ()/(53-84) 111/64  SpO2:  [90 %-99 %] 92 %    Physical Exam    General: No acute distress. Chronically ill appearing   HEENT: EOM grossly intact, no scleral icterus, no pharyngeal erythema.   Neck:  unable to assess JVD due to habitus, no bruits, trachea midline  CVS: RRR. 2/ sys murmur. No LE edema.  2+ radial pulses, 2+ PT pulses  Resp: Coarse BS bilaterally. Mild increase work of breathing  Abdomen: Soft, NT, no yoseph hepatomegaly.  MSK/Ext: No clubbing or cyanosis. RA joint deformities hands/feet  Skin: Warm and dry, no rashes.  Neurological: CN III-XII grossly intact. No focal deficits.   Psych: A&O x 3, appropriate affect, good judgement    Lab Review  Lab Results   Component Value Date/Time    WBC 11.1 (H) 04/19/2021 01:02 AM    RBC 5.48 04/19/2021 01:02 AM    HEMOGLOBIN 13.9 (L) 04/19/2021 01:02 AM    HEMATOCRIT 46.6 04/19/2021 01:02 AM    MCV 85.0 04/19/2021 01:02 AM    MCH 25.4 (L)  04/19/2021 01:02 AM    MCHC 29.8 (L) 04/19/2021 01:02 AM    MPV 9.2 04/19/2021 01:02 AM      Lab Results   Component Value Date/Time    SODIUM 134 (L) 04/19/2021 01:02 AM    POTASSIUM 4.1 04/19/2021 01:02 AM    CHLORIDE 96 04/19/2021 01:02 AM    CO2 32 04/19/2021 01:02 AM    GLUCOSE 106 (H) 04/19/2021 01:02 AM    BUN 17 04/19/2021 01:02 AM    CREATININE 1.03 04/19/2021 01:02 AM    CREATININE 1.3 09/11/2008 04:21 PM      Lab Results   Component Value Date/Time    ASTSGOT 25 04/19/2021 01:02 AM    ALTSGPT 17 04/19/2021 01:02 AM     Lab Results   Component Value Date/Time    CHOLSTRLTOT 124 01/29/2019 05:58 AM    LDL 69 01/29/2019 05:58 AM    HDL 42 01/29/2019 05:58 AM    TRIGLYCERIDE 67 01/29/2019 05:58 AM    TROPONINT 621 (H) 04/19/2021 08:48 PM       No results for input(s): NTPROBNP in the last 72 hours.    Cardiac Imaging and Procedures Review  EKG:  My personal interpretation of the EKG dated 4-19-21 is a paced, atypical LBBB    Echocardiogram:  4-19-21   Compared to the images of the study done 1/22/2019  - there has been no   significant change.  EF 60%  Akinesis of apex, hypokinesis of apical septal wall may be from   ventricular pacing verus prior infarct.  Normal right ventricular size and systolic function.  Mild mitral regurgitation.  Mild aortic stenosis.  Mild aortic insufficiency.    Echo 1-22-19  Technically difficult and incomplete study.   Small left ventricular chamber size.  Normal left ventricular systolic function.  Left ventricular ejection fraction is visually estimated to be >80%.  Normal regional wall motion.  All the valves were not well visualized.  Mild aortic insufficiency.  Mild aortic stenosis is probably present.  No mitral stenosis or mitral regurgitation seen.  Mildly dilated left atrium.  Right heart was well visualized.  Vena cava is not well visualized.  Normal pericardium without effusion.    Cardiac Catheterization:  pending    Imaging  EC-ECHOCARDIOGRAM COMPLETE W/O CONT    Final Result      CT-CTA CHEST PULMONARY ARTERY W/ RECONS   Final Result         1.  No evidence of pulmonary embolism.   2.  Mild cardiomegaly.   3.  Atherosclerosis.   4.  Mild 4.2 cm ascending thoracic aortic aneurysm.   5.  Hypoinflation, elevation of left hemidiaphragm with bibasilar atelectasis. No significant consolidation or pleural effusion.   6.  4 mm noncalcified right middle lobe pulmonary nodule. Follow-up per Fleischner criteria is clinically indicated.      Fleischner Society pulmonary nodule recommendations:   Low Risk: No routine follow-up      High Risk: Optional CT at 12 months      Comments: Nodules less than 6 mm do not require routine follow-up, but certain patients at high risk with suspicious nodule morphology, upper lobe location, or both may warrant 12-month follow-up.      Low Risk - Minimal or absent history of smoking and of other known risk factors.      High Risk - History of smoking or of other known risk factors.      Note: These recommendations do not apply to lung cancer screening, patients with immunosuppression, or patients with known primary cancer.      Fleischner Society 2017 Guidelines for Management of Incidentally Detected Pulmonary Nodules in Adults      DX-CHEST-PORTABLE (1 VIEW)   Final Result      1.  Hypoinflation with LEFT lung base atelectasis or infiltrate, possibly chronic.   2.  No overt pulmonary edema.      CL-LEFT HEART CATHETERIZATION WITH POSSIBLE INTERVENTION    (Results Pending)   CT-CTA CHEST PULMONARY ARTERY W/ RECONS    (Results Pending)       Assessment/Plan  -NSTEMI  -Hypotension  -Ongoing CP in the setting of hypotension  -Ischemic CMO, NEW LAD WMA on echo  -PAF  -Hx DVT/Pes, IVC filter  -Chronic anticoagulation, disrupted   -HTN with LVH vs HCM/ mild septal hypetrophy  -SSS  -Pacemaker, prior Gen change  -Pituitary dysfunction, on steroids  -RA  -PVCs by hx  -Upper GI bleed 1/22/2019 with vessel clipping  -Chronic resp failure/COPD  -Sotalol  therapy, high risk medication associated with VT    PLAN:  -Needs aggressive medical mgmt for ACS with correction of hypotension first and foremost, clinical presentation could be ongoing Type II after recent infarct (LAD WMA already present), is not STEMI.  -Transfer to ICU to work on BP and perfusion in the setting of presumed CAD, ongoing ischemia, only modestly climbing troponin  -Midodrine 10 mg stat now to help with BP  -Lisinopril to intolerance list  -Stress steroids per ICU team  -Consider vasopressor to counter act peripheral vasodilator (lisinopril)  -Heparin gtt  -Morphine if BP improves  -LHC in the morning, would go emergently if clinical condition deteriorates    Cardiology Critical Care Documentation    This patient is critically ill.  I have spent a total of 35 minutes examining this patient, all lab data, imaging including ecg and echo, and discussion with Dr. Salvador Corona, pharmacist, RN    ADDENDUM: FU on pt, SBP in 90s, feeling better, CP 2/10, would like to eat, will then give a little morphine, allow pt to rest.    Cardiology will continue to follow this patient.    Thank you for allowing me to participate in the care of this patient.    Please contact me with any questions.    Kellee Miller M.D.   Cardiologist, [facility]  [phone]         Topical Anesthesia?: BLT cream (benzocaine 20%, lidocaine 6%, tetracaine 4%)

## 2023-03-29 NOTE — THERAPY
Medications refilled per protocol completed.     Physical Therapy   Daily Treatment     Patient Name: Jeffrey Lizama  Age:  88 y.o., Sex:  male  Medical Record #: [id number]  Today's Date: 5/10/2021     Precautions: Fall Risk, Cardiac Precautions (See Comments)    Assessment    Pt is more alert than this am, following cues well, mod A to come to EOB but needed only min A for sit to stand, unable to ambulate due to weakness, fear, will progress as able. Transport arrived to take pt to xray, nsg in to prep pt.     Plan    Continue current treatment plan.    DC Equipment Recommendations: Unable to determine at this time  Discharge Recommendations: Recommend post-acute placement for additional physical therapy services prior to discharge home       Objective       05/10/21 1321   Pain 0 - 10 Group   Therapist Pain Assessment During Activity;Nurse Notified  (c/o abdominal pain, not rated. )   Gait Analysis   Gait Level Of Assist Unable to Participate   Bed Mobility    Supine to Sit Moderate Assist   Sit to Supine Moderate Assist   Scooting Moderate Assist   Rolling Moderate Assist to Rt.;Moderate Assist to Lt.   Comments Pt with large BM in bed, CNA in to assist clean up.    Functional Mobility   Sit to Stand Minimal Assist  (surprisingly strong, pt pleased. )   Comments transport arrived at end to take pt to xray. Nsg in to replace pt's condom cath that fell off during therapy.    Short Term Goals    Short Term Goal # 1 pt will perform supine <> sit with min A in 6 visits for improved independence   Goal Outcome # 1 goal not met   Short Term Goal # 2 pt will perform tolerate sitting EOB 5 min with SPV in 6 visits for improved activity tolerance   Goal Outcome # 2 Goal met   Short Term Goal # 3 pt will perform STS with min A in 6 visits to initiate xfrs and gait   Goal Outcome # 3 Goal met   Short Term Goal # 4 pt will perform SPT with mod A in 6 visits for improved OOB mobility   Goal Outcome # 4 Goal not met   Short Term Goal # 5 pt will ambulate 50ft with FWW and  min A in 6 visits for short household distances   Goal Outcome # 5 Goal not met   Anticipated Discharge Equipment and Recommendations   DC Equipment Recommendations Unable to determine at this time   Discharge Recommendations Recommend post-acute placement for additional physical therapy services prior to discharge home

## 2023-08-14 NOTE — PROGRESS NOTES
Gave report to Reyna ARANDA, pt is a/o x 4 VSS no acute changes or distress noted at this time. Pt is going to 4NW 468-2 until a bed assignment is given.   Received report from day shift RN.  Assumed care at 1900. Pt denies any discomfort at this time.  Call light within reach. Bed locked and in lowest position.  Non skid socks on, Belongings within  Reach.  Will continue to monitor hourly.

## 2023-10-04 NOTE — PROGRESS NOTES
- continue home gabapentin and pramipexole.   Assumed care of patient at 0700 from Torres Tinajero RN. Patient is A&O x 3, states pain level is 3/10. Bed locked in lowest position with 3 rails up. Call light in place, belongings at bedside. Patient expresses no needs at this time and hourly rounding is in place.

## 2024-05-15 NOTE — PROGRESS NOTES
Hospital Medicine Daily Progress Note    Date of Service  6/16/2021    Chief Complaint  88 y.o. 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     During the stay in the hospital, patient electrolytes continue to be repleted.  Patient is also noted to have acute on chronic systolic congestive heart failure, started on IV Lasix--> later switched po, I/Os, daily weight, fluid restriction.  Regarding CAD, patient is on aspirin, Plavix, Coreg, losartan, and statin. Patient is to follow-up with cardiology as an outpatient.     Patient has history of adrenal insufficiency,on hydrocortisone 10 bid. PT/OT has evaluated, recommended discharging the patient to skilled nursing facility.  During hospital course patient noted to have UA positive for UTI.  Urine culture positive for Klebsiella .    Interval Problem Update  Patient was seen and examined at bedside.  No acute events overnight. Patient is resting comfortably in bed and in no acute distress.     Awaiting placement  Stable for discharge    Consultants/Specialty  N/A     Code Status  DNAR, I OK     Disposition  [facility]      Review of Systems  Review of Systems   Constitutional: Negative for fever.   HENT: Negative for hearing loss.    Eyes: Negative for blurred vision and double vision.   Respiratory: Negative for cough.    Cardiovascular: Negative for chest pain and palpitations.   Gastrointestinal:  [de-identified] : 80 y/o F pt presents with moderate medial compartmental osteoarthritis of the left knee. I discussed the nature of her condition and treatment options were discussed in detail. The pt is not a candidate for a left TKA. I discussed conservative treatment such as cortisone injections, HA injections, PT, anti-inflammatories, and low impact exercise. The pt responded well to the last series of HA injections and would like to repeat them today. She opted for 3/3 Euflexxa injection in the left knee. She tolerated the injection well.  Patient will continue with low impact exercise and activity modification she may utilize over-the-counter pain medication for osteoarthritis flareups. The pt may have repeat HA injections in 6 months.     Negative for nausea and vomiting.   Genitourinary: Negative for dysuria.   Musculoskeletal: Negative for myalgias.   Skin: Negative for rash.   Neurological: Negative for dizziness and headaches.   Psychiatric/Behavioral: Negative for depression.         Physical Exam  Temp:  [36.2 °C (97.2 °F)-37.1 °C (98.8 °F)] 36.2 °C (97.2 °F)  Pulse:  [71-82] 78  Resp:  [16-21] 16  BP: (125-156)/(59-92) 156/92  SpO2:  [93 %-96 %] 96 %     Physical Exam  Constitutional:       Appearance: He is obese. Chronically ill appearing, pleasant, conversational  HENT:      Head: Normocephalic and atraumatic.      Right Ear: Tympanic membrane normal.      Left Ear: Tympanic membrane normal.      Nose: Nose normal.      Mouth/Throat:      Mouth: Mucous membranes are moist.     Cardiovascular:      Rate and Rhythm: Normal rate and regular rhythm.      Pulses: Normal pulses.      Heart sounds: Normal heart sounds. No murmur heard.     Pulmonary:      Effort: Pulmonary effort is normal. No respiratory distress.   Abdominal:      General: Abdomen is flat.      Palpations: Abdomen is soft.      Tenderness: There is no abdominal tenderness.   Musculoskeletal:    Left upper extremity swelling.  Skin:     Coloration: Skin is not jaundiced.   Neurological:      General: No focal deficit present.      Mental Status: He is alert and oriented to person, place, and time. Mental status is at baseline.   Psychiatric:         Mood and Affect: Mood normal.     Patient was seen and examined at bedside. No changes in physical exam from prior evaluation.      Fluids    Intake/Output Summary (Last 24 hours) at 6/16/2021 1439  Last data filed at 6/16/2021 1300  Gross per 24 hour   Intake 480 ml   Output 2500 ml   Net -2020 ml       Laboratory  Recent Labs     06/15/21  0416   WBC 9.0   RBC 3.84*   HEMOGLOBIN 11.7*   HEMATOCRIT 35.7*   MCV 93.0   MCH 30.5   MCHC 32.8*   RDW 70.5*   PLATELETCT 253   MPV 10.0     Recent Labs     06/15/21  0416   SODIUM 135    POTASSIUM 3.6   CHLORIDE 96   CO2 34*   GLUCOSE 80   BUN 16   CREATININE 0.62   CALCIUM 8.2*                   Imaging  DX-CHEST-PORTABLE (1 VIEW)   Final Result      1.  Bibasilar underinflation atelectasis which could obscure an additional process.   2.  Small BILATERAL pleural effusions      DX-CHEST-PORTABLE (1 VIEW)   Final Result      Findings consistent with mild pulmonary edema along with minimal bibasilar atelectasis and small left pleural effusion. No significant change.      DX-CHEST-LIMITED (1 VIEW)   Final Result         No significant interval change.      Small bilateral pleural effusion bibasilar opacities, similar to prior.      Stable cardiomegaly.      US-EXTREMITY VENOUS UPPER UNILAT LEFT   Final Result      CT-EXTREMITY, UPPER WITH LEFT   Final Result      No acute fracture or dislocation.      Severe osteoarthritis of the elbow joint.      IR-MIDLINE CATHETER INSERTION WO GUIDANCE > AGE 3   Final Result                  Ultrasound-guided midline placement performed by qualified nursing staff    as above.          IR-US GUIDED PIV   Final Result    Ultrasound-guided PERIPHERAL IV INSERTION performed by    qualified nursing staff as above.      US-EXTREMITY VENOUS LOWER UNILAT LEFT   Final Result      DX-CHEST-PORTABLE (1 VIEW)   Final Result      Bibasilar opacities may represent atelectasis or consolidation.      Small left pleural effusion may be present.      Pulmonary interstitial edema.      Stable prominence of the cardiomediastinal silhouette.      Atherosclerotic calcification is seen.      DX-ELBOW-COMPLETE 3+ LEFT   Final Result      1.  No fracture or dislocation of LEFT elbow.   2.  Severe degenerative change.   3.  Diffuse subcutaneous edema.   4.  Limited by positioning.           Assessment/Plan  * Acute on chronic systolic congestive heart failure (HCC)- (present on admission)  Assessment & Plan  Patient on 5L oxygen via nc at baseline  Echo showed EF of 45% on 5/12, I/os ,  daily weight and fluid restriction to 1.5 L  Continue BB , losartan and  Lasix  Reached euvolemic status.  Cardiology out-patient follow-up.    Pain and swelling of left upper extremity- (present on admission)  Assessment & Plan  US negative for DVT   Improving   Compression banding per OT.    Anasarca- (present on admission)  Assessment & Plan  Continue to monitor on diuretic   Resolving.    Debility- (present on admission)  Assessment & Plan  SNF placement pending     Adrenal insufficiency (HCC)- (present on admission)  Assessment & Plan   On hydrocortisone home dose.    UTI (urinary tract infection)  Assessment & Plan  On rocephine   Urine culture growing Klebsiella pneumonia  Completed a course of antibiotics.    Chronic respiratory failure with hypoxia (HCC)  Assessment & Plan  At baseline for now.  Continue supplemental oxygen, on 4 to 5 L  Encourage incentive spirometry as atelectasis seen on chest x-ray.    History of pulmonary embolism- (present on admission)  Assessment & Plan  Warfarin was discontinued during previous recent hospitalization due to blood loss anemia, patient knows   monitor hemoglobin hematocrit, so far stabel  Transfuse if less than 7  Recent CT of the chest done on 5/8/2021 was negative for PE.        COPD (chronic obstructive pulmonary disease) (HCC)- (present on admission)  Assessment & Plan  Continue RT protocol, breathing treatment   --Titrate O2 as needed    -- ON 4 L of O2  And denied any complain of sob    Essential hypertension, benign- (present on admission)  Assessment & Plan  Blood pressure noted to be well controlled, monitor  Continue coreg and losartan    Advanced care planning/counseling discussion  Assessment & Plan  Discussion with patient wife regarding patient placement. Wife agreeable to SNF placement and is willing to expand geographic area of placement including [place]. Total of 13 minutes spent in conversation and coordination of advanced care planning.     Sacral  wound- (present on admission)  Assessment & Plan  Wound mx as per wound team recs   --Offloading and periodic turning     Hypophosphatemia  Assessment & Plan  Replete and monitor  2.5      Hypomagnesemia  Assessment & Plan  Replete and monitor      Hypokalemia- (present on admission)  Assessment & Plan  Replete and monitor    Coronary artery disease- (present on admission)  Assessment & Plan  S/p PCI with stent placement 4 weeks ago  Aspirin, Plavix, atorvastatin    FABIAN (acute kidney injury) ([facility])  Assessment & Plan  Resolved.  Resume losartan and Lasix    Sepsis ([facility])  Assessment & Plan  Resolved           BPH (benign prostatic hypertrophy)- (present on admission)  Assessment & Plan  continiue Flomax and finasteride       VTE prophylaxis: lovenox

## 2024-10-16 NOTE — PROGRESS NOTES
Report received from ОЛЬГА Lawrence at 0700. Assumed care, assessment complete. Pt is A & O x 3.  Pt denies having any pain at this time. Fall precautions and appropriate signs in place. Pt oriented to unit routine, call light/phone system and CNA and RN extension number provided. Pt educated regarding fall precautions. Bed alarm in use and locked in lowest position. Pt denies any additional needs at this time. Call light within reach.    17:07

## 2025-05-05 NOTE — TELEPHONE ENCOUNTER
Advance Care Planning   Healthcare Decision Maker:    Primary Decision Maker: Coco Blackman - [facility] - [phone]      Today we documented Decision Maker(s) consistent with Legal Next of Kin hierarchy.        Contacted patient. Acknowledged message. Patient states he had to give up driving.  His wife drives however she only drives in town.  She can't drive long distances.     Last pacer check was March 2019    To pacer clinic to advise

## (undated) DEVICE — CATHERTER CLEAR SINGLE USE INJECTION THERAPY NEEDLE 25GA X 4MM  2.3MM X 240CM (5EA/BX)

## (undated) DEVICE — FILM CASSETTE ENDO

## (undated) DEVICE — BITE BLOCK ADULT 60FR (100EA/CA)

## (undated) DEVICE — KIT CUSTOM PROCEDURE SINGLE FOR ENDO  (15/CA)

## (undated) DEVICE — DEVICE HEMOSTATIC CLIPPING RESOLUTION 360 DEGREES (20EA/BX)